# Patient Record
Sex: FEMALE | Race: WHITE | Employment: OTHER | URBAN - METROPOLITAN AREA
[De-identification: names, ages, dates, MRNs, and addresses within clinical notes are randomized per-mention and may not be internally consistent; named-entity substitution may affect disease eponyms.]

---

## 2017-10-01 ENCOUNTER — APPOINTMENT (OUTPATIENT)
Dept: CT IMAGING | Age: 82
DRG: 690 | End: 2017-10-01
Payer: MEDICARE

## 2017-10-01 ENCOUNTER — APPOINTMENT (OUTPATIENT)
Dept: MRI IMAGING | Age: 82
DRG: 690 | End: 2017-10-01
Payer: MEDICARE

## 2017-10-01 ENCOUNTER — HOSPITAL ENCOUNTER (INPATIENT)
Age: 82
LOS: 2 days | Discharge: HOME | DRG: 690 | End: 2017-10-03
Attending: EMERGENCY MEDICINE | Admitting: INTERNAL MEDICINE
Payer: MEDICARE

## 2017-10-01 DIAGNOSIS — G35 MULTIPLE SCLEROSIS (HCC): ICD-10-CM

## 2017-10-01 DIAGNOSIS — R27.0 ATAXIA: Primary | ICD-10-CM

## 2017-10-01 DIAGNOSIS — N30.00 ACUTE CYSTITIS WITHOUT HEMATURIA: ICD-10-CM

## 2017-10-01 LAB
-: ABNORMAL
ABSOLUTE EOS #: 0.1 K/UL (ref 0–0.4)
ABSOLUTE LYMPH #: 2.2 K/UL (ref 1–4.8)
ABSOLUTE MONO #: 0.3 K/UL (ref 0.1–1.3)
ALBUMIN SERPL-MCNC: 4.3 G/DL (ref 3.5–5.2)
ALBUMIN/GLOBULIN RATIO: NORMAL (ref 1–2.5)
ALP BLD-CCNC: 58 U/L (ref 35–104)
ALT SERPL-CCNC: 11 U/L (ref 5–33)
AMORPHOUS: ABNORMAL
ANION GAP SERPL CALCULATED.3IONS-SCNC: 13 MMOL/L (ref 9–17)
AST SERPL-CCNC: 14 U/L
BACTERIA: ABNORMAL
BASOPHILS # BLD: 1 %
BASOPHILS ABSOLUTE: 0 K/UL (ref 0–0.2)
BILIRUB SERPL-MCNC: 0.34 MG/DL (ref 0.3–1.2)
BILIRUBIN DIRECT: <0.08 MG/DL
BILIRUBIN URINE: NEGATIVE
BILIRUBIN, INDIRECT: NORMAL MG/DL (ref 0–1)
BUN BLDV-MCNC: 18 MG/DL (ref 8–23)
BUN/CREAT BLD: ABNORMAL (ref 9–20)
CALCIUM SERPL-MCNC: 9.2 MG/DL (ref 8.6–10.4)
CASTS UA: ABNORMAL /LPF
CHLORIDE BLD-SCNC: 101 MMOL/L (ref 98–107)
CO2: 26 MMOL/L (ref 20–31)
COLOR: YELLOW
COMMENT UA: ABNORMAL
CREAT SERPL-MCNC: 0.82 MG/DL (ref 0.5–0.9)
CRYSTALS, UA: ABNORMAL /HPF
DIFFERENTIAL TYPE: NORMAL
EOSINOPHILS RELATIVE PERCENT: 1 %
EPITHELIAL CELLS UA: ABNORMAL /HPF
GFR AFRICAN AMERICAN: >60 ML/MIN
GFR NON-AFRICAN AMERICAN: >60 ML/MIN
GFR SERPL CREATININE-BSD FRML MDRD: ABNORMAL ML/MIN/{1.73_M2}
GFR SERPL CREATININE-BSD FRML MDRD: ABNORMAL ML/MIN/{1.73_M2}
GLOBULIN: NORMAL G/DL (ref 1.5–3.8)
GLUCOSE BLD-MCNC: 100 MG/DL (ref 70–99)
GLUCOSE URINE: NEGATIVE
HCT VFR BLD CALC: 37.4 % (ref 36–46)
HEMOGLOBIN: 12.7 G/DL (ref 12–16)
KETONES, URINE: NEGATIVE
LEUKOCYTE ESTERASE, URINE: ABNORMAL
LYMPHOCYTES # BLD: 37 %
MCH RBC QN AUTO: 30.8 PG (ref 26–34)
MCHC RBC AUTO-ENTMCNC: 34.1 G/DL (ref 31–37)
MCV RBC AUTO: 90.3 FL (ref 80–100)
MONOCYTES # BLD: 6 %
MUCUS: ABNORMAL
NITRITE, URINE: NEGATIVE
OTHER OBSERVATIONS UA: ABNORMAL
PDW BLD-RTO: 14.2 % (ref 11.5–14.9)
PH UA: 7.5 (ref 5–8)
PLATELET # BLD: 288 K/UL (ref 150–450)
PLATELET ESTIMATE: NORMAL
PMV BLD AUTO: 7.8 FL (ref 6–12)
POTASSIUM SERPL-SCNC: 4.7 MMOL/L (ref 3.7–5.3)
PROTEIN UA: ABNORMAL
RBC # BLD: 4.14 M/UL (ref 4–5.2)
RBC # BLD: NORMAL 10*6/UL
RBC UA: ABNORMAL /HPF
RENAL EPITHELIAL, UA: ABNORMAL /HPF
SEG NEUTROPHILS: 55 %
SEGMENTED NEUTROPHILS ABSOLUTE COUNT: 3.3 K/UL (ref 1.3–9.1)
SODIUM BLD-SCNC: 140 MMOL/L (ref 135–144)
SPECIFIC GRAVITY UA: 1.01 (ref 1–1.03)
TOTAL PROTEIN: 7 G/DL (ref 6.4–8.3)
TRICHOMONAS: ABNORMAL
TROPONIN INTERP: NORMAL
TROPONIN T: <0.03 NG/ML
TURBIDITY: ABNORMAL
URINE HGB: NEGATIVE
UROBILINOGEN, URINE: NORMAL
WBC # BLD: 5.9 K/UL (ref 3.5–11)
WBC # BLD: NORMAL 10*3/UL
WBC UA: ABNORMAL /HPF
YEAST: ABNORMAL

## 2017-10-01 PROCEDURE — 80048 BASIC METABOLIC PNL TOTAL CA: CPT

## 2017-10-01 PROCEDURE — 96374 THER/PROPH/DIAG INJ IV PUSH: CPT

## 2017-10-01 PROCEDURE — 6360000002 HC RX W HCPCS: Performed by: EMERGENCY MEDICINE

## 2017-10-01 PROCEDURE — 6360000002 HC RX W HCPCS: Performed by: STUDENT IN AN ORGANIZED HEALTH CARE EDUCATION/TRAINING PROGRAM

## 2017-10-01 PROCEDURE — 84484 ASSAY OF TROPONIN QUANT: CPT

## 2017-10-01 PROCEDURE — 87086 URINE CULTURE/COLONY COUNT: CPT

## 2017-10-01 PROCEDURE — 6370000000 HC RX 637 (ALT 250 FOR IP): Performed by: STUDENT IN AN ORGANIZED HEALTH CARE EDUCATION/TRAINING PROGRAM

## 2017-10-01 PROCEDURE — 99285 EMERGENCY DEPT VISIT HI MDM: CPT

## 2017-10-01 PROCEDURE — 70450 CT HEAD/BRAIN W/O DYE: CPT

## 2017-10-01 PROCEDURE — 36415 COLL VENOUS BLD VENIPUNCTURE: CPT

## 2017-10-01 PROCEDURE — 80076 HEPATIC FUNCTION PANEL: CPT

## 2017-10-01 PROCEDURE — 6370000000 HC RX 637 (ALT 250 FOR IP): Performed by: INTERNAL MEDICINE

## 2017-10-01 PROCEDURE — 81001 URINALYSIS AUTO W/SCOPE: CPT

## 2017-10-01 PROCEDURE — 6360000002 HC RX W HCPCS: Performed by: INTERNAL MEDICINE

## 2017-10-01 PROCEDURE — 1200000000 HC SEMI PRIVATE

## 2017-10-01 PROCEDURE — 2580000003 HC RX 258: Performed by: STUDENT IN AN ORGANIZED HEALTH CARE EDUCATION/TRAINING PROGRAM

## 2017-10-01 PROCEDURE — 2580000003 HC RX 258: Performed by: EMERGENCY MEDICINE

## 2017-10-01 PROCEDURE — 93005 ELECTROCARDIOGRAM TRACING: CPT

## 2017-10-01 PROCEDURE — 85025 COMPLETE CBC W/AUTO DIFF WBC: CPT

## 2017-10-01 PROCEDURE — 99223 1ST HOSP IP/OBS HIGH 75: CPT | Performed by: INTERNAL MEDICINE

## 2017-10-01 RX ORDER — MELOXICAM 15 MG/1
15 TABLET ORAL DAILY
Status: CANCELLED | OUTPATIENT
Start: 2017-10-01

## 2017-10-01 RX ORDER — TRAMADOL HYDROCHLORIDE 50 MG/1
50 TABLET ORAL NIGHTLY
Status: DISCONTINUED | OUTPATIENT
Start: 2017-10-01 | End: 2017-10-01

## 2017-10-01 RX ORDER — TRAMADOL HYDROCHLORIDE 50 MG/1
50 TABLET ORAL EVERY 6 HOURS PRN
Status: DISCONTINUED | OUTPATIENT
Start: 2017-10-01 | End: 2017-10-03 | Stop reason: HOSPADM

## 2017-10-01 RX ORDER — TIZANIDINE 4 MG/1
4 TABLET ORAL EVERY 6 HOURS PRN
Status: CANCELLED | OUTPATIENT
Start: 2017-10-01

## 2017-10-01 RX ORDER — HYDROCODONE BITARTRATE AND ACETAMINOPHEN 5; 325 MG/1; MG/1
1 TABLET ORAL 2 TIMES DAILY
Status: DISCONTINUED | OUTPATIENT
Start: 2017-10-01 | End: 2017-10-01

## 2017-10-01 RX ORDER — HYDROCODONE BITARTRATE AND ACETAMINOPHEN 5; 325 MG/1; MG/1
1 TABLET ORAL EVERY 6 HOURS PRN
Status: DISCONTINUED | OUTPATIENT
Start: 2017-10-01 | End: 2017-10-01

## 2017-10-01 RX ORDER — ACETAMINOPHEN AND CODEINE PHOSPHATE 60; 300 MG/1; MG/1
1 TABLET ORAL DAILY
Status: DISCONTINUED | OUTPATIENT
Start: 2017-10-01 | End: 2017-10-01

## 2017-10-01 RX ORDER — ACETAMINOPHEN AND CODEINE PHOSPHATE 300; 60 MG/1; MG/1
1 TABLET ORAL DAILY
Status: CANCELLED | OUTPATIENT
Start: 2017-10-01

## 2017-10-01 RX ORDER — HYDROCODONE BITARTRATE AND ACETAMINOPHEN 5; 325 MG/1; MG/1
1 TABLET ORAL 2 TIMES DAILY
Status: CANCELLED | OUTPATIENT
Start: 2017-10-01

## 2017-10-01 RX ORDER — TIZANIDINE 4 MG/1
4 TABLET ORAL EVERY 6 HOURS PRN
Status: DISCONTINUED | OUTPATIENT
Start: 2017-10-01 | End: 2017-10-03 | Stop reason: HOSPADM

## 2017-10-01 RX ORDER — METHYLPREDNISOLONE SODIUM SUCCINATE 1 G/16ML
1000 INJECTION, POWDER, LYOPHILIZED, FOR SOLUTION INTRAMUSCULAR; INTRAVENOUS DAILY
Status: DISCONTINUED | OUTPATIENT
Start: 2017-10-01 | End: 2017-10-01 | Stop reason: CLARIF

## 2017-10-01 RX ORDER — ONDANSETRON 2 MG/ML
4 INJECTION INTRAMUSCULAR; INTRAVENOUS EVERY 6 HOURS PRN
Status: DISCONTINUED | OUTPATIENT
Start: 2017-10-01 | End: 2017-10-03 | Stop reason: HOSPADM

## 2017-10-01 RX ORDER — TRAMADOL HYDROCHLORIDE 50 MG/1
100 TABLET ORAL EVERY 6 HOURS PRN
Status: DISCONTINUED | OUTPATIENT
Start: 2017-10-01 | End: 2017-10-03 | Stop reason: HOSPADM

## 2017-10-01 RX ORDER — METHYLPREDNISOLONE SODIUM SUCCINATE 500 MG/8ML
500 INJECTION INTRAMUSCULAR; INTRAVENOUS EVERY 12 HOURS
Status: CANCELLED | OUTPATIENT
Start: 2017-10-02

## 2017-10-01 RX ORDER — AMITRIPTYLINE HYDROCHLORIDE 25 MG/1
25 TABLET, FILM COATED ORAL NIGHTLY
Status: CANCELLED | OUTPATIENT
Start: 2017-10-01

## 2017-10-01 RX ORDER — SODIUM CHLORIDE 0.9 % (FLUSH) 0.9 %
10 SYRINGE (ML) INJECTION EVERY 12 HOURS SCHEDULED
Status: DISCONTINUED | OUTPATIENT
Start: 2017-10-01 | End: 2017-10-03 | Stop reason: HOSPADM

## 2017-10-01 RX ORDER — SODIUM CHLORIDE 0.9 % (FLUSH) 0.9 %
10 SYRINGE (ML) INJECTION PRN
Status: DISCONTINUED | OUTPATIENT
Start: 2017-10-01 | End: 2017-10-03 | Stop reason: HOSPADM

## 2017-10-01 RX ORDER — SIMVASTATIN 20 MG
20 TABLET ORAL NIGHTLY
Status: CANCELLED | OUTPATIENT
Start: 2017-10-01

## 2017-10-01 RX ORDER — AMITRIPTYLINE HYDROCHLORIDE 25 MG/1
25 TABLET, FILM COATED ORAL NIGHTLY
Status: DISCONTINUED | OUTPATIENT
Start: 2017-10-01 | End: 2017-10-03 | Stop reason: HOSPADM

## 2017-10-01 RX ORDER — DEXAMETHASONE SODIUM PHOSPHATE 4 MG/ML
4 INJECTION, SOLUTION INTRA-ARTICULAR; INTRALESIONAL; INTRAMUSCULAR; INTRAVENOUS; SOFT TISSUE ONCE
Status: COMPLETED | OUTPATIENT
Start: 2017-10-01 | End: 2017-10-01

## 2017-10-01 RX ORDER — ALENDRONATE SODIUM 70 MG/1
70 TABLET ORAL
Status: CANCELLED | OUTPATIENT
Start: 2017-10-01

## 2017-10-01 RX ORDER — ALENDRONATE SODIUM 70 MG/1
70 TABLET ORAL
COMMUNITY
End: 2018-10-10 | Stop reason: ALTCHOICE

## 2017-10-01 RX ORDER — CIPROFLOXACIN 2 MG/ML
400 INJECTION, SOLUTION INTRAVENOUS ONCE
Status: COMPLETED | OUTPATIENT
Start: 2017-10-01 | End: 2017-10-01

## 2017-10-01 RX ORDER — MORPHINE SULFATE 2 MG/ML
2 INJECTION, SOLUTION INTRAMUSCULAR; INTRAVENOUS
Status: DISCONTINUED | OUTPATIENT
Start: 2017-10-01 | End: 2017-10-01

## 2017-10-01 RX ORDER — METHYLPREDNISOLONE SODIUM SUCCINATE 125 MG/2ML
250 INJECTION, POWDER, LYOPHILIZED, FOR SOLUTION INTRAMUSCULAR; INTRAVENOUS EVERY 8 HOURS
Status: DISCONTINUED | OUTPATIENT
Start: 2017-10-01 | End: 2017-10-01

## 2017-10-01 RX ORDER — POTASSIUM CHLORIDE 20MEQ/15ML
40 LIQUID (ML) ORAL PRN
Status: DISCONTINUED | OUTPATIENT
Start: 2017-10-01 | End: 2017-10-03 | Stop reason: HOSPADM

## 2017-10-01 RX ORDER — LISINOPRIL 5 MG/1
5 TABLET ORAL DAILY
Status: CANCELLED | OUTPATIENT
Start: 2017-10-01

## 2017-10-01 RX ORDER — TRAMADOL HYDROCHLORIDE 50 MG/1
50 TABLET ORAL NIGHTLY
Status: CANCELLED | OUTPATIENT
Start: 2017-10-01

## 2017-10-01 RX ORDER — ACETAMINOPHEN 325 MG/1
650 TABLET ORAL EVERY 4 HOURS PRN
Status: DISCONTINUED | OUTPATIENT
Start: 2017-10-01 | End: 2017-10-03 | Stop reason: HOSPADM

## 2017-10-01 RX ORDER — SIMVASTATIN 20 MG
20 TABLET ORAL NIGHTLY
Status: DISCONTINUED | OUTPATIENT
Start: 2017-10-01 | End: 2017-10-03 | Stop reason: HOSPADM

## 2017-10-01 RX ORDER — TRAMADOL HYDROCHLORIDE 50 MG/1
50 TABLET ORAL NIGHTLY
COMMUNITY
End: 2018-11-30 | Stop reason: SDUPTHER

## 2017-10-01 RX ORDER — HYDROCODONE BITARTRATE AND ACETAMINOPHEN 5; 325 MG/1; MG/1
1 TABLET ORAL 2 TIMES DAILY PRN
Status: DISCONTINUED | OUTPATIENT
Start: 2017-10-01 | End: 2017-10-01 | Stop reason: SDUPTHER

## 2017-10-01 RX ORDER — MORPHINE SULFATE 10 MG/ML
2 INJECTION, SOLUTION INTRAMUSCULAR; INTRAVENOUS
Status: DISCONTINUED | OUTPATIENT
Start: 2017-10-01 | End: 2017-10-03 | Stop reason: HOSPADM

## 2017-10-01 RX ORDER — POTASSIUM CHLORIDE 20 MEQ/1
40 TABLET, EXTENDED RELEASE ORAL PRN
Status: DISCONTINUED | OUTPATIENT
Start: 2017-10-01 | End: 2017-10-03 | Stop reason: HOSPADM

## 2017-10-01 RX ORDER — PANTOPRAZOLE SODIUM 40 MG/1
40 TABLET, DELAYED RELEASE ORAL
Status: DISCONTINUED | OUTPATIENT
Start: 2017-10-02 | End: 2017-10-03 | Stop reason: HOSPADM

## 2017-10-01 RX ORDER — LISINOPRIL 5 MG/1
5 TABLET ORAL DAILY
Status: DISCONTINUED | OUTPATIENT
Start: 2017-10-01 | End: 2017-10-03 | Stop reason: HOSPADM

## 2017-10-01 RX ORDER — SODIUM CHLORIDE 9 MG/ML
INJECTION, SOLUTION INTRAVENOUS CONTINUOUS
Status: DISCONTINUED | OUTPATIENT
Start: 2017-10-01 | End: 2017-10-03 | Stop reason: HOSPADM

## 2017-10-01 RX ORDER — POTASSIUM CHLORIDE 7.45 MG/ML
10 INJECTION INTRAVENOUS PRN
Status: DISCONTINUED | OUTPATIENT
Start: 2017-10-01 | End: 2017-10-03 | Stop reason: HOSPADM

## 2017-10-01 RX ORDER — ALENDRONATE SODIUM 70 MG/1
70 TABLET ORAL
Status: DISCONTINUED | OUTPATIENT
Start: 2017-10-01 | End: 2017-10-01

## 2017-10-01 RX ORDER — PANTOPRAZOLE SODIUM 40 MG/1
40 TABLET, DELAYED RELEASE ORAL
Status: CANCELLED | OUTPATIENT
Start: 2017-10-02

## 2017-10-01 RX ORDER — CIPROFLOXACIN 2 MG/ML
400 INJECTION, SOLUTION INTRAVENOUS EVERY 12 HOURS
Status: DISCONTINUED | OUTPATIENT
Start: 2017-10-01 | End: 2017-10-03 | Stop reason: HOSPADM

## 2017-10-01 RX ORDER — TIZANIDINE 4 MG/1
4 TABLET ORAL EVERY 6 HOURS PRN
COMMUNITY
End: 2018-11-01 | Stop reason: SDUPTHER

## 2017-10-01 RX ORDER — 0.9 % SODIUM CHLORIDE 0.9 %
1000 INTRAVENOUS SOLUTION INTRAVENOUS ONCE
Status: COMPLETED | OUTPATIENT
Start: 2017-10-01 | End: 2017-10-01

## 2017-10-01 RX ADMIN — AMITRIPTYLINE HYDROCHLORIDE 25 MG: 25 TABLET, FILM COATED ORAL at 21:16

## 2017-10-01 RX ADMIN — TRAMADOL HYDROCHLORIDE 50 MG: 50 TABLET, FILM COATED ORAL at 17:11

## 2017-10-01 RX ADMIN — SODIUM CHLORIDE: 9 INJECTION, SOLUTION INTRAVENOUS at 16:33

## 2017-10-01 RX ADMIN — SIMVASTATIN 20 MG: 20 TABLET, FILM COATED ORAL at 21:17

## 2017-10-01 RX ADMIN — DEXTROSE MONOHYDRATE: 5 INJECTION, SOLUTION INTRAVENOUS at 17:11

## 2017-10-01 RX ADMIN — HYDROCODONE BITARTRATE AND ACETAMINOPHEN 1 TABLET: 5; 325 TABLET ORAL at 13:16

## 2017-10-01 RX ADMIN — TRAMADOL HYDROCHLORIDE 100 MG: 50 TABLET, FILM COATED ORAL at 23:39

## 2017-10-01 RX ADMIN — LISINOPRIL 5 MG: 5 TABLET ORAL at 13:17

## 2017-10-01 RX ADMIN — CIPROFLOXACIN 400 MG: 2 INJECTION, SOLUTION INTRAVENOUS at 11:16

## 2017-10-01 RX ADMIN — SODIUM CHLORIDE 1000 ML: 9 INJECTION, SOLUTION INTRAVENOUS at 11:03

## 2017-10-01 RX ADMIN — CIPROFLOXACIN 400 MG: 2 INJECTION, SOLUTION INTRAVENOUS at 23:40

## 2017-10-01 RX ADMIN — ENOXAPARIN SODIUM 40 MG: 40 INJECTION SUBCUTANEOUS at 13:17

## 2017-10-01 RX ADMIN — DEXAMETHASONE SODIUM PHOSPHATE 4 MG: 4 INJECTION, SOLUTION INTRAMUSCULAR; INTRAVENOUS at 11:15

## 2017-10-01 ASSESSMENT — ENCOUNTER SYMPTOMS
CONSTIPATION: 0
SHORTNESS OF BREATH: 0
CHEST TIGHTNESS: 0
ABDOMINAL PAIN: 0
COUGH: 0
BACK PAIN: 0
VOMITING: 0
CHOKING: 0
ABDOMINAL DISTENTION: 0
NAUSEA: 0
TROUBLE SWALLOWING: 0
DIARRHEA: 0

## 2017-10-01 ASSESSMENT — PAIN DESCRIPTION - LOCATION
LOCATION: GENERALIZED;HEAD
LOCATION: HEAD
LOCATION: HEAD

## 2017-10-01 ASSESSMENT — PAIN DESCRIPTION - FREQUENCY: FREQUENCY: CONTINUOUS

## 2017-10-01 ASSESSMENT — PAIN SCALES - GENERAL
PAINLEVEL_OUTOF10: 5
PAINLEVEL_OUTOF10: 7
PAINLEVEL_OUTOF10: 8
PAINLEVEL_OUTOF10: 8
PAINLEVEL_OUTOF10: 7
PAINLEVEL_OUTOF10: 7

## 2017-10-01 ASSESSMENT — PAIN DESCRIPTION - PAIN TYPE: TYPE: CHRONIC PAIN

## 2017-10-01 ASSESSMENT — PAIN DESCRIPTION - ONSET: ONSET: ON-GOING

## 2017-10-01 ASSESSMENT — PAIN DESCRIPTION - ORIENTATION: ORIENTATION: POSTERIOR

## 2017-10-01 ASSESSMENT — PAIN DESCRIPTION - PROGRESSION: CLINICAL_PROGRESSION: NOT CHANGED

## 2017-10-01 NOTE — CARE COORDINATION
ADMISSION NOTE       Patient admitted to room  2059. Time of admit:  1215    Admit from:  ER    Reason for admission:  Ataxia    Where patient has been residing for the last 24 hrs:  Private residence    Has the patient been admitted to any facility in the last 4 weeks, which one:  No    Family at bedside:  Daughter    Patient is currently in pain Headache    Patient has been oriented to room, educated on how to use call light, and to call for assistance prior to getting up. Bed in lowest and locked position. 2 siderails up for safety. Call light within reach. Patient resting comfortably with daughter at bedside. 14 [facility]  DVT Prophylaxis and Vaccine Status  Work List  Mandatory for all patients      Patient must be on both Chemical prophylaxis and Mechanical prophylaxis.  If chemical/mechanical prophylaxis is not ordered, the physician must document a reason for not using prophylaxis     Chemical Prophylaxis  Is patient on chemical prophylaxis: Yes  If no chemical prophylaxis Is a order in for No Chemical VTE prophylaxisNo  If no was the physician notified not applicable      Mechanical Prophylaxis  Is patient on mechanical prophylaxis, intermittent pneumatic compression device: No  If no was the physician notified  Order in for No Mechanical VTE        Pneumonia Vaccine  Vaccine indicated:  Vaccination refused  If indicated was the vaccine given: not applicable    Influenza Vaccine (applicable from October through March):  Vaccine indicated: Up-to-date  If indicated was the vaccine given: not applicable    Patient Education  Education completed on DVT prophylaxis: yes

## 2017-10-01 NOTE — IP AVS SNAPSHOT
You may receive a survey regarding the care you received during your stay. Your input is valuable to us. We encourage you to complete and return your survey in the envelope provided. We hope you will choose us in the future for your healthcare needs. Patient Information     Patient Name YULIANA Lee 1934      Care Provided at:     Name Address Phone       [address] [facility]  [address] [phone]            Your Visit    Here you will find information about your visit, including the reason for your visit. Please take this sheet with you when you visit your doctor or other health care provider in the future. It will help determine the best possible medical care for you at that time. If you have any questions once you leave the hospital, please call the department phone number listed below. Why you were here     Your primary diagnosis was:  Ataxia    Your diagnoses also included:  Multiple Sclerosis (Hcc), Acute Cystitis Without Hematuria      Visit Information     Date & Time Provider Department Dept. Phone    10/1/2017 Amber Akhtar MD [facility]       Follow-up Appointments    Below is a list of your follow-up and future appointments. This may not be a complete list as you may have made appointments directly with providers that we are not aware of or your providers may have made some for you. Please call your providers to confirm appointments. It is important to keep your appointments. Please bring your current insurance card, photo ID, co-pay, and all medication bottles to your appointment. If self-pay, payment is expected at the time of service. Follow-up Information     Follow up with Ana Perla MD.    Specialty:  Internal Medicine    Contact information:    [facility] [address]  [phone]          Follow up with [facility]. Specialty:  Home Health Services    Why:  your visiting nurse service will call you and set up a time to come to your home    Contact information:    [facility]  [phone]        Preventive Care        Date Due    Tetanus Combination Vaccine (1 - Tdap) 1953    Zoster Vaccine 1994    Osteoporosis screening or a bone density scan (Dexa) is recommended once at age 72. Based upon the results and risk factors for bone loss, your provider will recommend whether this needs to be repeated. 1999    Pneumococcal Vaccines (two) for all adults aged 72 and over (1 of 2 - PCV13) 1999                 Care Plan Once You Return Home    This section includes instructions you will need to follow once you leave the hospital.  Your care team will discuss these with you, so you and those caring for you know how to best care for your health needs at home. This section may also include educational information about certain health topics that may be of help to you.           Discharge [address] Continuity of Care Form    Patient Name: Arcenio Randall   :  1934  MRN:  [id number]    Admit date:  10/1/2017  Discharge date:  10/3/17    Code Status Order: Full Code   Advance Directives: No    Admitting Physician:  Dunia Peaccok MD  PCP: Florin Deal MD    Discharging Nurse: DEB[facility] Unit/Room#: 5216/8539-86  Discharging Unit Phone Number: [phone]    Emergency Contact:   Contact 1: Name: Kathe Reed  Contact 1: Number: [phone]  Contact 1: Relationship: Daughter    Past Surgical History:  Past Surgical History:   Procedure Laterality Date    CATARACT REMOVAL WITH IMPLANT Right 2014    [facility]    CATARACT REMOVAL WITH IMPLANT Left 2014    [facility]    CERVICAL DISC SURGERY      CYSTOCELE REPAIR      HYSTERECTOMY      RECTOCELE REPAIR      SHOULDER ARTHROPLASTY Right 2017 Daily Fluid Restriction: no  Last Modified Barium Swallow with Video (Video Swallowing Test): not done    Treatments at the Time of Hospital Discharge:   Respiratory Treatments: none  Oxygen Therapy:  is not on home oxygen therapy. Ventilator:    - No ventilator support    Rehab Therapies: Physical Therapy and Occupational Therapy  Weight Bearing Status/Restrictions: No weight bearing restirctions  Other Medical Equipment (for information only, NOT a DME order):  walker  Other Treatments: skilled assessment, medication education    Patient's personal belongings (please select all that are sent with patient):  Rose    RN SIGNATURE:  Samara Tirado    PHYSICIAN SECTION    Prognosis: Good    Condition at Discharge: Stable    Rehab Potential (if transferring to Rehab): Good    Recommended Labs or Other Treatments After Discharge:     Physician Certification: I certify the above information and transfer of Luigi Sutton  is necessary for the continuing treatment of the diagnosis listed and that she requires Home Care for less 30 days. Update Admission H&P: No change in H&P    PHYSICIAN SIGNATURE:  Electronically signed by Ashley Rodriguez MD on 10/3/17 at 12:03 PM    CASE MANAGEMENT/SOCIAL WORK SECTION    Inpatient Status Date: 10/1/17    [facility] Readmission Risk Assessment Score:  Risk Score: 19.5   (Score > 14= high risk for readmission)    Discharging to Facility/ [address] [place] [address]  Phone [phone]  · Fax [phone]      / signature: Electronically signed by Harris Jensen RN on 10/3/17 at 3:07 PM      Diet Instructions     ? Good nutrition is important when healing from an illness, injury, or surgery. Follow any nutrition recommendations given to you during your hospital stay. ? If you were given an oral nutrition supplement while in the hospital, continue to take this supplement at home.   You can take it with meals, · headache with chest pain and severe dizziness, fainting, fast or pounding heartbeats;  · muscle pain or weakness;  · a seizure (convulsions);  · signs of tendon rupture --sudden pain, swelling, bruising, tenderness, stiffness, movement problems, or a snapping or popping sound in any of your joints (rest the joint until you receive medical care or instructions);  · nerve symptoms --numbness, weakness, tingling, burning, pain, or being more sensitive to temperature, light touch, or the sense of your body position;  · changes in mood or behavior --depression, confusion, hallucinations, paranoia, tremors, feeling restless or anxious, unusual thoughts or behavior, insomnia, nightmares;  · liver problems --upper stomach pain, loss of appetite, dark urine, sabiha-colored stools, jaundice (yellowing of the skin or eyes);  · increased pressure inside the skull --severe headaches, ringing in your ears, vision problems, pain behind your eyes; or  · severe skin reaction --skin pain followed by a red or purple skin rash that spreads (especially in the face or upper body) and causes blistering and peeling. Common side effects may include:  · nausea, vomiting, diarrhea;  · rash; or  · abnormal liver function tests. This is not a complete list of side effects and others may occur. Call your doctor for medical advice about side effects. You may report side effects to FDA at 0-648-FDA-0549. What other drugs will affect ciprofloxacin?   Tell your doctor about all your current medicines and any you start or stop using, especially:  · cyclosporine, methotrexate, metoclopramide, omeprazole, pentoxifylline, phenytoin, probenecid, ropinirole, sildenafil, theophylline;  · a diuretic or \"water pill\";  · heart rhythm medication --amiodarone, disopyramide, dofetilide, dronedarone, procainamide, quinidine, sotalol, and others;  · medicine to treat depression or mental illness --amitriptylline, clomipramine, clozapine, desipramine, duloxetine, iloperidone, imipramine, nortriptyline, and others; or  · NSAIDs (nonsteroidal anti-inflammatory drugs) --aspirin, ibuprofen (Advil, Motrin), naproxen (Aleve), celecoxib, diclofenac, indomethacin, meloxicam, and others. This list is not complete. Other drugs may interact with ciprofloxacin, including prescription and over-the-counter medicines, vitamins, and herbal products. Not all possible interactions are listed in this medication guide. Where can I get more information? Your pharmacist can provide more information about ciprofloxacin. Remember, keep this and all other medicines out of the reach of children, never share your medicines with others, and use this medication only for the indication prescribed. Every effort has been made to ensure that the information provided by [address] is accurate, up-to-date, and complete, but no guarantee is made to that effect. Drug information contained herein may be time sensitive. [facility] information has been compiled for use by healthcare practitioners and consumers in the [place] and therefore [facility] does not warrant that uses outside of the [place] are appropriate, unless specifically indicated otherwise. [facility]'s drug information does not endorse drugs, diagnose patients or recommend therapy. [facility] drug information is an informational resource designed to assist licensed healthcare practitioners in caring for their patients and/or to serve consumers viewing this service as a supplement to, and not a substitute for, the expertise, skill, knowledge and judgment of healthcare practitioners. The absence of a warning for a given drug or drug combination in no way should be construed to indicate that the drug or drug combination is safe, effective or appropriate for any given patient.  [facility] does not assume any responsibility for any aspect of

## 2017-10-01 NOTE — ED PROVIDER NOTES
[address]  eMERGENCY dEPARTMENT eNCOUnter      Pt Name: Justin Nj  MRN: [id number]  [place] 1934  Date of evaluation: 10/1/17      CHIEF COMPLAINT       Chief Complaint   Patient presents with    Dizziness     Lightheadedness    Neurologic Problem     Imbalance    Multiple Sclerosis    Headache         HISTORY OF PRESENT ILLNESS   HPI 80 y.o. female with a history of multiple sclerosis presents with impaired gait. The patient states that over the last week she's had intermittent sensations of unsteadiness and lightheadedness. The symptoms of been going on for the last week. She went to bed last night around 9 or 10, she was at her baseline but then when she woke up at 4 AM this morning, she tried to get up to go the bathroom by she was unable to walk a straight line and she fell sideways against the wall. She states that she is laying still she feels fine. Unsteadiness occurs when she tries to get up and walk. She denies any weakness in her legs. She has a chronic right-sided weakness and right-sided facial droop from her multiple sclerosis. She denies any new slurred speech or new weakness. She didn't take any medications prior to arrival.  She does state that she doesn't drink much water and that she's been eating and drinking less over the last few weeks. REVIEW OF SYSTEMS       Review of Systems   Constitutional: Positive for activity change, appetite change and fatigue. Negative for chills and fever. HENT: Negative for congestion. Eyes: Negative for visual disturbance. Respiratory: Negative for cough and shortness of breath. Cardiovascular: Negative for chest pain. Gastrointestinal: Negative for abdominal pain and nausea. Endocrine: Negative for polyuria. Musculoskeletal: Positive for gait problem. Negative for back pain and myalgias. Skin: Negative for rash. Neurological: Negative for headaches. Hematological: Negative for adenopathy.        PAST MEDICAL

## 2017-10-01 NOTE — H&P
frequency, hematuria and urgency. Musculoskeletal: Positive for gait problem (Wobbling Gait). Negative for back pain. Neurological: Positive for dizziness, facial asymmetry (Stable R sided Facial Droop), weakness (B/L Lower extremity), light-headedness and headaches. Negative for seizures, speech difficulty and numbness. PHYSICAL EXAM      BP (!) 185/75  Pulse 83  Temp 97.5 °F (36.4 °C) (Oral)   Resp 16  Ht 4' 11\" (1.499 m)  Wt 129 lb 6.4 oz (58.7 kg)  SpO2 97%  BMI 26.14 kg/m2   Physical Exam   Constitutional: She is oriented to person, place, and time. She appears well-developed and well-nourished. HENT:   Head: Normocephalic and atraumatic. Eyes: Conjunctivae and EOM are normal. Pupils are equal, round, and reactive to light. Neck: Normal range of motion. Neck supple. Cardiovascular: Normal rate, regular rhythm, normal heart sounds and intact distal pulses. Pulmonary/Chest: Effort normal and breath sounds normal.   Abdominal: Soft. Neurological: She is alert and oriented to person, place, and time. No cranial nerve deficit. GCS eye subscore is 4. GCS verbal subscore is 5. GCS motor subscore is 6. She displays no Babinski's sign on the right side. She displays no Babinski's sign on the left side. Reflex Scores:       Patellar reflexes are 1+ on the right side and 2+ on the left side. Achilles reflexes are 1+ on the right side and 2+ on the left side.   Strength 4/5 On lower extremities         DIAGNOSTICS     Laboratory Testing:  Recent Results (from the past 24 hour(s))   CBC with DIFF    Collection Time: 10/01/17 10:25 AM   Result Value Ref Range    WBC 5.9 3.5 - 11.0 k/uL    RBC 4.14 4.0 - 5.2 m/uL    Hemoglobin 12.7 12.0 - 16.0 g/dL    Hematocrit 37.4 36 - 46 %    MCV 90.3 80 - 100 fL    MCH 30.8 26 - 34 pg    MCHC 34.1 31 - 37 g/dL    RDW 14.2 11.5 - 14.9 %    Platelets 552 880 - 866 k/uL    MPV 7.8 6.0 - 12.0 fL    Differential Type NOT REPORTED     Seg Neutrophils 55 % Lymphocytes 37 %    Monocytes 6 %    Eosinophils % 1 %    Basophils 1 %    Segs Absolute 3.30 1.3 - 9.1 k/uL    Absolute Lymph # 2.20 1.0 - 4.8 k/uL    Absolute Mono # 0.30 0.1 - 1.3 k/uL    Absolute Eos # 0.10 0.0 - 0.4 k/uL    Basophils # 0.00 0.0 - 0.2 k/uL    WBC Morphology NOT REPORTED     RBC Morphology NOT REPORTED     Platelet Estimate NOT REPORTED    Basic Metabolic Prof    Collection Time: 10/01/17 10:25 AM   Result Value Ref Range    Glucose 100 (H) 70 - 99 mg/dL    BUN 18 8 - 23 mg/dL    CREATININE 0.82 0.50 - 0.90 mg/dL    Bun/Cre Ratio NOT REPORTED 9 - 20    Calcium 9.2 8.6 - 10.4 mg/dL    Sodium 140 135 - 144 mmol/L    Potassium 4.7 3.7 - 5.3 mmol/L    Chloride 101 98 - 107 mmol/L    CO2 26 20 - 31 mmol/L    Anion Gap 13 9 - 17 mmol/L    GFR Non-African American >60 >60 mL/min    GFR African American >60 >60 mL/min    GFR Comment          GFR Staging NOT REPORTED    Hepatic Function Panel    Collection Time: 10/01/17 10:25 AM   Result Value Ref Range    Alb 4.3 3.5 - 5.2 g/dL    Alkaline Phosphatase 58 35 - 104 U/L    ALT 11 5 - 33 U/L    AST 14 <32 U/L    Total Bilirubin 0.34 0.3 - 1.2 mg/dL    Bilirubin, Direct <0.08 <0.31 mg/dL    Bilirubin, Indirect CANNOT BE CALCULATED 0.00 - 1.00 mg/dL    Total Protein 7.0 6.4 - 8.3 g/dL    Globulin NOT REPORTED 1.5 - 3.8 g/dL    Albumin/Globulin Ratio NOT REPORTED 1.0 - 2.5   Troponin    Collection Time: 10/01/17 10:25 AM   Result Value Ref Range    Troponin T <0.03 <0.03 ng/mL    Troponin Interp         Urine reflex to Culture    Collection Time: 10/01/17 10:49 AM   Result Value Ref Range    Color, UA YELLOW YEL    Turbidity UA CLOUDY (A) CLEAR    Glucose, Ur NEGATIVE NEG    Bilirubin Urine NEGATIVE NEG    Ketones, Urine NEGATIVE NEG    Specific [place], UA 1.010 1.000 - 1.030    Urine Hgb NEGATIVE NEG    pH, UA 7.5 5.0 - 8.0    Protein, UA NEGATIVE  Verified by sulfosalicylic acid test. (A) NEG    Urobilinogen, Urine Normal NORM    Nitrite, Urine NEGATIVE NEG    Leukocyte Esterase, Urine LARGE (A) NEG    Urinalysis Comments NOT REPORTED    Microscopic Urinalysis    Collection Time: 10/01/17 10:49 AM   Result Value Ref Range    -          WBC, UA TOO NUMEROUS TO COUNT /HPF    RBC, UA 10 TO 20 /HPF    Casts UA NOT REPORTED /LPF    Crystals UA NOT REPORTED NONE /HPF    Epithelial Cells UA 10 TO 20 /HPF    Renal Epithelial, Urine NOT REPORTED 0 /HPF    Bacteria, UA FEW (A) NONE    Mucus, UA NOT REPORTED NONE    Trichomonas, UA NOT REPORTED NONE    Amorphous, UA NOT REPORTED NONE    Other Observations UA NOT REPORTED NREQ    Yeast, UA NOT REPORTED NONE   EKG 12 Lead    Collection Time: 10/01/17 11:27 AM   Result Value Ref Range    Ventricular Rate 83 BPM    Atrial Rate 83 BPM    P-R Interval 170 ms    QRS Duration 86 ms    Q-T Interval 408 ms    QTc Calculation (Bazett) 479 ms    P Axis 10 degrees    R Axis -3 degrees    T Axis 42 degrees       Imaging/Diagonstics:  Ct Head Wo Contrast    Result Date: 10/1/2017  EXAMINATION: CT OF THE HEAD WITHOUT CONTRAST  10/1/2017 10:41 am TECHNIQUE: CT of the head was performed without the administration of intravenous contrast. Dose modulation, iterative reconstruction, and/or weight based adjustment of the mA/kV was utilized to reduce the radiation dose to as low as reasonably achievable. COMPARISON: 11/16/2016 HISTORY: ORDERING SYSTEM PROVIDED HISTORY: gait impairment. TECHNOLOGIST PROVIDED HISTORY: Ordering Physician Provided Reason for Exam: GAIT IMPAIRMENT Acuity: Acute Type of Exam: Initial Additional signs and symptoms: WOKE UP LIGHT HEADED/DIZZY AND FALLING INTO WALLS FINDINGS: BRAIN/VENTRICLES: There is no acute intracranial hemorrhage, mass effect or midline shift. No abnormal extra-axial fluid collection. The gray-white differentiation is maintained without evidence of an acute infarct. There is no evidence of hydrocephalus. Mild diminished attenuation of periventricular white matter suggesting chronic microangiopathy. There are calcified arteriosclerotic plaques of the bilateral carotid siphons ORBITS: The visualized portion of the orbits demonstrate no acute abnormality. SINUSES: The visualized paranasal sinuses and mastoid air cells demonstrate no acute abnormality. SOFT TISSUES/SKULL:  No acute abnormality of the visualized skull . There is mild soft tissue edema in the left frontal scalp. No acute intracranial abnormality. ASSESSMENT     Principal Problem:    Ataxia  Active Problems:    Multiple sclerosis ([address]    Acute cystitis without hematuria      PLAN     1. Admit the patient Med/Surg    2. Multiple Sclerosis Exacerbation   - MRI Head w/ w/o contrast for new White matter lesion   - Neuro Consult   - IV Solumedrol 1000 mg QD for 3-7 days depending on Symptoms    3.  UTI   - IV Cipro 400 mg Q12       Orders Placed This Encounter   Procedures    Urine culture clean catch    CT Head WO Contrast    MRI BRAIN W WO CONTRAST    CBC with DIFF    Basic Metabolic Prof    Hepatic Function Panel    Troponin    Urine reflex to Culture    Microscopic Urinalysis    Protein, Urine    Basic metabolic panel    CBC auto differential    B12/Folate Panel    TSH w/reflex to FT4    DIET GENERAL;    Orthostatic blood pressure and pulse    Vital signs per unit routine    Tobacco cessation education    Notify physician    Up with assistance    Daily weights    Neuro checks    Full Code    Inpatient consult to Internal Medicine    Consult to Neurology    Inpatient consult to Social Work    OT eval and treat    PT evaluation and treat    Initiate Oxygen Therapy Protocol    Speech language pathology evaluation    EKG 12 Lead    PATIENT STATUS (DIRECT) Inpatient    PATIENT STATUS (FROM ED OR OR/PROCEDURAL) Inpatient         DVT prophylaxis: Lovenox 40 mg SC  GI prophylaxis: Protonix 40 mg daily    Consultations:   Consults: IP CONSULT TO INTERNAL MEDICINE  IP CONSULT TO NEUROLOGY  IP CONSULT TO SOCIAL

## 2017-10-02 ENCOUNTER — APPOINTMENT (OUTPATIENT)
Dept: MRI IMAGING | Age: 82
DRG: 690 | End: 2017-10-02
Payer: MEDICARE

## 2017-10-02 LAB
ABSOLUTE EOS #: 0 K/UL (ref 0–0.4)
ABSOLUTE LYMPH #: 1 K/UL (ref 1–4.8)
ABSOLUTE MONO #: 0.1 K/UL (ref 0.1–1.3)
ANION GAP SERPL CALCULATED.3IONS-SCNC: 15 MMOL/L (ref 9–17)
BASOPHILS # BLD: 0 %
BASOPHILS ABSOLUTE: 0 K/UL (ref 0–0.2)
BUN BLDV-MCNC: 17 MG/DL (ref 8–23)
BUN/CREAT BLD: ABNORMAL (ref 9–20)
CALCIUM SERPL-MCNC: 8.5 MG/DL (ref 8.6–10.4)
CHLORIDE BLD-SCNC: 102 MMOL/L (ref 98–107)
CO2: 22 MMOL/L (ref 20–31)
CREAT SERPL-MCNC: 0.82 MG/DL (ref 0.5–0.9)
CULTURE: NORMAL
CULTURE: NORMAL
DIFFERENTIAL TYPE: ABNORMAL
EOSINOPHILS RELATIVE PERCENT: 0 %
FOLATE: 14.6 NG/ML
GFR AFRICAN AMERICAN: >60 ML/MIN
GFR NON-AFRICAN AMERICAN: >60 ML/MIN
GFR SERPL CREATININE-BSD FRML MDRD: ABNORMAL ML/MIN/{1.73_M2}
GFR SERPL CREATININE-BSD FRML MDRD: ABNORMAL ML/MIN/{1.73_M2}
GLUCOSE BLD-MCNC: 180 MG/DL (ref 70–99)
HCT VFR BLD CALC: 34.2 % (ref 36–46)
HEMOGLOBIN: 11.9 G/DL (ref 12–16)
LYMPHOCYTES # BLD: 12 %
Lab: NORMAL
MCH RBC QN AUTO: 31.2 PG (ref 26–34)
MCHC RBC AUTO-ENTMCNC: 34.6 G/DL (ref 31–37)
MCV RBC AUTO: 90.1 FL (ref 80–100)
MONOCYTES # BLD: 1 %
PDW BLD-RTO: 14.1 % (ref 11.5–14.9)
PLATELET # BLD: 272 K/UL (ref 150–450)
PLATELET ESTIMATE: ABNORMAL
PMV BLD AUTO: 8.3 FL (ref 6–12)
POTASSIUM SERPL-SCNC: 4.1 MMOL/L (ref 3.7–5.3)
RBC # BLD: 3.8 M/UL (ref 4–5.2)
RBC # BLD: ABNORMAL 10*6/UL
SEG NEUTROPHILS: 87 %
SEGMENTED NEUTROPHILS ABSOLUTE COUNT: 6.7 K/UL (ref 1.3–9.1)
SODIUM BLD-SCNC: 139 MMOL/L (ref 135–144)
SPECIMEN DESCRIPTION: NORMAL
SPECIMEN DESCRIPTION: NORMAL
STATUS: NORMAL
THYROXINE, FREE: 0.87 NG/DL (ref 0.93–1.7)
TSH SERPL DL<=0.05 MIU/L-ACNC: 0.61 MIU/L (ref 0.3–5)
VITAMIN B-12: 220 PG/ML (ref 211–946)
WBC # BLD: 7.7 K/UL (ref 3.5–11)
WBC # BLD: ABNORMAL 10*3/UL

## 2017-10-02 PROCEDURE — 85025 COMPLETE CBC W/AUTO DIFF WBC: CPT

## 2017-10-02 PROCEDURE — 70553 MRI BRAIN STEM W/O & W/DYE: CPT

## 2017-10-02 PROCEDURE — 82746 ASSAY OF FOLIC ACID SERUM: CPT

## 2017-10-02 PROCEDURE — 6360000002 HC RX W HCPCS: Performed by: STUDENT IN AN ORGANIZED HEALTH CARE EDUCATION/TRAINING PROGRAM

## 2017-10-02 PROCEDURE — 2580000003 HC RX 258: Performed by: STUDENT IN AN ORGANIZED HEALTH CARE EDUCATION/TRAINING PROGRAM

## 2017-10-02 PROCEDURE — G9162 LANG EXPRESS CURRENT STATUS: HCPCS

## 2017-10-02 PROCEDURE — 84443 ASSAY THYROID STIM HORMONE: CPT

## 2017-10-02 PROCEDURE — 6360000004 HC RX CONTRAST MEDICATION: Performed by: INTERNAL MEDICINE

## 2017-10-02 PROCEDURE — 1200000000 HC SEMI PRIVATE

## 2017-10-02 PROCEDURE — 6370000000 HC RX 637 (ALT 250 FOR IP): Performed by: INTERNAL MEDICINE

## 2017-10-02 PROCEDURE — 80048 BASIC METABOLIC PNL TOTAL CA: CPT

## 2017-10-02 PROCEDURE — 97161 PT EVAL LOW COMPLEX 20 MIN: CPT

## 2017-10-02 PROCEDURE — G8979 MOBILITY GOAL STATUS: HCPCS

## 2017-10-02 PROCEDURE — 36415 COLL VENOUS BLD VENIPUNCTURE: CPT

## 2017-10-02 PROCEDURE — 97530 THERAPEUTIC ACTIVITIES: CPT

## 2017-10-02 PROCEDURE — 82607 VITAMIN B-12: CPT

## 2017-10-02 PROCEDURE — 97165 OT EVAL LOW COMPLEX 30 MIN: CPT

## 2017-10-02 PROCEDURE — 84439 ASSAY OF FREE THYROXINE: CPT

## 2017-10-02 PROCEDURE — 99232 SBSQ HOSP IP/OBS MODERATE 35: CPT | Performed by: INTERNAL MEDICINE

## 2017-10-02 PROCEDURE — G8978 MOBILITY CURRENT STATUS: HCPCS

## 2017-10-02 PROCEDURE — 6360000002 HC RX W HCPCS: Performed by: INTERNAL MEDICINE

## 2017-10-02 PROCEDURE — G9164 LANG EXPRESS D/C STATUS: HCPCS

## 2017-10-02 PROCEDURE — A9579 GAD-BASE MR CONTRAST NOS,1ML: HCPCS | Performed by: INTERNAL MEDICINE

## 2017-10-02 PROCEDURE — 6370000000 HC RX 637 (ALT 250 FOR IP): Performed by: STUDENT IN AN ORGANIZED HEALTH CARE EDUCATION/TRAINING PROGRAM

## 2017-10-02 PROCEDURE — 92523 SPEECH SOUND LANG COMPREHEN: CPT

## 2017-10-02 RX ORDER — SODIUM CHLORIDE 0.9 % (FLUSH) 0.9 %
10 SYRINGE (ML) INJECTION PRN
Status: DISCONTINUED | OUTPATIENT
Start: 2017-10-02 | End: 2017-10-03 | Stop reason: HOSPADM

## 2017-10-02 RX ADMIN — PANTOPRAZOLE SODIUM 40 MG: 40 TABLET, DELAYED RELEASE ORAL at 06:19

## 2017-10-02 RX ADMIN — GADOPENTETATE DIMEGLUMINE 13 ML: 469.01 INJECTION INTRAVENOUS at 12:30

## 2017-10-02 RX ADMIN — MORPHINE SULFATE 2 MG: 10 INJECTION, SOLUTION INTRAMUSCULAR; INTRAVENOUS at 19:45

## 2017-10-02 RX ADMIN — TRAMADOL HYDROCHLORIDE 100 MG: 50 TABLET, FILM COATED ORAL at 14:07

## 2017-10-02 RX ADMIN — DEXTROSE MONOHYDRATE: 5 INJECTION, SOLUTION INTRAVENOUS at 09:11

## 2017-10-02 RX ADMIN — SODIUM CHLORIDE: 9 INJECTION, SOLUTION INTRAVENOUS at 18:39

## 2017-10-02 RX ADMIN — LISINOPRIL 5 MG: 5 TABLET ORAL at 09:11

## 2017-10-02 RX ADMIN — MORPHINE SULFATE 2 MG: 10 INJECTION, SOLUTION INTRAMUSCULAR; INTRAVENOUS at 06:18

## 2017-10-02 RX ADMIN — SIMVASTATIN 20 MG: 20 TABLET, FILM COATED ORAL at 19:44

## 2017-10-02 RX ADMIN — ENOXAPARIN SODIUM 40 MG: 40 INJECTION SUBCUTANEOUS at 09:10

## 2017-10-02 RX ADMIN — CIPROFLOXACIN 400 MG: 2 INJECTION, SOLUTION INTRAVENOUS at 14:04

## 2017-10-02 RX ADMIN — CIPROFLOXACIN 400 MG: 2 INJECTION, SOLUTION INTRAVENOUS at 23:16

## 2017-10-02 RX ADMIN — AMITRIPTYLINE HYDROCHLORIDE 25 MG: 25 TABLET, FILM COATED ORAL at 19:44

## 2017-10-02 RX ADMIN — TRAMADOL HYDROCHLORIDE 100 MG: 50 TABLET, FILM COATED ORAL at 23:15

## 2017-10-02 ASSESSMENT — PAIN DESCRIPTION - FREQUENCY
FREQUENCY: INTERMITTENT
FREQUENCY: INTERMITTENT
FREQUENCY: CONTINUOUS

## 2017-10-02 ASSESSMENT — PAIN DESCRIPTION - LOCATION
LOCATION: HEAD

## 2017-10-02 ASSESSMENT — PAIN DESCRIPTION - PROGRESSION
CLINICAL_PROGRESSION: GRADUALLY WORSENING
CLINICAL_PROGRESSION: NOT CHANGED
CLINICAL_PROGRESSION: NOT CHANGED

## 2017-10-02 ASSESSMENT — PAIN SCALES - GENERAL
PAINLEVEL_OUTOF10: 10
PAINLEVEL_OUTOF10: 5
PAINLEVEL_OUTOF10: 4
PAINLEVEL_OUTOF10: 5
PAINLEVEL_OUTOF10: 9
PAINLEVEL_OUTOF10: 4
PAINLEVEL_OUTOF10: 4
PAINLEVEL_OUTOF10: 7
PAINLEVEL_OUTOF10: 6
PAINLEVEL_OUTOF10: 7

## 2017-10-02 ASSESSMENT — PAIN DESCRIPTION - PAIN TYPE
TYPE: CHRONIC PAIN

## 2017-10-02 ASSESSMENT — PAIN DESCRIPTION - DESCRIPTORS
DESCRIPTORS: HEADACHE

## 2017-10-02 ASSESSMENT — PAIN DESCRIPTION - ORIENTATION: ORIENTATION: POSTERIOR

## 2017-10-02 ASSESSMENT — PAIN DESCRIPTION - ONSET: ONSET: ON-GOING

## 2017-10-02 NOTE — CARE COORDINATION
CASE MANAGEMENT NOTE:    Admission Date:  10/1/2017 Cuco Zavala is a 80 y.o.  female    Admitted for : Multiple sclerosis exacerbation ([facility] [address] [G35]  Multiple sclerosis exacerbation ([facility] [address] Deandra [facility]  Ataxia [R27.0]    Met with:  Patient    PCP:  Tammie Guzman,                                Insurance:  Medicare/ BCBS      Current Residence/ Living Arrangements:  independently at home             Current Services PTA:  No    Is patient agreeable to VNS: Yes, if needed    Freedom of choice provided: Yes         VNS chosen:  Yes, thinks she has had [facility], in past, informed Leslie Liriano, from [facility], to speak w/PT. DME:  straight cane, Grab bars  In [place]    Home Oxygen: No    Nebulizer: No    Supplier: N/A    Potential Assistance Needed: Yes follow for possible ARU, & would like a walker, notified, Edmond Alfaro, from [facility] [place] to follow for this    SNF needed: No    Pharmacy:  [facility] [address]       Does Patient want to use MEDS to BEDS? No    Family Members/Caregivers that pt would like involved in their care:    Yes    If yes, list name here:  Brittany Heller, Daughter Irineo Macias    Transportation Provider:  Family                      Discharge Plan:  10/2/17 Medicare Pt. Lives alone in 2 story home w/ steps, has liveable first floor. HX of MS since she was 52. DME, cane, grab bars in [place], would like a walker, Notified, Edmond Alfaro from [id number] [place] to follow for this. LSW is following for possible ARU, SEEMA will need signed, completed. VNS, [facility], following as well. MRI Brain, Neuro following, PT/OT.  Will follow closely for needs//KB               Readmission Risk              Readmission Risk:        19.5       Age 72 or Greater:  1    Admitted from SNF or Requires Paid or Family Care:  0    Currently has CHF,COPD,ARF,CRI,or is on dialysis:  0    Takes more than 5 Prescription Medications:  4    Takes Digoxin,Insulin,Anticoagulants,Narcotics or ASA/Plavix:  [facility] in Past 12 Months:  10    On Disability:  0    Patient Considers own Health:  2.5          Electronically signed by: Zara Edmonds RN on 10/2/2017 at 12:01 PM

## 2017-10-02 NOTE — CARE COORDINATION
Notified, Wm, from [facility] [place], to follow for pt's request for walker, & possibility for Pt. D/C to ARU & to follow.

## 2017-10-02 NOTE — CARE COORDINATION
SW intern gave referral to the ARU and spoke to Andreea from the [facility]. SW intern is waiting to hear back for accept/denial. SW will continue to follow.

## 2017-10-02 NOTE — PROGRESS NOTES
Speech Language Pathology  Facility/Department: [facility] MED SURG  Initial Speech/Language/Cognitive Assessment    NAME: Tami Downing  : 1934   MRN: [id number]  ADMISSION DATE: 10/1/2017  ADMITTING DIAGNOSIS: has Ataxia; Multiple sclerosis ([address] and Acute cystitis without hematuria on her problem list.    Date of Eval: 10/2/2017   Evaluating Therapist: Tanya Camarena    RECENT RESULTS  CT OF HEAD/MRI: 10/01 CT- No acute intracranial abnormality. Primary Complaint: Pt. Admitted d/t lightheadedness and dizziness, c a h/o MS. Pain:  Pain Assessment  Patient Currently in Pain: none reported       Assessment:      Diagnosis: Pt. presents with clear fluent speech and performance within functional limits for orientation and abstract reasoning. Pt. exhibits left facial weakness which she reports is from  [address] diagnosed in , that does not effect speech. Recommendations:  Requires SLP Intervention: No       Subjective:   Previous level of function and limitations: independent at home  General  Chart Reviewed: Yes  Patient assessed for rehabilitation services?: Yes  Family / Caregiver Present: No     Vision  Vision: Impaired  Vision Exceptions: Wears glasses at all times  Hearing  Hearing: Within functional limits        Objective:     Oral/Motor  Oral Motor: Exceptions to [facility]  Labial Symmetry: Abnormal symmetry left    Auditory Comprehension  Comprehension: Within Functional Limits       Expression  Primary Mode of Expression: Verbal    Verbal Expression  Verbal Expression: Within functional limits       Motor Speech  Motor Speech:  Within Functional Limits    Pragmatics/Social Functioning  Pragmatics: Within functional limits    Cognition:      Orientation  Overall Orientation Status: Within Normal Limits  Attention  Attention: Within Functional Limits  Abstract Reasoning  Abstract Reasoning: Within Functional Limits    Education:  Patient Education Response: Verbalizes understanding    G-Code:  SLP G-Codes  Functional Limitations: Spoken language expressive  Spoken Language Expression Current Status (): 0 percent impaired, limited or restricted  Spoken Language Expression Discharge Status (): 0 percent impaired, limited or restricted         Therapy Time:   Individual Concurrent Group Co-treatment   Time In  [address] [place] [address]         Time Out  1105         Minutes  [address],  intern  10/2/2017 11:16 AM

## 2017-10-02 NOTE — PLAN OF CARE
Problem: Falls - Risk of  Goal: Absence of falls  Outcome: Ongoing    Problem: Pain:  Goal: Pain level will decrease  Pain level will decrease   Outcome: Ongoing  Goal: Control of acute pain  Control of acute pain   Outcome: Ongoing  Goal: Control of chronic pain  Control of chronic pain   Outcome: Ongoing    Problem: Mobility - Impaired:  Goal: Able to ambulate independently  Able to ambulate independently   Outcome: Ongoing  Goal: Able to ambulate with minimal assistance  Able to ambulate with minimal assistance   Outcome: Ongoing  Goal: Ability to appropriately use an adaptive device for ambulation will improve  Ability to appropriately use an adaptive device for ambulation will improve   Outcome: Ongoing

## 2017-10-02 NOTE — PROGRESS NOTES
[address]    PROGRESS NOTE             Date:   10/2/2017  Patient name:  Trace Seen  Date of admission:  10/1/2017  9:19 AM  MRN:   [id number]  YOB: 1934    SUBJECTIVE/ LAST 24 HOURS     Patient seen and examined at bedside. No acute overnight events. Patient states her dizziness is less frequent but ataxia still present. Patients UTI is improving. Patient denies dysuria and hematuria. CHIEF COMPLAINT     Chief Complaint   Patient presents with    Dizziness     Lightheadedness    Neurologic Problem     Imbalance    Multiple Sclerosis    Headache     History Obtained From:  Patient and chart review. HISTORY OF PRESENT ILLNESS      The patient is a 80 y.o.  female who is admitted to the hospital for ataxia. PAST MEDICAL HISTORY       has a past medical history of Arthritis; GERD (gastroesophageal reflux disease); Hyperlipidemia; Hypertension; and Multiple sclerosis ([facility] PAST SURGICAL HISTORY       has a past surgical history that includes Cystocele repair; Rectocele repair; Hysterectomy; Cervical disc surgery; Cataract removal with implant (Right, 11/6/2014); Cataract removal with implant (Left, 12/2/2014); and Total shoulder arthroplasty (Right, 04/20/2017). HOME MEDICATIONS        Prior to Admission medications    Medication Sig Start Date End Date Taking? Authorizing Provider   traMADol (ULTRAM) 50 MG tablet Take 50 mg by mouth nightly   Yes Historical Provider, MD   tiZANidine (ZANAFLEX) 4 MG tablet Take 4 mg by mouth every 6 hours as needed   Yes Historical Provider, MD   alendronate (FOSAMAX) 70 MG tablet Take 70 mg by mouth every 7 days   Yes Historical Provider, MD   omeprazole (PRILOSEC) 20 MG capsule Take 20 mg by mouth daily. Yes Historical Provider, MD   amitriptyline (ELAVIL) 25 MG tablet Take 25 mg by mouth nightly.    Yes Historical Provider, MD   meloxicam (MOBIC) 15 MG tablet Take 145/70  Pulse 96  Temp 97.7 °F (36.5 °C) (Oral)   Resp 16  Ht 4' 11\" (1.499 m)  Wt 129 lb 6.4 oz (58.7 kg)  SpO2 98%  BMI 26.14 kg/m2   Physical Exam   Constitutional: She is oriented to person, place, and time. She appears well-developed and well-nourished. HENT:   Head: Normocephalic and atraumatic. Eyes: Conjunctivae and EOM are normal. Pupils are equal, round, and reactive to light. Neck: Normal range of motion. Neck supple. Cardiovascular: Normal rate, regular rhythm, normal heart sounds. Pulmonary/Chest: Effort normal and breath sounds normal.   Abdominal: Soft. Neurological: She is alert and oriented to person, place, and time. No cranial nerve deficit. Romberg sign +   She displays no Babinski's sign on the right side. She displays no Babinski's sign on the left side.      DIAGNOSTICS      Laboratory Testing:  Recent Results (from the past 24 hour(s))   Urine reflex to Culture    Collection Time: 10/01/17 10:49 AM   Result Value Ref Range    Color, UA YELLOW YEL    Turbidity UA CLOUDY (A) CLEAR    Glucose, Ur NEGATIVE NEG    Bilirubin Urine NEGATIVE NEG    Ketones, Urine NEGATIVE NEG    Specific [place], UA 1.010 1.000 - 1.030    Urine Hgb NEGATIVE NEG    pH, UA 7.5 5.0 - 8.0    Protein, UA NEGATIVE  Verified by sulfosalicylic acid test. (A) NEG    Urobilinogen, Urine Normal NORM    Nitrite, Urine NEGATIVE NEG    Leukocyte Esterase, Urine LARGE (A) NEG    Urinalysis Comments NOT REPORTED    Microscopic Urinalysis    Collection Time: 10/01/17 10:49 AM   Result Value Ref Range    -          WBC, UA TOO NUMEROUS TO COUNT /HPF    RBC, UA 10 TO 20 /HPF    Casts UA NOT REPORTED /LPF    Crystals UA NOT REPORTED NONE /HPF    Epithelial Cells UA 10 TO 20 /HPF    Renal Epithelial, Urine NOT REPORTED 0 /HPF    Bacteria, UA FEW (A) NONE    Mucus, UA NOT REPORTED NONE    Trichomonas, UA NOT REPORTED NONE    Amorphous, UA NOT REPORTED NONE    Other Observations UA NOT REPORTED NREQ    Yeast, UA NOT 1. 00 1.0 - 4.8 k/uL    Absolute Mono # 0.10 0.1 - 1.3 k/uL    Absolute Eos # 0.00 0.0 - 0.4 k/uL    Basophils # 0.00 0.0 - 0.2 k/uL   B12/Folate Panel    Collection Time: 10/02/17  6:04 AM   Result Value Ref Range    Vitamin B-12 220 211 - 946 pg/mL    Folate 14.6 >4.8 ng/mL   TSH w/reflex to FT4    Collection Time: 10/02/17  6:04 AM   Result Value Ref Range    TSH 0.61 0.30 - 5.00 mIU/L       Intake/Output    Intake/Output Summary (Last 24 hours) at 10/02/17 1046  Last data filed at 10/01/17 1842   Gross per 24 hour   Intake             1015 ml   Output                0 ml   Net             1015 ml       Imaging/Diagnostics:    Ct Head Wo Contrast     Result Date: 10/1/2017  EXAMINATION: CT OF THE HEAD WITHOUT CONTRAST  10/1/2017 10:41 am TECHNIQUE: CT of the head was performed without the administration of intravenous contrast. Dose modulation, iterative reconstruction, and/or weight based adjustment of the mA/kV was utilized to reduce the radiation dose to as low as reasonably achievable. COMPARISON: 11/16/2016 HISTORY: ORDERING SYSTEM PROVIDED HISTORY: gait impairment. TECHNOLOGIST PROVIDED HISTORY: Ordering Physician Provided Reason for Exam: GAIT IMPAIRMENT Acuity: Acute Type of Exam: Initial Additional signs and symptoms: WOKE UP LIGHT HEADED/DIZZY AND FALLING INTO WALLS FINDINGS: BRAIN/VENTRICLES: There is no acute intracranial hemorrhage, mass effect or midline shift. No abnormal extra-axial fluid collection. The gray-white differentiation is maintained without evidence of an acute infarct. There is no evidence of hydrocephalus. Mild diminished attenuation of periventricular white matter suggesting chronic microangiopathy. There are calcified arteriosclerotic plaques of the bilateral carotid siphons ORBITS: The visualized portion of the orbits demonstrate no acute abnormality. SINUSES: The visualized paranasal sinuses and mastoid air cells demonstrate no acute abnormality.  SOFT TISSUES/SKULL:  No acute abnormality of the visualized skull . There is mild soft tissue edema in the left frontal scalp.      No acute intracranial abnormality. ASSSESSMENT     Principal Problem:    Ataxia  Active Problems:    Multiple sclerosis (HCC)    Acute cystitis without hematuria      PLAN      Multiple Sclerosis Exacerbation   - MRI Head w/ w/o contrast for new White matter lesion   - D/C IV Solumedrol as per neuro   - waiting neuro rec     UTI   - IV Cipro 400 mg Q12          Chronic conditions      F: sodium chloride Last Rate: 75 mL/hr at 10/01/17 1633,    E: Supp as needed  N:  DIET GENERAL;      DVT prophylaxis: Lovenox 40 mg SC  GI prophylaxis: Protonix 40 mg daily    Consults: IP CONSULT TO INTERNAL MEDICINE  IP CONSULT TO NEUROLOGY  IP CONSULT TO SOCIAL WORK      Plan will be discussed with the rounding attending: Dr. Justina Acevedo. MD IQRA Hernandez[address], [address] [place] [address]. Phone [phone]   Fax: [phone]  Answering Service: [phone]      IM Attending    Pt seen and examined today   I have discussed the care of pt , including pertinent history and exam findings,  with the resident. I have reviewed the key elements of all parts of the encounter with the resident. I agree with the assessment, plan and orders as documented by the resident.     Electronically signed by Ashley Rodriguez MD on 10/2/2017 at 11:23 AM

## 2017-10-02 NOTE — PROGRESS NOTES
Physical Therapy    Facility/Department: [facility] MED SURG  Initial Assessment    NAME: Jose Armando Mendez  : 1934  MRN: [id number]    Date of Service: 10/2/2017    Patient Diagnosis(es): The primary encounter diagnosis was Ataxia. A diagnosis of Multiple sclerosis ([facility] [address] was also pertinent to this visit. has a past medical history of Arthritis; GERD (gastroesophageal reflux disease); Hyperlipidemia; Hypertension; and Multiple sclerosis ([facility] [address] has a past surgical history that includes Cystocele repair; Rectocele repair; Hysterectomy; Cervical disc surgery; Cataract removal with implant (Right, 2014); Cataract removal with implant (Left, 2014); and Total shoulder arthroplasty (Right, 2017).     Restrictions  Restrictions/Precautions  Restrictions/Precautions: Fall Risk, Contact Precautions  Required Braces or Orthoses?: No  Implants present? : Metal implants (R TSA)  Vision/Hearing  Vision: Impaired  Vision Exceptions: Wears glasses at all times  Hearing: Within functional limits     Subjective  General  Patient assessed for rehabilitation services?: Yes  Pain Screening  Patient Currently in Pain: Yes  Pain Assessment  Pain Assessment: 0-10  Pain Level: 5  Pain Type: Chronic pain  Pain Location: Head  Pain Descriptors: Headache  Pain Frequency: Intermittent  Clinical Progression: Not changed  Response to Pain Intervention: Patient Satisfied  Vital Signs  Patient Currently in Pain: Yes     Orientation  Orientation  Overall Orientation Status: Within Normal Limits    Social/Functional History  Social/Functional History  Lives With: Alone  Type of Home: House  Home Layout: Two level, Performs ADL's on one level  Home Access: Stairs to enter with rails  Entrance Stairs - Number of Steps: 3-4  Entrance Stairs - Rails: Left (Also has grab bar on inside of door)  Bathroom Shower/Tub: Tub/Shower unit, Curtain, Shower chair with back  [employer]: Standard  Bathroom Equipment: Grab bars in shower  Bathroom Recommendations: Gait Training, Transfer Training, Balance Training, Functional Mobility Training  Safety Devices  Type of devices: Left in bed, Gait belt, Call light within reach    G-Code  PT G-Codes  Functional Assessment Tool Used: [facility] Outcome  Score: 16  Functional Limitation: Mobility: Walking and moving around  Mobility: Walking and Moving Around Current Status (): At least 40 percent but less than 60 percent impaired, limited or restricted  Mobility: Walking and Moving Around Goal Status ():  At least 20 percent but less than 40 percent impaired, limited or restricted    Goals  Short term goals  Time Frame for Short term goals: 7 visits  Short term goal 1: transfers SBA  Short term goal 2: Ambulation 25ft CGA  Short term goal 3: standing balance FAIR     Therapy Time   Individual Concurrent Group Co-treatment   Time In [facility]         Time Out 0952         Minutes [address], PT

## 2017-10-02 NOTE — CONSULTS
[address] Neurology  Requesting Physician:  Reji Camarillo MD/Jose Bill MD     CHIEF COMPLAINT:     MS  UTI  Gen weakness       HISTORY OF PRESENT ILLNESS:                 The patient is a 80 y.o. female who presents with     Allergies:  Bactrim [sulfamethoxazole-trimethoprim]; Lactose intolerance (gi); and Pcn [penicillins]    Current Medications:   Scheduled Meds:   sodium chloride flush  10 mL Intravenous 2 times per day    enoxaparin  40 mg Subcutaneous Daily    ciprofloxacin  400 mg Intravenous Q12H    amitriptyline  25 mg Oral Nightly    lisinopril  5 mg Oral Daily    pantoprazole  40 mg Oral QAM AC    simvastatin  20 mg Oral Nightly    IVPB builder   Intravenous Daily     Continuous Infusions:   sodium chloride 75 mL/hr at 10/01/17 1633     PRN Meds:.sodium chloride flush, acetaminophen, magnesium hydroxide, ondansetron, potassium chloride **OR** potassium chloride **OR** potassium chloride, tiZANidine, morphine, traMADol **OR** traMADol     REVIEW OF SYSTEMS:         HPI 80 y.o. female with a history of multiple sclerosis presents with impaired gait. The patient states that over the last week she's had intermittent sensations of unsteadiness and lightheadedness. The symptoms of been going on for the last week. She went to bed last night around 9 or 10, she was at her baseline but then when she woke up at 4 AM this morning, she tried to get up to go the bathroom by she was unable to walk a straight line and she fell sideways against the wall. She states that she is laying still she feels fine. Unsteadiness occurs when she tries to get up and walk. She denies any weakness in her legs. She has a chronic right-sided weakness and right-sided facial droop from her multiple sclerosis. She denies any new slurred speech or new weakness.   She didn't take any medications prior to arrival.  She does state that she doesn't drink much water and that she's been eating BRAIN/VENTRICLES: There is no acute intracranial hemorrhage, mass effect or midline shift. No abnormal extra-axial fluid collection. The gray-white differentiation is maintained without evidence of an acute infarct. There is no evidence of hydrocephalus. Mild diminished attenuation of periventricular white matter suggesting chronic microangiopathy. There are calcified arteriosclerotic plaques of the bilateral carotid siphons ORBITS: The visualized portion of the orbits demonstrate no acute abnormality. SINUSES: The visualized paranasal sinuses and mastoid air cells demonstrate no acute abnormality. SOFT TISSUES/SKULL:  No acute abnormality of the visualized skull . There is mild soft tissue edema in the left frontal scalp. No acute intracranial abnormality. ASSESSMENT AND PLAN:       Patient Active Problem List   Diagnosis    Ataxia    Multiple sclerosis ([facility] [address]    Acute cystitis without hematuria         Donna Sanford M.D.   Neurology  10/2/2017  9:59 AM

## 2017-10-02 NOTE — PROGRESS NOTES
[facility]: [facility]   Occupational Therapy Evaluation  Date: 10/2/17  Patient Name: Will Amin       Room: 8795/3854-79  MRN: [id number]  Account: [de-identified]   : 1934  (80 y.o.) Gender: female     Referring Practitioner: Dr. Simeon Rasmussen  Diagnosis: MS exacerbation  Additional Pertinent Hx: Hx of MS for ~30 yrs - double vision and daily headaches, R sided weakness    Treatment Diagnosis: Impaired self-care status  Past Medical History:  has a past medical history of Arthritis; GERD (gastroesophageal reflux disease); Hyperlipidemia; Hypertension; and Multiple sclerosis ([facility] [address] Past Surgical History:   has a past surgical history that includes Cystocele repair; Rectocele repair; Hysterectomy; Cervical disc surgery; Cataract removal with implant (Right, 2014); Cataract removal with implant (Left, 2014); and Total shoulder arthroplasty (Right, 2017). Restrictions  Restrictions/Precautions: Fall Risk, Contact Precautions  Implants present? : Metal implants (R TSA)  Required Braces or Orthoses?: No     Vitals  Temp: 97.7 °F (36.5 °C)  Pulse: 96  Resp: 16  BP: (!) 145/70  Height: 4' 11\" (149.9 cm)  Weight: 129 lb 6.4 oz (58.7 kg)  BMI (Calculated): 26.2  Oxygen Therapy  SpO2: 98 %  Pulse Oximeter Device Mode: Continuous  O2 Device: None (Room air)  Blood Pressure Lyin/75  Pulse Lyin PER MINUTE  Blood Pressure Sittin/114 (pt reports moderate imbalance sensation)  Pulse Sittin PER MINUTE  Level of Consciousness: Alert    Subjective  Subjective: \"I don't have any problems dressing myself, I am very independent\"  Comments: Pt demo'd don/doffing socks at EOB without difficulty.   Overall Orientation Status: Within Functional Limits  Vision  Vision: Impaired  Vision Exceptions: Wears glasses at all times  Hearing  Hearing: Within functional limits  Social/Functional History  Lives With: Alone  Type of Home: House  Home Layout: Two level, Performs ADL's on one level  Home text)  Activity Tolerance: Pt had increased c/o lightheadedness towards end of session and was returned to supine. Goals  Patient Goals   Patient goals : Pt planning to return home alone with intermittent A from children  Short term goals  Time Frame for Short term goals: By Discharge  Short term goal 1: Pt will complete BADL's with Mod I and Good safety using AE as needed. Short term goal 2: Pt will complete toileting and toilet transfer with Mod I and Good safety using appropriate DME. Short term goal 3: Pt will stand for 5+ minutes with 1-2 UE support and no LOB while completing a functional task. Short term goal 4: Pt will V/D good understanding of fall prevention strategies as they relate to self-care and home mgt tasks. Short term goal 5: Pt will actively participate in 15-20 minutes of therapeutic exercise/activity to promote increased independence and safety with self-care and mobility.     Plan  Safety Devices  Safety Devices in place: Yes  Type of devices: Patient at risk for falls, Gait belt, Left in bed, Call light within reach     Plan  Times per week: 5-7  Times per day: Daily  Current Treatment Recommendations: Balance Training, Functional Mobility Training, Endurance Training, Safety Education & Training, Patient/Caregiver Education & Training, Equipment Evaluation, Education, & procurement, Self-Care / ADL, Home Management Training    Equipment Recommendations  Equipment Needed:  (TBD)  OT Individual Minutes  Time In: 0839  Time Out: 9674  Minutes: 27    Electronically signed by Case Avila on 10/2/17 at 10:44 AM

## 2017-10-03 VITALS
OXYGEN SATURATION: 96 % | RESPIRATION RATE: 14 BRPM | TEMPERATURE: 98.2 F | HEART RATE: 95 BPM | SYSTOLIC BLOOD PRESSURE: 159 MMHG | WEIGHT: 129.4 LBS | BODY MASS INDEX: 26.08 KG/M2 | HEIGHT: 59 IN | DIASTOLIC BLOOD PRESSURE: 76 MMHG

## 2017-10-03 LAB
ABSOLUTE BANDS #: 0.81 K/UL (ref 0–1)
ABSOLUTE EOS #: 0 K/UL (ref 0–0.4)
ABSOLUTE LYMPH #: 1.46 K/UL (ref 1–4.8)
ABSOLUTE MONO #: 0.16 K/UL (ref 0.1–1.3)
ANION GAP SERPL CALCULATED.3IONS-SCNC: 11 MMOL/L (ref 9–17)
BANDS: 5 %
BASOPHILS # BLD: 0 %
BASOPHILS ABSOLUTE: 0 K/UL (ref 0–0.2)
BUN BLDV-MCNC: 20 MG/DL (ref 8–23)
BUN/CREAT BLD: ABNORMAL (ref 9–20)
CALCIUM SERPL-MCNC: 8.6 MG/DL (ref 8.6–10.4)
CHLORIDE BLD-SCNC: 105 MMOL/L (ref 98–107)
CO2: 24 MMOL/L (ref 20–31)
CREAT SERPL-MCNC: 0.76 MG/DL (ref 0.5–0.9)
DIFFERENTIAL TYPE: ABNORMAL
EOSINOPHILS RELATIVE PERCENT: 0 %
GFR AFRICAN AMERICAN: >60 ML/MIN
GFR NON-AFRICAN AMERICAN: >60 ML/MIN
GFR SERPL CREATININE-BSD FRML MDRD: ABNORMAL ML/MIN/{1.73_M2}
GFR SERPL CREATININE-BSD FRML MDRD: ABNORMAL ML/MIN/{1.73_M2}
GLUCOSE BLD-MCNC: 167 MG/DL (ref 70–99)
HCT VFR BLD CALC: 34.2 % (ref 36–46)
HEMOGLOBIN: 11.7 G/DL (ref 12–16)
LYMPHOCYTES # BLD: 9 %
MCH RBC QN AUTO: 30.9 PG (ref 26–34)
MCHC RBC AUTO-ENTMCNC: 34.2 G/DL (ref 31–37)
MCV RBC AUTO: 90.5 FL (ref 80–100)
MONOCYTES # BLD: 1 %
MORPHOLOGY: ABNORMAL
PDW BLD-RTO: 14.3 % (ref 11.5–14.9)
PLATELET # BLD: 279 K/UL (ref 150–450)
PLATELET ESTIMATE: ABNORMAL
PMV BLD AUTO: 8.2 FL (ref 6–12)
POTASSIUM SERPL-SCNC: 4.4 MMOL/L (ref 3.7–5.3)
RBC # BLD: 3.78 M/UL (ref 4–5.2)
RBC # BLD: ABNORMAL 10*6/UL
SEG NEUTROPHILS: 85 %
SEGMENTED NEUTROPHILS ABSOLUTE COUNT: 13.77 K/UL (ref 1.3–9.1)
SODIUM BLD-SCNC: 140 MMOL/L (ref 135–144)
WBC # BLD: 16.2 K/UL (ref 3.5–11)
WBC # BLD: ABNORMAL 10*3/UL

## 2017-10-03 PROCEDURE — 99239 HOSP IP/OBS DSCHRG MGMT >30: CPT | Performed by: INTERNAL MEDICINE

## 2017-10-03 PROCEDURE — 97110 THERAPEUTIC EXERCISES: CPT

## 2017-10-03 PROCEDURE — 36415 COLL VENOUS BLD VENIPUNCTURE: CPT

## 2017-10-03 PROCEDURE — 6360000002 HC RX W HCPCS: Performed by: STUDENT IN AN ORGANIZED HEALTH CARE EDUCATION/TRAINING PROGRAM

## 2017-10-03 PROCEDURE — 80048 BASIC METABOLIC PNL TOTAL CA: CPT

## 2017-10-03 PROCEDURE — 6360000002 HC RX W HCPCS: Performed by: INTERNAL MEDICINE

## 2017-10-03 PROCEDURE — 2580000003 HC RX 258: Performed by: STUDENT IN AN ORGANIZED HEALTH CARE EDUCATION/TRAINING PROGRAM

## 2017-10-03 PROCEDURE — 85025 COMPLETE CBC W/AUTO DIFF WBC: CPT

## 2017-10-03 PROCEDURE — 6370000000 HC RX 637 (ALT 250 FOR IP): Performed by: INTERNAL MEDICINE

## 2017-10-03 PROCEDURE — 97530 THERAPEUTIC ACTIVITIES: CPT

## 2017-10-03 RX ORDER — CIPROFLOXACIN 500 MG/1
500 TABLET, FILM COATED ORAL 2 TIMES DAILY
Qty: 14 TABLET | Refills: 0 | Status: SHIPPED | OUTPATIENT
Start: 2017-10-03 | End: 2017-10-10

## 2017-10-03 RX ADMIN — PANTOPRAZOLE SODIUM 40 MG: 40 TABLET, DELAYED RELEASE ORAL at 05:22

## 2017-10-03 RX ADMIN — CIPROFLOXACIN 400 MG: 2 INJECTION, SOLUTION INTRAVENOUS at 10:47

## 2017-10-03 RX ADMIN — ENOXAPARIN SODIUM 40 MG: 40 INJECTION SUBCUTANEOUS at 09:25

## 2017-10-03 RX ADMIN — SODIUM CHLORIDE: 9 INJECTION, SOLUTION INTRAVENOUS at 09:36

## 2017-10-03 RX ADMIN — MORPHINE SULFATE 2 MG: 10 INJECTION, SOLUTION INTRAMUSCULAR; INTRAVENOUS at 05:23

## 2017-10-03 RX ADMIN — LISINOPRIL 5 MG: 5 TABLET ORAL at 09:25

## 2017-10-03 ASSESSMENT — PAIN SCALES - GENERAL
PAINLEVEL_OUTOF10: 0
PAINLEVEL_OUTOF10: 9
PAINLEVEL_OUTOF10: 0
PAINLEVEL_OUTOF10: 6

## 2017-10-03 NOTE — PROGRESS NOTES
Discharge instructions  And verbalized understanding, pt to car per wheelchair with belongings and dtr at side, pt stable at time of discharge

## 2017-10-03 NOTE — CARE COORDINATION
[facility] [address] Encounter Date/Time: 10/1/2017 [address] Account: [de-identified]    MRN: [id number]    Patient:  Arcenio Randall   Contact Serial #: [id number]              ENCOUNTER          Patient Class: I Private Enc? No Unit RM BD: STCZ MED ZIYAD    Hospital Service: Med/Surg   ADM DX: Multiple sclerosis exace*   ADM Provider: Dunia Peacock MD   Procedure:     ATT Provider: Dunia Peacock MD   REF Provider: Michael Alvarado      PATIENT                 Name: Arcenio Randall : 1934 (82 yrs)   Address: [address] [place] [address] Sex: Female   City: [address] [place], [place] [id number]         Marital Status:    Employer: NA         Tenriism: Latter day   Primary Care Provider: Florin Deal MD         Primary Phone: [phone]   EMERGENCY CONTACT   Contact Name Legal Guardian? Relationship to Patient Home Phone Work Phone   1. Yolis Poon  2. Justino Raza   Child  Child [phone]           GUARANTOR            Guarantor: Arcenio Randall     : 1934   Address: [address] [place] [address] Sex: Female     [place] [id number]     Relation to Patient: Self       Home Phone: [phone]   Guarantor ID: [id number]       Work Phone:     Guarantor Employer: NA         Status: NOT EMPLO*      COVERAGE        PRIMARY INSURANCE   Payor: MEDICARE Plan: MEDICARE PART A AND B   Group Number:   Insurance Type: INDEMNITY   Subscriber Name: Cecilia Weinstein : 1934   Subscriber ID: [id number] Pat. Rel. to Sub: Self   SECONDARY INSURANCE   Payor: BCBS Plan: ANTHEM MEDICARE SUPP   Group Number: [id number] Insurance Type: INDEMNITY   Subscriber Name: Cecilia Weinstein : 1934   Subscriber ID: [id number] Josh Guzman.  Rel. to Sub: [address] of Care Form    Patient Name: Arcenio Randall   :  1934  MRN:  [id number]    Admit date:  10/1/2017  Discharge date:  10/3/17    Code Status Order: Full Code   Advance Directives: No    Admitting Physician:  Dunia Peacock MD  PCP: Elimination:  Continence:   · Bowel: Yes  · Bladder: Yes  Urinary Catheter: None   Colostomy/Ileostomy/Ileal Conduit: No    Date of Last BM:     Intake/Output Summary (Last 24 hours) at 10/02/17 1211  Last data filed at 10/01/17 1842   Gross per 24 hour   Intake             1015 ml   Output                0 ml   Net             1015 ml     I/O last 3 completed shifts: In: 9078 [P.O.:690; I.V.:75; IV Piggyback:250]  Out: -     Safety Concerns: At Risk for Falls    Impairments/Disabilities:      ataxia    Nutrition Therapy:  Current Nutrition Therapy:   - Oral Diet:  General    Routes of Feeding: Oral  Liquids: No Restrictions  Daily Fluid Restriction: no  Last Modified Barium Swallow with Video (Video Swallowing Test): not done    Treatments at the Time of Hospital Discharge:   Respiratory Treatments: none  Oxygen Therapy:  is not on home oxygen therapy. Ventilator:    - No ventilator support    Rehab Therapies: Physical Therapy and Occupational Therapy  Weight Bearing Status/Restrictions: No weight bearing restirctions  Other Medical Equipment (for information only, NOT a DME order):  walker  Other Treatments: skilled assessment, medication education    Patient's personal belongings (please select all that are sent with patient):  Rose    RN SIGNATURE:  Ty Bedolla    PHYSICIAN SECTION    Prognosis: Good    Condition at Discharge: Stable    Rehab Potential (if transferring to Rehab): Good    Recommended Labs or Other Treatments After Discharge:     Physician Certification: I certify the above information and transfer of Margarita Garrison  is necessary for the continuing treatment of the diagnosis listed and that she requires Home Care for less 30 days.      Update Admission H&P: No change in H&P    PHYSICIAN SIGNATURE:  Electronically signed by Valentina Jamison MD on 10/3/17 at 12:03 PM    CASE MANAGEMENT/SOCIAL WORK SECTION    Inpatient Status Date: 10/1/17    [facility] Read[facility] Risk Assessment Score:  Risk Score: 19.5   (Score > 14= high risk for readmission)    Discharging to Facility/ [address]  Phone [phone]  · Fax [phone]      / signature: Electronically signed by Puma Vazquez RN on 10/3/17 at 3:07 PM    Current Discharge Medication List      UNREVIEWED medications      Medication Dose     traMADol (ULTRAM) 50 MG tablet 50 mg                            START taking these medications      Medication Dose     ciprofloxacin (CIPRO) 500 MG tablet 500 mg     Take 1 tablet by mouth 2 times daily for 7 days   Quantity: 14 tablet Refills: 0                      CONTINUE these medications which have NOT CHANGED      Medication Dose     tiZANidine (ZANAFLEX) 4 MG tablet 4 mg     Take 4 mg by mouth every 6 hours as needed         alendronate (FOSAMAX) 70 MG tablet 70 mg     Take 70 mg by mouth every 7 days         omeprazole (PRILOSEC) 20 MG capsule 20 mg     Take 20 mg by mouth daily.         amitriptyline (ELAVIL) 25 MG tablet 25 mg     Take 25 mg by mouth nightly.         meloxicam (MOBIC) 15 MG tablet 15 mg     Take 15 mg by mouth daily.         lisinopril (PRINIVIL;ZESTRIL) 5 MG tablet 5 mg     Take 5 mg by mouth daily.         simvastatin (ZOCOR) 20 MG tablet 20 mg     Take 20 mg by mouth nightly.         HYDROcodone-acetaminophen (NORCO) 5-325 MG per tablet 1 tablet     Take 1 tablet by mouth 2 times daily.         VITAMIN D, CHOLECALCIFEROL, PO 1 tablet     Take 1 tablet by mouth 2 times daily.         acetaminophen-codeine (TYLENOL #4) 300-60 MG per tablet 1 tablet     Take 1 tablet by mouth daily.

## 2017-10-03 NOTE — PROGRESS NOTES
Device: Standard Walker  Activity: Other;Retrieve items  Assist Level: Supervision  Functional Mobility Comments: A req for IV pole, pt demo no LOB and G safety during task, pt able to safely maneuever around obstacles in room using S/W         Transfers  Sit to stand: Modified independent  Stand to sit: Modified independent  Upper Extremity Function  UE Strengthing: BUE exercises using 2# weighted bar, 20 reps in all available planes, to support mobility/transfers and overall endurance, rest breaks req d/t overall fatigue, pt stood for exercises to increase standing tolerance/balance and safety, pt demo no LOB during task c no UE support, pt took appropriate rest breaks when needed        Additional Activities: pt educated on fall prevention/home safety, pt able to verbally demonstrate G safety regarding home safety/fall prevention, pt states \"I got rid of all my rugs, I use a cane but want a walker. My family put in grab bars near the toilet and shower and I have a seat I sit on. Nothing is to high because I can't reach over my head after I hurt my shoulder and I always leave a light on at night because of my MS\", pt reports having transfer tub bench and GB for increased shower safety, pt requests getting a R/W for home, SW notified. Assessment  Performance deficits / Impairments: Decreased functional mobility ; Decreased ADL status; Decreased endurance;Decreased balance;Decreased high-level IADLs  Prognosis: Good  Discharge Recommendations: Home with assist PRN  Activity Tolerance: Patient Tolerated treatment well  Activity Tolerance: rest breaks when needed  Safety Devices in place: Yes  Type of devices: Call light within reach; Left in bed  Equipment Recommendations  Equipment Needed: Yes  Walker: Rolling     Patient Education:  Patient Education: safety, fall prevention/home safety, DME/AE  Learner:patient  Method: demonstration and explanation       Outcome: demonstrated understanding Plan  Safety Devices  Safety Devices in place: Yes  Type of devices: Call light within reach, Left in bed  Plan  Times per week: 5-7  Times per day: Daily  Current Treatment Recommendations: Balance Training, Functional Mobility Training, Endurance Training, Safety Education & Training, Patient/Caregiver Education & Training, Equipment Evaluation, Education, & procurement, Self-Care / ADL, Home Management Training    Goals  Short term goals  Time Frame for Short term goals: By Discharge  Short term goal 1: Pt will complete BADL's with Mod I and Good safety using AE as needed. Short term goal 2: Pt will complete toileting and toilet transfer with Mod I and Good safety using appropriate DME. Short term goal 3: Pt will stand for 5+ minutes with 1-2 UE support and no LOB while completing a functional task. Short term goal 4: Pt will V/D good understanding of fall prevention strategies as they relate to self-care and home mgt tasks. Short term goal 5: Pt will actively participate in 15-20 minutes of therapeutic exercise/activity to promote increased independence and safety with self-care and mobility.     OT Individual Minutes  Time In: 1010  Time Out: 1553  Minutes: 25     10/03/17 1354   OT Individual Minutes   Time In 1010   Time Out 1035   Minutes 25       Electronically signed by Leia Runner, COTA/L on 10/3/17 at 2:15 PM

## 2017-10-03 NOTE — PROGRESS NOTES
[address]    PROGRESS NOTE             Date:   10/3/2017  Patient name:  Oskar Felton  Date of admission:  10/1/2017  9:19 AM  MRN:   [id number]  YOB: 1934    SUBJECTIVE/ LAST 24 HOURS     Patient seen and examined at bedside. No acute overnight events. Patient states she is no longer dizzy but ataxia still present. Patients UTI is improving. Patient denies dysuria and hematuria. CHIEF COMPLAINT     Chief Complaint   Patient presents with    Dizziness     Lightheadedness    Neurologic Problem     Imbalance    Multiple Sclerosis    Headache     History Obtained From:  Patient and chart review. HISTORY OF PRESENT ILLNESS      The patient is a 80 y.o.  female who is admitted to the hospital for ataxia. PAST MEDICAL HISTORY       has a past medical history of Arthritis; GERD (gastroesophageal reflux disease); Hyperlipidemia; Hypertension; and Multiple sclerosis ([facility] [address] PAST SURGICAL HISTORY       has a past surgical history that includes Cystocele repair; Rectocele repair; Hysterectomy; Cervical disc surgery; Cataract removal with implant (Right, 11/6/2014); Cataract removal with implant (Left, 12/2/2014); and Total shoulder arthroplasty (Right, 04/20/2017). HOME MEDICATIONS        Prior to Admission medications    Medication Sig Start Date End Date Taking? Authorizing Provider   traMADol (ULTRAM) 50 MG tablet Take 50 mg by mouth nightly   Yes Historical Provider, MD   tiZANidine (ZANAFLEX) 4 MG tablet Take 4 mg by mouth every 6 hours as needed   Yes Historical Provider, MD   alendronate (FOSAMAX) 70 MG tablet Take 70 mg by mouth every 7 days   Yes Historical Provider, MD   omeprazole (PRILOSEC) 20 MG capsule Take 20 mg by mouth daily. Yes Historical Provider, MD   amitriptyline (ELAVIL) 25 MG tablet Take 25 mg by mouth nightly.    Yes Historical Provider, MD   meloxicam (MOBIC) 15 MG tablet Take 15 mg by mouth daily. Yes Historical Provider, MD   lisinopril (PRINIVIL;ZESTRIL) 5 MG tablet Take 5 mg by mouth daily. Yes Historical Provider, MD   simvastatin (ZOCOR) 20 MG tablet Take 20 mg by mouth nightly. Yes Historical Provider, MD   HYDROcodone-acetaminophen (NORCO) 5-325 MG per tablet Take 1 tablet by mouth 2 times daily. Yes Historical Provider, MD   VITAMIN D, CHOLECALCIFEROL, PO Take 1 tablet by mouth 2 times daily. Yes Historical Provider, MD   acetaminophen-codeine (TYLENOL #4) 300-60 MG per tablet Take 1 tablet by mouth daily. Yes Historical Provider, MD       ALLERGIES      Bactrim [sulfamethoxazole-trimethoprim]; Lactose intolerance (gi); and Pcn [penicillins]    SOCIAL HISTORY       reports that she has never smoked. She does not have any smokeless tobacco history on file. She reports that she does not drink alcohol or use illicit drugs. FAMILY HISTORY      family history is not on file. REVIEW OF SYSTEMS      Review of Systems   Constitutional: Negative for chills, fatigue and fever. HENT: Negative for trouble swallowing. Eyes: Positive for visual disturbance (Stable Diplopia). Respiratory: Negative for cough, choking, chest tightness and shortness of breath. Cardiovascular: Negative for chest pain and leg swelling. Gastrointestinal: Negative for abdominal distention, abdominal pain, constipation, diarrhea, nausea and vomiting. Endocrine: Negative for polyuria. Genitourinary: Positive for difficulty urinating (Difficulty in initiating stream. ). Negative for dysuria, enuresis, flank pain, frequency, hematuria and urgency. Musculoskeletal: Positive for gait problem (Wobbling Gait). Negative for back pain. Neurological: Positive for dizziness, facial asymmetry (Stable R sided Facial Droop), weakness (B/L Lower extremity), light-headedness and headaches.  Negative for seizures, speech difficulty and numbness.        PHYSICAL EXAM    BP (!) 143/67  Pulse 86  Temp 98.2 °F (36.8 °C) (Oral)   Resp 16  Ht 4' 11\" (1.499 m)  Wt 129 lb 6.4 oz (58.7 kg)  SpO2 96%  BMI 26.14 kg/m2   Physical Exam   Constitutional: She is oriented to person, place, and time. She appears well-developed and well-nourished. HENT:   Head: Normocephalic and atraumatic. Eyes: Conjunctivae and EOM are normal. Pupils are equal, round, and reactive to light. Neck: Normal range of motion. Neck supple. Cardiovascular: Normal rate, regular rhythm, normal heart sounds. Pulmonary/Chest: Effort normal and breath sounds normal.   Abdominal: Soft. Neurological: She is alert and oriented to person, place, and time. No cranial nerve deficit. Romberg sign +   She displays no Babinski's sign on the right side. She displays no Babinski's sign on the left side.      DIAGNOSTICS      Laboratory Testing:  Recent Results (from the past 24 hour(s))   Basic metabolic panel    Collection Time: 10/03/17  5:54 AM   Result Value Ref Range    Glucose 167 (H) 70 - 99 mg/dL    BUN 20 8 - 23 mg/dL    CREATININE 0.76 0.50 - 0.90 mg/dL    Bun/Cre Ratio NOT REPORTED 9 - 20    Calcium 8.6 8.6 - 10.4 mg/dL    Sodium 140 135 - 144 mmol/L    Potassium 4.4 3.7 - 5.3 mmol/L    Chloride 105 98 - 107 mmol/L    CO2 24 20 - 31 mmol/L    Anion Gap 11 9 - 17 mmol/L    GFR Non-African American >60 >60 mL/min    GFR African American >60 >60 mL/min    GFR Comment          GFR Staging NOT REPORTED    CBC auto differential    Collection Time: 10/03/17  5:54 AM   Result Value Ref Range    WBC 16.2 (H) 3.5 - 11.0 k/uL    RBC 3.78 (L) 4.0 - 5.2 m/uL    Hemoglobin 11.7 (L) 12.0 - 16.0 g/dL    Hematocrit 34.2 (L) 36 - 46 %    MCV 90.5 80 - 100 fL    MCH 30.9 26 - 34 pg    MCHC 34.2 31 - 37 g/dL    RDW 14.3 11.5 - 14.9 %    Platelets 356 932 - 917 k/uL    MPV 8.2 6.0 - 12.0 fL    Differential Type NOT REPORTED     WBC Morphology NOT REPORTED     RBC Morphology NOT REPORTED     Platelet Estimate NOT REPORTED     Seg Neutrophils 85 %    Lymphocytes 9 % Monocytes 1 %    Eosinophils % 0 %    Basophils 0 %    Bands 5 %    Segs Absolute 13.77 (H) 1.3 - 9.1 k/uL    Absolute Lymph # 1.46 1.0 - 4.8 k/uL    Absolute Mono # 0.16 0.1 - 1.3 k/uL    Absolute Eos # 0.00 0.0 - 0.4 k/uL    Basophils # 0.00 0.0 - 0.2 k/uL    Absolute Bands # 0.81 0.0 - 1.0 k/uL    Morphology 1+        Intake/Output    Intake/Output Summary (Last 24 hours) at 10/03/17 1024  Last data filed at 10/03/17 4266   Gross per 24 hour   Intake             3630 ml   Output             1950 ml   Net             1680 ml       Imaging/Diagnostics:    Ct Head Wo Contrast     Result Date: 10/1/2017  EXAMINATION: CT OF THE HEAD WITHOUT CONTRAST  10/1/2017 10:41 am TECHNIQUE: CT of the head was performed without the administration of intravenous contrast. Dose modulation, iterative reconstruction, and/or weight based adjustment of the mA/kV was utilized to reduce the radiation dose to as low as reasonably achievable. COMPARISON: 11/16/2016 HISTORY: ORDERING SYSTEM PROVIDED HISTORY: gait impairment. TECHNOLOGIST PROVIDED HISTORY: Ordering Physician Provided Reason for Exam: GAIT IMPAIRMENT Acuity: Acute Type of Exam: Initial Additional signs and symptoms: WOKE UP LIGHT HEADED/DIZZY AND FALLING INTO WALLS FINDINGS: BRAIN/VENTRICLES: There is no acute intracranial hemorrhage, mass effect or midline shift. No abnormal extra-axial fluid collection. The gray-white differentiation is maintained without evidence of an acute infarct. There is no evidence of hydrocephalus. Mild diminished attenuation of periventricular white matter suggesting chronic microangiopathy. There are calcified arteriosclerotic plaques of the bilateral carotid siphons ORBITS: The visualized portion of the orbits demonstrate no acute abnormality. SINUSES: The visualized paranasal sinuses and mastoid air cells demonstrate no acute abnormality. SOFT TISSUES/SKULL:  No acute abnormality of the visualized skull .   There is mild soft tissue edema

## 2017-10-03 NOTE — CARE COORDINATION
ONGOING DISCHARGE PLAN:    Spoke with patient regarding discharge plan and patient confirms that plan is home with [facility]   kishan is signed and completed, does not want to go to ARU  Pt no longer dizzy but ataxia still present  UTI improving on IV cipro  Pt needs wheeled walker, called to Laila Rowell at [facility]   face sheet and DME faxed to [facility]- [phone]    Will continue to follow for additional discharge needs.     Electronically signed by Joyce Romo RN on 10/3/2017 at 11:57 AM

## 2017-10-03 NOTE — PROGRESS NOTES
SW spoke to pt regarding discharge. Pt does not want ot go to ARU, she said she will return home. Pt is agreeable to home care. Pt said her daughter will be here around three and can take her home. Pt further stated that her son put grab bars throughout her house and she has DME.

## 2017-10-03 NOTE — DISCHARGE SUMMARY
ETO:[id number]    Printed on:10/03/17 4986   Medication Information                      acetaminophen-codeine (TYLENOL #4) 300-60 MG per tablet  Take 1 tablet by mouth daily. alendronate (FOSAMAX) 70 MG tablet  Take 70 mg by mouth every 7 days             amitriptyline (ELAVIL) 25 MG tablet  Take 25 mg by mouth nightly. HYDROcodone-acetaminophen (NORCO) 5-325 MG per tablet  Take 1 tablet by mouth 2 times daily. lisinopril (PRINIVIL;ZESTRIL) 5 MG tablet  Take 5 mg by mouth daily. meloxicam (MOBIC) 15 MG tablet  Take 15 mg by mouth daily. omeprazole (PRILOSEC) 20 MG capsule  Take 20 mg by mouth daily. simvastatin (ZOCOR) 20 MG tablet  Take 20 mg by mouth nightly. tiZANidine (ZANAFLEX) 4 MG tablet  Take 4 mg by mouth every 6 hours as needed             traMADol (ULTRAM) 50 MG tablet  Take 50 mg by mouth nightly             VITAMIN D, CHOLECALCIFEROL, PO  Take 1 tablet by mouth 2 times daily. Send Copies to: Cheryl Murphy MD      Note that over 30 minutes was spent in preparing discharge papers, discussing discharge with patient and family, medication review, etc.    Signed:  Cyndy Skinner MD PGY 1  10/3/2017, 1:55 PM    [address], [address] [place] [address]. Phone [phone]   Fax: [phone]  Answering Service: [phone]    IM Attending     Pt seen and examined before discharge   Discharge plan and medications were reviewed and agreed as documented by resident.   Time spent for discharge planning more than 30 minutes     Electronically signed by Juliette Devries MD on 10/10/2017 at 5:19 AM

## 2017-10-03 NOTE — CONSULTS
deferred. This lady has evidence of a florid urinary tract infection. ASSESSMENT AND PLAN:  1. This lady has a history of multiple sclerosis. 2.  Presently coming with multiple complaints including ataxia but in  the process of urinary tract infection. 3.  At this juncture, we would prefer to treating her urinary tract  infection rather than placing her on steroids. 4.  Above observation and recommendations were discussed with her. 5.  MRI scan has already been ordered. 6.  We will ask physical therapy to see her. B12 and folate levels  have also been ordered with thyroid profile. Following in the  hospital with you. Following which, she will follow with Dr. Jud Kayser at [facility]. Her last MRI done at [facility] will be requested. Leverette Sever    D: 10/02/2017 10:13:35       T: 10/02/2017 20:22:58     [email or web address]  Job#: [id number]     Doc#: [id number]    CC:  Dr. Richelle Edwards. Dr. Tisha Quiroga.

## 2017-10-04 LAB
EKG ATRIAL RATE: 83 BPM
EKG P AXIS: 10 DEGREES
EKG P-R INTERVAL: 170 MS
EKG Q-T INTERVAL: 408 MS
EKG QRS DURATION: 86 MS
EKG QTC CALCULATION (BAZETT): 479 MS
EKG R AXIS: -3 DEGREES
EKG T AXIS: 42 DEGREES
EKG VENTRICULAR RATE: 83 BPM

## 2018-02-22 ENCOUNTER — APPOINTMENT (OUTPATIENT)
Dept: CT IMAGING | Age: 83
End: 2018-02-22
Payer: MEDICARE

## 2018-02-22 ENCOUNTER — HOSPITAL ENCOUNTER (EMERGENCY)
Age: 83
Discharge: HOME | End: 2018-02-22
Attending: EMERGENCY MEDICINE
Payer: MEDICARE

## 2018-02-22 VITALS
HEIGHT: 59 IN | WEIGHT: 138 LBS | OXYGEN SATURATION: 98 % | DIASTOLIC BLOOD PRESSURE: 68 MMHG | HEART RATE: 89 BPM | SYSTOLIC BLOOD PRESSURE: 181 MMHG | BODY MASS INDEX: 27.82 KG/M2 | RESPIRATION RATE: 16 BRPM | TEMPERATURE: 98.4 F

## 2018-02-22 DIAGNOSIS — R10.9 ABDOMINAL PAIN, UNSPECIFIED ABDOMINAL LOCATION: Primary | ICD-10-CM

## 2018-02-22 DIAGNOSIS — N30.00 ACUTE CYSTITIS WITHOUT HEMATURIA: ICD-10-CM

## 2018-02-22 LAB
-: ABNORMAL
ABO/RH: NORMAL
ABSOLUTE EOS #: 0.1 K/UL (ref 0–0.4)
ABSOLUTE IMMATURE GRANULOCYTE: NORMAL K/UL (ref 0–0.3)
ABSOLUTE LYMPH #: 1.6 K/UL (ref 1–4.8)
ABSOLUTE MONO #: 0.3 K/UL (ref 0.1–1.3)
ALBUMIN SERPL-MCNC: 4.5 G/DL (ref 3.5–5.2)
ALBUMIN/GLOBULIN RATIO: ABNORMAL (ref 1–2.5)
ALP BLD-CCNC: 56 U/L (ref 35–104)
ALT SERPL-CCNC: 12 U/L (ref 5–33)
AMORPHOUS: ABNORMAL
ANION GAP SERPL CALCULATED.3IONS-SCNC: 9 MMOL/L (ref 9–17)
ANTIBODY SCREEN: NEGATIVE
ARM BAND NUMBER: NORMAL
AST SERPL-CCNC: 16 U/L
BACTERIA: ABNORMAL
BASOPHILS # BLD: 1 % (ref 0–2)
BASOPHILS ABSOLUTE: 0.1 K/UL (ref 0–0.2)
BILIRUB SERPL-MCNC: 0.28 MG/DL (ref 0.3–1.2)
BILIRUBIN URINE: NEGATIVE
BLOOD BANK COMMENT: NORMAL
BUN BLDV-MCNC: 14 MG/DL (ref 8–23)
BUN/CREAT BLD: ABNORMAL (ref 9–20)
CALCIUM SERPL-MCNC: 9.2 MG/DL (ref 8.6–10.4)
CASTS UA: ABNORMAL /LPF
CHLORIDE BLD-SCNC: 102 MMOL/L (ref 98–107)
CO2: 28 MMOL/L (ref 20–31)
COLOR: YELLOW
COMMENT UA: ABNORMAL
CREAT SERPL-MCNC: 0.8 MG/DL (ref 0.5–0.9)
CRYSTALS, UA: ABNORMAL /HPF
DIFFERENTIAL TYPE: NORMAL
EOSINOPHILS RELATIVE PERCENT: 2 % (ref 0–4)
EPITHELIAL CELLS UA: ABNORMAL /HPF
EXPIRATION DATE: NORMAL
GFR AFRICAN AMERICAN: >60 ML/MIN
GFR NON-AFRICAN AMERICAN: >60 ML/MIN
GFR SERPL CREATININE-BSD FRML MDRD: ABNORMAL ML/MIN/{1.73_M2}
GFR SERPL CREATININE-BSD FRML MDRD: ABNORMAL ML/MIN/{1.73_M2}
GLUCOSE BLD-MCNC: 100 MG/DL (ref 70–99)
GLUCOSE URINE: NEGATIVE
HCT VFR BLD CALC: 38.2 % (ref 36–46)
HEMOGLOBIN: 13.1 G/DL (ref 12–16)
IMMATURE GRANULOCYTES: NORMAL %
INR BLD: 1
KETONES, URINE: NEGATIVE
LEUKOCYTE ESTERASE, URINE: ABNORMAL
LIPASE: 15 U/L (ref 13–60)
LYMPHOCYTES # BLD: 35 % (ref 24–44)
MAGNESIUM: 2 MG/DL (ref 1.6–2.6)
MCH RBC QN AUTO: 31.3 PG (ref 26–34)
MCHC RBC AUTO-ENTMCNC: 34.3 G/DL (ref 31–37)
MCV RBC AUTO: 91.3 FL (ref 80–100)
MONOCYTES # BLD: 6 % (ref 1–7)
MUCUS: ABNORMAL
NITRITE, URINE: NEGATIVE
NRBC AUTOMATED: NORMAL PER 100 WBC
OTHER OBSERVATIONS UA: ABNORMAL
PDW BLD-RTO: 13.4 % (ref 11.5–14.9)
PH UA: 7.5 (ref 5–8)
PLATELET # BLD: 281 K/UL (ref 150–450)
PLATELET ESTIMATE: NORMAL
PMV BLD AUTO: 7.7 FL (ref 6–12)
POTASSIUM SERPL-SCNC: 4.5 MMOL/L (ref 3.7–5.3)
PROTEIN UA: NEGATIVE
PROTHROMBIN TIME: 10.3 SEC (ref 9.7–12)
RBC # BLD: 4.19 M/UL (ref 4–5.2)
RBC # BLD: NORMAL 10*6/UL
RBC UA: ABNORMAL /HPF
RENAL EPITHELIAL, UA: ABNORMAL /HPF
SEG NEUTROPHILS: 56 % (ref 36–66)
SEGMENTED NEUTROPHILS ABSOLUTE COUNT: 2.6 K/UL (ref 1.3–9.1)
SODIUM BLD-SCNC: 139 MMOL/L (ref 135–144)
SPECIFIC GRAVITY UA: 1.02 (ref 1–1.03)
TOTAL PROTEIN: 6.8 G/DL (ref 6.4–8.3)
TRICHOMONAS: ABNORMAL
TURBIDITY: CLEAR
URINE HGB: NEGATIVE
UROBILINOGEN, URINE: NORMAL
WBC # BLD: 4.7 K/UL (ref 3.5–11)
WBC # BLD: NORMAL 10*3/UL
WBC UA: ABNORMAL /HPF
YEAST: ABNORMAL

## 2018-02-22 PROCEDURE — 36415 COLL VENOUS BLD VENIPUNCTURE: CPT

## 2018-02-22 PROCEDURE — 86900 BLOOD TYPING SEROLOGIC ABO: CPT

## 2018-02-22 PROCEDURE — 6360000002 HC RX W HCPCS: Performed by: EMERGENCY MEDICINE

## 2018-02-22 PROCEDURE — 99284 EMERGENCY DEPT VISIT MOD MDM: CPT

## 2018-02-22 PROCEDURE — 96374 THER/PROPH/DIAG INJ IV PUSH: CPT

## 2018-02-22 PROCEDURE — 2580000003 HC RX 258: Performed by: EMERGENCY MEDICINE

## 2018-02-22 PROCEDURE — 85025 COMPLETE CBC W/AUTO DIFF WBC: CPT

## 2018-02-22 PROCEDURE — 6360000004 HC RX CONTRAST MEDICATION: Performed by: EMERGENCY MEDICINE

## 2018-02-22 PROCEDURE — 6370000000 HC RX 637 (ALT 250 FOR IP): Performed by: EMERGENCY MEDICINE

## 2018-02-22 PROCEDURE — 85610 PROTHROMBIN TIME: CPT

## 2018-02-22 PROCEDURE — 83690 ASSAY OF LIPASE: CPT

## 2018-02-22 PROCEDURE — 80053 COMPREHEN METABOLIC PANEL: CPT

## 2018-02-22 PROCEDURE — 74177 CT ABD & PELVIS W/CONTRAST: CPT

## 2018-02-22 PROCEDURE — 72131 CT LUMBAR SPINE W/O DYE: CPT

## 2018-02-22 PROCEDURE — 86850 RBC ANTIBODY SCREEN: CPT

## 2018-02-22 PROCEDURE — 86901 BLOOD TYPING SEROLOGIC RH(D): CPT

## 2018-02-22 PROCEDURE — 81001 URINALYSIS AUTO W/SCOPE: CPT

## 2018-02-22 PROCEDURE — 83735 ASSAY OF MAGNESIUM: CPT

## 2018-02-22 PROCEDURE — 72128 CT CHEST SPINE W/O DYE: CPT

## 2018-02-22 PROCEDURE — 87086 URINE CULTURE/COLONY COUNT: CPT

## 2018-02-22 RX ORDER — CEPHALEXIN 250 MG/1
500 CAPSULE ORAL ONCE
Status: COMPLETED | OUTPATIENT
Start: 2018-02-22 | End: 2018-02-22

## 2018-02-22 RX ORDER — MORPHINE SULFATE 4 MG/ML
4 INJECTION, SOLUTION INTRAMUSCULAR; INTRAVENOUS ONCE
Status: COMPLETED | OUTPATIENT
Start: 2018-02-22 | End: 2018-02-22

## 2018-02-22 RX ORDER — 0.9 % SODIUM CHLORIDE 0.9 %
100 INTRAVENOUS SOLUTION INTRAVENOUS ONCE
Status: COMPLETED | OUTPATIENT
Start: 2018-02-22 | End: 2018-02-22

## 2018-02-22 RX ORDER — CEPHALEXIN 500 MG/1
500 CAPSULE ORAL 3 TIMES DAILY
Qty: 21 CAPSULE | Refills: 0 | Status: SHIPPED | OUTPATIENT
Start: 2018-02-22 | End: 2018-03-01

## 2018-02-22 RX ORDER — SODIUM CHLORIDE 0.9 % (FLUSH) 0.9 %
10 SYRINGE (ML) INJECTION PRN
Status: DISCONTINUED | OUTPATIENT
Start: 2018-02-22 | End: 2018-02-22 | Stop reason: HOSPADM

## 2018-02-22 RX ORDER — 0.9 % SODIUM CHLORIDE 0.9 %
500 INTRAVENOUS SOLUTION INTRAVENOUS ONCE
Status: COMPLETED | OUTPATIENT
Start: 2018-02-22 | End: 2018-02-22

## 2018-02-22 RX ADMIN — SODIUM CHLORIDE 100 ML: 9 INJECTION, SOLUTION INTRAVENOUS at 10:38

## 2018-02-22 RX ADMIN — CEPHALEXIN 500 MG: 250 CAPSULE ORAL at 12:22

## 2018-02-22 RX ADMIN — IOPAMIDOL 100 ML: 755 INJECTION, SOLUTION INTRAVENOUS at 10:38

## 2018-02-22 RX ADMIN — SODIUM CHLORIDE 500 ML: 9 INJECTION, SOLUTION INTRAVENOUS at 10:45

## 2018-02-22 RX ADMIN — MORPHINE SULFATE 4 MG: 4 INJECTION, SOLUTION INTRAMUSCULAR; INTRAVENOUS at 09:56

## 2018-02-22 RX ADMIN — Medication 10 ML: at 10:38

## 2018-02-22 ASSESSMENT — PAIN DESCRIPTION - DESCRIPTORS
DESCRIPTORS_2: ACHING
DESCRIPTORS: ACHING

## 2018-02-22 ASSESSMENT — PAIN SCALES - GENERAL
PAINLEVEL_OUTOF10: 8
PAINLEVEL_OUTOF10: 6

## 2018-02-22 ASSESSMENT — PAIN DESCRIPTION - ORIENTATION
ORIENTATION: LOWER
ORIENTATION_2: LOWER

## 2018-02-22 ASSESSMENT — ENCOUNTER SYMPTOMS
VOMITING: 0
HEMATOCHEZIA: 0
DIARRHEA: 0
HEMATEMESIS: 0
CONSTIPATION: 0
ABDOMINAL PAIN: 1
NAUSEA: 0

## 2018-02-22 ASSESSMENT — PAIN DESCRIPTION - PAIN TYPE: TYPE: ACUTE PAIN

## 2018-02-22 ASSESSMENT — PAIN DESCRIPTION - INTENSITY: RATING_2: 10

## 2018-02-22 ASSESSMENT — PAIN DESCRIPTION - LOCATION
LOCATION: ABDOMEN
LOCATION_2: BACK

## 2018-02-22 NOTE — ED PROVIDER NOTES
evidence of acute process with multiple incidental/chronic findings as   described including tiny amount of air in the nondependent aspect of the   bladder which may be correlated for recent catheterization. LABS: All lab results were reviewed by myself, and all abnormals are listed below. Labs Reviewed   COMPREHENSIVE METABOLIC PANEL - Abnormal; Notable for the following:        Result Value    Glucose 100 (*)     Total Bilirubin 0.28 (*)     All other components within normal limits   URINALYSIS - Abnormal; Notable for the following:     Leukocyte Esterase, Urine MOD (*)     All other components within normal limits   MICROSCOPIC URINALYSIS - Abnormal; Notable for the following:     Bacteria, UA FEW (*)     All other components within normal limits   URINE CULTURE CLEAN CATCH   CBC WITH AUTO DIFFERENTIAL   MAGNESIUM   PROTIME-INR   LIPASE   TYPE AND SCREEN     EMERGENCY DEPARTMENT COURSE:     Vitals:    Vitals:    02/22/18 0848 02/22/18 0936 02/22/18 1053 02/22/18 1150   BP: (!) 181/83 (!) 174/82 (!) 161/74 (!) 181/68   Pulse: 88 82 85 89   Resp: 16 16 16 16   Temp: 98.4 °F (36.9 °C)      SpO2: 95% 96% 97% 98%   Weight: 138 lb (62.6 kg)      Height: 4' 11\" (1.499 m)        The patient was given the following medications while in the emergency department:  Orders Placed This Encounter   Medications    0.9 % sodium chloride bolus    morphine injection 4 mg    iopamidol (ISOVUE-370) 76 % injection 100 mL    0.9 % sodium chloride bolus    DISCONTD: sodium chloride flush 0.9 % injection 10 mL    cephALEXin (KEFLEX) capsule 500 mg    cephALEXin (KEFLEX) 500 MG capsule     Sig: Take 1 capsule by mouth 3 times daily for 7 days     Dispense:  21 capsule     Refill:  0     FINAL IMPRESSION      1. Abdominal pain, unspecified abdominal location    2.  Acute cystitis without hematuria          DISPOSITION/PLAN   DISPOSITION        PATIENT REFERRED TO:  Vic Terrazas MD  [place] [address] [address] [facility]    Schedule an appointment as soon as possible for a visit in 1 day      Nani Hollis MD  Attending Emergency Physician  [employer] voice recognition software used in portions of this document.                     Nani Hollis MD  02/22/18 7258

## 2018-02-23 LAB
CULTURE: NORMAL
CULTURE: NORMAL
Lab: NORMAL
SPECIMEN DESCRIPTION: NORMAL
SPECIMEN DESCRIPTION: NORMAL
STATUS: NORMAL

## 2018-10-10 ENCOUNTER — OFFICE VISIT (OUTPATIENT)
Dept: FAMILY MEDICINE CLINIC | Age: 83
End: 2018-10-10
Payer: MEDICARE

## 2018-10-10 VITALS
HEART RATE: 88 BPM | BODY MASS INDEX: 28.1 KG/M2 | TEMPERATURE: 97.2 F | DIASTOLIC BLOOD PRESSURE: 76 MMHG | OXYGEN SATURATION: 95 % | WEIGHT: 139.4 LBS | HEIGHT: 59 IN | RESPIRATION RATE: 16 BRPM | SYSTOLIC BLOOD PRESSURE: 118 MMHG

## 2018-10-10 DIAGNOSIS — M81.0 AGE-RELATED OSTEOPOROSIS WITHOUT CURRENT PATHOLOGICAL FRACTURE: ICD-10-CM

## 2018-10-10 DIAGNOSIS — I10 ESSENTIAL HYPERTENSION: ICD-10-CM

## 2018-10-10 DIAGNOSIS — G62.9 PERIPHERAL POLYNEUROPATHY: ICD-10-CM

## 2018-10-10 DIAGNOSIS — R27.0 ATAXIA: ICD-10-CM

## 2018-10-10 DIAGNOSIS — M70.62 GREATER TROCHANTERIC BURSITIS OF LEFT HIP: Primary | ICD-10-CM

## 2018-10-10 DIAGNOSIS — Z23 NEED FOR INFLUENZA VACCINATION: ICD-10-CM

## 2018-10-10 DIAGNOSIS — G35 MULTIPLE SCLEROSIS (HCC): ICD-10-CM

## 2018-10-10 PROCEDURE — G0008 ADMIN INFLUENZA VIRUS VAC: HCPCS | Performed by: INTERNAL MEDICINE

## 2018-10-10 PROCEDURE — 1090F PRES/ABSN URINE INCON ASSESS: CPT | Performed by: INTERNAL MEDICINE

## 2018-10-10 PROCEDURE — 4040F PNEUMOC VAC/ADMIN/RCVD: CPT | Performed by: INTERNAL MEDICINE

## 2018-10-10 PROCEDURE — 99203 OFFICE O/P NEW LOW 30 MIN: CPT | Performed by: INTERNAL MEDICINE

## 2018-10-10 PROCEDURE — G8482 FLU IMMUNIZE ORDER/ADMIN: HCPCS | Performed by: INTERNAL MEDICINE

## 2018-10-10 PROCEDURE — G8400 PT W/DXA NO RESULTS DOC: HCPCS | Performed by: INTERNAL MEDICINE

## 2018-10-10 PROCEDURE — 90662 IIV NO PRSV INCREASED AG IM: CPT | Performed by: INTERNAL MEDICINE

## 2018-10-10 PROCEDURE — G8419 CALC BMI OUT NRM PARAM NOF/U: HCPCS | Performed by: INTERNAL MEDICINE

## 2018-10-10 PROCEDURE — 1123F ACP DISCUSS/DSCN MKR DOCD: CPT | Performed by: INTERNAL MEDICINE

## 2018-10-10 PROCEDURE — 1036F TOBACCO NON-USER: CPT | Performed by: INTERNAL MEDICINE

## 2018-10-10 PROCEDURE — G8427 DOCREV CUR MEDS BY ELIG CLIN: HCPCS | Performed by: INTERNAL MEDICINE

## 2018-10-10 PROCEDURE — 1101F PT FALLS ASSESS-DOCD LE1/YR: CPT | Performed by: INTERNAL MEDICINE

## 2018-10-10 RX ORDER — ALENDRONATE SODIUM 10 MG/1
10 TABLET ORAL
Qty: 90 TABLET | Refills: 1 | Status: ON HOLD | OUTPATIENT
Start: 2018-10-10 | End: 2019-08-19 | Stop reason: HOSPADM

## 2018-10-10 RX ORDER — METHYLPREDNISOLONE 4 MG/1
TABLET ORAL
Qty: 1 KIT | Refills: 0 | Status: SHIPPED | OUTPATIENT
Start: 2018-10-10 | End: 2018-11-07 | Stop reason: ALTCHOICE

## 2018-10-10 RX ORDER — GABAPENTIN 100 MG/1
100 CAPSULE ORAL 2 TIMES DAILY
Qty: 60 CAPSULE | Refills: 0 | Status: ON HOLD | OUTPATIENT
Start: 2018-10-10 | End: 2019-03-08 | Stop reason: HOSPADM

## 2018-10-10 ASSESSMENT — PATIENT HEALTH QUESTIONNAIRE - PHQ9
SUM OF ALL RESPONSES TO PHQ9 QUESTIONS 1 & 2: 0
SUM OF ALL RESPONSES TO PHQ QUESTIONS 1-9: 0
1. LITTLE INTEREST OR PLEASURE IN DOING THINGS: 0
2. FEELING DOWN, DEPRESSED OR HOPELESS: 0
SUM OF ALL RESPONSES TO PHQ QUESTIONS 1-9: 0

## 2018-10-16 DIAGNOSIS — M25.552 LEFT HIP PAIN: Primary | ICD-10-CM

## 2018-10-19 ENCOUNTER — HOSPITAL ENCOUNTER (OUTPATIENT)
Age: 83
Discharge: HOME | End: 2018-10-21
Payer: MEDICARE

## 2018-10-19 ENCOUNTER — HOSPITAL ENCOUNTER (OUTPATIENT)
Dept: GENERAL RADIOLOGY | Facility: CLINIC | Age: 83
Discharge: HOME | End: 2018-10-21
Payer: MEDICARE

## 2018-10-19 DIAGNOSIS — M25.552 LEFT HIP PAIN: ICD-10-CM

## 2018-10-19 PROCEDURE — 73502 X-RAY EXAM HIP UNI 2-3 VIEWS: CPT

## 2018-10-22 ENCOUNTER — PATIENT MESSAGE (OUTPATIENT)
Dept: FAMILY MEDICINE CLINIC | Age: 83
End: 2018-10-22

## 2018-10-22 DIAGNOSIS — M16.9 OSTEOARTHRITIS OF HIP, UNSPECIFIED LATERALITY, UNSPECIFIED OSTEOARTHRITIS TYPE: Primary | ICD-10-CM

## 2018-11-01 ENCOUNTER — PATIENT MESSAGE (OUTPATIENT)
Dept: FAMILY MEDICINE CLINIC | Age: 83
End: 2018-11-01

## 2018-11-01 NOTE — TELEPHONE ENCOUNTER
Last visit: 10/10/18  Last Med refill: HISTORICAL    Next Visit Date:  Future Appointments  Date Time Provider Cj Tara   11/7/2018 3:30 PM Pauly Pierce  [address] [facility]   Topic Date Due    DTaP/Tdap/Td vaccine (1 - Tdap) [birth year]/1953    DEXA (modify frequency per FRAX score)  12/08/1999    Shingles Vaccine (1 of 2 - 2 Dose Series) 10/10/2019 (Originally 12/8/1984)    Potassium monitoring  02/22/2019    Creatinine monitoring  02/22/2019    Flu vaccine  Completed    Pneumococcal low/med risk  Completed       No results found for: LABA1C          ( goal A1C is < 7)   No results found for: LABMICR  LDL Cholesterol (mg/dL)   Date Value   01/04/2012 167 (H)       (goal LDL is <100)   AST (U/L)   Date Value   02/22/2018 16     ALT (U/L)   Date Value   02/22/2018 12     BUN (mg/dL)   Date Value   02/22/2018 14     BP Readings from Last 3 Encounters:   10/10/18 118/76   02/22/18 (!) 181/68   10/03/17 (!) 159/76          (goal 120/80)    All Future Testing planned in [facility]              Patient Active Problem List:     Ataxia     Multiple sclerosis ([facility] [address]     Acute cystitis without hematuria

## 2018-11-02 RX ORDER — TIZANIDINE 4 MG/1
4 TABLET ORAL EVERY 6 HOURS PRN
Qty: 30 TABLET | Refills: 0 | Status: ON HOLD | OUTPATIENT
Start: 2018-11-02 | End: 2019-03-08 | Stop reason: HOSPADM

## 2018-11-07 ENCOUNTER — OFFICE VISIT (OUTPATIENT)
Dept: FAMILY MEDICINE CLINIC | Age: 83
End: 2018-11-07
Payer: MEDICARE

## 2018-11-07 VITALS
HEART RATE: 97 BPM | WEIGHT: 139.8 LBS | DIASTOLIC BLOOD PRESSURE: 60 MMHG | SYSTOLIC BLOOD PRESSURE: 108 MMHG | OXYGEN SATURATION: 95 % | TEMPERATURE: 98 F | BODY MASS INDEX: 28.48 KG/M2 | RESPIRATION RATE: 16 BRPM

## 2018-11-07 DIAGNOSIS — E78.5 HYPERLIPIDEMIA, UNSPECIFIED HYPERLIPIDEMIA TYPE: ICD-10-CM

## 2018-11-07 DIAGNOSIS — M25.552 LEFT HIP PAIN: Primary | ICD-10-CM

## 2018-11-07 DIAGNOSIS — I10 ESSENTIAL HYPERTENSION: ICD-10-CM

## 2018-11-07 DIAGNOSIS — R27.0 ATAXIA: ICD-10-CM

## 2018-11-07 DIAGNOSIS — M81.0 AGE-RELATED OSTEOPOROSIS WITHOUT CURRENT PATHOLOGICAL FRACTURE: ICD-10-CM

## 2018-11-07 PROCEDURE — 4040F PNEUMOC VAC/ADMIN/RCVD: CPT | Performed by: INTERNAL MEDICINE

## 2018-11-07 PROCEDURE — 1090F PRES/ABSN URINE INCON ASSESS: CPT | Performed by: INTERNAL MEDICINE

## 2018-11-07 PROCEDURE — G8400 PT W/DXA NO RESULTS DOC: HCPCS | Performed by: INTERNAL MEDICINE

## 2018-11-07 PROCEDURE — 1101F PT FALLS ASSESS-DOCD LE1/YR: CPT | Performed by: INTERNAL MEDICINE

## 2018-11-07 PROCEDURE — G8482 FLU IMMUNIZE ORDER/ADMIN: HCPCS | Performed by: INTERNAL MEDICINE

## 2018-11-07 PROCEDURE — G8427 DOCREV CUR MEDS BY ELIG CLIN: HCPCS | Performed by: INTERNAL MEDICINE

## 2018-11-07 PROCEDURE — 99214 OFFICE O/P EST MOD 30 MIN: CPT | Performed by: INTERNAL MEDICINE

## 2018-11-07 PROCEDURE — G8419 CALC BMI OUT NRM PARAM NOF/U: HCPCS | Performed by: INTERNAL MEDICINE

## 2018-11-07 PROCEDURE — 1036F TOBACCO NON-USER: CPT | Performed by: INTERNAL MEDICINE

## 2018-11-07 PROCEDURE — 1123F ACP DISCUSS/DSCN MKR DOCD: CPT | Performed by: INTERNAL MEDICINE

## 2018-11-28 DIAGNOSIS — G89.29 CHRONIC LEFT-SIDED LOW BACK PAIN, WITH SCIATICA PRESENCE UNSPECIFIED: ICD-10-CM

## 2018-11-28 DIAGNOSIS — M54.5 CHRONIC LEFT-SIDED LOW BACK PAIN, WITH SCIATICA PRESENCE UNSPECIFIED: ICD-10-CM

## 2018-11-28 DIAGNOSIS — M25.552 LEFT HIP PAIN: Primary | ICD-10-CM

## 2018-11-30 ENCOUNTER — PATIENT MESSAGE (OUTPATIENT)
Dept: FAMILY MEDICINE CLINIC | Age: 83
End: 2018-11-30

## 2018-11-30 RX ORDER — SIMVASTATIN 20 MG
20 TABLET ORAL NIGHTLY
Qty: 30 TABLET | Refills: 5 | Status: SHIPPED | OUTPATIENT
Start: 2018-11-30 | End: 2019-05-24 | Stop reason: SDUPTHER

## 2018-11-30 NOTE — TELEPHONE ENCOUNTER
From: Emanuel Martinez  To:  Barbra Martinez MD  Sent: 11/30/2018 10:15 AM EST  Subject: Prescription Question     I also need a refill on the below medication:    simvastatin 20 MG tablet  Commonly known as: ZOCOR  Take 20 mg by mouth night

## 2018-12-06 ENCOUNTER — HOSPITAL ENCOUNTER (OUTPATIENT)
Dept: MRI IMAGING | Age: 83
Discharge: HOME | End: 2018-12-08
Payer: MEDICARE

## 2018-12-06 DIAGNOSIS — M25.552 LEFT HIP PAIN: ICD-10-CM

## 2018-12-06 DIAGNOSIS — G89.29 CHRONIC LEFT-SIDED LOW BACK PAIN, WITH SCIATICA PRESENCE UNSPECIFIED: ICD-10-CM

## 2018-12-06 DIAGNOSIS — M54.5 CHRONIC LEFT-SIDED LOW BACK PAIN, WITH SCIATICA PRESENCE UNSPECIFIED: ICD-10-CM

## 2018-12-06 PROCEDURE — 73721 MRI JNT OF LWR EXTRE W/O DYE: CPT

## 2018-12-06 PROCEDURE — 72148 MRI LUMBAR SPINE W/O DYE: CPT

## 2018-12-11 DIAGNOSIS — M54.50 CHRONIC MIDLINE LOW BACK PAIN WITHOUT SCIATICA: ICD-10-CM

## 2018-12-11 DIAGNOSIS — M25.552 LEFT HIP PAIN: Primary | ICD-10-CM

## 2018-12-11 DIAGNOSIS — M67.952 TENDINOPATHY OF LEFT GLUTEUS MEDIUS: ICD-10-CM

## 2018-12-11 DIAGNOSIS — G89.29 OTHER CHRONIC PAIN: ICD-10-CM

## 2018-12-11 DIAGNOSIS — G89.29 CHRONIC MIDLINE LOW BACK PAIN WITHOUT SCIATICA: ICD-10-CM

## 2018-12-11 RX ORDER — TRAMADOL HYDROCHLORIDE 50 MG/1
50 TABLET ORAL EVERY 8 HOURS PRN
Qty: 90 TABLET | Refills: 1 | Status: SHIPPED | OUTPATIENT
Start: 2018-12-11 | End: 2019-01-10

## 2018-12-28 ENCOUNTER — OFFICE VISIT (OUTPATIENT)
Dept: ORTHOPEDIC SURGERY | Age: 83
End: 2018-12-28
Payer: MEDICARE

## 2018-12-28 VITALS — BODY MASS INDEX: 28.18 KG/M2 | WEIGHT: 139.77 LBS | HEIGHT: 59 IN

## 2018-12-28 DIAGNOSIS — M25.552 HIP PAIN, LEFT: ICD-10-CM

## 2018-12-28 DIAGNOSIS — M76.892 HIP FLEXOR TENDONITIS, LEFT: Primary | ICD-10-CM

## 2018-12-28 PROCEDURE — 99203 OFFICE O/P NEW LOW 30 MIN: CPT | Performed by: ORTHOPAEDIC SURGERY

## 2018-12-28 PROCEDURE — G8419 CALC BMI OUT NRM PARAM NOF/U: HCPCS | Performed by: ORTHOPAEDIC SURGERY

## 2018-12-28 PROCEDURE — 1090F PRES/ABSN URINE INCON ASSESS: CPT | Performed by: ORTHOPAEDIC SURGERY

## 2018-12-28 PROCEDURE — G8482 FLU IMMUNIZE ORDER/ADMIN: HCPCS | Performed by: ORTHOPAEDIC SURGERY

## 2018-12-28 PROCEDURE — 1101F PT FALLS ASSESS-DOCD LE1/YR: CPT | Performed by: ORTHOPAEDIC SURGERY

## 2018-12-28 PROCEDURE — G8427 DOCREV CUR MEDS BY ELIG CLIN: HCPCS | Performed by: ORTHOPAEDIC SURGERY

## 2018-12-28 PROCEDURE — 20610 DRAIN/INJ JOINT/BURSA W/O US: CPT | Performed by: ORTHOPAEDIC SURGERY

## 2018-12-28 RX ORDER — BUPIVACAINE HYDROCHLORIDE 2.5 MG/ML
2 INJECTION, SOLUTION INFILTRATION; PERINEURAL ONCE
Status: COMPLETED | OUTPATIENT
Start: 2018-12-28 | End: 2018-12-28

## 2018-12-28 RX ORDER — MELOXICAM 15 MG/1
15 TABLET ORAL DAILY
Qty: 30 TABLET | Refills: 2 | Status: ON HOLD | OUTPATIENT
Start: 2018-12-28 | End: 2019-03-08 | Stop reason: HOSPADM

## 2018-12-28 RX ORDER — METHYLPREDNISOLONE ACETATE 80 MG/ML
80 INJECTION, SUSPENSION INTRA-ARTICULAR; INTRALESIONAL; INTRAMUSCULAR; SOFT TISSUE ONCE
Status: COMPLETED | OUTPATIENT
Start: 2018-12-28 | End: 2018-12-28

## 2018-12-28 RX ADMIN — BUPIVACAINE HYDROCHLORIDE 5 MG: 2.5 INJECTION, SOLUTION INFILTRATION; PERINEURAL at 16:02

## 2018-12-28 RX ADMIN — METHYLPREDNISOLONE ACETATE 80 MG: 80 INJECTION, SUSPENSION INTRA-ARTICULAR; INTRALESIONAL; INTRAMUSCULAR; SOFT TISSUE at 16:02

## 2018-12-28 ASSESSMENT — ENCOUNTER SYMPTOMS
CHEST TIGHTNESS: 0
APNEA: 0
VOMITING: 0
ABDOMINAL PAIN: 0
COUGH: 0

## 2018-12-28 NOTE — PROGRESS NOTES
are greatest at L1-L2. Multiple endplate Schmorl's node deformities are noted. SPINAL CORD: Conus terminates at L1 and is grossly normal in appearance SOFT TISSUES: Multilevel disc space narrowing and disc desiccation is noted. No paraspinal masses are noted. There is no retroperitoneal adenopathy. In the lower thoracic spine, minor multilevel disc bulging is noted on the sagittal imaging. This is greatest at T12-L1 L1-L2: Uncovering of the disc is noted. There is mild superimposed diffuse disc bulging with annular tear. Facet hypertrophic changes are noted. Mild proximal foraminal narrowing on the left is suggested. L2-L3: Minimal disc bulging is noted. Facet hypertrophic changes are noted L3-L4: Mild disc bulging is noted diffusely. Facet and ligament hypertrophic changes are noted. Mild narrowing of the canal is noted. L4-L5: Minimal disc bulging is noted. Facet and ligament hypertrophic changes are noted. Severe narrowing of the canal is noted. Mild proximal foraminal narrowing on the right is noted. L5-S1: Minimal disc bulging is noted. Facet hypertrophic changes are noted. Mild-to-moderate right and mild left foraminal narrowing is noted. Multilevel degenerative disc disease in the lumbar spine as described. See above for details. Mri Hip Left Wo Contrast    Result Date: 12/7/2018  EXAMINATION: MRI OF THE LEFT HIP WITHOUT CONTRAST, 12/6/2018 6:04 pm TECHNIQUE: Multiplanar multisequence MRI of the hip was performed without the administration of intravenous contrast. COMPARISON: Radiographs October 8, 2014. HISTORY: ORDERING SYSTEM PROVIDED HISTORY: Left hip pain TECHNOLOGIST PROVIDED HISTORY: ongoing severe hip pain Ordering Physician Provided Reason for Exam: Left hip pain M25.552; Chronic left-sided low back pain, with sciatica presence unspecified Acuity: Chronic Type of Exam: Ongoing FINDINGS: BONE MARROW: No acute fracture. No suspicious bone marrow replacing lesion.  HIP JOINT:

## 2019-01-02 ENCOUNTER — PATIENT MESSAGE (OUTPATIENT)
Dept: FAMILY MEDICINE CLINIC | Age: 84
End: 2019-01-02

## 2019-01-02 RX ORDER — AMITRIPTYLINE HYDROCHLORIDE 25 MG/1
25 TABLET, FILM COATED ORAL NIGHTLY
Qty: 90 TABLET | Refills: 1 | Status: SHIPPED | OUTPATIENT
Start: 2019-01-02 | End: 2019-01-08 | Stop reason: SDUPTHER

## 2019-01-07 ENCOUNTER — HOSPITAL ENCOUNTER (OUTPATIENT)
Dept: PHYSICAL THERAPY | Age: 84
Setting detail: THERAPIES SERIES
Discharge: HOME | End: 2019-01-07
Payer: MEDICARE

## 2019-01-07 PROCEDURE — G8978 MOBILITY CURRENT STATUS: HCPCS

## 2019-01-07 PROCEDURE — 97110 THERAPEUTIC EXERCISES: CPT

## 2019-01-07 PROCEDURE — G8979 MOBILITY GOAL STATUS: HCPCS

## 2019-01-07 PROCEDURE — 97162 PT EVAL MOD COMPLEX 30 MIN: CPT

## 2019-01-08 ENCOUNTER — PATIENT MESSAGE (OUTPATIENT)
Dept: FAMILY MEDICINE CLINIC | Age: 84
End: 2019-01-08

## 2019-01-08 RX ORDER — AMITRIPTYLINE HYDROCHLORIDE 100 MG/1
100 TABLET, FILM COATED ORAL NIGHTLY
Qty: 90 TABLET | Refills: 1 | Status: ON HOLD | OUTPATIENT
Start: 2019-01-08 | End: 2019-03-08 | Stop reason: HOSPADM

## 2019-01-09 ENCOUNTER — HOSPITAL ENCOUNTER (OUTPATIENT)
Dept: PHYSICAL THERAPY | Age: 84
Setting detail: THERAPIES SERIES
Discharge: HOME | End: 2019-01-09
Payer: MEDICARE

## 2019-01-09 PROCEDURE — 97110 THERAPEUTIC EXERCISES: CPT

## 2019-01-09 PROCEDURE — 97112 NEUROMUSCULAR REEDUCATION: CPT

## 2019-01-14 ENCOUNTER — HOSPITAL ENCOUNTER (OUTPATIENT)
Dept: PHYSICAL THERAPY | Age: 84
Setting detail: THERAPIES SERIES
Discharge: HOME | End: 2019-01-14
Payer: MEDICARE

## 2019-01-14 PROCEDURE — 97110 THERAPEUTIC EXERCISES: CPT

## 2019-01-14 PROCEDURE — 97140 MANUAL THERAPY 1/> REGIONS: CPT

## 2019-01-16 ENCOUNTER — HOSPITAL ENCOUNTER (OUTPATIENT)
Dept: PHYSICAL THERAPY | Age: 84
Setting detail: THERAPIES SERIES
Discharge: HOME | End: 2019-01-16
Payer: MEDICARE

## 2019-01-16 PROCEDURE — 97110 THERAPEUTIC EXERCISES: CPT

## 2019-01-17 ENCOUNTER — HOSPITAL ENCOUNTER (OUTPATIENT)
Dept: PHYSICAL THERAPY | Age: 84
Setting detail: THERAPIES SERIES
Discharge: HOME | End: 2019-01-17
Payer: MEDICARE

## 2019-01-17 PROCEDURE — 97110 THERAPEUTIC EXERCISES: CPT

## 2019-01-21 ENCOUNTER — HOSPITAL ENCOUNTER (OUTPATIENT)
Dept: PHYSICAL THERAPY | Age: 84
Setting detail: THERAPIES SERIES
Discharge: HOME | End: 2019-01-21
Payer: MEDICARE

## 2019-01-21 PROCEDURE — 97110 THERAPEUTIC EXERCISES: CPT

## 2019-01-23 ENCOUNTER — HOSPITAL ENCOUNTER (OUTPATIENT)
Dept: PHYSICAL THERAPY | Age: 84
Setting detail: THERAPIES SERIES
Discharge: HOME | End: 2019-01-23
Payer: MEDICARE

## 2019-01-23 PROCEDURE — 97140 MANUAL THERAPY 1/> REGIONS: CPT

## 2019-01-23 PROCEDURE — 97110 THERAPEUTIC EXERCISES: CPT

## 2019-01-24 ENCOUNTER — HOSPITAL ENCOUNTER (OUTPATIENT)
Dept: PHYSICAL THERAPY | Age: 84
Setting detail: THERAPIES SERIES
Discharge: HOME | End: 2019-01-24
Payer: MEDICARE

## 2019-01-24 PROCEDURE — 97110 THERAPEUTIC EXERCISES: CPT

## 2019-01-28 ENCOUNTER — HOSPITAL ENCOUNTER (OUTPATIENT)
Dept: PHYSICAL THERAPY | Age: 84
Setting detail: THERAPIES SERIES
Discharge: HOME | End: 2019-01-28
Payer: MEDICARE

## 2019-01-28 PROCEDURE — 97110 THERAPEUTIC EXERCISES: CPT

## 2019-01-28 PROCEDURE — 97140 MANUAL THERAPY 1/> REGIONS: CPT

## 2019-01-30 ENCOUNTER — APPOINTMENT (OUTPATIENT)
Dept: PHYSICAL THERAPY | Age: 84
End: 2019-01-30
Payer: MEDICARE

## 2019-01-31 ENCOUNTER — APPOINTMENT (OUTPATIENT)
Dept: PHYSICAL THERAPY | Age: 84
End: 2019-01-31
Payer: MEDICARE

## 2019-02-04 ENCOUNTER — HOSPITAL ENCOUNTER (OUTPATIENT)
Dept: PHYSICAL THERAPY | Age: 84
Setting detail: THERAPIES SERIES
Discharge: HOME | End: 2019-02-04
Payer: MEDICARE

## 2019-02-04 PROCEDURE — 97110 THERAPEUTIC EXERCISES: CPT

## 2019-02-04 PROCEDURE — 97140 MANUAL THERAPY 1/> REGIONS: CPT

## 2019-02-06 ENCOUNTER — HOSPITAL ENCOUNTER (OUTPATIENT)
Dept: PHYSICAL THERAPY | Age: 84
Setting detail: THERAPIES SERIES
Discharge: HOME | End: 2019-02-06
Payer: MEDICARE

## 2019-02-06 PROCEDURE — 97110 THERAPEUTIC EXERCISES: CPT

## 2019-02-08 ENCOUNTER — HOSPITAL ENCOUNTER (OUTPATIENT)
Dept: PHYSICAL THERAPY | Age: 84
Setting detail: THERAPIES SERIES
Discharge: HOME | End: 2019-02-08
Payer: MEDICARE

## 2019-02-08 PROCEDURE — 97110 THERAPEUTIC EXERCISES: CPT

## 2019-02-11 ENCOUNTER — HOSPITAL ENCOUNTER (OUTPATIENT)
Dept: PHYSICAL THERAPY | Age: 84
Setting detail: THERAPIES SERIES
Discharge: HOME | End: 2019-02-11
Payer: MEDICARE

## 2019-02-11 PROCEDURE — 97140 MANUAL THERAPY 1/> REGIONS: CPT

## 2019-02-11 PROCEDURE — 97110 THERAPEUTIC EXERCISES: CPT

## 2019-02-13 ENCOUNTER — HOSPITAL ENCOUNTER (OUTPATIENT)
Dept: PHYSICAL THERAPY | Age: 84
Setting detail: THERAPIES SERIES
Discharge: HOME | End: 2019-02-13
Payer: MEDICARE

## 2019-02-13 PROCEDURE — 97110 THERAPEUTIC EXERCISES: CPT

## 2019-02-14 ENCOUNTER — OFFICE VISIT (OUTPATIENT)
Dept: FAMILY MEDICINE CLINIC | Age: 84
End: 2019-02-14
Payer: MEDICARE

## 2019-02-14 VITALS
OXYGEN SATURATION: 98 % | SYSTOLIC BLOOD PRESSURE: 120 MMHG | TEMPERATURE: 98.1 F | HEART RATE: 91 BPM | WEIGHT: 141.8 LBS | BODY MASS INDEX: 28.89 KG/M2 | RESPIRATION RATE: 16 BRPM | DIASTOLIC BLOOD PRESSURE: 66 MMHG

## 2019-02-14 DIAGNOSIS — R73.9 ELEVATED BLOOD SUGAR: ICD-10-CM

## 2019-02-14 DIAGNOSIS — E78.5 HYPERLIPIDEMIA, UNSPECIFIED HYPERLIPIDEMIA TYPE: ICD-10-CM

## 2019-02-14 DIAGNOSIS — G35 MULTIPLE SCLEROSIS (HCC): ICD-10-CM

## 2019-02-14 DIAGNOSIS — I10 ESSENTIAL HYPERTENSION: ICD-10-CM

## 2019-02-14 DIAGNOSIS — R30.0 DYSURIA: Primary | ICD-10-CM

## 2019-02-14 LAB
BILIRUBIN, POC: NORMAL
BLOOD URINE, POC: NORMAL
CLARITY, POC: NORMAL
COLOR, POC: NORMAL
GLUCOSE URINE, POC: NORMAL
HBA1C MFR BLD: 6 %
KETONES, POC: NORMAL
LEUKOCYTE EST, POC: NORMAL
NITRITE, POC: POSITIVE
PH, POC: 6
PROTEIN, POC: NORMAL
SPECIFIC GRAVITY, POC: <=1.005
UROBILINOGEN, POC: 0.2

## 2019-02-14 PROCEDURE — 99214 OFFICE O/P EST MOD 30 MIN: CPT | Performed by: INTERNAL MEDICINE

## 2019-02-14 PROCEDURE — G8482 FLU IMMUNIZE ORDER/ADMIN: HCPCS | Performed by: INTERNAL MEDICINE

## 2019-02-14 PROCEDURE — G8419 CALC BMI OUT NRM PARAM NOF/U: HCPCS | Performed by: INTERNAL MEDICINE

## 2019-02-14 PROCEDURE — 83036 HEMOGLOBIN GLYCOSYLATED A1C: CPT | Performed by: INTERNAL MEDICINE

## 2019-02-14 PROCEDURE — 1123F ACP DISCUSS/DSCN MKR DOCD: CPT | Performed by: INTERNAL MEDICINE

## 2019-02-14 PROCEDURE — G8427 DOCREV CUR MEDS BY ELIG CLIN: HCPCS | Performed by: INTERNAL MEDICINE

## 2019-02-14 PROCEDURE — G8400 PT W/DXA NO RESULTS DOC: HCPCS | Performed by: INTERNAL MEDICINE

## 2019-02-14 PROCEDURE — 81002 URINALYSIS NONAUTO W/O SCOPE: CPT | Performed by: INTERNAL MEDICINE

## 2019-02-14 PROCEDURE — 4040F PNEUMOC VAC/ADMIN/RCVD: CPT | Performed by: INTERNAL MEDICINE

## 2019-02-14 PROCEDURE — 1036F TOBACCO NON-USER: CPT | Performed by: INTERNAL MEDICINE

## 2019-02-14 PROCEDURE — 1101F PT FALLS ASSESS-DOCD LE1/YR: CPT | Performed by: INTERNAL MEDICINE

## 2019-02-14 PROCEDURE — 1090F PRES/ABSN URINE INCON ASSESS: CPT | Performed by: INTERNAL MEDICINE

## 2019-02-14 RX ORDER — TRAMADOL HYDROCHLORIDE 50 MG/1
50 TABLET ORAL EVERY 8 HOURS PRN
Refills: 1 | COMMUNITY
Start: 2019-01-29 | End: 2019-02-19 | Stop reason: SDUPTHER

## 2019-02-14 RX ORDER — NITROFURANTOIN 25; 75 MG/1; MG/1
100 CAPSULE ORAL 2 TIMES DAILY
Qty: 20 CAPSULE | Refills: 0 | Status: SHIPPED | OUTPATIENT
Start: 2019-02-14 | End: 2019-02-24

## 2019-02-14 ASSESSMENT — PATIENT HEALTH QUESTIONNAIRE - PHQ9
SUM OF ALL RESPONSES TO PHQ QUESTIONS 1-9: 0
2. FEELING DOWN, DEPRESSED OR HOPELESS: 0
SUM OF ALL RESPONSES TO PHQ QUESTIONS 1-9: 0
1. LITTLE INTEREST OR PLEASURE IN DOING THINGS: 0
SUM OF ALL RESPONSES TO PHQ9 QUESTIONS 1 & 2: 0

## 2019-02-15 ENCOUNTER — HOSPITAL ENCOUNTER (OUTPATIENT)
Dept: PHYSICAL THERAPY | Age: 84
Setting detail: THERAPIES SERIES
Discharge: HOME | End: 2019-02-15
Payer: MEDICARE

## 2019-02-15 PROCEDURE — 97110 THERAPEUTIC EXERCISES: CPT

## 2019-02-19 DIAGNOSIS — G89.29 OTHER CHRONIC PAIN: ICD-10-CM

## 2019-02-19 RX ORDER — TRAMADOL HYDROCHLORIDE 50 MG/1
50 TABLET ORAL EVERY 6 HOURS PRN
Qty: 120 TABLET | Refills: 1 | Status: ON HOLD | OUTPATIENT
Start: 2019-02-19 | End: 2019-03-08 | Stop reason: HOSPADM

## 2019-02-21 ENCOUNTER — OFFICE VISIT (OUTPATIENT)
Dept: ORTHOPEDIC SURGERY | Age: 84
End: 2019-02-21
Payer: MEDICARE

## 2019-02-21 VITALS — BODY MASS INDEX: 28.58 KG/M2 | HEIGHT: 59 IN | WEIGHT: 141.76 LBS

## 2019-02-21 DIAGNOSIS — M76.892 HIP FLEXOR TENDONITIS, LEFT: Primary | ICD-10-CM

## 2019-02-21 PROCEDURE — 1036F TOBACCO NON-USER: CPT | Performed by: ORTHOPAEDIC SURGERY

## 2019-02-21 PROCEDURE — 99213 OFFICE O/P EST LOW 20 MIN: CPT | Performed by: ORTHOPAEDIC SURGERY

## 2019-02-21 PROCEDURE — 1123F ACP DISCUSS/DSCN MKR DOCD: CPT | Performed by: ORTHOPAEDIC SURGERY

## 2019-02-21 PROCEDURE — 1090F PRES/ABSN URINE INCON ASSESS: CPT | Performed by: ORTHOPAEDIC SURGERY

## 2019-02-21 PROCEDURE — 1101F PT FALLS ASSESS-DOCD LE1/YR: CPT | Performed by: ORTHOPAEDIC SURGERY

## 2019-02-21 PROCEDURE — G8419 CALC BMI OUT NRM PARAM NOF/U: HCPCS | Performed by: ORTHOPAEDIC SURGERY

## 2019-02-21 PROCEDURE — 20610 DRAIN/INJ JOINT/BURSA W/O US: CPT | Performed by: ORTHOPAEDIC SURGERY

## 2019-02-21 PROCEDURE — 4040F PNEUMOC VAC/ADMIN/RCVD: CPT | Performed by: ORTHOPAEDIC SURGERY

## 2019-02-21 PROCEDURE — G8482 FLU IMMUNIZE ORDER/ADMIN: HCPCS | Performed by: ORTHOPAEDIC SURGERY

## 2019-02-21 PROCEDURE — G8427 DOCREV CUR MEDS BY ELIG CLIN: HCPCS | Performed by: ORTHOPAEDIC SURGERY

## 2019-02-21 PROCEDURE — G8400 PT W/DXA NO RESULTS DOC: HCPCS | Performed by: ORTHOPAEDIC SURGERY

## 2019-02-21 RX ORDER — BUPIVACAINE HYDROCHLORIDE 2.5 MG/ML
2 INJECTION, SOLUTION INFILTRATION; PERINEURAL ONCE
Status: COMPLETED | OUTPATIENT
Start: 2019-02-21 | End: 2019-02-22

## 2019-02-21 RX ORDER — METHYLPREDNISOLONE ACETATE 80 MG/ML
80 INJECTION, SUSPENSION INTRA-ARTICULAR; INTRALESIONAL; INTRAMUSCULAR; SOFT TISSUE ONCE
Status: COMPLETED | OUTPATIENT
Start: 2019-02-21 | End: 2019-02-22

## 2019-02-21 ASSESSMENT — ENCOUNTER SYMPTOMS
BACK PAIN: 0
SHORTNESS OF BREATH: 0
WHEEZING: 0

## 2019-02-22 RX ADMIN — BUPIVACAINE HYDROCHLORIDE 5 MG: 2.5 INJECTION, SOLUTION INFILTRATION; PERINEURAL at 16:34

## 2019-02-22 RX ADMIN — METHYLPREDNISOLONE ACETATE 80 MG: 80 INJECTION, SUSPENSION INTRA-ARTICULAR; INTRALESIONAL; INTRAMUSCULAR; SOFT TISSUE at 16:34

## 2019-02-25 ENCOUNTER — APPOINTMENT (OUTPATIENT)
Dept: CT IMAGING | Age: 84
End: 2019-02-25
Payer: MEDICARE

## 2019-02-25 ENCOUNTER — HOSPITAL ENCOUNTER (OUTPATIENT)
Age: 84
Setting detail: OBSERVATION
Discharge: NURSING, REHAB OR LONG-TERM CARE | End: 2019-02-28
Attending: EMERGENCY MEDICINE | Admitting: SURGERY
Payer: MEDICARE

## 2019-02-25 ENCOUNTER — APPOINTMENT (OUTPATIENT)
Dept: GENERAL RADIOLOGY | Age: 84
End: 2019-02-25
Payer: MEDICARE

## 2019-02-25 DIAGNOSIS — S22.42XA CLOSED FRACTURE OF MULTIPLE RIBS OF LEFT SIDE, INITIAL ENCOUNTER: Primary | ICD-10-CM

## 2019-02-25 PROBLEM — S22.32XA CLOSED FRACTURE OF RIB OF LEFT SIDE: Status: ACTIVE | Noted: 2019-02-25

## 2019-02-25 PROCEDURE — G0378 HOSPITAL OBSERVATION PER HR: HCPCS

## 2019-02-25 PROCEDURE — 74176 CT ABD & PELVIS W/O CONTRAST: CPT

## 2019-02-25 PROCEDURE — 2580000003 HC RX 258: Performed by: SURGERY

## 2019-02-25 PROCEDURE — 6370000000 HC RX 637 (ALT 250 FOR IP): Performed by: SURGERY

## 2019-02-25 PROCEDURE — 6370000000 HC RX 637 (ALT 250 FOR IP): Performed by: EMERGENCY MEDICINE

## 2019-02-25 PROCEDURE — 99285 EMERGENCY DEPT VISIT HI MDM: CPT

## 2019-02-25 PROCEDURE — 71046 X-RAY EXAM CHEST 2 VIEWS: CPT

## 2019-02-25 PROCEDURE — 96374 THER/PROPH/DIAG INJ IV PUSH: CPT

## 2019-02-25 PROCEDURE — 6360000002 HC RX W HCPCS: Performed by: EMERGENCY MEDICINE

## 2019-02-25 RX ORDER — SODIUM CHLORIDE 0.9 % (FLUSH) 0.9 %
10 SYRINGE (ML) INJECTION EVERY 12 HOURS SCHEDULED
Status: DISCONTINUED | OUTPATIENT
Start: 2019-02-25 | End: 2019-03-01 | Stop reason: HOSPADM

## 2019-02-25 RX ORDER — FENTANYL CITRATE 50 UG/ML
50 INJECTION, SOLUTION INTRAMUSCULAR; INTRAVENOUS ONCE
Status: COMPLETED | OUTPATIENT
Start: 2019-02-25 | End: 2019-02-25

## 2019-02-25 RX ORDER — MORPHINE SULFATE 4 MG/ML
2 INJECTION, SOLUTION INTRAMUSCULAR; INTRAVENOUS
Status: DISCONTINUED | OUTPATIENT
Start: 2019-02-25 | End: 2019-03-01 | Stop reason: HOSPADM

## 2019-02-25 RX ORDER — SODIUM CHLORIDE 0.9 % (FLUSH) 0.9 %
10 SYRINGE (ML) INJECTION PRN
Status: DISCONTINUED | OUTPATIENT
Start: 2019-02-25 | End: 2019-03-01 | Stop reason: HOSPADM

## 2019-02-25 RX ORDER — ACETAMINOPHEN 325 MG/1
650 TABLET ORAL EVERY 4 HOURS PRN
Status: DISCONTINUED | OUTPATIENT
Start: 2019-02-25 | End: 2019-03-01 | Stop reason: HOSPADM

## 2019-02-25 RX ORDER — HYDROCODONE BITARTRATE AND ACETAMINOPHEN 5; 325 MG/1; MG/1
1 TABLET ORAL ONCE
Status: COMPLETED | OUTPATIENT
Start: 2019-02-25 | End: 2019-02-25

## 2019-02-25 RX ORDER — OXYCODONE HYDROCHLORIDE AND ACETAMINOPHEN 5; 325 MG/1; MG/1
1 TABLET ORAL EVERY 4 HOURS PRN
Status: DISCONTINUED | OUTPATIENT
Start: 2019-02-25 | End: 2019-03-01 | Stop reason: HOSPADM

## 2019-02-25 RX ORDER — ONDANSETRON 4 MG/1
4 TABLET, ORALLY DISINTEGRATING ORAL EVERY 8 HOURS PRN
Status: DISCONTINUED | OUTPATIENT
Start: 2019-02-25 | End: 2019-03-01 | Stop reason: HOSPADM

## 2019-02-25 RX ADMIN — OXYCODONE AND ACETAMINOPHEN 1 TABLET: 5; 325 TABLET ORAL at 22:15

## 2019-02-25 RX ADMIN — Medication 10 ML: at 22:15

## 2019-02-25 RX ADMIN — HYDROCODONE BITARTRATE AND ACETAMINOPHEN 1 TABLET: 5; 325 TABLET ORAL at 17:23

## 2019-02-25 RX ADMIN — FENTANYL CITRATE 50 MCG: 50 INJECTION INTRAMUSCULAR; INTRAVENOUS at 20:01

## 2019-02-25 ASSESSMENT — ENCOUNTER SYMPTOMS
VOMITING: 0
ABDOMINAL PAIN: 0
NAUSEA: 0
DIARRHEA: 0

## 2019-02-25 ASSESSMENT — PAIN SCALES - GENERAL
PAINLEVEL_OUTOF10: 9
PAINLEVEL_OUTOF10: 9
PAINLEVEL_OUTOF10: 0
PAINLEVEL_OUTOF10: 7
PAINLEVEL_OUTOF10: 9
PAINLEVEL_OUTOF10: 8

## 2019-02-26 LAB
ABSOLUTE EOS #: 0.1 K/UL (ref 0–0.4)
ABSOLUTE IMMATURE GRANULOCYTE: ABNORMAL K/UL (ref 0–0.3)
ABSOLUTE LYMPH #: 2.3 K/UL (ref 1–4.8)
ABSOLUTE MONO #: 0.7 K/UL (ref 0.1–1.3)
ANION GAP SERPL CALCULATED.3IONS-SCNC: 8 MMOL/L (ref 9–17)
BASOPHILS # BLD: 1 % (ref 0–2)
BASOPHILS ABSOLUTE: 0.1 K/UL (ref 0–0.2)
BUN BLDV-MCNC: 21 MG/DL (ref 8–23)
BUN/CREAT BLD: ABNORMAL (ref 9–20)
CALCIUM SERPL-MCNC: 9.1 MG/DL (ref 8.6–10.4)
CHLORIDE BLD-SCNC: 100 MMOL/L (ref 98–107)
CO2: 30 MMOL/L (ref 20–31)
CREAT SERPL-MCNC: 0.91 MG/DL (ref 0.5–0.9)
DIFFERENTIAL TYPE: ABNORMAL
EOSINOPHILS RELATIVE PERCENT: 1 % (ref 0–4)
GFR AFRICAN AMERICAN: >60 ML/MIN
GFR NON-AFRICAN AMERICAN: 59 ML/MIN
GFR SERPL CREATININE-BSD FRML MDRD: ABNORMAL ML/MIN/{1.73_M2}
GFR SERPL CREATININE-BSD FRML MDRD: ABNORMAL ML/MIN/{1.73_M2}
GLUCOSE BLD-MCNC: 117 MG/DL (ref 70–99)
HCT VFR BLD CALC: 35.5 % (ref 36–46)
HEMOGLOBIN: 12.3 G/DL (ref 12–16)
IMMATURE GRANULOCYTES: ABNORMAL %
LYMPHOCYTES # BLD: 25 % (ref 24–44)
MCH RBC QN AUTO: 32.1 PG (ref 26–34)
MCHC RBC AUTO-ENTMCNC: 34.8 G/DL (ref 31–37)
MCV RBC AUTO: 92.4 FL (ref 80–100)
MONOCYTES # BLD: 8 % (ref 1–7)
NRBC AUTOMATED: ABNORMAL PER 100 WBC
PDW BLD-RTO: 13.8 % (ref 11.5–14.9)
PLATELET # BLD: 279 K/UL (ref 150–450)
PLATELET ESTIMATE: ABNORMAL
PMV BLD AUTO: 8 FL (ref 6–12)
POTASSIUM SERPL-SCNC: 4.5 MMOL/L (ref 3.7–5.3)
RBC # BLD: 3.84 M/UL (ref 4–5.2)
RBC # BLD: ABNORMAL 10*6/UL
SEG NEUTROPHILS: 65 % (ref 36–66)
SEGMENTED NEUTROPHILS ABSOLUTE COUNT: 6.1 K/UL (ref 1.3–9.1)
SODIUM BLD-SCNC: 138 MMOL/L (ref 135–144)
WBC # BLD: 9.3 K/UL (ref 3.5–11)
WBC # BLD: ABNORMAL 10*3/UL

## 2019-02-26 PROCEDURE — 96376 TX/PRO/DX INJ SAME DRUG ADON: CPT

## 2019-02-26 PROCEDURE — 85025 COMPLETE CBC W/AUTO DIFF WBC: CPT

## 2019-02-26 PROCEDURE — 96372 THER/PROPH/DIAG INJ SC/IM: CPT

## 2019-02-26 PROCEDURE — 6370000000 HC RX 637 (ALT 250 FOR IP): Performed by: SURGERY

## 2019-02-26 PROCEDURE — 36415 COLL VENOUS BLD VENIPUNCTURE: CPT

## 2019-02-26 PROCEDURE — 2580000003 HC RX 258: Performed by: SURGERY

## 2019-02-26 PROCEDURE — G0378 HOSPITAL OBSERVATION PER HR: HCPCS

## 2019-02-26 PROCEDURE — 6360000002 HC RX W HCPCS: Performed by: INTERNAL MEDICINE

## 2019-02-26 PROCEDURE — 96375 TX/PRO/DX INJ NEW DRUG ADDON: CPT

## 2019-02-26 PROCEDURE — 6370000000 HC RX 637 (ALT 250 FOR IP): Performed by: INTERNAL MEDICINE

## 2019-02-26 PROCEDURE — 6360000002 HC RX W HCPCS: Performed by: SURGERY

## 2019-02-26 PROCEDURE — 80048 BASIC METABOLIC PNL TOTAL CA: CPT

## 2019-02-26 RX ORDER — ALBUTEROL SULFATE 2.5 MG/3ML
2.5 SOLUTION RESPIRATORY (INHALATION) EVERY 4 HOURS PRN
Status: DISCONTINUED | OUTPATIENT
Start: 2019-02-26 | End: 2019-03-01 | Stop reason: HOSPADM

## 2019-02-26 RX ORDER — LIDOCAINE 4 G/G
2 PATCH TOPICAL DAILY
Status: DISCONTINUED | OUTPATIENT
Start: 2019-02-26 | End: 2019-03-01 | Stop reason: HOSPADM

## 2019-02-26 RX ORDER — HEPARIN SODIUM 5000 [USP'U]/ML
5000 INJECTION, SOLUTION INTRAVENOUS; SUBCUTANEOUS 2 TIMES DAILY
Status: DISCONTINUED | OUTPATIENT
Start: 2019-02-26 | End: 2019-03-01 | Stop reason: HOSPADM

## 2019-02-26 RX ADMIN — OXYCODONE AND ACETAMINOPHEN 1 TABLET: 5; 325 TABLET ORAL at 08:03

## 2019-02-26 RX ADMIN — OXYCODONE AND ACETAMINOPHEN 1 TABLET: 5; 325 TABLET ORAL at 02:04

## 2019-02-26 RX ADMIN — MORPHINE SULFATE 2 MG: 4 INJECTION, SOLUTION INTRAMUSCULAR; INTRAVENOUS at 12:25

## 2019-02-26 RX ADMIN — OXYCODONE AND ACETAMINOPHEN 1 TABLET: 5; 325 TABLET ORAL at 21:17

## 2019-02-26 RX ADMIN — Medication 10 ML: at 21:19

## 2019-02-26 RX ADMIN — HEPARIN SODIUM 5000 UNITS: 5000 INJECTION INTRAVENOUS; SUBCUTANEOUS at 21:17

## 2019-02-26 RX ADMIN — MORPHINE SULFATE 2 MG: 4 INJECTION, SOLUTION INTRAMUSCULAR; INTRAVENOUS at 09:17

## 2019-02-26 RX ADMIN — HEPARIN SODIUM 5000 UNITS: 5000 INJECTION INTRAVENOUS; SUBCUTANEOUS at 14:56

## 2019-02-26 ASSESSMENT — PAIN SCALES - GENERAL
PAINLEVEL_OUTOF10: 10
PAINLEVEL_OUTOF10: 8
PAINLEVEL_OUTOF10: 9
PAINLEVEL_OUTOF10: 0
PAINLEVEL_OUTOF10: 10
PAINLEVEL_OUTOF10: 9
PAINLEVEL_OUTOF10: 0

## 2019-02-27 ENCOUNTER — APPOINTMENT (OUTPATIENT)
Dept: GENERAL RADIOLOGY | Age: 84
End: 2019-02-27
Payer: MEDICARE

## 2019-02-27 PROBLEM — J98.11 ATELECTASIS: Status: ACTIVE | Noted: 2019-02-27

## 2019-02-27 LAB
ABSOLUTE EOS #: 0.1 K/UL (ref 0–0.4)
ABSOLUTE IMMATURE GRANULOCYTE: NORMAL K/UL (ref 0–0.3)
ABSOLUTE LYMPH #: 2.2 K/UL (ref 1–4.8)
ABSOLUTE MONO #: 0.5 K/UL (ref 0.1–1.3)
ANION GAP SERPL CALCULATED.3IONS-SCNC: 10 MMOL/L (ref 9–17)
BASOPHILS # BLD: 1 % (ref 0–2)
BASOPHILS ABSOLUTE: 0.1 K/UL (ref 0–0.2)
BUN BLDV-MCNC: 22 MG/DL (ref 8–23)
BUN/CREAT BLD: ABNORMAL (ref 9–20)
CALCIUM SERPL-MCNC: 8.9 MG/DL (ref 8.6–10.4)
CHLORIDE BLD-SCNC: 100 MMOL/L (ref 98–107)
CO2: 27 MMOL/L (ref 20–31)
CREAT SERPL-MCNC: 0.97 MG/DL (ref 0.5–0.9)
DIFFERENTIAL TYPE: NORMAL
EOSINOPHILS RELATIVE PERCENT: 2 % (ref 0–4)
GFR AFRICAN AMERICAN: >60 ML/MIN
GFR NON-AFRICAN AMERICAN: 55 ML/MIN
GFR SERPL CREATININE-BSD FRML MDRD: ABNORMAL ML/MIN/{1.73_M2}
GFR SERPL CREATININE-BSD FRML MDRD: ABNORMAL ML/MIN/{1.73_M2}
GLUCOSE BLD-MCNC: 118 MG/DL (ref 70–99)
HCT VFR BLD CALC: 37 % (ref 36–46)
HEMOGLOBIN: 12.6 G/DL (ref 12–16)
IMMATURE GRANULOCYTES: NORMAL %
LYMPHOCYTES # BLD: 30 % (ref 24–44)
MCH RBC QN AUTO: 31.3 PG (ref 26–34)
MCHC RBC AUTO-ENTMCNC: 34.1 G/DL (ref 31–37)
MCV RBC AUTO: 91.8 FL (ref 80–100)
MONOCYTES # BLD: 7 % (ref 1–7)
NRBC AUTOMATED: NORMAL PER 100 WBC
PDW BLD-RTO: 13.5 % (ref 11.5–14.9)
PLATELET # BLD: 271 K/UL (ref 150–450)
PLATELET ESTIMATE: NORMAL
PMV BLD AUTO: 7.9 FL (ref 6–12)
POTASSIUM SERPL-SCNC: 4.5 MMOL/L (ref 3.7–5.3)
RBC # BLD: 4.02 M/UL (ref 4–5.2)
RBC # BLD: NORMAL 10*6/UL
SEG NEUTROPHILS: 60 % (ref 36–66)
SEGMENTED NEUTROPHILS ABSOLUTE COUNT: 4.5 K/UL (ref 1.3–9.1)
SODIUM BLD-SCNC: 137 MMOL/L (ref 135–144)
WBC # BLD: 7.4 K/UL (ref 3.5–11)
WBC # BLD: NORMAL 10*3/UL

## 2019-02-27 PROCEDURE — 97530 THERAPEUTIC ACTIVITIES: CPT

## 2019-02-27 PROCEDURE — G0378 HOSPITAL OBSERVATION PER HR: HCPCS

## 2019-02-27 PROCEDURE — 6360000002 HC RX W HCPCS: Performed by: INTERNAL MEDICINE

## 2019-02-27 PROCEDURE — 96372 THER/PROPH/DIAG INJ SC/IM: CPT

## 2019-02-27 PROCEDURE — 85025 COMPLETE CBC W/AUTO DIFF WBC: CPT

## 2019-02-27 PROCEDURE — 97166 OT EVAL MOD COMPLEX 45 MIN: CPT

## 2019-02-27 PROCEDURE — 2580000003 HC RX 258: Performed by: SURGERY

## 2019-02-27 PROCEDURE — 6370000000 HC RX 637 (ALT 250 FOR IP): Performed by: INTERNAL MEDICINE

## 2019-02-27 PROCEDURE — 6370000000 HC RX 637 (ALT 250 FOR IP): Performed by: SURGERY

## 2019-02-27 PROCEDURE — 36415 COLL VENOUS BLD VENIPUNCTURE: CPT

## 2019-02-27 PROCEDURE — 71045 X-RAY EXAM CHEST 1 VIEW: CPT

## 2019-02-27 PROCEDURE — 80048 BASIC METABOLIC PNL TOTAL CA: CPT

## 2019-02-27 RX ADMIN — Medication 10 ML: at 07:32

## 2019-02-27 RX ADMIN — OXYCODONE AND ACETAMINOPHEN 1 TABLET: 5; 325 TABLET ORAL at 15:37

## 2019-02-27 RX ADMIN — HEPARIN SODIUM 5000 UNITS: 5000 INJECTION INTRAVENOUS; SUBCUTANEOUS at 20:44

## 2019-02-27 RX ADMIN — OXYCODONE AND ACETAMINOPHEN 1 TABLET: 5; 325 TABLET ORAL at 20:44

## 2019-02-27 RX ADMIN — OXYCODONE AND ACETAMINOPHEN 1 TABLET: 5; 325 TABLET ORAL at 06:57

## 2019-02-27 RX ADMIN — OXYCODONE AND ACETAMINOPHEN 1 TABLET: 5; 325 TABLET ORAL at 11:21

## 2019-02-27 RX ADMIN — OXYCODONE AND ACETAMINOPHEN 1 TABLET: 5; 325 TABLET ORAL at 02:30

## 2019-02-27 RX ADMIN — HEPARIN SODIUM 5000 UNITS: 5000 INJECTION INTRAVENOUS; SUBCUTANEOUS at 07:27

## 2019-02-27 ASSESSMENT — PAIN SCALES - GENERAL
PAINLEVEL_OUTOF10: 7
PAINLEVEL_OUTOF10: 1
PAINLEVEL_OUTOF10: 2
PAINLEVEL_OUTOF10: 0
PAINLEVEL_OUTOF10: 0
PAINLEVEL_OUTOF10: 9
PAINLEVEL_OUTOF10: 8
PAINLEVEL_OUTOF10: 2
PAINLEVEL_OUTOF10: 8

## 2019-02-27 ASSESSMENT — PAIN DESCRIPTION - LOCATION
LOCATION: RIB CAGE

## 2019-02-27 ASSESSMENT — PAIN DESCRIPTION - ORIENTATION
ORIENTATION: LEFT
ORIENTATION: LEFT
ORIENTATION: RIGHT;LEFT

## 2019-02-27 ASSESSMENT — PAIN DESCRIPTION - PAIN TYPE
TYPE: ACUTE PAIN

## 2019-02-27 ASSESSMENT — PAIN DESCRIPTION - FREQUENCY: FREQUENCY: INTERMITTENT

## 2019-02-27 ASSESSMENT — PAIN DESCRIPTION - DESCRIPTORS: DESCRIPTORS: SHARP;CONSTANT

## 2019-02-28 ENCOUNTER — APPOINTMENT (OUTPATIENT)
Dept: PHYSICAL THERAPY | Age: 84
End: 2019-02-28
Payer: MEDICARE

## 2019-02-28 ENCOUNTER — HOSPITAL ENCOUNTER (INPATIENT)
Age: 84
LOS: 9 days | Discharge: HOME | DRG: 059 | End: 2019-03-09
Attending: PHYSICAL MEDICINE & REHABILITATION | Admitting: PHYSICAL MEDICINE & REHABILITATION
Payer: MEDICARE

## 2019-02-28 PROCEDURE — 6370000000 HC RX 637 (ALT 250 FOR IP): Performed by: INTERNAL MEDICINE

## 2019-02-28 PROCEDURE — 97162 PT EVAL MOD COMPLEX 30 MIN: CPT

## 2019-02-28 PROCEDURE — 97530 THERAPEUTIC ACTIVITIES: CPT

## 2019-02-28 PROCEDURE — G0378 HOSPITAL OBSERVATION PER HR: HCPCS

## 2019-02-28 PROCEDURE — 1180000000 HC REHAB R&B

## 2019-02-28 PROCEDURE — 97110 THERAPEUTIC EXERCISES: CPT

## 2019-02-28 PROCEDURE — 6370000000 HC RX 637 (ALT 250 FOR IP): Performed by: SURGERY

## 2019-02-28 PROCEDURE — 6360000002 HC RX W HCPCS: Performed by: INTERNAL MEDICINE

## 2019-02-28 PROCEDURE — 97116 GAIT TRAINING THERAPY: CPT

## 2019-02-28 PROCEDURE — 96372 THER/PROPH/DIAG INJ SC/IM: CPT

## 2019-02-28 RX ORDER — IBUPROFEN 400 MG/1
400 TABLET ORAL EVERY 6 HOURS PRN
Status: DISCONTINUED | OUTPATIENT
Start: 2019-02-28 | End: 2019-03-01 | Stop reason: HOSPADM

## 2019-02-28 RX ORDER — HEPARIN SODIUM 5000 [USP'U]/ML
5000 INJECTION, SOLUTION INTRAVENOUS; SUBCUTANEOUS 2 TIMES DAILY
Status: CANCELLED | OUTPATIENT
Start: 2019-02-28

## 2019-02-28 RX ORDER — OXYCODONE HYDROCHLORIDE AND ACETAMINOPHEN 5; 325 MG/1; MG/1
1 TABLET ORAL EVERY 4 HOURS PRN
Status: CANCELLED | OUTPATIENT
Start: 2019-02-28

## 2019-02-28 RX ORDER — IBUPROFEN 400 MG/1
400 TABLET ORAL EVERY 6 HOURS PRN
Status: CANCELLED | OUTPATIENT
Start: 2019-02-28

## 2019-02-28 RX ORDER — ONDANSETRON 4 MG/1
4 TABLET, ORALLY DISINTEGRATING ORAL EVERY 8 HOURS PRN
Status: CANCELLED | OUTPATIENT
Start: 2019-02-28

## 2019-02-28 RX ORDER — LIDOCAINE 4 G/G
2 PATCH TOPICAL DAILY
Status: CANCELLED | OUTPATIENT
Start: 2019-03-01

## 2019-02-28 RX ORDER — ALBUTEROL SULFATE 2.5 MG/3ML
2.5 SOLUTION RESPIRATORY (INHALATION) EVERY 4 HOURS PRN
Status: CANCELLED | OUTPATIENT
Start: 2019-02-28

## 2019-02-28 RX ORDER — ACETAMINOPHEN 325 MG/1
650 TABLET ORAL EVERY 4 HOURS PRN
Status: CANCELLED | OUTPATIENT
Start: 2019-02-28

## 2019-02-28 RX ADMIN — HEPARIN SODIUM 5000 UNITS: 5000 INJECTION INTRAVENOUS; SUBCUTANEOUS at 08:07

## 2019-02-28 RX ADMIN — OXYCODONE AND ACETAMINOPHEN 1 TABLET: 5; 325 TABLET ORAL at 20:30

## 2019-02-28 RX ADMIN — HEPARIN SODIUM 5000 UNITS: 5000 INJECTION INTRAVENOUS; SUBCUTANEOUS at 20:31

## 2019-02-28 RX ADMIN — OXYCODONE AND ACETAMINOPHEN 1 TABLET: 5; 325 TABLET ORAL at 02:31

## 2019-02-28 RX ADMIN — OXYCODONE AND ACETAMINOPHEN 1 TABLET: 5; 325 TABLET ORAL at 12:25

## 2019-02-28 RX ADMIN — OXYCODONE AND ACETAMINOPHEN 1 TABLET: 5; 325 TABLET ORAL at 16:15

## 2019-02-28 RX ADMIN — OXYCODONE AND ACETAMINOPHEN 1 TABLET: 5; 325 TABLET ORAL at 08:07

## 2019-02-28 ASSESSMENT — PAIN SCALES - GENERAL
PAINLEVEL_OUTOF10: 2
PAINLEVEL_OUTOF10: 2
PAINLEVEL_OUTOF10: 10
PAINLEVEL_OUTOF10: 2
PAINLEVEL_OUTOF10: 5
PAINLEVEL_OUTOF10: 8
PAINLEVEL_OUTOF10: 10
PAINLEVEL_OUTOF10: 10
PAINLEVEL_OUTOF10: 8
PAINLEVEL_OUTOF10: 2
PAINLEVEL_OUTOF10: 2
PAINLEVEL_OUTOF10: 10
PAINLEVEL_OUTOF10: 10
PAINLEVEL_OUTOF10: 8

## 2019-02-28 ASSESSMENT — PAIN DESCRIPTION - ORIENTATION
ORIENTATION: LEFT

## 2019-02-28 ASSESSMENT — PAIN DESCRIPTION - PAIN TYPE
TYPE: ACUTE PAIN

## 2019-02-28 ASSESSMENT — PAIN DESCRIPTION - LOCATION
LOCATION: RIB CAGE

## 2019-02-28 ASSESSMENT — PAIN DESCRIPTION - DESCRIPTORS
DESCRIPTORS: SHARP;ACHING
DESCRIPTORS: ACHING
DESCRIPTORS: SHARP

## 2019-02-28 ASSESSMENT — PAIN DESCRIPTION - PROGRESSION: CLINICAL_PROGRESSION: NOT CHANGED

## 2019-02-28 ASSESSMENT — PAIN DESCRIPTION - FREQUENCY: FREQUENCY: CONTINUOUS

## 2019-02-28 ASSESSMENT — PAIN DESCRIPTION - ONSET: ONSET: ON-GOING

## 2019-03-01 VITALS
HEIGHT: 59 IN | SYSTOLIC BLOOD PRESSURE: 100 MMHG | BODY MASS INDEX: 27.2 KG/M2 | TEMPERATURE: 97.3 F | WEIGHT: 134.92 LBS | OXYGEN SATURATION: 98 % | HEART RATE: 91 BPM | DIASTOLIC BLOOD PRESSURE: 61 MMHG | RESPIRATION RATE: 16 BRPM

## 2019-03-01 DIAGNOSIS — S22.42XS CLOSED FRACTURE OF MULTIPLE RIBS OF LEFT SIDE, SEQUELA: Primary | ICD-10-CM

## 2019-03-01 DIAGNOSIS — G35 MULTIPLE SCLEROSIS (HCC): ICD-10-CM

## 2019-03-01 DIAGNOSIS — R53.81 DEBILITY: ICD-10-CM

## 2019-03-01 PROCEDURE — 6370000000 HC RX 637 (ALT 250 FOR IP): Performed by: INTERNAL MEDICINE

## 2019-03-01 PROCEDURE — 6370000000 HC RX 637 (ALT 250 FOR IP): Performed by: SURGERY

## 2019-03-01 PROCEDURE — 6360000002 HC RX W HCPCS: Performed by: SURGERY

## 2019-03-01 PROCEDURE — 97530 THERAPEUTIC ACTIVITIES: CPT

## 2019-03-01 PROCEDURE — 96372 THER/PROPH/DIAG INJ SC/IM: CPT

## 2019-03-01 PROCEDURE — 97110 THERAPEUTIC EXERCISES: CPT

## 2019-03-01 PROCEDURE — 6360000002 HC RX W HCPCS: Performed by: INTERNAL MEDICINE

## 2019-03-01 PROCEDURE — 99221 1ST HOSP IP/OBS SF/LOW 40: CPT | Performed by: PHYSICAL MEDICINE & REHABILITATION

## 2019-03-01 PROCEDURE — 97116 GAIT TRAINING THERAPY: CPT

## 2019-03-01 PROCEDURE — 1180000000 HC REHAB R&B

## 2019-03-01 RX ORDER — ONDANSETRON 4 MG/1
4 TABLET, ORALLY DISINTEGRATING ORAL EVERY 8 HOURS PRN
Status: DISCONTINUED | OUTPATIENT
Start: 2019-03-01 | End: 2019-03-09 | Stop reason: HOSPADM

## 2019-03-01 RX ORDER — OXYCODONE HYDROCHLORIDE AND ACETAMINOPHEN 5; 325 MG/1; MG/1
1 TABLET ORAL EVERY 4 HOURS PRN
Status: DISCONTINUED | OUTPATIENT
Start: 2019-03-01 | End: 2019-03-06

## 2019-03-01 RX ORDER — DOCUSATE SODIUM 100 MG/1
200 CAPSULE, LIQUID FILLED ORAL ONCE
Status: COMPLETED | OUTPATIENT
Start: 2019-03-01 | End: 2019-03-01

## 2019-03-01 RX ORDER — ALBUTEROL SULFATE 2.5 MG/3ML
2.5 SOLUTION RESPIRATORY (INHALATION) EVERY 4 HOURS PRN
Status: DISCONTINUED | OUTPATIENT
Start: 2019-03-01 | End: 2019-03-09 | Stop reason: HOSPADM

## 2019-03-01 RX ORDER — LIDOCAINE 4 G/G
2 PATCH TOPICAL DAILY
Status: DISCONTINUED | OUTPATIENT
Start: 2019-03-01 | End: 2019-03-04

## 2019-03-01 RX ORDER — IBUPROFEN 400 MG/1
400 TABLET ORAL EVERY 6 HOURS PRN
Status: DISCONTINUED | OUTPATIENT
Start: 2019-03-01 | End: 2019-03-09 | Stop reason: HOSPADM

## 2019-03-01 RX ORDER — HEPARIN SODIUM 5000 [USP'U]/ML
5000 INJECTION, SOLUTION INTRAVENOUS; SUBCUTANEOUS 2 TIMES DAILY
Status: DISCONTINUED | OUTPATIENT
Start: 2019-03-01 | End: 2019-03-09 | Stop reason: HOSPADM

## 2019-03-01 RX ORDER — POLYETHYLENE GLYCOL 3350 17 G/17G
17 POWDER, FOR SOLUTION ORAL DAILY
Status: DISCONTINUED | OUTPATIENT
Start: 2019-03-01 | End: 2019-03-01 | Stop reason: HOSPADM

## 2019-03-01 RX ORDER — ACETAMINOPHEN 325 MG/1
650 TABLET ORAL EVERY 4 HOURS PRN
Status: DISCONTINUED | OUTPATIENT
Start: 2019-03-01 | End: 2019-03-09 | Stop reason: HOSPADM

## 2019-03-01 RX ADMIN — IBUPROFEN 400 MG: 400 TABLET, FILM COATED ORAL at 08:07

## 2019-03-01 RX ADMIN — DOCUSATE SODIUM 200 MG: 100 CAPSULE, LIQUID FILLED ORAL at 14:19

## 2019-03-01 RX ADMIN — HEPARIN SODIUM 5000 UNITS: 5000 INJECTION INTRAVENOUS; SUBCUTANEOUS at 08:08

## 2019-03-01 RX ADMIN — OXYCODONE AND ACETAMINOPHEN 1 TABLET: 5; 325 TABLET ORAL at 21:44

## 2019-03-01 RX ADMIN — OXYCODONE AND ACETAMINOPHEN 1 TABLET: 5; 325 TABLET ORAL at 12:56

## 2019-03-01 RX ADMIN — OXYCODONE AND ACETAMINOPHEN 1 TABLET: 5; 325 TABLET ORAL at 08:07

## 2019-03-01 RX ADMIN — HEPARIN SODIUM 5000 UNITS: 5000 INJECTION INTRAVENOUS; SUBCUTANEOUS at 21:44

## 2019-03-01 RX ADMIN — POLYETHYLENE GLYCOL 3350 17 G: 17 POWDER, FOR SOLUTION ORAL at 14:19

## 2019-03-01 ASSESSMENT — PAIN SCALES - GENERAL
PAINLEVEL_OUTOF10: 1
PAINLEVEL_OUTOF10: 3
PAINLEVEL_OUTOF10: 7
PAINLEVEL_OUTOF10: 5
PAINLEVEL_OUTOF10: 2
PAINLEVEL_OUTOF10: 8
PAINLEVEL_OUTOF10: 1
PAINLEVEL_OUTOF10: 8
PAINLEVEL_OUTOF10: 7
PAINLEVEL_OUTOF10: 8

## 2019-03-01 ASSESSMENT — PAIN DESCRIPTION - FREQUENCY: FREQUENCY: INTERMITTENT

## 2019-03-01 ASSESSMENT — PAIN DESCRIPTION - PAIN TYPE
TYPE: ACUTE PAIN

## 2019-03-01 ASSESSMENT — PAIN DESCRIPTION - LOCATION
LOCATION: RIB CAGE
LOCATION: RIB CAGE
LOCATION: BACK;RIB CAGE

## 2019-03-01 ASSESSMENT — PAIN DESCRIPTION - ORIENTATION
ORIENTATION: LEFT

## 2019-03-01 ASSESSMENT — PAIN DESCRIPTION - PROGRESSION: CLINICAL_PROGRESSION: GRADUALLY IMPROVING

## 2019-03-01 ASSESSMENT — PAIN DESCRIPTION - DESCRIPTORS: DESCRIPTORS: ACHING

## 2019-03-02 PROBLEM — E44.0 MODERATE MALNUTRITION (HCC): Status: ACTIVE | Noted: 2019-03-02

## 2019-03-02 LAB
ANION GAP SERPL CALCULATED.3IONS-SCNC: 11 MMOL/L (ref 9–17)
BUN BLDV-MCNC: 25 MG/DL (ref 8–23)
BUN/CREAT BLD: ABNORMAL (ref 9–20)
CALCIUM SERPL-MCNC: 9 MG/DL (ref 8.6–10.4)
CHLORIDE BLD-SCNC: 97 MMOL/L (ref 98–107)
CO2: 28 MMOL/L (ref 20–31)
CREAT SERPL-MCNC: 0.97 MG/DL (ref 0.5–0.9)
GFR AFRICAN AMERICAN: >60 ML/MIN
GFR NON-AFRICAN AMERICAN: 55 ML/MIN
GFR SERPL CREATININE-BSD FRML MDRD: ABNORMAL ML/MIN/{1.73_M2}
GFR SERPL CREATININE-BSD FRML MDRD: ABNORMAL ML/MIN/{1.73_M2}
GLUCOSE BLD-MCNC: 104 MG/DL (ref 70–99)
HCT VFR BLD CALC: 34.2 % (ref 36–46)
HEMOGLOBIN: 11.7 G/DL (ref 12–16)
MCH RBC QN AUTO: 31.6 PG (ref 26–34)
MCHC RBC AUTO-ENTMCNC: 34.2 G/DL (ref 31–37)
MCV RBC AUTO: 92.3 FL (ref 80–100)
NRBC AUTOMATED: ABNORMAL PER 100 WBC
PDW BLD-RTO: 13.7 % (ref 11.5–14.9)
PLATELET # BLD: 257 K/UL (ref 150–450)
PMV BLD AUTO: 7.9 FL (ref 6–12)
POTASSIUM SERPL-SCNC: 4.9 MMOL/L (ref 3.7–5.3)
RBC # BLD: 3.7 M/UL (ref 4–5.2)
SODIUM BLD-SCNC: 136 MMOL/L (ref 135–144)
WBC # BLD: 6.4 K/UL (ref 3.5–11)

## 2019-03-02 PROCEDURE — 97110 THERAPEUTIC EXERCISES: CPT

## 2019-03-02 PROCEDURE — 6360000002 HC RX W HCPCS: Performed by: SURGERY

## 2019-03-02 PROCEDURE — 85027 COMPLETE CBC AUTOMATED: CPT

## 2019-03-02 PROCEDURE — 97166 OT EVAL MOD COMPLEX 45 MIN: CPT

## 2019-03-02 PROCEDURE — 1180000000 HC REHAB R&B

## 2019-03-02 PROCEDURE — 99222 1ST HOSP IP/OBS MODERATE 55: CPT | Performed by: PHYSICAL MEDICINE & REHABILITATION

## 2019-03-02 PROCEDURE — 97116 GAIT TRAINING THERAPY: CPT

## 2019-03-02 PROCEDURE — 97161 PT EVAL LOW COMPLEX 20 MIN: CPT

## 2019-03-02 PROCEDURE — 6370000000 HC RX 637 (ALT 250 FOR IP): Performed by: SURGERY

## 2019-03-02 PROCEDURE — 97530 THERAPEUTIC ACTIVITIES: CPT

## 2019-03-02 PROCEDURE — 97535 SELF CARE MNGMENT TRAINING: CPT

## 2019-03-02 PROCEDURE — 80048 BASIC METABOLIC PNL TOTAL CA: CPT

## 2019-03-02 PROCEDURE — 36415 COLL VENOUS BLD VENIPUNCTURE: CPT

## 2019-03-02 RX ORDER — CHOLECALCIFEROL (VITAMIN D3) 125 MCG
6000 CAPSULE ORAL
Status: DISCONTINUED | OUTPATIENT
Start: 2019-03-02 | End: 2019-03-03

## 2019-03-02 RX ADMIN — OXYCODONE AND ACETAMINOPHEN 1 TABLET: 5; 325 TABLET ORAL at 06:18

## 2019-03-02 RX ADMIN — OXYCODONE AND ACETAMINOPHEN 1 TABLET: 5; 325 TABLET ORAL at 22:13

## 2019-03-02 RX ADMIN — HEPARIN SODIUM 5000 UNITS: 5000 INJECTION INTRAVENOUS; SUBCUTANEOUS at 08:15

## 2019-03-02 RX ADMIN — ACETAMINOPHEN 650 MG: 325 TABLET, FILM COATED ORAL at 08:14

## 2019-03-02 RX ADMIN — ACETAMINOPHEN 650 MG: 325 TABLET, FILM COATED ORAL at 16:05

## 2019-03-02 RX ADMIN — IBUPROFEN 400 MG: 400 TABLET, FILM COATED ORAL at 14:46

## 2019-03-02 RX ADMIN — OXYCODONE AND ACETAMINOPHEN 1 TABLET: 5; 325 TABLET ORAL at 11:29

## 2019-03-02 RX ADMIN — HEPARIN SODIUM 5000 UNITS: 5000 INJECTION INTRAVENOUS; SUBCUTANEOUS at 22:06

## 2019-03-02 RX ADMIN — OXYCODONE AND ACETAMINOPHEN 1 TABLET: 5; 325 TABLET ORAL at 16:05

## 2019-03-02 ASSESSMENT — PAIN SCALES - GENERAL
PAINLEVEL_OUTOF10: 8
PAINLEVEL_OUTOF10: 0
PAINLEVEL_OUTOF10: 8
PAINLEVEL_OUTOF10: 4
PAINLEVEL_OUTOF10: 6
PAINLEVEL_OUTOF10: 9
PAINLEVEL_OUTOF10: 7
PAINLEVEL_OUTOF10: 8
PAINLEVEL_OUTOF10: 7
PAINLEVEL_OUTOF10: 8

## 2019-03-02 ASSESSMENT — PAIN DESCRIPTION - ORIENTATION
ORIENTATION: LEFT

## 2019-03-02 ASSESSMENT — PAIN DESCRIPTION - PROGRESSION
CLINICAL_PROGRESSION: NOT CHANGED
CLINICAL_PROGRESSION: NOT CHANGED

## 2019-03-02 ASSESSMENT — PAIN DESCRIPTION - DESCRIPTORS
DESCRIPTORS: ACHING
DESCRIPTORS: ACHING;DULL
DESCRIPTORS: CONSTANT

## 2019-03-02 ASSESSMENT — PAIN DESCRIPTION - PAIN TYPE
TYPE: ACUTE PAIN
TYPE: ACUTE PAIN

## 2019-03-02 ASSESSMENT — PAIN DESCRIPTION - FREQUENCY
FREQUENCY: INTERMITTENT
FREQUENCY: INTERMITTENT
FREQUENCY: CONTINUOUS

## 2019-03-02 ASSESSMENT — PAIN DESCRIPTION - ONSET: ONSET: OTHER (COMMENT)

## 2019-03-02 ASSESSMENT — PAIN DESCRIPTION - LOCATION
LOCATION: RIB CAGE

## 2019-03-03 PROCEDURE — 6360000002 HC RX W HCPCS: Performed by: SURGERY

## 2019-03-03 PROCEDURE — 6370000000 HC RX 637 (ALT 250 FOR IP): Performed by: SURGERY

## 2019-03-03 PROCEDURE — 97110 THERAPEUTIC EXERCISES: CPT

## 2019-03-03 PROCEDURE — 99232 SBSQ HOSP IP/OBS MODERATE 35: CPT | Performed by: PHYSICAL MEDICINE & REHABILITATION

## 2019-03-03 PROCEDURE — 97116 GAIT TRAINING THERAPY: CPT

## 2019-03-03 PROCEDURE — 97530 THERAPEUTIC ACTIVITIES: CPT

## 2019-03-03 PROCEDURE — 1180000000 HC REHAB R&B

## 2019-03-03 RX ORDER — CHOLECALCIFEROL (VITAMIN D3) 125 MCG
6000 CAPSULE ORAL PRN
Status: DISCONTINUED | OUTPATIENT
Start: 2019-03-03 | End: 2019-03-09 | Stop reason: HOSPADM

## 2019-03-03 RX ADMIN — Medication 6000 UNITS: at 07:51

## 2019-03-03 RX ADMIN — HEPARIN SODIUM 5000 UNITS: 5000 INJECTION INTRAVENOUS; SUBCUTANEOUS at 22:42

## 2019-03-03 RX ADMIN — HEPARIN SODIUM 5000 UNITS: 5000 INJECTION INTRAVENOUS; SUBCUTANEOUS at 10:31

## 2019-03-03 RX ADMIN — OXYCODONE AND ACETAMINOPHEN 1 TABLET: 5; 325 TABLET ORAL at 18:11

## 2019-03-03 RX ADMIN — OXYCODONE AND ACETAMINOPHEN 1 TABLET: 5; 325 TABLET ORAL at 07:21

## 2019-03-03 RX ADMIN — OXYCODONE AND ACETAMINOPHEN 1 TABLET: 5; 325 TABLET ORAL at 12:08

## 2019-03-03 RX ADMIN — OXYCODONE AND ACETAMINOPHEN 1 TABLET: 5; 325 TABLET ORAL at 22:42

## 2019-03-03 ASSESSMENT — PAIN DESCRIPTION - DESCRIPTORS: DESCRIPTORS: ACHING;DISCOMFORT

## 2019-03-03 ASSESSMENT — PAIN DESCRIPTION - PROGRESSION: CLINICAL_PROGRESSION: NOT CHANGED

## 2019-03-03 ASSESSMENT — PAIN SCALES - GENERAL
PAINLEVEL_OUTOF10: 5
PAINLEVEL_OUTOF10: 8
PAINLEVEL_OUTOF10: 7
PAINLEVEL_OUTOF10: 8
PAINLEVEL_OUTOF10: 8
PAINLEVEL_OUTOF10: 0
PAINLEVEL_OUTOF10: 9
PAINLEVEL_OUTOF10: 8
PAINLEVEL_OUTOF10: 8

## 2019-03-03 ASSESSMENT — PAIN DESCRIPTION - PAIN TYPE
TYPE: ACUTE PAIN
TYPE: ACUTE PAIN

## 2019-03-03 ASSESSMENT — PAIN DESCRIPTION - FREQUENCY: FREQUENCY: INTERMITTENT

## 2019-03-03 ASSESSMENT — PAIN DESCRIPTION - ORIENTATION
ORIENTATION: LEFT
ORIENTATION: LEFT

## 2019-03-03 ASSESSMENT — PAIN DESCRIPTION - ONSET: ONSET: ON-GOING

## 2019-03-03 ASSESSMENT — PAIN DESCRIPTION - LOCATION
LOCATION: RIB CAGE
LOCATION: RIB CAGE

## 2019-03-04 PROCEDURE — 6370000000 HC RX 637 (ALT 250 FOR IP): Performed by: SURGERY

## 2019-03-04 PROCEDURE — 97530 THERAPEUTIC ACTIVITIES: CPT

## 2019-03-04 PROCEDURE — 99232 SBSQ HOSP IP/OBS MODERATE 35: CPT | Performed by: PHYSICAL MEDICINE & REHABILITATION

## 2019-03-04 PROCEDURE — 97110 THERAPEUTIC EXERCISES: CPT

## 2019-03-04 PROCEDURE — 6360000002 HC RX W HCPCS: Performed by: SURGERY

## 2019-03-04 PROCEDURE — 97535 SELF CARE MNGMENT TRAINING: CPT

## 2019-03-04 PROCEDURE — 97116 GAIT TRAINING THERAPY: CPT

## 2019-03-04 PROCEDURE — 1180000000 HC REHAB R&B

## 2019-03-04 PROCEDURE — 6370000000 HC RX 637 (ALT 250 FOR IP): Performed by: PHYSICAL MEDICINE & REHABILITATION

## 2019-03-04 RX ORDER — DIAPER,BRIEF,INFANT-TODD,DISP
EACH MISCELLANEOUS 2 TIMES DAILY
Status: DISCONTINUED | OUTPATIENT
Start: 2019-03-04 | End: 2019-03-09 | Stop reason: HOSPADM

## 2019-03-04 RX ADMIN — HEPARIN SODIUM 5000 UNITS: 5000 INJECTION INTRAVENOUS; SUBCUTANEOUS at 09:23

## 2019-03-04 RX ADMIN — OXYCODONE AND ACETAMINOPHEN 1 TABLET: 5; 325 TABLET ORAL at 22:48

## 2019-03-04 RX ADMIN — IBUPROFEN 400 MG: 400 TABLET, FILM COATED ORAL at 14:25

## 2019-03-04 RX ADMIN — OXYCODONE AND ACETAMINOPHEN 1 TABLET: 5; 325 TABLET ORAL at 18:20

## 2019-03-04 RX ADMIN — HEPARIN SODIUM 5000 UNITS: 5000 INJECTION INTRAVENOUS; SUBCUTANEOUS at 21:06

## 2019-03-04 RX ADMIN — OXYCODONE AND ACETAMINOPHEN 1 TABLET: 5; 325 TABLET ORAL at 11:34

## 2019-03-04 RX ADMIN — OXYCODONE AND ACETAMINOPHEN 1 TABLET: 5; 325 TABLET ORAL at 06:19

## 2019-03-04 RX ADMIN — HYDROCORTISONE: 1 OINTMENT TOPICAL at 21:06

## 2019-03-04 ASSESSMENT — PAIN DESCRIPTION - LOCATION
LOCATION: RIB CAGE

## 2019-03-04 ASSESSMENT — PAIN SCALES - GENERAL
PAINLEVEL_OUTOF10: 9
PAINLEVEL_OUTOF10: 3
PAINLEVEL_OUTOF10: 8
PAINLEVEL_OUTOF10: 0
PAINLEVEL_OUTOF10: 7
PAINLEVEL_OUTOF10: 8
PAINLEVEL_OUTOF10: 6
PAINLEVEL_OUTOF10: 8
PAINLEVEL_OUTOF10: 7
PAINLEVEL_OUTOF10: 3
PAINLEVEL_OUTOF10: 7

## 2019-03-04 ASSESSMENT — PAIN DESCRIPTION - ONSET
ONSET: ON-GOING
ONSET: ON-GOING

## 2019-03-04 ASSESSMENT — PAIN DESCRIPTION - ORIENTATION
ORIENTATION: LEFT

## 2019-03-04 ASSESSMENT — PAIN DESCRIPTION - PROGRESSION
CLINICAL_PROGRESSION: NOT CHANGED

## 2019-03-04 ASSESSMENT — PAIN DESCRIPTION - FREQUENCY
FREQUENCY: INTERMITTENT
FREQUENCY: INTERMITTENT

## 2019-03-04 ASSESSMENT — PAIN DESCRIPTION - PAIN TYPE
TYPE: ACUTE PAIN

## 2019-03-04 ASSESSMENT — PAIN DESCRIPTION - DESCRIPTORS
DESCRIPTORS: ACHING;DISCOMFORT
DESCRIPTORS: ACHING;DISCOMFORT

## 2019-03-05 PROCEDURE — 97110 THERAPEUTIC EXERCISES: CPT

## 2019-03-05 PROCEDURE — 97530 THERAPEUTIC ACTIVITIES: CPT

## 2019-03-05 PROCEDURE — 97535 SELF CARE MNGMENT TRAINING: CPT

## 2019-03-05 PROCEDURE — 6360000002 HC RX W HCPCS: Performed by: SURGERY

## 2019-03-05 PROCEDURE — 6370000000 HC RX 637 (ALT 250 FOR IP): Performed by: SURGERY

## 2019-03-05 PROCEDURE — 99232 SBSQ HOSP IP/OBS MODERATE 35: CPT | Performed by: PHYSICAL MEDICINE & REHABILITATION

## 2019-03-05 PROCEDURE — 97116 GAIT TRAINING THERAPY: CPT

## 2019-03-05 PROCEDURE — 99232 SBSQ HOSP IP/OBS MODERATE 35: CPT | Performed by: INTERNAL MEDICINE

## 2019-03-05 PROCEDURE — 1180000000 HC REHAB R&B

## 2019-03-05 RX ADMIN — HEPARIN SODIUM 5000 UNITS: 5000 INJECTION INTRAVENOUS; SUBCUTANEOUS at 09:19

## 2019-03-05 RX ADMIN — HYDROCORTISONE: 1 OINTMENT TOPICAL at 09:21

## 2019-03-05 RX ADMIN — OXYCODONE AND ACETAMINOPHEN 1 TABLET: 5; 325 TABLET ORAL at 14:17

## 2019-03-05 RX ADMIN — IBUPROFEN 400 MG: 400 TABLET, FILM COATED ORAL at 21:29

## 2019-03-05 RX ADMIN — OXYCODONE AND ACETAMINOPHEN 1 TABLET: 5; 325 TABLET ORAL at 04:55

## 2019-03-05 RX ADMIN — OXYCODONE AND ACETAMINOPHEN 1 TABLET: 5; 325 TABLET ORAL at 18:56

## 2019-03-05 RX ADMIN — ACETAMINOPHEN 650 MG: 325 TABLET, FILM COATED ORAL at 09:19

## 2019-03-05 RX ADMIN — HYDROCORTISONE: 1 OINTMENT TOPICAL at 21:30

## 2019-03-05 RX ADMIN — HEPARIN SODIUM 5000 UNITS: 5000 INJECTION INTRAVENOUS; SUBCUTANEOUS at 21:30

## 2019-03-05 ASSESSMENT — PAIN DESCRIPTION - PROGRESSION: CLINICAL_PROGRESSION: NOT CHANGED

## 2019-03-05 ASSESSMENT — PAIN SCALES - GENERAL
PAINLEVEL_OUTOF10: 8
PAINLEVEL_OUTOF10: 9
PAINLEVEL_OUTOF10: 8
PAINLEVEL_OUTOF10: 7
PAINLEVEL_OUTOF10: 7
PAINLEVEL_OUTOF10: 9
PAINLEVEL_OUTOF10: 0

## 2019-03-05 ASSESSMENT — PAIN DESCRIPTION - LOCATION
LOCATION: RIB CAGE
LOCATION: RIB CAGE

## 2019-03-05 ASSESSMENT — PAIN DESCRIPTION - DESCRIPTORS: DESCRIPTORS: ACHING;DISCOMFORT

## 2019-03-05 ASSESSMENT — PAIN DESCRIPTION - PAIN TYPE
TYPE: ACUTE PAIN
TYPE: ACUTE PAIN

## 2019-03-05 ASSESSMENT — PAIN DESCRIPTION - ONSET: ONSET: ON-GOING

## 2019-03-05 ASSESSMENT — PAIN DESCRIPTION - ORIENTATION
ORIENTATION: LEFT
ORIENTATION: LEFT

## 2019-03-05 ASSESSMENT — PAIN DESCRIPTION - FREQUENCY: FREQUENCY: INTERMITTENT

## 2019-03-06 PROCEDURE — 97535 SELF CARE MNGMENT TRAINING: CPT

## 2019-03-06 PROCEDURE — 97530 THERAPEUTIC ACTIVITIES: CPT

## 2019-03-06 PROCEDURE — 97116 GAIT TRAINING THERAPY: CPT

## 2019-03-06 PROCEDURE — 6370000000 HC RX 637 (ALT 250 FOR IP): Performed by: PHYSICAL MEDICINE & REHABILITATION

## 2019-03-06 PROCEDURE — 6360000002 HC RX W HCPCS: Performed by: SURGERY

## 2019-03-06 PROCEDURE — 99231 SBSQ HOSP IP/OBS SF/LOW 25: CPT | Performed by: INTERNAL MEDICINE

## 2019-03-06 PROCEDURE — 6370000000 HC RX 637 (ALT 250 FOR IP): Performed by: SURGERY

## 2019-03-06 PROCEDURE — 97150 GROUP THERAPEUTIC PROCEDURES: CPT

## 2019-03-06 PROCEDURE — 97110 THERAPEUTIC EXERCISES: CPT

## 2019-03-06 PROCEDURE — 99232 SBSQ HOSP IP/OBS MODERATE 35: CPT | Performed by: PHYSICAL MEDICINE & REHABILITATION

## 2019-03-06 PROCEDURE — 1180000000 HC REHAB R&B

## 2019-03-06 RX ORDER — HYDROCODONE BITARTRATE AND ACETAMINOPHEN 5; 325 MG/1; MG/1
1 TABLET ORAL EVERY 6 HOURS PRN
Status: DISCONTINUED | OUTPATIENT
Start: 2019-03-06 | End: 2019-03-09 | Stop reason: HOSPADM

## 2019-03-06 RX ORDER — HYDROCODONE BITARTRATE AND ACETAMINOPHEN 5; 325 MG/1; MG/1
1 TABLET ORAL EVERY 4 HOURS PRN
Status: DISCONTINUED | OUTPATIENT
Start: 2019-03-06 | End: 2019-03-09 | Stop reason: HOSPADM

## 2019-03-06 RX ADMIN — HEPARIN SODIUM 5000 UNITS: 5000 INJECTION INTRAVENOUS; SUBCUTANEOUS at 07:48

## 2019-03-06 RX ADMIN — IBUPROFEN 400 MG: 400 TABLET, FILM COATED ORAL at 20:31

## 2019-03-06 RX ADMIN — IBUPROFEN 400 MG: 400 TABLET, FILM COATED ORAL at 07:48

## 2019-03-06 RX ADMIN — HYDROCORTISONE: 1 OINTMENT TOPICAL at 15:12

## 2019-03-06 RX ADMIN — HYDROCODONE BITARTRATE AND ACETAMINOPHEN 1 TABLET: 5; 325 TABLET ORAL at 15:51

## 2019-03-06 RX ADMIN — HYDROCORTISONE: 1 OINTMENT TOPICAL at 20:33

## 2019-03-06 RX ADMIN — HEPARIN SODIUM 5000 UNITS: 5000 INJECTION INTRAVENOUS; SUBCUTANEOUS at 20:33

## 2019-03-06 RX ADMIN — OXYCODONE AND ACETAMINOPHEN 1 TABLET: 5; 325 TABLET ORAL at 05:18

## 2019-03-06 RX ADMIN — HYDROCODONE BITARTRATE AND ACETAMINOPHEN 1 TABLET: 5; 325 TABLET ORAL at 11:53

## 2019-03-06 ASSESSMENT — PAIN DESCRIPTION - LOCATION
LOCATION: RIB CAGE
LOCATION: RIB CAGE

## 2019-03-06 ASSESSMENT — PAIN SCALES - GENERAL
PAINLEVEL_OUTOF10: 7
PAINLEVEL_OUTOF10: 4
PAINLEVEL_OUTOF10: 7
PAINLEVEL_OUTOF10: 6
PAINLEVEL_OUTOF10: 0
PAINLEVEL_OUTOF10: 6
PAINLEVEL_OUTOF10: 5
PAINLEVEL_OUTOF10: 9
PAINLEVEL_OUTOF10: 8
PAINLEVEL_OUTOF10: 5

## 2019-03-06 ASSESSMENT — PAIN DESCRIPTION - PAIN TYPE
TYPE: ACUTE PAIN
TYPE: ACUTE PAIN

## 2019-03-06 ASSESSMENT — PAIN DESCRIPTION - ORIENTATION
ORIENTATION: LEFT
ORIENTATION: LEFT

## 2019-03-07 PROCEDURE — 6360000002 HC RX W HCPCS: Performed by: SURGERY

## 2019-03-07 PROCEDURE — 97535 SELF CARE MNGMENT TRAINING: CPT

## 2019-03-07 PROCEDURE — 1180000000 HC REHAB R&B

## 2019-03-07 PROCEDURE — 99232 SBSQ HOSP IP/OBS MODERATE 35: CPT | Performed by: PHYSICAL MEDICINE & REHABILITATION

## 2019-03-07 PROCEDURE — 99231 SBSQ HOSP IP/OBS SF/LOW 25: CPT | Performed by: INTERNAL MEDICINE

## 2019-03-07 PROCEDURE — 97116 GAIT TRAINING THERAPY: CPT

## 2019-03-07 PROCEDURE — 6370000000 HC RX 637 (ALT 250 FOR IP): Performed by: PHYSICAL MEDICINE & REHABILITATION

## 2019-03-07 PROCEDURE — 97530 THERAPEUTIC ACTIVITIES: CPT

## 2019-03-07 PROCEDURE — 97110 THERAPEUTIC EXERCISES: CPT

## 2019-03-07 RX ORDER — SENNA AND DOCUSATE SODIUM 50; 8.6 MG/1; MG/1
1 TABLET, FILM COATED ORAL 2 TIMES DAILY
Status: DISCONTINUED | OUTPATIENT
Start: 2019-03-07 | End: 2019-03-09 | Stop reason: HOSPADM

## 2019-03-07 RX ORDER — DOCUSATE SODIUM 100 MG/1
100 CAPSULE, LIQUID FILLED ORAL 2 TIMES DAILY
Status: DISCONTINUED | OUTPATIENT
Start: 2019-03-07 | End: 2019-03-09 | Stop reason: HOSPADM

## 2019-03-07 RX ADMIN — STANDARDIZED SENNA CONCENTRATE AND DOCUSATE SODIUM 1 TABLET: 8.6; 5 TABLET ORAL at 22:34

## 2019-03-07 RX ADMIN — HEPARIN SODIUM 5000 UNITS: 5000 INJECTION INTRAVENOUS; SUBCUTANEOUS at 07:23

## 2019-03-07 RX ADMIN — HYDROCORTISONE: 1 OINTMENT TOPICAL at 22:34

## 2019-03-07 RX ADMIN — DOCUSATE SODIUM 100 MG: 100 CAPSULE, LIQUID FILLED ORAL at 22:34

## 2019-03-07 RX ADMIN — HYDROCODONE BITARTRATE AND ACETAMINOPHEN 1 TABLET: 5; 325 TABLET ORAL at 05:45

## 2019-03-07 RX ADMIN — HYDROCODONE BITARTRATE AND ACETAMINOPHEN 1 TABLET: 5; 325 TABLET ORAL at 10:09

## 2019-03-07 RX ADMIN — HYDROCODONE BITARTRATE AND ACETAMINOPHEN 1 TABLET: 5; 325 TABLET ORAL at 15:24

## 2019-03-07 RX ADMIN — HEPARIN SODIUM 5000 UNITS: 5000 INJECTION INTRAVENOUS; SUBCUTANEOUS at 22:36

## 2019-03-07 RX ADMIN — HYDROCORTISONE: 1 OINTMENT TOPICAL at 07:24

## 2019-03-07 RX ADMIN — HYDROCODONE BITARTRATE AND ACETAMINOPHEN 1 TABLET: 5; 325 TABLET ORAL at 22:35

## 2019-03-07 ASSESSMENT — PAIN SCALES - GENERAL
PAINLEVEL_OUTOF10: 5
PAINLEVEL_OUTOF10: 0
PAINLEVEL_OUTOF10: 9
PAINLEVEL_OUTOF10: 9
PAINLEVEL_OUTOF10: 7
PAINLEVEL_OUTOF10: 10
PAINLEVEL_OUTOF10: 5
PAINLEVEL_OUTOF10: 6
PAINLEVEL_OUTOF10: 5
PAINLEVEL_OUTOF10: 7

## 2019-03-07 ASSESSMENT — PAIN DESCRIPTION - PAIN TYPE
TYPE: ACUTE PAIN
TYPE: ACUTE PAIN

## 2019-03-07 ASSESSMENT — PAIN DESCRIPTION - DESCRIPTORS: DESCRIPTORS: ACHING;DISCOMFORT

## 2019-03-07 ASSESSMENT — PAIN DESCRIPTION - ONSET: ONSET: ON-GOING

## 2019-03-07 ASSESSMENT — PAIN DESCRIPTION - ORIENTATION
ORIENTATION: LEFT

## 2019-03-07 ASSESSMENT — PAIN DESCRIPTION - PROGRESSION: CLINICAL_PROGRESSION: NOT CHANGED

## 2019-03-07 ASSESSMENT — PAIN DESCRIPTION - LOCATION
LOCATION: RIB CAGE

## 2019-03-07 ASSESSMENT — PAIN DESCRIPTION - FREQUENCY: FREQUENCY: INTERMITTENT

## 2019-03-08 PROCEDURE — 6370000000 HC RX 637 (ALT 250 FOR IP): Performed by: PHYSICAL MEDICINE & REHABILITATION

## 2019-03-08 PROCEDURE — 97535 SELF CARE MNGMENT TRAINING: CPT

## 2019-03-08 PROCEDURE — 97116 GAIT TRAINING THERAPY: CPT

## 2019-03-08 PROCEDURE — 97530 THERAPEUTIC ACTIVITIES: CPT

## 2019-03-08 PROCEDURE — 1180000000 HC REHAB R&B

## 2019-03-08 PROCEDURE — 99231 SBSQ HOSP IP/OBS SF/LOW 25: CPT | Performed by: INTERNAL MEDICINE

## 2019-03-08 PROCEDURE — 6360000002 HC RX W HCPCS: Performed by: SURGERY

## 2019-03-08 PROCEDURE — 97110 THERAPEUTIC EXERCISES: CPT

## 2019-03-08 PROCEDURE — 99232 SBSQ HOSP IP/OBS MODERATE 35: CPT | Performed by: PHYSICAL MEDICINE & REHABILITATION

## 2019-03-08 RX ORDER — PSEUDOEPHEDRINE HCL 30 MG
100 TABLET ORAL 2 TIMES DAILY
Qty: 30 CAPSULE | Refills: 0 | Status: ON HOLD | OUTPATIENT
Start: 2019-03-08 | End: 2019-10-09 | Stop reason: HOSPADM

## 2019-03-08 RX ORDER — HYDROCODONE BITARTRATE AND ACETAMINOPHEN 5; 325 MG/1; MG/1
1 TABLET ORAL EVERY 6 HOURS PRN
Qty: 20 TABLET | Refills: 0 | Status: SHIPPED | OUTPATIENT
Start: 2019-03-08 | End: 2019-03-13

## 2019-03-08 RX ORDER — DIAPER,BRIEF,INFANT-TODD,DISP
EACH MISCELLANEOUS
Qty: 30 G | Refills: 0 | Status: SHIPPED | OUTPATIENT
Start: 2019-03-08 | End: 2019-05-24 | Stop reason: ALTCHOICE

## 2019-03-08 RX ADMIN — DOCUSATE SODIUM 100 MG: 100 CAPSULE, LIQUID FILLED ORAL at 09:05

## 2019-03-08 RX ADMIN — HYDROCORTISONE: 1 OINTMENT TOPICAL at 21:40

## 2019-03-08 RX ADMIN — HEPARIN SODIUM 5000 UNITS: 5000 INJECTION INTRAVENOUS; SUBCUTANEOUS at 09:05

## 2019-03-08 RX ADMIN — STANDARDIZED SENNA CONCENTRATE AND DOCUSATE SODIUM 1 TABLET: 8.6; 5 TABLET ORAL at 09:05

## 2019-03-08 RX ADMIN — HEPARIN SODIUM 5000 UNITS: 5000 INJECTION INTRAVENOUS; SUBCUTANEOUS at 21:41

## 2019-03-08 RX ADMIN — HYDROCODONE BITARTRATE AND ACETAMINOPHEN 1 TABLET: 5; 325 TABLET ORAL at 03:24

## 2019-03-08 RX ADMIN — HYDROCODONE BITARTRATE AND ACETAMINOPHEN 1 TABLET: 5; 325 TABLET ORAL at 21:40

## 2019-03-08 RX ADMIN — HYDROCORTISONE: 1 OINTMENT TOPICAL at 09:05

## 2019-03-08 RX ADMIN — HYDROCODONE BITARTRATE AND ACETAMINOPHEN 1 TABLET: 5; 325 TABLET ORAL at 16:05

## 2019-03-08 RX ADMIN — HYDROCODONE BITARTRATE AND ACETAMINOPHEN 1 TABLET: 5; 325 TABLET ORAL at 09:13

## 2019-03-08 ASSESSMENT — PAIN SCALES - GENERAL
PAINLEVEL_OUTOF10: 0
PAINLEVEL_OUTOF10: 0
PAINLEVEL_OUTOF10: 2
PAINLEVEL_OUTOF10: 5
PAINLEVEL_OUTOF10: 7
PAINLEVEL_OUTOF10: 5
PAINLEVEL_OUTOF10: 6
PAINLEVEL_OUTOF10: 4
PAINLEVEL_OUTOF10: 3

## 2019-03-08 ASSESSMENT — PAIN DESCRIPTION - FREQUENCY
FREQUENCY: INTERMITTENT

## 2019-03-08 ASSESSMENT — PAIN DESCRIPTION - ORIENTATION
ORIENTATION: LEFT

## 2019-03-08 ASSESSMENT — PAIN DESCRIPTION - PROGRESSION: CLINICAL_PROGRESSION: GRADUALLY IMPROVING

## 2019-03-08 ASSESSMENT — PAIN DESCRIPTION - LOCATION
LOCATION: RIB CAGE

## 2019-03-08 ASSESSMENT — PAIN DESCRIPTION - DESCRIPTORS
DESCRIPTORS: ACHING
DESCRIPTORS: ACHING
DESCRIPTORS: ACHING;DISCOMFORT

## 2019-03-08 ASSESSMENT — PAIN DESCRIPTION - PAIN TYPE
TYPE: ACUTE PAIN
TYPE: CHRONIC PAIN
TYPE: CHRONIC PAIN

## 2019-03-08 ASSESSMENT — PAIN DESCRIPTION - ONSET: ONSET: ON-GOING

## 2019-03-09 VITALS
OXYGEN SATURATION: 97 % | WEIGHT: 134 LBS | DIASTOLIC BLOOD PRESSURE: 75 MMHG | TEMPERATURE: 97.3 F | RESPIRATION RATE: 16 BRPM | BODY MASS INDEX: 27.01 KG/M2 | SYSTOLIC BLOOD PRESSURE: 155 MMHG | HEART RATE: 87 BPM | HEIGHT: 59 IN

## 2019-03-09 PROCEDURE — 97535 SELF CARE MNGMENT TRAINING: CPT

## 2019-03-09 PROCEDURE — 6370000000 HC RX 637 (ALT 250 FOR IP): Performed by: PHYSICAL MEDICINE & REHABILITATION

## 2019-03-09 PROCEDURE — 97530 THERAPEUTIC ACTIVITIES: CPT

## 2019-03-09 PROCEDURE — 97110 THERAPEUTIC EXERCISES: CPT

## 2019-03-09 PROCEDURE — 6370000000 HC RX 637 (ALT 250 FOR IP): Performed by: SURGERY

## 2019-03-09 PROCEDURE — 97116 GAIT TRAINING THERAPY: CPT

## 2019-03-09 PROCEDURE — 99238 HOSP IP/OBS DSCHRG MGMT 30/<: CPT | Performed by: PHYSICAL MEDICINE & REHABILITATION

## 2019-03-09 PROCEDURE — 6360000002 HC RX W HCPCS: Performed by: SURGERY

## 2019-03-09 RX ADMIN — HYDROCODONE BITARTRATE AND ACETAMINOPHEN 1 TABLET: 5; 325 TABLET ORAL at 04:51

## 2019-03-09 RX ADMIN — STANDARDIZED SENNA CONCENTRATE AND DOCUSATE SODIUM 1 TABLET: 8.6; 5 TABLET ORAL at 07:39

## 2019-03-09 RX ADMIN — IBUPROFEN 400 MG: 400 TABLET, FILM COATED ORAL at 07:42

## 2019-03-09 RX ADMIN — HYDROCORTISONE: 1 OINTMENT TOPICAL at 07:39

## 2019-03-09 RX ADMIN — DOCUSATE SODIUM 100 MG: 100 CAPSULE, LIQUID FILLED ORAL at 07:39

## 2019-03-09 RX ADMIN — HEPARIN SODIUM 5000 UNITS: 5000 INJECTION INTRAVENOUS; SUBCUTANEOUS at 07:38

## 2019-03-09 RX ADMIN — HYDROCODONE BITARTRATE AND ACETAMINOPHEN 1 TABLET: 5; 325 TABLET ORAL at 10:28

## 2019-03-09 ASSESSMENT — PAIN DESCRIPTION - DESCRIPTORS: DESCRIPTORS: ACHING;DISCOMFORT

## 2019-03-09 ASSESSMENT — PAIN SCALES - GENERAL
PAINLEVEL_OUTOF10: 5
PAINLEVEL_OUTOF10: 6
PAINLEVEL_OUTOF10: 6
PAINLEVEL_OUTOF10: 0
PAINLEVEL_OUTOF10: 3
PAINLEVEL_OUTOF10: 6

## 2019-03-09 ASSESSMENT — PAIN DESCRIPTION - PAIN TYPE: TYPE: CHRONIC PAIN

## 2019-03-09 ASSESSMENT — PAIN DESCRIPTION - ORIENTATION: ORIENTATION: LEFT

## 2019-03-09 ASSESSMENT — PAIN DESCRIPTION - LOCATION: LOCATION: RIB CAGE

## 2019-03-09 ASSESSMENT — PAIN DESCRIPTION - PROGRESSION: CLINICAL_PROGRESSION: GRADUALLY IMPROVING

## 2019-03-27 DIAGNOSIS — G62.9 PERIPHERAL POLYNEUROPATHY: ICD-10-CM

## 2019-03-27 RX ORDER — GABAPENTIN 100 MG/1
CAPSULE ORAL
Qty: 60 CAPSULE | Refills: 0 | OUTPATIENT
Start: 2019-03-27

## 2019-04-26 DIAGNOSIS — M76.892 HIP FLEXOR TENDONITIS, LEFT: ICD-10-CM

## 2019-04-26 RX ORDER — MELOXICAM 15 MG/1
TABLET ORAL
Qty: 30 TABLET | Refills: 0 | Status: SHIPPED | OUTPATIENT
Start: 2019-04-26 | End: 2019-05-24 | Stop reason: SDUPTHER

## 2019-05-15 ENCOUNTER — PATIENT MESSAGE (OUTPATIENT)
Dept: FAMILY MEDICINE CLINIC | Age: 84
End: 2019-05-15

## 2019-05-15 NOTE — TELEPHONE ENCOUNTER
From: Keenan Vallejo  To:  Charisse Trevizo MD  Sent: 5/8/2019 7:10 AM EDT  Subject: Alcohol & Tobacco Use    History questionnaire submitted on Wednesday May 08,   2019 at 7:10:22 AM  Questionnaire: Alcohol & Tobacco Use  Patient: Keenan Vallejo [[id number]]      Social History:    Question: Alcohol Use  Response: Not Currently  Drinks/Week:   Comments:     Question: Tobacco Use  Response: Never  Comments:

## 2019-05-24 ENCOUNTER — OFFICE VISIT (OUTPATIENT)
Dept: FAMILY MEDICINE CLINIC | Age: 84
End: 2019-05-24
Payer: MEDICARE

## 2019-05-24 ENCOUNTER — HOSPITAL ENCOUNTER (OUTPATIENT)
Age: 84
Setting detail: SPECIMEN
Discharge: HOME | End: 2019-05-24
Payer: MEDICARE

## 2019-05-24 VITALS
SYSTOLIC BLOOD PRESSURE: 118 MMHG | WEIGHT: 133 LBS | OXYGEN SATURATION: 97 % | RESPIRATION RATE: 16 BRPM | BODY MASS INDEX: 26.86 KG/M2 | DIASTOLIC BLOOD PRESSURE: 70 MMHG | TEMPERATURE: 98.6 F | HEART RATE: 85 BPM

## 2019-05-24 DIAGNOSIS — Z13.29 SCREENING FOR THYROID DISORDER: ICD-10-CM

## 2019-05-24 DIAGNOSIS — E78.5 HYPERLIPIDEMIA, UNSPECIFIED HYPERLIPIDEMIA TYPE: Primary | ICD-10-CM

## 2019-05-24 DIAGNOSIS — Z13.0 SCREENING FOR DEFICIENCY ANEMIA: ICD-10-CM

## 2019-05-24 DIAGNOSIS — E44.0 MODERATE MALNUTRITION (HCC): ICD-10-CM

## 2019-05-24 DIAGNOSIS — K21.9 GASTROESOPHAGEAL REFLUX DISEASE, ESOPHAGITIS PRESENCE NOT SPECIFIED: ICD-10-CM

## 2019-05-24 DIAGNOSIS — M25.552 LEFT HIP PAIN: ICD-10-CM

## 2019-05-24 DIAGNOSIS — M76.892 HIP FLEXOR TENDONITIS, LEFT: ICD-10-CM

## 2019-05-24 DIAGNOSIS — E78.5 HYPERLIPIDEMIA, UNSPECIFIED HYPERLIPIDEMIA TYPE: ICD-10-CM

## 2019-05-24 LAB
ABSOLUTE EOS #: 0.05 K/UL (ref 0–0.44)
ABSOLUTE IMMATURE GRANULOCYTE: 0.03 K/UL (ref 0–0.3)
ABSOLUTE LYMPH #: 1.51 K/UL (ref 1.1–3.7)
ABSOLUTE MONO #: 0.51 K/UL (ref 0.1–1.2)
ALBUMIN SERPL-MCNC: 4.4 G/DL (ref 3.5–5.2)
ALBUMIN/GLOBULIN RATIO: 1.6 (ref 1–2.5)
ALP BLD-CCNC: 54 U/L (ref 35–104)
ALT SERPL-CCNC: 10 U/L (ref 5–33)
ANION GAP SERPL CALCULATED.3IONS-SCNC: 18 MMOL/L (ref 9–17)
AST SERPL-CCNC: 12 U/L
BASOPHILS # BLD: 1 % (ref 0–2)
BASOPHILS ABSOLUTE: 0.05 K/UL (ref 0–0.2)
BILIRUB SERPL-MCNC: 0.3 MG/DL (ref 0.3–1.2)
BUN BLDV-MCNC: 16 MG/DL (ref 8–23)
BUN/CREAT BLD: ABNORMAL (ref 9–20)
CALCIUM SERPL-MCNC: 9.4 MG/DL (ref 8.6–10.4)
CHLORIDE BLD-SCNC: 102 MMOL/L (ref 98–107)
CHOLESTEROL, FASTING: 140 MG/DL
CHOLESTEROL/HDL RATIO: 3
CO2: 23 MMOL/L (ref 20–31)
CREAT SERPL-MCNC: 0.92 MG/DL (ref 0.5–0.9)
DIFFERENTIAL TYPE: ABNORMAL
EOSINOPHILS RELATIVE PERCENT: 1 % (ref 1–4)
GFR AFRICAN AMERICAN: >60 ML/MIN
GFR NON-AFRICAN AMERICAN: 58 ML/MIN
GFR SERPL CREATININE-BSD FRML MDRD: ABNORMAL ML/MIN/{1.73_M2}
GFR SERPL CREATININE-BSD FRML MDRD: ABNORMAL ML/MIN/{1.73_M2}
GLUCOSE BLD-MCNC: 99 MG/DL (ref 70–99)
HCT VFR BLD CALC: 37.7 % (ref 36.3–47.1)
HDLC SERPL-MCNC: 46 MG/DL
HEMOGLOBIN: 12.1 G/DL (ref 11.9–15.1)
IMMATURE GRANULOCYTES: 0 %
LDL CHOLESTEROL: 75 MG/DL (ref 0–130)
LYMPHOCYTES # BLD: 22 % (ref 24–43)
MCH RBC QN AUTO: 30.7 PG (ref 25.2–33.5)
MCHC RBC AUTO-ENTMCNC: 32.1 G/DL (ref 28.4–34.8)
MCV RBC AUTO: 95.7 FL (ref 82.6–102.9)
MONOCYTES # BLD: 7 % (ref 3–12)
NRBC AUTOMATED: 0 PER 100 WBC
PDW BLD-RTO: 12.5 % (ref 11.8–14.4)
PLATELET # BLD: 282 K/UL (ref 138–453)
PLATELET ESTIMATE: ABNORMAL
PMV BLD AUTO: 10.6 FL (ref 8.1–13.5)
POTASSIUM SERPL-SCNC: 4.9 MMOL/L (ref 3.7–5.3)
RBC # BLD: 3.94 M/UL (ref 3.95–5.11)
RBC # BLD: ABNORMAL 10*6/UL
SEG NEUTROPHILS: 69 % (ref 36–65)
SEGMENTED NEUTROPHILS ABSOLUTE COUNT: 4.72 K/UL (ref 1.5–8.1)
SODIUM BLD-SCNC: 143 MMOL/L (ref 135–144)
TOTAL PROTEIN: 7.2 G/DL (ref 6.4–8.3)
TRIGLYCERIDE, FASTING: 95 MG/DL
TSH SERPL DL<=0.05 MIU/L-ACNC: 1.54 MIU/L (ref 0.3–5)
VLDLC SERPL CALC-MCNC: NORMAL MG/DL (ref 1–30)
WBC # BLD: 6.9 K/UL (ref 3.5–11.3)
WBC # BLD: ABNORMAL 10*3/UL

## 2019-05-24 PROCEDURE — 99214 OFFICE O/P EST MOD 30 MIN: CPT | Performed by: INTERNAL MEDICINE

## 2019-05-24 PROCEDURE — G8419 CALC BMI OUT NRM PARAM NOF/U: HCPCS | Performed by: INTERNAL MEDICINE

## 2019-05-24 PROCEDURE — 1090F PRES/ABSN URINE INCON ASSESS: CPT | Performed by: INTERNAL MEDICINE

## 2019-05-24 PROCEDURE — 1036F TOBACCO NON-USER: CPT | Performed by: INTERNAL MEDICINE

## 2019-05-24 PROCEDURE — 4040F PNEUMOC VAC/ADMIN/RCVD: CPT | Performed by: INTERNAL MEDICINE

## 2019-05-24 PROCEDURE — G8400 PT W/DXA NO RESULTS DOC: HCPCS | Performed by: INTERNAL MEDICINE

## 2019-05-24 PROCEDURE — 1123F ACP DISCUSS/DSCN MKR DOCD: CPT | Performed by: INTERNAL MEDICINE

## 2019-05-24 PROCEDURE — G8427 DOCREV CUR MEDS BY ELIG CLIN: HCPCS | Performed by: INTERNAL MEDICINE

## 2019-05-24 RX ORDER — LISINOPRIL 5 MG/1
10 TABLET ORAL NIGHTLY
Status: ON HOLD | COMMUNITY
End: 2019-10-09 | Stop reason: HOSPADM

## 2019-05-24 RX ORDER — HYDROCODONE BITARTRATE AND ACETAMINOPHEN 5; 325 MG/1; MG/1
1 TABLET ORAL 3 TIMES DAILY
COMMUNITY
End: 2021-10-26 | Stop reason: ALTCHOICE

## 2019-05-24 RX ORDER — OMEPRAZOLE 20 MG/1
20 CAPSULE, DELAYED RELEASE ORAL DAILY
Qty: 90 CAPSULE | Refills: 1 | Status: SHIPPED | OUTPATIENT
Start: 2019-05-24 | End: 2020-01-14

## 2019-05-24 RX ORDER — TRAMADOL HYDROCHLORIDE 50 MG/1
50 TABLET ORAL NIGHTLY
COMMUNITY
End: 2019-05-24 | Stop reason: SDUPTHER

## 2019-05-24 RX ORDER — METHYLPREDNISOLONE 4 MG/1
TABLET ORAL
Qty: 1 KIT | Refills: 0 | Status: SHIPPED | OUTPATIENT
Start: 2019-05-24 | End: 2019-07-15 | Stop reason: ALTCHOICE

## 2019-05-24 RX ORDER — TIZANIDINE 4 MG/1
4 TABLET ORAL DAILY PRN
COMMUNITY
End: 2019-06-04 | Stop reason: SDUPTHER

## 2019-05-24 RX ORDER — TRAMADOL HYDROCHLORIDE 50 MG/1
50 TABLET ORAL EVERY 6 HOURS PRN
Qty: 120 TABLET | Refills: 0 | Status: SHIPPED | OUTPATIENT
Start: 2019-05-24 | End: 2019-06-18 | Stop reason: SDUPTHER

## 2019-05-24 RX ORDER — AMITRIPTYLINE HYDROCHLORIDE 25 MG/1
100 TABLET, FILM COATED ORAL NIGHTLY
COMMUNITY
End: 2019-07-25 | Stop reason: SDUPTHER

## 2019-05-24 RX ORDER — SIMVASTATIN 20 MG
20 TABLET ORAL NIGHTLY
Qty: 90 TABLET | Refills: 1 | Status: SHIPPED | OUTPATIENT
Start: 2019-05-24 | End: 2019-09-24 | Stop reason: SDUPTHER

## 2019-05-24 RX ORDER — ALENDRONATE SODIUM 70 MG/1
70 TABLET ORAL
COMMUNITY
End: 2019-08-19 | Stop reason: SDUPTHER

## 2019-05-24 RX ORDER — MELOXICAM 15 MG/1
TABLET ORAL
Qty: 90 TABLET | Refills: 1 | Status: ON HOLD | OUTPATIENT
Start: 2019-05-24 | End: 2019-10-09 | Stop reason: HOSPADM

## 2019-05-24 NOTE — PROGRESS NOTES
medSubjective:       Patient ID: Betsy Neil is a 80 y.o. female who presents for   Chief Complaint   Patient presents with    Back Pain    Abdominal Pain    Hip Pain     left       HPI:  Nursing note reviewed and discussed with patient. Abdominal pain - not having regular BMs. No appetite. She is bloated and gassy. She denies nausea, vomiting, diarrhea. She has been eating less now. Continues to have hip pain and low back pain on the left side, unchanged. She had some temporary relief with cortisone shot to L hip. MRI showed tendonitis only. She is not able to function at all. She is not able to move around her house without pain. She is able to sleep at night with her pain meds but wakes up frequently in pain. Was supposed to go back to see ortho for repeat injection but that was when she fell and broke her ribs and ended up in rehab, did not finish PT for her ribs. Patient's medications, allergies, past medical, surgical, social and family histories were reviewed and updated as appropriate. Social History     Tobacco Use    Smoking status: Never Smoker    Smokeless tobacco: Never Used   Substance Use Topics    Alcohol use: No        Review of Systems  Energy level good overall, and weight is stable. No chest pain or shortness of breath. Bowels have been normal without constipation or diarrhea         Objective:        Physical Exam:  /70 (Site: Right Upper Arm, Position: Sitting, Cuff Size: Medium Adult)   Pulse 85   Temp 98.6 °F (37 °C) (Oral)   Resp 16   Wt 133 lb (60.3 kg)   SpO2 97%   BMI 26.86 kg/m²   Wt Readings from Last 3 Encounters:   05/24/19 133 lb (60.3 kg)   03/01/19 134 lb (60.8 kg)   02/28/19 134 lb 14.7 oz (61.2 kg)       General: Alert and oriented, in no distress. Patient ambulating with normal gait. Normal body habitus. Chest: clear with no wheezes or rales. No retractions, or use of accessory muscles noted.     Cardiovascular: PMI is not displaced, and no thrill noted. Regular rate and rhythm with no rub, murmur or gallop. There is no peripheral edema. Pedal pulses are normal.   Abdomen: Abdomen is soft and nontender. The bowel sounds are normal.   Musculoskeletal: There are no deformities of the the extremities. Patient has all ten fingers intact. The patient has full range of motion on all 4 extremities without pain. Skin: The skin is warm and dry. There are no rashes noted. Prior to Visit Medications    Medication Sig Taking? Authorizing Provider   traMADol (ULTRAM) 50 MG tablet Take 50 mg by mouth nightly. Yes Historical Provider, MD   lisinopril (PRINIVIL;ZESTRIL) 5 MG tablet Take 5 mg by mouth nightly Yes Historical Provider, MD   amitriptyline (ELAVIL) 25 MG tablet Take 100 mg by mouth nightly Yes Historical Provider, MD   tiZANidine (ZANAFLEX) 4 MG tablet Take 4 mg by mouth daily as needed Yes Historical Provider, MD   HYDROcodone-acetaminophen (NORCO) 5-325 MG per tablet Take 1 tablet by mouth every 6 hours as needed for Pain. Yes Historical Provider, MD   alendronate (FOSAMAX) 70 MG tablet Take 70 mg by mouth every 7 days Yes Historical Provider, MD   meloxicam (MOBIC) 15 MG tablet TAKE 1 TABLET BY MOUTH EVERY DAY Yes Elder Eaton DO   docusate sodium (COLACE, DULCOLAX) 100 MG CAPS Take 100 mg by mouth 2 times daily Yes Manny Garay MD   simvastatin (ZOCOR) 20 MG tablet Take 1 tablet by mouth nightly Yes Pauly Bowens MD   omeprazole (PRILOSEC) 20 MG capsule Take 20 mg by mouth daily. Yes Historical Provider, MD       Data Review      Assessment/Plan:      1. Hip flexor tendonitis, left  Go back to ortho   - meloxicam (MOBIC) 15 MG tablet; TAKE 1 TABLET BY MOUTH EVERY DAY  Dispense: 90 tablet; Refill: 1  - [facility]    2. Hyperlipidemia, unspecified hyperlipidemia type  - simvastatin (ZOCOR) 20 MG tablet; Take 1 tablet by mouth nightly  Dispense: 90 tablet; Refill: 1  - Lipid, Fasting;  Future  - Comprehensive Metabolic Panel; Future    3. Screening for deficiency anemia  - CBC With Auto Differential; Future    4. Screening for thyroid disorder  - TSH With Reflex Ft4; Future    5. Left hip pain  - [facility]  - traMADol (ULTRAM) 50 MG tablet; Take 1 tablet by mouth every 6 hours as needed for Pain for up to 30 days. Dispense: 120 tablet; Refill: 0    6. Gastroesophageal reflux disease, esophagitis presence not specified  - omeprazole (PRILOSEC) 20 MG delayed release capsule; Take 1 capsule by mouth daily  Dispense: 90 capsule;  Refill: 1           Electronically signed by Malcolm Berry MD on 5/24/2019 at 9:13 AM

## 2019-06-04 ENCOUNTER — PATIENT MESSAGE (OUTPATIENT)
Dept: FAMILY MEDICINE CLINIC | Age: 84
End: 2019-06-04

## 2019-06-04 RX ORDER — TIZANIDINE 4 MG/1
4 TABLET ORAL EVERY 6 HOURS PRN
Qty: 30 TABLET | Refills: 0 | Status: SHIPPED | OUTPATIENT
Start: 2019-06-04 | End: 2019-07-01 | Stop reason: SDUPTHER

## 2019-06-04 NOTE — TELEPHONE ENCOUNTER
From: Lakhwinder Dwyer  To: Kristan Eckert MD  Sent: 6/4/2019 7:17 AM EDT  Subject: Prescription Question    Dr. Asia Joshi.    I need a refill on my mom, Nichelle's Tizanidine 4mg. Tablet. You had said at the last visit to let you know when this was needed. She will be out of pills by the end of the week. Also, I will be taking her to the pain management appointment on June 17th as her hip pain is back again. The steroid worked for a couple of days but now she is complaining again about being in pain. Taking her tramadol as prescribed but still complaining. Just an FYI. Thanks!     Jessica Jeffries

## 2019-06-17 ENCOUNTER — HOSPITAL ENCOUNTER (OUTPATIENT)
Dept: PAIN MANAGEMENT | Age: 84
Discharge: HOME | End: 2019-06-17
Payer: MEDICARE

## 2019-06-17 VITALS
DIASTOLIC BLOOD PRESSURE: 75 MMHG | BODY MASS INDEX: 26.81 KG/M2 | TEMPERATURE: 98.8 F | OXYGEN SATURATION: 96 % | HEIGHT: 59 IN | SYSTOLIC BLOOD PRESSURE: 122 MMHG | RESPIRATION RATE: 17 BRPM | WEIGHT: 133 LBS | HEART RATE: 89 BPM

## 2019-06-17 DIAGNOSIS — M51.36 DEGENERATIVE DISC DISEASE, LUMBAR: ICD-10-CM

## 2019-06-17 DIAGNOSIS — G60.9 IDIOPATHIC PERIPHERAL NEUROPATHY: ICD-10-CM

## 2019-06-17 DIAGNOSIS — G35 MULTIPLE SCLEROSIS (HCC): ICD-10-CM

## 2019-06-17 DIAGNOSIS — M16.12 PRIMARY LOCALIZED OSTEOARTHROSIS OF THE HIP, LEFT: Primary | ICD-10-CM

## 2019-06-17 DIAGNOSIS — R26.9 ABNORMAL GAIT: ICD-10-CM

## 2019-06-17 DIAGNOSIS — M47.817 LUMBOSACRAL SPONDYLOSIS WITHOUT MYELOPATHY: ICD-10-CM

## 2019-06-17 DIAGNOSIS — S76.012A TEAR OF LEFT GLUTEUS MEDIUS TENDON: ICD-10-CM

## 2019-06-17 PROCEDURE — 99203 OFFICE O/P NEW LOW 30 MIN: CPT

## 2019-06-17 PROCEDURE — 99213 OFFICE O/P EST LOW 20 MIN: CPT

## 2019-06-17 PROCEDURE — 99204 OFFICE O/P NEW MOD 45 MIN: CPT | Performed by: PAIN MEDICINE

## 2019-06-17 ASSESSMENT — PAIN - FUNCTIONAL ASSESSMENT: PAIN_FUNCTIONAL_ASSESSMENT: PREVENTS OR INTERFERES SOME ACTIVE ACTIVITIES AND ADLS

## 2019-06-17 ASSESSMENT — PAIN DESCRIPTION - ORIENTATION: ORIENTATION: LEFT

## 2019-06-17 ASSESSMENT — ENCOUNTER SYMPTOMS
GASTROINTESTINAL NEGATIVE: 1
EYES NEGATIVE: 1
RESPIRATORY NEGATIVE: 1
ALLERGIC/IMMUNOLOGIC NEGATIVE: 1

## 2019-06-17 ASSESSMENT — PAIN DESCRIPTION - PROGRESSION: CLINICAL_PROGRESSION: GRADUALLY WORSENING

## 2019-06-17 ASSESSMENT — PAIN DESCRIPTION - DIRECTION: RADIATING_TOWARDS: TO GROIN

## 2019-06-17 ASSESSMENT — PAIN DESCRIPTION - ONSET: ONSET: ON-GOING

## 2019-06-17 ASSESSMENT — PAIN DESCRIPTION - DESCRIPTORS: DESCRIPTORS: ACHING;CONSTANT

## 2019-06-17 ASSESSMENT — PAIN SCALES - GENERAL: PAINLEVEL_OUTOF10: 9

## 2019-06-17 ASSESSMENT — PAIN DESCRIPTION - LOCATION: LOCATION: LEG;HIP

## 2019-06-17 ASSESSMENT — PAIN DESCRIPTION - FREQUENCY: FREQUENCY: CONTINUOUS

## 2019-06-17 ASSESSMENT — PAIN DESCRIPTION - PAIN TYPE: TYPE: CHRONIC PAIN

## 2019-06-17 NOTE — PROGRESS NOTES
[address] Pain Management  Patient Pain Assessment  Consultation - Dr. Eric Tay    Primary Care Physician: Abigail Herbert MD    Chief complaint:   Chief Complaint   Patient presents with    Hip Pain   . HISTORY OF PRESENT ILLNESS:  John Sampson is 80 y.o. female referred to the pain clinic in consultation for left hip pain by Donato Vasquez MD    Patient is a 59-year-old female who is referred to the pain clinic with chief complaint of pain in all in the left hip region. Patient also has history of multiple sclerosis. She was evaluated by Dr. Amanda Peters and apparently had an injection in her left hip which helped the pain for only short period of time. She is working on doing physical therapy. She apparently developed this pain in follow-up last year. Patient reports when she tries to move the pain becomes extremely severe and makes her to double over she reports it feels like something is moving in her hip. Hip Pain    The incident occurred more than 1 week ago (since September 2018). There was no injury mechanism. The pain is present in the left hip. The quality of the pain is described as aching. The pain is at a severity of 8/10 (6-10). The pain is severe. The pain has been constant since onset. Associated symptoms include an inability to bear weight and muscle weakness. She reports no foreign bodies present. The symptoms are aggravated by movement, weight bearing and palpation. RX Monitoring 5/24/2019   Attestation The Prescription Monitoring Report for this patient was reviewed today. Acute Pain Prescriptions Severe pain not adequately treated with lower dose. Periodic Controlled Substance Monitoring No signs of potential drug abuse or diversion identified: otherwise, see note documentation;Obtaining appropriate analgesic effect of treatment.    Chronic Pain > 80 MEDD -       Current Pain Assessment  Pain Assessment  Pain Assessment: 0-10  Pain Level: 9  Patient's Stated Pain Goal: 1(to decrease pain with increased activity)  Pain Type: Chronic pain  Pain Location: Leg, Hip  Pain Orientation: Left  Pain Radiating Towards: to groin  Pain Descriptors: Aching, Constant  Pain Frequency: Continuous  Pain Onset: On-going  Clinical Progression: Gradually worsening  Functional Pain Assessment: Prevents or interferes some active activities and ADLs  Non-Pharmaceutical Pain Intervention(s): Rest, Repositioned, Cold applied                    ADVERSE MEDICATION EFFECTS:   Constipation: no  Bowel Regimen: No  Diet: common adult  Appetite:  ok  Sedation:  yes  Urinary Retention: no    FOCUSED PAIN SCALE:  Highest : 10  Lowest :6  Average: Range-8  When and What  was your last procedure:  Dr. Nino December - Injections in February 2019    Was your procedureeffective:  no    ACTIVITY/SOCIAL/EMOTIONAL:  Sleep Pattern: 8 hours per night. generally restful sleep  Energy Level: Normal  Currently attending Physical Therapy:  No- completed  Home Exercises: none  Mobility: some pain with walking  Do you use assistive devices?  Yes, cane  Have you fallen in the last 30 days?:  No  Currently seeing a Psychiatristor Psychologist:  No  Emotional Issues: normal   Mood: appropriate     ABERRANT BEHAVIORS SINCE LAST VISIT:  Have you ever been treated in another Pain Clinic no  Refills for prescriptions appropriate: not applicable  Lost rx/pills: not applicable  Taking more medication than prescribed:  not applicable  Are you receiving PAIN medications from  other doctors: yes, Dr. Montrell Paz- Tramadol, Dr. Yong Gunter - Vicodin for chronic headaches d/t MS  Last Urine/Serum Drug Screen :  Was Serum/UDS as anticipated?  not applicable  Brought pill bottles in :not applicable   Was Pill count appropriate? :not applicable   Are currently pregnant? not applicable  Recent ER visits: No             Past Medical History      Diagnosis Date    Arthritis     Chronic daily headache     GERD (gastroesophageal reflux disease)     Hyperlipidemia     Hypertension     Multiple sclerosis ([facility] [address]     Neuropathy     Pneumonia 2017    states had double pneumonia    Vitamin D deficiency        Surgical History  Past Surgical History:   Procedure Laterality Date    CATARACT [facility]PL[facility]2014    [facility]    CATARACT REMOVAL WITH IMPLANT Left 12/2/2014    [facility]    CERVICAL DISC SURGERY      CYSTOCELE REPAIR      HYSTERECTOMY      RECTOCELE REPAIR      SHOULDER ARTHROPLASTY Right 04/20/2017    SHOULDER SURGERY Right 2017       Medications  Current Outpatient Medications   Medication Sig Dispense Refill    tiZANidine (ZANAFLEX) 4 MG tablet Take 1 tablet by mouth every 6 hours as needed (for muscle spasms) 30 tablet 0    lisinopril (PRINIVIL;ZESTRIL) 5 MG tablet Take 5 mg by mouth nightly      amitriptyline (ELAVIL) 25 MG tablet Take 100 mg by mouth nightly      HYDROcodone-acetaminophen (NORCO) 5-325 MG per tablet Take 1 tablet by mouth every 6 hours as needed for Pain.  alendronate (FOSAMAX) 70 MG tablet Take 70 mg by mouth every 7 days      meloxicam (MOBIC) 15 MG tablet TAKE 1 TABLET BY MOUTH EVERY DAY 90 tablet 1    simvastatin (ZOCOR) 20 MG tablet Take 1 tablet by mouth nightly 90 tablet 1    omeprazole (PRILOSEC) 20 MG delayed release capsule Take 1 capsule by mouth daily 90 capsule 1    traMADol (ULTRAM) 50 MG tablet Take 1 tablet by mouth every 6 hours as needed for Pain for up to 30 days. 120 tablet 0    methylPREDNISolone (MEDROL DOSEPACK) 4 MG tablet Take by mouth. 1 kit 0    docusate sodium (COLACE, DULCOLAX) 100 MG CAPS Take 100 mg by mouth 2 times daily 30 capsule 0     No current facility-administered medications for this encounter. Allergies  Bactrim [sulfamethoxazole-trimethoprim]; Lactose intolerance (gi); Pcn [penicillins]; and Lidoderm [lidocaine]    Family History  family history is not on file.     Social History  Social History Socioeconomic History    Marital status:      Spouse name: None    Number of children: None    Years of education: None    Highest education level: None   Occupational History    None   Social Needs    Financial resource strain: None    Food insecurity:     Worry: None     Inability: None    Transportation needs:     Medical: None     Non-medical: None   Tobacco Use    Smoking status: Never Smoker    Smokeless tobacco: Never Used   Substance and Sexual Activity    Alcohol use: No    Drug use: No    Sexual activity: None   Lifestyle    Physical activity:     Days per week: None     Minutes per session: None    Stress: None   Relationships    Social connections:     Talks on phone: None     Gets together: None     Attends [employer] service: None     Active member of club or organization: None     Attends meetings of clubs or organizations: None     Relationship status: None    Intimate partner violence:     Fear of current or ex partner: None     Emotionally abused: None     Physically abused: None     Forced sexual activity: None   Other Topics Concern    None   Social History Narrative    None      reports that she does not use drugs. REVIEW OF SYSTEMS:  Review of Systems   Constitutional: Negative. Negative for appetite change, fatigue and unexpected weight change. HENT: Negative. Negative for congestion and hearing loss. Redness on the nose   Eyes: Negative. Negative for photophobia and pain. Respiratory: Negative. Negative for cough, shortness of breath and stridor. Cardiovascular: Negative. Negative for chest pain and palpitations. Gastrointestinal: Negative. Negative for abdominal pain, constipation, nausea and vomiting. Endocrine: Negative. Negative for cold intolerance and polyphagia. Genitourinary: Negative. Negative for dysuria and hematuria. Musculoskeletal: Positive for arthralgias and back pain. HIP PAIN   Skin: Negative. Negative for rash. Allergic/Immunologic: Negative. Neurological: Positive for tremors and headaches. MS   Hematological: Negative. Does not bruise/bleed easily. Psychiatric/Behavioral: Negative. Negative for suicidal ideas. The patient is not nervous/anxious. GENERAL PHYSICAL EXAM:  Vitals: /75   Pulse 89   Temp 98.8 °F (37.1 °C) (Oral)   Resp 17   Ht 4' 11\" (1.499 m)   Wt 133 lb (60.3 kg)   SpO2 96%   BMI 26.86 kg/m² , Body mass index is 26.86 kg/m². Physical Exam   Constitutional: She is oriented to person, place, and time. She appears well-developed and well-nourished. HENT:   Head: Normocephalic and atraumatic. Eyes: Pupils are equal, round, and reactive to light. Conjunctivae are normal.   Neck: Normal range of motion. Neck supple. No tracheal deviation present. No thyromegaly present. Cardiovascular: Normal rate and regular rhythm. Pulmonary/Chest: Effort normal and breath sounds normal. No respiratory distress. Abdominal: Soft. Bowel sounds are normal. She exhibits no distension. Musculoskeletal: Normal range of motion. Neurological: She is alert and oriented to person, place, and time. She has normal strength. She displays no atrophy, no tremor and normal reflexes. No cranial nerve deficit or sensory deficit. She exhibits normal muscle tone. She displays a negative Romberg sign. Gait abnormal. Coordination normal.   Reflex Scores:       Patellar reflexes are 1+ on the right side and 1+ on the left side. Achilles reflexes are 1+ on the right side and 1+ on the left side. She uses a walker to help with ambulation. Her gait is antalgic   Skin: Skin is warm and dry. No erythema. No pallor. Psychiatric: She has a normal mood and affect.  Her speech is normal and behavior is normal. Judgment and thought content normal. Cognition and memory are normal.    Right Ankle Exam     Muscle Strength   The patient has normal right ankle strength. Left Ankle Exam     Muscle Strength   The patient has normal left ankle strength. Right Knee Exam     Muscle Strength   The patient has normal right knee strength. Left Knee Exam     Muscle Strength   The patient has normal left knee strength. Right Hip Exam     Muscle Strength   The patient has normal right hip strength. Left Hip Exam     Tenderness   The patient is experiencing tenderness in the anterior, greater trochanter, lateral and posterior. Muscle Strength   Abduction: 4/5   Adduction: 5/5   Flexion: 5/5     Tests   WANDY: positive    Other   Erythema: absent  Scars: absent  Sensation: normal  Pulse: present    Comments: It is extremely tender over the gluteal muscles. Back Exam     Tenderness   The patient is experiencing tenderness in the lumbar and sacroiliac. Range of Motion   Back extension: painful and limited. Back flexion: painful and limited. Tests   Straight leg raise right: negative  Straight leg raise left: negative    Other   Sensation: normal  Gait: antalgic   Erythema: no back redness  Scars: absent    Comments:  Mild scoliosis present. There is slight increase in the thoracic kyphosis          Nurses Notes and Vital Signs reviewed. DATA  Labs:     5/24/2019  5:04 PM - Kurt Cai Incoming Lab Results From [facility]     Component Value Ref Range & Units Status Collected Lab   Glucose 99  70 - 99 mg/dL Final 05/24/2019  9:31  [address]   BUN 16  8 - 23 mg/dL Final 05/24/2019  9:31  [address]   0.50 - 0.90 mg/dL Final 05/24/2019  9:31  [address]   Bun/Cre Ratio NOT REPORTED  9 - 20 Final 05/24/2019  9:31  [address]   Calcium 9.4  8.6 - 10.4 mg/dL Final 05/24/2019  9:31  [address]   Sodium 143  135 - 144 mmol/L Final 05/24/2019  9:31  [address]   Potassium 4.9  3.7 - 5.3 mmol/L Final 05/24/2019  9:31 trochanteric bursa.       SCIATIC NERVE: No mass or lesion upon the course of the sciatic nerve.       MUSCLES/TENDONS: Moderate partial-thickness tearing of the distal gluteus   medius tendon near the attachment.  Mild surrounding increased signal.       INTRAPELVIC CONTENTS/SOFT TISSUES: Limited images of the intrapelvic contents   demonstrate no acute abnormality.           Impression   Left hip osteoarthritis.       Suspect mild trochanteric bursitis.       Moderate partial-thickness tearing of the distal gluteus medius tendon with   mild surrounding increased signal may be symptomatic. FINDINGS:   BONES/ALIGNMENT: There loss of the normal lordotic curvature.  Degenerative   endplate signal changes are greatest at L1-L2.  Multiple endplate Schmorl's   node deformities are noted.       SPINAL CORD: Conus terminates at L1 and is grossly normal in appearance       SOFT TISSUES: Multilevel disc space narrowing and disc desiccation is noted. No paraspinal masses are noted. Sofi Leventhal is no retroperitoneal adenopathy.       In the lower thoracic spine, minor multilevel disc bulging is noted on the   sagittal imaging.  This is greatest at T12-L1       L1-L2: Uncovering of the disc is noted.  There is mild superimposed diffuse   disc bulging with annular tear.  Facet hypertrophic changes are noted.  Mild   proximal foraminal narrowing on the left is suggested.       L2-L3: Minimal disc bulging is noted.  Facet hypertrophic changes are noted       L3-L4: Mild disc bulging is noted diffusely.  Facet and ligament hypertrophic   changes are noted.  Mild narrowing of the canal is noted.       L4-L5: Minimal disc bulging is noted.  Facet and ligament hypertrophic   changes are noted.  Severe narrowing of the canal is noted.  Mild proximal   foraminal narrowing on the right is noted.       L5-S1: Minimal disc bulging is noted.  Facet hypertrophic changes are noted.    Mild-to-moderate right and mild left foraminal narrowing is noted.           Impression   Multilevel degenerative disc disease in the lumbar spine as described.  See   above for details. Patient Active Problem List   Diagnosis    Ataxia    Multiple sclerosis ([address]    Acute cystitis without hematuria    Essential hypertension    Hyperlipidemia    Closed fracture of rib of left side    Atelectasis    Debility    Moderate malnutrition (HCC)    Abnormal gait    Actinic keratosis    Enthesopathy of hip region    Generalized osteoarthritis    Idiopathic peripheral neuropathy    Vitamin D deficiency        ASSESSMENT    Donnie Milan is a 80 y.o. female with    1. Primary localized osteoarthrosis of the hip, left    2. Abnormal gait    3. Tear of left gluteus medius tendon    4. Idiopathic peripheral neuropathy    5. Multiple sclerosis ([address]    6. Degenerative disc disease, lumbar    7. Lumbosacral spondylosis without myelopathy           PLAN  . Patient's   [x] x-ray    [] CT scan    [x] MRI  Were/was  Reviewed. These findings are consistent with the patient's symptoms and physical examination. [x] Patient's findings on the x-ray were explained to the patient using a bone modal.    Other reports reviewed include    [] Bone scan   [] EMG and nerve conduction studies   [x] Referral reports-  I also discussed with him the following treatment options Including advantages and disadvantages of each:    [x] Physical therapy    [x] Interventional pain treatment    [] Medication management    [] Surgical options    Patient's OARRS were reviewed. It is acceptable and appears patient is not receiving prescriptions from multiple prescribers. Patient is  forthcoming regarding prescriptions for pain medication in the past  Controlled Substances Monitoring: The following screens were also reviewed  SOAPP- the score is 0.  (Values:cates patient is  <4minimal potential  4-7 Moderate potential  >7 High potential  for drug addiction  Counselling/Preventive measures for pain  Control:    [x]  Spine strengthening exercises are discussed with patient in detail. As patient is having increased Left Hip joint and Greater Trochanteric Bursa  pain, I discussed injections of Left under Fluorsoscopy. Patient also has a trigger area in the left gluteals medius muscle with a torn tendon. We may inject the trigger area at the same time  Patient failed NSAIDS, home exercises, ice/heat, Opioid medications without any relief. Patient has a painful gait with limping on the affected side and not able to carry out activities of daily living. Imaging correlates with her pain and physical findings. We will see the patient in two weeks after the procedure and re-evaluate her symptoms. Orders Placed This Encounter   Procedures    Fluoro For Surgical Procedures     Standing Status:   Future     Standing Expiration Date:   6/17/2020    NH ARTHROCENTESIS ASPIR&/INJ MAJOR JT/BURSA W/O US   Patient is also encouraged to participate in physical therapy and massage therapy. She will be contacting Dr. Aminata Rios in the near future regarding this. Decision Making Process : Patient's health history and referral records thoroughly reviewed before focused physical examination and discussion with patient. Over 50% of today's visit is spent on examining the patient and counseling. Level of complexity of date to be reviewed is Moderate. The chart date reviewed include the following: Imaging Reports. Summary of Care. Time spent reviewing with patient the below reports:   Medication safety, Treatment options. Level of diagnosis and management options of this case is multiple: involving the following management options: Interventions as needed, medication management as appropriate, future visits, activity modification, heat/ice as needed, Urine drug screen as required. [x]The patient's questions were answered to the best of my abilities.       This note was created using voice recognition software. There may be inaccuracies of transcription  that are inadvertently overlooked prior to the signature. There is any questions about the transcription please contact me.     Electronically signed by Nasim Vides MD on 6/18/2019 at 6:21 AM

## 2019-06-18 ASSESSMENT — ENCOUNTER SYMPTOMS
VOMITING: 0
ABDOMINAL PAIN: 0
CONSTIPATION: 0
NAUSEA: 0
COUGH: 0
PHOTOPHOBIA: 0
SHORTNESS OF BREATH: 0
EYE PAIN: 0
STRIDOR: 0
BACK PAIN: 1

## 2019-06-21 ENCOUNTER — HOSPITAL ENCOUNTER (OUTPATIENT)
Dept: GENERAL RADIOLOGY | Age: 84
Discharge: HOME | End: 2019-06-23
Payer: MEDICARE

## 2019-06-21 ENCOUNTER — HOSPITAL ENCOUNTER (OUTPATIENT)
Dept: PAIN MANAGEMENT | Age: 84
Discharge: HOME | End: 2019-06-21
Payer: MEDICARE

## 2019-06-21 VITALS
HEIGHT: 59 IN | SYSTOLIC BLOOD PRESSURE: 152 MMHG | TEMPERATURE: 98.2 F | OXYGEN SATURATION: 95 % | DIASTOLIC BLOOD PRESSURE: 73 MMHG | HEART RATE: 86 BPM | WEIGHT: 133 LBS | BODY MASS INDEX: 26.81 KG/M2 | RESPIRATION RATE: 16 BRPM

## 2019-06-21 DIAGNOSIS — M79.18 MYOFASCIAL PAIN: ICD-10-CM

## 2019-06-21 DIAGNOSIS — M16.12 PRIMARY OSTEOARTHRITIS OF LEFT HIP: Primary | ICD-10-CM

## 2019-06-21 DIAGNOSIS — M16.12 PRIMARY LOCALIZED OSTEOARTHROSIS OF THE HIP, LEFT: ICD-10-CM

## 2019-06-21 DIAGNOSIS — M70.62 GREATER TROCHANTERIC BURSITIS OF LEFT HIP: ICD-10-CM

## 2019-06-21 DIAGNOSIS — S76.012A TEAR OF LEFT GLUTEUS MEDIUS TENDON: ICD-10-CM

## 2019-06-21 PROCEDURE — 6360000004 HC RX CONTRAST MEDICATION: Performed by: PAIN MEDICINE

## 2019-06-21 PROCEDURE — 3209999900 FLUORO FOR SURGICAL PROCEDURES

## 2019-06-21 PROCEDURE — 20610 DRAIN/INJ JOINT/BURSA W/O US: CPT | Performed by: PAIN MEDICINE

## 2019-06-21 PROCEDURE — 20552 NJX 1/MLT TRIGGER POINT 1/2: CPT | Performed by: PAIN MEDICINE

## 2019-06-21 PROCEDURE — 20610 DRAIN/INJ JOINT/BURSA W/O US: CPT

## 2019-06-21 PROCEDURE — 77002 NEEDLE LOCALIZATION BY XRAY: CPT | Performed by: PAIN MEDICINE

## 2019-06-21 PROCEDURE — 77002 NEEDLE LOCALIZATION BY XRAY: CPT

## 2019-06-21 PROCEDURE — 6360000002 HC RX W HCPCS: Performed by: PAIN MEDICINE

## 2019-06-21 RX ORDER — ROPIVACAINE HYDROCHLORIDE 5 MG/ML
INJECTION, SOLUTION EPIDURAL; INFILTRATION; PERINEURAL
Status: COMPLETED | OUTPATIENT
Start: 2019-06-21 | End: 2019-06-21

## 2019-06-21 RX ORDER — BETAMETHASONE SODIUM PHOSPHATE AND BETAMETHASONE ACETATE 3; 3 MG/ML; MG/ML
INJECTION, SUSPENSION INTRA-ARTICULAR; INTRALESIONAL; INTRAMUSCULAR; SOFT TISSUE
Status: COMPLETED | OUTPATIENT
Start: 2019-06-21 | End: 2019-06-21

## 2019-06-21 RX ADMIN — IOHEXOL 1 ML: 180 INJECTION INTRAVENOUS at 11:12

## 2019-06-21 RX ADMIN — ROPIVACAINE HYDROCHLORIDE 20 ML: 5 INJECTION, SOLUTION EPIDURAL; INFILTRATION; PERINEURAL at 11:13

## 2019-06-21 RX ADMIN — BETAMETHASONE SODIUM PHOSPHATE AND BETAMETHASONE ACETATE 15 MG: 3; 3 INJECTION, SUSPENSION INTRA-ARTICULAR; INTRALESIONAL; INTRAMUSCULAR at 11:13

## 2019-06-21 ASSESSMENT — PAIN DESCRIPTION - ORIENTATION: ORIENTATION: LEFT

## 2019-06-21 ASSESSMENT — PAIN SCALES - GENERAL
PAINLEVEL_OUTOF10: 9
PAINLEVEL_OUTOF10: 6

## 2019-06-21 ASSESSMENT — PAIN DESCRIPTION - DIRECTION: RADIATING_TOWARDS: TO GROIN

## 2019-06-21 ASSESSMENT — PAIN - FUNCTIONAL ASSESSMENT: PAIN_FUNCTIONAL_ASSESSMENT: PREVENTS OR INTERFERES SOME ACTIVE ACTIVITIES AND ADLS

## 2019-06-21 ASSESSMENT — PAIN DESCRIPTION - LOCATION: LOCATION: HIP

## 2019-06-21 ASSESSMENT — PAIN DESCRIPTION - DESCRIPTORS: DESCRIPTORS: ACHING;CONSTANT

## 2019-06-21 ASSESSMENT — PAIN DESCRIPTION - PAIN TYPE: TYPE: CHRONIC PAIN

## 2019-06-21 ASSESSMENT — PAIN DESCRIPTION - FREQUENCY: FREQUENCY: CONTINUOUS

## 2019-06-21 ASSESSMENT — PAIN DESCRIPTION - PROGRESSION: CLINICAL_PROGRESSION: GRADUALLY WORSENING

## 2019-06-21 ASSESSMENT — PAIN DESCRIPTION - ONSET: ONSET: ON-GOING

## 2019-06-21 NOTE — PROCEDURES
Pre-Procedure Note    Patient Name: Artur Bach   YOB: 1934  Room/Bed: Room/bed info not found  Medical Record Number: [id number]  Date: 6/21/2019       Indication:    1. Primary osteoarthritis of left hip    2. Greater trochanteric bursitis of left hip    3. Tear of left gluteus medius tendon    4. Myofascial pain trigger point left gluteus medius        Consent: On file. Vital Signs:   Vitals:    06/21/19 1111   BP: (!) 170/82   Pulse: 81   Resp: 12   Temp:    SpO2: 95%       Past Medical History:   has a past medical history of Arthritis, Chronic daily headache, GERD (gastroesophageal reflux disease), Hyperlipidemia, Hypertension, Multiple sclerosis ([address] Neuropathy, Pneumonia, and Vitamin D deficiency. Past Surgical History:   has a past surgical history that includes Cystocele repair; Rectocele repair; Hysterectomy; Cervical disc surgery; Cataract removal with implant (Right, 11/6/2014); Cataract removal with implant (Left, 12/2/2014); Total shoulder arthroplasty (Right, 04/20/2017); and shoulder surgery (Right, 2017). Pre-Sedation Documentation and Exam:   Vital signs have been reviewed (see flow sheet for vitals). Mallampati Airway Assessment:  normal    ASA Classification:  Class 3 - A patient with severe systemic disease that limits activity but is not incapacitating    Sedation/ Anesthesia Plan:   intravenous sedation   as needed. Medications Planned:   midazolam (Versed) / Fentanyl  Intravenously  as needed. Patient is an appropriate candidate for plan of sedation: yes    Patient's History and Physical examination was reviewed and there is no change. Electronically signed by Irvin Donnelly MD on 6/21/2019 at 11:18 AM      Preoperative Diagnosis:   1.  left hip joint osteoarthritis. 2.  left greater trochantric bursitis. Postoperative Diagnosis:   1. left hip joint osteoarthritis. 2. left greater trochantric bursitis.     Procedure Performed:  1.  left hip injected. The needle was withdrawn and repositioned over the tip of greater trochanter. After negitive aspiration a total of 3 ml of 0.5% Naropin and 3 mg of Celestone was injected. The trigger points over the left gluteus medius was palpated and were marked. Skin over the area was preped with betadine. Then a skin wheel is raised with 1ml of 0.5% Naropin  A 3.5  inch needle was inserted into the muscle through the skin wheel. Depth and direction of the needle were monitored at all times. The needle was advanced until a muscle twitch response was felt and the patient reported concordant pain. The syringe was aspirated and was negative for blood  or air. Then, a combination of 8 ml. 0.5% Naropin with a 3 mg of Celestone was injected at the site in a fan zarco fashion . [place] A total of 1 trigger points were injected. The patient tolerated the procedure well and with out complication Patient's vital signs remained stable and was discharged home in stable condition. Patient will be followed in pain clinic in 2 weeks for follow up and further planning. Patient's vital signs remained stable and tolerated the procedure well. Patient was discharged home in stable condition and will be followed in the pain clinic in the next few weeks for further planning.     Electronically signed by Alon Arriola MD on 6/21/2019 at 11:18 AM

## 2019-06-21 NOTE — PROGRESS NOTES
Discharge criteria met. Post procedure dressing dry and intact. Sensory and motor function intact as per pre-procedure. Patient alert and oriented x3  Instructions and follow up reviewed with pt at patient at discharge. Discharged home transported by wheelchair, accompanied by family .   Discharged @1802

## 2019-06-24 ENCOUNTER — TELEPHONE (OUTPATIENT)
Dept: PAIN MANAGEMENT | Age: 84
End: 2019-06-24

## 2019-07-01 RX ORDER — TIZANIDINE 4 MG/1
4 TABLET ORAL EVERY 6 HOURS PRN
Qty: 30 TABLET | Refills: 0 | Status: SHIPPED | OUTPATIENT
Start: 2019-07-01 | End: 2019-07-17 | Stop reason: SDUPTHER

## 2019-07-05 RX ORDER — TIZANIDINE 4 MG/1
TABLET ORAL
Qty: 385 TABLET | Refills: 0 | OUTPATIENT
Start: 2019-07-05

## 2019-07-15 ENCOUNTER — HOSPITAL ENCOUNTER (OUTPATIENT)
Dept: PAIN MANAGEMENT | Age: 84
Discharge: HOME | End: 2019-07-15
Payer: MEDICARE

## 2019-07-15 VITALS
DIASTOLIC BLOOD PRESSURE: 101 MMHG | OXYGEN SATURATION: 97 % | TEMPERATURE: 98.3 F | SYSTOLIC BLOOD PRESSURE: 147 MMHG | RESPIRATION RATE: 16 BRPM | HEART RATE: 84 BPM

## 2019-07-15 DIAGNOSIS — M16.12 PRIMARY OSTEOARTHRITIS OF LEFT HIP: Primary | ICD-10-CM

## 2019-07-15 DIAGNOSIS — M15.9 GENERALIZED OSTEOARTHRITIS: ICD-10-CM

## 2019-07-15 DIAGNOSIS — M16.12 PRIMARY LOCALIZED OSTEOARTHROSIS OF THE HIP, LEFT: ICD-10-CM

## 2019-07-15 DIAGNOSIS — M25.552 LEFT HIP PAIN: ICD-10-CM

## 2019-07-15 DIAGNOSIS — M76.892 ENTHESOPATHY OF LEFT HIP REGION: ICD-10-CM

## 2019-07-15 DIAGNOSIS — R26.9 ABNORMAL GAIT: ICD-10-CM

## 2019-07-15 PROCEDURE — 99213 OFFICE O/P EST LOW 20 MIN: CPT | Performed by: NURSE PRACTITIONER

## 2019-07-15 PROCEDURE — 99213 OFFICE O/P EST LOW 20 MIN: CPT

## 2019-07-15 RX ORDER — TIZANIDINE 4 MG/1
4 TABLET ORAL EVERY 6 HOURS PRN
Qty: 30 TABLET | Refills: 0 | OUTPATIENT
Start: 2019-07-15

## 2019-07-15 RX ORDER — TRAMADOL HYDROCHLORIDE 50 MG/1
50 TABLET ORAL EVERY 6 HOURS PRN
Qty: 120 TABLET | Refills: 0 | OUTPATIENT
Start: 2019-07-15 | End: 2019-08-14

## 2019-07-15 ASSESSMENT — ENCOUNTER SYMPTOMS: GASTROINTESTINAL NEGATIVE: 1

## 2019-07-15 NOTE — PROGRESS NOTES
Annemarie 89 PROGRESS NOTE      Patient here today for follow up after procedure  Chief Complaint:  Left hip pain      HPI: She is here for follow up injection left hip and  Gluteus medius injection  on 6-21-19. She had 3 days of relief 50% . It has now worn off and pain is back to where it was. She sleeps well. She has difficulty ambulating due to pain. She states PT did not help. Hip Pain    There was no injury mechanism. The pain is present in the left hip (groin). Quality: dull. The pain has been constant since onset. She reports no foreign bodies present. Treatments tried: tramadol. The treatment provided mild relief. Patient denies any new neurological symptoms. No bowel or bladder incontinence, no weakness, and no falling. Treatment goals:  Functional status: reduce pain      Aberrancy:   Any alcoholic beverages    no   Any illegal drugs   no      Analgesia:pain 7      Adverse  Effects :n/a    ADL;s :        Pill count: appropriate n/a    Morphine equivalent dose as reported on OARRS:n/a     Review ofOARRS does not show any aberrant prescription behavior. Medication is helping the patient stay active. Patient denies any side effects and reports adequate analgesia. No sign of misuse/abuse.         When was thelast UDS:     n/a        Was the UDS appropriate:      Record/Diagnostics Review:      As above, I did review the imaging          Past Medical History:   Diagnosis Date    Arthritis     Chronic daily headache     GERD (gastroesophageal reflux disease)     Hyperlipidemia     Hypertension     Multiple sclerosis ([address]     Neuropathy     Pneumonia 2017    states had double pneumonia    Vitamin D deficiency        Past Surgical History:   Procedure Laterality Date    CATARACT REMOVAL WITH IMPLANT [facility]    CATARACT REMOVAL WITH IMPLANT Left 12/2/2014    [facility]    CERVICAL DISC SURGERY      CYSTOCELE REPAIR Non-medical: Not on file   Tobacco Use    Smoking status: Never Smoker    Smokeless tobacco: Never Used   Substance and Sexual Activity    Alcohol use: No    Drug use: No    Sexual activity: Not on file   Lifestyle    Physical activity:     Days per week: Not on file     Minutes per session: Not on file    Stress: Not on file   Relationships    Social connections:     Talks on phone: Not on file     Gets together: Not on file     Attends Mandaen service: Not on file     Active member of club or organization: Not on file     Attends meetings of clubs or organizations: Not on file     Relationship status: Not on file    Intimate partner violence:     Fear of current or ex partner: Not on file     Emotionally abused: Not on file     Physically abused: Not on file     Forced sexual activity: Not on file   Other Topics Concern    Not on file   Social History Narrative    Not on file       Review of Systems:  Review of Systems   Constitution: Negative. HENT: Positive for hearing loss. Eyes:        Glasses   Cardiovascular: Negative. Endocrine: Negative. Hematologic/Lymphatic: Negative. Skin: Negative. Musculoskeletal: Positive for joint pain. Gastrointestinal: Negative. Genitourinary: Negative. Neurological: Positive for headaches and loss of balance. Cane    Psychiatric/Behavioral: Negative. Physical Exam:  BP (!) 147/101   Pulse 84   Temp 98.3 °F (36.8 °C) (Oral)   Resp 16   SpO2 97%     Physical Exam   Constitutional: She appears well-developed. Neck: Normal range of motion. Neck supple. Pulmonary/Chest: Effort normal.   Musculoskeletal:        Left hip: She exhibits decreased range of motion and tenderness. Lumbar back: She exhibits decreased range of motion. Neurological: She is alert. Gait abnormal.   Ambulates with a cane   Skin:        Psychiatric: She has a normal mood and affect.  Her speech is normal and behavior is normal. Judgment normal. hip and  Gluteus mediusinjection    2.  Advised of elevated B/P    TREATMENT OPTIONS:     Return ASAP  Left hip andinjection  Follow up appointment made

## 2019-07-16 DIAGNOSIS — M25.552 LEFT HIP PAIN: ICD-10-CM

## 2019-07-16 RX ORDER — TRAMADOL HYDROCHLORIDE 50 MG/1
50 TABLET ORAL EVERY 6 HOURS PRN
Qty: 120 TABLET | Refills: 1 | Status: SHIPPED | OUTPATIENT
Start: 2019-07-16 | End: 2019-08-13 | Stop reason: SDUPTHER

## 2019-07-17 RX ORDER — TIZANIDINE 4 MG/1
4 TABLET ORAL EVERY 6 HOURS PRN
Qty: 60 TABLET | Refills: 1 | Status: SHIPPED | OUTPATIENT
Start: 2019-07-17 | End: 2019-09-25 | Stop reason: SDUPTHER

## 2019-07-23 RX ORDER — AMITRIPTYLINE HYDROCHLORIDE 100 MG/1
100 TABLET, FILM COATED ORAL NIGHTLY
Qty: 90 TABLET | Refills: 1 | OUTPATIENT
Start: 2019-07-23

## 2019-07-25 ENCOUNTER — PATIENT MESSAGE (OUTPATIENT)
Dept: FAMILY MEDICINE CLINIC | Age: 84
End: 2019-07-25

## 2019-07-25 DIAGNOSIS — M81.0 AGE-RELATED OSTEOPOROSIS WITHOUT CURRENT PATHOLOGICAL FRACTURE: ICD-10-CM

## 2019-07-25 RX ORDER — ALENDRONATE SODIUM 10 MG/1
TABLET ORAL
Qty: 90 TABLET | Refills: 0 | OUTPATIENT
Start: 2019-07-25

## 2019-07-25 RX ORDER — AMITRIPTYLINE HYDROCHLORIDE 100 MG/1
100 TABLET, FILM COATED ORAL NIGHTLY
Qty: 90 TABLET | Refills: 1 | Status: SHIPPED | OUTPATIENT
Start: 2019-07-25 | End: 2020-01-21 | Stop reason: SDUPTHER

## 2019-07-26 DIAGNOSIS — M81.0 AGE-RELATED OSTEOPOROSIS WITHOUT CURRENT PATHOLOGICAL FRACTURE: ICD-10-CM

## 2019-07-26 RX ORDER — ALENDRONATE SODIUM 10 MG/1
TABLET ORAL
Qty: 90 TABLET | Refills: 0 | OUTPATIENT
Start: 2019-07-26

## 2019-08-02 ENCOUNTER — HOSPITAL ENCOUNTER (OUTPATIENT)
Dept: PAIN MANAGEMENT | Age: 84
Discharge: HOME | End: 2019-08-02
Payer: MEDICARE

## 2019-08-02 ENCOUNTER — HOSPITAL ENCOUNTER (OUTPATIENT)
Dept: GENERAL RADIOLOGY | Age: 84
Discharge: HOME | End: 2019-08-04
Payer: MEDICARE

## 2019-08-02 VITALS
WEIGHT: 133 LBS | HEIGHT: 59 IN | RESPIRATION RATE: 15 BRPM | SYSTOLIC BLOOD PRESSURE: 156 MMHG | BODY MASS INDEX: 26.81 KG/M2 | OXYGEN SATURATION: 95 % | DIASTOLIC BLOOD PRESSURE: 83 MMHG | HEART RATE: 83 BPM | TEMPERATURE: 97.9 F

## 2019-08-02 DIAGNOSIS — M16.12 PRIMARY OSTEOARTHRITIS OF LEFT HIP: Primary | ICD-10-CM

## 2019-08-02 DIAGNOSIS — S76.012A TEAR OF LEFT GLUTEUS MEDIUS TENDON: ICD-10-CM

## 2019-08-02 DIAGNOSIS — M70.62 GREATER TROCHANTERIC BURSITIS OF LEFT HIP: ICD-10-CM

## 2019-08-02 DIAGNOSIS — M16.12 PRIMARY OSTEOARTHRITIS OF LEFT HIP: ICD-10-CM

## 2019-08-02 PROCEDURE — 77002 NEEDLE LOCALIZATION BY XRAY: CPT | Performed by: PAIN MEDICINE

## 2019-08-02 PROCEDURE — 20610 DRAIN/INJ JOINT/BURSA W/O US: CPT

## 2019-08-02 PROCEDURE — 3209999900 FLUORO FOR SURGICAL PROCEDURES

## 2019-08-02 PROCEDURE — 77002 NEEDLE LOCALIZATION BY XRAY: CPT

## 2019-08-02 PROCEDURE — 6360000002 HC RX W HCPCS: Performed by: PAIN MEDICINE

## 2019-08-02 PROCEDURE — 20552 NJX 1/MLT TRIGGER POINT 1/2: CPT

## 2019-08-02 PROCEDURE — 6360000004 HC RX CONTRAST MEDICATION: Performed by: PAIN MEDICINE

## 2019-08-02 PROCEDURE — 20610 DRAIN/INJ JOINT/BURSA W/O US: CPT | Performed by: PAIN MEDICINE

## 2019-08-02 PROCEDURE — 20552 NJX 1/MLT TRIGGER POINT 1/2: CPT | Performed by: PAIN MEDICINE

## 2019-08-02 RX ORDER — ROPIVACAINE HYDROCHLORIDE 5 MG/ML
INJECTION, SOLUTION EPIDURAL; INFILTRATION; PERINEURAL
Status: COMPLETED | OUTPATIENT
Start: 2019-08-02 | End: 2019-08-02

## 2019-08-02 RX ORDER — BETAMETHASONE SODIUM PHOSPHATE AND BETAMETHASONE ACETATE 3; 3 MG/ML; MG/ML
INJECTION, SUSPENSION INTRA-ARTICULAR; INTRALESIONAL; INTRAMUSCULAR; SOFT TISSUE
Status: COMPLETED | OUTPATIENT
Start: 2019-08-02 | End: 2019-08-02

## 2019-08-02 RX ADMIN — ROPIVACAINE HYDROCHLORIDE 20 ML: 5 INJECTION, SOLUTION EPIDURAL; INFILTRATION; PERINEURAL at 09:54

## 2019-08-02 RX ADMIN — IOHEXOL 1 ML: 180 INJECTION INTRAVENOUS at 09:54

## 2019-08-02 RX ADMIN — BETAMETHASONE SODIUM PHOSPHATE AND BETAMETHASONE ACETATE 15 MG: 3; 3 INJECTION, SUSPENSION INTRA-ARTICULAR; INTRALESIONAL; INTRAMUSCULAR at 09:54

## 2019-08-02 ASSESSMENT — PAIN - FUNCTIONAL ASSESSMENT
PAIN_FUNCTIONAL_ASSESSMENT: 0-10
PAIN_FUNCTIONAL_ASSESSMENT: 0-10
PAIN_FUNCTIONAL_ASSESSMENT: PREVENTS OR INTERFERES SOME ACTIVE ACTIVITIES AND ADLS

## 2019-08-02 ASSESSMENT — PAIN DESCRIPTION - DESCRIPTORS: DESCRIPTORS: ACHING;CONSTANT;DULL;JABBING;NAGGING

## 2019-08-05 ENCOUNTER — OFFICE VISIT (OUTPATIENT)
Dept: FAMILY MEDICINE CLINIC | Age: 84
End: 2019-08-05
Payer: MEDICARE

## 2019-08-05 ENCOUNTER — TELEPHONE (OUTPATIENT)
Dept: PAIN MANAGEMENT | Age: 84
End: 2019-08-05

## 2019-08-05 VITALS
DIASTOLIC BLOOD PRESSURE: 80 MMHG | SYSTOLIC BLOOD PRESSURE: 136 MMHG | BODY MASS INDEX: 27.06 KG/M2 | HEART RATE: 88 BPM | WEIGHT: 134 LBS | OXYGEN SATURATION: 97 %

## 2019-08-05 DIAGNOSIS — M16.12 PRIMARY OSTEOARTHRITIS OF LEFT HIP: ICD-10-CM

## 2019-08-05 DIAGNOSIS — N39.498 OTHER URINARY INCONTINENCE: ICD-10-CM

## 2019-08-05 DIAGNOSIS — G35 MULTIPLE SCLEROSIS (HCC): ICD-10-CM

## 2019-08-05 DIAGNOSIS — M25.552 LEFT HIP PAIN: Primary | ICD-10-CM

## 2019-08-05 DIAGNOSIS — E78.5 HYPERLIPIDEMIA, UNSPECIFIED HYPERLIPIDEMIA TYPE: ICD-10-CM

## 2019-08-05 DIAGNOSIS — I10 ESSENTIAL HYPERTENSION: ICD-10-CM

## 2019-08-05 DIAGNOSIS — R82.90 ABNORMAL URINALYSIS: ICD-10-CM

## 2019-08-05 LAB
BILIRUBIN, POC: NORMAL
BLOOD URINE, POC: NORMAL
CLARITY, POC: NORMAL
COLOR, POC: NORMAL
GLUCOSE URINE, POC: NORMAL
KETONES, POC: NORMAL
LEUKOCYTE EST, POC: NORMAL
NITRITE, POC: NEGATIVE
PH, POC: 5.5
PROTEIN, POC: NORMAL
SPECIFIC GRAVITY, POC: 1
UROBILINOGEN, POC: 0.2

## 2019-08-05 PROCEDURE — G8427 DOCREV CUR MEDS BY ELIG CLIN: HCPCS | Performed by: INTERNAL MEDICINE

## 2019-08-05 PROCEDURE — 1036F TOBACCO NON-USER: CPT | Performed by: INTERNAL MEDICINE

## 2019-08-05 PROCEDURE — 0509F URINE INCON PLAN DOCD: CPT | Performed by: INTERNAL MEDICINE

## 2019-08-05 PROCEDURE — 4040F PNEUMOC VAC/ADMIN/RCVD: CPT | Performed by: INTERNAL MEDICINE

## 2019-08-05 PROCEDURE — G8400 PT W/DXA NO RESULTS DOC: HCPCS | Performed by: INTERNAL MEDICINE

## 2019-08-05 PROCEDURE — 1123F ACP DISCUSS/DSCN MKR DOCD: CPT | Performed by: INTERNAL MEDICINE

## 2019-08-05 PROCEDURE — G8419 CALC BMI OUT NRM PARAM NOF/U: HCPCS | Performed by: INTERNAL MEDICINE

## 2019-08-05 PROCEDURE — 99214 OFFICE O/P EST MOD 30 MIN: CPT | Performed by: INTERNAL MEDICINE

## 2019-08-05 PROCEDURE — 81002 URINALYSIS NONAUTO W/O SCOPE: CPT | Performed by: INTERNAL MEDICINE

## 2019-08-05 PROCEDURE — 1090F PRES/ABSN URINE INCON ASSESS: CPT | Performed by: INTERNAL MEDICINE

## 2019-08-05 RX ORDER — CEPHALEXIN 500 MG/1
500 CAPSULE ORAL 3 TIMES DAILY
Qty: 21 CAPSULE | Refills: 0 | Status: SHIPPED | OUTPATIENT
Start: 2019-08-05 | End: 2019-08-12

## 2019-08-05 NOTE — PROGRESS NOTES
Visit Information    Have you changed or started any medications since your last visit including any over-the-counter medicines, vitamins, or herbal medicines? no   Have you stopped taking any of your medications? Is so, why? -  no  Are you having any side effects from any of your medications? - no    Have you seen any other physician or provider since your last visit? yes - pain management   Have you had any other diagnostic tests since your last visit?  no   Have you been seen in the emergency room and/or had an admission in a hospital since we last saw you?  no   Have you had your routine dental cleaning in the past 6 months?  no     Do you have an active MyChart account? If no, what is the barrier?   Yes    Patient Care Team:  Erickson Churchill MD as PCP - General (Internal Medicine)  Erickson Churchill MD as PCP [facility]  Silvana Hensley as Neurologist (Neurology)    Medical History Review  Past Medical, Family, and Social History reviewed and does not contribute to the patient presenting condition    Health Maintenance   Topic Date Due    DTaP/Tdap/Td vaccine (1 - Tdap) [birth year]/1953    Annual Wellness Visit (AWV)  12/08/1997    Shingles Vaccine (1 of 2) 10/10/2019 (Originally 12/8/1984)    Flu vaccine (1) 09/01/2019    Potassium monitoring  05/24/2020    Creatinine monitoring  05/24/2020    DEXA (modify frequency per FRAX score)  Completed    Pneumococcal 65+ years Vaccine  Completed

## 2019-08-06 ENCOUNTER — HOSPITAL ENCOUNTER (OUTPATIENT)
Age: 84
Setting detail: SPECIMEN
Discharge: HOME | End: 2019-08-06
Payer: MEDICARE

## 2019-08-07 LAB
CULTURE: NORMAL
Lab: NORMAL
SPECIMEN DESCRIPTION: NORMAL

## 2019-08-13 ENCOUNTER — PATIENT MESSAGE (OUTPATIENT)
Dept: FAMILY MEDICINE CLINIC | Age: 84
End: 2019-08-13

## 2019-08-13 DIAGNOSIS — M25.552 LEFT HIP PAIN: ICD-10-CM

## 2019-08-13 RX ORDER — TRAMADOL HYDROCHLORIDE 50 MG/1
50 TABLET ORAL EVERY 6 HOURS PRN
Qty: 120 TABLET | Refills: 1 | Status: SHIPPED | OUTPATIENT
Start: 2019-08-13 | End: 2019-09-12

## 2019-08-13 NOTE — TELEPHONE ENCOUNTER
Last visit: 8/5/19  Last Med refill: 7/16/19  Does patient have enough medication for 72 hours: Yes    Next Visit Date:  Future Appointments   Date Time Provider Cj Pacheco   8/19/2019  3:00 PM MANDI Russo - CNP [facility] None   11/5/2019  3:30 PM Pauly Roberts  [address] Maintenance   Topic Date Due    DTaP/Tdap/Td vaccine (1 - Tdap) 12[birth year]1953    Annual Wellness Visit (AWV)  12/08/1997    Shingles Vaccine (1 of 2) 10/10/2019 (Originally 12/8/1984)    Flu vaccine (1) 09/01/2019    Potassium monitoring  05/24/2020    Creatinine monitoring  05/24/2020    DEXA (modify frequency per FRAX score)  Completed    Pneumococcal 65+ years Vaccine  Completed       Hemoglobin A1C (%)   Date Value   02/14/2019 6.0             ( goal A1C is < 7)   No results found for: LABMICR  LDL Cholesterol (mg/dL)   Date Value   05/24/2019 75   01/04/2012 167 (H)       (goal LDL is <100)   AST (U/L)   Date Value   05/24/2019 12     ALT (U/L)   Date Value   05/24/2019 10     BUN (mg/dL)   Date Value   05/24/2019 16     BP Readings from Last 3 Encounters:   08/05/19 136/80   08/02/19 (!) 156/83   07/15/19 (!) 147/101          (goal 120/80)    All Future Testing planned in [facility]              Patient Active Problem List:     Ataxia     Multiple sclerosis ([facility] [address]     Acute cystitis without hematuria     Essential hypertension     Hyperlipidemia     Closed fracture of rib of left side     Atelectasis     Debility     Moderate malnutrition (HCC)     Abnormal gait     Actinic keratosis     Enthesopathy of hip region     Generalized osteoarthritis     Idiopathic peripheral neuropathy     Vitamin D deficiency     Primary localized osteoarthrosis of the hip, left     Primary osteoarthritis of left hip

## 2019-08-13 NOTE — TELEPHONE ENCOUNTER
From: Sebastián Bianchi  To: Analia Walker MD  Sent: 8/13/2019 9:50 AM EDT  Subject: Visit Follow-Up Question    Good Morning - my mom Ashok was in to see Dr. Chele Castellanos on August 5th. She was prescribed Keflex 500MG table 3 times daily for a urinary tract infection. She will complete the medicine today but is still feeling no different as far as her abdominal discomfort. Does Dr. Chele Castellanos want to see her again or try to prescribe a different medicine for her to take again. Please advise.      Thanks - Michele Carrillo

## 2019-08-14 DIAGNOSIS — R35.0 URINARY FREQUENCY: Primary | ICD-10-CM

## 2019-08-14 DIAGNOSIS — R33.9 INCOMPLETE BLADDER EMPTYING: ICD-10-CM

## 2019-08-17 DIAGNOSIS — E78.5 HYPERLIPIDEMIA, UNSPECIFIED HYPERLIPIDEMIA TYPE: ICD-10-CM

## 2019-08-17 DIAGNOSIS — K21.9 GASTROESOPHAGEAL REFLUX DISEASE, ESOPHAGITIS PRESENCE NOT SPECIFIED: ICD-10-CM

## 2019-08-17 DIAGNOSIS — M76.892 HIP FLEXOR TENDONITIS, LEFT: ICD-10-CM

## 2019-08-19 ENCOUNTER — HOSPITAL ENCOUNTER (OUTPATIENT)
Dept: ULTRASOUND IMAGING | Age: 84
Discharge: HOME | End: 2019-08-21
Payer: MEDICARE

## 2019-08-19 ENCOUNTER — HOSPITAL ENCOUNTER (OUTPATIENT)
Dept: PAIN MANAGEMENT | Age: 84
Discharge: HOME | End: 2019-08-19
Payer: MEDICARE

## 2019-08-19 VITALS
TEMPERATURE: 99 F | RESPIRATION RATE: 16 BRPM | OXYGEN SATURATION: 100 % | BODY MASS INDEX: 27.06 KG/M2 | WEIGHT: 134 LBS | HEART RATE: 88 BPM | SYSTOLIC BLOOD PRESSURE: 145 MMHG | DIASTOLIC BLOOD PRESSURE: 79 MMHG

## 2019-08-19 DIAGNOSIS — R26.9 ABNORMAL GAIT: ICD-10-CM

## 2019-08-19 DIAGNOSIS — M16.12 PRIMARY OSTEOARTHRITIS OF LEFT HIP: Primary | ICD-10-CM

## 2019-08-19 DIAGNOSIS — R33.9 INCOMPLETE BLADDER EMPTYING: ICD-10-CM

## 2019-08-19 DIAGNOSIS — M16.12 PRIMARY LOCALIZED OSTEOARTHROSIS OF THE HIP, LEFT: ICD-10-CM

## 2019-08-19 DIAGNOSIS — R35.0 URINARY FREQUENCY: ICD-10-CM

## 2019-08-19 PROCEDURE — 76775 US EXAM ABDO BACK WALL LIM: CPT

## 2019-08-19 PROCEDURE — 99213 OFFICE O/P EST LOW 20 MIN: CPT

## 2019-08-19 PROCEDURE — 99213 OFFICE O/P EST LOW 20 MIN: CPT | Performed by: NURSE PRACTITIONER

## 2019-08-19 RX ORDER — OMEPRAZOLE 20 MG/1
20 CAPSULE, DELAYED RELEASE ORAL DAILY
Qty: 90 CAPSULE | Refills: 0 | OUTPATIENT
Start: 2019-08-19

## 2019-08-19 RX ORDER — SIMVASTATIN 20 MG
TABLET ORAL
Qty: 90 TABLET | Refills: 0 | OUTPATIENT
Start: 2019-08-19

## 2019-08-19 RX ORDER — MELOXICAM 15 MG/1
TABLET ORAL
Qty: 90 TABLET | Refills: 0 | OUTPATIENT
Start: 2019-08-19

## 2019-08-19 ASSESSMENT — ENCOUNTER SYMPTOMS
RESPIRATORY NEGATIVE: 1
GASTROINTESTINAL NEGATIVE: 1

## 2019-08-20 ENCOUNTER — PATIENT MESSAGE (OUTPATIENT)
Dept: FAMILY MEDICINE CLINIC | Age: 84
End: 2019-08-20

## 2019-09-04 RX ORDER — PREDNISONE 2.5 MG
2.5 TABLET ORAL DAILY
Qty: 30 TABLET | Refills: 3 | Status: SHIPPED | OUTPATIENT
Start: 2019-09-04 | End: 2019-09-17

## 2019-09-04 RX ORDER — SOLIFENACIN SUCCINATE 5 MG/1
5 TABLET, FILM COATED ORAL DAILY
Qty: 90 TABLET | Refills: 3 | OUTPATIENT
Start: 2019-09-04 | End: 2020-09-03

## 2019-09-04 RX ORDER — SOLIFENACIN SUCCINATE 5 MG/1
5 TABLET, FILM COATED ORAL DAILY
Qty: 30 TABLET | Refills: 3 | Status: ON HOLD | OUTPATIENT
Start: 2019-09-04 | End: 2019-10-09 | Stop reason: HOSPADM

## 2019-09-17 ENCOUNTER — PATIENT MESSAGE (OUTPATIENT)
Dept: FAMILY MEDICINE CLINIC | Age: 84
End: 2019-09-17

## 2019-09-17 DIAGNOSIS — G89.29 OTHER CHRONIC PAIN: Primary | ICD-10-CM

## 2019-09-17 RX ORDER — PREDNISONE 10 MG/1
10 TABLET ORAL DAILY
Qty: 30 TABLET | Refills: 3 | Status: ON HOLD | OUTPATIENT
Start: 2019-09-17 | End: 2019-10-09 | Stop reason: SDUPTHER

## 2019-09-17 RX ORDER — TRAMADOL HYDROCHLORIDE 50 MG/1
50 TABLET ORAL EVERY 6 HOURS PRN
Qty: 120 TABLET | Refills: 1 | Status: ON HOLD | OUTPATIENT
Start: 2019-09-17 | End: 2019-10-09 | Stop reason: HOSPADM

## 2019-09-24 DIAGNOSIS — E78.5 HYPERLIPIDEMIA, UNSPECIFIED HYPERLIPIDEMIA TYPE: ICD-10-CM

## 2019-09-24 RX ORDER — SIMVASTATIN 20 MG
TABLET ORAL
Qty: 90 TABLET | Refills: 1 | Status: SHIPPED | OUTPATIENT
Start: 2019-09-24 | End: 2020-02-13

## 2019-09-24 NOTE — TELEPHONE ENCOUNTER
Last visit: 8/5/19  Last Med refill: 5/24/19  Does patient have enough medication for 72 hours: Yes    Next Visit Date:  Future Appointments   Date Time Provider Cj Tara   11/5/2019  3:30 PM Pauly Miller  [address] Maintenance   Topic Date Due    DTaP/Tdap/Td vaccine (1 - Tdap) 12/08/1953    Annual Wellness Visit (AWV)  05/29/2019    Flu vaccine (1) 09/01/2019    Shingles Vaccine (1 of 2) 10/10/2019 (Originally 12/8/1984)    Potassium monitoring  05/24/2020    Creatinine monitoring  05/24/2020    DEXA (modify frequency per FRAX score)  Completed    Pneumococcal 65+ years Vaccine  Completed       Hemoglobin A1C (%)   Date Value   02/14/2019 6.0             ( goal A1C is < 7)   No results found for: LABMICR  LDL Cholesterol (mg/dL)   Date Value   05/24/2019 75   01/04/2012 167 (H)       (goal LDL is <100)   AST (U/L)   Date Value   05/24/2019 12     ALT (U/L)   Date Value   05/24/2019 10     BUN (mg/dL)   Date Value   05/24/2019 16     BP Readings from Last 3 Encounters:   08/19/19 (!) 145/79   08/05/19 136/80   08/02/19 (!) 156/83          (goal 120/80)    All Future Testing planned in [facility]              Patient Active Problem List:     Ataxia     Multiple sclerosis ([facility] [address]     Acute cystitis without hematuria     Essential hypertension     Hyperlipidemia     Closed fracture of rib of left side     Atelectasis     Debility     Moderate malnutrition (HCC)     Abnormal gait     Actinic keratosis     Enthesopathy of hip region     Generalized osteoarthritis     Idiopathic peripheral neuropathy     Vitamin D deficiency     Primary localized osteoarthrosis of the hip, left     Primary osteoarthritis of left hip

## 2019-09-25 RX ORDER — TIZANIDINE 4 MG/1
4 TABLET ORAL EVERY 6 HOURS PRN
Qty: 60 TABLET | Refills: 1 | Status: ON HOLD | OUTPATIENT
Start: 2019-09-25 | End: 2019-10-09 | Stop reason: HOSPADM

## 2019-09-26 ENCOUNTER — OFFICE VISIT (OUTPATIENT)
Dept: FAMILY MEDICINE CLINIC | Age: 84
End: 2019-09-26
Payer: MEDICARE

## 2019-09-26 VITALS
BODY MASS INDEX: 27.06 KG/M2 | HEART RATE: 79 BPM | RESPIRATION RATE: 16 BRPM | SYSTOLIC BLOOD PRESSURE: 108 MMHG | OXYGEN SATURATION: 94 % | WEIGHT: 134 LBS | TEMPERATURE: 97.9 F | DIASTOLIC BLOOD PRESSURE: 80 MMHG

## 2019-09-26 DIAGNOSIS — M25.552 CHRONIC LEFT HIP PAIN: ICD-10-CM

## 2019-09-26 DIAGNOSIS — R42 DIZZINESS: ICD-10-CM

## 2019-09-26 DIAGNOSIS — R13.12 OROPHARYNGEAL DYSPHAGIA: ICD-10-CM

## 2019-09-26 DIAGNOSIS — E86.0 DEHYDRATION: Primary | ICD-10-CM

## 2019-09-26 DIAGNOSIS — G89.29 CHRONIC LEFT HIP PAIN: ICD-10-CM

## 2019-09-26 PROCEDURE — 1036F TOBACCO NON-USER: CPT | Performed by: INTERNAL MEDICINE

## 2019-09-26 PROCEDURE — G8427 DOCREV CUR MEDS BY ELIG CLIN: HCPCS | Performed by: INTERNAL MEDICINE

## 2019-09-26 PROCEDURE — G8400 PT W/DXA NO RESULTS DOC: HCPCS | Performed by: INTERNAL MEDICINE

## 2019-09-26 PROCEDURE — 1123F ACP DISCUSS/DSCN MKR DOCD: CPT | Performed by: INTERNAL MEDICINE

## 2019-09-26 PROCEDURE — 4040F PNEUMOC VAC/ADMIN/RCVD: CPT | Performed by: INTERNAL MEDICINE

## 2019-09-26 PROCEDURE — 1090F PRES/ABSN URINE INCON ASSESS: CPT | Performed by: INTERNAL MEDICINE

## 2019-09-26 PROCEDURE — G8419 CALC BMI OUT NRM PARAM NOF/U: HCPCS | Performed by: INTERNAL MEDICINE

## 2019-09-26 PROCEDURE — 99214 OFFICE O/P EST MOD 30 MIN: CPT | Performed by: INTERNAL MEDICINE

## 2019-09-26 RX ORDER — METHYLPREDNISOLONE 4 MG/1
TABLET ORAL
Qty: 1 KIT | Refills: 0 | Status: ON HOLD | OUTPATIENT
Start: 2019-09-26 | End: 2019-10-09 | Stop reason: HOSPADM

## 2019-09-26 RX ORDER — MECLIZINE HYDROCHLORIDE 25 MG/1
25 TABLET ORAL 3 TIMES DAILY PRN
Qty: 15 TABLET | Refills: 0 | Status: ON HOLD | OUTPATIENT
Start: 2019-09-26 | End: 2019-10-09 | Stop reason: HOSPADM

## 2019-09-26 NOTE — PROGRESS NOTES
range of motion on all 4 extremities without pain. Skin: The skin is warm and dry. There are no rashes noted. Prior to Visit Medications    Medication Sig Taking? Authorizing Provider   tiZANidine (ZANAFLEX) 4 MG tablet Take 1 tablet by mouth every 6 hours as needed (for muscle spasms) Yes Pauly Borden MD   simvastatin (ZOCOR) 20 MG tablet TAKE 1 TABLET BY MOUTH EVERY NIGHT Yes Pauly Borden MD   traMADol (ULTRAM) 50 MG tablet Take 1 tablet by mouth every 6 hours as needed for Pain for up to 60 days. Yes Shreya Muhammad MD   predniSONE (DELTASONE) 10 MG tablet Take 1 tablet by mouth daily Yes Pauly Borden MD   solifenacin (VESICARE) 5 MG tablet Take 1 tablet by mouth daily Yes Shreya Muhammad MD   alendronate (FOSAMAX) 70 MG tablet Take 1 tablet by mouth every 7 days Yes Pauly Borden MD   amitriptyline (ELAVIL) 100 MG tablet Take 1 tablet by mouth nightly Yes Shreya Muhammad MD   lisinopril (PRINIVIL;ZESTRIL) 5 MG tablet Take 10 mg by mouth nightly  Yes Pauly Borden MD   HYDROcodone-acetaminophen (NORCO) 5-325 MG per tablet Take 1 tablet by mouth every 6 hours as needed for Pain. Yes Historical Provider, MD   meloxicam (MOBIC) 15 MG tablet TAKE 1 TABLET BY MOUTH EVERY DAY Yes Pauly Borden MD   omeprazole (PRILOSEC) 20 MG delayed release capsule Take 1 capsule by mouth daily Yes Shreya Muhammad MD   docusate sodium (COLACE, DULCOLAX) 100 MG CAPS Take 100 mg by mouth 2 times daily Yes Imelda Harris MD       Data Review      Assessment/Plan:      1. Dehydration  - methylPREDNISolone (MEDROL DOSEPACK) 4 MG tablet; Take by mouth. Dispense: 1 kit; Refill: 0    2. Dizziness  - meclizine (ANTIVERT) 25 MG tablet; Take 1 tablet by mouth 3 times daily as needed for Dizziness  Dispense: 15 tablet; Refill: 0    3. Chronic left hip pain  - External Referral To Pain Clinic    4.  Oropharyngeal dysphagia  Will keep an eye on it - if worsens needs to have GI eval

## 2019-10-06 ENCOUNTER — OFFICE VISIT (OUTPATIENT)
Dept: FAMILY MEDICINE CLINIC | Age: 84
End: 2019-10-06
Payer: MEDICARE

## 2019-10-06 ENCOUNTER — HOSPITAL ENCOUNTER (INPATIENT)
Age: 84
LOS: 3 days | Discharge: HOME | DRG: 202 | End: 2019-10-09
Attending: EMERGENCY MEDICINE | Admitting: INTERNAL MEDICINE
Payer: MEDICARE

## 2019-10-06 ENCOUNTER — APPOINTMENT (OUTPATIENT)
Dept: GENERAL RADIOLOGY | Age: 84
DRG: 202 | End: 2019-10-06
Payer: MEDICARE

## 2019-10-06 VITALS
TEMPERATURE: 97.8 F | HEART RATE: 102 BPM | OXYGEN SATURATION: 95 % | BODY MASS INDEX: 26.46 KG/M2 | WEIGHT: 131 LBS | DIASTOLIC BLOOD PRESSURE: 46 MMHG | SYSTOLIC BLOOD PRESSURE: 75 MMHG

## 2019-10-06 DIAGNOSIS — G35 MULTIPLE SCLEROSIS (HCC): ICD-10-CM

## 2019-10-06 DIAGNOSIS — R42 LIGHT HEADED: ICD-10-CM

## 2019-10-06 DIAGNOSIS — I95.1 ORTHOSTATIC HYPOTENSION: ICD-10-CM

## 2019-10-06 DIAGNOSIS — N17.9 ACUTE KIDNEY INJURY (HCC): ICD-10-CM

## 2019-10-06 DIAGNOSIS — J20.9 ACUTE BRONCHITIS, UNSPECIFIED ORGANISM: Primary | ICD-10-CM

## 2019-10-06 DIAGNOSIS — I10 ESSENTIAL HYPERTENSION: ICD-10-CM

## 2019-10-06 DIAGNOSIS — J18.9 PNEUMONIA DUE TO ORGANISM: Primary | ICD-10-CM

## 2019-10-06 LAB
-: ABNORMAL
ABSOLUTE EOS #: 0 K/UL (ref 0–0.4)
ABSOLUTE IMMATURE GRANULOCYTE: ABNORMAL K/UL (ref 0–0.3)
ABSOLUTE LYMPH #: 1.46 K/UL (ref 1–4.8)
ABSOLUTE MONO #: 1.04 K/UL (ref 0.1–1.3)
ADENOVIRUS PCR: NOT DETECTED
ALBUMIN SERPL-MCNC: 3.8 G/DL (ref 3.5–5.2)
ALBUMIN/GLOBULIN RATIO: ABNORMAL (ref 1–2.5)
ALP BLD-CCNC: 64 U/L (ref 35–104)
ALT SERPL-CCNC: 9 U/L (ref 5–33)
AMORPHOUS: ABNORMAL
ANION GAP SERPL CALCULATED.3IONS-SCNC: 15 MMOL/L (ref 9–17)
AST SERPL-CCNC: 9 U/L
BACTERIA: ABNORMAL
BASOPHILS # BLD: 0 % (ref 0–2)
BASOPHILS ABSOLUTE: 0 K/UL (ref 0–0.2)
BILIRUB SERPL-MCNC: 0.29 MG/DL (ref 0.3–1.2)
BILIRUBIN URINE: NEGATIVE
BORDETELLA PERTUSSIS PCR: NOT DETECTED
BUN BLDV-MCNC: 19 MG/DL (ref 8–23)
BUN/CREAT BLD: ABNORMAL (ref 9–20)
CALCIUM SERPL-MCNC: 8.6 MG/DL (ref 8.6–10.4)
CASTS UA: ABNORMAL /LPF
CHLAMYDIA PNEUMONIAE BY PCR: NOT DETECTED
CHLORIDE BLD-SCNC: 96 MMOL/L (ref 98–107)
CO2: 24 MMOL/L (ref 20–31)
COLOR: YELLOW
COMMENT UA: ABNORMAL
CORONAVIRUS 229E PCR: NOT DETECTED
CORONAVIRUS HKU1 PCR: NOT DETECTED
CORONAVIRUS NL63 PCR: NOT DETECTED
CORONAVIRUS OC43 PCR: NOT DETECTED
CREAT SERPL-MCNC: 1.31 MG/DL (ref 0.5–0.9)
CRYSTALS, UA: ABNORMAL /HPF
DIFFERENTIAL TYPE: ABNORMAL
EOSINOPHILS RELATIVE PERCENT: 0 % (ref 0–4)
EPITHELIAL CELLS UA: ABNORMAL /HPF
GFR AFRICAN AMERICAN: 47 ML/MIN
GFR NON-AFRICAN AMERICAN: 39 ML/MIN
GFR SERPL CREATININE-BSD FRML MDRD: ABNORMAL ML/MIN/{1.73_M2}
GFR SERPL CREATININE-BSD FRML MDRD: ABNORMAL ML/MIN/{1.73_M2}
GLUCOSE BLD-MCNC: 124 MG/DL (ref 70–99)
GLUCOSE URINE: NEGATIVE
HCT VFR BLD CALC: 34.8 % (ref 36–46)
HEMOGLOBIN: 11.8 G/DL (ref 12–16)
HUMAN METAPNEUMOVIRUS PCR: NOT DETECTED
IMMATURE GRANULOCYTES: ABNORMAL %
INFLUENZA A BY PCR: NOT DETECTED
INFLUENZA A H1 (2009) PCR: ABNORMAL
INFLUENZA A H1 PCR: ABNORMAL
INFLUENZA A H3 PCR: ABNORMAL
INFLUENZA B BY PCR: NOT DETECTED
KETONES, URINE: NEGATIVE
LACTIC ACID: 1.5 MMOL/L (ref 0.5–2.2)
LEUKOCYTE ESTERASE, URINE: ABNORMAL
LYMPHOCYTES # BLD: 7 % (ref 24–44)
MCH RBC QN AUTO: 31.3 PG (ref 26–34)
MCHC RBC AUTO-ENTMCNC: 33.8 G/DL (ref 31–37)
MCV RBC AUTO: 92.5 FL (ref 80–100)
MONOCYTES # BLD: 5 % (ref 1–7)
MORPHOLOGY: ABNORMAL
MUCUS: ABNORMAL
MYCOPLASMA PNEUMONIAE PCR: NOT DETECTED
NITRITE, URINE: NEGATIVE
NRBC AUTOMATED: ABNORMAL PER 100 WBC
OTHER OBSERVATIONS UA: ABNORMAL
PARAINFLUENZA 1 PCR: NOT DETECTED
PARAINFLUENZA 2 PCR: NOT DETECTED
PARAINFLUENZA 3 PCR: NOT DETECTED
PARAINFLUENZA 4 PCR: NOT DETECTED
PDW BLD-RTO: 14.3 % (ref 11.5–14.9)
PH UA: 6.5 (ref 5–8)
PLATELET # BLD: 342 K/UL (ref 150–450)
PLATELET ESTIMATE: ABNORMAL
PMV BLD AUTO: 6.8 FL (ref 6–12)
POTASSIUM SERPL-SCNC: 3.9 MMOL/L (ref 3.7–5.3)
PROTEIN UA: NEGATIVE
RBC # BLD: 3.76 M/UL (ref 4–5.2)
RBC # BLD: ABNORMAL 10*6/UL
RBC UA: ABNORMAL /HPF
RENAL EPITHELIAL, UA: ABNORMAL /HPF
RESP SYNCYTIAL VIRUS PCR: NOT DETECTED
RHINO/ENTEROVIRUS PCR: DETECTED
SEG NEUTROPHILS: 88 % (ref 36–66)
SEGMENTED NEUTROPHILS ABSOLUTE COUNT: 18.3 K/UL (ref 1.3–9.1)
SODIUM BLD-SCNC: 135 MMOL/L (ref 135–144)
SPECIFIC GRAVITY UA: 1.01 (ref 1–1.03)
SPECIMEN DESCRIPTION: ABNORMAL
TOTAL PROTEIN: 6.7 G/DL (ref 6.4–8.3)
TRICHOMONAS: ABNORMAL
TROPONIN INTERP: ABNORMAL
TROPONIN T: ABNORMAL NG/ML
TROPONIN, HIGH SENSITIVITY: 38 NG/L (ref 0–14)
TURBIDITY: CLEAR
URINE HGB: NEGATIVE
UROBILINOGEN, URINE: NORMAL
WBC # BLD: 20.8 K/UL (ref 3.5–11)
WBC # BLD: ABNORMAL 10*3/UL
WBC UA: ABNORMAL /HPF
YEAST: ABNORMAL

## 2019-10-06 PROCEDURE — 4040F PNEUMOC VAC/ADMIN/RCVD: CPT | Performed by: INTERNAL MEDICINE

## 2019-10-06 PROCEDURE — 87086 URINE CULTURE/COLONY COUNT: CPT

## 2019-10-06 PROCEDURE — 1036F TOBACCO NON-USER: CPT | Performed by: INTERNAL MEDICINE

## 2019-10-06 PROCEDURE — 96365 THER/PROPH/DIAG IV INF INIT: CPT

## 2019-10-06 PROCEDURE — 1090F PRES/ABSN URINE INCON ASSESS: CPT | Performed by: INTERNAL MEDICINE

## 2019-10-06 PROCEDURE — 87633 RESP VIRUS 12-25 TARGETS: CPT

## 2019-10-06 PROCEDURE — 6370000000 HC RX 637 (ALT 250 FOR IP): Performed by: STUDENT IN AN ORGANIZED HEALTH CARE EDUCATION/TRAINING PROGRAM

## 2019-10-06 PROCEDURE — 99285 EMERGENCY DEPT VISIT HI MDM: CPT

## 2019-10-06 PROCEDURE — 80053 COMPREHEN METABOLIC PANEL: CPT

## 2019-10-06 PROCEDURE — 85025 COMPLETE CBC W/AUTO DIFF WBC: CPT

## 2019-10-06 PROCEDURE — 83605 ASSAY OF LACTIC ACID: CPT

## 2019-10-06 PROCEDURE — 81001 URINALYSIS AUTO W/SCOPE: CPT

## 2019-10-06 PROCEDURE — 2060000000 HC ICU INTERMEDIATE R&B

## 2019-10-06 PROCEDURE — 87798 DETECT AGENT NOS DNA AMP: CPT

## 2019-10-06 PROCEDURE — 93005 ELECTROCARDIOGRAM TRACING: CPT | Performed by: EMERGENCY MEDICINE

## 2019-10-06 PROCEDURE — G8484 FLU IMMUNIZE NO ADMIN: HCPCS | Performed by: INTERNAL MEDICINE

## 2019-10-06 PROCEDURE — G8400 PT W/DXA NO RESULTS DOC: HCPCS | Performed by: INTERNAL MEDICINE

## 2019-10-06 PROCEDURE — 87040 BLOOD CULTURE FOR BACTERIA: CPT

## 2019-10-06 PROCEDURE — 6360000002 HC RX W HCPCS: Performed by: INTERNAL MEDICINE

## 2019-10-06 PROCEDURE — 71046 X-RAY EXAM CHEST 2 VIEWS: CPT

## 2019-10-06 PROCEDURE — 2580000003 HC RX 258: Performed by: EMERGENCY MEDICINE

## 2019-10-06 PROCEDURE — 2580000003 HC RX 258: Performed by: INTERNAL MEDICINE

## 2019-10-06 PROCEDURE — 87581 M.PNEUMON DNA AMP PROBE: CPT

## 2019-10-06 PROCEDURE — 99223 1ST HOSP IP/OBS HIGH 75: CPT | Performed by: INTERNAL MEDICINE

## 2019-10-06 PROCEDURE — G8427 DOCREV CUR MEDS BY ELIG CLIN: HCPCS | Performed by: INTERNAL MEDICINE

## 2019-10-06 PROCEDURE — 1123F ACP DISCUSS/DSCN MKR DOCD: CPT | Performed by: INTERNAL MEDICINE

## 2019-10-06 PROCEDURE — 87486 CHLMYD PNEUM DNA AMP PROBE: CPT

## 2019-10-06 PROCEDURE — 36415 COLL VENOUS BLD VENIPUNCTURE: CPT

## 2019-10-06 PROCEDURE — 84484 ASSAY OF TROPONIN QUANT: CPT

## 2019-10-06 PROCEDURE — G8419 CALC BMI OUT NRM PARAM NOF/U: HCPCS | Performed by: INTERNAL MEDICINE

## 2019-10-06 PROCEDURE — 6360000002 HC RX W HCPCS: Performed by: EMERGENCY MEDICINE

## 2019-10-06 PROCEDURE — 99213 OFFICE O/P EST LOW 20 MIN: CPT | Performed by: INTERNAL MEDICINE

## 2019-10-06 RX ORDER — CODEINE PHOSPHATE AND GUAIFENESIN 10; 100 MG/5ML; MG/5ML
5 SOLUTION ORAL EVERY 4 HOURS PRN
Status: DISCONTINUED | OUTPATIENT
Start: 2019-10-06 | End: 2019-10-09 | Stop reason: HOSPADM

## 2019-10-06 RX ORDER — DOCUSATE SODIUM 100 MG/1
100 CAPSULE, LIQUID FILLED ORAL 2 TIMES DAILY
Status: DISCONTINUED | OUTPATIENT
Start: 2019-10-06 | End: 2019-10-09 | Stop reason: HOSPADM

## 2019-10-06 RX ORDER — AMITRIPTYLINE HYDROCHLORIDE 50 MG/1
100 TABLET, FILM COATED ORAL NIGHTLY
Status: DISCONTINUED | OUTPATIENT
Start: 2019-10-06 | End: 2019-10-09 | Stop reason: HOSPADM

## 2019-10-06 RX ORDER — SODIUM CHLORIDE 0.9 % (FLUSH) 0.9 %
10 SYRINGE (ML) INJECTION EVERY 12 HOURS SCHEDULED
Status: DISCONTINUED | OUTPATIENT
Start: 2019-10-06 | End: 2019-10-09 | Stop reason: HOSPADM

## 2019-10-06 RX ORDER — ALENDRONATE SODIUM 70 MG/1
70 TABLET ORAL
Status: DISCONTINUED | OUTPATIENT
Start: 2019-10-06 | End: 2019-10-06 | Stop reason: RX

## 2019-10-06 RX ORDER — AZITHROMYCIN 250 MG/1
TABLET, FILM COATED ORAL
Qty: 1 PACKET | Refills: 0 | Status: ON HOLD | OUTPATIENT
Start: 2019-10-06 | End: 2019-10-09 | Stop reason: HOSPADM

## 2019-10-06 RX ORDER — ONDANSETRON 2 MG/ML
4 INJECTION INTRAMUSCULAR; INTRAVENOUS EVERY 8 HOURS PRN
Status: DISCONTINUED | OUTPATIENT
Start: 2019-10-06 | End: 2019-10-09 | Stop reason: HOSPADM

## 2019-10-06 RX ORDER — SIMVASTATIN 20 MG
20 TABLET ORAL NIGHTLY
Status: DISCONTINUED | OUTPATIENT
Start: 2019-10-06 | End: 2019-10-09 | Stop reason: HOSPADM

## 2019-10-06 RX ORDER — MELOXICAM 15 MG/1
15 TABLET ORAL DAILY
Status: DISCONTINUED | OUTPATIENT
Start: 2019-10-06 | End: 2019-10-07

## 2019-10-06 RX ORDER — ACETAMINOPHEN 325 MG/1
650 TABLET ORAL EVERY 4 HOURS PRN
Status: DISCONTINUED | OUTPATIENT
Start: 2019-10-06 | End: 2019-10-09 | Stop reason: HOSPADM

## 2019-10-06 RX ORDER — BENZONATATE 100 MG/1
100 CAPSULE ORAL 3 TIMES DAILY PRN
Qty: 15 CAPSULE | Refills: 0 | Status: SHIPPED | OUTPATIENT
Start: 2019-10-06 | End: 2019-10-11

## 2019-10-06 RX ORDER — TIZANIDINE 4 MG/1
4 TABLET ORAL EVERY 6 HOURS PRN
Status: DISCONTINUED | OUTPATIENT
Start: 2019-10-06 | End: 2019-10-09 | Stop reason: HOSPADM

## 2019-10-06 RX ORDER — SODIUM CHLORIDE 0.9 % (FLUSH) 0.9 %
10 SYRINGE (ML) INJECTION PRN
Status: DISCONTINUED | OUTPATIENT
Start: 2019-10-06 | End: 2019-10-09 | Stop reason: HOSPADM

## 2019-10-06 RX ORDER — HYDROCODONE BITARTRATE AND ACETAMINOPHEN 5; 325 MG/1; MG/1
1 TABLET ORAL EVERY 6 HOURS PRN
Status: DISCONTINUED | OUTPATIENT
Start: 2019-10-06 | End: 2019-10-09 | Stop reason: HOSPADM

## 2019-10-06 RX ORDER — 0.9 % SODIUM CHLORIDE 0.9 %
30 INTRAVENOUS SOLUTION INTRAVENOUS ONCE
Status: COMPLETED | OUTPATIENT
Start: 2019-10-06 | End: 2019-10-06

## 2019-10-06 RX ORDER — TRAMADOL HYDROCHLORIDE 50 MG/1
50 TABLET ORAL EVERY 6 HOURS PRN
Status: DISCONTINUED | OUTPATIENT
Start: 2019-10-06 | End: 2019-10-09 | Stop reason: HOSPADM

## 2019-10-06 RX ADMIN — SIMVASTATIN 20 MG: 20 TABLET, FILM COATED ORAL at 21:32

## 2019-10-06 RX ADMIN — MELOXICAM 15 MG: 15 TABLET ORAL at 21:31

## 2019-10-06 RX ADMIN — SODIUM CHLORIDE 1782 ML: 9 INJECTION, SOLUTION INTRAVENOUS at 13:25

## 2019-10-06 RX ADMIN — GUAIFENESIN AND CODEINE PHOSPHATE 5 ML: 100; 10 SOLUTION ORAL at 23:40

## 2019-10-06 RX ADMIN — HYDROCODONE BITARTRATE AND ACETAMINOPHEN 1 TABLET: 5; 325 TABLET ORAL at 19:09

## 2019-10-06 RX ADMIN — Medication 10 ML: at 21:32

## 2019-10-06 RX ADMIN — AMITRIPTYLINE HYDROCHLORIDE 100 MG: 50 TABLET, FILM COATED ORAL at 21:32

## 2019-10-06 RX ADMIN — GUAIFENESIN AND CODEINE PHOSPHATE 5 ML: 100; 10 SOLUTION ORAL at 19:40

## 2019-10-06 RX ADMIN — AZITHROMYCIN MONOHYDRATE 500 MG: 500 INJECTION, POWDER, LYOPHILIZED, FOR SOLUTION INTRAVENOUS at 13:55

## 2019-10-06 RX ADMIN — CEFTRIAXONE SODIUM 1 G: 1 INJECTION, POWDER, FOR SOLUTION INTRAMUSCULAR; INTRAVENOUS at 13:25

## 2019-10-06 RX ADMIN — TRAMADOL HYDROCHLORIDE 50 MG: 50 TABLET, FILM COATED ORAL at 21:32

## 2019-10-06 RX ADMIN — DOCUSATE SODIUM 100 MG: 100 CAPSULE, LIQUID FILLED ORAL at 21:31

## 2019-10-06 RX ADMIN — ENOXAPARIN SODIUM 30 MG: 30 INJECTION SUBCUTANEOUS at 21:31

## 2019-10-06 ASSESSMENT — PAIN SCALES - GENERAL
PAINLEVEL_OUTOF10: 6
PAINLEVEL_OUTOF10: 6
PAINLEVEL_OUTOF10: 4
PAINLEVEL_OUTOF10: 5
PAINLEVEL_OUTOF10: 6
PAINLEVEL_OUTOF10: 9

## 2019-10-06 ASSESSMENT — PAIN DESCRIPTION - PAIN TYPE: TYPE: ACUTE PAIN

## 2019-10-06 ASSESSMENT — PAIN DESCRIPTION - FREQUENCY: FREQUENCY: CONTINUOUS

## 2019-10-06 ASSESSMENT — ENCOUNTER SYMPTOMS
COUGH: 1
ABDOMINAL PAIN: 0
SHORTNESS OF BREATH: 0
VOMITING: 0
DIARRHEA: 0
COUGH: 1
SHORTNESS OF BREATH: 1

## 2019-10-06 ASSESSMENT — PAIN DESCRIPTION - DESCRIPTORS: DESCRIPTORS: ACHING

## 2019-10-06 ASSESSMENT — PAIN DESCRIPTION - LOCATION: LOCATION: HEAD

## 2019-10-07 ENCOUNTER — APPOINTMENT (OUTPATIENT)
Dept: GENERAL RADIOLOGY | Age: 84
DRG: 202 | End: 2019-10-07
Payer: MEDICARE

## 2019-10-07 LAB
ANION GAP SERPL CALCULATED.3IONS-SCNC: 11 MMOL/L (ref 9–17)
BUN BLDV-MCNC: 14 MG/DL (ref 8–23)
BUN/CREAT BLD: ABNORMAL (ref 9–20)
CALCIUM SERPL-MCNC: 7.9 MG/DL (ref 8.6–10.4)
CHLORIDE BLD-SCNC: 102 MMOL/L (ref 98–107)
CO2: 23 MMOL/L (ref 20–31)
CORTISOL COLLECTION INFO: NORMAL
CORTISOL: 9.4 UG/DL (ref 2.7–18.4)
CREAT SERPL-MCNC: 0.81 MG/DL (ref 0.5–0.9)
CULTURE: NO GROWTH
GFR AFRICAN AMERICAN: >60 ML/MIN
GFR NON-AFRICAN AMERICAN: >60 ML/MIN
GFR SERPL CREATININE-BSD FRML MDRD: ABNORMAL ML/MIN/{1.73_M2}
GFR SERPL CREATININE-BSD FRML MDRD: ABNORMAL ML/MIN/{1.73_M2}
GLUCOSE BLD-MCNC: 125 MG/DL (ref 70–99)
Lab: NORMAL
PATHOLOGIST REVIEW: NORMAL
POTASSIUM SERPL-SCNC: 4 MMOL/L (ref 3.7–5.3)
PROCALCITONIN: 0.15 NG/ML
SODIUM BLD-SCNC: 136 MMOL/L (ref 135–144)
SPECIMEN DESCRIPTION: NORMAL

## 2019-10-07 PROCEDURE — 6370000000 HC RX 637 (ALT 250 FOR IP): Performed by: INTERNAL MEDICINE

## 2019-10-07 PROCEDURE — 71045 X-RAY EXAM CHEST 1 VIEW: CPT

## 2019-10-07 PROCEDURE — 6360000002 HC RX W HCPCS: Performed by: STUDENT IN AN ORGANIZED HEALTH CARE EDUCATION/TRAINING PROGRAM

## 2019-10-07 PROCEDURE — 36415 COLL VENOUS BLD VENIPUNCTURE: CPT

## 2019-10-07 PROCEDURE — 94761 N-INVAS EAR/PLS OXIMETRY MLT: CPT

## 2019-10-07 PROCEDURE — 2580000003 HC RX 258: Performed by: INTERNAL MEDICINE

## 2019-10-07 PROCEDURE — 82533 TOTAL CORTISOL: CPT

## 2019-10-07 PROCEDURE — 6370000000 HC RX 637 (ALT 250 FOR IP): Performed by: STUDENT IN AN ORGANIZED HEALTH CARE EDUCATION/TRAINING PROGRAM

## 2019-10-07 PROCEDURE — 84145 PROCALCITONIN (PCT): CPT

## 2019-10-07 PROCEDURE — 97162 PT EVAL MOD COMPLEX 30 MIN: CPT

## 2019-10-07 PROCEDURE — 94640 AIRWAY INHALATION TREATMENT: CPT

## 2019-10-07 PROCEDURE — 6360000002 HC RX W HCPCS: Performed by: INTERNAL MEDICINE

## 2019-10-07 PROCEDURE — 2060000000 HC ICU INTERMEDIATE R&B

## 2019-10-07 PROCEDURE — 94664 DEMO&/EVAL PT USE INHALER: CPT

## 2019-10-07 PROCEDURE — 97166 OT EVAL MOD COMPLEX 45 MIN: CPT

## 2019-10-07 PROCEDURE — 2580000003 HC RX 258: Performed by: STUDENT IN AN ORGANIZED HEALTH CARE EDUCATION/TRAINING PROGRAM

## 2019-10-07 PROCEDURE — 86738 MYCOPLASMA ANTIBODY: CPT

## 2019-10-07 PROCEDURE — 80048 BASIC METABOLIC PNL TOTAL CA: CPT

## 2019-10-07 PROCEDURE — 99233 SBSQ HOSP IP/OBS HIGH 50: CPT | Performed by: INTERNAL MEDICINE

## 2019-10-07 RX ORDER — BENZONATATE 100 MG/1
100 CAPSULE ORAL 3 TIMES DAILY PRN
Status: DISCONTINUED | OUTPATIENT
Start: 2019-10-07 | End: 2019-10-09 | Stop reason: HOSPADM

## 2019-10-07 RX ORDER — GUAIFENESIN 600 MG/1
600 TABLET, EXTENDED RELEASE ORAL 2 TIMES DAILY
Status: DISCONTINUED | OUTPATIENT
Start: 2019-10-07 | End: 2019-10-09 | Stop reason: HOSPADM

## 2019-10-07 RX ORDER — SODIUM CHLORIDE FOR INHALATION 0.9 %
3 VIAL, NEBULIZER (ML) INHALATION PRN
Status: CANCELLED | OUTPATIENT
Start: 2019-10-07

## 2019-10-07 RX ORDER — IPRATROPIUM BROMIDE AND ALBUTEROL SULFATE 2.5; .5 MG/3ML; MG/3ML
1 SOLUTION RESPIRATORY (INHALATION)
Status: DISCONTINUED | OUTPATIENT
Start: 2019-10-07 | End: 2019-10-09 | Stop reason: HOSPADM

## 2019-10-07 RX ADMIN — TRAMADOL HYDROCHLORIDE 50 MG: 50 TABLET, FILM COATED ORAL at 09:13

## 2019-10-07 RX ADMIN — HYDROCODONE BITARTRATE AND ACETAMINOPHEN 1 TABLET: 5; 325 TABLET ORAL at 03:53

## 2019-10-07 RX ADMIN — AMITRIPTYLINE HYDROCHLORIDE 100 MG: 50 TABLET, FILM COATED ORAL at 20:26

## 2019-10-07 RX ADMIN — ENOXAPARIN SODIUM 30 MG: 30 INJECTION SUBCUTANEOUS at 09:13

## 2019-10-07 RX ADMIN — GUAIFENESIN 600 MG: 600 TABLET, EXTENDED RELEASE ORAL at 20:26

## 2019-10-07 RX ADMIN — GUAIFENESIN AND CODEINE PHOSPHATE 5 ML: 100; 10 SOLUTION ORAL at 03:45

## 2019-10-07 RX ADMIN — TRAMADOL HYDROCHLORIDE 50 MG: 50 TABLET, FILM COATED ORAL at 17:25

## 2019-10-07 RX ADMIN — IPRATROPIUM BROMIDE AND ALBUTEROL SULFATE 1 AMPULE: .5; 3 SOLUTION RESPIRATORY (INHALATION) at 19:54

## 2019-10-07 RX ADMIN — HYDROCODONE BITARTRATE AND ACETAMINOPHEN 1 TABLET: 5; 325 TABLET ORAL at 12:23

## 2019-10-07 RX ADMIN — Medication 10 ML: at 20:26

## 2019-10-07 RX ADMIN — GUAIFENESIN AND CODEINE PHOSPHATE 5 ML: 100; 10 SOLUTION ORAL at 09:12

## 2019-10-07 RX ADMIN — GUAIFENESIN AND CODEINE PHOSPHATE 5 ML: 100; 10 SOLUTION ORAL at 17:25

## 2019-10-07 RX ADMIN — SIMVASTATIN 20 MG: 20 TABLET, FILM COATED ORAL at 20:26

## 2019-10-07 RX ADMIN — IPRATROPIUM BROMIDE AND ALBUTEROL SULFATE 1 AMPULE: .5; 3 SOLUTION RESPIRATORY (INHALATION) at 10:36

## 2019-10-07 RX ADMIN — DOCUSATE SODIUM 100 MG: 100 CAPSULE, LIQUID FILLED ORAL at 09:13

## 2019-10-07 RX ADMIN — TIZANIDINE 4 MG: 4 TABLET ORAL at 09:13

## 2019-10-07 RX ADMIN — GUAIFENESIN AND CODEINE PHOSPHATE 5 ML: 100; 10 SOLUTION ORAL at 21:49

## 2019-10-07 RX ADMIN — MELOXICAM 15 MG: 15 TABLET ORAL at 09:13

## 2019-10-07 RX ADMIN — AZITHROMYCIN MONOHYDRATE 500 MG: 500 INJECTION, POWDER, LYOPHILIZED, FOR SOLUTION INTRAVENOUS at 10:09

## 2019-10-07 RX ADMIN — BENZONATATE 100 MG: 100 CAPSULE ORAL at 19:46

## 2019-10-07 RX ADMIN — IPRATROPIUM BROMIDE AND ALBUTEROL SULFATE 1 AMPULE: .5; 3 SOLUTION RESPIRATORY (INHALATION) at 14:50

## 2019-10-07 RX ADMIN — Medication 10 ML: at 09:13

## 2019-10-07 RX ADMIN — DOCUSATE SODIUM 100 MG: 100 CAPSULE, LIQUID FILLED ORAL at 20:26

## 2019-10-07 RX ADMIN — GUAIFENESIN 600 MG: 600 TABLET, EXTENDED RELEASE ORAL at 17:25

## 2019-10-07 RX ADMIN — BENZONATATE 100 MG: 100 CAPSULE ORAL at 10:07

## 2019-10-07 RX ADMIN — CEFTRIAXONE SODIUM 1 G: 1 INJECTION, POWDER, FOR SOLUTION INTRAMUSCULAR; INTRAVENOUS at 12:24

## 2019-10-07 ASSESSMENT — ENCOUNTER SYMPTOMS
FACIAL SWELLING: 0
RHINORRHEA: 1
SINUS PRESSURE: 0
SHORTNESS OF BREATH: 1
ABDOMINAL DISTENTION: 0
GASTROINTESTINAL NEGATIVE: 1
CHOKING: 1
COUGH: 1
SINUS PAIN: 0
SHORTNESS OF BREATH: 0
NAUSEA: 0
WHEEZING: 0
ABDOMINAL PAIN: 0
VOMITING: 0
CONSTIPATION: 1
SORE THROAT: 0

## 2019-10-07 ASSESSMENT — PAIN DESCRIPTION - FREQUENCY
FREQUENCY: INTERMITTENT

## 2019-10-07 ASSESSMENT — PAIN SCALES - GENERAL
PAINLEVEL_OUTOF10: 8
PAINLEVEL_OUTOF10: 8
PAINLEVEL_OUTOF10: 6
PAINLEVEL_OUTOF10: 6
PAINLEVEL_OUTOF10: 8

## 2019-10-07 ASSESSMENT — PAIN DESCRIPTION - PROGRESSION
CLINICAL_PROGRESSION: NOT CHANGED

## 2019-10-07 ASSESSMENT — PAIN DESCRIPTION - PAIN TYPE
TYPE: ACUTE PAIN

## 2019-10-07 ASSESSMENT — PAIN DESCRIPTION - LOCATION
LOCATION: HEAD;RIB CAGE

## 2019-10-07 ASSESSMENT — PAIN DESCRIPTION - ONSET: ONSET: ON-GOING

## 2019-10-08 PROBLEM — M48.062 SPINAL STENOSIS OF LUMBAR REGION WITH NEUROGENIC CLAUDICATION: Status: ACTIVE | Noted: 2019-10-08

## 2019-10-08 PROBLEM — M43.10 ACQUIRED SPONDYLOLISTHESIS: Status: ACTIVE | Noted: 2019-10-08

## 2019-10-08 PROBLEM — M54.42 CHRONIC MIDLINE LOW BACK PAIN WITH LEFT-SIDED SCIATICA: Status: ACTIVE | Noted: 2019-10-08

## 2019-10-08 PROBLEM — G89.29 CHRONIC MIDLINE LOW BACK PAIN WITH LEFT-SIDED SCIATICA: Status: ACTIVE | Noted: 2019-10-08

## 2019-10-08 LAB
ANION GAP SERPL CALCULATED.3IONS-SCNC: 12 MMOL/L (ref 9–17)
BUN BLDV-MCNC: 13 MG/DL (ref 8–23)
BUN/CREAT BLD: ABNORMAL (ref 9–20)
CALCIUM SERPL-MCNC: 7.6 MG/DL (ref 8.6–10.4)
CHLORIDE BLD-SCNC: 99 MMOL/L (ref 98–107)
CO2: 23 MMOL/L (ref 20–31)
CREAT SERPL-MCNC: 0.9 MG/DL (ref 0.5–0.9)
EKG ATRIAL RATE: 95 BPM
EKG P AXIS: 21 DEGREES
EKG P-R INTERVAL: 144 MS
EKG Q-T INTERVAL: 370 MS
EKG QRS DURATION: 84 MS
EKG QTC CALCULATION (BAZETT): 464 MS
EKG R AXIS: 11 DEGREES
EKG T AXIS: 40 DEGREES
EKG VENTRICULAR RATE: 95 BPM
GFR AFRICAN AMERICAN: >60 ML/MIN
GFR NON-AFRICAN AMERICAN: 60 ML/MIN
GFR SERPL CREATININE-BSD FRML MDRD: ABNORMAL ML/MIN/{1.73_M2}
GFR SERPL CREATININE-BSD FRML MDRD: ABNORMAL ML/MIN/{1.73_M2}
GLUCOSE BLD-MCNC: 94 MG/DL (ref 70–99)
POTASSIUM SERPL-SCNC: 3.9 MMOL/L (ref 3.7–5.3)
SODIUM BLD-SCNC: 134 MMOL/L (ref 135–144)

## 2019-10-08 PROCEDURE — 94761 N-INVAS EAR/PLS OXIMETRY MLT: CPT

## 2019-10-08 PROCEDURE — 2580000003 HC RX 258: Performed by: INTERNAL MEDICINE

## 2019-10-08 PROCEDURE — 6370000000 HC RX 637 (ALT 250 FOR IP): Performed by: INTERNAL MEDICINE

## 2019-10-08 PROCEDURE — 90686 IIV4 VACC NO PRSV 0.5 ML IM: CPT | Performed by: INTERNAL MEDICINE

## 2019-10-08 PROCEDURE — 99223 1ST HOSP IP/OBS HIGH 75: CPT | Performed by: ORTHOPAEDIC SURGERY

## 2019-10-08 PROCEDURE — 97116 GAIT TRAINING THERAPY: CPT

## 2019-10-08 PROCEDURE — G0008 ADMIN INFLUENZA VIRUS VAC: HCPCS | Performed by: INTERNAL MEDICINE

## 2019-10-08 PROCEDURE — 99232 SBSQ HOSP IP/OBS MODERATE 35: CPT | Performed by: INTERNAL MEDICINE

## 2019-10-08 PROCEDURE — 80048 BASIC METABOLIC PNL TOTAL CA: CPT

## 2019-10-08 PROCEDURE — 94640 AIRWAY INHALATION TREATMENT: CPT

## 2019-10-08 PROCEDURE — 2060000000 HC ICU INTERMEDIATE R&B

## 2019-10-08 PROCEDURE — 2580000003 HC RX 258: Performed by: STUDENT IN AN ORGANIZED HEALTH CARE EDUCATION/TRAINING PROGRAM

## 2019-10-08 PROCEDURE — 97530 THERAPEUTIC ACTIVITIES: CPT

## 2019-10-08 PROCEDURE — 6360000002 HC RX W HCPCS: Performed by: STUDENT IN AN ORGANIZED HEALTH CARE EDUCATION/TRAINING PROGRAM

## 2019-10-08 PROCEDURE — 6370000000 HC RX 637 (ALT 250 FOR IP): Performed by: STUDENT IN AN ORGANIZED HEALTH CARE EDUCATION/TRAINING PROGRAM

## 2019-10-08 PROCEDURE — 36415 COLL VENOUS BLD VENIPUNCTURE: CPT

## 2019-10-08 PROCEDURE — 6360000002 HC RX W HCPCS: Performed by: INTERNAL MEDICINE

## 2019-10-08 PROCEDURE — 93010 ELECTROCARDIOGRAM REPORT: CPT | Performed by: INTERNAL MEDICINE

## 2019-10-08 RX ADMIN — HYDROCODONE BITARTRATE AND ACETAMINOPHEN 1 TABLET: 5; 325 TABLET ORAL at 10:05

## 2019-10-08 RX ADMIN — GUAIFENESIN AND CODEINE PHOSPHATE 5 ML: 100; 10 SOLUTION ORAL at 19:23

## 2019-10-08 RX ADMIN — ENOXAPARIN SODIUM 30 MG: 30 INJECTION SUBCUTANEOUS at 10:04

## 2019-10-08 RX ADMIN — TRAMADOL HYDROCHLORIDE 50 MG: 50 TABLET, FILM COATED ORAL at 11:09

## 2019-10-08 RX ADMIN — INFLUENZA A VIRUS A/BRISBANE/02/2018 IVR-190 (H1N1) ANTIGEN (PROPIOLACTONE INACTIVATED), INFLUENZA A VIRUS A/KANSAS/14/2017 X-327 (H3N2) ANTIGEN (PROPIOLACTONE INACTIVATED), INFLUENZA B VIRUS B/MARYLAND/15/2016 ANTIGEN (PROPIOLACTONE INACTIVATED), INFLUENZA B VIRUS B/PHUKET/3073/2013 BVR-1B ANTIGEN (PROPIOLACTONE INACTIVATED) 0.5 ML: 15; 15; 15; 15 INJECTION, SUSPENSION INTRAMUSCULAR at 11:21

## 2019-10-08 RX ADMIN — AMITRIPTYLINE HYDROCHLORIDE 100 MG: 50 TABLET, FILM COATED ORAL at 21:10

## 2019-10-08 RX ADMIN — IPRATROPIUM BROMIDE AND ALBUTEROL SULFATE 1 AMPULE: .5; 3 SOLUTION RESPIRATORY (INHALATION) at 10:45

## 2019-10-08 RX ADMIN — GUAIFENESIN 600 MG: 600 TABLET, EXTENDED RELEASE ORAL at 10:04

## 2019-10-08 RX ADMIN — TIZANIDINE 4 MG: 4 TABLET ORAL at 21:11

## 2019-10-08 RX ADMIN — IPRATROPIUM BROMIDE AND ALBUTEROL SULFATE 1 AMPULE: .5; 3 SOLUTION RESPIRATORY (INHALATION) at 14:42

## 2019-10-08 RX ADMIN — CEFTRIAXONE SODIUM 1 G: 1 INJECTION, POWDER, FOR SOLUTION INTRAMUSCULAR; INTRAVENOUS at 07:13

## 2019-10-08 RX ADMIN — BENZONATATE 100 MG: 100 CAPSULE ORAL at 19:23

## 2019-10-08 RX ADMIN — IPRATROPIUM BROMIDE AND ALBUTEROL SULFATE 1 AMPULE: .5; 3 SOLUTION RESPIRATORY (INHALATION) at 06:55

## 2019-10-08 RX ADMIN — DOCUSATE SODIUM 100 MG: 100 CAPSULE, LIQUID FILLED ORAL at 21:10

## 2019-10-08 RX ADMIN — GUAIFENESIN AND CODEINE PHOSPHATE 5 ML: 100; 10 SOLUTION ORAL at 14:44

## 2019-10-08 RX ADMIN — SIMVASTATIN 20 MG: 20 TABLET, FILM COATED ORAL at 21:10

## 2019-10-08 RX ADMIN — TRAMADOL HYDROCHLORIDE 50 MG: 50 TABLET, FILM COATED ORAL at 19:23

## 2019-10-08 RX ADMIN — DOCUSATE SODIUM 100 MG: 100 CAPSULE, LIQUID FILLED ORAL at 10:04

## 2019-10-08 RX ADMIN — GUAIFENESIN AND CODEINE PHOSPHATE 5 ML: 100; 10 SOLUTION ORAL at 08:09

## 2019-10-08 RX ADMIN — GUAIFENESIN 600 MG: 600 TABLET, EXTENDED RELEASE ORAL at 21:10

## 2019-10-08 RX ADMIN — AZITHROMYCIN MONOHYDRATE 500 MG: 500 INJECTION, POWDER, LYOPHILIZED, FOR SOLUTION INTRAVENOUS at 11:22

## 2019-10-08 RX ADMIN — IPRATROPIUM BROMIDE AND ALBUTEROL SULFATE 1 AMPULE: .5; 3 SOLUTION RESPIRATORY (INHALATION) at 19:14

## 2019-10-08 RX ADMIN — BENZONATATE 100 MG: 100 CAPSULE ORAL at 10:04

## 2019-10-08 RX ADMIN — ACETAMINOPHEN 650 MG: 325 TABLET, FILM COATED ORAL at 00:07

## 2019-10-08 ASSESSMENT — PAIN DESCRIPTION - LOCATION
LOCATION: ARM;HAND
LOCATION: HEAD

## 2019-10-08 ASSESSMENT — PAIN SCALES - GENERAL
PAINLEVEL_OUTOF10: 8
PAINLEVEL_OUTOF10: 0
PAINLEVEL_OUTOF10: 2
PAINLEVEL_OUTOF10: 2
PAINLEVEL_OUTOF10: 8
PAINLEVEL_OUTOF10: 2

## 2019-10-08 ASSESSMENT — PAIN DESCRIPTION - PAIN TYPE
TYPE: ACUTE PAIN
TYPE: ACUTE PAIN

## 2019-10-08 ASSESSMENT — PAIN DESCRIPTION - ORIENTATION: ORIENTATION: LEFT;RIGHT

## 2019-10-08 ASSESSMENT — PAIN DESCRIPTION - DESCRIPTORS
DESCRIPTORS: HEADACHE
DESCRIPTORS: ACHING

## 2019-10-08 ASSESSMENT — PAIN DESCRIPTION - FREQUENCY: FREQUENCY: CONTINUOUS

## 2019-10-09 ENCOUNTER — APPOINTMENT (OUTPATIENT)
Dept: GENERAL RADIOLOGY | Age: 84
DRG: 202 | End: 2019-10-09
Payer: MEDICARE

## 2019-10-09 VITALS
DIASTOLIC BLOOD PRESSURE: 72 MMHG | HEIGHT: 59 IN | SYSTOLIC BLOOD PRESSURE: 125 MMHG | HEART RATE: 92 BPM | WEIGHT: 131 LBS | TEMPERATURE: 99 F | RESPIRATION RATE: 16 BRPM | BODY MASS INDEX: 26.41 KG/M2 | OXYGEN SATURATION: 99 %

## 2019-10-09 LAB
ANION GAP SERPL CALCULATED.3IONS-SCNC: 13 MMOL/L (ref 9–17)
BUN BLDV-MCNC: 11 MG/DL (ref 8–23)
BUN/CREAT BLD: ABNORMAL (ref 9–20)
CALCIUM SERPL-MCNC: 8.2 MG/DL (ref 8.6–10.4)
CHLORIDE BLD-SCNC: 99 MMOL/L (ref 98–107)
CO2: 23 MMOL/L (ref 20–31)
CREAT SERPL-MCNC: 0.78 MG/DL (ref 0.5–0.9)
GFR AFRICAN AMERICAN: >60 ML/MIN
GFR NON-AFRICAN AMERICAN: >60 ML/MIN
GFR SERPL CREATININE-BSD FRML MDRD: ABNORMAL ML/MIN/{1.73_M2}
GFR SERPL CREATININE-BSD FRML MDRD: ABNORMAL ML/MIN/{1.73_M2}
GLUCOSE BLD-MCNC: 100 MG/DL (ref 70–99)
MYCOPLASMA PNEUMONIAE IGM: 0.06
POTASSIUM SERPL-SCNC: 4.3 MMOL/L (ref 3.7–5.3)
SODIUM BLD-SCNC: 135 MMOL/L (ref 135–144)

## 2019-10-09 PROCEDURE — 71046 X-RAY EXAM CHEST 2 VIEWS: CPT

## 2019-10-09 PROCEDURE — 6370000000 HC RX 637 (ALT 250 FOR IP): Performed by: STUDENT IN AN ORGANIZED HEALTH CARE EDUCATION/TRAINING PROGRAM

## 2019-10-09 PROCEDURE — 2580000003 HC RX 258: Performed by: STUDENT IN AN ORGANIZED HEALTH CARE EDUCATION/TRAINING PROGRAM

## 2019-10-09 PROCEDURE — 94640 AIRWAY INHALATION TREATMENT: CPT

## 2019-10-09 PROCEDURE — 36415 COLL VENOUS BLD VENIPUNCTURE: CPT

## 2019-10-09 PROCEDURE — 80048 BASIC METABOLIC PNL TOTAL CA: CPT

## 2019-10-09 PROCEDURE — 2580000003 HC RX 258: Performed by: INTERNAL MEDICINE

## 2019-10-09 PROCEDURE — 6370000000 HC RX 637 (ALT 250 FOR IP): Performed by: INTERNAL MEDICINE

## 2019-10-09 PROCEDURE — 6360000002 HC RX W HCPCS: Performed by: INTERNAL MEDICINE

## 2019-10-09 PROCEDURE — 6360000002 HC RX W HCPCS: Performed by: STUDENT IN AN ORGANIZED HEALTH CARE EDUCATION/TRAINING PROGRAM

## 2019-10-09 PROCEDURE — 99239 HOSP IP/OBS DSCHRG MGMT >30: CPT | Performed by: INTERNAL MEDICINE

## 2019-10-09 RX ORDER — AZITHROMYCIN 250 MG/1
250 TABLET, FILM COATED ORAL DAILY
Qty: 3 TABLET | Refills: 0 | Status: SHIPPED | OUTPATIENT
Start: 2019-10-09 | End: 2019-10-12

## 2019-10-09 RX ORDER — PREDNISONE 10 MG/1
10 TABLET ORAL DAILY
Qty: 5 TABLET | Refills: 0 | Status: SHIPPED | OUTPATIENT
Start: 2019-10-09 | End: 2019-10-14

## 2019-10-09 RX ORDER — CODEINE PHOSPHATE AND GUAIFENESIN 10; 100 MG/5ML; MG/5ML
5 SOLUTION ORAL 4 TIMES DAILY PRN
Qty: 100 ML | Refills: 0 | Status: SHIPPED | OUTPATIENT
Start: 2019-10-09 | End: 2019-10-14

## 2019-10-09 RX ADMIN — IPRATROPIUM BROMIDE AND ALBUTEROL SULFATE 1 AMPULE: .5; 3 SOLUTION RESPIRATORY (INHALATION) at 07:45

## 2019-10-09 RX ADMIN — Medication 10 ML: at 08:50

## 2019-10-09 RX ADMIN — ENOXAPARIN SODIUM 30 MG: 30 INJECTION SUBCUTANEOUS at 08:42

## 2019-10-09 RX ADMIN — GUAIFENESIN 600 MG: 600 TABLET, EXTENDED RELEASE ORAL at 08:42

## 2019-10-09 RX ADMIN — GUAIFENESIN AND CODEINE PHOSPHATE 5 ML: 100; 10 SOLUTION ORAL at 08:42

## 2019-10-09 RX ADMIN — CEFTRIAXONE SODIUM 1 G: 1 INJECTION, POWDER, FOR SOLUTION INTRAMUSCULAR; INTRAVENOUS at 05:20

## 2019-10-09 RX ADMIN — DOCUSATE SODIUM 100 MG: 100 CAPSULE, LIQUID FILLED ORAL at 08:42

## 2019-10-09 RX ADMIN — IPRATROPIUM BROMIDE AND ALBUTEROL SULFATE 1 AMPULE: .5; 3 SOLUTION RESPIRATORY (INHALATION) at 15:23

## 2019-10-09 RX ADMIN — IPRATROPIUM BROMIDE AND ALBUTEROL SULFATE 1 AMPULE: .5; 3 SOLUTION RESPIRATORY (INHALATION) at 11:07

## 2019-10-09 RX ADMIN — AZITHROMYCIN MONOHYDRATE 500 MG: 500 INJECTION, POWDER, LYOPHILIZED, FOR SOLUTION INTRAVENOUS at 08:42

## 2019-10-09 RX ADMIN — HYDROCODONE BITARTRATE AND ACETAMINOPHEN 1 TABLET: 5; 325 TABLET ORAL at 08:42

## 2019-10-09 ASSESSMENT — PAIN SCALES - GENERAL
PAINLEVEL_OUTOF10: 3
PAINLEVEL_OUTOF10: 8

## 2019-10-12 LAB
CULTURE: NORMAL
CULTURE: NORMAL
Lab: NORMAL
Lab: NORMAL
SPECIMEN DESCRIPTION: NORMAL
SPECIMEN DESCRIPTION: NORMAL

## 2019-10-15 ENCOUNTER — PATIENT MESSAGE (OUTPATIENT)
Dept: FAMILY MEDICINE CLINIC | Age: 84
End: 2019-10-15

## 2019-10-15 DIAGNOSIS — M25.552 LEFT HIP PAIN: ICD-10-CM

## 2019-10-15 RX ORDER — TRAMADOL HYDROCHLORIDE 50 MG/1
50 TABLET ORAL EVERY 6 HOURS PRN
Qty: 120 TABLET | Refills: 1 | Status: SHIPPED | OUTPATIENT
Start: 2019-10-15 | End: 2019-12-26 | Stop reason: SDUPTHER

## 2019-10-27 DIAGNOSIS — M76.892 HIP FLEXOR TENDONITIS, LEFT: ICD-10-CM

## 2019-10-28 RX ORDER — MELOXICAM 15 MG/1
TABLET ORAL
Qty: 90 TABLET | Refills: 0 | OUTPATIENT
Start: 2019-10-28

## 2019-10-30 ENCOUNTER — PATIENT MESSAGE (OUTPATIENT)
Dept: FAMILY MEDICINE CLINIC | Age: 84
End: 2019-10-30

## 2019-10-30 RX ORDER — MELOXICAM 15 MG/1
15 TABLET ORAL DAILY
Qty: 90 TABLET | Refills: 1 | Status: SHIPPED | OUTPATIENT
Start: 2019-10-30 | End: 2020-05-13

## 2019-11-19 ENCOUNTER — OFFICE VISIT (OUTPATIENT)
Dept: FAMILY MEDICINE CLINIC | Age: 84
End: 2019-11-19
Payer: MEDICARE

## 2019-11-19 VITALS
WEIGHT: 135 LBS | BODY MASS INDEX: 27.21 KG/M2 | HEIGHT: 59 IN | DIASTOLIC BLOOD PRESSURE: 80 MMHG | HEART RATE: 90 BPM | OXYGEN SATURATION: 96 % | SYSTOLIC BLOOD PRESSURE: 130 MMHG

## 2019-11-19 DIAGNOSIS — Z00.00 ROUTINE GENERAL MEDICAL EXAMINATION AT A HEALTH CARE FACILITY: Primary | ICD-10-CM

## 2019-11-19 PROBLEM — Z96.611 PRESENCE OF RIGHT ARTIFICIAL SHOULDER JOINT: Status: ACTIVE | Noted: 2017-07-12

## 2019-11-19 PROCEDURE — G0438 PPPS, INITIAL VISIT: HCPCS | Performed by: NURSE PRACTITIONER

## 2019-11-19 PROCEDURE — G8482 FLU IMMUNIZE ORDER/ADMIN: HCPCS | Performed by: NURSE PRACTITIONER

## 2019-11-19 PROCEDURE — 1123F ACP DISCUSS/DSCN MKR DOCD: CPT | Performed by: NURSE PRACTITIONER

## 2019-11-19 PROCEDURE — 4040F PNEUMOC VAC/ADMIN/RCVD: CPT | Performed by: NURSE PRACTITIONER

## 2019-11-19 ASSESSMENT — PATIENT HEALTH QUESTIONNAIRE - PHQ9
SUM OF ALL RESPONSES TO PHQ QUESTIONS 1-9: 0
SUM OF ALL RESPONSES TO PHQ QUESTIONS 1-9: 0

## 2019-11-19 ASSESSMENT — LIFESTYLE VARIABLES: HOW OFTEN DO YOU HAVE A DRINK CONTAINING ALCOHOL: 0

## 2020-01-13 NOTE — TELEPHONE ENCOUNTER
Last visit: 11/19/19  Last Med refill: 5/24/19  Does patient have enough medication for 72 hours: No:     Next Visit Date:  Future Appointments   Date Time Provider Cj Pacheco   2/10/2020  3:30 PM Pauly Peraza  [address] Maintenance   Topic Date Due    DTaP/Tdap/Td vaccine (1 - Tdap) 12/08/1945    Shingles Vaccine (1 of 2) 12/08/1984    Lipid screen  05/24/2020    Potassium monitoring  10/09/2020    Creatinine monitoring  10/09/2020    Annual Wellness Visit (AWV)  11/18/2020    DEXA (modify frequency per FRAX score)  Completed    Flu vaccine  Completed    Pneumococcal 65+ years Vaccine  Completed       Hemoglobin A1C (%)   Date Value   02/14/2019 6.0             ( goal A1C is < 7)   No results found for: LABMICR  LDL Cholesterol (mg/dL)   Date Value   05/24/2019 75   01/04/2012 167 (H)       (goal LDL is <100)   AST (U/L)   Date Value   10/06/2019 9     ALT (U/L)   Date Value   10/06/2019 9     BUN (mg/dL)   Date Value   10/09/2019 11     BP Readings from Last 3 Encounters:   11/19/19 130/80   10/09/19 125/72   10/06/19 (!) 75/46          (goal 120/80)    All Future Testing planned in [facility]              Patient Active Problem List:     Ataxia     Multiple sclerosis ([facility] [address]     Acute cystitis without hematuria     Essential hypertension     Hyperlipidemia     Closed fracture of rib of left side     Atelectasis     Debility     Moderate malnutrition (HCC)     Abnormal gait     Actinic keratosis     Enthesopathy of hip region     Generalized osteoarthritis     Idiopathic peripheral neuropathy     Vitamin D deficiency     Primary localized osteoarthrosis of the hip, left     Primary osteoarthritis of left hip     Pneumonia     Acquired spondylolisthesis     Chronic midline low back pain with left-sided sciatica     Spinal stenosis of lumbar region with neurogenic claudication     Presence of right artificial shoulder joint

## 2020-01-14 RX ORDER — OMEPRAZOLE 20 MG/1
20 CAPSULE, DELAYED RELEASE ORAL DAILY
Qty: 90 CAPSULE | Refills: 1 | Status: SHIPPED | OUTPATIENT
Start: 2020-01-14 | End: 2020-07-27

## 2020-01-21 NOTE — TELEPHONE ENCOUNTER
Last visit: 11/19/19  Last Med refill: 7/25/19  Does patient have enough medication for 72 hours: Yes    Next Visit Date:  Future Appointments   Date Time Provider Cj Pacheco   2/10/2020  3:30 PM Pauly Carlin  [address] Maintenance   Topic Date Due    DTaP/Tdap/Td vaccine (1 - Tdap) 12/08/1945    Shingles Vaccine (1 of 2) 12/08/1984    Lipid screen  05/24/2020    Potassium monitoring  10/09/2020    Creatinine monitoring  10/09/2020    Annual Wellness Visit (AWV)  11/18/2020    DEXA (modify frequency per FRAX score)  Completed    Flu vaccine  Completed    Pneumococcal 65+ years Vaccine  Completed       Hemoglobin A1C (%)   Date Value   02/14/2019 6.0             ( goal A1C is < 7)   No results found for: LABMICR  LDL Cholesterol (mg/dL)   Date Value   05/24/2019 75   01/04/2012 167 (H)       (goal LDL is <100)   AST (U/L)   Date Value   10/06/2019 9     ALT (U/L)   Date Value   10/06/2019 9     BUN (mg/dL)   Date Value   10/09/2019 11     BP Readings from Last 3 Encounters:   11/19/19 130/80   10/09/19 125/72   10/06/19 (!) 75/46          (goal 120/80)    All Future Testing planned in [facility]              Patient Active Problem List:     Ataxia     Multiple sclerosis ([facility] [address]     Acute cystitis without hematuria     Essential hypertension     Hyperlipidemia     Closed fracture of rib of left side     Atelectasis     Debility     Moderate malnutrition (HCC)     Abnormal gait     Actinic keratosis     Enthesopathy of hip region     Generalized osteoarthritis     Idiopathic peripheral neuropathy     Vitamin D deficiency     Primary localized osteoarthrosis of the hip, left     Primary osteoarthritis of left hip     Pneumonia     Acquired spondylolisthesis     Chronic midline low back pain with left-sided sciatica     Spinal stenosis of lumbar region with neurogenic claudication     Presence of right artificial shoulder joint

## 2020-01-22 RX ORDER — AMITRIPTYLINE HYDROCHLORIDE 100 MG/1
100 TABLET, FILM COATED ORAL NIGHTLY
Qty: 90 TABLET | Refills: 1 | Status: SHIPPED | OUTPATIENT
Start: 2020-01-22 | End: 2020-07-30

## 2020-02-10 ENCOUNTER — OFFICE VISIT (OUTPATIENT)
Dept: FAMILY MEDICINE CLINIC | Age: 85
End: 2020-02-10
Payer: MEDICARE

## 2020-02-10 VITALS
HEART RATE: 97 BPM | WEIGHT: 134.2 LBS | OXYGEN SATURATION: 100 % | HEIGHT: 59 IN | BODY MASS INDEX: 27.05 KG/M2 | DIASTOLIC BLOOD PRESSURE: 88 MMHG | SYSTOLIC BLOOD PRESSURE: 136 MMHG

## 2020-02-10 PROBLEM — E44.0 MODERATE MALNUTRITION (HCC): Status: RESOLVED | Noted: 2019-03-02 | Resolved: 2020-02-10

## 2020-02-10 PROBLEM — N30.00 ACUTE CYSTITIS WITHOUT HEMATURIA: Status: RESOLVED | Noted: 2017-10-01 | Resolved: 2020-02-10

## 2020-02-10 PROCEDURE — G8427 DOCREV CUR MEDS BY ELIG CLIN: HCPCS | Performed by: INTERNAL MEDICINE

## 2020-02-10 PROCEDURE — G8417 CALC BMI ABV UP PARAM F/U: HCPCS | Performed by: INTERNAL MEDICINE

## 2020-02-10 PROCEDURE — 1036F TOBACCO NON-USER: CPT | Performed by: INTERNAL MEDICINE

## 2020-02-10 PROCEDURE — 0509F URINE INCON PLAN DOCD: CPT | Performed by: INTERNAL MEDICINE

## 2020-02-10 PROCEDURE — 1123F ACP DISCUSS/DSCN MKR DOCD: CPT | Performed by: INTERNAL MEDICINE

## 2020-02-10 PROCEDURE — G8482 FLU IMMUNIZE ORDER/ADMIN: HCPCS | Performed by: INTERNAL MEDICINE

## 2020-02-10 PROCEDURE — 1090F PRES/ABSN URINE INCON ASSESS: CPT | Performed by: INTERNAL MEDICINE

## 2020-02-10 PROCEDURE — 4040F PNEUMOC VAC/ADMIN/RCVD: CPT | Performed by: INTERNAL MEDICINE

## 2020-02-10 PROCEDURE — 99214 OFFICE O/P EST MOD 30 MIN: CPT | Performed by: INTERNAL MEDICINE

## 2020-02-10 PROCEDURE — G8400 PT W/DXA NO RESULTS DOC: HCPCS | Performed by: INTERNAL MEDICINE

## 2020-02-10 RX ORDER — ALBUTEROL SULFATE 90 UG/1
2 AEROSOL, METERED RESPIRATORY (INHALATION) EVERY 4 HOURS PRN
Qty: 1 INHALER | Refills: 0 | Status: ON HOLD | OUTPATIENT
Start: 2020-02-10 | End: 2021-03-11

## 2020-02-10 RX ORDER — OXYBUTYNIN CHLORIDE 5 MG/1
10 TABLET, EXTENDED RELEASE ORAL DAILY
Qty: 60 TABLET | Refills: 3 | Status: SHIPPED | OUTPATIENT
Start: 2020-02-10 | End: 2020-03-11 | Stop reason: DRUGHIGH

## 2020-02-10 ASSESSMENT — PATIENT HEALTH QUESTIONNAIRE - PHQ9
SUM OF ALL RESPONSES TO PHQ QUESTIONS 1-9: 0
1. LITTLE INTEREST OR PLEASURE IN DOING THINGS: 0
2. FEELING DOWN, DEPRESSED OR HOPELESS: 0
SUM OF ALL RESPONSES TO PHQ9 QUESTIONS 1 & 2: 0
SUM OF ALL RESPONSES TO PHQ QUESTIONS 1-9: 0

## 2020-02-10 NOTE — PROGRESS NOTES
Abdomen: Abdomen is soft and nontender. The bowel sounds are normal.   Musculoskeletal: There are no deformities of the the extremities. Patient has all ten fingers intact. The patient has full range of motion on all 4 extremities without pain. Skin: The skin is warm and dry. There are no rashes noted. Prior to Visit Medications    Medication Sig Taking? Authorizing Provider   traMADol (ULTRAM) 50 MG tablet Take 1 tablet by mouth every 6 hours as needed for Pain for up to 30 days. Yes Rox Albarran MD   amitriptyline (ELAVIL) 100 MG tablet Take 1 tablet by mouth nightly Yes Rox Albarran MD   omeprazole (PRILOSEC) 20 MG delayed release capsule TAKE 1 CAPSULE BY MOUTH DAILY Yes Pauly Pérez MD   meloxicam (MOBIC) 15 MG tablet Take 1 tablet by mouth daily Yes Rox Albarran MD   simvastatin (ZOCOR) 20 MG tablet TAKE 1 TABLET BY MOUTH EVERY NIGHT Yes Pauly Pérez MD   HYDROcodone-acetaminophen (NORCO) 5-325 MG per tablet Take 1 tablet by mouth every 6 hours as needed for Pain. Yes Historical Provider, MD       Data Review      Assessment/Plan:      1. Moderate malnutrition ([facility] [address]  Resolved     2. Multiple sclerosis ([facility] [address]  F/u neruology  Continue current regimen per neurology     3. Hyperlipidemia, unspecified hyperlipidemia type  Continue atorvastatin  - Lipid, Fasting; Future  - Comprehensive Metabolic Panel; Future    4. Essential hypertension  Continue diet and lifestyle management   - Comprehensive Metabolic Panel; Future    5. Generalized osteoarthritis  Continue tramadol and prednisone 2.5mg daily. Will wean off prednisone once weather warms up     6. Mild intermittent reactive airway disease without complication  - albuterol sulfate HFA (VENTOLIN HFA) 108 (90 Base) MCG/ACT inhaler; Inhale 2 puffs into the lungs every 4 hours as needed for Wheezing  Dispense: 1 Inhaler; Refill: 0    7.  Mixed stress and urge urinary incontinence  D/c vesicare   - oxybutynin (DITROPAN-XL) 5 MG extended

## 2020-02-13 RX ORDER — SIMVASTATIN 20 MG
TABLET ORAL
Qty: 90 TABLET | Refills: 1 | Status: SHIPPED | OUTPATIENT
Start: 2020-02-13 | End: 2020-07-27

## 2020-02-13 NOTE — TELEPHONE ENCOUNTER
midline low back pain with left-sided sciatica     Spinal stenosis of lumbar region with neurogenic claudication     Presence of right artificial shoulder joint

## 2020-03-02 ENCOUNTER — OFFICE VISIT (OUTPATIENT)
Dept: FAMILY MEDICINE CLINIC | Age: 85
End: 2020-03-02
Payer: MEDICARE

## 2020-03-02 VITALS
WEIGHT: 138 LBS | TEMPERATURE: 98.9 F | HEART RATE: 106 BPM | SYSTOLIC BLOOD PRESSURE: 119 MMHG | HEIGHT: 59 IN | OXYGEN SATURATION: 90 % | BODY MASS INDEX: 27.82 KG/M2 | DIASTOLIC BLOOD PRESSURE: 78 MMHG

## 2020-03-02 LAB
BILIRUBIN, POC: NEGATIVE
BLOOD URINE, POC: NEGATIVE
CLARITY, POC: CLEAR
COLOR, POC: YELLOW
GLUCOSE URINE, POC: NEGATIVE
KETONES, POC: NEGATIVE
LEUKOCYTE EST, POC: ABNORMAL
NITRITE, POC: NEGATIVE
PH, POC: 6
PROTEIN, POC: NEGATIVE
SPECIFIC GRAVITY, POC: 1
UROBILINOGEN, POC: 0.2

## 2020-03-02 PROCEDURE — G8417 CALC BMI ABV UP PARAM F/U: HCPCS | Performed by: INTERNAL MEDICINE

## 2020-03-02 PROCEDURE — 99213 OFFICE O/P EST LOW 20 MIN: CPT | Performed by: INTERNAL MEDICINE

## 2020-03-02 PROCEDURE — 4040F PNEUMOC VAC/ADMIN/RCVD: CPT | Performed by: INTERNAL MEDICINE

## 2020-03-02 PROCEDURE — G8482 FLU IMMUNIZE ORDER/ADMIN: HCPCS | Performed by: INTERNAL MEDICINE

## 2020-03-02 PROCEDURE — 81003 URINALYSIS AUTO W/O SCOPE: CPT | Performed by: INTERNAL MEDICINE

## 2020-03-02 PROCEDURE — 1036F TOBACCO NON-USER: CPT | Performed by: INTERNAL MEDICINE

## 2020-03-02 PROCEDURE — 1090F PRES/ABSN URINE INCON ASSESS: CPT | Performed by: INTERNAL MEDICINE

## 2020-03-02 PROCEDURE — G8427 DOCREV CUR MEDS BY ELIG CLIN: HCPCS | Performed by: INTERNAL MEDICINE

## 2020-03-02 PROCEDURE — 1123F ACP DISCUSS/DSCN MKR DOCD: CPT | Performed by: INTERNAL MEDICINE

## 2020-03-02 PROCEDURE — G8400 PT W/DXA NO RESULTS DOC: HCPCS | Performed by: INTERNAL MEDICINE

## 2020-03-02 RX ORDER — PREDNISONE 10 MG/1
10 TABLET ORAL DAILY
Qty: 30 TABLET | Refills: 3 | Status: SHIPPED | OUTPATIENT
Start: 2020-03-02 | End: 2020-07-15 | Stop reason: SDUPTHER

## 2020-03-02 RX ORDER — CEPHALEXIN 500 MG/1
500 CAPSULE ORAL 3 TIMES DAILY
Qty: 21 CAPSULE | Refills: 0 | Status: SHIPPED | OUTPATIENT
Start: 2020-03-02 | End: 2020-07-15 | Stop reason: SDUPTHER

## 2020-03-02 ASSESSMENT — ENCOUNTER SYMPTOMS
RHINORRHEA: 0
NAUSEA: 0
COUGH: 1
WHEEZING: 0
VOMITING: 0
SORE THROAT: 0
HEARTBURN: 0
HEMOPTYSIS: 0
SHORTNESS OF BREATH: 1

## 2020-03-02 NOTE — PROGRESS NOTES
[facility]  [facility] [address]  [facility] [address]  Dept: [phone]  Dept Fax: [phone]      Darshan Mistry is a 80 y.o. female who presents today for hermedical conditions/complaints as noted below. Darshan Mitsry is c/o of Urinary Tract Infection and Cough            HPI:     Urinary Tract Infection    This is a new problem. The current episode started in the past 7 days. The problem occurs every urination. The quality of the pain is described as burning. The pain is moderate. She is not sexually active. There is no history of pyelonephritis. Associated symptoms include chills and urgency. Pertinent negatives include no discharge, flank pain, frequency, hematuria, hesitancy, nausea, possible pregnancy, sweats or vomiting. She has tried increased fluids for the symptoms. The treatment provided no relief. Cough   This is a new problem. The current episode started 1 to 4 weeks ago. The problem has been gradually worsening. The problem occurs constantly. The cough is non-productive. Associated symptoms include chills, nasal congestion, postnasal drip and shortness of breath. Pertinent negatives include no chest pain, ear congestion, ear pain, fever, headaches, heartburn, hemoptysis, myalgias, rash, rhinorrhea, sore throat, sweats, weight loss or wheezing. The symptoms are aggravated by exercise. She has tried OTC cough suppressant for the symptoms.        Hemoglobin A1C (%)   Date Value   02/14/2019 6.0             ( goal A1Cis < 7)   No results found for: LABMICR  LDL Cholesterol (mg/dL)   Date Value   05/24/2019 75   01/04/2012 167 (H)       (goal LDL is <100)   AST (U/L)   Date Value   10/06/2019 9     ALT (U/L)   Date Value   10/06/2019 9     BUN (mg/dL)   Date Value   10/09/2019 11     BP Readings from Last 3 Encounters:   03/02/20 119/78   02/10/20 136/88   11/19/19 130/80          (goal 120/80)    Past Medical History:   Diagnosis Date    Acute cystitis without hematuria 10/1/2017    Arthritis     Chronic daily headache     GERD (gastroesophageal reflux disease)     Hyperlipidemia     Hypertension     Moderate malnutrition ([facility] [address] 3/2/2019    Multiple sclerosis ([facility] [address]     Neuropathy     Pneumonia 2017    states had double pneumonia    Vitamin D deficiency       Past Surgical History:   Procedure Laterality Date    CATARACT REMOVAL WITH IMPLANT [facility] 11/6/2014    [facility]    CATARACT REMOVAL WITH IMPLANT Left 12/2/2014    [facility]    CERVICAL DISC SURGERY      CYSTOCELE REPAIR      HYSTERECTOMY      RECTOCELE REPAIR      SHOULDER ARTHROPLASTY Right 04/20/2017    SHOULDER SURGERY Right 2017       No family history on file. Social History     Tobacco Use    Smoking status: Never Smoker    Smokeless tobacco: Never Used   Substance Use Topics    Alcohol use: No      Current Outpatient Medications   Medication Sig Dispense Refill    simvastatin (ZOCOR) 20 MG tablet TAKE 1 TABLET BY MOUTH EVERY NIGHT 90 tablet 1    oxybutynin (DITROPAN-XL) 5 MG extended release tablet Take 2 tablets by mouth daily 60 tablet 3    albuterol sulfate HFA (VENTOLIN HFA) 108 (90 Base) MCG/ACT inhaler Inhale 2 puffs into the lungs every 4 hours as needed for Wheezing 1 Inhaler 0    amitriptyline (ELAVIL) 100 MG tablet Take 1 tablet by mouth nightly 90 tablet 1    omeprazole (PRILOSEC) 20 MG delayed release capsule TAKE 1 CAPSULE BY MOUTH DAILY 90 capsule 1    meloxicam (MOBIC) 15 MG tablet Take 1 tablet by mouth daily 90 tablet 1    HYDROcodone-acetaminophen (NORCO) 5-325 MG per tablet Take 1 tablet by mouth every 6 hours as needed for Pain. No current facility-administered medications for this visit.       Allergies   Allergen Reactions    Bactrim [Sulfamethoxazole-Trimethoprim] Other (See Comments)     BLISTERS IN MOUTH    Lactose Intolerance (Gi) Diarrhea    Pcn [Penicillins] Hives    Lidoderm [Lidocaine] Rash       Health Maintenance   Topic Date Due    DTaP/Tdap/Td vaccine (1 - Tdap) 12/08/1945    Shingles Vaccine (1 of 2) 12/08/1984    Lipid screen  05/24/2020    Potassium monitoring  10/09/2020    Creatinine monitoring  10/09/2020    Annual Wellness Visit (AWV)  11/19/2020    DEXA (modify frequency per FRAX score)  Completed    Flu vaccine  Completed    Pneumococcal 65+ years Vaccine  Completed    Hepatitis A vaccine  Aged Out    Hepatitis B vaccine  Aged Out    Hib vaccine  Aged Out    Meningococcal (ACWY) vaccine  Aged Out          Review of Systems   Constitutional: Positive for chills. Negative for fever and weight loss. HENT: Positive for postnasal drip. Negative for ear pain, rhinorrhea and sore throat. Respiratory: Positive for cough and shortness of breath. Negative for hemoptysis and wheezing. Cardiovascular: Negative for chest pain. Gastrointestinal: Negative for heartburn, nausea and vomiting. Genitourinary: Positive for urgency. Negative for flank pain, frequency, hematuria and hesitancy. Musculoskeletal: Negative for myalgias. Skin: Negative for rash. Neurological: Negative for headaches. All other systems reviewed and are negative. Objective:     /78 (Site: Left Upper Arm, Position: Sitting, Cuff Size: Medium Adult)   Pulse 106   Temp 98.9 °F (37.2 °C) (Oral)   Ht 4' 11.02\" (1.499 m)   Wt 138 lb (62.6 kg)   SpO2 90%   BMI 27.86 kg/m²   Physical Exam  Vitals signs and nursing note reviewed. Constitutional:       Appearance: She is well-developed. HENT:      Right Ear: Hearing and external ear normal.      Left Ear: Hearing and external ear normal.      Nose: Mucosal edema and rhinorrhea present. Rhinorrhea is purulent. Right Nostril: Epistaxis present. Right Sinus: No maxillary sinus tenderness or frontal sinus tenderness. Left Sinus: No maxillary sinus tenderness or frontal sinus tenderness. Mouth/Throat:      Pharynx: Uvula midline. Cardiovascular:      Rate and Rhythm: Normal rate and regular rhythm. Heart sounds: Normal heart sounds. No murmur. No friction rub. No gallop. Pulmonary:      Effort: Pulmonary effort is normal. No respiratory distress. Breath sounds: Normal breath sounds. No wheezing. Abdominal:      General: Bowel sounds are normal.      Palpations: Abdomen is soft. There is no mass. Tenderness: There is no abdominal tenderness. There is no guarding. Musculoskeletal: Normal range of motion. Lymphadenopathy:      Cervical: Cervical adenopathy present. Right cervical: Superficial cervical adenopathy and posterior cervical adenopathy present. Left cervical: Superficial cervical adenopathy and posterior cervical adenopathy present. Neurological:      Mental Status: She is alert. Assessment/Plan:     1. Dysuria  - POCT Urinalysis No Micro (Auto)  - cephALEXin (KEFLEX) 500 MG capsule; Take 1 capsule by mouth 3 times daily for 7 days  Dispense: 21 capsule; Refill: 0    2. Acute bacterial sinusitis  - cephALEXin (KEFLEX) 500 MG capsule; Take 1 capsule by mouth 3 times daily for 7 days  Dispense: 21 capsule; Refill: 0    3. Primary osteoarthritis involving multiple joints  - predniSONE (DELTASONE) 10 MG tablet; Take 1 tablet by mouth daily  Dispense: 30 tablet; Refill: 3        No follow-ups on file. Patient given educational materials- see patient instructions. Discussed use, benefit, and side effects of prescribedmedications. All patient questions answered. Pt voiced understanding. Reviewedhealth maintenance. Instructed to continue current medications, diet and exercise. Patient agreed with treatment plan. Follow up as directed.      Electronically signedby Rohit Pizarro MD on 3/2/2020

## 2020-03-03 ENCOUNTER — PATIENT MESSAGE (OUTPATIENT)
Dept: FAMILY MEDICINE CLINIC | Age: 85
End: 2020-03-03

## 2020-03-11 RX ORDER — OXYBUTYNIN CHLORIDE 10 MG/1
10 TABLET, EXTENDED RELEASE ORAL DAILY
Qty: 30 TABLET | Refills: 3 | Status: SHIPPED | OUTPATIENT
Start: 2020-03-11 | End: 2020-06-10

## 2020-03-11 RX ORDER — OXYBUTYNIN CHLORIDE 10 MG/1
10 TABLET, EXTENDED RELEASE ORAL DAILY
Qty: 90 TABLET | Refills: 1 | OUTPATIENT
Start: 2020-03-11

## 2020-03-11 NOTE — TELEPHONE ENCOUNTER
Last visit:   Last Med refill: 3/11/2020  Does patient have enough medication for 72 hours: Yes    Next Visit Date:  Future Appointments   Date Time Provider Cj Tara   6/10/2020  3:45 PM Pauly Goss  [address] Maintenance   Topic Date Due    DTaP/Tdap/Td vaccine (1 - Tdap) [birth year]/1953    Shingles Vaccine (1 of 2) 12/08/1984    Lipid screen  05/24/2020    Potassium monitoring  10/09/2020    Creatinine monitoring  10/09/2020    Annual Wellness Visit (AWV)  11/19/2020    DEXA (modify frequency per FRAX score)  Completed    Flu vaccine  Completed    Pneumococcal 65+ years Vaccine  Completed    Hepatitis A vaccine  Aged Out    Hepatitis B vaccine  Aged Out    Hib vaccine  Aged Out    Meningococcal (ACWY) vaccine  Aged Out       Hemoglobin A1C (%)   Date Value   02/14/2019 6.0             ( goal A1C is < 7)   No results found for: LABMICR  LDL Cholesterol (mg/dL)   Date Value   05/24/2019 75   01/04/2012 167 (H)       (goal LDL is <100)   AST (U/L)   Date Value   10/06/2019 9     ALT (U/L)   Date Value   10/06/2019 9     BUN (mg/dL)   Date Value   10/09/2019 11     BP Readings from Last 3 Encounters:   03/02/20 119/78   02/10/20 136/88   11/19/19 130/80          (goal 120/80)    All Future Testing planned in [facility]  Lab Frequency Next Occurrence   Lipid, Fasting Once 05/24/2020   Comprehensive Metabolic Panel Once 41/11/8670   Hemoglobin A1C Once 05/24/2020               Patient Active Problem List:     Ataxia     Multiple sclerosis ([facility] [address]     Essential hypertension     Hyperlipidemia     Closed fracture of rib of left side     Atelectasis     Debility     Abnormal gait     Actinic keratosis     Enthesopathy of hip region     Generalized osteoarthritis     Idiopathic peripheral neuropathy     Vitamin D deficiency     Primary localized osteoarthrosis of the hip, left     Primary osteoarthritis of left hip     Pneumonia     Acquired spondylolisthesis     Chronic midline low back pain with left-sided sciatica     Spinal stenosis of lumbar region with neurogenic claudication     Presence of right artificial shoulder joint

## 2020-03-23 RX ORDER — GABAPENTIN 100 MG/1
100 CAPSULE ORAL 2 TIMES DAILY
Qty: 60 CAPSULE | Refills: 3 | Status: SHIPPED | OUTPATIENT
Start: 2020-03-23 | End: 2021-02-25

## 2020-04-01 RX ORDER — SIMVASTATIN 20 MG
TABLET ORAL
Qty: 90 TABLET | Refills: 1 | OUTPATIENT
Start: 2020-04-01

## 2020-04-06 ENCOUNTER — PATIENT MESSAGE (OUTPATIENT)
Dept: FAMILY MEDICINE CLINIC | Age: 85
End: 2020-04-06

## 2020-04-06 RX ORDER — TRAMADOL HYDROCHLORIDE 50 MG/1
50 TABLET ORAL EVERY 6 HOURS PRN
Qty: 120 TABLET | Refills: 1 | Status: SHIPPED | OUTPATIENT
Start: 2020-04-06 | End: 2020-05-06

## 2020-04-30 ENCOUNTER — TELEPHONE (OUTPATIENT)
Dept: FAMILY MEDICINE CLINIC | Age: 85
End: 2020-04-30

## 2020-04-30 RX ORDER — DOCUSATE SODIUM 100 MG/1
100 CAPSULE, LIQUID FILLED ORAL 2 TIMES DAILY
Qty: 60 CAPSULE | Refills: 0 | Status: SHIPPED | OUTPATIENT
Start: 2020-04-30 | End: 2020-05-30

## 2020-05-13 RX ORDER — MELOXICAM 15 MG/1
15 TABLET ORAL DAILY
Qty: 90 TABLET | Refills: 1 | Status: SHIPPED | OUTPATIENT
Start: 2020-05-13 | End: 2020-06-10 | Stop reason: ALTCHOICE

## 2020-05-19 ENCOUNTER — PATIENT MESSAGE (OUTPATIENT)
Dept: FAMILY MEDICINE CLINIC | Age: 85
End: 2020-05-19

## 2020-05-20 ENCOUNTER — E-VISIT (OUTPATIENT)
Dept: FAMILY MEDICINE CLINIC | Age: 85
End: 2020-05-20
Payer: MEDICARE

## 2020-05-20 PROCEDURE — 99421 OL DIG E/M SVC 5-10 MIN: CPT | Performed by: INTERNAL MEDICINE

## 2020-05-20 RX ORDER — CEPHALEXIN 500 MG/1
500 CAPSULE ORAL 3 TIMES DAILY
Qty: 21 CAPSULE | Refills: 0 | Status: SHIPPED | OUTPATIENT
Start: 2020-05-20 | End: 2020-05-27

## 2020-05-20 RX ORDER — CELECOXIB 200 MG/1
200 CAPSULE ORAL DAILY
Qty: 30 CAPSULE | Refills: 3 | Status: SHIPPED | OUTPATIENT
Start: 2020-05-20 | End: 2020-10-06 | Stop reason: SDUPTHER

## 2020-05-20 NOTE — TELEPHONE ENCOUNTER
Subject:RE: Visit Follow-Up Question    Perla Lara, we are not at our [facility] office right now. We are temporally off of [address]. Our [facility] lab is closed but I can give you a list of the labs that are open right now. [address] [facility] [address] [facility] Ph: [address] [facility] [address] Ph: [phone]    [address], [place], [address] [facility] Ph: [phone]      ----- Message -----   From:Nichelle Valles   Sent:5/19/2020 8:53 AM EDT   To:IFTIKHAR Eaton MD   Subject:Visit Follow-Up Question    My mom has a scheduled appointment with Dr. Usama Chavez on June 10th. She gave us an order for blood work to get completed prior to this visit. With the current conditions we have not been able to get this done. Are you currently doing blood work at your office? If so, can we get this done when I bring her in to see Dr. Usama Chavez? Or can we come into your office another time to get this blood work completed? If so, what time is best and when can I bring her over? I believe one of the tests she is running on her would require fasting, so that probably would not be possible if we get it done at the time she sees the doctor. Let me know what you think or is best for this. Thanks!     Aashish Ochoa

## 2020-05-21 ENCOUNTER — HOSPITAL ENCOUNTER (OUTPATIENT)
Age: 85
Setting detail: SPECIMEN
Discharge: HOME | End: 2020-05-21
Payer: MEDICARE

## 2020-05-21 LAB
ALBUMIN SERPL-MCNC: 3.9 G/DL (ref 3.5–5.2)
ALBUMIN/GLOBULIN RATIO: 1.6 (ref 1–2.5)
ALP BLD-CCNC: 69 U/L (ref 35–104)
ALT SERPL-CCNC: 10 U/L (ref 5–33)
ANION GAP SERPL CALCULATED.3IONS-SCNC: 11 MMOL/L (ref 9–17)
AST SERPL-CCNC: 11 U/L
BILIRUB SERPL-MCNC: 0.28 MG/DL (ref 0.3–1.2)
BUN BLDV-MCNC: 12 MG/DL (ref 8–23)
BUN/CREAT BLD: ABNORMAL (ref 9–20)
CALCIUM SERPL-MCNC: 9.2 MG/DL (ref 8.6–10.4)
CHLORIDE BLD-SCNC: 102 MMOL/L (ref 98–107)
CHOLESTEROL, FASTING: 159 MG/DL
CHOLESTEROL/HDL RATIO: 3.5
CO2: 29 MMOL/L (ref 20–31)
CREAT SERPL-MCNC: 0.87 MG/DL (ref 0.5–0.9)
ESTIMATED AVERAGE GLUCOSE: 131 MG/DL
GFR AFRICAN AMERICAN: >60 ML/MIN
GFR NON-AFRICAN AMERICAN: >60 ML/MIN
GFR SERPL CREATININE-BSD FRML MDRD: ABNORMAL ML/MIN/{1.73_M2}
GFR SERPL CREATININE-BSD FRML MDRD: ABNORMAL ML/MIN/{1.73_M2}
GLUCOSE BLD-MCNC: 120 MG/DL (ref 70–99)
HBA1C MFR BLD: 6.2 % (ref 4–6)
HDLC SERPL-MCNC: 46 MG/DL
LDL CHOLESTEROL: 86 MG/DL (ref 0–130)
POTASSIUM SERPL-SCNC: 4.1 MMOL/L (ref 3.7–5.3)
SODIUM BLD-SCNC: 142 MMOL/L (ref 135–144)
TOTAL PROTEIN: 6.4 G/DL (ref 6.4–8.3)
TRIGLYCERIDE, FASTING: 135 MG/DL
VLDLC SERPL CALC-MCNC: NORMAL MG/DL (ref 1–30)

## 2020-06-10 ENCOUNTER — HOSPITAL ENCOUNTER (OUTPATIENT)
Age: 85
Discharge: HOME | End: 2020-06-12
Payer: MEDICARE

## 2020-06-10 ENCOUNTER — HOSPITAL ENCOUNTER (OUTPATIENT)
Dept: GENERAL RADIOLOGY | Age: 85
Discharge: HOME | End: 2020-06-12
Payer: MEDICARE

## 2020-06-10 ENCOUNTER — OFFICE VISIT (OUTPATIENT)
Dept: FAMILY MEDICINE CLINIC | Age: 85
End: 2020-06-10
Payer: MEDICARE

## 2020-06-10 VITALS
TEMPERATURE: 98 F | SYSTOLIC BLOOD PRESSURE: 128 MMHG | BODY MASS INDEX: 26.97 KG/M2 | DIASTOLIC BLOOD PRESSURE: 82 MMHG | OXYGEN SATURATION: 95 % | HEART RATE: 90 BPM | WEIGHT: 133.6 LBS

## 2020-06-10 PROCEDURE — G8400 PT W/DXA NO RESULTS DOC: HCPCS | Performed by: INTERNAL MEDICINE

## 2020-06-10 PROCEDURE — 1123F ACP DISCUSS/DSCN MKR DOCD: CPT | Performed by: INTERNAL MEDICINE

## 2020-06-10 PROCEDURE — 0509F URINE INCON PLAN DOCD: CPT | Performed by: INTERNAL MEDICINE

## 2020-06-10 PROCEDURE — 1036F TOBACCO NON-USER: CPT | Performed by: INTERNAL MEDICINE

## 2020-06-10 PROCEDURE — 73502 X-RAY EXAM HIP UNI 2-3 VIEWS: CPT

## 2020-06-10 PROCEDURE — 4040F PNEUMOC VAC/ADMIN/RCVD: CPT | Performed by: INTERNAL MEDICINE

## 2020-06-10 PROCEDURE — G8427 DOCREV CUR MEDS BY ELIG CLIN: HCPCS | Performed by: INTERNAL MEDICINE

## 2020-06-10 PROCEDURE — G8417 CALC BMI ABV UP PARAM F/U: HCPCS | Performed by: INTERNAL MEDICINE

## 2020-06-10 PROCEDURE — 1090F PRES/ABSN URINE INCON ASSESS: CPT | Performed by: INTERNAL MEDICINE

## 2020-06-10 PROCEDURE — 99214 OFFICE O/P EST MOD 30 MIN: CPT | Performed by: INTERNAL MEDICINE

## 2020-06-10 RX ORDER — PREDNISONE 20 MG/1
TABLET ORAL
Qty: 30 TABLET | Refills: 0 | Status: SHIPPED | OUTPATIENT
Start: 2020-06-10 | End: 2020-06-20

## 2020-06-10 RX ORDER — TRAMADOL HYDROCHLORIDE 50 MG/1
50 TABLET ORAL EVERY 6 HOURS PRN
Qty: 120 TABLET | Refills: 2 | Status: SHIPPED | OUTPATIENT
Start: 2020-06-10 | End: 2020-09-21 | Stop reason: SDUPTHER

## 2020-06-10 RX ORDER — TRAMADOL HYDROCHLORIDE 50 MG/1
50 TABLET ORAL EVERY 6 HOURS PRN
COMMUNITY
End: 2020-06-10 | Stop reason: SDUPTHER

## 2020-06-10 RX ORDER — TRAMADOL HYDROCHLORIDE 50 MG/1
50 TABLET ORAL EVERY 6 HOURS PRN
Qty: 120 TABLET | Refills: 1 | Status: CANCELLED | OUTPATIENT
Start: 2020-06-10 | End: 2020-07-10

## 2020-06-10 RX ORDER — SOLIFENACIN SUCCINATE 5 MG/1
5 TABLET, FILM COATED ORAL DAILY
Qty: 90 TABLET | Refills: 1 | Status: SHIPPED | OUTPATIENT
Start: 2020-06-10 | End: 2020-12-03 | Stop reason: SDUPTHER

## 2020-06-10 NOTE — PROGRESS NOTES
She fell in the kitchen in March falling on her RT side. She has a lot of pain on the RT side and radiates to her back and into her leg. She can only stand a few minutes and needs to sit. Wants to discuss the Ditropan.
on 6/10/2020  Pauly Mcclellan MD       Data Review      Assessment/Plan:     1. Generalized osteoarthritis  - traMADol (ULTRAM) 50 MG tablet; Take 1 tablet by mouth every 6 hours as needed for Pain for up to 30 days. Dispense: 120 tablet; Refill: 2    2. Spinal stenosis of lumbar region with neurogenic claudication  Continue tramadol     3. Fall, initial encounter  - predniSONE (DELTASONE) 20 MG tablet; 4 tabs po daily for 4 days, then 3 po daily for 3 days, then 2 po daily for 2 days, then 1 po daily for 1 day. Dispense: 30 tablet; Refill: 0  - XR HIP LEFT (2-3 VIEWS); Future    4. Hip pain, bilateral  - predniSONE (DELTASONE) 20 MG tablet; 4 tabs po daily for 4 days, then 3 po daily for 3 days, then 2 po daily for 2 days, then 1 po daily for 1 day. Dispense: 30 tablet; Refill: 0  - XR HIP LEFT (2-3 VIEWS); Future  - will refer for PT once imaging is resulted     5. Mixed stress and urge urinary incontinence  - solifenacin (VESICARE) 5 MG tablet; Take 1 tablet by mouth daily  Dispense: 90 tablet;  Refill: 1        Electronically signed by Eric Guerrero MD on 6/10/2020 at 4:04 PM

## 2020-06-15 ENCOUNTER — PATIENT MESSAGE (OUTPATIENT)
Dept: FAMILY MEDICINE CLINIC | Age: 85
End: 2020-06-15

## 2020-06-15 NOTE — TELEPHONE ENCOUNTER
From: Lui Jovel  To: Heather Lea MD  Sent: 6/15/2020 11:46 AM EDT  Subject: Visit Irena Lopez. The prednisone that you prescribed is giving her relief - however I still feel she needs some in home therapy which you suggested at the office. Can we get this set up for her?     Thanks - Candido Liang

## 2020-06-18 ENCOUNTER — TELEPHONE (OUTPATIENT)
Dept: FAMILY MEDICINE CLINIC | Age: 85
End: 2020-06-18

## 2020-06-18 NOTE — TELEPHONE ENCOUNTER
Chantel Victor with [facility], called to let you know they are starting therapy 1x this week, 2x for 3 weeks, and 1x for 1 week. Also wanted to let you know they may use ultrasound.

## 2020-06-29 ENCOUNTER — E-VISIT (OUTPATIENT)
Dept: FAMILY MEDICINE CLINIC | Age: 85
End: 2020-06-29
Payer: MEDICARE

## 2020-06-29 PROCEDURE — 99421 OL DIG E/M SVC 5-10 MIN: CPT | Performed by: INTERNAL MEDICINE

## 2020-06-29 RX ORDER — TRIAMCINOLONE ACETONIDE 0.1 %
PASTE (GRAM) DENTAL
Qty: 5 G | Refills: 1 | Status: SHIPPED | OUTPATIENT
Start: 2020-06-29 | End: 2020-09-23 | Stop reason: ALTCHOICE

## 2020-07-03 ENCOUNTER — PATIENT MESSAGE (OUTPATIENT)
Dept: FAMILY MEDICINE CLINIC | Age: 85
End: 2020-07-03

## 2020-07-03 ENCOUNTER — E-VISIT (OUTPATIENT)
Dept: FAMILY MEDICINE CLINIC | Age: 85
End: 2020-07-03
Payer: MEDICARE

## 2020-07-03 PROCEDURE — 99421 OL DIG E/M SVC 5-10 MIN: CPT | Performed by: INTERNAL MEDICINE

## 2020-07-06 RX ORDER — CELECOXIB 200 MG/1
200 CAPSULE ORAL DAILY
Qty: 30 CAPSULE | Refills: 3 | OUTPATIENT
Start: 2020-07-06 | End: 2021-07-06

## 2020-07-06 RX ORDER — CLOBETASOL PROPIONATE 0.5 MG/G
CREAM TOPICAL
Qty: 15 G | Refills: 0 | Status: SHIPPED | OUTPATIENT
Start: 2020-07-06 | End: 2020-09-23 | Stop reason: ALTCHOICE

## 2020-07-06 RX ORDER — SOLIFENACIN SUCCINATE 5 MG/1
5 TABLET, FILM COATED ORAL DAILY
Qty: 90 TABLET | Refills: 1 | OUTPATIENT
Start: 2020-07-06 | End: 2021-07-06

## 2020-07-06 NOTE — TELEPHONE ENCOUNTER
From: Sophia Lai  To: Clementine Holland MD  Sent: 7/3/2020 9:25 AM EDT  Subject: Non-Urgent Medical Question    My moms mouth is not getting any better from the sores inside is there anything else we can do or medicine that might help.  The sites do not seem worse just not any better and she wears dentures and she cannot wear them right now

## 2020-07-06 NOTE — PROGRESS NOTES
From: Jenaro Joy  To: Zen Jackman MD  Sent: 7/3/2020  9:25 AM EDT  Subject: Non-Urgent Medical Question    My moms mouth is not getting any better from the sores inside is there anything else we can do or medicine that might help.  The sites do not seem worse just not any better and she wears dentures and she cannot wear them right now         Documentation          The message was closed by an RN but I'm not sure it was addressed

## 2020-07-11 ENCOUNTER — HOSPITAL ENCOUNTER (OUTPATIENT)
Dept: MRI IMAGING | Age: 85
Discharge: HOME | End: 2020-07-13
Payer: MEDICARE

## 2020-07-11 ENCOUNTER — HOSPITAL ENCOUNTER (OUTPATIENT)
Dept: NON INVASIVE DIAGNOSTICS | Age: 85
Discharge: HOME | End: 2020-07-11
Payer: MEDICARE

## 2020-07-11 ENCOUNTER — HOSPITAL ENCOUNTER (OUTPATIENT)
Dept: VASCULAR LAB | Age: 85
Discharge: HOME | End: 2020-07-11
Payer: MEDICARE

## 2020-07-11 LAB
LV EF: 75 %
LVEF MODALITY: NORMAL

## 2020-07-11 PROCEDURE — 93306 TTE W/DOPPLER COMPLETE: CPT

## 2020-07-11 PROCEDURE — 93880 EXTRACRANIAL BILAT STUDY: CPT

## 2020-07-11 PROCEDURE — 70551 MRI BRAIN STEM W/O DYE: CPT

## 2020-07-11 NOTE — PROGRESS NOTES
Echo complete. IV removed. PT taken to radiology waiting room to await further testing. Lorne Hernandez, notified.

## 2020-07-15 ENCOUNTER — E-VISIT (OUTPATIENT)
Dept: FAMILY MEDICINE CLINIC | Age: 85
End: 2020-07-15
Payer: MEDICARE

## 2020-07-15 ENCOUNTER — PATIENT MESSAGE (OUTPATIENT)
Dept: FAMILY MEDICINE CLINIC | Age: 85
End: 2020-07-15

## 2020-07-15 PROCEDURE — 99421 OL DIG E/M SVC 5-10 MIN: CPT | Performed by: INTERNAL MEDICINE

## 2020-07-15 RX ORDER — PREDNISONE 10 MG/1
10 TABLET ORAL DAILY
Qty: 30 TABLET | Refills: 5 | Status: SHIPPED | OUTPATIENT
Start: 2020-07-15 | End: 2020-10-07 | Stop reason: SDUPTHER

## 2020-07-15 RX ORDER — NITROFURANTOIN 25; 75 MG/1; MG/1
100 CAPSULE ORAL 2 TIMES DAILY
Qty: 20 CAPSULE | Refills: 0 | Status: SHIPPED | OUTPATIENT
Start: 2020-07-15 | End: 2020-07-15 | Stop reason: SINTOL

## 2020-07-15 RX ORDER — CEPHALEXIN 500 MG/1
500 CAPSULE ORAL 3 TIMES DAILY
Qty: 21 CAPSULE | Refills: 0 | Status: SHIPPED | OUTPATIENT
Start: 2020-07-15 | End: 2020-07-22

## 2020-07-15 NOTE — TELEPHONE ENCOUNTER
Last visit: 6/10/20  Last Med refill: 3/2/20  Does patient have enough medication for 72 hours: No:     Next Visit Date:  No future appointments.     Health Maintenance   Topic Date Due    DTaP/Tdap/Td vaccine (1 - Tdap) 12/[birth year]/1953    Shingles Vaccine (1 of 2) 12/08/1984    Flu vaccine (1) 09/01/2020    Annual Wellness Visit (AWV)  11/19/2020    Lipid screen  05/21/2021    Potassium monitoring  05/21/2021    Creatinine monitoring  05/21/2021    DEXA (modify frequency per FRAX score)  Completed    Pneumococcal 65+ years Vaccine  Completed    Hepatitis A vaccine  Aged Out    Hepatitis B vaccine  Aged Out    Hib vaccine  Aged Out    Meningococcal (ACWY) vaccine  Aged Out       Hemoglobin A1C (%)   Date Value   05/21/2020 6.2 (H)   02/14/2019 6.0             ( goal A1C is < 7)   No results found for: LABMICR  LDL Cholesterol (mg/dL)   Date Value   05/21/2020 86   05/24/2019 75       (goal LDL is <100)   AST (U/L)   Date Value   05/21/2020 11     ALT (U/L)   Date Value   05/21/2020 10     BUN (mg/dL)   Date Value   05/21/2020 12     BP Readings from Last 3 Encounters:   06/10/20 128/82   03/02/20 119/78   02/10/20 136/88          (goal 120/80)    All Future Testing planned in [facility]              Patient Active Problem List:     Ataxia     Multiple sclerosis ([address]     Essential hypertension     Hyperlipidemia     Closed fracture of rib of left side     Atelectasis     Debility     Abnormal gait     Actinic keratosis     Enthesopathy of hip region     Generalized osteoarthritis     Idiopathic peripheral neuropathy     Vitamin D deficiency     Primary localized osteoarthrosis of the hip, left     Primary osteoarthritis of left hip     Pneumonia     Acquired spondylolisthesis     Chronic midline low back pain with left-sided sciatica     Spinal stenosis of lumbar region with neurogenic claudication     Presence of right artificial shoulder joint

## 2020-07-27 RX ORDER — SIMVASTATIN 20 MG
TABLET ORAL
Qty: 90 TABLET | Refills: 1 | Status: SHIPPED | OUTPATIENT
Start: 2020-07-27 | End: 2020-10-01 | Stop reason: SDUPTHER

## 2020-07-27 RX ORDER — OMEPRAZOLE 20 MG/1
20 CAPSULE, DELAYED RELEASE ORAL DAILY
Qty: 90 CAPSULE | Refills: 1 | Status: SHIPPED | OUTPATIENT
Start: 2020-07-27 | End: 2020-08-07

## 2020-07-27 NOTE — TELEPHONE ENCOUNTER
Last visit: 7/15/20-E VISIT, 6/10/20-OV  Last Med refill: 1/14/20, 2/13/20  Does patient have enough medication for 72 hours: No:     Next Visit Date:  No future appointments.     Health Maintenance   Topic Date Due    DTaP/Tdap/Td vaccine (1 - Tdap) 12/[birth year]/1953    Shingles Vaccine (1 of 2) 12/08/1984    Flu vaccine (1) 09/01/2020    Annual Wellness Visit (AWV)  11/19/2020    Lipid screen  05/21/2021    Potassium monitoring  05/21/2021    Creatinine monitoring  05/21/2021    DEXA (modify frequency per FRAX score)  Completed    Pneumococcal 65+ years Vaccine  Completed    Hepatitis A vaccine  Aged Out    Hepatitis B vaccine  Aged Out    Hib vaccine  Aged Out    Meningococcal (ACWY) vaccine  Aged Out       Hemoglobin A1C (%)   Date Value   05/21/2020 6.2 (H)   02/14/2019 6.0             ( goal A1C is < 7)   No results found for: LABMICR  LDL Cholesterol (mg/dL)   Date Value   05/21/2020 86   05/24/2019 75       (goal LDL is <100)   AST (U/L)   Date Value   05/21/2020 11     ALT (U/L)   Date Value   05/21/2020 10     BUN (mg/dL)   Date Value   05/21/2020 12     BP Readings from Last 3 Encounters:   06/10/20 128/82   03/02/20 119/78   02/10/20 136/88          (goal 120/80)    All Future Testing planned in [facility]              Patient Active Problem List:     Ataxia     Multiple sclerosis ([address]     Essential hypertension     Hyperlipidemia     Closed fracture of rib of left side     Atelectasis     Debility     Abnormal gait     Actinic keratosis     Enthesopathy of hip region     Generalized osteoarthritis     Idiopathic peripheral neuropathy     Vitamin D deficiency     Primary localized osteoarthrosis of the hip, left     Primary osteoarthritis of left hip     Pneumonia     Acquired spondylolisthesis     Chronic midline low back pain with left-sided sciatica     Spinal stenosis of lumbar region with neurogenic claudication     Presence of right artificial shoulder joint

## 2020-07-30 RX ORDER — AMITRIPTYLINE HYDROCHLORIDE 100 MG/1
100 TABLET, FILM COATED ORAL NIGHTLY
Qty: 90 TABLET | Refills: 1 | Status: SHIPPED | OUTPATIENT
Start: 2020-07-30 | End: 2021-01-25 | Stop reason: SDUPTHER

## 2020-07-30 NOTE — TELEPHONE ENCOUNTER
Last visit: 6/10/20  Last Med refill: 1/22/20  Does patient have enough medication for 72 hours: No:     Next Visit Date:  No future appointments.     Health Maintenance   Topic Date Due    DTaP/Tdap/Td vaccine (1 - Tdap) 12/08/1953    Shingles Vaccine (1 of 2) 12/08/1984    Flu vaccine (1) 09/01/2020    Annual Wellness Visit (AWV)  11/19/2020    Lipid screen  05/21/2021    Potassium monitoring  05/21/2021    Creatinine monitoring  05/21/2021    DEXA (modify frequency per FRAX score)  Completed    Pneumococcal 65+ years Vaccine  Completed    Hepatitis A vaccine  Aged Out    Hepatitis B vaccine  Aged Out    Hib vaccine  Aged Out    Meningococcal (ACWY) vaccine  Aged Out       Hemoglobin A1C (%)   Date Value   05/21/2020 6.2 (H)   02/14/2019 6.0             ( goal A1C is < 7)   No results found for: LABMICR  LDL Cholesterol (mg/dL)   Date Value   05/21/2020 86   05/24/2019 75       (goal LDL is <100)   AST (U/L)   Date Value   05/21/2020 11     ALT (U/L)   Date Value   05/21/2020 10     BUN (mg/dL)   Date Value   05/21/2020 12     BP Readings from Last 3 Encounters:   06/10/20 128/82   03/02/20 119/78   02/10/20 136/88          (goal 120/80)    All Future Testing planned in [facility]              Patient Active Problem List:     Ataxia     Multiple sclerosis ([address]     Essential hypertension     Hyperlipidemia     Closed fracture of rib of left side     Atelectasis     Debility     Abnormal gait     Actinic keratosis     Enthesopathy of hip region     Generalized osteoarthritis     Idiopathic peripheral neuropathy     Vitamin D deficiency     Primary localized osteoarthrosis of the hip, left     Primary osteoarthritis of left hip     Pneumonia     Acquired spondylolisthesis     Chronic midline low back pain with left-sided sciatica     Spinal stenosis of lumbar region with neurogenic claudication     Presence of right artificial shoulder joint

## 2020-08-06 NOTE — TELEPHONE ENCOUNTER
Last visit: 06/10/2020  Last Med refill: 04/30/2020  Does patient have enough medication for 72 hours: Yes    Next Visit Date:  No future appointments.     Health Maintenance   Topic Date Due    DTaP/Tdap/Td vaccine (1 - Tdap) 12/08/1953    Shingles Vaccine (1 of 2) 12/08/1984    Flu vaccine (1) 09/01/2020    Annual Wellness Visit (AWV)  11/19/2020    Lipid screen  05/21/2021    Potassium monitoring  05/21/2021    Creatinine monitoring  05/21/2021    DEXA (modify frequency per FRAX score)  Completed    Pneumococcal 65+ years Vaccine  Completed    Hepatitis A vaccine  Aged Out    Hepatitis B vaccine  Aged Out    Hib vaccine  Aged Out    Meningococcal (ACWY) vaccine  Aged Out       Hemoglobin A1C (%)   Date Value   05/21/2020 6.2 (H)   02/14/2019 6.0             ( goal A1C is < 7)   No results found for: LABMICR  LDL Cholesterol (mg/dL)   Date Value   05/21/2020 86   05/24/2019 75       (goal LDL is <100)   AST (U/L)   Date Value   05/21/2020 11     ALT (U/L)   Date Value   05/21/2020 10     BUN (mg/dL)   Date Value   05/21/2020 12     BP Readings from Last 3 Encounters:   06/10/20 128/82   03/02/20 119/78   02/10/20 136/88          (goal 120/80)    All Future Testing planned in [facility]              Patient Active Problem List:     Ataxia     Multiple sclerosis ([address]     Essential hypertension     Hyperlipidemia     Closed fracture of rib of left side     Atelectasis     Debility     Abnormal gait     Actinic keratosis     Enthesopathy of hip region     Generalized osteoarthritis     Idiopathic peripheral neuropathy     Vitamin D deficiency     Primary localized osteoarthrosis of the hip, left     Primary osteoarthritis of left hip     Pneumonia     Acquired spondylolisthesis     Chronic midline low back pain with left-sided sciatica     Spinal stenosis of lumbar region with neurogenic claudication     Presence of right artificial shoulder joint

## 2020-08-07 RX ORDER — OMEPRAZOLE 20 MG/1
20 CAPSULE, DELAYED RELEASE ORAL DAILY
Qty: 90 CAPSULE | Refills: 1 | Status: SHIPPED | OUTPATIENT
Start: 2020-08-07 | End: 2021-02-07 | Stop reason: SDUPTHER

## 2020-08-24 ENCOUNTER — E-VISIT (OUTPATIENT)
Dept: FAMILY MEDICINE CLINIC | Age: 85
End: 2020-08-24
Payer: MEDICARE

## 2020-08-24 PROCEDURE — 99421 OL DIG E/M SVC 5-10 MIN: CPT | Performed by: INTERNAL MEDICINE

## 2020-08-24 RX ORDER — CEPHALEXIN 500 MG/1
500 CAPSULE ORAL 3 TIMES DAILY
Qty: 21 CAPSULE | Refills: 0 | Status: SHIPPED | OUTPATIENT
Start: 2020-08-24 | End: 2020-11-27 | Stop reason: SDUPTHER

## 2020-09-23 ENCOUNTER — OFFICE VISIT (OUTPATIENT)
Dept: FAMILY MEDICINE CLINIC | Age: 85
End: 2020-09-23
Payer: MEDICARE

## 2020-09-23 VITALS
TEMPERATURE: 98.1 F | SYSTOLIC BLOOD PRESSURE: 124 MMHG | OXYGEN SATURATION: 95 % | DIASTOLIC BLOOD PRESSURE: 78 MMHG | WEIGHT: 136.6 LBS | BODY MASS INDEX: 27.57 KG/M2 | HEART RATE: 92 BPM

## 2020-09-23 PROCEDURE — G8400 PT W/DXA NO RESULTS DOC: HCPCS | Performed by: INTERNAL MEDICINE

## 2020-09-23 PROCEDURE — G8417 CALC BMI ABV UP PARAM F/U: HCPCS | Performed by: INTERNAL MEDICINE

## 2020-09-23 PROCEDURE — 1090F PRES/ABSN URINE INCON ASSESS: CPT | Performed by: INTERNAL MEDICINE

## 2020-09-23 PROCEDURE — 4040F PNEUMOC VAC/ADMIN/RCVD: CPT | Performed by: INTERNAL MEDICINE

## 2020-09-23 PROCEDURE — 1123F ACP DISCUSS/DSCN MKR DOCD: CPT | Performed by: INTERNAL MEDICINE

## 2020-09-23 PROCEDURE — 99214 OFFICE O/P EST MOD 30 MIN: CPT | Performed by: INTERNAL MEDICINE

## 2020-09-23 PROCEDURE — G8427 DOCREV CUR MEDS BY ELIG CLIN: HCPCS | Performed by: INTERNAL MEDICINE

## 2020-09-23 PROCEDURE — 90694 VACC AIIV4 NO PRSRV 0.5ML IM: CPT | Performed by: INTERNAL MEDICINE

## 2020-09-23 PROCEDURE — 1036F TOBACCO NON-USER: CPT | Performed by: INTERNAL MEDICINE

## 2020-09-23 PROCEDURE — G0008 ADMIN INFLUENZA VIRUS VAC: HCPCS | Performed by: INTERNAL MEDICINE

## 2020-09-23 RX ORDER — DOCUSATE SODIUM 100 MG/1
100 CAPSULE, LIQUID FILLED ORAL DAILY
Qty: 90 CAPSULE | Refills: 1 | Status: SHIPPED | OUTPATIENT
Start: 2020-09-23 | End: 2021-01-25 | Stop reason: ALTCHOICE

## 2020-09-23 NOTE — PROGRESS NOTES
Subjective:       Patient ID: Binu Astorga is a 80 y.o. female who presents for   Chief Complaint   Patient presents with    Headache    Hip Pain     LT side       HPI:  Nursing note reviewed and discussed with patient. Here to follow-up   Breathing has been goodm, has not needed her inhaler     She has been struggling to see out of her right eye, she saw ophthalmology and she has film on it from her cataract surgery 5 years ago. She has been scheduled for surgery to remove that film. --> decided not to do it, the recovery would have been too much for her to handle. Unable to afford her vesicare due to cost, would jignesh to see if there are alternatives. She reports it has been a miracle pill for her. --> doing well on it, will continue --> continues to do well on vesicare, ditropan did not work   Tolerating simvastatin without myalgias, dyspepsia and jaundice --> unchanged   Continues to f/u with neurology --> headache is worse every morning, she has been noticing more pain lately. She does admit that she has decreased her norco to once daily from three times daily without discussing with neurology. Patient's medications, allergies, past medical, surgical, social and family histories were reviewed and updated as appropriate. Social History     Tobacco Use    Smoking status: Never Smoker    Smokeless tobacco: Never Used   Substance Use Topics    Alcohol use: No        Review of Systems  Energy level good overall, and weight is stable. No chest pain or shortness of breath. Bowels have been normal without constipation or diarrhea         Objective:        Physical Exam:  /78 (Site: Left Upper Arm, Position: Sitting, Cuff Size: Medium Adult)   Pulse 92   Temp 98.1 °F (36.7 °C) (Temporal)   Wt 136 lb 9.6 oz (62 kg)   SpO2 95%   BMI 27.57 kg/m²     General: Alert and oriented, in no distress. Patient ambulating with normal gait. Normal body habitus.    Chest: clear with no wheezes or rales. No retractions, or use of accessory muscles noted. Cardiovascular: PMI is not displaced, and no thrill noted. Regular rate and rhythm with no rub, murmur or gallop. There is no peripheral edema. Pedal pulses are normal.   Abdomen: Abdomen is soft and nontender. The bowel sounds are normal.   Musculoskeletal: There are no deformities of the the extremities. There is difficulty getting up to stand from sitting, bilateral hip pain with standing, R greater trochanteric tenderness to palpation noted   Skin: The skin is warm and dry. There are no rashes noted. Prior to Visit Medications    Medication Sig Taking? Authorizing Provider   traMADol (ULTRAM) 50 MG tablet Take 1 tablet by mouth every 6 hours as needed for Pain for up to 30 days. Yes Aline Reynoso MD   omeprazole (PRILOSEC) 20 MG delayed release capsule TAKE 1 CAPSULE BY MOUTH DAILY Yes Pauly Guzman MD   amitriptyline (ELAVIL) 100 MG tablet take 1 tablet by mouth nightly Yes Pauly Guzman MD   simvastatin (ZOCOR) 20 MG tablet TAKE 1 TABLET BY MOUTH EVERY NIGHT Yes Pauly Guzman MD   predniSONE (DELTASONE) 10 MG tablet Take 1 tablet by mouth daily Yes Pauly Guzman MD   solifenacin (VESICARE) 5 MG tablet Take 1 tablet by mouth daily Yes Pauly Guzman MD   celecoxib (CELEBREX) 200 MG capsule Take 1 capsule by mouth daily Yes Aline Reynoso MD   gabapentin (NEURONTIN) 100 MG capsule Take 1 capsule by mouth 2 times daily for 30 days. Yes Pauly Guzman MD   albuterol sulfate HFA (VENTOLIN HFA) 108 (90 Base) MCG/ACT inhaler Inhale 2 puffs into the lungs every 4 hours as needed for Wheezing Yes Aline Reynoso MD   HYDROcodone-acetaminophen (NORCO) 5-325 MG per tablet Take 1 tablet by mouth every 6 hours as needed for Pain. Yes Historical Provider, MD       Data Review      Assessment/Plan:     1. Need for influenza vaccination    - INFLUENZA, QUADV, ADJUVANTED, 65 YRS =, IM, PF, PREFILL SYR, 0.5ML (FLUAD)    2. Therapeutic opioid-induced constipation (OIC)  - docusate sodium (COLACE) 100 MG capsule; Take 1 capsule by mouth daily  Dispense: 90 capsule; Refill: 1    3. Spinal stenosis of lumbar region with neurogenic claudication  Continue tramadol     4. Primary osteoarthritis of left hip  Continue prednisone, celebrex and tramadol   Prednisone wean reviewed - half a pill daily for two weeks, then every other day for two weeks, every three days for two weeks, then stop. 5. Hyperlipidemia, unspecified hyperlipidemia type  Continue simvastatin    6.  Essential hypertension  Continue diet and lifestyle management         Electronically signed by Andrew Burrows MD on 9/23/2020 at 4:26 PM

## 2020-09-23 NOTE — PROGRESS NOTES
Pt is here today for a 3 month follow up. She states her LT hip is painful. Some days are worse than others. It gets better but never total goes away. She did have PT/OT and has nursing. Pt feels she is okay with out the nursing. Vaccine Information Sheet, \"Influenza - Inactivated\"  given to Lazaro Cheney, or parent/legal guardian of  Lazaro Cheney and verbalized understanding. Patient responses:    Have you ever had a reaction to a flu vaccine? No  Do you have any current illness? No  Have you ever had Guillian Westby Syndrome? No  Do you have a serious allergy to any of the following: Neomycin, Polymyxin, Thimerosal, eggs or egg products? No    Flu vaccine given per order. Please see immunization tab. Risks and benefits explained. Current VIS given.

## 2020-10-01 ENCOUNTER — PATIENT MESSAGE (OUTPATIENT)
Dept: FAMILY MEDICINE CLINIC | Age: 85
End: 2020-10-01

## 2020-10-01 RX ORDER — SIMVASTATIN 20 MG
TABLET ORAL
Qty: 90 TABLET | Refills: 1 | Status: SHIPPED | OUTPATIENT
Start: 2020-10-01 | End: 2021-01-20 | Stop reason: SDUPTHER

## 2020-10-01 RX ORDER — SIMVASTATIN 20 MG
TABLET ORAL
Qty: 90 TABLET | Refills: 1 | OUTPATIENT
Start: 2020-10-01

## 2020-10-01 NOTE — TELEPHONE ENCOUNTER
From: Chelsea Gaines  To: Emmanuel Andersen MD  Sent: 10/1/2020 10:46 AM EDT  Subject: Prescription Question    Can you please tell me why my mom's simvastin prescription was denied?

## 2020-10-04 PROBLEM — S22.32XA CLOSED FRACTURE OF RIB OF LEFT SIDE: Status: RESOLVED | Noted: 2019-02-25 | Resolved: 2020-10-04

## 2020-10-04 PROBLEM — J18.9 PNEUMONIA: Status: RESOLVED | Noted: 2019-10-06 | Resolved: 2020-10-04

## 2020-10-06 RX ORDER — CELECOXIB 200 MG/1
200 CAPSULE ORAL DAILY
Qty: 30 CAPSULE | Refills: 3 | Status: SHIPPED | OUTPATIENT
Start: 2020-10-06 | End: 2021-01-30 | Stop reason: SDUPTHER

## 2020-10-07 ENCOUNTER — PATIENT MESSAGE (OUTPATIENT)
Dept: FAMILY MEDICINE CLINIC | Age: 85
End: 2020-10-07

## 2020-10-07 RX ORDER — PREDNISONE 10 MG/1
10 TABLET ORAL DAILY
Qty: 30 TABLET | Refills: 5 | Status: SHIPPED | OUTPATIENT
Start: 2020-10-07 | End: 2021-01-30 | Stop reason: SDUPTHER

## 2020-10-07 RX ORDER — METHYLPREDNISOLONE 4 MG/1
TABLET ORAL
Qty: 1 KIT | Refills: 0 | Status: SHIPPED | OUTPATIENT
Start: 2020-10-07 | End: 2021-01-25 | Stop reason: ALTCHOICE

## 2020-11-21 ENCOUNTER — PATIENT MESSAGE (OUTPATIENT)
Dept: FAMILY MEDICINE CLINIC | Age: 85
End: 2020-11-21

## 2020-11-23 RX ORDER — TRAMADOL HYDROCHLORIDE 50 MG/1
50 TABLET ORAL EVERY 6 HOURS PRN
Qty: 120 TABLET | Refills: 1 | Status: CANCELLED | OUTPATIENT
Start: 2020-11-23 | End: 2020-12-23

## 2020-11-23 NOTE — TELEPHONE ENCOUNTER
Last visit: 9/23/2020  Last Med refill: 9/21/2020  Does patient have enough medication for 72 hours: No:     Next Visit Date:  Future Appointments   Date Time Provider Cj Tara   1/25/2021  4:45 PM Pauly Jones  [address] Maintenance   Topic Date Due    DTaP/Tdap/Td vaccine (1 - Tdap) 12/08/1953    Shingles Vaccine (1 of 2) 12/08/1984    Annual Wellness Visit (AWV)  05/29/2019    Lipid screen  05/21/2021    Potassium monitoring  05/21/2021    Creatinine monitoring  05/21/2021    DEXA (modify frequency per FRAX score)  Completed    Flu vaccine  Completed    Pneumococcal 65+ years Vaccine  Completed    Hepatitis A vaccine  Aged Out    Hepatitis B vaccine  Aged Out    Hib vaccine  Aged Out    Meningococcal (ACWY) vaccine  Aged Out       Hemoglobin A1C (%)   Date Value   05/21/2020 6.2 (H)   02/14/2019 6.0             ( goal A1C is < 7)   No results found for: LABMICR  LDL Cholesterol (mg/dL)   Date Value   05/21/2020 86   05/24/2019 75       (goal LDL is <100)   AST (U/L)   Date Value   05/21/2020 11     ALT (U/L)   Date Value   05/21/2020 10     BUN (mg/dL)   Date Value   05/21/2020 12     BP Readings from Last 3 Encounters:   09/23/20 124/78   06/10/20 128/82   03/02/20 119/78          (goal 120/80)    All Future Testing planned in [facility]              Patient Active Problem List:     Ataxia     Multiple sclerosis ([address]     Essential hypertension     Hyperlipidemia     Atelectasis     Debility     Abnormal gait     Actinic keratosis     Enthesopathy of hip region     Generalized osteoarthritis     Idiopathic peripheral neuropathy     Vitamin D deficiency     Primary localized osteoarthrosis of the hip, left     Primary osteoarthritis of left hip     Acquired spondylolisthesis     Chronic midline low back pain with left-sided sciatica     Spinal stenosis of lumbar region with neurogenic claudication     Presence of right artificial shoulder joint

## 2020-11-23 NOTE — TELEPHONE ENCOUNTER
From: Zulema Forbes  To: Zev Mcbride MD  Sent: 11/21/2020 11:01 AM EST  Subject: Prescription Question    We need a tramadol refill to be sent to the pharmacy ASAP she is totally out. The pharmacy said they tried to contact you with no response. Again this medicine is not listed in her chart to request a refill through my chart. If there is some problem with the refill please let me know.

## 2020-11-24 RX ORDER — TRAMADOL HYDROCHLORIDE 50 MG/1
50 TABLET ORAL EVERY 6 HOURS PRN
Qty: 120 TABLET | Refills: 1 | OUTPATIENT
Start: 2020-11-24 | End: 2020-12-24

## 2020-11-24 RX ORDER — TRAMADOL HYDROCHLORIDE 50 MG/1
50 TABLET ORAL EVERY 6 HOURS PRN
Qty: 120 TABLET | Refills: 1 | Status: SHIPPED | OUTPATIENT
Start: 2020-11-24 | End: 2020-12-24

## 2020-11-26 ENCOUNTER — E-VISIT (OUTPATIENT)
Dept: FAMILY MEDICINE CLINIC | Age: 85
End: 2020-11-26
Payer: MEDICARE

## 2020-11-26 PROCEDURE — 99421 OL DIG E/M SVC 5-10 MIN: CPT | Performed by: NURSE PRACTITIONER

## 2020-11-27 RX ORDER — CEPHALEXIN 500 MG/1
500 CAPSULE ORAL 3 TIMES DAILY
Qty: 21 CAPSULE | Refills: 0 | Status: SHIPPED | OUTPATIENT
Start: 2020-11-27 | End: 2020-12-04

## 2020-11-27 NOTE — PROGRESS NOTES
Spoke with daughter- did an e visit yesterday without response. Still having symptoms. This is a recurrent problem for her. macrobid doesn't work, keflex is the only thing that works. She is using azo, symptoms not resolved.

## 2020-12-04 RX ORDER — SOLIFENACIN SUCCINATE 5 MG/1
5 TABLET, FILM COATED ORAL DAILY
Qty: 30 TABLET | Refills: 5 | Status: ON HOLD | OUTPATIENT
Start: 2020-12-04 | End: 2021-03-11 | Stop reason: HOSPADM

## 2020-12-04 NOTE — TELEPHONE ENCOUNTER
Last visit: 9/23/2020  Last Med refill: 6/10/2020  Does patient have enough medication for 72 hours: Yes    Next Visit Date:  Future Appointments   Date Time Provider Cj Pacheco   1/25/2021  4:45 PM Pauly Brasher  [address] Maintenance   Topic Date Due    DTaP/Tdap/Td vaccine (1 - Tdap) 12/08/1953    Shingles Vaccine (1 of 2) 12/08/1984    Annual Wellness Visit (AWV)  05/29/2019    Lipid screen  05/21/2021    Potassium monitoring  05/21/2021    Creatinine monitoring  05/21/2021    DEXA (modify frequency per FRAX score)  Completed    Flu vaccine  Completed    Pneumococcal 65+ years Vaccine  Completed    Hepatitis A vaccine  Aged Out    Hepatitis B vaccine  Aged Out    Hib vaccine  Aged Out    Meningococcal (ACWY) vaccine  Aged Out       Hemoglobin A1C (%)   Date Value   05/21/2020 6.2 (H)   02/14/2019 6.0             ( goal A1C is < 7)   No results found for: LABMICR  LDL Cholesterol (mg/dL)   Date Value   05/21/2020 86   05/24/2019 75       (goal LDL is <100)   AST (U/L)   Date Value   05/21/2020 11     ALT (U/L)   Date Value   05/21/2020 10     BUN (mg/dL)   Date Value   05/21/2020 12     BP Readings from Last 3 Encounters:   09/23/20 124/78   06/10/20 128/82   03/02/20 119/78          (goal 120/80)    All Future Testing planned in [facility]              Patient Active Problem List:     Ataxia     Multiple sclerosis ([address]     Essential hypertension     Hyperlipidemia     Atelectasis     Debility     Abnormal gait     Actinic keratosis     Enthesopathy of hip region     Generalized osteoarthritis     Idiopathic peripheral neuropathy     Vitamin D deficiency     Primary localized osteoarthrosis of the hip, left     Primary osteoarthritis of left hip     Acquired spondylolisthesis     Chronic midline low back pain with left-sided sciatica     Spinal stenosis of lumbar region with neurogenic claudication     Presence of right artificial shoulder joint

## 2021-01-19 DIAGNOSIS — E78.5 HYPERLIPIDEMIA, UNSPECIFIED HYPERLIPIDEMIA TYPE: ICD-10-CM

## 2021-01-19 NOTE — TELEPHONE ENCOUNTER
Last visit: 09/23/2020  Last Med refill: 12/21/2020  Does patient have enough medication for 72 hours: No:     Next Visit Date:  Future Appointments   Date Time Provider Cj Pacheco   1/25/2021  4:45 PM Pauly Osborn  [address] Maintenance   Topic Date Due    COVID-19 Vaccine (1 of 2) 12/08/1950    DTaP/Tdap/Td vaccine (1 - Tdap) 12[birth year]/1953    Shingles Vaccine (1 of 2) 12/08/1984    Annual Wellness Visit (AWV)  05/29/2019    Lipid screen  05/21/2021    Potassium monitoring  05/21/2021    Creatinine monitoring  05/21/2021    Flu vaccine  Completed    Pneumococcal 65+ years Vaccine  Completed    Hepatitis A vaccine  Aged Out    Hepatitis B vaccine  Aged Out    Hib vaccine  Aged Out    Meningococcal (ACWY) vaccine  Aged Out       Hemoglobin A1C (%)   Date Value   05/21/2020 6.2 (H)   02/14/2019 6.0             ( goal A1C is < 7)   No results found for: LABMICR  LDL Cholesterol (mg/dL)   Date Value   05/21/2020 86   05/24/2019 75       (goal LDL is <100)   AST (U/L)   Date Value   05/21/2020 11     ALT (U/L)   Date Value   05/21/2020 10     BUN (mg/dL)   Date Value   05/21/2020 12     BP Readings from Last 3 Encounters:   09/23/20 124/78   06/10/20 128/82   03/02/20 119/78          (goal 120/80)    All Future Testing planned in [facility]              Patient Active Problem List:     Ataxia     Multiple sclerosis ([address]     Essential hypertension     Hyperlipidemia     Atelectasis     Debility     Abnormal gait     Actinic keratosis     Enthesopathy of hip region     Generalized osteoarthritis     Idiopathic peripheral neuropathy     Vitamin D deficiency     Primary localized osteoarthrosis of the hip, left     Primary osteoarthritis of left hip     Acquired spondylolisthesis     Chronic midline low back pain with left-sided sciatica     Spinal stenosis of lumbar region with neurogenic claudication     Presence of right artificial shoulder joint

## 2021-01-20 RX ORDER — SIMVASTATIN 20 MG
TABLET ORAL
Qty: 90 TABLET | Refills: 1 | Status: ON HOLD | OUTPATIENT
Start: 2021-01-20 | End: 2021-03-11 | Stop reason: SDUPTHER

## 2021-01-22 ENCOUNTER — E-VISIT (OUTPATIENT)
Dept: FAMILY MEDICINE CLINIC | Age: 86
End: 2021-01-22
Payer: MEDICARE

## 2021-01-22 ENCOUNTER — E-VISIT (OUTPATIENT)
Dept: INTERNAL MEDICINE CLINIC | Age: 86
End: 2021-01-22

## 2021-01-22 DIAGNOSIS — N39.0 URINARY TRACT INFECTION WITHOUT HEMATURIA, SITE UNSPECIFIED: Primary | ICD-10-CM

## 2021-01-22 PROCEDURE — 99421 OL DIG E/M SVC 5-10 MIN: CPT | Performed by: INTERNAL MEDICINE

## 2021-01-22 RX ORDER — CEPHALEXIN 500 MG/1
500 CAPSULE ORAL 3 TIMES DAILY
Qty: 21 CAPSULE | Refills: 0 | Status: SHIPPED | OUTPATIENT
Start: 2021-01-22 | End: 2021-01-29

## 2021-01-25 ENCOUNTER — OFFICE VISIT (OUTPATIENT)
Dept: FAMILY MEDICINE CLINIC | Age: 86
End: 2021-01-25
Payer: MEDICARE

## 2021-01-25 VITALS
TEMPERATURE: 98.2 F | HEART RATE: 86 BPM | SYSTOLIC BLOOD PRESSURE: 156 MMHG | OXYGEN SATURATION: 97 % | WEIGHT: 141.6 LBS | DIASTOLIC BLOOD PRESSURE: 92 MMHG | BODY MASS INDEX: 28.58 KG/M2

## 2021-01-25 DIAGNOSIS — M16.12 PRIMARY OSTEOARTHRITIS OF LEFT HIP: ICD-10-CM

## 2021-01-25 DIAGNOSIS — G89.29 CHRONIC MIDLINE LOW BACK PAIN WITH LEFT-SIDED SCIATICA: Primary | ICD-10-CM

## 2021-01-25 DIAGNOSIS — G35 MULTIPLE SCLEROSIS (HCC): ICD-10-CM

## 2021-01-25 DIAGNOSIS — M15.9 GENERALIZED OSTEOARTHRITIS: ICD-10-CM

## 2021-01-25 DIAGNOSIS — M54.42 CHRONIC MIDLINE LOW BACK PAIN WITH LEFT-SIDED SCIATICA: Primary | ICD-10-CM

## 2021-01-25 DIAGNOSIS — E78.5 HYPERLIPIDEMIA, UNSPECIFIED HYPERLIPIDEMIA TYPE: ICD-10-CM

## 2021-01-25 DIAGNOSIS — K21.9 GASTROESOPHAGEAL REFLUX DISEASE, UNSPECIFIED WHETHER ESOPHAGITIS PRESENT: ICD-10-CM

## 2021-01-25 DIAGNOSIS — I10 ESSENTIAL HYPERTENSION: ICD-10-CM

## 2021-01-25 DIAGNOSIS — M43.10 ACQUIRED SPONDYLOLISTHESIS: ICD-10-CM

## 2021-01-25 DIAGNOSIS — R42 DIZZINESS: ICD-10-CM

## 2021-01-25 DIAGNOSIS — G62.9 PERIPHERAL POLYNEUROPATHY: ICD-10-CM

## 2021-01-25 PROCEDURE — 1090F PRES/ABSN URINE INCON ASSESS: CPT | Performed by: INTERNAL MEDICINE

## 2021-01-25 PROCEDURE — 1123F ACP DISCUSS/DSCN MKR DOCD: CPT | Performed by: INTERNAL MEDICINE

## 2021-01-25 PROCEDURE — 99214 OFFICE O/P EST MOD 30 MIN: CPT | Performed by: INTERNAL MEDICINE

## 2021-01-25 PROCEDURE — G8484 FLU IMMUNIZE NO ADMIN: HCPCS | Performed by: INTERNAL MEDICINE

## 2021-01-25 PROCEDURE — 4040F PNEUMOC VAC/ADMIN/RCVD: CPT | Performed by: INTERNAL MEDICINE

## 2021-01-25 PROCEDURE — 1036F TOBACCO NON-USER: CPT | Performed by: INTERNAL MEDICINE

## 2021-01-25 PROCEDURE — G8427 DOCREV CUR MEDS BY ELIG CLIN: HCPCS | Performed by: INTERNAL MEDICINE

## 2021-01-25 PROCEDURE — G8417 CALC BMI ABV UP PARAM F/U: HCPCS | Performed by: INTERNAL MEDICINE

## 2021-01-25 RX ORDER — AMITRIPTYLINE HYDROCHLORIDE 100 MG/1
100 TABLET, FILM COATED ORAL NIGHTLY
Qty: 90 TABLET | Refills: 1 | Status: ON HOLD | OUTPATIENT
Start: 2021-01-25 | End: 2021-03-11 | Stop reason: SDUPTHER

## 2021-01-25 RX ORDER — TRAMADOL HYDROCHLORIDE 50 MG/1
50 TABLET ORAL EVERY 6 HOURS PRN
Qty: 120 TABLET | Refills: 0 | Status: SHIPPED | OUTPATIENT
Start: 2021-01-25 | End: 2021-02-22 | Stop reason: SDUPTHER

## 2021-01-25 RX ORDER — MECLIZINE HYDROCHLORIDE 25 MG/1
25 TABLET ORAL 3 TIMES DAILY PRN
Qty: 30 TABLET | Refills: 1 | Status: SHIPPED | OUTPATIENT
Start: 2021-01-25 | End: 2022-02-23 | Stop reason: SDUPTHER

## 2021-01-25 RX ORDER — TRAMADOL HYDROCHLORIDE 50 MG/1
50 TABLET ORAL EVERY 6 HOURS PRN
COMMUNITY
End: 2021-01-25 | Stop reason: SDUPTHER

## 2021-01-25 RX ORDER — LISINOPRIL 5 MG/1
5 TABLET ORAL DAILY
Qty: 30 TABLET | Refills: 5 | Status: ON HOLD
Start: 2021-01-25 | End: 2021-03-11 | Stop reason: HOSPADM

## 2021-01-25 ASSESSMENT — PATIENT HEALTH QUESTIONNAIRE - PHQ9
SUM OF ALL RESPONSES TO PHQ QUESTIONS 1-9: 0
SUM OF ALL RESPONSES TO PHQ QUESTIONS 1-9: 0

## 2021-01-25 NOTE — PROGRESS NOTES
Subjective:       Patient ID:     Gurpreet Barnhart is a 80 y.o. female who presents for   Chief Complaint   Patient presents with    Discuss Medications     Amitriptyline  is costly    Back Pain     asking about a brace    Shortness of Breath     more lately       HPI:  Nursing note reviewed and discussed with patient. Here for follow-up today   Elavil is 60$ for 90 day supply at [facility] - wondering whether there is an alternative that would cost less. She has noted shortness of breath with prolonged walking. She is able to do housework, has to sit down. Going up three steps is hard for her - has to do that to change dru litter once a week. Her back pain is worst in the morning - very stiff when she wakes up, has to use her walker, she gets more limber as she moves around more. She is much better by the time afternoon comes around. Her morning headaches are getting worse, she thinks this is her MS, she also feels that she is getting a little bit weaker from that as well. Following with neurology - remains on norco in AM for her headaches   She remains on prednisone daily for her joint pains. Patient's medications, allergies, past medical, surgical, social and family histories were reviewed and updated as appropriate.     Past Medical History:   Diagnosis Date    Acute cystitis without hematuria 10/1/2017    Arthritis     Chronic daily headache     GERD (gastroesophageal reflux disease)     Hyperlipidemia     Hypertension     Moderate malnutrition ([address] 3/2/2019    Multiple sclerosis ([address]     Neuropathy     Pneumonia 2017    states had double pneumonia    Vitamin D deficiency      Past Surgical History:   Procedure Laterality Date    CATARACT REMOVAL WITH IMPLANT [facility] 11/6/2014    [facility]    CATARACT REMOVAL WITH IMPLANT Left 12/2/2014    [facility]    CERVICAL DISC SURGERY      CYSTOCELE REPAIR      HYSTERECTOMY      RECTOCELE REPAIR  SHOULDER ARTHROPLASTY Right 04/20/2017    SHOULDER SURGERY Right 2017       Social History     Tobacco Use    Smoking status: Never Smoker    Smokeless tobacco: Never Used   Substance Use Topics    Alcohol use: No      Patient Active Problem List   Diagnosis    Ataxia    Multiple sclerosis ([address]    Essential hypertension    Hyperlipidemia    Atelectasis    Debility    Abnormal gait    Actinic keratosis    Enthesopathy of hip region    Generalized osteoarthritis    Idiopathic peripheral neuropathy    Vitamin D deficiency    Primary localized osteoarthrosis of the hip, left    Primary osteoarthritis of left hip    Acquired spondylolisthesis    Chronic midline low back pain with left-sided sciatica    Spinal stenosis of lumbar region with neurogenic claudication    Presence of right artificial shoulder joint         Prior to Visit Medications    Medication Sig Taking? Authorizing Provider   traMADol (ULTRAM) 50 MG tablet Take 50 mg by mouth every 6 hours as needed for Pain. Yes Historical Provider, MD   cephALEXin (KEFLEX) 500 MG capsule Take 1 capsule by mouth 3 times daily for 7 days Yes Pauly Diallo MD   simvastatin (ZOCOR) 20 MG tablet TAKE 1 TABLET BY MOUTH EVERY NIGHT Yes Pauly Diallo MD   solifenacin (VESICARE) 5 MG tablet Take 1 tablet by mouth daily Yes Dominique Godwin MD   predniSONE (DELTASONE) 10 MG tablet Take 1 tablet by mouth daily Yes Pauly Diallo MD   celecoxib (CELEBREX) 200 MG capsule Take 1 capsule by mouth daily Yes Dominique Godwin MD   omeprazole (PRILOSEC) 20 MG delayed release capsule TAKE 1 CAPSULE BY MOUTH DAILY Yes Pauly Diallo MD   amitriptyline (ELAVIL) 100 MG tablet take 1 tablet by mouth nightly Yes Dominique Godwin MD   gabapentin (NEURONTIN) 100 MG capsule Take 1 capsule by mouth 2 times daily for 30 days.  Yes Dominique Godwin MD albuterol sulfate HFA (VENTOLIN HFA) 108 (90 Base) MCG/ACT inhaler Inhale 2 puffs into the lungs every 4 hours as needed for Wheezing Yes Arti Darylene Ronde, MD   HYDROcodone-acetaminophen (NORCO) 5-325 MG per tablet Take 1 tablet by mouth every 6 hours as needed for Pain. Yes Historical Provider, MD     Review of Systems  Review of Systems   Constitutional: Negative for fatigue, fever and unexpected weight change. Respiratory: Positive for shortness of breath. Negative for cough, choking, chest tightness and wheezing. Cardiovascular: Negative for chest pain, palpitations and leg swelling. Gastrointestinal: Negative for abdominal pain, anal bleeding, blood in stool, constipation, diarrhea, nausea and vomiting. Endocrine: Negative. Musculoskeletal: Negative for joint swelling and myalgias. Skin: Negative. Neurological: Positive for headaches. Negative for dizziness. Psychiatric/Behavioral: Negative for sleep disturbance. All other systems reviewed and are negative. Objective:       Physical Exam:  BP (!) 160/90 (Site: Left Upper Arm, Position: Sitting, Cuff Size: Medium Adult)   Pulse 86   Temp 98.2 °F (36.8 °C) (Temporal)   Wt 141 lb 9.6 oz (64.2 kg)   SpO2 97%   BMI 28.58 kg/m²   Physical Exam  Vitals signs and nursing note reviewed. Constitutional:       General: She is not in acute distress. Appearance: She is well-developed. She is not ill-appearing. Eyes:      General: Lids are normal. Vision grossly intact. Cardiovascular:      Rate and Rhythm: Normal rate and regular rhythm. Heart sounds: Normal heart sounds, S1 normal and S2 normal. No murmur. No friction rub. No gallop. Pulmonary:      Effort: Pulmonary effort is normal. No respiratory distress. Breath sounds: Normal breath sounds. No wheezing. Abdominal:      General: Bowel sounds are normal.      Palpations: Abdomen is soft. There is no mass. Tenderness: There is no abdominal tenderness. There is no guarding. Musculoskeletal: Normal range of motion. Skin:     General: Skin is warm and dry. Capillary Refill: Capillary refill takes less than 2 seconds. Neurological:      General: No focal deficit present. Mental Status: She is alert and oriented to person, place, and time. Data Review  No visits with results within 2 Month(s) from this visit. Latest known visit with results is:   Hospital Outpatient Visit on 07/11/2020   Component Date Value    Left Ventricular Ejectio* 07/11/2020 75     LVEF MODALITY 07/11/2020 ECHO           Assessment/Plan:      1. Chronic midline low back pain with left-sided sciatica  - Elastic Bandages & Supports (LUMBAR BACK BRACE/SUPPORT PAD) MISC; 1 each by Does not apply route daily as needed (back pain)  Dispense: 1 each; Refill: 0  - amitriptyline (ELAVIL) 100 MG tablet; Take 1 tablet by mouth nightly  Dispense: 90 tablet; Refill: 1    2. Gastroesophageal reflux disease, unspecified whether esophagitis present  Continue prilosec     3. Peripheral polyneuropathy  Continue gabapentin     4. Acquired spondylolisthesis  Continue tramadol     5. Primary osteoarthritis of left hip  Continue prednisone     6. Generalized osteoarthritis  - traMADol (ULTRAM) 50 MG tablet; Take 1 tablet by mouth every 6 hours as needed for Pain for up to 30 days. Dispense: 120 tablet; Refill: 0    7. Multiple sclerosis ([facility]  F/u neurology     8. Essential hypertension  - lisinopril (PRINIVIL;ZESTRIL) 5 MG tablet; Take 1 tablet by mouth daily  Dispense: 30 tablet; Refill: 5    9. Hyperlipidemia, unspecified hyperlipidemia type  Continue simvastatin    10. Dizziness  - meclizine (ANTIVERT) 25 MG tablet; Take 1 tablet by mouth 3 times daily as needed for Dizziness  Dispense: 30 tablet;  Refill: 1           Health Maintenance Due   Topic Date Due    DTaP/Tdap/Td vaccine (1 - Tdap) 12/08/1953  Shingles Vaccine (1 of 2) 12/08/1984    Annual Wellness Visit (AWV)  05/29/2019       Electronically signed by Arnav Hernandez MD on 1/25/2021 at 5:12 PM

## 2021-01-25 NOTE — PATIENT INSTRUCTIONS
Patient Education        Patellofemoral Pain Syndrome (Runner's Knee): Exercises  Introduction  Here are some examples of exercises for you to try. The exercises may be suggested for a condition or for rehabilitation. Start each exercise slowly. Ease off the exercises if you start to have pain. You will be told when to start these exercises and which ones will work best for you. How to do the exercises  Calf wall stretch   1. Stand facing a wall with your hands on the wall at about eye level. Put your affected leg about a step behind your other leg. 2. Keeping your back leg straight and your back heel on the floor, bend your front knee and gently bring your hip and chest toward the wall until you feel a stretch in the calf of your back leg. 3. Hold the stretch for at least 15 to 30 seconds. 4. Repeat 2 to 4 times. 5. Repeat steps 1 through 4, but this time keep your back knee bent. Quadriceps stretch   1. If you are not steady on your feet, hold on to a chair, counter, or wall. 2. Bend your affected leg, and reach behind you to grab the front of your foot or ankle with the hand on the same side. For example, if you are stretching your right leg, use your right hand. 3. Keeping your knees next to each other, pull your foot toward your buttock until you feel a gentle stretch across the front of your hip and down the front of your thigh. Your knee should be pointed directly to the ground, and not out to the side. 4. Hold the stretch for at least 15 to 30 seconds. 5. Repeat 2 to 4 times. Hamstring wall stretch   1. Lie on your back in a doorway, with your good leg through the open door. 2. Slide your affected leg up the wall to straighten your knee. You should feel a gentle stretch down the back of your leg. 3. Hold the stretch for at least 1 minute. Then over time, try to lengthen the time you hold the stretch to as long as 6 minutes. 4. Repeat 2 to 4 times. 5. If you do not have a place to do this exercise in a doorway, there is another way to do it:  6. Lie on your back, and bend your affected leg. 7. Loop a towel under the ball and toes of that foot, and hold the ends of the towel in your hands. 8. Straighten your knee, and slowly pull back on the towel. You should feel a gentle stretch down the back of your leg. 9. Hold the stretch for at least 15 to 30 seconds. Or even better, hold the stretch for 1 minute if you can. 10. Repeat 2 to 4 times. 1. Do not arch your back. 2. Do not bend either knee. 3. Keep one heel touching the floor and the other heel touching the wall. Do not point your toes. Quad sets   1. Sit with your affected leg straight and supported on the floor or a firm bed. Place a small, rolled-up towel under your affected knee. Your other leg should be bent, with that foot flat on the floor. 2. Tighten the thigh muscles of your affected leg by pressing the back of your knee down into the towel. 3. Hold for about 6 seconds, then rest for up to 10 seconds. 4. Repeat 8 to 12 times. Straight-leg raises to the front   1. Lie on your back with your good knee bent so that your foot rests flat on the floor. Your affected leg should be straight. Make sure that your low back has a normal curve. You should be able to slip your hand in between the floor and the small of your back, with your palm touching the floor and your back touching the back of your hand. 2. Tighten the thigh muscles in your affected leg by pressing the back of your knee flat down to the floor. Hold your knee straight. 3. Keeping the thigh muscles tight and your leg straight, lift your affected leg up so that your heel is about 12 inches off the floor. 4. Hold for about 6 seconds, then lower your leg slowly. Rest for up to 10 seconds between repetitions. 5. Repeat 8 to 12 times.     Straight-leg raises to the back 1. Lie on your stomach, and lift your leg straight up behind you (toward the ceiling). 2. Lift your toes about 6 inches off the floor, hold for about 6 seconds, then lower slowly. 3. Do 8 to 12 repetitions. Wall slide with ball squeeze   1. Stand with your back against a wall and with your feet about shoulder-width apart. Your feet should be about 12 inches away from the wall. 2. Put a ball about the size of a soccer ball between your knees. Then slowly slide down the wall until your knees are bent about 20 to 30 degrees. 3. Tighten your thigh muscles by squeezing the ball between your knees. Hold that position for about 10 seconds, then stop squeezing. Rest for up to 10 seconds between repetitions. 4. Repeat 8 to 12 times. Follow-up care is a key part of your treatment and safety. Be sure to make and go to all appointments, and call your doctor if you are having problems. It's also a good idea to know your test results and keep a list of the medicines you take. Where can you learn more? Go to https://[email or web address]. org and sign in to your [employer] account. Enter A404 in the [employer] box to learn more about \"Patellofemoral Pain Syndrome (Runner's Knee): Exercises. \"     If you do not have an account, please click on the \"Sign Up Now\" link. Current as of: March 2, 2020               Content Version: 12.6  © 0688-4860 [employer]. Care instructions adapted under license by [facility] [place] ([facility]). If you have questions about a medical condition or this instruction, always ask your healthcare professional. [place] [address] any warranty or liability for your use of this information.

## 2021-01-25 NOTE — PROGRESS NOTES
Pt's BP is elevated today. Her daughter states is also running high at home when they check it. She is wondering about going back on Lisinopril or something. Pt is here today for a follow up. She needs the Tramadol refilled today, she is completely out. Her lower back is hurting due to arthritis and pain. She is wondering if she can get an order for a back brace.

## 2021-01-26 ENCOUNTER — PATIENT MESSAGE (OUTPATIENT)
Dept: FAMILY MEDICINE CLINIC | Age: 86
End: 2021-01-26

## 2021-01-26 DIAGNOSIS — M48.062 SPINAL STENOSIS OF LUMBAR REGION WITH NEUROGENIC CLAUDICATION: ICD-10-CM

## 2021-01-26 DIAGNOSIS — G89.29 CHRONIC MIDLINE LOW BACK PAIN WITH LEFT-SIDED SCIATICA: Primary | ICD-10-CM

## 2021-01-26 DIAGNOSIS — M54.42 CHRONIC MIDLINE LOW BACK PAIN WITH LEFT-SIDED SCIATICA: Primary | ICD-10-CM

## 2021-01-26 NOTE — TELEPHONE ENCOUNTER
From: Sherlyn Ballesteros  To: Juan Perry MD  Sent: 1/26/2021 2:29 PM EST  Subject: Visit Follow-Up Question    My mom was in to see Dr. Amada Rivera. yesterday. She submitted a prescription for a back brace for her to the wrong location. It should have went to [facility]. Could we please get this prescription faxed to [phone] so that they can get it approved from her insurance and then they will get back to me. Please also put my phone number on the fax to them at [phone] for contact purposes. Thank you very much.

## 2021-01-26 NOTE — TELEPHONE ENCOUNTER
Order was faxed to [phone].   I list daughter [facility] Flor was to be called when ready for , [phone]

## 2021-01-30 DIAGNOSIS — M15.9 PRIMARY OSTEOARTHRITIS INVOLVING MULTIPLE JOINTS: ICD-10-CM

## 2021-02-01 RX ORDER — CELECOXIB 200 MG/1
200 CAPSULE ORAL DAILY
Qty: 30 CAPSULE | Refills: 3 | Status: ON HOLD | OUTPATIENT
Start: 2021-02-01 | End: 2021-03-11 | Stop reason: HOSPADM

## 2021-02-01 RX ORDER — PREDNISONE 10 MG/1
10 TABLET ORAL DAILY
Qty: 30 TABLET | Refills: 5 | Status: ON HOLD | OUTPATIENT
Start: 2021-02-01 | End: 2021-02-27 | Stop reason: SDUPTHER

## 2021-02-01 NOTE — TELEPHONE ENCOUNTER
Last visit: 01/25/2021  Last Med refill: 01/6/2021  Does patient have enough medication for 72 hours: yes    Next Visit Date:  Future Appointments   Date Time Provider Cj Pacheco   3/8/2021  3:30 PM Pauly Diallo MD [facility]   4/26/2021  4:30 PM Pauly Diallo  [address] Maintenance   Topic Date Due    DTaP/Tdap/Td vaccine (1 - Tdap) 12/08/1953    Shingles Vaccine (1 of 2) 12/08/1984    Annual Wellness Visit (AWV)  05/29/2019    COVID-19 Vaccine (2 of 2 - Pfizer series) 02/11/2021    Lipid screen  05/21/2021    Potassium monitoring  05/21/2021    Creatinine monitoring  05/21/2021    Flu vaccine  Completed    Pneumococcal 65+ years Vaccine  Completed    Hepatitis A vaccine  Aged Out    Hepatitis B vaccine  Aged Out    Hib vaccine  Aged Out    Meningococcal (ACWY) vaccine  Aged Out       Hemoglobin A1C (%)   Date Value   05/21/2020 6.2 (H)   02/14/2019 6.0             ( goal A1C is < 7)   No results found for: LABMICR  LDL Cholesterol (mg/dL)   Date Value   05/21/2020 86   05/24/2019 75       (goal LDL is <100)   AST (U/L)   Date Value   05/21/2020 11     ALT (U/L)   Date Value   05/21/2020 10     BUN (mg/dL)   Date Value   05/21/2020 12     BP Readings from Last 3 Encounters:   01/25/21 (!) 156/92   09/23/20 124/78   06/10/20 128/82          (goal 120/80)    All Future Testing planned in [facility]              Patient Active Problem List:     Ataxia     Multiple sclerosis ([facility] [address]     Essential hypertension     Hyperlipidemia     Atelectasis     Debility     Abnormal gait     Actinic keratosis     Enthesopathy of hip region     Generalized osteoarthritis     Idiopathic peripheral neuropathy     Vitamin D deficiency     Primary localized osteoarthrosis of the hip, left     Primary osteoarthritis of left hip     Acquired spondylolisthesis     Chronic midline low back pain with left-sided sciatica     Spinal stenosis of lumbar region with neurogenic claudication     Presence of right artificial shoulder joint

## 2021-02-01 NOTE — TELEPHONE ENCOUNTER
Last visit: 01/25/2021  Last Med refill: 10/05/2020-5 refills  Does patient have enough medication for 72 hours:     Next Visit Date:  Future Appointments   Date Time Provider Cj Pacheco   3/8/2021  3:30 PM Pauly Loredo MD [facility]   4/26/2021  4:30 PM Pauly Loredo  [address] Maintenance   Topic Date Due    DTaP/Tdap/Td vaccine (1 - Tdap) 12[birth year]1953    Shingles Vaccine (1 of 2) 12/08/1984    Annual Wellness Visit (AWV)  05/29/2019    COVID-19 Vaccine (2 of 2 - Pfizer series) 02/11/2021    Lipid screen  05/21/2021    Potassium monitoring  05/21/2021    Creatinine monitoring  05/21/2021    Flu vaccine  Completed    Pneumococcal 65+ years Vaccine  Completed    Hepatitis A vaccine  Aged Out    Hepatitis B vaccine  Aged Out    Hib vaccine  Aged Out    Meningococcal (ACWY) vaccine  Aged Out       Hemoglobin A1C (%)   Date Value   05/21/2020 6.2 (H)   02/14/2019 6.0             ( goal A1C is < 7)   No results found for: LABMICR  LDL Cholesterol (mg/dL)   Date Value   05/21/2020 86   05/24/2019 75       (goal LDL is <100)   AST (U/L)   Date Value   05/21/2020 11     ALT (U/L)   Date Value   05/21/2020 10     BUN (mg/dL)   Date Value   05/21/2020 12     BP Readings from Last 3 Encounters:   01/25/21 (!) 156/92   09/23/20 124/78   06/10/20 128/82          (goal 120/80)    All Future Testing planned in [facility]              Patient Active Problem List:     Ataxia     Multiple sclerosis ([facility] [address]     Essential hypertension     Hyperlipidemia     Atelectasis     Debility     Abnormal gait     Actinic keratosis     Enthesopathy of hip region     Generalized osteoarthritis     Idiopathic peripheral neuropathy     Vitamin D deficiency     Primary localized osteoarthrosis of the hip, left     Primary osteoarthritis of left hip     Acquired spondylolisthesis     Chronic midline low back pain with left-sided sciatica     Spinal stenosis of lumbar region with neurogenic claudication     Presence of right artificial shoulder joint

## 2021-02-02 ENCOUNTER — E-VISIT (OUTPATIENT)
Dept: FAMILY MEDICINE CLINIC | Age: 86
End: 2021-02-02
Payer: MEDICARE

## 2021-02-02 DIAGNOSIS — N39.0 RECURRENT UTI: Primary | ICD-10-CM

## 2021-02-02 PROCEDURE — 99421 OL DIG E/M SVC 5-10 MIN: CPT | Performed by: INTERNAL MEDICINE

## 2021-02-02 RX ORDER — CEPHALEXIN 500 MG/1
500 CAPSULE ORAL 3 TIMES DAILY
Qty: 21 CAPSULE | Refills: 0 | Status: SHIPPED | OUTPATIENT
Start: 2021-02-02 | End: 2021-02-09

## 2021-02-07 DIAGNOSIS — K21.9 GASTROESOPHAGEAL REFLUX DISEASE: ICD-10-CM

## 2021-02-07 ASSESSMENT — ENCOUNTER SYMPTOMS
CHOKING: 0
VOMITING: 0
COUGH: 0
ANAL BLEEDING: 0
WHEEZING: 0
SHORTNESS OF BREATH: 1
NAUSEA: 0
BLOOD IN STOOL: 0
CHEST TIGHTNESS: 0
DIARRHEA: 0
CONSTIPATION: 0
ABDOMINAL PAIN: 0

## 2021-02-07 ASSESSMENT — VISUAL ACUITY: OU: 1

## 2021-02-08 RX ORDER — OMEPRAZOLE 20 MG/1
20 CAPSULE, DELAYED RELEASE ORAL DAILY
Qty: 90 CAPSULE | Refills: 1 | Status: ON HOLD | OUTPATIENT
Start: 2021-02-08 | End: 2021-03-11 | Stop reason: SDUPTHER

## 2021-02-08 NOTE — TELEPHONE ENCOUNTER
Last visit: 02/02/2021  Last Med refill: 11/07/2020  Does patient have enough medication for 72 hours: No:     Next Visit Date:  Future Appointments   Date Time Provider Cj Pacheco   3/8/2021  3:30 PM Pauly Diallo MD [facility]   4/26/2021  4:30 PM aPuly Diallo  [address] Maintenance   Topic Date Due    DTaP/Tdap/Td vaccine (1 - Tdap) 12/08/1953    Shingles Vaccine (1 of 2) 12/08/1984    Annual Wellness Visit (AWV)  05/29/2019    COVID-19 Vaccine (2 of 2 - Pfizer series) 02/11/2021    Lipid screen  05/21/2021    Potassium monitoring  05/21/2021    Creatinine monitoring  05/21/2021    Flu vaccine  Completed    Pneumococcal 65+ years Vaccine  Completed    Hepatitis A vaccine  Aged Out    Hepatitis B vaccine  Aged Out    Hib vaccine  Aged Out    Meningococcal (ACWY) vaccine  Aged Out       Hemoglobin A1C (%)   Date Value   05/21/2020 6.2 (H)   02/14/2019 6.0             ( goal A1C is < 7)   No results found for: LABMICR  LDL Cholesterol (mg/dL)   Date Value   05/21/2020 86   05/24/2019 75       (goal LDL is <100)   AST (U/L)   Date Value   05/21/2020 11     ALT (U/L)   Date Value   05/21/2020 10     BUN (mg/dL)   Date Value   05/21/2020 12     BP Readings from Last 3 Encounters:   01/25/21 (!) 156/92   09/23/20 124/78   06/10/20 128/82          (goal 120/80)    All Future Testing planned in [facility]  Lab Frequency Next Occurrence   Culture, Urine Once 02/16/2021               Patient Active Problem List:     Ataxia     Multiple sclerosis ([address]     Essential hypertension     Hyperlipidemia     Atelectasis     Debility     Abnormal gait     Actinic keratosis     Enthesopathy of hip region     Generalized osteoarthritis     Idiopathic peripheral neuropathy     Vitamin D deficiency     Primary localized osteoarthrosis of the hip, left     Primary osteoarthritis of left hip     Acquired spondylolisthesis     Chronic midline low back pain with left-sided sciatica     Spinal stenosis of lumbar region with neurogenic claudication     Presence of right artificial shoulder joint

## 2021-02-18 ENCOUNTER — E-VISIT (OUTPATIENT)
Dept: FAMILY MEDICINE CLINIC | Age: 86
End: 2021-02-18
Payer: MEDICARE

## 2021-02-18 DIAGNOSIS — R09.82 PND (POST-NASAL DRIP): ICD-10-CM

## 2021-02-18 DIAGNOSIS — R05.9 COUGH: ICD-10-CM

## 2021-02-18 DIAGNOSIS — J06.9 ACUTE URI: Primary | ICD-10-CM

## 2021-02-18 PROCEDURE — 98970 NQHP OL DIG ASSMT&MGMT 5-10: CPT | Performed by: PHYSICIAN ASSISTANT

## 2021-02-18 ASSESSMENT — LIFESTYLE VARIABLES: SMOKING_STATUS: NO, I'VE NEVER SMOKED

## 2021-02-19 RX ORDER — FLUTICASONE PROPIONATE 50 MCG
1 SPRAY, SUSPENSION (ML) NASAL DAILY
Qty: 1 BOTTLE | Refills: 1 | Status: SHIPPED | OUTPATIENT
Start: 2021-02-19 | End: 2021-04-21 | Stop reason: ALTCHOICE

## 2021-02-19 RX ORDER — GUAIFENESIN 600 MG/1
600 TABLET, EXTENDED RELEASE ORAL 2 TIMES DAILY
Qty: 30 TABLET | Refills: 0 | Status: SHIPPED | OUTPATIENT
Start: 2021-02-19 | End: 2021-02-25

## 2021-02-19 RX ORDER — BENZONATATE 100 MG/1
100 CAPSULE ORAL EVERY 8 HOURS
Qty: 30 CAPSULE | Refills: 0 | Status: SHIPPED | OUTPATIENT
Start: 2021-02-19 | End: 2021-02-25

## 2021-02-19 NOTE — PROGRESS NOTES
E-visit accepted. Q/A reviewed. 3 day symptoms of clear congestion, PND and cough with clear sputum. No fever reported. At this time, supportive treatment with flonase, mucinex. Should be evaluated Monday if symptoms are worsening. Total time spent: 5-10 minutes.

## 2021-02-22 DIAGNOSIS — M15.9 GENERALIZED OSTEOARTHRITIS: ICD-10-CM

## 2021-02-22 RX ORDER — TRAMADOL HYDROCHLORIDE 50 MG/1
50 TABLET ORAL EVERY 6 HOURS PRN
Qty: 120 TABLET | Refills: 0 | Status: ON HOLD | OUTPATIENT
Start: 2021-02-22 | End: 2021-03-11 | Stop reason: HOSPADM

## 2021-02-22 NOTE — TELEPHONE ENCOUNTER
Last visit: 1/25/21  Last Med refill: 1/25/21  Does patient have enough medication for 72 hours: Yes    Next Visit Date:  Future Appointments   Date Time Provider Cj Pacheco   3/8/2021  3:30 PM Pauly Dyson MD [facility]   4/26/2021  4:30 PM Pauly Dyson  [address] Maintenance   Topic Date Due    DTaP/Tdap/Td vaccine (1 - Tdap) 12/08/1953    Shingles Vaccine (1 of 2) 12/08/1984    Annual Wellness Visit (AWV)  05/29/2019    COVID-19 Vaccine (2 of 2 - Pfizer series) 02/11/2021    Lipid screen  05/21/2021    Potassium monitoring  05/21/2021    Creatinine monitoring  05/21/2021    Flu vaccine  Completed    Pneumococcal 65+ years Vaccine  Completed    Hepatitis A vaccine  Aged Out    Hepatitis B vaccine  Aged Out    Hib vaccine  Aged Out    Meningococcal (ACWY) vaccine  Aged Out       Hemoglobin A1C (%)   Date Value   05/21/2020 6.2 (H)   02/14/2019 6.0             ( goal A1C is < 7)   No results found for: LABMICR  LDL Cholesterol (mg/dL)   Date Value   05/21/2020 86   05/24/2019 75       (goal LDL is <100)   AST (U/L)   Date Value   05/21/2020 11     ALT (U/L)   Date Value   05/21/2020 10     BUN (mg/dL)   Date Value   05/21/2020 12     BP Readings from Last 3 Encounters:   01/25/21 (!) 156/92   09/23/20 124/78   06/10/20 128/82          (goal 120/80)    All Future Testing planned in [facility]  Lab Frequency Next Occurrence   Culture, Urine Once 02/16/2021               Patient Active Problem List:     Ataxia     Multiple sclerosis ([address]     Essential hypertension     Hyperlipidemia     Atelectasis     Debility     Abnormal gait     Actinic keratosis     Enthesopathy of hip region     Generalized osteoarthritis     Idiopathic peripheral neuropathy     Vitamin D deficiency     Primary localized osteoarthrosis of the hip, left     Primary osteoarthritis of left hip     Acquired spondylolisthesis     Chronic midline low back pain with left-sided sciatica

## 2021-02-25 ENCOUNTER — APPOINTMENT (OUTPATIENT)
Dept: GENERAL RADIOLOGY | Age: 86
DRG: 059 | End: 2021-02-25
Payer: MEDICARE

## 2021-02-25 ENCOUNTER — HOSPITAL ENCOUNTER (INPATIENT)
Age: 86
LOS: 5 days | Discharge: NURSING, REHAB OR LONG-TERM CARE | DRG: 059 | End: 2021-03-02
Attending: EMERGENCY MEDICINE | Admitting: INTERNAL MEDICINE
Payer: MEDICARE

## 2021-02-25 ENCOUNTER — APPOINTMENT (OUTPATIENT)
Dept: CT IMAGING | Age: 86
DRG: 059 | End: 2021-02-25
Payer: MEDICARE

## 2021-02-25 DIAGNOSIS — M15.9 PRIMARY OSTEOARTHRITIS INVOLVING MULTIPLE JOINTS: ICD-10-CM

## 2021-02-25 DIAGNOSIS — R42 DIZZINESS: ICD-10-CM

## 2021-02-25 DIAGNOSIS — G35 MULTIPLE SCLEROSIS EXACERBATION (HCC): Primary | ICD-10-CM

## 2021-02-25 DIAGNOSIS — R27.0 ATAXIA: ICD-10-CM

## 2021-02-25 PROBLEM — G89.29 CHRONIC HEADACHE: Status: ACTIVE | Noted: 2021-02-25

## 2021-02-25 PROBLEM — R19.7 DIARRHEA: Status: ACTIVE | Noted: 2021-02-25

## 2021-02-25 PROBLEM — R51.9 CHRONIC HEADACHE: Status: ACTIVE | Noted: 2021-02-25

## 2021-02-25 PROBLEM — Q67.0 FACIAL ASYMMETRY: Status: ACTIVE | Noted: 2021-02-25

## 2021-02-25 LAB
-: ABNORMAL
ABSOLUTE EOS #: 0.1 K/UL (ref 0–0.4)
ABSOLUTE IMMATURE GRANULOCYTE: ABNORMAL K/UL (ref 0–0.3)
ABSOLUTE LYMPH #: 1.9 K/UL (ref 1–4.8)
ABSOLUTE MONO #: 0.4 K/UL (ref 0.1–1.3)
ALBUMIN SERPL-MCNC: 3.9 G/DL (ref 3.5–5.2)
ALBUMIN/GLOBULIN RATIO: ABNORMAL (ref 1–2.5)
ALP BLD-CCNC: 55 U/L (ref 35–104)
ALT SERPL-CCNC: 14 U/L (ref 5–33)
AMORPHOUS: ABNORMAL
ANION GAP SERPL CALCULATED.3IONS-SCNC: 12 MMOL/L (ref 9–17)
AST SERPL-CCNC: 15 U/L
BACTERIA: ABNORMAL
BASOPHILS # BLD: 1 % (ref 0–2)
BASOPHILS ABSOLUTE: 0.1 K/UL (ref 0–0.2)
BILIRUB SERPL-MCNC: 0.21 MG/DL (ref 0.3–1.2)
BILIRUBIN URINE: NEGATIVE
BUN BLDV-MCNC: 24 MG/DL (ref 8–23)
BUN/CREAT BLD: ABNORMAL (ref 9–20)
CALCIUM SERPL-MCNC: 8.9 MG/DL (ref 8.6–10.4)
CASTS UA: ABNORMAL /LPF
CHLORIDE BLD-SCNC: 100 MMOL/L (ref 98–107)
CO2: 24 MMOL/L (ref 20–31)
COLOR: YELLOW
COMMENT UA: ABNORMAL
CREAT SERPL-MCNC: 1.13 MG/DL (ref 0.5–0.9)
CRYSTALS, UA: ABNORMAL /HPF
DIFFERENTIAL TYPE: ABNORMAL
EOSINOPHILS RELATIVE PERCENT: 1 % (ref 0–4)
EPITHELIAL CELLS UA: ABNORMAL /HPF
GFR AFRICAN AMERICAN: 55 ML/MIN
GFR NON-AFRICAN AMERICAN: 46 ML/MIN
GFR SERPL CREATININE-BSD FRML MDRD: ABNORMAL ML/MIN/{1.73_M2}
GFR SERPL CREATININE-BSD FRML MDRD: ABNORMAL ML/MIN/{1.73_M2}
GLUCOSE BLD-MCNC: 117 MG/DL (ref 70–99)
GLUCOSE URINE: NEGATIVE
HCT VFR BLD CALC: 36 % (ref 36–46)
HEMOGLOBIN: 11.8 G/DL (ref 12–16)
IMMATURE GRANULOCYTES: ABNORMAL %
INR BLD: 0.9
KETONES, URINE: NEGATIVE
LEUKOCYTE ESTERASE, URINE: ABNORMAL
LIPASE: 15 U/L (ref 13–60)
LYMPHOCYTES # BLD: 19 % (ref 24–44)
MAGNESIUM: 1.9 MG/DL (ref 1.6–2.6)
MCH RBC QN AUTO: 30.7 PG (ref 26–34)
MCHC RBC AUTO-ENTMCNC: 32.8 G/DL (ref 31–37)
MCV RBC AUTO: 93.6 FL (ref 80–100)
MONOCYTES # BLD: 4 % (ref 1–7)
MUCUS: ABNORMAL
NITRITE, URINE: NEGATIVE
NRBC AUTOMATED: ABNORMAL PER 100 WBC
OTHER OBSERVATIONS UA: ABNORMAL
PDW BLD-RTO: 14.5 % (ref 11.5–14.9)
PH UA: 5.5 (ref 5–8)
PLATELET # BLD: 282 K/UL (ref 150–450)
PLATELET ESTIMATE: ABNORMAL
PMV BLD AUTO: 7.3 FL (ref 6–12)
POTASSIUM SERPL-SCNC: 4.2 MMOL/L (ref 3.7–5.3)
PROTEIN UA: NEGATIVE
PROTHROMBIN TIME: 12 SEC (ref 11.8–14.6)
RBC # BLD: 3.85 M/UL (ref 4–5.2)
RBC # BLD: ABNORMAL 10*6/UL
RBC UA: ABNORMAL /HPF
RENAL EPITHELIAL, UA: ABNORMAL /HPF
SARS-COV-2, RAPID: NOT DETECTED
SEG NEUTROPHILS: 75 % (ref 36–66)
SEGMENTED NEUTROPHILS ABSOLUTE COUNT: 7.8 K/UL (ref 1.3–9.1)
SODIUM BLD-SCNC: 136 MMOL/L (ref 135–144)
SPECIFIC GRAVITY UA: 1.01 (ref 1–1.03)
SPECIMEN DESCRIPTION: NORMAL
TOTAL PROTEIN: 6.5 G/DL (ref 6.4–8.3)
TRICHOMONAS: ABNORMAL
TROPONIN INTERP: ABNORMAL
TROPONIN INTERP: ABNORMAL
TROPONIN T: ABNORMAL NG/ML
TROPONIN T: ABNORMAL NG/ML
TROPONIN, HIGH SENSITIVITY: 23 NG/L (ref 0–14)
TROPONIN, HIGH SENSITIVITY: 27 NG/L (ref 0–14)
TURBIDITY: CLEAR
URINE HGB: NEGATIVE
UROBILINOGEN, URINE: NORMAL
WBC # BLD: 10.3 K/UL (ref 3.5–11)
WBC # BLD: ABNORMAL 10*3/UL
WBC UA: ABNORMAL /HPF
YEAST: ABNORMAL

## 2021-02-25 PROCEDURE — 6370000000 HC RX 637 (ALT 250 FOR IP): Performed by: STUDENT IN AN ORGANIZED HEALTH CARE EDUCATION/TRAINING PROGRAM

## 2021-02-25 PROCEDURE — 85025 COMPLETE CBC W/AUTO DIFF WBC: CPT

## 2021-02-25 PROCEDURE — 87186 SC STD MICRODIL/AGAR DIL: CPT

## 2021-02-25 PROCEDURE — 84484 ASSAY OF TROPONIN QUANT: CPT

## 2021-02-25 PROCEDURE — 6360000002 HC RX W HCPCS: Performed by: STUDENT IN AN ORGANIZED HEALTH CARE EDUCATION/TRAINING PROGRAM

## 2021-02-25 PROCEDURE — 87077 CULTURE AEROBIC IDENTIFY: CPT

## 2021-02-25 PROCEDURE — 81001 URINALYSIS AUTO W/SCOPE: CPT

## 2021-02-25 PROCEDURE — 99285 EMERGENCY DEPT VISIT HI MDM: CPT

## 2021-02-25 PROCEDURE — 6360000002 HC RX W HCPCS: Performed by: NURSE PRACTITIONER

## 2021-02-25 PROCEDURE — 2580000003 HC RX 258: Performed by: EMERGENCY MEDICINE

## 2021-02-25 PROCEDURE — 2580000003 HC RX 258: Performed by: STUDENT IN AN ORGANIZED HEALTH CARE EDUCATION/TRAINING PROGRAM

## 2021-02-25 PROCEDURE — 87086 URINE CULTURE/COLONY COUNT: CPT

## 2021-02-25 PROCEDURE — 6370000000 HC RX 637 (ALT 250 FOR IP): Performed by: NURSE PRACTITIONER

## 2021-02-25 PROCEDURE — U0002 COVID-19 LAB TEST NON-CDC: HCPCS

## 2021-02-25 PROCEDURE — 83735 ASSAY OF MAGNESIUM: CPT

## 2021-02-25 PROCEDURE — 83690 ASSAY OF LIPASE: CPT

## 2021-02-25 PROCEDURE — 6360000002 HC RX W HCPCS: Performed by: EMERGENCY MEDICINE

## 2021-02-25 PROCEDURE — 36415 COLL VENOUS BLD VENIPUNCTURE: CPT

## 2021-02-25 PROCEDURE — 80053 COMPREHEN METABOLIC PANEL: CPT

## 2021-02-25 PROCEDURE — 70450 CT HEAD/BRAIN W/O DYE: CPT

## 2021-02-25 PROCEDURE — 2060000000 HC ICU INTERMEDIATE R&B

## 2021-02-25 PROCEDURE — 96374 THER/PROPH/DIAG INJ IV PUSH: CPT

## 2021-02-25 PROCEDURE — 85610 PROTHROMBIN TIME: CPT

## 2021-02-25 PROCEDURE — 71045 X-RAY EXAM CHEST 1 VIEW: CPT

## 2021-02-25 PROCEDURE — 93005 ELECTROCARDIOGRAM TRACING: CPT | Performed by: EMERGENCY MEDICINE

## 2021-02-25 RX ORDER — PROMETHAZINE HYDROCHLORIDE 25 MG/1
12.5 TABLET ORAL EVERY 6 HOURS PRN
Status: CANCELLED | OUTPATIENT
Start: 2021-02-25

## 2021-02-25 RX ORDER — ACETAMINOPHEN 650 MG/1
650 SUPPOSITORY RECTAL EVERY 6 HOURS PRN
Status: CANCELLED | OUTPATIENT
Start: 2021-02-25

## 2021-02-25 RX ORDER — FAMOTIDINE 20 MG/1
10 TABLET, FILM COATED ORAL DAILY
Status: DISCONTINUED | OUTPATIENT
Start: 2021-02-25 | End: 2021-03-02 | Stop reason: HOSPADM

## 2021-02-25 RX ORDER — HYDROCODONE BITARTRATE AND ACETAMINOPHEN 5; 325 MG/1; MG/1
1 TABLET ORAL EVERY 6 HOURS PRN
Status: DISCONTINUED | OUTPATIENT
Start: 2021-02-25 | End: 2021-03-02 | Stop reason: HOSPADM

## 2021-02-25 RX ORDER — SODIUM CHLORIDE 0.9 % (FLUSH) 0.9 %
10 SYRINGE (ML) INJECTION EVERY 12 HOURS SCHEDULED
Status: DISCONTINUED | OUTPATIENT
Start: 2021-02-25 | End: 2021-03-02 | Stop reason: HOSPADM

## 2021-02-25 RX ORDER — SODIUM CHLORIDE 9 MG/ML
INJECTION, SOLUTION INTRAVENOUS CONTINUOUS
Status: CANCELLED | OUTPATIENT
Start: 2021-02-25

## 2021-02-25 RX ORDER — AMITRIPTYLINE HYDROCHLORIDE 50 MG/1
100 TABLET, FILM COATED ORAL NIGHTLY
Status: DISCONTINUED | OUTPATIENT
Start: 2021-02-25 | End: 2021-03-02 | Stop reason: HOSPADM

## 2021-02-25 RX ORDER — ONDANSETRON 2 MG/ML
4 INJECTION INTRAMUSCULAR; INTRAVENOUS EVERY 6 HOURS PRN
Status: DISCONTINUED | OUTPATIENT
Start: 2021-02-25 | End: 2021-03-02 | Stop reason: HOSPADM

## 2021-02-25 RX ORDER — SODIUM CHLORIDE 9 MG/ML
INJECTION, SOLUTION INTRAVENOUS CONTINUOUS
Status: DISCONTINUED | OUTPATIENT
Start: 2021-02-25 | End: 2021-02-27

## 2021-02-25 RX ORDER — METHYLPREDNISOLONE SODIUM SUCCINATE 125 MG/2ML
125 INJECTION, POWDER, LYOPHILIZED, FOR SOLUTION INTRAMUSCULAR; INTRAVENOUS ONCE
Status: COMPLETED | OUTPATIENT
Start: 2021-02-25 | End: 2021-02-25

## 2021-02-25 RX ORDER — PROMETHAZINE HYDROCHLORIDE 25 MG/1
12.5 TABLET ORAL EVERY 6 HOURS PRN
Status: DISCONTINUED | OUTPATIENT
Start: 2021-02-25 | End: 2021-03-02 | Stop reason: HOSPADM

## 2021-02-25 RX ORDER — POLYETHYLENE GLYCOL 3350 17 G/17G
17 POWDER, FOR SOLUTION ORAL DAILY PRN
Status: DISCONTINUED | OUTPATIENT
Start: 2021-02-25 | End: 2021-03-02 | Stop reason: HOSPADM

## 2021-02-25 RX ORDER — DEXTROMETHORPHAN POLISTIREX 30 MG/5ML
30 SUSPENSION ORAL EVERY 12 HOURS SCHEDULED
Status: DISCONTINUED | OUTPATIENT
Start: 2021-02-25 | End: 2021-03-02 | Stop reason: HOSPADM

## 2021-02-25 RX ORDER — POTASSIUM CHLORIDE 7.45 MG/ML
10 INJECTION INTRAVENOUS PRN
Status: DISCONTINUED | OUTPATIENT
Start: 2021-02-25 | End: 2021-02-25 | Stop reason: CLARIF

## 2021-02-25 RX ORDER — SODIUM CHLORIDE 0.9 % (FLUSH) 0.9 %
10 SYRINGE (ML) INJECTION EVERY 12 HOURS SCHEDULED
Status: CANCELLED | OUTPATIENT
Start: 2021-02-25

## 2021-02-25 RX ORDER — GABAPENTIN 100 MG/1
100 CAPSULE ORAL 2 TIMES DAILY
Status: DISCONTINUED | OUTPATIENT
Start: 2021-02-25 | End: 2021-03-02 | Stop reason: HOSPADM

## 2021-02-25 RX ORDER — BENZONATATE 100 MG/1
100 CAPSULE ORAL 3 TIMES DAILY PRN
Status: DISCONTINUED | OUTPATIENT
Start: 2021-02-25 | End: 2021-03-02 | Stop reason: HOSPADM

## 2021-02-25 RX ORDER — ACETAMINOPHEN 650 MG/1
650 SUPPOSITORY RECTAL EVERY 6 HOURS PRN
Status: DISCONTINUED | OUTPATIENT
Start: 2021-02-25 | End: 2021-03-02 | Stop reason: HOSPADM

## 2021-02-25 RX ORDER — TRAMADOL HYDROCHLORIDE 50 MG/1
50 TABLET ORAL EVERY 6 HOURS PRN
Status: DISCONTINUED | OUTPATIENT
Start: 2021-02-25 | End: 2021-03-02 | Stop reason: HOSPADM

## 2021-02-25 RX ORDER — POTASSIUM CHLORIDE 20 MEQ/1
40 TABLET, EXTENDED RELEASE ORAL PRN
Status: DISCONTINUED | OUTPATIENT
Start: 2021-02-25 | End: 2021-02-25 | Stop reason: CLARIF

## 2021-02-25 RX ORDER — ATORVASTATIN CALCIUM 20 MG/1
20 TABLET, FILM COATED ORAL DAILY
Status: DISCONTINUED | OUTPATIENT
Start: 2021-02-25 | End: 2021-03-02 | Stop reason: HOSPADM

## 2021-02-25 RX ORDER — POLYETHYLENE GLYCOL 3350 17 G/17G
17 POWDER, FOR SOLUTION ORAL DAILY PRN
Status: CANCELLED | OUTPATIENT
Start: 2021-02-25

## 2021-02-25 RX ORDER — ONDANSETRON 2 MG/ML
4 INJECTION INTRAMUSCULAR; INTRAVENOUS EVERY 6 HOURS PRN
Status: CANCELLED | OUTPATIENT
Start: 2021-02-25

## 2021-02-25 RX ORDER — SODIUM CHLORIDE 0.9 % (FLUSH) 0.9 %
10 SYRINGE (ML) INJECTION PRN
Status: DISCONTINUED | OUTPATIENT
Start: 2021-02-25 | End: 2021-03-02 | Stop reason: HOSPADM

## 2021-02-25 RX ORDER — SODIUM CHLORIDE 0.9 % (FLUSH) 0.9 %
10 SYRINGE (ML) INJECTION PRN
Status: CANCELLED | OUTPATIENT
Start: 2021-02-25

## 2021-02-25 RX ORDER — SODIUM CHLORIDE 9 MG/ML
INJECTION, SOLUTION INTRAVENOUS CONTINUOUS
Status: DISCONTINUED | OUTPATIENT
Start: 2021-02-25 | End: 2021-02-25

## 2021-02-25 RX ORDER — HYDRALAZINE HYDROCHLORIDE 20 MG/ML
5 INJECTION INTRAMUSCULAR; INTRAVENOUS EVERY 6 HOURS PRN
Status: DISCONTINUED | OUTPATIENT
Start: 2021-02-25 | End: 2021-02-26

## 2021-02-25 RX ORDER — ACETAMINOPHEN 325 MG/1
650 TABLET ORAL EVERY 6 HOURS PRN
Status: CANCELLED | OUTPATIENT
Start: 2021-02-25

## 2021-02-25 RX ORDER — ACETAMINOPHEN 325 MG/1
650 TABLET ORAL EVERY 6 HOURS PRN
Status: DISCONTINUED | OUTPATIENT
Start: 2021-02-25 | End: 2021-03-02 | Stop reason: HOSPADM

## 2021-02-25 RX ORDER — BUTALBITAL, ACETAMINOPHEN AND CAFFEINE 300; 40; 50 MG/1; MG/1; MG/1
1 CAPSULE ORAL EVERY 6 HOURS PRN
Status: DISCONTINUED | OUTPATIENT
Start: 2021-02-25 | End: 2021-03-02 | Stop reason: HOSPADM

## 2021-02-25 RX ADMIN — HYDRALAZINE HYDROCHLORIDE 5 MG: 20 INJECTION INTRAMUSCULAR; INTRAVENOUS at 21:59

## 2021-02-25 RX ADMIN — METHYLPREDNISOLONE SODIUM SUCCINATE 125 MG: 125 INJECTION, POWDER, FOR SOLUTION INTRAMUSCULAR; INTRAVENOUS at 17:30

## 2021-02-25 RX ADMIN — FAMOTIDINE 10 MG: 20 TABLET ORAL at 21:58

## 2021-02-25 RX ADMIN — BENZONATATE 100 MG: 100 CAPSULE ORAL at 23:31

## 2021-02-25 RX ADMIN — HYDROCODONE BITARTRATE AND ACETAMINOPHEN 1 TABLET: 5; 325 TABLET ORAL at 20:28

## 2021-02-25 RX ADMIN — SODIUM CHLORIDE 500 MG: 9 INJECTION, SOLUTION INTRAVENOUS at 19:17

## 2021-02-25 RX ADMIN — SODIUM CHLORIDE: 9 INJECTION, SOLUTION INTRAVENOUS at 21:41

## 2021-02-25 RX ADMIN — ATORVASTATIN CALCIUM 20 MG: 20 TABLET, FILM COATED ORAL at 21:37

## 2021-02-25 RX ADMIN — AMITRIPTYLINE HYDROCHLORIDE 100 MG: 50 TABLET, FILM COATED ORAL at 21:37

## 2021-02-25 RX ADMIN — ENOXAPARIN SODIUM 30 MG: 30 INJECTION SUBCUTANEOUS at 21:59

## 2021-02-25 RX ADMIN — Medication 30 MG: at 23:31

## 2021-02-25 RX ADMIN — SODIUM CHLORIDE: 9 INJECTION, SOLUTION INTRAVENOUS at 17:05

## 2021-02-25 ASSESSMENT — PAIN SCALES - GENERAL
PAINLEVEL_OUTOF10: 8
PAINLEVEL_OUTOF10: 7
PAINLEVEL_OUTOF10: 5

## 2021-02-25 ASSESSMENT — ENCOUNTER SYMPTOMS
CHEST TIGHTNESS: 0
COUGH: 0
PHOTOPHOBIA: 0
DIARRHEA: 1
SHORTNESS OF BREATH: 0
BACK PAIN: 1
NAUSEA: 1
ALLERGIC/IMMUNOLOGIC NEGATIVE: 1
ABDOMINAL PAIN: 1

## 2021-02-25 ASSESSMENT — PAIN DESCRIPTION - FREQUENCY: FREQUENCY: CONTINUOUS

## 2021-02-25 ASSESSMENT — PAIN DESCRIPTION - LOCATION: LOCATION: BACK

## 2021-02-25 ASSESSMENT — PAIN DESCRIPTION - PROGRESSION: CLINICAL_PROGRESSION: NOT CHANGED

## 2021-02-25 ASSESSMENT — PAIN - FUNCTIONAL ASSESSMENT: PAIN_FUNCTIONAL_ASSESSMENT: PREVENTS OR INTERFERES SOME ACTIVE ACTIVITIES AND ADLS

## 2021-02-25 NOTE — H&P
[address] [place] [facility] [address]      [address]     HISTORY AND PHYSICAL EXAMINATION            Date:   2/26/2021  Patient name:  Lorin Godoy  Date of admission:  2/25/2021  4:07 PM  MRN:   [id number]  Account:  [de-identified]  YOB: 1934  PCP:    Tarah Garcia MD  Room:   [facility]  Code Status:    Full Code    Chief Complaint:     Chief Complaint   Patient presents with    Dizziness    Difficulty Walking    Cough    Back Pain       History Obtained From:     patient    History of Present Illness: The patient is a 80 y.o. Non-/non  female who presents withDizziness, Difficulty Walking, Cough, and Back Pain   and she is admitted to the hospital for the management of MS exacerbation, acute kidney injury and lightheadedness secondary to dehydration. 77-year-old  female with past medical history of multiple sclerosis, spinal stenosis of the lumbar region, chronic severe headaches and facial asymmetry presented to us with complaint of lightheadedness and abnormal bowel movements for the past 12 hours. Patient states that on 2/24, she had a fish sandwich at 6 PM and at around 10 PM and she  started having abdominal pain, generalized, 7 out of 10 intensity and gurgling in nature followed by more than 3 episodes of diarrhea without any associated blood. The consistency of her semisolid and patient also felt nauseous and had an episode of vomiting. She slept at 12 PM and woke up feeling dizzy and around 7 AM and call her daughter. Patient was brought in the emergency department, she recalls that her last MS exacerbation years ago had a similar presentation. She also complains of severe chronic headache like migraines. In the ED, patient was given 125 mg of Solu-Medrol and her creatinine was elevated.   She was started on maintenance fluid 100 mL/h for elevated creatinine 1.19 (baseline 0.80). Troponin were mildly elevated 27 and her GFR is 46. Home medications were resumed with respiratory care and eval consult. Severe headache patient was started on Norco and Fioricet as needed orders. Past Medical History:     Past Medical History:   Diagnosis Date    Acute cystitis without hematuria 10/1/2017    Arthritis     Chronic daily headache     GERD (gastroesophageal reflux disease)     Hyperlipidemia     Hypertension     Moderate malnutrition ([facility] [address] 3/2/2019    Multiple sclerosis ([facility] [address]     Neuropathy     Pneumonia 2017    states had double pneumonia    Vitamin D deficiency         Past SurgicalHistory:     Past Surgical History:   Procedure Laterality Date    CATARACT REMOVAL WITH IMPLANT [facility] 11/6/2014    [facility]    CATARACT REMOVAL WITH IMPLANT Left 12/2/2014    [facility]    CERVICAL DISC SURGERY      CYSTOCELE REPAIR      HYSTERECTOMY      RECTOCELE REPAIR      SHOULDER ARTHROPLASTY Right 04/20/2017    SHOULDER SURGERY Right 2017        Medications Prior to Admission:        Prior to Admission medications    Medication Sig Start Date End Date Taking? Authorizing Provider   traMADol (ULTRAM) 50 MG tablet Take 1 tablet by mouth every 6 hours as needed for Pain for up to 30 days.  2/22/21 3/24/21 Yes Pauly Ambrose MD   fluticasone [facility]) 50 MCG/ACT nasal spray 1 spray by Each Nostril route daily 2/19/21  Yes Fanta Chandler PA-C   omeprazole (PRILOSEC) 20 MG delayed release capsule Take 1 capsule by mouth daily 2/8/21  Yes Pauly Ambrose MD   predniSONE (DELTASONE) 10 MG tablet Take 1 tablet by mouth daily 2/1/21 2/1/22 Yes Pauly Ambrose MD   celecoxib (CELEBREX) 200 MG capsule Take 1 capsule by mouth daily 2/1/21 2/1/22 Yes Pauly Ambrose MD   amitriptyline (ELAVIL) 100 MG tablet Take 1 tablet by mouth nightly 1/25/21  Yes Pauly Ambrose MD   lisinopril (PRINIVIL;ZESTRIL) 5 MG tablet Take 1 tablet by mouth daily 1/25/21  Yes Pauly Malhotra MD   simvastatin (ZOCOR) 20 MG tablet TAKE 1 TABLET BY MOUTH EVERY NIGHT 1/20/21  Yes Pauly Malhotra MD   solifenacin (VESICARE) 5 MG tablet Take 1 tablet by mouth daily 12/4/20 12/4/21 Yes Pauly Malhotra MD   HYDROcodone-acetaminophen (NORCO) 5-325 MG per tablet Take 1 tablet by mouth 3 times daily. Yes Historical Provider, MD   Elastic Bandages & Supports (LUMBAR BACK BRACE/SUPPORT PAD) MISC 1 each by Does not apply route daily 1/26/21   Pauly Malhotra MD   Elastic Bandages & Supports (LUMBAR BACK BRACE/SUPPORT PAD) MISC 1 each by Does not apply route daily as needed (back pain) 1/25/21   Pauly Malhotra MD   albuterol sulfate HFA (VENTOLIN HFA) 108 (90 Base) MCG/ACT inhaler Inhale 2 puffs into the lungs every 4 hours as needed for Wheezing 2/10/20 2/9/21  Pauly Malhotra MD        Allergies:     Bactrim [sulfamethoxazole-trimethoprim], Lactose intolerance (gi), Pcn [penicillins], Lidoderm [lidocaine], and Macrobid [nitrofurantoin]    Social History:     Tobacco:    reports that she has never smoked. She has never used smokeless tobacco.  Alcohol:      reports no history of alcohol use. Drug Use:  reports no history of drug use. Family History:     History reviewed. No pertinent family history. Review of Systems:     Positive and Negative as described in HPI. Review of Systems   Constitutional: Positive for fatigue. HENT: Negative. Eyes: Negative for photophobia and visual disturbance. Respiratory: Negative for cough, chest tightness and shortness of breath. Cardiovascular: Negative for chest pain, palpitations and leg swelling. Gastrointestinal: Positive for abdominal pain, diarrhea and nausea. Endocrine: Negative. Genitourinary: Negative for difficulty urinating, dysuria, flank pain and urgency. Musculoskeletal: Positive for back pain and gait problem. Skin: Negative. Allergic/Immunologic: Negative. Neurological: Positive for facial asymmetry, weakness, light-headedness, numbness and headaches. Hematological: Negative. Psychiatric/Behavioral: Positive for decreased concentration. Physical Exam:   BP (!) 170/83   Pulse 95   Temp 97.3 °F (36.3 °C) (Oral)   Resp 18   Ht 4' 11\" (1.499 m)   Wt 132 lb 15 oz (60.3 kg)   SpO2 96%   BMI 26.85 kg/m²   Temp (24hrs), Av.3 °F (36.8 °C), Min:97.3 °F (36.3 °C), Max:98.9 °F (37.2 °C)    No results for input(s): POCGLU in the last 72 hours. Intake/Output Summary (Last 24 hours) at 2021 1310  Last data filed at 2021 0600  Gross per 24 hour   Intake 1400 ml   Output 100 ml   Net 1300 ml       Physical Exam  Vitals signs and nursing note reviewed. Constitutional:       Appearance: Normal appearance. She is overweight. HENT:      Head: Normocephalic and atraumatic. Nose: Nose normal.      Mouth/Throat:      Mouth: Mucous membranes are moist.   Eyes:      General: No visual field deficit. Pupils: Pupils are equal, round, and reactive to light. Comments: No visual disturbance in the past 12 hours   Neck:      Musculoskeletal: Normal range of motion. Cardiovascular:      Rate and Rhythm: Normal rate and regular rhythm. Pulses: Normal pulses. Comments: History of grade 2 diastolic dysfunction  Pulmonary:      Effort: Pulmonary effort is normal.      Breath sounds: Normal breath sounds. No wheezing or rales. Abdominal:      General: Abdomen is flat. Bowel sounds are normal.      Palpations: Abdomen is soft. Tenderness: There is abdominal tenderness. Musculoskeletal:      Right lower leg: No edema. Left lower leg: No edema. Comments: Normal upper and lower extremity movements as per baseline. Skin:     General: Skin is warm and dry. Neurological:      Mental Status: She is lethargic. Cranial Nerves: Facial asymmetry present. No dysarthria. Sensory: Sensory deficit present.       Motor: Weakness present. Gait: Gait abnormal.      Comments: Patient mentioned that she has left-sided weakness and sensory defects especially on the medial side of the foot and shin. Facial drooping on the left side (chronic finding). Abnormal gait patient refused to walk as she feels wobbling. Psychiatric:         Attention and Perception: Attention normal.         Speech: Speech normal.         Behavior: Behavior is cooperative. Thought Content:  Thought content normal.         Judgment: Judgment normal.      Comments: Mild memory impairment         Investigations:     Laboratory Testing:  Recent Results (from the past 24 hour(s))   EKG 12 Lead    Collection Time: 02/25/21  4:16 PM   Result Value Ref Range    Ventricular Rate 86 BPM    Atrial Rate 86 BPM    P-R Interval 148 ms    QRS Duration 88 ms    Q-T Interval 382 ms    QTc Calculation (Bazett) 457 ms    P Axis 47 degrees    R Axis 38 degrees    T Axis 83 degrees   CBC Auto Differential    Collection Time: 02/25/21  4:55 PM   Result Value Ref Range    WBC 10.3 3.5 - 11.0 k/uL    RBC 3.85 (L) 4.0 - 5.2 m/uL    Hemoglobin 11.8 (L) 12.0 - 16.0 g/dL    Hematocrit 36.0 36 - 46 %    MCV 93.6 80 - 100 fL    MCH 30.7 26 - 34 pg    MCHC 32.8 31 - 37 g/dL    RDW 14.5 11.5 - 14.9 %    Platelets 107 089 - 263 k/uL    MPV 7.3 6.0 - 12.0 fL    NRBC Automated NOT REPORTED per 100 WBC    Differential Type NOT REPORTED     Seg Neutrophils 75 (H) 36 - 66 %    Lymphocytes 19 (L) 24 - 44 %    Monocytes 4 1 - 7 %    Eosinophils % 1 0 - 4 %    Basophils 1 0 - 2 %    Immature Granulocytes NOT REPORTED 0 %    Segs Absolute 7.80 1.3 - 9.1 k/uL    Absolute Lymph # 1.90 1.0 - 4.8 k/uL    Absolute Mono # 0.40 0.1 - 1.3 k/uL    Absolute Eos # 0.10 0.0 - 0.4 k/uL    Basophils Absolute 0.10 0.0 - 0.2 k/uL    Absolute Immature Granulocyte NOT REPORTED 0.00 - 0.30 k/uL    WBC Morphology NOT REPORTED     RBC Morphology NOT REPORTED     Platelet Estimate NOT REPORTED    Comprehensive Metabolic Panel    Collection Time: 02/25/21  4:55 PM   Result Value Ref Range    Glucose 117 (H) 70 - 99 mg/dL    BUN 24 (H) 8 - 23 mg/dL    CREATININE 1.13 (H) 0.50 - 0.90 mg/dL    Bun/Cre Ratio NOT REPORTED 9 - 20    Calcium 8.9 8.6 - 10.4 mg/dL    Sodium 136 135 - 144 mmol/L    Potassium 4.2 3.7 - 5.3 mmol/L    Chloride 100 98 - 107 mmol/L    CO2 24 20 - 31 mmol/L    Anion Gap 12 9 - 17 mmol/L    Alkaline Phosphatase 55 35 - 104 U/L    ALT 14 5 - 33 U/L    AST 15 <32 U/L    Total Bilirubin 0.21 (L) 0.3 - 1.2 mg/dL    Total Protein 6.5 6.4 - 8.3 g/dL    Albumin 3.9 3.5 - 5.2 g/dL    Albumin/Globulin Ratio NOT REPORTED 1.0 - 2.5    GFR Non- 46 (L) >60 mL/min    GFR  55 (L) >60 mL/min    GFR Comment          GFR Staging NOT REPORTED    Troponin    Collection Time: 02/25/21  4:55 PM   Result Value Ref Range    Troponin, High Sensitivity 27 (H) 0 - 14 ng/L    Troponin T NOT REPORTED <0.03 ng/mL    Troponin Interp NOT REPORTED    Protime-INR    Collection Time: 02/25/21  4:55 PM   Result Value Ref Range    Protime 12.0 11.8 - 14.6 sec    INR 0.9    Magnesium    Collection Time: 02/25/21  4:55 PM   Result Value Ref Range    Magnesium 1.9 1.6 - 2.6 mg/dL   Lipase    Collection Time: 02/25/21  4:55 PM   Result Value Ref Range    Lipase 15 13 - 60 U/L   COVID-19, Rapid    Collection Time: 02/25/21  5:08 PM    Specimen: Nasopharyngeal Swab   Result Value Ref Range    Specimen Description . NASOPHARYNGEAL SWAB     SARS-CoV-2, Rapid Not Detected Not Detected   Urinalysis    Collection Time: 02/25/21  5:48 PM   Result Value Ref Range    Color, UA YELLOW YELLOW    Turbidity UA CLEAR CLEAR    Glucose, Ur NEGATIVE NEGATIVE    Bilirubin Urine NEGATIVE NEGATIVE    Ketones, Urine NEGATIVE NEGATIVE    Specific [place], UA 1.011 1.000 - 1.030    Urine Hgb NEGATIVE NEGATIVE    pH, UA 5.5 5.0 - 8.0    Protein, UA NEGATIVE NEGATIVE    Urobilinogen, Urine Normal Normal    Nitrite, Urine NEGATIVE NEGATIVE Leukocyte Esterase, Urine TRACE (A) NEGATIVE    Urinalysis Comments NOT REPORTED    Microscopic Urinalysis    Collection Time: 02/25/21  5:48 PM   Result Value Ref Range    -          WBC, UA 2 TO 5 /HPF    RBC, UA 0 TO 2 /HPF    Casts UA NOT REPORTED /LPF    Crystals, UA NOT REPORTED None /HPF    Epithelial Cells UA 0 TO 2 /HPF    Renal Epithelial, UA NOT REPORTED 0 /HPF    Bacteria, UA FEW (A) None    Mucus, UA NOT REPORTED None    Trichomonas, UA NOT REPORTED None    Amorphous, UA NOT REPORTED None    Other Observations UA NOT REPORTED NOT REQ. Yeast, UA NOT REPORTED None   Troponin    Collection Time: 02/25/21  6:55 PM   Result Value Ref Range    Troponin, High Sensitivity 23 (H) 0 - 14 ng/L    Troponin T NOT REPORTED <0.03 ng/mL    Troponin Interp NOT REPORTED    Creatinine,Random Ur    Collection Time: 02/26/21  1:57 AM   Result Value Ref Range    Creatinine, Ur 24.1 (L) 28.0 - 217.0 mg/dL   SODIUM, URINE, RANDOM    Collection Time: 02/26/21  1:57 AM   Result Value Ref Range    Sodium,Ur 39 mmol/L       Imaging/Diagnostics:  Ct Head Wo Contrast    Result Date: 2/25/2021  EXAMINATION: CT OF THE HEAD WITHOUT CONTRAST  2/25/2021 4:32 pm TECHNIQUE: CT of the head was performed without the administration of intravenous contrast. Dose modulation, iterative reconstruction, and/or weight based adjustment of the mA/kV was utilized to reduce the radiation dose to as low as reasonably achievable. COMPARISON: MRI of the brain July 11, 2020. HISTORY: ORDERING SYSTEM PROVIDED HISTORY: dizziness, vomiting TECHNOLOGIST PROVIDED HISTORY: dizziness, vomiting Decision Support Exception->Emergency Medical Condition (MA) Reason for Exam: Dizzines, vomiting Acuity: Unknown Type of Exam: Unknown Mechanism of Injury: Pt c/o worsening double vision and dizziness, h/o multiple sclerosis FINDINGS: BRAIN/VENTRICLES: There is no acute intracranial hemorrhage, mass effect or midline shift. No abnormal extra-axial fluid collection.   The gray-white differentiation is maintained without evidence of an acute infarct. There is no evidence of hydrocephalus. The ventricular system is mildly prominent, but still well within limits of normal for size for the age of the patient and with compensatory prominence of sulcation. There are areas of decreased attenuation density in the deep white matter and periventricular regions compatible with areas of old micro ischemic change. There is some prominence of the cerebellar and vermian sulci. Calcifications are present in vessels at the base of the brain. ORBITS: The visualized portion of the orbits demonstrate no acute abnormality. SINUSES: The visualized paranasal sinuses and mastoid air cells demonstrate no acute abnormality. SOFT TISSUES/SKULL:  No acute abnormality of the visualized skull or soft tissues. Senescent changes compatible with the age of the patient. No acute intracranial abnormality. Xr Chest Portable    Result Date: 2/25/2021  EXAMINATION: ONE XRAY VIEW OF THE CHEST 2/25/2021 3:20 pm COMPARISON: 10/09/2019. HISTORY: ORDERING SYSTEM PROVIDED HISTORY: cough, dizziness TECHNOLOGIST PROVIDED HISTORY: cough, dizziness Reason for Exam: cough, dizziness Acuity: Unknown Type of Exam: Unknown FINDINGS: Postoperative changes were noted from prior discectomies in the lower cervical spine. Mild left ventricular enlargement was noted without pneumonia, interstitial edema or pleural effusions. Elevation of the right hemidiaphragm was noted. Old healed fractures involve the posterolateral aspect of the left 3rd, 4th, 5th, 6th and left 7th ribs. A right shoulder hemiarthroplasty was noted with humeral and glenoid components in their expected positions. Mild left ventricular enlargement without pneumonia, interstitial edema or pleural effusion. Old healed fractures involve the posterolateral aspect of the left 3rd through 7th ribs.        Assessment :      Primary Problem  Exacerbation of the resident. Is my impression that she had an episode of food poisoning with vomiting and abdominal pain, it started after she ate a fish sandwich with mayonnaise. Later on she became symptomatic with dizziness and ataxia which suggest an exacerbation of multiple sclerosis.   Electronically signed by Tara Zapien MD on 2/26/2021 at 1:10 PM

## 2021-02-25 NOTE — ED PROVIDER NOTES
EMERGENCY DEPARTMENT ENCOUNTER    Pt Name: Jai Abdi  MRN: [id number]  [place] 1934  Date of evaluation: 2/25/21  CHIEF COMPLAINT       Chief Complaint   Patient presents with    Dizziness    Difficulty Walking    Cough    Back Pain     HISTORY OF PRESENT ILLNESS     Dizziness  Quality:  Lightheadedness and imbalance  Severity:  Severe  Onset quality:  Gradual  Duration:  3 days  Timing:  Constant  Progression:  Worsening  Chronicity:  New  Context: head movement, physical activity and standing up    Relieved by:  Nothing  Worsened by:  Nothing  Associated symptoms comment:  Feels like an MS flair up, dizzy, unstable gait  Had some nausea last night, has a sensitive stomach  No diarrhea  Cough the past three days  Had both covid vaccines, booster 2 weeks ago  uses can and walker at home  No falls  Having a hard time walking          REVIEW OF SYSTEMS     Review of Systems   Neurological: Positive for dizziness. All other systems reviewed and are negative.     PASTMEDICAL HISTORY     Past Medical History:   Diagnosis Date    Acute cystitis without hematuria 10/1/2017    Arthritis     Chronic daily headache     GERD (gastroesophageal reflux disease)     Hyperlipidemia     Hypertension     Moderate malnutrition ([address] 3/2/2019    Multiple sclerosis ([address]     Neuropathy     Pneumonia 2017    states had double pneumonia    Vitamin D deficiency      Past Problem List  Patient Active Problem List   Diagnosis Code    Ataxia R27.0    Multiple sclerosis ([address] G35    Essential hypertension I10    Hyperlipidemia E78.5    Atelectasis J98.11    Debility R53.81    Abnormal gait R26.9    Actinic keratosis L57.0    Enthesopathy of hip region M76.899    Generalized osteoarthritis M15.9    Idiopathic peripheral neuropathy G60.9    Vitamin D deficiency E55.9    Primary localized osteoarthrosis of the hip, left M16.12    Primary osteoarthritis of left hip M16.12    Acquired spondylolisthesis M43.10  Chronic midline low back pain with left-sided sciatica M54.42, G89.29    Spinal stenosis of lumbar region with neurogenic claudication M48.062    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     SURGICAL HISTORY       Past Surgical History:   Procedure Laterality Date    CATARACT REMOVAL [facility] IMPLANT [facility] 11/6[facility]2014    [facility]    CATARACT REMOVAL WITH IMPLANT Left 12/2/2014    [facility]    CERVICAL DISC SURGERY      CYSTOCELE REPAIR      HYSTERECTOMY      RECTOCELE REPAIR      SHOULDER ARTHROPLASTY Right 04/20/2017    SHOULDER SURGERY Right 2017     CURRENT MEDICATIONS       Previous Medications    ALBUTEROL SULFATE HFA (VENTOLIN HFA) 108 (90 BASE) MCG/ACT INHALER    Inhale 2 puffs into the lungs every 4 hours as needed for Wheezing    AMITRIPTYLINE (ELAVIL) 100 MG TABLET    Take 1 tablet by mouth nightly    BENZONATATE (TESSALON PERLES) 100 MG CAPSULE    Take 1 capsule by mouth every 8 hours for 10 days    CELECOXIB (CELEBREX) 200 MG CAPSULE    Take 1 capsule by mouth daily    ELASTIC BANDAGES & SUPPORTS (LUMBAR BACK BRACE/SUPPORT PAD) MISC    1 each by Does not apply route daily as needed (back pain)    ELASTIC BANDAGES & SUPPORTS (LUMBAR BACK BRACE/SUPPORT PAD) MISC    1 each by Does not apply route daily    FLUTICASONE (FLONASE) 50 MCG/ACT NASAL SPRAY    1 spray by Each Nostril route daily    GABAPENTIN (NEURONTIN) 100 MG CAPSULE    Take 1 capsule by mouth 2 times daily for 30 days. GUAIFENESIN (MUCINEX) 600 MG EXTENDED RELEASE TABLET    Take 1 tablet by mouth 2 times daily for 15 days    HYDROCODONE-ACETAMINOPHEN (NORCO) 5-325 MG PER TABLET    Take 1 tablet by mouth every 6 hours as needed for Pain.     LISINOPRIL (PRINIVIL;ZESTRIL) 5 MG TABLET    Take 1 tablet by mouth daily    OMEPRAZOLE (PRILOSEC) 20 MG DELAYED RELEASE CAPSULE    Take 1 capsule by mouth daily    PREDNISONE (DELTASONE) 10 MG TABLET    Take 1 tablet by mouth daily    SIMVASTATIN (ZOCOR) 20 MG TABLET    TAKE 1 TABLET BY MOUTH EVERY NIGHT    SOLIFENACIN (VESICARE) 5 MG TABLET    Take 1 tablet by mouth daily    TRAMADOL (ULTRAM) 50 MG TABLET    Take 1 tablet by mouth every 6 hours as needed for Pain for up to 30 days. ALLERGIES     is allergic to bactrim [sulfamethoxazole-trimethoprim]; lactose intolerance (gi); pcn [penicillins]; lidoderm [lidocaine]; and macrobid [nitrofurantoin]. FAMILY HISTORY     She indicated that her mother is . She indicated that her father is . She indicated that her sister is alive. She indicated that her brother is alive. SOCIAL HISTORY       Social History     Tobacco Use    Smoking status: Never Smoker    Smokeless tobacco: Never Used   Substance Use Topics    Alcohol use: No    Drug use: No     PHYSICAL EXAM     INITIAL VITALS: /66   Pulse 88   Temp 98.9 °F (37.2 °C) (Oral)   Resp 18   Ht 4' 11\" (1.499 m)   Wt 139 lb (63 kg)   SpO2 97%   BMI 28.07 kg/m²    Physical Exam  Constitutional:       General: She is not in acute distress. Appearance: Normal appearance. She is well-developed. She is not diaphoretic. HENT:      Head: Normocephalic and atraumatic. Right Ear: External ear normal.      Left Ear: External ear normal.      Nose: Nose normal. No congestion. Mouth/Throat:      Mouth: Mucous membranes are moist.      Pharynx: Oropharynx is clear. Eyes:      General:         Right eye: No discharge. Left eye: No discharge. Conjunctiva/sclera: Conjunctivae normal.      Pupils: Pupils are equal, round, and reactive to light. Neck:      Musculoskeletal: Normal range of motion and neck supple. Trachea: No tracheal deviation. Cardiovascular:      Rate and Rhythm: Normal rate and regular rhythm. Pulses: Normal pulses. Heart sounds: Normal heart sounds. Pulmonary:      Effort: Pulmonary effort is normal. No respiratory distress. Breath sounds: Normal breath sounds. No stridor.  No wheezing or rales. Abdominal:      Palpations: Abdomen is soft. Tenderness: There is no abdominal tenderness. There is no guarding or rebound. Musculoskeletal: Normal range of motion. General: No tenderness or deformity. Skin:     General: Skin is warm and dry. Capillary Refill: Capillary refill takes less than 2 seconds. Findings: No erythema or rash. Neurological:      Mental Status: She is alert and oriented to person, place, and time. Cranial Nerves: No cranial nerve deficit. Coordination: Coordination normal.      Comments: Left lower facial droop, ataxia all 4 limbs, ataxic gait, strength in arms and legs 5/5, sensation intact all 4 limbs, no dysarthria or aphasia, visual fields intact, no nystagmus, no vertical skew   Psychiatric:         Mood and Affect: Mood normal.         Behavior: Behavior normal.         Thought Content: Thought content normal.         Judgment: Judgment normal.         MEDICAL DECISION MAKING:       ED Course as of Feb 25 1739   Thu Feb 25, 2021   1720 Do not suspect stroke or TIA  3 days of symptoms  Patient states this feels exactly like an MS exacerbation  Starting iv solumedrol  Calling medicine for admission    [WM]   [phone] I do not suspect sepsis  Looks like she is dehydrated also, giving iv fluids      [WM]   [address] Elysia Schofield, accepts admit to PCU  Notifying residents now    [WM]      ED Course User Index  [WM] Jesica Gongora MD     Procedures    DIAGNOSTIC RESULTS   EKG:All EKG's are interpreted by the Emergency Department Physician who either signs or Co-signs this chart in the absence of a cardiologist.  Normal ekg      RADIOLOGY:All plain film, CT, MRI, and formal ultrasound images (except ED bedside ultrasound) are read by the radiologist, see reports below, unless otherwisenoted in MDM or here. CT HEAD WO CONTRAST   Final Result   Senescent changes compatible with the age of the patient. No acute intracranial abnormality.          XR CHEST PORTABLE   Final Result   Mild left ventricular enlargement without pneumonia, interstitial edema or   pleural effusion. Old healed fractures involve the posterolateral aspect of the left 3rd   through 7th ribs. LABS: All lab results were reviewed by myself, and all abnormals are listed below. Labs Reviewed   CBC WITH AUTO DIFFERENTIAL - Abnormal; Notable for the following components:       Result Value    RBC 3.85 (*)     Hemoglobin 11.8 (*)     Seg Neutrophils 75 (*)     Lymphocytes 19 (*)     All other components within normal limits   COMPREHENSIVE METABOLIC PANEL - Abnormal; Notable for the following components:    Glucose 117 (*)     BUN 24 (*)     CREATININE 1.13 (*)     Total Bilirubin 0.21 (*)     GFR Non- 46 (*)     GFR  55 (*)     All other components within normal limits   TROPONIN - Abnormal; Notable for the following components:    Troponin, High Sensitivity 27 (*)     All other components within normal limits   COVID-19, RAPID   CULTURE, URINE   PROTIME-INR   MAGNESIUM   LIPASE   TROPONIN   URINALYSIS       EMERGENCY DEPARTMENTCOURSE:         Vitals:    Vitals:    02/25/21 1600   BP: 129/66   Pulse: 88   Resp: 18   Temp: 98.9 °F (37.2 °C)   TempSrc: Oral   SpO2: 97%   Weight: 139 lb (63 kg)   Height: 4' 11\" (1.499 m)       The patient was given the following medications while in the emergency department:  Orders Placed This Encounter   Medications    0.9 % sodium chloride infusion    methylPREDNISolone sodium (SOLU-MEDROL) injection 125 mg     CONSULTS:  IP CONSULT TO INTERNAL MEDICINE    FINAL IMPRESSION      1. Multiple sclerosis exacerbation ([address]    2. Ataxia    3. Dizziness          DISPOSITION/PLAN   DISPOSITION        PATIENT REFERRED TO:  No follow-up provider specified.   DISCHARGE MEDICATIONS:  New Prescriptions    No medications on file     Catherine Moya MD  Attending Emergency Physician                    Catherine Moya MD 02/25/21 1731

## 2021-02-26 ENCOUNTER — APPOINTMENT (OUTPATIENT)
Dept: GENERAL RADIOLOGY | Age: 86
DRG: 059 | End: 2021-02-26
Payer: MEDICARE

## 2021-02-26 PROBLEM — A05.9 FOOD POISONING: Status: ACTIVE | Noted: 2021-02-26

## 2021-02-26 LAB
CREATININE URINE: 24.1 MG/DL (ref 28–217)
SODIUM,UR: 39 MMOL/L

## 2021-02-26 PROCEDURE — 92611 MOTION FLUOROSCOPY/SWALLOW: CPT

## 2021-02-26 PROCEDURE — 99223 1ST HOSP IP/OBS HIGH 75: CPT | Performed by: INTERNAL MEDICINE

## 2021-02-26 PROCEDURE — 99221 1ST HOSP IP/OBS SF/LOW 40: CPT | Performed by: STUDENT IN AN ORGANIZED HEALTH CARE EDUCATION/TRAINING PROGRAM

## 2021-02-26 PROCEDURE — 92610 EVALUATE SWALLOWING FUNCTION: CPT

## 2021-02-26 PROCEDURE — 2060000000 HC ICU INTERMEDIATE R&B

## 2021-02-26 PROCEDURE — 97162 PT EVAL MOD COMPLEX 30 MIN: CPT

## 2021-02-26 PROCEDURE — 2580000003 HC RX 258: Performed by: STUDENT IN AN ORGANIZED HEALTH CARE EDUCATION/TRAINING PROGRAM

## 2021-02-26 PROCEDURE — 6360000002 HC RX W HCPCS: Performed by: STUDENT IN AN ORGANIZED HEALTH CARE EDUCATION/TRAINING PROGRAM

## 2021-02-26 PROCEDURE — 82570 ASSAY OF URINE CREATININE: CPT

## 2021-02-26 PROCEDURE — 97530 THERAPEUTIC ACTIVITIES: CPT

## 2021-02-26 PROCEDURE — 97166 OT EVAL MOD COMPLEX 45 MIN: CPT

## 2021-02-26 PROCEDURE — 74230 X-RAY XM SWLNG FUNCJ C+: CPT

## 2021-02-26 PROCEDURE — 2500000003 HC RX 250 WO HCPCS: Performed by: STUDENT IN AN ORGANIZED HEALTH CARE EDUCATION/TRAINING PROGRAM

## 2021-02-26 PROCEDURE — 84300 ASSAY OF URINE SODIUM: CPT

## 2021-02-26 PROCEDURE — 6370000000 HC RX 637 (ALT 250 FOR IP): Performed by: NURSE PRACTITIONER

## 2021-02-26 PROCEDURE — 6370000000 HC RX 637 (ALT 250 FOR IP): Performed by: STUDENT IN AN ORGANIZED HEALTH CARE EDUCATION/TRAINING PROGRAM

## 2021-02-26 RX ORDER — METOPROLOL TARTRATE 5 MG/5ML
5 INJECTION INTRAVENOUS EVERY 4 HOURS PRN
Status: DISCONTINUED | OUTPATIENT
Start: 2021-02-26 | End: 2021-02-26

## 2021-02-26 RX ORDER — METOPROLOL TARTRATE 5 MG/5ML
10 INJECTION INTRAVENOUS EVERY 4 HOURS PRN
Status: DISCONTINUED | OUTPATIENT
Start: 2021-02-26 | End: 2021-03-02 | Stop reason: HOSPADM

## 2021-02-26 RX ADMIN — GABAPENTIN 100 MG: 100 CAPSULE ORAL at 20:30

## 2021-02-26 RX ADMIN — SODIUM CHLORIDE 500 MG: 9 INJECTION, SOLUTION INTRAVENOUS at 10:46

## 2021-02-26 RX ADMIN — FAMOTIDINE 10 MG: 20 TABLET ORAL at 10:45

## 2021-02-26 RX ADMIN — TRAMADOL HYDROCHLORIDE 50 MG: 50 TABLET, FILM COATED ORAL at 21:48

## 2021-02-26 RX ADMIN — HYDROCODONE BITARTRATE AND ACETAMINOPHEN 1 TABLET: 5; 325 TABLET ORAL at 10:55

## 2021-02-26 RX ADMIN — Medication 30 MG: at 10:45

## 2021-02-26 RX ADMIN — Medication 30 MG: at 20:30

## 2021-02-26 RX ADMIN — METOPROLOL TARTRATE 10 MG: 1 INJECTION, SOLUTION INTRAVENOUS at 16:58

## 2021-02-26 RX ADMIN — ACETAMINOPHEN 650 MG: 325 TABLET ORAL at 13:00

## 2021-02-26 RX ADMIN — SODIUM CHLORIDE: 9 INJECTION, SOLUTION INTRAVENOUS at 01:53

## 2021-02-26 RX ADMIN — ENOXAPARIN SODIUM 30 MG: 30 INJECTION SUBCUTANEOUS at 10:46

## 2021-02-26 RX ADMIN — AMITRIPTYLINE HYDROCHLORIDE 100 MG: 50 TABLET, FILM COATED ORAL at 20:30

## 2021-02-26 RX ADMIN — GABAPENTIN 100 MG: 100 CAPSULE ORAL at 10:46

## 2021-02-26 RX ADMIN — ATORVASTATIN CALCIUM 20 MG: 20 TABLET, FILM COATED ORAL at 10:45

## 2021-02-26 RX ADMIN — HYDROCODONE BITARTRATE AND ACETAMINOPHEN 1 TABLET: 5; 325 TABLET ORAL at 16:58

## 2021-02-26 ASSESSMENT — PAIN SCALES - GENERAL
PAINLEVEL_OUTOF10: 9
PAINLEVEL_OUTOF10: 7
PAINLEVEL_OUTOF10: 7
PAINLEVEL_OUTOF10: 9
PAINLEVEL_OUTOF10: 7
PAINLEVEL_OUTOF10: 2
PAINLEVEL_OUTOF10: 9

## 2021-02-26 ASSESSMENT — PAIN DESCRIPTION - PAIN TYPE
TYPE: ACUTE PAIN
TYPE: CHRONIC PAIN;ACUTE PAIN
TYPE: ACUTE PAIN
TYPE: ACUTE PAIN
TYPE: CHRONIC PAIN

## 2021-02-26 ASSESSMENT — PAIN DESCRIPTION - DESCRIPTORS
DESCRIPTORS: ACHING;CONSTANT;HEADACHE
DESCRIPTORS: ACHING;CONSTANT

## 2021-02-26 ASSESSMENT — ENCOUNTER SYMPTOMS
CHEST TIGHTNESS: 0
SHORTNESS OF BREATH: 0
ABDOMINAL PAIN: 1
EYES NEGATIVE: 1
ALLERGIC/IMMUNOLOGIC NEGATIVE: 1
ABDOMINAL PAIN: 0
SHORTNESS OF BREATH: 1

## 2021-02-26 ASSESSMENT — PAIN DESCRIPTION - LOCATION
LOCATION: HEAD
LOCATION: HAND

## 2021-02-26 ASSESSMENT — PAIN DESCRIPTION - FREQUENCY
FREQUENCY: CONTINUOUS
FREQUENCY: CONTINUOUS

## 2021-02-26 ASSESSMENT — PAIN - FUNCTIONAL ASSESSMENT
PAIN_FUNCTIONAL_ASSESSMENT: 0-10
PAIN_FUNCTIONAL_ASSESSMENT: 0-10

## 2021-02-26 ASSESSMENT — PAIN DESCRIPTION - ORIENTATION
ORIENTATION: ANTERIOR
ORIENTATION: ANTERIOR

## 2021-02-26 ASSESSMENT — PAIN DESCRIPTION - ONSET: ONSET: ON-GOING

## 2021-02-26 NOTE — PROCEDURES
INSTRUMENTAL SWALLOW REPORT  MODIFIED BARIUM SWALLOW    NAME: Sherlyn Ballesteros   : 1934  MRN: [id number]       Date of Eval: 2021              Referring Diagnosis(es):  dysphagia    Past Medical History:  has a past medical history of Acute cystitis without hematuria, Arthritis, Chronic daily headache, GERD (gastroesophageal reflux disease), Hyperlipidemia, Hypertension, Moderate malnutrition ([address] Multiple sclerosis ([address] Neuropathy, Pneumonia, and Vitamin D deficiency. Past Surgical History:  has a past surgical history that includes Cystocele repair; Rectocele repair; Hysterectomy; Cervical disc surgery; Cataract removal with implant (Right, 2014); Cataract removal with implant (Left, 2014); Total shoulder arthroplasty (Right, 2017); and shoulder surgery (Right, 2017). Current Diet Solid Consistency: NPO  Current Diet Liquid Consistency: NPO       Type of Study: Initial MBS       Recent CXR/CT of Chest: Date - CXR-   Mild left ventricular enlargement without pneumonia, interstitial edema or   pleural effusion.       Old healed fractures involve the posterolateral aspect of the left 3rd   through 7th ribs. Patient Complaints/Reason for Referral:  Sherlyn Ballesteros was referred for a MBS to assess the efficiency of his/her swallow function, assess for aspiration, and to make recommendations regarding safe dietary consistencies, effective compensatory strategies, and safe eating environment. Onset of problem:    Unable to r/o or confirm aspiration at bedside. Behavior/Cognition/Vision/Hearing:  Behavior/Cognition: Alert; Cooperative  Vision: Impaired  Vision Exceptions: Wears glasses at all times  Hearing: Exceptions to [facility]  Hearing Exceptions: Hard of hearing/hearing concerns; No hearing aid    Impressions:     Patient Position: Lateral     Consistencies Administered: Dysphagia Soft and Bite-Sized (Dysphagia III); Pudding teaspoon;Reg solid; Nectar cup; Thin cup Dysphagia Outcome Severity Scale: Level 7: Normal in all situations       Recommended Diet:  Solid consistency: Regular  Liquid consistency: Thin            Safe Swallow Protocol:     Compensatory Swallowing Strategies: Eat/Feed slowly;Upright as possible for all oral intake;Small bites/sips              Recommendations/Treatment  Requires SLP Intervention: No                Education:  recommendations were reviewed with pt.  following this exam.      Patient Education Response: Verbalizes understanding                            Oral Preparation / Oral Phase  Oral Phase: WNL        Pharyngeal Phase  Pharyngeal Phase: WNL      Esophageal Phase  Esophageal Screen: WNL        Pain   Patient Currently in Pain: Yes  Pain Level: 7  Pain Type: Acute pain  Pain Location: Head      Therapy Time:   Individual Concurrent Group Co-treatment   Time In 1430         Time Out 1443         Minutes 13                 Mayelin MCKEON A.CCC/SLP     2/26/2021, 3:01 PM

## 2021-02-26 NOTE — PROGRESS NOTES
Bronchodilator Assessment     RR 18  Breath Sounds: clear  SPO2: 94%  FiO2: room air      · Bronchodilator assessment at level  0  ·   · []    Bronchodilator Assessment  BRONCHODILATOR ASSESSMENT SCORE  Score 0 1 2 3 4 5   Breath Sounds   [x]  Patient Baseline []  No Wheeze good aeration []  Faint, scattered wheezing, good aeration []  Expiratory Wheezing and or moderately diminished []  Insp/Exp wheeze and/or very diminished []  Insp/Exp and/ or marked distress   Respiratory Rate   [x]  Patient Baseline []  Less than 20 []  Less than 20 []  20-25 []  Greater than 25 []  Greater than 25   Peak flow % of Pred or PB []  NA   []  Greater than 90%  []  81-90% []  71-80% []  Less than or equal to 70%  or unable to perform []  Unable due to Respiratory Distress   Dyspnea re [x]  Patient Baseline []  No SOB []  No SOB []  SOB on exertion []  SOB min activity []  At rest/acute   e FEV% Predicted       []  NA []  Above 69%  []  Unable []  Above 60-69%  []  Unable []  Above 50-59%  []  Unable []  Above 35-49%  []  Unable []  Less than 35%  []  Unable          Pt has no pulmonary history   Orlie Jose      8:07 PM

## 2021-02-26 NOTE — PLAN OF CARE
Problem: Falls - Risk of:  Goal: Will remain free from falls  Description: Will remain free from falls  Outcome: Ongoing  Note: Pt assessed as a fall risk this shift. Remains free from falls and accidental injury at this time. Fall precautions in place, including falling star sign and fall risk band on pt. Floor free from obstacles, and bed is locked and in lowest position. Adequate lighting provided. Pt encouraged to call before getting OOB for any need. Bed alarm activated. Needs monitored during hourly rounding.

## 2021-02-26 NOTE — PLAN OF CARE
Plan of Care:     Morning: Pt difficulty swallowing semi solids. Vomited. NPO effective now. Nursing swallow assessment. Speech pathologist consulted. Patient confirms past medical history of dysphagia since 1 year. IV hydralazine changed to IV lopressor. BP and HR elevated. MS exacerbation improving.

## 2021-02-26 NOTE — ED NOTES
Report given to Prem Hummel RN from [facility]. Report method by phone   The following was reviewed with receiving RN:   Current vital signs:  BP (!) 167/83   Pulse 97   Temp 98.7 °F (37.1 °C) (Oral)   Resp 18   Ht 4' 11\" (1.499 m)   Wt 139 lb (63 kg)   SpO2 91%   BMI 28.07 kg/m²                MEWS Score: 1     Any medication or safety alerts were reviewed. Any pending diagnostics and notifications were also reviewed, as well as any safety concerns or issues, abnormal labs, abnormal imaging, and abnormal assessment findings. Questions were answered.         Max Martinez RN  02/25/21 9950

## 2021-02-26 NOTE — PROGRESS NOTES
Speech Language Pathology  Facility/Department: [address] [place] [address]   CLINICAL BEDSIDE SWALLOW EVALUATION    NAME: Kay Gitelman  : 1934  MRN: [id number]    ADMISSION DATE: 2021  ADMITTING DIAGNOSIS: has Ataxia; Multiple sclerosis ([facility] Essential hypertension; Hyperlipidemia; Atelectasis; Debility; Abnormal gait; Actinic keratosis; Enthesopathy of hip region; Generalized osteoarthritis; Idiopathic peripheral neuropathy; Vitamin D deficiency; Primary localized osteoarthrosis of the hip, left; Primary osteoarthritis of left hip; Acquired spondylolisthesis; Chronic midline low back pain with left-sided sciatica; Spinal stenosis of lumbar region with neurogenic claudication; Presence of right artificial shoulder joint; Exacerbation of multiple sclerosis ([facility] Diarrhea; Chronic headache; and Facial asymmetry on their problem list.    Recent Chest Xray/CT of Chest:   - CXR-   Mild left ventricular enlargement without pneumonia, interstitial edema or   pleural effusion.       Old healed fractures involve the posterolateral aspect of the left 3rd   through 7th ribs. Date of Eval: 2021  Evaluating Therapist: Jacky Nascimento    Current Diet level:  Current Diet : NPO  Current Liquid Diet : NPO      Primary Complaint   IM resident H&P:  The patient is a 80 y.o.   Non-/non  female who presents withDizziness, Difficulty Walking, Cough, and Back Pain   and she is admitted to the hospital for the management of MS exacerbation, acute kidney injury and lightheadedness secondary to dehydration.    51-year-old  female with past medical history of multiple sclerosis, spinal stenosis of the lumbar region, chronic severe headaches and facial asymmetry presented to us with complaint of lightheadedness and abnormal bowel movements for the past 12 hours. Patient states that on 2/24, she had a fish sandwich at 6 PM and at around 10 PM and she  started having abdominal pain, generalized, 7 out of 10 intensity and gurgling in nature followed by more than 3 episodes of diarrhea without any associated blood. The consistency of her semisolid and patient also felt nauseous and had an episode of vomiting. She slept at 12 PM and woke up feeling dizzy and around 7 AM and call her daughter. Patient was brought in the emergency department, she recalls that her last MS exacerbation years ago had a similar presentation. She also complains of severe chronic headache like migraines. In the ED, patient was given 125 mg of Solu-Medrol and her creatinine was elevated. She was started on maintenance fluid 100 mL/h for elevated creatinine 1.19 (baseline 0.80). Troponin were mildly elevated 27 and her GFR is 46. Home medications were resumed with respiratory care and eval consult. Severe headache patient was started on Norco and Fioricet as needed orders. Per pt. And RN, pt. Vomited on her oatmeal this am as \"it felt like it got stuck. \"    Pain:  Pain Assessment  Pain Assessment: 0-10  Pain Level: 7  Patient's Stated Pain Goal: No pain  Pain Type: Chronic pain, Acute pain  Pain Location: Head    Reason for Referral  Maribell Perry was referred for a bedside swallow evaluation to assess the efficiency of her swallow function, identify signs and symptoms of aspiration and make recommendations regarding safe dietary consistencies, effective compensatory strategies, and safe eating environment.     Impression  Dysphagia Diagnosis: Suspected needs further assessment Dysphagia Impression : Recommend video swallow study to r/o or confirm aspiration        Treatment Plan   Pending MBS results             Recommended Diet and Intervention  Diet Solids Recommendation: NPO  Liquid Consistency Recommendation: NPO     Recommendations: Modified barium swallow study         General  Behavior/Cognition: Alert; Cooperative  Respiratory Status: O2 via nasual cannula  O2 Device: Nasal cannula  Communication Observation: Functional  Follows Directions: Complex  Dentition: Some missing teeth  Patient Positioning: Upright in bed  Baseline Vocal Quality: Normal  Consistencies Administered: Reg solid; Thin - straw(taking sips of water with pills)      Pain Level: 7    Vision/Hearing  Vision  Vision: Impaired  Vision Exceptions: Wears glasses at all times  Hearing  Hearing: Exceptions to [facility]  Hearing Exceptions: Hard of hearing/hearing concerns; No hearing aid    Oral Motor Deficits  Oral/Motor  Oral Motor: Within functional limits    Oral Phase Dysfunction  Oral Phase  Oral Phase: Exceptions  Oral Phase  Oral Phase - Comment: Decreased mastication of dry solids. Indicators of Pharyngeal Phase Dysfunction   Pharyngeal Phase  Pharyngeal Phase: Exceptions  Pharyngeal Phase   Pharyngeal: No immediate overt s/s aspiration, but pt. demonstrating significant, delayed cough. Unable to r/o or confirm aspiration at bedside. Recommend video swallow study. Education  Patient Education Response: Verbalizes understanding             Therapy Time  SLP Individual Minutes  Time In: 4608  Time Out: [address]  Minutes: 15          Khushi MCKEON A.CCC/SLP    2/26/2021 11:16 AM

## 2021-02-26 NOTE — FLOWSHEET NOTE
02/26/21 0600   Vital Signs   Orthostatic B/P and Pulse?  Yes   Blood Pressure Lying 173/92   Pulse Lying 94 PER MINUTE   Blood Pressure Sitting 130/79   Pulse Sitting 100 PER MINUTE   Blood Pressure Standing 137/86   Pulse Standing 98 PER MINUTE

## 2021-02-26 NOTE — PROGRESS NOTES
RN notified Resident of speech therapy's recommendation for diet.  General diet noted and Resident is going to be placing the order

## 2021-02-26 NOTE — CONSULTS
Physical Medicine & Rehabilitation  Consult Note      Admitting Physician:  Kiran Ocampo MD    Primary Care Provider:  Johnnie Diehl MD     Reason for Consult:  Acute Inpatient Rehabilitation    Chief Complaint:  Dizziness    History of Present Illness:  Referring Provider is requesting an evaluation for appropriate placement upon discharge from acute care. History from chart review and patient. Faye Pinto is a 80 y.o. right-handed female with past medical history of multiples sclerosis, HTN, HLD, arthritis, GERD, and chronic daily headache admitted to [facility] on 2/25/2021. She initially presented with dizziness, abdominal pain, and diarrhea. She reportedly ate a fish sandwich the night prior to presentation and developed abdominal pain, diarrhea, nausea, and an episode of vomiting a few hours later. The next morning she awoke with dizziness and difficulty walking and was brought to the ED by her daughter, as a previous MS exacerbation had a similar presentation. She was found to have EDINSON. She was started on solumedrol for 5 days and IV fluids. She currently reports ongoing lightheadedness. She also notes a recent cold, shortness of breath on exertion, chronic daily headaches, and chronic numbness/tingling in her feet. She denies any other pain at this time. Review of Systems:  Review of Systems   Constitutional: Negative for fever. Respiratory: Positive for shortness of breath. Cardiovascular: Negative for chest pain. Gastrointestinal: Negative for abdominal pain. No changes in bowel control   Genitourinary:        +intermittent urinary incontinence   Neurological: Positive for dizziness, sensory change (chronic) and headaches (chronic).       Premorbid function:  Independent with ADLs, needs assistance with IADLs    Current function:    PT:  Restrictions/Precautions: Fall Risk(troponins 27 on 2-, peripheral IV right antecubital) Implants present? : Metal implants(left shoulder arthroplasty)  Required Braces or Orthoses  Spinal: (states she has a back brace)   Transfers  Sit to Stand: Moderate Assistance  Stand to sit: Moderate Assistance  Comment: pt stood next to the bed for less than 30 seconds w/ max x 1 for support- pt's standing balance was poor w/ increased sway initially progressing to almost a rotational sway. Pt placed back on the bed w/ BP taken in a seated position at the /102 and then returned to bed       Transfers  Sit to Stand: Moderate Assistance  Stand to sit: Moderate Assistance  Comment: pt stood next to the bed for less than 30 seconds w/ max x 1 for support- pt's standing balance was poor w/ increased sway initially progressing to almost a rotational sway. Pt placed back on the bed w/ BP taken in a seated position at the /102 and then returned to bed  Ambulation  Ambulation?: No(deferred due to safety concerns)                 OT:   ADL  Feeding: NPO  Grooming: Stand by assistance  UE Bathing: Stand by assistance  LE Bathing: Moderate assistance  UE Dressing: Stand by assistance  LE Dressing: Maximum assistance  Toileting: Maximum assistance  Additional Comments: ADL scores based on skilled observations and clinical reasoning unless otherwise noted. Pt likely requires significant assist with lower body self-care tasks while standing due to needed Maximum assist for standing balance this date. Pt doffed and donned bilateral socks while seated edge of bed with stand by assist and Minimal verbal cues for use of bed to bring RLE onto bed for increased ease to doff/don. Pt demonstrates Fair carryover with further education warranted. Continue OT POC.          Balance  Sitting Balance: Stand by assistance  Standing Balance: Maximum assistance(pt visibly swaying while standing)   Standing Balance  Time: 30 seconds  Activity: BUE support on RW, standing against edge of bed Comment: pt visibly swaying while standing at edge of bed, reports dizziness. Therapist assisted Pt to sit and BP assessed. BP was 171/102. RN [employer] notified. Bed mobility  Rolling to Left: Stand by assistance  Supine to Sit: Stand by assistance  Scooting: Stand by assistance  Comment: dangles at the EOB w/ SBA; O2 sat 95% at the EOB; C/O dizziness at the EOB  Transfers  Sit to stand: Moderate assistance  Stand to sit: Moderate assistance  Transfer Comments: Unsafe to attempt steps this date due to Maximum assist for standing balance with visible sway and reported dizziness                 SLP:  Impressions:    Patient Position: Lateral      Consistencies Administered: Dysphagia Soft and Bite-Sized (Dysphagia III); Pudding teaspoon;Reg solid; Nectar cup; Thin cup     Dysphagia Outcome Severity Scale: Level 7: Normal in all situations     Recommended Diet:  Solid consistency: Regular  Liquid consistency: Thin        Safe Swallow Protocol:  Compensatory Swallowing Strategies: Eat/Feed slowly;Upright as possible for all oral intake;Small bites/sips     Recommendations/Treatment  Requires SLP Intervention: No      Past Medical History:        Diagnosis Date    Acute cystitis without hematuria 10/1/2017    Arthritis     Chronic daily headache     GERD (gastroesophageal reflux disease)     Hyperlipidemia     Hypertension     Moderate malnutrition ([address] 3/2/2019    Multiple sclerosis ([address]     Neuropathy     Pneumonia 2017    states had double pneumonia    Vitamin D deficiency        Past Surgical History:        Procedure Laterality Date    CATARACT REMOVAL [facility] IMPLANT [facility]2014    [facility]    CATARACT REMOVAL WITH IMPLANT Left 12/2/2014    [facility]    CERVICAL DISC SURGERY      CYSTOCELE REPAIR      HYSTERECTOMY      RECTOCELE REPAIR      SHOULDER ARTHROPLASTY Right 04/20/2017    SHOULDER SURGERY Right 2017       Allergies:     Allergies   Allergen Reactions  Bactrim [Sulfamethoxazole-Trimethoprim] Other (See Comments)     BLISTERS IN MOUTH    Lactose Intolerance (Gi) Diarrhea    Pcn [Penicillins] Hives    Lidoderm [Lidocaine] Rash    Macrobid [Nitrofurantoin] Nausea And Vomiting        Current Medications:   Current Facility-Administered Medications: metoprolol (LOPRESSOR) injection 10 mg, 10 mg, Intravenous, Q4H PRN  sodium chloride flush 0.9 % injection 10 mL, 10 mL, Intravenous, 2 times per day  sodium chloride flush 0.9 % injection 10 mL, 10 mL, Intravenous, PRN  enoxaparin (LOVENOX) injection 30 mg, 30 mg, Subcutaneous, Daily  promethazine (PHENERGAN) tablet 12.5 mg, 12.5 mg, Oral, Q6H PRN **OR** ondansetron (ZOFRAN) injection 4 mg, 4 mg, Intravenous, Q6H PRN  polyethylene glycol (GLYCOLAX) packet 17 g, 17 g, Oral, Daily PRN  acetaminophen (TYLENOL) tablet 650 mg, 650 mg, Oral, Q6H PRN **OR** acetaminophen (TYLENOL) suppository 650 mg, 650 mg, Rectal, Q6H PRN  famotidine (PEPCID) tablet 10 mg, 10 mg, Oral, Daily  methylPREDNISolone sodium (SOLU-MEDROL) 500 mg in sodium chloride 0.9 % 250 mL IVPB, 500 mg, Intravenous, Daily  amitriptyline (ELAVIL) tablet 100 mg, 100 mg, Oral, Nightly  gabapentin (NEURONTIN) capsule 100 mg, 100 mg, Oral, BID  HYDROcodone-acetaminophen (NORCO) 5-325 MG per tablet 1 tablet, 1 tablet, Oral, Q6H PRN  atorvastatin (LIPITOR) tablet 20 mg, 20 mg, Oral, Daily  traMADol (ULTRAM) tablet 50 mg, 50 mg, Oral, Q6H PRN  butalbital-APAP-caffeine -40 MG per capsule 1 capsule, 1 capsule, Oral, Q6H PRN  0.9 % sodium chloride infusion, , Intravenous, Continuous  benzonatate (TESSALON) capsule 100 mg, 100 mg, Oral, TID PRN  dextromethorphan (DELSYM) 30 MG/5ML extended release liquid 30 mg, 30 mg, Oral, 2 times per day    Family History:   History reviewed. No pertinent family history.     Social History:  Lives With: Alone  Type of Home: House  Home Layout: Two level, Able to Live on Main level with bedroom/bathroom Home Access: Stairs to enter with rails  Entrance Stairs - Number of Steps: 3 steps w/ right rail  Entrance Stairs - Rails: Right  Bathroom Shower/Tub: Tub/Shower unit, Shower chair without back, Curtain  Bathroom Toilet: Standard  Bathroom Equipment: Grab bars in shower, Shower chair, Grab bars around toilet  Home Equipment: Rolling walker, Reacher  ADL Assistance: Independent(dtr will assist w/ showers when she is not feeling well, otherwise she is independent)  Homemaking Assistance: Needs assistance(dtr assists cleaning, grocery shopping  and laundry, pt does her own microwave or simple meals; dtr brings in some meals)  Homemaking Responsibilities: Yes(simple meals)  Ambulation Assistance: Independent(uses back brace and wheeled walker mainly in the morning and then progresses  to cane or walking around furniture in the later day, uses cane outside)  Transfer Assistance: Independent  Active : No  Mode of Transportation: Family  Occupation: Retired  IADL Comments: sleeps in a flat bed  Additional Comments: son and dtr both work day shift; states that she calls her dtr every morning  Social History     Socioeconomic History    Marital status:       Spouse name: None    Number of children: 3    Years of education: None    Highest education level: None   Occupational History    None   Social Needs    Financial resource strain: Not hard at all   [place] insecurity     Worry: Never true     Inability: Never true   [employer] needs     Medical: No     Non-medical: No   Tobacco Use    Smoking status: Never Smoker    Smokeless tobacco: Never Used   Substance and Sexual Activity    Alcohol use: No    Drug use: No    Sexual activity: None   Lifestyle    Physical activity     Days per week: None     Minutes per session: None    Stress: None   Relationships    Social connections     Talks on phone: None     Gets together: None     Attends Oriental orthodox service: None Active member of club or organization: None     Attends meetings of clubs or organizations: None     Relationship status: None    Intimate partner violence     Fear of current or ex partner: None     Emotionally abused: None     Physically abused: None     Forced sexual activity: None   Other Topics Concern    None   Social History Narrative    None       Physical Exam:  BP (!) 170/83   Pulse 95   Temp 97.3 °F (36.3 °C) (Oral)   Resp 18   Ht 4' 11\" (1.499 m)   Wt 132 lb 15 oz (60.3 kg)   SpO2 96%   BMI 26.85 kg/m²     GEN: Well developed, well nourished, no acute distress  HEENT: NCAT. EOMI. Hearing grossly intact. Mucous membranes pink and moist.  RESP: Normal breath sounds with no wheezing, rales, or rhonchi. Respirations WNL and unlabored. CV: Tachycardic, regular rhythm. No murmurs, rubs, or gallops. ABD: Soft, non-distended, BS+ and equal.  NEURO: Alert. Speech fluent with no aphasia or dysarthria noted. No facial droop. Symmetrical shoulder shrug. Midline tongue protrusion. Sensation to light touch intact. MSK: Functional ROM in all limbs. Muscle tone and bulk are normal bilaterally. Strength 4/5 in bilateral upper limbs. Able to lift bilateral lower limbs off bed against gravity. Strength 4+/5 with bilateral ankle dorsiflexion and plantarflexion. LIMBS: No edema in bilateral lower limbs. SKIN: Warm and dry with good turgor. PSYCH: Mood WNL. Affect WNL. Appropriately interactive. Diagnostics:    CBC:   Recent Labs     02/25/21  1655   WBC 10.3   RBC 3.85*   HGB 11.8*   HCT 36.0   MCV 93.6   RDW 14.5        BMP:   Recent Labs     02/25/21  1655      K 4.2      CO2 24   BUN 24*   CREATININE 1.13*   GLUCOSE 117*      HbA1c:   Lab Results   Component Value Date    LABA1C 6.2 (H) 05/21/2020     BNP: No results for input(s): BNP in the last 72 hours.   PT/INR:   Recent Labs     02/25/21  1655   PROTIME 12.0   INR 0.9 APTT: No results for input(s): APTT in the last 72 hours. CARDIAC ENZYMES:   Recent Labs     02/25/21  1655 02/25/21  1855   TROPONINT NOT REPORTED NOT REPORTED     FASTING LIPID PANEL:  Lab Results   Component Value Date    CHOL 235 (H) 01/04/2012    HDL 46 05/21/2020    TRIG 119 01/04/2012     LIVER PROFILE:   Recent Labs     02/25/21  1655   AST 15   ALT 14   BILITOT 0.21*   ALKPHOS 55       Radiology:  FL MODIFIED BARIUM SWALLOW W VIDEO   Final Result   Swallowing mechanism grossly within normal limits without evidence of   aspiration. Please see separate speech pathology report for full discussion of findings   and recommendations. CT HEAD WO CONTRAST   Final Result   Senescent changes compatible with the age of the patient. No acute intracranial abnormality. XR CHEST PORTABLE   Final Result   Mild left ventricular enlargement without pneumonia, interstitial edema or   pleural effusion. Old healed fractures involve the posterolateral aspect of the left 3rd   through 7th ribs. Impression:    1. Acute exacerbation of MS  2. EDINSON  3. Anemia  4. HTN  5. HLD  6. Arthritis  7. Chronic daily headaches  8. GERD    Recommendations:    1. Diagnosis:  Acute exacerbation of MS  2. Therapy: Has PT/OT needs  3. Medical Necessity: As above  4.  Support: Lives alone, has supportive daughter 5. Rehab Recommendation: The patient will benefit from acute inpatient rehabilitation once medically stable per primary service. Anticipate she will be able to tolerate 3 hours of therapy per day in rehabilitation. The patient requires multidisciplinary rehabilitation treatment including medical management by a PM&R physician, 24 hour rehabilitation nursing, physical therapy, occupational therapy, rehabilitation social work, and nutrition services. Patient and family also require education in post-hospital precautions and home exercise routine, adaptive techniques and deficit compensation strategies, strengthening and conditioning, equipment prescription and instructions in use. - Patient is currently on day 2 of a 5-day course of solumedrol    6. DVT Prophylaxis: Lovenox    It was my pleasure to evaluate Wesley Adorno today. Please call with questions.     Norma Lerma MD

## 2021-02-26 NOTE — PROGRESS NOTES
Pt arrived to floor via stretcher from ED and was transfered to bed. Vitals taken. Admission and assessment to follow. No distress noted. See doc flowsheet and admission navigator for details. POC and education initiated and reviewed with patient. Call light within reach, and pt educated on its use. Bed in lowest position, and locked. Side rails up x 2. Denied further questions or needs at this time.

## 2021-02-26 NOTE — PROGRESS NOTES
Blood Pressure Sittin/79  Pulse Sittin PER MINUTE  Blood Pressure Standin/86  Pulse Standin PER MINUTE  Level of Consciousness: Alert (0)    Subjective  Subjective: \"We have a thing where if I don't call her by 11 AM, she calls me\" Pt reports her daughters checks in on her everyday.   Overall Orientation Status: Within Functional Limits  Vision  Vision: Impaired  Vision Exceptions: Wears glasses at all times  Hearing  Hearing: Exceptions to [facility]  Hearing Exceptions: Hard of hearing/hearing concerns, No hearing aid  Social/Functional History  Lives With: Alone  Type of Home: House  Home Layout: Two level, Able to Live on Main level with bedroom/bathroom  Home Access: Stairs to enter with rails  Entrance Stairs - Number of Steps: 3 steps w/ right rail  Entrance Stairs - Rails: Right  Bathroom Shower/Tub: Tub/Shower unit, Shower chair without back, Curtain  Bathroom Toilet: Standard  Bathroom Equipment: Grab bars in shower, Shower chair, Grab bars around toilet  Home Equipment: Rolling walker, Reacher  ADL Assistance: Independent(dtr will assist w/ showers when she is not feeling well, otherwise she is independent)  [address]: Needs assistance(dtr assists cleaning, grocery shopping  and laundry, pt does her own microwave or simple meals; dtr brings in some meals)  Homemaking Responsibilities: Yes(simple meals)  Ambulation Assistance: Independent(uses back brace and wheeled walker mainly in the morning and then progresses  to cane or walking around furniture in the later day, uses cane outside)  Transfer Assistance: Independent  Active : No  Mode of Transportation: Family  Occupation: Retired  IADL Comments: sleeps in a flat bed  Additional Comments: son and dtr both work day shift; states that she calls her dtr every morning  Pain Assessment  Pain Assessment: 0-10  Pain Level: 9  Patient's Stated Pain Goal: No pain  Pain Type: Chronic pain  Pain Location: Head  Pain Orientation: Anterior(by ears)  Pain Descriptors: Aching, Constant, Headache  Pain Frequency: Continuous  Response to Pain Intervention: (asking for pain meds, RN notified)  Multiple Pain Sites: No    Objective  Vision - Basic Assessment  Prior Vision: Wears glasses all the time  Patient Visual Report: Diplopia(reports glasses have prism to counteract diplopia)   Cognition  Overall Cognitive Status: WFL   Perception  Overall Perceptual Status: WFL  Sensation  Overall Sensation Status: Impaired(C/O neuropathy bilateral feet (sole of the foot))   ADL  Feeding: NPO  Grooming: Stand by assistance  UE Bathing: Stand by assistance  LE Bathing: Moderate assistance  UE Dressing: Stand by assistance  LE Dressing: Maximum assistance  Toileting: Maximum assistance  Additional Comments: ADL scores based on skilled observations and clinical reasoning unless otherwise noted. Pt likely requires significant assist with lower body self-care tasks while standing due to needed Maximum assist for standing balance this date. Pt doffed and donned bilateral socks while seated edge of bed with stand by assist and Minimal verbal cues for use of bed to bring RLE onto bed for increased ease to doff/don. Pt demonstrates Fair carryover with further education warranted. Continue OT POC. UE Function  Hand Dominance  Hand Dominance: Right        LUE Strength  Gross LUE Strength: Exceptions to [facility]  L Hand General: 4-/5  LUE Strength Comment: Shoulder 4-/5, elbow 4-/5. Pt reports her L sided is \"weaker\" due to her MS, however MMT of LUE is slightly stronger than RUE this date. LUE Tone: Normotonic     LUE AROM (degrees)  LUE AROM : WFL     Left Hand AROM (degrees)  Left Hand AROM: WFL  RUE Strength  Gross RUE Strength: Exceptions to [place]  R Hand General: 3+/5  RUE Strength Comment: Shoulder 3+/5, elbow 3+/5      RUE Tone: Normotonic  RUE PROM (degrees)  RUE PROM: Exceptions  RUE General PROM: Hard end feel noted at right shoulder ~60% of PROM.  Reports history of shoulder surgery. Elbow WFL  RUE AROM (degrees)  RUE AROM : Exceptions  RUE General AROM: Shoulder AROM ~60%. Reports history of shoulder surgery. Elbow WFL. Right Hand AROM (degrees)  Right Hand AROM: WFL  Right Hand General AROM: Chronic arthritic deformities noted in R hand    Fine Motor Skills  Coordination  Movements Are Fluid And Coordinated: No  Coordination and Movement description: Left UE, Right UE, Fine motor impairments, Decreased speed, Decreased accuracy                           Mobility  Supine to Sit: Stand by assistance       Balance  Sitting Balance: Stand by assistance  Standing Balance: Maximum assistance(pt visibly swaying while standing)  Standing Balance  Time: 30 seconds  Activity: BUE support on RW, standing against edge of bed  Comment: pt visibly swaying while standing at edge of bed, reports dizziness. Therapist assisted Pt to sit and BP assessed. BP was 171/102. RN [employer] notified. Bed mobility  Rolling to Left: Stand by assistance  Supine to Sit: Stand by assistance  Scooting: Stand by assistance  Comment: dangles at the EOB w/ SBA; O2 sat 95% at the EOB; C/O dizziness at the EOB     Transfers  Sit to stand: Moderate assistance  Stand to sit: Moderate assistance  Transfer Comments: Unsafe to attempt steps this date due to Maximum assist for standing balance with visible sway and reported dizziness  Functional Activity Tolerance  Functional Activity Tolerance: Tolerates 30 min exercise with multiple rests   Assessment  Performance deficits / Impairments: Decreased functional mobility , Decreased ADL status, Decreased ROM, Decreased strength, Decreased safe awareness, Decreased endurance, Decreased sensation, Decreased balance, Decreased vision/visual deficit, Decreased high-level IADLs, Decreased fine motor control, Decreased coordination, Decreased posture  Treatment Diagnosis: Impaired self-care status.   Prognosis: Good  Decision Making: Medium Complexity  REQUIRES OT FOLLOW UP: Yes  Discharge Recommendations: Patient would benefit from continued therapy after discharge  Activity Tolerance: Patient Tolerated treatment well, Treatment limited secondary to medical complications (free text)(dizziness and elevated BP while seated EOB)         Functional Outcome Measures  -PAC Daily Activity Inpatient   How much help for putting on and taking off regular lower body clothing?: A Lot  How much help for Bathing?: A Lot  How much help for Toileting?: A Lot  How much help for putting on and taking off regular upper body clothing?: A Little  How much help for taking care of personal grooming?: A Little  How much help for eating meals?: A Little(NPO with video swallow study pending per SLP - would require setup)  -[facility] Inpatient Daily Activity Raw Score: 15  AM-PAC Inpatient ADL T-Scale Score : 34.69  ADL Inpatient CMS 0-100% Score: 56.46  ADL Inpatient CMS G-Code Modifier : CK       Goals  Patient Goals   Patient goals : To discharge to rehab prior to return home  Short term goals  Time Frame for Short term goals: By discharge  Short term goal 1: Pt will verbalize/demonstrate Good understanding of assistive equipment/durable medical equipment/modified techniques for increased independence in self-care and mobility. Short term goal 2: Pt will perform functional transfers and mobility during self-care with Moderate assist, rolling walker, and Good safety awareness. Short term goal 3: Pt will perform upper body bathing and dressing with setup and Good safety. Short term goal 4: Pt will perform lower body bathing/dressing and toileting tasks during self-care with Moderate assist and Good safety. Short term goal 5: Pt will actively participate in 15+ minutes of therapeutic exercise/functional activities to promote increased independence with self-care and mobility. Plan  Safety Devices  Safety Devices in place: Yes  Type of devices:  All fall risk precautions in place, Bed alarm in place, Call light within reach, Gait belt, Patient at risk for falls, Left in bed, Nurse notified     Plan  Times per week: 5-7  Times per day: Daily  Current Treatment Recommendations: Self-Care / ADL, Home Management Training, Strengthening, ROM, Balance Training, Functional Mobility Training, Endurance Training, Pain Management, Safety Education & Training, Patient/Caregiver Education & Training, Equipment Evaluation, Education, & procurement       Equipment Recommendations  Equipment Needed: (TBD)  OT Individual Minutes  Time In: 4759  Time Out: 7052  Minutes: 44    Electronically signed by Tennille Hill OT on 2/26/21 at 11:46 AM EST

## 2021-02-26 NOTE — PROGRESS NOTES
Physical Therapy    Facility/Department: [facility] PROGRESSIVE CARE  Initial Assessment    NAME: Felicia Bryson  : 1934  MRN: [id number]    Date of Service: 2021    Discharge Recommendations:  Patient would benefit from continued therapy after discharge   PT Equipment Recommendations  Equipment Needed: (TBD)    Assessment   Body structures, Functions, Activity limitations: Decreased strength;Decreased safe awareness;Decreased balance; Increased pain;Decreased functional mobility ; Decreased endurance  Assessment: continue per POC to maxmize potential for safe D/C  Treatment Diagnosis: impaired mobility due to weakness and debilitation  Specific instructions for Next Treatment: advance gait distance using wheeled walker, advance to steps w/ rail if dizziness improves and instruct in exercises for balance and strengthening exercises  Prognosis: Good  Decision Making: Medium Complexity  History: admitted due to exacerbation of MS  Exam: ROM, MMT, balance and mobility assessments  Clinical Presentation: SBA for bed mobility, mod x 1 sit <> stand but max x 1 for standing by the EOB w/ wheeled walker for 30 seconds- HIGH FALL RISK- C/O dizziness  PT Education: Goals; General Safety;Gait Training;PT Role;Plan of Care;Family Education;Home Exercise Program;Transfer Training;Energy Conservation; Functional Mobility Training  Barriers to Learning: [place]  REQUIRES PT FOLLOW UP: Yes  Activity Tolerance  Activity Tolerance: Patient limited by fatigue;Patient limited by pain; Patient limited by endurance       Patient Diagnosis(es): The primary encounter diagnosis was Multiple sclerosis exacerbation ([address] Diagnoses of Ataxia and Dizziness were also pertinent to this visit.      has a past medical history of Acute cystitis without hematuria, Arthritis, Chronic daily headache, GERD (gastroesophageal reflux disease), Hyperlipidemia, Hypertension, Moderate malnutrition ([address] Multiple sclerosis ([address] Neuropathy, Pneumonia, and Vitamin D deficiency. has a past surgical history that includes Cystocele repair; Rectocele repair; Hysterectomy; Cervical disc surgery; Cataract removal with implant (Right, 11/6/2014); Cataract removal with implant (Left, 12/2/2014); Total shoulder arthroplasty (Right, 04/20/2017); and shoulder surgery (Right, 2017). Restrictions  Restrictions/Precautions  Restrictions/Precautions: Fall Risk(troponins 27 on 2-, peripheral IV right antecubital)  Required Braces or Orthoses?: Yes(back  brace- wears mainly in the morning)  Implants present? : Metal implants(left shoulder arthroplasty)  Required Braces or Orthoses  Spinal: (states she has a back brace)  Vision/Hearing  Vision: Impaired  Vision Exceptions: Wears glasses at all times(wears prism glasses)  Hearing: Exceptions to [facility]  Hearing Exceptions: Hard of hearing/hearing concerns; No hearing aid     Subjective  General  Chart Reviewed: Yes  Patient assessed for rehabilitation services?: Yes  Response To Previous Treatment: Not applicable  Family / Caregiver Present: No  Referring Practitioner: Dr. Chelsey Jacobson. [facility]  Referral Date : 02/25/21  Diagnosis: exacerbation of MS  Follows Commands: Impaired  Other (Comment): OK per nurse Stephens to proceed w/ PT evaluation. Bedrest order is discontinued. General Comment  Comments: CT scan of the head shows no acute intracranial abnormality. Chest x-ray showed old healed rib fractures left 3rd through the 7th. Subjective  Subjective: C/O a headache that I can't get rid of. \"I have MS. \" Pt states that she was diagnosed in 1983 and has had chronic headaches since. Pt has C/O left sided weaknes and double vision.   Pain Screening  Patient Currently in Pain: Yes  Pain Assessment  Pain Assessment: 0-10  Pain Level: 9  Patient's Stated Pain Goal: No pain  Pain Type: Chronic pain  Pain Location: Head  Pain Orientation: Anterior(by ears)  Pain Descriptors: Aching;Constant  Pain Frequency: Continuous  Pain Onset: On-going Non-Pharmaceutical Pain Intervention(s): Ambulation/Increased Activity;Repositioned  Response to Pain Intervention: (asking for pain meds)  Multiple Pain Sites: No  Vital Signs  Patient Currently in Pain: Yes       Orientation  Orientation  Overall Orientation Status: Within Functional Limits(stated name and birthdate correctly, month: March corrected to February, year: 2021, President: Alyssa Holland)  Social/Functional History  Social/Functional History  Lives With: Alone  Type of Home: House  Home Layout: Two level, Able to Live on Main level with bedroom/bathroom  Home Access: Stairs to enter with rails  Entrance Stairs - Number of Steps: 3 steps w/ right rail  Entrance Stairs - Rails: Right  Bathroom Shower/Tub: Tub/Shower unit, Shower chair without back, Curtain  Bathroom Toilet: Standard  Bathroom Equipment: Grab bars in shower, Shower chair, Grab bars around toilet  Home Equipment: Rolling walker, Reacher  ADL Assistance: Independent(dtr will assist w/ showers when she is not feeling well, otherwise she is independent)  [address]: Needs assistance(dtr assists cleaning, grocery shopping  and laundry, pt does her own microwave or simple meals; dtr brings in some meals)  Homemaking Responsibilities: Yes(simple meals)  Ambulation Assistance: Independent(uses back brace and wheeled walker mainly in the morning and then progresses  to cane or walking around furniture in the later day, uses cane outside)  Transfer Assistance: Independent  Active : No  Mode of Transportation: Family  Occupation: Retired  IADL Comments: sleeps in a flat bed  Additional Comments: son and dtr both work day shift; states that she calls her dtr every morning  Cognition        Objective     Observation/Palpation  Observation: peripheral IV right antecubital    AROM RLE (degrees)  RLE AROM: WFL  AROM LLE (degrees)  LLE AROM : WFL  AROM RUE (degrees)  RUE General AROM: see OT for UE assessment  AROM LUE (degrees)  LUE General AROM: see OT for UE assessment  Strength RLE  Comment: grossly 4-/5  Strength LLE  Comment: C/O left sided weakness; grossly  3+/5  Strength RUE  Comment: see OT for UE assessment  Strength LUE  Comment: see OT for UE assessment     Sensation  Overall Sensation Status: Impaired(C/O neuropathy bilateral feet (sole of the foot))  Bed mobility  Rolling to Left: Stand by assistance  Supine to Sit: Stand by assistance  Scooting: Stand by assistance  Comment: dangles at the EOB w/ SBA; O2 sat 95% at the EOB; C/O dizziness at the EOB  Transfers  Sit to Stand: Moderate Assistance  Stand to sit: Moderate Assistance  Comment: pt stood next to the bed for less than 30 seconds w/ max x 1 for support- pt's standing balance was poor w/ increased sway initially progressing to almost a rotational sway. Pt placed back on the bed w/ BP taken in a seated position at the /102 and then returned to bed  Ambulation  Ambulation?: No(deferred due to safety concerns)  Stairs/Curb  Stairs?: No     Balance  Sitting - Static: Fair;+  Sitting - Dynamic: -;Fair  Standing - Static: Poor(used wheeled walker)  Standing - Dynamic: Poor(used wheeled walker)        Plan   Plan  Times per week: 5-7 treatments/ week  Times per day: (5-7 treatments/ week)  Specific instructions for Next Treatment: advance gait distance using wheeled walker, advance to steps w/ rail if dizziness improves and instruct in exercises for balance and strengthening exercises  Current Treatment Recommendations: Strengthening, Home Exercise Program, Safety Education & Training, Balance Training, Endurance Training, Patient/Caregiver Education & Training, Equipment Evaluation, Education, & procurement, Functional Mobility Training, Transfer Training, Gait Training, Stair training  Safety Devices  Type of devices:  All fall risk precautions in place, Call light within reach, Gait belt, Patient at risk for falls, Nurse notified, Bed alarm in place, Left in bed(nurse Kat)

## 2021-02-26 NOTE — DISCHARGE INSTR - COC
 Influenza Vaccine, unspecified formulation 11/01/2011, 10/25/2012, 10/01/2013, 11/20/2015    Influenza Virus Vaccine 11/01/2011, 10/25/2012, 10/01/2013, 11/20/2015, 09/01/2017    Influenza, High Dose (Fluzone 65 yrs and older) 10/17/2015, 09/26/2016, 09/26/2017, 10/10/2018    Influenza, Barbara Rey, IM, PF (6 mo and older Fluzone, Flulaval, Fluarix, and 3 yrs and older Afluria) 10/08/2019    Influenza, Quadv, adjuvanted, 65 yrs +, IM, PF (Fluad) 09/23/2020    Pneumococcal Conjugate 13-valent ([id number]) 01/27/2017    Pneumococcal Polysaccharide ([id number]) 12/01/2010       Active Problems:  Patient Active Problem List   Diagnosis Code    Ataxia R27.0    Multiple sclerosis ([facility] [address] G35    Essential hypertension I10    Hyperlipidemia E78.5    Atelectasis J98.11    Debility R53.81    Abnormal gait R26.9    Actinic keratosis L57.0    Enthesopathy of hip region M76.899    Generalized osteoarthritis M15.9    Idiopathic peripheral neuropathy G60.9    Vitamin D deficiency E55.9    Primary localized osteoarthrosis of the hip, left M16.12    Primary osteoarthritis of left hip M16.12    Acquired spondylolisthesis M43.10    Chronic midline low back pain with left-sided sciatica M54.42, G89.29    Spinal stenosis of lumbar region with neurogenic claudication M48.062    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    Multiple sclerosis exacerbation (HCC) G35    Diarrhea R19.7    Chronic headache R51.9, G89.29    Facial asymmetry Q67.0    Food poisoning A05.9       Isolation/Infection:   Isolation            Contact          Patient Infection Status       Infection Onset Added Last Indicated Last Indicated By Review Planned Expiration Resolved Resolved By    MRSA  11/07/14 11/07/14 Neil Oquendo RN        07/2013 rt shin    Resolved    COVID-19 Rule Out 02/25/21 02/25/21 02/25/21 COVID-19, Rapid (Ordered)   02/25/21 Rule-Out Test Resulted            Nurse Assessment: Last Vital Signs: BP (!) 170/83   Pulse 95   Temp 97.3 °F (36.3 °C) (Oral)   Resp 18   Ht 4' 11\" (1.499 m)   Wt 132 lb 15 oz (60.3 kg)   SpO2 96%   BMI 26.85 kg/m²     Last documented pain score (0-10 scale): Pain Level: 9  Last Weight:   Wt Readings from Last 1 Encounters:   21 132 lb 15 oz (60.3 kg)     Mental Status:  {IP PT MENTAL STATUS:}    IV Access:  { SEEMA IV ACCESS:727905444}    Nursing Mobility/ADLs:  Walking   {CHP DME NVHQ:264847881}  Transfer  {CHP DME IDFY:774808973}  Bathing  {CHP DME ACRT:288199551}  Dressing  {CHP DME DAPN:601160782}  Toileting  {CHP DME HGUW:822285823}  Feeding  {CHP DME YJJE:416126040}  Med Admin  {P DME NUZH:533918510}  Med Delivery   { SEEMA MED Delivery:574221016}    Wound Care Documentation and Therapy:        Elimination:  Continence:   · Bowel: {YES / QT:46933}  · Bladder: {YES / TA:33473}  Urinary Catheter: {Urinary Catheter:556491873}   Colostomy/Ileostomy/Ileal Conduit: {YES / EU:70397}       Date of Last BM: ***    Intake/Output Summary (Last 24 hours) at 2021 1407  Last data filed at 2021 0600  Gross per 24 hour   Intake 1400 ml   Output 100 ml   Net 1300 ml     I/O last 3 completed shifts: In: 1400 [P.O.:500;  I.V.:900]  Out: 100 [Urine:100]    Safety Concerns:     [address] [employer] Safety Concerns:435412286}    Impairments/Disabilities:      [address] [employer] Impairments/Disabilities:109921267}    Nutrition Therapy:  Current Nutrition Therapy:   [address] [employer] Diet List:375027855}    Routes of Feeding: {CHP DME Other Feedings:408816222}  Liquids: {Slp liquid thickness:58104}  Daily Fluid Restriction: {CHP DME Yes amt example:200897597}  Last Modified Barium Swallow with Video (Video Swallowing Test): {Done Not Done [facility]:265241870}    Treatments at the Time of Hospital Discharge:   Respiratory Treatments: ***  Oxygen Therapy:  {Therapy; copd oxygen:21046}  Ventilator:    { CC Vent PUV} Rehab Therapies: Physical Therapy, Occupational Therapy and Speech/Language Therapy  Weight Bearing Status/Restrictions: No weight bearing restirctions  Other Medical Equipment (for information only, NOT a DME order): Other Treatments: skilled nursing assessment, medication education and monitoring    Patient's personal belongings (please select all that are sent with patient):  {CHP DME Belongings:677894565}    RN SIGNATURE:  {Esignature:960596576}    CASE MANAGEMENT/SOCIAL WORK SECTION    Inpatient Status Date: 2/25/21    Readmission Risk Assessment Score:  Readmission Risk              Risk of Unplanned Readmission:        10           Discharging to Facility/ Agency   ? [address]  ? Phone: [phone]  · Fax [phone]  ·     Dialysis Facility (if applicable)   · Name:  · Address:  · Dialysis Schedule:  · Phone:  · Fax:    / signature: Electronically signed by Waldemar Villalpando RN on 2/26/21 at 2:07 PM EST    PHYSICIAN SECTION    Prognosis: Good    Condition at Discharge: Stable    Rehab Potential (if transferring to Rehab): Good    Recommended Labs or Other Treatments After Discharge:     Physician Certification: I certify the above information and transfer of Cony Oneal  is necessary for the continuing treatment of the diagnosis listed and that she requires Acute Rehab for greater 30 days.      Update Admission H&P: No change in H&P    PHYSICIAN SIGNATURE:  Electronically signed by Miguel Ángel House MD on 3/1/21 at 10:50 AM EST

## 2021-02-27 PROBLEM — R82.71 ASYMPTOMATIC BACTERIURIA: Status: ACTIVE | Noted: 2021-02-27

## 2021-02-27 LAB
ABSOLUTE EOS #: 0 K/UL (ref 0–0.4)
ABSOLUTE IMMATURE GRANULOCYTE: ABNORMAL K/UL (ref 0–0.3)
ABSOLUTE LYMPH #: 0.52 K/UL (ref 1–4.8)
ABSOLUTE MONO #: 0.17 K/UL (ref 0.1–1.3)
ANION GAP SERPL CALCULATED.3IONS-SCNC: 12 MMOL/L (ref 9–17)
BASOPHILS # BLD: 0 % (ref 0–2)
BASOPHILS ABSOLUTE: 0 K/UL (ref 0–0.2)
BUN BLDV-MCNC: 23 MG/DL (ref 8–23)
BUN/CREAT BLD: ABNORMAL (ref 9–20)
CALCIUM SERPL-MCNC: 8.2 MG/DL (ref 8.6–10.4)
CHLORIDE BLD-SCNC: 109 MMOL/L (ref 98–107)
CO2: 22 MMOL/L (ref 20–31)
CREAT SERPL-MCNC: 0.89 MG/DL (ref 0.5–0.9)
CULTURE: ABNORMAL
DIFFERENTIAL TYPE: ABNORMAL
EOSINOPHILS RELATIVE PERCENT: 0 % (ref 0–4)
GFR AFRICAN AMERICAN: >60 ML/MIN
GFR NON-AFRICAN AMERICAN: >60 ML/MIN
GFR SERPL CREATININE-BSD FRML MDRD: ABNORMAL ML/MIN/{1.73_M2}
GFR SERPL CREATININE-BSD FRML MDRD: ABNORMAL ML/MIN/{1.73_M2}
GLUCOSE BLD-MCNC: 184 MG/DL (ref 70–99)
HCT VFR BLD CALC: 33.5 % (ref 36–46)
HEMOGLOBIN: 11.4 G/DL (ref 12–16)
IMMATURE GRANULOCYTES: ABNORMAL %
LYMPHOCYTES # BLD: 3 % (ref 24–44)
Lab: ABNORMAL
MCH RBC QN AUTO: 31.3 PG (ref 26–34)
MCHC RBC AUTO-ENTMCNC: 34 G/DL (ref 31–37)
MCV RBC AUTO: 92.2 FL (ref 80–100)
MONOCYTES # BLD: 1 % (ref 1–7)
MORPHOLOGY: NORMAL
NRBC AUTOMATED: ABNORMAL PER 100 WBC
PDW BLD-RTO: 14.2 % (ref 11.5–14.9)
PLATELET # BLD: 295 K/UL (ref 150–450)
PLATELET ESTIMATE: ABNORMAL
PMV BLD AUTO: 7.7 FL (ref 6–12)
POTASSIUM SERPL-SCNC: 3.8 MMOL/L (ref 3.7–5.3)
RBC # BLD: 3.64 M/UL (ref 4–5.2)
RBC # BLD: ABNORMAL 10*6/UL
SEG NEUTROPHILS: 96 % (ref 36–66)
SEGMENTED NEUTROPHILS ABSOLUTE COUNT: 16.71 K/UL (ref 1.3–9.1)
SODIUM BLD-SCNC: 143 MMOL/L (ref 135–144)
SPECIMEN DESCRIPTION: ABNORMAL
WBC # BLD: 17.4 K/UL (ref 3.5–11)
WBC # BLD: ABNORMAL 10*3/UL

## 2021-02-27 PROCEDURE — 80048 BASIC METABOLIC PNL TOTAL CA: CPT

## 2021-02-27 PROCEDURE — 36415 COLL VENOUS BLD VENIPUNCTURE: CPT

## 2021-02-27 PROCEDURE — 2500000003 HC RX 250 WO HCPCS: Performed by: STUDENT IN AN ORGANIZED HEALTH CARE EDUCATION/TRAINING PROGRAM

## 2021-02-27 PROCEDURE — 6360000002 HC RX W HCPCS: Performed by: STUDENT IN AN ORGANIZED HEALTH CARE EDUCATION/TRAINING PROGRAM

## 2021-02-27 PROCEDURE — 97110 THERAPEUTIC EXERCISES: CPT

## 2021-02-27 PROCEDURE — 6370000000 HC RX 637 (ALT 250 FOR IP): Performed by: NURSE PRACTITIONER

## 2021-02-27 PROCEDURE — 97116 GAIT TRAINING THERAPY: CPT

## 2021-02-27 PROCEDURE — 6370000000 HC RX 637 (ALT 250 FOR IP): Performed by: STUDENT IN AN ORGANIZED HEALTH CARE EDUCATION/TRAINING PROGRAM

## 2021-02-27 PROCEDURE — 2580000003 HC RX 258: Performed by: STUDENT IN AN ORGANIZED HEALTH CARE EDUCATION/TRAINING PROGRAM

## 2021-02-27 PROCEDURE — 2060000000 HC ICU INTERMEDIATE R&B

## 2021-02-27 PROCEDURE — 85025 COMPLETE CBC W/AUTO DIFF WBC: CPT

## 2021-02-27 PROCEDURE — 99232 SBSQ HOSP IP/OBS MODERATE 35: CPT | Performed by: INTERNAL MEDICINE

## 2021-02-27 RX ORDER — PREDNISONE 10 MG/1
10 TABLET ORAL DAILY
Qty: 30 TABLET | Refills: 5 | Status: ON HOLD | OUTPATIENT
Start: 2021-03-02 | End: 2021-03-11 | Stop reason: HOSPADM

## 2021-02-27 RX ORDER — HYDROCHLOROTHIAZIDE 25 MG/1
25 TABLET ORAL ONCE
Status: COMPLETED | OUTPATIENT
Start: 2021-02-27 | End: 2021-02-27

## 2021-02-27 RX ORDER — AMLODIPINE BESYLATE 10 MG/1
10 TABLET ORAL ONCE
Status: COMPLETED | OUTPATIENT
Start: 2021-02-27 | End: 2021-02-27

## 2021-02-27 RX ORDER — PREDNISONE 20 MG/1
TABLET ORAL
Qty: 38 TABLET | Refills: 0 | Status: ON HOLD | OUTPATIENT
Start: 2021-02-27 | End: 2021-03-11 | Stop reason: HOSPADM

## 2021-02-27 RX ORDER — HYDROCODONE BITARTRATE AND ACETAMINOPHEN 5; 325 MG/1; MG/1
1 TABLET ORAL ONCE
Status: DISCONTINUED | OUTPATIENT
Start: 2021-02-27 | End: 2021-03-02 | Stop reason: HOSPADM

## 2021-02-27 RX ADMIN — GABAPENTIN 100 MG: 100 CAPSULE ORAL at 08:21

## 2021-02-27 RX ADMIN — METOPROLOL TARTRATE 10 MG: 1 INJECTION, SOLUTION INTRAVENOUS at 13:59

## 2021-02-27 RX ADMIN — AMITRIPTYLINE HYDROCHLORIDE 100 MG: 50 TABLET, FILM COATED ORAL at 21:06

## 2021-02-27 RX ADMIN — AMLODIPINE BESYLATE 10 MG: 10 TABLET ORAL at 16:59

## 2021-02-27 RX ADMIN — SODIUM CHLORIDE 500 MG: 9 INJECTION, SOLUTION INTRAVENOUS at 08:37

## 2021-02-27 RX ADMIN — HYDROCODONE BITARTRATE AND ACETAMINOPHEN 1 TABLET: 5; 325 TABLET ORAL at 08:21

## 2021-02-27 RX ADMIN — ENOXAPARIN SODIUM 30 MG: 30 INJECTION SUBCUTANEOUS at 08:21

## 2021-02-27 RX ADMIN — ACETAMINOPHEN 650 MG: 325 TABLET ORAL at 15:10

## 2021-02-27 RX ADMIN — Medication 30 MG: at 21:06

## 2021-02-27 RX ADMIN — SODIUM CHLORIDE, PRESERVATIVE FREE 10 ML: 5 INJECTION INTRAVENOUS at 21:07

## 2021-02-27 RX ADMIN — METOPROLOL TARTRATE 10 MG: 1 INJECTION, SOLUTION INTRAVENOUS at 08:38

## 2021-02-27 RX ADMIN — FAMOTIDINE 10 MG: 20 TABLET ORAL at 08:21

## 2021-02-27 RX ADMIN — SODIUM CHLORIDE: 9 INJECTION, SOLUTION INTRAVENOUS at 01:10

## 2021-02-27 RX ADMIN — GABAPENTIN 100 MG: 100 CAPSULE ORAL at 21:06

## 2021-02-27 RX ADMIN — HYDROCHLOROTHIAZIDE 25 MG: 25 TABLET ORAL at 19:37

## 2021-02-27 RX ADMIN — Medication 30 MG: at 08:20

## 2021-02-27 RX ADMIN — ATORVASTATIN CALCIUM 20 MG: 20 TABLET, FILM COATED ORAL at 08:21

## 2021-02-27 RX ADMIN — METOPROLOL TARTRATE 10 MG: 1 INJECTION, SOLUTION INTRAVENOUS at 01:16

## 2021-02-27 ASSESSMENT — ENCOUNTER SYMPTOMS
CHEST TIGHTNESS: 0
EYES NEGATIVE: 1
SHORTNESS OF BREATH: 0
ABDOMINAL PAIN: 1
ALLERGIC/IMMUNOLOGIC NEGATIVE: 1

## 2021-02-27 ASSESSMENT — PAIN SCALES - GENERAL
PAINLEVEL_OUTOF10: 0
PAINLEVEL_OUTOF10: 1
PAINLEVEL_OUTOF10: 10

## 2021-02-27 NOTE — PLAN OF CARE
Patient agreed to acute rehabilitation. Discharge orders placed. Plan to continue p.o. prednisone for the next 3 days. Follow-up appointment with urology for stress incontinence.

## 2021-02-27 NOTE — PROGRESS NOTES
[address] [facility].    PROGRESS NOTE             2/27/2021    7:27 AM    Name:   Maribell Perry  MRN:     [id number]     [place]:      [de-identified]   Room:   2112/2112-01  IP Day:  2  Admit Date:  2/25/2021  4:07 PM    PCP:  Deisi Manning MD  Code Status:  Full Code    Subjective:     C/C:   Chief Complaint   Patient presents with    Dizziness    Difficulty Walking    Cough    Back Pain     Interval History Status: not changed. Overnight: Patient on IV Solu-Medrol. Glucose was up from 117-184. WBCs 10.3-17.4. Blood pressure fluctuating overnight minimum 108/55 to max 175/93. One-time Norco given to resolve headache. Had an episode of nightmare. Plan to discharge on p.o. methylprednisone. Night notes from the consults and nurses were reviewed. Further management plan was discussed with the patient and the nurse.   Brief History: 80-year-old  female with past medical history of multiple sclerosis, spinal stenosis of the lumbar region, chronic severe headaches and facial asymmetry presented to us with complaint of lightheadedness and abnormal bowel movements for the past 12 hours. Patient states that on 2/24, she had a fish sandwich at 6 PM and at around 10 PM and she  started having abdominal pain, generalized, 7 out of 10 intensity and gurgling in nature followed by more than 3 episodes of diarrhea without any associated blood. The consistency of her semisolid and patient also felt nauseous and had an episode of vomiting. She slept at 12 PM and woke up feeling dizzy and around 7 AM and call her daughter. Patient was brought in the emergency department, she recalls that her last MS exacerbation years ago had a similar presentation. She also complains of severe chronic headache like migraines. In the ED, patient was given 125 mg of Solu-Medrol and her creatinine was elevated. She was started on maintenance fluid 100 mL/h for elevated creatinine 1.19 (baseline 0.80). Troponin were mildly elevated 27 and her GFR is 46. Home medications were resumed with respiratory care and eval consult. Severe headache patient was started on Norco and Fioricet as needed orders.       Review of Systems:     Review of Systems   Constitutional: Positive for fatigue. HENT: Negative. Eyes: Negative. Respiratory: Negative for chest tightness and shortness of breath. Cardiovascular: Negative for chest pain, palpitations and leg swelling. Gastrointestinal: Positive for abdominal pain. Endocrine: Negative. Musculoskeletal: Positive for gait problem. Allergic/Immunologic: Negative. Neurological: Positive for weakness, light-headedness, numbness and headaches. Medications: Allergies:     Allergies   Allergen Reactions    Bactrim [Sulfamethoxazole-Trimethoprim] Other (See Comments)     BLISTERS IN MOUTH  Lactose Intolerance (Gi) Diarrhea    Pcn [Penicillins] Hives    Lidoderm [Lidocaine] Rash    Macrobid [Nitrofurantoin] Nausea And Vomiting       Current Meds:   Scheduled Meds:    sodium chloride flush  10 mL Intravenous 2 times per day    enoxaparin  30 mg Subcutaneous Daily    famotidine  10 mg Oral Daily    methylPREDNISolone  500 mg Intravenous Daily    amitriptyline  100 mg Oral Nightly    gabapentin  100 mg Oral BID    atorvastatin  20 mg Oral Daily    dextromethorphan  30 mg Oral 2 times per day     Continuous Infusions:    sodium chloride 50 mL/hr at 21 0110     PRN Meds: metoprolol, sodium chloride flush, promethazine **OR** ondansetron, polyethylene glycol, acetaminophen **OR** acetaminophen, HYDROcodone-acetaminophen, traMADol, butalbital-APAP-caffeine, benzonatate    Data:     Past Medical History:   has a past medical history of Acute cystitis without hematuria, Arthritis, Chronic daily headache, GERD (gastroesophageal reflux disease), Hyperlipidemia, Hypertension, Moderate malnutrition ([address] Multiple sclerosis ([address] Neuropathy, Pneumonia, and Vitamin D deficiency. Social History:   reports that she has never smoked. She has never used smokeless tobacco. She reports that she does not drink alcohol or use drugs. Family History: History reviewed. No pertinent family history. Vitals:  BP (!) 167/89   Pulse 88   Temp 97.3 °F (36.3 °C) (Oral)   Resp 16   Ht 4' 11\" (1.499 m)   Wt 131 lb 6.3 oz (59.6 kg)   SpO2 95%   BMI 26.54 kg/m²   Temp (24hrs), Av.9 °F (36.6 °C), Min:97.3 °F (36.3 °C), Max:98.7 °F (37.1 °C)    No results for input(s): POCGLU in the last 72 hours. I/O(24Hr):     Intake/Output Summary (Last 24 hours) at 2021 07  Last data filed at 2021 2748  Gross per 24 hour   Intake 1335 ml   Output    Net 1335 ml       Labs:    [unfilled]    Lab Results   Component Value Date/Time    SPECIAL NOT REPORTED 2021 05:48 PM     Lab Results Component Value Date/Time    CULTURE  02/25/2021 05:48 PM     PRESUMPTIVE ID: GROUP D ENTEROCOCCUS >[id number] CFU/ML       [unfilled]    Radiology:    Ct Head Wo Contrast    Result Date: 2/25/2021  EXAMINATION: CT OF THE HEAD WITHOUT CONTRAST  2/25/2021 4:32 pm TECHNIQUE: CT of the head was performed without the administration of intravenous contrast. Dose modulation, iterative reconstruction, and/or weight based adjustment of the mA/kV was utilized to reduce the radiation dose to as low as reasonably achievable. COMPARISON: MRI of the brain July 11, 2020. HISTORY: ORDERING SYSTEM PROVIDED HISTORY: dizziness, vomiting TECHNOLOGIST PROVIDED HISTORY: dizziness, vomiting Decision Support Exception->Emergency Medical Condition (MA) Reason for Exam: Dizzines, vomiting Acuity: Unknown Type of Exam: Unknown Mechanism of Injury: Pt c/o worsening double vision and dizziness, h/o multiple sclerosis FINDINGS: BRAIN/VENTRICLES: There is no acute intracranial hemorrhage, mass effect or midline shift. No abnormal extra-axial fluid collection. The gray-white differentiation is maintained without evidence of an acute infarct. There is no evidence of hydrocephalus. The ventricular system is mildly prominent, but still well within limits of normal for size for the age of the patient and with compensatory prominence of sulcation. There are areas of decreased attenuation density in the deep white matter and periventricular regions compatible with areas of old micro ischemic change. There is some prominence of the cerebellar and vermian sulci. Calcifications are present in vessels at the base of the brain. ORBITS: The visualized portion of the orbits demonstrate no acute abnormality. SINUSES: The visualized paranasal sinuses and mastoid air cells demonstrate no acute abnormality. SOFT TISSUES/SKULL:  No acute abnormality of the visualized skull or soft tissues. Senescent changes compatible with the age of the patient. No acute intracranial abnormality. Xr Chest Portable    Result Date: 2/25/2021  EXAMINATION: ONE XRAY VIEW OF THE CHEST 2/25/2021 3:20 pm COMPARISON: 10/09/2019. HISTORY: ORDERING SYSTEM PROVIDED HISTORY: cough, dizziness TECHNOLOGIST PROVIDED HISTORY: cough, dizziness Reason for Exam: cough, dizziness Acuity: Unknown Type of Exam: Unknown FINDINGS: Postoperative changes were noted from prior discectomies in the lower cervical spine. Mild left ventricular enlargement was noted without pneumonia, interstitial edema or pleural effusions. Elevation of the right hemidiaphragm was noted. Old healed fractures involve the posterolateral aspect of the left 3rd, 4th, 5th, 6th and left 7th ribs. A right shoulder hemiarthroplasty was noted with humeral and glenoid components in their expected positions. Mild left ventricular enlargement without pneumonia, interstitial edema or pleural effusion. Old healed fractures involve the posterolateral aspect of the left 3rd through 7th ribs. Physical Examination:        Physical Exam  Constitutional:       General: She is awake. Appearance: Normal appearance. She is overweight. Comments: Patient had difficulty swallowing semisolids and solids. She vomited everything out and complaint of something being stuck in her throat. Patient was kept n.p.o. and speech pathology was consulted   HENT:      Head: Normocephalic and atraumatic. Nose: Nose normal.      Mouth/Throat:      Mouth: Mucous membranes are moist.   Eyes:      Pupils: Pupils are equal, round, and reactive to light. Neck:      Musculoskeletal: Normal range of motion. Cardiovascular:      Rate and Rhythm: Normal rate and regular rhythm. Pulses: Normal pulses. Heart sounds: Normal heart sounds. Pulmonary:      Effort: Pulmonary effort is normal.      Breath sounds: Normal breath sounds.    Abdominal: General: Bowel sounds are normal.      Comments: Abdominal pain with improvement since yesterday   Musculoskeletal:      Comments: Weakness on the left side and facial asymmetry   Neurological:      Mental Status: She is alert and oriented to person, place, and time. Cranial Nerves: Facial asymmetry present. Motor: Weakness present. Gait: Gait abnormal.      Comments: Severe headache. Psychiatric:         Mood and Affect: Mood normal.         Behavior: Behavior normal. Behavior is cooperative. Assessment:        Primary Problem  Multiple sclerosis exacerbation [facility])    Active Hospital Problems    Diagnosis Date Noted    Food poisoning [A05.9] 02/26/2021    Multiple sclerosis exacerbation ([facility] [address] Marvene Hash 02/25/2021    Diarrhea [R19.7] 02/25/2021    Chronic headache [R51.9, G89.29] 02/25/2021    Facial asymmetry [Q67.0] 02/25/2021    Acquired spondylolisthesis [M43.10] 10/08/2019    Essential hypertension [I10] 11/07/2018    Hyperlipidemia [E78.5] 11/07/2018    Abnormal gait [R26.9] 10/07/2014       Plan:         Patient status Admit as inpatient in the  Progressive Unit/Step down  Overall status: Improving  Date of admission: 2/25     ~MS exacerbation (improving)  Reasoning: Past medical history of multiple sclerosis. Abnormal bowel movement. Dizziness and ataxia. IV Solu-Medrol 500 mg daily for the next 5 days. Plan for outpatient prednisone therapy. Recommended dose 650 mg today 1250 mg for 3 to 7 days. Plan to give 2 more days of oral prednisone starting from tomorrow 2/28. Leukocytosis 17.8    Dysphagia (resolved)  Past medical history of dysphagia primarily with semisolids. Consult to speech pathologist: N.p.o. for solids and liquids. Modified barium swallow: Swallowing mechanism grossly within normal limits without evidence of aspiration.     ~EDINSON 2/2 dehydration 2/2 diarrhea/2 suspected food poisoning (resolved)  Cr: 0.89 (baseline 0.80)  Maintenance therapy 50 mL/h.

## 2021-02-27 NOTE — PROGRESS NOTES
RN spoke with Resident regarding patient's BP of 173/107 after giving the one time dose of Norvasc 10mg. Resident Maverick said he would put an order in.

## 2021-02-27 NOTE — CARE COORDINATION
Social Work-Phone call to ARU. They do not have any paperwork on patient for admission. Please call [phone] Tonio Stallings to see if they are able to admit. Read Dr Matthew Lowery notes and she appears to be appropriate for ARU. Left message for Elke.  [facility]

## 2021-02-27 NOTE — PLAN OF CARE
Problem: Falls - Risk of:  Goal: Will remain free from falls  Description: Will remain free from falls  2/27/2021 0430 by Rip Stinson RN  Outcome: Ongoing  Note: Patient remained free from falls. Call light within reach. Problem: Pain:  Goal: Pain level will decrease  Description: Pain level will decrease  2/27/2021 0430 by Rip Stinson RN  Outcome: Ongoing  Note: Patient given pain medication as ordered. Problem: Physical Regulation:  Goal: Will show no signs and symptoms of electrolyte imbalance  Description: Will show no signs and symptoms of electrolyte imbalance  2/27/2021 0430 by Rip Stinson RN  Outcome: Ongoing  Note: Electrolytes drawn this shift were within normal range.

## 2021-02-27 NOTE — PLAN OF CARE
Problem: Falls - Risk of:  Goal: Will remain free from falls  Description: Will remain free from falls  Outcome: Ongoing     Problem: Falls - Risk of:  Goal: Absence of physical injury  Description: Absence of physical injury  Outcome: Ongoing     Problem:  Activity:  Goal: Ability to tolerate increased activity will improve  Description: Ability to tolerate increased activity will improve  Outcome: Ongoing     Problem: Pain:  Goal: Pain level will decrease  Description: Pain level will decrease  Outcome: Ongoing     Problem: Pain:  Goal: Control of acute pain  Description: Control of acute pain  Outcome: Ongoing     Problem: Pain:  Goal: Control of chronic pain  Description: Control of chronic pain  Outcome: Ongoing     Problem: Fluid Volume:  Goal: Ability to achieve a balanced intake and output will improve  Description: Ability to achieve a balanced intake and output will improve  Outcome: Ongoing     Problem: Physical Regulation:  Goal: Ability to maintain clinical measurements within normal limits will improve  Description: Ability to maintain clinical measurements within normal limits will improve  Outcome: Ongoing     Problem: Physical Regulation:  Goal: Will show no signs and symptoms of electrolyte imbalance  Description: Will show no signs and symptoms of electrolyte imbalance  Outcome: Ongoing     Problem: Musculor/Skeletal Functional Status  Goal: Highest potential functional level  Outcome: Ongoing     Problem: Musculor/Skeletal Functional Status  Goal: Absence of falls  Outcome: Ongoing     Problem: Musculor/Skeletal Functional Status  Goal: Highest potential functional level  Outcome: Ongoing     Problem: Musculor/Skeletal Functional Status  Goal: Absence of falls  Outcome: Ongoing

## 2021-02-27 NOTE — PLAN OF CARE
Problem: Falls - Risk of:  Goal: Will remain free from falls  Description: Will remain free from falls  2/27/2021 1749 by Ling Maria RN  Outcome: Ongoing  Note: Patient remained free from falls. Call light within reach. Problem: Pain:  Goal: Pain level will decrease  Description: Pain level will decrease  2/27/2021 1749 by Ling Maria RN  Outcome: Ongoing  Note: Patient given pain medication as ordered.

## 2021-02-27 NOTE — PROGRESS NOTES
Physical Therapy  Facility/Department: [facility] PROGRESSIVE CARE  Daily Treatment Note  NAME: Lorin Godoy  : 1934  MRN: [id number]    Date of Service: 2021    Discharge Recommendations:  Patient would benefit from continued therapy after discharge   PT Equipment Recommendations  Equipment Needed: (TBD)    Assessment   Body structures, Functions, Activity limitations: Decreased strength;Decreased safe awareness;Decreased balance; Increased pain;Decreased functional mobility ; Decreased endurance  Treatment Diagnosis: impaired mobility due to weakness and debilitation  Specific instructions for Next Treatment: advance gait distance using wheeled walker, advance to steps w/ rail if dizziness improves and instruct in exercises for balance and strengthening exercises  History: admitted due to exacerbation of MS  Barriers to Learning: [facility]  REQUIRES PT FOLLOW UP: Yes  Activity Tolerance  Activity Tolerance: Patient limited by endurance; Patient Tolerated treatment well     Patient Diagnosis(es): The primary encounter diagnosis was Multiple sclerosis exacerbation ([address] Diagnoses of Ataxia, Dizziness, and Primary osteoarthritis involving multiple joints were also pertinent to this visit. has a past medical history of Acute cystitis without hematuria, Arthritis, Chronic daily headache, GERD (gastroesophageal reflux disease), Hyperlipidemia, Hypertension, Moderate malnutrition ([address] Multiple sclerosis ([address] Neuropathy, Pneumonia, and Vitamin D deficiency. has a past surgical history that includes Cystocele repair; Rectocele repair; Hysterectomy; Cervical disc surgery; Cataract removal with implant (Right, 2014); Cataract removal with implant (Left, 2014); Total shoulder arthroplasty (Right, 2017); and shoulder surgery (Right, ).     Restrictions  Restrictions/Precautions  Restrictions/Precautions: Fall Risk( troponins 27 on 2021, peripheral IV right antecubital) Required Braces or Orthoses?: Yes(back  brace- wears mainly in the morning)  Implants present? : Metal implants(right  shoulder arthroplasty)  Required Braces or Orthoses  Spinal: (states she has a back brace)  Subjective   General  Chart Reviewed: Yes  Response To Previous Treatment: Patient with no complaints from previous session. Family / Caregiver Present: No  Referring Practitioner: Dr. Rodrigo Robbins. Jonh  Subjective  Subjective: Patient complains of shortness of breath while sitting. Checked SpO2: 93%.  Patient reported sleeping well throughout the night   General Comment  Comments: LASHAUN Quesada approved therapy   Pain Screening  Patient Currently in Pain: Denies  Vital Signs  Pulse: 82  Heart Rate Source: Monitor  Patient Currently in Pain: Denies  Oxygen Therapy  SpO2: 93 %  Pulse Oximeter Device Mode: Intermittent  Pulse Oximeter Device Location: Finger  O2 Device: None (Room air)       Orientation  Orientation  Overall Orientation Status: Within Functional Limits  Objective   Bed mobility  Comment: patient up in chair upon arrival  Transfers  Sit to Stand: Minimal Assistance  Stand to sit: Minimal Assistance  Ambulation  Ambulation?: Yes  Ambulation 1  Surface: level tile  Device: Rolling Walker  Assistance: Contact guard assistance  Quality of Gait: forward flexed posture and small steps   Gait Deviations: Slow Emily;Decreased step length;Decreased step height  Distance: 88ft  Comments: patient commeneted how she felt better while walking and less SOB rather than when she was sitting   Stairs/Curb  Stairs?: No        Other exercises  Other exercises?: Yes  Other exercises 1: sit to stand x2  Other exercises 2: seated B LE exercises x20 reps   Other exercises 3: education on pursed lip breathing     Goals  Short term goals  Time Frame for Short term goals: 5-7 treatments/ week  Short term goal 1: pt to tolerate 1/2 hour of therapuetic exercise and activity

## 2021-02-28 PROBLEM — R73.9 HYPERGLYCEMIA: Status: ACTIVE | Noted: 2021-02-28

## 2021-02-28 LAB
ABSOLUTE EOS #: 0 K/UL (ref 0–0.4)
ABSOLUTE IMMATURE GRANULOCYTE: ABNORMAL K/UL (ref 0–0.3)
ABSOLUTE LYMPH #: 0.74 K/UL (ref 1–4.8)
ABSOLUTE MONO #: 0.74 K/UL (ref 0.1–1.3)
ANION GAP SERPL CALCULATED.3IONS-SCNC: 11 MMOL/L (ref 9–17)
BASOPHILS # BLD: 0 % (ref 0–2)
BASOPHILS ABSOLUTE: 0 K/UL (ref 0–0.2)
BUN BLDV-MCNC: 23 MG/DL (ref 8–23)
BUN/CREAT BLD: ABNORMAL (ref 9–20)
CALCIUM SERPL-MCNC: 8.9 MG/DL (ref 8.6–10.4)
CHLORIDE BLD-SCNC: 102 MMOL/L (ref 98–107)
CO2: 26 MMOL/L (ref 20–31)
CREAT SERPL-MCNC: 0.91 MG/DL (ref 0.5–0.9)
DIFFERENTIAL TYPE: ABNORMAL
EKG ATRIAL RATE: 86 BPM
EKG P AXIS: 47 DEGREES
EKG P-R INTERVAL: 148 MS
EKG Q-T INTERVAL: 382 MS
EKG QRS DURATION: 88 MS
EKG QTC CALCULATION (BAZETT): 457 MS
EKG R AXIS: 38 DEGREES
EKG T AXIS: 83 DEGREES
EKG VENTRICULAR RATE: 86 BPM
EOSINOPHILS RELATIVE PERCENT: 0 % (ref 0–4)
GFR AFRICAN AMERICAN: >60 ML/MIN
GFR NON-AFRICAN AMERICAN: 59 ML/MIN
GFR SERPL CREATININE-BSD FRML MDRD: ABNORMAL ML/MIN/{1.73_M2}
GFR SERPL CREATININE-BSD FRML MDRD: ABNORMAL ML/MIN/{1.73_M2}
GLUCOSE BLD-MCNC: 228 MG/DL (ref 70–99)
HCT VFR BLD CALC: 37.4 % (ref 36–46)
HEMOGLOBIN: 12.7 G/DL (ref 12–16)
IMMATURE GRANULOCYTES: ABNORMAL %
LYMPHOCYTES # BLD: 4 % (ref 24–44)
MCH RBC QN AUTO: 31.2 PG (ref 26–34)
MCHC RBC AUTO-ENTMCNC: 33.9 G/DL (ref 31–37)
MCV RBC AUTO: 92.2 FL (ref 80–100)
MONOCYTES # BLD: 4 % (ref 1–7)
MORPHOLOGY: ABNORMAL
NRBC AUTOMATED: ABNORMAL PER 100 WBC
PDW BLD-RTO: 14.4 % (ref 11.5–14.9)
PLATELET # BLD: 321 K/UL (ref 150–450)
PLATELET ESTIMATE: ABNORMAL
PMV BLD AUTO: 7.8 FL (ref 6–12)
POTASSIUM SERPL-SCNC: 4.1 MMOL/L (ref 3.7–5.3)
RBC # BLD: 4.06 M/UL (ref 4–5.2)
RBC # BLD: ABNORMAL 10*6/UL
SEG NEUTROPHILS: 92 % (ref 36–66)
SEGMENTED NEUTROPHILS ABSOLUTE COUNT: 17.12 K/UL (ref 1.3–9.1)
SODIUM BLD-SCNC: 139 MMOL/L (ref 135–144)
WBC # BLD: 18.6 K/UL (ref 3.5–11)
WBC # BLD: ABNORMAL 10*3/UL

## 2021-02-28 PROCEDURE — 2500000003 HC RX 250 WO HCPCS: Performed by: STUDENT IN AN ORGANIZED HEALTH CARE EDUCATION/TRAINING PROGRAM

## 2021-02-28 PROCEDURE — 97110 THERAPEUTIC EXERCISES: CPT

## 2021-02-28 PROCEDURE — 6360000002 HC RX W HCPCS: Performed by: STUDENT IN AN ORGANIZED HEALTH CARE EDUCATION/TRAINING PROGRAM

## 2021-02-28 PROCEDURE — 99232 SBSQ HOSP IP/OBS MODERATE 35: CPT | Performed by: INTERNAL MEDICINE

## 2021-02-28 PROCEDURE — 6370000000 HC RX 637 (ALT 250 FOR IP): Performed by: STUDENT IN AN ORGANIZED HEALTH CARE EDUCATION/TRAINING PROGRAM

## 2021-02-28 PROCEDURE — 80048 BASIC METABOLIC PNL TOTAL CA: CPT

## 2021-02-28 PROCEDURE — 2060000000 HC ICU INTERMEDIATE R&B

## 2021-02-28 PROCEDURE — 85025 COMPLETE CBC W/AUTO DIFF WBC: CPT

## 2021-02-28 PROCEDURE — 2580000003 HC RX 258: Performed by: STUDENT IN AN ORGANIZED HEALTH CARE EDUCATION/TRAINING PROGRAM

## 2021-02-28 PROCEDURE — 93010 ELECTROCARDIOGRAM REPORT: CPT | Performed by: INTERNAL MEDICINE

## 2021-02-28 PROCEDURE — 97116 GAIT TRAINING THERAPY: CPT

## 2021-02-28 PROCEDURE — 6370000000 HC RX 637 (ALT 250 FOR IP): Performed by: NURSE PRACTITIONER

## 2021-02-28 PROCEDURE — 36415 COLL VENOUS BLD VENIPUNCTURE: CPT

## 2021-02-28 RX ORDER — CIPROFLOXACIN 250 MG/1
250 TABLET, FILM COATED ORAL EVERY 12 HOURS SCHEDULED
Status: DISCONTINUED | OUTPATIENT
Start: 2021-02-28 | End: 2021-03-02 | Stop reason: HOSPADM

## 2021-02-28 RX ORDER — PREDNISONE 20 MG/1
20 TABLET ORAL DAILY
Status: DISCONTINUED | OUTPATIENT
Start: 2021-02-28 | End: 2021-02-28

## 2021-02-28 RX ORDER — PREDNISONE 20 MG/1
20 TABLET ORAL DAILY
Status: DISCONTINUED | OUTPATIENT
Start: 2021-03-01 | End: 2021-03-02 | Stop reason: HOSPADM

## 2021-02-28 RX ORDER — HYDROCHLOROTHIAZIDE 25 MG/1
25 TABLET ORAL ONCE
Status: COMPLETED | OUTPATIENT
Start: 2021-02-28 | End: 2021-02-28

## 2021-02-28 RX ADMIN — ATORVASTATIN CALCIUM 20 MG: 20 TABLET, FILM COATED ORAL at 08:40

## 2021-02-28 RX ADMIN — METOPROLOL TARTRATE 10 MG: 1 INJECTION, SOLUTION INTRAVENOUS at 06:16

## 2021-02-28 RX ADMIN — Medication 30 MG: at 08:41

## 2021-02-28 RX ADMIN — HYDROCHLOROTHIAZIDE 25 MG: 25 TABLET ORAL at 08:40

## 2021-02-28 RX ADMIN — GABAPENTIN 100 MG: 100 CAPSULE ORAL at 20:37

## 2021-02-28 RX ADMIN — GABAPENTIN 100 MG: 100 CAPSULE ORAL at 08:40

## 2021-02-28 RX ADMIN — CIPROFLOXACIN HYDROCHLORIDE 250 MG: 250 TABLET, FILM COATED ORAL at 20:37

## 2021-02-28 RX ADMIN — SODIUM CHLORIDE, PRESERVATIVE FREE 10 ML: 5 INJECTION INTRAVENOUS at 20:37

## 2021-02-28 RX ADMIN — SODIUM CHLORIDE 500 MG: 9 INJECTION, SOLUTION INTRAVENOUS at 08:41

## 2021-02-28 RX ADMIN — SODIUM CHLORIDE, PRESERVATIVE FREE 10 ML: 5 INJECTION INTRAVENOUS at 08:41

## 2021-02-28 RX ADMIN — ACETAMINOPHEN 650 MG: 325 TABLET ORAL at 08:40

## 2021-02-28 RX ADMIN — FAMOTIDINE 10 MG: 20 TABLET ORAL at 08:40

## 2021-02-28 RX ADMIN — Medication 30 MG: at 20:37

## 2021-02-28 RX ADMIN — HYDROCODONE BITARTRATE AND ACETAMINOPHEN 1 TABLET: 5; 325 TABLET ORAL at 06:19

## 2021-02-28 RX ADMIN — ENOXAPARIN SODIUM 30 MG: 30 INJECTION SUBCUTANEOUS at 08:40

## 2021-02-28 RX ADMIN — AMITRIPTYLINE HYDROCHLORIDE 100 MG: 50 TABLET, FILM COATED ORAL at 20:37

## 2021-02-28 RX ADMIN — ACETAMINOPHEN 650 MG: 325 TABLET ORAL at 14:34

## 2021-02-28 ASSESSMENT — PAIN SCALES - GENERAL
PAINLEVEL_OUTOF10: 0
PAINLEVEL_OUTOF10: 7
PAINLEVEL_OUTOF10: 3
PAINLEVEL_OUTOF10: 2

## 2021-02-28 ASSESSMENT — ENCOUNTER SYMPTOMS
ALLERGIC/IMMUNOLOGIC NEGATIVE: 1
EYES NEGATIVE: 1
ABDOMINAL PAIN: 1
SHORTNESS OF BREATH: 0
CHEST TIGHTNESS: 0

## 2021-02-28 NOTE — CARE COORDINATION
ONGOING DISCHARGE PLANNING NOTE:    Writer reviewed LSW notes, and discharge plan is still to follow for DC to ARU. Per Notes, Dr. Gin Pisano states patient is appropriate for ARU. Not sure they can admit on the weekend-no paperwork. Writer did call ARU, staff unsure of admission. Await, to see if Dr. Gin Pisano rounds today. Will follow.     Electronically signed by Asya Moran RN on 2/28/2021 at 10:11 AM

## 2021-02-28 NOTE — PROGRESS NOTES
Physical Therapy  Facility/Department: [facility] PROGRESSIVE CARE  Daily Treatment Note  NAME: Peter Mcgowan  : 1934  MRN: [id number]    Date of Service: 2021    Discharge Recommendations:  Patient would benefit from continued therapy after discharge   PT Equipment Recommendations  Equipment Needed: (TBD)    Assessment   Body structures, Functions, Activity limitations: Decreased strength;Decreased safe awareness;Decreased balance; Increased pain;Decreased functional mobility ; Decreased endurance  Treatment Diagnosis: impaired mobility due to weakness and debilitation  Specific instructions for Next Treatment: advance gait distance using wheeled walker, advance to steps w/ rail if dizziness improves and instruct in exercises for balance and strengthening exercises  History: admitted due to exacerbation of MS  REQUIRES PT FOLLOW UP: Yes  Activity Tolerance  Activity Tolerance: Patient limited by endurance; Patient Tolerated treatment well     Patient Diagnosis(es): The primary encounter diagnosis was Multiple sclerosis exacerbation ([address] Diagnoses of Ataxia, Dizziness, and Primary osteoarthritis involving multiple joints were also pertinent to this visit. has a past medical history of Acute cystitis without hematuria, Arthritis, Chronic daily headache, GERD (gastroesophageal reflux disease), Hyperlipidemia, Hypertension, Moderate malnutrition ([address] Multiple sclerosis ([address] Neuropathy, Pneumonia, and Vitamin D deficiency. has a past surgical history that includes Cystocele repair; Rectocele repair; Hysterectomy; Cervical disc surgery; Cataract removal with implant (Right, 2014); Cataract removal with implant (Left, 2014); Total shoulder arthroplasty (Right, 2017); and shoulder surgery (Right, ).     Restrictions  Restrictions/Precautions  Restrictions/Precautions: Fall Risk(troponins 27 on 2021, peripheral IV right antecubital)  Required Braces or Orthoses?: Yes(back  brace- wears mainly in the morning)  Implants present? : Metal implants(right  shoulder arthroplasty)  Required Braces or Orthoses  Spinal: (states she has a back brace)  Subjective   General  Chart Reviewed: Yes  Response To Previous Treatment: Patient with no complaints from previous session. Family / Caregiver Present: No  Referring Practitioner: Dr. Jannet Garcia.  Jonh  Subjective  Subjective: Patient up in bedside chair upon arrival, agreeable to therapy  General Comment  Comments: LASHAUN Zaragoza approved therapy   Pain Screening  Patient Currently in Pain: Denies  Vital Signs  Pulse: 88  Heart Rate Source: Monitor  BP: (!) 158/88  BP Location: Left upper arm  Patient Position: Sitting(post ambulation )  Patient Currently in Pain: Denies  Oxygen Therapy  SpO2: 98 %  Pulse Oximeter Device Mode: Intermittent  Pulse Oximeter Device Location: Finger  O2 Device: None (Room air)       Orientation  Orientation  Overall Orientation Status: Within Functional Limits  Objective      Transfers  Sit to Stand: Contact guard assistance  Stand to sit: Contact guard assistance  Ambulation  Ambulation?: Yes  Ambulation 1  Surface: level tile  Device: Rolling Walker  Assistance: Contact guard assistance  Quality of Gait: forward flexed posture and small steps   Gait Deviations: Slow Emily;Decreased step length;Decreased step height  Distance: 95ft  Comments: no LOB   Stairs/Curb  Stairs?: No        Other exercises  Other exercises?: Yes  Other exercises 1: sit to stand x2  Other exercises 2: seated B LE exercises x20 reps   Other exercises 3: standing mini squats x5 reps     Goals  Short term goals  Time Frame for Short term goals: 5-7 treatments/ week  Short term goal 1: pt to tolerate 1/2 hour of therapuetic exercise and activity  Short term goal 2: pt to demonstrate good technique for balance activities, strengthening exercises and energy conservation techniques  Short term goal 3: pt to demonstrate independent rolling in bed for position change Short term goal 4: pt to demonstrate transfers supine <> sit w/ supervision  Short term goal 5: pt to demonstrate transfers sit <> stand and bed <> chair using wheeled walker w/ min x 1  Short term goal 6: pt to demonstrate good sitting balance and fair + or better standing balance using wheeled walker  Short term goal 7: pt to demonstrate gait 30-50' using wheeled walker w/ min x 1 and W/C follow for safety  Short term goal 8: pt to advance to and demonstrate ability to ascend/ descend 4-6\" platform step using rail and AD w/ min x 1  Patient Goals   Patient goals : get stronger    Plan    Plan  Times per week: 5-7 treatments/ week  Times per day: (5-7 treatments/ week)  Specific instructions for Next Treatment: advance gait distance using wheeled walker, advance to steps w/ rail if dizziness improves and instruct in exercises for balance and strengthening exercises  Current Treatment Recommendations: Strengthening, Home Exercise Program, Safety Education & Training, Balance Training, Endurance Training, Patient/Caregiver Education & Training, Equipment Evaluation, Education, & procurement, Functional Mobility Training, Transfer Training, Gait Training, Stair training  Safety Devices  Type of devices:  All fall risk precautions in place, Call light within reach, Gait belt, Patient at risk for falls, Nurse notified, Left in chair        02/28/21 1350   PT Individual Minutes   Time In [facility]   Time Out 1117   Minutes 25     Electronically signed by Kayla Sun PTA on 2/28/21 at 1:52 PM EST

## 2021-02-28 NOTE — PROGRESS NOTES
[address]    PROGRESS NOTE             2/28/2021    7:42 AM    Name:   Maribell Perry  MRN:     [id number]     Acct:      [de-identified]   Room:   2112/2112-01  IP Day:  3  Admit Date:  2/25/2021  4:07 PM    PCP:  Deisi Manning MD  Code Status:  Full Code    Subjective:     C/C:   Chief Complaint   Patient presents with    Dizziness    Difficulty Walking    Cough    Back Pain     Interval History Status: not changed. Overnight: Blood pressure was elevated in the last 18 hours. Max 210/90. Patient given Norvasc. Blood pressure still 173/102. Patient given one-time dose of HCTZ and blood pressure slowly declined. Current blood pressure 137/82. HCTZ repeated. Plan is to go to ARU. Patient is looking forward for blood pressure stabilization and then discharged to ARU for rehabilitation. Overnight notes from the nurses and consults were reviewed. Patient was explained about the course of disease and further management plan. Brief History:     60-year-old  female with past medical history of multiple sclerosis, spinal stenosis of the lumbar region, chronic severe headaches and facial asymmetry presented to us with complaint of lightheadedness and abnormal bowel movements for the past 12 hours. Patient states that on 2/24, she had a fish sandwich at 6 PM and at around 10 PM and she  started having abdominal pain, generalized, 7 out of 10 intensity and gurgling in nature followed by more than 3 episodes of diarrhea without any associated blood. The consistency of her semisolid and patient also felt nauseous and had an episode of vomiting. She slept at 12 PM and woke up feeling dizzy and around 7 AM and call her daughter. Patient was brought in the emergency department, she recalls that her last MS exacerbation years ago had a similar presentation. She also complains of severe chronic headache like migraines. In the ED, patient was given 125 mg of Solu-Medrol and her creatinine was elevated. She was started on maintenance fluid 100 mL/h for elevated creatinine 1.19 (baseline 0.80). Troponin were mildly elevated 27 and her GFR is 46. Home medications were resumed with respiratory care and eval consult. Severe headache patient was started on Norco and Fioricet as needed orders.       Review of Systems:     Review of Systems   Constitutional: Positive for fatigue. HENT: Negative. Eyes: Negative. Respiratory: Negative for chest tightness and shortness of breath. Cardiovascular: Negative for chest pain, palpitations and leg swelling. Gastrointestinal: Positive for abdominal pain. Endocrine: Negative. Musculoskeletal: Positive for gait problem. Allergic/Immunologic: Negative. Neurological: Positive for weakness, light-headedness, numbness and headaches. Medications: Allergies:     Allergies   Allergen Reactions    Bactrim [Sulfamethoxazole-Trimethoprim] Other (See Comments)     BLISTERS IN MOUTH    Lactose Intolerance (Gi) Diarrhea    Pcn [Penicillins] Hives    Lidoderm [Lidocaine] Rash    Macrobid [Nitrofurantoin] Nausea And Vomiting       Current Meds:   Scheduled Meds:    HYDROcodone 5 mg - acetaminophen  1 tablet Oral Once    sodium chloride flush  10 mL Intravenous 2 times per day    enoxaparin  30 mg Subcutaneous Daily    famotidine  10 mg Oral Daily    methylPREDNISolone  500 mg Intravenous Daily    amitriptyline  100 mg Oral Nightly    gabapentin  100 mg Oral BID    atorvastatin  20 mg Oral Daily    dextromethorphan  30 mg Oral 2 times per day     Continuous Infusions:     PRN Meds: metoprolol, sodium chloride flush, promethazine **OR** ondansetron, polyethylene glycol, acetaminophen **OR** acetaminophen, HYDROcodone-acetaminophen, traMADol, butalbital-APAP-caffeine, benzonatate    Data:     Past Medical History:   has a past medical history of Acute cystitis without hematuria, Arthritis, Chronic daily headache, GERD (gastroesophageal reflux disease), Hyperlipidemia, Hypertension, Moderate malnutrition ([address] Multiple sclerosis ([address] Neuropathy, Pneumonia, and Vitamin D deficiency. Social History:   reports that she has never smoked. She has never used smokeless tobacco. She reports that she does not drink alcohol or use drugs. Family History: History reviewed. No pertinent family history. Vitals:  BP (!) 167/82   Pulse 81   Temp 97.7 °F (36.5 °C) (Oral)   Resp 16   Ht 4' 11\" (1.499 m)   Wt 130 lb 8.2 oz (59.2 kg)   SpO2 92%   BMI 26.36 kg/m²   Temp (24hrs), Av °F (36.7 °C), Min:97.7 °F (36.5 °C), Max:98.3 °F (36.8 °C)    No results for input(s): POCGLU in the last 72 hours. I/O(24Hr): Intake/Output Summary (Last 24 hours) at 2021 4652  Last data filed at 2021 0956  Gross per 24 hour   Intake 75 ml   Output    Net 75 ml       Labs:    [unfilled]    Lab Results   Component Value Date/Time    SPECIAL NOT REPORTED 2021 05:48 PM     Lab Results   Component Value Date/Time    CULTURE ENTEROCOCCUS FAECALIS >[id number] CFU/ML (A) 2021 05:48 PM       [unfilled]    Radiology:    Ct Head Wo Contrast    Result Date: 2021  EXAMINATION: CT OF THE HEAD WITHOUT CONTRAST  2021 4:32 pm TECHNIQUE: CT of the head was performed without the administration of intravenous contrast. Dose modulation, iterative reconstruction, and/or weight based adjustment of the mA/kV was utilized to reduce the radiation dose to as low as reasonably achievable. COMPARISON: MRI of the brain 2020.  HISTORY: ORDERING SYSTEM PROVIDED HISTORY: dizziness, vomiting TECHNOLOGIST PROVIDED HISTORY: dizziness, vomiting Decision Support Exception->Emergency Medical Condition (MA) Reason for Exam: Dizzines, vomiting Acuity: Unknown Type of Exam: Unknown Mechanism of Injury: Pt c/o worsening double vision and dizziness, h/o multiple sclerosis FINDINGS: BRAIN/VENTRICLES: There is no acute intracranial hemorrhage, mass effect or midline shift. No abnormal extra-axial fluid collection. The gray-white differentiation is maintained without evidence of an acute infarct. There is no evidence of hydrocephalus. The ventricular system is mildly prominent, but still well within limits of normal for size for the age of the patient and with compensatory prominence of sulcation. There are areas of decreased attenuation density in the deep white matter and periventricular regions compatible with areas of old micro ischemic change. There is some prominence of the cerebellar and vermian sulci. Calcifications are present in vessels at the base of the brain. ORBITS: The visualized portion of the orbits demonstrate no acute abnormality. SINUSES: The visualized paranasal sinuses and mastoid air cells demonstrate no acute abnormality. SOFT TISSUES/SKULL:  No acute abnormality of the visualized skull or soft tissues. Senescent changes compatible with the age of the patient. No acute intracranial abnormality. Xr Chest Portable    Result Date: 2/25/2021  EXAMINATION: ONE XRAY VIEW OF THE CHEST 2/25/2021 3:20 pm COMPARISON: 10/09/2019. HISTORY: ORDERING SYSTEM PROVIDED HISTORY: cough, dizziness TECHNOLOGIST PROVIDED HISTORY: cough, dizziness Reason for Exam: cough, dizziness Acuity: Unknown Type of Exam: Unknown FINDINGS: Postoperative changes were noted from prior discectomies in the lower cervical spine. Mild left ventricular enlargement was noted without pneumonia, interstitial edema or pleural effusions. Elevation of the right hemidiaphragm was noted. Old healed fractures involve the posterolateral aspect of the left 3rd, 4th, 5th, 6th and left 7th ribs. A right shoulder hemiarthroplasty was noted with humeral and glenoid components in their expected positions. Mild left ventricular enlargement without pneumonia, interstitial edema or pleural effusion.  Old healed fractures involve the posterolateral aspect of the left 3rd through 7th ribs. Physical Examination:        Physical Exam  Constitutional:       General: She is awake. Appearance: Normal appearance. She is overweight. Comments: Patient had difficulty swallowing semisolids and solids. She vomited everything out and complaint of something being stuck in her throat. Patient was kept n.p.o. and speech pathology was consulted   HENT:      Head: Normocephalic and atraumatic. Nose: Nose normal.      Mouth/Throat:      Mouth: Mucous membranes are moist.   Eyes:      Pupils: Pupils are equal, round, and reactive to light. Neck:      Musculoskeletal: Normal range of motion. Cardiovascular:      Rate and Rhythm: Normal rate and regular rhythm. Pulses: Normal pulses. Heart sounds: Normal heart sounds. Pulmonary:      Effort: Pulmonary effort is normal.      Breath sounds: Normal breath sounds. Abdominal:      General: Bowel sounds are normal.      Comments: Abdominal pain with improvement since yesterday   Musculoskeletal:      Comments: Weakness on the left side and facial asymmetry   Neurological:      Mental Status: She is alert and oriented to person, place, and time. Cranial Nerves: Facial asymmetry present. Motor: Weakness present. Gait: Gait abnormal.      Comments: Severe headache. Psychiatric:         Mood and Affect: Mood normal.         Behavior: Behavior normal. Behavior is cooperative.            Assessment:        Primary Problem  Multiple sclerosis exacerbation [facility])    Active Hospital Problems    Diagnosis Date Noted    Asymptomatic bacteriuria [R82.71] 02/27/2021    Food poisoning [A05.9] 02/26/2021    [facility] Tiff Vicktz 02/25/2021    Diarrhea [R19.7] 02/25/2021    Chronic headache [R51.9, G89.29] 02/25/2021    Facial asymmetry [Q67.0] 02/25/2021    Acquired spondylolisthesis [M43.10] 10/08/2019    Essential hypertension Ramana Andrade 11/07/2018    Hyperlipidemia [E78.5] 11/07/2018    Abnormal gait [R26.9] 10/07/2014       Plan:         Patient status Admit as inpatient in the  Progressive Unit/Step down  Overall status: Improving  Date of admission: 2/25     ~MS exacerbation (improving)  Reasoning: Past medical history of multiple sclerosis. Abnormal bowel movement. Dizziness and ataxia. IV Solu-Medrol 500 mg daily for the next 5 days. Plan for outpatient prednisone therapy. Recommended dose 650 mg today 1250 mg for 3 to 7 days. Plan to give 2 more days of oral prednisone starting from tomorrow 2/28. Leukocytosis 17.8. Dysphagia (resolved)  Past medical history of dysphagia primarily with semisolids. Consult to speech pathologist: N.p.o. for solids and liquids. Modified barium swallow: Swallowing mechanism grossly within normal limits without evidence of aspiration.     ~EDINSON 2/2 dehydration 2/2 diarrhea/2 suspected food poisoning (resolved)  Cr: 0.89 (baseline 0.80)  Maintenance therapy 50 mL/h. FeNa  1.3 %. Intrinsic  Diarrhea resolved.     ~Lightheadedness   Orthostatic pulse and pressure pending.     ~Chronic severe headache  Norco.  Tramadol  Fioricet as needed    Hyperglycemia  GLU: 228. Patient on methyl prednisone. ~Hyperlipidemia  Atorvastatin 20 mg.    ~Hypertension   Norvasc 10 mg once on 2/27. Blood pressure still elevated. HCTZ 25 mg oral once. Patient on IV Solu-Medrol 500 mg IV for MS exacerbation. Respiratory care and eval on board. DVT prophylaxis: Enoxaparin 40 mg subcu  GI prophylaxis: Pepcid 20 mg tablet  Discharge planning: Awaiting recommendations  Diet: Renal diet  OT: Patient will benefit from intensive level of therapy after discharge from the facility. They should be able to tolerate 3-hours of Combined OT/PT/ST over 5 days/week or at least 900 minutes of  Combined Therapy over 7 days. Equipment Needed: (TBD).   PM&R on board: Patient would benefit from continued therapy after discharge. Myah Addison MD  2/28/2021  7:42 AM     Attestation and add on       I have discussed the care of Virginia Kumar , including pertinent history and exam findings,      2/28/21    with the resident. I have seen and examined the patient and the key elements of all parts of the encounter have been performed by me . I agree with the assessment, plan and orders as documented by the resident. Principal Problem:    Multiple sclerosis exacerbation (HCC)  Active Problems:    Essential hypertension    Hyperlipidemia    Abnormal gait    Acquired spondylolisthesis    Diarrhea    Chronic headache    Facial asymmetry    Food poisoning    Asymptomatic bacteriuria    Hyperglycemia  Resolved Problems:    * No resolved hospital problems. *            ---- ;        Medications: Allergies: Allergies   Allergen Reactions    Bactrim [Sulfamethoxazole-Trimethoprim] Other (See Comments)     BLISTERS IN MOUTH    Lactose Intolerance (Gi) Diarrhea    Pcn [Penicillins] Hives    Lidoderm [Lidocaine] Rash    Macrobid [Nitrofurantoin] Nausea And Vomiting       Current Meds:   Scheduled Meds:    [START ON 3/1/2021] predniSONE  20 mg Oral Daily    ciprofloxacin  250 mg Oral 2 times per day    HYDROcodone 5 mg - acetaminophen  1 tablet Oral Once    sodium chloride flush  10 mL Intravenous 2 times per day    enoxaparin  30 mg Subcutaneous Daily    famotidine  10 mg Oral Daily    amitriptyline  100 mg Oral Nightly    gabapentin  100 mg Oral BID    atorvastatin  20 mg Oral Daily    dextromethorphan  30 mg Oral 2 times per day     Continuous Infusions:   PRN Meds: metoprolol, sodium chloride flush, promethazine **OR** ondansetron, polyethylene glycol, acetaminophen **OR** acetaminophen, HYDROcodone-acetaminophen, traMADol, butalbital-APAP-caffeine, benzonatate        [facility] [address]    Phone [phone]   Fax: [phone]  Answering Service: [phone]

## 2021-02-28 NOTE — CARE COORDINATION
Writer spoke to Jelena Thibodeaux, from ARU, in regards to admission to ARU. Jelena Thibodeaux states \"they have not started any paperwork for Pre-Cert & that will not be able to be done until inés\". Writer notified, Charge Nurse [address] [place] [address], of this.

## 2021-02-28 NOTE — PLAN OF CARE
Problem: Falls - Risk of:  Goal: Will remain free from falls  Description: Will remain free from falls  2/28/2021 0354 by Tia Cruz RN  Outcome: Ongoing  Note: Patient remained free from falls. Call light within reach. Problem: Pain:  Goal: Pain level will decrease  Description: Pain level will decrease  2/28/2021 0354 by Tia Cruz RN  Outcome: Ongoing  Note: Patient given pain medication as ordered. Problem: Musculor/Skeletal Functional Status  Goal: Highest potential functional level  Outcome: Ongoing  Note: Patient up to bathroom multiple times over night with walker and stand by assistance.

## 2021-03-01 LAB
ABSOLUTE EOS #: 0 K/UL (ref 0–0.4)
ABSOLUTE IMMATURE GRANULOCYTE: ABNORMAL K/UL (ref 0–0.3)
ABSOLUTE LYMPH #: 0.87 K/UL (ref 1–4.8)
ABSOLUTE MONO #: 0.87 K/UL (ref 0.1–1.3)
ANION GAP SERPL CALCULATED.3IONS-SCNC: 13 MMOL/L (ref 9–17)
BASOPHILS # BLD: 0 % (ref 0–2)
BASOPHILS ABSOLUTE: 0 K/UL (ref 0–0.2)
BUN BLDV-MCNC: 27 MG/DL (ref 8–23)
BUN/CREAT BLD: ABNORMAL (ref 9–20)
CALCIUM SERPL-MCNC: 8.5 MG/DL (ref 8.6–10.4)
CHLORIDE BLD-SCNC: 100 MMOL/L (ref 98–107)
CO2: 28 MMOL/L (ref 20–31)
CREAT SERPL-MCNC: 0.99 MG/DL (ref 0.5–0.9)
DIFFERENTIAL TYPE: ABNORMAL
EOSINOPHILS RELATIVE PERCENT: 0 % (ref 0–4)
GFR AFRICAN AMERICAN: >60 ML/MIN
GFR NON-AFRICAN AMERICAN: 53 ML/MIN
GFR SERPL CREATININE-BSD FRML MDRD: ABNORMAL ML/MIN/{1.73_M2}
GFR SERPL CREATININE-BSD FRML MDRD: ABNORMAL ML/MIN/{1.73_M2}
GLUCOSE BLD-MCNC: 163 MG/DL (ref 65–105)
GLUCOSE BLD-MCNC: 174 MG/DL (ref 65–105)
GLUCOSE BLD-MCNC: 203 MG/DL (ref 65–105)
GLUCOSE BLD-MCNC: 229 MG/DL (ref 70–99)
HCT VFR BLD CALC: 36.8 % (ref 36–46)
HEMOGLOBIN: 12.6 G/DL (ref 12–16)
IMMATURE GRANULOCYTES: ABNORMAL %
LYMPHOCYTES # BLD: 5 % (ref 24–44)
MAGNESIUM: 2.1 MG/DL (ref 1.6–2.6)
MCH RBC QN AUTO: 31.4 PG (ref 26–34)
MCHC RBC AUTO-ENTMCNC: 34.3 G/DL (ref 31–37)
MCV RBC AUTO: 91.4 FL (ref 80–100)
MONOCYTES # BLD: 5 % (ref 1–7)
MORPHOLOGY: ABNORMAL
NRBC AUTOMATED: ABNORMAL PER 100 WBC
PDW BLD-RTO: 14.1 % (ref 11.5–14.9)
PLATELET # BLD: 297 K/UL (ref 150–450)
PLATELET ESTIMATE: ABNORMAL
PMV BLD AUTO: 7.7 FL (ref 6–12)
POTASSIUM SERPL-SCNC: 2.9 MMOL/L (ref 3.7–5.3)
RBC # BLD: 4.03 M/UL (ref 4–5.2)
RBC # BLD: ABNORMAL 10*6/UL
SEG NEUTROPHILS: 90 % (ref 36–66)
SEGMENTED NEUTROPHILS ABSOLUTE COUNT: 15.56 K/UL (ref 1.3–9.1)
SODIUM BLD-SCNC: 141 MMOL/L (ref 135–144)
WBC # BLD: 17.3 K/UL (ref 3.5–11)
WBC # BLD: ABNORMAL 10*3/UL

## 2021-03-01 PROCEDURE — 99232 SBSQ HOSP IP/OBS MODERATE 35: CPT | Performed by: INTERNAL MEDICINE

## 2021-03-01 PROCEDURE — 97110 THERAPEUTIC EXERCISES: CPT

## 2021-03-01 PROCEDURE — 6360000002 HC RX W HCPCS: Performed by: STUDENT IN AN ORGANIZED HEALTH CARE EDUCATION/TRAINING PROGRAM

## 2021-03-01 PROCEDURE — 2060000000 HC ICU INTERMEDIATE R&B

## 2021-03-01 PROCEDURE — 6370000000 HC RX 637 (ALT 250 FOR IP): Performed by: STUDENT IN AN ORGANIZED HEALTH CARE EDUCATION/TRAINING PROGRAM

## 2021-03-01 PROCEDURE — 97116 GAIT TRAINING THERAPY: CPT

## 2021-03-01 PROCEDURE — 6370000000 HC RX 637 (ALT 250 FOR IP): Performed by: INTERNAL MEDICINE

## 2021-03-01 PROCEDURE — 2580000003 HC RX 258: Performed by: STUDENT IN AN ORGANIZED HEALTH CARE EDUCATION/TRAINING PROGRAM

## 2021-03-01 PROCEDURE — 36415 COLL VENOUS BLD VENIPUNCTURE: CPT

## 2021-03-01 PROCEDURE — 82947 ASSAY GLUCOSE BLOOD QUANT: CPT

## 2021-03-01 PROCEDURE — 83735 ASSAY OF MAGNESIUM: CPT

## 2021-03-01 PROCEDURE — 85025 COMPLETE CBC W/AUTO DIFF WBC: CPT

## 2021-03-01 PROCEDURE — 2500000003 HC RX 250 WO HCPCS: Performed by: STUDENT IN AN ORGANIZED HEALTH CARE EDUCATION/TRAINING PROGRAM

## 2021-03-01 PROCEDURE — 6360000002 HC RX W HCPCS: Performed by: NURSE PRACTITIONER

## 2021-03-01 PROCEDURE — 6370000000 HC RX 637 (ALT 250 FOR IP): Performed by: NURSE PRACTITIONER

## 2021-03-01 PROCEDURE — 80048 BASIC METABOLIC PNL TOTAL CA: CPT

## 2021-03-01 PROCEDURE — 2580000003 HC RX 258: Performed by: NURSE PRACTITIONER

## 2021-03-01 RX ORDER — CIPROFLOXACIN 250 MG/1
250 TABLET, FILM COATED ORAL EVERY 12 HOURS SCHEDULED
Status: CANCELLED | OUTPATIENT
Start: 2021-03-01 | End: 2021-03-05

## 2021-03-01 RX ORDER — BUTALBITAL, ACETAMINOPHEN AND CAFFEINE 300; 40; 50 MG/1; MG/1; MG/1
1 CAPSULE ORAL EVERY 6 HOURS PRN
Status: CANCELLED | OUTPATIENT
Start: 2021-03-01

## 2021-03-01 RX ORDER — DEXTROSE MONOHYDRATE 25 G/50ML
12.5 INJECTION, SOLUTION INTRAVENOUS PRN
Status: CANCELLED | OUTPATIENT
Start: 2021-03-01

## 2021-03-01 RX ORDER — LISINOPRIL 5 MG/1
5 TABLET ORAL NIGHTLY
Status: DISCONTINUED | OUTPATIENT
Start: 2021-03-01 | End: 2021-03-02

## 2021-03-01 RX ORDER — HYDROCODONE BITARTRATE AND ACETAMINOPHEN 5; 325 MG/1; MG/1
1 TABLET ORAL EVERY 6 HOURS PRN
Status: CANCELLED | OUTPATIENT
Start: 2021-03-01

## 2021-03-01 RX ORDER — FAMOTIDINE 20 MG/1
10 TABLET, FILM COATED ORAL DAILY
Status: CANCELLED | OUTPATIENT
Start: 2021-03-02

## 2021-03-01 RX ORDER — DEXTROSE MONOHYDRATE 25 G/50ML
12.5 INJECTION, SOLUTION INTRAVENOUS PRN
Status: DISCONTINUED | OUTPATIENT
Start: 2021-03-01 | End: 2021-03-02 | Stop reason: HOSPADM

## 2021-03-01 RX ORDER — BENZONATATE 100 MG/1
100 CAPSULE ORAL 3 TIMES DAILY PRN
Status: CANCELLED | OUTPATIENT
Start: 2021-03-01

## 2021-03-01 RX ORDER — HYDRALAZINE HYDROCHLORIDE 50 MG/1
50 TABLET, FILM COATED ORAL EVERY 6 HOURS PRN
Status: DISCONTINUED | OUTPATIENT
Start: 2021-03-01 | End: 2021-03-02 | Stop reason: HOSPADM

## 2021-03-01 RX ORDER — POTASSIUM CHLORIDE 20 MEQ/1
40 TABLET, EXTENDED RELEASE ORAL PRN
Status: CANCELLED | OUTPATIENT
Start: 2021-03-01

## 2021-03-01 RX ORDER — HYDROCHLOROTHIAZIDE 25 MG/1
25 TABLET ORAL ONCE
Status: COMPLETED | OUTPATIENT
Start: 2021-03-01 | End: 2021-03-01

## 2021-03-01 RX ORDER — ACETAMINOPHEN 650 MG/1
650 SUPPOSITORY RECTAL EVERY 6 HOURS PRN
Status: CANCELLED | OUTPATIENT
Start: 2021-03-01

## 2021-03-01 RX ORDER — LISINOPRIL 5 MG/1
5 TABLET ORAL NIGHTLY
Status: CANCELLED | OUTPATIENT
Start: 2021-03-01

## 2021-03-01 RX ORDER — AMITRIPTYLINE HYDROCHLORIDE 50 MG/1
100 TABLET, FILM COATED ORAL NIGHTLY
Status: CANCELLED | OUTPATIENT
Start: 2021-03-01

## 2021-03-01 RX ORDER — DEXTROSE MONOHYDRATE 50 MG/ML
100 INJECTION, SOLUTION INTRAVENOUS PRN
Status: DISCONTINUED | OUTPATIENT
Start: 2021-03-01 | End: 2021-03-02 | Stop reason: HOSPADM

## 2021-03-01 RX ORDER — GABAPENTIN 100 MG/1
100 CAPSULE ORAL 2 TIMES DAILY
Status: CANCELLED | OUTPATIENT
Start: 2021-03-01

## 2021-03-01 RX ORDER — DEXTROSE MONOHYDRATE 50 MG/ML
100 INJECTION, SOLUTION INTRAVENOUS PRN
Status: CANCELLED | OUTPATIENT
Start: 2021-03-01

## 2021-03-01 RX ORDER — DEXTROMETHORPHAN POLISTIREX 30 MG/5ML
30 SUSPENSION ORAL EVERY 12 HOURS SCHEDULED
Status: CANCELLED | OUTPATIENT
Start: 2021-03-01

## 2021-03-01 RX ORDER — POTASSIUM CHLORIDE 7.45 MG/ML
10 INJECTION INTRAVENOUS PRN
Status: DISCONTINUED | OUTPATIENT
Start: 2021-03-01 | End: 2021-03-02 | Stop reason: HOSPADM

## 2021-03-01 RX ORDER — SPIRONOLACTONE 25 MG/1
25 TABLET ORAL DAILY
Status: DISCONTINUED | OUTPATIENT
Start: 2021-03-01 | End: 2021-03-02 | Stop reason: HOSPADM

## 2021-03-01 RX ORDER — METOPROLOL TARTRATE 5 MG/5ML
10 INJECTION INTRAVENOUS EVERY 4 HOURS PRN
Status: CANCELLED | OUTPATIENT
Start: 2021-03-01

## 2021-03-01 RX ORDER — NICOTINE POLACRILEX 4 MG
15 LOZENGE BUCCAL PRN
Status: DISCONTINUED | OUTPATIENT
Start: 2021-03-01 | End: 2021-03-02 | Stop reason: HOSPADM

## 2021-03-01 RX ORDER — ATORVASTATIN CALCIUM 20 MG/1
20 TABLET, FILM COATED ORAL DAILY
Status: CANCELLED | OUTPATIENT
Start: 2021-03-02

## 2021-03-01 RX ORDER — NICOTINE POLACRILEX 4 MG
15 LOZENGE BUCCAL PRN
Status: CANCELLED | OUTPATIENT
Start: 2021-03-01

## 2021-03-01 RX ORDER — POTASSIUM CHLORIDE 7.45 MG/ML
10 INJECTION INTRAVENOUS PRN
Status: CANCELLED | OUTPATIENT
Start: 2021-03-01

## 2021-03-01 RX ORDER — ACETAMINOPHEN 325 MG/1
650 TABLET ORAL EVERY 6 HOURS PRN
Status: CANCELLED | OUTPATIENT
Start: 2021-03-01

## 2021-03-01 RX ORDER — SPIRONOLACTONE 25 MG/1
25 TABLET ORAL DAILY
Status: CANCELLED | OUTPATIENT
Start: 2021-03-01

## 2021-03-01 RX ORDER — POTASSIUM CHLORIDE 20 MEQ/1
40 TABLET, EXTENDED RELEASE ORAL PRN
Status: DISCONTINUED | OUTPATIENT
Start: 2021-03-01 | End: 2021-03-02 | Stop reason: HOSPADM

## 2021-03-01 RX ADMIN — ENOXAPARIN SODIUM 30 MG: 30 INJECTION SUBCUTANEOUS at 08:25

## 2021-03-01 RX ADMIN — INSULIN LISPRO 1 UNITS: 100 INJECTION, SOLUTION INTRAVENOUS; SUBCUTANEOUS at 12:37

## 2021-03-01 RX ADMIN — GABAPENTIN 100 MG: 100 CAPSULE ORAL at 08:24

## 2021-03-01 RX ADMIN — HYDRALAZINE HYDROCHLORIDE 50 MG: 50 TABLET, FILM COATED ORAL at 20:01

## 2021-03-01 RX ADMIN — POTASSIUM CHLORIDE: 2 INJECTION, SOLUTION, CONCENTRATE INTRAVENOUS at 08:24

## 2021-03-01 RX ADMIN — SPIRONOLACTONE 25 MG: 25 TABLET, FILM COATED ORAL at 13:02

## 2021-03-01 RX ADMIN — HYDROCHLOROTHIAZIDE 25 MG: 25 TABLET ORAL at 05:47

## 2021-03-01 RX ADMIN — INSULIN LISPRO 1 UNITS: 100 INJECTION, SOLUTION INTRAVENOUS; SUBCUTANEOUS at 20:02

## 2021-03-01 RX ADMIN — BUTALBITAL, ACETAMINOPHEN, AND CAFFEINE 1 CAPSULE: 50; 300; 40 CAPSULE ORAL at 05:49

## 2021-03-01 RX ADMIN — ACETAMINOPHEN 650 MG: 325 TABLET ORAL at 09:51

## 2021-03-01 RX ADMIN — INSULIN LISPRO 1 UNITS: 100 INJECTION, SOLUTION INTRAVENOUS; SUBCUTANEOUS at 17:16

## 2021-03-01 RX ADMIN — ATORVASTATIN CALCIUM 20 MG: 20 TABLET, FILM COATED ORAL at 08:24

## 2021-03-01 RX ADMIN — Medication 30 MG: at 08:25

## 2021-03-01 RX ADMIN — SODIUM CHLORIDE, PRESERVATIVE FREE 10 ML: 5 INJECTION INTRAVENOUS at 08:24

## 2021-03-01 RX ADMIN — BUTALBITAL, ACETAMINOPHEN, AND CAFFEINE 1 CAPSULE: 50; 300; 40 CAPSULE ORAL at 20:13

## 2021-03-01 RX ADMIN — CIPROFLOXACIN HYDROCHLORIDE 250 MG: 250 TABLET, FILM COATED ORAL at 20:02

## 2021-03-01 RX ADMIN — AMITRIPTYLINE HYDROCHLORIDE 100 MG: 50 TABLET, FILM COATED ORAL at 20:13

## 2021-03-01 RX ADMIN — METOPROLOL TARTRATE 10 MG: 1 INJECTION, SOLUTION INTRAVENOUS at 03:33

## 2021-03-01 RX ADMIN — Medication 30 MG: at 20:02

## 2021-03-01 RX ADMIN — FAMOTIDINE 10 MG: 20 TABLET ORAL at 08:24

## 2021-03-01 RX ADMIN — CIPROFLOXACIN HYDROCHLORIDE 250 MG: 250 TABLET, FILM COATED ORAL at 08:24

## 2021-03-01 RX ADMIN — GABAPENTIN 100 MG: 100 CAPSULE ORAL at 20:02

## 2021-03-01 RX ADMIN — HYDROCODONE BITARTRATE AND ACETAMINOPHEN 1 TABLET: 5; 325 TABLET ORAL at 14:30

## 2021-03-01 RX ADMIN — PREDNISONE 20 MG: 20 TABLET ORAL at 08:25

## 2021-03-01 RX ADMIN — LISINOPRIL 5 MG: 5 TABLET ORAL at 20:02

## 2021-03-01 RX ADMIN — METOPROLOL TARTRATE 10 MG: 1 INJECTION, SOLUTION INTRAVENOUS at 18:15

## 2021-03-01 RX ADMIN — SODIUM CHLORIDE, PRESERVATIVE FREE 10 ML: 5 INJECTION INTRAVENOUS at 20:03

## 2021-03-01 ASSESSMENT — PAIN SCALES - GENERAL
PAINLEVEL_OUTOF10: 0
PAINLEVEL_OUTOF10: 8
PAINLEVEL_OUTOF10: 1
PAINLEVEL_OUTOF10: 8
PAINLEVEL_OUTOF10: 5

## 2021-03-01 ASSESSMENT — PAIN DESCRIPTION - LOCATION: LOCATION: HEAD

## 2021-03-01 ASSESSMENT — PAIN DESCRIPTION - FREQUENCY: FREQUENCY: CONTINUOUS

## 2021-03-01 ASSESSMENT — PAIN DESCRIPTION - DESCRIPTORS: DESCRIPTORS: HEADACHE;PRESSURE

## 2021-03-01 NOTE — PLAN OF CARE
Problem: Falls - Risk of:  Goal: Will remain free from falls  Description: Will remain free from falls  3/1/2021 0422 by Jeffrey Garcia RN  Outcome: Ongoing  Note: Patient remained free from falls. Call light within reach. Problem: Pain:  Goal: Pain level will decrease  Description: Pain level will decrease  3/1/2021 0422 by Jeffrey Garcia RN  Outcome: Ongoing  Note: Patient given pain medication as ordered. Problem: Physical Regulation:  Goal: Will show no signs and symptoms of electrolyte imbalance  Description: Will show no signs and symptoms of electrolyte imbalance  Outcome: Ongoing  Note: Electrolytes within normal range this shift.

## 2021-03-01 NOTE — DISCHARGE SUMMARY
[place] [address] Internal Medicine    Discharge Summary     Patient ID: Deep Kirkland  :  1934   MRN: [id number]     ACCOUNT:  [de-identified]   Patient's PCP: Johnnie Whitley MD  Admit Date: 2021   Discharge Date: 3/2/2021    Length of Stay: 5  Code Status:  Full Code  Admitting Physician: Dahiana Rivera MD  Discharge Physician: Parish Kelley MD     Active Discharge Diagnoses:     Primary Problem  Multiple sclerosis exacerbation [facility])      [facility]    Diagnosis Date Noted    Hyperglycemia [R73.9] 2021    Asymptomatic bacteriuria [R82.71] 2021    Food poisoning [A05.9] 2021    Multiple sclerosis exacerbation ([facility] Raymond Sale 2021    Diarrhea [R19.7] 2021    Chronic headache [R51.9, G89.29] 2021    Facial asymmetry [Q67.0] 2021    Acquired spondylolisthesis [M43.10] 10/08/2019    Essential hypertension [I10] 2018    Hyperlipidemia [E78.5] 2018    Abnormal gait [R26.9] 10/07/2014       Admission Condition:  fair     Discharged Condition: fair    Hospital Stay:     Hospital Course:  Deep Kirkland is a 80 y.o. female who was admitted for the management of Multiple sclerosis exacerbation ([facility] , presented to ER with Dizziness, Difficulty Walking, Cough, and Back Pain  The patient is a 80 y.o. Non-/non  female who presents withDizziness, Difficulty Walking, Cough, and Back Pain   and she is admitted to the hospital for the management of MS exacerbation, acute kidney injury and lightheadedness secondary to dehydration.   She was diagnosed with UTI secondary to Enterococcus treated with Cipro sensitive  Advised to continue total 5 days  Uncontrolled hypertension with hypokalemia possible primary hyper aldosteronism added Aldactone recheck potassium internal medicine consult for hypertension and hypokalemia  lisinopirl increased to 40  Bmp in a few days to rule out within normal limits without evidence of aspiration. Please see separate speech pathology report for full discussion of findings and recommendations. Consultations:    Consults:     Final Specialist Recommendations/Findings:   IP CONSULT TO INTERNAL MEDICINE  IP CONSULT TO SOCIAL WORK  IP CONSULT TO PHYSICAL MEDICINE REHAB  IP CONSULT TO INTERNAL MEDICINE      The patient was seen and examined on day of discharge and this discharge summary is in conjunction with any daily progress note from day of discharge. Discharge plan:     Disposition: ARU  Vikashsaúl Ponce    Physician Follow Up:     Edie Velez MD  [address] [place] [address]  [phone]    Go on 3/8/2021  [facility] Follow Up @ 3:30pm    [address] [facility] [address]  [phone]    they will call you to setup a time to come out to your house    [address] [place] [address]  [phone]    In 1 week  Post MS excerbation. Patient recieved 2 days of 500mg IV methylprednisone followed by oral for three days. Post hospital followup. Miguel Ángel Martinez MD  [place] [id number]  [phone]      Evaluation of stress incontinence. Asymptomatic bacteuria. Requiring Further Evaluation/Follow Up POST HOSPITALIZATION/Incidental Findings:    Diet: cardiac diet    Activity: As tolerated    Instructions to Patient:     Discharge Medications:      Medication List      CHANGE how you take these medications    * predniSONE 20 MG tablet  Commonly known as: [place] will need 625mg of prednisone every day for the next three days. Please take 4 tablets of 50mg every 8 hours apart each day. After three days you can resume your 10 mg prednisone every day schedule. What changed: You were already taking a medication with the same name, and this prescription was added. Make sure you understand how and when to take each.      * predniSONE 10 MG tablet  Commonly known as: DELTASONE  Take 1 tablet by mouth daily  What changed: Another medication with the same name was added. Make sure you understand how and when to take each. * This list has 2 medication(s) that are the same as other medications prescribed for you. Read the directions carefully, and ask your doctor or other care provider to review them with you. CONTINUE taking these medications    albuterol sulfate  (90 Base) MCG/ACT inhaler  Commonly known as: Ventolin HFA  Inhale 2 puffs into the lungs every 4 hours as needed for Wheezing     amitriptyline 100 MG tablet  Commonly known as: ELAVIL  Take 1 tablet by mouth nightly     celecoxib 200 MG capsule  Commonly known as: CeleBREX  Take 1 capsule by mouth daily     fluticasone 50 MCG/ACT nasal spray  Commonly known as: FLONASE  1 spray by Each Nostril route daily     HYDROcodone-acetaminophen 5-325 MG per tablet  Commonly known as: NORCO     lisinopril 5 MG tablet  Commonly known as: PRINIVIL;ZESTRIL  Take 1 tablet by mouth daily     * Lumbar Back Brace/Support Pad Misc  1 each by Does not apply route daily as needed (back pain)     * Lumbar Back Brace/Support Pad Misc  1 each by Does not apply route daily     omeprazole 20 MG delayed release capsule  Commonly known as: PRILOSEC  Take 1 capsule by mouth daily     simvastatin 20 MG tablet  Commonly known as: ZOCOR  TAKE 1 TABLET BY MOUTH EVERY NIGHT     solifenacin 5 MG tablet  Commonly known as: VESIcare  Take 1 tablet by mouth daily     traMADol 50 MG tablet  Commonly known as: ULTRAM  Take 1 tablet by mouth every 6 hours as needed for Pain for up to 30 days. * This list has 2 medication(s) that are the same as other medications prescribed for you. Read the directions carefully, and ask your doctor or other care provider to review them with you.                Where to Get Your Medications      These medications were sent to [address]  [address] [facility], [address] [place] [id number]    Phone: [phone]   · predniSONE 10 MG tablet     You can get these medications from any pharmacy    Bring a paper prescription for each of these medications  · predniSONE 20 MG tablet         Time Spent on discharge is  35 mins in patient examination, evaluation, counseling as well as medication reconciliation, prescriptions for required medications, discharge plan and follow up. Electronically signed by   Hossein Hills MD  3/2/2021  9:56 AM      Thank you Dr. Salma Mathias MD for the opportunity to be involved in this patient's care.

## 2021-03-01 NOTE — PROGRESS NOTES
[address] [facility]   Acute Inpatient Rehab Preadmission Assessment    Patient Name: Asad Eisenberg        MRN: [id number]    : 1934  (80 y.o.)  Gender: female     Admitted from:   [address] [place] [address]  [place] [place]   [][facility]   []Outside Admission - Location:                                 [x]Initial         []Updated    Date of Onset / Admission to the acute hospital:  21    Inpatient Rehabilitation Admitting Diagnosis: Exacerbation of MS    Did patient have surgery? [x]No  []Yes:      Physicians: Yousuf-Gómez, Amena-MALORIE    Risk for clinical complications: Moderate to High      Co-Morbidities:  1. EDINSON  2. Anemia  3. HTN  4. HLD  5. Arthritis  6. Chronic daily headaches    7.  GERD    Financial Information  Primary insurance:  [x]Medicare [] Medicare HMO  []Commercial insurance    []Medicaid   [] Medicaid HMO []Workers Compensation   []Personal Pay    Secondary Insurance:  []Medicare [] Medicare HMO  [x]Commercial insurance    []Medicaid  []Medicaid HMO  []Workers Compensation []None    Precautions:   []Cardiac Precautions:    []Total hip precautions:    []Weight Bearing status:  [x]Safety Precautions/Concerns:  Fall Risk, General Precautions  [x]Visually impaired:  Wears glasses at all times   [x]Hard of Hearing: Hard of hearing/hearing concerns, No hearing aid    Isolation Precautions:         [x]Yes  []No  If Yes:  [] Droplet  [x]Contact []Airborne     []VRE     [x]MRSA     []C-diff         [] TB       [] ESBL [] MDRO          [] Other:        Physiatrist:  []        []   Dr. iRckie Olivo  [x]   Dr. Corazon Haro  []   Dr. Martinez Govern    Patients Occupation: Retired    Reviewed Lab and Diagnostic reports from Current Admission: Yes    Patients Prior Functional  Level: Prior Function  ADL Assistance: Independent(dtr will assist w/ showers when she is not feeling well, otherwise she is independent)  Homemaking Assistance: Needs assistance(dtr assists cleaning, grocery shopping  and laundry, pt does her own microwave or simple meals; dtr brings in some meals)  Ambulation Assistance: Independent(uses back brace and wheeled walker mainly in the morning and then progresses  to cane or walking around furniture in the later day, uses cane outside)  Transfer Assistance: Independent  Additional Comments: son and dtr both work day shift; states that she calls her dtr every morning    History of current illness:  Estephanie Garcia is a 80 y.o. right-handed female with past medical history of multiples sclerosis, HTN, HLD, arthritis, GERD, and chronic daily headache admitted to [facility] on 2/25/2021.       She initially presented with dizziness, abdominal pain, and diarrhea. She reportedly ate a fish sandwich the night prior to presentation and developed abdominal pain, diarrhea, nausea, and an episode of vomiting a few hours later. The next morning she awoke with dizziness and difficulty walking and was brought to the ED by her daughter, as a previous MS exacerbation had a similar presentation. She was found to have EDINSON. She was started on solumedrol for 5 days and IV fluids.     She currently reports ongoing lightheadedness. She also notes a recent cold, shortness of breath on exertion, chronic daily headaches, and chronic numbness/tingling in her feet. She denies any other pain at this time. Current functional status for upper extremity ADLs:  UE Bathing: Stand by assistance  UE Dressing: Stand by assistance    Current functional status for lower extremity ADLs:  LE Bathing: Moderate assistance  LE Dressing: Maximum assistance    Current functional status for bed, chair, wheelchair transfers:  Transfers  Sit to Stand: Contact guard assistance  Stand to sit: Contact guard assistance  Comment: pt stood next to the bed for less than 30 seconds w/ max x 1 for support- pt's standing balance was poor w/ increased sway initially progressing to almost a rotational sway.   Pt placed back on the bed w/ BP taken in a seated position at the /102 and then returned to bed    Current functional status for toilet transfers:  Contact guard assistance         Current functional status for locomotion:  Ambulation  Ambulation?: Yes  Ambulation 1  Surface: level tile  Device: Rolling Walker  Assistance: Contact guard assistance  Quality of Gait: forward flexed posture and small steps   Gait Deviations: Slow Emily, Decreased step length, Decreased step height  Distance: 95ft  Comments: no LOB     Current functional status for comprehension: Complete independence    Current functional status for expression: Complete independence    Current functional status for social interaction: Complete independence    Current functional status for problem solving: Complete independence    Current functional status for memory: Complete independence    Current Deficits R/T Impairment: Impaired Functional Mobility and Decreased ADLs    Required Therapy:   [x] Physical Therapy  [x] Occupational Therapy   [] Speech Therapy, as appropriate    Additional Services:  [x]   [] Recreational Therapy, as appropriate  [x] Nutrition  [] Dialysis  [] Other:     Rehab Justification:  Needs 3 hrs therapy per day or 15 hours per week:  Yes  Identified Rehab Nursing needs: Yes  Intense Interdisciplinary need:  Yes  Need for 24 hr physician supervision:  Yes  Measurable improved quality of life:  Yes  Willingness to participate:  Yes  Medical Necessity:  Yes  Patient able to tolerate care proposed:  Yes    Expected Discharge Destination/Functional Level:  Home with assist  Expected length of time to achieve that level of improvement: 1-2 weeks  Expected Post Discharge Treatments: Home with possible Home Care    Other information relevant to the care needs:  n/a    Acute Inpatient Rehabilitation Disclosure Statement provided to patient. Patient verbalized understanding. Copy placed on patient's light chart.     I have reviewed and concur with the findings and results of the pre-admission screening assessment completed by the Inpatient Rehabilitation Admissions Coordinator.

## 2021-03-01 NOTE — PROGRESS NOTES
Informed per Claduette Wiseman RN, of patient's elevated BP throughout the night, with most recent reading of 179/87. Upon review of E HR, noted that patient received HCTZ in the a.m. for the past 2 days with effective decline in her BP. Review of home medications reveals the patient usually takes lisinopril 5 mg nightly, which has not been administered this admission. Creatinine 0.91 yesterday a.m., which appears to be near patient's baseline. New orders entered for HCTZ 25 mg now and resume home dose lisinopril 5 mg p.o. nightly this evening. A.m. labs pending; will review creatinine once available.

## 2021-03-01 NOTE — PROGRESS NOTES
[address] [facility]    Physical Therapy Progress Note    Date: 3/1/21  Patient Name: Estephanie Garcia       Room:   MRN: [id number]   Account: [de-identified]   : 1934  (80 y.o.)   Gender: female     Discharge Recommendations   Patient would benefit from continued therapy after discharge  Equipment Needed: (TBD)    Referring Practitioner: Mainor Garvey MD  Diagnosis: Exacerbation of multiple sclerosis  Restrictions/Precautions: Fall Risk(troponins 27 on 2021, peripheral IV right antecubital)  Implants present? : Metal implants(right  shoulder arthroplasty)  Required Braces or Orthoses  Spinal: (states she has a back brace)   Past Medical History:  has a past medical history of Acute cystitis without hematuria, Arthritis, Chronic daily headache, GERD (gastroesophageal reflux disease), Hyperlipidemia, Hypertension, Moderate malnutrition ([address] Multiple sclerosis ([address] Neuropathy, Pneumonia, and Vitamin D deficiency. Past Surgical History:   has a past surgical history that includes Cystocele repair; Rectocele repair; Hysterectomy; Cervical disc surgery; Cataract removal with implant (Right, 2014); Cataract removal with implant (Left, 2014); Total shoulder arthroplasty (Right, 2017); and shoulder surgery (Right, ). Overall Orientation Status: Within Functional Limits  Restrictions/Precautions  Restrictions/Precautions: Fall Risk(troponins 27 on 2021, peripheral IV right antecubital)  Required Braces or Orthoses?: Yes(back  brace- wears mainly in the morning)  Implants present? : Metal implants(right  shoulder arthroplasty)  Required Braces or Orthoses  Spinal: (states she has a back brace)    Subjective: Pt reports having a headache, with double vision, and light headed. Pt report having these problems often for the last 35 years. Comments: LASHAUN edng with PT tx.     Vital Signs  BP: (!) 171/105  BP Location: Left upper arm Discharge Recommendations: Patient would benefit from continued therapy after discharge     Type of devices: All fall risk precautions in place;Call light within reach;Gait belt;Patient at risk for falls;Nurse notified; Left in chair     Plan  Times per week: 5-7 treatments/ week  Times per day: (5-7 treatments/ week)  Current Treatment Recommendations: Strengthening, Home Exercise Program, Safety Education & Training, Balance Training, Endurance Training, Patient/Caregiver Education & Training, Equipment Evaluation, Education, & procurement, Functional Mobility Training, Transfer Training, Gait Training, Stair training    Patient Education  New Education Provided:  Plan of Care  Learner:patient  Method: demonstration and explanation       Outcome: needs reinforcement     Goals  Short term goals  Time Frame for Short term goals: 5-7 treatments/ week  Short term goal 1: pt to tolerate 1/2 hour of therapuetic exercise and activity  Short term goal 2: pt to demonstrate good technique for balance activities, strengthening exercises and energy conservation techniques  Short term goal 3: pt to demonstrate independent rolling in bed for position change  Short term goal 4: pt to demonstrate transfers supine <> sit w/ supervision  Short term goal 5: pt to demonstrate transfers sit <> stand and bed <> chair using wheeled walker w/ min x 1  Short term goal 6: pt to demonstrate good sitting balance and fair + or better standing balance using wheeled walker  Short term goal 7: pt to demonstrate gait 30-50' using wheeled walker w/ min x 1 and W/C follow for safety  Short term goal 8: pt to advance to and demonstrate ability to ascend/ descend 4-6\" platform step using rail and AD w/ min x 1    PT Individual Minutes  Time In: 1446  Time Out: [address]  Minutes: 43    Electronically signed by Deepa Pradhan PTA on 3/1/21 at 3:30 PM EST

## 2021-03-01 NOTE — PROGRESS NOTES
Transfer to private service    Patient is a 24-year-old female with multiple sclerosis initially admitted for dysphagia, EDINSON, MS exacerbation. Plan to give 2 more days of oral prednisone. Elevated blood pressure 2/27 was started on Norvasc. Overnight elevated blood pressure resumed home lisinopril and hydrochlorothiazide. Pending placement ARU.     Devon Chavarria  Medicine Resident

## 2021-03-02 ENCOUNTER — HOSPITAL ENCOUNTER (INPATIENT)
Age: 86
LOS: 10 days | Discharge: HOME HEALTH | DRG: 092 | End: 2021-03-12
Attending: PHYSICAL MEDICINE & REHABILITATION | Admitting: PHYSICAL MEDICINE & REHABILITATION
Payer: MEDICARE

## 2021-03-02 VITALS
RESPIRATION RATE: 18 BRPM | TEMPERATURE: 97.5 F | OXYGEN SATURATION: 95 % | HEIGHT: 59 IN | DIASTOLIC BLOOD PRESSURE: 70 MMHG | SYSTOLIC BLOOD PRESSURE: 120 MMHG | WEIGHT: 116.18 LBS | HEART RATE: 112 BPM | BODY MASS INDEX: 23.42 KG/M2

## 2021-03-02 DIAGNOSIS — G89.29 CHRONIC MIDLINE LOW BACK PAIN WITH LEFT-SIDED SCIATICA: ICD-10-CM

## 2021-03-02 DIAGNOSIS — K21.9 GASTROESOPHAGEAL REFLUX DISEASE: ICD-10-CM

## 2021-03-02 DIAGNOSIS — J45.20 MILD INTERMITTENT REACTIVE AIRWAY DISEASE WITHOUT COMPLICATION: ICD-10-CM

## 2021-03-02 DIAGNOSIS — M54.42 CHRONIC MIDLINE LOW BACK PAIN WITH LEFT-SIDED SCIATICA: ICD-10-CM

## 2021-03-02 DIAGNOSIS — E78.5 HYPERLIPIDEMIA, UNSPECIFIED HYPERLIPIDEMIA TYPE: ICD-10-CM

## 2021-03-02 DIAGNOSIS — G62.9 PERIPHERAL POLYNEUROPATHY: ICD-10-CM

## 2021-03-02 LAB
ABSOLUTE EOS #: 0 K/UL (ref 0–0.4)
ABSOLUTE IMMATURE GRANULOCYTE: ABNORMAL K/UL (ref 0–0.3)
ABSOLUTE LYMPH #: 3.31 K/UL (ref 1–4.8)
ABSOLUTE MONO #: 1.01 K/UL (ref 0.1–1.3)
ANION GAP SERPL CALCULATED.3IONS-SCNC: 9 MMOL/L (ref 9–17)
BASOPHILS # BLD: 0 % (ref 0–2)
BASOPHILS ABSOLUTE: 0 K/UL (ref 0–0.2)
BUN BLDV-MCNC: 25 MG/DL (ref 8–23)
BUN/CREAT BLD: ABNORMAL (ref 9–20)
CALCIUM SERPL-MCNC: 8.7 MG/DL (ref 8.6–10.4)
CHLORIDE BLD-SCNC: 102 MMOL/L (ref 98–107)
CO2: 30 MMOL/L (ref 20–31)
CREAT SERPL-MCNC: 0.99 MG/DL (ref 0.5–0.9)
DIFFERENTIAL TYPE: ABNORMAL
EOSINOPHILS RELATIVE PERCENT: 0 % (ref 0–4)
GFR AFRICAN AMERICAN: >60 ML/MIN
GFR NON-AFRICAN AMERICAN: 53 ML/MIN
GFR SERPL CREATININE-BSD FRML MDRD: ABNORMAL ML/MIN/{1.73_M2}
GFR SERPL CREATININE-BSD FRML MDRD: ABNORMAL ML/MIN/{1.73_M2}
GLUCOSE BLD-MCNC: 110 MG/DL (ref 70–99)
GLUCOSE BLD-MCNC: 195 MG/DL (ref 65–105)
GLUCOSE BLD-MCNC: 200 MG/DL (ref 65–105)
GLUCOSE BLD-MCNC: 96 MG/DL (ref 65–105)
HCT VFR BLD CALC: 38 % (ref 36–46)
HEMOGLOBIN: 12.8 G/DL (ref 12–16)
IMMATURE GRANULOCYTES: ABNORMAL %
LYMPHOCYTES # BLD: 23 % (ref 24–44)
MCH RBC QN AUTO: 31.3 PG (ref 26–34)
MCHC RBC AUTO-ENTMCNC: 33.7 G/DL (ref 31–37)
MCV RBC AUTO: 92.8 FL (ref 80–100)
MONOCYTES # BLD: 7 % (ref 1–7)
MORPHOLOGY: NORMAL
NRBC AUTOMATED: ABNORMAL PER 100 WBC
PDW BLD-RTO: 14.4 % (ref 11.5–14.9)
PLATELET # BLD: 293 K/UL (ref 150–450)
PLATELET ESTIMATE: ABNORMAL
PMV BLD AUTO: 7.8 FL (ref 6–12)
POTASSIUM SERPL-SCNC: 4.4 MMOL/L (ref 3.7–5.3)
RBC # BLD: 4.1 M/UL (ref 4–5.2)
RBC # BLD: ABNORMAL 10*6/UL
SEG NEUTROPHILS: 70 % (ref 36–66)
SEGMENTED NEUTROPHILS ABSOLUTE COUNT: 10.08 K/UL (ref 1.3–9.1)
SODIUM BLD-SCNC: 141 MMOL/L (ref 135–144)
WBC # BLD: 14.4 K/UL (ref 3.5–11)
WBC # BLD: ABNORMAL 10*3/UL

## 2021-03-02 PROCEDURE — 82947 ASSAY GLUCOSE BLOOD QUANT: CPT

## 2021-03-02 PROCEDURE — 94664 DEMO&/EVAL PT USE INHALER: CPT

## 2021-03-02 PROCEDURE — 2580000003 HC RX 258: Performed by: STUDENT IN AN ORGANIZED HEALTH CARE EDUCATION/TRAINING PROGRAM

## 2021-03-02 PROCEDURE — 6370000000 HC RX 637 (ALT 250 FOR IP): Performed by: INTERNAL MEDICINE

## 2021-03-02 PROCEDURE — 6370000000 HC RX 637 (ALT 250 FOR IP): Performed by: NURSE PRACTITIONER

## 2021-03-02 PROCEDURE — 6370000000 HC RX 637 (ALT 250 FOR IP): Performed by: STUDENT IN AN ORGANIZED HEALTH CARE EDUCATION/TRAINING PROGRAM

## 2021-03-02 PROCEDURE — 99239 HOSP IP/OBS DSCHRG MGMT >30: CPT | Performed by: INTERNAL MEDICINE

## 2021-03-02 PROCEDURE — 97116 GAIT TRAINING THERAPY: CPT

## 2021-03-02 PROCEDURE — 1180000000 HC REHAB R&B

## 2021-03-02 PROCEDURE — 36415 COLL VENOUS BLD VENIPUNCTURE: CPT

## 2021-03-02 PROCEDURE — 85025 COMPLETE CBC W/AUTO DIFF WBC: CPT

## 2021-03-02 PROCEDURE — 97110 THERAPEUTIC EXERCISES: CPT

## 2021-03-02 PROCEDURE — 80048 BASIC METABOLIC PNL TOTAL CA: CPT

## 2021-03-02 PROCEDURE — 6360000002 HC RX W HCPCS: Performed by: STUDENT IN AN ORGANIZED HEALTH CARE EDUCATION/TRAINING PROGRAM

## 2021-03-02 PROCEDURE — 94761 N-INVAS EAR/PLS OXIMETRY MLT: CPT

## 2021-03-02 RX ORDER — BENZONATATE 100 MG/1
100 CAPSULE ORAL 3 TIMES DAILY PRN
Status: DISCONTINUED | OUTPATIENT
Start: 2021-03-02 | End: 2021-03-12 | Stop reason: HOSPADM

## 2021-03-02 RX ORDER — POTASSIUM CHLORIDE 20 MEQ/1
40 TABLET, EXTENDED RELEASE ORAL PRN
Status: DISCONTINUED | OUTPATIENT
Start: 2021-03-02 | End: 2021-03-02

## 2021-03-02 RX ORDER — ALBUTEROL SULFATE 90 UG/1
2 AEROSOL, METERED RESPIRATORY (INHALATION) EVERY 6 HOURS PRN
Status: DISCONTINUED | OUTPATIENT
Start: 2021-03-02 | End: 2021-03-12 | Stop reason: HOSPADM

## 2021-03-02 RX ORDER — HYDRALAZINE HYDROCHLORIDE 50 MG/1
50 TABLET, FILM COATED ORAL EVERY 6 HOURS PRN
Status: CANCELLED | OUTPATIENT
Start: 2021-03-02

## 2021-03-02 RX ORDER — HYDRALAZINE HYDROCHLORIDE 50 MG/1
50 TABLET, FILM COATED ORAL EVERY 6 HOURS PRN
Status: DISCONTINUED | OUTPATIENT
Start: 2021-03-02 | End: 2021-03-12 | Stop reason: HOSPADM

## 2021-03-02 RX ORDER — METOPROLOL TARTRATE 5 MG/5ML
10 INJECTION INTRAVENOUS EVERY 4 HOURS PRN
Status: DISCONTINUED | OUTPATIENT
Start: 2021-03-02 | End: 2021-03-02

## 2021-03-02 RX ORDER — HYDROCODONE BITARTRATE AND ACETAMINOPHEN 5; 325 MG/1; MG/1
1 TABLET ORAL EVERY 6 HOURS PRN
Status: DISCONTINUED | OUTPATIENT
Start: 2021-03-02 | End: 2021-03-12 | Stop reason: HOSPADM

## 2021-03-02 RX ORDER — POLYETHYLENE GLYCOL 3350 17 G/17G
17 POWDER, FOR SOLUTION ORAL DAILY
Status: DISCONTINUED | OUTPATIENT
Start: 2021-03-02 | End: 2021-03-12 | Stop reason: HOSPADM

## 2021-03-02 RX ORDER — ACETAMINOPHEN 325 MG/1
650 TABLET ORAL EVERY 4 HOURS PRN
Status: DISCONTINUED | OUTPATIENT
Start: 2021-03-02 | End: 2021-03-02

## 2021-03-02 RX ORDER — ATORVASTATIN CALCIUM 10 MG/1
20 TABLET, FILM COATED ORAL NIGHTLY
Status: DISCONTINUED | OUTPATIENT
Start: 2021-03-03 | End: 2021-03-12 | Stop reason: HOSPADM

## 2021-03-02 RX ORDER — BISACODYL 10 MG
10 SUPPOSITORY, RECTAL RECTAL DAILY PRN
Status: DISCONTINUED | OUTPATIENT
Start: 2021-03-02 | End: 2021-03-12 | Stop reason: HOSPADM

## 2021-03-02 RX ORDER — FAMOTIDINE 20 MG/1
10 TABLET, FILM COATED ORAL DAILY
Status: DISCONTINUED | OUTPATIENT
Start: 2021-03-03 | End: 2021-03-12 | Stop reason: HOSPADM

## 2021-03-02 RX ORDER — SENNA PLUS 8.6 MG/1
2 TABLET ORAL DAILY PRN
Status: DISCONTINUED | OUTPATIENT
Start: 2021-03-02 | End: 2021-03-12 | Stop reason: HOSPADM

## 2021-03-02 RX ORDER — LISINOPRIL 20 MG/1
40 TABLET ORAL NIGHTLY
Status: DISCONTINUED | OUTPATIENT
Start: 2021-03-02 | End: 2021-03-08

## 2021-03-02 RX ORDER — NICOTINE POLACRILEX 4 MG
15 LOZENGE BUCCAL PRN
Status: DISCONTINUED | OUTPATIENT
Start: 2021-03-02 | End: 2021-03-12 | Stop reason: HOSPADM

## 2021-03-02 RX ORDER — BUTALBITAL, ACETAMINOPHEN AND CAFFEINE 300; 40; 50 MG/1; MG/1; MG/1
1 CAPSULE ORAL EVERY 6 HOURS PRN
Status: DISCONTINUED | OUTPATIENT
Start: 2021-03-02 | End: 2021-03-12 | Stop reason: HOSPADM

## 2021-03-02 RX ORDER — DEXTROMETHORPHAN POLISTIREX 30 MG/5ML
30 SUSPENSION ORAL EVERY 12 HOURS SCHEDULED
Status: DISCONTINUED | OUTPATIENT
Start: 2021-03-02 | End: 2021-03-12 | Stop reason: HOSPADM

## 2021-03-02 RX ORDER — ACETAMINOPHEN 325 MG/1
650 TABLET ORAL EVERY 6 HOURS PRN
Status: DISCONTINUED | OUTPATIENT
Start: 2021-03-02 | End: 2021-03-02

## 2021-03-02 RX ORDER — GABAPENTIN 100 MG/1
100 CAPSULE ORAL 2 TIMES DAILY
Status: DISCONTINUED | OUTPATIENT
Start: 2021-03-02 | End: 2021-03-12 | Stop reason: HOSPADM

## 2021-03-02 RX ORDER — AMITRIPTYLINE HYDROCHLORIDE 50 MG/1
100 TABLET, FILM COATED ORAL NIGHTLY
Status: DISCONTINUED | OUTPATIENT
Start: 2021-03-02 | End: 2021-03-12 | Stop reason: HOSPADM

## 2021-03-02 RX ORDER — DEXTROSE MONOHYDRATE 50 MG/ML
100 INJECTION, SOLUTION INTRAVENOUS PRN
Status: DISCONTINUED | OUTPATIENT
Start: 2021-03-02 | End: 2021-03-12 | Stop reason: HOSPADM

## 2021-03-02 RX ORDER — SPIRONOLACTONE 25 MG/1
25 TABLET ORAL DAILY
Status: DISCONTINUED | OUTPATIENT
Start: 2021-03-03 | End: 2021-03-03

## 2021-03-02 RX ORDER — LISINOPRIL 20 MG/1
40 TABLET ORAL NIGHTLY
Status: CANCELLED | OUTPATIENT
Start: 2021-03-02

## 2021-03-02 RX ORDER — ACETAMINOPHEN 650 MG/1
650 SUPPOSITORY RECTAL EVERY 6 HOURS PRN
Status: DISCONTINUED | OUTPATIENT
Start: 2021-03-02 | End: 2021-03-02

## 2021-03-02 RX ORDER — POTASSIUM CHLORIDE 7.45 MG/ML
10 INJECTION INTRAVENOUS PRN
Status: DISCONTINUED | OUTPATIENT
Start: 2021-03-02 | End: 2021-03-02

## 2021-03-02 RX ORDER — LISINOPRIL 20 MG/1
40 TABLET ORAL NIGHTLY
Status: DISCONTINUED | OUTPATIENT
Start: 2021-03-02 | End: 2021-03-02 | Stop reason: HOSPADM

## 2021-03-02 RX ORDER — CIPROFLOXACIN 500 MG/1
250 TABLET, FILM COATED ORAL EVERY 12 HOURS SCHEDULED
Status: COMPLETED | OUTPATIENT
Start: 2021-03-02 | End: 2021-03-06

## 2021-03-02 RX ORDER — DEXTROSE MONOHYDRATE 25 G/50ML
12.5 INJECTION, SOLUTION INTRAVENOUS PRN
Status: DISCONTINUED | OUTPATIENT
Start: 2021-03-02 | End: 2021-03-12 | Stop reason: HOSPADM

## 2021-03-02 RX ADMIN — METRONIDAZOLE 100 MG: 500 TABLET ORAL at 20:41

## 2021-03-02 RX ADMIN — INSULIN LISPRO 1 UNITS: 100 INJECTION, SOLUTION INTRAVENOUS; SUBCUTANEOUS at 14:03

## 2021-03-02 RX ADMIN — LISINOPRIL 40 MG: 20 TABLET ORAL at 20:40

## 2021-03-02 RX ADMIN — HYDRALAZINE HYDROCHLORIDE 50 MG: 50 TABLET, FILM COATED ORAL at 02:37

## 2021-03-02 RX ADMIN — BUTALBITA,ACETAMINOPHEN AND CAFFEINE 1 CAPSULE: 50; 300; 40 CAPSULE ORAL at 21:18

## 2021-03-02 RX ADMIN — FAMOTIDINE 10 MG: 20 TABLET ORAL at 08:49

## 2021-03-02 RX ADMIN — Medication 30 MG: at 08:52

## 2021-03-02 RX ADMIN — SPIRONOLACTONE 25 MG: 25 TABLET, FILM COATED ORAL at 08:49

## 2021-03-02 RX ADMIN — CIPROFLOXACIN 250 MG: 500 TABLET, FILM COATED ORAL at 20:41

## 2021-03-02 RX ADMIN — ACETAMINOPHEN 650 MG: 325 TABLET ORAL at 10:31

## 2021-03-02 RX ADMIN — HYDROCODONE BITARTRATE AND ACETAMINOPHEN 1 TABLET: 5; 325 TABLET ORAL at 05:45

## 2021-03-02 RX ADMIN — CIPROFLOXACIN HYDROCHLORIDE 250 MG: 250 TABLET, FILM COATED ORAL at 08:49

## 2021-03-02 RX ADMIN — ATORVASTATIN CALCIUM 20 MG: 20 TABLET, FILM COATED ORAL at 08:52

## 2021-03-02 RX ADMIN — GABAPENTIN 100 MG: 100 CAPSULE ORAL at 08:49

## 2021-03-02 RX ADMIN — PREDNISONE 20 MG: 20 TABLET ORAL at 08:49

## 2021-03-02 RX ADMIN — INSULIN LISPRO 1 UNITS: 100 INJECTION, SOLUTION INTRAVENOUS; SUBCUTANEOUS at 20:42

## 2021-03-02 RX ADMIN — AMITRIPTYLINE HYDROCHLORIDE 100 MG: 50 TABLET, FILM COATED ORAL at 21:18

## 2021-03-02 RX ADMIN — SODIUM CHLORIDE, PRESERVATIVE FREE 10 ML: 5 INJECTION INTRAVENOUS at 08:53

## 2021-03-02 RX ADMIN — ENOXAPARIN SODIUM 30 MG: 30 INJECTION SUBCUTANEOUS at 08:52

## 2021-03-02 ASSESSMENT — PAIN DESCRIPTION - PAIN TYPE: TYPE: ACUTE PAIN

## 2021-03-02 ASSESSMENT — PAIN SCALES - GENERAL
PAINLEVEL_OUTOF10: 5
PAINLEVEL_OUTOF10: 0

## 2021-03-02 ASSESSMENT — ENCOUNTER SYMPTOMS
SHORTNESS OF BREATH: 0
CHEST TIGHTNESS: 0
ALLERGIC/IMMUNOLOGIC NEGATIVE: 1
EYES NEGATIVE: 1
ABDOMINAL PAIN: 1

## 2021-03-02 NOTE — PLAN OF CARE
Problem: Falls - Risk of:  Goal: Will remain free from falls  Description: Will remain free from falls  Outcome: Ongoing  Goal: Absence of physical injury  Description: Absence of physical injury  Outcome: Ongoing     Problem: Activity:  Goal: Ability to tolerate increased activity will improve  Description: Ability to tolerate increased activity will improve  Outcome: Ongoing     Problem: Pain:  Goal: Pain level will decrease  Description: Pain level will decrease  Outcome: Ongoing  Goal: Control of acute pain  Description: Control of acute pain  Outcome: Ongoing  Note: Pt has continued WATSON with the high blood pressure. New medication on board as needed. Goal: Control of chronic pain  Description: Control of chronic pain  Outcome: Ongoing     Problem: Fluid Volume:  Goal: Ability to achieve a balanced intake and output will improve  Description: Ability to achieve a balanced intake and output will improve  Outcome: Ongoing     Problem: Physical Regulation:  Goal: Ability to maintain clinical measurements within normal limits will improve  Description: Ability to maintain clinical measurements within normal limits will improve  Outcome: Ongoing  Goal: Will show no signs and symptoms of electrolyte imbalance  Description: Will show no signs and symptoms of electrolyte imbalance  Outcome: Ongoing     Problem: Musculor/Skeletal Functional Status  Goal: Highest potential functional level  Outcome: Ongoing  Goal: Absence of falls  Outcome: Ongoing     Problem: Musculor/Skeletal Functional Status  Goal: Highest potential functional level  Outcome: Ongoing  Goal: Absence of falls  Outcome: Ongoing     Problem: Cardiac:  Goal: Hemodynamic stability will improve  Description: Hemodynamic stability will improve  Outcome: Ongoing  Note: BP was unstable at beginning of shift but after PRN hydralazine, it stabled out.   Vitals:    03/01/21 1900 03/01/21 1930 03/01/21 2200 03/02/21 0130 BP: (!) 152/97 (!) 200/109 (!) 153/81 (!) 168/90   Pulse: 87 91 86 81   Resp:   18 16   Temp:  98.1 °F (36.7 °C)  98.1 °F (36.7 °C)   TempSrc:  Oral  Oral   SpO2:  96% 95%    Weight:    116 lb 2.9 oz (52.7 kg)   Height:           Goal: Complications related to the disease process, condition or treatment will be avoided or minimized  Description: Complications related to the disease process, condition or treatment will be avoided or minimized  Outcome: Ongoing  Goal: Cerebral tissue perfusion will improve  Description: Cerebral tissue perfusion will improve  Outcome: Ongoing     Problem: Coping:  Goal: Ability to identify and develop effective coping behavior will improve  Description: Ability to identify and develop effective coping behavior will improve  Outcome: Ongoing

## 2021-03-02 NOTE — PROGRESS NOTES
[address] [facility].    PROGRESS NOTE             3/2/2021    9:53 AM    Name:   Aki Kidd  MRN:     [id number]     [place]:      [de-identified]   Room:   2112/2112-01  IP Day:  5  Admit Date:  2/25/2021  4:07 PM    PCP:  Aline Leigh MD  Code Status:  Full Code    Subjective:     C/C:   Chief Complaint   Patient presents with    Dizziness    Difficulty Walking    Cough    Back Pain     Interval History Status: not changed. Overnight: Blood pressure was elevated in the last 18 hours. Max 210/90. Patient given Norvasc. Blood pressure still 173/102. Patient given one-time dose of HCTZ and blood pressure slowly declined. Current blood pressure 137/82. HCTZ repeated. Plan is to go to ARU. Patient is looking forward for blood pressure stabilization and then discharged to ARU for rehabilitation. Overnight notes from the nurses and consults were reviewed. Patient was explained about the course of disease and further management plan.   Brief History: 63-year-old  female with past medical history of multiple sclerosis, spinal stenosis of the lumbar region, chronic severe headaches and facial asymmetry presented to us with complaint of lightheadedness and abnormal bowel movements for the past 12 hours. Patient states that on 2/24, she had a fish sandwich at 6 PM and at around 10 PM and she  started having abdominal pain, generalized, 7 out of 10 intensity and gurgling in nature followed by more than 3 episodes of diarrhea without any associated blood. The consistency of her semisolid and patient also felt nauseous and had an episode of vomiting. She slept at 12 PM and woke up feeling dizzy and around 7 AM and call her daughter. Patient was brought in the emergency department, she recalls that her last MS exacerbation years ago had a similar presentation. She also complains of severe chronic headache like migraines. In the ED, patient was given 125 mg of Solu-Medrol and her creatinine was elevated. She was started on maintenance fluid 100 mL/h for elevated creatinine 1.19 (baseline 0.80). Troponin were mildly elevated 27 and her GFR is 46. Home medications were resumed with respiratory care and eval consult. Severe headache patient was started on Norco and Fioricet as needed orders.       Review of Systems:     Review of Systems   Constitutional: Positive for fatigue. HENT: Negative. Eyes: Negative. Respiratory: Negative for chest tightness and shortness of breath. Cardiovascular: Negative for chest pain, palpitations and leg swelling. Gastrointestinal: Positive for abdominal pain. Endocrine: Negative. Musculoskeletal: Positive for gait problem. Allergic/Immunologic: Negative. Neurological: Positive for weakness, light-headedness, numbness and headaches. Medications: Allergies:     Allergies   Allergen Reactions    Bactrim [Sulfamethoxazole-Trimethoprim] Other (See Comments)     BLISTERS IN MOUTH  Lactose Intolerance (Gi) Diarrhea    Pcn [Penicillins] Hives    Lidoderm [Lidocaine] Rash    Macrobid [Nitrofurantoin] Nausea And Vomiting       Current Meds:   Scheduled Meds:    lisinopril  40 mg Oral Nightly    spironolactone  25 mg Oral Daily    insulin lispro  0-6 Units Subcutaneous TID WC    insulin lispro  0-3 Units Subcutaneous Nightly    predniSONE  20 mg Oral Daily    ciprofloxacin  250 mg Oral 2 times per day    HYDROcodone 5 mg - acetaminophen  1 tablet Oral Once    sodium chloride flush  10 mL Intravenous 2 times per day    enoxaparin  30 mg Subcutaneous Daily    famotidine  10 mg Oral Daily    amitriptyline  100 mg Oral Nightly    gabapentin  100 mg Oral BID    atorvastatin  20 mg Oral Daily    dextromethorphan  30 mg Oral 2 times per day     Continuous Infusions:    dextrose       PRN Meds: potassium chloride **OR** potassium alternative oral replacement **OR** potassium chloride, glucose, dextrose, glucagon (rDNA), dextrose, hydrALAZINE, metoprolol, sodium chloride flush, promethazine **OR** ondansetron, polyethylene glycol, acetaminophen **OR** acetaminophen, HYDROcodone-acetaminophen, traMADol, butalbital-APAP-caffeine, benzonatate    Data:     Past Medical History:   has a past medical history of Acute cystitis without hematuria, Arthritis, Chronic daily headache, GERD (gastroesophageal reflux disease), Hyperlipidemia, Hypertension, Moderate malnutrition ([address] Multiple sclerosis ([address] Neuropathy, Pneumonia, and Vitamin D deficiency. Social History:   reports that she has never smoked. She has never used smokeless tobacco. She reports that she does not drink alcohol or use drugs. Family History: History reviewed. No pertinent family history.     Vitals:  /84   Pulse 86   Temp 98.6 °F (37 °C) (Oral)   Resp 18   Ht 4' 11\" (1.499 m)   Wt 116 lb 2.9 oz (52.7 kg)   SpO2 95%   BMI 23.47 kg/m² Temp (24hrs), Av.2 °F (36.8 °C), Min:98.1 °F (36.7 °C), Max:98.6 °F (37 °C)    Recent Labs     21  1214 21  1612 21  1937 21  0707   POCGLU 174* 163* 203* 96       I/O(24Hr):     Intake/Output Summary (Last 24 hours) at 3/2/2021 0953  Last data filed at 3/2/2021 0428  Gross per 24 hour   Intake 240 ml   Output    Net 240 ml       Labs:    [unfilled]    Lab Results   Component Value Date/Time    SPECIAL NOT REPORTED 2021 05:48 PM     Lab Results   Component Value Date/Time    CULTURE ENTEROCOCCUS FAECALIS >610478 CFU/ML (A) 2021 05:48 PM       [unfilled]    Radiology:    Ct Head Wo Contrast    Result Date: 2021 EXAMINATION: CT OF THE HEAD WITHOUT CONTRAST  2/25/2021 4:32 pm TECHNIQUE: CT of the head was performed without the administration of intravenous contrast. Dose modulation, iterative reconstruction, and/or weight based adjustment of the mA/kV was utilized to reduce the radiation dose to as low as reasonably achievable. COMPARISON: MRI of the brain July 11, 2020. HISTORY: ORDERING SYSTEM PROVIDED HISTORY: dizziness, vomiting TECHNOLOGIST PROVIDED HISTORY: dizziness, vomiting Decision Support Exception->Emergency Medical Condition (MA) Reason for Exam: Dizzines, vomiting Acuity: Unknown Type of Exam: Unknown Mechanism of Injury: Pt c/o worsening double vision and dizziness, h/o multiple sclerosis FINDINGS: BRAIN/VENTRICLES: There is no acute intracranial hemorrhage, mass effect or midline shift. No abnormal extra-axial fluid collection. The gray-white differentiation is maintained without evidence of an acute infarct. There is no evidence of hydrocephalus. The ventricular system is mildly prominent, but still well within limits of normal for size for the age of the patient and with compensatory prominence of sulcation. There are areas of decreased attenuation density in the deep white matter and periventricular regions compatible with areas of old micro ischemic change. There is some prominence of the cerebellar and vermian sulci. Calcifications are present in vessels at the base of the brain. ORBITS: The visualized portion of the orbits demonstrate no acute abnormality. SINUSES: The visualized paranasal sinuses and mastoid air cells demonstrate no acute abnormality. SOFT TISSUES/SKULL:  No acute abnormality of the visualized skull or soft tissues. Senescent changes compatible with the age of the patient. No acute intracranial abnormality.      Xr Chest Portable    Result Date: 2/25/2021 EXAMINATION: ONE XRAY VIEW OF THE CHEST 2/25/2021 3:20 pm COMPARISON: 10/09/2019. HISTORY: ORDERING SYSTEM PROVIDED HISTORY: cough, dizziness TECHNOLOGIST PROVIDED HISTORY: cough, dizziness Reason for Exam: cough, dizziness Acuity: Unknown Type of Exam: Unknown FINDINGS: Postoperative changes were noted from prior discectomies in the lower cervical spine. Mild left ventricular enlargement was noted without pneumonia, interstitial edema or pleural effusions. Elevation of the right hemidiaphragm was noted. Old healed fractures involve the posterolateral aspect of the left 3rd, 4th, 5th, 6th and left 7th ribs. A right shoulder hemiarthroplasty was noted with humeral and glenoid components in their expected positions. Mild left ventricular enlargement without pneumonia, interstitial edema or pleural effusion. Old healed fractures involve the posterolateral aspect of the left 3rd through 7th ribs. Physical Examination:        Physical Exam  Constitutional:       General: She is awake. Appearance: Normal appearance. She is overweight. Comments: Patient had difficulty swallowing semisolids and solids. She vomited everything out and complaint of something being stuck in her throat. Patient was kept n.p.o. and speech pathology was consulted   HENT:      Head: Normocephalic and atraumatic. Nose: Nose normal.      Mouth/Throat:      Mouth: Mucous membranes are moist.   Eyes:      Pupils: Pupils are equal, round, and reactive to light. Neck:      Musculoskeletal: Normal range of motion. Cardiovascular:      Rate and Rhythm: Normal rate and regular rhythm. Pulses: Normal pulses. Heart sounds: Normal heart sounds. Pulmonary:      Effort: Pulmonary effort is normal.      Breath sounds: Normal breath sounds.    Abdominal:      General: Bowel sounds are normal.      Comments: Abdominal pain with improvement since yesterday   Musculoskeletal: Comments: Weakness on the left side and facial asymmetry   Neurological:      Mental Status: She is alert and oriented to person, place, and time. Cranial Nerves: Facial asymmetry present. Motor: Weakness present. Gait: Gait abnormal.      Comments: Severe headache. Psychiatric:         Mood and Affect: Mood normal.         Behavior: Behavior normal. Behavior is cooperative. Assessment:        Primary Problem  Multiple sclerosis exacerbation [facility])    Active Hospital Problems    Diagnosis Date Noted    Hyperglycemia [R73.9] 02/28/2021    Asymptomatic bacteriuria [R82.71] 02/27/2021    Food poisoning [A05.9] 02/26/2021    Multiple sclerosis exacerbation ([facility] [address] Allen Art 02/25/2021    Diarrhea [R19.7] 02/25/2021    Chronic headache [R51.9, G89.29] 02/25/2021    Facial asymmetry [Q67.0] 02/25/2021    Acquired spondylolisthesis [M43.10] 10/08/2019    Essential hypertension [I10] 11/07/2018    Hyperlipidemia [E78.5] 11/07/2018    Abnormal gait [R26.9] 10/07/2014       Plan:         Patient status Admit as inpatient in the  Progressive Unit/Step down  Overall status: Improving  Date of admission: 2/25     ~MS exacerbation (improving)  Reasoning: Past medical history of multiple sclerosis. Abnormal bowel movement. Dizziness and ataxia. IV Solu-Medrol 500 mg daily for the next 5 days. Plan for outpatient prednisone therapy. Recommended dose 650 mg today 1250 mg for 3 to 7 days. Plan to give 2 more days of oral prednisone starting from tomorrow 2/28. Leukocytosis 17.8. Dysphagia (resolved)  Past medical history of dysphagia primarily with semisolids. Consult to speech pathologist: N.p.o. for solids and liquids. Modified barium swallow: Swallowing mechanism grossly within normal limits without evidence of aspiration.     ~EDINSON 2/2 dehydration 2/2 diarrhea/2 suspected food poisoning (resolved)  Cr: 0.89 (baseline 0.80)  Maintenance therapy 50 mL/h. FeNa  1.3 %.   Intrinsic Diarrhea resolved.     ~Lightheadedness   Orthostatic pulse and pressure pending.     ~Chronic severe headache  Norco.  Tramadol  Fioricet as needed    Hyperglycemia  GLU: 228. Patient on methyl prednisone. ~Hyperlipidemia  Atorvastatin 20 mg.    ~Hypertension   Norvasc 10 mg once on 2/27. Blood pressure still elevated. HCTZ 25 mg oral once. Patient on IV Solu-Medrol 500 mg IV for MS exacerbation. Respiratory care and eval on board. DVT prophylaxis: Enoxaparin 40 mg subcu  GI prophylaxis: Pepcid 20 mg tablet  Discharge planning: Awaiting recommendations  Diet: Renal diet  OT: Patient will benefit from intensive level of therapy after discharge from the facility. They should be able to tolerate 3-hours of Combined OT/PT/ST over 5 days/week or at least 900 minutes of  Combined Therapy over 7 days. Equipment Needed: (TBD). PM&R on board: Patient would benefit from continued therapy after discharge. Jade Snyder MD      Attestation and add on            Principal Problem:    Multiple sclerosis exacerbation ([facility] [address]  Active Problems:    Essential hypertension    Hyperlipidemia    Abnormal gait    Acquired spondylolisthesis    Diarrhea    Chronic headache    Facial asymmetry    Food poisoning    Asymptomatic bacteriuria    Hyperglycemia  Resolved Problems:    * No resolved hospital problems. *       Late note entry   Pt has unconrolled htn  And dc held       ---- ;        Medications: Allergies:     Allergies   Allergen Reactions    Bactrim [Sulfamethoxazole-Trimethoprim] Other (See Comments)     BLISTERS IN MOUTH    Lactose Intolerance (Gi) Diarrhea    Pcn [Penicillins] Hives    Lidoderm [Lidocaine] Rash    Macrobid [Nitrofurantoin] Nausea And Vomiting       Current Meds:   Scheduled Meds:    lisinopril  40 mg Oral Nightly    spironolactone  25 mg Oral Daily    insulin lispro  0-6 Units Subcutaneous TID WC    insulin lispro  0-3 Units Subcutaneous Nightly  predniSONE  20 mg Oral Daily    ciprofloxacin  250 mg Oral 2 times per day    HYDROcodone 5 mg - acetaminophen  1 tablet Oral Once    sodium chloride flush  10 mL Intravenous 2 times per day    enoxaparin  30 mg Subcutaneous Daily    famotidine  10 mg Oral Daily    amitriptyline  100 mg Oral Nightly    gabapentin  100 mg Oral BID    atorvastatin  20 mg Oral Daily    dextromethorphan  30 mg Oral 2 times per day     Continuous Infusions:    dextrose       PRN Meds: potassium chloride **OR** potassium alternative oral replacement **OR** potassium chloride, glucose, dextrose, glucagon (rDNA), dextrose, hydrALAZINE, metoprolol, sodium chloride flush, promethazine **OR** ondansetron, polyethylene glycol, acetaminophen **OR** acetaminophen, HYDROcodone-acetaminophen, traMADol, butalbital-APAP-caffeine, benzonatate        [address], [address] [facility].    Phone [phone]   Fax: [phone]  Answering Service: [phone]

## 2021-03-02 NOTE — PROGRESS NOTES
Patient admitted to room 2609. V/s obtain, patient resting comfortably with call light with in reach. Admission medications reviewed with Dr. Estephanie Joseph on the phone.

## 2021-03-02 NOTE — CARE COORDINATION
CASE MANAGEMENT NOTE:    Admission Date:  3/2/2021 Maribell Perry is a 80 y.o.  female    Admitted for : Multiple sclerosis exacerbation ([facility] [address] Drake Silva    Met with:  Patient    PCP:  Taylor Rivera MD                                Insurance:  Medicare and . [address]    . Current Residence/ Living Arrangements:  independently at home. Daughter visits approximately 3-4 times a week and assists with housekeeping and laundry. Pt has a cat at home whom her daughter is caring for at this time. Current Services PTA:  No    Is patient agreeable to VNS: NA    Freedom of choice provided:  Yes    List of [address] provided: No    VNS chosen:  No    DME:  straight cane and walker    Home Oxygen: No    Nebulizer: No    CPAP/BIPAP: No    Supplier: N/A    Potential Assistance Needed: Pt does not anticipate    SNF needed: No    Freedom of choice and list provided: NA    Pharmacy:  [facility] in [place]       Does Patient want to use MEDS to BEDS? Yes    Is patient currently receiving oral anticoagulation therapy? No    Is the Patient an LEE JULIAN [facility] with Readmission Risk Score greater than 14%? No  If yes, pt needs a follow up appointment made within 7 days. Family Members/Caregivers that pt would like involved in their care:    Yes    If yes, list name here:  Shahram Jones and Kwan Mishra- adult children    Transportation Provider:  Family             Is patient in Isolation/One on One/Altered Mental Status? No  If yes, skip next question. If no, would they like an I-Pad to  use? No  If yes, call [phone]. PHQ-9: 1 minimal depression for having little energy. Pt denied SI or thoughts of self harm    Mental Health History: Pt denied    Substance use: Pt denied    Discharge Plan:  Pt plans to return home with little need of additional services.                   Electronically signed by: OLIVER Andre on 3/2/2021 at 4:01 PM

## 2021-03-03 PROBLEM — N17.9 AKI (ACUTE KIDNEY INJURY) (HCC): Status: ACTIVE | Noted: 2021-03-03

## 2021-03-03 LAB
ANION GAP SERPL CALCULATED.3IONS-SCNC: 11 MMOL/L (ref 9–17)
BUN BLDV-MCNC: 25 MG/DL (ref 8–23)
BUN/CREAT BLD: ABNORMAL (ref 9–20)
CALCIUM SERPL-MCNC: 8.8 MG/DL (ref 8.6–10.4)
CHLORIDE BLD-SCNC: 102 MMOL/L (ref 98–107)
CO2: 27 MMOL/L (ref 20–31)
CREAT SERPL-MCNC: 0.99 MG/DL (ref 0.5–0.9)
GFR AFRICAN AMERICAN: >60 ML/MIN
GFR NON-AFRICAN AMERICAN: 53 ML/MIN
GFR SERPL CREATININE-BSD FRML MDRD: ABNORMAL ML/MIN/{1.73_M2}
GFR SERPL CREATININE-BSD FRML MDRD: ABNORMAL ML/MIN/{1.73_M2}
GLUCOSE BLD-MCNC: 104 MG/DL (ref 65–105)
GLUCOSE BLD-MCNC: 110 MG/DL (ref 70–99)
GLUCOSE BLD-MCNC: 118 MG/DL (ref 65–105)
GLUCOSE BLD-MCNC: 135 MG/DL (ref 65–105)
GLUCOSE BLD-MCNC: 158 MG/DL (ref 65–105)
GLUCOSE BLD-MCNC: 170 MG/DL (ref 65–105)
HCT VFR BLD CALC: 40 % (ref 36–46)
HEMOGLOBIN: 13.1 G/DL (ref 12–16)
MCH RBC QN AUTO: 30.7 PG (ref 26–34)
MCHC RBC AUTO-ENTMCNC: 32.8 G/DL (ref 31–37)
MCV RBC AUTO: 93.7 FL (ref 80–100)
NRBC AUTOMATED: ABNORMAL PER 100 WBC
PDW BLD-RTO: 14.1 % (ref 11.5–14.9)
PLATELET # BLD: 303 K/UL (ref 150–450)
PMV BLD AUTO: 7.6 FL (ref 6–12)
POTASSIUM SERPL-SCNC: 3.7 MMOL/L (ref 3.7–5.3)
RBC # BLD: 4.27 M/UL (ref 4–5.2)
SODIUM BLD-SCNC: 140 MMOL/L (ref 135–144)
WBC # BLD: 14.8 K/UL (ref 3.5–11)

## 2021-03-03 PROCEDURE — 82947 ASSAY GLUCOSE BLOOD QUANT: CPT

## 2021-03-03 PROCEDURE — 36415 COLL VENOUS BLD VENIPUNCTURE: CPT

## 2021-03-03 PROCEDURE — 99222 1ST HOSP IP/OBS MODERATE 55: CPT | Performed by: INTERNAL MEDICINE

## 2021-03-03 PROCEDURE — 51798 US URINE CAPACITY MEASURE: CPT

## 2021-03-03 PROCEDURE — 6360000002 HC RX W HCPCS: Performed by: INTERNAL MEDICINE

## 2021-03-03 PROCEDURE — 80048 BASIC METABOLIC PNL TOTAL CA: CPT

## 2021-03-03 PROCEDURE — 97161 PT EVAL LOW COMPLEX 20 MIN: CPT

## 2021-03-03 PROCEDURE — 6370000000 HC RX 637 (ALT 250 FOR IP): Performed by: INTERNAL MEDICINE

## 2021-03-03 PROCEDURE — 1180000000 HC REHAB R&B

## 2021-03-03 PROCEDURE — 97166 OT EVAL MOD COMPLEX 45 MIN: CPT

## 2021-03-03 PROCEDURE — 6370000000 HC RX 637 (ALT 250 FOR IP): Performed by: PHYSICAL MEDICINE & REHABILITATION

## 2021-03-03 PROCEDURE — 97530 THERAPEUTIC ACTIVITIES: CPT

## 2021-03-03 PROCEDURE — 97110 THERAPEUTIC EXERCISES: CPT

## 2021-03-03 PROCEDURE — 97535 SELF CARE MNGMENT TRAINING: CPT

## 2021-03-03 PROCEDURE — 99223 1ST HOSP IP/OBS HIGH 75: CPT | Performed by: PHYSICAL MEDICINE & REHABILITATION

## 2021-03-03 PROCEDURE — 97116 GAIT TRAINING THERAPY: CPT

## 2021-03-03 PROCEDURE — 85027 COMPLETE CBC AUTOMATED: CPT

## 2021-03-03 RX ORDER — CALCIUM CARBONATE 200(500)MG
500 TABLET,CHEWABLE ORAL 3 TIMES DAILY PRN
Status: DISCONTINUED | OUTPATIENT
Start: 2021-03-03 | End: 2021-03-12 | Stop reason: HOSPADM

## 2021-03-03 RX ADMIN — INSULIN LISPRO 1 UNITS: 100 INJECTION, SOLUTION INTRAVENOUS; SUBCUTANEOUS at 21:21

## 2021-03-03 RX ADMIN — METRONIDAZOLE 100 MG: 500 TABLET ORAL at 21:12

## 2021-03-03 RX ADMIN — SPIRONOLACTONE 25 MG: 25 TABLET, FILM COATED ORAL at 08:37

## 2021-03-03 RX ADMIN — ANTACID TABLETS 500 MG: 500 TABLET, CHEWABLE ORAL at 19:35

## 2021-03-03 RX ADMIN — ENOXAPARIN SODIUM 30 MG: 30 INJECTION SUBCUTANEOUS at 08:35

## 2021-03-03 RX ADMIN — INSULIN LISPRO 1 UNITS: 100 INJECTION, SOLUTION INTRAVENOUS; SUBCUTANEOUS at 11:17

## 2021-03-03 RX ADMIN — FAMOTIDINE 10 MG: 20 TABLET ORAL at 08:37

## 2021-03-03 RX ADMIN — AMITRIPTYLINE HYDROCHLORIDE 100 MG: 50 TABLET, FILM COATED ORAL at 21:15

## 2021-03-03 RX ADMIN — ANTACID TABLETS 500 MG: 500 TABLET, CHEWABLE ORAL at 16:46

## 2021-03-03 RX ADMIN — CIPROFLOXACIN 250 MG: 500 TABLET, FILM COATED ORAL at 21:12

## 2021-03-03 RX ADMIN — LISINOPRIL 40 MG: 20 TABLET ORAL at 21:12

## 2021-03-03 RX ADMIN — Medication 30 MG: at 21:16

## 2021-03-03 RX ADMIN — CIPROFLOXACIN 250 MG: 500 TABLET, FILM COATED ORAL at 08:37

## 2021-03-03 RX ADMIN — HYDROCODONE BITARTRATE AND ACETAMINOPHEN 1 TABLET: 5; 325 TABLET ORAL at 10:27

## 2021-03-03 RX ADMIN — ATORVASTATIN CALCIUM 20 MG: 10 TABLET, FILM COATED ORAL at 21:12

## 2021-03-03 RX ADMIN — METRONIDAZOLE 100 MG: 500 TABLET ORAL at 08:38

## 2021-03-03 RX ADMIN — Medication 30 MG: at 08:38

## 2021-03-03 RX ADMIN — POLYETHYLENE GLYCOL 3350 17 G: 17 POWDER, FOR SOLUTION ORAL at 08:42

## 2021-03-03 RX ADMIN — BUTALBITA,ACETAMINOPHEN AND CAFFEINE 1 CAPSULE: 50; 300; 40 CAPSULE ORAL at 06:12

## 2021-03-03 ASSESSMENT — PAIN DESCRIPTION - DESCRIPTORS: DESCRIPTORS: HEADACHE

## 2021-03-03 ASSESSMENT — PAIN DESCRIPTION - LOCATION
LOCATION: HEAD
LOCATION: HEAD

## 2021-03-03 ASSESSMENT — PAIN DESCRIPTION - FREQUENCY: FREQUENCY: CONTINUOUS

## 2021-03-03 ASSESSMENT — PAIN DESCRIPTION - PAIN TYPE
TYPE: CHRONIC PAIN
TYPE: CHRONIC PAIN

## 2021-03-03 ASSESSMENT — PAIN DESCRIPTION - PROGRESSION
CLINICAL_PROGRESSION: GRADUALLY WORSENING
CLINICAL_PROGRESSION: NOT CHANGED

## 2021-03-03 ASSESSMENT — PAIN SCALES - GENERAL
PAINLEVEL_OUTOF10: 5
PAINLEVEL_OUTOF10: 7
PAINLEVEL_OUTOF10: 4

## 2021-03-03 ASSESSMENT — PAIN DESCRIPTION - ORIENTATION: ORIENTATION: ANTERIOR;POSTERIOR;RIGHT;LEFT

## 2021-03-03 ASSESSMENT — PAIN DESCRIPTION - ONSET: ONSET: ON-GOING

## 2021-03-03 NOTE — PROGRESS NOTES
Speech Language Pathology  [facility] Rehab Unit at [address] [place] [address]  Speech Language Pathology    Date: 3/3/2021  Patient Name: Beltran Manning  YOB: 1934   AGE: 80 y.o. MRN: [id number]      [address] contacted  for diet order clarification, pt. Admitted on general diet with notation \"consistency of food needs to be thin, pt. Cleared for general diet\". Per pt., pt. Having no difficulty tolerating regular diet. Pt. Had MBS on 02/26, demonstrating functional swallow with recommendations for regular diet. ST recommends REGULAR Diet with thin liquids. Completed by: Manolo Hunter A.CCC/SLP

## 2021-03-03 NOTE — CONSULTS
Comprehensive Nutrition Assessment    Type and Reason for Visit:  Initial, Consult(Evalutate and Treat)    Nutrition Recommendations/Plan: Continue current diet. Monitor glucose levels. Nutrition Assessment:  Pt admitted with MS Exacerbation. Pt generally has been eating well. Wt has been stable over time; possibly gained over the past year. No chewing or swallowing problems. No recent N+V. Has lactose intolerance; avoids ice cream and drinking milk. Is able to tolerate small amounts of milk in foods. Will continue current diet. Malnutrition Assessment:  Malnutrition Status:  No malnutrition    Context:  Acute Illness     Findings of the 6 clinical characteristics of malnutrition:  Energy Intake:  No significant decrease in energy intake  Weight Loss:  No significant weight loss     Body Fat Loss:  No significant body fat loss     Muscle Mass Loss:  No significant muscle mass loss    Fluid Accumulation:  No significant fluid accumulation     Strength:  Not Performed    Estimated Daily Nutrient Needs:  Energy (kcal):  1245 based on Arti Olives with 1.3 factor; Weight Used for Energy Requirements:  Admission     Protein (g):  61 based on 1 gm per kg; Weight Used for Protein Requirements:  Admission(98lb)          Nutrition Related Findings:  No edema. Wounds:  None       Current Nutrition Therapies:    DIET GENERAL; Anthropometric Measures:  · Height: 4' 11\" (149.9 cm)  · Current Body Weight: 134 lb 14.7 oz (61.2 kg)   · Admission Body Weight: 134 lb 14.7 oz (61.2 kg)    · Ideal Body Weight: 95 lbs; % Ideal Body Weight 142 %   · BMI: 27.2  · BMI Categories: Overweight (BMI 25.0-29. 9)       Nutrition Diagnosis:   · Overweight/Obese related to excessive energy intake as evidenced by BMI    Nutrition Interventions:   Food and/or Nutrient Delivery:  Continue Current Diet  Nutrition Education/Counseling:  No recommendation at this time Coordination of Nutrition Care:  Continue to monitor while inpatient    Goals:  PO intake % of most meals with avoidance of wt gain       Nutrition Monitoring and Evaluation:   Behavioral-Environmental Outcomes:  None Identified   Food/Nutrient Intake Outcomes:  Food and Nutrient Intake  Physical Signs/Symptoms Outcomes:  Biochemical Data, Weight     Discharge Planning:    Continue current diet     Some areas of assessment may be incomplete due to standard COVID-19 Precautions. Natasha Pierce R.D., L.D.   Phone: [phone]

## 2021-03-03 NOTE — PLAN OF CARE
Nutrition Problem #1: Overweight/Obese  Intervention: Food and/or Nutrient Delivery: Continue Current Diet  Nutritional Goals: PO intake % of most meals with avoidance of wt gain

## 2021-03-03 NOTE — PROGRESS NOTES
[facility]: HE AN   ACUTE REHABILITATION OCCUPATIONAL THERAPY  DAILY NOTE    Date: 3/3/21  Patient Name: Yaritza Bowie      Room: 3197/9618-36    MRN: [id number]   : 1934  (80 y.o.)  Gender: female   Referring Practitioner: Dr. Katie Dangelo  Diagnosis: Exacerbation of multiple sclerosis    Restrictions  Restrictions/Precautions: Fall Risk  Implants present? 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 Pt aslo has Left eye lid droop, per pt, its not Bell's palsy, but its from MS-facial muscle weakness. Required Braces or Orthoses  Spinal: (states she has a back brace, uses PRN)  Required Braces or Orthoses?: Yes(back  brace- wears mainly in the morning)    Subjective  Subjective: \"I am going to work really hard to get stronger so I can get back home\"  Comments: Pt very motivated to participate in therapy.   Patient Currently in Pain: Yes  Pain Level: 4  Pain Location: Head  Restrictions/Precautions: Fall Risk  Overall Orientation Status: Within Functional Limits     Pain Assessment  Pain Assessment: 0-10  Pain Level: 4  Pain Type: Chronic pain  Pain Location: Head  Pain Descriptors: Headache  Pain Frequency: Continuous  Clinical Progression: Gradually worsening(Increased to 7/10 by end of session)  Response to Pain Intervention: Asleep with RR greater than 10  Multiple Pain Sites: No    Objective  Cognition  Overall Cognitive Status: WFL  Perception  Overall Perceptual Status: WFL  Balance  Sitting Balance: Supervision  Standing Balance: Contact guard assistance(SBA in AM; CGA in PM 2* low BP)  Bed mobility  Supine to Sit: Stand by assistance  Sit to Supine: Stand by assistance  Scooting: Stand by assistance  Transfers  Stand Pivot Transfers: Stand by assistance;Contact guard assistance(SBA in AM; CGA in PM) Sit to stand: Stand by assistance  Stand to sit: Stand by assistance  Transfer Comments: w/RW; cues for hand placement      Toilet Transfers  Toilet - Technique: Stand pivot  Equipment Used: Grab bars  Toilet Transfer: Contact guard assistance  Toilet Transfers Comments: Pivot to/from wheelchair in PM session. Shower Transfers  Shower - Transfer From: Wheelchair  Shower - Transfer Type: To and From  Shower - Transfer To:  Transfer tub bench  Shower - Technique: Stand pivot  Shower Transfers: Stand by assistance  Shower Transfers Comments: Used GB for support as needed; Good safety noted     Vision  Vision: Impaired(Double vison)  Vision Exceptions: Wears glasses at all times  Vision - Basic Assessment  Prior Vision: Wears glasses all the time(Glasses have prism to counteract diplopia)  Patient Visual Report: Diplopia(No c/o in AM, increased c/o in PM even w/glasses on)  Vision  Patient Visual Report: Diplopia(No c/o in AM, increased c/o in PM even w/glasses on)  ADL  Feeding: Modified independent (Dentures)  Grooming: Setup  UE Bathing: Stand by assistance  LE Bathing: Stand by assistance  UE Dressing: Stand by assistance  LE Dressing: Minimal assistance(A for sock adjustments and TEDs; pants/underwear w/SBA)  Toileting: Contact guard assistance Additional Comments: In AM, pt with minimal c/o pain and no reports of dizziness/lightheadedness. Pt T/F'd in/out of shower with SBA for pivot from w/c<>TTB. Pt completed all bathing tasks, including standing to wash her buttocks with SBA and Good safety. Pt donned [place] shirt, underwear, and pants with SBA and Good safety in standing. Pt required A for FRANCISCO's and donned B socks with difficulty and Min A for adjustments. In PM session, pt experiencing low BP with associated reports of feeling dizzy and \"woozy\", as well as reporting increased c/o double vision. Pt alert and conversant at EOB with BP of 87/49. Pt requested to go to the bathroom and stated she felt safe transferring to w/c and toilet. Transfers completed with CGA/Min A and cues for hand placement. Pt reported minimal increase in dizziness and BP was taken once returned to EOB at 95/48. Pt returned to supine and nursing informed of all vitals and pt reports.     OT scores   Eating  Assistance Needed: Independent  CARE Score: 6  Discharge Goal: Independent  Oral Hygiene  Assistance Needed: Setup or clean-up assistance  CARE Score: 5  Discharge Goal: Independent  Toileting Hygiene  Assistance Needed: Supervision or touching assistance  CARE Score: 4  Discharge Goal: Independent  Shower/Bathe Self  Assistance Needed: Supervision or touching assistance  CARE Score: 4  Discharge Goal: Independent  Upper Body Dressing  Assistance Needed: Supervision or touching assistance  CARE Score: 4  Discharge Goal: Independent  Lower Body Dressing  Assistance Needed: Supervision or touching assistance  CARE Score: 4  Discharge Goal: Independent  Putting On/Taking Off Footwear  Assistance Needed: Partial/moderate assistance  CARE Score: 3  Discharge Goal: Independent  Toilet Transfer  Assistance Needed: Supervision or touching assistance  CARE Score: 4  Discharge Goal: Independent    Assessment Short term goal 3: Pt will consistently complete toilet transfer and toileting with supervision and Good safety using least restrictive device. Short term goal 4: Pt will V/D good understanding of fall prevention strategies as well as EC/WS techniques during functional tasks. Short term goal 5: Pt will actively participate in 30 minutes of therapeutic exercise/functional activities to promote increased independence with self-care and mobility. Long term goals  Time Frame for Long term goals : By Discharge  Long term goal 1: Pt will complete BADL's with Mod I and Good safety using AE as needed. Long term goal 2: Pt will complete functional transfers with Mod I and Good safety using least restrictive device. Long term goal 3: Pt will complete simple meal prep/light housekeeping tasks with Mod I and Good safety using least restrictive device. 03/03/21 0912 03/03/21 1306 03/03/21 1335   OT Individual Minutes   Time In [id number]   Time Out 1020 1321 1355   Minutes 68 15 20   Time Code Minutes    Timed Code Treatment Minutes 50 Minutes 15 Minutes 20 Minutes     Electronically signed by Diego Turpin on 3/3/21 at 3:39 PM EST    3-4-21 Plan frequency adjusted to 900 minutes / 7 days to support care needs and stamina.   Electronically signed by Melly Dugan OT on 3/4/21 at 10:40 AM EST

## 2021-03-03 NOTE — CARE COORDINATION
Katie Montejo RN   Registered Nurse   Case Management   Progress Notes   Signed   Date of Service:  3/1/2021  2:53 PM         Related encounter: ED to Hosp-Admission (Discharged) from 2021 in [facility]  Acute Inpatient Rehab Preadmission Assessment     Patient Name: Josee Love        MRN:   [id number]    : 1934  (80 y.o.)  Gender: female      Admitted from:   [place]? [facility] [place]  [place]? [facility]   []? [facility]   []? [facility]   []? Outside Admission - Location:                                 [x]? Initial         []? Updated     Date of Onset / Admission to the acute hospital:  21     Inpatient Rehabilitation Admitting Diagnosis: Exacerbation of MS     Did patient have surgery? [x]? No  []? Yes:       Physicians: Johnna Rinaldit for clinical complications: Moderate to High        Co-Morbidities:  1. EDINSON  2. Anemia  3. HTN  4. HLD  5. Arthritis  6. Chronic daily headaches    7. GERD     Financial Information  Primary insurance:  [x]? Medicare     []? Medicare HMO      []? [employer]    []? Medicaid      []? Medicaid HMO       []? Workers Compensation        []? Personal Pay     Secondary Insurance:  []? Medicare     []? Medicare HMO      [x]? [employer]    []? Medicaid      []? Medicaid HMO        []? Workers Compensation      []? None     Precautions:   []? Cardiac Precautions:            []? Total hip precautions:           []? Weight Bearing status:  [x]? Safety Precautions/Concerns:  Fall Risk, General Precautions  [x]? Visually impaired:  Wears glasses at all times         [x]? Hard of Hearing: Hard of hearing/hearing concerns, No hearing aid     Isolation Precautions:         [x]? Yes              []?No  If Yes:   []? Droplet  [x]? Contact           []? Airborne     []? VRE     [x]? MRSA        []? C-diff         []? TB             []? ESBL         []? MDRO          []?  Other:                        Physiatrist: []?        []? Dr. Steven Epperson  [x]? Dr. Bushra Lawson  []? Dr. Edy Cortes     Patients Occupation: Retired     Reviewed Lab and Diagnostic reports from Current Admission: Yes     Patients Prior Functional  Level: Prior Function  ADL Assistance: Independent(dtr will assist w/ showers when she is not feeling well, otherwise she is independent)  Homemaking Assistance: Needs assistance(dtr assists cleaning, grocery shopping  and laundry, pt does her own microwave or simple meals; dtr brings in some meals)  Ambulation Assistance: Independent(uses back brace and wheeled walker mainly in the morning and then progresses  to cane or walking around furniture in the later day, uses cane outside)  Transfer Assistance: Independent  Additional Comments: son and dtr both work day shift; states that she calls her dtr every morning     History of current illness:  Lois Raza is a 80 y. o. right-handed female with past medical history of multiples sclerosis, HTN, HLD, arthritis, GERD, and chronic daily headache admitted to [facility] 2/25/2021.       She initially presented with dizziness, abdominal pain, and diarrhea.  She reportedly ate a fish sandwich the night prior to presentation and developed abdominal pain, diarrhea, nausea, and an episode of vomiting a few hours later.  The next morning she awoke with dizziness and difficulty walking and was brought to the ED by her daughter, as a previous MS exacerbation had a similar presentation. Barak Ruiz was found to have EDINSON. Barak Ruiz was started on solumedrol for 5 days and IV fluids.     She currently reports ongoing lightheadedness.  She also notes a recent cold, shortness of breath on exertion, chronic daily headaches, and chronic numbness/tingling in her feet.  She denies any other pain at this time.     Current functional status for upper extremity ADLs:  UE Bathing: Stand by assistance  UE Dressing: Stand by assistance     Current functional status for lower extremity ADLs: LE Bathing: Moderate assistance  LE Dressing: Maximum assistance     Current functional status for bed, chair, wheelchair transfers:  Transfers  Sit to Stand: Contact guard assistance  Stand to sit: Contact guard assistance  Comment: pt stood next to the bed for less than 30 seconds w/ max x 1 for support- pt's standing balance was poor w/ increased sway initially progressing to almost a rotational sway. Pt placed back on the bed w/ BP taken in a seated position at the /102 and then returned to bed     Current functional status for toilet transfers:  Contact guard assistance       Current functional status for locomotion:  Ambulation  Ambulation?: Yes  Ambulation 1  Surface: level tile  Device: Rolling Walker  Assistance: Contact guard assistance  Quality of Gait: forward flexed posture and small steps   Gait Deviations: Slow Emily, Decreased step length, Decreased step height  Distance: 95ft  Comments: no LOB      Current functional status for comprehension: Complete independence     Current functional status for expression: Complete independence     Current functional status for social interaction: Complete independence     Current functional status for problem solving: Complete independence     Current functional status for memory: Complete independence     Current Deficits R/T Impairment: Impaired Functional Mobility and Decreased ADLs     Required Therapy:   [x]? Physical Therapy  [x]? Occupational Therapy   []? Speech Therapy, as appropriate     Additional Services:  [x]?   []? Recreational Therapy, as appropriate  [x]? Nutrition  []? Dialysis  []?  Other:      Rehab Justification:  Needs 3 hrs therapy per day or 15 hours per week:  Yes  Identified Rehab Nursing needs: Yes  Intense Interdisciplinary need:  Yes  Need for 24 hr physician supervision:  Yes  Measurable improved quality of life:  Yes  Willingness to participate:  Yes  Medical Necessity:  Yes Patient able to tolerate care proposed: Yes     Expected Discharge Destination/Functional Level:  Home with assist  Expected length of time to achieve that level of improvement: 1-2 weeks  Expected Post Discharge Treatments: Home with possible Home Care     Other information relevant to the care needs:  n/a     Acute Inpatient Rehabilitation Disclosure Statement provided to patient. Patient verbalized understanding.   Copy placed on patient's light chart.     I have reviewed and concur with the findings and results of the pre-admission screening assessment completed by the Inpatient Rehabilitation Admissions Coordinator.                  Cosigned by: Irena Weber MD at 3/1/2021  3:12 PM

## 2021-03-03 NOTE — H&P
Physical Medicine & Rehabilitation History and Physical  [facility] Acute Rehabilitation Unit     Primary care provider: Daniel Delgado MD     Chief Complaint and Reason for Rehabilitation Admission:   MS Exacerbation    History of Present Illness:  Becky Stoner  is a 80 y.o.   female admitted to the [address] [place] [address] unit on 3/2/2021. She was originally admitted to [facility] on 02/25/21 for dizziness, ataxia, cough, back pain, abdominal pain, diarrhea and was admitted to hospital for management of MS exacerbation, EDINSON, and lightheadedness secondary to dehydration. Has a past medical history of MS, spinal stenosis of lumbar region, chronic severe headaches, and facial asymmetry. On 02/24 she had a fish sandwich around 1800 and by 2200 began having abdominal pain, 7/10 and gurgling in nature followed by 3 episodes of non-bloody diarrhea. She also had a single episode of non-bloody emesis. She fell asleep around 0000 and awoke at 0700 and called her daughter. She was brought to the ED at that time. In the ED, she recalls an MS exacerbation that was similar in presentation. She was having chonic migraaine-like headaches. Cr was elevated 1.19 (baseline 0.80) in the ED, maintenance fluids started. Troponins were mildly elevated 27 and GFR 46. Nordco and Fuioricet for headaches. She was diagnosed and treated of UTI secondary to Enterococcus infection sensitive to Cipro. Aldactone added for uncontrolled HTN with hypokalemia suspecting primary hyperaldosteronism. She is currently requiring assistance for self-care activities and mobility prompting this admission.     Premorbid function:  Modified independent    Current Function:  PT:  Restrictions/Precautions: Fall Risk  Implants present? 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 Transfers  Sit to Stand: Contact guard assistance  Stand to sit: Contact guard assistance  Stand Pivot Transfers: Contact guard assistance  Comment: standing with RW  Ambulation  Ambulation?: Yes  Ambulation 1  Surface: level tile  Device: Rolling Walker  Assistance: Contact guard assistance  Quality of Gait: No LOB  Gait Deviations: Slow Emily, Decreased step length, Decreased step height  Distance: 50' x 1(One standing rest break)  Comments: No LOB, required more time and encouraged to rest PRN. Surface: level tile  Ambulation 1  Surface: level tile  Device: Rolling Walker  Assistance: Contact guard assistance  Quality of Gait: No LOB  Gait Deviations: Slow Emily, Decreased step length, Decreased step height  Distance: 50' x 1(One standing rest break)  Comments: No LOB, required more time and encouraged to rest PRN. OT:   ADL  Feeding: Modified independent (Dentures)                      Bed mobility  Scooting: Stand by assistance                  SPEECH:    Recommended Diet:  Solid consistency: Regular  Liquid consistency:  Thin        Safe Swallow Protocol:  Compensatory Swallowing Strategies: Eat/Feed slowly;Upright as possible for all oral intake;Small bites/sips       Past Medical History:      Diagnosis Date    Acute cystitis without hematuria 10/1/2017    Arthritis     Chronic daily headache     GERD (gastroesophageal reflux disease)     Hyperlipidemia     Hypertension     Moderate malnutrition ([facility] [address] 3/2/2019    Multiple sclerosis ([facility] [address]     Neuropathy     Pneumonia 2017    states had double pneumonia    Vitamin D deficiency        Past Surgical History:      Procedure Laterality Date    CATARACT REMOVAL WITH IMPLANT [facility] 11/6/2014    [facility]    CATARACT REMOVAL WITH IMPLANT Left 12/2/2014    [facility]    CERVICAL DISC SURGERY      CYSTOCELE REPAIR      HYSTERECTOMY      RECTOCELE REPAIR      SHOULDER ARTHROPLASTY Right 04/20/2017    SHOULDER SURGERY Right 2017       Allergies:    Bactrim [sulfamethoxazole-trimethoprim], Lactose intolerance (gi), Pcn [penicillins], Lidoderm [lidocaine], and Macrobid [nitrofurantoin]    Medications   Scheduled Meds:   lisinopril  40 mg Oral Nightly    enoxaparin  30 mg Subcutaneous Daily    famotidine  10 mg Oral Daily    insulin lispro  0-3 Units Subcutaneous Nightly    insulin lispro  0-6 Units Subcutaneous TID WC    amitriptyline  100 mg Oral Nightly    atorvastatin  20 mg Oral Nightly    ciprofloxacin  250 mg Oral 2 times per day    dextromethorphan  30 mg Oral 2 times per day    gabapentin  100 mg Oral BID    spironolactone  25 mg Oral Daily    polyethylene glycol  17 g Oral Daily     Continuous Infusions:   dextrose       PRN Meds:.hydrALAZINE, dextrose, dextrose, glucagon (rDNA), glucose, benzonatate, butalbital-APAP-caffeine, HYDROcodone-acetaminophen, senna, bisacodyl, albuterol sulfate HFA     Social History:  Dwelling House - 2 story. Steps to enter:  Ramp in back entrance                                       Bed/bathroom level:  1. Lives with: self  Devices: Cane, walker  Activity level:  normal activities of daily living  Social History     Socioeconomic History    Marital status:       Spouse name: Not on file    Number of children: 3    Years of education: Not on file    Highest education level: Not on file Occupational History    Not on file   Social Needs    Financial resource strain: Not hard at all   [employer] insecurity     Worry: Never true     Inability: Never true   [employer] needs     Medical: No     Non-medical: No   Tobacco Use    Smoking status: Never Smoker    Smokeless tobacco: Never Used   Substance and Sexual Activity    Alcohol use: No    Drug use: No    Sexual activity: Not on file   Lifestyle    Physical activity     Days per week: Not on file     Minutes per session: Not on file    Stress: Not on file   Relationships    Social connections     Talks on phone: Not on file     Gets together: Not on file     Attends Yazidi service: Not on file     Active member of club or organization: Not on file     Attends meetings of clubs or organizations: Not on file     Relationship status: Not on file    Intimate partner violence     Fear of current or ex partner: Not on file     Emotionally abused: Not on file     Physically abused: Not on file     Forced sexual activity: Not on file   Other Topics Concern    Not on file   Social History Narrative    Not on file       Family History:   No family history on file. Diagnostics:     XR CHEST PORTABLE   2/25/2021 3:20 pm  Impression   Mild left ventricular enlargement without pneumonia, interstitial edema or   pleural effusion.       Old healed fractures involve the posterolateral aspect of the left 3rd   through 7th ribs.         CT HEAD WO CONTRAST  2/25/2021 4:32 pm  Impression   Senescent changes compatible with the age of the patient.       No acute intracranial abnormality.         MODIFIED BARIUM SWALLOW  02/26/21  Impression   Swallowing mechanism grossly within normal limits without evidence of   aspiration.           CBC:   Recent Labs     03/01/21  0502 03/02/21  0520 03/03/21  0605   WBC 17.3* 14.4* 14.8*   RBC 4.03 4.10 4.27   HGB 12.6 12.8 13.1   HCT 36.8 38.0 40.0   MCV 91.4 92.8 93.7   RDW 14.1 14.4 14.1    293 303 BMP:   Recent Labs     03/01/21  0502 03/02/21  0520 03/03/21  0605    141 140   K 2.9* 4.4 3.7    102 102   CO2 28 30 27   BUN 27* 25* 25*   CREATININE 0.99* 0.99* 0.99*   GLUCOSE 229* 110* 110*     HbA1c:   Lab Results   Component Value Date    LABA1C 6.2 (H) 05/21/2020     BNP: No results for input(s): BNP in the last 72 hours. PT/INR: No results for input(s): PROTIME, INR in the last 72 hours. APTT: No results for input(s): APTT in the last 72 hours. CARDIAC ENZYMES: No results for input(s): CKMB, CKMBINDEX, TROPONINT in the last 72 hours. Invalid input(s): CKTOTAL;3  FASTING LIPID PANEL:  Lab Results   Component Value Date    CHOL 235 (H) 01/04/2012    HDL 46 05/21/2020    TRIG 119 01/04/2012     LIVER PROFILE: No results for input(s): AST, ALT, ALB, BILIDIR, BILITOT, ALKPHOS in the last 72 hours. Review of Systems:  CONSTITUTIONAL:  Denies fevers, chills, fatigue. HEENT:  Denies new trouble swallowing  RESPIRATORY:  Denies wheezing, coughing, dyspnea  CARDIOVASCULAR:  Denies chest pain, palpitations   GASTROINTESTINAL:  Abdominal discomfort. Denies nausea, constipation, diarrhea  GENITOURINARY:  Denies urgency, frequency, incontinence, dysuria. MUSCULOSKELETAL: Left sided weakness, lower back pain, denies focal joint pain, neck pain. NEUROLOGICAL:  Lightheadedness. Denies focal numbness, tingling, decreased sensation  SKIN:  No ulcers, rash, bruises. Physical Exam:  /65   Pulse 99   Temp 97.9 °F (36.6 °C)   Resp 19   Ht 4' 11\" (1.499 m)   Wt 135 lb (61.2 kg)   SpO2 93%   BMI 27.27 kg/m²     GEN: In no acute distress, pleasant, well appearing  HEENT:  EOMI, mucous membranes moist and pink  PULM:  CTA bilaterally, no rhonchi, rales, cough  CV:  Regular rate rhythm. No murmurs, rubs, or gallops. GI:  Abdomen soft, nontender to palpation, BSx4 quadrants. Non-distended. NEUROLOGICAL: A&O x3. Sensation intact to light touch in all extremities.  CN II-XII intact  MSK: Left facial asymmetry Functional ROM intact. Motor testing LLE 3/5, all other extremities 5/5. SKIN: Warm dry and intact. Good turgor. PSYCH: Mood and affect normal. Interactive. Principal Diagnosis/plan:  The patient is a 80y.o. year old with ADL and Mobility deficits secondary to MS exacerbation. She will require close medical monitoring for the comorbidities listed below. She will benefit from intensive interdisciplinary therapies and rehab nursing care and is appropriate for inpatient rehabilitation. The post admission physician evaluation (EDWINA) is consistent with the pre-admission assessment. See above findings to reflect the elements required in the EDWINA. Patient's admitting condition is consistent with the findings of the preadmission assessment by the rehabilitation admissions coordinator. Diagnoses/plan:    1. MS exacerbation:  PT/OT for gait, mobility, strengthening, endurance, ADLs, and self care.    -gabapentin 100 mg bid, amitriptyline 100 mg qhs  2. UTI- Continue Cipro 250 mg until 03/06/21  3. Lightheadedness  4. Chronic headache- butalbital, Norco, tylenol prn  5. Hyperglycemia- Humalog,   6. Hyperlipidemia- Atorvastatin 20 mg  7. HTN- Lisinopril 40 mg, spironolactone 25 mg  8. Bowel Management: Pepcid, glycolax, senna, mirilax  9. DVT Prophylaxis:  Lovenox   10. IM for medical management      Estimated Length of Stay:  ??? weeks.     Prognosis  good    Goals    Home at [facility] at Discharge: None      Irena Marinelli

## 2021-03-03 NOTE — H&P
Physical Medicine & Rehabilitation History and Physical  [facility] Acute Rehabilitation Unit     Primary care provider: Elba Patel MD     Chief Complaint and Reason for Rehabilitation Admission:   ADL and Mobility deficits secondary to MS exacerbation. History of Present Illness:  Eric Lerma  is a 80 y.o. right-handed     female admitted to the [address] [place] [address] unit on 3/2/2021. She was originally admitted to [address] [place] [address] on 2/25/21 for dizziness, abdominal pain, and diarrhea. She reportedly ate a fish sandwich the night prior to presentation and developed abdominal pain, diarrhea, nausea, and an episode of vomiting a few hours later. The next morning she awoke with dizziness and difficulty walking and was brought to the ED by her daughter, as a previous MS exacerbation had a similar presentation. She treats with Dr. Javad Camilo at [facility]. She was found to have EDINSON. She was treated with solumedrol for 5 days and IV fluids. Solumedrol completed 3/1. Internal medicine added aldactone for her uncontrolled hypertension and increased her lisinopril. Internal medicine also recommending urology evaluation as outpatient for stress incontinence and asymptomatic bacteriuria. She is currently requiring assistance for self-care activities and mobility prompting this admission. Her MS was diagnosed in 26 and she is not currently on any medications. She reports that Dr. Javad Camilo typically treats exacerbations with steroids. She has been modified independent at home with the intermittent use of a straight cane.      Premorbid function:  Needing some assistance    Current Function:  PT:  Restrictions/Precautions: Fall Risk  Implants present? : Metal implants(right  shoulder arthroplasty)  Other position/activity restrictions: Per pt, she has chronic Left Hip tendonitis, used to follow orthopaedic surge  Required Braces or Orthoses  Spinal: (states she has a back brace, uses PRN)   Transfers  Sit to Stand: Contact guard assistance  Stand to sit: Contact guard assistance  Stand Pivot Transfers: Contact guard assistance  Comment: standing with RW  Ambulation 1  Surface: level tile  Device: Rolling Walker  Assistance: Contact guard assistance  Quality of Gait: No LOB  Gait Deviations: Slow Emily, Decreased step length, Decreased step height  Distance: 50' x 1(One standing rest break)  Comments: No LOB, required more time and encouraged to rest PRN. Transfers  Sit to Stand: Contact guard assistance  Stand to sit: Contact guard assistance  Stand Pivot Transfers: Contact guard assistance  Comment: standing with RW  Ambulation  Ambulation?: Yes  Ambulation 1  Surface: level tile  Device: Rolling Walker  Assistance: Contact guard assistance  Quality of Gait: No LOB  Gait Deviations: Slow Emily, Decreased step length, Decreased step height  Distance: 50' x 1(One standing rest break)  Comments: No LOB, required more time and encouraged to rest PRN. Surface: level tile  Ambulation 1  Surface: level tile  Device: Rolling Walker  Assistance: Contact guard assistance  Quality of Gait: No LOB  Gait Deviations: Slow Emily, Decreased step length, Decreased step height  Distance: 50' x 1(One standing rest break)  Comments: No LOB, required more time and encouraged to rest PRN. OT:       ADL  Feeding: Modified independent ([employer])  Grooming: Setup  UE Bathing: Stand by assistance  LE Bathing: Stand by assistance  UE Dressing: Stand by assistance  LE Dressing: Minimal assistance(A for sock adjustments and TEDs; pants/underwear w/SBA)  Toileting: Contact guard assistance  Additional Comments: In AM, pt with minimal c/o pain and no reports of dizziness/lightheadedness. Pt T/F'd in/out of shower with SBA for pivot from w/c<>TTB. Pt completed all bathing tasks, including standing to wash her buttocks with SBA and Good safety.   Pt donned OH shirt, underwear, and pants with SBA and Good safety in standing. Pt required A for FRANCISCO's and donned B socks with difficulty and Min A for adjustments. In PM session, pt experiencing low BP with associated reports of feeling dizzy and \"woozy\", as well as reporting increased c/o double vision. Pt alert and conversant at EOB with BP of 87/49. Pt requested to go to the bathroom and stated she felt safe transferring to w/c and toilet. Transfers completed with CGA/Min A and cues for hand placement. Pt reported minimal increase in dizziness and BP was taken once returned to EOB at 95/48. Pt returned to supine and nursing informed of all vitals and pt reports. SPEECH:  LASHAUN Diego contacted ST for diet order clarification, pt. Admitted on general diet with notation \"consistency of food needs to be thin, pt. Cleared for general diet\". Per pt., pt. Having no difficulty tolerating regular diet. Pt. Had MBS on 02/26, demonstrating functional swallow with recommendations for regular diet. ST recommends REGULAR Diet with thin liquids.        Past Medical History:      Diagnosis Date    Acute cystitis without hematuria 10/1/2017    Arthritis     Chronic daily headache     GERD (gastroesophageal reflux disease)     Hyperlipidemia     Hypertension     Moderate malnutrition ([address] 3/2/2019    Multiple sclerosis [address] [address]     Neuropathy     Pneumonia 2017    states had double pneumonia    Vitamin D deficiency        Past Surgical History:      Procedure Laterality Date    CATARACT REMOVAL WITH IMPLANT Right 11/6/2014    [facility]    CATARACT REMOVAL WITH IMPLANT Left 12/2/2014    [facility]    CERVICAL DISC SURGERY      CYSTOCELE REPAIR      HYSTERECTOMY      RECTOCELE REPAIR      SHOULDER ARTHROPLASTY Right 04/20/2017    SHOULDER SURGERY Right 2017       Allergies:    Bactrim [sulfamethoxazole-trimethoprim], Lactose intolerance (gi), Pcn [penicillins], Lidoderm [lidocaine], and Macrobid [nitrofurantoin]    Medications Scheduled Meds:   lisinopril  40 mg Oral Nightly    enoxaparin  30 mg Subcutaneous Daily    famotidine  10 mg Oral Daily    insulin lispro  0-3 Units Subcutaneous Nightly    insulin lispro  0-6 Units Subcutaneous TID WC    amitriptyline  100 mg Oral Nightly    atorvastatin  20 mg Oral Nightly    ciprofloxacin  250 mg Oral 2 times per day    dextromethorphan  30 mg Oral 2 times per day    gabapentin  100 mg Oral BID    spironolactone  25 mg Oral Daily    polyethylene glycol  17 g Oral Daily     Continuous Infusions:   dextrose       PRN Meds:.hydrALAZINE, dextrose, dextrose, glucagon (rDNA), glucose, benzonatate, butalbital-APAP-caffeine, HYDROcodone-acetaminophen, senna, bisacodyl, albuterol sulfate HFA     Social History:  Lives With: Alone  Type of Home: House  Home Layout: Two level, Able to Live on Main level with bedroom/bathroom  Home Access: Stairs to enter with rails  Entrance Stairs - Number of Steps: 3 steps w/ right rail  Entrance Stairs - Rails: Right  Bathroom Shower/Tub: Tub/Shower unit, Shower chair without back, Curtain  Bathroom Toilet: Standard  Bathroom Equipment: Grab bars in shower, Shower chair, Grab bars around toilet  Home Equipment: Rolling walker, Reacher  ADL Assistance: Independent(dtr will assist w/ showers when she is not feeling well, otherwise she is independent)  Homemaking Assistance: Needs assistance(dtr assists cleaning, grocery shopping  and laundry, pt does her own microwave or simple meals; dtr brings in some meals)  Homemaking Responsibilities: Yes(simple meals)  Ambulation Assistance: Independent(uses back brace and wheeled walker mainly in the morning and then progresses  to cane or walking around furniture in the later day, uses cane outside)  Transfer Assistance: Independent  Active : No  Mode of Transportation: Family  Occupation: Retired  IADL Comments: sleeps in a flat bed  Additional Comments: son and dtr both work day shift; states that she calls her dtr every morning    Social History     Socioeconomic History    Marital status:      Spouse name: Not on file    Number of children: 3    Years of education: Not on file    Highest education level: Not on file   Occupational History    Not on file   Social Needs    Financial resource strain: Not hard at all   [employer] insecurity     Worry: Never true     Inability: Never true   [employer] needs     Medical: No     Non-medical: No   Tobacco Use    Smoking status: Never Smoker    Smokeless tobacco: Never Used   Substance and Sexual Activity    Alcohol use: No    Drug use: No    Sexual activity: Not on file   Lifestyle    Physical activity     Days per week: Not on file     Minutes per session: Not on file    Stress: Not on file   Relationships    Social connections     Talks on phone: Not on file     Gets together: Not on file     Attends Protestant service: Not on file     Active member of club or organization: Not on file     Attends meetings of clubs or organizations: Not on file     Relationship status: Not on file    Intimate partner violence     Fear of current or ex partner: Not on file     Emotionally abused: Not on file     Physically abused: Not on file     Forced sexual activity: Not on file   Other Topics Concern    Not on file   Social History Narrative    Not on file       Family History:   No family history on file. Diagnostics:     Ct Head Wo Contrast     Result Date: 2/25/2021  EXAMINATION: CT OF THE HEAD WITHOUT CONTRAST  2/25/2021 4:32 pm TECHNIQUE: CT of the head was performed without the administration of intravenous contrast. Dose modulation, iterative reconstruction, and/or weight based adjustment of the mA/kV was utilized to reduce the radiation dose to as low as reasonably achievable. COMPARISON: MRI of the brain July 11, 2020.  HISTORY: ORDERING SYSTEM PROVIDED HISTORY: dizziness, vomiting TECHNOLOGIST PROVIDED HISTORY: dizziness, vomiting Decision Support Exception->Emergency Medical Condition (MA) Reason for Exam: Dizzines, vomiting Acuity: Unknown Type of Exam: Unknown Mechanism of Injury: Pt c/o worsening double vision and dizziness, h/o multiple sclerosis FINDINGS: BRAIN/VENTRICLES: There is no acute intracranial hemorrhage, mass effect or midline shift. No abnormal extra-axial fluid collection. The gray-white differentiation is maintained without evidence of an acute infarct. There is no evidence of hydrocephalus. The ventricular system is mildly prominent, but still well within limits of normal for size for the age of the patient and with compensatory prominence of sulcation. There are areas of decreased attenuation density in the deep white matter and periventricular regions compatible with areas of old micro ischemic change. There is some prominence of the cerebellar and vermian sulci. Calcifications are present in vessels at the base of the brain. ORBITS: The visualized portion of the orbits demonstrate no acute abnormality. SINUSES: The visualized paranasal sinuses and mastoid air cells demonstrate no acute abnormality. SOFT TISSUES/SKULL:  No acute abnormality of the visualized skull or soft tissues.      Senescent changes compatible with the age of the patient. No acute intracranial abnormality.      Xr Chest Portable     Result Date: 2/25/2021  EXAMINATION: ONE XRAY VIEW OF THE CHEST 2/25/2021 3:20 pm COMPARISON: 10/09/2019. HISTORY: ORDERING SYSTEM PROVIDED HISTORY: cough, dizziness TECHNOLOGIST PROVIDED HISTORY: cough, dizziness Reason for Exam: cough, dizziness Acuity: Unknown Type of Exam: Unknown FINDINGS: Postoperative changes were noted from prior discectomies in the lower cervical spine. Mild left ventricular enlargement was noted without pneumonia, interstitial edema or pleural effusions. Elevation of the right hemidiaphragm was noted.   Old healed fractures involve the posterolateral aspect of the left 3rd, 4th, 5th, 6th and left 7th ribs.  A right shoulder hemiarthroplasty was noted with humeral and glenoid components in their expected positions.      Mild left ventricular enlargement without pneumonia, interstitial edema or pleural effusion. Old healed fractures involve the posterolateral aspect of the left 3rd through 7th ribs.      Fl Modified Barium Swallow W Video     Result Date: 2/26/2021  EXAMINATION: MODIFIED BARIUM SWALLOW WAS PERFORMED IN CONJUNCTION WITH SPEECH PATHOLOGY SERVICES 02/26/2021 TECHNIQUE: Fluoroscopic evaluation of the swallowing mechanism was performed with multiple consistency of barium product. FLUOROSCOPY DOSE AND TYPE OR TIME AND EXPOSURES: 141 seconds; D AP 81.5 dGy cm2 COMPARISON: None HISTORY: ORDERING SYSTEM PROVIDED HISTORY: difficulty swallowing TECHNOLOGIST PROVIDED HISTORY: difficulty swallowing Reason for Exam: Difficulty swallowing. DAP 81.5 dGy/cm2 AK 18.41 mGy Fl time 141 seconds Acuity: Unknown Type of Exam: Unknown Additional signs and symptoms: Difficulty swallowing. DAP 81.5 dGy/cm2 AK 18.41 mGy Fl time 141 seconds FINDINGS: Oral phase of swallowing was grossly within normal limits. No evidence of laryngeal penetration or aspiration. Fusion hardware lower cervical spine.      Swallowing mechanism grossly within normal limits without evidence of aspiration. Please see separate speech pathology report for full discussion of findings and recommendations.      CBC:   Recent Labs     03/01/21  0502 03/02/21  0520 03/03/21  0605   WBC 17.3* 14.4* 14.8*   RBC 4.03 4.10 4.27   HGB 12.6 12.8 13.1   HCT 36.8 38.0 40.0   MCV 91.4 92.8 93.7   RDW 14.1 14.4 14.1    293 303     BMP:   Recent Labs     03/01/21  0502 03/02/21  0520 03/03/21  0605    141 140   K 2.9* 4.4 3.7    102 102   CO2 28 30 27   BUN 27* 25* 25*   CREATININE 0.99* 0.99* 0.99*   GLUCOSE 229* 110* 110*     HbA1c:   Lab Results   Component Value Date    LABA1C 6.2 (H) 05/21/2020     BNP: No results for input(s): BNP in the last 72 hours. PT/INR: No results for input(s): PROTIME, INR in the last 72 hours. APTT: No results for input(s): APTT in the last 72 hours. CARDIAC ENZYMES: No results for input(s): CKMB, CKMBINDEX, TROPONINT in the last 72 hours. Invalid input(s): CKTOTAL;3  FASTING LIPID PANEL:  Lab Results   Component Value Date    CHOL 235 (H) 01/04/2012    HDL 46 05/21/2020    TRIG 119 01/04/2012     LIVER PROFILE: No results for input(s): AST, ALT, ALB, BILIDIR, BILITOT, ALKPHOS in the last 72 hours. Review of Systems:  CONSTITUTIONAL:  Denies fevers, chills, sweats or fatigue. EYES:  Denies blind spots. Notes intermittent diplopia, blurring. HEENT:  Denies hearing loss, trouble chewing or swallowing. RESPIRATORY:  No wheezing, coughing, shortness of breath. CARDIOVASCULAR:  Denies chest pain, palpitations, lightheadedness. GASTROINTESTINAL:  Denies heartburn, nausea, constipation, diarrhea, abdominal pain. GENITOURINARY:  No urgency, frequency, incontinence, dysuria. ENDOCRINE:  Denies hot or cold intolerance. MUSCULOSKELETAL:  Denies focal joint pain, back pain, neck pain. NEUROLOGICAL:  Denies focal numbness, tingling, balance loss, headache. BEHAVIOR/PSYCH:  Denies depression, anxiety, memory loss, insomnia. SKIN:  No ulcers, rash, bruises. Physical Exam:  /65   Pulse 99   Temp 97.9 °F (36.6 °C)   Resp 19   Ht 4' 11\" (1.499 m)   Wt 135 lb (61.2 kg)   SpO2 93%   BMI 27.27 kg/m²     GEN: Well developed, well nourished, in NAD  HEENT:  NCAT. PERRL. EOMI. Mucous membranes pink and moist.   PULM:  Clear to ausculation. No rales or rhonchi. Respirations WNL and unlabored. CV:  Regular rate rhythm. No murmurs or gallops. GI:  Abdomen soft. Nontender. Non-distended. BS + and equal.    NEUROLOGICAL: A&O x3. Sensation intact to light touch. DTRs 2+. MSK:  Functional ROM all extremities. Motor testing 4+/5 key muscles RUE and RLE. 4-/5 key muscles LUE and LLE.     SKIN: Warm dry and intact. Good turgor. EXTREMITIES:  No calf tenderness to palpation. No edema BLEs. Yulissa Garcia PSYCH: Mood WNL. Appropriately interactive. Affect WNL. Principal Diagnosis/plan:  The patient is a 80y.o. year old with ADL and Mobility deficits secondary to MS exacerbation    She will require close medical monitoring for the comorbidities listed below. She will benefit from intensive interdisciplinary therapies and rehab nursing care and is appropriate for inpatient rehabilitation. The post admission physician evaluation (EDWINA) is consistent with the pre-admission assessment. See above findings to reflect the elements required in the EDWINA. Patient's admitting condition is consistent with the findings of the preadmission assessment by the rehabilitation admissions coordinator. Diagnoses/plan:    1. Acute MS exacerbation:  PT/OT for gait, mobility, strengthening, endurance, ADLs, and self care. To follow up with Dr. Matthew Flores 1 week. 2. EDINSON: monitoring BMP. Encourage hydration. 3. Anemia: monitoring CBC  4. Leukocytosis: likely secondary to recent burst of IV steroid. Will monitor. No current signs or symptoms of infection. 5. HTN/Hyperlipidemia: on atorvastatin, hydralazine prn, lisinopril, spironolactone  6. Arthritis: has Norco prn pain  7. Chronic daily headaches: on amitriptyline q hs. has Fioricet prn.   8. GERD: on famotidine daily  9. Hyperglycemia: has insulin sliding scale - likely related to 5 days IV steroid. 10. Cough: Improving. has albuterol prn, tessalon prn, dextromethorphan BID  11. Stress incontinence/asymptomatic bacteriuria: for outpatient follow up with Dr. Kenny Gross. Completing Cipro 3/6. 12. Bowel Management: Miralax daily, senokot prn, dulcolax prn. 13. DVT Prophylaxis:  low molecular weight heparin, SCD's while in bed and FRANCISCO's during the day  14. Internal medicine for medical management      Estimated Length of Stay:  2 weeks.     Prognosis  fair    Goals    Home at Independent Supervision at Discharge: None      Taniya Whitfield MD     This note is created with the assistance of a speech recognition program.  While intending to generate a document that actually reflects the content of the visit, the document can still have some errors including those of syntax and sound a like substitutions which may escape proof reading. In such instances, actual meaning can be extrapolated by contextual diversion.

## 2021-03-03 NOTE — PLAN OF CARE
Problem: Falls - Risk of:  Goal: Will remain free from falls  Description: Will remain free from falls  3/3/2021 1522 by Aaliyah Griggs RN  Outcome: Ongoing     Problem: Falls - Risk of:  Goal: Absence of physical injury  Description: Absence of physical injury  3/3/2021 1522 by Aaliyah Griggs RN  Outcome: Ongoing     Problem: Pain:  Goal: Pain level will decrease  Description: Pain level will decrease  Outcome: Ongoing     Problem: Pain:  Goal: Control of acute pain  Description: Control of acute pain  Outcome: Ongoing     Problem: Pain:  Goal: Control of chronic pain  Description: Control of chronic pain  Outcome: Ongoing     Problem: Musculor/Skeletal Functional Status  Goal: Highest potential functional level  Outcome: Ongoing     Problem: Musculor/Skeletal Functional Status  Goal: Absence of falls  Outcome: Ongoing

## 2021-03-03 NOTE — PROGRESS NOTES
NERI TianPPatient Assessment complete. Multiple sclerosis exacerbation ([facility] [address] [G35] . Vitals:    03/02/21 1835   BP: 125/75   Pulse: 109   Resp: 18   Temp: 98.5 °F (36.9 °C)   SpO2: 92%   . Patients home meds are   Prior to Admission medications    Medication Sig Start Date End Date Taking? Authorizing Provider   amitriptyline (ELAVIL) 100 MG tablet Take 1 tablet by mouth nightly 1/25/21  Yes Pauly Millan MD   predniSONE (DELTASONE) 10 MG tablet Take 1 tablet by mouth daily 3/2/21 3/2/22  Blanca Moreno MD   predniSONE (DELTASONE) 20 MG tablet You will need 625mg of prednisone every day for the next three days. Please take 4 tablets of 50mg every 8 hours apart each day. After three days you can resume your 10 mg prednisone every day schedule. 2/27/21 3/2/21  Blanca Moreno MD   traMADol (ULTRAM) 50 MG tablet Take 1 tablet by mouth every 6 hours as needed for Pain for up to 30 days.  2/22/21 3/24/21  Pauly Millan MD   fluticasone Adonna [place]) 50 MCG/ACT nasal spray 1 spray by Each Nostril route daily 2/19/21   Fanta Chandler PA-C   omeprazole (PRILOSEC) 20 MG delayed release capsule Take 1 capsule by mouth daily 2/8/21   Pauly Millan MD   celecoxib (CELEBREX) 200 MG capsule Take 1 capsule by mouth daily 2/1/21 2/1/22  Pauly Millan MD   Elastic Bandages & Supports (LUMBAR BACK BRACE/SUPPORT PAD) MISC 1 each by Does not apply route daily 1/26/21   Pauly Millan MD   Elastic Bandages & Supports (LUMBAR BACK BRACE/SUPPORT PAD) MISC 1 each by Does not apply route daily as needed (back pain) 1/25/21   Pauly Millan MD   lisinopril (PRINIVIL;ZESTRIL) 5 MG tablet Take 1 tablet by mouth daily 1/25/21   Pauly Millan MD   simvastatin (ZOCOR) 20 MG tablet TAKE 1 TABLET BY MOUTH EVERY NIGHT 1/20/21   Pauly Millan MD   solifenacin (VESICARE) 5 MG tablet Take 1 tablet by mouth daily 12/4/20 12/4/21  Toña Cavazos MD Atelectasis or absent basilar breath sounds   Incentive Spirometry Volume  (Per IBW)   [x]  Greater than or equal to 15ml/Kg []  less than 15ml/Kg []  less than 15ml/Kg   Surgery within last 2 weeks [x]  None or general   []  Abdominal or thoracic surgery  []  Abdominal or thoracic   Chronic Pulmonary Historyre [x]  No []  Yes []  Yes     [x]  Secretion Management Assessment  Score 1 2 3   Bilateral Breath Sounds   []  Occasional Rhonchi []  Scattered Rhonchi []  Course Rhonchi and/or poor aeration   Sputum    []  Small amount of thin secretions []  Moderate amount of viscous secretions []  Copius, Viscious Yellow/ Secretions   CXR as reported by physician []  clear  [x]  Unavailable []  Infiltrates and/or consolidation  []  Unavailable []  Mucus Plugging and or lobar consolidation  []  Unavailable   Cough [x]  Strong, productive cough []  Weak productive cough []  No cough or weak non-productive cough   Sergey Tejada  7:38 PM

## 2021-03-03 NOTE — PATIENT CARE CONFERENCE
and Pulse?: Yes            Equipment Needed: (Continue to assess)    Pt with history of MS, is overall weak, L LE > R LE due  Left Hip tendonitis. Pt needs assistance for functional mobility and is at fall risk at this time. Will conitnue POC to maximize her fucntion for safe return to home. Goals  Time Frame for Short term goals: 5 to 6 days. Short term goal 1: pt to tolerate 1/2 to 45 minutes of therapuetic exercise and activity, four sessions per day.   Short term goal 2: pt to demonstrate good technique for balance activities, strengthening exercises and energy conservation techniques  Short term goal 3: pt to demonstrate independent rolling in bed for position change  Short term goal 4: pt to demonstrate transfers supine <> sit w/ supervision  Short term goal 5: pt to demonstrate transfers sit <> stand and bed <> chair using wheeled walker w/ SBA  Short term goal 6: pt to demonstrate good sitting dynamic balance and fair + or better standing balance using wheeled walker  Short term goal 7: pt to demonstrate gait 200 ft using wheeled walker w/ SBA  Short term goal 8: pt to advance to and demonstrate ability to ascend/ descend 4-6\" step using rail and st cane w/ min x 1      OCCUPATIONAL THERAPY  SELF CARE      Eating   6  Independent     Modified independent ([employer])   Oral Hygiene   5  Assistance Needed: Setup or clean-up assistance     Setup   Shower/Bathe Self   4  Assistance Needed: Supervision or touching assistance      UE Bathing: Stand by assistance  LE Bathing: Stand by assistance   Upper Body Dressing   4  Assistance Needed: Supervision or touching assistance     Stand by assistance   Lower Body Dressing   4  Assistance Needed: Supervision or touching assistance     Putting On/Taking Off Footwear   3  Assistance Needed: Partial/moderate assistance      Minimal assistance(A for sock adjustments and TEDs; pants/underwear w/SBA)   Toilet Transfer   4  Assistance Needed: Supervision or touching Tub/Shower unit, Shower chair without back, Curtain  Bathroom Toilet: Standard  Bathroom Equipment: Grab bars in shower, Shower chair, Grab bars around toilet, Hand-held shower  Bathroom Accessibility: Accessible(walker will not fit int he bathroom.)  Home Equipment: Rolling walker, Reacher, Cane, Alert [employer] Help From: Family  ADL Assistance: Independent(dtr will assist w/ showers when she is not feeling well, otherwise she is independent)  [address]: Needs assistance(dtr assists cleaning, grocery shopping  and laundry, pt does her own microwave or simple meals; dtr brings in some meals)  Homemaking Responsibilities: Yes(simple meals)  Ambulation Assistance: Independent(uses back brace and wheeled walker mainly in the morning and then progresses  to cane or walking around furniture in the later day, uses cane outside)  Transfer Assistance: Independent  Active : No  Patient's  Info: Pt's daughter  Mode of Transportation: Family, Car  Occupation: Retired  IADL Comments: sleeps in a flat bed  Additional Comments: son and dtr both work day shift; states that she calls her dtr every morning Daughter does shopping, cleaning and laundry. Pt does her own meals and dishes. Family Education: Need to make contact with family to initiate education    Percentage Risk for Readmission: Low 0 - 18%   Risk of Unplanned Readmission:      10 %    Critical Items: None       Problem / Barrier Intervention / Plan  Results   Altered Tolerance and Safety during self care tasks  Conditioning and training in use of devices and modified care strategies to maximize ADL status     Impaired function Strengthening, endurance activiites, fucntional mobility training. Total Self Care Score    Total Mobility Score  Admission Score:  30      Admission Score:  34  Goal:  42/42         Goal:  79/90   `  Discharge Plan   Estimated Discharge Date: 3/12/2021  Home evaluation needed?  No approve the established interdisciplinary plan of care as documented within the medical record of Peter Mcgowan.     Elizabeth Gil MD

## 2021-03-03 NOTE — PROGRESS NOTES
Patient minimally responsive during OT. BP obtained at 1315: 102/59, blood sugar stable. BP rechecked at 1415 98/51 lying and 87/49 sitting.

## 2021-03-03 NOTE — PROGRESS NOTES
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 Pt aslo has Left eye lid droop, per pt, its not Bell's palsy, but its from MS-facial muscle weakness. Subjective   General  Chart Reviewed: Yes  Family / Caregiver Present: No  Referring Practitioner: Dr Jesse Lopez: Patient complaint of significant dizziness and \"unable to focus\"  General Comment  Comments: During session, RN brings patient water and encourages patient to drink more per Dr. Autumn Tomlinson. While drinking, patient begins coughing. Patient reports at home she has to \"put a cracker in her mouth\" to stop her from choking. Patient states, \"it doesn't happen all the time but only sometimes the water strangles me. \" RN [address] and SLP Jim Speaker notified of complaint and symptoms  Vital Signs  Orthostatic B/P and Pulse?: Yes  Blood Pressure Lyin/55  Blood Pressure Sittin/42  Blood Pressure Standing: (Unable to assess, unsafe to stand patient at this time)  Orthostatic B/P and Pulse?: Yes       Objective   Bed mobility  Rolling to Left: Stand by assistance  Rolling to Right: Stand by assistance  Supine to Sit: Contact guard assistance  Sit to Supine: Contact guard assistance  Scooting: Stand by assistance  Comment: CGA for safety, patient complaint of dizziness and feeling \"woozy\"  Transfers  Comment: Unable to assess, not safe to attempt at this time     Other exercises  Other exercises?: Yes  Other exercises 1: Supine <> sit (for orthostatic BPs)  Other exercises 2: EOB x2 minutes (minimal assist required to maintain balance, increased sway with dizziness)  Other exercises 3: Supine (B) LE exercises x15 (AROM)   Other exercises 4: Positioning in bed (patient able to scoot herself up in supine towards HOB)      Goals Short term goals  Time Frame for Short term goals: 5 to 6 days. Short term goal 1: pt to tolerate 1/2 to 45 minutes of therapuetic exercise and activity, four sessions per day. Short term goal 2: pt to demonstrate good technique for balance activities, strengthening exercises and energy conservation techniques  Short term goal 3: pt to demonstrate independent rolling in bed for position change  Short term goal 4: pt to demonstrate transfers supine <> sit w/ supervision  Short term goal 5: pt to demonstrate transfers sit <> stand and bed <> chair using wheeled walker w/ SBA  Short term goal 6: pt to demonstrate good sitting dynamic balance and fair + or better standing balance using wheeled walker  Short term goal 7: pt to demonstrate gait 200 ft using wheeled walker w/ SBA  Short term goal 8: pt to advance to and demonstrate ability to ascend/ descend 4-6\" step using rail and st cane w/ min x 1  Long term goals  Time Frame for Long term goals : By DC  Long term goal 1: pt able to perform transfers independently with or without appropriate device. Long term goal 2: pt able to ambulate with appropriate device safely distance of 150 to 200 ft, level surface, for 2MWT. Long term goal 3: Assess if pt able to ambulate safely without device for 10 to 20 ft. (at baseline at times pt able to walk shor distance without a device). Long term goal 4: pt able to go up and down 5 to 8 steps with 1 HR/st cane, supervsion level. Long term goal 5: improve Tinetti balance score to atleast 22/28 to reduce fall risk. Patient Goals   Patient goals : get stronger    Plan    Plan  Times per week: 1.5 hr/day, 5 to 7 days/week.   Specific instructions for Next Treatment: advance gait distance using wheeled walker, advance to steps w/ rail if dizziness improves and instruct in exercises for balance and strengthening exercises Current Treatment Recommendations: Strengthening, Home Exercise Program, Safety Education & Training, Balance Training, Endurance Training, Patient/Caregiver Education & Training, Equipment Evaluation, Education, & procurement, Functional Mobility Training, Transfer Training, Gait Training, Stair training  Safety Devices  Type of devices:  All fall risk precautions in place, Call light within reach, Gait belt, Patient at risk for falls, Nurse notified, Bed alarm in place, Left in bed     Therapy Time   Individual Concurrent Group Co-treatment   Time In 1430         Time Out 1504         Minutes [place], [place]

## 2021-03-03 NOTE — FLOWSHEET NOTE
Patient alone sitting up in bed, patient recognized writer from earlier visit. Patient shared with writer that she hope that she would be able to tolerate her rehab program so she could get better and go home soon. Writer helped patient to understand that we are here to help her not to challenge her. Patient has good support system through her family. Chaplains will remain available to offer spiritual and emotional support as needed. 03/02/21 1830   Encounter Summary   Services provided to: Patient   Referral/Consult From: [address] [place] [address] Children;Family members   Continue Visiting   (3/2/21)   Complexity of Encounter Moderate   Length of Encounter 15 minutes   Spiritual Assessment Completed Yes   Routine   Type Initial   Assessment Calm; Approachable   Intervention Active listening;Explored feelings, thoughts, concerns;Nurtured hope;Prayer;Sustaining presence/ Ministry of presence;Provided reading materials/devotional materials; Discussed illness/injury and it's impact; Discussed relationship with God   Outcome Acceptance;Expressed gratitude;Engaged in conversation;Coping;Encouraged; Hopeful;Receptive   Visited by Ann Marie Bowens

## 2021-03-03 NOTE — PROGRESS NOTES
Pain Radiating Towards: From forehead goes to posterior head/behind the ears, and face  Pain Descriptors: Headache  Pain Frequency: Continuous  Pain Onset: On-going  Clinical Progression: Not changed  Response to Pain Intervention: Asleep with RR greater than 10  Vital Signs  BP Location: Left upper arm  Level of Consciousness: Alert (0)  Patient Currently in Pain: Yes  Oxygen Therapy  O2 Device: None (Room air)       Orientation  Orientation  Overall Orientation Status: Within Functional Limits  Social/Functional History  Social/Functional History  Lives With: Alone  Type of Home: House  Home Layout: Two level, Able to Live on Main level with bedroom/bathroom, Laundry in basement  Home Access: Stairs to enter with rails  Entrance Stairs - Number of Steps: 3 steps w/ right rail  Entrance Stairs - Rails: Right  Bathroom Shower/Tub: Tub/Shower unit, Shower chair without back, Curtain  Bathroom Toilet: Standard  Bathroom Equipment: Grab bars in shower, Shower chair, Grab bars around toilet, Hand-held shower  Bathroom Accessibility: Accessible(walker will not fit int he bathroom.)  Home Equipment: Rolling walker, Reacher, Cane, Alert [employer] Help From: Family  ADL Assistance: Independent(dtr will assist w/ showers when she is not feeling well, otherwise she is independent)  Homemaking Assistance: Needs assistance(dtr assists cleaning, grocery shopping  and laundry, pt does her own microwave or simple meals; dtr brings in some meals)  Homemaking Responsibilities: Yes(simple meals)  Ambulation Assistance: Independent(uses back brace and wheeled walker mainly in the morning and then progresses  to cane or walking around furniture in the later day, uses cane outside)  Transfer Assistance: Independent  Active : No  Patient's  Info: Pt's daughter  Mode of Transportation: Family, Car  Occupation: Retired  IADL Comments: sleeps in a flat bed Additional Comments: son and dtr both work day shift; states that she calls her dtr every morning Daughter does shopping, cleaning and laundry. Pt does her own meals and dishes. 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))  Bed mobility  Scooting: Stand by assistance  Transfers  Sit to Stand: Contact guard assistance  Stand to sit: Contact guard assistance  Bed to Chair: Contact guard assistance;Minimal assistance(Fluctuates depending on HA and dizzyness.)  Stand Pivot Transfers: Contact guard assistance  Comment: standing with RW  Ambulation  Ambulation?: Yes  Ambulation 1  Surface: level tile  Device: Rolling Walker  Assistance: Contact guard assistance;Minimal assistance  Quality of Gait: No LOB  Gait Deviations: Slow Emily;Decreased step length;Decreased step height  Distance: 91 ft (2MWT),  pt rests and ambulates another 180 ft with one standing rest berak for 10 secs. Comments: No LOB,, but L LE slightly \"gaveout\" with fatiuqe during 2nd ambulation. Eduction provided for pacing and not overdoing things at one time.    Stairs/Curb  Stairs?: No     Balance  Posture: Fair  Sitting - Static: Good  Sitting - Dynamic: Fair;+  Standing - Static: Fair(w/ RW)  Standing - Dynamic: Fair( with RW)  Comments: Seated EOB SBA, Standing with RW-CGA/min A  Other exercises  Other exercises?: Yes  Other exercises 1: sit to stand x 3(cues for hand placement)  Other exercises 2: Ther ex both L/E seated x 10     Plan   Plan Times per week: 1.5 hr/day, 5 to 7 days/week. Specific instructions for Next Treatment: advance gait distance using wheeled walker, advance to steps w/ rail if dizziness improves and instruct in exercises for balance and strengthening exercises  Current Treatment Recommendations: Strengthening, Home Exercise Program, Safety Education & Training, Balance Training, Endurance Training, Patient/Caregiver Education & Training, Equipment Evaluation, Education, & procurement, Functional Mobility Training, Transfer Training, Gait Training, Stair training  Safety Devices  Type of devices: All fall risk precautions in place, Call light within reach, Gait belt, Patient at risk for falls, Nurse notified, Left in chair    G-Code       OutComes Score  Tinetti Performance Oriented Mobility Assessment  Tinetti  Sitting Balance: Steady, safe  Arises: Unable without help  Attempts to Arise: Unable without help  Immediate Standing Balance (First 5 Seconds):  Unsteady (swaggers, moves feet, trunk sway)(when gets dizzy/lightheaded and or have headaches early in the morning.)  Standing Balance: Steady but wide stance, uses cane or other support  Nudged: Begins to fall  Eyes Closed: Unsteady  Turned 360 Degrees: Steadiness: Unsteady (grabs, staggers)  Turned 360 Degrees: Continuity of Steps: Continuous  Sitting Down: Uses arms or not a smooth motion  Balance Score: 4  Initiation of Gait: No hesitancy  Step Height: R Swing Foot: Right foot complete clears floor  Step Length: R Swing Foot: Passes left stance foot  Step Height: L Swing Foot: Left foot complete clears floor  Step Length: L Swing Foot: Passes right stance foot  Step Symmetry: Right and left step appear equal  Step Continuity: Steps appear continuous  Path: Mild/moderate deviation or uses walking aid  Trunk: Marked sway or uses walking aid  Walking Time: Heels apart  Gait Score: 8  Tinetti Total Score: 12  Tinetti Disability Index: 40-59%  Tinetti CMS MODIFIER: CK Balance Score: 4 (03/03/21 0804)  Gait Score: 8 (03/03/21 0804)        Tinetti Total Score: 12 (03/03/21 0804)             2MWT: 91 ft with RW. Goals  Short term goals  Time Frame for Short term goals: 5 to 6 days. Short term goal 1: pt to tolerate 1/2 to 45 minutes of therapuetic exercise and activity, four sessions per day. Short term goal 2: pt to demonstrate good technique for balance activities, strengthening exercises and energy conservation techniques  Short term goal 3: pt to demonstrate independent rolling in bed for position change  Short term goal 4: pt to demonstrate transfers supine <> sit w/ supervision  Short term goal 5: pt to demonstrate transfers sit <> stand and bed <> chair using wheeled walker w/ SBA  Short term goal 6: pt to demonstrate good sitting dynamic balance and fair + or better standing balance using wheeled walker  Short term goal 7: pt to demonstrate gait 200 ft using wheeled walker w/ SBA  Short term goal 8: pt to advance to and demonstrate ability to ascend/ descend 4-6\" step using rail and st cane w/ min x 1  Long term goals  Time Frame for Long term goals : By DC  Long term goal 1: pt able to perform transfers independently with or without appropriate device. Long term goal 2: pt able to ambulate with appropriate device safely distance of 150 to 200 ft, level surface, for 2MWT. Long term goal 3: Assess if pt able to ambulate safely without device for 10 to 20 ft. (at baseline at times pt able to walk shor distance without a device). Long term goal 4: pt able to go up and down 5 to 8 steps with 1 HR/st cane, supervsion level. Long term goal 5: improve Tinetti balance score to atleast 22/28 to reduce fall risk.   Patient Goals   Patient goals : get stronger       Therapy Time   Individual Concurrent Group Co-treatment   Time In 0804         Time Out 0906         Minutes 62         Timed Code Treatment Minutes: 3475 EVELYN Hernandez, PT

## 2021-03-03 NOTE — CONSULTS
[facility] Internal Medicine    CONSULTATION / HISTORY AND PHYSICAL EXAMINATION            Date:   3/3/2021  Patient name:  Ethan Lazcano  Date of admission:  3/2/2021  3:19 PM  MRN:   [id number]  Account:  [de-identified]  YOB: 1934  PCP:    Samira Piper MD  Room:   26092609-01  Code Status:    Full Code    Physician Requesting Consult: Lexy Carver MD    Reason for Consult:  medical management    Chief Complaint:     No chief complaint on file.       History Obtained From:     Patient medical record nursing staff    History of Present Illness:   Patient admitted to acute rehab for rehabilitation  Internal medicine, consulted for medical management  Patient with multiple medical problems which include history of MS, since 1983, chronic headache, GERD, hypertension  She has chronic weakness on left side of her body, leg more than arm  She was admitted originally at [facility] with worsening dizziness, ataxia, CT head was done, which was negative for acute intracranial pathology  Patient was treated with IV steroids for 5 days, her symptoms improved, during her hospital stay, she was found to have urinary tract infection, urine culture was positive for Enterococcus faecalis, which was sensitive to ciprofloxacin, treated with antibiotics  Patient mention she has chronic headache for years, which got better with Norco  Her blood pressure was running high, she was started on Aldactone  Today morning, during therapy patient was found to be dizzy, her blood pressure was running low  No other complaints    Past Medical History:     Past Medical History:   Diagnosis Date    Acute cystitis without hematuria 10/1/2017    Arthritis     Chronic daily headache     GERD (gastroesophageal reflux disease)     Hyperlipidemia     Hypertension     Moderate malnutrition ([address] 3/2/2019    Multiple sclerosis ([address]     Neuropathy     Pneumonia 2017 albuterol sulfate HFA (VENTOLIN HFA) 108 (90 Base) MCG/ACT inhaler Inhale 2 puffs into the lungs every 4 hours as needed for Wheezing 2/10/20 2/9/21  Pauly Garcia MD   HYDROcodone-acetaminophen (NORCO) 5-325 MG per tablet Take 1 tablet by mouth 3 times daily. Historical Provider, MD        Allergies:     Bactrim [sulfamethoxazole-trimethoprim], Lactose intolerance (gi), Pcn [penicillins], Lidoderm [lidocaine], and Macrobid [nitrofurantoin]    Social History:     Tobacco:    reports that she has never smoked. She has never used smokeless tobacco.  Alcohol:      reports no history of alcohol use. Drug Use:  reports no history of drug use. Family History:     No family history on file. Review of Systems:     Positive and Negative as described in HPI. CONSTITUTIONAL:  negative for fevers, chills, sweats, fatigue, weight loss  HEENT:  negative for vision, hearing changes, runny nose, throat pain  RESPIRATORY:  negative for shortness of breath, cough, congestion, wheezing. CARDIOVASCULAR:  negative for chest pain, palpitations.   GASTROINTESTINAL:  negative for nausea, vomiting, diarrhea, constipation, change in bowel habits, abdominal pain   GENITOURINARY:  negative for difficulty of urination, burning with urination, frequency   INTEGUMENT:  negative for rash, skin lesions, easy bruising   HEMATOLOGIC/LYMPHATIC:  negative for swelling/edema   ALLERGIC/IMMUNOLOGIC:  negative for urticaria , itching  ENDOCRINE:  negative increase in drinking, increase in urination, hot or cold intolerance  MUSCULOSKELETAL:  negative joint pains, muscle aches, swelling of joints  NEUROLOGICAL: Dizziness, weakness on left side of body  BEHAVIOR/PSYCH:      Physical Exam:     BP (!) 114/57   Pulse 95   Temp 97.9 °F (36.6 °C)   Resp 19   Ht 4' 11\" (1.499 m)   Wt 135 lb (61.2 kg)   SpO2 93%   BMI 27.27 kg/m²   Temp (24hrs), Av.2 °F (36.8 °C), Min:97.9 °F (36.6 °C), Max:98.5 °F (36.9 °C)    Recent Labs 03/03/21  0526 03/03/21  1026 03/03/21  1318 03/03/21  1541   POCGLU 104 158* 135* 118*     No intake or output data in the 24 hours ending 03/03/21 1552    General Appearance:  alert, well appearing, and in no acute distress  Mental status: oriented to person, place, and time with normal affect  Head:  normocephalic, atraumatic. Eye: no icterus, redness, pupils equal and reactive, extraocular eye movements intact, conjunctiva clear  Ear: normal external ear, no discharge, hearing intact  Nose:  no drainage noted  Mouth: mucous membranes moist  Neck: supple, no carotid bruits, thyroid not palpable  Lungs: Bilateral equal air entry, clear to ausculation, no wheezing, rales or rhonchi, normal effort  Cardiovascular: normal rate, regular rhythm, no murmur, gallop, rub. Abdomen: Soft, nontender, nondistended, normal bowel sounds, no hepatomegaly or splenomegaly  Neurologic: Patient has weakness on left side of body, power in left upper and lower extremity is 5/5 skin: No gross lesions, rashes, bruising or bleeding on exposed skin area  Extremities:  peripheral pulses palpable, no pedal edema or calf pain with palpation  Psych:      Investigations:      Laboratory Testing:  Recent Results (from the past 24 hour(s))   POC Glucose Fingerstick    Collection Time: 03/02/21  7:47 PM   Result Value Ref Range    POC Glucose 200 (H) 65 - 105 mg/dL   POC Glucose Fingerstick    Collection Time: 03/03/21  5:26 AM   Result Value Ref Range    POC Glucose 104 65 - 105 mg/dL   Basic Metabolic Panel w/ Reflex to MG    Collection Time: 03/03/21  6:05 AM   Result Value Ref Range    Glucose 110 (H) 70 - 99 mg/dL    BUN 25 (H) 8 - 23 mg/dL    CREATININE 0.99 (H) 0.50 - 0.90 mg/dL    Bun/Cre Ratio NOT REPORTED 9 - 20    Calcium 8.8 8.6 - 10.4 mg/dL    Sodium 140 135 - 144 mmol/L    Potassium 3.7 3.7 - 5.3 mmol/L    Chloride 102 98 - 107 mmol/L    CO2 27 20 - 31 mmol/L    Anion Gap 11 9 - 17 mmol/L GFR Non-African American 53 (L) >60 mL/min    GFR African American >60 >60 mL/min    GFR Comment          GFR Staging NOT REPORTED    CBC    Collection Time: 03/03/21  6:05 AM   Result Value Ref Range    WBC 14.8 (H) 3.5 - 11.0 k/uL    RBC 4.27 4.0 - 5.2 m/uL    Hemoglobin 13.1 12.0 - 16.0 g/dL    Hematocrit 40.0 36 - 46 %    MCV 93.7 80 - 100 fL    MCH 30.7 26 - 34 pg    MCHC 32.8 31 - 37 g/dL    RDW 14.1 11.5 - 14.9 %    Platelets 677 573 - 917 k/uL    MPV 7.6 6.0 - 12.0 fL    NRBC Automated NOT REPORTED per 100 WBC   POC Glucose Fingerstick    Collection Time: 03/03/21 10:26 AM   Result Value Ref Range    POC Glucose 158 (H) 65 - 105 mg/dL   POC Glucose Fingerstick    Collection Time: 03/03/21  1:18 PM   Result Value Ref Range    POC Glucose 135 (H) 65 - 105 mg/dL   POC Glucose Fingerstick    Collection Time: 03/03/21  3:41 PM   Result Value Ref Range    POC Glucose 118 (H) 65 - 105 mg/dL           Consultations:   IP CONSULT TO INTERNAL MEDICINE  IP CONSULT TO DIETITIAN  IP CONSULT TO SOCIAL WORK  IP CONSULT TO INTERNAL MEDICINE  Assessment :      Primary Problem  <principal problem not specified>    Active Hospital Problems    Diagnosis Date Noted    Multiple sclerosis exacerbation ([facility] Jonah Tlaley 02/25/2021   Active Problems:    Ataxia    Multiple sclerosis ([address]    Essential hypertension    Hyperlipidemia    Abnormal gait    Multiple sclerosis exacerbation (HCC)    Chronic headache    EDINSON (acute kidney injury) ([facility]  Resolved Problems:    * No resolved hospital problems. *        Plan:     1. Admitted with multiple sclerosis exacerbation, treated with IV steroids feeling better  2. Urinary tract infection, urine culture growing Enterococcus, sensitive to ciprofloxacin, currently on ciprofloxacin till 3/6  3. Hypertension, patient was started recently on Aldactone, readings of low blood pressures, will discontinue Aldactone  4. Chronic headache, patient is on Elavil, [place] as needed  5.  On DVT proxy Lovenox 6. Acute kidney injury, much improved, last creatinine is 0.9        Suyapa Murry MD  3/3/2021  3:52 PM    Copy sent to Dr. Marky Henson MD    Please note that this chart was generated using voice recognition Dragon dictation software. Although every effort was made to ensure the accuracy of this automated transcription, some errors in transcription may have occurred.

## 2021-03-03 NOTE — PROGRESS NOTES
Physical Therapy  [email or web address]  Daily Treatment Note  NAME: Danis Brown  : 1934  MRN: [id number]    Date of Service: 3/2/2021    Discharge Recommendations:  Patient would benefit from continued therapy after discharge   PT Equipment Recommendations  Equipment Needed: (TBD)    Assessment   Body structures, Functions, Activity limitations: Decreased strength;Decreased safe awareness;Decreased balance; Increased pain;Decreased functional mobility ; Decreased endurance  Treatment Diagnosis: impaired mobility due to weakness and debilitation  Prognosis: Good  Barriers to Learning: [place]  REQUIRES PT FOLLOW UP: Yes  Activity Tolerance  Activity Tolerance: Patient limited by endurance  Activity Tolerance: Dizziness and lightheadness     Patient Diagnosis(es): The primary encounter diagnosis was Multiple sclerosis exacerbation ([address] Diagnoses of Ataxia, Dizziness, and Primary osteoarthritis involving multiple joints were also pertinent to this visit. has a past medical history of Acute cystitis without hematuria, Arthritis, Chronic daily headache, GERD (gastroesophageal reflux disease), Hyperlipidemia, Hypertension, Moderate malnutrition ([address] Multiple sclerosis ([address] Neuropathy, Pneumonia, and Vitamin D deficiency. has a past surgical history that includes Cystocele repair; Rectocele repair; Hysterectomy; Cervical disc surgery; Cataract removal with implant (Right, 2014); Cataract removal with implant (Left, 2014); Total shoulder arthroplasty (Right, 2017); and shoulder surgery (Right, 2017).     Restrictions  Restrictions/Precautions  Restrictions/Precautions: Fall Risk(troponins 27 on 2021, peripheral IV right antecubital)  Required Braces or Orthoses?: Yes(back  brace- wears mainly in the morning)  Implants present? : Metal implants(right  shoulder arthroplasty)  Required Braces or Orthoses  Spinal: (states she has a back brace)  Subjective   Subjective Subjective: Pt report a mild headache, no double vision today. . Pt agreeable to therapy. General Comment  Comments: Pt is very pleasant and cooperative. Pain Assessment  Pain Assessment: 0-10  Pain Level: 2  Patient's Stated Pain Goal: No pain  Pain Type: Acute pain  Pain Location: Head  Pain Orientation: Anterior;Posterior  Response to Pain Intervention: Patient Satisfied  Oxygen Therapy  SpO2: 95 %  Pulse Oximeter Device Mode: Intermittent  Pulse Oximeter Device Location: Finger  O2 Device: None (Room air)       Orientation  Orientation  Overall Orientation Status: Within Functional Limits  Cognition      Objective      Transfers  Sit to Stand: Contact guard assistance  Stand to sit: Contact guard assistance  Stand Pivot Transfers: Contact guard assistance  Comment: standing with RW  Ambulation  Ambulation?: Yes  Ambulation 1  Surface: level tile  Device: Rolling Walker  Assistance: Contact guard assistance  Quality of Gait: No LOB  Gait Deviations: Slow Emily;Decreased step length;Decreased step height  Distance: 50' x 1(One standing rest break)  Comments: No LOB, required more time and encouraged to rest PRN. Stairs/Curb  Stairs?: No     Balance  Posture: Fair  Sitting - Static: Good;-  Sitting - Dynamic: Fair  Standing - Static: Fair(w/ RW)  Standing - Dynamic: Fair( with RW)  Comments: Seated EOB, Standing with RW  Other exercises  Other exercises?: Yes  Other exercises 1: sit to stand x2(cues for hand placement)  Other exercises 2: Ther ex both L/E seated x 10  Other exercises 3: Ther ex both U/E w/ MIN ([place]) T-band 2 sets 5's all planes.                         G-Code     OutComes Score                                                     AM-PAC Score             Goals  Short term goals  Time Frame for Short term goals: 5-7 treatments/ week  Short term goal 1: pt to tolerate 1/2 hour of therapuetic exercise and activity Short term goal 2: pt to demonstrate good technique for balance activities, strengthening exercises and energy conservation techniques  Short term goal 3: pt to demonstrate independent rolling in bed for position change  Short term goal 4: pt to demonstrate transfers supine <> sit w/ supervision  Short term goal 5: pt to demonstrate transfers sit <> stand and bed <> chair using wheeled walker w/ min x 1  Short term goal 6: pt to demonstrate good sitting balance and fair + or better standing balance using wheeled walker  Short term goal 7: pt to demonstrate gait 30-50' using wheeled walker w/ min x 1 and W/C follow for safety  Short term goal 8: pt to advance to and demonstrate ability to ascend/ descend 4-6\" platform step using rail and AD w/ min x 1  Patient Goals   Patient goals : get stronger    Plan    Plan  Times per week: 5-7 treatments/ week  Times per day: (5-7 treatments/ week)  Specific instructions for Next Treatment: advance gait distance using wheeled walker, advance to steps w/ rail if dizziness improves and instruct in exercises for balance and strengthening exercises  Current Treatment Recommendations: Strengthening, Home Exercise Program, Safety Education & Training, Balance Training, Endurance Training, Patient/Caregiver Education & Training, Equipment Evaluation, Education, & procurement, Functional Mobility Training, Transfer Training, Gait Training, Stair training  Safety Devices  Type of devices:  All fall risk precautions in place, Call light within reach, Gait belt, Patient at risk for falls, Nurse notified, Left in chair     Therapy Time   Individual Concurrent Group Co-treatment   Time In 0926         Time Out 1000         Minutes 140 KEENA Byrd   Electronically signed by Nola Calderon PTA on 3/2/2021 at 9:16 PM

## 2021-03-03 NOTE — PLAN OF CARE
Problem: Falls - Risk of:  Goal: Will remain free from falls  Description: Will remain free from falls  Outcome: Ongoing  Note: Patient remained free from falls this shift. Call light and bed side table are within reach, bed is in lowest position, and side rails are up x2. Will continue to monitor.    Goal: Absence of physical injury  Description: Absence of physical injury  Outcome: Ongoing

## 2021-03-04 LAB
GLUCOSE BLD-MCNC: 121 MG/DL (ref 65–105)
GLUCOSE BLD-MCNC: 132 MG/DL (ref 65–105)
GLUCOSE BLD-MCNC: 135 MG/DL (ref 65–105)
GLUCOSE BLD-MCNC: 153 MG/DL (ref 65–105)

## 2021-03-04 PROCEDURE — 1180000000 HC REHAB R&B

## 2021-03-04 PROCEDURE — 82947 ASSAY GLUCOSE BLOOD QUANT: CPT

## 2021-03-04 PROCEDURE — 97110 THERAPEUTIC EXERCISES: CPT

## 2021-03-04 PROCEDURE — 6370000000 HC RX 637 (ALT 250 FOR IP): Performed by: INTERNAL MEDICINE

## 2021-03-04 PROCEDURE — 6370000000 HC RX 637 (ALT 250 FOR IP): Performed by: PHYSICAL MEDICINE & REHABILITATION

## 2021-03-04 PROCEDURE — 97535 SELF CARE MNGMENT TRAINING: CPT

## 2021-03-04 PROCEDURE — 97116 GAIT TRAINING THERAPY: CPT

## 2021-03-04 PROCEDURE — 99232 SBSQ HOSP IP/OBS MODERATE 35: CPT | Performed by: PHYSICAL MEDICINE & REHABILITATION

## 2021-03-04 PROCEDURE — 6360000002 HC RX W HCPCS: Performed by: INTERNAL MEDICINE

## 2021-03-04 PROCEDURE — 99232 SBSQ HOSP IP/OBS MODERATE 35: CPT | Performed by: INTERNAL MEDICINE

## 2021-03-04 PROCEDURE — 97530 THERAPEUTIC ACTIVITIES: CPT

## 2021-03-04 RX ADMIN — Medication 30 MG: at 08:00

## 2021-03-04 RX ADMIN — ENOXAPARIN SODIUM 30 MG: 30 INJECTION SUBCUTANEOUS at 07:59

## 2021-03-04 RX ADMIN — HYDROCODONE BITARTRATE AND ACETAMINOPHEN 1 TABLET: 5; 325 TABLET ORAL at 20:31

## 2021-03-04 RX ADMIN — AMITRIPTYLINE HYDROCHLORIDE 100 MG: 50 TABLET, FILM COATED ORAL at 20:30

## 2021-03-04 RX ADMIN — ATORVASTATIN CALCIUM 20 MG: 10 TABLET, FILM COATED ORAL at 20:32

## 2021-03-04 RX ADMIN — POLYETHYLENE GLYCOL 3350 17 G: 17 POWDER, FOR SOLUTION ORAL at 07:59

## 2021-03-04 RX ADMIN — LISINOPRIL 40 MG: 20 TABLET ORAL at 20:31

## 2021-03-04 RX ADMIN — HYDROCODONE BITARTRATE AND ACETAMINOPHEN 1 TABLET: 5; 325 TABLET ORAL at 08:00

## 2021-03-04 RX ADMIN — METRONIDAZOLE 100 MG: 500 TABLET ORAL at 07:59

## 2021-03-04 RX ADMIN — CIPROFLOXACIN 250 MG: 500 TABLET, FILM COATED ORAL at 08:00

## 2021-03-04 RX ADMIN — CIPROFLOXACIN 250 MG: 500 TABLET, FILM COATED ORAL at 20:32

## 2021-03-04 RX ADMIN — Medication 30 MG: at 20:32

## 2021-03-04 RX ADMIN — FAMOTIDINE 10 MG: 20 TABLET ORAL at 07:59

## 2021-03-04 RX ADMIN — METRONIDAZOLE 100 MG: 500 TABLET ORAL at 20:31

## 2021-03-04 ASSESSMENT — PAIN DESCRIPTION - PAIN TYPE
TYPE: ACUTE PAIN

## 2021-03-04 ASSESSMENT — PAIN DESCRIPTION - LOCATION
LOCATION: HEAD
LOCATION: HEAD

## 2021-03-04 ASSESSMENT — PAIN DESCRIPTION - ONSET: ONSET: GRADUAL

## 2021-03-04 ASSESSMENT — PAIN SCALES - GENERAL: PAINLEVEL_OUTOF10: 7

## 2021-03-04 NOTE — PLAN OF CARE
Problem: Falls - Risk of:  Goal: Will remain free from falls  Description: Will remain free from falls  3/4/2021 0249 by Nicola Sow RN  Outcome: Ongoing  No falls or injury this shift. Bed is maintained at the lowest level, brakes engaged, call light and assistive devices in reach. Room is clutter free, adequate lighting for safe transfers and ambulation. Assistance provided for transfers and toileting. Problem: Falls - Risk of:  Goal: Absence of physical injury  Description: Absence of physical injury  3/4/2021 0249 by Nicola Sow RN  Outcome: Ongoing    Problem: Musculor/Skeletal Functional Status  Goal: Highest potential functional level  3/4/2021 0249 by Nicola Sow RN  Outcome: Ongoing    Problem: Musculor/Skeletal Functional Status  Goal: Absence of falls  3/4/2021 0249 by Nicola Sow RN  Outcome: Ongoing     Problem: Pain:  Goal: Pain level will decrease  Description: Pain level will decrease  3/4/2021 0249 by Nicola Sow RN  Outcome: Ongoing  Patient assessed for pain, medicated per orders. Patient reports an acceptable level of discomfort with the current medications. Patient was repositioned as needed for comfort and skin integrity.     Problem: Pain:  Goal: Control of acute pain  Description: Control of acute pain  3/4/2021 0249 by Nicola Sow RN  Outcome: Ongoing    Problem: Pain:  Goal: Control of chronic pain  Description: Control of chronic pain  3/4/2021 0249 by Nicola Sow RN  Outcome: Ongoing     Problem: Nutrition  Goal: Optimal nutrition therapy  Outcome: Ongoing

## 2021-03-04 NOTE — PROGRESS NOTES
[facility]    CONSULTATION / HISTORY AND PHYSICAL EXAMINATION            Date:   3/4/2021  Patient name:  Pat Toro  Date of admission:  3/2/2021  3:19 PM  MRN:   [id number]  Account:  [de-identified]  YOB: 1934  PCP:    Nba Dupont MD  Room:   [id number]  Code Status:    Full Code    Physician Requesting Consult: Yasmine Stahl MD    Reason for Consult:  medical management    Chief Complaint:     No chief complaint on file.       History Obtained From:     Patient medical record nursing staff    History of Present Illness:   Patient admitted to acute rehab for rehabilitation  Internal medicine, consulted for medical management  Patient with multiple medical problems which include history of MS, since 1983, chronic headache, GERD, hypertension  She has chronic weakness on left side of her body, leg more than arm  She was admitted originally at [facility] with worsening dizziness, ataxia, CT head was done, which was negative for acute intracranial pathology  Patient was treated with IV steroids for 5 days, her symptoms improved, during her hospital stay, she was found to have urinary tract infection, urine culture was positive for Enterococcus faecalis, which was sensitive to ciprofloxacin, treated with antibiotics  Patient mention she has chronic headache for years, which got better with Norco  Her blood pressure was running high, she was started on Aldactone  Today morning, during therapy patient was found to be dizzy, her blood pressure was running low  No other complaints    Past Medical History:     Past Medical History:   Diagnosis Date    Acute cystitis without hematuria 10/1/2017    Arthritis     Chronic daily headache     GERD (gastroesophageal reflux disease)     Hyperlipidemia     Hypertension     Moderate malnutrition ([address] 3/2/2019    Multiple sclerosis ([address]     Neuropathy     Pneumonia 2017 albuterol sulfate HFA (VENTOLIN HFA) 108 (90 Base) MCG/ACT inhaler Inhale 2 puffs into the lungs every 4 hours as needed for Wheezing 2/10/20 2/9/21  Pauly Simpson MD   HYDROcodone-acetaminophen (NORCO) 5-325 MG per tablet Take 1 tablet by mouth 3 times daily. Historical Provider, MD        Allergies:     Bactrim [sulfamethoxazole-trimethoprim], Lactose intolerance (gi), Pcn [penicillins], Lidoderm [lidocaine], and Macrobid [nitrofurantoin]    Social History:     Tobacco:    reports that she has never smoked. She has never used smokeless tobacco.  Alcohol:      reports no history of alcohol use. Drug Use:  reports no history of drug use. Family History:     No family history on file. Review of Systems:     Positive and Negative as described in HPI. CONSTITUTIONAL:  negative for fevers, chills, sweats, fatigue, weight loss  HEENT:  negative for vision, hearing changes, runny nose, throat pain  RESPIRATORY:  negative for shortness of breath, cough, congestion, wheezing. CARDIOVASCULAR:  negative for chest pain, palpitations.   GASTROINTESTINAL:  negative for nausea, vomiting, diarrhea, constipation, change in bowel habits, abdominal pain   GENITOURINARY:  negative for difficulty of urination, burning with urination, frequency   INTEGUMENT:  negative for rash, skin lesions, easy bruising   HEMATOLOGIC/LYMPHATIC:  negative for swelling/edema   ALLERGIC/IMMUNOLOGIC:  negative for urticaria , itching  ENDOCRINE:  negative increase in drinking, increase in urination, hot or cold intolerance  MUSCULOSKELETAL:  negative joint pains, muscle aches, swelling of joints  NEUROLOGICAL: Dizziness, weakness on left side of body  BEHAVIOR/PSYCH:      Physical Exam:     /60   Pulse 103   Temp 98.7 °F (37.1 °C) (Oral)   Resp 18   Ht 4' 11\" (1.499 m)   Wt 135 lb (61.2 kg)   SpO2 100%   BMI 27.27 kg/m²   Temp (24hrs), Av.7 °F (37.1 °C), Min:98.6 °F (37 °C), Max:98.7 °F (37.1 °C)    Recent Labs 03/03/21  1541 03/03/21  1940 03/04/21  0558 03/04/21  1049   POCGLU 118* 170* 121* 132*       Intake/Output Summary (Last 24 hours) at 3/4/2021 1516  Last data filed at 3/4/2021 0801  Gross per 24 hour   Intake 200 ml   Output    Net 200 ml       General Appearance:  alert, well appearing, and in no acute distress  Mental status: oriented to person, place, and time with normal affect  Head:  normocephalic, atraumatic. Eye: no icterus, redness, pupils equal and reactive, extraocular eye movements intact, conjunctiva clear  Ear: normal external ear, no discharge, hearing intact  Nose:  no drainage noted  Mouth: mucous membranes moist  Neck: supple, no carotid bruits, thyroid not palpable  Lungs: Bilateral equal air entry, clear to ausculation, no wheezing, rales or rhonchi, normal effort  Cardiovascular: normal rate, regular rhythm, no murmur, gallop, rub. Abdomen: Soft, nontender, nondistended, normal bowel sounds, no hepatomegaly or splenomegaly  Neurologic: Patient has weakness on left side of body, power in left upper and lower extremity is 5/5 skin: No gross lesions, rashes, bruising or bleeding on exposed skin area  Extremities:  peripheral pulses palpable, no pedal edema or calf pain with palpation  Psych:      Investigations:      Laboratory Testing:  Recent Results (from the past 24 hour(s))   POC Glucose Fingerstick    Collection Time: 03/03/21  3:41 PM   Result Value Ref Range    POC Glucose 118 (H) 65 - 105 mg/dL   POC Glucose Fingerstick    Collection Time: 03/03/21  7:40 PM   Result Value Ref Range    POC Glucose 170 (H) 65 - 105 mg/dL   POC Glucose Fingerstick    Collection Time: 03/04/21  5:58 AM   Result Value Ref Range    POC Glucose 121 (H) 65 - 105 mg/dL   POC Glucose Fingerstick    Collection Time: 03/04/21 10:49 AM   Result Value Ref Range    POC Glucose 132 (H) 65 - 105 mg/dL           Consultations:   IP CONSULT TO INTERNAL MEDICINE  IP CONSULT TO DIETITIAN  IP CONSULT TO SOCIAL WORK

## 2021-03-04 NOTE — PLAN OF CARE
Individualized Plan of Care  [facility]    Rehabilitation physician: Dr Tuyet Dobbins Date: 3/2/2021     Rehabilitation Diagnosis: Multiple sclerosis exacerbation ([facility] [G35]     Rehabilitation impairments: self care, mobility and motor dysfunction    Factors facilitating achievement of predicted outcomes: Family support, Motivated, Cooperative, Pleasant, Good insight into deficits, Knowledge about rehab and Has homemaker services  Barriers to the achievement of predicted outcomes: Decreased endurance, Upper extremity weakness, Lower extremity weakness, Long standing deficits, Impaired vision and Medication managment    Patient Goals: Improve independence with mobility, Increase overall strength and endurance, Increase balance, Increase independence with activities of daily living, Increase safety awareness, Integrate appropriate pain management plan, Assure adequate nutritional option for discharge and Provide appropriate patient and family education      NURSING:  Nursing goals for Estephanie Garcia while on the rehabilitation unit will include:  Adequate number of bowel movements, Urinate with no urinary retention >300ml in bladder, Maintain O2 SATs at an acceptable level during stay, Effective pain management while on the rehabilitation unit, Establish adequate pain control plan for discharge, Absence of skin breakdown while on the rehabilitation unit, Avoidance of any hospital acquired infections, Freedom from injury during hospitalization and Complete education with patient/family with understanding demonstrated regarding disease process and resultant impairment     In order to achieve these goals, nursing interventions may include bowel/bladder training, education for medical assistive devices, medication education, O2 saturation management, energy conservation, stress management techniques, fall prevention, alarms protocol, seating and positioning, skin/wound care, pressure relief instruction, dressing changes, infection protection, DVT prophylaxis, assistance with safe transfers , and/or assistance with bathroom activities and hygiene. PHYSICAL THERAPY:  Goals:        Short term goals  Time Frame for Short term goals: 5 to 6 days. Short term goal 1: pt to tolerate 1/2 to 45 minutes of therapuetic exercise and activity, four sessions per day. Short term goal 2: pt to demonstrate good technique for balance activities, strengthening exercises and energy conservation techniques  Short term goal 3: pt to demonstrate independent rolling in bed for position change  Short term goal 4: pt to demonstrate transfers supine <> sit w/ supervision  Short term goal 5: pt to demonstrate transfers sit <> stand and bed <> chair using wheeled walker w/ SBA  Short term goal 6: pt to demonstrate good sitting dynamic balance and fair + or better standing balance using wheeled walker  Short term goal 7: pt to demonstrate gait 200 ft using wheeled walker w/ SBA  Short term goal 8: pt to advance to and demonstrate ability to ascend/ descend 4-6\" step using rail and st cane w/ min x 1  Long term goals  Time Frame for Long term goals : By DC  Long term goal 1: pt able to perform transfers independently with or without appropriate device. Long term goal 2: pt able to ambulate with appropriate device safely distance of 150 to 200 ft, level surface, for 2MWT. Long term goal 3: Assess if pt able to ambulate safely without device for 10 to 20 ft. (at baseline at times pt able to walk shor distance without a device). Long term goal 4: pt able to go up and down 5 to 8 steps with 1 HR/st cane, supervsion level. Long term goal 5: improve Tinetti balance score to atleast 22/28 to reduce fall risk.     Plan of Care: Pt to be seen by physical therapy services Plan Of Care:  Due to impaired functional endurance and/or medical issues, the patient is to be seen for a combined total of at least a  900 minutes over 7 days of Physical, Occupational and/or Speech Therapy. per day at least 5 out of 7 days per week     Anticipated interventions may include therapeutic exercises, gait training, neuromuscular re-ed, transfer training, community reintegration, bed mobility, w/c mobility and training. OCCUPATIONAL THERAPY:  Goals:             Short term goals  Time Frame for Short term goals: One Week  Short term goal 1: Pt will complete bathing/UBD tasks with set-up A and Good safety. Short term goal 2: Pt will complete LB dressing including socks/shoes with SBA and Good safety using AE if needed. Short term goal 3: Pt will consistently complete toilet transfer and toileting with supervision and Good safety using least restrictive device. Short term goal 4: Pt will V/D good understanding of fall prevention strategies as well as EC/WS techniques during functional tasks. Short term goal 5: Pt will actively participate in 30 minutes of therapeutic exercise/functional activities to promote increased independence with self-care and mobility. Long term goals  Time Frame for Long term goals : By Discharge  Long term goal 1: Pt will complete BADL's with Mod I and Good safety using AE as needed. Long term goal 2: Pt will complete functional transfers with Mod I and Good safety using least restrictive device. Long term goal 3: Pt will complete simple meal prep/light housekeeping tasks with Mod I and Good safety using least restrictive device. Plan of Care: Patient to be seen by occupational therapy services Plan Of Care:  Due to impaired functional endurance and/or medical issues, the patient is to be seen for a combined total of at least a  900 minutes over 7 days of Physical, Occupational and/or Speech Therapy.    per day at least 5 out of 7 days per week     Anticipated interventions may include ADL and IADL retraining, strengthening, safety education and training, patient/caregiver education and training, equipment evaluation/ training/procurement, neuromuscular reeducation, wheelchair mobility training. SPEECH THERAPY:   Goals:                        CASE MANAGEMENT:  Goals:   Assist patient/family with discharge planning, patient/family counseling,  and coordination with insurance during the inpatient rehabilitation stay. Other members of the multidisciplinary rehabilitation team that will be involved in the patient's plan of care include recreational therapy, dietary, respiratory therapy, and neuropsychology. Medical issues being managed closely and that require 24 hour availability of a physician:  Pain management, Infection protection, DVT prophylaxis, Fall precautions, Fluid/Electrolyte balance, Nutritional status and History of heart disease                                           Physician anticipated functional outcomes: Improved independence with functional measures   Estimated length of stay for this admission 2 weeks  Medical Prognosis: Fair  Anticipated disposition: Home. The potential to achieve the above medical and rehabilitative goals is fair. This plan of care has been developed with the assistance and input of the multidisciplinary rehabilitation team.  The plan was reviewed with the patient on 3/4/2021. The patient has had the opportunity to provide input to the therapy team.    I have reviewed this Individualized Plan of Care and agree with its contents. Above documentation has been expanded, modified, adjusted to reflect the findings of my evaluations and goals for the patient.     Physician:  Electronically signed by Ravi Whalen MD on 3/4/21 at 7:48 AM EST

## 2021-03-04 NOTE — PROGRESS NOTES
[address]   ACUTE REHABILITATION OCCUPATIONAL THERAPY  DAILY NOTE    Date: 3/4/21  Patient Name: Josee Love      Room: 2695/9797-99    MRN: [id number]   : 1934  (80 y.o.)  Gender: female   Referring Practitioner: Dr. Delia Goldman  Diagnosis: Exacerbation of multiple sclerosis       Restrictions  Restrictions/Precautions: Fall Risk  Implants present? 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 Pt aslo has Left eye lid droop, per pt, its not Bell's palsy, but its from MS-facial muscle weakness. Required Braces or Orthoses  Spinal: (states she has a back brace, uses PRN)  Required Braces or Orthoses?: Yes(back brace-wears mainly in the morning. )    Subjective  Subjective: \"having this episode has really changed me\" pt reports since latest MS exasberation. Comments: Pt fatigued with reported headache this AM. Pt reports headache has improved however remains fatigue and \"foggy headed\". Pt is pleasant and cooperative despite symptoms. Patient Currently in Pain: Yes  Pain Level: 7  Pain Location: Head  Restrictions/Precautions: Fall Risk  Overall Orientation Status: Within Functional Limits     Pain Assessment  Pain Assessment: 0-10  Pain Level: 7  Pain Type: Acute pain  Pain Location: Head    Objective  Cognition  Overall Cognitive Status: WFL  Perception  Overall Perceptual Status: WFL  Balance  Sitting Balance: Independent  Standing Balance: Stand by assistance  Bed mobility  Sit to Supine: Stand by assistance  Transfers  Sit to stand: Stand by assistance  Stand to sit: Stand by assistance  Transfer Comments: w/RW; cues for hand placement   Standing Balance  Time: Am: 4-5 minutes, 1-2 min x 4  Activity: AM: functional mobility, sinkside standing for self care tasks. Comment: Pt reports feeling a little lightheaded however no noted unsteadiness. Functional Mobility  Functional - Mobility Device: Rolling Walker  Activity: To/from bathroom  Assist Level: Stand by assistance     Type of ROM/Therapeutic Exercise  Type of ROM/Therapeutic Exercise: Free weights(1#, x15 reps)  Comment: Pt engages in UB strengthening exercises to promote increased strength and endurance for functional task tolerance. Pt requires frequent rest breaks throughout, noted increased fatigue following. Exercises  Scapular Protraction: x  Scapular Retraction: x  Shoulder Flexion: x  Shoulder Extension: x  Elbow Flexion: x  Elbow Extension: x  Grasp/Release: provided blue resistive sponge for B hand strengthening and promote joint mvmt for improved tolerance and use during functional and leisure tasks; pt reports that she loves to christopher and complete word puzzles. Other: resistive clothespins yellow (min resistance) to blue (mod/max resistance) grasped and placed onto vertical and horizontal dowel rods promoting increased /pinch strengthening and controlled ROM of UE's as pt reports she was noticing lack of control when reaching for items on meal tray and tabletop as well as difficulty opening containers with tabs. Pt completes task from seated position to promote energy conservation technique during dynamic UB task mvmt. ADL  Grooming: Setup  UE Bathing: Setup;Stand by assistance, increased time(completed mostly standing at sinkside. )  LE Bathing: Stand by assistance;Setup, increased time  UE Dressing: Setup  LE Dressing: Setup;Stand by assistance; Increased time to complete(Assist with donning TEDs)  Toileting: None  Additional Comments: Pt reports increased fatigue following basic ADL routine.            Assessment Performance deficits / Impairments: Decreased functional mobility ; Decreased ADL status; Decreased endurance;Decreased balance;Decreased high-level IADLs;Decreased fine motor control  Prognosis: Good  Discharge Recommendations: Home with assist PRN  Activity Tolerance: Patient Tolerated treatment well;Treatment limited secondary to medical complications (free text)  Safety Devices in place: Yes  Type of devices: Patient at risk for falls;Gait belt;Left in chair;Call light within reach;Nurse notified  Equipment Recommendations  Equipment Needed: (TBD)        Plan  Plan  Times per week: 900/7  Times per day: Twice a day  Current Treatment Recommendations: Self-Care / ADL, Home Management Training, Strengthening, Balance Training, Functional Mobility Training, Endurance Training, Pain Management, Safety Education & Training, Patient/Caregiver Education & Training, Equipment Evaluation, Education, & procurement  Plan Comment: Due to medical status and fatigue concerns she will be seen for 900 minutes of OT/PT/ST combined over 7 days  Patient Goals   Patient goals : Regain independence and return home with A from family as needed. Short term goals  Time Frame for Short term goals: One Week  Short term goal 1: Pt will complete bathing/UBD tasks with set-up A and Good safety. Short term goal 2: Pt will complete LB dressing including socks/shoes with SBA and Good safety using AE if needed. Short term goal 3: Pt will consistently complete toilet transfer and toileting with supervision and Good safety using least restrictive device. Short term goal 4: Pt will V/D good understanding of fall prevention strategies as well as EC/WS techniques during functional tasks. Short term goal 5: Pt will actively participate in 30 minutes of therapeutic exercise/functional activities to promote increased independence with self-care and mobility.   Long term goals  Time Frame for Long term goals : By Discharge Long term goal 1: Pt will complete BADL's with Mod I and Good safety using AE as needed. Long term goal 2: Pt will complete functional transfers with Mod I and Good safety using least restrictive device. Long term goal 3: Pt will complete simple meal prep/light housekeeping tasks with Mod I and Good safety using least restrictive device.         03/04/21 1047 03/04/21 1340   OT Individual Minutes   Time In 0935 1235   Time Out 1030 1308   Minutes 55 33     Electronically signed by SANDI Goodwin on 3/4/21 at 1:55 PM EST

## 2021-03-04 NOTE — PROGRESS NOTES
Implants present? 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 Pt aslo has Left eye lid droop, per pt, its not Bell's palsy, but its from MS-facial muscle weakness. Subjective   General  Chart Reviewed: Yes  Response To Previous Treatment: Patient with no complaints from previous session. Family / Caregiver Present: No  Referring Practitioner: Dr Bal Chamorro: Patient feeling better than previous session, but reports continued feeling of being light headed.  Complaint of headache (which she reports is chronic, daily for >30 years)  Pain Screening  Patient Currently in Pain: Yes  Pain Assessment  Pain Assessment: 0-10  Pain Level: 7  Pain Type: Acute pain  Pain Location: Head  Vital Signs  Level of Consciousness: Alert (0)  Patient Currently in Pain: Yes  Oxygen Therapy  O2 Device: None (Room air)       Objective   Bed mobility  Scooting: Stand by assistance  Transfers  Sit to Stand: Contact guard assistance  Stand to sit: Contact guard assistance  Bed to Chair: Contact guard assistance;Minimal assistance(Fluctuates depending on HA and dizzyness.)  Stand Pivot Transfers: Contact guard assistance(with and without RW)  Comment: Cues for hand placement and technique  Ambulation  Ambulation?: Yes  Ambulation 1  Surface: level tile  Device: Rolling Walker  Other Apparatus: Wheelchair follow  Assistance: Contact guard assistance;Minimal assistance  Quality of Gait: No LOB  Gait Deviations: Slow Emily;Decreased step length;Decreased step height  Distance: 80ft; 25 ft x2 (to/from bathroom) Comments: Patient fatigues easily, WC follow for safety due to low blood pressure and dizziness during last session. Cues given to look ahead and have good posture. Noted veering to the Right, verbal cues given to go towards the middle of the hallway to avoid wall. Stairs/Curb  Stairs?: Yes  Stairs  # Steps : 5  Stairs Height: 4\"(6\")  Rails: Bilateral  Device: No Device  Assistance: Contact guard assistance  Comment: Patient used Right rail when ascending and descending during initial 5 steps, B rails during last 5 steps. Step-to pattern. Cues for sequencing (advance right LE first d/t hx of left hip tendonitis)      Balance  Posture: Fair  Sitting - Static: Good  Sitting - Dynamic: Fair;+  Standing - Static: Fair(w/ RW)  Standing - Dynamic: Fair( with RW)  Comments: Seated EOB SBA, Standing with RW-CGA/min A  Other exercises  Other exercises?: Yes  Other exercises 1: Seated B LE exercises #1.5  x10 reps  x2   Other exercises 2: Seated B LE exercises with Lime Green Band  x10 reps  x2  Other exercises 3: Supine (B) LE exercises x15-20 per patient tolerance (AROM)  Other exercises 4: UBE x3 minutes (forward/retro)      Other Activities: (Toilet transfer (AM and PM))  Comment: Patient performs teofilo care and ld/doffs pants at [facility] for safety/balance    Goals  Short term goals  Time Frame for Short term goals: 5 to 6 days. Short term goal 1: pt to tolerate 1/2 to 45 minutes of therapuetic exercise and activity, four sessions per day.   Short term goal 2: pt to demonstrate good technique for balance activities, strengthening exercises and energy conservation techniques  Short term goal 3: pt to demonstrate independent rolling in bed for position change  Short term goal 4: pt to demonstrate transfers supine <> sit w/ supervision  Short term goal 5: pt to demonstrate transfers sit <> stand and bed <> chair using wheeled walker w/ SBA Short term goal 6: pt to demonstrate good sitting dynamic balance and fair + or better standing balance using wheeled walker  Short term goal 7: pt to demonstrate gait 200 ft using wheeled walker w/ SBA  Short term goal 8: pt to advance to and demonstrate ability to ascend/ descend 4-6\" step using rail and st cane w/ min x 1  Long term goals  Time Frame for Long term goals : By DC  Long term goal 1: pt able to perform transfers independently with or without appropriate device. Long term goal 2: pt able to ambulate with appropriate device safely distance of 150 to 200 ft, level surface, for 2MWT. Long term goal 3: Assess if pt able to ambulate safely without device for 10 to 20 ft. (at baseline at times pt able to walk shor distance without a device). Long term goal 4: pt able to go up and down 5 to 8 steps with 1 HR/st cane, supervsion level. Long term goal 5: improve Tinetti balance score to atleast 22/28 to reduce fall risk. Patient Goals   Patient goals : get stronger    Plan    Plan  Times per week: 900 minutes/week between combined therapy of PT/OT due to decreased tolerance to activity. Times per day: (5-7 treatments/ week)  Specific instructions for Next Treatment: advance gait distance using wheeled walker, advance to steps w/ rail if dizziness improves and instruct in exercises for balance and strengthening exercises  Current Treatment Recommendations: Strengthening, Home Exercise Program, Safety Education & Training, Balance Training, Endurance Training, Patient/Caregiver Education & Training, Equipment Evaluation, Education, & procurement, Functional Mobility Training, Transfer Training, Gait Training, Stair training  Safety Devices  Type of devices: Gait belt        03/04/21 0801 03/04/21 1430   PT Individual Minutes   Time In 0801 1430   Time Out 9358 1030   [id number]   Time Code Minutes   Timed Code Treatment Minutes  --  35 Minutes   * 15 minutes of non-billable time.

## 2021-03-04 NOTE — PROGRESS NOTES
Transfers  Sit to Stand: Contact guard assistance  Stand to sit: Contact guard assistance  Bed to Chair: Contact guard assistance, Minimal assistance(Fluctuates depending on HA and dizzyness.)  Stand Pivot Transfers: Contact guard assistance  Comment: Unable to assess, not safe to attempt at this time  Ambulation 1  Surface: level tile  Device: Rolling Walker  Assistance: Contact guard assistance, Minimal assistance  Quality of Gait: No LOB  Gait Deviations: Slow Emily, Decreased step length, Decreased step height  Distance: 91 ft (2MWT),  pt rests and ambulates another 180 ft with one standing rest berak for 10 secs. Comments: No LOB,, but L LE slightly \"gaveout\" with fatiuqe during 2nd ambulation. Eduction provided for pacing and not overdoing things at one time. Transfers  Sit to Stand: Contact guard assistance  Stand to sit: Contact guard assistance  Bed to Chair: Contact guard assistance, Minimal assistance(Fluctuates depending on HA and dizzyness.)  Stand Pivot Transfers: Contact guard assistance  Comment: Unable to assess, not safe to attempt at this time  Ambulation  Ambulation?: Yes  Ambulation 1  Surface: level tile  Device: Rolling Walker  Assistance: Contact guard assistance, Minimal assistance  Quality of Gait: No LOB  Gait Deviations: Slow Emily, Decreased step length, Decreased step height  Distance: 91 ft (2MWT),  pt rests and ambulates another 180 ft with one standing rest berak for 10 secs. Comments: No LOB,, but L LE slightly \"gaveout\" with fatiuqe during 2nd ambulation. Eduction provided for pacing and not overdoing things at one time. Surface: level tile  Ambulation 1  Surface: level tile  Device: Rolling Walker  Assistance: Contact guard assistance, Minimal assistance  Quality of Gait: No LOB  Gait Deviations: Slow Emily, Decreased step length, Decreased step height  Distance: 91 ft (2MWT),  pt rests and ambulates another 180 ft with one standing rest berak for 10 secs. Comments: No LOB,, but L LE slightly \"gaveout\" with fatiuqe during 2nd ambulation. Eduction provided for pacing and not overdoing things at one time. OT:  ADL  Feeding: Modified independent ([employer])  Grooming: Setup  UE Bathing: Stand by assistance  LE Bathing: Stand by assistance  UE Dressing: Stand by assistance  LE Dressing: Minimal assistance(A for sock adjustments and TEDs; pants/underwear w/SBA)  Toileting: Contact guard assistance  Additional Comments: In AM, pt with minimal c/o pain and no reports of dizziness/lightheadedness. Pt T/F'd in/out of shower with SBA for pivot from w/c<>TTB. Pt completed all bathing tasks, including standing to wash her buttocks with SBA and Good safety. Pt donned [place] shirt, underwear, and pants with SBA and Good safety in standing. Pt required A for FRANCISCO's and donned B socks with difficulty and Min A for adjustments. In PM session, pt experiencing low BP with associated reports of feeling dizzy and \"woozy\", as well as reporting increased c/o double vision. Pt alert and conversant at EOB with BP of 87/49. Pt requested to go to the bathroom and stated she felt safe transferring to w/c and toilet. Transfers completed with CGA/Min A and cues for hand placement. Pt reported minimal increase in dizziness and BP was taken once returned to EOB at 95/48. Pt returned to supine and nursing informed of all vitals and pt reports.          Balance  Sitting Balance: Supervision  Standing Balance: Contact guard assistance(SBA in AM; CGA in PM 2* low BP)            Bed mobility  Rolling to Left: Stand by assistance  Rolling to Right: Stand by assistance  Supine to Sit: Contact guard assistance  Sit to Supine: Contact guard assistance  Scooting: Stand by assistance  Comment: CGA for safety, patient complaint of dizziness and feeling \"woozy\"  Transfers  Stand Pivot Transfers: Stand by assistance, Contact guard assistance(SBA in AM; CGA in PM)  Sit to stand: Stand by assistance Stand to sit: Stand by assistance  Transfer Comments: w/RW; cues for hand placement    Toilet Transfers  Toilet - Technique: Stand pivot  Equipment Used: Grab bars  Toilet Transfer: Contact guard assistance  Toilet Transfers Comments: Pivot to/from wheelchair in PM session. Shower Transfers  Shower - Transfer From: Wheelchair  Shower - Transfer Type: To and From  Shower - Transfer To: Transfer tub bench  Shower - Technique: Stand pivot  Shower Transfers: Stand by assistance  Shower Transfers Comments: Used GB for support as needed; Good safety noted       SPEECH:      Objective:  /69   Pulse 102   Temp 98.6 °F (37 °C) (Oral)   Resp 16   Ht 4' 11\" (1.499 m)   Wt 135 lb (61.2 kg)   SpO2 98%   BMI 27.27 kg/m²       GEN: Well developed, well nourished, in NAD  HEENT:  NCAT. PERRL. EOMI. Mucous membranes pink and moist.   PULM:  Clear to ausculation. No rales or rhonchi. Respirations WNL and unlabored. CV:  tachycardic rate regular rhythm. No murmurs or gallops. GI:  Abdomen soft. Nontender. Non-distended. BS + and equal.    NEUROLOGICAL: A&O x3. Sensation intact to light touch. DTRs 2+. MSK:  Functional ROM all extremities. Motor testing 4+/5 key muscles RUE and RLE. 4-/5 key muscles LUE and LLE. SKIN: Warm dry and intact. Good turgor. EXTREMITIES:  No calf tenderness to palpation. No edema BLEs. Lesle Gulling PSYCH: Mood WNL. Appropriately interactive. Affect WNL. Diagnostics:     CBC:   Recent Labs     03/02/21  0520 03/03/21  0605   WBC 14.4* 14.8*   RBC 4.10 4.27   HGB 12.8 13.1   HCT 38.0 40.0   MCV 92.8 93.7   RDW 14.4 14.1    303     BMP:   Recent Labs     03/02/21  0520 03/03/21  0605    140   K 4.4 3.7    102   CO2 30 27   BUN 25* 25*   CREATININE 0.99* 0.99*   GLUCOSE 110* 110*     BNP: No results for input(s): BNP in the last 72 hours. PT/INR: No results for input(s): PROTIME, INR in the last 72 hours. APTT: No results for input(s): APTT in the last 72 hours. CARDIAC ENZYMES: No results for input(s): CKMB, CKMBINDEX, TROPONINT in the last 72 hours. Invalid input(s): CKTOTAL;3  FASTING LIPID PANEL:  Lab Results   Component Value Date    CHOL 235 (H) 01/04/2012    HDL 46 05/21/2020    TRIG 119 01/04/2012     LIVER PROFILE: No results for input(s): AST, ALT, ALB, BILIDIR, BILITOT, ALKPHOS in the last 72 hours.      Current Medications:   Current Facility-Administered Medications: calcium carbonate (TUMS) chewable tablet 500 mg, 500 mg, Oral, TID PRN  hydrALAZINE (APRESOLINE) tablet 50 mg, 50 mg, Oral, Q6H PRN  lisinopril (PRINIVIL;ZESTRIL) tablet 40 mg, 40 mg, Oral, Nightly  enoxaparin (LOVENOX) injection 30 mg, 30 mg, Subcutaneous, Daily  famotidine (PEPCID) tablet 10 mg, 10 mg, Oral, Daily  dextrose 5 % solution, 100 mL/hr, Intravenous, PRN  dextrose 50 % IV solution, 12.5 g, Intravenous, PRN  glucagon (rDNA) injection 1 mg, 1 mg, Intramuscular, PRN  glucose (GLUTOSE) 40 % oral gel 15 g, 15 g, Oral, PRN  insulin lispro (HUMALOG) injection vial 0-3 Units, 0-3 Units, Subcutaneous, Nightly  insulin lispro (HUMALOG) injection vial 0-6 Units, 0-6 Units, Subcutaneous, TID WC  amitriptyline (ELAVIL) tablet 100 mg, 100 mg, Oral, Nightly  atorvastatin (LIPITOR) tablet 20 mg, 20 mg, Oral, Nightly  benzonatate (TESSALON) capsule 100 mg, 100 mg, Oral, TID PRN  butalbital-APAP-caffeine -40 MG per capsule 1 capsule, 1 capsule, Oral, Q6H PRN  ciprofloxacin (CIPRO) tablet 250 mg, 250 mg, Oral, 2 times per day  dextromethorphan (DELSYM) 30 MG/5ML extended release liquid 30 mg, 30 mg, Oral, 2 times per day  gabapentin (NEURONTIN) capsule 100 mg, 100 mg, Oral, BID  HYDROcodone-acetaminophen (NORCO) 5-325 MG per tablet 1 tablet, 1 tablet, Oral, Q6H PRN  polyethylene glycol (GLYCOLAX) packet 17 g, 17 g, Oral, Daily  senna (SENOKOT) tablet 17.2 mg, 2 tablet, Oral, Daily PRN  bisacodyl (DULCOLAX) suppository 10 mg, 10 mg, Rectal, Daily PRN albuterol sulfate  (90 Base) MCG/ACT inhaler 2 puff, 2 puff, Inhalation, Q6H PRN      Impression/Plan:   Impaired ADLs, gait, and mobility due to:      1. Acute MS exacerbation:  PT/OT for gait, mobility, strengthening, endurance, ADLs, and self care. To follow up with Dr. Acacia Street in October - will contact about scheduling sooner since patient has been hospitalized with an exacerbation  2. EDINSON: monitoring BMP. Encourage hydration. 3. Anemia: monitoring CBC  4. Leukocytosis: likely secondary to recent burst of IV steroid. Will monitor. No current signs or symptoms of infection. 5. HTN/Hyperlipidemia: on atorvastatin, hydralazine prn, lisinopril, spironolactone  6. Arthritis: has Norco prn pain  7. Chronic daily headaches: on amitriptyline q hs. has Norco and Fioricet prn.   8. GERD: on famotidine daily  9. Hyperglycemia: has insulin sliding scale - likely related to 5 days IV steroid. 10. Cough: Improving. has albuterol prn, tessalon prn, dextromethorphan BID  11. Stress incontinence/asymptomatic bacteriuria: for outpatient follow up with Dr. Liborio Montanez. Completing Cipro 3/6. 12. Bowel Management: Miralax daily, senokot prn, dulcolax prn. 13. DVT Prophylaxis:  low molecular weight heparin, SCD's while in bed and FRANCISCO's during the day  14. Internal medicine for medical management    Electronically signed by Saul Heredia MD on 3/4/2021 at 7:39 AM      This note is created with the assistance of a speech recognition program.  While intending to generate a document that actually reflects the content of the visit, the document can still have some errors including those of syntax and sound a like substitutions which may escape proof reading. In such instances, actual meaning can be extrapolated by contextual diversion.

## 2021-03-05 LAB — GLUCOSE BLD-MCNC: 119 MG/DL (ref 65–105)

## 2021-03-05 PROCEDURE — 97530 THERAPEUTIC ACTIVITIES: CPT

## 2021-03-05 PROCEDURE — 6370000000 HC RX 637 (ALT 250 FOR IP): Performed by: PHYSICAL MEDICINE & REHABILITATION

## 2021-03-05 PROCEDURE — 51798 US URINE CAPACITY MEASURE: CPT

## 2021-03-05 PROCEDURE — 99231 SBSQ HOSP IP/OBS SF/LOW 25: CPT | Performed by: INTERNAL MEDICINE

## 2021-03-05 PROCEDURE — 51701 INSERT BLADDER CATHETER: CPT

## 2021-03-05 PROCEDURE — 97116 GAIT TRAINING THERAPY: CPT

## 2021-03-05 PROCEDURE — 6370000000 HC RX 637 (ALT 250 FOR IP): Performed by: INTERNAL MEDICINE

## 2021-03-05 PROCEDURE — 6360000002 HC RX W HCPCS: Performed by: INTERNAL MEDICINE

## 2021-03-05 PROCEDURE — 1180000000 HC REHAB R&B

## 2021-03-05 PROCEDURE — 97110 THERAPEUTIC EXERCISES: CPT

## 2021-03-05 PROCEDURE — 82947 ASSAY GLUCOSE BLOOD QUANT: CPT

## 2021-03-05 PROCEDURE — 99231 SBSQ HOSP IP/OBS SF/LOW 25: CPT | Performed by: PHYSICAL MEDICINE & REHABILITATION

## 2021-03-05 PROCEDURE — 97535 SELF CARE MNGMENT TRAINING: CPT

## 2021-03-05 RX ADMIN — FAMOTIDINE 10 MG: 20 TABLET ORAL at 07:28

## 2021-03-05 RX ADMIN — Medication 30 MG: at 19:57

## 2021-03-05 RX ADMIN — ATORVASTATIN CALCIUM 20 MG: 10 TABLET, FILM COATED ORAL at 19:57

## 2021-03-05 RX ADMIN — METRONIDAZOLE 100 MG: 500 TABLET ORAL at 19:57

## 2021-03-05 RX ADMIN — AMITRIPTYLINE HYDROCHLORIDE 100 MG: 50 TABLET, FILM COATED ORAL at 20:00

## 2021-03-05 RX ADMIN — Medication 30 MG: at 07:54

## 2021-03-05 RX ADMIN — HYDROCODONE BITARTRATE AND ACETAMINOPHEN 1 TABLET: 5; 325 TABLET ORAL at 07:29

## 2021-03-05 RX ADMIN — HYDROCODONE BITARTRATE AND ACETAMINOPHEN 1 TABLET: 5; 325 TABLET ORAL at 19:57

## 2021-03-05 RX ADMIN — ENOXAPARIN SODIUM 30 MG: 30 INJECTION SUBCUTANEOUS at 07:28

## 2021-03-05 RX ADMIN — SENNOSIDES 17.2 MG: 8.6 TABLET, FILM COATED ORAL at 18:24

## 2021-03-05 RX ADMIN — POLYETHYLENE GLYCOL 3350 17 G: 17 POWDER, FOR SOLUTION ORAL at 07:28

## 2021-03-05 RX ADMIN — CIPROFLOXACIN 250 MG: 500 TABLET, FILM COATED ORAL at 07:27

## 2021-03-05 RX ADMIN — LISINOPRIL 40 MG: 20 TABLET ORAL at 19:58

## 2021-03-05 RX ADMIN — CIPROFLOXACIN 250 MG: 500 TABLET, FILM COATED ORAL at 19:57

## 2021-03-05 RX ADMIN — METRONIDAZOLE 100 MG: 500 TABLET ORAL at 07:28

## 2021-03-05 ASSESSMENT — PAIN SCALES - GENERAL
PAINLEVEL_OUTOF10: 0
PAINLEVEL_OUTOF10: 9
PAINLEVEL_OUTOF10: 0
PAINLEVEL_OUTOF10: 8
PAINLEVEL_OUTOF10: 5
PAINLEVEL_OUTOF10: 2

## 2021-03-05 ASSESSMENT — PAIN DESCRIPTION - PAIN TYPE: TYPE: CHRONIC PAIN

## 2021-03-05 ASSESSMENT — PAIN DESCRIPTION - LOCATION
LOCATION: HEAD
LOCATION: HEAD

## 2021-03-05 NOTE — PROGRESS NOTES
[facility] Internal Medicine    CONSULTATION / HISTORY AND PHYSICAL EXAMINATION            Date:   3/5/2021  Patient name:  Wesley Adorno  Date of admission:  3/2/2021  3:19 PM  MRN:   [id number]  Account:  [de-identified]  YOB: 1934  PCP:    Moriah Grimes MD  Room:   [facility]  Code Status:    Full Code    Physician Requesting Consult: Brittany Birmingham MD    Reason for Consult:  medical management    Chief Complaint:     No chief complaint on file.       History Obtained From:     Patient medical record nursing staff    History of Present Illness:   Patient admitted to acute rehab for rehabilitation  Internal medicine, consulted for medical management  Patient with multiple medical problems which include history of MS, since 1983, chronic headache, GERD, hypertension  She has chronic weakness on left side of her body, leg more than arm  She was admitted originally at [facility] with worsening dizziness, ataxia, CT head was done, which was negative for acute intracranial pathology  Patient was treated with IV steroids for 5 days, her symptoms improved, during her hospital stay, she was found to have urinary tract infection, urine culture was positive for Enterococcus faecalis, which was sensitive to ciprofloxacin, treated with antibiotics  Patient mention she has chronic headache for years, which got better with Norco  Her blood pressure was running high, she was started on Aldactone  Today morning, during therapy patient was found to be dizzy, her blood pressure was running low  No other complaints    Past Medical History:     Past Medical History:   Diagnosis Date    Acute cystitis without hematuria 10/1/2017    Arthritis     Chronic daily headache     GERD (gastroesophageal reflux disease)     Hyperlipidemia     Hypertension     Moderate malnutrition ([address] 3/2/2019    Multiple sclerosis ([address]     Neuropathy     Pneumonia 2017    states had double pneumonia    Vitamin D deficiency         Past Surgical History:     Past Surgical History:   Procedure Laterality Date    CATARACT REMOVAL WITH IMPLANT Right 11/6/2014    [facility]    CATARACT REMOVAL WITH IMPLANT Left 12/2/2014    [facility]    CERVICAL DISC SURGERY      CYSTOCELE REPAIR      HYSTERECTOMY      RECTOCELE REPAIR      SHOULDER ARTHROPLASTY Right 04/20/2017    SHOULDER SURGERY Right 2017        Medications Prior to Admission:     Prior to Admission medications    Medication Sig Start Date End Date Taking? Authorizing Provider   amitriptyline (ELAVIL) 100 MG tablet Take 1 tablet by mouth nightly 1/25/21  Yes Pauly Marie MD   predniSONE (DELTASONE) 10 MG tablet Take 1 tablet by mouth daily 3/2/21 3/2/22  Laila Somers MD   traMADol (ULTRAM) 50 MG tablet Take 1 tablet by mouth every 6 hours as needed for Pain for up to 30 days.  2/22/21 3/24/21  Pauly Marie MD   fluticasone [facility]) 50 MCG/ACT nasal spray 1 spray by Each Nostril route daily 2/19/21   Fanta Chandler PA-C   omeprazole (PRILOSEC) 20 MG delayed release capsule Take 1 capsule by mouth daily 2/8/21   Pauly Marie MD   celecoxib (CELEBREX) 200 MG capsule Take 1 capsule by mouth daily 2/1/21 2/1/22  Pauly Marie MD   Elastic Bandages & Supports (LUMBAR BACK BRACE/SUPPORT PAD) MISC 1 each by Does not apply route daily 1/26/21   Pauly Marie MD   Elastic Bandages & Supports (LUMBAR BACK BRACE/SUPPORT PAD) MISC 1 each by Does not apply route daily as needed (back pain) 1/25/21   Pauly Marie MD   lisinopril (PRINIVIL;ZESTRIL) 5 MG tablet Take 1 tablet by mouth daily 1/25/21   Pauly Marie MD   simvastatin (ZOCOR) 20 MG tablet TAKE 1 TABLET BY MOUTH EVERY NIGHT 1/20/21   Pauly Marie MD   solifenacin (VESICARE) 5 MG tablet Take 1 tablet by mouth daily 12/4/20 12/4/21  Pauly Marie MD   albuterol sulfate HFA (VENTOLIN HFA) 108 (90 Base) MCG/ACT inhaler Inhale 2 puffs into the lungs every 4 hours as needed for Wheezing 2/10/20 2/9/21  Pauly Jarqiun MD   HYDROcodone-acetaminophen (NORCO) 5-325 MG per tablet Take 1 tablet by mouth 3 times daily. Historical Provider, MD        Allergies:     Bactrim [sulfamethoxazole-trimethoprim], Lactose intolerance (gi), Pcn [penicillins], Lidoderm [lidocaine], and Macrobid [nitrofurantoin]    Social History:     Tobacco:    reports that she has never smoked. She has never used smokeless tobacco.  Alcohol:      reports no history of alcohol use. Drug Use:  reports no history of drug use. Family History:     No family history on file. Review of Systems:     Positive and Negative as described in HPI. CONSTITUTIONAL:  negative for fevers, chills, sweats, fatigue, weight loss  HEENT:  negative for vision, hearing changes, runny nose, throat pain  RESPIRATORY:  negative for shortness of breath, cough, congestion, wheezing. CARDIOVASCULAR:  negative for chest pain, palpitations.   GASTROINTESTINAL:  negative for nausea, vomiting, diarrhea, constipation, change in bowel habits, abdominal pain   GENITOURINARY:  negative for difficulty of urination, burning with urination, frequency   INTEGUMENT:  negative for rash, skin lesions, easy bruising   HEMATOLOGIC/LYMPHATIC:  negative for swelling/edema   ALLERGIC/IMMUNOLOGIC:  negative for urticaria , itching  ENDOCRINE:  negative increase in drinking, increase in urination, hot or cold intolerance  MUSCULOSKELETAL:  negative joint pains, muscle aches, swelling of joints  NEUROLOGICAL: Dizziness, weakness on left side of body  BEHAVIOR/PSYCH:      Physical Exam:     BP 99/73   Pulse 100   Temp 99 °F (37.2 °C) (Oral) Comment (Src): oral  Resp 20   Ht 4' 11\" (1.499 m)   Wt 135 lb (61.2 kg)   SpO2 98%   BMI 27.27 kg/m²   Temp (24hrs), Av.4 °F (36.9 °C), Min:97.9 °F (36.6 °C), Max:99 °F (37.2 °C)    Recent Labs     21  1049 21  1555 21  0615 POCGLU 132* 135* 153* 119*       Intake/Output Summary (Last 24 hours) at 3/5/2021 1607  Last data filed at 3/5/2021 1525  Gross per 24 hour   Intake 960 ml   Output 1286 ml   Net -326 ml       General Appearance:  alert, well appearing, and in no acute distress  Mental status: oriented to person, place, and time with normal affect  Head:  normocephalic, atraumatic. Eye: no icterus, redness, pupils equal and reactive, extraocular eye movements intact, conjunctiva clear  Ear: normal external ear, no discharge, hearing intact  Nose:  no drainage noted  Mouth: mucous membranes moist  Neck: supple, no carotid bruits, thyroid not palpable  Lungs: Bilateral equal air entry, clear to ausculation, no wheezing, rales or rhonchi, normal effort  Cardiovascular: normal rate, regular rhythm, no murmur, gallop, rub. Abdomen: Soft, nontender, nondistended, normal bowel sounds, no hepatomegaly or splenomegaly  Neurologic: Patient has weakness on left side of body, power in left upper and lower extremity is 5/5 skin: No gross lesions, rashes, bruising or bleeding on exposed skin area  Extremities:  peripheral pulses palpable, no pedal edema or calf pain with palpation  Psych:      Investigations:      Laboratory Testing:  Recent Results (from the past 24 hour(s))   POC Glucose Fingerstick    Collection Time: 03/04/21  8:23 PM   Result Value Ref Range    POC Glucose 153 (H) 65 - 105 mg/dL   POC Glucose Fingerstick    Collection Time: 03/05/21  6:15 AM   Result Value Ref Range    POC Glucose 119 (H) 65 - 105 mg/dL           Consultations:   IP CONSULT TO INTERNAL MEDICINE  IP CONSULT TO DIETITIAN  IP CONSULT TO SOCIAL WORK  IP CONSULT TO INTERNAL MEDICINE  Assessment :      Primary Problem  <principal problem not specified>    Active Hospital Problems    Diagnosis Date Noted    EDINSON (acute kidney injury) ([facility] [N17.9] 03/03/2021    Multiple sclerosis exacerbation ([facility] [G35] 02/25/2021    Chronic headache [R51.9, G89.29] 02/25/2021  Essential hypertension [I10] 11/07/2018    Hyperlipidemia [E78.5] 11/07/2018    Ataxia [R27.0] 10/01/2017    Multiple sclerosis ([facility] Bella Gain 10/01/2017    Abnormal gait [R26.9] 10/07/2014   Active Problems:    Ataxia    Multiple sclerosis (HCC)    Essential hypertension    Hyperlipidemia    Abnormal gait    Multiple sclerosis exacerbation (HCC)    Chronic headache    EDINSON (acute kidney injury) ([facility]  Resolved Problems:    * No resolved hospital problems. *        Plan:     1. Admitted with multiple sclerosis exacerbation, treated with IV steroids feeling better  2. Urinary tract infection, urine culture growing Enterococcus, sensitive to ciprofloxacin, currently on ciprofloxacin till 3/6  3. Hypertension, patient was started recently on Aldactone, readings of low blood pressures, will discontinue Aldactone  4. Chronic headache, patient is on Elavil, [place] as needed  5. On DVT proxy Lovenox  6. Acute kidney injury, much improved, last creatinine is 0.9    3/4   Patient blood pressure is doing okay  Patient told me she never diagnosed with diabetes, will discontinue point-of-care glucoses in the chest, sliding scale  Last HbA1c was 6.2  3/5   No new complaints, patient is smiling, feeling better      Mickey Ribera MD  3/5/2021  4:07 PM    Copy sent to Dr. Jim Nugent MD    Please note that this chart was generated using voice recognition Dragon dictation software. Although every effort was made to ensure the accuracy of this automated transcription, some errors in transcription may have occurred.

## 2021-03-05 NOTE — PROGRESS NOTES
Physical Therapy    [address] [facility]   Acute Rehabilitation Physical Therapy Progress Note    Date: 3/5/21  Patient Name: Opal Bacon       Room: 6570/5185-80  MRN: [id number]   Account: [de-identified]   : 1934  (80 y.o.) Gender: female     Referring Practitioner: Dr. Ene Omer  Diagnosis: Exacerbation of multiple sclerosis  Past Medical History:  has a past medical history of Acute cystitis without hematuria, Arthritis, Chronic daily headache, GERD (gastroesophageal reflux disease), Hyperlipidemia, Hypertension, Moderate malnutrition ([address] Multiple sclerosis ([address] Neuropathy, Pneumonia, and Vitamin D deficiency. Past Surgical History:   has a past surgical history that includes Cystocele repair; Rectocele repair; Hysterectomy; Cervical disc surgery; Cataract removal with implant (Right, 2014); Cataract removal with implant (Left, 2014); Total shoulder arthroplasty (Right, 2017); and shoulder surgery (Right, ). Restrictions/Precautions  Restrictions/Precautions: Fall Risk  Required Braces or Orthoses?: Yes(back brace-wears mainly in the morning. )  Implants present? 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 Pt aslo has Left eye lid droop, per pt, its not Bell's palsy, but its from MS-facial muscle weakness. Subjective: Patient complains of headache, says she gets them every morning for the past >30 years. Mentions she doesn't feel dizzy today during AM session. Upon arrival for PM session, patient states her headache went away, just light headed.  She says, \"I am always light headed, it stays with me all the time because of my MS\"     Vital Signs  Pulse: 97(97 AM, lightheadness, headaches, \"difficulties focusing\"  PT Equipment Recommendations  Equipment Needed: No  Other: Patient has RW and single point cane at home. Current Treatment Recommendations: Strengthening, Home Exercise Program, Safety Education & Training, Balance Training, Endurance Training, Patient/Caregiver Education & Training, Equipment Evaluation, Education, & procurement, Functional Mobility Training, Transfer Training, Gait Training, Stair training    Conditions Requiring Skilled Therapeutic Intervention  Body structures, Functions, Activity limitations: Decreased strength;Decreased safe awareness;Decreased balance; Increased pain;Decreased functional mobility ; Decreased endurance  Treatment Diagnosis: impaired mobility due to weakness and debilitation  Prognosis: Good  Decision Making: Medium Complexity  History: Admitted due to exacerbation of MS  Clinical Presentation: Motivated and pleasant. Barriers to Learning: [place]  REQUIRES PT FOLLOW UP: Yes  Discharge Recommendations: Patient would benefit from continued therapy after discharge;Home with assist PRN    Goals  Short term goals  Time Frame for Short term goals: 5 to 6 days. Short term goal 1: pt to tolerate 1/2 to 45 minutes of therapuetic exercise and activity, four sessions per day.   Short term goal 2: pt to demonstrate good technique for balance activities, strengthening exercises and energy conservation techniques  Short term goal 3: pt to demonstrate independent rolling in bed for position change  Short term goal 4: pt to demonstrate transfers supine <> sit w/ supervision  Short term goal 5: pt to demonstrate transfers sit <> stand and bed <> chair using wheeled walker w/ SBA  Short term goal 6: pt to demonstrate good sitting dynamic balance and fair + or better standing balance using wheeled walker  Short term goal 7: pt to demonstrate gait 200 ft using wheeled walker w/ SBA  Short term goal 8: pt to advance to and demonstrate ability to ascend/ descend 4-6\" step using rail and st cane w/ min x 1  Long term goals  Time Frame for Long term goals : By DC  Long term goal 1: pt able to perform transfers independently with or without appropriate device. Long term goal 2: pt able to ambulate with appropriate device safely distance of 150 to 200 ft, level surface, for 2MWT. Long term goal 3: Assess if pt able to ambulate safely without device for 10 to 20 ft. (at baseline at times pt able to walk shor distance without a device). Long term goal 4: pt able to go up and down 5 to 8 steps with 1 HR/st cane, supervsion level. Long term goal 5: improve Tinetti balance score to atleast 22/28 to reduce fall risk. 03/05/21 0800 03/05/21 1346   PT Individual Minutes   Time In 0800 1346   Time Out 0845 1441   Minutes 45 55       Electronically signed by LANCE Adams  The above documentation completed by Student Physical Therapist Assistant was provided under the direct line of sight by supervision. Documentation was reviewed and accepted by supervising Clinical Instructor [facility].

## 2021-03-05 NOTE — PLAN OF CARE
Problem: Falls - Risk of:  Goal: Will remain free from falls  Outcome: Ongoing     Problem: Falls - Risk of:  Goal: Absence of physical injury  Outcome: Ongoing     Problem: Pain:  Goal: Pain level will decrease  Outcome: Ongoing     Problem: Pain:  Goal: Control of acute pain  Outcome: Ongoing     Problem: Musculor/Skeletal Functional Status  Goal: Highest potential functional level  Outcome: Ongoing     Problem: Nutrition  Goal: Optimal nutrition therapy  Outcome: Ongoing

## 2021-03-05 NOTE — PLAN OF CARE
Problem: Falls - Risk of:  Goal: Will remain free from falls  Description: Will remain free from falls  3/4/2021 0249 by Gilmer Wall RN  Outcome: Ongoing  No falls or injury this shift. Bed is maintained at the lowest level, brakes engaged, call light and assistive devices in reach. Room is clutter free, adequate lighting for safe transfers and ambulation. Assistance provided for transfers and toileting.      Problem: Falls - Risk of:  Goal: Absence of physical injury  Description: Absence of physical injury  3/4/2021 0249 by Gilmer Wall RN  Outcome: Ongoing     Problem: Musculor/Skeletal Functional Status  Goal: Highest potential functional level  3/4/2021 0249 by Gilmer Wall RN  Outcome: Ongoing     Problem: Musculor/Skeletal Functional Status  Goal: Absence of falls  3/4/2021 0249 by Gilmer Wall RN  Outcome: Ongoing     Problem: Pain:  Goal: Pain level will decrease  Description: Pain level will decrease  3/4/2021 0249 by Gilmer Wall RN  Outcome: Ongoing  Patient assessed for pain, medicated per orders. Patient reports an acceptable level of discomfort with the current medications.  Patient was repositioned as needed for comfort and skin integrity.     Problem: Pain:  Goal: Control of acute pain  Description: Control of acute pain  3/4/2021 0249 by Gilmer Wall RN  Outcome: Ongoing     Problem: Pain:  Goal: Control of chronic pain  Description: Control of chronic pain  3/4/2021 0249 by Gilmer Wall RN  Outcome: Ongoing     Problem: Nutrition  Goal: Optimal nutrition therapy  Outcome: Ongoing

## 2021-03-05 NOTE — PROGRESS NOTES
Physical Medicine & Rehabilitation  Progress Note      Subjective:      80year-old with MS exacerbation. Patient is doing well today. R hand symptoms have improved today. She had some issues with urine retention this morning. No new issues with sleep, appetite, bowels. ROS:  Denies fevers, chills, sweats. No chest pain, palpitations, lightheadedness. Denies coughing, wheezing or shortness of breath. Denies abdominal pain, nausea, diarrhea or constipation. No new areas of joint pain. Chronic daily headache. Denies new areas of numbness or weakness. Denies new anxiety or depression issues. No new skin problems. Rehabilitation:   Progressing in therapies. PT:  Restrictions/Precautions: Fall Risk  Implants present? 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 Pt aslo has Left eye lid droop, per pt, its not Bell's palsy, but its from MS-facial muscle weakness. Required Braces or Orthoses  Spinal: (states she has a back brace, uses PRN)   Transfers  Sit to Stand: Contact guard assistance  Stand to sit: Contact guard assistance  Bed to Chair: Contact guard assistance, Minimal assistance(Fluctuates depending on HA and dizzyness.)  Stand Pivot Transfers: Contact guard assistance(with and without RW)  Comment: Cues for hand placement and technique  Ambulation 1  Surface: level tile  Device: Rolling Walker  Other Apparatus: Wheelchair follow  Assistance: Contact guard assistance, Minimal assistance  Quality of Gait: No LOB  Gait Deviations: Slow Emily, Decreased step length, Decreased step height  Distance: 80ft; 25 ft x2 (to/from bathroom)  Comments: Patient fatigues easily, WC follow for safety due to low blood pressure and dizziness during last session. Cues given to look ahead and have good posture.  Noted veering to the R, verabl cues given to go towards the middle of the hallway to avoid wall. Transfers  Sit to Stand: Contact guard assistance  Stand to sit: Contact guard assistance  Bed to Chair: Contact guard assistance, Minimal assistance(Fluctuates depending on HA and dizzyness.)  Stand Pivot Transfers: Contact guard assistance(with and without RW)  Comment: Cues for hand placement and technique  Ambulation  Ambulation?: Yes  Ambulation 1  Surface: level tile  Device: Rolling Walker  Other Apparatus: Wheelchair follow  Assistance: Contact guard assistance, Minimal assistance  Quality of Gait: No LOB  Gait Deviations: Slow Emily, Decreased step length, Decreased step height  Distance: 80ft; 25 ft x2 (to/from bathroom)  Comments: Patient fatigues easily, WC follow for safety due to low blood pressure and dizziness during last session. Cues given to look ahead and have good posture. Noted veering to the R, verabl cues given to go towards the middle of the hallway to avoid wall. Surface: level tile  Ambulation 1  Surface: level tile  Device: Rolling Walker  Other Apparatus: Wheelchair follow  Assistance: Contact guard assistance, Minimal assistance  Quality of Gait: No LOB  Gait Deviations: Slow Emily, Decreased step length, Decreased step height  Distance: 80ft; 25 ft x2 (to/from bathroom)  Comments: Patient fatigues easily, WC follow for safety due to low blood pressure and dizziness during last session. Cues given to look ahead and have good posture. Noted veering to the R, verabl cues given to go towards the middle of the hallway to avoid wall.     OT:  ADL  Feeding: Modified independent ([place])  Grooming: Setup  UE Bathing: Setup, Stand by assistance(completed mostly standing at sinkside. )  LE Bathing: Stand by assistance, Setup  UE Dressing: Setup  LE Dressing: Setup, Stand by assistance, Increased time to complete(Assist with donning TEDs)  Toileting: None  Additional Comments: Pt reports increased fatigue following basic ADL routine. Balance  Sitting Balance: Independent  Standing Balance: Stand by assistance   Standing Balance  Time: Am: 4-5 minutes, 1-2 min x 4  Activity: AM: functional mobility, sinkside standing for self care tasks. Comment: Pt reports feeling a little lightheaded however no noted unsteadiness. Functional Mobility  Functional - Mobility Device: Rolling Walker  Activity: To/from bathroom  Assist Level: Stand by assistance     Bed mobility  Rolling to Left: Stand by assistance  Rolling to Right: Stand by assistance  Supine to Sit: Contact guard assistance  Sit to Supine: Stand by assistance  Scooting: Stand by assistance  Comment: CGA for safety, patient complaint of dizziness and feeling \"woozy\"  Transfers  Stand Pivot Transfers: Stand by assistance, Contact guard assistance(SBA in AM; CGA in PM)  Sit to stand: Stand by assistance  Stand to sit: Stand by assistance  Transfer Comments: w/RW; cues for hand placement    Toilet Transfers  Toilet - Technique: Stand pivot  Equipment Used: Grab bars  Toilet Transfer: Contact guard assistance  Toilet Transfers Comments: Pivot to/from wheelchair in PM session. Shower Transfers  Shower - Transfer From: Wheelchair  Shower - Transfer Type: To and From  Shower - Transfer To: Transfer tub bench  Shower - Technique: Stand pivot  Shower Transfers: Stand by assistance  Shower Transfers Comments: Used GB for support as needed; Good safety noted       SPEECH:      Objective:  /61   Pulse 91   Temp 97.9 °F (36.6 °C) (Oral)   Resp 19   Ht 4' 11\" (1.499 m)   Wt 135 lb (61.2 kg)   SpO2 95%   BMI 27.27 kg/m²       GEN: Well developed, well nourished, in NAD  HEENT:  NCAT. PERRL. EOMI. Mucous membranes pink and moist.   PULM:  Clear to ausculation. No rales or rhonchi. Respirations WNL and unlabored. CV:  tachycardic rate regular rhythm. No murmurs or gallops. GI:  Abdomen soft. Nontender. Non-distended.   BS + and equal.    NEUROLOGICAL: A&O x3. Sensation intact to light touch. DTRs 2+. MSK:  Functional ROM all extremities. Motor testing 4+/5 key muscles RUE and RLE. 4-/5 key muscles LUE and LLE. SKIN: Warm dry and intact. Good turgor. EXTREMITIES:  No calf tenderness to palpation. No edema BLEs. Johana Salm PSYCH: Mood WNL. Appropriately interactive. Affect WNL. Diagnostics:     CBC:   Recent Labs     03/03/21  0605   WBC 14.8*   RBC 4.27   HGB 13.1   HCT 40.0   MCV 93.7   RDW 14.1        BMP:   Recent Labs     03/03/21  0605      K 3.7      CO2 27   BUN 25*   CREATININE 0.99*   GLUCOSE 110*     BNP: No results for input(s): BNP in the last 72 hours. PT/INR: No results for input(s): PROTIME, INR in the last 72 hours. APTT: No results for input(s): APTT in the last 72 hours. CARDIAC ENZYMES: No results for input(s): CKMB, CKMBINDEX, TROPONINT in the last 72 hours. Invalid input(s): CKTOTAL;3  FASTING LIPID PANEL:  Lab Results   Component Value Date    CHOL 235 (H) 01/04/2012    HDL 46 05/21/2020    TRIG 119 01/04/2012     LIVER PROFILE: No results for input(s): AST, ALT, ALB, BILIDIR, BILITOT, ALKPHOS in the last 72 hours.      Current Medications:   Current Facility-Administered Medications: calcium carbonate (TUMS) chewable tablet 500 mg, 500 mg, Oral, TID PRN  hydrALAZINE (APRESOLINE) tablet 50 mg, 50 mg, Oral, Q6H PRN  lisinopril (PRINIVIL;ZESTRIL) tablet 40 mg, 40 mg, Oral, Nightly  enoxaparin (LOVENOX) injection 30 mg, 30 mg, Subcutaneous, Daily  famotidine (PEPCID) tablet 10 mg, 10 mg, Oral, Daily  dextrose 5 % solution, 100 mL/hr, Intravenous, PRN  dextrose 50 % IV solution, 12.5 g, Intravenous, PRN  glucagon (rDNA) injection 1 mg, 1 mg, Intramuscular, PRN  glucose (GLUTOSE) 40 % oral gel 15 g, 15 g, Oral, PRN  amitriptyline (ELAVIL) tablet 100 mg, 100 mg, Oral, Nightly  atorvastatin (LIPITOR) tablet 20 mg, 20 mg, Oral, Nightly  benzonatate (TESSALON) capsule 100 mg, 100 mg, Oral, TID PRN 14. DVT Prophylaxis:  low molecular weight heparin, SCD's while in bed and FRANCISCO's during the day  15. Internal medicine for medical management    Electronically signed by Cande Philippe MD on 3/5/2021 at 8:37 AM      This note is created with the assistance of a speech recognition program.  While intending to generate a document that actually reflects the content of the visit, the document can still have some errors including those of syntax and sound a like substitutions which may escape proof reading. In such instances, actual meaning can be extrapolated by contextual diversion.

## 2021-03-05 NOTE — PROGRESS NOTES
[address] [facility]    ACUTE REHABILITATION OCCUPATIONAL THERAPY  DAILY NOTE    Date: 3/5/21  Patient Name: Estephanie Garcia      Room: 1786/5049-71    MRN: [id number]   : 1934  (80 y.o.)  Gender: female   Referring Practitioner: Dr. Alline Krabbe  Diagnosis: Exacerbation of multiple sclerosis       Restrictions  Restrictions/Precautions: Fall Risk  Implants present? 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 Pt aslo has Left eye lid droop, per pt, its not Bell's palsy, but its from MS-facial muscle weakness. Required Braces or Orthoses  Spinal: (states she has a back brace, uses PRN)  Required Braces or Orthoses?: Yes    Subjective  Subjective: \" My arms are just so heavy, I need to get them back\"   Comments: Pt states she is eager to regain strenght in BUEs to perform ADL and leisure activiteis   Patient Currently in Pain: Yes  Pain Level: 2  Pain Location: Head  Restrictions/Precautions: Fall Risk  Overall Orientation Status: Within Functional Limits     Pain Assessment  Pain Assessment: 0-10  Pain Level: 2  Pain Type: Acute pain  Pain Location: Head    Objective  Cognition  Overall Cognitive Status: WFL  Perception  Overall Perceptual Status: WFL  Balance  Sitting Balance: Independent  Standing Balance: Stand by assistance  Transfers  Sit to stand: Stand by assistance  Stand to sit: Stand by assistance  Transfer Comments: w/RW; cues for hand placement   Standing Balance  Time: 2-3 min, 1-2 min x3   Activity: functional activity, transfers and ADL activities   Comment: Pt reports lightneadedness at times, vitals checked and withing normal limits.  No SOB or LOB noted   Functional Mobility  Functional - Mobility Device: Rolling Walker  Activity: To/from bathroom  Assist Level: Stand by assistance     Exercises  Grasp/Release: Pt engaged in BUE hand strenghting exercises with (1.0, 3.0 and 5.0#) 15 reps each             ADL  Grooming: Setup  UE Bathing: Stand by assistance;Setup ( seated at sink)   LE Bathing: Stand by assistance;Setup ( lifting leg reach foot and lower leg)   UE Dressing: Setup( donning [place] shirt)   LE Dressing: Setup;Stand by assistance; Increased time to complete( donning brief, pants   Toileting: None  Additional Comments: Pt completes ADL seated/standing at sink this date           Assessment  Performance deficits / Impairments: Decreased functional mobility ; Decreased ADL status; Decreased endurance;Decreased balance;Decreased high-level IADLs;Decreased fine motor control  Prognosis: Good  Discharge Recommendations: Home with assist PRN  Activity Tolerance: Patient Tolerated treatment well;Treatment limited secondary to medical complications (free text)  Safety Devices in place: Yes          Patient Education:  Patient Goals   Patient goals : Regain independence and return home with A from family as needed. Learner:patient  Method: demonstration and explanation       Outcome: acknowledged understanding         Plan  Plan  Times per week: 900/7  Times per day: Twice a day  Current Treatment Recommendations: Self-Care / ADL, Home Management Training, Strengthening, Balance Training, Functional Mobility Training, Endurance Training, Pain Management, Safety Education & Training, Patient/Caregiver Education & Training, Equipment Evaluation, Education, & procurement  Plan Comment: Due to medical status and fatigue concerns she will be seen for 900 minutes of OT/PT/ST combined over 7 days  Patient Goals   Patient goals : Regain independence and return home with A from family as needed. Short term goals  Time Frame for Short term goals: One Week  Short term goal 1: Pt will complete bathing/UBD tasks with set-up A and Good safety.   Short term goal 2: Pt will complete LB dressing including socks/shoes with SBA and Good safety using AE if needed. Short term goal 3: Pt will consistently complete toilet transfer and toileting with supervision and Good safety using least restrictive device. Short term goal 4: Pt will V/D good understanding of fall prevention strategies as well as EC/WS techniques during functional tasks. Short term goal 5: Pt will actively participate in 30 minutes of therapeutic exercise/functional activities to promote increased independence with self-care and mobility. Long term goals  Time Frame for Long term goals : By Discharge  Long term goal 1: Pt will complete BADL's with Mod I and Good safety using AE as needed. Long term goal 2: Pt will complete functional transfers with Mod I and Good safety using least restrictive device. Long term goal 3: Pt will complete simple meal prep/light housekeeping tasks with Mod I and Good safety using least restrictive device.         03/05/21 1030 03/05/21 1512   OT Individual Minutes   Time In 1030 1230   Time Out 1115 1300   Minutes 45 30     Electronically signed by RISSA Galarza on 3/5/21 at 3:13 PM EST

## 2021-03-05 NOTE — PROGRESS NOTES
Patient complains of inability to void. Abdomen slightly distended. Bladder scan of 452 ml. Straight catheterization of 600 ml. Will continue to monitor.

## 2021-03-06 PROCEDURE — 97112 NEUROMUSCULAR REEDUCATION: CPT

## 2021-03-06 PROCEDURE — 6370000000 HC RX 637 (ALT 250 FOR IP): Performed by: PHYSICAL MEDICINE & REHABILITATION

## 2021-03-06 PROCEDURE — 1180000000 HC REHAB R&B

## 2021-03-06 PROCEDURE — 6370000000 HC RX 637 (ALT 250 FOR IP): Performed by: INTERNAL MEDICINE

## 2021-03-06 PROCEDURE — 97530 THERAPEUTIC ACTIVITIES: CPT

## 2021-03-06 PROCEDURE — 99231 SBSQ HOSP IP/OBS SF/LOW 25: CPT | Performed by: INTERNAL MEDICINE

## 2021-03-06 PROCEDURE — 51798 US URINE CAPACITY MEASURE: CPT

## 2021-03-06 PROCEDURE — 97535 SELF CARE MNGMENT TRAINING: CPT

## 2021-03-06 PROCEDURE — 97116 GAIT TRAINING THERAPY: CPT

## 2021-03-06 PROCEDURE — 6360000002 HC RX W HCPCS: Performed by: INTERNAL MEDICINE

## 2021-03-06 PROCEDURE — 97110 THERAPEUTIC EXERCISES: CPT

## 2021-03-06 RX ORDER — ACETAMINOPHEN 500 MG
500 TABLET ORAL EVERY 6 HOURS PRN
Status: DISCONTINUED | OUTPATIENT
Start: 2021-03-06 | End: 2021-03-12 | Stop reason: HOSPADM

## 2021-03-06 RX ADMIN — ATORVASTATIN CALCIUM 20 MG: 10 TABLET, FILM COATED ORAL at 21:28

## 2021-03-06 RX ADMIN — LISINOPRIL 40 MG: 20 TABLET ORAL at 21:28

## 2021-03-06 RX ADMIN — ENOXAPARIN SODIUM 30 MG: 30 INJECTION SUBCUTANEOUS at 08:44

## 2021-03-06 RX ADMIN — HYDROCODONE BITARTRATE AND ACETAMINOPHEN 1 TABLET: 5; 325 TABLET ORAL at 13:32

## 2021-03-06 RX ADMIN — HYDROCODONE BITARTRATE AND ACETAMINOPHEN 1 TABLET: 5; 325 TABLET ORAL at 06:38

## 2021-03-06 RX ADMIN — METRONIDAZOLE 100 MG: 500 TABLET ORAL at 08:44

## 2021-03-06 RX ADMIN — BUTALBITA,ACETAMINOPHEN AND CAFFEINE 1 CAPSULE: 50; 300; 40 CAPSULE ORAL at 01:03

## 2021-03-06 RX ADMIN — ACETAMINOPHEN 500 MG: 500 TABLET ORAL at 09:02

## 2021-03-06 RX ADMIN — POLYETHYLENE GLYCOL 3350 17 G: 17 POWDER, FOR SOLUTION ORAL at 08:44

## 2021-03-06 RX ADMIN — Medication 30 MG: at 21:36

## 2021-03-06 RX ADMIN — METRONIDAZOLE 100 MG: 500 TABLET ORAL at 21:28

## 2021-03-06 RX ADMIN — FAMOTIDINE 10 MG: 20 TABLET ORAL at 08:48

## 2021-03-06 RX ADMIN — BUTALBITA,ACETAMINOPHEN AND CAFFEINE 1 CAPSULE: 50; 300; 40 CAPSULE ORAL at 06:39

## 2021-03-06 RX ADMIN — Medication 30 MG: at 08:45

## 2021-03-06 RX ADMIN — AMITRIPTYLINE HYDROCHLORIDE 100 MG: 50 TABLET, FILM COATED ORAL at 21:35

## 2021-03-06 RX ADMIN — CIPROFLOXACIN 250 MG: 500 TABLET, FILM COATED ORAL at 08:48

## 2021-03-06 ASSESSMENT — PAIN SCALES - GENERAL
PAINLEVEL_OUTOF10: 7
PAINLEVEL_OUTOF10: 7
PAINLEVEL_OUTOF10: 0
PAINLEVEL_OUTOF10: 8
PAINLEVEL_OUTOF10: 0
PAINLEVEL_OUTOF10: 0
PAINLEVEL_OUTOF10: 5

## 2021-03-06 ASSESSMENT — PAIN DESCRIPTION - ORIENTATION: ORIENTATION: ANTERIOR;POSTERIOR

## 2021-03-06 NOTE — PLAN OF CARE
Problem: Falls - Risk of:  Goal: Will remain free from falls  Description: Will remain free from falls  3/6/2021 1426 by Kaylie Patel RN  Outcome: Ongoing  Note: Patient is alert and oriented and remains free from falls this shift. Bed is locked and in lowest position. Call light is within reach and patient is using appropriately. Problem: Pain:  Description: Pain management should include both nonpharmacologic and pharmacologic interventions. Goal: Control of chronic pain  Description: Control of chronic pain  3/6/2021 1426 by Kaylie Patel RN  Outcome: Ongoing  Note: Patient complains of headache this shift. Chronic in nature. Patient medicated per PRN orders. See MAR. Patient currently resting in bed. No distress noted. Problem: Skin Integrity:  Goal: Will show no infection signs and symptoms  Description: Will show no infection signs and symptoms  Outcome: Ongoing     Problem: Skin Integrity:  Goal: Absence of new skin breakdown  Description: Absence of new skin breakdown  Outcome: Ongoing  Note: Skin assessment completed this shift. No new skin breakdown noted. See head to toe.

## 2021-03-06 NOTE — PROGRESS NOTES
[address]   ACUTE REHABILITATION OCCUPATIONAL THERAPY  DAILY NOTE    Date: 3/6/21  Patient Name: Gurpreet Ear      Room: 9169/3874-38    MRN: [id number]   : 1934  (80 y.o.)  Gender: female   Referring Practitioner: Dr. Mar Jauregui  Diagnosis: Exacerbation of multiple sclerosis       Restrictions  Restrictions/Precautions: Fall Risk  Implants present? 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 Pt aslo has Left eye lid droop, per pt, its not Bell's palsy, but its from MS-facial muscle weakness. Required Braces or Orthoses  Spinal: (states she has a back brace, uses PRN)  Required Braces or Orthoses?: Yes(back brace-wears mainly in the morning. )    Subjective  Subjective: \"I got to thinking, I'm going to do my crocheting at home. I've got to practice. \"  pt with christopher materials here and was pleased with her ability to resume christopher when she tried it. Comments: discussed pt's attempt to urinate on toilet this am, (slight dribble) and agreed nsg should complete bladder scan (RN was notified)  per pt she had to be straight cathed yesterday but then was incontinent large amount of urine in bed last night. pt requesting therapy for L facial weakness, OT spoke with ST who notes she can complete ed re exercises with pt.             Objective     Balance  Sitting Balance: Independent  Standing Balance: Contact guard assistance(SBA static, CGA dynamic)  Transfers  Stand Step Transfers: Contact guard assistance  Sit to stand: Stand by assistance  Stand to sit: Stand by assistance  Transfer Comments: w/RW; cues for hand placement   Standing Balance  Time: 5-6 min x 2, 2-4 min x 3 in am, 5-7 min x 2 in pm  Activity: adls with walker  Functional Mobility  Functional - Mobility Device: Rolling Walker Activity: To/from bathroom  Assist Level: Contact guard assistance  Functional Mobility Comments: pt ambulated 20 feet to obtain set up with walker and CGA- ed re carry clothing on walker, ambulated toilet to sink, bathroom to edge of bed. Toilet Transfers  Toilet - Technique: Ambulating  Equipment Used: Grab bars  Toilet Transfer: Contact guard assistance                             ADL  Feeding: Modified independent (dentures, cuts meat but with extra time and is difficult)  Grooming: Modified independent (seated in w/c at sink)  UE Bathing: Setup  LE Bathing: Setup;Stand by assistance  UE Dressing: Setup  LE Dressing: Setup;Stand by assistance(underpants, pants, teds, slip on shoes, assist with teds and SBA stand to don pants)  Toileting: Stand by assistance  Additional Comments: pt ambulated to obtain set up with walker and CGA, pt completed groom and bathe at sink, dressed. pt declined shower due to headache. Instrumental ADL's  Instrumental ADLs: Yes  Meal Prep  Meal Prep Level: Walker  Meal Prep Level of Assistance: Stand by assistance(verbal cues)  Meal Preparation: post demo, pt practiced access refridgerator, obtain plate of food, transport across kitchen and to table x 2, errors with walker safety needing verbal cues to correct- even post same task completed twice in a row. pt attempting to step without both hands on walker during task. Assessment     Activity Tolerance: Patient Tolerated treatment well;Patient limited by fatigue;Patient limited by pain  Activity Tolerance: pt declined shower due to headache. RN in and pt declined pain meds for headache. pt standing tolerance was limited by back pain. 03/06/21 1142 03/06/21 1623   OT Individual Minutes   Time In [id number]   Time Out [id number]   Minutes 60 30          Patient Education:  Patient Goals   Patient goals : Regain independence and return home with A from family as needed.   Learner:patient  Method: demonstration and explanation       Outcome: acknowledged understanding , demonstrated understanding and needs reinforcement   OT Education  Patient Education: ed re obtain set up for adls:  pt ambulated 20 feet to obtain set up with walker and CGA- ed re carry clothing on walker, ed re safe walker technique for advanced adls- pt returned demo, needs reinforcement    Plan  Plan  Times per week: 900/7  Times per day: Twice a day  Current Treatment Recommendations: Self-Care / ADL, Home Management Training, Strengthening, Balance Training, Functional Mobility Training, Endurance Training, Pain Management, Safety Education & Training, Patient/Caregiver Education & Training, Equipment Evaluation, Education, & procurement  Plan Comment: Due to medical status and fatigue concerns she will be seen for 900 minutes of OT/PT/ST combined over 7 days  Patient Goals   Patient goals : Regain independence and return home with A from family as needed. Short term goals  Time Frame for Short term goals: One Week  Short term goal 1: Pt will complete bathing/UBD tasks with set-up A and Good safety. Short term goal 2: Pt will complete LB dressing including socks/shoes with SBA and Good safety using AE if needed. Short term goal 3: Pt will consistently complete toilet transfer and toileting with supervision and Good safety using least restrictive device. Short term goal 4: Pt will V/D good understanding of fall prevention strategies as well as EC/WS techniques during functional tasks. Short term goal 5: Pt will actively participate in 30 minutes of therapeutic exercise/functional activities to promote increased independence with self-care and mobility. Long term goals  Time Frame for Long term goals : By Discharge  Long term goal 1: Pt will complete BADL's with Mod I and Good safety using AE as needed. Long term goal 2: Pt will complete functional transfers with Mod I and Good safety using least restrictive device.   Long term goal 3: Pt will complete simple meal prep/light housekeeping tasks with Mod I and Good safety using least restrictive device.        Electronically signed by Manuel Akhtar OT on 3/6/21 at 4:28 PM EST

## 2021-03-06 NOTE — PROGRESS NOTES
[facility] Internal Medicine    CONSULTATION / HISTORY AND PHYSICAL EXAMINATION            Date:   3/6/2021  Patient name:  Ethan Lazcano  Date of admission:  3/2/2021  3:19 PM  MRN:   [id number]  Account:  [de-identified]  YOB: 1934  PCP:    Samira Piper MD  Room:   [id number]  Code Status:    Full Code    Physician Requesting Consult: Lexy Carver MD    Reason for Consult:  medical management    Chief Complaint:     No chief complaint on file.       History Obtained From:     Patient medical record nursing staff    History of Present Illness:   Patient admitted to acute rehab for rehabilitation  Internal medicine, consulted for medical management  Patient with multiple medical problems which include history of MS, since 1983, chronic headache, GERD, hypertension  She has chronic weakness on left side of her body, leg more than arm  She was admitted originally at [facility] with worsening dizziness, ataxia, CT head was done, which was negative for acute intracranial pathology  Patient was treated with IV steroids for 5 days, her symptoms improved, during her hospital stay, she was found to have urinary tract infection, urine culture was positive for Enterococcus faecalis, which was sensitive to ciprofloxacin, treated with antibiotics  Patient mention she has chronic headache for years, which got better with Norco  Her blood pressure was running high, she was started on Aldactone  Today morning, during therapy patient was found to be dizzy, her blood pressure was running low  No other complaints    Past Medical History:     Past Medical History:   Diagnosis Date    Acute cystitis without hematuria 10/1/2017    Arthritis     Chronic daily headache     GERD (gastroesophageal reflux disease)     Hyperlipidemia     Hypertension     Moderate malnutrition ([address] 3/2/2019    Multiple sclerosis ([address]     Neuropathy     Pneumonia 2017    states had double pneumonia    Vitamin D deficiency         Past Surgical History:     Past Surgical History:   Procedure Laterality Date    CATARACT REMOVAL WITH IMPLANT Right 11/6/2014    Raffoul/Ole    CATARACT REMOVAL WITH IMPLANT Left 12/2/2014    Raffofrankie/Ole    CERVICAL DISC SURGERY      CYSTOCELE REPAIR      HYSTERECTOMY      RECTOCELE REPAIR      SHOULDER ARTHROPLASTY Right 04/20/2017    SHOULDER SURGERY Right 2017        Medications Prior to Admission:     Prior to Admission medications    Medication Sig Start Date End Date Taking? Authorizing Provider   amitriptyline (ELAVIL) 100 MG tablet Take 1 tablet by mouth nightly 1/25/21  Yes Pauly Sheth MD   predniSONE (DELTASONE) 10 MG tablet Take 1 tablet by mouth daily 3/2/21 3/2/22  Samy Cordova MD   traMADol (ULTRAM) 50 MG tablet Take 1 tablet by mouth every 6 hours as needed for Pain for up to 30 days.  2/22/21 3/24/21  Pauly Sheth MD   fluticasone Collin Ask) 50 MCG/ACT nasal spray 1 spray by Each Nostril route daily 2/19/21   Fanta Chandler PA-C   omeprazole (PRILOSEC) 20 MG delayed release capsule Take 1 capsule by mouth daily 2/8/21   Pauly Sheth MD   celecoxib (CELEBREX) 200 MG capsule Take 1 capsule by mouth daily 2/1/21 2/1/22  Pauly Sheth MD   Elastic Bandages & Supports (LUMBAR BACK BRACE/SUPPORT PAD) MISC 1 each by Does not apply route daily 1/26/21   Pauly Sheth MD   Elastic Bandages & Supports (LUMBAR BACK BRACE/SUPPORT PAD) MISC 1 each by Does not apply route daily as needed (back pain) 1/25/21   Pauly Sheth MD   lisinopril (PRINIVIL;ZESTRIL) 5 MG tablet Take 1 tablet by mouth daily 1/25/21   Pauly Sheth MD   simvastatin (ZOCOR) 20 MG tablet TAKE 1 TABLET BY MOUTH EVERY NIGHT 1/20/21   Pauly Sheth MD   solifenacin (VESICARE) 5 MG tablet Take 1 tablet by mouth daily 12/4/20 12/4/21  Pauly Sheth MD   albuterol sulfate HFA (VENTOLIN HFA) 108 (90 Base) MCG/ACT inhaler Inhale 2 puffs into the lungs every 4 hours as needed for Wheezing 2/10/20 2/9/21  Pauly Loredo MD   HYDROcodone-acetaminophen (NORCO) 5-325 MG per tablet Take 1 tablet by mouth 3 times daily. Historical Provider, MD        Allergies:     Bactrim [sulfamethoxazole-trimethoprim], Lactose intolerance (gi), Pcn [penicillins], Lidoderm [lidocaine], and Macrobid [nitrofurantoin]    Social History:     Tobacco:    reports that she has never smoked. She has never used smokeless tobacco.  Alcohol:      reports no history of alcohol use. Drug Use:  reports no history of drug use. Family History:     No family history on file. Review of Systems:     Positive and Negative as described in HPI. CONSTITUTIONAL:  negative for fevers, chills, sweats, fatigue, weight loss  HEENT:  negative for vision, hearing changes, runny nose, throat pain  RESPIRATORY:  negative for shortness of breath, cough, congestion, wheezing. CARDIOVASCULAR:  negative for chest pain, palpitations.   GASTROINTESTINAL:  negative for nausea, vomiting, diarrhea, constipation, change in bowel habits, abdominal pain   GENITOURINARY:  negative for difficulty of urination, burning with urination, frequency   INTEGUMENT:  negative for rash, skin lesions, easy bruising   HEMATOLOGIC/LYMPHATIC:  negative for swelling/edema   ALLERGIC/IMMUNOLOGIC:  negative for urticaria , itching  ENDOCRINE:  negative increase in drinking, increase in urination, hot or cold intolerance  MUSCULOSKELETAL:  negative joint pains, muscle aches, swelling of joints  NEUROLOGICAL: Dizziness, weakness on left side of body  BEHAVIOR/PSYCH:      Physical Exam:     BP (!) 97/48   Pulse 97   Temp 98 °F (36.7 °C) (Oral)   Resp 18   Ht 4' 11\" (1.499 m)   Wt 135 lb (61.2 kg)   SpO2 100%   BMI 27.27 kg/m²   Temp (24hrs), Av.3 °F (36.8 °C), Min:98 °F (36.7 °C), Max:98.5 °F (36.9 °C)    Recent Labs     21  1049 21  1555 21  0615   POCGLU 132* 135* 153* 119*       Intake/Output Summary (Last 24 hours) at 3/6/2021 1451  Last data filed at 3/6/2021 1425  Gross per 24 hour   Intake 675 ml   Output 2079 ml   Net -1404 ml       General Appearance:  alert, well appearing, and in no acute distress  Mental status: oriented to person, place, and time with normal affect  Head:  normocephalic, atraumatic. Eye: no icterus, redness, pupils equal and reactive, extraocular eye movements intact, conjunctiva clear  Ear: normal external ear, no discharge, hearing intact  Nose:  no drainage noted  Mouth: mucous membranes moist  Neck: supple, no carotid bruits, thyroid not palpable  Lungs: Bilateral equal air entry, clear to ausculation, no wheezing, rales or rhonchi, normal effort  Cardiovascular: normal rate, regular rhythm, no murmur, gallop, rub. Abdomen: Soft, nontender, nondistended, normal bowel sounds, no hepatomegaly or splenomegaly  Neurologic: Patient has weakness on left side of body, power in left upper and lower extremity is 5/5 skin: No gross lesions, rashes, bruising or bleeding on exposed skin area  Extremities:  peripheral pulses palpable, no pedal edema or calf pain with palpation  Psych: Investigations:      Laboratory Testing:  No results found for this or any previous visit (from the past 24 hour(s)).         Consultations:   IP CONSULT TO INTERNAL MEDICINE  IP CONSULT TO DIETITIAN  IP CONSULT TO SOCIAL WORK  IP CONSULT TO INTERNAL MEDICINE  Assessment :      Primary Problem  <principal problem not specified>    Active Hospital Problems    Diagnosis Date Noted    EDINSON (acute kidney injury) ([facility] [N17.9] 03/03/2021    Multiple sclerosis exacerbation ([facility] Lanice Sukhdeep 02/25/2021    Chronic headache [R51.9, G89.29] 02/25/2021    Essential hypertension [I10] 11/07/2018    Hyperlipidemia [E78.5] 11/07/2018    Ataxia [R27.0] 10/01/2017    Multiple sclerosis ([facility] Lanice Sukhdeep 10/01/2017    Abnormal gait [R26.9] 10/07/2014   Active Problems:    Ataxia    Multiple

## 2021-03-06 NOTE — PROGRESS NOTES
Physical Medicine & Rehabilitation  Progress Note      Subjective:      80year-old with MS exacerbation. Patient is doing well today. R hand symptoms have improved today. She continues to have H/A however it is somewhat better , would like to try tylenol. No new issues with sleep, appetite, bowels. ROS:  Denies fevers, chills, sweats. No chest pain, palpitations, lightheadedness. Denies coughing, wheezing or shortness of breath. Denies abdominal pain, nausea, diarrhea or constipation. No new areas of joint pain. Chronic daily headache. Denies new areas of numbness or weakness. Denies new anxiety or depression issues. No new skin problems. Rehabilitation:   Progressing in therapies. PT:      Bed Mobility:   Rolling: Stand by assistance  Supine to Sit: Stand by assistance  Sit to Supine: Stand by assistance  Scooting: Stand by assistance     Transfers:  Sit to Stand: Stand by assistance  Stand to sit: Stand by assistance  Bed to Chair: Stand by assistance(Fluctuates depending on HA and dizzyness.)  Ambulation 1  Surface: level tile  Device: Rolling Walker  Other Apparatus: Wheelchair follow  Assistance: Contact guard assistance;Minimal assistance  Quality of Gait: No LOB  Gait Deviations: Slow Emily;Decreased step length;Decreased step height  Distance: 180ft (AM), 200ft (PM)   Comments: Patient fatigues easily, WC follow for safety due to low blood pressure and dizziness during from a previous session. Cues given to look ahead and have good posture. Stairs/Curb  Stairs?: Yes  Stairs  # Steps : 10  Stairs Height: 4\"(6\")  Rails: Right ascending(Right descending)  Device: No Device  Assistance: Contact guard assistance  Comment: Step-to pattern at first, transitioned to reciprocal pattern.   BALANCE Posture: Fair  Sitting - Static: Good  Sitting - Dynamic: Fair;+  Standing - Static: Fair(w/ RW)  Standing - Dynamic: Fair( with RW)  Comments: Seated EOB SBA, Standing with RW-CGA/min A   OT Cognition  Overall Cognitive Status: WFL  Perception  Overall Perceptual Status: WFL  Balance  Sitting Balance: Independent  Standing Balance: Stand by assistance  Transfers  Sit to stand: Stand by assistance  Stand to sit: Stand by assistance  Transfer Comments: w/RW; cues for hand placement   Standing Balance  Time: 2-3 min, 1-2 min x3   Activity: functional activity, transfers and ADL activities   Comment: Pt reports lightneadedness at times, vitals checked and withing normal limits. No SOB or LOB noted   Functional Mobility  Functional - Mobility Device: Rolling Walker  Activity: To/from bathroom  Assist Level: Stand by assistance  Exercises  Grasp/Release: Pt engaged in BUE hand strenghting exercises with (1.0, 3.0 and 5.0#) 15 reps each   ADL  Grooming: Setup  UE Bathing: Stand by assistance;Setup ( seated at sink)   LE Bathing: Stand by assistance;Setup ( lifting leg reach foot and lower leg)   UE Dressing: Setup( donning [place] shirt)   LE Dressing: Setup;Stand by assistance; Increased time to complete( donning brief, pants   Toileting: None  Additional Comments: Pt completes ADL seated/standing at sink this date        Objective:  BP (!) 97/48   Pulse 97   Temp 98 °F (36.7 °C) (Oral)   Resp 18   Ht 4' 11\" (1.499 m)   Wt 135 lb (61.2 kg)   SpO2 100%   BMI 27.27 kg/m²       GEN: Well developed, well nourished, in NAD  HEENT:  NCAT. PERRL. EOMI. Mucous membranes pink and moist.   PULM:  Clear to ausculation. No rales or rhonchi. Respirations WNL and unlabored. CV:  tachycardic rate regular rhythm. No murmurs or gallops. GI:  Abdomen soft. Nontender. Non-distended. BS + and equal.    NEUROLOGICAL: A&O x3. Sensation intact to light touch. DTRs 2+. MSK:  Functional ROM all extremities. Motor testing 4+/5 key muscles RUE and RLE. 4-/5 key muscles LUE and LLE. SKIN: Warm dry and intact. Good turgor. EXTREMITIES:  No calf tenderness to palpation. No edema BLEs. Felipe Steel PSYCH: Mood WNL.  Appropriately interactive. Affect WNL. Diagnostics:     CBC:   No results for input(s): WBC, RBC, HGB, HCT, MCV, RDW, PLT in the last 72 hours. BMP:   No results for input(s): NA, K, CL, CO2, PHOS, BUN, CREATININE, GLUCOSE in the last 72 hours. Invalid input(s): CA  BNP: No results for input(s): BNP in the last 72 hours. PT/INR: No results for input(s): PROTIME, INR in the last 72 hours. APTT: No results for input(s): APTT in the last 72 hours. CARDIAC ENZYMES: No results for input(s): CKMB, CKMBINDEX, TROPONINT in the last 72 hours. Invalid input(s): CKTOTAL;3  FASTING LIPID PANEL:  Lab Results   Component Value Date    CHOL 235 (H) 01/04/2012    HDL 46 05/21/2020    TRIG 119 01/04/2012     LIVER PROFILE: No results for input(s): AST, ALT, ALB, BILIDIR, BILITOT, ALKPHOS in the last 72 hours.      Current Medications:   Current Facility-Administered Medications: acetaminophen (TYLENOL) tablet 500 mg, 500 mg, Oral, Q6H PRN  calcium carbonate (TUMS) chewable tablet 500 mg, 500 mg, Oral, TID PRN  hydrALAZINE (APRESOLINE) tablet 50 mg, 50 mg, Oral, Q6H PRN  lisinopril (PRINIVIL;ZESTRIL) tablet 40 mg, 40 mg, Oral, Nightly  enoxaparin (LOVENOX) injection 30 mg, 30 mg, Subcutaneous, Daily  famotidine (PEPCID) tablet 10 mg, 10 mg, Oral, Daily  dextrose 5 % solution, 100 mL/hr, Intravenous, PRN  dextrose 50 % IV solution, 12.5 g, Intravenous, PRN  glucagon (rDNA) injection 1 mg, 1 mg, Intramuscular, PRN  glucose (GLUTOSE) 40 % oral gel 15 g, 15 g, Oral, PRN  amitriptyline (ELAVIL) tablet 100 mg, 100 mg, Oral, Nightly  atorvastatin (LIPITOR) tablet 20 mg, 20 mg, Oral, Nightly  benzonatate (TESSALON) capsule 100 mg, 100 mg, Oral, TID PRN  butalbital-APAP-caffeine -40 MG per capsule 1 capsule, 1 capsule, Oral, Q6H PRN  dextromethorphan (DELSYM) 30 MG/5ML extended release liquid 30 mg, 30 mg, Oral, 2 times per day  gabapentin (NEURONTIN) capsule 100 mg, 100 mg, Oral, BID  HYDROcodone-acetaminophen (NORCO) 5-325 MG per tablet 1 tablet, 1 tablet, Oral, Q6H PRN  polyethylene glycol (GLYCOLAX) packet 17 g, 17 g, Oral, Daily  senna (SENOKOT) tablet 17.2 mg, 2 tablet, Oral, Daily PRN  bisacodyl (DULCOLAX) suppository 10 mg, 10 mg, Rectal, Daily PRN  albuterol sulfate  (90 Base) MCG/ACT inhaler 2 puff, 2 puff, Inhalation, Q6H PRN      Impression/Plan:   Impaired ADLs, gait, and mobility due to:      1. Acute MS exacerbation:  PT/OT for gait, mobility, strengthening, endurance, ADLs, and self care. To follow up with Dr. Colin Stinson in October - will contact about scheduling sooner since patient has been hospitalized with an exacerbation  2. EDINSON: monitoring BMP. Encourage hydration. 3. Urine retention: Nursing to perform timed voids while awake. Can intermittent straight cath prn if PVR > 350 ml. Performed x 1 on 3/5.  4. Anemia: monitoring CBC  5. Leukocytosis: likely secondary to recent burst of IV steroid. Will monitor. No current signs or symptoms of infection. 6. HTN/Hyperlipidemia: on atorvastatin, hydralazine prn, lisinopril, spironolactone  7. Arthritis: has Norco prn pain  8. Chronic daily headaches: worse in the morning. on amitriptyline q hs. has Norco and Fioricet prn.   9. GERD: on famotidine daily  10. Hyperglycemia: Resolved-  related to 5 days IV steroid. No history diabetes. IM discontinued insulin sliding scale. 11. Cough: Improving. has albuterol prn, tessalon prn, dextromethorphan BID  12. Stress incontinence/asymptomatic bacteriuria: for outpatient follow up with Dr. Shaquille Matias. Completing Cipro 3/6. 13. Bowel Management: Miralax daily, senokot prn, dulcolax prn. 14. DVT Prophylaxis:  low molecular weight heparin, SCD's while in bed and FRANCISCO's during the day  15.  Internal medicine for medical management    Electronically signed by Alen Jones MD on 3/6/2021 at 9:19 AM      This note is created with the assistance of a speech recognition program.  While intending to generate a document that actually reflects the content of the visit, the document can still have some errors including those of syntax and sound a like substitutions which may escape proof reading. In such instances, actual meaning can be extrapolated by contextual diversion.

## 2021-03-06 NOTE — PROGRESS NOTES
Physical Therapy  [facility]: [facility]  Acute Rehabilitation Physical Therapy Progress Note    Date: 3/6/21  Patient Name: Cynthia Kimble       Room: [facility]/8921-  MRN: [id number]   Account: [de-identified]   : 1934  (80 y.o.) Gender: female     Referring Practitioner: Dr. Chidi Machuca  Diagnosis: Exacerbation of multiple sclerosis  Past Medical History:  has a past medical history of Acute cystitis without hematuria, Arthritis, Chronic daily headache, GERD (gastroesophageal reflux disease), Hyperlipidemia, Hypertension, Moderate malnutrition ([address] Multiple sclerosis ([address] Neuropathy, Pneumonia, and Vitamin D deficiency. Past Surgical History:   has a past surgical history that includes Cystocele repair; Rectocele repair; Hysterectomy; Cervical disc surgery; Cataract removal with implant (Right, 2014); Cataract removal with implant (Left, 2014); Total shoulder arthroplasty (Right, 2017); and shoulder surgery (Right, ). Restrictions/Precautions  Restrictions/Precautions: Fall Risk  Required Braces or Orthoses?: Yes(back brace-wears mainly in the morning. )  Implants present? 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 Pt aslo has Left eye lid droop, per pt, its not Bell's palsy, but its from MS-facial muscle weakness.      Subjective: Patient complains of headache, says she gets them every morning for the past >30 years, with mild light headed symptoms AM . PM headache abolished moderate lighr headed BP seated pre treatment 94/48 HR 80 , post standing alternating hip flexion 100/54 HR 99       Vital Signs  Patient Currently in Pain: Yes  Pain Assessment: 0-10  Pain Level: 7 Pain Type: Chronic pain  Pain Location: Head  Pain Orientation: Anterior;Posterior(right <> left)  Pain Descriptors: Aching  Pain Frequency: Intermittent  Non-Pharmaceutical Pain Intervention(s): Ambulation/Increased Activity(as tolerated )     Bed Mobility:   Bed Mobility  Rolling: Stand by assistance  Supine to Sit: Stand by assistance  Scooting: Stand by assistance  Bed mobility  Scooting: Stand by assistance    Transfers:  Sit to Stand: Stand by assistance  Stand to sit: Stand by assistance  Bed to Chair: Stand by assistance(Fluctuates depending on HA and dizzyness.)      Ambulation 1  Surface: level tile  Device: Rolling Walker  Other Apparatus: Wheelchair follow  Assistance: Contact guard assistance;Stand by assistance  Quality of Gait: No LOB  Gait Deviations: Slow Emily;Decreased step length;Decreased step height  Distance: 200 ft  ft PM  Comments: BP at completion /66  /54 HR 99  Ambulation 2  Surface - 2: level tile  Device 2: Single point cane  Assistance 2: Contact guard assistance;Minimal assistance  Gait Deviations: Decreased step length;Decreased step height;Deviated path  Distance: 100 ft  Comments: decreased stability and straight path vs rolling walker      Stairs/Curb  Stairs?: Yes  Stairs  # Steps : 10  Stairs Height: 4\"(6\")  Rails: Right ascending(Right descending)  Device: No Device  Assistance: Contact guard assistance;Stand by assistance  Comment: Step-to pattern ascending right , 1 x step thru ascending 4 inch      FIMS:       BALANCE Posture: Fair  Sitting - Static: Good  Sitting - Dynamic: Fair;+  Standing - Static: Fair(w/ RW)  Standing - Dynamic: Fair( with RW)  Comments: Seated EOB SBA, Standing with RW-CGA/min A    EXERCISES    Other exercises?: Yes  Other exercises 1: Seated B LE exercises #2 and lt green x15 reps     Other exercises 2: standing no UE support reaching within NELDA , 3.30 minute bouts CGA(instruction to increase head rotation/visual scan)  Other exercises 3: nustep 5 minutes L2           Activity Tolerance: Patient limited by endurance, Treatment limited secondary to medical complications (free text), Patient limited by fatigue  Activity Tolerance: Low BP, dizziness and lightheadness, headaches, \"difficulties focusing\"  PT Equipment Recommendations  Equipment Needed: No  Other: Patient has RW and single point cane at home. Patient Education  New Education Provided:    Learner:patient  Method: explanation       Outcome: demonstrated understanding     Current Treatment Recommendations: Strengthening, Home Exercise Program, Safety Education & Training, Balance Training, Endurance Training, Patient/Caregiver Education & Training, Equipment Evaluation, Education, & procurement, Functional Mobility Training, Transfer Training, Gait Training, Stair training    Conditions Requiring Skilled Therapeutic Intervention  Body structures, Functions, Activity limitations: Decreased strength;Decreased safe awareness;Decreased balance; Increased pain;Decreased functional mobility ; Decreased endurance  Treatment Diagnosis: impaired mobility due to weakness and debilitation  Prognosis: Good  Decision Making: Medium Complexity  History: Admitted due to exacerbation of MS  Clinical Presentation: Motivated and pleasant. Barriers to Learning: [place]  REQUIRES PT FOLLOW UP: Yes  Discharge Recommendations: Patient would benefit from continued therapy after discharge;Home with assist PRN    Goals  Short term goals  Time Frame for Short term goals: 5 to 6 days. Short term goal 1: pt to tolerate 1/2 to 45 minutes of therapuetic exercise and activity, four sessions per day.   Short term goal 2: pt to demonstrate good technique for balance activities, strengthening exercises and energy conservation techniques  Short term goal 3: pt to demonstrate independent rolling in bed for position change  Short term goal 4: pt to demonstrate transfers supine <> sit w/ supervision  Short term goal 5: pt to demonstrate transfers sit <> stand and bed <> chair using wheeled walker w/ SBA  Short term goal 6: pt to demonstrate good sitting dynamic balance and fair + or better standing balance using wheeled walker  Short term goal 7: pt to demonstrate gait 200 ft using wheeled walker w/ SBA  Short term goal 8: pt to advance to and demonstrate ability to ascend/ descend 4-6\" step using rail and st cane w/ min x 1  Long term goals  Time Frame for Long term goals : By DC  Long term goal 1: pt able to perform transfers independently with or without appropriate device. Long term goal 2: pt able to ambulate with appropriate device safely distance of 150 to 200 ft, level surface, for 2MWT. Long term goal 3: Assess if pt able to ambulate safely without device for 10 to 20 ft. (at baseline at times pt able to walk shor distance without a device). Long term goal 4: pt able to go up and down 5 to 8 steps with 1 HR/st cane, supervsion level. Long term goal 5: improve Tinetti balance score to atleast 22/28 to reduce fall risk.     Electronically signed by Jose South, KEENA on 3/6/21 at 2:23 PM EST     03/06/21 0908 03/06/21 1422   PT Individual Minutes   Time In [id number]   Time Out 0833 1250   Minutes 64 35   2

## 2021-03-06 NOTE — PROGRESS NOTES
Speech Language Pathology  [facility] at [facility]  Speech Language Pathology    Date: 3/6/2021  Patient Name: Durga Pruitt  YOB: 1934   AGE: 80 y.o. MRN: [id number]      OT contacted ST reporting Pt. has requested oral motor exercises. Per chart and Pt. report, Pt. has hx of MS and Bell's palsy with residual weakness on L side of face. Handout of oral motor exercises reviewed with Pt. And left in room. Education provided re: completion of exercises in front of mirror for visual feedback. Pt. verbalized understanding of all information presented and expressed gratitude for information/handout. Completed by:  Karina Moore M.A., CCC-SLP

## 2021-03-06 NOTE — PROGRESS NOTES
Patient currently resting in bed. Denies pain. No change in patient condition. Will continue to monitor.

## 2021-03-07 LAB
BILIRUBIN URINE: NEGATIVE
COLOR: YELLOW
COMMENT UA: NORMAL
GLUCOSE URINE: NEGATIVE
KETONES, URINE: NEGATIVE
LEUKOCYTE ESTERASE, URINE: NEGATIVE
NITRITE, URINE: NEGATIVE
PH UA: 6.5 (ref 5–8)
PROTEIN UA: NEGATIVE
SPECIFIC GRAVITY UA: 1.01 (ref 1–1.03)
TURBIDITY: CLEAR
URINE HGB: NEGATIVE
UROBILINOGEN, URINE: NORMAL

## 2021-03-07 PROCEDURE — 51701 INSERT BLADDER CATHETER: CPT

## 2021-03-07 PROCEDURE — 6370000000 HC RX 637 (ALT 250 FOR IP): Performed by: PHYSICAL MEDICINE & REHABILITATION

## 2021-03-07 PROCEDURE — 6370000000 HC RX 637 (ALT 250 FOR IP): Performed by: INTERNAL MEDICINE

## 2021-03-07 PROCEDURE — 97110 THERAPEUTIC EXERCISES: CPT

## 2021-03-07 PROCEDURE — 97116 GAIT TRAINING THERAPY: CPT

## 2021-03-07 PROCEDURE — 51798 US URINE CAPACITY MEASURE: CPT

## 2021-03-07 PROCEDURE — 99231 SBSQ HOSP IP/OBS SF/LOW 25: CPT | Performed by: INTERNAL MEDICINE

## 2021-03-07 PROCEDURE — 1180000000 HC REHAB R&B

## 2021-03-07 PROCEDURE — 97535 SELF CARE MNGMENT TRAINING: CPT

## 2021-03-07 PROCEDURE — 6360000002 HC RX W HCPCS: Performed by: INTERNAL MEDICINE

## 2021-03-07 PROCEDURE — 51702 INSERT TEMP BLADDER CATH: CPT

## 2021-03-07 PROCEDURE — 97112 NEUROMUSCULAR REEDUCATION: CPT

## 2021-03-07 PROCEDURE — 81003 URINALYSIS AUTO W/O SCOPE: CPT

## 2021-03-07 RX ORDER — DOCUSATE SODIUM 100 MG/1
100 CAPSULE, LIQUID FILLED ORAL 2 TIMES DAILY
Status: DISCONTINUED | OUTPATIENT
Start: 2021-03-07 | End: 2021-03-12 | Stop reason: HOSPADM

## 2021-03-07 RX ORDER — FLUCONAZOLE 100 MG/1
100 TABLET ORAL DAILY
Status: DISCONTINUED | OUTPATIENT
Start: 2021-03-07 | End: 2021-03-12 | Stop reason: HOSPADM

## 2021-03-07 RX ADMIN — Medication 30 MG: at 21:07

## 2021-03-07 RX ADMIN — Medication 30 MG: at 08:39

## 2021-03-07 RX ADMIN — DOCUSATE SODIUM 100 MG: 100 CAPSULE, LIQUID FILLED ORAL at 12:27

## 2021-03-07 RX ADMIN — HYDROCODONE BITARTRATE AND ACETAMINOPHEN 1 TABLET: 5; 325 TABLET ORAL at 08:49

## 2021-03-07 RX ADMIN — ATORVASTATIN CALCIUM 20 MG: 10 TABLET, FILM COATED ORAL at 21:06

## 2021-03-07 RX ADMIN — METRONIDAZOLE 100 MG: 500 TABLET ORAL at 08:38

## 2021-03-07 RX ADMIN — METRONIDAZOLE 100 MG: 500 TABLET ORAL at 21:06

## 2021-03-07 RX ADMIN — POLYETHYLENE GLYCOL 3350 17 G: 17 POWDER, FOR SOLUTION ORAL at 08:38

## 2021-03-07 RX ADMIN — ANTACID TABLETS 500 MG: 500 TABLET, CHEWABLE ORAL at 17:02

## 2021-03-07 RX ADMIN — DOCUSATE SODIUM 100 MG: 100 CAPSULE, LIQUID FILLED ORAL at 21:06

## 2021-03-07 RX ADMIN — AMITRIPTYLINE HYDROCHLORIDE 100 MG: 50 TABLET, FILM COATED ORAL at 21:07

## 2021-03-07 RX ADMIN — LISINOPRIL 40 MG: 20 TABLET ORAL at 21:06

## 2021-03-07 RX ADMIN — ACETAMINOPHEN 500 MG: 500 TABLET ORAL at 05:36

## 2021-03-07 RX ADMIN — FLUCONAZOLE 100 MG: 100 TABLET ORAL at 16:37

## 2021-03-07 RX ADMIN — SENNOSIDES 17.2 MG: 8.6 TABLET, FILM COATED ORAL at 08:49

## 2021-03-07 RX ADMIN — ENOXAPARIN SODIUM 30 MG: 30 INJECTION SUBCUTANEOUS at 08:38

## 2021-03-07 RX ADMIN — FAMOTIDINE 10 MG: 20 TABLET ORAL at 08:38

## 2021-03-07 RX ADMIN — ACETAMINOPHEN 500 MG: 500 TABLET ORAL at 17:02

## 2021-03-07 ASSESSMENT — PAIN DESCRIPTION - ORIENTATION: ORIENTATION: RIGHT;LEFT;ANTERIOR;POSTERIOR

## 2021-03-07 ASSESSMENT — PAIN DESCRIPTION - DESCRIPTORS: DESCRIPTORS: ACHING

## 2021-03-07 ASSESSMENT — PAIN DESCRIPTION - ONSET: ONSET: ON-GOING

## 2021-03-07 ASSESSMENT — PAIN SCALES - GENERAL
PAINLEVEL_OUTOF10: 6
PAINLEVEL_OUTOF10: 3
PAINLEVEL_OUTOF10: 0
PAINLEVEL_OUTOF10: 8
PAINLEVEL_OUTOF10: 0
PAINLEVEL_OUTOF10: 0
PAINLEVEL_OUTOF10: 2

## 2021-03-07 ASSESSMENT — PAIN DESCRIPTION - PAIN TYPE
TYPE: CHRONIC PAIN
TYPE: CHRONIC PAIN

## 2021-03-07 ASSESSMENT — PAIN DESCRIPTION - FREQUENCY: FREQUENCY: INTERMITTENT

## 2021-03-07 ASSESSMENT — PAIN DESCRIPTION - LOCATION
LOCATION: HEAD
LOCATION: HEAD

## 2021-03-07 NOTE — PLAN OF CARE
Problem: Falls - Risk of:  Goal: Will remain free from falls  Description: Will remain free from falls  3/7/2021 0418 by Avi Perera RN  Outcome: Ongoing  Note: Education provided; safety precautions in place; pt remained free from falls  3/6/2021 1714 by Abby Byrd RN  Outcome: Ongoing  3/6/2021 1426 by Letitia Sales RN  Outcome: Ongoing  Note: Patient is alert and oriented and remains free from falls this shift. Bed is locked and in lowest position. Call light is within reach and patient is using appropriately. Goal: Absence of physical injury  Description: Absence of physical injury  3/7/2021 0418 by Avi Perera RN  Outcome: Ongoing  3/6/2021 1714 by Abby Byrd RN  Outcome: Ongoing     Problem: Pain:  Goal: Pain level will decrease  Description: Pain level will decrease  3/7/2021 0418 by Avi Perera RN  Outcome: Ongoing  Note: Patient denies pain  3/6/2021 1714 by Abby Byrd RN  Outcome: Ongoing  Goal: Control of acute pain  Description: Control of acute pain  3/7/2021 0418 by Avi Perera RN  Outcome: Ongoing  3/6/2021 1714 by Abby Byrd RN  Outcome: Ongoing  Goal: Control of chronic pain  Description: Control of chronic pain  3/7/2021 0418 by Avi Perera RN  Outcome: Ongoing  3/6/2021 1714 by Abby Byrd RN  Outcome: Ongoing  3/6/2021 1426 by Letitia Sales RN  Outcome: Ongoing  Note: Patient complains of headache this shift. Chronic in nature. Patient medicated per PRN orders. See MAR. Patient currently resting in bed. No distress noted.       Problem: Musculor/Skeletal Functional Status  Goal: Highest potential functional level  3/7/2021 0418 by Avi Perera RN  Outcome: Ongoing  3/6/2021 1714 by Abby Byrd RN  Outcome: Ongoing  Goal: Absence of falls  3/7/2021 0418 by Avi Perera RN  Outcome: Ongoing  3/6/2021 1714 by Abby Byrd RN  Outcome: Ongoing     Problem: Nutrition  Goal: Optimal nutrition therapy  3/7/2021 0418 by Avi Perera RN  Outcome: Ongoing  3/6/2021 1714 by Raven Conroy RN  Outcome: Ongoing     Problem: Skin Integrity:  Goal: Will show no infection signs and symptoms  Description: Will show no infection signs and symptoms  3/7/2021 0418 by Jackie Serra RN  Outcome: Ongoing  3/6/2021 1714 by Raven Conroy RN  Outcome: Ongoing  3/6/2021 1426 by Delmis Arambula RN  Outcome: Ongoing  Goal: Absence of new skin breakdown  Description: Absence of new skin breakdown  3/7/2021 0418 by Jackie Serra RN  Outcome: Ongoing  3/6/2021 1714 by Raven Conroy RN  Outcome: Ongoing  3/6/2021 1426 by Delmis Arambula RN  Outcome: Ongoing  Note: Skin assessment completed this shift. No new skin breakdown noted. See head to toe.

## 2021-03-07 NOTE — PROGRESS NOTES
Physical Medicine & Rehabilitation  Progress Note      Subjective:      80year-old with MS exacerbation. Patient is doing well today. R hand symptoms have improved today. She continues to have H/A however it is somewhat better using Tylenol / hydrocodone , . No new issues with sleep, appetite, bowels. occasional urinary retention with PVR <300, just completed ATB TX for UTI    ROS:  Denies fevers, chills, sweats. No chest pain, palpitations, lightheadedness. Denies coughing, wheezing or shortness of breath. Denies abdominal pain, nausea, diarrhea or constipation. No new areas of joint pain. Chronic daily headache. Denies new areas of numbness or weakness. Denies new anxiety or depression issues. No new skin problems. Rehabilitation:   Progressing in therapies.     PT:      Bed Mobility  Rolling: Stand by assistance  Supine to Sit: Stand by assistance  Scooting: Stand by assistance  Bed mobility  Scooting: Stand by assistance     Transfers:  Sit to Stand: Stand by assistance  Stand to sit: Stand by assistance  Bed to Chair: Stand by assistance(Fluctuates depending on HA and dizzyness.)  Ambulation 1  Surface: level tile  Device: Rolling Walker  Other Apparatus: Wheelchair follow  Assistance: Contact guard assistance;Stand by assistance  Quality of Gait: No LOB  Gait Deviations: Slow Emily;Decreased step length;Decreased step height  Distance: 200 ft  ft PM  Comments: BP at completion /66  /54 HR 99  Ambulation 2  Surface - 2: level tile  Device 2: Single point cane  Assistance 2: Contact guard assistance;Minimal assistance  Gait Deviations: Decreased step length;Decreased step height;Deviated path  Distance: 100 ft  Comments: decreased stability and straight path vs rolling walker   Stairs/Curb  Stairs?: Yes  Stairs  # Steps : 10  Stairs Height: 4\"(6\")  Rails: Right ascending(Right descending)  Device: No Device  Assistance: Contact guard assistance;Stand by assistance  Comment: Step-to pattern ascending right , 1 x step thru ascending 4 inch   FIMS:       BALANCE Posture: Fair  Sitting - Static: Good  Sitting - Dynamic: Fair;+  Standing - Static: Fair(w/ RW)  Standing - Dynamic: Fair( with RW)  Comments: Seated EOB SBA, Standing with RW-CGA/min A       OT    Balance  Sitting Balance: Independent  Standing Balance: Contact guard assistance(SBA static, CGA dynamic)  Transfers  Stand Step Transfers: Contact guard assistance  Sit to stand: Stand by assistance  Stand to sit: Stand by assistance  Transfer Comments: w/RW; cues for hand placement   Standing Balance  Time: 5-6 min x 2, 2-4 min x 3 in am, 5-7 min x 2 in pm  Activity: adls with walker  Functional Mobility  Functional - Mobility Device: Rolling Walker  Activity: To/from bathroom  Assist Level: Contact guard assistance  Functional Mobility Comments: pt ambulated 20 feet to obtain set up with walker and CGA- ed re carry clothing on walker, ambulated toilet to sink, bathroom to edge of bed. Toilet Transfers  Toilet - Technique: Ambulating  Equipment Used: Grab bars  Toilet Transfer: Contact guard assistance  ADL  Feeding: Modified independent (dentures, cuts meat but with extra time and is difficult)  Grooming: Modified independent (seated in w/c at sink)  UE Bathing: Setup  LE Bathing: Setup;Stand by assistance  UE Dressing: Setup  LE Dressing: Setup;Stand by assistance(underpants, pants, teds, slip on shoes, assist with teds and SBA stand to don pants)  Toileting: Stand by assistance  Additional Comments: pt ambulated to obtain set up with walker and CGA, pt completed groom and bathe at sink, dressed. pt declined shower due to headache.   Instrumental ADL's  Instrumental ADLs: Yes  Meal Prep  Meal Prep Level: Walker  Meal Prep Level of Assistance: Stand by assistance(verbal cues)  Meal Preparation: post demo, pt practiced access refridgerator, obtain plate of food, transport across kitchen and to table x 2, errors with walker safety Invalid input(s): CKTOTAL;3  FASTING LIPID PANEL:  Lab Results   Component Value Date    CHOL 235 (H) 01/04/2012    HDL 46 05/21/2020    TRIG 119 01/04/2012     LIVER PROFILE: No results for input(s): AST, ALT, ALB, BILIDIR, BILITOT, ALKPHOS in the last 72 hours. Current Medications:   Current Facility-Administered Medications: docusate sodium (COLACE) capsule 100 mg, 100 mg, Oral, BID  acetaminophen (TYLENOL) tablet 500 mg, 500 mg, Oral, Q6H PRN  calcium carbonate (TUMS) chewable tablet 500 mg, 500 mg, Oral, TID PRN  hydrALAZINE (APRESOLINE) tablet 50 mg, 50 mg, Oral, Q6H PRN  lisinopril (PRINIVIL;ZESTRIL) tablet 40 mg, 40 mg, Oral, Nightly  enoxaparin (LOVENOX) injection 30 mg, 30 mg, Subcutaneous, Daily  famotidine (PEPCID) tablet 10 mg, 10 mg, Oral, Daily  dextrose 5 % solution, 100 mL/hr, Intravenous, PRN  dextrose 50 % IV solution, 12.5 g, Intravenous, PRN  glucagon (rDNA) injection 1 mg, 1 mg, Intramuscular, PRN  glucose (GLUTOSE) 40 % oral gel 15 g, 15 g, Oral, PRN  amitriptyline (ELAVIL) tablet 100 mg, 100 mg, Oral, Nightly  atorvastatin (LIPITOR) tablet 20 mg, 20 mg, Oral, Nightly  benzonatate (TESSALON) capsule 100 mg, 100 mg, Oral, TID PRN  butalbital-APAP-caffeine -40 MG per capsule 1 capsule, 1 capsule, Oral, Q6H PRN  dextromethorphan (DELSYM) 30 MG/5ML extended release liquid 30 mg, 30 mg, Oral, 2 times per day  gabapentin (NEURONTIN) capsule 100 mg, 100 mg, Oral, BID  HYDROcodone-acetaminophen (NORCO) 5-325 MG per tablet 1 tablet, 1 tablet, Oral, Q6H PRN  polyethylene glycol (GLYCOLAX) packet 17 g, 17 g, Oral, Daily  senna (SENOKOT) tablet 17.2 mg, 2 tablet, Oral, Daily PRN  bisacodyl (DULCOLAX) suppository 10 mg, 10 mg, Rectal, Daily PRN  albuterol sulfate  (90 Base) MCG/ACT inhaler 2 puff, 2 puff, Inhalation, Q6H PRN      Impression/Plan:   Impaired ADLs, gait, and mobility due to:      1.  Acute MS exacerbation:  PT/OT for gait, mobility, strengthening, endurance, ADLs, and self care. To follow up with Dr. Sherman Bhatia in October - will contact about scheduling sooner since patient has been hospitalized with an exacerbation  2. EDINSON: monitoring BMP. Encourage hydration. 3. Urine retention: Nursing to perform timed voids while awake. Can intermittent straight cath prn if PVR > 350 ml. Performed x 1 on 3/5 , none since  4. Anemia: monitoring CBC  5. Leukocytosis: likely secondary to recent burst of IV steroid. Will monitor. No current signs or symptoms of infection. 6. HTN/Hyperlipidemia: on atorvastatin, hydralazine prn, lisinopril, spironolactone  7. Arthritis: has Norco prn pain  8. Chronic daily headaches: worse in the morning. on amitriptyline q hs. has Norco and Fioricet prn.   9. GERD: on famotidine daily  10. Hyperglycemia: Resolved-  related to 5 days IV steroid. No history diabetes. IM discontinued insulin sliding scale. 11. Cough: Improving. has albuterol prn, tessalon prn, dextromethorphan BID  12. Stress incontinence/asymptomatic bacteriuria: for outpatient follow up with Dr. Tung Murray. Completing Cipro 3/6. 13. Bowel Management: Miralax daily, senokot prn, dulcolax prn. 14. DVT Prophylaxis:  low molecular weight heparin, SCD's while in bed and FRANCISCO's during the day  15.  Internal medicine for medical management    Electronically signed by Kym Hensley MD on 3/7/2021 at 8:52 AM      .

## 2021-03-07 NOTE — PROGRESS NOTES
[address] [facility]    ACUTE REHABILITATION OCCUPATIONAL THERAPY  DAILY NOTE    Date: 3/7/21  Patient Name: Faye Pinto      Room: 9763/7417-76    MRN: [id number]   : 1934  (80 y.o.)  Gender: female   Referring Practitioner: Dr. Benito Paige  Diagnosis: Exacerbation of multiple sclerosis       Restrictions  Restrictions/Precautions: Fall Risk  Implants present? 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 Pt aslo has Left eye lid droop, per pt, its not Bell's palsy, but its from MS-facial muscle weakness. Required Braces or Orthoses  Spinal: (states she has a back brace, uses PRN)  Required Braces or Orthoses?: Yes(back brace-wears mainly in the morning. )    Subjective  Subjective: Because of my MS I never take a hot shower, it bothers the nerves. Comments: am pt attempt to urinate on toilet with little output, RN was notified. pm, pt notes 600 cc urinary retention when RN straight cathed, ed pt that sometimes people have to learn to straight cath themselves. pt notes new onset also of  incontinent urine during night x past 2 nights.                 Objective  Cognition  Overall Cognitive Status: WFL  Balance  Sitting Balance: Independent(dynamic sitting on tub bench)  Standing Balance: Stand by assistance  Bed mobility  Supine to Sit: Modified independent  Scooting: Modified independent  Transfers  Stand Step Transfers: Stand by assistance  Stand Pivot Transfers: Stand by assistance  Sit to stand: Supervision  Stand to sit: Supervision  Transfer Comments: w/RW; cues for hand placement   Standing Balance  Time: 6-7 min, 2-4 min x 4 in am, 2-5 min x 5 in pm  Activity: adls with walker- am self care, pm- advanced adls  Functional Mobility  Functional - Mobility Device: Rolling Walker  Activity: To/from bathroom; Retrieve items;Transport items  Assist Level: Stand by assistance  Functional Mobility Comments: pt ambulated 20 feet to obtain set up with walker and SBA- ambulated to toilet to shower then w/c then to sit edge of bed. additional ambulation in room carrying items in pm for advanced adls. Type of ROM/Therapeutic Exercise  Type of ROM/Therapeutic Exercise: AROM  Exercises  Shoulder Flexion: BUE 5 stretch x 2 in supine              Light Housekeeping  Light Housekeeping Level: [employer] Level of Assistance: Stand by assistance  Light Housekeeping: ambulated to remove soiled pad from bed, to bathroom to place in hamper , to bed to position pad and pillows. ADL  Feeding: Modified independent (dentures)  Grooming: Modified independent (seated at sink in w/c)  UE Bathing: Setup  LE Bathing: Setup;Stand by assistance  UE Dressing: Setup  LE Dressing: Setup;Supervision  Toileting: Supervision  Additional Comments: pt ambulated to obtain set up with walker and SBA, pt showered , washed her hair and dressed. pt completed groom at sink (pt wearing dentures already donned this am, used mouthwash and combed hair. )     Meal Prep  Meal Prep Level: Walker  Meal Prep Level of Assistance: Stand by assistance  Meal Preparation: ambulated to obtain washcloth from cupboard, to bathroom sink to wet it, to table in room to wipe it, to bathroom to place in hamper and wash hands. Light Housekeeping  Light Housekeeping Level: [employer] Level of Assistance: Stand by assistance  Light Housekeeping: ambulated to remove soiled pad from bed, to bathroom to place in hamper , to bed to position pad and pillows. Assessment     Activity Tolerance: Patient Tolerated treatment well;Patient limited by fatigue;Patient limited by pain  Activity Tolerance: pt with headache (chronic)  but agreed to shower this am, extra rests needed.   pt max fatigued this pm, needing sitting breaks due to limited stand tolerance, good motivation and participation               03/07/21 1129 03/07/21 1619   OT Individual Minutes   Time In [id number]   Time Out 9350 0845   [id number]       Patient Education:  Patient Goals   Patient goals : Regain independence and return home with A from family as needed. Learner:patient  Method: demonstration and explanation       Outcome: acknowledged understanding of  and demonstrated understanding   OT Education  Patient Education: ed pt re neurogenic bladder with MS, pt was unaware current retention issues could be related. Plan  Plan  Times per week: 900/7  Times per day: Twice a day  Current Treatment Recommendations: Self-Care / ADL, Home Management Training, Strengthening, Balance Training, Functional Mobility Training, Endurance Training, Pain Management, Safety Education & Training, Patient/Caregiver Education & Training, Equipment Evaluation, Education, & procurement  Plan Comment: Due to medical status and fatigue concerns she will be seen for 900 minutes of OT/PT/ST combined over 7 days  Patient Goals   Patient goals : Regain independence and return home with A from family as needed. Short term goals  Time Frame for Short term goals: One Week  Short term goal 1: Pt will complete bathing/UBD tasks with set-up A and Good safety. Short term goal 2: Pt will complete LB dressing including socks/shoes with SBA and Good safety using AE if needed. Short term goal 3: Pt will consistently complete toilet transfer and toileting with supervision and Good safety using least restrictive device. Short term goal 4: Pt will V/D good understanding of fall prevention strategies as well as EC/WS techniques during functional tasks. Short term goal 5: Pt will actively participate in 30 minutes of therapeutic exercise/functional activities to promote increased independence with self-care and mobility.   Long term goals  Time Frame for Long term goals : By Discharge  Long term goal 1: Pt will complete BADL's with Mod I and Good safety using AE as needed. Long term goal 2: Pt will complete functional transfers with Mod I and Good safety using least restrictive device. Long term goal 3: Pt will complete simple meal prep/light housekeeping tasks with Mod I and Good safety using least restrictive device.        Electronically signed by Penelope Barraza OT on 3/7/21 at 4:28 PM EST

## 2021-03-07 NOTE — PROGRESS NOTES
Notified Dr. Ulysses Mead of consult via perfect serve for urology. Dr. Ulysses Mead called back. Per t.o. Dr. Ulysses Mead, place continuous francisco and obtain an ua/c&s with placement.

## 2021-03-07 NOTE — PROGRESS NOTES
Right;Left; Anterior;Posterior  Pain Descriptors: Aching  Pain Frequency: Intermittent  Non-Pharmaceutical Pain Intervention(s): Ambulation/Increased Activity(as tolerated )     Bed Mobility:   Bed Mobility  Rolling: Stand by assistance  Supine to Sit: Stand by assistance    Transfers:  Sit to Stand: Stand by assistance  Stand to sit: Stand by assistance      Ambulation 1  Surface: level tile  Device: Rolling Walker  Assistance: Contact guard assistance;Stand by assistance  Quality of Gait: 25% thoracic /cervical flexion   Gait Deviations: Slow Emily  Distance: 200 ft AM and PM  Comments: no change lightheaded intensity mild fatigue   Ambulation 2  Surface - 2: level tile  Device 2: Single point cane(right UE )  Assistance 2: Contact guard assistance;Minimal assistance(left HHA)  Gait Deviations: Decreased step length;Decreased step height;Deviated path  Distance: 50 ft  Comments: pt reports is primary technique ambulating in community   Ambulation 3  Surface - 3: level tile  Device 3: Cane Single point cane(right UE )  Assistance 3: Contact guard assistance;Minimal assistance  Quality of Gait 3: wavers to left 25% , pt demonstrated left lateral curvature upper trunk   Gait Deviations: Deviated path;Decreased step height;Decreased step length; Slow Emily  Distance: 50 ft and 100 ft  Comments: pt reports ambulation in home enviroment SC as able   Stairs/Curb  Stairs?: Yes  Stairs  # Steps : 5(x 2 1 sitting rest break)  Stairs Height: 4\"(6\")  Rails: Right ascending(Right descending)  Device: Single pt cane  Assistance: Contact guard assistance  Comment: Step-to pattern ascending right descending left SC positioned on step ascending to      FIMS:      BALANCE Posture: Fair  Sitting - Static: Good  Sitting - Dynamic: Fair;+  Standing - Static: Fair(w/ RW)  Standing - Dynamic: Fair( with RW)  Comments: Seated EOB standing with RW    EXERCISES    Other exercises?: Yes  Other exercises 1: Seated B LE exercises #2 and lt green x15 reps     Other exercises 2: advanced gait training ambulation without AD mod assist (truknk flexion and left lateral flexion )  Other exercises 3: nustep 3 minutes L2  Other exercises 4: dynamic balance challenge standing no UE support right rotation and lateral trunk flexion   Other exercises 5: standing no UE support right lateral reach(pt reports low back fatigue vs pain )  Other Activities  Comment: daughter present PM , pt educated risks of ambulation without AD and tactile cues at walls vs ambulation with AD rolling walker vs SC , daughter voices understanding         Activity Tolerance: Patient limited by endurance, Treatment limited secondary to medical complications (free text), Patient limited by fatigue  Activity Tolerance: Low BP, dizziness and lightheadness, headaches, \"difficulties focusing\"  PT Equipment Recommendations  Equipment Needed: No  Other: Patient has RW and single point cane at home. Patient Education  New Education Provided:    Learner:patient  Method: explanation       Outcome: needs reinforcement     Current Treatment Recommendations: Strengthening, Home Exercise Program, Safety Education & Training, Balance Training, Endurance Training, Patient/Caregiver Education & Training, Equipment Evaluation, Education, & procurement, Functional Mobility Training, Transfer Training, Gait Training, Stair training    Conditions Requiring Skilled Therapeutic Intervention  Body structures, Functions, Activity limitations: Decreased strength;Decreased safe awareness;Decreased balance; Increased pain;Decreased functional mobility ; Decreased endurance  Treatment Diagnosis: impaired mobility due to weakness and debilitation  Prognosis: Good  Decision Making: Medium Complexity  History: Admitted due to exacerbation of MS  Clinical Presentation: Motivated and pleasant.   Barriers to Learning: [place]  REQUIRES PT FOLLOW UP: Yes  Discharge Recommendations: Patient would benefit from continued therapy after discharge;Home with assist PRN    Goals  Short term goals  Time Frame for Short term goals: 5 to 6 days. Short term goal 1: pt to tolerate 1/2 to 45 minutes of therapuetic exercise and activity, four sessions per day. Short term goal 2: pt to demonstrate good technique for balance activities, strengthening exercises and energy conservation techniques  Short term goal 3: pt to demonstrate independent rolling in bed for position change  Short term goal 4: pt to demonstrate transfers supine <> sit w/ supervision  Short term goal 5: pt to demonstrate transfers sit <> stand and bed <> chair using wheeled walker w/ SBA  Short term goal 6: pt to demonstrate good sitting dynamic balance and fair + or better standing balance using wheeled walker  Short term goal 7: pt to demonstrate gait 200 ft using wheeled walker w/ SBA  Short term goal 8: pt to advance to and demonstrate ability to ascend/ descend 4-6\" step using rail and st cane w/ min x 1  Long term goals  Time Frame for Long term goals : By DC  Long term goal 1: pt able to perform transfers independently with or without appropriate device. Long term goal 2: pt able to ambulate with appropriate device safely distance of 150 to 200 ft, level surface, for 2MWT. Long term goal 3: Assess if pt able to ambulate safely without device for 10 to 20 ft. (at baseline at times pt able to walk shor distance without a device). Long term goal 4: pt able to go up and down 5 to 8 steps with 1 HR/st cane, supervsion level. Long term goal 5: improve Tinetti balance score to atleast 22/28 to reduce fall risk.     Electronically signed by Grabiel Isaacs PTA on 3/7/21 at 3:35 PM EST     03/07/21 1158 03/07/21 1534   PT Individual Minutes   Time In 7963 [id number]   Time Out 5790 3404   Minutes 48 39   PT Concurrent Minutes   Time In [email or web address]  --    Time Out 3153  --    Minutes 5  --

## 2021-03-07 NOTE — PROGRESS NOTES
[facility] Internal Medicine    CONSULTATION / HISTORY AND PHYSICAL EXAMINATION            Date:   3/7/2021  Patient name:  Bceky Stoner  Date of admission:  3/2/2021  3:19 PM  MRN:   [id number]  Account:  [de-identified]  YOB: 1934  PCP:    Daniel Delgado MD  Room:   [facility]  Code Status:    Full Code    Physician Requesting Consult: Bud Butts MD    Reason for Consult:  medical management    Chief Complaint:     No chief complaint on file. History Obtained From:     Patient medical record nursing staff    History of Present Illness:   Patient admitted to acute rehab for rehabilitation  Internal medicine, consulted for medical management  Patient with multiple medical problems which include history of MS, since 1983, chronic headache, GERD, hypertension  She has chronic weakness on left side of her body, leg more than arm  She was admitted originally at [facility] with worsening dizziness, ataxia, CT head was done, which was negative for acute intracranial pathology  Patient was treated with IV steroids for 5 days, her symptoms improved, during her hospital stay, she was found to have urinary tract infection, urine culture was positive for Enterococcus faecalis, which was sensitive to ciprofloxacin, treated with antibiotics  Patient mention she has chronic headache for years, which got better with Norco  Her blood pressure was running high, she was started on Aldactone  Today morning, during therapy patient was found to be dizzy, her blood pressure was running low  No other complaints      3/7/21    · Has urinary retention . · Neurogenic bladder . · PVResidual was 650 ml . Straight cath . · Consult urology . ·  1. Has vaginal candidiasis . 2. Had antibiotics for uti . 3. Will start Diflucon  4.          Past Medical History:     Past Medical History:   Diagnosis Date    Acute cystitis without hematuria 10/1/2017    Arthritis  Chronic daily headache     GERD (gastroesophageal reflux disease)     Hyperlipidemia     Hypertension     Moderate malnutrition ([facility] [address] 3/2/2019    Multiple sclerosis ([facility] [address]     Neuropathy     Pneumonia 2017    states had double pneumonia    Vitamin D deficiency         Past Surgical History:     Past Surgical History:   Procedure Laterality Date    CATARACT REMOVAL WITH IMPLANT Right 11/6/2014    [facility]    CATARACT REMOVAL WITH IMPLANT Left 12/2/2014    [facility]    CERVICAL DISC SURGERY      CYSTOCELE REPAIR      HYSTERECTOMY      RECTOCELE REPAIR      SHOULDER ARTHROPLASTY Right 04/20/2017    SHOULDER SURGERY Right 2017        Medications Prior to Admission:     Prior to Admission medications    Medication Sig Start Date End Date Taking? Authorizing Provider   amitriptyline (ELAVIL) 100 MG tablet Take 1 tablet by mouth nightly 1/25/21  Yes Pauly Woodson MD   predniSONE (DELTASONE) 10 MG tablet Take 1 tablet by mouth daily 3/2/21 3/2/22  Heavenly Thompson MD   traMADol (ULTRAM) 50 MG tablet Take 1 tablet by mouth every 6 hours as needed for Pain for up to 30 days.  2/22/21 3/24/21  Pauly Woodson MD   fluticasone [facility]) 50 MCG/ACT nasal spray 1 spray by Each Nostril route daily 2/19/21   Fanta Chandler PA-C   omeprazole (PRILOSEC) 20 MG delayed release capsule Take 1 capsule by mouth daily 2/8/21   Pauly Woodson MD   celecoxib (CELEBREX) 200 MG capsule Take 1 capsule by mouth daily 2/1/21 2/1/22  Pauly Woodson MD   Elastic Bandages & Supports (LUMBAR BACK BRACE/SUPPORT PAD) MISC 1 each by Does not apply route daily 1/26/21   Pauly Woodson MD   Elastic Bandages & Supports (LUMBAR BACK BRACE/SUPPORT PAD) MISC 1 each by Does not apply route daily as needed (back pain) 1/25/21   Pauly Woodson MD   lisinopril (PRINIVIL;ZESTRIL) 5 MG tablet Take 1 tablet by mouth daily 1/25/21   Pauly Woodson MD   simvastatin (ZOCOR) 20 MG tablet TAKE 1 TABLET BY MOUTH 135 lb (61.2 kg)   SpO2 97%   BMI 27.27 kg/m²   Temp (24hrs), Av.4 °F (36.9 °C), Min:97.6 °F (36.4 °C), Max:99.2 °F (37.3 °C)    Recent Labs     21  1555 21  0615   POCGLU 135* 153* 119*       Intake/Output Summary (Last 24 hours) at 3/7/2021 1516  Last data filed at 3/7/2021 1227  Gross per 24 hour   Intake 800 ml   Output 2827 ml   Net - ml       General Appearance:  alert, well appearing, and in no acute distress  Mental status: oriented to person, place, and time with normal affect  Head:  normocephalic, atraumatic. Eye: no icterus, redness, pupils equal and reactive, extraocular eye movements intact, conjunctiva clear  Ear: normal external ear, no discharge, hearing intact  Nose:  no drainage noted  Mouth: mucous membranes moist  Neck: supple, no carotid bruits, thyroid not palpable  Lungs: Bilateral equal air entry, clear to ausculation, no wheezing, rales or rhonchi, normal effort  Cardiovascular: normal rate, regular rhythm, no murmur, gallop, rub. Abdomen: Soft, nontender, nondistended, normal bowel sounds, no hepatomegaly or splenomegaly  Neurologic: Patient has weakness on left side of body, power in left upper and lower extremity is 5/5 skin: No gross lesions, rashes, bruising or bleeding on exposed skin area  Extremities:  peripheral pulses palpable, no pedal edema or calf pain with palpation  Psych: Investigations:      Laboratory Testing:  No results found for this or any previous visit (from the past 24 hour(s)).         Consultations:   IP CONSULT TO INTERNAL MEDICINE  IP CONSULT TO DIETITIAN  IP CONSULT TO SOCIAL WORK  IP CONSULT TO INTERNAL MEDICINE  Assessment :      Primary Problem  <principal problem not specified>    Active Hospital Problems    Diagnosis Date Noted    EDINSON (acute kidney injury) ([facility] [N17.9] 2021    Multiple sclerosis exacerbation ([facility] Marvene Hash 2021    Chronic headache [R51.9, G89.29] 2021    Essential hypertension Sandra Castro 11/07/2018    Hyperlipidemia [E78.5] 11/07/2018    Ataxia [R27.0] 10/01/2017    Multiple sclerosis ([facility] Claudia Reynaga 10/01/2017    Abnormal gait [R26.9] 10/07/2014   Active Problems:    Ataxia    Multiple sclerosis (HCC)    Essential hypertension    Hyperlipidemia    Abnormal gait    Multiple sclerosis exacerbation (HCC)    Chronic headache    EDINSON (acute kidney injury) ([facility]  Resolved Problems:    * No resolved hospital problems. *        Plan:     1. Admitted with multiple sclerosis exacerbation, treated with IV steroids feeling better  2. Urinary tract infection, urine culture growing Enterococcus, sensitive to ciprofloxacin, currently on ciprofloxacin till 3/6  3. Hypertension, patient was started recently on Aldactone, readings of low blood pressures, will discontinue Aldactone  4. Chronic headache, patient is on Elavil, [place] as needed  5. On DVT proxy Lovenox  6. Acute kidney injury, much improved, last creatinine is 0.9    3/4   Patient blood pressure is doing okay  Patient told me she never diagnosed with diabetes, will discontinue point-of-care glucoses in the chest, sliding scale  Last HbA1c was 6.2  3/5   No new complaints, patient is smiling, feeling better      3/7/21    · Has urinary retention . · Neurogenic bladder . · PVResidual was 650 ml . Straight cath . · Consult urology . · Order ua with reflex 5. Has vaginal candidiasis . 6. Had antibiotics for uti . 7. Will start Diflucon  8. Harmeet Snider MD  3/7/2021  3:16 PM    Copy sent to Dr. Chidi James MD    Please note that this chart was generated using voice recognition [employer]apping (SAM)n dictation software. Although every effort was made to ensure the accuracy of this automated transcription, some errors in transcription may have occurred.

## 2021-03-08 LAB
ANION GAP SERPL CALCULATED.3IONS-SCNC: 11 MMOL/L (ref 9–17)
BUN BLDV-MCNC: 16 MG/DL (ref 8–23)
BUN/CREAT BLD: ABNORMAL (ref 9–20)
CALCIUM SERPL-MCNC: 9.2 MG/DL (ref 8.6–10.4)
CHLORIDE BLD-SCNC: 95 MMOL/L (ref 98–107)
CO2: 25 MMOL/L (ref 20–31)
CREAT SERPL-MCNC: 1.39 MG/DL (ref 0.5–0.9)
GFR AFRICAN AMERICAN: 44 ML/MIN
GFR NON-AFRICAN AMERICAN: 36 ML/MIN
GFR SERPL CREATININE-BSD FRML MDRD: ABNORMAL ML/MIN/{1.73_M2}
GFR SERPL CREATININE-BSD FRML MDRD: ABNORMAL ML/MIN/{1.73_M2}
GLUCOSE BLD-MCNC: 139 MG/DL (ref 70–99)
HCT VFR BLD CALC: 36.7 % (ref 36–46)
HEMOGLOBIN: 12 G/DL (ref 12–16)
MCH RBC QN AUTO: 30.8 PG (ref 26–34)
MCHC RBC AUTO-ENTMCNC: 32.7 G/DL (ref 31–37)
MCV RBC AUTO: 94.2 FL (ref 80–100)
NRBC AUTOMATED: ABNORMAL PER 100 WBC
PDW BLD-RTO: 14 % (ref 11.5–14.9)
PLATELET # BLD: 289 K/UL (ref 150–450)
PMV BLD AUTO: 7.9 FL (ref 6–12)
POTASSIUM SERPL-SCNC: 4.3 MMOL/L (ref 3.7–5.3)
RBC # BLD: 3.9 M/UL (ref 4–5.2)
SODIUM BLD-SCNC: 131 MMOL/L (ref 135–144)
WBC # BLD: 9.7 K/UL (ref 3.5–11)

## 2021-03-08 PROCEDURE — 6370000000 HC RX 637 (ALT 250 FOR IP): Performed by: INTERNAL MEDICINE

## 2021-03-08 PROCEDURE — 99232 SBSQ HOSP IP/OBS MODERATE 35: CPT | Performed by: INTERNAL MEDICINE

## 2021-03-08 PROCEDURE — 97110 THERAPEUTIC EXERCISES: CPT

## 2021-03-08 PROCEDURE — 1180000000 HC REHAB R&B

## 2021-03-08 PROCEDURE — 97530 THERAPEUTIC ACTIVITIES: CPT

## 2021-03-08 PROCEDURE — 97116 GAIT TRAINING THERAPY: CPT

## 2021-03-08 PROCEDURE — 80048 BASIC METABOLIC PNL TOTAL CA: CPT

## 2021-03-08 PROCEDURE — 6360000002 HC RX W HCPCS: Performed by: INTERNAL MEDICINE

## 2021-03-08 PROCEDURE — 36415 COLL VENOUS BLD VENIPUNCTURE: CPT

## 2021-03-08 PROCEDURE — 97535 SELF CARE MNGMENT TRAINING: CPT

## 2021-03-08 PROCEDURE — 6370000000 HC RX 637 (ALT 250 FOR IP): Performed by: PHYSICAL MEDICINE & REHABILITATION

## 2021-03-08 PROCEDURE — 85027 COMPLETE CBC AUTOMATED: CPT

## 2021-03-08 PROCEDURE — 99231 SBSQ HOSP IP/OBS SF/LOW 25: CPT | Performed by: STUDENT IN AN ORGANIZED HEALTH CARE EDUCATION/TRAINING PROGRAM

## 2021-03-08 RX ORDER — LISINOPRIL 20 MG/1
20 TABLET ORAL NIGHTLY
Status: DISCONTINUED | OUTPATIENT
Start: 2021-03-08 | End: 2021-03-09

## 2021-03-08 RX ADMIN — DOCUSATE SODIUM 100 MG: 100 CAPSULE, LIQUID FILLED ORAL at 08:05

## 2021-03-08 RX ADMIN — ANTACID TABLETS 500 MG: 500 TABLET, CHEWABLE ORAL at 21:36

## 2021-03-08 RX ADMIN — SENNOSIDES 17.2 MG: 8.6 TABLET, FILM COATED ORAL at 21:25

## 2021-03-08 RX ADMIN — FAMOTIDINE 10 MG: 20 TABLET ORAL at 08:05

## 2021-03-08 RX ADMIN — Medication 30 MG: at 21:25

## 2021-03-08 RX ADMIN — Medication 30 MG: at 08:07

## 2021-03-08 RX ADMIN — FLUCONAZOLE 100 MG: 100 TABLET ORAL at 08:07

## 2021-03-08 RX ADMIN — DOCUSATE SODIUM 100 MG: 100 CAPSULE, LIQUID FILLED ORAL at 21:25

## 2021-03-08 RX ADMIN — METRONIDAZOLE 100 MG: 500 TABLET ORAL at 08:05

## 2021-03-08 RX ADMIN — METRONIDAZOLE 100 MG: 500 TABLET ORAL at 21:25

## 2021-03-08 RX ADMIN — LISINOPRIL 20 MG: 20 TABLET ORAL at 21:29

## 2021-03-08 RX ADMIN — AMITRIPTYLINE HYDROCHLORIDE 100 MG: 50 TABLET, FILM COATED ORAL at 21:25

## 2021-03-08 RX ADMIN — ENOXAPARIN SODIUM 30 MG: 30 INJECTION SUBCUTANEOUS at 08:05

## 2021-03-08 RX ADMIN — ATORVASTATIN CALCIUM 20 MG: 10 TABLET, FILM COATED ORAL at 21:25

## 2021-03-08 RX ADMIN — HYDROCODONE BITARTRATE AND ACETAMINOPHEN 1 TABLET: 5; 325 TABLET ORAL at 06:17

## 2021-03-08 RX ADMIN — POLYETHYLENE GLYCOL 3350 17 G: 17 POWDER, FOR SOLUTION ORAL at 08:05

## 2021-03-08 ASSESSMENT — PAIN DESCRIPTION - PAIN TYPE
TYPE: ACUTE PAIN
TYPE: ACUTE PAIN

## 2021-03-08 ASSESSMENT — PAIN DESCRIPTION - ORIENTATION
ORIENTATION: LEFT
ORIENTATION: POSTERIOR;ANTERIOR

## 2021-03-08 ASSESSMENT — PAIN SCALES - GENERAL
PAINLEVEL_OUTOF10: 5
PAINLEVEL_OUTOF10: 0
PAINLEVEL_OUTOF10: 0
PAINLEVEL_OUTOF10: 8

## 2021-03-08 ASSESSMENT — PAIN DESCRIPTION - ONSET: ONSET: ON-GOING

## 2021-03-08 ASSESSMENT — PAIN DESCRIPTION - LOCATION: LOCATION: HEAD

## 2021-03-08 NOTE — PROGRESS NOTES
Physical Medicine & Rehabilitation  Progress Note      Subjective:      Maribell Perry is a 80 y.o. female with MS exacerbation. She reports feeling \"off\" today. She notes a headache, which is consistent with her chronic daily headaches. She notes dizziness and diplopia, which are also chronic for her. She has known weakness on the left side. She states that she was falling asleep during therapy this morning, although she feels like she slept okay overnight. She is not able to pinpoint any specific or new changes in how she is feeling. ROS:  Denies fevers, chills, sweats. No chest pain, palpitations, lightheadedness. Denies coughing, wheezing or shortness of breath. Denies abdominal pain, nausea, diarrhea or constipation. No new areas of joint pain. Denies new areas of numbness or weakness. Denies new anxiety or depression issues. No new skin problems. Rehabilitation:   Progressing in therapies. PT:  Restrictions/Precautions: Fall Risk  Implants present? 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 Pt aslo has Left eye lid droop, per pt, its not Bell's palsy, but its from MS-facial muscle weakness.    Required Braces or Orthoses  Spinal: (states she has a back brace, uses PRN)   Transfers  Sit to Stand: Stand by assistance  Stand to sit: Contact guard assistance, Minimal Assistance  Bed to Chair: Stand by assistance(Fluctuates depending on HA and dizzyness.)  Stand Pivot Transfers: Stand by assistance(with and without RW)  Comment: Cues for hand placement and technique  Ambulation 1  Surface: level tile  Device: Rolling Walker  Other Apparatus: Wheelchair follow  Assistance: Contact guard assistance, Minimal assistance  Quality of Gait: No LOB  Gait Deviations: Slow Emily, Decreased step length, Decreased step height  Distance: 47 (AM), 157ft (PM)  Comments: Patient fatigues easily, WC follow for safety due to low blood pressure and dizziness during from a previous session. Cues given to look ahead and have good posture. Max cues given to keep patient alert. Transfers  Sit to Stand: Stand by assistance  Stand to sit: Contact guard assistance, Minimal Assistance  Bed to Chair: Stand by assistance(Fluctuates depending on HA and dizzyness.)  Stand Pivot Transfers: Stand by assistance(with and without RW)  Comment: Cues for hand placement and technique  Ambulation  Ambulation?: Yes  Ambulation 1  Surface: level tile  Device: Rolling Walker  Other Apparatus: Wheelchair follow  Assistance: Contact guard assistance, Minimal assistance  Quality of Gait: No LOB  Gait Deviations: Slow Emily, Decreased step length, Decreased step height  Distance: 47 (AM), 157ft (PM)  Comments: Patient fatigues easily, WC follow for safety due to low blood pressure and dizziness during from a previous session. Cues given to look ahead and have good posture. Max cues given to keep patient alert. Surface: level tile  Ambulation 1  Surface: level tile  Device: Rolling Walker  Other Apparatus: Wheelchair follow  Assistance: Contact guard assistance, Minimal assistance  Quality of Gait: No LOB  Gait Deviations: Slow Emily, Decreased step length, Decreased step height  Distance: 47 (AM), 157ft (PM)  Comments: Patient fatigues easily, WC follow for safety due to low blood pressure and dizziness during from a previous session. Cues given to look ahead and have good posture. Max cues given to keep patient alert.     OT:  ADL  Feeding: Modified independent (dentures)  Grooming: Setup  UE Bathing: Setup  LE Bathing: None  UE Dressing: Stand by assistance(pt retireved clothing from closet at [facility] level )  LE Dressing: Minimal assistance, Stand by assistance, Setup, Increased time to complete(pt requires assist c threading due to catheter, difficult)  Toileting: None  Additional Comments: Pt too fatigued for initial set up for bathing this date. Instrumental ADL's  Instrumental ADLs: Yes     Balance  Sitting Balance: Independent(pt sitting EOB in good safety upon arrival )  Standing Balance: Stand by assistance   Standing Balance  Time: AM: ~1 min, 1-2 min, <1 min PM: 2-3 min   Activity: AM: functional transfers, clothing retrieval and LB care tasks. PM: therapeutic task at tabletop; increased fatigue in PM, LEs giving out with prolonged standing. Comment: Pt reports lightneadedness at times, vitals checked and withing normal limits. No SOB or LOB noted   Functional Mobility  Functional - Mobility Device: Rolling Walker  Activity: Other, Retrieve items, Transport items  Assist Level: Stand by assistance  Functional Mobility Comments: pt ambulated 20 feet to obtain set up with walker and SBA- ambulated to toilet to shower then w/c then to sit edge of bed. additional ambulation in room carrying items in pm for advanced adls. Bed mobility  Rolling to Left: Stand by assistance  Rolling to Right: Stand by assistance  Supine to Sit: Modified independent  Sit to Supine: Stand by assistance  Scooting: Stand by assistance  Comment: CGA for safety, patient complaint of dizziness and feeling \"woozy\"  Transfers  Stand Step Transfers: Stand by assistance  Stand Pivot Transfers: Stand by assistance  Sit to stand: Stand by assistance  Stand to sit: Stand by assistance  Transfer Comments: w/RW; cues for hand placement    Toilet Transfers  Toilet - Technique: Ambulating  Equipment Used: Grab bars  Toilet Transfer: Contact guard assistance  Toilet Transfers Comments: Pivot to/from wheelchair in PM session. Shower Transfers  Shower - Transfer From: Wheelchair  Shower - Transfer Type: To and From  Shower - Transfer To:  Transfer tub bench  Shower - Technique: Stand pivot  Shower Transfers: Stand by assistance  Shower Transfers Comments: Used GB for support as with no wheezing, rales, or rhonchi. Respirations WNL and unlabored. CV: Regular rate and rhythm. No murmurs, rubs, or gallops. ABD: Soft, non-distended, BS+ and equal.  NEURO: Alert. Speech fluent with no aphasia or dysarthria noted. Left facial droop. Symmetrical shoulder shrug. Midline tongue protrusion. Sensation to light touch intact. MSK: Functional ROM in all limbs. Muscle tone and bulk are normal bilaterally. Strength 4+/5 in right upper and lower limbs. Strength 4/5 in left upper and lower limbs. LIMBS: No edema in bilateral lower limbs. SKIN: Warm and dry with good turgor. PSYCH: Mood WNL. Affect WNL. Appropriately interactive. Diagnostics:     CBC:   Recent Labs     03/08/21  1302   WBC 9.7   RBC 3.90*   HGB 12.0   HCT 36.7   MCV 94.2   RDW 14.0        BMP:   Recent Labs     03/08/21  1302   *   K 4.3   CL 95*   CO2 25   BUN 16   CREATININE 1.39*   GLUCOSE 139*     BNP: No results for input(s): BNP in the last 72 hours. PT/INR: No results for input(s): PROTIME, INR in the last 72 hours. APTT: No results for input(s): APTT in the last 72 hours. CARDIAC ENZYMES: No results for input(s): CKMB, CKMBINDEX, TROPONINT in the last 72 hours. Invalid input(s): CKTOTAL;3  FASTING LIPID PANEL:  Lab Results   Component Value Date    CHOL 235 (H) 01/04/2012    HDL 46 05/21/2020    TRIG 119 01/04/2012     LIVER PROFILE: No results for input(s): AST, ALT, ALB, BILIDIR, BILITOT, ALKPHOS in the last 72 hours. Impression/Plan:   Impaired ADLs, gait, and mobility due to:    1. Acute MS exacerbation:  PT/OT for gait, mobility, strengthening, endurance, ADLs, and self care. To follow up with Dr. Alonso Mckinney in October - will contact about scheduling sooner since patient has been hospitalized with an exacerbation  2. EDINSON: monitoring BMP. Creatinine 1.39 on 3/8. Encourage hydration. Will discuss possible IV fluids with IM.    3. Urine retention: Nursing was performing timed voids while awake with intermittent straight cath prn if PVR > 350 ml. Performed x 1 on 3/5 and several times on 3/7. Urology consulted and recommended francisco placement with voiding trial on day prior to discharge with PVRs. Plan to notify urology of PVRs for any further recommendations. 4. Vaginal candidiasis:  IM started diflucan 3/7.  5. Anemia: Resolved. Monitoring CBC  6. Leukocytosis:  Resolved. Likely secondary to recent burst of IV steroid. Will monitor. No current signs or symptoms of infection.   7. HTN/Hyperlipidemia: on atorvastatin, hydralazine prn, lisinopril  8. Arthritis: has Norco prn pain  9. Chronic daily headaches: worse in the morning. on amitriptyline q hs. has Norco and Fioricet prn. 10. GERD: on famotidine daily  11. Hyperglycemia: Resolved-  related to 5 days IV steroid. No history diabetes. IM discontinued insulin sliding scale. 12. Cough: Improving. has albuterol prn, tessalon prn, dextromethorphan BID  13. Stress incontinence/asymptomatic bacteriuria: for outpatient follow up with Dr. Lenny Graham. Completed Cipro 3/6.   14. Bowel Management: Miralax daily, senokot prn, dulcolax prn. 15. DVT Prophylaxis:  low molecular weight heparin, SCD's while in bed and FRANCISCO's during the day  16.  Internal medicine for medical management      Electronically signed by Irena Osborn MD on 3/8/2021 at 3:19 PM

## 2021-03-08 NOTE — PATIENT CARE CONFERENCE
[address]    ACUTE REHABILITATION  TEAM CONFERENCE NOTE  Date: 3/9/21  Patient Name: Virginia Kumar       Room: 5675/6534-22  MRN: [id number]       : 1934  (80 y.o.)     Gender: female   Referring Practitioner: Dr Gerhardt Beery   Multiple sclerosis exacerbation [facility]) Tressa Conklin  Diagnosis: Exacerbation of multiple sclerosis     NURSING  Bladder  francisco  Bowel   Continent  Intervention    Both Bowel & Bladder Program     Wounds/Incisions/Ulcers: No skin issues identified  Medication Education Program: Patient able to manage medications and being educated by nursing  Pain: norco    Fall Risk:  Falling star program initiated    PHYSICAL THERAPY  Bed Mobility  Rolling: Stand by assistance  Supine to Sit: Stand by assistance  Sit to Supine: Stand by assistance  Scooting: Stand by assistance    Transfers:  Sit to Stand: Contact guard assistance(Increased fatigue today with dizziness.)  Stand to sit: Contact guard assistance  Bed to Chair: Stand by assistance(Fluctuates depending on HA and dizzyness.)    Ambulation 1  Surface: level tile  Device: Rolling Walker  Other Apparatus: Wheelchair follow  Assistance: Contact guard assistance;Minimal assistance  Quality of Gait: No LOB  Gait Deviations: Slow Emily;Decreased step length;Decreased step height  Distance: 47 (AM), 157ft (PM)  Comments: Patient fatigues easily, WC follow for safety due to low blood pressure and dizziness during from a previous session. Cues given to look ahead and have good posture. Max cues given to keep patient alert. # Steps : 10  Stairs Height: 4\"(6\")  Rails: Right ascending(Right descending)  Device: No Device  Assistance: Contact guard assistance  Comment: step-to pattern. Did not require cane on this date. Equipment Needed: No  Other: Patient has RW and single point cane at home. Pt with history of MS, is overall weak, L LE > R LE due  Left Hip tendonitis.   Pt needs assistance for functional mobility and is at fall risk at this time. Will conitnue POC to maximize her fucntion for safe return to home. Goals  Time Frame for Short term goals: 5 to 6 days. Short term goal 1: pt to tolerate 1/2 to 45 minutes of therapuetic exercise and activity, four sessions per day. Short term goal 2: pt to demonstrate good technique for balance activities, strengthening exercises and energy conservation techniques  Short term goal 3: pt to demonstrate independent rolling in bed for position change  Short term goal 4: pt to demonstrate transfers supine <> sit w/ supervision  Short term goal 5: pt to demonstrate transfers sit <> stand and bed <> chair using wheeled walker w/ SBA  Short term goal 6: pt to demonstrate good sitting dynamic balance and fair + or better standing balance using wheeled walker  Short term goal 7: pt to demonstrate gait 200 ft using wheeled walker w/ SBA  Short term goal 8: pt to advance to and demonstrate ability to ascend/ descend 4-6\" step using rail and st cane w/ min x 1    OCCUPATIONAL THERAPY  SELF CARE    Eating            Modified independent (dentures)   Oral Hygiene            Setup   Shower/Bathe Self             UE Bathing: Setup  LE Bathing: None (prior session - min A for foot level care)   Upper Body Dressing            Stand by assistance(pt retireved clothing from closet at RW level )   Lower Body Dressing            Putting On/Taking Off Footwear             Minimal assistance;Stand by assistance;Setup; Increased time to complete(pt requires assist c threading due to catheter, difficult)   Toilet Transfer             Did not occur during OT session    [address]            Did not occur during OT session     Bed mobility  Supine to Sit: Modified independent  Sit to Supine: Stand by assistance  Scooting: Modified independent      Balance  Sitting Balance: Independent(pt sitting EOB in good safety upon arrival )  Standing Balance: Stand by assistance  Standing Balance  Time: AM: ~1 min, 1-2 min, <1 min PM: 2-3 min   Activity: AM: functional transfers, clothing retrieval and LB care tasks. PM: therapeutic task at tabletop; increased fatigue in PM, LEs giving out with prolonged standing. Equipment Recommendations  Equipment Needed: (TBD)       Short term goals  Time Frame for Short term goals: One Week  Short term goal 1: Pt will complete bathing/UBD tasks with set-up A and Good safety. Short term goal 2: Pt will complete LB dressing including socks/shoes with SBA and Good safety using AE if needed. Short term goal 3: Pt will consistently complete toilet transfer and toileting with supervision and Good safety using least restrictive device. Short term goal 4: Pt will V/D good understanding of fall prevention strategies as well as EC/WS techniques during functional tasks. Short term goal 5: Pt will actively participate in 30 minutes of therapeutic exercise/functional activities to promote increased independence with self-care and mobility. SPEECH THERAPY      NUTRITION  Weight: 135 lb (61.2 kg) / Body mass index is 27.27 kg/m². Diet: General- po intake appears variable; 25-75% of most meals. Ensure HP added once daily. Will monitor intake. Please see nutrition note for details. SOCIAL WORK ASSESSMENT  Assessment: Pt anticipates returning home. Her daughter visits regularly and assists with light house keeping and laundry.    Pre-Admission Status:  Lives With: Alone  Type of Home: House  Home Layout: Two level, Able to Live on Main level with bedroom/bathroom, Laundry in basement  Home Access: Stairs to enter with rails  Entrance Stairs - Number of Steps: 3 steps w/ right rail  Entrance Stairs - Rails: Right  Bathroom Shower/Tub: Tub/Shower unit, Shower chair without back, Curtain  Bathroom Toilet: Standard  Bathroom Equipment: Grab bars in shower, Shower chair, Grab bars around toilet, Hand-held shower  Bathroom Accessibility: Accessible(walker will not fit int he bathroom.)  Home Equipment: Rolling walker, Reacher, Cane, Alert [employer] Help From: Family  ADL Assistance: Independent(dtr will assist w/ showers when she is not feeling well, otherwise she is independent)  Homemaking Assistance: Needs assistance(dtr assists cleaning, grocery shopping  and laundry, pt does her own microwave or simple meals; dtr brings in some meals)  Homemaking Responsibilities: Yes(simple meals)  Ambulation Assistance: Independent(uses back brace and wheeled walker mainly in the morning and then progresses  to cane or walking around furniture in the later day, uses cane outside)  Transfer Assistance: Independent  Active : No  Patient's  Info: Pt's daughter  Mode of Transportation: Family, Car  Occupation: Retired  IADL Comments: sleeps in a flat bed  Additional Comments: son and dtr both work day shift; states that she calls her dtr every morning Daughter does shopping, cleaning and laundry. Pt does her own meals and dishes. Family Education: Family Education not required    Percentage Risk for Readmission: Low 0 - 18%   Risk of Unplanned Readmission:      9 %    Critical Items: None     Problem / Barrier Intervention / Plan  Results   Altered Tolerance and Safety during self care tasks  Conditioning and training in use of devices and modified care strategies to maximize ADL status      Impaired function Strengthening, endurance activiites, fucntional mobility training.                                               Total Self Care Score    Total Mobility Score  Admission Score:  30      Admission Score:  34  Goal:  42/42         Goal:  79/90   `  Discharge Plan   Estimated Discharge Date: 3/12/21  Home evaluation needed?  No home evaluation need indicated for patient at this time  Overnight or Day Pass: No  Factors facilitating achievement of predicted outcomes: Family support, Motivated, Cooperative and Pleasant  Barriers to the achievement of predicted outcomes: Pain, Decreased endurance, Upper extremity weakness, Lower extremity weakness, Skin Care and Medication managment    Functional Goals at discharge:  Predicted Outcome: Home alonePATIENT'S LEVEL OF ASSISTANCE: Modified independence  Discharge therapy goals:  PT: Long term goals  Time Frame for Long term goals : By DC  Long term goal 1: pt able to perform transfers independently with or without appropriate device. Long term goal 2: pt able to ambulate with appropriate device safely distance of 150 to 200 ft, level surface, for 2MWT. Long term goal 3: Assess if pt able to ambulate safely without device for 10 to 20 ft. (at baseline at times pt able to walk shor distance without a device). Long term goal 4: pt able to go up and down 5 to 8 steps with 1 HR/st cane, supervsion level. Long term goal 5: improve Tinetti balance score to atleast 22/28 to reduce fall risk. OT:Long term goals  Time Frame for Long term goals : By Discharge  Long term goal 1: Pt will complete BADL's with Mod I and Good safety using AE as needed. Long term goal 2: Pt will complete functional transfers with Mod I and Good safety using least restrictive device. Long term goal 3: Pt will complete simple meal prep/light housekeeping tasks with Mod I and Good safety using least restrictive device. ST:     Team Members Present at Conference:  :  Sergio Gonzalez [facility]  Occupational Therapist: Wes Pettit OT   [address] PT  Speech Therapist:  N/A  Nurse: Princess Faby RN    Dietary/Nutrition: Johnnie VASQUEZ  Pastoral Care: Mercedez Palacio  CMG:  Nu Gonzales RN    I approve the established interdisciplinary plan of care as documented within the medical record of [facility].     Shlomo Gamble MD

## 2021-03-08 NOTE — PROGRESS NOTES
[address] [facility]    ACUTE REHABILITATION OCCUPATIONAL THERAPY  DAILY NOTE    Date: 3/8/21  Patient Name: Omayra Pinedaal      Room: 2505/7963-08    MRN: [id number]   : 1934  (80 y.o.)  Gender: female   Referring Practitioner: Dr. Vicente Kinsey  Diagnosis: Exacerbation of multiple sclerosis       Restrictions  Restrictions/Precautions: Fall Risk  Implants present? 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 Pt aslo has Left eye lid droop, per pt, its not Bell's palsy, but its from MS-facial muscle weakness. Required Braces or Orthoses  Spinal: (states she has a back brace, uses PRN)  Required Braces or Orthoses?: Yes(back brace-wears mainly in the morning. )    Subjective  Subjective: \"I'm just so tired today\"   Comments: Pt fatigued with mild confusion however agreeable to therapy session. Pain Level: 5  Pain Location: Head;Hip  Pain Orientation: Left  Restrictions/Precautions: Fall Risk  Overall Orientation Status: Within Functional Limits     Pain Assessment  Pain Assessment: 0-10  Pain Level: 5  Pain Type: Acute pain  Pain Location: Head, Hip  Pain Orientation: Left    Objective  Cognition  Overall Cognitive Status: WFL  Perception  Overall Perceptual Status: WFL  Balance  Sitting Balance: Independent(pt sitting EOB in good safety upon arrival )  Standing Balance: Stand by assistance  Bed mobility  Sit to Supine: Stand by assistance  Transfers  Sit to stand: Stand by assistance  Stand to sit: Stand by assistance  Standing Balance  Time: AM: ~1 min, 1-2 min, <1 min PM: 2-3 min   Activity: AM: functional transfers, clothing retrieval and LB care tasks. PM: therapeutic task at tabletop; increased fatigue in PM, LEs giving out with prolonged standing.    Functional Mobility  Functional - Mobility Device: Rolling Walker  Activity: Other;Retrieve items;Transport items  Assist Level: Stand by assistance     Type of ROM/Therapeutic Exercise  Type of ROM/Therapeutic Exercise: Free weights(2#, x10 reps as tolerated. )  Comment: Pt engaged in UB ex's to improve overall strength and endurance for functional tasks, 2# increased challenge for patient  Exercises  Scapular Protraction: x  Scapular Retraction: x  Shoulder Flexion: x  Shoulder Extension: x  Elbow Flexion: x  Elbow Extension: x  Other: folding activity with B wrist weights; .5# donned on RUE, 1# donned on LUE to address overall strength and endurance for functional tasks. Task completed at seated level for safety due to increased fatigue this date. Pt tolerated with increased time and rest breaks. ADL  Grooming: Setup  UE Bathing: Setup  LE Bathing: None  UE Dressing: Stand by assistance(pt retireved clothing from closet at RW level )  LE Dressing: Minimal assistance;Stand by assistance;Setup; Increased time to complete(pt requires assist c threading due to catheter, difficulty this date with orientation and technique)  Toileting: None  Additional Comments: Pt too fatigued for initial set up for bathing this date. Assessment  Performance deficits / Impairments: Decreased functional mobility ; Decreased ADL status; Decreased endurance;Decreased balance;Decreased high-level IADLs;Decreased fine motor control  Prognosis: Good  Discharge Recommendations: Home with assist PRN  Activity Tolerance: Patient Tolerated treatment well;Patient limited by fatigue;Patient limited by pain  Safety Devices in place: Yes  Equipment Recommendations  Equipment Needed: (TBD)        Plan  Plan  Times per week: 900/7  Times per day: Twice a day  Current Treatment Recommendations: Self-Care / ADL, Home Management Training, Strengthening, Balance Training, Functional Mobility Training, Endurance Training, Pain Management, Safety Education & Training, Patient/Caregiver Education & Training, Equipment Evaluation, Education, & procurement  Plan Comment: Due to medical status and fatigue concerns she will be seen for 900 minutes of OT/PT/ST combined over 7 days  Patient Goals   Patient goals : Regain independence and return home with A from family as needed. Short term goals  Time Frame for Short term goals: One Week  Short term goal 1: Pt will complete bathing/UBD tasks with set-up A and Good safety. Short term goal 2: Pt will complete LB dressing including socks/shoes with SBA and Good safety using AE if needed. Short term goal 3: Pt will consistently complete toilet transfer and toileting with supervision and Good safety using least restrictive device. Short term goal 4: Pt will V/D good understanding of fall prevention strategies as well as EC/WS techniques during functional tasks. Short term goal 5: Pt will actively participate in 30 minutes of therapeutic exercise/functional activities to promote increased independence with self-care and mobility. Long term goals  Time Frame for Long term goals : By Discharge  Long term goal 1: Pt will complete BADL's with Mod I and Good safety using AE as needed. Long term goal 2: Pt will complete functional transfers with Mod I and Good safety using least restrictive device. Long term goal 3: Pt will complete simple meal prep/light housekeeping tasks with Mod I and Good safety using least restrictive device.         03/08/21 0935 03/08/21 1427   OT Individual Minutes   Time In 5064 [id number]   Time Out 0933 1254   Minutes 55 21     Electronically signed by SANDI Cummings on 3/8/21 at 2:35 PM EST

## 2021-03-08 NOTE — PLAN OF CARE
Problem: Falls - Risk of:  Goal: Will remain free from falls  Description: Will remain free from falls  Outcome: Met This Shift  No falls or injuries sustained at this time. No attempts to get out of bed without nursing assistance. Call light within reach and pt. uses appropriately for assistance. Siderails up x 2. Nonskid footwear remains on. Bed in low and locked position. Hourly nursing rounds made. Pt. Alert and oriented, aware of limitations, and exhibits good safety judgement. Pt. uses assistive devices appropriately. Pt. understands individual fall risk factors. Pt. reminded to use call light with each nurse/patient interaction. Bed alarm  remains engaged throughout the shift as a precaution. Problem: Pain:  Goal: Pain level will decrease  Description: Pain level will decrease  Outcome: Met This Shift   Pain assessment and reassessment completed so far this shift. Pt. able to rest after the use of pain medication. Patient denies any pain so far this shift  Will continue to monitor. Problem: Skin Integrity:  Goal: Absence of new skin breakdown  Description: Absence of new skin breakdown  Outcome: Met This Shift   Skin assessment completed this shift. Nutrition and Hydration status assessed with adequate intake. José Miguel Score as charted. Chair cushion in use for when pt is up to chair. Bilateral heels remain elevated on pillows throughout the shift. Patient able to reposition self for comfort and to prevent breakdown. Patient verbalizes understanding of pressure ulcer prevention measures. Skin integrity maintained. No new skin breakdown noted. Skin to high risk pressure areas including coccyx and heels are clear. Helen care provided as needed throughout the shift. Aloe Vesta Moisture Barrier ointment applied to buttocks as a preventative measure.

## 2021-03-08 NOTE — PROGRESS NOTES
Physical Therapy    [address] [facility]   Acute Rehabilitation Physical Therapy Progress Note    Date: 3/8/21  Patient Name: Cony Oneal       Room: 2756/5615-33  MRN: [id number]   Account: [de-identified]   : 1934  (80 y.o.) Gender: female     Referring Practitioner: Dr. Eric Montoya  Diagnosis: Exacerbation of multiple sclerosis  Past Medical History:  has a past medical history of Acute cystitis without hematuria, Arthritis, Chronic daily headache, GERD (gastroesophageal reflux disease), Hyperlipidemia, Hypertension, Moderate malnutrition ([address] Multiple sclerosis ([address] Neuropathy, Pneumonia, and Vitamin D deficiency. Past Surgical History:   has a past surgical history that includes Cystocele repair; Rectocele repair; Hysterectomy; Cervical disc surgery; Cataract removal with implant (Right, 2014); Cataract removal with implant (Left, 2014); Total shoulder arthroplasty (Right, 2017); and shoulder surgery (Right, ). Restrictions/Precautions  Restrictions/Precautions: Fall Risk  Required Braces or Orthoses?: Yes(back brace-wears mainly in the morning. )  Implants present? 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 Pt aslo has Left eye lid droop, per pt, its not Bell's palsy, but its from MS-facial muscle weakness. Subjective: Patient took pain meds before AM treatment session. She states, \"I don't feel normal, I am seeing double vision today. Normally when I have my glasses on it goes away, but my glasses aren't helping. \"  Comments: Patient seems more fatigued today during AM and PM treatments. Noted possibly some confusion this morning. Dr. Nolasco Staff and RN informed. Patient got her blood drawn before PM session. Vital Signs  Pulse: 91  BP: (!) 92/52  BP Location: Right upper arm  Patient Currently in Pain: Denies  Pain Assessment: 0-10  Pain Level: 0     Oxygen Therapy  SpO2: 95 %          Bed Mobility:   Rolling: Stand by assistance  Supine to Sit: Stand by assistance  Sit to Supine: Stand by assistance  Scooting: Stand by assistance      Transfers:  Sit to Stand: Contact guard assistance(Increased fatigue today with dizziness.)  Stand to sit: Contact guard assistance  Bed to Chair: Stand by assistance(Fluctuates depending on HA and dizzyness.)              Ambulation 1  Surface: level tile  Device: Rolling Walker  Other Apparatus: Wheelchair follow  Assistance: Contact guard assistance;Minimal assistance  Quality of Gait: No LOB  Gait Deviations: Slow Emily;Decreased step length;Decreased step height  Distance: 47 (AM), 157ft (PM)  Comments: Patient fatigues easily, WC follow for safety due to low blood pressure and dizziness during from a previous session. Cues given to look ahead and have good posture. Max cues given to keep patient alert. Stairs/Curb  Stairs?: Yes  Stairs  # Steps : 10  Stairs Height: 4\"(6\")  Rails: Right ascending(Right descending)  Device: No Device  Assistance: Contact guard assistance  Comment: step-to pattern. Did not require cane on this date.            BALANCE Posture: Fair  Sitting - Static: Good  Sitting - Dynamic: Fair;+  Standing - Static: Fair(w/ RW)  Standing - Dynamic: Fair( with RW)  Comments: Seated EOB SBA, Standing with RW-CGA/min A    EXERCISES    Other exercises?: Yes  Other exercises 1: Seated B LE exercises #1.5  x15 reps     Other exercises 2: Seated B LE exercises with Lime Green Band  x15 reps   Other exercises 3: Toilet transfer           Activity Tolerance: Patient limited by endurance, Treatment limited secondary to medical complications (free text), Patient limited by fatigue  Activity Tolerance: Low BP, dizziness and lightheadness, headaches, \"difficulties focusing\"  PT Equipment Recommendations  Equipment Needed: No  Other: Patient has RW and single point cane at home. Current Treatment Recommendations: Strengthening, Home Exercise Program, Safety Education & Training, Balance Training, Endurance Training, Patient/Caregiver Education & Training, Equipment Evaluation, Education, & procurement, Functional Mobility Training, Transfer Training, Gait Training, Stair training    Conditions Requiring Skilled Therapeutic Intervention  Body structures, Functions, Activity limitations: Decreased strength;Decreased safe awareness;Decreased balance; Increased pain;Decreased functional mobility ; Decreased endurance  Treatment Diagnosis: impaired mobility due to weakness and debilitation  Prognosis: Good  Decision Making: Medium Complexity  History: Admitted due to exacerbation of MS  Clinical Presentation: Motivated and pleasant. Barriers to Learning: [place]  REQUIRES PT FOLLOW UP: Yes  Discharge Recommendations: Patient would benefit from continued therapy after discharge;Home with assist PRN    Goals  Short term goals  Time Frame for Short term goals: 5 to 6 days. Short term goal 1: pt to tolerate 1/2 to 45 minutes of therapuetic exercise and activity, four sessions per day.   Short term goal 2: pt to demonstrate good technique for balance activities, strengthening exercises and energy conservation techniques  Short term goal 3: pt to demonstrate independent rolling in bed for position change  Short term goal 4: pt to demonstrate transfers supine <> sit w/ supervision  Short term goal 5: pt to demonstrate transfers sit <> stand and bed <> chair using wheeled walker w/ SBA  Short term goal 6: pt to demonstrate good sitting dynamic balance and fair + or better standing balance using wheeled walker  Short term goal 7: pt to demonstrate gait 200 ft using wheeled walker w/ SBA  Short term goal 8: pt to advance to and demonstrate ability to ascend/ descend 4-6\" step using rail and st cane w/ min x 1  Long term goals  Time Frame for Long term goals : By DC  Long term goal 1: pt able to perform transfers independently with or without appropriate device. Long term goal 2: pt able to ambulate with appropriate device safely distance of 150 to 200 ft, level surface, for 2MWT. Long term goal 3: Assess if pt able to ambulate safely without device for 10 to 20 ft. (at baseline at times pt able to walk shor distance without a device). Long term goal 4: pt able to go up and down 5 to 8 steps with 1 HR/st cane, supervsion level. Long term goal 5: improve Tinetti balance score to atleast 22/28 to reduce fall risk. 03/08/21 1032 03/08/21 1254   PT Individual Minutes   Time In [address]   Time Out 1116 1325   Minutes 44 31       Electronically signed by LANCE Mendes  The above documentation completed by Student Physical Therapist Assistant was provided under the direct line of sight by supervision. Documentation was reviewed and accepted by supervising Clinical Instructor [facility], [facility].

## 2021-03-08 NOTE — CONSULTS
Department of Urology  Urology Consult Note    Patient:  Asad Eisenberg  MRN: [id number]  YOB: 1934    Reason for Consult:  Exacerbation of multiple sclerosis and urinary retention  Requesting Physician:  Nolna Asher COMPLAINT:    No chief complaint on file. History Obtained From:   patient, RN    HISTORY OF PRESENT ILLNESS:    The patient is a 80 y.o. female with significant past medical history of multiple sclerosis who presents with exacerbation. She was recently treated with a urinary tract infection. She was found to be retaining a large postvoid residual.  A Vanessa catheter was placed yesterday. She is feeling better today. Up until recently, patient denies any difficulty urinating. She states that she has been emptying her bladder prior to this admission without much difficulty.     Past Medical History:        Diagnosis Date    Acute cystitis without hematuria 10/1/2017    Arthritis     Chronic daily headache     GERD (gastroesophageal reflux disease)     Hyperlipidemia     Hypertension     Moderate malnutrition ([address] 3/2/2019    [facility] sclerosis [facility]     Neuropathy     Pneumonia 2017    states had double pneumonia    Vitamin D deficiency      Past Surgical History:        Procedure Laterality Date    CATARACT REM[facility]AL [facility]PLANT [facility] 11/6/2014    [facility]    CATARACT REMOVAL WITH IMPLANT Left 12/2/2014    [facility]    CERVICAL DISC SURGERY      CYSTOCELE REPAIR      HYSTERECTOMY      RECTOCELE REPAIR      SHOULDER ARTHROPLASTY Right 04/20/2017    SHOULDER SURGERY Right 2017     Current Medications:   Current Facility-Administered Medications: docusate sodium (COLACE) capsule 100 mg, 100 mg, Oral, BID  fluconazole (DIFLUCAN) tablet 100 mg, 100 mg, Oral, Daily  acetaminophen (TYLENOL) tablet 500 mg, 500 mg, Oral, Q6H PRN  calcium carbonate (TUMS) chewable tablet 500 mg, 500 mg, Oral, TID PRN  hydrALAZINE (APRESOLINE) tablet 50 mg, 50 mg, Oral, Q6H PRN  lisinopril (PRINIVIL;ZESTRIL) tablet 40 mg, 40 mg, Oral, Nightly  enoxaparin (LOVENOX) injection 30 mg, 30 mg, Subcutaneous, Daily  famotidine (PEPCID) tablet 10 mg, 10 mg, Oral, Daily  dextrose 5 % solution, 100 mL/hr, Intravenous, PRN  dextrose 50 % IV solution, 12.5 g, Intravenous, PRN  glucagon (rDNA) injection 1 mg, 1 mg, Intramuscular, PRN  glucose (GLUTOSE) 40 % oral gel 15 g, 15 g, Oral, PRN  amitriptyline (ELAVIL) tablet 100 mg, 100 mg, Oral, Nightly  atorvastatin (LIPITOR) tablet 20 mg, 20 mg, Oral, Nightly  benzonatate (TESSALON) capsule 100 mg, 100 mg, Oral, TID PRN  butalbital-APAP-caffeine -40 MG per capsule 1 capsule, 1 capsule, Oral, Q6H PRN  dextromethorphan (DELSYM) 30 MG/5ML extended release liquid 30 mg, 30 mg, Oral, 2 times per day  gabapentin (NEURONTIN) capsule 100 mg, 100 mg, Oral, BID  HYDROcodone-acetaminophen (NORCO) 5-325 MG per tablet 1 tablet, 1 tablet, Oral, Q6H PRN  polyethylene glycol (GLYCOLAX) packet 17 g, 17 g, Oral, Daily  senna (SENOKOT) tablet 17.2 mg, 2 tablet, Oral, Daily PRN  bisacodyl (DULCOLAX) suppository 10 mg, 10 mg, Rectal, Daily PRN  albuterol sulfate  (90 Base) MCG/ACT inhaler 2 puff, 2 puff, Inhalation, Q6H PRN    Allergies: ALG@    Social History:   Social History     Socioeconomic History    Marital status:       Spouse name: Not on file    Number of children: 3    Years of education: Not on file    Highest education level: Not on file   Occupational History    Not on file   Social Needs    Financial resource strain: Not hard at all   [employer] insecurity     Worry: Never true     Inability: Never true   [employer] needs     Medical: No     Non-medical: No   Tobacco Use    Smoking status: Never Smoker    Smokeless tobacco: Never Used   Substance and Sexual Activity    Alcohol use: No    Drug use: No    Sexual activity: Not on file   Lifestyle    Physical activity     Days per week: Not on file     Minutes per session: Not on file    Stress: Not on file   Relationships    Social connections     Talks on phone: Not on file     Gets together: Not on file     Attends [employer] service: Not on file     Active member of club or organization: Not on file     Attends meetings of clubs or organizations: Not on file     Relationship status: Not on file    Intimate partner violence     Fear of current or ex partner: Not on file     Emotionally abused: Not on file     Physically abused: Not on file     Forced sexual activity: Not on file   Other Topics Concern    Not on file   Social History Narrative    Not on file       Family History:   No family history on file. Review of Systems:  Constitutional: Negative for fever, chills and activity change. Eyes: Negative for pain, redness and visual disturbance. Respiratory: Negative for cough, shortness of breath and wheezing. Cardiovascular: Negative for chest pain and leg swelling. Gastrointestinal: Negative for nausea, vomiting and abdominal pain. Endocrine: Negative for polydipsia and polyphagia. Genitourinary: Negative for dysuria, frequency, hematuria, flank pain and difficulty urinating. Musculoskeletal: Negative for myalgias, back pain and joint swelling. Skin: Negative for color change, rash and wound. Allergic/Immunologic: Negative for environmental allergies and food allergies. Neurological: Negative for dizziness, tremors and numbness. Hematological: Negative for adenopathy. Does not bruise/bleed easily. Psychiatric/Behavioral: Negative for confusion and dysphoric mood. The patient is not nervous/anxious.        Patient Vitals for the past 24 hrs:   BP Temp Temp src Pulse Resp SpO2   03/08/21 0617 127/69 98 °F (36.7 °C) Oral 92 19 97 %   03/07/21 2105 128/61 98.5 °F (36.9 °C) Oral 91     03/07/21 1836 (!) 117/47 99.7 °F (37.6 °C) Oral 95 16 97 %       Intake/Output Summary (Last 24 hours) at 3/8/2021 1247  Last data filed at 3/8/2021 3520  Gross per 24 hour   Intake 250 ml   Output 1175 ml   Net -925 ml       No results for input(s): WBC, HGB, HCT, MCV, PLT in the last 72 hours. No results for input(s): NA, K, CL, CO2, PHOS, BUN, CREATININE in the last 72 hours. Invalid input(s): CA    Recent Labs     03/07/21  [address] 6.5   SPECGRAV 1.007   LEUKOCYTESUR NEGATIVE   UROBILINOGEN Normal   BILIRUBINUR NEGATIVE       Additional Lab/culture results:    Physical Exam:  Constitutional: Patient in no acute distress; Neuro: alert and oriented to person place and time. Psych: Mood and affect normal.  Skin: Normal  Lungs: Respiratory effort normal  Cardiovascular:  Normal peripheral pulses  Abdomen: Soft, non-tender, non-distended with no CVA, flank pain, hepatosplenomegaly or hernia. Kidneys normal.  Bladder non-tender and not distended. Lymphatics: no palpable lymphadenopathy  Vanessa catheter draining clear yellow urine    Interval Imaging Findings:   No results found. Impression:    Patient Active Problem List   Diagnosis    Ataxia    Multiple sclerosis ([address]    Essential hypertension    Hyperlipidemia    Atelectasis    Debility    Abnormal gait    Actinic keratosis    Enthesopathy of hip region    Generalized osteoarthritis    Idiopathic peripheral neuropathy    Vitamin D deficiency    Primary localized osteoarthrosis of the hip, left    Primary osteoarthritis of left hip    Acquired spondylolisthesis    Chronic midline low back pain with left-sided sciatica    Spinal stenosis of lumbar region with neurogenic claudication    Presence of right artificial shoulder joint    Multiple sclerosis exacerbation (HCC)    Diarrhea    Chronic headache    Facial asymmetry    Food poisoning    Asymptomatic bacteriuria    Hyperglycemia    EDINSON (acute kidney injury) ([address]       Plan: Maintain Vanessa catheter for now.   Once a plan is made for discharge, the catheter can be removed 1 day prior with postvoid residuals measured by bladder scan. I have asked the nurse to make a note of this and to notify us when she is ready for discharge and we can give further recommendations based on her postvoid residuals. Thank you very much for involving us with the care of this very pleasant patient.     Electronically signed by Nubia Quispe MD on 3/8/2021 at 12:47 PM

## 2021-03-08 NOTE — PROGRESS NOTES
Formerly [facility] Internal Medicine    CONSULTATION / HISTORY AND PHYSICAL EXAMINATION            Date:   3/8/2021  Patient name:  Kay Gitelman  Date of admission:  3/2/2021  3:19 PM  MRN:   [id number]  Account:  [de-identified]  YOB: 1934  PCP:    Elin Lane MD  Room:   [facility]  Code Status:    Full Code    Physician Requesting Consult: Capri Loredo MD    Reason for Consult:  medical management    Chief Complaint:     No chief complaint on file.       History Obtained From:     Patient medical record nursing staff    History of Present Illness:   Patient admitted to acute rehab for rehabilitation  Internal medicine, consulted for medical management  Patient with multiple medical problems which include history of MS, since 1983, chronic headache, GERD, hypertension  She has chronic weakness on left side of her body, leg more than arm  She was admitted originally at [facility] with worsening dizziness, ataxia, CT head was done, which was negative for acute intracranial pathology  Patient was treated with IV steroids for 5 days, her symptoms improved, during her hospital stay, she was found to have urinary tract infection, urine culture was positive for Enterococcus faecalis, which was sensitive to ciprofloxacin, treated with antibiotics  Patient mention she has chronic headache for years, which got better with Norco  Her blood pressure was running high, she was started on Aldactone  Today morning, during therapy patient was found to be dizzy, her blood pressure was running low  No other complaints    Past Medical History:     Past Medical History:   Diagnosis Date    Acute cystitis without hematuria 10/1/2017    Arthritis     Chronic daily headache     GERD (gastroesophageal reflux disease)     Hyperlipidemia     Hypertension     Moderate malnutrition ([address] 3/2/2019    Multiple sclerosis ([address]     Neuropathy     Pneumonia 2017    states had double pneumonia    Vitamin D deficiency         Past Surgical History:     Past Surgical History:   Procedure Laterality Date    CATARACT REMOVAL WITH IMPLANT Right 11/6/2014    [facility]    CATARACT REMOVAL WITH IMPLANT Left 12/2/2014    [facility]    CERVICAL DISC SURGERY      CYSTOCELE REPAIR      HYSTERECTOMY      RECTOCELE REPAIR      SHOULDER ARTHROPLASTY Right 04/20/2017    SHOULDER SURGERY Right 2017        Medications Prior to Admission:     Prior to Admission medications    Medication Sig Start Date End Date Taking? Authorizing Provider   amitriptyline (ELAVIL) 100 MG tablet Take 1 tablet by mouth nightly 1/25/21  Yes Pauly Cherry MD   predniSONE (DELTASONE) 10 MG tablet Take 1 tablet by mouth daily 3/2/21 3/2/22  Anirudh Gutierrez MD   traMADol (ULTRAM) 50 MG tablet Take 1 tablet by mouth every 6 hours as needed for Pain for up to 30 days.  2/22/21 3/24/21  Pauly Cherry MD   fluticasone [facility]) 50 MCG/ACT nasal spray 1 spray by Each Nostril route daily 2/19/21   Fanta Chandler PA-C   omeprazole (PRILOSEC) 20 MG delayed release capsule Take 1 capsule by mouth daily 2/8/21   Pauly Cherry MD   celecoxib (CELEBREX) 200 MG capsule Take 1 capsule by mouth daily 2/1/21 2/1/22  Pauly Cherry MD   Elastic Bandages & Supports (LUMBAR BACK BRACE/SUPPORT PAD) MISC 1 each by Does not apply route daily 1/26/21   Pualy Cherry MD   Elastic Bandages & Supports (LUMBAR BACK BRACE/SUPPORT PAD) MISC 1 each by Does not apply route daily as needed (back pain) 1/25/21   Pauly Cherry MD   lisinopril (PRINIVIL;ZESTRIL) 5 MG tablet Take 1 tablet by mouth daily 1/25/21   Pauly Cherry MD   simvastatin (ZOCOR) 20 MG tablet TAKE 1 TABLET BY MOUTH EVERY NIGHT 1/20/21   Pauly Cherry MD   solifenacin (VESICARE) 5 MG tablet Take 1 tablet by mouth daily 12/4/20 12/4/21  Pauly Cherry MD   albuterol sulfate HFA (VENTOLIN HFA) 108 (90 Base) MCG/ACT inhaler Inhale 2 puffs into the lungs every 4 hours as needed for Wheezing 2/10/20 2/9/21  Pauly Cherry MD   HYDROcodone-acetaminophen (NORCO) 5-325 MG per tablet Take 1 tablet by mouth 3 times daily. Historical Provider, MD        Allergies:     Bactrim [sulfamethoxazole-trimethoprim], Lactose intolerance (gi), Pcn [penicillins], Lidoderm [lidocaine], and Macrobid [nitrofurantoin]    Social History:     Tobacco:    reports that she has never smoked. She has never used smokeless tobacco.  Alcohol:      reports no history of alcohol use. Drug Use:  reports no history of drug use. Family History:     No family history on file. Review of Systems:     Positive and Negative as described in HPI. CONSTITUTIONAL:  negative for fevers, chills, sweats, fatigue, weight loss  HEENT:  negative for vision, hearing changes, runny nose, throat pain  RESPIRATORY:  negative for shortness of breath, cough, congestion, wheezing. CARDIOVASCULAR:  negative for chest pain, palpitations. GASTROINTESTINAL:  negative for nausea, vomiting, diarrhea, constipation, change in bowel habits, abdominal pain   GENITOURINARY:  negative for difficulty of urination, burning with urination, frequency   INTEGUMENT:  negative for rash, skin lesions, easy bruising   HEMATOLOGIC/LYMPHATIC:  negative for swelling/edema   ALLERGIC/IMMUNOLOGIC:  negative for urticaria , itching  ENDOCRINE:  negative increase in drinking, increase in urination, hot or cold intolerance  MUSCULOSKELETAL:  negative joint pains, muscle aches, swelling of joints  NEUROLOGICAL: Dizziness, weakness on left side of body  BEHAVIOR/PSYCH:      Physical Exam:     BP (!) 92/52   Pulse 91   Temp 98 °F (36.7 °C) (Oral)   Resp 19   Ht 4' 11\" (1.499 m)   Wt 135 lb (61.2 kg)   SpO2 95%   BMI 27.27 kg/m²   Temp (24hrs), Av.7 °F (37.1 °C), Min:98 °F (36.7 °C), Max:99.7 °F (37.6 °C)    No results for input(s): POCGLU in the last 72 hours.     Intake/Output Summary (Last 24 hours) at 3/8/2021 1650  Last data filed at 3/8/2021 0653  Gross per 24 hour   Intake 250 ml   Output 1175 ml   Net -925 ml       General Appearance:  alert, well appearing, and in no acute distress  Mental status: oriented to person, place, and time with normal affect  Head:  normocephalic, atraumatic. Eye: no icterus, redness, pupils equal and reactive, extraocular eye movements intact, conjunctiva clear  Ear: normal external ear, no discharge, hearing intact  Nose:  no drainage noted  Mouth: mucous membranes moist  Neck: supple, no carotid bruits, thyroid not palpable  Lungs: Bilateral equal air entry, clear to ausculation, no wheezing, rales or rhonchi, normal effort  Cardiovascular: normal rate, regular rhythm, no murmur, gallop, rub. Abdomen: Soft, nontender, nondistended, normal bowel sounds, no hepatomegaly or splenomegaly  Neurologic: Patient has weakness on left side of body, power in left upper and lower extremity is 5/5 skin: No gross lesions, rashes, bruising or bleeding on exposed skin area  Extremities:  peripheral pulses palpable, no pedal edema or calf pain with palpation  Psych:      Investigations:      Laboratory Testing:  Recent Results (from the past 24 hour(s))   Basic Metabolic Panel w/ Reflex to MG    Collection Time: 03/08/21  1:02 PM   Result Value Ref Range    Glucose 139 (H) 70 - 99 mg/dL    BUN 16 8 - 23 mg/dL    CREATININE 1.39 (H) 0.50 - 0.90 mg/dL    Bun/Cre Ratio NOT REPORTED 9 - 20    Calcium 9.2 8.6 - 10.4 mg/dL    Sodium 131 (L) 135 - 144 mmol/L    Potassium 4.3 3.7 - 5.3 mmol/L    Chloride 95 (L) 98 - 107 mmol/L    CO2 25 20 - 31 mmol/L    Anion Gap 11 9 - 17 mmol/L    GFR Non-African American 36 (L) >60 mL/min    GFR  44 (L) >60 mL/min    GFR Comment          GFR Staging NOT REPORTED    CBC    Collection Time: 03/08/21  1:02 PM   Result Value Ref Range    WBC 9.7 3.5 - 11.0 k/uL    RBC 3.90 (L) 4.0 - 5.2 m/uL    Hemoglobin 12.0 12.0 - 16.0 g/dL    Hematocrit 36.7 36 - 46 %    MCV 94.2 80 - 100 fL    MCH 30.8 26 - 34 pg    MCHC 32.7 31 - 37 g/dL    RDW 14.0 11.5 - 14.9 %    Platelets 607 506 - 065 k/uL    MPV 7.9 6.0 - 12.0 fL    NRBC Automated NOT REPORTED per 100 WBC           Consultations:   IP CONSULT TO INTERNAL MEDICINE  IP CONSULT TO DIETITIAN  IP CONSULT TO SOCIAL WORK  IP CONSULT TO INTERNAL MEDICINE  IP CONSULT TO UROLOGY  Assessment :      Primary Problem  <principal problem not specified>    Active Hospital Problems    Diagnosis Date Noted    EDINSON (acute kidney injury) ([facility] [N17.9] 03/03/2021    Multiple sclerosis exacerbation ([facility] [facility] Renate Ropjaiden 02/25/2021    Chronic headache [R51.9, G89.29] 02/25/2021    Essential hypertension [I10] 11/07/2018    Hyperlipidemia [E78.5] 11/07/2018    Ataxia [R27.0] 10/01/2017    Multiple sclerosis ([facility] [facility] Renate Edwards 10/01/2017    Abnormal gait [R26.9] 10/07/2014   Active Problems:    Ataxia    Multiple sclerosis (HCC)    Essential hypertension    Hyperlipidemia    Abnormal gait    Multiple sclerosis exacerbation (HCC)    Chronic headache    EDINSON (acute kidney injury) ([facility]  Resolved Problems:    * No resolved hospital problems. *        Plan:     1. Admitted with multiple sclerosis exacerbation, treated with IV steroids feeling better  2. Urinary tract infection, urine culture growing Enterococcus, sensitive to ciprofloxacin, currently on ciprofloxacin till 3/6  3. Hypertension, patient was started recently on Aldactone, readings of low blood pressures, will discontinue Aldactone  4. Chronic headache, patient is on Elavil, Royal as needed  5. On DVT proxy Lovenox  6.  Acute kidney injury, much improved, last creatinine is 0.9    3/4   Patient blood pressure is doing okay  Patient told me she never diagnosed with diabetes, will discontinue point-of-care glucoses in the chest, sliding scale  Last HbA1c was 6.2  3/5   No new complaints, patient is smiling, feeling better    3/8   Patient feels Dizzy   Has readings of low BP   REDUCING LISINOPRIL to 20 mg   Creatinine is slightly , elevated . Nury Gonzáles MD  3/8/2021  4:50 PM    Copy sent to Dr. Maria Eugenia Garcia MD    Please note that this chart was generated using voice recognition Dragon dictation software. Although every effort was made to ensure the accuracy of this automated transcription, some errors in transcription may have occurred.

## 2021-03-09 PROCEDURE — 99231 SBSQ HOSP IP/OBS SF/LOW 25: CPT | Performed by: STUDENT IN AN ORGANIZED HEALTH CARE EDUCATION/TRAINING PROGRAM

## 2021-03-09 PROCEDURE — 97116 GAIT TRAINING THERAPY: CPT

## 2021-03-09 PROCEDURE — 97535 SELF CARE MNGMENT TRAINING: CPT

## 2021-03-09 PROCEDURE — 97110 THERAPEUTIC EXERCISES: CPT

## 2021-03-09 PROCEDURE — 6370000000 HC RX 637 (ALT 250 FOR IP): Performed by: INTERNAL MEDICINE

## 2021-03-09 PROCEDURE — 99232 SBSQ HOSP IP/OBS MODERATE 35: CPT | Performed by: INTERNAL MEDICINE

## 2021-03-09 PROCEDURE — 6370000000 HC RX 637 (ALT 250 FOR IP): Performed by: PHYSICAL MEDICINE & REHABILITATION

## 2021-03-09 PROCEDURE — 6360000002 HC RX W HCPCS: Performed by: INTERNAL MEDICINE

## 2021-03-09 PROCEDURE — 1180000000 HC REHAB R&B

## 2021-03-09 PROCEDURE — 97530 THERAPEUTIC ACTIVITIES: CPT

## 2021-03-09 RX ADMIN — POLYETHYLENE GLYCOL 3350 17 G: 17 POWDER, FOR SOLUTION ORAL at 07:41

## 2021-03-09 RX ADMIN — ENOXAPARIN SODIUM 30 MG: 30 INJECTION SUBCUTANEOUS at 07:44

## 2021-03-09 RX ADMIN — METRONIDAZOLE 100 MG: 500 TABLET ORAL at 20:10

## 2021-03-09 RX ADMIN — DOCUSATE SODIUM 100 MG: 100 CAPSULE, LIQUID FILLED ORAL at 20:11

## 2021-03-09 RX ADMIN — AMITRIPTYLINE HYDROCHLORIDE 100 MG: 50 TABLET, FILM COATED ORAL at 20:11

## 2021-03-09 RX ADMIN — HYDROCODONE BITARTRATE AND ACETAMINOPHEN 1 TABLET: 5; 325 TABLET ORAL at 06:34

## 2021-03-09 RX ADMIN — FAMOTIDINE 10 MG: 20 TABLET ORAL at 07:44

## 2021-03-09 RX ADMIN — LISINOPRIL 20 MG: 20 TABLET ORAL at 20:45

## 2021-03-09 RX ADMIN — METRONIDAZOLE 100 MG: 500 TABLET ORAL at 07:44

## 2021-03-09 RX ADMIN — Medication 30 MG: at 07:45

## 2021-03-09 RX ADMIN — FLUCONAZOLE 100 MG: 100 TABLET ORAL at 07:45

## 2021-03-09 RX ADMIN — DOCUSATE SODIUM 100 MG: 100 CAPSULE, LIQUID FILLED ORAL at 07:44

## 2021-03-09 RX ADMIN — ATORVASTATIN CALCIUM 20 MG: 10 TABLET, FILM COATED ORAL at 20:10

## 2021-03-09 RX ADMIN — SENNOSIDES 17.2 MG: 8.6 TABLET, FILM COATED ORAL at 07:44

## 2021-03-09 RX ADMIN — Medication 30 MG: at 20:11

## 2021-03-09 RX ADMIN — ANTACID TABLETS 500 MG: 500 TABLET, CHEWABLE ORAL at 20:10

## 2021-03-09 ASSESSMENT — PAIN DESCRIPTION - FREQUENCY: FREQUENCY: INTERMITTENT

## 2021-03-09 ASSESSMENT — PAIN DESCRIPTION - PROGRESSION: CLINICAL_PROGRESSION: NOT CHANGED

## 2021-03-09 ASSESSMENT — PAIN SCALES - GENERAL
PAINLEVEL_OUTOF10: 0
PAINLEVEL_OUTOF10: 8
PAINLEVEL_OUTOF10: 0

## 2021-03-09 NOTE — PROGRESS NOTES
Physical Medicine & Rehabilitation  Progress Note      Subjective:      Becky Stoner is a 80 y.o. female with MS exacerbation. She reports feeling better today than yesterday. He notes chronic headache this morning but states that norco helps (which is what she takes at home as well). She reports that she was able to eat some breakfast this morning without any problems. She denies any acute concerns. ROS:  Denies fevers, chills, sweats. No chest pain, palpitations, lightheadedness. Denies coughing, wheezing or shortness of breath. Denies abdominal pain, nausea, diarrhea or constipation. No new areas of joint pain. Denies new areas of numbness or weakness. Denies new anxiety or depression issues. No new skin problems. Rehabilitation:   Progressing in therapies. PT:  Restrictions/Precautions: Fall Risk  Implants present? 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 Pt aslo has Left eye lid droop, per pt, its not Bell's palsy, but its from MS-facial muscle weakness. Required Braces or Orthoses  Spinal: (states she has a back brace, uses PRN)   Transfers  Sit to Stand: Contact guard assistance(Increased fatigue today with dizziness.)  Stand to sit: Contact guard assistance  Bed to Chair: Stand by assistance(Fluctuates depending on HA and dizzyness.)  Stand Pivot Transfers: Stand by assistance(with and without RW)  Comment: Cues for hand placement and technique  Ambulation 1  Surface: level tile  Device: Rolling Walker  Other Apparatus: Wheelchair follow  Assistance: Contact guard assistance, Minimal assistance  Quality of Gait: No LOB  Gait Deviations: Slow Emily, Decreased step length, Decreased step height  Distance: 47 ft (AM), 200 ft (PM)  Comments: Posterior lean at times when she first stands. Transfers  Sit to Stand: Contact guard assistance(Increased fatigue today with dizziness.)  Stand to sit: Contact guard assistance  Bed to Chair: Stand by assistance(Fluctuates depending on HA and dizzyness.)  Stand Pivot Transfers: Stand by assistance(with and without RW)  Comment: Cues for hand placement and technique  Ambulation  Ambulation?: Yes  Ambulation 1  Surface: level tile  Device: Rolling Walker  Other Apparatus: Wheelchair follow  Assistance: Contact guard assistance, Minimal assistance  Quality of Gait: No LOB  Gait Deviations: Slow Emily, Decreased step length, Decreased step height  Distance: 47 ft (AM), 200 ft (PM)  Comments: Posterior lean at times when she first stands. Surface: level tile  Ambulation 1  Surface: level tile  Device: Rolling Walker  Other Apparatus: Wheelchair follow  Assistance: Contact guard assistance, Minimal assistance  Quality of Gait: No LOB  Gait Deviations: Slow Emily, Decreased step length, Decreased step height  Distance: 47 ft (AM), 200 ft (PM)  Comments: Posterior lean at times when she first stands. OT:  ADL  Feeding: Modified independent   Grooming: Modified independent   UE Bathing: Setup, Increased time to complete  LE Bathing: Supervision, Setup, Increased time to complete  UE Dressing: Increased time to complete, Supervision  LE Dressing: Minimal assistance, Setup, Increased time to complete, Supervision(assist with thread RLE and catheter. )  Toileting: None  Additional Comments: Pt too fatigued for initial set up for bathing this date. Instrumental ADL's  Instrumental ADLs: Yes     Balance  Sitting Balance: Independent(pt sitting EOB in good safety upon arrival )  Standing Balance: Stand by assistance   Standing Balance  Time: Am: 6-7 min, 1-2 min x2 PM: 6 min   Activity: Am: clothing retrieval with RW, functional care tasks at sinkside. PM: standing at tabletop to increase standing tolerance for functional tasks. Comment: 0-1 UE support. Functional Mobility  Functional - Mobility Device: Rolling Walker  Activity: Other, Retrieve items, Transport items  Assist Level: Stand by assistance  Functional Mobility Comments: pt ambulated 20 feet to obtain set up with walker and SBA- ambulated to toilet to shower then w/c then to sit edge of bed. additional ambulation in room carrying items in pm for advanced adls. Bed mobility  Rolling to Left: Stand by assistance  Rolling to Right: Stand by assistance  Supine to Sit: Modified independent  Sit to Supine: Stand by assistance  Scooting: Stand by assistance  Comment: CGA for safety, patient complaint of dizziness and feeling \"woozy\"  Transfers  Stand Step Transfers: Stand by assistance  Stand Pivot Transfers: Stand by assistance  Sit to stand: Supervision  Stand to sit: Supervision  Transfer Comments: w/RW; cues for hand placement    Toilet Transfers  Toilet - Technique: Ambulating  Equipment Used: Grab bars  Toilet Transfer: Contact guard assistance  Toilet Transfers Comments: Pivot to/from wheelchair in PM session. Shower Transfers  Shower - Transfer From: Wheelchair  Shower - Transfer Type: To and From  Shower - Transfer To:  Transfer tub bench  Shower - Technique: Stand pivot  Shower Transfers: Stand by assistance  Shower Transfers Comments: Used GB for support as needed; Good safety noted       SPEECH:      Current Medications:   Current Facility-Administered Medications: lisinopril (PRINIVIL;ZESTRIL) tablet 20 mg, 20 mg, Oral, Nightly  docusate sodium (COLACE) capsule 100 mg, 100 mg, Oral, BID  fluconazole (DIFLUCAN) tablet 100 mg, 100 mg, Oral, Daily  acetaminophen (TYLENOL) tablet 500 mg, 500 mg, Oral, Q6H PRN  calcium carbonate (TUMS) chewable tablet 500 mg, 500 mg, Oral, TID PRN  hydrALAZINE (APRESOLINE) tablet 50 mg, 50 mg, Oral, Q6H PRN  enoxaparin (LOVENOX) injection 30 mg, 30 mg, Subcutaneous, Daily  famotidine (PEPCID) tablet 10 mg, 10 mg, Oral, Daily  dextrose 5 % solution, 100 mL/hr, Intravenous, PRN  dextrose 50 % IV solution, 12.5 g, Intravenous, PRN  glucagon (rDNA) injection 1 mg, 1 mg, Intramuscular, PRN  glucose (GLUTOSE) 40 % oral gel 15 g, 15 g, Oral, PRN  amitriptyline (ELAVIL) tablet 100 mg, 100 mg, Oral, Nightly  atorvastatin (LIPITOR) tablet 20 mg, 20 mg, Oral, Nightly  benzonatate (TESSALON) capsule 100 mg, 100 mg, Oral, TID PRN  butalbital-APAP-caffeine -40 MG per capsule 1 capsule, 1 capsule, Oral, Q6H PRN  dextromethorphan (DELSYM) 30 MG/5ML extended release liquid 30 mg, 30 mg, Oral, 2 times per day  gabapentin (NEURONTIN) capsule 100 mg, 100 mg, Oral, BID  HYDROcodone-acetaminophen (NORCO) 5-325 MG per tablet 1 tablet, 1 tablet, Oral, Q6H PRN  polyethylene glycol (GLYCOLAX) packet 17 g, 17 g, Oral, Daily  senna (SENOKOT) tablet 17.2 mg, 2 tablet, Oral, Daily PRN  bisacodyl (DULCOLAX) suppository 10 mg, 10 mg, Rectal, Daily PRN  albuterol sulfate  (90 Base) MCG/ACT inhaler 2 puff, 2 puff, Inhalation, Q6H PRN      Objective:  /62   Pulse 98   Temp 98.4 °F (36.9 °C) (Oral)   Resp 18   Ht 4' 11\" (1.499 m)   Wt 135 lb (61.2 kg)   SpO2 96%   BMI 27.27 kg/m²       GEN: Well developed, well nourished, no acute distress  HEENT: NCAT. EOMI. Hearing grossly intact. Mucous membranes pink and moist.  RESP: Normal breath sounds with no wheezing, rales, or rhonchi. Respirations WNL and unlabored. CV: Regular rate and rhythm. No murmurs, rubs, or gallops. ABD: Soft, non-distended, BS+ and equal.  NEURO: Alert. Speech fluent with no aphasia or dysarthria noted. Left facial droop. Sensation to light touch intact. MSK: Functional ROM in all limbs. Muscle tone and bulk are normal bilaterally. Strength 4+/5 in right upper and lower limbs. Strength 4/5 in left upper limb and 4+/5 in left lower limb. LIMBS: No edema in bilateral lower limbs. SKIN: Warm and dry with good turgor. PSYCH: Mood WNL. Affect WNL. Appropriately interactive.     Diagnostics:     CBC: has Norco prn pain  9. Chronic daily headaches: worse in the morning. on amitriptyline q hs. has Norco and Fioricet prn. 10. GERD: on famotidine daily  11. Hyperglycemia: Resolved-  related to 5 days IV steroid. No history diabetes. IM discontinued insulin sliding scale. 12. Cough: Improving. has albuterol prn, tessalon prn, dextromethorphan BID  13. Stress incontinence/asymptomatic bacteriuria: for outpatient follow up with Dr. bEer Chin. Completed Cipro 3/6.   14. Bowel Management: Miralax daily, senokot prn, dulcolax prn. 15. DVT Prophylaxis:  low molecular weight heparin, SCD's while in bed and FRANCISCO's during the day  16.  Internal medicine for medical management      Electronically signed by Paolo Potter MD on 3/9/2021 at 9:07 PM

## 2021-03-09 NOTE — PROGRESS NOTES
Physical Therapy    [address]   Acute Rehabilitation Physical Therapy Progress Note    Date: 3/9/21  Patient Name: Asad Eisenberg       Room: 7975/0811-92  MRN: [id number]   Account: [de-identified]   : 1934  (80 y.o.) Gender: female     Referring Practitioner: Dr. Rickie Olivo  Diagnosis: Exacerbation of multiple sclerosis  Past Medical History:  has a past medical history of Acute cystitis without hematuria, Arthritis, Chronic daily headache, GERD (gastroesophageal reflux disease), Hyperlipidemia, Hypertension, Moderate malnutrition ([address] Multiple sclerosis ([address] Neuropathy, Pneumonia, and Vitamin D deficiency. Past Surgical History:   has a past surgical history that includes Cystocele repair; Rectocele repair; Hysterectomy; Cervical disc surgery; Cataract removal with implant (Right, 2014); Cataract removal with implant (Left, 2014); Total shoulder arthroplasty (Right, 2017); and shoulder surgery (Right, ). Restrictions/Precautions  Restrictions/Precautions: Fall Risk  Required Braces or Orthoses?: Yes(back brace-wears mainly in the morning. )  Implants present? 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 Pt aslo has Left eye lid droop, per pt, its not Bell's palsy, but its from MS-facial muscle weakness. Subjective: Patient reports she feels light headed and tired today.        Vital Signs  Pulse: 68(70 (PM))  Heart Rate Source: Monitor  BP: (!) 89/52(97/62 (PM))  BP Location: Right upper arm  Patient Currently in Pain: Denies  Pain Assessment: 0-10  Pain Level: 0     Oxygen Therapy  SpO2: 92 %(97 (PM))          Bed Mobility:   Rolling: Stand by assistance Supine to Sit: Stand by assistance  Sit to Supine: Stand by assistance  Scooting: Stand by assistance      Transfers:  Sit to Stand: Contact guard assistance(Increased fatigue today with dizziness.)  Stand to sit: Contact guard assistance  Bed to Chair: Stand by assistance(Fluctuates depending on HA and dizzyness.)              Ambulation 1  Surface: level tile  Device: Rolling Walker  Assistance: Contact guard assistance;Minimal assistance  Quality of Gait: No LOB  Gait Deviations: Slow Emily;Decreased step length;Decreased step height  Distance: 47 ft (AM), 200 ft (PM)  Comments: Posterior lean at times when she first stands. Stairs/Curb  Stairs?: Yes  Stairs  # Steps : 10  Stairs Height: 4\"(6\")  Rails: Right ascending(Right descending)  Device: No Device  Assistance: Contact guard assistance  Comment: Reciprocal pattern. Posterior lean caused patient to become unbalanced with CGA to correct. No cane needed this date. BALANCE Posture: Fair  Sitting - Static: Good  Sitting - Dynamic: Fair;+  Standing - Static: Fair(w/ RW)  Standing - Dynamic: Fair( with RW)  Comments: Seated EOB SBA, Standing with RW-CGA/min A    EXERCISES    Other exercises?: Yes  Other exercises 1: Seated B LE exercises #1.5  x15 reps     Other exercises 2: Seated B LE exercises with Lime Green Band  x15 reps   Other exercises 3: NuStep workload 3, 10mins  Other exercises 4: Seated UBE  5 mins. forward and backward. Activity Tolerance: Patient limited by endurance, Treatment limited secondary to medical complications (free text), Patient limited by fatigue  Activity Tolerance: Low BP, dizziness and lightheadness, headaches, \"difficulties focusing\"  PT Equipment Recommendations  Equipment Needed: No  Other: Patient has RW and single point cane at home.          Current Treatment Recommendations: Strengthening, Home Exercise Program, Safety Education & Training, Balance Training, Endurance Training, Patient/Caregiver Education & Training, Equipment Evaluation, Education, & procurement, Functional Mobility Training, Transfer Training, Gait Training, Stair training    Conditions Requiring Skilled Therapeutic Intervention  Body structures, Functions, Activity limitations: Decreased strength;Decreased safe awareness;Decreased balance; Increased pain;Decreased functional mobility ; Decreased endurance  Treatment Diagnosis: impaired mobility due to weakness and debilitation  Prognosis: Good  Decision Making: Medium Complexity  History: Admitted due to exacerbation of MS  Clinical Presentation: Motivated and pleasant. Barriers to Learning: [place]  REQUIRES PT FOLLOW UP: Yes  Discharge Recommendations: Patient would benefit from continued therapy after discharge;Home with assist PRN    Goals  Short term goals  Time Frame for Short term goals: 5 to 6 days. Short term goal 1: pt to tolerate 1/2 to 45 minutes of therapuetic exercise and activity, four sessions per day. Short term goal 2: pt to demonstrate good technique for balance activities, strengthening exercises and energy conservation techniques  Short term goal 3: pt to demonstrate independent rolling in bed for position change  Short term goal 4: pt to demonstrate transfers supine <> sit w/ supervision  Short term goal 5: pt to demonstrate transfers sit <> stand and bed <> chair using wheeled walker w/ SBA  Short term goal 6: pt to demonstrate good sitting dynamic balance and fair + or better standing balance using wheeled walker  Short term goal 7: pt to demonstrate gait 200 ft using wheeled walker w/ SBA  Short term goal 8: pt to advance to and demonstrate ability to ascend/ descend 4-6\" step using rail and st cane w/ min x 1  Long term goals  Time Frame for Long term goals : By DC  Long term goal 1: pt able to perform transfers independently with or without appropriate device.   Long term goal 2: pt able to ambulate with appropriate device safely distance of 150 to 200 ft, level

## 2021-03-09 NOTE — PLAN OF CARE
Problem: Falls - Risk of:  Goal: Will remain free from falls  Description: Will remain free from falls  Outcome: Ongoing  Goal: Absence of physical injury  Description: Absence of physical injury  Outcome: Ongoing     Problem: Pain:  Goal: Pain level will decrease  Description: Pain level will decrease  Outcome: Ongoing  Goal: Control of acute pain  Description: Control of acute pain  Outcome: Ongoing  Goal: Control of chronic pain  Description: Control of chronic pain  Outcome: Ongoing     Problem: Musculor/Skeletal Functional Status  Goal: Highest potential functional level  Outcome: Ongoing  Goal: Absence of falls  Outcome: Ongoing     Problem: Nutrition  Goal: Optimal nutrition therapy  Outcome: Ongoing     Problem: Skin Integrity:  Goal: Will show no infection signs and symptoms  Description: Will show no infection signs and symptoms  Outcome: Ongoing  Goal: Absence of new skin breakdown  Description: Absence of new skin breakdown  Outcome: Ongoing

## 2021-03-09 NOTE — DISCHARGE INSTR - COC
Continuity of Care Form    Patient Name: Durga Pruitt   :  1934  MRN:  [id number]    Admit date:  3/2/2021  Discharge date:  2021    Code Status Order: Full Code   Advance Directives:   [facility] Documentation       Date/Time Healthcare Directive Type of Healthcare Directive Copy in [address] Agent's Name Healthcare Agent's Phone Number    [phone]  No, patient does not have an advance directive for healthcare treatment --  Other (Comment) -- -- --            Admitting Physician:  Esha Bermudez MD  PCP: Yoseph Liang MD    Discharging Nurse: [facility] Unit/Room#: 2609/2609-01  Discharging Unit Phone Number: [phone]    Emergency Contact:   Extended Emergency Contact Information  Primary Emergency Contact: Annalee Quick  Address: [place] [address] Phone: [phone]  Work Phone: [phone]  Mobile Phone: [phone]  Relation: Child  Hearing or visual needs: None  Other needs: None  Preferred language: English   needed? No  Secondary Emergency Contact: Justino Raza  Address: [place] [address] Phone: [phone]  Relation: Child  Hearing or visual needs: None  Other needs: None  Preferred language: English   needed?  No    Past Surgical History:  Past Surgical History:   Procedure Laterality Date    [facility]ACT [facility]OVAL WITH IMPLANT [facility] 2014    [facility]    CATARACT REMOVAL WITH IMPLANT Left 2014    [facility]    CERVICAL DISC SURGERY      CYSTOCELE REPAIR      HYSTERECTOMY      RECTOCELE REPAIR      SHOULDER ARTHROPLASTY Right 2017    SHOULDER SURGERY Right 2017       Immunization History:   Immunization History   Administered Date(s) Administered    COVID-19, Dumont Peter, PF, 30mcg/0.3mL 2021    Influenza A (M8M4-11) Vaccine PF IM 2010    Influenza Vaccine, unspecified formulation 2011, 10/25/2012, 10/01/2013, 11/20/2015    Influenza Virus Vaccine 11/01/2011, 10/25/2012, 10/01/2013, 11/20/2015, 09/01/2017    Influenza, High Dose (Fluzone 65 yrs and older) 10/17/2015, 09/26/2016, 09/26/2017, 10/10/2018    Influenza, Gale Ora, IM, PF (6 mo and older Fluzone, Flulaval, Fluarix, and 3 yrs and older Afluria) 10/08/2019    Influenza, Quadv, adjuvanted, 65 yrs +, IM, PF (Fluad) 09/23/2020    Pneumococcal Conjugate 13-valent ([id number]) 01/27/2017    Pneumococcal Polysaccharide ([id number]) 12/01/2010       Active Problems:  Patient Active Problem List   Diagnosis Code    Ataxia R27.0    Multiple sclerosis ([facility] G35    Essential hypertension I10    Hyperlipidemia E78.5    Atelectasis J98.11    Debility R53.81    Abnormal gait R26.9    Actinic keratosis L57.0    Enthesopathy of hip region M76.899    Generalized osteoarthritis M15.9    Idiopathic peripheral neuropathy G60.9    Vitamin D deficiency E55.9    Primary localized osteoarthrosis of the hip, left M16.12    Primary osteoarthritis of left hip M16.12    Acquired spondylolisthesis M43.10    Chronic midline low back pain with left-sided sciatica M54.42, G89.29    Spinal stenosis of lumbar region with neurogenic claudication M48.062    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    Multiple sclerosis exacerbation (HCC) G35    Diarrhea R19.7    Chronic headache R51.9, G89.29    Facial asymmetry Q67.0    Food poisoning A05.9    Asymptomatic bacteriuria R82.71    Hyperglycemia R73.9    EDINSON (acute kidney injury) ([facility] N17.9       Isolation/Infection:   Isolation            Contact          Patient Infection Status       Infection Onset Added Last Indicated Last Indicated By Review Planned Expiration Resolved Resolved By    MRSA  11/07/14 11/07/14 El Gruber RN        07/2013 rt shin    Resolved    COVID-19 Rule Out 02/25/21 02/25/21 02/25/21 COVID-19, Rapid (Ordered)   02/25/21 Rule-Out Test Resulted            Nurse Assessment:  Last Vital Signs: BP (!) 99/59   Pulse 85   Temp 97.8 °F (36.6 °C) (Oral)   Resp 16   Ht 4' 11\" (1.499 m)   Wt 135 lb (61.2 kg)   SpO2 100%   BMI 27.27 kg/m²     Last documented pain score (0-10 scale): Pain Level: 8  Last Weight:   Wt Readings from Last 1 Encounters:   03/02/21 135 lb (61.2 kg)     Mental Status:  oriented and alert    IV Access:  - None    Nursing Mobility/ADLs:  Walking   Assisted  Transfer  Assisted  Bathing  Assisted  Dressing  Assisted  Toileting  Independent  Feeding  [address]  Independent  Med Delivery   whole    Wound Care Documentation and Therapy:        Elimination:  Continence:   · Bowel: Yes  · Bladder: Yes  Urinary Catheter: None   Colostomy/Ileostomy/Ileal Conduit: No       Date of Last BM: 03/11/2021      Intake/Output Summary (Last 24 hours) at 3/9/2021 1310  Last data filed at 3/9/2021 0604  Gross per 24 hour   Intake    Output 1050 ml   Net -1050 ml     I/O last 3 completed shifts:  In: -   Out: [address] [Urine:1050]    Safety Concerns: At Risk for Falls    Impairments/Disabilities:      None    Nutrition Therapy:  Current Nutrition Therapy:   - Oral Diet:  General    Routes of Feeding: Oral  Liquids: No Restrictions  Daily Fluid Restriction: no  Last Modified Barium Swallow with Video (Video Swallowing Test): not done    Treatments at the Time of Hospital Discharge:   Respiratory Treatments:   Oxygen Therapy:  is not on home oxygen therapy.   Ventilator:    - No ventilator support    Rehab Therapies: Physical Therapy and Occupational Therapy  Weight Bearing Status/Restrictions: No weight bearing restirctions  Other Medical Equipment (for information only, NOT a DME order):  walker  Other Treatments: ***    Patient's personal belongings (please select all that are sent with patient):  None    RN SIGNATURE:  Electronically signed by Jessica Addison RN on 3/12/21 at 2:14 PM EST    CASE MANAGEMENT/SOCIAL WORK SECTION    Inpatient Status Date: 3/2/2021    Readmission Risk Assessment Score:  Readmission Risk              Risk of Unplanned Readmission:        10           Discharging to Facility/ Agency    Name: [address] [place] [address] [address] [facility] [address]   Phone: [phone]  · Fax: [phone]      / signature: Electronically signed by OLIVER Santiago on 3/9/21 at 1:14 PM EST    PHYSICIAN SECTION    Prognosis: Fair    Condition at Discharge: Stable    Rehab Potential (if transferring to Rehab): Good    Recommended Labs or Other Treatments After Discharge: Physical and occupational therapy for ongoing functional deficits related to exacerbation of multiple sclerosis. Nursing for medication management. Physician Certification: I certify the above information and transfer of Desi Arnett  is necessary for the continuing treatment of the diagnosis listed and that she requires Home Care for less than 30 days.      Update Admission H&P: No change in H&P    PHYSICIAN SIGNATURE:  Electronically signed by Venus Issa MD on 3/11/21 at 5:03 PM EST

## 2021-03-09 NOTE — PLAN OF CARE
Problem: Falls - Risk of:  Goal: Will remain free from falls  Description: Will remain free from falls  Outcome: Met This Shift  No falls or injuries sustained at this time. No attempts to get out of bed without nursing assistance. Call light within reach and pt. uses appropriately for assistance. Siderails up x 2. Nonskid footwear remains on. Bed in low and locked position. Hourly nursing rounds made. Pt. Alert and oriented, aware of limitations, and exhibits good safety judgement. Pt. uses assistive devices appropriately. Pt. understands individual fall risk factors.     Pt. reminded to use call light with each nurse/patient interaction.     Bed alarm  remains engaged throughout the shift as a precaution.          Problem: Pain:  Goal: Pain level will decrease  Description: Pain level will decrease  Outcome: Met This Shift   Pain assessment and reassessment completed so far this shift. Pt. able to rest after the use of pain medication. Patient denies any pain so far this shift  Will continue to monitor.        Problem: Skin Integrity:  Goal: Absence of new skin breakdown  Description: Absence of new skin breakdown  Outcome: Met This Shift   Skin assessment completed this shift. Nutrition and Hydration status assessed with adequate intake. José Miguel Score as charted. Chair cushion in use for when pt is up to chair. Bilateral heels remain elevated on pillows throughout the shift. Patient able to reposition self for comfort and to prevent breakdown. Patient verbalizes understanding of pressure ulcer prevention measures. Skin integrity maintained. No new skin breakdown noted. Skin to high risk pressure areas including coccyx and heels are clear.     Helen care provided as needed throughout the shift.  Aloe Depauw Moisture Barrier ointment applied to buttocks as a preventative measure.

## 2021-03-09 NOTE — PROGRESS NOTES
[address] [facility]    ACUTE REHABILITATION OCCUPATIONAL THERAPY  DAILY NOTE    Date: 3/9/21  Patient Name: Ethan Lazcano      Room: 9598/3632-69    MRN: [id number]   : 1934  (80 y.o.)  Gender: female   Referring Practitioner: Dr. Roger Mayo  Diagnosis: Exacerbation of multiple sclerosis       Restrictions  Restrictions/Precautions: Fall Risk  Implants present? 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 Pt aslo has Left eye lid droop, per pt, its not Bell's palsy, but its from MS-facial muscle weakness. Required Braces or Orthoses  Spinal: (states she has a back brace, uses PRN)  Required Braces or Orthoses?: Yes(back brace-wears mainly in the morning. )    Subjective  Subjective: Pt reports feeling better this date, more awake. Comments: Pt pleasant and cooperative. Pt with increased fatigue in PM.   Patient Currently in Pain: Denies  Restrictions/Precautions: Fall Risk  Overall Orientation Status: Within Functional Limits     Pain Assessment  Pain Assessment: 0-10  Pain Level: 5  Pain Type: Acute pain  Pain Location: Head, Hip  Pain Orientation: Left    Objective  Cognition  Overall Cognitive Status: WFL  Perception  Overall Perceptual Status: WFL  Balance  Standing Balance: Stand by assistance  Transfers  Sit to stand: Supervision  Stand to sit: Supervision  Standing Balance  Time: Am: 6-7 min, 1-2 min x2 PM: 6 min   Activity: Am: clothing retrieval with RW, functional care tasks at sinkside. PM: standing at tabletop to increase standing tolerance for functional tasks. Comment: 0-1 UE support.    Functional Mobility  Functional - Mobility Device: Rolling Walker  Activity: Other;Retrieve items;Transport items  Assist Level: Stand by assistance     Type of ROM/Therapeutic Exercise  Type of ROM/Therapeutic Exercise: Free weights(1#, x15 reps. )  Comment: Pt engaged in UB ex's to improve overall strength and endurance for functional tasks. Exercises  Scapular Protraction: x  Scapular Retraction: x  Shoulder Flexion: x  Shoulder Extension: x  Elbow Flexion: x  Elbow Extension: x  Grasp/Release: resistive clothespins for digit strenghtening and UE endurance for repetitive reaching; all colors (resistances) tolerated. Other: red theraflex bar; upward/downward bends x10                        ADL  Feeding: Modified independent   Grooming: Modified independent   UE Bathing: Setup; Increased time to complete  LE Bathing: Supervision;Setup; Increased time to complete  UE Dressing: Increased time to complete;Supervision  LE Dressing: Minimal assistance;Setup; Increased time to complete;Supervision(assist with thread RLE and catheter. )  Toileting: None        Assessment  Performance deficits / Impairments: Decreased functional mobility ; Decreased ADL status; Decreased endurance;Decreased balance;Decreased high-level IADLs;Decreased fine motor control  Prognosis: Good  Discharge Recommendations: Home with assist PRN  Activity Tolerance: Patient Tolerated treatment well;Patient limited by fatigue;Patient limited by pain  Safety Devices in place: Yes           Plan  Plan  Times per week: 900/7  Times per day: Twice a day  Current Treatment Recommendations: Self-Care / ADL, Home Management Training, Strengthening, Balance Training, Functional Mobility Training, Endurance Training, Pain Management, Safety Education & Training, Patient/Caregiver Education & Training, Equipment Evaluation, Education, & procurement  Plan Comment: Due to medical status and fatigue concerns she will be seen for 900 minutes of OT/PT/ST combined over 7 days  Patient Goals   Patient goals : Regain independence and return home with A from family as needed. Short term goals  Time Frame for Short term goals:  One Week  Short term goal 1: Pt will complete bathing/UBD tasks with set-up A and Good safety. Short term goal 2: Pt will complete LB dressing including socks/shoes with SBA and Good safety using AE if needed. Short term goal 3: Pt will consistently complete toilet transfer and toileting with supervision and Good safety using least restrictive device. Short term goal 4: Pt will V/D good understanding of fall prevention strategies as well as EC/WS techniques during functional tasks. Short term goal 5: Pt will actively participate in 30 minutes of therapeutic exercise/functional activities to promote increased independence with self-care and mobility. Long term goals  Time Frame for Long term goals : By Discharge  Long term goal 1: Pt will complete BADL's with Mod I and Good safety using AE as needed. Long term goal 2: Pt will complete functional transfers with Mod I and Good safety using least restrictive device. Long term goal 3: Pt will complete simple meal prep/light housekeeping tasks with Mod I and Good safety using least restrictive device.         03/09/21 1033 03/09/21 1505   OT Individual Minutes   Time In 0845 1235   Time Out 0931 1300   Minutes 46 25     Electronically signed by SANDI Mina on 3/9/21 at 3:15 PM EST

## 2021-03-09 NOTE — CARE COORDINATION
Met with pt regarding discharge needs. Per team meeting pt will need home health services. Met with pt; she stated she has had HH in the past and would like to use the same agency. She could not recall the name but it was when she was at [facility] before. Review of medical records indicated it was [facility]; referral sent. Call received from Kev Escamilla at [address]; provided clarification as they had recently had pt for services in October. Referral sent and cancelled with [facility].

## 2021-03-10 LAB
ANION GAP SERPL CALCULATED.3IONS-SCNC: 10 MMOL/L (ref 9–17)
BUN BLDV-MCNC: 15 MG/DL (ref 8–23)
BUN/CREAT BLD: ABNORMAL (ref 9–20)
CALCIUM SERPL-MCNC: 9 MG/DL (ref 8.6–10.4)
CHLORIDE BLD-SCNC: 94 MMOL/L (ref 98–107)
CO2: 27 MMOL/L (ref 20–31)
CREAT SERPL-MCNC: 1.12 MG/DL (ref 0.5–0.9)
GFR AFRICAN AMERICAN: 56 ML/MIN
GFR NON-AFRICAN AMERICAN: 46 ML/MIN
GFR SERPL CREATININE-BSD FRML MDRD: ABNORMAL ML/MIN/{1.73_M2}
GFR SERPL CREATININE-BSD FRML MDRD: ABNORMAL ML/MIN/{1.73_M2}
GLUCOSE BLD-MCNC: 116 MG/DL (ref 70–99)
HCT VFR BLD CALC: 32.6 % (ref 36–46)
HEMOGLOBIN: 11.3 G/DL (ref 12–16)
MCH RBC QN AUTO: 32 PG (ref 26–34)
MCHC RBC AUTO-ENTMCNC: 34.8 G/DL (ref 31–37)
MCV RBC AUTO: 92.2 FL (ref 80–100)
NRBC AUTOMATED: ABNORMAL PER 100 WBC
PDW BLD-RTO: 13.8 % (ref 11.5–14.9)
PLATELET # BLD: 251 K/UL (ref 150–450)
PMV BLD AUTO: 7.9 FL (ref 6–12)
POTASSIUM SERPL-SCNC: 4.8 MMOL/L (ref 3.7–5.3)
RBC # BLD: 3.54 M/UL (ref 4–5.2)
SODIUM BLD-SCNC: 131 MMOL/L (ref 135–144)
WBC # BLD: 7.8 K/UL (ref 3.5–11)

## 2021-03-10 PROCEDURE — 1180000000 HC REHAB R&B

## 2021-03-10 PROCEDURE — 6370000000 HC RX 637 (ALT 250 FOR IP): Performed by: PHYSICAL MEDICINE & REHABILITATION

## 2021-03-10 PROCEDURE — 6360000002 HC RX W HCPCS: Performed by: INTERNAL MEDICINE

## 2021-03-10 PROCEDURE — 6370000000 HC RX 637 (ALT 250 FOR IP): Performed by: INTERNAL MEDICINE

## 2021-03-10 PROCEDURE — 99231 SBSQ HOSP IP/OBS SF/LOW 25: CPT | Performed by: INTERNAL MEDICINE

## 2021-03-10 PROCEDURE — 97116 GAIT TRAINING THERAPY: CPT

## 2021-03-10 PROCEDURE — 36415 COLL VENOUS BLD VENIPUNCTURE: CPT

## 2021-03-10 PROCEDURE — 97530 THERAPEUTIC ACTIVITIES: CPT

## 2021-03-10 PROCEDURE — 80048 BASIC METABOLIC PNL TOTAL CA: CPT

## 2021-03-10 PROCEDURE — 99231 SBSQ HOSP IP/OBS SF/LOW 25: CPT | Performed by: STUDENT IN AN ORGANIZED HEALTH CARE EDUCATION/TRAINING PROGRAM

## 2021-03-10 PROCEDURE — 97535 SELF CARE MNGMENT TRAINING: CPT

## 2021-03-10 PROCEDURE — 85027 COMPLETE CBC AUTOMATED: CPT

## 2021-03-10 PROCEDURE — 97110 THERAPEUTIC EXERCISES: CPT

## 2021-03-10 RX ADMIN — FAMOTIDINE 10 MG: 20 TABLET ORAL at 06:49

## 2021-03-10 RX ADMIN — POLYETHYLENE GLYCOL 3350 17 G: 17 POWDER, FOR SOLUTION ORAL at 06:49

## 2021-03-10 RX ADMIN — SENNOSIDES 17.2 MG: 8.6 TABLET, FILM COATED ORAL at 06:49

## 2021-03-10 RX ADMIN — Medication 30 MG: at 08:39

## 2021-03-10 RX ADMIN — HYDROCODONE BITARTRATE AND ACETAMINOPHEN 1 TABLET: 5; 325 TABLET ORAL at 06:49

## 2021-03-10 RX ADMIN — DOCUSATE SODIUM 100 MG: 100 CAPSULE, LIQUID FILLED ORAL at 06:49

## 2021-03-10 RX ADMIN — FLUCONAZOLE 100 MG: 100 TABLET ORAL at 06:49

## 2021-03-10 RX ADMIN — METRONIDAZOLE 100 MG: 500 TABLET ORAL at 20:30

## 2021-03-10 RX ADMIN — METRONIDAZOLE 100 MG: 500 TABLET ORAL at 06:49

## 2021-03-10 RX ADMIN — BISACODYL 10 MG: 10 SUPPOSITORY RECTAL at 21:50

## 2021-03-10 RX ADMIN — ATORVASTATIN CALCIUM 20 MG: 10 TABLET, FILM COATED ORAL at 20:30

## 2021-03-10 RX ADMIN — ENOXAPARIN SODIUM 30 MG: 30 INJECTION SUBCUTANEOUS at 06:49

## 2021-03-10 RX ADMIN — DOCUSATE SODIUM 100 MG: 100 CAPSULE, LIQUID FILLED ORAL at 20:30

## 2021-03-10 RX ADMIN — AMITRIPTYLINE HYDROCHLORIDE 100 MG: 50 TABLET, FILM COATED ORAL at 20:31

## 2021-03-10 RX ADMIN — HYDROCODONE BITARTRATE AND ACETAMINOPHEN 1 TABLET: 5; 325 TABLET ORAL at 20:30

## 2021-03-10 RX ADMIN — Medication 30 MG: at 20:30

## 2021-03-10 ASSESSMENT — PAIN SCALES - GENERAL
PAINLEVEL_OUTOF10: 6
PAINLEVEL_OUTOF10: 4

## 2021-03-10 ASSESSMENT — PAIN DESCRIPTION - LOCATION: LOCATION: HEAD

## 2021-03-10 ASSESSMENT — PAIN DESCRIPTION - DESCRIPTORS: DESCRIPTORS: HEADACHE

## 2021-03-10 ASSESSMENT — PAIN DESCRIPTION - PAIN TYPE: TYPE: ACUTE PAIN

## 2021-03-10 NOTE — PROGRESS NOTES
[place] [address] Internal Medicine    CONSULTATION / HISTORY AND PHYSICAL EXAMINATION            Date:   3/9/2021  Patient name:  Omayra Howard  Date of admission:  3/2/2021  3:19 PM  MRN:   [id number]  Account:  [de-identified]  YOB: 1934  PCP:    Tova Morrison MD  Room:   2609/2609-01  Code Status:    Full Code    Physician Requesting Consult: Andrés Enrique MD    Reason for Consult:  medical management    Chief Complaint:     No chief complaint on file.       History Obtained From:     Patient medical record nursing staff    History of Present Illness:   Patient admitted to acute rehab for rehabilitation  Internal medicine, consulted for medical management  Patient with multiple medical problems which include history of MS, since 1983, chronic headache, GERD, hypertension  She has chronic weakness on left side of her body, leg more than arm  She was admitted originally at [facility] with worsening dizziness, ataxia, CT head was done, which was negative for acute intracranial pathology  Patient was treated with IV steroids for 5 days, her symptoms improved, during her hospital stay, she was found to have urinary tract infection, urine culture was positive for Enterococcus faecalis, which was sensitive to ciprofloxacin, treated with antibiotics  Patient mention she has chronic headache for years, which got better with Norco  Her blood pressure was running high, she was started on Aldactone  Today morning, during therapy patient was found to be dizzy, her blood pressure was running low  No other complaints    Past Medical History:     Past Medical History:   Diagnosis Date    Acute cystitis without hematuria 10/1/2017    Arthritis     Chronic daily headache     GERD (gastroesophageal reflux disease)     Hyperlipidemia     Hypertension     Moderate malnutrition ([address] 3/2/2019    Multiple sclerosis ([address]     Neuropathy     Pneumonia 2017    states had double pneumonia    Vitamin D deficiency         Past Surgical History:     Past Surgical History:   Procedure Laterality Date    CATARACT REMOVAL WITH IMPLANT Right 11/6/2014    [facility]    CATARACT REMOVAL WITH IMPLANT Left 12/2/2014    [facility]    CERVICAL DISC SURGERY      CYSTOCELE REPAIR      HYSTERECTOMY      RECTOCELE REPAIR      SHOULDER ARTHROPLASTY Right 04/20/2017    SHOULDER SURGERY Right 2017        Medications Prior to Admission:     Prior to Admission medications    Medication Sig Start Date End Date Taking? Authorizing Provider   amitriptyline (ELAVIL) 100 MG tablet Take 1 tablet by mouth nightly 1/25/21  Yes Pauly Marie MD   predniSONE (DELTASONE) 10 MG tablet Take 1 tablet by mouth daily 3/2/21 3/2/22  Laila Somers MD   traMADol (ULTRAM) 50 MG tablet Take 1 tablet by mouth every 6 hours as needed for Pain for up to 30 days.  2/22/21 3/24/21  Pauly Marie MD   fluticasone Kacey Dub) 50 MCG/ACT nasal spray 1 spray by Each Nostril route daily 2/19/21   Fanta Chandler PA-C   omeprazole (PRILOSEC) 20 MG delayed release capsule Take 1 capsule by mouth daily 2/8/21   Pauly Marie MD   celecoxib (CELEBREX) 200 MG capsule Take 1 capsule by mouth daily 2/1/21 2/1/22  Pauly Marie MD   Elastic Bandages & Supports (LUMBAR BACK BRACE/SUPPORT PAD) MISC 1 each by Does not apply route daily 1/26/21   Pauly Marie MD   Elastic Bandages & Supports (LUMBAR BACK BRACE/SUPPORT PAD) MISC 1 each by Does not apply route daily as needed (back pain) 1/25/21   Pauly Marie MD   lisinopril (PRINIVIL;ZESTRIL) 5 MG tablet Take 1 tablet by mouth daily 1/25/21   Pauly Marie MD   simvastatin (ZOCOR) 20 MG tablet TAKE 1 TABLET BY MOUTH EVERY NIGHT 1/20/21   Pauly Marie MD   solifenacin (VESICARE) 5 MG tablet Take 1 tablet by mouth daily 12/4/20 12/4/21  Pauly Marie MD   albuterol sulfate HFA (VENTOLIN HFA) 108 (90 Base) MCG/ACT inhaler Inhale 2 puffs into the lungs every 4 hours as needed for Wheezing 2/10/20 2/9/21  Pauly Padron MD   HYDROcodone-acetaminophen (NORCO) 5-325 MG per tablet Take 1 tablet by mouth 3 times daily. Historical Provider, MD        Allergies:     Bactrim [sulfamethoxazole-trimethoprim], Lactose intolerance (gi), Pcn [penicillins], Lidoderm [lidocaine], and Macrobid [nitrofurantoin]    Social History:     Tobacco:    reports that she has never smoked. She has never used smokeless tobacco.  Alcohol:      reports no history of alcohol use. Drug Use:  reports no history of drug use. Family History:     No family history on file. Review of Systems:     Positive and Negative as described in HPI. CONSTITUTIONAL:  negative for fevers, chills, sweats, fatigue, weight loss  HEENT:  negative for vision, hearing changes, runny nose, throat pain  RESPIRATORY:  negative for shortness of breath, cough, congestion, wheezing. CARDIOVASCULAR:  negative for chest pain, palpitations. GASTROINTESTINAL:  negative for nausea, vomiting, diarrhea, constipation, change in bowel habits, abdominal pain   GENITOURINARY:  negative for difficulty of urination, burning with urination, frequency   INTEGUMENT:  negative for rash, skin lesions, easy bruising   HEMATOLOGIC/LYMPHATIC:  negative for swelling/edema   ALLERGIC/IMMUNOLOGIC:  negative for urticaria , itching  ENDOCRINE:  negative increase in drinking, increase in urination, hot or cold intolerance  MUSCULOSKELETAL:  negative joint pains, muscle aches, swelling of joints  NEUROLOGICAL: Dizziness, weakness on left side of body  BEHAVIOR/PSYCH:      Physical Exam:     /62   Pulse 98   Temp 98.4 °F (36.9 °C) (Oral)   Resp 18   Ht 4' 11\" (1.499 m)   Wt 135 lb (61.2 kg)   SpO2 96%   BMI 27.27 kg/m²   Temp (24hrs), Av.1 °F (36.7 °C), Min:97.8 °F (36.6 °C), Max:98.4 °F (36.9 °C)    No results for input(s): POCGLU in the last 72 hours.     Intake/Output Summary (Last 24 hours) at 3/9/2021 2351  Last data filed at 3/9/2021 0604  Gross per 24 hour   Intake    Output 1050 ml   Net -1050 ml       General Appearance:  alert, well appearing, and in no acute distress  Mental status: oriented to person, place, and time with normal affect  Head:  normocephalic, atraumatic. Eye: no icterus, redness, pupils equal and reactive, extraocular eye movements intact, conjunctiva clear  Ear: normal external ear, no discharge, hearing intact  Nose:  no drainage noted  Mouth: mucous membranes moist  Neck: supple, no carotid bruits, thyroid not palpable  Lungs: Bilateral equal air entry, clear to ausculation, no wheezing, rales or rhonchi, normal effort  Cardiovascular: normal rate, regular rhythm, no murmur, gallop, rub. Abdomen: Soft, nontender, nondistended, normal bowel sounds, no hepatomegaly or splenomegaly  Neurologic: Patient has weakness on left side of body, power in left upper and lower extremity is 5/5 skin: No gross lesions, rashes, bruising or bleeding on exposed skin area  Extremities:  peripheral pulses palpable, no pedal edema or calf pain with palpation  Psych: Investigations:      Laboratory Testing:  No results found for this or any previous visit (from the past 24 hour(s)).         Consultations:   IP CONSULT TO INTERNAL MEDICINE  IP CONSULT TO DIETITIAN  IP CONSULT TO SOCIAL WORK  IP CONSULT TO INTERNAL MEDICINE  IP CONSULT TO UROLOGY  Assessment :      Primary Problem  <principal problem not specified>    Active Hospital Problems    Diagnosis Date Noted    EDINSON (acute kidney injury) ([facility] [N17.9] 03/03/2021    Multiple sclerosis exacerbation ([facility] Alma Darby 02/25/2021    Chronic headache [R51.9, G89.29] 02/25/2021    Essential hypertension [I10] 11/07/2018    Hyperlipidemia [E78.5] 11/07/2018    Ataxia [R27.0] 10/01/2017    Multiple sclerosis ([facility] Alma Darby 10/01/2017    Abnormal gait [R26.9] 10/07/2014   Active Problems:    Ataxia    Multiple sclerosis ([facility]    Essential hypertension Hyperlipidemia    Abnormal gait    Multiple sclerosis exacerbation (HCC)    Chronic headache    EDINSON (acute kidney injury) ([facility]  Resolved Problems:    * No resolved hospital problems. *        Plan:     1. Admitted with multiple sclerosis exacerbation, treated with IV steroids feeling better  2. Urinary tract infection, completed abx on march 6  3. Hypertension,now bp is low  4. Stop ace lisinopril    5. Chronic headache, patient is on Elavil, [place] as needed  6. On DVT proxy Lovenox  7. Acute kidney injury, much improved, last creatinine is 0.9           Marcelino Saenz MD  3/9/2021  11:51 PM    Copy sent to Dr. Elin Lane MD    Please note that this chart was generated using voice recognition Dragon dictation software. Although every effort was made to ensure the accuracy of this automated transcription, some errors in transcription may have occurred.

## 2021-03-10 NOTE — PROGRESS NOTES
Formerly [facility], 1928–1983 Internal Medicine    CONSULTATION / HISTORY AND PHYSICAL EXAMINATION            Date:   3/10/2021  Patient name:  Lorin Godoy  Date of admission:  3/2/2021  3:19 PM  MRN:   [id number]  Account:  [de-identified]  YOB: 1934  PCP:    Tarah Garcia MD  Room:   [facility]  Code Status:    Full Code    Physician Requesting Consult: Leah Lei MD    Reason for Consult:  medical management    Chief Complaint:     No chief complaint on file.       History Obtained From:     Patient medical record nursing staff    History of Present Illness:   Patient admitted to acute rehab for rehabilitation  Internal medicine, consulted for medical management  Patient with multiple medical problems which include history of MS, since 1983, chronic headache, GERD, hypertension  She has chronic weakness on left side of her body, leg more than arm  She was admitted originally at [facility] with worsening dizziness, ataxia, CT head was done, which was negative for acute intracranial pathology  Patient was treated with IV steroids for 5 days, her symptoms improved, during her hospital stay, she was found to have urinary tract infection, urine culture was positive for Enterococcus faecalis, which was sensitive to ciprofloxacin, treated with antibiotics  Patient mention she has chronic headache for years, which got better with Norco  Her blood pressure was running high, she was started on Aldactone  Today morning, during therapy patient was found to be dizzy, her blood pressure was running low  No other complaints    Past Medical History:     Past Medical History:   Diagnosis Date    Acute cystitis without hematuria 10/1/2017    Arthritis     Chronic daily headache     GERD (gastroesophageal reflux disease)     Hyperlipidemia     Hypertension     Moderate malnutrition ([address] 3/2/2019    Multiple sclerosis ([address]     Neuropathy     Pneumonia 2017    states had double pneumonia    Vitamin D deficiency         Past Surgical History:     Past Surgical History:   Procedure Laterality Date    CATARACT REMOVAL WITH IMPLANT Right 11/6/2014    Raffoul/StKlaudia    CATARACT REMOVAL WITH IMPLANT Left 12/2/2014    Raffoul/Ole    CERVICAL DISC SURGERY      CYSTOCELE REPAIR      HYSTERECTOMY      RECTOCELE REPAIR      SHOULDER ARTHROPLASTY Right 04/20/2017    SHOULDER SURGERY Right 2017        Medications Prior to Admission:     Prior to Admission medications    Medication Sig Start Date End Date Taking? Authorizing Provider   amitriptyline (ELAVIL) 100 MG tablet Take 1 tablet by mouth nightly 1/25/21  Yes Pauly Woodson MD   predniSONE (DELTASONE) 10 MG tablet Take 1 tablet by mouth daily 3/2/21 3/2/22  Heavenly Thompson MD   traMADol (ULTRAM) 50 MG tablet Take 1 tablet by mouth every 6 hours as needed for Pain for up to 30 days.  2/22/21 3/24/21  Pauly Woodson MD   fluticasone [facility]) 50 MCG/ACT nasal spray 1 spray by Each Nostril route daily 2/19/21   Fanta Chandler PA-C   omeprazole (PRILOSEC) 20 MG delayed release capsule Take 1 capsule by mouth daily 2/8/21   Pauly Woodson MD   celecoxib (CELEBREX) 200 MG capsule Take 1 capsule by mouth daily 2/1/21 2/1/22  Pauly Woodson MD   Elastic Bandages & Supports (LUMBAR BACK BRACE/SUPPORT PAD) MISC 1 each by Does not apply route daily 1/26/21   Pauly Woodson MD   Elastic Bandages & Supports (LUMBAR BACK BRACE/SUPPORT PAD) MISC 1 each by Does not apply route daily as needed (back pain) 1/25/21   Pauly Woodson MD   lisinopril (PRINIVIL;ZESTRIL) 5 MG tablet Take 1 tablet by mouth daily 1/25/21   Pauly Woodson MD   simvastatin (ZOCOR) 20 MG tablet TAKE 1 TABLET BY MOUTH EVERY NIGHT 1/20/21   Pauly Woodson MD   solifenacin (VESICARE) 5 MG tablet Take 1 tablet by mouth daily 12/4/20 12/4/21  Pauly Woodson MD   albuterol sulfate HFA (VENTOLIN HFA) 108 (90 Base) MCG/ACT inhaler Inhale 2 puffs into the lungs every 4 hours as needed for Wheezing 2/10/20 2/9/21  Pauly Dyson MD   HYDROcodone-acetaminophen (NORCO) 5-325 MG per tablet Take 1 tablet by mouth 3 times daily. Historical Provider, MD        Allergies:     Bactrim [sulfamethoxazole-trimethoprim], Lactose intolerance (gi), Pcn [penicillins], Lidoderm [lidocaine], and Macrobid [nitrofurantoin]    Social History:     Tobacco:    reports that she has never smoked. She has never used smokeless tobacco.  Alcohol:      reports no history of alcohol use. Drug Use:  reports no history of drug use. Family History:     No family history on file. Review of Systems:     Positive and Negative as described in HPI. CONSTITUTIONAL:  negative for fevers, chills, sweats, fatigue, weight loss  HEENT:  negative for vision, hearing changes, runny nose, throat pain  RESPIRATORY:  negative for shortness of breath, cough, congestion, wheezing. CARDIOVASCULAR:  negative for chest pain, palpitations. GASTROINTESTINAL:  negative for nausea, vomiting, diarrhea, constipation, change in bowel habits, abdominal pain   GENITOURINARY:  negative for difficulty of urination, burning with urination, frequency   INTEGUMENT:  negative for rash, skin lesions, easy bruising   HEMATOLOGIC/LYMPHATIC:  negative for swelling/edema   ALLERGIC/IMMUNOLOGIC:  negative for urticaria , itching  ENDOCRINE:  negative increase in drinking, increase in urination, hot or cold intolerance  MUSCULOSKELETAL:  negative joint pains, muscle aches, swelling of joints  NEUROLOGICAL: Dizziness, weakness on left side of body  BEHAVIOR/PSYCH:      Physical Exam:     /68   Pulse 94   Temp 97.7 °F (36.5 °C) (Oral)   Resp 18   Ht 4' 11\" (1.499 m)   Wt 135 lb (61.2 kg)   SpO2 100%   BMI 27.27 kg/m²   Temp (24hrs), Av.1 °F (36.7 °C), Min:97.7 °F (36.5 °C), Max:98.4 °F (36.9 °C)    No results for input(s): POCGLU in the last 72 hours.     Intake/Output Summary (Last 24 hours) at 3/10/2021 1513  Last data filed at 3/10/2021 0427  Gross per 24 hour   Intake    Output 850 ml   Net -850 ml       General Appearance:  alert, well appearing, and in no acute distress  Mental status: oriented to person, place, and time with normal affect  Head:  normocephalic, atraumatic. Eye: no icterus, redness, pupils equal and reactive, extraocular eye movements intact, conjunctiva clear  Ear: normal external ear, no discharge, hearing intact  Nose:  no drainage noted  Mouth: mucous membranes moist  Neck: supple, no carotid bruits, thyroid not palpable  Lungs: Bilateral equal air entry, clear to ausculation, no wheezing, rales or rhonchi, normal effort  Cardiovascular: normal rate, regular rhythm, no murmur, gallop, rub. Abdomen: Soft, nontender, nondistended, normal bowel sounds, no hepatomegaly or splenomegaly  Neurologic: Patient has weakness on left side of body, power in left upper and lower extremity is 5/5 skin: No gross lesions, rashes, bruising or bleeding on exposed skin area  Extremities:  peripheral pulses palpable, no pedal edema or calf pain with palpation  Psych:      Investigations:      Laboratory Testing:  Recent Results (from the past 24 hour(s))   Basic Metabolic Panel w/ Reflex to MG    Collection Time: 03/10/21  6:10 AM   Result Value Ref Range    Glucose 116 (H) 70 - 99 mg/dL    BUN 15 8 - 23 mg/dL    CREATININE 1.12 (H) 0.50 - 0.90 mg/dL    Bun/Cre Ratio NOT REPORTED 9 - 20    Calcium 9.0 8.6 - 10.4 mg/dL    Sodium 131 (L) 135 - 144 mmol/L    Potassium 4.8 3.7 - 5.3 mmol/L    Chloride 94 (L) 98 - 107 mmol/L    CO2 27 20 - 31 mmol/L    Anion Gap 10 9 - 17 mmol/L    GFR Non-African American 46 (L) >60 mL/min    GFR  56 (L) >60 mL/min    GFR Comment          GFR Staging NOT REPORTED    CBC    Collection Time: 03/10/21  6:10 AM   Result Value Ref Range    WBC 7.8 3.5 - 11.0 k/uL    RBC 3.54 (L) 4.0 - 5.2 m/uL    Hemoglobin 11.3 (L) 12.0 - 16.0 g/dL    Hematocrit 32.6 (L) 36 - 46 %    MCV 92.2 80 - 100 fL    MCH 32.0 26 - 34 pg    MCHC 34.8 31 - 37 g/dL    RDW 13.8 11.5 - 14.9 %    Platelets 460 179 - 978 k/uL    MPV 7.9 6.0 - 12.0 fL    NRBC Automated NOT REPORTED per 100 WBC           Consultations:   IP CONSULT TO INTERNAL MEDICINE  IP CONSULT TO DIETITIAN  IP CONSULT TO SOCIAL WORK  IP CONSULT TO INTERNAL MEDICINE  IP CONSULT TO UROLOGY  Assessment :      Primary Problem  <principal problem not specified>    Active Hospital Problems    Diagnosis Date Noted    EDINSON (acute kidney injury) ([facility] [address] [N17.9] 03/03/2021    Multiple sclerosis exacerbation ([facility] [address] Tiff Haro 02/25/2021    Chronic headache [R51.9, G89.29] 02/25/2021    Essential hypertension [I10] 11/07/2018    Hyperlipidemia [E78.5] 11/07/2018    Ataxia [R27.0] 10/01/2017    Multiple sclerosis ([facility] [address] Tiff Haro 10/01/2017    Abnormal gait [R26.9] 10/07/2014   Active Problems:    Ataxia    Multiple sclerosis (HCC)    Essential hypertension    Hyperlipidemia    Abnormal gait    Multiple sclerosis exacerbation (HCC)    Chronic headache    EDINSON (acute kidney injury) ([facility] [address]  Resolved Problems:    * No resolved hospital problems. *        Plan:     1. Admitted with multiple sclerosis exacerbation, treated with IV steroids feeling better  2. Urinary tract infection, completed abx on march 6  3. Hypertension,now bp is low  4. Stop ace lisinopril    5. Chronic headache, patient is on Elavil, [place] as needed  6. On DVT proxy Lovenox  7. Acute kidney injury, much improved, last creatinine is 0.9           Humble Pierce MD  3/10/2021  3:13 PM    Copy sent to Dr. Juan Perry MD    Please note that this chart was generated using voice recognition Dragon dictation software. Although every effort was made to ensure the accuracy of this automated transcription, some errors in transcription may have occurred.

## 2021-03-10 NOTE — PROGRESS NOTES
[address]   ACUTE REHABILITATION OCCUPATIONAL THERAPY  DAILY NOTE    Date: 3/10/21  Patient Name: Yaritza Bowie      Room: 4111/4703-54    MRN: [id number]   : 1934  (80 y.o.)  Gender: female   Referring Practitioner: Dr. Katie Dangelo  Diagnosis: Exacerbation of multiple sclerosis, Urinary tract infection, completed antibiotics on , has francisco catheter       Restrictions  Restrictions/Precautions: Fall Risk  Implants present? 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 Pt aslo has Left eye lid droop, per pt, its not Bell's palsy, but its from MS-facial muscle weakness. Required Braces or Orthoses  Spinal: (states she has a back brace, uses PRN)  Required Braces or Orthoses?: Yes(back brace-wears mainly in the morning. )    Subjective  Subjective: \"I think I got them on wrong, try again. \"  (underpants leg in wrong place)  self corrected on 2nd attempt  Comments: Pt pleasant and cooperative. pt with francisco catheter today, pt having urinary retention new this admit.   Urinary tract infection, completed abx on                 Objective  Cognition  Overall Cognitive Status: Exceptions  Cognition Comment: mild decreased memory and problem solving especially with high pain and fatigue  Balance  Sitting Balance: Independent  Standing Balance: Stand by assistance  Bed mobility  Rolling to Left: Modified independent  Rolling to Right: Modified independent  Sit to Supine: Modified independent  Transfers  Stand Step Transfers: Stand by assistance  Stand Pivot Transfers: Stand by assistance  Sit to stand: Supervision  Stand to sit: Supervision  Standing Balance  Time: 4-5 min x 2, 2-3 min x 3 in am, 2-3 min x 1 pm  Activity: adls with walker  Functional Mobility  Functional - Mobility Device: Rolling Walker  Activity: Other;Retrieve items;Transport items  Assist Level: Contact guard assistance  Functional Mobility Comments: pt ambulated 20 feet to obtain set up for adls with walker - unsteady- having pain, fatigue  Toilet Transfers  Toilet Transfers Comments: pt has francisco, no toilet transfer today     Type of ROM/Therapeutic Exercise  Comment: pt practiced christopher tasks, had only completed 1.5 inches of line since materials were brought from home, pt completed 1 inch by 2 inch rectangle this pm,  pt notes pleased she can do it but difference in B hands and vision is noticeable. Exercises  Grasp/Release: resistive clothespins for digit strenghtening and UE endurance for repetitive reaching. ADL  Feeding: Modified independent   Grooming: Modified independent   UE Bathing: Setup  LE Bathing: Supervision;Setup; Increased time to complete  UE Dressing: Setup  LE Dressing: Minimal assistance;Setup; Increased time to complete  Toileting: Supervision  Additional Comments: pt ambulated with walker and CGA to obtain set up, declined shower, groom, wash up and dress in bathroom. pt needing min assist to reach R foot to don clothing over and also assist with francisco management          Assessment     Activity Tolerance: Patient Tolerated treatment well;Patient limited by fatigue;Patient limited by pain  Activity Tolerance: stand hay limited this am due to L  hip pain and back pain, declined need to re don back brace post self care. pt with heartburn, nsg aware and already medicated. pt with chronic headache also. pt max fatigued prior to pm OT, modified this treatment to UE work seated. Type of devices: Bed alarm in place;Call light within reach; Left in chair            03/10/21 1430 03/10/21 1431   OT Individual Minutes   Time In [address] [place] [address]   Time Out 1129 1421   Minutes 57 35       Patient Education:  Patient Goals   Patient goals : Regain independence and return home with A from family as needed. restrictive device. Long term goal 3: Pt will complete simple meal prep/light housekeeping tasks with Mod I and Good safety using least restrictive device.        Electronically signed by Leopoldo Pane, OT on 3/10/21 at 2:58 PM EST No

## 2021-03-10 NOTE — PROGRESS NOTES
orthopaedic surgeon, conservative treatment was recommended. Pt aslo has Left eye lid droop, per pt, its not Bell's palsy, but its from MS-facial muscle weakness. Subjective   General  Chart Reviewed: Yes  Family / Caregiver Present: No  Referring Practitioner: Dr Pope Abts: Patient pleasant and agreeable to therapy. No complaints of dizziness this AM/PM.  General Comment  Comments: Back brace donned at begining of AM session for patient comfort, patient requests back brace only for AM session.   Pain Screening  Patient Currently in Pain: Yes  Pain Assessment  Pain Assessment: 0-10  Pain Level: 4  Pain Type: Acute pain  Pain Location: Head  Pain Orientation: Posterior  Pain Descriptors: Headache  Vital Signs  Patient Currently in Pain: Yes       Objective   Transfers  Sit to Stand: Contact guard assistance  Stand to sit: Contact guard assistance  Comment: Cues for hand placement and technique  Ambulation  Ambulation?: Yes  Ambulation 1  Surface: level tile  Device: Rolling Walker  Other Apparatus: (Back brace in place for patient comfort)  Assistance: Contact guard assistance  Gait Deviations: Slow Emily;Decreased step length;Decreased step height  Distance: 200 ft  Comments: Posterior lean and minimally unsteady throughout, no LOB, cues to remain within walker NELDA and correct posture  Stairs/Curb  Stairs?: Yes  Stairs  # Steps : 10  Stairs Height: (4\"/6\")  Rails: Right ascending  Device: No Device  Assistance: Contact guard assistance  Comment: Reciprocal pattern, posterior lean at times     Other exercises  Other exercises?: Yes  Other exercises 1: Seated (B) LE exercises x15 (#1.5, lime green theraband)  Other exercises 2: Balance activities on blue foam balance mat (heavy posterior lean with no UE support)  Other exercises 3: Balance activities in // bars (patient unable to maintain NBOS without (B) UE support), reaching activity with 1 UE support, tadem stance requiring 1 UE support to maintain balance)(Patient with increased difficulties following directions throughout activities, requiring min-moderate assistance to maintain balance throughout)  Other exercises 4: (B) toe taps (forward>lateral>retro>neutral) patient with difficulties following sequence without constant verbal/visual cues  Other exercises 5: NuStep lvl 2 x15 minutes  Other exercises 6: Low hurdles at ray rail, forward and laterally (patient does well forward, knocking over x1 francisco javier; increased difficulties laterally, hits or knocks each francisco javier despite cues for technique)     Goals  Short term goals  Time Frame for Short term goals: 5 to 6 days. Short term goal 1: pt to tolerate 1/2 to 45 minutes of therapuetic exercise and activity, four sessions per day. Short term goal 2: pt to demonstrate good technique for balance activities, strengthening exercises and energy conservation techniques  Short term goal 3: pt to demonstrate independent rolling in bed for position change  Short term goal 4: pt to demonstrate transfers supine <> sit w/ supervision  Short term goal 5: pt to demonstrate transfers sit <> stand and bed <> chair using wheeled walker w/ SBA  Short term goal 6: pt to demonstrate good sitting dynamic balance and fair + or better standing balance using wheeled walker  Short term goal 7: pt to demonstrate gait 200 ft using wheeled walker w/ SBA  Short term goal 8: pt to advance to and demonstrate ability to ascend/ descend 4-6\" step using rail and st cane w/ min x 1  Long term goals  Time Frame for Long term goals : By DC  Long term goal 1: pt able to perform transfers independently with or without appropriate device. Long term goal 2: pt able to ambulate with appropriate device safely distance of 150 to 200 ft, level surface, for 2MWT. Long term goal 3: Assess if pt able to ambulate safely without device for 10 to 20 ft. (at baseline at times pt able to walk shor distance without a device).   Long term goal 4: pt able to go up and down 5 to 8 steps with 1 HR/st cane, supervsion level. Long term goal 5: improve Tinetti balance score to atleast 22/28 to reduce fall risk. Patient Goals   Patient goals : get stronger    Plan    Plan  Times per week: 900 minutes/week between combined therapy of PT/OT due to decreased tolerance to activity. Times per day: (5-7 treatments/ week)  Specific instructions for Next Treatment: advance gait distance using wheeled walker, advance to steps w/ rail if dizziness improves and instruct in exercises for balance and strengthening exercises  Current Treatment Recommendations: Strengthening, Home Exercise Program, Safety Education & Training, Balance Training, Endurance Training, Patient/Caregiver Education & Training, Equipment Evaluation, Education, & procurement, Functional Mobility Training, Transfer Training, Gait Training, Stair training  Safety Devices  Type of devices:  All fall risk precautions in place, Call light within reach, Chair alarm in place, Gait belt, Patient at risk for falls, Left in chair        03/10/21 1043 03/10/21 1341   PT Individual Minutes   Time In 0928 1309   Time Out 1028 1340   Minutes 60 [address], [place]

## 2021-03-10 NOTE — PROGRESS NOTES
Physical Medicine & Rehabilitation  Progress Note      Subjective:      Faye Pinto is a 80 y.o. female with MS exacerbation. She reports feeling fine today. She notes chronic dizziness/lightheadedness. IM discontinued lisinopril yesterday due to low blood pressure during the day, which is improved this morning. She is looking forward to discharge on Friday. Discussed plan for francisco removal tomorrow and monitoring of PVRs. She denies any acute concerns. ROS:  Denies fevers, chills, sweats. No chest pain, palpitations, lightheadedness. Denies coughing, wheezing or shortness of breath. Denies abdominal pain, nausea, diarrhea or constipation. No new areas of joint pain. Denies new areas of numbness or weakness. Denies new anxiety or depression issues. No new skin problems. Rehabilitation:   Progressing in therapies. PT:  Restrictions/Precautions: Fall Risk  Implants present? 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 Pt aslo has Left eye lid droop, per pt, its not Bell's palsy, but its from MS-facial muscle weakness.    Required Braces or Orthoses  Spinal: (states she has a back brace, uses PRN)   Transfers  Sit to Stand: Contact guard assistance  Stand to sit: Contact guard assistance  Bed to Chair: Stand by assistance(Fluctuates depending on HA and dizzyness.)  Stand Pivot Transfers: Stand by assistance(with and without RW)  Comment: Cues for hand placement and technique  Ambulation 1  Surface: level tile  Device: Rolling Walker  Other Apparatus: (Back brace in place for patient comfort)  Assistance: Contact guard assistance  Quality of Gait: No LOB  Gait Deviations: Slow Emily, Decreased step length, Decreased step height  Distance: 200 ft  Comments: Posterior lean and minimally unsteady throughout, no LOB, cues to remain within walker NELDA and correct posture    Transfers  Sit to Stand: Contact guard assistance  Stand to sit: Contact guard assistance  Bed to Chair: Stand by assistance(Fluctuates depending on HA and dizzyness.)  Stand Pivot Transfers: Stand by assistance(with and without RW)  Comment: Cues for hand placement and technique  Ambulation  Ambulation?: Yes  Ambulation 1  Surface: level tile  Device: Rolling Walker  Other Apparatus: (Back brace in place for patient comfort)  Assistance: Contact guard assistance  Quality of Gait: No LOB  Gait Deviations: Slow Emily, Decreased step length, Decreased step height  Distance: 200 ft  Comments: Posterior lean and minimally unsteady throughout, no LOB, cues to remain within walker NELDA and correct posture    Surface: level tile  Ambulation 1  Surface: level tile  Device: Rolling Walker  Other Apparatus: (Back brace in place for patient comfort)  Assistance: Contact guard assistance  Quality of Gait: No LOB  Gait Deviations: Slow Emily, Decreased step length, Decreased step height  Distance: 200 ft  Comments: Posterior lean and minimally unsteady throughout, no LOB, cues to remain within walker NELDA and correct posture    OT:  ADL  Feeding: Modified independent   Grooming: Modified independent   UE Bathing: Setup  LE Bathing: Supervision, Setup, Increased time to complete  UE Dressing: Setup  LE Dressing: Minimal assistance, Setup, Increased time to complete  Toileting: Supervision  Additional Comments: pt ambulated with walker and CGA to obtain set up, declined shower, groom, wash up and dress in bathroom.   pt needing min assist to reach R foot to don clothing over and also assist with francisco management   Instrumental ADL's  Instrumental ADLs: Yes     Balance  Sitting Balance: Independent  Standing Balance: Stand by assistance   Standing Balance  Time: 4-5 min x 2, 2-3 min x 3 in am, 2-3 min x 1 pm  Activity: adls with walker  Comment: 0-1 UE support. Functional Mobility  Functional - Mobility Device: Rolling Walker  Activity: Other, Retrieve items, Transport items  Assist Level: Contact guard assistance  Functional Mobility Comments: pt ambulated 20 feet to obtain set up for adls with walker - unsteady- having pain, fatigue     Bed mobility  Rolling to Left: Modified independent  Rolling to Right: Modified independent  Supine to Sit: Modified independent  Sit to Supine: Modified independent  Scooting: Stand by assistance  Comment: CGA for safety, patient complaint of dizziness and feeling \"woozy\"  Transfers  Stand Step Transfers: Stand by assistance  Stand Pivot Transfers: Stand by assistance  Sit to stand: Supervision  Stand to sit: Supervision  Transfer Comments: w/RW; cues for hand placement    Toilet Transfers  Toilet - Technique: Ambulating  Equipment Used: Grab bars  Toilet Transfer: Contact guard assistance  Toilet Transfers Comments: pt has francisco, no toilet transfer today     Shower Transfers  Shower - Transfer From: Wheelchair  Shower - Transfer Type: To and From  Shower - Transfer To:  Transfer tub bench  Shower - Technique: Stand pivot  Shower Transfers: Stand by assistance  Shower Transfers Comments: Used GB for support as needed; Good safety noted       SPEECH:      Current Medications:   Current Facility-Administered Medications: docusate sodium (COLACE) capsule 100 mg, 100 mg, Oral, BID  fluconazole (DIFLUCAN) tablet 100 mg, 100 mg, Oral, Daily  acetaminophen (TYLENOL) tablet 500 mg, 500 mg, Oral, Q6H PRN  calcium carbonate (TUMS) chewable tablet 500 mg, 500 mg, Oral, TID PRN  hydrALAZINE (APRESOLINE) tablet 50 mg, 50 mg, Oral, Q6H PRN  enoxaparin (LOVENOX) injection 30 mg, 30 mg, Subcutaneous, Daily  famotidine (PEPCID) tablet 10 mg, 10 mg, Oral, Daily  dextrose 5 % solution, 100 mL/hr, Intravenous, PRN  dextrose 50 % IV solution, 12.5 g, Intravenous, PRN  glucagon (rDNA) injection 1 mg, 1 mg, Intramuscular, PRN  glucose (GLUTOSE) 40 % oral gel 15 g, 15 g, Oral, PRN  amitriptyline (ELAVIL) tablet 100 mg, 100 mg, Oral, Nightly  atorvastatin (LIPITOR) tablet 20 mg, 20 mg, Oral, Nightly  benzonatate (TESSALON) capsule 100 mg, 100 mg, Oral, TID PRN  butalbital-APAP-caffeine -40 MG per capsule 1 capsule, 1 capsule, Oral, Q6H PRN  dextromethorphan (DELSYM) 30 MG/5ML extended release liquid 30 mg, 30 mg, Oral, 2 times per day  gabapentin (NEURONTIN) capsule 100 mg, 100 mg, Oral, BID  HYDROcodone-acetaminophen (NORCO) 5-325 MG per tablet 1 tablet, 1 tablet, Oral, Q6H PRN  polyethylene glycol (GLYCOLAX) packet 17 g, 17 g, Oral, Daily  senna (SENOKOT) tablet 17.2 mg, 2 tablet, Oral, Daily PRN  bisacodyl (DULCOLAX) suppository 10 mg, 10 mg, Rectal, Daily PRN  albuterol sulfate  (90 Base) MCG/ACT inhaler 2 puff, 2 puff, Inhalation, Q6H PRN      Objective:  /68   Pulse 94   Temp 97.7 °F (36.5 °C) (Oral)   Resp 18   Ht 4' 11\" (1.499 m)   Wt 135 lb (61.2 kg)   SpO2 100%   BMI 27.27 kg/m²       GEN: Well developed, well nourished, no acute distress  HEENT: NCAT. EOMI. Hearing grossly intact. Mucous membranes pink and moist.  RESP: Normal breath sounds with no wheezing, rales, or rhonchi. Respirations WNL and unlabored. CV: Regular rate and rhythm. No murmurs, rubs, or gallops. ABD: Soft, non-distended, BS+ and equal.  NEURO: Alert. Speech fluent with no aphasia or dysarthria noted. Left facial droop. Sensation to light touch intact. MSK: Functional ROM in all limbs. Muscle tone and bulk are normal bilaterally. Strength 4+/5 in right upper and lower limbs. Strength 4/5 in left upper limb and 4+/5 in left lower limb. LIMBS: No edema in bilateral lower limbs. SKIN: Warm and dry with good turgor. PSYCH: Mood WNL. Affect WNL. Appropriately interactive.     Diagnostics:     CBC:   Recent Labs     03/08/21  1302 03/10/21  0610   WBC 9.7 7.8   RBC 3.90* 3.54*   HGB 12.0 11.3*   HCT 36.7 32.6*   MCV 94.2 92.2   RDW 14.0 13.8   PLT 289 251     BMP:   Recent Labs     03/08/21  1302 03/10/21  0610   * 131*   K 4.3 4.8   CL 95* 94*   CO2 25 27   BUN 16 15   CREATININE 1.39* 1.12*   GLUCOSE 139* 116*     BNP: No results for input(s): BNP in the last 72 hours. PT/INR: No results for input(s): PROTIME, INR in the last 72 hours. APTT: No results for input(s): APTT in the last 72 hours. CARDIAC ENZYMES: No results for input(s): CKMB, CKMBINDEX, TROPONINT in the last 72 hours. Invalid input(s): CKTOTAL;3  FASTING LIPID PANEL:  Lab Results   Component Value Date    CHOL 235 (H) 01/04/2012    HDL 46 05/21/2020    TRIG 119 01/04/2012     LIVER PROFILE: No results for input(s): AST, ALT, ALB, BILIDIR, BILITOT, ALKPHOS in the last 72 hours. Impression/Plan:   Impaired ADLs, gait, and mobility due to:    1. Acute MS exacerbation:  PT/OT for gait, mobility, strengthening, endurance, ADLs, and self care. To follow up with Dr. Valentina Conte in October - will contact about scheduling sooner since patient has been hospitalized with an exacerbation  2. EDINSON: Improving, monitoring BMP. Creatinine 1.12 on 3/10. Encourage hydration. IM discontinued lisinopril 3/9.  3. Urine retention: Nursing was performing timed voids while awake with intermittent straight cath prn if PVR > 350 ml. Performed x 1 on 3/5 and several times on 3/7. Urology consulted and recommended francisco placement with voiding trial on day prior to discharge with PVRs - plan for 3/11. Plan to notify urology of PVRs for any further recommendations. 4. Vaginal candidiasis:  IM started diflucan 3/7.  5. Anemia: Resolved. Monitoring CBC  6. Leukocytosis:  Resolved. Likely secondary to recent burst of IV steroid. Will monitor. No current signs or symptoms of infection.   7. HTN/Hyperlipidemia: on atorvastatin, hydralazine prn, lisinopril (IM discontinued 3/9)  8. Arthritis: has Norco prn pain  9. Chronic daily headaches: worse in the morning.  on amitriptyline q hs. has Norco and Fioricet prn. 10. GERD: on famotidine daily  11. Hyperglycemia: Resolved-  related to 5 days IV steroid. No history diabetes. IM discontinued insulin sliding scale. 12. Cough: Improving. has albuterol prn, tessalon prn, dextromethorphan BID  13. Stress incontinence/asymptomatic bacteriuria: for outpatient follow up with Dr. Kenny Gross. Completed Cipro 3/6.   14. Bowel Management: Miralax daily, senokot prn, dulcolax prn. 15. DVT Prophylaxis:  low molecular weight heparin, SCD's while in bed and FRANCISCO's during the day  16.  Internal medicine for medical management      Electronically signed by Bushra Orellana MD on 3/10/2021 at 3:48 PM

## 2021-03-10 NOTE — PLAN OF CARE
Problem: Falls - Risk of:  Goal: Will remain free from falls  Description: Will remain free from falls  3/10/2021 0043 by Jackie Serra RN  Outcome: Ongoing  Note: Education provide; pt remained free from falls  3/9/2021 1728 by Darylene Gibbon, RN  Outcome: Ongoing  Goal: Absence of physical injury  Description: Absence of physical injury  3/10/2021 0043 by Jackie Serra RN  Outcome: Ongoing  3/9/2021 1728 by Darylene Gibbon, RN  Outcome: Ongoing     Problem: Pain:  Goal: Pain level will decrease  Description: Pain level will decrease  3/10/2021 0043 by Jackie Serra RN  Outcome: Ongoing  Note: Pt denies pain  3/9/2021 1728 by Darylene Gibbon, RN  Outcome: Ongoing  Goal: Control of acute pain  Description: Control of acute pain  3/10/2021 0043 by Jackie Serra RN  Outcome: Ongoing  3/9/2021 1728 by Darylene Gibbon, RN  Outcome: Ongoing  Goal: Control of chronic pain  Description: Control of chronic pain  3/10/2021 0043 by Jackie Serra RN  Outcome: Ongoing  3/9/2021 1728 by Darylene Gibbon, RN  Outcome: Ongoing     Problem: Musculor/Skeletal Functional Status  Goal: Highest potential functional level  3/10/2021 0043 by Jackie Serra RN  Outcome: Ongoing  3/9/2021 1728 by Darylene Gibbon, RN  Outcome: Ongoing  Goal: Absence of falls  3/10/2021 0043 by Jackie Serra RN  Outcome: Ongoing  3/9/2021 1728 by Darylene Gibbon, RN  Outcome: Ongoing     Problem: Nutrition  Goal: Optimal nutrition therapy  3/10/2021 0043 by Jackie Serra RN  Outcome: Ongoing  3/9/2021 1728 by Darylene Gibbon, RN  Outcome: Ongoing     Problem: Skin Integrity:  Goal: Will show no infection signs and symptoms  Description: Will show no infection signs and symptoms  3/10/2021 0043 by Jackie Serra RN  Outcome: Ongoing  3/9/2021 1728 by Darylene Gibbon, RN  Outcome: Ongoing  Goal: Absence of new skin breakdown  Description: Absence of new skin breakdown  3/10/2021 0043 by Jackie Serra RN  Outcome: Ongoing  Note: No signs of new skin breakdown  3/9/2021 1728 by Trevon Rosario, RN  Outcome: Ongoing

## 2021-03-11 PROCEDURE — 99231 SBSQ HOSP IP/OBS SF/LOW 25: CPT | Performed by: STUDENT IN AN ORGANIZED HEALTH CARE EDUCATION/TRAINING PROGRAM

## 2021-03-11 PROCEDURE — 6370000000 HC RX 637 (ALT 250 FOR IP): Performed by: PHYSICAL MEDICINE & REHABILITATION

## 2021-03-11 PROCEDURE — 6370000000 HC RX 637 (ALT 250 FOR IP): Performed by: INTERNAL MEDICINE

## 2021-03-11 PROCEDURE — 97535 SELF CARE MNGMENT TRAINING: CPT

## 2021-03-11 PROCEDURE — 99231 SBSQ HOSP IP/OBS SF/LOW 25: CPT | Performed by: INTERNAL MEDICINE

## 2021-03-11 PROCEDURE — 97116 GAIT TRAINING THERAPY: CPT

## 2021-03-11 PROCEDURE — 97110 THERAPEUTIC EXERCISES: CPT

## 2021-03-11 PROCEDURE — 1180000000 HC REHAB R&B

## 2021-03-11 PROCEDURE — 6360000002 HC RX W HCPCS: Performed by: INTERNAL MEDICINE

## 2021-03-11 PROCEDURE — 97530 THERAPEUTIC ACTIVITIES: CPT

## 2021-03-11 RX ORDER — AMITRIPTYLINE HYDROCHLORIDE 100 MG/1
100 TABLET, FILM COATED ORAL NIGHTLY
Qty: 30 TABLET | Refills: 0 | Status: SHIPPED | OUTPATIENT
Start: 2021-03-11 | End: 2021-09-20

## 2021-03-11 RX ORDER — SIMVASTATIN 20 MG
20 TABLET ORAL NIGHTLY
Qty: 30 TABLET | Refills: 0 | Status: SHIPPED | OUTPATIENT
Start: 2021-03-11 | End: 2021-07-19

## 2021-03-11 RX ORDER — OMEPRAZOLE 20 MG/1
20 CAPSULE, DELAYED RELEASE ORAL DAILY
Qty: 30 CAPSULE | Refills: 0 | Status: SHIPPED | OUTPATIENT
Start: 2021-03-11 | End: 2021-08-09 | Stop reason: SDUPTHER

## 2021-03-11 RX ORDER — ALBUTEROL SULFATE 90 UG/1
2 AEROSOL, METERED RESPIRATORY (INHALATION) EVERY 6 HOURS PRN
Qty: 1 INHALER | Refills: 0 | Status: ON HOLD | OUTPATIENT
Start: 2021-03-11 | End: 2022-02-20 | Stop reason: HOSPADM

## 2021-03-11 RX ORDER — POLYETHYLENE GLYCOL 3350 17 G/17G
17 POWDER, FOR SOLUTION ORAL DAILY
Qty: 507 G | Refills: 0 | Status: SHIPPED | OUTPATIENT
Start: 2021-03-11 | End: 2021-04-21 | Stop reason: ALTCHOICE

## 2021-03-11 RX ORDER — GABAPENTIN 100 MG/1
100 CAPSULE ORAL 2 TIMES DAILY
Qty: 60 CAPSULE | Refills: 0 | Status: ON HOLD | OUTPATIENT
Start: 2021-03-11 | End: 2022-02-20

## 2021-03-11 RX ORDER — FLUCONAZOLE 100 MG/1
100 TABLET ORAL DAILY
Qty: 1 TABLET | Refills: 0 | Status: SHIPPED | OUTPATIENT
Start: 2021-03-13 | End: 2021-03-14

## 2021-03-11 RX ADMIN — DOCUSATE SODIUM 100 MG: 100 CAPSULE, LIQUID FILLED ORAL at 19:37

## 2021-03-11 RX ADMIN — ATORVASTATIN CALCIUM 20 MG: 10 TABLET, FILM COATED ORAL at 19:38

## 2021-03-11 RX ADMIN — DOCUSATE SODIUM 100 MG: 100 CAPSULE, LIQUID FILLED ORAL at 08:25

## 2021-03-11 RX ADMIN — METRONIDAZOLE 100 MG: 500 TABLET ORAL at 19:37

## 2021-03-11 RX ADMIN — ALBUTEROL SULFATE 2 PUFF: 90 AEROSOL, METERED RESPIRATORY (INHALATION) at 08:27

## 2021-03-11 RX ADMIN — BENZONATATE 100 MG: 100 CAPSULE ORAL at 08:26

## 2021-03-11 RX ADMIN — AMITRIPTYLINE HYDROCHLORIDE 100 MG: 50 TABLET, FILM COATED ORAL at 19:37

## 2021-03-11 RX ADMIN — Medication 30 MG: at 19:38

## 2021-03-11 RX ADMIN — FLUCONAZOLE 100 MG: 100 TABLET ORAL at 10:29

## 2021-03-11 RX ADMIN — POLYETHYLENE GLYCOL 3350 17 G: 17 POWDER, FOR SOLUTION ORAL at 08:27

## 2021-03-11 RX ADMIN — HYDROCODONE BITARTRATE AND ACETAMINOPHEN 1 TABLET: 5; 325 TABLET ORAL at 19:38

## 2021-03-11 RX ADMIN — ENOXAPARIN SODIUM 30 MG: 30 INJECTION SUBCUTANEOUS at 08:26

## 2021-03-11 RX ADMIN — FAMOTIDINE 10 MG: 20 TABLET ORAL at 08:26

## 2021-03-11 RX ADMIN — METRONIDAZOLE 100 MG: 500 TABLET ORAL at 08:26

## 2021-03-11 RX ADMIN — HYDROCODONE BITARTRATE AND ACETAMINOPHEN 1 TABLET: 5; 325 TABLET ORAL at 10:29

## 2021-03-11 RX ADMIN — Medication 30 MG: at 08:26

## 2021-03-11 ASSESSMENT — PAIN SCALES - GENERAL
PAINLEVEL_OUTOF10: 7
PAINLEVEL_OUTOF10: 7

## 2021-03-11 ASSESSMENT — PAIN DESCRIPTION - ORIENTATION: ORIENTATION: LEFT

## 2021-03-11 ASSESSMENT — PAIN DESCRIPTION - LOCATION: LOCATION: HIP

## 2021-03-11 ASSESSMENT — PAIN DESCRIPTION - PAIN TYPE: TYPE: ACUTE PAIN

## 2021-03-11 NOTE — PROGRESS NOTES
Physical Therapy    [address]  Acute Rehabilitation Physical Therapy Progress Note    Date: 3/11/21  Patient Name: Jarek Espinoza       Room: 0237/6725-29  MRN: [id number]   Account: [de-identified]   : 1934  (80 y.o.) Gender: female     Referring Practitioner: Dr. Lauren Cain  Diagnosis: Exacerbation of multiple sclerosis, Urinary tract infection, completed antibiotics on , has francisco catheter  Past Medical History:  has a past medical history of Acute cystitis without hematuria, Arthritis, Chronic daily headache, GERD (gastroesophageal reflux disease), Hyperlipidemia, Hypertension, Moderate malnutrition ([address] Multiple sclerosis ([address] Neuropathy, Pneumonia, and Vitamin D deficiency. Past Surgical History:   has a past surgical history that includes Cystocele repair; Rectocele repair; Hysterectomy; Cervical disc surgery; Cataract removal with implant (Right, 2014); Cataract removal with implant (Left, 2014); Total shoulder arthroplasty (Right, 2017); and shoulder surgery (Right, ). Overall Orientation Status: Within Functional Limits  Restrictions/Precautions  Restrictions/Precautions: Fall Risk  Required Braces or Orthoses?: Yes(back brace-wears mainly in the morning. )  Implants present? 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 Pt aslo has Left eye lid droop, per pt, its not Bell's palsy, but its from MS-facial muscle weakness. Subjective: Patient pleasant and agreeable to therapy. No complaints of dizziness this AM. Patient reports she is light headed upon arrival this PM. Pain meds taken before PM session.        Vital Signs  Pulse: 88(90 (PM))  Heart goal 1: pt able to perform transfers independently with or without appropriate device. Long term goal 2: pt able to ambulate with appropriate device safely distance of 150 to 200 ft, level surface, for 2MWT. Long term goal 3: Assess if pt able to ambulate safely without device for 10 to 20 ft. (at baseline at times pt able to walk shor distance without a device). Long term goal 4: pt able to go up and down 5 to 8 steps with 1 HR/st cane, supervsion level. Long term goal 5: improve Tinetti balance score to atleast 22/28 to reduce fall risk. 03/11/21 0930 03/11/21 1300   PT Individual Minutes   Time In 0930 1300   Time Out 1008 1350   Minutes 38 50       Electronically signed by LANCE Roe  The above documentation completed by Student Physical Therapist Assistant was provided under the direct line of sight by supervision. Documentation was reviewed and accepted by supervising Clinical Instructor [facility].

## 2021-03-11 NOTE — PROGRESS NOTES
Physical Medicine & Rehabilitation  Progress Note      Subjective:      Becky Stoner is a 80 y.o. female with MS exacerbation. She reports feeling pretty well today. She states that she is ready for discharge tomorrow. Discussed discharge plans. Vanessa removed this morning, and patient is voiding. Asked patient's nurse to check PVRs. Patient denies any acute concerns. ROS:  Denies fevers, chills, sweats. No chest pain, palpitations  Denies coughing, wheezing or shortness of breath. Denies abdominal pain, nausea, diarrhea or constipation. No new areas of joint pain. Denies new areas of numbness or weakness. Denies new anxiety or depression issues. No new skin problems. Rehabilitation:   Progressing in therapies. PT:  Restrictions/Precautions: Fall Risk  Implants present? 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 Pt aslo has Left eye lid droop, per pt, its not Bell's palsy, but its from MS-facial muscle weakness.    Required Braces or Orthoses  Spinal: (states she has back brace, uses PRN. )   Transfers  Sit to Stand: Modified independent  Stand to sit: Modified independent  Bed to Chair: Modified independent  Stand Pivot Transfers: Modified independent  Comment: Cues for hand placement and technique  Ambulation 1  Surface: level tile  Device: Rolling Walker  Other Apparatus: (Back brace in place for patient comfort)  Assistance: Modified Independent  Quality of Gait: No LOB  Gait Deviations: Slow Emily, Decreased step length, Decreased step height  Distance: 60ft x2   Comments: Posterior lean and minimally unsteady throughout, no LOB, cues to remain within walker NELDA and correct posture    Transfers  Sit to Stand: Modified independent  Stand to sit: Modified independent  Bed to Chair: Modified independent Transport items, To/from bathroom  Assist Level: Stand by assistance  Functional Mobility Comments: pt ambulated 20 feet to obtain set up for adls with walker - unsteady- having pain, fatigue     Bed mobility  Rolling to Left: Modified independent  Rolling to Right: Modified independent  Supine to Sit: Modified independent  Sit to Supine: Modified independent  Scooting: Modified independent  Comment: CGA for safety, patient complaint of dizziness and feeling \"woozy\"  Transfers  Stand Step Transfers: Stand by assistance  Stand Pivot Transfers: Stand by assistance  Sit to stand: Supervision  Stand to sit: Supervision  Transfer Comments: with RW, good hand placement throughout. Toilet Transfers  Toilet - Technique: Ambulating  Equipment Used: Grab bars  Toilet Transfer: Contact guard assistance  Toilet Transfers Comments: pt has nolan, no toilet transfer today     [address] - Transfer From: Wheelchair  Shower - Transfer Type: To and From  Shower - Transfer To:  Transfer tub bench  Shower - Technique: Stand pivot  Shower Transfers: Stand by assistance  Shower Transfers Comments: Used GB for support as needed; Good safety noted       SPEECH:      Current Medications:   Current Facility-Administered Medications: docusate sodium (COLACE) capsule 100 mg, 100 mg, Oral, BID  fluconazole (DIFLUCAN) tablet 100 mg, 100 mg, Oral, Daily  acetaminophen (TYLENOL) tablet 500 mg, 500 mg, Oral, Q6H PRN  calcium carbonate (TUMS) chewable tablet 500 mg, 500 mg, Oral, TID PRN  hydrALAZINE (APRESOLINE) tablet 50 mg, 50 mg, Oral, Q6H PRN  enoxaparin (LOVENOX) injection 30 mg, 30 mg, Subcutaneous, Daily  famotidine (PEPCID) tablet 10 mg, 10 mg, Oral, Daily  dextrose 5 % solution, 100 mL/hr, Intravenous, PRN  dextrose 50 % IV solution, 12.5 g, Intravenous, PRN  glucagon (rDNA) injection 1 mg, 1 mg, Intramuscular, PRN  glucose (GLUTOSE) 40 % oral gel 15 g, 15 g, Oral, PRN  amitriptyline (ELAVIL) tablet 100 mg, 100 mg, Oral, Nightly  atorvastatin (LIPITOR) tablet 20 mg, 20 mg, Oral, Nightly  benzonatate (TESSALON) capsule 100 mg, 100 mg, Oral, TID PRN  butalbital-APAP-caffeine -40 MG per capsule 1 capsule, 1 capsule, Oral, Q6H PRN  dextromethorphan (DELSYM) 30 MG/5ML extended release liquid 30 mg, 30 mg, Oral, 2 times per day  gabapentin (NEURONTIN) capsule 100 mg, 100 mg, Oral, BID  HYDROcodone-acetaminophen (NORCO) 5-325 MG per tablet 1 tablet, 1 tablet, Oral, Q6H PRN  polyethylene glycol (GLYCOLAX) packet 17 g, 17 g, Oral, Daily  senna (SENOKOT) tablet 17.2 mg, 2 tablet, Oral, Daily PRN  bisacodyl (DULCOLAX) suppository 10 mg, 10 mg, Rectal, Daily PRN  albuterol sulfate  (90 Base) MCG/ACT inhaler 2 puff, 2 puff, Inhalation, Q6H PRN      Objective:  BP (!) 132/58   Pulse 95   Temp 98 °F (36.7 °C) (Oral)   Resp 16   Ht 4' 11\" (1.499 m)   Wt 135 lb (61.2 kg)   SpO2 96%   BMI 27.27 kg/m²       GEN: Well developed, well nourished, no acute distress  HEENT: NCAT. EOMI. Hearing grossly intact. Mucous membranes pink and moist.  RESP: Normal breath sounds with no wheezing, rales, or rhonchi. Respirations WNL and unlabored. CV: Regular rate and rhythm. No murmurs, rubs, or gallops. ABD: Soft, non-distended, BS+ and equal.  NEURO: Alert. Speech fluent with no aphasia or dysarthria noted. Left facial droop. MSK: Functional ROM in all limbs. Muscle tone and bulk are normal bilaterally. Strength 4+/5 in right upper limb. Strength 4/5 in left upper limb. LIMBS: No edema in bilateral lower limbs. SKIN: Warm and dry with good turgor. PSYCH: Mood WNL. Affect WNL. Appropriately interactive. Diagnostics:     CBC:   Recent Labs     03/10/21  0610   WBC 7.8   RBC 3.54*   HGB 11.3*   HCT 32.6*   MCV 92.2   RDW 13.8        BMP:   Recent Labs     03/10/21  0610   *   K 4.8   CL 94*   CO2 27   BUN 15   CREATININE 1.12*   GLUCOSE 116*     BNP: No results for input(s): BNP in the last 72 hours.   PT/INR: No results for input(s): PROTIME, INR in the last 72 hours. APTT: No results for input(s): APTT in the last 72 hours. CARDIAC ENZYMES: No results for input(s): CKMB, CKMBINDEX, TROPONINT in the last 72 hours. Invalid input(s): CKTOTAL;3  FASTING LIPID PANEL:  Lab Results   Component Value Date    CHOL 235 (H) 01/04/2012    HDL 46 05/21/2020    TRIG 119 01/04/2012     LIVER PROFILE: No results for input(s): AST, ALT, ALB, BILIDIR, BILITOT, ALKPHOS in the last 72 hours. Impression/Plan:   Impaired ADLs, gait, and mobility due to:    1. Acute MS exacerbation:  PT/OT for gait, mobility, strengthening, endurance, ADLs, and self care. To follow up with Dr. Phil Rolle as outpatient  2. EDINSON: Improving, monitoring BMP. Creatinine 1.12 on 3/10. Encourage hydration. IM discontinued lisinopril 3/9.  3. Urine retention: Nursing was performing timed voids while awake with intermittent straight cath prn if PVR > 350 ml. Performed x 1 on 3/5 and several times on 3/7. Urology consulted and recommended francisco placement with voiding trial on day prior to discharge with PVRs - francisco removed 3/11, asked nurse to check PVRs. 4. Vaginal candidiasis:  IM started diflucan 3/7 for 7 days. 5. Anemia: Resolved. Monitoring CBC  6. Leukocytosis:  Resolved. Likely secondary to recent burst of IV steroid. Will monitor. No current signs or symptoms of infection.   7. HTN/Hyperlipidemia: on atorvastatin, hydralazine prn, lisinopril (IM discontinued 3/9)  8. Arthritis: has Norco prn pain  9. Chronic daily headaches: worse in the morning. on amitriptyline q hs. has Norco and Fioricet prn. 10. GERD: on famotidine daily  11. Hyperglycemia: Resolved-  related to 5 days IV steroid. No history diabetes. IM discontinued insulin sliding scale. 12. Cough: Improving. has albuterol prn, tessalon prn, dextromethorphan BID  13. Stress incontinence/asymptomatic bacteriuria: for outpatient follow up with Dr. Analia Tejada. Completed Cipro 3/6.   14.  Bowel Management: Miralax daily, senokot prn, dulcolax prn. 15. DVT Prophylaxis:  low molecular weight heparin, SCD's while in bed and FRANCISCO's during the day  16.  Internal medicine for medical management      Electronically signed by Duke Gomes MD on 3/11/2021 at 9:12 PM

## 2021-03-11 NOTE — PLAN OF CARE
Nutrition Problem #1: Predicted inadequate energy intake  Intervention: Food and/or Nutrient Delivery: Continue Current Diet, Continue Oral Nutrition Supplement  Nutritional Goals: PO intake to closely meet estimated needs

## 2021-03-11 NOTE — PROGRESS NOTES
[facility] Internal Medicine    CONSULTATION / HISTORY AND PHYSICAL EXAMINATION            Date:   3/11/2021  Patient name:  Redd Pierre  Date of admission:  3/2/2021  3:19 PM  MRN:   [id number]  Account:  [de-identified]  YOB: 1934  PCP:    Barton Dandy, MD  Room:   [facility]2609-  Code Status:    Full Code    Physician Requesting Consult: Nguyen Burnette MD    Reason for Consult:  medical management    Chief Complaint:     No chief complaint on file.       History Obtained From:     Patient medical record nursing staff    History of Present Illness:   Patient admitted to acute rehab for rehabilitation  Internal medicine, consulted for medical management  Patient with multiple medical problems which include history of MS, since 1983, chronic headache, GERD, hypertension  She has chronic weakness on left side of her body, leg more than arm  She was admitted originally at [facility] with worsening dizziness, ataxia, CT head was done, which was negative for acute intracranial pathology  Patient was treated with IV steroids for 5 days, her symptoms improved, during her hospital stay, she was found to have urinary tract infection, urine culture was positive for Enterococcus faecalis, which was sensitive to ciprofloxacin, treated with antibiotics  Patient mention she has chronic headache for years, which got better with Norco  Her blood pressure was running high, she was started on Aldactone  Today morning, during therapy patient was found to be dizzy, her blood pressure was running low  No other complaints    Past Medical History:     Past Medical History:   Diagnosis Date    Acute cystitis without hematuria 10/1/2017    Arthritis     Chronic daily headache     GERD (gastroesophageal reflux disease)     Hyperlipidemia     Hypertension     Moderate malnutrition ([address] 3/2/2019    Multiple sclerosis ([address]     Neuropathy     Pneumonia 2017    states had puffs into the lungs every 4 hours as needed for Wheezing 2/10/20 2/9/21  Pauly Ambrose MD   HYDROcodone-acetaminophen (NORCO) 5-325 MG per tablet Take 1 tablet by mouth 3 times daily. Historical Provider, MD        Allergies:     Bactrim [sulfamethoxazole-trimethoprim], Lactose intolerance (gi), Pcn [penicillins], Lidoderm [lidocaine], and Macrobid [nitrofurantoin]    Social History:     Tobacco:    reports that she has never smoked. She has never used smokeless tobacco.  Alcohol:      reports no history of alcohol use. Drug Use:  reports no history of drug use. Family History:     No family history on file. Review of Systems:     Positive and Negative as described in HPI. CONSTITUTIONAL:  negative for fevers, chills, sweats, fatigue, weight loss  HEENT:  negative for vision, hearing changes, runny nose, throat pain  RESPIRATORY:  negative for shortness of breath, cough, congestion, wheezing. CARDIOVASCULAR:  negative for chest pain, palpitations. GASTROINTESTINAL:  negative for nausea, vomiting, diarrhea, constipation, change in bowel habits, abdominal pain   GENITOURINARY:  negative for difficulty of urination, burning with urination, frequency   INTEGUMENT:  negative for rash, skin lesions, easy bruising   HEMATOLOGIC/LYMPHATIC:  negative for swelling/edema   ALLERGIC/IMMUNOLOGIC:  negative for urticaria , itching  ENDOCRINE:  negative increase in drinking, increase in urination, hot or cold intolerance  MUSCULOSKELETAL:  negative joint pains, muscle aches, swelling of joints  NEUROLOGICAL: Dizziness, weakness on left side of body  BEHAVIOR/PSYCH:      Physical Exam:     BP (!) 146/72   Pulse 88 Comment: 90 (PM)  Temp 97.9 °F (36.6 °C)   Resp 19   Ht 4' 11\" (1.499 m)   Wt 135 lb (61.2 kg)   SpO2 98%   BMI 27.27 kg/m²   Temp (24hrs), Av.1 °F (36.7 °C), Min:97.9 °F (36.6 °C), Max:98.2 °F (36.8 °C)    No results for input(s): POCGLU in the last 72 hours.     Intake/Output Summary (Last Essential hypertension    Hyperlipidemia    Abnormal gait    Multiple sclerosis exacerbation (HCC)    Chronic headache    EDINSON (acute kidney injury) ([facility] [address]  Resolved Problems:    * No resolved hospital problems. *        Plan:     1. Admitted with multiple sclerosis exacerbation, treated with IV steroids feeling better  2. Urinary tract infection, completed abx on march 6  3. Hypertension,now bp is low  4. Stop ace lisinopril    5. Chronic headache, patient is on Elavil, [place] as needed  6. On DVT proxy Lovenox  7. Acute kidney injury, much improved, last creatinine is 1.12           Lorenso Dakins, MD  3/11/2021  3:01 PM    Copy sent to Dr. Adebayo Barahona MD    Please note that this chart was generated using voice recognition Dragon dictation software. Although every effort was made to ensure the accuracy of this automated transcription, some errors in transcription may have occurred.

## 2021-03-11 NOTE — CARE COORDINATION
Met with pt and reviewed discharge plan; pt in agreement and requested to contact her daughter Gerry Brownlee. Contacted daughter and provided discharge plan information; daughter agreeable and will pick-up her mother around 3PM tomorrow. She requested [facility] contact her to arrange for initial assessment. Contacted Mitali Holley at [address] and provided request; they will contact daughter.

## 2021-03-11 NOTE — PROGRESS NOTES
Comprehensive Nutrition Assessment    Type and Reason for Visit:  Reassess    Nutrition Recommendations/Plan: Continue current diet and oral nutrition supplement. Nutrition Assessment:  Pt states her PO intake varies and she doesn't have much of an appetite. Symptoms with MS contribute to decreased intake at times. Likes Ensure High Protein which has been provided with dinner since 3/8; states she drinks one Ensure per day at home also. Malnutrition Assessment:  Malnutrition Status: At risk for malnutrition (Comment)(Poor appetite)    Context:  Acute Illness     Findings of the 6 clinical characteristics of malnutrition:  Energy Intake:  Mild decrease in energy intake (Comment)  Weight Loss:  No significant weight loss     Body Fat Loss:  No significant body fat loss     Muscle Mass Loss:  No significant muscle mass loss    Fluid Accumulation:  No significant fluid accumulation     Strength:  Not Performed    Estimated Daily Nutrient Needs:  Energy (kcal):  1245 based on Nasim Danger with 1.3 factor; Weight Used for Energy Requirements:  Admission     Protein (g):  61 based on 1 gm per kg; Weight Used for Protein Requirements:  Admission(98lb)          Nutrition Related Findings:  No edema. Glucose: 116. Other labs and meds reviewed. Wounds:  None       Current Nutrition Therapies:    DIET GENERAL;  Dietary Nutrition Supplements: Low Calorie High Protein Supplement    Anthropometric Measures:  · Height: 4' 11\" (149.9 cm)  · Current Body Weight: 134 lb 14.7 oz (61.2 kg)   · Admission Body Weight: 134 lb 14.7 oz (61.2 kg)    · Ideal Body Weight: 95 lbs; % Ideal Body Weight 142 %   · BMI: 27.2  · BMI Categories: Overweight (BMI 25.0-29. 9)       Nutrition Diagnosis:   · Predicted inadequate energy intake related to (Decreased appetite) as evidenced by intake 26-50%(of meals)    Nutrition Interventions:   Food and/or Nutrient Delivery:  Continue Current Diet, Continue Oral Nutrition Supplement Nutrition Education/Counseling:  No recommendation at this time   Coordination of Nutrition Care:  Continue to monitor while inpatient, Coordination of Community Care(Contact [place] for possible oral nutrition supplement provision)    Goals:  PO intake to closely meet estimated needs       Nutrition Monitoring and Evaluation:   Behavioral-Environmental Outcomes:  None Identified   Food/Nutrient Intake Outcomes:  Food and Nutrient Intake  Physical Signs/Symptoms Outcomes:  Biochemical Data, Weight     Discharge Planning:    Continue current diet, Continue Oral Nutrition Supplement     Some areas of assessment may be incomplete due to standard COVID-19 Precautions. Tu De Leon R.D., L.D.   Phone: [phone]

## 2021-03-11 NOTE — PROGRESS NOTES
Removed francisco catheter per Dr. Lei Green order. Patient stated she had burning pain during removal. Recent urine output is 1200ml.

## 2021-03-11 NOTE — PLAN OF CARE
Problem: Falls - Risk of:  Goal: Will remain free from falls  Description: Will remain free from falls  3/11/2021 1811 by Shanelle Peralta RN  Outcome: Ongoing  3/11/2021 0515 by Oh Brewer RN  Outcome: Ongoing  Goal: Absence of physical injury  Description: Absence of physical injury  3/11/2021 1811 by Shanelle Peralta RN  Outcome: Ongoing  3/11/2021 0515 by Oh Brewer RN  Outcome: Ongoing     Problem: Pain:  Goal: Pain level will decrease  Description: Pain level will decrease  3/11/2021 1811 by Shanelle Peralta RN  Outcome: Ongoing  3/11/2021 0515 by Oh Brewer RN  Outcome: Ongoing  Note: Pain is managed by Norco 5-325 mg Q6H. Patient last received dose 2030.   Goal: Control of acute pain  Description: Control of acute pain  3/11/2021 1811 by Shanelle Peralta RN  Outcome: Ongoing  3/11/2021 0515 by Oh Brewer RN  Outcome: Ongoing  Goal: Control of chronic pain  Description: Control of chronic pain  3/11/2021 1811 by Shanelle Peralta RN  Outcome: Ongoing  3/11/2021 0515 by Oh Brewer RN  Outcome: Ongoing     Problem: Musculor/Skeletal Functional Status  Goal: Highest potential functional level  3/11/2021 1811 by Shanelle Peralta RN  Outcome: Ongoing  3/11/2021 0515 by Oh Brewer RN  Outcome: Ongoing  Goal: Absence of falls  3/11/2021 1811 by Shanelle Peralta RN  Outcome: Ongoing  3/11/2021 0515 by Oh Brewer RN  Outcome: Ongoing     Problem: Nutrition  Goal: Optimal nutrition therapy  3/11/2021 1811 by Shanelle Peralta RN  Outcome: Ongoing  3/11/2021 0515 by Oh Brewer RN  Outcome: Ongoing     Problem: Skin Integrity:  Goal: Will show no infection signs and symptoms  Description: Will show no infection signs and symptoms  3/11/2021 1811 by Shanelle Peralta RN  Outcome: Ongoing  3/11/2021 0515 by Oh Brewer RN  Outcome: Ongoing  Goal: Absence of new skin breakdown  Description: Absence of new skin breakdown  3/11/2021 1811 by Shanelle Peralta RN  Outcome: Ongoing  3/11/2021 0515 by Oh Brewer RN Outcome: Ongoing

## 2021-03-11 NOTE — PROGRESS NOTES
[facility]   ACUTE REHABILITATION OCCUPATIONAL THERAPY  DAILY NOTE    Date: 3/11/21  Patient Name: Felicia Bryson      Room: 5701/0743-63    MRN: [id number]   : 1934  (80 y.o.)  Gender: female   Referring Practitioner: Dr. Catracho Martínez  Diagnosis: Exacerbation of multiple sclerosis, Urinary tract infection, completed antibiotics on , has francisco catheter     Restrictions  Restrictions/Precautions: Fall Risk  Implants present? 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 Pt aslo has Left eye lid droop, per pt, its not Bell's palsy, but its from MS-facial muscle weakness. Required Braces or Orthoses  Spinal: (states she has back brace, uses PRN. )  Required Braces or Orthoses?: Yes(back brace-wears mainly in the morning. )    Subjective  Subjective: \"I do feel much better today, I get to go home tomorrow\"  Comments: Pt pleasant and cooperative. Patient Currently in Pain: Yes  Restrictions/Precautions: Fall Risk  Overall Orientation Status: Within Functional Limits     Pain Assessment  Pain Assessment: 0-10  Pain Level: 5  Pain Type: Acute pain  Pain Location: Head, Hip  Pain Orientation: Left    Objective  Cognition  Overall Cognitive Status: Impaired  Arousal/Alertness: Delayed responses to stimuli  Following Directions: Follows two step commands  Attention Span: Attends with cues to redirect  Memory: Decreased recall of recent events;Decreased short term memory  Safety Judgement: Decreased awareness of need for safety  Insights: Decreased awareness of deficits  Sequencing and Organization: Assistance required to identify errors made  Additional Comments: mild confusion intermittent throughout basic care tasks. Pt has assist with higher level IADL's such as finances, medications, shopping. Perception  Overall Perceptual Status: WFL     Bed mobility  Supine to Sit: Modified independent  Transfers  Sit to stand: Supervision  Stand to sit: Supervision  Transfer Comments: with RW, good hand placement throughout. Standing Balance  Time: AM: 4-5 min, ~2 min, <1 min, 3-4 min PM: 7-8 min   Activity: Am: self care tasks in room/bathroom PM: IADL tasks   Comment: 0-1 UE support. Functional Mobility  Functional - Mobility Device: Rolling Walker  Activity: Other;Retrieve items;Transport items; To/from bathroom  Assist Level: Stand by assistance     Type of ROM/Therapeutic Exercise  Type of ROM/Therapeutic Exercise: Free weights(2#, x10-15 )  Comment: Pt engaged in UB strengthening to promote increased strength and endurance for functional task tolerance. Pt requires cues for encouargement, proper technique. Rest breaks throughout for energy conservation. Exercises  Scapular Protraction: x  Scapular Retraction: x  Shoulder Flexion: x  Shoulder Extension: x  Elbow Flexion: x  Elbow Extension: x  Wrist Flexion: x  Wrist Extension: x        Left Hand Strength -  (lbs)  Handle Setting 2: 24#(improved by 5.5# since eval. NORMS 31-50)        Right Hand Strength -  (lbs)  Handle Setting 2: 27#(improved by 3# since eval (NORMS 31-50#))           ADL  Feeding: Modified independent   Grooming: Modified independent   UE Bathing: Setup(SBA and Vc's for set up from closet)  LE Bathing: Supervision;Setup; Increased time to complete(SBA for set up from closet )  UE Dressing: Setup  LE Dressing: Supervision;Setup; Increased time to complete  Toileting: Stand by assistance(per pt report this date. )     Meal Prep  Meal Prep Level: Walker  Meal Prep Level of Assistance: Stand by assistance  Meal Preparation: Pt reengaged in refridgerator and freezer access and reaching for items within fridge/freezer, bringing to countertop and transfer to tabletop within home restrictions as verbalized by pt.  Pt stood at counter to

## 2021-03-12 VITALS
OXYGEN SATURATION: 97 % | TEMPERATURE: 97.8 F | SYSTOLIC BLOOD PRESSURE: 141 MMHG | HEART RATE: 96 BPM | DIASTOLIC BLOOD PRESSURE: 78 MMHG | HEIGHT: 59 IN | WEIGHT: 135 LBS | BODY MASS INDEX: 27.21 KG/M2 | RESPIRATION RATE: 14 BRPM

## 2021-03-12 PROCEDURE — 6360000002 HC RX W HCPCS: Performed by: INTERNAL MEDICINE

## 2021-03-12 PROCEDURE — 99238 HOSP IP/OBS DSCHRG MGMT 30/<: CPT | Performed by: STUDENT IN AN ORGANIZED HEALTH CARE EDUCATION/TRAINING PROGRAM

## 2021-03-12 PROCEDURE — 6370000000 HC RX 637 (ALT 250 FOR IP): Performed by: PHYSICAL MEDICINE & REHABILITATION

## 2021-03-12 PROCEDURE — 97530 THERAPEUTIC ACTIVITIES: CPT

## 2021-03-12 PROCEDURE — 6370000000 HC RX 637 (ALT 250 FOR IP): Performed by: INTERNAL MEDICINE

## 2021-03-12 PROCEDURE — 51798 US URINE CAPACITY MEASURE: CPT

## 2021-03-12 PROCEDURE — 97116 GAIT TRAINING THERAPY: CPT

## 2021-03-12 RX ADMIN — DOCUSATE SODIUM 100 MG: 100 CAPSULE, LIQUID FILLED ORAL at 09:18

## 2021-03-12 RX ADMIN — Medication 30 MG: at 09:18

## 2021-03-12 RX ADMIN — FAMOTIDINE 10 MG: 20 TABLET ORAL at 09:18

## 2021-03-12 RX ADMIN — METRONIDAZOLE 100 MG: 500 TABLET ORAL at 09:18

## 2021-03-12 RX ADMIN — FLUCONAZOLE 100 MG: 100 TABLET ORAL at 09:18

## 2021-03-12 RX ADMIN — HYDROCODONE BITARTRATE AND ACETAMINOPHEN 1 TABLET: 5; 325 TABLET ORAL at 09:18

## 2021-03-12 RX ADMIN — POLYETHYLENE GLYCOL 3350 17 G: 17 POWDER, FOR SOLUTION ORAL at 09:18

## 2021-03-12 RX ADMIN — ENOXAPARIN SODIUM 30 MG: 30 INJECTION SUBCUTANEOUS at 09:18

## 2021-03-12 ASSESSMENT — PAIN DESCRIPTION - LOCATION
LOCATION: HIP
LOCATION: HIP

## 2021-03-12 ASSESSMENT — PAIN DESCRIPTION - PAIN TYPE
TYPE: ACUTE PAIN
TYPE_2: ACUTE PAIN

## 2021-03-12 ASSESSMENT — PAIN DESCRIPTION - INTENSITY: RATING_2: 7

## 2021-03-12 ASSESSMENT — PAIN DESCRIPTION - ORIENTATION: ORIENTATION: LEFT

## 2021-03-12 NOTE — PLAN OF CARE
Problem: Falls - Risk of:  Goal: Will remain free from falls  Description: Will remain free from falls  3/12/2021 0300 by Guilherme Hernadez RN  Outcome: Ongoing     Problem: Falls - Risk of:  Goal: Absence of physical injury  Description: Absence of physical injury  3/12/2021 0300 by Guilherme Hernadez RN  Outcome: Ongoing     Problem: Pain:  Goal: Pain level will decrease  Description: Pain level will decrease  3/12/2021 0300 by Guilherme Hernadez RN  Outcome: Ongoing     Problem: Pain:  Goal: Control of acute pain  Description: Control of acute pain  3/12/2021 0300 by Guilherme Hernadez RN  Outcome: Ongoing     Problem: Pain:  Goal: Control of chronic pain  Description: Control of chronic pain  3/12/2021 0300 by Guilherme Hernadez RN  Outcome: Ongoing     Problem: Musculor/Skeletal Functional Status  Goal: Highest potential functional level  3/12/2021 0300 by Guilherme Hernadez RN  Outcome: Ongoing     Problem: Musculor/Skeletal Functional Status  Goal: Absence of falls  3/12/2021 0300 by Guilherme Hernadez RN  Outcome: Ongoing     Problem: Nutrition  Goal: Optimal nutrition therapy  3/12/2021 0300 by Guilherme Hernadez RN  Outcome: Ongoing     Problem: Skin Integrity:  Goal: Will show no infection signs and symptoms  Description: Will show no infection signs and symptoms  3/12/2021 0300 by Guilherme Hernadez RN  Outcome: Ongoing     Problem: Skin Integrity:  Goal: Absence of new skin breakdown  Description: Absence of new skin breakdown  3/12/2021 0300 by Guilherme Hernadez RN  Outcome: Ongoing

## 2021-03-12 NOTE — PROGRESS NOTES
[facility]   ACUTE REHABILITATION OCCUPATIONAL THERAPY  DAILY NOTE    Date: 3/12/21  Patient Name: Omayra [facility]      Room: 4117/8861-02    MRN: [id number]   : 1934  (80 y.o.)  Gender: female   Referring Practitioner: Dr. Vicente Kinsey  Diagnosis: Exacerbation of multiple sclerosis, Urinary tract infection, completed antibiotics on , has francisco catheter       Restrictions  Restrictions/Precautions: Fall Risk  Implants present? 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 Pt aslo has Left eye lid droop, per pt, its not Bell's palsy, but its from MS-facial muscle weakness. Required Braces or Orthoses  Spinal: (states she has a back brace, uses PRN)  Required Braces or Orthoses?: Yes(back brace however not at hospital )    Subjective  Comments: Pt pleasant and cooperative. Patient Currently in Pain: Denies  Restrictions/Precautions: Fall Risk  Overall Orientation Status: Within Functional Limits     Pain Assessment  Pain Assessment: 0-10  Pain Level: 5  Pain Type: Acute pain  Pain Location: Head, Hip  Pain Orientation: Left    Objective     Balance  Sitting Balance: Independent  Standing Balance: Supervision  Bed mobility  Supine to Sit: Modified independent  Sit to Supine: Modified independent  Transfers  Sit to stand: Supervision  Stand to sit: Supervision  Transfer Comments: with RW, minimal cues for hand placement. Standing Balance  Time: ~10 min, ~8 min, ~1 min x 3   Activity: functional care tasks in room   Comment: 0-1 UE support. Functional Mobility  Functional - Mobility Device: Rolling Walker  Activity: Other;Retrieve items;Transport items; To/from bathroom  Assist Level: Supervision     Type of ROM/Therapeutic Exercise  Type of ROM/Therapeutic Exercise: Resistive Bands  Comment: and Good safety using AE if needed. Short term goal 3: Pt will consistently complete toilet transfer and toileting with supervision and Good safety using least restrictive device. Short term goal 4: Pt will V/D good understanding of fall prevention strategies as well as EC/WS techniques during functional tasks. Short term goal 5: Pt will actively participate in 30 minutes of therapeutic exercise/functional activities to promote increased independence with self-care and mobility. Long term goals  Time Frame for Long term goals : By Discharge  Long term goal 1: Pt will complete BADL's with Mod I and Good safety using AE as needed. Long term goal 2: Pt will complete functional transfers with Mod I and Good safety using least restrictive device. Long term goal 3: Pt will complete simple meal prep/light housekeeping tasks with Mod I and Good safety using least restrictive device.         03/12/21 1207   OT Individual Minutes   Time In 7203   Time Out 1138   Minutes 33     Electronically signed by SANDI Manriquez on 3/12/21 at 3:59 PM EST

## 2021-03-12 NOTE — PROGRESS NOTES
Physical Therapy  [facility]: [facility]  Acute Rehabilitation Physical Therapy Progress Note    Date: 3/12/21  Patient Name: Redd Pierre       Room: 5898/5702-59  MRN: [id number]   Account: [de-identified]   : 1934  (80 y.o.) Gender: female     Referring Practitioner: Dr. Author Parkinson  Diagnosis: Exacerbation of multiple sclerosis, Urinary tract infection, completed antibiotics on , has francisco catheter  Past Medical History:  has a past medical history of Acute cystitis without hematuria, Arthritis, Chronic daily headache, GERD (gastroesophageal reflux disease), Hyperlipidemia, Hypertension, Moderate malnutrition ([address] Multiple sclerosis ([address] Neuropathy, Pneumonia, and Vitamin D deficiency. Past Surgical History:   has a past surgical history that includes Cystocele repair; Rectocele repair; Hysterectomy; Cervical disc surgery; Cataract removal with implant (Right, 2014); Cataract removal with implant (Left, 2014); Total shoulder arthroplasty (Right, 2017); and shoulder surgery (Right, ). Overall Orientation Status: Within Functional Limits  Restrictions/Precautions  Restrictions/Precautions: Fall Risk  Required Braces or Orthoses?: Yes(back brace-wears mainly in the morning. )  Implants present? 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 Pt aslo has Left eye lid droop, per pt, its not Bell's palsy, but its from MS-facial muscle weakness. Subjective: Patient reports being light headed this morning, agreeable to therapy.         Vital Signs  Pulse: 96  Heart Rate Source: Monitor  BP: (!) 141/78  BP Location: Right upper arm  Patient Currently in Pain: Yes  Pain Assessment: 0-10  Pain Level: 5  Pain Type: Acute pain  Pain Location: Hip  Pain Orientation: Left     Oxygen Therapy  SpO2: 97 %          Bed Mobility:   Rolling: Modified independent  Supine to Sit: Modified independent  Sit to Supine: Modified independent  Scooting: Modified independent      Transfers:  Sit to Stand: Modified independent  Stand to sit: Modified independent  Bed to Chair: Modified independent              Ambulation 1  Surface: level tile  Device: Rolling Walker  Assistance: Modified Independent  Quality of Gait: No LOB  Gait Deviations: Slow Emily  Distance: 60ft         Ambulation 2  Surface - 2: level tile  Device 2: Rolling Walker  Assistance 2: Supervision  Gait Deviations: Slow Emily  Distance: 200 ft 2MWT  Comments: No LOB     Stairs/Curb  Stairs?: Yes  Stairs  # Steps : 10  Stairs Height: (4\"/6\")  Rails: Right ascending  Device: No Device  Assistance: Stand by assistance  Comment: Reciprocal pattern               BALANCE Posture: Fair  Sitting - Static: Good  Sitting - Dynamic: Fair;+  Standing - Static: Fair(w/ RW)  Standing - Dynamic: Fair( with RW)    EXERCISES    Other exercises?: Yes  Other exercises 1: 2 MWT: 200ft with RW  Other exercises 2: Tinetti Assessment: 21/28           Activity Tolerance: Patient limited by fatigue, Patient limited by endurance  Activity Tolerance: Low BP, dizziness and lightheadness, headaches, \"difficulties focusing\"  PT Equipment Recommendations  Equipment Needed: No  Other: Patient has RW and single point cane at home.          Current Treatment Recommendations: Strengthening, Home Exercise Program, Safety Education & Training, Balance Training, Endurance Training, Patient/Caregiver Education & Training, Equipment Evaluation, Education, & procurement, Functional Mobility Training, Transfer Training, Gait Training, Stair training    Conditions Requiring Skilled Therapeutic Intervention  Body structures, Functions, Activity limitations: Decreased strength;Decreased safe awareness;Decreased balance; Increased pain;Decreased functional mobility ; Decreased endurance  Treatment Diagnosis: impaired mobility due to weakness and debilitation  Prognosis: Good  Decision Making: Medium Complexity  History: Admitted due to exacerbation of MS  Clinical Presentation: Motivated and pleasant. Barriers to Learning: Torres Martinez  REQUIRES PT FOLLOW UP: Yes  Discharge Recommendations: Patient would benefit from continued therapy after discharge;Home with assist PRN    Goals  Short term goals  Time Frame for Short term goals: 5 to 6 days. Short term goal 1: pt to tolerate 1/2 to 45 minutes of therapuetic exercise and activity, four sessions per day. Short term goal 2: pt to demonstrate good technique for balance activities, strengthening exercises and energy conservation techniques  Short term goal 3: pt to demonstrate independent rolling in bed for position change  Short term goal 4: pt to demonstrate transfers supine <> sit w/ supervision  Short term goal 5: pt to demonstrate transfers sit <> stand and bed <> chair using wheeled walker w/ SBA  Short term goal 6: pt to demonstrate good sitting dynamic balance and fair + or better standing balance using wheeled walker  Short term goal 7: pt to demonstrate gait 200 ft using wheeled walker w/ SBA  Short term goal 8: pt to advance to and demonstrate ability to ascend/ descend 4-6\" step using rail and st cane w/ min x 1  Long term goals  Time Frame for Long term goals : By DC  Long term goal 1: pt able to perform transfers independently with or without appropriate device. Long term goal 2: pt able to ambulate with appropriate device safely distance of 150 to 200 ft, level surface, for 2MWT. Long term goal 3: Assess if pt able to ambulate safely without device for 10 to 20 ft. (at baseline at times pt able to walk shor distance without a device).   Long term goal 4: pt able to go up and down 5 to 8 steps with 1 HR/st cane, supervsion level.   Long term goal 5: improve Tinetti balance score to atleast 22/28 to reduce fall risk.        03/12/21 0930   PT Individual Minutes   Time In 0930   Time Out 1010   Minutes 40       Electronically signed by Nelia Mike PTA on 3/12/21 at 1:25 PM EST

## 2021-03-12 NOTE — PROGRESS NOTES
Pt. Discharged in stable in condition. Discharge teaching and instructions completed with patient and pt. Daughter using teachback method. AVS reviewed. Patient voiced understanding regarding medications, follow up appointments, and care of self at home.  Discharged in a wheelchair to  home with support per family

## 2021-03-13 NOTE — DISCHARGE SUMMARY
Recommendations  Equipment Needed: (TBD),      Speech therapy:         Inpatient Rehabilitation Course:   Asad Eisenberg is a 80 y.o. female admitted to inpatient rehabilitation on 3/2/2021 for rehab for acute exacerbation of multiple sclerosis. She was originally admitted to [address] [place] [address] on 2/25/21 for dizziness, abdominal pain, and diarrhea. She reportedly ate a fish sandwich the night prior to presentation and developed abdominal pain, diarrhea, nausea, and an episode of vomiting a few hours later. The next morning she awoke with dizziness and difficulty walking and was brought to the ED by her daughter, as a previous MS exacerbation had a similar presentation. Hospital course was complicated by EDINSON and asymptomatic bacteriuria. She was started on solumedrol for 5 days and IV fluids. She was also started on cipro for asymptomatic bacteriuria.     She was requiring assistance for self-care activities and mobility prompting admission to inpatient rehab. Rehab course: The patient participated in an aggressive multidisciplinary inpatient rehabilitation program involving 900 minutes of rehabilitation over 7 days. Patient benefited from inpatient rehab and was discharged in good stable condition. Lisinopril was discontinued due to hypotension. Vanessa was placed due to urinary retention but was removed on 3/11/21 with  mL. She was treated with diflucan for vaginal candidiasis. Chronic conditions were otherwise stable on medications. Patient evaluated today:  GEN: Well developed, well nourished, no acute distress  HEENT: NCAT. EOMI. Hearing grossly intact. Mucous membranes pink and moist.  RESP: Normal breath sounds with no wheezing, rales, or rhonchi. Respirations WNL and unlabored. CV: Regular rate and rhythm. No murmurs, rubs, or gallops. ABD: Soft, non-distended, BS+ and equal.  NEURO: Alert. Speech fluent with no aphasia or dysarthria noted. Left facial droop.   Sensation to light touch intact. MSK: Functional ROM in all limbs. Muscle tone and bulk are normal bilaterally. Strength 4+/5 in right upper limb. Strength 4/5 in left upper limb. Strength 4+/5 in bilateral lower limbs. LIMBS: No edema in bilateral lower limbs. SKIN: Warm and dry with good turgor. PSYCH: Mood WNL. Affect WNL. Appropriately interactive.       Consults:   urology and internal medicine    Significant Diagnostics:   CBC:   Lab Results   Component Value Date    WBC 7.8 03/10/2021    RBC 3.54 03/10/2021    RBC 3.45 01/05/2012    HGB 11.3 03/10/2021    HCT 32.6 03/10/2021    MCV 92.2 03/10/2021    MCH 32.0 03/10/2021    MCHC 34.8 03/10/2021    RDW 13.8 03/10/2021     03/10/2021     01/05/2012    MPV 7.9 03/10/2021     BMP:    Lab Results   Component Value Date     03/10/2021    K 4.8 03/10/2021    CL 94 03/10/2021    CO2 27 03/10/2021    BUN 15 03/10/2021    LABALBU 3.9 02/25/2021    LABALBU 3.7 01/05/2012    CREATININE 1.12 03/10/2021    CALCIUM 9.0 03/10/2021    GFRAA 56 03/10/2021    LABGLOM 46 03/10/2021    GLUCOSE 116 03/10/2021    GLUCOSE 112 01/05/2012         No orders to display         Patient Instructions:    No driving until cleared by a physician    Medications, precautions and follow up reviewed with patient and family    Follow-up visits: See after visit summary from hospitalization  Follow up PCP 3/25/21, Dr. Avi Jovel 3/29/21, Dr. Taylor Thomas 3/25/21, Dr. Tonia Donald 4/8/21    Disposition:  Home with home health    Discharge Medications:   Andrew Mock   Home Medication Instructions OKX:[id number]    Printed on:03/12/21 5250   Medication Information                      albuterol sulfate HFA (VENTOLIN HFA) 108 (90 Base) MCG/ACT inhaler  Inhale 2 puffs into the lungs every 6 hours as needed for Wheezing             amitriptyline (ELAVIL) 100 MG tablet  Take 1 tablet by mouth nightly             fluconazole (DIFLUCAN) 100 MG tablet  Take 1 tablet by mouth daily for 1 day fluticasone (FLONASE) 50 MCG/ACT nasal spray  1 spray by Each Nostril route daily             gabapentin (NEURONTIN) 100 MG capsule  Take 1 capsule by mouth 2 times daily for 30 days. HYDROcodone-acetaminophen (NORCO) 5-325 MG per tablet  Take 1 tablet by mouth 3 times daily.               omeprazole (PRILOSEC) 20 MG delayed release capsule  Take 1 capsule by mouth daily             polyethylene glycol (GLYCOLAX) 17 GM/SCOOP powder  Take 17 g by mouth daily             simvastatin (ZOCOR) 20 MG tablet  Take 1 tablet by mouth nightly                   Ruperto Carmona MD

## 2021-03-15 ENCOUNTER — TELEPHONE (OUTPATIENT)
Dept: FAMILY MEDICINE CLINIC | Age: 86
End: 2021-03-15

## 2021-03-15 NOTE — TELEPHONE ENCOUNTER
Patient c/o a lot of indigestion and feels like a lump in throat. Especially at night and on right side. Takes omperzole, can dose be increased or is there something else she can take?   Call daughter J Luis Diallo back

## 2021-03-16 ENCOUNTER — TELEPHONE (OUTPATIENT)
Dept: FAMILY MEDICINE CLINIC | Age: 86
End: 2021-03-16

## 2021-03-16 NOTE — TELEPHONE ENCOUNTER
Spoke with Chi Cleary and she stated mom describes it has a burning sensation in throat. States she has noticed she has been burping more. States they will try to gas-x and let us know if no improvement.

## 2021-03-30 ENCOUNTER — OFFICE VISIT (OUTPATIENT)
Dept: FAMILY MEDICINE CLINIC | Age: 86
End: 2021-03-30
Payer: MEDICARE

## 2021-03-30 VITALS
BODY MASS INDEX: 27.94 KG/M2 | HEART RATE: 92 BPM | HEIGHT: 59 IN | TEMPERATURE: 97 F | OXYGEN SATURATION: 94 % | SYSTOLIC BLOOD PRESSURE: 126 MMHG | DIASTOLIC BLOOD PRESSURE: 62 MMHG | WEIGHT: 138.6 LBS

## 2021-03-30 DIAGNOSIS — M79.605 LEFT LEG PAIN: ICD-10-CM

## 2021-03-30 DIAGNOSIS — E11.9 TYPE 2 DIABETES MELLITUS WITHOUT COMPLICATION, WITHOUT LONG-TERM CURRENT USE OF INSULIN (HCC): ICD-10-CM

## 2021-03-30 DIAGNOSIS — E78.5 HYPERLIPIDEMIA, UNSPECIFIED HYPERLIPIDEMIA TYPE: ICD-10-CM

## 2021-03-30 DIAGNOSIS — L20.9 ATOPIC DERMATITIS, UNSPECIFIED TYPE: ICD-10-CM

## 2021-03-30 DIAGNOSIS — R73.9 HYPERGLYCEMIA: Primary | ICD-10-CM

## 2021-03-30 DIAGNOSIS — B37.31 VULVOVAGINAL CANDIDIASIS: ICD-10-CM

## 2021-03-30 DIAGNOSIS — R60.0 LEG EDEMA, LEFT: ICD-10-CM

## 2021-03-30 DIAGNOSIS — M15.9 GENERALIZED OSTEOARTHRITIS: ICD-10-CM

## 2021-03-30 DIAGNOSIS — G35 MULTIPLE SCLEROSIS EXACERBATION (HCC): ICD-10-CM

## 2021-03-30 PROBLEM — A05.9 FOOD POISONING: Status: RESOLVED | Noted: 2021-02-26 | Resolved: 2021-03-30

## 2021-03-30 LAB — HBA1C MFR BLD: 6.7 %

## 2021-03-30 PROCEDURE — 83036 HEMOGLOBIN GLYCOSYLATED A1C: CPT | Performed by: INTERNAL MEDICINE

## 2021-03-30 PROCEDURE — 1111F DSCHRG MED/CURRENT MED MERGE: CPT | Performed by: INTERNAL MEDICINE

## 2021-03-30 PROCEDURE — 99214 OFFICE O/P EST MOD 30 MIN: CPT | Performed by: INTERNAL MEDICINE

## 2021-03-30 RX ORDER — CLOTRIMAZOLE AND BETAMETHASONE DIPROPIONATE 10; .64 MG/G; MG/G
CREAM TOPICAL
Qty: 15 G | Refills: 1 | Status: SHIPPED | OUTPATIENT
Start: 2021-03-30 | End: 2021-08-11 | Stop reason: SDUPTHER

## 2021-03-30 RX ORDER — CLOBETASOL PROPIONATE 0.5 MG/G
CREAM TOPICAL
Qty: 15 G | Refills: 0 | Status: ON HOLD | OUTPATIENT
Start: 2021-03-30 | End: 2022-02-20 | Stop reason: HOSPADM

## 2021-03-30 RX ORDER — FLUCONAZOLE 100 MG/1
200 TABLET ORAL DAILY
Qty: 14 TABLET | Refills: 0 | Status: SHIPPED | OUTPATIENT
Start: 2021-03-30 | End: 2021-04-06

## 2021-03-30 RX ORDER — CELECOXIB 200 MG/1
200 CAPSULE ORAL DAILY
Qty: 30 CAPSULE | Refills: 3 | Status: SHIPPED | OUTPATIENT
Start: 2021-03-30 | End: 2021-07-22 | Stop reason: SDUPTHER

## 2021-03-30 NOTE — PROGRESS NOTES
albuterol sulfate HFA (VENTOLIN HFA) 108 (90 Base) MCG/ACT inhaler  Inhale 2 puffs into the lungs every 6 hours as needed for Wheezing             amitriptyline (ELAVIL) 100 MG tablet  Take 1 tablet by mouth nightly             fluticasone (FLONASE) 50 MCG/ACT nasal spray  1 spray by Each Nostril route daily             gabapentin (NEURONTIN) 100 MG capsule  Take 1 capsule by mouth 2 times daily for 30 days. HYDROcodone-acetaminophen (NORCO) 5-325 MG per tablet  Take 1 tablet by mouth 3 times daily. omeprazole (PRILOSEC) 20 MG delayed release capsule  Take 1 capsule by mouth daily             polyethylene glycol (GLYCOLAX) 17 GM/SCOOP powder  Take 17 g by mouth daily             simvastatin (ZOCOR) 20 MG tablet  Take 1 tablet by mouth nightly                   Medications marked \"taking\" at this time  Outpatient Medications Marked as Taking for the 3/30/21 encounter (Office Visit) with Clara Lora MD   Medication Sig Dispense Refill    gabapentin (NEURONTIN) 100 MG capsule Take 1 capsule by mouth 2 times daily for 30 days. 60 capsule 0    amitriptyline (ELAVIL) 100 MG tablet Take 1 tablet by mouth nightly 30 tablet 0    simvastatin (ZOCOR) 20 MG tablet Take 1 tablet by mouth nightly 30 tablet 0    omeprazole (PRILOSEC) 20 MG delayed release capsule Take 1 capsule by mouth daily 30 capsule 0    albuterol sulfate HFA (VENTOLIN HFA) 108 (90 Base) MCG/ACT inhaler Inhale 2 puffs into the lungs every 6 hours as needed for Wheezing 1 Inhaler 0    HYDROcodone-acetaminophen (NORCO) 5-325 MG per tablet Take 1 tablet by mouth 3 times daily.            Medications patient taking as of now reconciled against medications ordered at time of hospital discharge: Yes    Chief Complaint   Patient presents with    Follow-Up from Hospital    Vaginitis    Joint Swelling     bilateral ankles       History of Present illness - Follow up of Hospital diagnosis(es): admitted with MS Flare, discharged to [facility] rehab, discharged home 3/12/21 with improvement in weakness. Inpatient course: Discharge summary reviewed- see chart. Interval history/Current status: seen Neurology last week, taken off the gabapentin as scheduled, using it as needed when neuropathy kicks in. A comprehensive review of systems was negative except for what was noted in the HPI. Vitals:    03/30/21 1536   BP: 126/62   Site: Right Upper Arm   Position: Sitting   Cuff Size: Small Adult   Pulse: 92   Temp: 97 °F (36.1 °C)   TempSrc: Temporal   SpO2: 94%   Weight: 138 lb 9.6 oz (62.9 kg)   Height: 4' 11\" (1.499 m)     Body mass index is 27.99 kg/m². Wt Readings from Last 3 Encounters:   03/30/21 138 lb 9.6 oz (62.9 kg)   03/02/21 135 lb (61.2 kg)   03/02/21 116 lb 2.9 oz (52.7 kg)     BP Readings from Last 3 Encounters:   03/30/21 126/62   03/12/21 (!) 141/78   03/02/21 120/70        Physical Exam:  General Appearance: alert and oriented to person, place and time, well developed and well- nourished, in no acute distress  Skin: warm and dry, no rash or erythema  Head: normocephalic and atraumatic  Eyes: pupils equal, round, and reactive to light, extraocular eye movements intact, conjunctivae normal  ENT: tympanic membrane, external ear and ear canal normal bilaterally, nose without deformity, nasal mucosa and turbinates normal without polyps  Neck: supple and non-tender without mass, no thyromegaly or thyroid nodules, no cervical lymphadenopathy  Pulmonary/Chest: clear to auscultation bilaterally- no wheezes, rales or rhonchi, normal air movement, no respiratory distress  Cardiovascular: normal rate, regular rhythm, normal S1 and S2, no murmurs, rubs, clicks, or gallops, distal pulses intact, no carotid bruits  Abdomen: soft, non-tender, non-distended, normal bowel sounds, no masses or organomegaly  Extremities: no cyanosis, clubbing or edema  Musculoskeletal: arthritic changes of hands and feet.  Crepitus on knee movement. Good strength all 4 extremities 4/5. Tense LLE edema 4/5 with tenderness to calf palpation   Neurologic: reflexes normal and symmetric, no cranial nerve deficit, gait, coordination and speech normal    Assessment/Plan:  1. Hyperglycemia  - POCT glycosylated hemoglobin (Hb A1C)    2. Vulvovaginal candidiasis  - clotrimazole-betamethasone (LOTRISONE) 1-0.05 % cream; Apply topically 2 times daily. Dispense: 15 g; Refill: 1  - fluconazole (DIFLUCAN) 100 MG tablet; Take 2 tablets by mouth daily for 7 days  Dispense: 14 tablet; Refill: 0    3. Type 2 diabetes mellitus without complication, without long-term current use of insulin ([facility])  Provided counseling re: low sugar die, consistent exercise 20-30 minutes per day (goal 150 min/week of moderate intensity exercise like walking) and consistent eating habits that include vegetables, fruit and whole grain foods along with healthy fats and protein  Avoid processed and sugary foods, large amounts of alcoholic drinks     4. Multiple sclerosis exacerbation ([facility]  F/u neurology   - [place] DISCHARGE MEDS RECONCILED W/ CURRENT OUTPATIENT MED LIST    5. Hyperlipidemia, unspecified hyperlipidemia type  Continue statin     6. Generalized osteoarthritis  - celecoxib (CELEBREX) 200 MG capsule; Take 1 capsule by mouth daily  Dispense: 30 capsule; Refill: 3    7. Leg edema, left  - US DUP LOWER EXTREMITY LEFT NILSA; Future  - [place] DISCHARGE MEDS RECONCILED W/ CURRENT OUTPATIENT MED LIST    8. Left leg pain  - US DUP LOWER EXTREMITY LEFT NILSA; Future  - [place] DISCHARGE MEDS RECONCILED W/ CURRENT OUTPATIENT MED LIST  - if the US is negative for DVT will treat with diuretics     9. Atopic dermatitis, unspecified type  - clobetasol (TEMOVATE) 0.05 % cream; Apply topically 2 times daily.   Dispense: 15 g; Refill: 0        Medical Decision Making: high complexity

## 2021-03-31 ENCOUNTER — HOSPITAL ENCOUNTER (OUTPATIENT)
Dept: VASCULAR LAB | Age: 86
Discharge: HOME | End: 2021-03-31
Payer: MEDICARE

## 2021-03-31 DIAGNOSIS — R60.0 LEG EDEMA, LEFT: ICD-10-CM

## 2021-03-31 DIAGNOSIS — M79.605 LEFT LEG PAIN: ICD-10-CM

## 2021-03-31 PROCEDURE — 93970 EXTREMITY STUDY: CPT

## 2021-04-01 RX ORDER — FUROSEMIDE 20 MG/1
20 TABLET ORAL DAILY
Qty: 7 TABLET | Refills: 0 | Status: SHIPPED | OUTPATIENT
Start: 2021-04-01 | End: 2021-04-21 | Stop reason: SDUPTHER

## 2021-04-08 ENCOUNTER — OFFICE VISIT (OUTPATIENT)
Dept: PHYSICAL MEDICINE AND REHAB | Age: 86
End: 2021-04-08
Payer: MEDICARE

## 2021-04-08 VITALS
DIASTOLIC BLOOD PRESSURE: 60 MMHG | WEIGHT: 135 LBS | HEART RATE: 99 BPM | SYSTOLIC BLOOD PRESSURE: 103 MMHG | HEIGHT: 59 IN | BODY MASS INDEX: 27.21 KG/M2 | TEMPERATURE: 97.3 F

## 2021-04-08 DIAGNOSIS — G81.94 LEFT HEMIPARESIS (HCC): ICD-10-CM

## 2021-04-08 DIAGNOSIS — G35 MULTIPLE SCLEROSIS (HCC): Primary | ICD-10-CM

## 2021-04-08 PROCEDURE — G8417 CALC BMI ABV UP PARAM F/U: HCPCS | Performed by: STUDENT IN AN ORGANIZED HEALTH CARE EDUCATION/TRAINING PROGRAM

## 2021-04-08 PROCEDURE — 4040F PNEUMOC VAC/ADMIN/RCVD: CPT | Performed by: STUDENT IN AN ORGANIZED HEALTH CARE EDUCATION/TRAINING PROGRAM

## 2021-04-08 PROCEDURE — 99212 OFFICE O/P EST SF 10 MIN: CPT | Performed by: STUDENT IN AN ORGANIZED HEALTH CARE EDUCATION/TRAINING PROGRAM

## 2021-04-08 PROCEDURE — 1036F TOBACCO NON-USER: CPT | Performed by: STUDENT IN AN ORGANIZED HEALTH CARE EDUCATION/TRAINING PROGRAM

## 2021-04-08 PROCEDURE — 1111F DSCHRG MED/CURRENT MED MERGE: CPT | Performed by: STUDENT IN AN ORGANIZED HEALTH CARE EDUCATION/TRAINING PROGRAM

## 2021-04-08 PROCEDURE — 1090F PRES/ABSN URINE INCON ASSESS: CPT | Performed by: STUDENT IN AN ORGANIZED HEALTH CARE EDUCATION/TRAINING PROGRAM

## 2021-04-08 PROCEDURE — G8427 DOCREV CUR MEDS BY ELIG CLIN: HCPCS | Performed by: STUDENT IN AN ORGANIZED HEALTH CARE EDUCATION/TRAINING PROGRAM

## 2021-04-08 PROCEDURE — 1123F ACP DISCUSS/DSCN MKR DOCD: CPT | Performed by: STUDENT IN AN ORGANIZED HEALTH CARE EDUCATION/TRAINING PROGRAM

## 2021-04-08 NOTE — PROGRESS NOTES
[facility]  [facility] PHYSICAL MEDICINE AND REHABILITATION  [address] [address]  Dept: [phone]  Dept Fax: [phone]    Outpatient Followup Note    Nusrat Winter is a 80y.o.-year-old female presenting for follow up of exacerbation of multiple sclerosis. HPI:     Initial History:  She was originally admitted to [facility] on 2/25/21 for an exacerbation of MS with symptoms of dizziness and difficulty walking.  Hospital course was complicated by EDINSON and asymptomatic bacteriuria. She was started on solumedrol for 5 days and IV fluids. She was also started on cipro for asymptomatic bacteriuria. She was admitted to the [address] [place] [address] from 3/2/21 to 3/12/21. Vanessa was placed due to urinary retention but was removed on 3/11/21 with  mL. Interval History:  She reports doing well since discharge from acute rehab. She states that she finished up home PT and OT last week. She notes that she is able to complete ADLs independently but her daughter is at the patient's home when she takes a shower for safety. Additionally, the patient is able to complete most IADLs, although she reports having some assistance with vacuuming. She notes that she walks with a straight cane or walker most of the time. She reports that she does ambulate without an assistive device at home sometimes if she is feeling well. She notes ongoing dizziness/lightheadedness, which is chronic for her. She states that the left-sided weakness is still present but improved. She reports that urination seems normal and that she did not see urology because she has not been having any issues. She denies any constipation. She had some edema in her lower limbs but notes that it is better after switching gabapentin from scheduled back to as needed (this is how she was taking it at home previously) and after she was given lasix by her PCP.     She states that she has been able to follow up with her PCP and neurologist since discharge from acute rehab. Past Medical History:   Diagnosis Date    Acute cystitis without hematuria 10/1/2017    Arthritis     Chronic daily headache     GERD (gastroesophageal reflux disease)     Hyperlipidemia     Hypertension     Moderate malnutrition ([address] 3/2/2019    Multiple sclerosis ([facility]     Neuropathy     Pneumonia 2017    states had double pneumonia    Vitamin D deficiency       Past Surgical History:   Procedure Laterality Date    CATARACT REMOVAL [facility] IMPLANT Right 11/6/2014    [facility]    CATARACT REMOVAL WITH IMPLANT Left 12/2/2014    [facility]    CERVICAL DISC SURGERY      CYSTOCELE REPAIR      HYSTERECTOMY      RECTOCELE REPAIR      SHOULDER ARTHROPLASTY Right 04/20/2017    SHOULDER SURGERY Right 2017     History reviewed. No pertinent family history. Social History     Socioeconomic History    Marital status:       Spouse name: None    Number of children: 3    Years of education: None    Highest education level: None   Occupational History    None   Social Needs    Financial resource strain: Not hard at all   [employer] insecurity     Worry: Never true     Inability: Never true   Zynstra needs     Medical: No     Non-medical: No   Tobacco Use    Smoking status: Never Smoker    Smokeless tobacco: Never Used   Substance and Sexual Activity    Alcohol use: No    Drug use: No    Sexual activity: None   Lifestyle    Physical activity     Days per week: None     Minutes per session: None    Stress: None   Relationships    Social connections     Talks on phone: None     Gets together: None     Attends Gnosticism service: None     Active member of club or organization: None     Attends meetings of clubs or organizations: None     Relationship status: None    Intimate partner violence     Fear of current or ex partner: None     Emotionally abused: None     Physically abused: None     Forced sexual activity: None   Other Topics Concern    None   Social History Narrative    None       Allergies   Allergen Reactions    Bactrim [Sulfamethoxazole-Trimethoprim] Other (See Comments)     BLISTERS IN MOUTH    Lactose Intolerance (Gi) Diarrhea    Pcn [Penicillins] Hives    Lidoderm [Lidocaine] Rash    Macrobid [Nitrofurantoin] Nausea And Vomiting       Current Outpatient Medications   Medication Sig Dispense Refill    furosemide (LASIX) 20 MG tablet Take 1 tablet by mouth daily for 7 days 7 tablet 0    clotrimazole-betamethasone (LOTRISONE) 1-0.05 % cream Apply topically 2 times daily. 15 g 1    celecoxib (CELEBREX) 200 MG capsule Take 1 capsule by mouth daily 30 capsule 3    clobetasol (TEMOVATE) 0.05 % cream Apply topically 2 times daily. 15 g 0    gabapentin (NEURONTIN) 100 MG capsule Take 1 capsule by mouth 2 times daily for 30 days. 60 capsule 0    amitriptyline (ELAVIL) 100 MG tablet Take 1 tablet by mouth nightly 30 tablet 0    simvastatin (ZOCOR) 20 MG tablet Take 1 tablet by mouth nightly 30 tablet 0    omeprazole (PRILOSEC) 20 MG delayed release capsule Take 1 capsule by mouth daily 30 capsule 0    polyethylene glycol (GLYCOLAX) 17 GM/SCOOP powder Take 17 g by mouth daily 507 g 0    albuterol sulfate HFA (VENTOLIN HFA) 108 (90 Base) MCG/ACT inhaler Inhale 2 puffs into the lungs every 6 hours as needed for Wheezing 1 Inhaler 0    fluticasone (FLONASE) 50 MCG/ACT nasal spray 1 spray by Each Nostril route daily 1 Bottle 1    HYDROcodone-acetaminophen (NORCO) 5-325 MG per tablet Take 1 tablet by mouth 3 times daily. No current facility-administered medications for this visit. Subjective:      Review of Systems   Constitutional: Negative for activity change. Cardiovascular: Negative for leg swelling. Gastrointestinal: Negative for constipation. Genitourinary: Negative for difficulty urinating. Neurological: Positive for dizziness and weakness. Objective:     Physical Exam  /60   Pulse 99   Temp 97.3 °F (36.3 °C) (Temporal)   Ht 4' 11\" (1.499 m)   Wt 135 lb (61.2 kg)   BMI 27.27 kg/m²     Constitutional: She appears well-developed and well-nourished. In no distress. HEENT: NCAT, EOMI. Hard of hearing. Pulmonary/Chest: Respirations WNL and unlabored. MSK: Functional ROM in all limbs. Strength 5/5 in right upper and lower limbs. Strength 4+/5 in left upper and lower limbs with some give-away weakness noted. Neurological: She is alert and oriented to person, place, and time. Speech fluent. No facial droop. Symmetrical shoulder shrug. Midline tongue protrusion. Sensation to light touch intact in all limbs. Normal gait with straight cane. Skin: Skin is warm dry and intact with good turgor. Psychiatric: She has a normal mood and affect. Her behavior is normal. Thought content normal.    Nursing note and vitals reviewed. Assessment:       Diagnosis Orders   1. Multiple sclerosis ([address]     2. Left hemiparesis ([address]          Plan:      - Patient has finished home PT and OT and feels like she is doing well at this time.   Discussed that she may benefit from additional home or outpatient therapies in the future if she notes a functional decline.  - Continue use of straight cane or walker for assistance with ambulation  - Follow up with neurology as directed  - Follow up in this clinic as needed      Electronically signed by Janiya Fragoso MD on 4/13/2021 at 9:41 PM.

## 2021-04-13 ASSESSMENT — ENCOUNTER SYMPTOMS: CONSTIPATION: 0

## 2021-04-17 ENCOUNTER — PATIENT MESSAGE (OUTPATIENT)
Dept: FAMILY MEDICINE CLINIC | Age: 86
End: 2021-04-17

## 2021-04-17 DIAGNOSIS — M15.9 GENERALIZED OSTEOARTHRITIS: ICD-10-CM

## 2021-04-18 ASSESSMENT — PATIENT HEALTH QUESTIONNAIRE - PHQ9
SUM OF ALL RESPONSES TO PHQ QUESTIONS 1-9: 0
SUM OF ALL RESPONSES TO PHQ QUESTIONS 1-9: 0
2. FEELING DOWN, DEPRESSED OR HOPELESS: 0
SUM OF ALL RESPONSES TO PHQ QUESTIONS 1-9: 0
1. LITTLE INTEREST OR PLEASURE IN DOING THINGS: 0

## 2021-04-18 ASSESSMENT — LIFESTYLE VARIABLES
AUDIT-C TOTAL SCORE: INCOMPLETE
HOW OFTEN DO YOU HAVE A DRINK CONTAINING ALCOHOL: NEVER
HOW OFTEN DO YOU HAVE A DRINK CONTAINING ALCOHOL: 0
AUDIT TOTAL SCORE: INCOMPLETE

## 2021-04-19 RX ORDER — TRAMADOL HYDROCHLORIDE 50 MG/1
50 TABLET ORAL EVERY 6 HOURS PRN
Qty: 120 TABLET | Refills: 0 | Status: SHIPPED | OUTPATIENT
Start: 2021-04-19 | End: 2021-05-17 | Stop reason: SDUPTHER

## 2021-04-21 ENCOUNTER — OFFICE VISIT (OUTPATIENT)
Dept: FAMILY MEDICINE CLINIC | Age: 86
End: 2021-04-21
Payer: MEDICARE

## 2021-04-21 VITALS
BODY MASS INDEX: 28.91 KG/M2 | DIASTOLIC BLOOD PRESSURE: 76 MMHG | SYSTOLIC BLOOD PRESSURE: 118 MMHG | TEMPERATURE: 98.1 F | HEART RATE: 91 BPM | OXYGEN SATURATION: 96 % | HEIGHT: 57 IN | WEIGHT: 134 LBS

## 2021-04-21 DIAGNOSIS — B37.31 VULVOVAGINAL CANDIDIASIS: ICD-10-CM

## 2021-04-21 DIAGNOSIS — Z00.00 ROUTINE GENERAL MEDICAL EXAMINATION AT A HEALTH CARE FACILITY: ICD-10-CM

## 2021-04-21 DIAGNOSIS — E11.9 TYPE 2 DIABETES MELLITUS WITHOUT COMPLICATION, WITHOUT LONG-TERM CURRENT USE OF INSULIN (HCC): ICD-10-CM

## 2021-04-21 DIAGNOSIS — R60.0 LEG EDEMA, LEFT: ICD-10-CM

## 2021-04-21 DIAGNOSIS — M16.12 PRIMARY OSTEOARTHRITIS OF LEFT HIP: ICD-10-CM

## 2021-04-21 DIAGNOSIS — M76.892 ENTHESOPATHY OF LEFT HIP REGION: ICD-10-CM

## 2021-04-21 DIAGNOSIS — M15.9 GENERALIZED OSTEOARTHRITIS: ICD-10-CM

## 2021-04-21 DIAGNOSIS — L20.89 OTHER ATOPIC DERMATITIS: Primary | ICD-10-CM

## 2021-04-21 PROCEDURE — G0439 PPPS, SUBSEQ VISIT: HCPCS | Performed by: INTERNAL MEDICINE

## 2021-04-21 PROCEDURE — 1123F ACP DISCUSS/DSCN MKR DOCD: CPT | Performed by: INTERNAL MEDICINE

## 2021-04-21 PROCEDURE — 4040F PNEUMOC VAC/ADMIN/RCVD: CPT | Performed by: INTERNAL MEDICINE

## 2021-04-21 RX ORDER — HYDROXYZINE HYDROCHLORIDE 25 MG/1
25 TABLET, FILM COATED ORAL NIGHTLY
Qty: 30 TABLET | Refills: 0 | Status: SHIPPED | OUTPATIENT
Start: 2021-04-21 | End: 2021-05-21

## 2021-04-21 RX ORDER — FUROSEMIDE 20 MG/1
20 TABLET ORAL DAILY
Qty: 7 TABLET | Refills: 0 | Status: SHIPPED | OUTPATIENT
Start: 2021-04-21 | End: 2021-10-26 | Stop reason: ALTCHOICE

## 2021-04-21 RX ORDER — FLUCONAZOLE 100 MG/1
100 TABLET ORAL DAILY
Qty: 7 TABLET | Refills: 0 | Status: SHIPPED | OUTPATIENT
Start: 2021-04-21 | End: 2021-04-28

## 2021-04-21 RX ORDER — PREDNISONE 10 MG/1
TABLET ORAL
Qty: 30 TABLET | Refills: 0 | Status: SHIPPED | OUTPATIENT
Start: 2021-04-21 | End: 2021-08-11 | Stop reason: ALTCHOICE

## 2021-04-21 RX ORDER — CALCIPOTRIENE 50 UG/G
CREAM TOPICAL
Qty: 1 TUBE | Refills: 4 | Status: ON HOLD
Start: 2021-04-21 | End: 2022-02-20 | Stop reason: HOSPADM

## 2021-04-21 NOTE — PROGRESS NOTES
Medicare Annual Wellness Visit  Name: Marline Parada Date: 2021   MRN: [id number] Sex: Female   Age: 80 y.o. Ethnicity: Non-/Non    : 1934 Race: Edawrdo Oquendo is here for Medicare AWV    Screenings for behavioral, psychosocial and functional/safety risks, and cognitive dysfunction are all negative except as indicated below. These results, as well as other patient data from the [address] form, are documented in Flowsheets linked to this Encounter. Allergies   Allergen Reactions    Bactrim [Sulfamethoxazole-Trimethoprim] Other (See Comments)     BLISTERS IN MOUTH    Lactose Intolerance (Gi) Diarrhea    Pcn [Penicillins] Hives    Lidoderm [Lidocaine] Rash    Macrobid [Nitrofurantoin] Nausea And Vomiting       Prior to Visit Medications    Medication Sig Taking? Authorizing Provider   traMADol (ULTRAM) 50 MG tablet Take 1 tablet by mouth every 6 hours as needed for Pain for up to 30 days. Yes Khushboo Pratt MD   clotrimazole-betamethasone (LOTRISONE) 1-0.05 % cream Apply topically 2 times daily. Yes Pauly Salcedo MD   celecoxib (CELEBREX) 200 MG capsule Take 1 capsule by mouth daily Yes Pauly Salcedo MD   clobetasol (TEMOVATE) 0.05 % cream Apply topically 2 times daily. Yes Khushboo Pratt MD   gabapentin (NEURONTIN) 100 MG capsule Take 1 capsule by mouth 2 times daily for 30 days. Yes Janiya Fragoso MD   amitriptyline (ELAVIL) 100 MG tablet Take 1 tablet by mouth nightly Yes Janiya Fragoso MD   simvastatin (ZOCOR) 20 MG tablet Take 1 tablet by mouth nightly Yes Janiya Fragoso MD   omeprazole (PRILOSEC) 20 MG delayed release capsule Take 1 capsule by mouth daily Yes Janiya Fragoso MD   albuterol sulfate HFA (VENTOLIN HFA) 108 (90 Base) MCG/ACT inhaler Inhale 2 puffs into the lungs every 6 hours as needed for Wheezing Yes Janiya Fragoso MD   HYDROcodone-acetaminophen (NORCO) 5-325 MG per tablet Take 1 tablet by mouth 3 times daily. Yes Historical Provider, MD   furosemide (LASIX) 20 MG tablet Take 1 tablet by mouth daily for 7 days  Pauly Schuster MD       Past Medical History:   Diagnosis Date    Acute cystitis without hematuria 10/1/2017    Arthritis     Chronic daily headache     GERD (gastroesophageal reflux disease)     Hyperlipidemia     Hypertension     Moderate malnutrition ([address] 3/2/2019    Multiple sclerosis ([facility]     Neuropathy     Pneumonia 2017    states had double pneumonia    Vitamin D deficiency        Past Surgical History:   Procedure Laterality Date    CATARACT REMOVAL WITH IMPLANT Right 11/6/2014    [facility]    CATARACT REMOVAL WITH IMPLANT Left 12/2/2014    [facility]    CERVICAL DISC SURGERY      CYSTOCELE REPAIR      HYSTERECTOMY      RECTOCELE REPAIR      SHOULDER ARTHROPLASTY Right 04/20/2017    SHOULDER SURGERY Right 2017       No family history on file. CareTeam (Including outside providers/suppliers regularly involved in providing care):   Patient Care Team:  Jerrica Blanchard MD as PCP - General (Internal Medicine)  Jerrica Blanchard MD as PCP - [facility] Empaneled Provider  Felicia Mancuso as Neurologist (Neurology)    Wt Readings from Last 3 Encounters:   04/21/21 134 lb (60.8 kg)   04/08/21 135 lb (61.2 kg)   03/30/21 138 lb 9.6 oz (62.9 kg)     Vitals:    04/21/21 1536   BP: 118/76   Site: Left Upper Arm   Position: Sitting   Cuff Size: Medium Adult   Pulse: 91   Temp: 98.1 °F (36.7 °C)   TempSrc: Temporal   SpO2: 96%   Weight: 134 lb (60.8 kg)   Height: 4' 9\" (1.448 m)     Body mass index is 29 kg/m². Based upon direct observation of the patient, evaluation of cognition reveals recent and remote memory intact.   Rash L leg   Swelling of left leg is better       General Appearance: alert and oriented to person, place and time, well developed and well- nourished, in no acute distress  Skin: warm and dry, no rash or erythema  Head: normocephalic and atraumatic  Eyes: pupils equal, round, and reactive to light, extraocular eye movements intact, conjunctivae normal  ENT: tympanic membrane, external ear and ear canal normal bilaterally, nose without deformity, nasal mucosa and turbinates normal without polyps  Neck: supple and non-tender without mass, no thyromegaly or thyroid nodules, no cervical lymphadenopathy  Pulmonary/Chest: clear to auscultation bilaterally- no wheezes, rales or rhonchi, normal air movement, no respiratory distress  Cardiovascular: normal rate, regular rhythm, normal S1 and S2, no murmurs, rubs, clicks, or gallops, distal pulses intact, no carotid bruits  Abdomen: soft, non-tender, non-distended, normal bowel sounds, no masses or organomegaly  Extremities: no cyanosis, clubbing or edema  Musculoskeletal: normal range of motion, no joint swelling, deformity or tenderness  Neurologic: reflexes normal and symmetric, no cranial nerve deficit, gait, coordination and speech normal    Patient's complete Health Risk Assessment and screening values have been reviewed and are found in Flowsheets. The following problems were reviewed today and where indicated follow up appointments were made and/or referrals ordered. Positive Risk Factor Screenings with Interventions:          General Health and ACP:  General  In general, how would you say your health is?: Good  In the past 7 days, have you experienced any of the following?  New or Increased Pain, New or Increased Fatigue, Loneliness, Social Isolation, Stress or Anger?: (!) New or Increased Pain  Do you get the social and emotional support that you need?: Yes  Do you have a Living Will?: Yes  Advance Directives     Power of  Living Will ACP-Advance Directive ACP-Power of     Not on File Not on File Not on File Not on File      General Health Risk Interventions:  · No Living Will: ACP documents already completed- patient asked to provide copy to the office    Health Habits/Nutrition:  Health taper, by which time she should be able to get back in with orthopedics. If her pain continues to come back after completion of treatment and she is not getting much relief with orthopedic management, will consider restarting daily low-dose prednisone therapy. Ashok and her daughter Kj Walsh are agreeable to this plan. .    Generalized osteoarthritis  -     predniSONE (DELTASONE) 10 MG tablet; 4 tabs by mouth daily for 3 days, then 3 tabs daily for 3 days, then 2 tabs daily for 3 days, then 1 tab daily till gone. Primary osteoarthritis of left hip  -     predniSONE (DELTASONE) 10 MG tablet; 4 tabs by mouth daily for 3 days, then 3 tabs daily for 3 days, then 2 tabs daily for 3 days, then 1 tab daily till gone. Vulvovaginal candidiasis  -     fluconazole (DIFLUCAN) 100 MG tablet; Take 1 tablet by mouth daily for 7 days  -     VAGINITIS DNA PROBE    Type 2 diabetes mellitus without complication, without long-term current use of insulin ([facility])    Leg edema, left  -     furosemide (LASIX) 20 MG tablet;  Take 1 tablet by mouth daily for 7 days    Routine general medical examination at a health care facility

## 2021-04-21 NOTE — PATIENT INSTRUCTIONS
Laquita [address]Jeff, [address] [facility] [address], [address]   [facility], [address]   [phone]  Personalized Preventive Plan for Wendy Mcgowan - 4/21/2021  Medicare offers a range of preventive health benefits. Some of the tests and screenings are paid in full while other may be subject to a deductible, co-insurance, and/or copay. Some of these benefits include a comprehensive review of your medical history including lifestyle, illnesses that may run in your family, and various assessments and screenings as appropriate. After reviewing your medical record and screening and assessments performed today your provider may have ordered immunizations, labs, imaging, and/or referrals for you. A list of these orders (if applicable) as well as your Preventive Care list are included within your After Visit Summary for your review. Other Preventive Recommendations:    · A preventive eye exam performed by an eye specialist is recommended every 1-2 years to screen for glaucoma; cataracts, macular degeneration, and other eye disorders. · A preventive dental visit is recommended every 6 months. · Try to get at least 150 minutes of exercise per week or 10,000 steps per day on a pedometer . · Order or download the FREE \"Exercise & Physical Activity: Your Everyday Guide\" from [employer]. Call 8-536.313.4906 or search [employer] online. · You need 0467-7119 mg of calcium and 1564-4171 IU of vitamin D per day. It is possible to meet your calcium requirement with diet alone, but a vitamin D supplement is usually necessary to meet this goal.  · When exposed to the sun, use a sunscreen that protects against both UVA and UVB radiation with an SPF of 30 or greater. Reapply every 2 to 3 hours or after sweating, drying off with a towel, or swimming. · Always wear a seat belt when traveling in a car.  Always wear a helmet when riding a bicycle or motorcycle.

## 2021-04-22 ENCOUNTER — HOSPITAL ENCOUNTER (OUTPATIENT)
Age: 86
Setting detail: SPECIMEN
Discharge: HOME | End: 2021-04-22
Payer: MEDICARE

## 2021-04-23 LAB
DIRECT EXAM: ABNORMAL
Lab: ABNORMAL
SPECIMEN DESCRIPTION: ABNORMAL

## 2021-04-26 RX ORDER — METRONIDAZOLE 500 MG/1
500 TABLET ORAL 2 TIMES DAILY
Qty: 14 TABLET | Refills: 0 | Status: SHIPPED | OUTPATIENT
Start: 2021-04-26 | End: 2021-05-03

## 2021-05-03 ENCOUNTER — PATIENT MESSAGE (OUTPATIENT)
Dept: FAMILY MEDICINE CLINIC | Age: 86
End: 2021-05-03

## 2021-05-03 NOTE — TELEPHONE ENCOUNTER
From: Collin Gordon  To: Otilio Alberto MD  Sent: 5/3/2021 11:21 AM EDT  Subject: Prescription Question    Dr Zaid Mosley.  I believe the prednisone is working very well for my mom. Can we get her back on a daily dose now? If so please call in prescription to [facility] on [employer].  thanks!!

## 2021-05-04 RX ORDER — PREDNISONE 1 MG/1
5 TABLET ORAL DAILY
Qty: 90 TABLET | Refills: 1 | Status: SHIPPED | OUTPATIENT
Start: 2021-05-04 | End: 2021-10-25 | Stop reason: SDUPTHER

## 2021-05-17 DIAGNOSIS — M15.9 GENERALIZED OSTEOARTHRITIS: ICD-10-CM

## 2021-05-17 RX ORDER — TRAMADOL HYDROCHLORIDE 50 MG/1
50 TABLET ORAL EVERY 6 HOURS PRN
Qty: 120 TABLET | Refills: 0 | Status: SHIPPED | OUTPATIENT
Start: 2021-05-17 | End: 2021-06-17 | Stop reason: SDUPTHER

## 2021-05-17 NOTE — TELEPHONE ENCOUNTER
Last visit: 04/21/2021  Last Med refill: 04/19/2021  Does patient have enough medication for 72 hours: Yes    Next Visit Date:  Future Appointments   Date Time Provider Cj Pacheco   5/28/2021 11:50 AM Chanel Phlegm,  [address] Maintenance   Topic Date Due    Lipid screen  05/21/2021    Shingles Vaccine (1 of 2) 07/30/2021 (Originally 12/8/1984)    DTaP/Tdap/Td vaccine (1 - Tdap) 03/30/2022 (Originally 12/8/1953)    Potassium monitoring  03/10/2022    Creatinine monitoring  03/10/2022    Annual Wellness Visit (AWV)  04/22/2022    Flu vaccine  Completed    Pneumococcal 65+ years Vaccine  Completed    COVID-19 Vaccine  Completed    Hepatitis A vaccine  Aged Out    Hib vaccine  Aged Out    Meningococcal (ACWY) vaccine  Aged Out       Hemoglobin A1C (%)   Date Value   03/30/2021 6.7   05/21/2020 6.2 (H)   02/14/2019 6.0             ( goal A1C is < 7)   No results found for: LABMICR  LDL Cholesterol (mg/dL)   Date Value   05/21/2020 86   05/24/2019 75       (goal LDL is <100)   AST (U/L)   Date Value   02/25/2021 15     ALT (U/L)   Date Value   02/25/2021 14     BUN (mg/dL)   Date Value   03/10/2021 15     BP Readings from Last 3 Encounters:   04/21/21 118/76   04/08/21 103/60   03/30/21 126/62          (goal 120/80)    All Future Testing planned in [facility]              Patient Active Problem List:     Ataxia     Multiple sclerosis ([facility]     Essential hypertension     Hyperlipidemia     Atelectasis     Debility     Abnormal gait     Actinic keratosis     Enthesopathy of hip region     Generalized osteoarthritis     Idiopathic peripheral neuropathy     Vitamin D deficiency     Primary localized osteoarthrosis of the hip, left     Primary osteoarthritis of left hip     Acquired spondylolisthesis     Chronic midline low back pain with left-sided sciatica     Spinal stenosis of lumbar region with neurogenic claudication     Presence of right artificial shoulder joint

## 2021-05-20 ENCOUNTER — PATIENT MESSAGE (OUTPATIENT)
Dept: FAMILY MEDICINE CLINIC | Age: 86
End: 2021-05-20

## 2021-05-20 DIAGNOSIS — G35 MULTIPLE SCLEROSIS (HCC): Primary | ICD-10-CM

## 2021-05-20 NOTE — TELEPHONE ENCOUNTER
From: Chuck Barahona  To: Marino Mathews MD  Sent: 2021 9:45 AM EDT  Subject: Prescription Question    Good Morning,    My mom is in need of a handicap card for the vehicle she is in when going somewhere. We had previous had one but it has . I believe the doctor will need to write a prescription for this for which I take over to the [facility] to get it. If you could email the prescription to me to print out or I can pick it up at the office whatever works best for you. My email is [email or web address] if this is how you want to send it to us. Thanks!

## 2021-05-21 NOTE — TELEPHONE ENCOUNTER
I will sign this on Monday 5/24 when I am in office since it needs to be printed, or it can be done by in-office provider today if urgent.

## 2021-05-24 ENCOUNTER — PATIENT MESSAGE (OUTPATIENT)
Dept: FAMILY MEDICINE CLINIC | Age: 86
End: 2021-05-24

## 2021-05-24 DIAGNOSIS — K59.03 THERAPEUTIC OPIOID-INDUCED CONSTIPATION (OIC): ICD-10-CM

## 2021-05-24 DIAGNOSIS — T40.2X5A THERAPEUTIC OPIOID-INDUCED CONSTIPATION (OIC): ICD-10-CM

## 2021-05-25 DIAGNOSIS — M16.12 PRIMARY LOCALIZED OSTEOARTHROSIS OF THE HIP, LEFT: Primary | ICD-10-CM

## 2021-05-25 RX ORDER — DOCUSATE SODIUM 100 MG/1
100 CAPSULE, LIQUID FILLED ORAL DAILY
Qty: 90 CAPSULE | Refills: 1 | Status: SHIPPED | OUTPATIENT
Start: 2021-05-25 | End: 2022-02-01 | Stop reason: SDUPTHER

## 2021-05-27 ENCOUNTER — OFFICE VISIT (OUTPATIENT)
Dept: ORTHOPEDIC SURGERY | Age: 86
End: 2021-05-27
Payer: MEDICARE

## 2021-05-27 VITALS — HEIGHT: 57 IN | WEIGHT: 128 LBS | BODY MASS INDEX: 27.61 KG/M2

## 2021-05-27 DIAGNOSIS — M47.816 LUMBAR SPONDYLOSIS: Primary | ICD-10-CM

## 2021-05-27 PROCEDURE — 4040F PNEUMOC VAC/ADMIN/RCVD: CPT | Performed by: ORTHOPAEDIC SURGERY

## 2021-05-27 PROCEDURE — 1123F ACP DISCUSS/DSCN MKR DOCD: CPT | Performed by: ORTHOPAEDIC SURGERY

## 2021-05-27 PROCEDURE — G8427 DOCREV CUR MEDS BY ELIG CLIN: HCPCS | Performed by: ORTHOPAEDIC SURGERY

## 2021-05-27 PROCEDURE — 1090F PRES/ABSN URINE INCON ASSESS: CPT | Performed by: ORTHOPAEDIC SURGERY

## 2021-05-27 PROCEDURE — G8417 CALC BMI ABV UP PARAM F/U: HCPCS | Performed by: ORTHOPAEDIC SURGERY

## 2021-05-27 PROCEDURE — 1036F TOBACCO NON-USER: CPT | Performed by: ORTHOPAEDIC SURGERY

## 2021-05-27 PROCEDURE — 99213 OFFICE O/P EST LOW 20 MIN: CPT | Performed by: ORTHOPAEDIC SURGERY

## 2021-05-27 RX ORDER — METHYLPREDNISOLONE 4 MG/1
TABLET ORAL
Qty: 1 KIT | Refills: 0 | Status: SHIPPED | OUTPATIENT
Start: 2021-05-27 | End: 2021-06-02

## 2021-05-27 NOTE — PROGRESS NOTES
[facility]  [address] [facility] [address]  Dept: [phone]    Orthopedic Clinic Follow Up      SUBJECTIVE: Donavan Miller is a 80 y.o. female who presents as a follow up for low back and left hip pain. Patient was last evaluated on 2/21/2019 for left hip flexor tendinitis. Today, her main complaint is left-sided pain along her low back and into her left lateral thigh. Denies groin pain. She has significant pain with extension of her low back. Mild pain with low back flexion. Denies any pain to the left lower extremity extending past the knee. No new numbness or tingling however she does have a significant past medical history of multiple sclerosis. She has hyperreflexia to the left and right lower extremities. She also has noticeable weakness on the left lower extremity. She ambulates with a cane baseline as well as a walker when needed. Her pain is created a significant deterioration in her quality of life. She is tearful in the office. She takes Vicodin, low-dose steroid, and tramadol daily for her multiple sclerosis. She is not on any other multiple sclerosis medications. She has significant stiffness in the morning, but this does get better throughout the day. Her most difficult time is getting out of bed. She has done physical therapy in the past but has not done this in roughly 1 year. ROS:   Constitutional: Negative for fever and chills. Respiratory: Negative for cough, shortness of breath. Cardiovascular: Negative for chest pain and palpitations. Musculoskeletal: Positive for low back and left lower extremity pain. Skin: Negative for itching and rash. Neurological: Positive for lower extremity weakness, numbness, tingling. Julia Rued Psychiatric/Behavioral: Patient display good fund of knowledge, understanding of assessment and plan. OBJECTIVE:  Physical Exam:  General: NAD, sitting comfortably in the room. CV: No dependent edema, regular rate.   Pulm: Respirations unlabored and regular. Neuro: Alert. Oriented. Ortho exam:  Lumbar spine: Pain with extension of the low back, mild pain with lumbar flexion. No radiating pain down the left lower extremity past the left lateral thigh. No pain past the knee. 3+ hyperreflexia to the patellar and Achilles tendon reflexes. Mild ankle clonus bilaterally. Dysesthesias bilateral stocking distribution from the midfoot to the toes. Otherwise, sensation intact to light touch to the L3-S1 distribution. LLE: Unable to perform left lower extremity Stinchfield test due to severe weakness. 3/5 hip flexor motor function. Negative logroll. No groin pain. Nontender over the lateral hip. PROCEDURES: None. RADIOLOGY:  History: 80y.o. year old female with a history of low back and left hip pain. Comparison: 6/10/2020    Findings: 2 views including AP and frog-leg lateral of the left hip demonstrates mild to moderate degenerative changes of the left femoral acetabular joint with joint space narrowing. Minimal osteophyte formation and subchondral sclerosis and cyst formation. Diffuse osteopenia present. No obvious fractures or dislocations, lytic or blastic lesions identified. Significant spondylosis to the lumbar spine that is evident on the AP radiograph. Impression: Mild to moderate degenerative changes of the left hip as described above. ASSESSMENT:   1. Lumbar spondylosis without radiculopathy  2. Mild left hip osteoarthritis    PLAN:  Jose Weldon is here for her low back pain. We discussed getting radiographs at her next visit which will include lumbar spine radiographs with flexion extension films. We recommend that she undergo physical therapy 3 days a week for 6 weeks for a total of 18 sessions prior to returning for an office visit. We will see her in roughly 7 weeks time. She was also given a Rx for a Medrol dose pack to help improve her symptoms acutely.     -Return in about 7 weeks (around 7/15/2021) for XRs of the lumbar spine. Orders Placed This Encounter   Medications    methylPREDNISolone (MEDROL DOSEPACK) 4 MG tablet     Sig: Take by mouth. Dispense:  1 kit     Refill:  0      Orders Placed This Encounter   Procedures    External Referral To Physical Therapy     Referral Priority:   Routine     Referral Type:   Eval and Treat     Referral Reason:   Specialty Services Required     Requested Specialty:   Physical Therapy     Number of Visits Requested:   1         Electronically signed by Greg Howell DO on 5/27/2021 at 2:42 PM    This dictation was generated by voice recognition computer software. Although all attempts are made to edit the dictation for accuracy, there may be errors in the transcription that are not intended. The actual meaning should be extrapolated by the context of the sentence.

## 2021-06-07 ENCOUNTER — HOSPITAL ENCOUNTER (OUTPATIENT)
Dept: PHYSICAL THERAPY | Age: 86
Setting detail: THERAPIES SERIES
Discharge: HOME | End: 2021-06-07
Payer: MEDICARE

## 2021-06-07 PROCEDURE — 97530 THERAPEUTIC ACTIVITIES: CPT

## 2021-06-07 PROCEDURE — 97162 PT EVAL MOD COMPLEX 30 MIN: CPT

## 2021-06-07 NOTE — CONSULTS
[facility]   Outpatient Physical Therapy  Physical Therapy Lumbar Evaluation    Date:  2021  Patient: Carlene Parada  : 1934  MRN: [id number]  Physician: Nalini Webb DO (resident physician)  Castro Miranda DO (attending physician)    Insurance: Medicare with Elaine Sutherland Medicare Supplement secondary. Visits BMN and in accordance with Medicare guidelines  Medical Diagnosis: M47.816 (ICD-10-CM) - Lumbar spondylosis Rehab Codes: M47.816  Onset Date:    Next 's appt: 7/15/21    Subjective:   CC: Chronic back/L hip pain and generalized weakness/instability  HPI: Chronic 5+ year hx of L sided low back and hip pain associated with degenerative arthritis. Received PT at this facility in 2019 with fair improvement at the time but pt states that pain has progressively worsened over time causing greater limitations in her mobility/function. Has had several pain mgmt injections with no significant relief. Denies any radiating pain into the legs, but does have chronic neuropathy in B feet. Of note, pt has also been diagnosed with MS for 36 years now and also feels like her mobility and balance has gotten worse as a result of this. Was hospitalized this past March for a MS flare up, and was referred to PT for continued conservative mgmt of pain and impaired functional mobility.     PMHx: [] Unremarkable [] Diabetes [x] HTN  [] Pacemaker   [] MI/Heart Problems [] Cancer [x] Arthritis [x] Other: GERD              [x] Refer to full medical chart  In EPIC     Comorbidities:   [] Obesity [] Dialysis  [x] Other: Multiple Sclerosis (diagnosed at age 52)   [] Asthma/COPD [] Dementia [] Other:   [] Stroke [] Sleep apnea [] Other:   [] Vascular disease [] Rheumatic disease [] Other:     Preferred Language:   [x] English           [] Other:    Prior Imaging: Recent x-rays revealed significant lumbar spondylosis and mild to mod OA in L hip    Previous Treatment: Attended 12 PT visits at this facility in 2019 for her L hip pain with some improvement at the time. Multiple prior injections in L lumbar/hip region without significant relief. Completed home health PT a couple months ago after a MS exacerbation put her in the hospital this past March. Home Environment: Pt lives alone in a multi-story home, but everything that she needs is on the first floor. Pt's son and daughter live close by and help pt with household upkeep, transportation, and self-care tasks as needed (ex: bathing). Medications: [] Refer to full medical record [] None [x] Other: Tramadol and Vicodin  Allergies:      [x] Refer to full medical record  [] None [] Other:    Work Status: Retired    Prior Level of Function:  Pt has had chronic pain for many years at this point but prior to the past couple years, states she was able to be more active and mobile in her home/community with assistance from her family    Pain:  [x] Yes  [] No Location: L lower back/hip Pain Rating: (0-10 scale) 7/10  Pain altered Tx:  [] Yes  [x] No  Action:    Symptoms:  [] Improving [x] Worsening [] Same  Aggravating factors: Standing/walking, bending   Alleviating factors: Light activity (usually feels better in the evening)      Functional Status Previous level of function Current level of function Comments   Sitting [x] Independent  [] Deficit [x] Independent  [] Deficit    Standing/walking [] Independent  [x] Deficit [] Independent  [x] Deficit 5 minutes max.  Uses cane or walker to amb   Driving [] Independent  [x] Deficit [] Independent  [x] Deficit Pt's family assists   Housekeeping/Meal Preparation [] Independent  [x] Deficit [] Independent  [x] Deficit Pt's family assists   Lifting/Carrying [] Independent  [x] Deficit [] Independent  [x] Deficit Unable   Bending/Reaching [] Independent  [x] Deficit [] Independent  [x] Deficit Pain   Stair climbing [] Independent  [x] Deficit [] Independent  [x] Deficit Does not use stairs   Pivoting [] Independent  [x] Deficit [] Independent  [x] Deficit Pain   Getting out of bed [x] Independent  [] Deficit [] Independent  [x] Deficit Severe pain/difficulty   Other: [] Independent  [] Deficit [] Independent  [] Deficit      Patient Goals/Rehab Expectations: \"Get the pain more tolerable\"      Objective:      OBSERVATION No Deficit Deficit Not Tested Comments   Forward Head [] [] []    Rounded Shoulders [] [x] []    Kyphosis [] [x] []    Lordosis [] [x] [] Reduced   Scoliosis [x] [] []    Innominate Alignment [] [] [x]    NEUROLOGICAL       Reflexes [] [] [x]    Compression/Distraction [] [x] [] Pain with compression. No relief with distraction   Sensation [] [x] [] Chronic neuropathy in B feet   FALL RISK? [] [x]     Comments:                                  Yenny De Leon Fall Risk Assessment    Risk Factor Scale  Score   History of Falls [x] Yes  [] No 25  0 25   Secondary Diagnosis [x] Yes  [] No 15  0 15   Ambulatory Aid [] Furniture  [x] Crutches/cane/walker  [] None/bedrest/wheelchair/nurse 30  15  0 15   IV/Heparin Lock [] Yes  [x] No 20  0 0   Gait/Transferring [x] Impaired  [] Weak  [] Normal/bedrest/immobile 20  10  0 20   Mental Status [] Forgets limitations  [x] Oriented to own ability 15  0 0      Total: 75     Based on the Assessment score: check the appropriate box.     []  No intervention needed   Low =   Score of 0-24    []  Use standard prevention interventions Moderate =  Score of 24-44   [] Give patient handout and discuss fall prevention strategies   [] Establish goal of education for patient/family RE: fall prevention strategies    [x]  Use high risk prevention interventions High = Score of 45 and higher   [x] Give patient handout and discuss fall prevention strategies   [x] Establish goal of education for patient/family Re: fall prevention strategies   [x] Discuss lifeline / other resources            Range of Motion  Left Range of Motion  Right Strength  Left Strength  Right   Lumbar Flexion 50%  Pain/discomfort 50%  Pain/discomfort     Lumbar Extension 25%  Pain 25%  Pain     Lumbar Rotation 50%  Tightness 50%  Tightness     Lumbar Side Bend 75%  Tightness 75%  Tightness     Hip Flexion   3/5 3+/5   Hip Abduction   3/5 3+/5   Hip Adduction   3+/5 4-/5   Hip Extension   2+/5 3/5   Hip ER       Hip IR       Knee Flexion   3/5 3+/5   Knee Extension   3/5 3+/5   Dorsiflexion   3+/5 3+/5   Plantar flexion   3+/5 3+/5   Inversion       Eversion        *Modified MMT in sitting    LUMBAR/SIJ SPECIAL TESTS Left Right   Standing Flexion + [x]         - []           NT []  + [x]         - []           NT []    Repeated Flexion + []         - []           NT [x]  + []         - []           NT [x]    Repeated Extension + []         - []           NT [x]  + []         - []           NT [x]    SLR + []         - [x]           NT []  + []         - [x]           NT []    Slump Test + []         - [x]           NT []  + []         - [x]           NT []    Prone Instability Test + []         - []           NT [x]  + []         - []           NT [x]    Anterior Gapping + []         - []           NT [x]  + []         - []           NT [x]    Posterior Gapping + []         - []           NT [x]  + []         - []           NT [x]    Sacral Thrust + []         - []           NT [x]  + []         - []           NT [x]    Thigh Thrust + []         - []           NT [x]  + []         - []           NT [x]    Gaenslen's + []         - []           NT [x]  + []         - []           NT [x]    Other:  + []         - []           NT []  + []         - []           NT []      HIP SPECIAL TESTS Left Right   WANDY + [x]         - []           NT []  + []         - []           NT []    FADIR + [x]         - []           NT []  + []         - []           NT []    Scour + [x]         - []           NT []  + []         - []           NT []    Trendelenburg Sign + []         - []           NT []   Frequently loses balance to L + []         - [] NT []    Roxi + []         - []           NT [x]  + []         - []           NT []    Long axis traction + []         - [x]           NT []  + []         - []           NT []    Other: + []         - []           NT []  + []         - []           NT []        NEURAL/VASCULAR TESTS Left Right   Sciatic Nerve Tensioning + []         - [x]           NT []  + []         - [x]           NT []    Tibial Nerve Tensioning + []         - [x]           NT []  + []         - [x]           NT []    Sural Nerve Tensioning + []         - [x]           NT []  + []         - [x]           NT []    Common Peroneal Nerve Tensioning + []         - [x]           NT []  + []         - [x]           NT []    Femoral Nerve Tensioning + []         - []           NT [x]  + []         - []           NT [x]    Other: + []         - []           NT []  + []         - []           NT []        Palpation:  Hypertonic and TTP at L lumbar paraspinals, glutes, and proximal rectus femoris    Gait: Independent []           Modified Independent []            Not independent [x]  Comments: CGA to intermittent hand hold assist required amb with SPC today     Functional Testing:   Five Time Sit to Stand (FTSTS): To be assessed at a future visit with appropriate AD  Timed Up and Go (TUG): To be assessed at a future visit with appropriate AD    Tinetti Balance & Gait Assessment     0 1 2   Sitting balance Leans/slides in chair  [] Steady; safe  [x]    Rises from chair Unable without help  [] Uses arms  [x] Able without arms  []   Attempts to rise Unable without help  [] >1 attempt  [x] 1 attempt  []   Immediate standing balance Unsteady  [x] Steady w/AD  [] Steady;  Independent  []   Standing balance Unsteady  [x] Wide NELDA  [] Steady; neutral NELDA  []   Nudged Begins to fall  [x] Staggers/Grabs/Catches  [] Steady  []   Standing (eyes closed) Unsteady  [x] Steady  []    360 deg Turn (steps) Discontinuous steps  [x] Continuous steps  []    360 deg Turn (steadiness) Unsteady  [x] Steady  []    Sitting Down Unsafe  [x] Uses arms; not smooth  [] Safe and smooth  []         Gait initiation Hesitancy  [x] No hesitancy  []    Step Length (R) R swing foot does not pass stance foot  [] R swing foot passes stance foot  [x]    Step Length (L) L swing foot does not pass stance foot  [] L swing foot passes stance foot  [x]    Step Height (R) R foot does not clear completely  [] R foot clears completely  [x]    Step Height (L) L foot does not clear completely  [] L foot clears completely  [x]    Step Symmetry Not equal  [x] Equal  []    Step Continuity Discontinuous  [x] Continuous  []    Walking Path Marked deviation  [] AD  [] Straight w/o AD  []   Trunk Marked sway or AD  [x] Flexed knees/back or arms out  [] No sway/deviation  []   Walking Stance Heels apart  [x] Heels almost touch  []     Balance score: 3 / 16  Gait score: 4 / 12  Total score: 7 / 28  Fall risk: Low (>/=24)  []      Moderate (19-23)  []       High (</=18)  [x]        Assessment:  Problems:    [x] ? Pain:  [x] ? ROM:  [x] ? Strength:  [x] ? Function:  [] Other:     Pt is a 81 y/o F referred to PT with chronic hx of LBP and L hip pain associated with degenerative changes. Pt presents today with moderate pain levels through all lumbar and L hip mobility but no radiculopathy at this time. Of note, pt also has MS and shows generalized weakness through B LEs (L>R) as well as very poor static and dynamic balance. Pt presents to evaluation using a SPC but with significant unsteadiness with CGA to min A required to safely ambulate through clinic. Recommended pt utilize RW at all times for safety, especially given that pt lives alone and she's already had a couple recent falls. Mild soft tissue dysfunction present at L lumbar and hip region but otherwise chief concern is poor strength and mobility limitations at this time.  Skilled PT intervention is required to modulate sx as well as improve strength, gait/balance LEs and core, with progressive static and dynamic balance interventions as safe/appropriate. May also benefit from soft tissue mobilization/MFR as needed to L lumbar/hip region. Treatment Charges: Mins Units   [x] Evaluation       []  Low       [x]  Moderate       []  High 52 1   []  Modalities     []  Ther Exercise     []  Manual Therapy     [x]  Ther Activities 8 1   []  Aquatics     []  Neuromuscular     []  Gait Training     []  Dry Needling           1-2 muscles     []  Dry Needling           3 or more muscles     [] Vasocompression     []  Other       TOTAL TREATMENT TIME: 60    Time in: 5:15pm    Time Out: 6:15pm    Electronically signed by: Radha Richard PT        Physician Signature:________________________________Date:__________________  By signing above or cosigning this note, I have reviewed this plan of care and certify a need for medically necessary rehabilitation services.      *PLEASE SIGN ABOVE AND FAX BACK ALL PAGES*

## 2021-06-14 ENCOUNTER — HOSPITAL ENCOUNTER (OUTPATIENT)
Dept: PHYSICAL THERAPY | Age: 86
Setting detail: THERAPIES SERIES
Discharge: HOME | End: 2021-06-14
Payer: MEDICARE

## 2021-06-14 PROCEDURE — 97112 NEUROMUSCULAR REEDUCATION: CPT

## 2021-06-14 PROCEDURE — 97110 THERAPEUTIC EXERCISES: CPT

## 2021-06-14 NOTE — FLOWSHEET NOTE
[facility]   [address]   Phone: [phone]   Fax: [phone]    Physical Therapy Daily Treatment Note      Date:  2021  Patient Name:  Wendy Mcgowan    :  1934  MRN: [id number]  Physician: Surya Bazan DO (resident physician)  Renetta Fonseca DO (attending physician)                                            Insurance: Medicare with Agustín Driver 150 Medicare Supplement secondary. Visits [facility]N and in accordance with Medicare guidelines  Medical Diagnosis: M47.816 (ICD-10-CM) - Lumbar spondylosis  Rehab Codes: M47.816  Onset Date:                    Next 's appt: 7/15/21  Visit# / total visits:  Cancels/No Shows: 0/0    Subjective:  Pt reports pain is in center of lumbar back and off to the left. Denies any changes since the evaluation. Notes using her walker for safety and is limited with walking due to pain. Pain:  [x] Yes  [] No Location: Left lumbar back    Pain Rating: (0-10 scale) 7/10  Pain altered Tx:  [] No  [] Yes  Action:  Comments:    Objective:  Modalities:   Precautions: Unsteady on feet. CGA to close supervision required at all times when ambulating for safety. Avoid over-exertion as pt has MS.   Exercises: Pt wore gait belt for all standing exercises  Exercise Reps/ Time Weight/ Level Comments   Supine SKTC 3x15\" (B)    Piriformis stretch 3x15\" L only    LTR 10x (B)    TrA activation w/ bridge 10x3\" hold     Clamshells 10x  L only          Nustep 5' L3    Semi-Tandem stance 2x30\" ea  Alt LE forward, finger tip touch for balance   Feet together 2x30\"  Unable to balance with feet touching   Slant board w/ toes on board 2x30\"      Hip abduction x10 reps (B) LE     Heel raises x10 reps  Finger tip touch for balance                                           Other:     Specific Instructions for next treatment: Emphasis on functional strengthening/stabilization of B LEs and core, with progressive static and dynamic balance interventions as safe/appropriate. May also benefit from soft tissue mobilization/MFR as needed to L lumbar/hip region. Assessment: [x] Progressing toward goals. Initiated therapeutic exercise for core and LE strengthening and balance training to patient's tolerance. CGA-Mod assist with balance exercises and cueing for postural awareness throughout. CGA for gait and turns while walking. Difficulty sitting up from supine, mod A needed. Unable to maintain balance with most exercises unless using finger tip touch on rails. 2 rest breaks needed due to increased lumbar back pain. [] No change. [] Other:    [] Patient would continue to benefit from skilled physical therapy services in order to: modulate sx as well as improve strength, gait/balance and functional tolerances necessary to improve safe ADL/self-care participation in her home. STG: (to be met in 8 treatments)  1. Pt will self report worst pain no greater than 4/10 in order to better tolerate ADLs/self-care tasks with minimal dysfunction  2. Pt will improve lumbar AROM to 75% or greater in all planes in order to demonstrate ability to move/reach in all planes with minimal restriction  3. Pt will demonstrate good understanding of all fall precaution education (using RW at all times, life alert button, clear paths and good lighting) to demonstrate improved safety in home  4. Pt will self report ability to be on feet for 10 min or greater to improve ability to perform light household upkeep tasks around home at prior level  LTG: (to be met in 16 treatments)  1. Pt will demonstrate improved B LE strength to 4-/5 or greater (modified MMT in sitting) in order to demonstrate improved stability/strength necessary for unrestricted ADLs/work activities  2. Pt will decrease DAREK to 40% impaired or less in order to demonstrate improved functional tolerances at PLOF with minimal restriction/dysfunction  3.  Pt will improve Tinetti Balance score to 19 or greater to demonstrate decreased fall risk in home  4. Pt will self report no falls x 4 weeks to demonstrate better safety in home and overall functional mobility  5. Pt will demonstrate independence with a long term HEP for continued progress/maintenance after completion of PT    Pt. Education:  [x] Yes  [] No  [x] Reviewed Prior HEP/Ed  Method of Education: [x] Verbal  [x] Demo  [] Written  Comprehension of Education:  [x] Verbalizes understanding. [x] Demonstrates understanding. [] Needs review. [] Demonstrates/verbalizes HEP/Ed previously given. Plan: [x] Continue per plan of care.    [] Other:      Treatment Charges: Mins Units   []  Modalities     [x]  Ther Exercise 36 2   []  Manual Therapy     []  Ther Activities     []  Aquatics     [x]  Neuromuscular 10 1   [] Vasocompression     [] Gait Training     [] Dry needling        [] 1 or 2 muscles        [] 3 or more muscles     []  Other     Total Treatment time 46 3     Time In:  7732           Time Out: 9673    Electronically signed by:  Berenice Montemayor PTA

## 2021-06-16 ENCOUNTER — HOSPITAL ENCOUNTER (OUTPATIENT)
Dept: PHYSICAL THERAPY | Age: 86
Setting detail: THERAPIES SERIES
Discharge: HOME | End: 2021-06-16
Payer: MEDICARE

## 2021-06-16 PROCEDURE — 97112 NEUROMUSCULAR REEDUCATION: CPT

## 2021-06-16 PROCEDURE — 97140 MANUAL THERAPY 1/> REGIONS: CPT

## 2021-06-16 PROCEDURE — 97110 THERAPEUTIC EXERCISES: CPT

## 2021-06-16 NOTE — FLOWSHEET NOTE
[facility]   [address]   Phone: [phone]   Fax: [phone]    Physical Therapy Daily Treatment Note      Date:  2021  Patient Name:  Dennie Hartigan    :  1934  MRN: [id number]  Physician: Ashly Manzanares DO (resident physician)  Kim Tavarez DO (attending physician)                                            Insurance: Medicare with [facility] Medicare Supplement secondary. Visits BMN and in accordance with Medicare guidelines  Medical Diagnosis: M47.816 (ICD-10-CM) - Lumbar spondylosis  Rehab Codes: M47.816  Onset Date:                    Next 's appt: 7/15/21  Visit# / total visits: 3/16 Cancels/No Shows: 0/0    Subjective:    Pain:  [x] Yes  [] No Location: Left lumbar back    Pain Rating: (0-10 scale) 8/10  Pain altered Tx:  [] No  [] Yes  Action:  Comments: : Pt reports very high levels of pain today in L mid/lower back, more so than usual and states that today is \"just a bad day. \" Otherwise, states that her previous session went well and has been trying to do her exercises at home. Objective:  Modalities:   Precautions: Unsteady on feet. CGA to close supervision required at all times when ambulating for safety. Avoid over-exertion as pt has MS.   Exercises: Pt wore gait belt for all standing exercises  Exercise Reps/ Time Weight/ Level Comments Completed today   Supine SKTC 3x15\" (B)     Piriformis stretch 3x15\" L only  x   LTR 20 reps total   x   TrA activation w/ bridge 10x3\" hold   x   Supine marches with TrA activation 20 reps   L/R alternating each rep x   Clamshells 10x  L only            Nustep 5' L3     Semi-Tandem stance 2x30\" ea  Alt LE forward, finger tip touch for balance x   Horizontal head turns in neutral NELDA 2'  Fingertip touch at parallel bars  x   Balance reaction training in neutral NELDA 2'  Very light trunk perturbations in all planes  UE support at parallel bars and CGA to intermittent min A x Slant board w/ toes on board 2x30\"       Hip abduction x10 reps (B) LE      Heel raises x10 reps  Finger tip touch for balance                                                Other:  Manual therapy x 10'- MFR to L thoracolumbar paraspinals for pain control     Specific Instructions for next treatment: Emphasis on functional strengthening/stabilization of B LEs and core, with progressive static and dynamic balance interventions as safe/appropriate. May also benefit from soft tissue mobilization/MFR as needed to L lumbar/hip region. Assessment: [x] Progressing toward goals. [] No change. [] Other:    [x] Patient would continue to benefit from skilled physical therapy services in order to: modulate sx as well as improve strength, gait/balance and functional tolerances necessary to improve safe ADL/self-care participation in her home. 6/16: Increased pain levels today in L mid and lower back. Trialed light manual therapy to this region for pain control, which seemed to help some with no reduction in sx severity but pt reporting that it felt \"less stabby\" afterwards. Increased time required to complete all treatment interventions but overall still expressed good motivation to participate despite increased pain. Initiated more dynamic balance training and reactional stability with high levels of difficulty and UE support required in parallel bars, which will benefit from additional focus in future visits. STG: (to be met in 8 treatments)  1. Pt will self report worst pain no greater than 4/10 in order to better tolerate ADLs/self-care tasks with minimal dysfunction  2. Pt will improve lumbar AROM to 75% or greater in all planes in order to demonstrate ability to move/reach in all planes with minimal restriction  3.  Pt will demonstrate good understanding of all fall precaution education (using RW at all times, life alert button, clear paths and good lighting) to demonstrate improved safety in home  4. Pt will self report ability to be on feet for 10 min or greater to improve ability to perform light household upkeep tasks around home at prior level  LTG: (to be met in 16 treatments)  1. Pt will demonstrate improved B LE strength to 4-/5 or greater (modified MMT in sitting) in order to demonstrate improved stability/strength necessary for unrestricted ADLs/work activities  2. Pt will decrease DAREK to 40% impaired or less in order to demonstrate improved functional tolerances at PLOF with minimal restriction/dysfunction  3. Pt will improve Tinetti Balance score to 19 or greater to demonstrate decreased fall risk in home  4. Pt will self report no falls x 4 weeks to demonstrate better safety in home and overall functional mobility  5. Pt will demonstrate independence with a long term HEP for continued progress/maintenance after completion of PT    Pt. Education:  [x] Yes  [] No  [x] Reviewed Prior HEP/Ed  Method of Education: [x] Verbal  [x] Demo  [] Written  Comprehension of Education:  [x] Verbalizes understanding. [x] Demonstrates understanding. [] Needs review. [] Demonstrates/verbalizes HEP/Ed previously given. Plan: [x] Continue per plan of care.    [] Other:      Treatment Charges: Mins Units   []  Modalities     [x]  Ther Exercise 23 1   [x]  Manual Therapy 10 1   []  Ther Activities     []  Aquatics     [x]  Neuromuscular 10 1   [] Vasocompression     [] Gait Training     [] Dry needling        [] 1 or 2 muscles        [] 3 or more muscles     []  Other     Total Treatment time 43 3     Time In: 3:32pm           Time Out: 4:15pm    Electronically signed by:  Stephanie Ruiz, PT

## 2021-06-21 ENCOUNTER — HOSPITAL ENCOUNTER (OUTPATIENT)
Dept: PHYSICAL THERAPY | Age: 86
Setting detail: THERAPIES SERIES
Discharge: HOME | End: 2021-06-21
Payer: MEDICARE

## 2021-06-21 PROCEDURE — 97110 THERAPEUTIC EXERCISES: CPT

## 2021-06-21 PROCEDURE — 97112 NEUROMUSCULAR REEDUCATION: CPT

## 2021-06-21 PROCEDURE — 97140 MANUAL THERAPY 1/> REGIONS: CPT

## 2021-06-21 NOTE — FLOWSHEET NOTE
[facility]    [address]   Phone: [phone]   Fax: [phone]    Physical Therapy Daily Treatment Note    Date:  2021  Patient Name:  Gale Morales    :  1934  MRN: [id number]  Physician: Ivonne Cline DO (resident physician)  Swati Zarate DO (attending physician)                                            Insurance: Medicare with [facility] Medicare Supplement secondary. Visits BMN and in accordance with Medicare guidelines  Medical Diagnosis: M47.816 (ICD-10-CM) - Lumbar spondylosis  Rehab Codes: M47.816  Onset Date:                    Next 's appt: 7/15/21  Visit# / total visits:  Cancels/No Shows: 0/0    Subjective:   Pain:  [x] Yes  [] No Location: Left lumbar back    Pain Rating: (0-10 scale) 7/10  Pain altered Tx:  [] No  [] Yes  Action:  Comments: :  Pt reports pain still high after performing HEP on the ground at home. Notes has been performing HEP on the floor due to bed being too soft to perform stretches/exercises on. But getting off the floor hurts when patient is finished with HEP. Objective:  Modalities:   Precautions: Unsteady on feet. CGA to close supervision required at all times when ambulating for safety. Avoid over-exertion as pt has MS.   Exercises: Pt wore gait belt for all standing exercises  Exercise Reps/ Time Weight/ Level Comments Completed today   Supine SKTC 3x15\" (B)     Piriformis stretch 3x15\" L only  x   LTR 20 reps total   x   TrA activation w/ bridge 10x3\" hold   x   Supine marches with TrA activation 20 reps   L/R alternating each rep x   Clamshells 10x  L only            Nustep 5' L3  x   Semi-Tandem stance 2x30\" ea  Alt LE forward, finger tip touch for balance x   Horizontal head turns in neutral NELDA 2'  Fingertip touch at parallel bars  x   Balance reaction training in neutral NELDA 2'  Very light trunk perturbations in all planes  UE support at parallel bars and CGA to intermittent min A x   Slant board w/ toes on board 2x30\"       Hip abduction x10 reps (B) LE      Heel raises x10 reps  Finger tip touch for balance                                                Other:  Manual therapy x 10'- MFR to L thoracolumbar paraspinals for pain control     Specific Instructions for next treatment: Emphasis on functional strengthening/stabilization of B LEs and core, with progressive static and dynamic balance interventions as safe/appropriate. May also benefit from soft tissue mobilization/MFR as needed to L lumbar/hip region. Assessment: [x] Progressing toward goals. [] No change. [] Other:    [x] Patient would continue to benefit from skilled physical therapy services in order to: modulate sx as well as improve strength, gait/balance and functional tolerances necessary to improve safe ADL/self-care participation in her home. 6/21:  Continued with manual to L thoracolumbar paraspinals and L proximal gluts with decreased tightness afterwards noted by patient. Also decreased pain and tightness after Nustep today. Good tolerance to balance activities with light finger tip touch for balance. Notes some dizziness with rotational head turns today. 1 Standing rest break needed. STG: (to be met in 8 treatments)  1. Pt will self report worst pain no greater than 4/10 in order to better tolerate ADLs/self-care tasks with minimal dysfunction  2. Pt will improve lumbar AROM to 75% or greater in all planes in order to demonstrate ability to move/reach in all planes with minimal restriction  3. Pt will demonstrate good understanding of all fall precaution education (using RW at all times, life alert button, clear paths and good lighting) to demonstrate improved safety in home  4. Pt will self report ability to be on feet for 10 min or greater to improve ability to perform light household upkeep tasks around home at prior level  LTG: (to be met in 16 treatments)  1.  Pt will demonstrate improved B LE strength to 4-/5 or greater (modified MMT in sitting) in order to demonstrate improved stability/strength necessary for unrestricted ADLs/work activities  2. Pt will decrease DAREK to 40% impaired or less in order to demonstrate improved functional tolerances at PLOF with minimal restriction/dysfunction  3. Pt will improve Tinetti Balance score to 19 or greater to demonstrate decreased fall risk in home  4. Pt will self report no falls x 4 weeks to demonstrate better safety in home and overall functional mobility  5. Pt will demonstrate independence with a long term HEP for continued progress/maintenance after completion of PT    Pt. Education:  [x] Yes  [] No  [x] Reviewed Prior HEP/Ed  Method of Education: [x] Verbal  [x] Demo  [] Written  Comprehension of Education:  [x] Verbalizes understanding. [x] Demonstrates understanding. [] Needs review. [] Demonstrates/verbalizes HEP/Ed previously given. Plan: [x] Continue per plan of care.    [] Other:      Treatment Charges: Mins Units   []  Modalities     [x]  Ther Exercise 23 1   [x]  Manual Therapy 10 1   []  Ther Activities     []  Aquatics     [x]  Neuromuscular 12 1   [] Vasocompression     [] Gait Training     [] Dry needling        [] 1 or 2 muscles        [] 3 or more muscles     []  Other     Total Treatment time       Time In: 2370           Time Out:  5775    Electronically signed by:  Ayaka Sears PTA

## 2021-06-23 ENCOUNTER — HOSPITAL ENCOUNTER (OUTPATIENT)
Dept: PHYSICAL THERAPY | Age: 86
Setting detail: THERAPIES SERIES
Discharge: HOME | End: 2021-06-23
Payer: MEDICARE

## 2021-06-23 PROCEDURE — 97110 THERAPEUTIC EXERCISES: CPT

## 2021-06-23 PROCEDURE — 97140 MANUAL THERAPY 1/> REGIONS: CPT

## 2021-06-23 PROCEDURE — 97112 NEUROMUSCULAR REEDUCATION: CPT

## 2021-06-23 NOTE — FLOWSHEET NOTE
[facility]   [address]   Phone: [phone]   Fax: [phone]    Physical Therapy Daily Treatment Note    Date:  2021  Patient Name:  Karen Mcdonald    :  1934  MRN: [id number]  Physician: Dave Lovett DO (resident physician)  Argyle Soulier, DO (attending physician)                                            Insurance: Medicare with [place] Medicare Supplement secondary. Visits BMN and in accordance with Medicare guidelines  Medical Diagnosis: M47.816 (ICD-10-CM) - Lumbar spondylosis  Rehab Codes: M47.816  Onset Date:                    Next 's appt: 7/15/21  Visit# / total visits:  Cancels/No Shows: 0/0  Next progress note due by visit #10 or 21    Subjective:   Pain:  [x] Yes  [] No Location: Left lumbar back    Pain Rating: (0-10 scale) 5/10  Pain altered Tx:  [] No  [] Yes  Action:  Comments: : Pt states that she feels like therapy has been helping with managing pain throughout the day and helping her stand taller. Still feels a lot of pain first thing in the morning. 5/10 discomfort along L side of mid and lower back currently. Objective:  Modalities:   Precautions: Unsteady on feet. CGA to close supervision required at all times when ambulating for safety. Avoid over-exertion as pt has MS.   Exercises: Pt wore gait belt for all standing exercises  Exercise Reps/ Time Weight/ Level Comments Completed today   Supine SKTC 3x15\" (B)     Piriformis stretch 3x15\" L only     LTR 20 reps total   x   TrA activation w/ bridge 10x3\" hold   x   Supine marches with TrA activation 20 reps   L/R alternating each rep x   Clamshells 10x  L only     BKFOs 20 reps ea  With TrA activation x          Nustep 5' L3  x   Semi-Tandem stance 2x30\" ea  Alt LE forward, finger tip touch for balance x   Horizontal head turns in neutral NELDA 2'  Fingertip touch at parallel bars  x   Balance reaction training in neutral NELDA 2'  Very light trunk perturbations in all planes  UE support at parallel bars and CGA to intermittent min A x   Slant board w/ toes on board 2x30\"      Standing hip abduction x10 reps (B) LE      Heel raises x10 reps  Finger tip touch for balance x                                               Other:  Manual therapy x 10'- MFR to L thoracolumbar paraspinals for pain control     Specific Instructions for next treatment: Emphasis on functional strengthening/stabilization of B LEs and core, with progressive static and dynamic balance interventions as safe/appropriate. May also benefit from soft tissue mobilization/MFR as needed to L lumbar/hip region. Assessment: [x] Progressing toward goals. [] No change. [] Other:    [x] Patient would continue to benefit from skilled physical therapy services in order to: modulate sx as well as improve strength, gait/balance and functional tolerances necessary to improve safe ADL/self-care participation in her home. 6/23: Continues to have soreness through L mid back but tolerances seem to be improving with no seated rest breaks required today. Slightly improved stability with modified tandem static standing, but is still highly challenged by all dynamic balance exercises and still requires CGA with gait belt through all for safety. STG: (to be met in 8 treatments)  1. Pt will self report worst pain no greater than 4/10 in order to better tolerate ADLs/self-care tasks with minimal dysfunction  2. Pt will improve lumbar AROM to 75% or greater in all planes in order to demonstrate ability to move/reach in all planes with minimal restriction  3. Pt will demonstrate good understanding of all fall precaution education (using RW at all times, life alert button, clear paths and good lighting) to demonstrate improved safety in home  4.  Pt will self report ability to be on feet for 10 min or greater to improve ability to perform light household upkeep tasks around home at prior level  LTG: (to be met in 16 treatments)  1. Pt will demonstrate improved B LE strength to 4-/5 or greater (modified MMT in sitting) in order to demonstrate improved stability/strength necessary for unrestricted ADLs/work activities  2. Pt will decrease DAREK to 40% impaired or less in order to demonstrate improved functional tolerances at PLOF with minimal restriction/dysfunction  3. Pt will improve Tinetti Balance score to 19 or greater to demonstrate decreased fall risk in home  4. Pt will self report no falls x 4 weeks to demonstrate better safety in home and overall functional mobility  5. Pt will demonstrate independence with a long term HEP for continued progress/maintenance after completion of PT    Pt. Education:  [x] Yes  [] No  [x] Reviewed Prior HEP/Ed  Method of Education: [x] Verbal  [x] Demo  [] Written  Comprehension of Education:  [x] Verbalizes understanding. [x] Demonstrates understanding. [] Needs review. [] Demonstrates/verbalizes HEP/Ed previously given. Plan: [x] Continue per plan of care.    [] Other:      Treatment Charges: Mins Units   []  Modalities     [x]  Ther Exercise 22 1   [x]  Manual Therapy 10 1   []  Ther Activities     []  Aquatics     [x]  Neuromuscular 10 1   [] Vasocompression     [] Gait Training     [] Dry needling        [] 1 or 2 muscles        [] 3 or more muscles     []  Other     Total Treatment time 42 3     Time In: 3:30pm           Time Out: 4:12pm    Electronically signed by:  Hossein Parekh, PT

## 2021-06-28 ENCOUNTER — HOSPITAL ENCOUNTER (OUTPATIENT)
Dept: PHYSICAL THERAPY | Age: 86
Setting detail: THERAPIES SERIES
Discharge: HOME | End: 2021-06-28
Payer: MEDICARE

## 2021-06-28 NOTE — FLOWSHEET NOTE
[] [address]  [place]: [phone]   F: [phone]     Physical Therapy Cancel/No Show note    Date: 2021  Patient: Viry Biswas  : 1934  MRN: [id number]    Visit Count:   Cancels/No Shows to date:     For today's appointment patient:    [x]  Cancelled    [] Rescheduled appointment    [] No-show     Reason given by patient:    [x]  Patient ill    []  Conflicting appointment    [] No transportation      [] Conflict with work    [] No reason given    [] Weather related    [] COVID-19    [] Other:      Comments:        [x] Next appointment was confirmed    Electronically signed by: Jalen Alexander PT

## 2021-06-30 ENCOUNTER — HOSPITAL ENCOUNTER (OUTPATIENT)
Dept: PHYSICAL THERAPY | Age: 86
Setting detail: THERAPIES SERIES
Discharge: HOME | End: 2021-06-30
Payer: MEDICARE

## 2021-06-30 PROCEDURE — 97112 NEUROMUSCULAR REEDUCATION: CPT

## 2021-06-30 PROCEDURE — 97140 MANUAL THERAPY 1/> REGIONS: CPT

## 2021-06-30 PROCEDURE — 97110 THERAPEUTIC EXERCISES: CPT

## 2021-06-30 NOTE — FLOWSHEET NOTE
[facility]   [address]   Phone: [phone]   Fax: [phone]    Physical Therapy Daily Treatment Note    Date:  2021  Patient Name:  Maureen Bazzi    :  1934  MRN: [id number]  Physician: Velma Alcazar DO (resident physician)  Edward Montiel DO (attending physician)                                            Insurance: Medicare with [facility] Medicare Supplement secondary. Visits BMN and in accordance with Medicare guidelines  Medical Diagnosis: M47.816 (ICD-10-CM) - Lumbar spondylosis  Rehab Codes: M47.816  Onset Date:                    Next 's appt: 7/15/21  Visit# / total visits:  Cancels/No Shows: 1  Next progress note due by visit #10 or 21    Subjective:   Pain:  [x] Yes  [] No Location: Left lumbar back    Pain Rating: (0-10 scale) 4/10  Pain altered Tx:  [] No  [] Yes  Action:  Comments: : Pt states that she had to miss her appt earlier this week due to stomach issues but is feeling better today. Feels like manual therapy has been helping make her pain less sharp and expresses that she appreciates the value of working on balance exercises. Objective:  Modalities:   Precautions: Unsteady on feet. CGA to close supervision required at all times when ambulating for safety. Avoid over-exertion as pt has MS.   Exercises: Pt wore gait belt for all standing exercises  Exercise Reps/ Time Weight/ Level Comments Completed today   Supine SKTC 3x15\" (B)     Piriformis stretch 3x15\" L only     LTR 20 reps total   x   TrA activation w/ bridge 10x3\" hold   x   Supine marches with TrA activation 20 reps   L/R alternating each rep x   Clamshells 10x  L only     BKFOs 20 reps ea  With TrA activation           Nustep 5' L3  x   Semi-Tandem stance 2x30\" ea  Alt LE forward, finger tip touch for balance    Horizontal head turns in neutral NELDA 2'  Fingertip touch at parallel bars  x   Balance reaction training in neutral NELDA 2' move/reach in all planes with minimal restriction  3. Pt will demonstrate good understanding of all fall precaution education (using RW at all times, life alert button, clear paths and good lighting) to demonstrate improved safety in home  4. Pt will self report ability to be on feet for 10 min or greater to improve ability to perform light household upkeep tasks around home at prior level  LTG: (to be met in 16 treatments)  1. Pt will demonstrate improved B LE strength to 4-/5 or greater (modified MMT in sitting) in order to demonstrate improved stability/strength necessary for unrestricted ADLs/work activities  2. Pt will decrease DAREK to 40% impaired or less in order to demonstrate improved functional tolerances at PLOF with minimal restriction/dysfunction  3. Pt will improve Tinetti Balance score to 19 or greater to demonstrate decreased fall risk in home  4. Pt will self report no falls x 4 weeks to demonstrate better safety in home and overall functional mobility  5. Pt will demonstrate independence with a long term HEP for continued progress/maintenance after completion of PT    Pt. Education:  [x] Yes  [] No  [x] Reviewed Prior HEP/Ed  Method of Education: [x] Verbal  [x] Demo  [] Written  Comprehension of Education:  [x] Verbalizes understanding. [x] Demonstrates understanding. [] Needs review. [] Demonstrates/verbalizes HEP/Ed previously given. Plan: [x] Continue per plan of care.    [] Other:      Treatment Charges: Mins Units   []  Modalities     [x]  Ther Exercise 20 1   [x]  Manual Therapy 10 1   [x]  Ther Activities 5 0   []  Aquatics     [x]  Neuromuscular 10 1   [] Vasocompression     [] Gait Training     [] Dry needling        [] 1 or 2 muscles        [] 3 or more muscles     []  Other     Total Treatment time 45 3     Time In: 3:25pm           Time Out: 4:10pm    Electronically signed by:  Florencio Connor, PT

## 2021-07-06 ENCOUNTER — HOSPITAL ENCOUNTER (OUTPATIENT)
Dept: PHYSICAL THERAPY | Age: 86
Setting detail: THERAPIES SERIES
Discharge: HOME | End: 2021-07-06
Payer: MEDICARE

## 2021-07-06 PROCEDURE — 97140 MANUAL THERAPY 1/> REGIONS: CPT

## 2021-07-06 PROCEDURE — 97110 THERAPEUTIC EXERCISES: CPT

## 2021-07-06 PROCEDURE — 97112 NEUROMUSCULAR REEDUCATION: CPT

## 2021-07-06 NOTE — PROGRESS NOTES
[facility]   [address]   Phone: [phone]   Fax: [phone]    Physical Therapy Progress Note    Date:  2021  Patient Name:  Fredrica Hammans    :  1934  MRN: [id number]  Physician: Lisa Salazar DO (resident physician)  Pawel Hills DO (attending physician)                                            Insurance: Medicare with [facility] Medicare Supplement secondary. Visits BMN and in accordance with Medicare guidelines  Medical Diagnosis: M47.816 (ICD-10-CM) - Lumbar spondylosis  Rehab Codes: M47.816  Onset Date:                    Next 's appt: 7/15/21  Visit# / total visits:  Cancels/No Shows:   Next progress note due by visit #16 or 21    Subjective:   Pain:  [x] Yes  [] No Location: Left lumbar back    Pain Rating: (0-10 scale) 7/10  Pain altered Tx:  [] No  [] Yes  Action:  Comments: : Pt states that her back has been hurting more than usual the last couple days, rating soreness at 7/10 currently. Otherwise, feels like therapy has been helpful in improving her mobility and pain levels. Formal progress not performed today for reporting period 21-21. Objective:  All below objectives updated  by primary PT       Range of Motion  Left Range of Motion  Right Strength  Left Strength  Right   Lumbar Flexion 75%  Pain/discomfort 75%  Pain/discomfort       Lumbar Extension 50%   Loses balance 50%  Loses balance       Lumbar Rotation 75%  Stretch 75%  Tightness/stretch       Lumbar Side Bend 75%  Tightness 75%  Tightness       Hip Flexion     3/5 3+/5   Hip Abduction     3/5 3+/5   Hip Adduction     4-/5 4-/5   Hip Extension     3-/5 3/5   Hip ER           Hip IR           Knee Flexion     3+/5 4-/5   Knee Extension     3+/5 4-/5   Dorsiflexion     3+/5 3+/5   Plantar flexion     3+/5 3+/5   Inversion           Eversion              Functional Testing:   Five Time Sit to Stand (FTSTS): 35 seconds from standard chair pushing up from 2WW  Timed Up and Go (TUG): 32 seconds with walker    Tinetti Balance & Gait Assessment     0 1 2   Sitting balance Leans/slides in chair  [] Steady; safe  [x]    Rises from chair Unable without help  [] Uses arms  [x] Able without arms  []   Attempts to rise Unable without help  [] >1 attempt  [] 1 attempt  [x]   Immediate standing balance Unsteady  [] Steady w/AD  [x] Steady; Independent  []   Standing balance Unsteady  [x] Wide NELDA  [] Steady; neutral NELDA  []   Nudged Begins to fall  [] Staggers/Grabs/Catches  [x] Steady  []   Standing (eyes closed) Unsteady  [x] Steady  []    360 deg Turn (steps) Discontinuous steps  [x] Continuous steps  []    360 deg Turn (steadiness) Unsteady  [x] Steady  []    Sitting Down Unsafe  [] Uses arms; not smooth  [x] Safe and smooth  []         Gait initiation Hesitancy  [x] No hesitancy  []    Step Length (R) R swing foot does not pass stance foot  [] R swing foot passes stance foot  [x]    Step Length (L) L swing foot does not pass stance foot  [] L swing foot passes stance foot  [x]    Step Height (R) R foot does not clear completely  [] R foot clears completely  [x]    Step Height (L) L foot does not clear completely  [] L foot clears completely  [x]    Step Symmetry Not equal  [] Equal  [x]    Step Continuity Discontinuous  [] Continuous  [x]    Walking Path Marked deviation  [] AD  [x] Straight w/o AD  []   Trunk Marked sway or AD  [x] Flexed knees/back or arms out  [] No sway/deviation  []   Walking Stance Heels apart  [x] Heels almost touch  []     Balance score: 7 / 16  Gait score: 7 / 12  Total score: 14 / 28  Fall risk: Low (>/=24)  []      Moderate (19-23)  []       High (</=18)  [x]      Modalities:   Precautions: Unsteady on feet. CGA to close supervision required at all times when ambulating for safety. Avoid over-exertion as pt has MS.   Exercises: Pt wore gait belt for all standing exercises  Exercise Reps/ Time Weight/ Level Comments Completed today   Supine SKTC 3x15\" (B)     Piriformis stretch 3x15\" L only     LTR 20 reps total   x   TrA activation w/ bridge 10x3\" hold      Supine marches with TrA activation 20 reps   L/R alternating each rep    Clamshells 10x  L only     BKFOs 20 reps ea  With TrA activation           Nustep 5' L3     Semi-Tandem stance 2x30\" ea  Alt LE forward, finger tip touch for balance x   Horizontal head turns in neutral NELDA 2'  Fingertip touch at parallel bars     Balance reaction training in neutral NELDA 2'  Very light trunk perturbations in all planes  UE support at parallel bars and CGA to intermittent min A    Slant board w/ toes on board 2x30\"      Standing hip flexion x10 reps B LE In parallel bars and CGA for balance    Standing hip abduction x10 reps (B) LE  In parallel bars and CGA for balance    Heel raises x10 reps  Finger tip touch for balance                Bed mobility (supine > sit) 5'   Practice rolling to side prior to sitting up to minimize strain on back                          Other:  Manual therapy x 10'- MFR to L thoracolumbar paraspinals for pain control     Re-evaluation of all subjective & objective measures 7/6 by primary PT- billed as therex (15') and neuro re-ed (10')     Specific Instructions for next treatment: Emphasis on functional strengthening/stabilization of B LEs and core, with progressive static and dynamic balance interventions as safe/appropriate. May also benefit from soft tissue mobilization/MFR as needed to L lumbar/hip region. Assessment: [x] Progressing toward goals. [] No change. [] Other:    [x] Patient would continue to benefit from skilled physical therapy services in order to: modulate sx as well as improve strength, gait/balance and functional tolerances necessary to improve safe ADL/self-care participation in her home. Pt has been seen for 7 PT visits to date for her chronic back pain, weakness and balance deficits.  Elevated pain levels present today over past couple days with unclear cause but overall pt reports that therapy has been helpful in making her back feel better and move easier. Lumbar AROM and LE strength slightly improved from initial assessment but pt continues demonstrate significant weakness as well as static & dynamic balance deficits that are putting her at an increased risk for falls in her home. Demonstrates good compliance with walker use, but still requires occasional cueing on safety in regard to using it all the way to the chair/mat. Discussed sitting posture as well today as pt has tendency to sit deviated to L which seems to cause increased L sided LBP. Additional skilled PT intervention is required to modulate pain and maximize strength, stability and general safety through all ADLs. 9 visits currently remaining on POC with no additional visits added at this time. STG: (to be met in 8 treatments)  1. Pt will self report worst pain no greater than 4/10 in order to better tolerate ADLs/self-care tasks with minimal dysfunction GOAL PROGRESSING AND EXTENDED THROUGH POC 7/6  2. Pt will improve lumbar AROM to 75% or greater in all planes in order to demonstrate ability to move/reach in all planes with minimal restriction GOAL MET 7/6  3. Pt will demonstrate good understanding of all fall precaution education (using RW at all times, life alert button, clear paths and good lighting) to demonstrate improved safety in home GOAL MET 7/6  4. Pt will self report ability to be on feet for 10 min or greater to improve ability to perform light household upkeep tasks around home at prior level [address] [facility] 7/6  LTG: (to be met in 16 treatments)  1. Pt will demonstrate improved B LE strength to 4-/5 or greater (modified MMT in sitting) in order to demonstrate improved stability/strength necessary for unrestricted ADLs/work activities GOAL PROGRESSING 7/6  2.  Pt will decrease DAREK to 40% impaired or less in order to demonstrate improved functional tolerances at PLOF with minimal restriction/dysfunction   3. Pt will improve Tinetti Balance score to 19 or greater to demonstrate decreased fall risk in home GOAL PROGRESSING 7/6  4. Pt will self report no falls x 4 weeks to demonstrate better safety in home and overall functional mobility  5. Pt will demonstrate independence with a long term HEP for continued progress/maintenance after completion of PT GOAL PROGRESSING 7/6    Pt. Education:  [x] Yes  [] No  [x] Reviewed Prior HEP/Ed  Method of Education: [x] Verbal  [x] Demo  [] Written  Comprehension of Education:  [x] Verbalizes understanding. [x] Demonstrates understanding. [] Needs review. [] Demonstrates/verbalizes HEP/Ed previously given. Plan: [x] Continue per plan of care.    [] Other:      Treatment Charges: Mins Units   []  Modalities     [x]  Ther Exercise 18 1   [x]  Manual Therapy 10 1   []  Ther Activities     []  Aquatics     [x]  Neuromuscular 12 1   [] Vasocompression     [] Gait Training     [] Dry needling        [] 1 or 2 muscles        [] 3 or more muscles     []  Other     Total Treatment time 40 3     Time In: 3:32pm           Time Out: 4:12pm    Electronically signed by:  Tammi Bailey, PT

## 2021-07-08 ENCOUNTER — HOSPITAL ENCOUNTER (OUTPATIENT)
Dept: PHYSICAL THERAPY | Age: 86
Setting detail: THERAPIES SERIES
Discharge: HOME | End: 2021-07-08
Payer: MEDICARE

## 2021-07-08 PROCEDURE — 97110 THERAPEUTIC EXERCISES: CPT

## 2021-07-08 PROCEDURE — 97112 NEUROMUSCULAR REEDUCATION: CPT

## 2021-07-08 PROCEDURE — 97140 MANUAL THERAPY 1/> REGIONS: CPT

## 2021-07-08 NOTE — FLOWSHEET NOTE
[facility]   [address]   Phone: [phone]   Fax: [phone]    Physical Therapy Daily Treatment Note    Date:  2021  Patient Name:  Chrissie Abebe    :  1934  MRN: [id number]  Physician: Bronwyn Bui DO (resident physician)  Tiffany Robert DO (attending physician)                                            Insurance: Medicare with [employer] Medicare Supplement secondary. Visits BMN and in accordance with Medicare guidelines  Medical Diagnosis: M47.816 (ICD-10-CM) - Lumbar spondylosis  Rehab Codes: M47.816  Onset Date:                    Next 's appt: 7/15/21  Visit# / total visits:  Cancels/No Shows:   Next progress note due by visit #16 or 21    Subjective:   Pain:  [x] Yes  [] No Location: Left lumbar back    Pain Rating: (0-10 scale) 6/10  Pain altered Tx:  [] No  [] Yes  Action:  Comments: : Pt states that she's doing ok today, having less pain than her previous visit but still reports usual L sided mid to lower back discomfort. \"I know I will probably have pain there for the rest of my life but I want to be able to manage it the best that I can and try to work on my posture so that I don't stoop down down so bad. \"    Objective:  All below objectives updated  by primary PT       Range of Motion  Left Range of Motion  Right Strength  Left Strength  Right   Lumbar Flexion 75%  Pain/discomfort 75%  Pain/discomfort       Lumbar Extension 50%   Loses balance 50%  Loses balance       Lumbar Rotation 75%  Stretch 75%  Tightness/stretch       Lumbar Side Bend 75%  Tightness 75%  Tightness       Hip Flexion     3/5 3+/5   Hip Abduction     3/5 3+/5   Hip Adduction     4-/5 4-/5   Hip Extension     3-/5 3/5   Hip ER           Hip IR           Knee Flexion     3+/5 4-/5   Knee Extension     3+/5 4-/5   Dorsiflexion     3+/5 3+/5   Plantar flexion     3+/5 3+/5   Inversion           Eversion              Functional Testing: Five Time Sit to Stand (FTSTS): 35 seconds from standard chair pushing up from 2WW  Timed Up and Go (TUG): 32 seconds with walker    Tinetti Balance & Gait Assessment     0 1 2   Sitting balance Leans/slides in chair  [] Steady; safe  [x]    Rises from chair Unable without help  [] Uses arms  [x] Able without arms  []   Attempts to rise Unable without help  [] >1 attempt  [] 1 attempt  [x]   Immediate standing balance Unsteady  [] Steady w/AD  [x] Steady; Independent  []   Standing balance Unsteady  [x] Wide NELDA  [] Steady; neutral NELDA  []   Nudged Begins to fall  [] Staggers/Grabs/Catches  [x] Steady  []   Standing (eyes closed) Unsteady  [x] Steady  []    360 deg Turn (steps) Discontinuous steps  [x] Continuous steps  []    360 deg Turn (steadiness) Unsteady  [x] Steady  []    Sitting Down Unsafe  [] Uses arms; not smooth  [x] Safe and smooth  []         Gait initiation Hesitancy  [x] No hesitancy  []    Step Length (R) R swing foot does not pass stance foot  [] R swing foot passes stance foot  [x]    Step Length (L) L swing foot does not pass stance foot  [] L swing foot passes stance foot  [x]    Step Height (R) R foot does not clear completely  [] R foot clears completely  [x]    Step Height (L) L foot does not clear completely  [] L foot clears completely  [x]    Step Symmetry Not equal  [] Equal  [x]    Step Continuity Discontinuous  [] Continuous  [x]    Walking Path Marked deviation  [] AD  [x] Straight w/o AD  []   Trunk Marked sway or AD  [x] Flexed knees/back or arms out  [] No sway/deviation  []   Walking Stance Heels apart  [x] Heels almost touch  []     Balance score: 7 / 16  Gait score: 7 / 12  Total score: 14 / 28  Fall risk: Low (>/=24)  []      Moderate (19-23)  []       High (</=18)  [x]      Modalities:   Precautions: Unsteady on feet. CGA to close supervision required at all times when ambulating for safety. Avoid over-exertion as pt has MS.   Exercises: Pt wore gait belt for all standing exercises  Exercise Reps/ Time Weight/ Level Comments Completed today   Supine SKTC 3x15\" (B)     Piriformis stretch 3x15\" L only     LTR 20 reps total      TrA activation w/ bridge 10x3\" hold      Supine marches with TrA activation 20 reps   L/R alternating each rep    Clamshells 10x  L only     BKFOs 20 reps ea  With TrA activation    Seated thoracic rotations 10 reps ea  Pain free range x   Seated scapular protraction 10 reps  Holding onto wooden dowel for mobilization through mid thoracic and scapular range x   Seated marches at edge of table 10 reps x 3  Emphasis on upright posture  x   Nustep 5' L3  x   Semi-Tandem stance 2x30\" ea  Alt LE forward, finger tip touch for balance x   Horizontal head turns in neutral NELDA 2'  Fingertip touch at parallel bars     Balance reaction training in neutral NELDA 2'  Very light trunk perturbations in all planes  UE support at parallel bars and CGA to intermittent min A    Slant board w/ toes on board 2x30\"      Standing 3-way hip 10 reps ea B LE In parallel bars and CGA for balance x   Heel raises x10 reps  Finger tip touch for balance                Bed mobility (supine > sit) 5'   Practice rolling to side prior to sitting up to minimize strain on back                         Other:  Manual therapy x 10'- MFR to L thoracolumbar paraspinals for pain control        Specific Instructions for next treatment: Emphasis on functional strengthening/stabilization of B LEs and core, with progressive static and dynamic balance interventions as safe/appropriate. May also benefit from soft tissue mobilization/MFR as needed to L lumbar/hip region. Assessment: [x] Progressing toward goals. [] No change. [] Other:    [x] Patient would continue to benefit from skilled physical therapy services in order to: modulate sx as well as improve strength, gait/balance and functional tolerances necessary to improve safe ADL/self-care participation in her home.     7/8: Performed manual intervention in sitting today as pt still struggles with a great deal of back pain transitioning from supine to sit. Also initiated gentle thoracic and scapular mobility exercises with pt reporting mild stretching/relief with seated scapular protraction with dowel. Pt states that she flexion seems to give her relief in tightness but knows that walking while stooped over is not good for her back long-term, and has been trying to be more mindful of her upright posture in sitting and standing. Added seatd marches and standing 3-way hip with emphasis on postural stability component with difficulty evident, requiring frequent tactile cues for correction. Will benefit from additional reinforcement as well as continued work on static & dynamic balance interventions    STG: (to be met in 8 treatments)  1. Pt will self report worst pain no greater than 4/10 in order to better tolerate ADLs/self-care tasks with minimal dysfunction GOAL PROGRESSING AND EXTENDED THROUGH POC 7/6  2. Pt will improve lumbar AROM to 75% or greater in all planes in order to demonstrate ability to move/reach in all planes with minimal restriction GOAL MET 7/6  3. Pt will demonstrate good understanding of all fall precaution education (using RW at all times, life alert button, clear paths and good lighting) to demonstrate improved safety in home GOAL MET 7/6  4. Pt will self report ability to be on feet for 10 min or greater to improve ability to perform light household upkeep tasks around home at prior level [address] [facility] POC 7/6  LTG: (to be met in 16 treatments)  1. Pt will demonstrate improved B LE strength to 4-/5 or greater (modified MMT in sitting) in order to demonstrate improved stability/strength necessary for unrestricted ADLs/work activities GOAL PROGRESSING 7/6  2. Pt will decrease DAREK to 40% impaired or less in order to demonstrate improved functional tolerances at PLOF with minimal restriction/dysfunction   3.  Pt will improve Tinetti Balance score to 19 or greater to demonstrate decreased fall risk in home GOAL PROGRESSING 7/6  4. Pt will self report no falls x 4 weeks to demonstrate better safety in home and overall functional mobility  5. Pt will demonstrate independence with a long term HEP for continued progress/maintenance after completion of PT GOAL PROGRESSING 7/6    Pt. Education:  [x] Yes  [] No  [x] Reviewed Prior HEP/Ed  Method of Education: [x] Verbal  [x] Demo  [] Written  Comprehension of Education:  [x] Verbalizes understanding. [x] Demonstrates understanding. [] Needs review. [] Demonstrates/verbalizes HEP/Ed previously given. Plan: [x] Continue per plan of care.    [] Other:      Treatment Charges: Mins Units   []  Modalities     [x]  Ther Exercise 17 1   [x]  Manual Therapy 10 1   []  Ther Activities     []  Aquatics     [x]  Neuromuscular 15 1   [] Vasocompression     [] Gait Training     [] Dry needling        [] 1 or 2 muscles        [] 3 or more muscles     []  Other     Total Treatment time 42 3     Time In: 3:30pm           Time Out: 4:12pm    Electronically signed by:  He Montenegro, PT

## 2021-07-12 ENCOUNTER — HOSPITAL ENCOUNTER (OUTPATIENT)
Dept: PHYSICAL THERAPY | Age: 86
Setting detail: THERAPIES SERIES
Discharge: HOME | End: 2021-07-12
Payer: MEDICARE

## 2021-07-12 PROCEDURE — 97140 MANUAL THERAPY 1/> REGIONS: CPT

## 2021-07-12 PROCEDURE — 97112 NEUROMUSCULAR REEDUCATION: CPT

## 2021-07-12 PROCEDURE — 97110 THERAPEUTIC EXERCISES: CPT

## 2021-07-12 NOTE — FLOWSHEET NOTE
[facility]   [address] [facility] [address]   Phone: [phone]   Fax: [phone]    Physical Therapy Daily Treatment Note    Date:  2021  Patient Name:  Lucila Gilford    :  1934  MRN: [id number]  Physician: Severiano Primmer, DO (resident physician)  Pardeep Alexandre DO (attending physician)                                            Insurance: Medicare with [employer] Medicare Supplement secondary. Visits BMN and in accordance with Medicare guidelines  Medical Diagnosis: M47.816 (ICD-10-CM) - Lumbar spondylosis  Rehab Codes: M47.816  Onset Date:                    Next 's appt: 7/15/21  Visit# / total visits:  Cancels/No Shows:   Next progress note due by visit #16 or 21    Subjective:   Pain:  [x] Yes  [] No Location: Left lumbar back    Pain Rating: (0-10 scale) 7/10  Pain altered Tx:  [] No  [] Yes  Action:  Comments: : Pt with continued complaints of pain in left lumber back with no change since last visit. The most painful when getting out of bed. Continues to work on posture and HEP throughout the day. Objective:    Modalities:   Precautions: Unsteady on feet. CGA to close supervision required at all times when ambulating for safety. Avoid over-exertion as pt has MS.   Exercises: Pt wore gait belt for all standing exercises  Exercise Reps/ Time Weight/ Level Comments Completed today   Supine SKTC 3x15\" (B)     Piriformis stretch 3x15\" L only     LTR 20 reps total      TrA activation w/ bridge 10x3\" hold      Supine marches with TrA activation 20 reps   L/R alternating each rep    Clamshells 10x  L only     BKFOs 20 reps ea  With TrA activation    Seated thoracic rotations 10 reps ea  Pain free range x   Seated scapular protraction 10 reps  Holding onto wooden dowel for mobilization through mid thoracic and scapular range x   Seated marches at edge of table 10 reps x 3  Emphasis on upright posture  x   Seated Scap retraction @ edge of mat       Nustep 5' L3  x   Semi-Tandem stance 2x30\" ea  Alt LE forward, finger tip touch for balance x   Horizontal head turns in neutral NELDA 2'  Fingertip touch at parallel bars     Balance reaction training in neutral NELDA 2'  Very light trunk perturbations in all planes  UE support at parallel bars and CGA to intermittent min A    Slant board w/ toes on board 2x30\"      Standing 3-way hip 10 reps ea B LE In parallel bars and CGA for balance x   Heel raises x10 reps  Finger tip touch for balance                Bed mobility (supine > sit) 5'   Practice rolling to side prior to sitting up to minimize strain on back                         Other:  Manual therapy x 10'- MFR to L thoracolumbar paraspinals for pain control        Specific Instructions for next treatment: Emphasis on functional strengthening/stabilization of B LEs and core, with progressive static and dynamic balance interventions as safe/appropriate. May also benefit from soft tissue mobilization/MFR as needed to L lumbar/hip region. Assessment: [x] Progressing toward goals. [] No change. [] Other:    [x] Patient would continue to benefit from skilled physical therapy services in order to: modulate sx as well as improve strength, gait/balance and functional tolerances necessary to improve safe ADL/self-care participation in her home. 7/12: Pt continues with increased pain with sit to  left lumbar back. Started treatment with manual to decrease tightness and pain. Notes less tightness and pain after manual.  Continued with no supine exercises and working on postural exercises in seated for better tolerance to exercises. Tactile cueing needed for proper technique and postural awareness. Able to complete all exercises with discomfort going from sit-stand throughout treatment. Educated on importance or posture and performing HEP daily. STG: (to be met in 8 treatments)  1.  Pt will self report worst pain no greater than 4/10 in order to better tolerate ADLs/self-care tasks with minimal dysfunction GOAL PROGRESSING AND EXTENDED THROUGH POC 7/6  2. Pt will improve lumbar AROM to 75% or greater in all planes in order to demonstrate ability to move/reach in all planes with minimal restriction GOAL MET 7/6  3. Pt will demonstrate good understanding of all fall precaution education (using RW at all times, life alert button, clear paths and good lighting) to demonstrate improved safety in home GOAL MET 7/6  4. Pt will self report ability to be on feet for 10 min or greater to improve ability to perform light household upkeep tasks around home at prior level [facility] POC 7/6  LTG: (to be met in 16 treatments)  1. Pt will demonstrate improved B LE strength to 4-/5 or greater (modified MMT in sitting) in order to demonstrate improved stability/strength necessary for unrestricted ADLs/work activities GOAL PROGRESSING 7/6  2. Pt will decrease DAREK to 40% impaired or less in order to demonstrate improved functional tolerances at PLOF with minimal restriction/dysfunction   3. Pt will improve Tinetti Balance score to 19 or greater to demonstrate decreased fall risk in home GOAL PROGRESSING 7/6  4. Pt will self report no falls x 4 weeks to demonstrate better safety in home and overall functional mobility  5. Pt will demonstrate independence with a long term HEP for continued progress/maintenance after completion of PT GOAL PROGRESSING 7/6    Pt. Education:  [x] Yes  [] No  [x] Reviewed Prior HEP/Ed  Method of Education: [x] Verbal  [x] Demo  [] Written  Comprehension of Education:  [x] Verbalizes understanding. [x] Demonstrates understanding. [] Needs review. [] Demonstrates/verbalizes HEP/Ed previously given. Plan: [x] Continue per plan of care.    [] Other:      Treatment Charges: Mins Units   []  Modalities     [x]  Ther Exercise 20 1   [x]  Manual Therapy 10 1   []  Ther Activities     []  Aquatics     [x] Neuromuscular 10 1   [] Vasocompression     [] Gait Training     [] Dry needling        [] 1 or 2 muscles        [] 3 or more muscles     []  Other     Total Treatment time 40 3     Time In: 3:30pm           Time Out: 4:20 PM    Electronically signed by:  Mary Jo Dallas PTA

## 2021-07-14 ENCOUNTER — HOSPITAL ENCOUNTER (OUTPATIENT)
Dept: PHYSICAL THERAPY | Age: 86
Setting detail: THERAPIES SERIES
Discharge: HOME | End: 2021-07-14
Payer: MEDICARE

## 2021-07-14 PROCEDURE — 97140 MANUAL THERAPY 1/> REGIONS: CPT

## 2021-07-14 PROCEDURE — 97112 NEUROMUSCULAR REEDUCATION: CPT

## 2021-07-14 PROCEDURE — 97110 THERAPEUTIC EXERCISES: CPT

## 2021-07-14 NOTE — FLOWSHEET NOTE
[facility]   [address]   Phone: [phone]   Fax: [phone]    Physical Therapy Daily Treatment Note    Date:  2021  Patient Name:  Brittany Phipps    :  1934  MRN: [id number]  Physician: Yong Roque DO (resident physician)  Zach Morales DO (attending physician)                                            Insurance: Medicare with [employer] Medicare Supplement secondary. Visits BMN and in accordance with Medicare guidelines  Medical Diagnosis: M47.816 (ICD-10-CM) - Lumbar spondylosis  Rehab Codes: M47.816  Onset Date:                    Next 's appt: 7/15/21  Visit# / total visits: 10/16 Cancels/No Shows:   Next progress note due by visit #16 or 21    Subjective:   Pain:  [x] Yes  [] No Location: Left lumbar back    Pain Rating: (0-10 scale) 5/10  Pain altered Tx:  [] No  [] Yes  Action:  Comments: : Pt states that she just woke up very tired this morning but otherwise is doing ok. Feels like she is walking better with better posture which her family has even commented on. Objective:    Modalities:   Precautions: Unsteady on feet. CGA to close supervision required at all times when ambulating for safety. Avoid over-exertion as pt has MS.   Exercises: Pt wore gait belt for all standing exercises  Exercise Reps/ Time Weight/ Level Comments Completed today   Supine SKTC 3x15\" (B)     Piriformis stretch 3x15\" L only     LTR 20 reps total      TrA activation w/ bridge 10x3\" hold      Supine marches with TrA activation 20 reps   L/R alternating each rep    Clamshells 10x  L only     BKFOs 20 reps ea  With TrA activation    Seated thoracic rotations 10 reps ea  Pain free range x   Seated scapular protraction 10 reps  Holding onto wooden dowel for mobilization through mid thoracic and scapular range    Seated marches at edge of table 10 reps x 3  Emphasis on upright posture  x   Seated Scap retraction @ edge of mat Nustep 5' L3  x   Standing balance unsupported 2'  CGA to min A; hovering hands above parallel bars x   Semi-Tandem stance 2x30\" ea  Alt LE forward, finger tip touch for balance x   Horizontal head turns in neutral NELDA 2'  Fingertip touch at parallel bars  x   Balance reaction training in neutral NELDA 2'  Very light trunk perturbations in all planes  UE support at parallel bars and CGA to intermittent min A    Slant board w/ toes on board 2x30\"      Standing 3-way hip 10 reps ea B LE In parallel bars and CGA for balance x   Heel raises x10 reps  Finger tip touch for balance                Bed mobility (supine > sit) 5'   Practice rolling to side prior to sitting up to minimize strain on back                         Other:  Manual therapy x 10'- MFR to L thoracolumbar paraspinals for pain control (in sitting)       Specific Instructions for next treatment: Emphasis on functional strengthening/stabilization of B LEs and core, with progressive static and dynamic balance interventions as safe/appropriate. May also benefit from soft tissue mobilization/MFR as needed to L lumbar/hip region. Assessment: [x] Progressing toward goals. [] No change. [] Other:    [x] Patient would continue to benefit from skilled physical therapy services in order to: modulate sx as well as improve strength, gait/balance and functional tolerances necessary to improve safe ADL/self-care participation in her home. 7/14: More fatigue present during today's visit which pt attributes to her MS and not sleeping well. Education provided today on pacing/energy conservation techniques to minimize over-exertion and chronic fatigue. More seated rest breaks required between interventions for safe recovery. Otherwise, good carryover understanding of appropriate upright posture with walking with improved back pain throughout the day. However, still reports high pain levels getting up from bed first thing in the morning.      STG: (to be met in 8 treatments)  1. Pt will self report worst pain no greater than 4/10 in order to better tolerate ADLs/self-care tasks with minimal dysfunction GOAL PROGRESSING AND EXTENDED THROUGH POC 7/6  2. Pt will improve lumbar AROM to 75% or greater in all planes in order to demonstrate ability to move/reach in all planes with minimal restriction GOAL MET 7/6  3. Pt will demonstrate good understanding of all fall precaution education (using RW at all times, life alert button, clear paths and good lighting) to demonstrate improved safety in home GOAL MET 7/6  4. Pt will self report ability to be on feet for 10 min or greater to improve ability to perform light household upkeep tasks around home at prior level [address] [facility] POC 7/6  LTG: (to be met in 16 treatments)  1. Pt will demonstrate improved B LE strength to 4-/5 or greater (modified MMT in sitting) in order to demonstrate improved stability/strength necessary for unrestricted ADLs/work activities GOAL PROGRESSING 7/6  2. Pt will decrease DAREK to 40% impaired or less in order to demonstrate improved functional tolerances at PLOF with minimal restriction/dysfunction   3. Pt will improve Tinetti Balance score to 19 or greater to demonstrate decreased fall risk in home GOAL PROGRESSING 7/6  4. Pt will self report no falls x 4 weeks to demonstrate better safety in home and overall functional mobility  5. Pt will demonstrate independence with a long term HEP for continued progress/maintenance after completion of PT GOAL PROGRESSING 7/6    Pt. Education:  [x] Yes  [] No  [x] Reviewed Prior HEP/Ed  Method of Education: [x] Verbal  [x] Demo  [] Written  Comprehension of Education:  [x] Verbalizes understanding. [x] Demonstrates understanding. [] Needs review. [] Demonstrates/verbalizes HEP/Ed previously given. Plan: [x] Continue per plan of care.    [] Other:      Treatment Charges: Mins Units   []  Modalities     [x]  Ther Exercise 20 1   [x] Manual Therapy 10 1   []  Ther Activities     []  Aquatics     [x]  Neuromuscular 10 1   [] Vasocompression     [] Gait Training     [] Dry needling        [] 1 or 2 muscles        [] 3 or more muscles     []  Other     Total Treatment time 42 3     Time In: 3:30pm           Time Out: 4:22 PM    Electronically signed by:  Tigre Stiles PT

## 2021-07-18 DIAGNOSIS — E78.5 HYPERLIPIDEMIA, UNSPECIFIED HYPERLIPIDEMIA TYPE: ICD-10-CM

## 2021-07-19 ENCOUNTER — HOSPITAL ENCOUNTER (OUTPATIENT)
Dept: PHYSICAL THERAPY | Age: 86
Setting detail: THERAPIES SERIES
Discharge: HOME | End: 2021-07-19
Payer: MEDICARE

## 2021-07-19 PROCEDURE — 97110 THERAPEUTIC EXERCISES: CPT

## 2021-07-19 PROCEDURE — 97140 MANUAL THERAPY 1/> REGIONS: CPT

## 2021-07-19 RX ORDER — SIMVASTATIN 20 MG
20 TABLET ORAL NIGHTLY
Qty: 90 TABLET | Refills: 1 | Status: SHIPPED | OUTPATIENT
Start: 2021-07-19 | End: 2021-08-23 | Stop reason: SDUPTHER

## 2021-07-19 NOTE — TELEPHONE ENCOUNTER
Last visit: 04/21/2021  Last Med refill: 04/23/2021  Does patient have enough medication for 72 hours: No:     Next Visit Date:  Future Appointments   Date Time Provider Cj Pacheco   7/19/2021  3:30 PM Tonya Roca, PTA [facility]   7/21/2021  3:30 PM Florentino Montgomery, PT [facility]   7/26/2021  3:30 PM Tonya Roca, PTA [facility]   7/28/2021  3:30 PM Florentino Montgomery, PT [facility]   8/5/2021  2:00 PM SCHEDULE, P ORTH[facility] [address] Maintenance   Topic Date Due    Lipid screen  05/21/2021    Shingles Vaccine (1 of 2) 07/30/2021 (Originally 12/8/1984)    DTaP/Tdap/Td vaccine (1 - Tdap) 03/30/2022 (Originally 12/8/1953)    Flu vaccine (1) 09/01/2021    Potassium monitoring  03/10/2022    Creatinine monitoring  03/10/2022    Annual Wellness Visit (AWV)  04/22/2022    Pneumococcal 65+ years Vaccine  Completed    COVID-19 Vaccine  Completed    Hepatitis A vaccine  Aged Out    Hib vaccine  Aged Out    Meningococcal (ACWY) vaccine  Aged Out       Hemoglobin A1C (%)   Date Value   03/30/2021 6.7   05/21/2020 6.2 (H)   02/14/2019 6.0             ( goal A1C is < 7)   No results found for: LABMICR  LDL Cholesterol (mg/dL)   Date Value   05/21/2020 86   05/24/2019 75       (goal LDL is <100)   AST (U/L)   Date Value   02/25/2021 15     ALT (U/L)   Date Value   02/25/2021 14     BUN (mg/dL)   Date Value   03/10/2021 15     BP Readings from Last 3 Encounters:   04/21/21 118/76   04/08/21 103/60   03/30/21 126/62          (goal 120/80)    All Future Testing planned in [facility]              Patient Active Problem List:     Ataxia     Multiple sclerosis (HCC)     Essential hypertension     Hyperlipidemia     Atelectasis     Debility     Abnormal gait     Actinic keratosis     Enthesopathy of hip region     Generalized osteoarthritis     Idiopathic peripheral neuropathy     Vitamin D deficiency     Primary localized osteoarthrosis of the hip, left     Primary osteoarthritis of left hip     Acquired spondylolisthesis     Chronic midline low back pain with left-sided sciatica     Spinal stenosis of lumbar region with neurogenic claudication     Presence of right artificial shoulder joint     Multiple sclerosis exacerbation (HCC)     Diarrhea     Chronic headache     Facial asymmetry     Asymptomatic bacteriuria     Hyperglycemia     EDINSON (acute kidney injury) ([facility] [address]         04

## 2021-07-19 NOTE — FLOWSHEET NOTE
[facility]   [address]   Phone: [phone]   Fax: [phone]    Physical Therapy Daily Treatment Note    Date:  2021  Patient Name:  Peggy Murillo    :  1934  MRN: [id number]  Physician: Brittany Ro DO (resident physician)  Natalie Padilla DO (attending physician)                                            Insurance: Medicare with [employer] Medicare Supplement secondary. Visits BMN and in accordance with Medicare guidelines  Medical Diagnosis: M47.816 (ICD-10-CM) - Lumbar spondylosis  Rehab Codes: M47.816  Onset Date:                    Next 's appt: 7/15/21  Visit# / total visits:  Cancels/No Shows:   Next progress note due by visit #16 or 21    Subjective:   Pain:  [x] Yes  [] No Location: Left lumbar back    Pain Rating: (0-10 scale) 5/10  Pain altered Tx:  [] No  [] Yes  Action:  Comments: : Pt reports feeling better today but still a little tired. Notes fatiguing quicker lately. Denies any issues after last therapy session. Continued with icreased pain first thing in morning and when going from sit-to-stand. Objective:    Modalities:   Precautions: Unsteady on feet. CGA to close supervision required at all times when ambulating for safety. Avoid over-exertion as pt has MS.   Exercises: Pt wore gait belt for all standing exercises  Exercise Reps/ Time Weight/ Level Comments Completed today   Supine SKTC 3x15\" (B)     Piriformis stretch 3x15\" L only     LTR 20 reps total      TrA activation w/ bridge 10x3\" hold      Supine marches with TrA activation 20 reps   L/R alternating each rep    Clamshells 10x  L only     BKFOs 20 reps ea  With TrA activation    Seated thoracic rotations 10 reps ea  Pain free range x   Seated scapular protraction 10 reps  Holding onto wooden dowel for mobilization through mid thoracic and scapular range    Seated marches at edge of table 10 reps x 3  Emphasis on upright posture  x Seated Scap retraction @ edge of mat       Nustep 5' L3  x   Standing balance unsupported 2'  CGA to min A; hovering hands above parallel bars x   Semi-Tandem stance 2x30\" ea  Alt LE forward, finger tip touch for balance x   Horizontal head turns in neutral NELDA 2'  Fingertip touch at parallel bars  x   Balance reaction training in neutral NELDA 2'  Very light trunk perturbations in all planes  UE support at parallel bars and CGA to intermittent min A    Slant board w/ toes on board 2x30\"      Standing 3-way hip 10 reps ea B LE In parallel bars and CGA for balance x   Heel raises x10 reps  Finger tip touch for balance     Sit to stand  10x    focusing on upright posture and standing straight up x    Bed mobility (supine > sit) 5'   Practice rolling to side prior to sitting up to minimize strain on back                         Other:  Manual therapy x 10'- MFR to L thoracolumbar paraspinals for pain control (in sitting)       Specific Instructions for next treatment: Emphasis on functional strengthening/stabilization of B LEs and core, with progressive static and dynamic balance interventions as safe/appropriate. May also benefit from soft tissue mobilization/MFR as needed to L lumbar/hip region. Assessment: [x] Progressing toward goals. [] No change. [] Other:    [x] Patient would continue to benefit from skilled physical therapy services in order to: modulate sx as well as improve strength, gait/balance and functional tolerances necessary to improve safe ADL/self-care participation in her home. 7/19: Continued with MFR to Left thoracolumbar paraspinals and left superior gluts/buttocks area. Notes less pain and tightness after manual.  Better tolerance to sit to stand with scooting to end and standing straight up-avoiding excessive forward flexion. Continued working on balance to patient's tolerance. STG: (to be met in 8 treatments)  1.  Pt will self report worst pain no greater than 4/10 in order to better tolerate ADLs/self-care tasks with minimal dysfunction GOAL PROGRESSING AND EXTENDED THROUGH POC 7/6  2. Pt will improve lumbar AROM to 75% or greater in all planes in order to demonstrate ability to move/reach in all planes with minimal restriction GOAL MET 7/6  3. Pt will demonstrate good understanding of all fall precaution education (using RW at all times, life alert button, clear paths and good lighting) to demonstrate improved safety in home GOAL MET 7/6  4. Pt will self report ability to be on feet for 10 min or greater to improve ability to perform light household upkeep tasks around home at prior level [address] [facility] POC 7/6  LTG: (to be met in 16 treatments)  1. Pt will demonstrate improved B LE strength to 4-/5 or greater (modified MMT in sitting) in order to demonstrate improved stability/strength necessary for unrestricted ADLs/work activities GOAL PROGRESSING 7/6  2. Pt will decrease DAREK to 40% impaired or less in order to demonstrate improved functional tolerances at PLOF with minimal restriction/dysfunction   3. Pt will improve Tinetti Balance score to 19 or greater to demonstrate decreased fall risk in home GOAL PROGRESSING 7/6  4. Pt will self report no falls x 4 weeks to demonstrate better safety in home and overall functional mobility  5. Pt will demonstrate independence with a long term HEP for continued progress/maintenance after completion of PT GOAL PROGRESSING 7/6    Pt. Education:  [x] Yes  [] No  [x] Reviewed Prior HEP/Ed  Method of Education: [x] Verbal  [x] Demo  [] Written  Comprehension of Education:  [x] Verbalizes understanding. [x] Demonstrates understanding. [] Needs review. [] Demonstrates/verbalizes HEP/Ed previously given. Plan: [x] Continue per plan of care.    [] Other:      Treatment Charges: Mins Units   []  Modalities     [x]  Ther Exercise 20 1   [x]  Manual Therapy 10 1   []  Ther Activities     []  Aquatics     [x] Neuromuscular 10 1   [] Vasocompression     [] Gait Training     [] Dry needling        [] 1 or 2 muscles        [] 3 or more muscles     []  Other     Total Treatment time 42 3     Time In: 3:30 pm           Time Out: 4:15 pm    Electronically signed by:  Moo Payne PTA

## 2021-07-21 ENCOUNTER — HOSPITAL ENCOUNTER (OUTPATIENT)
Dept: PHYSICAL THERAPY | Age: 86
Setting detail: THERAPIES SERIES
Discharge: HOME | End: 2021-07-21
Payer: MEDICARE

## 2021-07-21 PROCEDURE — 97140 MANUAL THERAPY 1/> REGIONS: CPT

## 2021-07-21 PROCEDURE — 97110 THERAPEUTIC EXERCISES: CPT

## 2021-07-21 PROCEDURE — 97112 NEUROMUSCULAR REEDUCATION: CPT

## 2021-07-21 NOTE — FLOWSHEET NOTE
[facility]   [address]   Phone: [phone]   Fax: [phone]    Physical Therapy Daily Treatment Note    Date:  2021  Patient Name:  Zulema Forbes    :  1934  MRN: [id number]  Physician: Evita Covarrubias DO (resident physician)  Sil Herzog DO (attending physician)                                            Insurance: Medicare with [employer] [address] [employer] Medicare Supplement secondary. Visits BMN and in accordance with Medicare guidelines  Medical Diagnosis: M47.816 (ICD-10-CM) - Lumbar spondylosis  Rehab Codes: M47.816  Onset Date:                    Next 's appt: 7/15/21  Visit# / total visits:  Cancels/No Shows: 10  Next progress note due by visit #16 or 21    Subjective:   Pain:  [x] Yes  [] No Location: Left lumbar back    Pain Rating: (0-10 scale) 5/10  Pain altered Tx:  [] No  [] Yes  Action:  Comments: : Pt states that she's been working on her posture and trying to stay more upright with her sit to stand transfers. Still reports back pain but that overall is seems to have gotten more manageable. Objective:    Modalities:   Precautions: Unsteady on feet. CGA to close supervision required at all times when ambulating for safety. Avoid over-exertion as pt has MS.   Exercises: Pt wore gait belt for all standing exercises  Exercise Reps/ Time Weight/ Level Comments Completed today   Supine SKTC 3x15\" (B)     Piriformis stretch 3x15\" L only     LTR 20 reps total      TrA activation w/ bridge 10x3\" hold      Supine marches with TrA activation 20 reps   L/R alternating each rep    Clamshells 10x  L only     BKFOs 20 reps ea  With TrA activation    Seated thoracic rotations 10 reps ea  Pain free range x   Seated scapular protraction 10 reps  Holding onto wooden dowel for mobilization through mid thoracic and scapular range    Seated marches at edge of table 10 reps x 3  Emphasis on upright posture  x   Seated Scap retraction @ edge of mat       Nustep 5' L3  x   Standing balance unsupported 2'  CGA to min A; hovering hands above parallel bars x   Semi-Tandem stance 2x30\" ea  Alt LE forward, finger tip touch for balance x   Horizontal head turns in neutral NELDA 2'  Fingertip touch at parallel bars  x   Balance reaction training in neutral NELDA 2'  Very light trunk perturbations in all planes  UE support at parallel bars and CGA to intermittent min A    Slant board w/ toes on board 2x30\"      Standing 3-way hip 10 reps ea B LE In parallel bars and CGA for balance    Standing marches 30 reps  In parallel bars and CGA for balance x   Heel raises x10 reps  Finger tip touch for balance     Sit to stand  10 reps x 2    focusing on upright posture and standing straight up x    Bed mobility (supine > sit) 5'   Practice rolling to side prior to sitting up to minimize strain on back                         Other:  Manual therapy x 10'- MFR to L thoracolumbar paraspinals for pain control (in sitting)       Specific Instructions for next treatment: Emphasis on functional strengthening/stabilization of B LEs and core, with progressive static and dynamic balance interventions as safe/appropriate. May also benefit from soft tissue mobilization/MFR as needed to L lumbar/hip region. Assessment: [x] Progressing toward goals. [] No change. [] Other:    [x] Patient would continue to benefit from skilled physical therapy services in order to: modulate sx as well as improve strength, gait/balance and functional tolerances necessary to improve safe ADL/self-care participation in her home. 7/21: Improved postural awareness with sit to stands but continues to demonstrate difficulty maintaining an engaged core and upright trunk with dynamic sitting and standing stabilization activities.  Balance continues to be poor with all static and dynamic standing activities but overall demonstrates good safety when using RW which pt has been significantly more compliant with. Plan to transition to an independent HEP for maintenance after completion of 2 remaining visits on schedule. STG: (to be met in 8 treatments)  1. Pt will self report worst pain no greater than 4/10 in order to better tolerate ADLs/self-care tasks with minimal dysfunction GOAL PROGRESSING AND EXTENDED THROUGH POC 7/6  2. Pt will improve lumbar AROM to 75% or greater in all planes in order to demonstrate ability to move/reach in all planes with minimal restriction GOAL MET 7/6  3. Pt will demonstrate good understanding of all fall precaution education (using RW at all times, life alert button, clear paths and good lighting) to demonstrate improved safety in home GOAL MET 7/6  4. Pt will self report ability to be on feet for 10 min or greater to improve ability to perform light household upkeep tasks around home at prior level [facility] POC 7/6  LTG: (to be met in 16 treatments)  1. Pt will demonstrate improved B LE strength to 4-/5 or greater (modified MMT in sitting) in order to demonstrate improved stability/strength necessary for unrestricted ADLs/work activities GOAL PROGRESSING 7/6  2. Pt will decrease DAREK to 40% impaired or less in order to demonstrate improved functional tolerances at PLOF with minimal restriction/dysfunction   3. Pt will improve Tinetti Balance score to 19 or greater to demonstrate decreased fall risk in home GOAL PROGRESSING 7/6  4. Pt will self report no falls x 4 weeks to demonstrate better safety in home and overall functional mobility  5. Pt will demonstrate independence with a long term HEP for continued progress/maintenance after completion of PT GOAL PROGRESSING 7/6    Pt. Education:  [x] Yes  [] No  [x] Reviewed Prior HEP/Ed  Method of Education: [x] Verbal  [x] Demo  [] Written  Comprehension of Education:  [x] Verbalizes understanding. [x] Demonstrates understanding. [] Needs review.   [] Demonstrates/verbalizes HEP/Ed previously given.     Plan: [x] Continue per plan of care.    [] Other:      Treatment Charges: Mins Units   []  Modalities     [x]  Ther Exercise 20 1   [x]  Manual Therapy 10 1   []  Ther Activities     []  Aquatics     [x]  Neuromuscular 10 1   [] Vasocompression     [] Gait Training     [] Dry needling        [] 1 or 2 muscles        [] 3 or more muscles     []  Other     Total Treatment time 40 3     Time In: 3:30 pm           Time Out: 4:10 pm    Electronically signed by:  Emmanuel Ramon PT

## 2021-07-22 ENCOUNTER — PATIENT MESSAGE (OUTPATIENT)
Dept: FAMILY MEDICINE CLINIC | Age: 86
End: 2021-07-22

## 2021-07-22 DIAGNOSIS — M15.9 GENERALIZED OSTEOARTHRITIS: ICD-10-CM

## 2021-07-22 RX ORDER — TRAMADOL HYDROCHLORIDE 50 MG/1
50 TABLET ORAL EVERY 6 HOURS PRN
Qty: 120 TABLET | Refills: 0 | Status: SHIPPED | OUTPATIENT
Start: 2021-07-22 | End: 2021-09-03 | Stop reason: SDUPTHER

## 2021-07-22 RX ORDER — CELECOXIB 200 MG/1
200 CAPSULE ORAL DAILY
Qty: 90 CAPSULE | Refills: 1 | Status: SHIPPED | OUTPATIENT
Start: 2021-07-22 | End: 2022-01-16 | Stop reason: SDUPTHER

## 2021-07-22 RX ORDER — PREDNISONE 1 MG/1
5 TABLET ORAL DAILY
Qty: 90 TABLET | Refills: 1 | OUTPATIENT
Start: 2021-07-22

## 2021-07-22 NOTE — TELEPHONE ENCOUNTER
From: Brittany Phipps  To: Macrina Moran MD  Sent: 7/22/2021 2:51 PM EDT  Subject: Prescription Question    My mom is in need of a refill by next week for her tramodol. It is not on the list of medications to request a refill. Please send prescription to [facility] on [address]. thanks.

## 2021-07-22 NOTE — TELEPHONE ENCOUNTER
Last visit: 4/21/21  Last Med refill: 3/30/21  Does patient have enough medication for 72 hours: Yes    Next Visit Date:  Future Appointments   Date Time Provider Cj Tara   7/26/2021  3:30 PM Natalya Beams, PTA [facility] [place]   7/28/2021  3:30 PM Susi Soto, PT [facility] [place]   8/5/2021  2:00 PM SCHEDULE, [facility] [address] Maintenance   Topic Date Due    Lipid screen  05/21/2021    Shingles Vaccine (1 of 2) 07/30/2021 (Originally 12/8/1984)    DTaP/Tdap/Td vaccine (1 - Tdap) 03/30/2022 (Originally 12/8/1953)    Flu vaccine (1) 09/01/2021    Potassium monitoring  03/10/2022    Creatinine monitoring  03/10/2022    Annual Wellness Visit (AWV)  04/22/2022    Pneumococcal 65+ years Vaccine  Completed    COVID-19 Vaccine  Completed    Hepatitis A vaccine  Aged Out    Hib vaccine  Aged Out    Meningococcal (ACWY) vaccine  Aged Out       Hemoglobin A1C (%)   Date Value   03/30/2021 6.7   05/21/2020 6.2 (H)   02/14/2019 6.0             ( goal A1C is < 7)   No results found for: LABMICR  LDL Cholesterol (mg/dL)   Date Value   05/21/2020 86   05/24/2019 75       (goal LDL is <100)   AST (U/L)   Date Value   02/25/2021 15     ALT (U/L)   Date Value   02/25/2021 14     BUN (mg/dL)   Date Value   03/10/2021 15     BP Readings from Last 3 Encounters:   04/21/21 118/76   04/08/21 103/60   03/30/21 126/62          (goal 120/80)    All Future Testing planned in [facility]              Patient Active Problem List:     Ataxia     Multiple sclerosis ([address]     Essential hypertension     Hyperlipidemia     Atelectasis     Debility     Abnormal gait     Actinic keratosis     Enthesopathy of hip region     Generalized osteoarthritis     Idiopathic peripheral neuropathy     Vitamin D deficiency     Primary localized osteoarthrosis of the hip, left     Primary osteoarthritis of left hip     Acquired spondylolisthesis     Chronic midline low back pain with left-sided sciatica     Spinal stenosis of lumbar region with neurogenic claudication     Presence of right artificial shoulder joint     Multiple sclerosis exacerbation (HCC)     Diarrhea     Chronic headache     Facial asymmetry     Asymptomatic bacteriuria     Hyperglycemia     EDINSON (acute kidney injury) ([facility] [address]

## 2021-07-26 ENCOUNTER — HOSPITAL ENCOUNTER (OUTPATIENT)
Dept: PHYSICAL THERAPY | Age: 86
Setting detail: THERAPIES SERIES
Discharge: HOME | End: 2021-07-26
Payer: MEDICARE

## 2021-07-26 PROCEDURE — 97140 MANUAL THERAPY 1/> REGIONS: CPT

## 2021-07-26 PROCEDURE — 97112 NEUROMUSCULAR REEDUCATION: CPT

## 2021-07-26 PROCEDURE — 97110 THERAPEUTIC EXERCISES: CPT

## 2021-07-26 NOTE — FLOWSHEET NOTE
[address] [place] [address] Outpatient Physical Therapy   [facility] [address] [facility] [address]   Phone: [phone]   Fax: [phone]    Physical Therapy Daily Treatment Note    Date:  2021  Patient Name:  Wade Au    :  1934  MRN: [id number]  Physician: Gerda Sweet DO (resident physician)  Adonis Carrillo DO (attending physician)                                            Insurance: Medicare with [facility] Medicare Supplement secondary. Visits BMN and in accordance with Medicare guidelines  Medical Diagnosis: M47.816 (ICD-10-CM) - Lumbar spondylosis  Rehab Codes: M47.816  Onset Date:                    Next 's appt: 7/15/21  Visit# / total visits:  Cancels/No Shows:   Next progress note due by visit #16 or 21    Subjective:   Pain:  [x] Yes  [] No Location: Left lumbar back    Pain Rating: (0-10 scale) 6/10  Pain altered Tx:  [] No  [] Yes  Action:  Comments: : Pt states improved pain overall throughout the day with sit to stands. Does continue to work on posture with walking and sitting. Continued c/o pain first thing in the morning when exiting bed. Objective:    Modalities:   Precautions: Unsteady on feet. CGA to close supervision required at all times when ambulating for safety. Avoid over-exertion as pt has MS.   Exercises: Pt wore gait belt for all standing exercises  Exercise Reps/ Time Weight/ Level Comments Completed today   Supine SKTC 3x15\" (B)     Piriformis stretch 3x15\" L only     LTR 20 reps total      TrA activation w/ bridge 10x3\" hold      Supine marches with TrA activation 20 reps   L/R alternating each rep    Clamshells 10x  L only     BKFOs 20 reps ea  With TrA activation    Seated thoracic rotations 10 reps ea  Pain free range x   Seated scapular protraction 10 reps  Holding onto wooden dowel for mobilization through mid thoracic and scapular range    Seated marches at edge of table 10 reps x 3  Emphasis on upright posture  x   Seated Scap retraction @ edge During I&O's patient received 7 doses from PCA greater than 4/10 in order to better tolerate ADLs/self-care tasks with minimal dysfunction GOAL PROGRESSING AND EXTENDED THROUGH POC 7/6  2. Pt will improve lumbar AROM to 75% or greater in all planes in order to demonstrate ability to move/reach in all planes with minimal restriction GOAL MET 7/6  3. Pt will demonstrate good understanding of all fall precaution education (using RW at all times, life alert button, clear paths and good lighting) to demonstrate improved safety in home GOAL MET 7/6  4. Pt will self report ability to be on feet for 10 min or greater to improve ability to perform light household upkeep tasks around home at prior level [facility] POC 7/6  LTG: (to be met in 16 treatments)  1. Pt will demonstrate improved B LE strength to 4-/5 or greater (modified MMT in sitting) in order to demonstrate improved stability/strength necessary for unrestricted ADLs/work activities GOAL PROGRESSING 7/6  2. Pt will decrease DAREK to 40% impaired or less in order to demonstrate improved functional tolerances at PLOF with minimal restriction/dysfunction   3. Pt will improve Tinetti Balance score to 19 or greater to demonstrate decreased fall risk in home GOAL PROGRESSING 7/6  4. Pt will self report no falls x 4 weeks to demonstrate better safety in home and overall functional mobility  5. Pt will demonstrate independence with a long term HEP for continued progress/maintenance after completion of PT GOAL PROGRESSING 7/6    Pt. Education:  [x] Yes  [] No  [x] Reviewed Prior HEP/Ed  Method of Education: [x] Verbal  [x] Demo  [] Written  Comprehension of Education:  [x] Verbalizes understanding. [x] Demonstrates understanding. [] Needs review. [] Demonstrates/verbalizes HEP/Ed previously given. Plan: [x] Continue per plan of care.    [] Other:      Treatment Charges: Mins Units   []  Modalities     [x]  Ther Exercise 20 1   [x]  Manual Therapy 10 1   []  Ther Activities     [] Aquatics     [x]  Neuromuscular 10 1   [] Vasocompression     [] Gait Training     [] Dry needling        [] 1 or 2 muscles        [] 3 or more muscles     []  Other     Total Treatment time 40 3     Time In: 3:15 pm           Time Out:  4:00 pm    Electronically signed by:  Kelsey Newman PTA

## 2021-07-28 ENCOUNTER — HOSPITAL ENCOUNTER (OUTPATIENT)
Dept: PHYSICAL THERAPY | Age: 86
Setting detail: THERAPIES SERIES
Discharge: HOME | End: 2021-07-28
Payer: MEDICARE

## 2021-07-28 PROCEDURE — 97110 THERAPEUTIC EXERCISES: CPT

## 2021-07-28 NOTE — DISCHARGE SUMMARY
[facility]   [address]   Phone: [phone]   Fax: [phone]    Physical Therapy Daily Treatment Note    Date:  2021  Patient Name:  Zulema Forbes    :  1934  MRN: [id number]  Physician: Evita Covarrubias DO (resident physician)  Sil Herzog DO (attending physician)                                            Insurance: Medicare with [facility] Medicare Supplement secondary. Visits BMN and in accordance with Medicare guidelines  Medical Diagnosis: M47.816 (ICD-10-CM) - Lumbar spondylosis  Rehab Codes: M47.816  Onset Date:                    Next 's appt: 21  Visit# / total visits:  Cancels/No Shows: 1  Next progress note due by visit #16 or 21    Subjective:   Pain:  [x] Yes  [] No Location: Left lumbar back    Pain Rating: (0-10 scale) 4/10  Pain altered Tx:  [] No  [] Yes  Action:  Comments: : Pt states that overall she feels like PT has been very helpful in improving her posture and making her \"think about things differently\" in regard to her back. Has been feeling steadier on her feet since she started using the walker full time and denies any recent falls. Standing tolerance limited to ~15-20 min secondary to back pain which is mostly unchanged since she started coming to PT. Follows up with an orthopedist next week and pt expresses that she would like to finish PT at this time. Formal progress note performed today for reporting period - to determine readiness for d/c. DAREK score (): 28% impaired    Objective:  All below objectives re-assessed 21 by Shanta Whitley PT       Range of Motion  Left Range of Motion  Right Strength  Left Strength  Right   Lumbar Flexion 75%  Pain/discomfort 75%  Pain/discomfort       Lumbar Extension 50%   Loses balance 50%  Loses balance       Lumbar Rotation 75%  Stretch 75%  Tightness/stretch       Lumbar Side Bend 75%  Tightness 75%  Tightness       Hip Flexion     3/5 3+/5   Hip Abduction     3/5 3+/5   Hip Adduction     4-/5 4/5   Hip Extension     3/5 3+/5   Hip ER           Hip IR           Knee Flexion     4-/5 4/5   Knee Extension     4-/5 4/5   Dorsiflexion     3+/5 3+/5   Plantar flexion     3+/5 3+/5   Inversion           Eversion             Functional Testing:   Five Time Sit to Stand (FTSTS): 24 seconds from standard chair pushing up from 2WW  Timed Up and Go (TUG): 23 seconds with walker    Tinetti Balance & Gait Assessment       0 1 2   Sitting balance Leans/slides in chair  []?  Steady; safe  [x]?       Rises from chair Unable without help  []?  Uses arms  [x]?  Able without arms  []?    Attempts to rise Unable without help  []?  >1 attempt  []?  1 attempt  [x]?     Immediate standing balance Unsteady  []?  Steady w/AD  [x]?  Steady; Independent  []?    Standing balance Unsteady  []?  Wide NELDA  [x]?  Steady; neutral NELDA  []?    Nudged Begins to fall  []?  Staggers/Grabs/Catches  [x]?  Steady  []?    Standing (eyes closed) Unsteady  [x]?  Steady  []?      360 deg Turn (steps) Discontinuous steps  []?  Continuous steps  [x]?      360 deg Turn (steadiness) Unsteady  []?  Steady  [x]?   (WITH AD)     Sitting Down Unsafe  []?  Uses arms; not smooth  [x]?   Safe and smooth  []?              Gait initiation Hesitancy  []?  No hesitancy  [x]?       Step Length (R) R swing foot does not pass stance foot  []?  R swing foot passes stance foot  [x]?       Step Length (L) L swing foot does not pass stance foot  []?  L swing foot passes stance foot  [x]?       Step Height (R) R foot does not clear completely  []?  R foot clears completely  [x]?       Step Height (L) L foot does not clear completely  []?  L foot clears completely  [x]?       Step Symmetry Not equal  []?  Equal  [x]?       Step Continuity Discontinuous  []?  Continuous  [x]?       Walking Path Marked deviation  []?  AD  [x]?  Straight w/o AD  []?    Trunk Marked sway or AD  [x]?  Flexed knees/back or arms out  []?  No sway/deviation  []?    Walking Stance Heels apart  [x]?  Heels almost touch  []?      Balance score: 10 / 16  Gait score: 8 / 12  Total score: 18 / 28  Fall risk: Low (>/=24)  []? Moderate (19-23)  []? High (</=18)  [x]? Modalities:   Precautions: Unsteady on feet. CGA to close supervision required at all times when ambulating for safety. Avoid over-exertion as pt has MS.   Exercises: Pt wore gait belt for all standing exercises  Exercise Reps/ Time Weight/ Level Comments Completed today   Supine SKTC 3x15\" (B)     Piriformis stretch 3x15\" L only     LTR 20 reps total      TrA activation w/ bridge 10x3\" hold      Supine marches with TrA activation 20 reps   L/R alternating each rep    Clamshells 10x  L only     BKFOs 20 reps ea  With TrA activation    Seated thoracic rotations 10 reps ea  Pain free range    Seated scapular protraction 10 reps  Holding onto wooden dowel for mobilization through mid thoracic and scapular range    Seated marches at edge of table 10 reps x 3  Emphasis on upright posture  X   Seated Scap retraction @ edge of mat       Nustep 5' L3     Standing balance unsupported 2'  CGA to min A; hovering hands above parallel bars    Semi-Tandem stance 2x30\" ea  Alt LE forward, finger tip touch for balance    Horizontal head turns in neutral NELDA 2'  Fingertip touch at parallel bars     Balance reaction training in neutral NELDA 2'  Very light trunk perturbations in all planes  UE support at parallel bars and CGA to intermittent min A    Slant board w/ toes on board 2x30\"      Standing 3-way hip 10 reps ea B LE In parallel bars and CGA for balance    Standing marches 30 reps  In parallel bars and CGA for balance    Heel raises x10 reps  Finger tip touch for balance     Sit to stand  10 reps x 2    focusing on upright posture and standing straight up     Bed mobility (supine > sit) 5'   Practice rolling to side prior to sitting up to minimize strain on back                      Other:    Re-evaluation of all subjective & objective measures + final HEP review- billed as therex       Specific Instructions for next treatment: Emphasis on functional strengthening/stabilization of B LEs and core, with progressive static and dynamic balance interventions as safe/appropriate. May also benefit from soft tissue mobilization/MFR as needed to L lumbar/hip region. Assessment: [x] Progressing toward goals. [] No change. [x] Other: Appropriate to d/c to independent HEP for continued maintenance    [] Patient would continue to benefit from skilled physical therapy services in order to: modulate sx as well as improve strength, gait/balance and functional tolerances necessary to improve safe ADL/self-care participation in her home. 7/28: Pt has been seen for 14 PT visits to date for her chronic back pain as well as general weakness/mobility concerns. Pt overall demonstrates better safety and postural awareness since starting PT with no recent falls or near misses, and pt stating that she's been much more mindful of the role that her posture plays in her back pain. However, continues to struggle with moderate to high levels of pain daily, especially getting out of bed first thing in the morning and getting up from a period of prolonged sitting. Minimal improvement in strength and balance with continued deficits likely consistent with chronic dx of MS. However, pt has been much more compliant with walker use and is modified independent with this device, exhibiting good safety today. At this time, pt is appropriate to transition to an independent HEP for continued maintenance. Updated written HEP provided today with good understanding of emphasis on gentle mobility and functional strengthening/balance. Reinforced that all functional balance activities are to be completed with UE support and family supervision as necessary.  Expresses good understanding and pt will be discharged from PT at this time. STG: (to be met in 8 treatments)  1. Pt will self report worst pain no greater than 4/10 in order to better tolerate ADLs/self-care tasks with minimal dysfunction NO CHANGE, FOLLOWING UP WITH ORTHO NEXT WEEK 7/28  2. Pt will improve lumbar AROM to 75% or greater in all planes in order to demonstrate ability to move/reach in all planes with minimal restriction GOAL MET 7/6 (except extension)  3. Pt will demonstrate good understanding of all fall precaution education (using RW at all times, life alert button, clear paths and good lighting) to demonstrate improved safety in home GOAL MET 7/6  4. Pt will self report ability to be on feet for 10 min or greater to improve ability to perform light household upkeep tasks around home at prior level GOAL MET 7/28  LTG: (to be met in 16 treatments)  1. Pt will demonstrate improved B LE strength to 4-/5 or greater (modified MMT in sitting) in order to demonstrate improved stability/strength necessary for unrestricted ADLs/work activities GOAL PROGRESSING W/INDEPENDENT HEP 7/28  2. Pt will decrease DAREK to 40% impaired or less in order to demonstrate improved functional tolerances at PLOF with minimal restriction/dysfunction NO CHANGE; FOLLOWING UP WITH ORTHO NEXT WEEK 7/28  3. Pt will improve Tinetti Balance score to 19 or greater to demonstrate decreased fall risk in home GOAL MOSTLY MET; PROGRESSING W/INDEPENDENT HEP 7/28  4. Pt will self report no falls x 4 weeks to demonstrate better safety in home and overall functional mobility GOAL MET 7/28  5. Pt will demonstrate independence with a long term HEP for continued progress/maintenance after completion of PT GOAL PROGRESSING WITH INDEPENDENT HEP 7/28    Pt. Education:  [x] Yes  [] No  [x] Reviewed Prior HEP/Ed  Method of Education: [x] Verbal  [x] Demo  [x] Written ([address] Access Code [facility])  Comprehension of Education:  [x] Verbalizes understanding. [x] Demonstrates understanding. [] Needs review.   [] Demonstrates/verbalizes HEP/Ed previously given. Plan: [] Continue per plan of care.    [x] Other: Discharge to independent HEP      Treatment Charges: Mins Units   []  Modalities     [x]  Ther Exercise 30 2   []  Manual Therapy     []  Ther Activities     []  Aquatics     []  Neuromuscular     [] Vasocompression     [] Gait Training     [] Dry needling        [] 1 or 2 muscles        [] 3 or more muscles     []  Other     Total Treatment time 30 2     Time In: 3:30 pm           Time Out:  4:00 pm    Electronically signed by:  Andry Kumar PT

## 2021-08-04 DIAGNOSIS — M47.816 LUMBAR SPONDYLOSIS: Primary | ICD-10-CM

## 2021-08-05 ENCOUNTER — OFFICE VISIT (OUTPATIENT)
Dept: ORTHOPEDIC SURGERY | Age: 86
End: 2021-08-05
Payer: MEDICARE

## 2021-08-05 VITALS — WEIGHT: 128 LBS | HEIGHT: 57 IN | BODY MASS INDEX: 27.61 KG/M2

## 2021-08-05 DIAGNOSIS — M47.816 SPONDYLOSIS OF LUMBAR JOINT: Primary | ICD-10-CM

## 2021-08-05 DIAGNOSIS — G89.29 CHRONIC LEFT-SIDED LOW BACK PAIN WITHOUT SCIATICA: ICD-10-CM

## 2021-08-05 DIAGNOSIS — M54.50 CHRONIC LEFT-SIDED LOW BACK PAIN WITHOUT SCIATICA: ICD-10-CM

## 2021-08-05 PROCEDURE — 1123F ACP DISCUSS/DSCN MKR DOCD: CPT | Performed by: STUDENT IN AN ORGANIZED HEALTH CARE EDUCATION/TRAINING PROGRAM

## 2021-08-05 PROCEDURE — G8417 CALC BMI ABV UP PARAM F/U: HCPCS | Performed by: STUDENT IN AN ORGANIZED HEALTH CARE EDUCATION/TRAINING PROGRAM

## 2021-08-05 PROCEDURE — 99213 OFFICE O/P EST LOW 20 MIN: CPT | Performed by: STUDENT IN AN ORGANIZED HEALTH CARE EDUCATION/TRAINING PROGRAM

## 2021-08-05 PROCEDURE — G8427 DOCREV CUR MEDS BY ELIG CLIN: HCPCS | Performed by: STUDENT IN AN ORGANIZED HEALTH CARE EDUCATION/TRAINING PROGRAM

## 2021-08-05 PROCEDURE — 4040F PNEUMOC VAC/ADMIN/RCVD: CPT | Performed by: STUDENT IN AN ORGANIZED HEALTH CARE EDUCATION/TRAINING PROGRAM

## 2021-08-05 PROCEDURE — 1090F PRES/ABSN URINE INCON ASSESS: CPT | Performed by: STUDENT IN AN ORGANIZED HEALTH CARE EDUCATION/TRAINING PROGRAM

## 2021-08-05 PROCEDURE — 1036F TOBACCO NON-USER: CPT | Performed by: STUDENT IN AN ORGANIZED HEALTH CARE EDUCATION/TRAINING PROGRAM

## 2021-08-05 NOTE — PROGRESS NOTES
[facility]  [facility]  [address] [place]  [address]  Dept: [phone]  Dept Fax: [phone]        Orthopaedic Clinic Follow Up      Subjective:     Brennan Gibbons is a 80y.o. year old female who presents to the clinic today for routine followup regarding her low back pain. She recently completed a course of physical therapy to try to alleviate some of her pain. She states that physical therapy did in fact help with her balance but she is still having significant back pain at this time. She complains of pain in the left side of the low back as well as the left buttock. At times, it radiates to her left \"hip\". It does not radiate down the legs. She denies any numbness or tingling other than some baseline numbness from the toes to the midfoot which is secondary to her neuropathy. She is currently taking tramadol, prednisone, Celebrex, and Norco for various ailments and states that none of these really seem to help. She does occasionally get Medrol Dosepaks from her primary care provider, which does provide relief. However this is not something that she can take on a long-term basis. She states her biggest complaint is pain first thing in the morning and she has difficulty getting out of bed secondary to the pain. She states that it takes 2-3 attempts before she can get up. Her goal is not to be pain-free, but just not tolerable pain. She states she has had injections in her hip prior which only provided relief for about a day. She has never had spinal injections. She has never seen a spine surgeon before.     ROS:  Gen: no fevers, chills   Cardio: no chest pain   Lungs: no shortness of breath   MSK: pain in low back, left buttock   Neuro: no radiating numbness or tingling down leg     Objective :   Physical exam  Gen: AAOx3, no acute distress  Cardio: regular rate  Respiratory: symmetrical chest expansion, no audible wheezing   MSK:   Lumbar Spine: Spinous processes noted to be in scoliotic positioning. Paraspinal hypertonicity to the left lumbar musculature with associated tenderness to palpation at approximately the L4/L5/S1 levels. Tenderness to palpation also along the left buttock which reproduced her pain. Sensation intact to light touch in L3-S2 distribution. Able to perform straight leg raise without pain. No muscular atrophy. DP pulse 2+. Negative log roll. No pain with FABERE/FADIR. Radiology:  History: Low back pain     Comparison: None    Findings: 3 views (AP and two lateral views) of the lumbar spine showing evidence of spondylosis and degenerative changes. There is decreased disc space noted diffusely throughout the lumbar spine. Scoliotic curve noted. Facet hypertrophy appreciated on both AP and lateral views. Grade 1 spondylolisthesis noted at L5-S1 level. Osteophyte formation along inferior endplate of L4 and superior endplate of L5. No obvious fractures or bony lesions noted. Impression: Spondylosis of the lumbar spine      Assessment:   80y.o. year old female with lumbar spine spondylosis   Plan:      Patient seen and examined today. Discussed possible etiologies of patient's pain with the patient and her daughter. Informed patient that there is not much from a medical standpoint that we can do for her, as she is on multiple medications for pain at this time. We did discuss the potential for needing referral to either pain management or a spine surgeon for further evaluation. Before making this recommendation, we will evaluate her with an MRI, she has not had one in several years and her back pain has actually worsened within the past year, and she does have some radicular symptoms of pain radiating in the buttock and around the hip. She was amenable to this plan. We will follow-up to discuss MRI results and make recommendations at that time. Follow up:Return for MRI results - lumbar spine .     No orders of the defined types were placed in this encounter. Orders Placed This Encounter   Procedures    MRI LUMBAR SPINE WO CONTRAST     Standing Status:   Future     Standing Expiration Date:   8/5/2022     Order Specific Question:   Reason for exam:     Answer:   Low back pain; Low back pain, no complicating feature; Chronic LBP duration >= 3 months; Worsening or not improving; Follow-up in the last 28 - 60 day for conservative therapy;  No known/automatically detected potential contraindications to imaging       Electronically signed by Joe Domingo DO on 8/5/2021 at 3:21 PM

## 2021-08-09 ENCOUNTER — PATIENT MESSAGE (OUTPATIENT)
Dept: FAMILY MEDICINE CLINIC | Age: 86
End: 2021-08-09

## 2021-08-09 DIAGNOSIS — K21.9 GASTROESOPHAGEAL REFLUX DISEASE: ICD-10-CM

## 2021-08-09 RX ORDER — OMEPRAZOLE 20 MG/1
20 CAPSULE, DELAYED RELEASE ORAL DAILY
Qty: 90 CAPSULE | Refills: 1 | Status: SHIPPED | OUTPATIENT
Start: 2021-08-09 | End: 2022-01-30 | Stop reason: SDUPTHER

## 2021-08-09 NOTE — TELEPHONE ENCOUNTER
From: Maureen Bazzi  To: Pura Krabbe, MD  Sent: 8/9/2021 10:06 AM EDT  Subject: Prescription Question    My mom is in need of a refill on her omeprazole - 20 mg once a day pill. It is not available in her medication list to request a refill. If this could be taken care of I would appreciate it.  Thanks!!

## 2021-08-10 ENCOUNTER — HOSPITAL ENCOUNTER (OUTPATIENT)
Dept: MRI IMAGING | Age: 86
Discharge: HOME | End: 2021-08-12
Payer: MEDICARE

## 2021-08-10 DIAGNOSIS — M47.816 SPONDYLOSIS OF LUMBAR JOINT: ICD-10-CM

## 2021-08-10 PROCEDURE — 72148 MRI LUMBAR SPINE W/O DYE: CPT

## 2021-08-11 ENCOUNTER — E-VISIT (OUTPATIENT)
Dept: FAMILY MEDICINE CLINIC | Age: 86
End: 2021-08-11
Payer: MEDICARE

## 2021-08-11 DIAGNOSIS — B37.31 VULVOVAGINAL CANDIDIASIS: ICD-10-CM

## 2021-08-11 DIAGNOSIS — N89.8 VAGINA ITCHING: Primary | ICD-10-CM

## 2021-08-11 PROCEDURE — 99421 OL DIG E/M SVC 5-10 MIN: CPT | Performed by: INTERNAL MEDICINE

## 2021-08-11 RX ORDER — FLUCONAZOLE 150 MG/1
150 TABLET ORAL ONCE
Qty: 1 TABLET | Refills: 0 | Status: SHIPPED | OUTPATIENT
Start: 2021-08-11 | End: 2021-08-11

## 2021-08-11 RX ORDER — CLOTRIMAZOLE AND BETAMETHASONE DIPROPIONATE 10; .64 MG/G; MG/G
CREAM TOPICAL
Qty: 15 G | Refills: 1 | Status: SHIPPED | OUTPATIENT
Start: 2021-08-11 | End: 2021-10-26 | Stop reason: ALTCHOICE

## 2021-08-11 NOTE — TELEPHONE ENCOUNTER
From: Justine Hall MD  To: Brennan Gibbons  Sent: 8/11/2021 1:46 PM EDT    I have sent in a prescription for Diflucan for you to take by mouth once. If your symptoms are still persistent after the weekend, let me know and we can send in a course of Flagyl followed by another round of Diflucan to treat any bacterial vaginosis that may be causing your symptoms.

## 2021-08-19 ENCOUNTER — OFFICE VISIT (OUTPATIENT)
Dept: ORTHOPEDIC SURGERY | Age: 86
End: 2021-08-19
Payer: MEDICARE

## 2021-08-19 ENCOUNTER — E-VISIT (OUTPATIENT)
Dept: FAMILY MEDICINE CLINIC | Age: 86
End: 2021-08-19
Payer: MEDICARE

## 2021-08-19 VITALS — BODY MASS INDEX: 28.69 KG/M2 | WEIGHT: 133 LBS | HEIGHT: 57 IN

## 2021-08-19 DIAGNOSIS — M48.062 SPINAL STENOSIS OF LUMBAR REGION WITH NEUROGENIC CLAUDICATION: Primary | ICD-10-CM

## 2021-08-19 DIAGNOSIS — R30.0 DYSURIA: Primary | ICD-10-CM

## 2021-08-19 PROCEDURE — 1090F PRES/ABSN URINE INCON ASSESS: CPT | Performed by: ORTHOPAEDIC SURGERY

## 2021-08-19 PROCEDURE — 1036F TOBACCO NON-USER: CPT | Performed by: ORTHOPAEDIC SURGERY

## 2021-08-19 PROCEDURE — 99421 OL DIG E/M SVC 5-10 MIN: CPT | Performed by: INTERNAL MEDICINE

## 2021-08-19 PROCEDURE — 99213 OFFICE O/P EST LOW 20 MIN: CPT | Performed by: ORTHOPAEDIC SURGERY

## 2021-08-19 PROCEDURE — 1123F ACP DISCUSS/DSCN MKR DOCD: CPT | Performed by: ORTHOPAEDIC SURGERY

## 2021-08-19 PROCEDURE — G8417 CALC BMI ABV UP PARAM F/U: HCPCS | Performed by: ORTHOPAEDIC SURGERY

## 2021-08-19 PROCEDURE — 4040F PNEUMOC VAC/ADMIN/RCVD: CPT | Performed by: ORTHOPAEDIC SURGERY

## 2021-08-19 PROCEDURE — G8427 DOCREV CUR MEDS BY ELIG CLIN: HCPCS | Performed by: ORTHOPAEDIC SURGERY

## 2021-08-19 RX ORDER — CEPHALEXIN 500 MG/1
500 CAPSULE ORAL 3 TIMES DAILY
Qty: 21 CAPSULE | Refills: 0 | Status: SHIPPED | OUTPATIENT
Start: 2021-08-19 | End: 2021-08-26

## 2021-08-19 RX ORDER — FLUCONAZOLE 150 MG/1
150 TABLET ORAL ONCE
Qty: 1 TABLET | Refills: 0 | Status: SHIPPED | OUTPATIENT
Start: 2021-08-19 | End: 2021-08-19

## 2021-08-19 NOTE — PROGRESS NOTES
[facility]  [facility]  [address] [place] [address]  Dept: [phone]  Dept Fax: [phone]        Orthopaedic Clinic Follow Up      Subjective:     Jhonny Morales is a 80y.o. year old female who presents to the clinic today for routine followup regarding her low back pain with radiculopathy. Patient is here today for follow-up of her MRI to the lumbar spine where she was last seen by us on 8/5/2021. Patient reports continued pain in the low back that does radiate to the buttock. There is pain while getting out of bed as well. Patient is on chronic low-dose prednisone which she finds relief that is being managed by primary care physician. Patient has never had a spinal injection or has never seen a spine specialist before. Otherwise, patient is no orthopedic complaints. Review of Systems  Gen: no fever, chills, malaise  CV: no chest pain or palpitations  Resp: no cough or shortness of breath  GI: no nausea, vomiting, diarrhea, or constipation  Neuro: no numbness, tingling, or weakness  Msk: As described in HPI  10 remaining systems reviewed and negative    Objective : There were no vitals filed for this visit. Body mass index is 28.78 kg/m². General: No acute distress, resting comfortably in the clinic  Neuro: alert. oriented  Eyes: Extra-ocular muscles intact  Pulm: Respirations unlabored and regular. Skin: warm, well perfused  Psych:   Patient has good fund of knowledge and displays understanging of exam, diagnosis, and plan. MSK: Low back:  Please refer to previous progress note. Patient is here today for discussion of MRI follow-up. Radiology:  None taken in office     Assessment:   80y.o. year old female with severe L5-S1 stenosis. Plan:      Patient is here today for follow-up of MRI of her lumbar spine for the aforementioned pathologies noted above. MRI was reviewed in length with the patient.   At this time, we believe it is reasonable for patient to see a spine specialist so we will refer her to Dr. Dali Tubbs for further eval.  Patient expressed her interest to not proceed with surgery therefore her treatment options will likely be a form of corticosteroid injection to the spine or a radiofrequency ablation. Patient is opted for the after mentioned treatment plan. She was amenable to all recommendations and was encouraged to call the office with any questions. Follow up:Return if symptoms worsen or fail to improve. No orders of the defined types were placed in this encounter.          Orders Placed This Encounter   Procedures   Sita Fragoso MD, Orthopedic Surgery, [place]     Referral Priority:   Routine     Referral Type:   Eval and Treat     Referral Reason:   Specialty Services Required     Referred to Provider:   Mj Dorsey MD     Requested Specialty:   Orthopedic Surgery     Number of Visits Requested:   1       Electronically signed by Cheng Moscoso DO on 8/19/2021 at 3:26 PM

## 2021-08-23 ENCOUNTER — PATIENT MESSAGE (OUTPATIENT)
Dept: FAMILY MEDICINE CLINIC | Age: 86
End: 2021-08-23

## 2021-08-23 DIAGNOSIS — E78.5 HYPERLIPIDEMIA, UNSPECIFIED HYPERLIPIDEMIA TYPE: ICD-10-CM

## 2021-08-23 RX ORDER — SIMVASTATIN 20 MG
20 TABLET ORAL NIGHTLY
Qty: 90 TABLET | Refills: 1 | Status: ON HOLD | OUTPATIENT
Start: 2021-08-23 | End: 2022-02-20 | Stop reason: SDUPTHER

## 2021-08-23 RX ORDER — SIMVASTATIN 20 MG
20 TABLET ORAL NIGHTLY
Qty: 90 TABLET | Refills: 1 | OUTPATIENT
Start: 2021-08-23

## 2021-08-23 NOTE — TELEPHONE ENCOUNTER
From: Isabela Heath  To: Arti Harley MD  Sent: 8/23/2021 4:10 PM EDT  Subject: Prescription Question    Why was her prescription for simvastin denied I checked with pharmacy at [facility] and they have no refills on file for her.  We do not go to [place] [address] any longer

## 2021-08-28 DIAGNOSIS — K21.9 GASTROESOPHAGEAL REFLUX DISEASE: ICD-10-CM

## 2021-08-30 RX ORDER — OMEPRAZOLE 20 MG/1
20 CAPSULE, DELAYED RELEASE ORAL DAILY
Qty: 90 CAPSULE | Refills: 1 | OUTPATIENT
Start: 2021-08-30

## 2021-09-07 ENCOUNTER — OFFICE VISIT (OUTPATIENT)
Dept: ORTHOPEDIC SURGERY | Age: 86
End: 2021-09-07
Payer: MEDICARE

## 2021-09-07 VITALS — WEIGHT: 133 LBS | HEIGHT: 57 IN | BODY MASS INDEX: 28.69 KG/M2

## 2021-09-07 DIAGNOSIS — M54.50 CHRONIC LEFT-SIDED LOW BACK PAIN WITHOUT SCIATICA: ICD-10-CM

## 2021-09-07 DIAGNOSIS — M48.062 SPINAL STENOSIS OF LUMBAR REGION WITH NEUROGENIC CLAUDICATION: Primary | ICD-10-CM

## 2021-09-07 DIAGNOSIS — M43.10 ACQUIRED SPONDYLOLISTHESIS: ICD-10-CM

## 2021-09-07 DIAGNOSIS — G89.29 CHRONIC LEFT-SIDED LOW BACK PAIN WITHOUT SCIATICA: ICD-10-CM

## 2021-09-07 DIAGNOSIS — M47.816 LUMBAR SPONDYLOSIS: ICD-10-CM

## 2021-09-07 PROCEDURE — 4040F PNEUMOC VAC/ADMIN/RCVD: CPT | Performed by: ORTHOPAEDIC SURGERY

## 2021-09-07 PROCEDURE — G8417 CALC BMI ABV UP PARAM F/U: HCPCS | Performed by: ORTHOPAEDIC SURGERY

## 2021-09-07 PROCEDURE — 1123F ACP DISCUSS/DSCN MKR DOCD: CPT | Performed by: ORTHOPAEDIC SURGERY

## 2021-09-07 PROCEDURE — 1036F TOBACCO NON-USER: CPT | Performed by: ORTHOPAEDIC SURGERY

## 2021-09-07 PROCEDURE — 99213 OFFICE O/P EST LOW 20 MIN: CPT | Performed by: ORTHOPAEDIC SURGERY

## 2021-09-07 PROCEDURE — 1090F PRES/ABSN URINE INCON ASSESS: CPT | Performed by: ORTHOPAEDIC SURGERY

## 2021-09-07 PROCEDURE — G8427 DOCREV CUR MEDS BY ELIG CLIN: HCPCS | Performed by: ORTHOPAEDIC SURGERY

## 2021-09-07 NOTE — PROGRESS NOTES
Patient ID: Kapil Arredondo is a 80 y.o. female    Chief Compliant:  No chief complaint on file. Diagnostic imaging:    MRI lumbar spine reviewed chronic T12 compression deformity grade 1 spondylolisthesis L4-5 associated with severe lumbar spinal stenosis    Assessment and Plan:  1. Spinal stenosis of lumbar region with neurogenic claudication    2. Chronic left-sided low back pain without sciatica    3. Lumbar spondylosis    4. Primary localized osteoarthrosis of the hip, left    5. Spondylosis of lumbar joint      Refer to pain management for lumbar epidural steroid injections     Consider L4-5  PLIF if she does not improve    Follow up in 10 weeks    HPI:  This is a 80 y.o. female who presents to the clinic today as a new patient for lower back evaluation. Patient with initially left lateral hip pain which has now progressed to left her left buttock. Pain is worst in the morning. She admits difficulty with standing in one position or walking for extended periods of time. She has underwent PT for 8 weeks with improvement in her gait however not much pain relief. She has received     She is ambulating with a walker    Review of Systems   All other systems reviewed and are negative. Past History:    Current Outpatient Medications:     celecoxib (CELEBREX) 200 MG capsule, Take 1 capsule by mouth daily, Disp: 90 capsule, Rfl: 1    traMADol (ULTRAM) 50 MG tablet, Take 1 tablet by mouth every 6 hours as needed for Pain for up to 60 days. , Disp: 120 tablet, Rfl: 1    simvastatin (ZOCOR) 20 MG tablet, Take 1 tablet by mouth nightly, Disp: 90 tablet, Rfl: 1    clotrimazole-betamethasone (LOTRISONE) 1-0.05 % cream, Apply topically 2 times daily. , Disp: 15 g, Rfl: 1    omeprazole (PRILOSEC) 20 MG delayed release capsule, Take 1 capsule by mouth daily, Disp: 90 capsule, Rfl: 1    docusate sodium (COLACE) 100 MG capsule, Take 1 capsule by mouth daily, Disp: 90 capsule, Rfl: 1    Handicap Placard [facility], by Does not apply route Exp: 5/20/2026, Disp: 1 each, Rfl: 0    predniSONE (DELTASONE) 5 MG tablet, Take 1 tablet by mouth daily, Disp: 90 tablet, Rfl: 1    calcipotriene (DOVONEX) 0.005 % cream, Apply topically 2 times daily. , Disp: 1 Tube, Rfl: 4    furosemide (LASIX) 20 MG tablet, Take 1 tablet by mouth daily for 7 days, Disp: 7 tablet, Rfl: 0    clobetasol (TEMOVATE) 0.05 % cream, Apply topically 2 times daily. , Disp: 15 g, Rfl: 0    gabapentin (NEURONTIN) 100 MG capsule, Take 1 capsule by mouth 2 times daily for 30 days. , Disp: 60 capsule, Rfl: 0    amitriptyline (ELAVIL) 100 MG tablet, Take 1 tablet by mouth nightly, Disp: 30 tablet, Rfl: 0    albuterol sulfate HFA (VENTOLIN HFA) 108 (90 Base) MCG/ACT inhaler, Inhale 2 puffs into the lungs every 6 hours as needed for Wheezing, Disp: 1 Inhaler, Rfl: 0    HYDROcodone-acetaminophen (NORCO) 5-325 MG per tablet, Take 1 tablet by mouth 3 times daily. , Disp: , Rfl:   Allergies   Allergen Reactions    Bactrim [Sulfamethoxazole-Trimethoprim] Other (See Comments)     BLISTERS IN MOUTH    Lactose Intolerance (Gi) Diarrhea    Pcn [Penicillins] Hives    Lidoderm [Lidocaine] Rash    Macrobid [Nitrofurantoin] Nausea And Vomiting     Social History     Socioeconomic History    Marital status:       Spouse name: Not on file    Number of children: 3    Years of education: Not on file    Highest education level: Not on file   Occupational History    Not on file   Tobacco Use    Smoking status: Never Smoker    Smokeless tobacco: Never Used   Vaping Use    Vaping Use: Never used   Substance and Sexual Activity    Alcohol use: No    Drug use: No    Sexual activity: Not on file   Other Topics Concern    Not on file   Social History Narrative    Not on file     Social Determinants of Health     Financial Resource Strain: Low Risk     Difficulty of Paying Living Expenses: Not hard at all   Food Insecurity: No Food Insecurity    Worried About Running Out of Food in the Last Year: Never true    Ran Out of Food in the Last Year: Never true   Transportation Needs: No Transportation Needs    Lack of Transportation (Medical): No    Lack of Transportation (Non-Medical): No   Physical Activity:     Days of Exercise per Week:     Minutes of Exercise per Session:    Stress:     Feeling of Stress :    Social Connections:     Frequency of Communication with Friends and Family:     Frequency of Social Gatherings with Friends and Family:     Attends Baptism Services:     Active Member of Clubs or Organizations:     Attends Club or Organization Meetings:     Marital Status:    Intimate Partner Violence:     Fear of Current or Ex-Partner:     Emotionally Abused:     Physically Abused:     Sexually Abused:      Past Medical History:   Diagnosis Date    Acute cystitis without hematuria 10/1/2017    Arthritis     Chronic daily headache     GERD (gastroesophageal reflux disease)     Hyperlipidemia     Hypertension     Moderate malnutrition ([address] 3/2/2019    Multiple sclerosis ([address]     Neuropathy     Pneumonia 2017    states had double pneumonia    Vitamin D deficiency      Past Surgical History:   Procedure Laterality Date    CATARACT REMOVAL WITH IMPLANT Right 11/6/2014    [facility]    CATARACT REMOVAL WITH IMPLANT Left 12/2/2014    [facility]    CERVICAL DISC SURGERY      CYSTOCELE REPAIR      HYSTERECTOMY      RECTOCELE REPAIR      SHOULDER ARTHROPLASTY Right 04/20/2017    SHOULDER SURGERY Right 2017     No family history on file. Physical Exam:  Vitals signs and nursing note reviewed. Constitutional:       Appearance: well-developed. HENT:      Head: Normocephalic and atraumatic. Nose: Nose normal.   Eyes:      Conjunctiva/sclera: Conjunctivae normal.   Neck:      Musculoskeletal: Normal range of motion and neck supple. Pulmonary:      Effort: Pulmonary effort is normal. No respiratory distress. Musculoskeletal:      Comments: Normal gait     Skin:     General: Skin is warm and dry. Neurological:      Mental Status: Alert and oriented to person, place, and time. Sensory: No sensory deficit. Psychiatric:         Behavior: Behavior normal.         Thought Content: Thought content normal.        Provider Attestation:  Gautam Tristan, personally performed the services described in this documentation. All medical record entries made by the scribe were at my direction and in my presence. I have reviewed the chart and discharge instructions and agree that the records reflect my personal performance and is accurate and complete. Jose Jimenez MD 9/7/21     Scribe Attestation:  By signing my name below, Loreto Gunter, attest that this documentation has been prepared under the direction and in the presence of Dr. Radha Wright. Electronically signed: Aaron Locke, 9/7/21     Please note that this chart was generated using voice recognition Dragon dictation software. Although every effort was made to ensure the accuracy of this automated transcription, some errors in transcription may have occurred.

## 2021-09-18 DIAGNOSIS — M54.42 CHRONIC MIDLINE LOW BACK PAIN WITH LEFT-SIDED SCIATICA: ICD-10-CM

## 2021-09-18 DIAGNOSIS — G89.29 CHRONIC MIDLINE LOW BACK PAIN WITH LEFT-SIDED SCIATICA: ICD-10-CM

## 2021-09-20 RX ORDER — AMITRIPTYLINE HYDROCHLORIDE 100 MG/1
TABLET, FILM COATED ORAL
Qty: 90 TABLET | Refills: 1 | Status: ON HOLD | OUTPATIENT
Start: 2021-09-20 | End: 2022-02-20 | Stop reason: SDUPTHER

## 2021-09-20 NOTE — TELEPHONE ENCOUNTER
Last visit: 8/19/21  Last Med refill: 3/11/21  Does patient have enough medication for 72 hours: No-has one day left     Next Visit Date:  Future Appointments   Date Time Provider Cj Tara   10/4/2021  2:30 PM Yuly Jang MD [address]   10/26/2021  4:45 PM Arti Sheryle Danas, MD [id number]   11/16/2021  3:20 PM Bandar Zavala MD [facility] [address] Maintenance   Topic Date Due    Shingles Vaccine (1 of 2) Never done    Lipid screen  05/21/2021    Flu vaccine (1) 09/01/2021    DTaP/Tdap/Td vaccine (1 - Tdap) 03/30/2022 (Originally 12/8/1953)    Potassium monitoring  03/10/2022    Creatinine monitoring  03/10/2022    Annual Wellness Visit (AWV)  04/22/2022    Pneumococcal 65+ years Vaccine  Completed    COVID-19 Vaccine  Completed    Hepatitis A vaccine  Aged Out    Hib vaccine  Aged Out    Meningococcal (ACWY) vaccine  Aged Out       Hemoglobin A1C (%)   Date Value   03/30/2021 6.7   05/21/2020 6.2 (H)   02/14/2019 6.0             ( goal A1C is < 7)   No results found for: LABMICR  LDL Cholesterol (mg/dL)   Date Value   05/21/2020 86   05/24/2019 75       (goal LDL is <100)   AST (U/L)   Date Value   02/25/2021 15     ALT (U/L)   Date Value   02/25/2021 14     BUN (mg/dL)   Date Value   03/10/2021 15     BP Readings from Last 3 Encounters:   04/21/21 118/76   04/08/21 103/60   03/30/21 126/62          (goal 120/80)    All Future Testing planned in [facility]              Patient Active Problem List:     Ataxia     Multiple sclerosis ([address]     Essential hypertension     Hyperlipidemia     Atelectasis     Debility     Abnormal gait     Actinic keratosis     Enthesopathy of hip region     Generalized osteoarthritis     Idiopathic peripheral neuropathy     Vitamin D deficiency     Primary localized osteoarthrosis of the hip, left     Primary osteoarthritis of left hip     Acquired spondylolisthesis     Chronic midline low back pain with left-sided sciatica Spinal stenosis of lumbar region with neurogenic claudication     Presence of right artificial shoulder joint     Multiple sclerosis exacerbation (HCC)     Diarrhea     Chronic headache     Facial asymmetry     Asymptomatic bacteriuria     Hyperglycemia     EDINSON (acute kidney injury) ([facility] [facility] [address]

## 2021-09-27 ASSESSMENT — ENCOUNTER SYMPTOMS: BACK PAIN: 1

## 2021-09-27 NOTE — CONSULTS
Francisco Berrios is a 80 y.o. female evaluated on 10/4/2021. Patient is referred by Sandi Luna MD    Patient identification was verified at the start of the visit: Yes    Chief complaint: Francisco Berrios is 80 y.o.,  female, with chief complaint of pain involving low back    Referring diagnosis  M48.062 (ICD-10-CM) - Spinal stenosis of lumbar region with neurogenic claudication  M54.5, G89.29 (ICD-10-CM) - Chronic left-sided low back pain without sciatica  M47.816 (ICD-10-CM) - Lumbar spondylosis  M43.10 (ICD-10-CM) - Acquired spondylolisthesis    Patient is an 80-year-old female referred to the pain clinic in consultation for back pain of longstanding duration. Patient was previously seen in the pain clinic in 2019 and had hip injections done. Patient reports she had 1 week of relief following the hip injection but since then the pain returned and hence she did not follow-up. Lately she reports her pain is getting worse and hence she she was evaluated by Dr. Issac Andres who referred her back to the pain clinic. Patient's pain is mostly in the left lower back radiating anteriorly into the groin area towards the lower abdomen. Patient has been very little pain in the left lower extremity. Patient also denies any tingling numbness or weakness in her lower extremity. Patient reports she cannot function because of the severe pain in the low back. She reports she has to use a walker or a cane to help with ambulation. Back Pain  This is a chronic problem. The current episode started more than 1 year ago. The problem occurs constantly. The problem has been gradually worsening since onset. The pain is present in the lumbar spine and sacro-iliac. The quality of the pain is described as aching (sharp). The pain is at a severity of 8/10 (7-10). The pain is severe. The pain is worse during the day. The symptoms are aggravated by standing, bending and sitting (walking,lifting,ADLs).  Pertinent negatives include no abdominal pain, chest pain, dysuria, numbness, paresthesias, weakness or weight loss. Risk factors include sedentary lifestyle, menopause and lack of exercise. Alleviating factors:rest   Lifestyle changes experienced with pain: Prevents or limits ADLs, Increases w/activity., Increases w/prolonged sitting/standing/walking  Mood changes,none  Patient currently unemployed. Physical therapy did not help the pain. Are you under psychological counseling at present: No  Goals for treatment include:  Decrease in pain  Enjoy daily and recreational activities, return to previous status. Current Pain Assessment  Pain Assessment  Pain Assessment: 0-10  Pain Level: 7  Patient's Stated Pain Goal:  (Decrease pain level to 3 and increase activity)  Pain Type: Chronic pain  Pain Location: Back, Hip  Pain Orientation: Left  Pain Radiating Towards: none  Pain Descriptors: Constant, Sharp, Shooting  Pain Frequency: Continuous  Pain Onset: On-going  Clinical Progression: Gradually worsening  Functional Pain Assessment: Prevents or interferes some active activities and ADLs  Non-Pharmaceutical Pain Intervention(s): Heat applied, Rest, Repositioned                ADVERSE MEDICATION EFFECTS:   Constipation: no  Bowel Regimen: No:   Diet: common adult  Appetite:  ok  Sedation:  no  Urinary Retention: no     FOCUSED PAINSCALE:  Highest : 10  Lowest :5  Average: Range-depends on activity  When and What  was your last procedure:  8/2/19 Left hip joint    Was your procedure effective:  Yes 80% for 3 days     ACTIVITY/SOCIAL/EMOTIONAL:  Sleep Pattern: 8 hours per night. generally restful sleep  Energy Level:  Tired/Fatigued  Currently attending Physical Therapy:  No  Home Exercises:  Stretches in morning  Mobility: Increased pain with prolonged walking and standing   Do you use assistive devices?   Yes  Have you fallen in the last 30 days?:  No  Currently seeing a Psychiatrist or Psychologist:  No  Emotional Issues: normal Mood: appropriate      ABERRANT BEHAVIORS SINCE LAST VISIT:  Have you ever been treated in another Pain Clinic no  Refills for prescriptions appropriate: not applicable  Lost rx/pills:not applicable  Taking more medication than prescribed:  not applicable  Are you receiving PAIN medications from  other doctors: yes Neurologist giving Roma Zack for MS  Last Urine/Serum Drug Screen :   Was Serum/UDS as anticipated? Not applicable  Brought pill bottles in :not applicable   Was Pill count appropriate? :not applicable   Are currently pregnant?not applicable  Recent ER visits: No     SOAP: 0     BP (!) 139/97   Pulse 82   Temp 98.2 °F (36.8 °C) (Infrared)   Resp 20   Ht 4' 9\" (1.448 m)   Wt 132 lb (59.9 kg)   BMI 28.56 kg/m²          ACTIVITY/SOCIAL/EMOTIONAL:  Sleep Pattern: 8 hours per night. generally restful sleep  Home Exercises:  no regular exercise  Activity:unchanged  Emotional Issues: normal.   Currently seeing a Psychiatrist or Psychologist:  No    Past Medical History:   Diagnosis Date    Acute cystitis without hematuria 10/1/2017    Arthritis     Chronic daily headache     GERD (gastroesophageal reflux disease)     Hyperlipidemia     Hypertension     Moderate malnutrition ([address] 3/2/2019    Multiple sclerosis ([address]     Neuropathy     Pneumonia 2017    states had double pneumonia    Vitamin D deficiency        Past Surgical History:   Procedure Laterality Date    CATARACT REMOVAL WITH IMPLANT Right 11/6/2014    [facility]    CATARACT REMOVAL WITH IMPLANT Left 12/2/2014    [facility]    CERVICAL DISC SURGERY      CYSTOCELE REPAIR      HYSTERECTOMY      RECTOCELE REPAIR      SHOULDER ARTHROPLASTY Right 04/20/2017    SHOULDER SURGERY Right 2017       History reviewed. No pertinent family history. Social History     Socioeconomic History    Marital status:       Spouse name: None    Number of children: 3    Years of education: None    Highest education level: None   Occupational History    None   Tobacco Use    Smoking status: Never Smoker    Smokeless tobacco: Never Used   Vaping Use    Vaping Use: Never used   Substance and Sexual Activity    Alcohol use: No    Drug use: No    Sexual activity: None   Other Topics Concern    None   Social History Narrative    None     Social Determinants of Health     Financial Resource Strain: Low Risk     Difficulty of Paying Living Expenses: Not hard at all   Food Insecurity: No Food Insecurity    Worried About Running Out of Food in the Last Year: Never true    Sofiya of Food in the Last Year: Never true   Transportation Needs: No Transportation Needs    Lack of Transportation (Medical): No    Lack of Transportation (Non-Medical): No   Physical Activity:     Days of Exercise per Week:     Minutes of Exercise per Session:    Stress:     Feeling of Stress :    Social Connections:     Frequency of Communication with Friends and Family:     Frequency of Social Gatherings with Friends and Family:     Attends [employer] Services:     Active Member of Clubs or Organizations:     Attends Club or Organization Meetings:     Marital Status:    Intimate Partner Violence:     Fear of Current or Ex-Partner:     Emotionally Abused:     Physically Abused:     Sexually Abused: Allergies   Allergen Reactions    Bactrim [Sulfamethoxazole-Trimethoprim] Other (See Comments)     BLISTERS IN MOUTH    Lactose Intolerance (Gi) Diarrhea    Pcn [Penicillins] Hives    Lidoderm [Lidocaine] Rash    Macrobid [Nitrofurantoin] Nausea And Vomiting       Current Outpatient Medications on File Prior to Encounter   Medication Sig Dispense Refill    celecoxib (CELEBREX) 200 MG capsule Take 1 capsule by mouth daily 90 capsule 1    amitriptyline (ELAVIL) 100 MG tablet TAKE ONE TABLET BY MOUTH ONCE NIGHTLY 90 tablet 1    traMADol (ULTRAM) 50 MG tablet Take 1 tablet by mouth every 6 hours as needed for Pain for up to 60 days. 120 tablet 1    simvastatin (ZOCOR) 20 MG tablet Take 1 tablet by mouth nightly 90 tablet 1    clotrimazole-betamethasone (LOTRISONE) 1-0.05 % cream Apply topically 2 times daily. 15 g 1    omeprazole (PRILOSEC) 20 MG delayed release capsule Take 1 capsule by mouth daily 90 capsule 1    docusate sodium (COLACE) 100 MG capsule Take 1 capsule by mouth daily 90 capsule 1    Handicap Placard MISC by Does not apply route Exp: 5/20/2026 1 each 0    predniSONE (DELTASONE) 5 MG tablet Take 1 tablet by mouth daily 90 tablet 1    calcipotriene (DOVONEX) 0.005 % cream Apply topically 2 times daily. 1 Tube 4    furosemide (LASIX) 20 MG tablet Take 1 tablet by mouth daily for 7 days 7 tablet 0    clobetasol (TEMOVATE) 0.05 % cream Apply topically 2 times daily. 15 g 0    gabapentin (NEURONTIN) 100 MG capsule Take 1 capsule by mouth 2 times daily for 30 days. 60 capsule 0    albuterol sulfate HFA (VENTOLIN HFA) 108 (90 Base) MCG/ACT inhaler Inhale 2 puffs into the lungs every 6 hours as needed for Wheezing 1 Inhaler 0    HYDROcodone-acetaminophen (NORCO) 5-325 MG per tablet Take 1 tablet by mouth 3 times daily. No current facility-administered medications on file prior to encounter. Review of Systems   Constitutional: Negative. Negative for activity change, appetite change, fatigue, unexpected weight change and weight loss. HENT: Negative. Negative for congestion, hearing loss and sore throat. Eyes: Negative for redness and visual disturbance. Respiratory: Negative for cough and shortness of breath. Cardiovascular: Negative for chest pain and palpitations. Gastrointestinal: Negative for abdominal pain, constipation, nausea and vomiting. Endocrine: Negative for heat intolerance and polyuria. Genitourinary: Negative for dysuria and hematuria. Musculoskeletal: Positive for arthralgias, back pain and gait problem. Skin: Negative for rash.    Neurological: Negative for weakness, numbness and paresthesias. History of multiple sclerosis with weakness involving left side of the body   Hematological: Does not bruise/bleed easily. Psychiatric/Behavioral: Negative for self-injury, sleep disturbance and suicidal ideas. The patient is not hyperactive. Physical Exam  Constitutional:       Appearance: Normal appearance. HENT:      Head: Normocephalic. Eyes:      Extraocular Movements: Extraocular movements intact. Conjunctiva/sclera: Conjunctivae normal.      Pupils: Pupils are equal, round, and reactive to light. Cardiovascular:      Rate and Rhythm: Normal rate and regular rhythm. Pulses: Normal pulses. Pulmonary:      Effort: Pulmonary effort is normal. No respiratory distress. Abdominal:      General: There is no distension. Tenderness: There is no abdominal tenderness. Musculoskeletal:      Cervical back: No rigidity. Lumbar back: Positive left straight leg raise test. Negative right straight leg raise test.      Right lower leg: No edema. Left lower leg: No edema. Lymphadenopathy:      Cervical: No cervical adenopathy. Skin:     Findings: No rash. Neurological:      Mental Status: She is alert and oriented to person, place, and time. Cranial Nerves: Cranial nerves are intact. No cranial nerve deficit. Sensory: Sensation is intact. No sensory deficit. Motor: No weakness or tremor. Coordination: Coordination is intact. Psychiatric:         Mood and Affect: Mood normal.         Behavior: Behavior normal.         Thought Content: Thought content normal.         Cognition and Memory: Cognition normal.         Judgment: Judgment normal.        Right Ankle Exam     Muscle Strength   The patient has normal right ankle strength. Left Ankle Exam     Muscle Strength   The patient has normal left ankle strength. Right Knee Exam     Muscle Strength   The patient has normal right knee strength.       Left Knee Exam Muscle Strength   The patient has normal left knee strength. Right Hip Exam     Muscle Strength   The patient has normal right hip strength. Left Hip Exam     Tenderness   The patient is experiencing tenderness in the anterior and greater trochanter. Muscle Strength   The patient has normal left hip strength. Comments:  Patient has slight decrease in the range of motion in the left hip with pain. Back Exam     Tenderness   The patient is experiencing tenderness in the lumbar and sacroiliac. Range of Motion   Back extension: Limited and painful. Back flexion: Limited and painful. Back lateral bend right: Limited and painful. Back lateral bend left: Limited and painful. Back rotation right: Limited and painful. Back rotation left: Limited and painful. Tests   Straight leg raise right: negative  Straight leg raise left: positive    Other   Sensation: normal  Gait: antalgic (Is a walker to help with ambulation)     Comments:  Skin --normal appearance  Surgical Scar --Absent   kyphosis present and scoliosis present  Alignment of her shoulders, scapulae and iliac crests--asymmetric  Paraspinal tenderness:Present  Lumbar lordosis-----------Increased  Movements of the lumbar spine indicated above are diminished range with pain   Facet loading---  : Right side--    Pain-Moderate                                   Left side----   Pain  Moderate  Arsenio's test -------------- Right side---negative                                           Left side-----negative            DATA:  LAB.:  3/10/2021  7:08 AM - Kurt Cai Incoming Lab Results From [facility]    Component Value Ref Range & Units Status Collected Lab   Glucose 116High   70 - 99 mg/dL Final 03/10/2021  6:10 AM [facility]   BUN 15  8 - 23 mg/dL Final 03/10/2021  6:10 AM [place] [id number] 12High   0.50 - 0.90 mg/dL Final 03/10/2021  6:10 AM [facility]   Bun/Cre Ratio NOT REPORTED  9 - 20 Final 03/10/2021  6:10 AM [facility]   Calcium 9.0  8.6 - 10.4 mg/dL Final 03/10/2021  6:10 AM [facility]   Sodium 131Low   135 - 144 mmol/L Final 03/10/2021  6:10  [facility]   Potassium 4.8  3.7 - 5.3 mmol/L Final 03/10/2021  6:10 AM [facility]   Chloride 94Low   98 - 107 mmol/L Final 03/10/2021  6:10 AM [facility]   CO2 27  20 - 31 mmol/L Final 03/10/2021  6:10 AM [facility]   Anion Gap 10  9 - 17 mmol/L Final 03/10/2021  6:10 AM [facility]   GFR Non- 46Low   >60 mL/min Final 03/10/2021  6:10 AM [facility]   GFR  56Low   >60 mL/min Final 03/10/2021  6:10 AM [facility]   GFR Comment                 X-Ray reports:     EXAMINATION:   MRI OF THE LUMBAR SPINE WITHOUT CONTRAST, 8/10/2021 12:49 pm       TECHNIQUE:   Multiplanar multisequence MRI of the lumbar spine was performed without the   administration of intravenous contrast.       COMPARISON:   12/06/2018       HISTORY:   ORDERING SYSTEM PROVIDED HISTORY: Spondylosis of lumbar joint   TECHNOLOGIST PROVIDED HISTORY:   Low back pain; Low back pain, no complicating feature; Chronic LBP duration   >= 3 months; Worsening or not improving; Follow-up in the last 28 - 60 day   for conservative therapy; No known/automatically detected potential   contraindications to imaging   Reason for Exam: Spondylosis of lumbar joint   Additional signs and symptoms: gradual onset of lower back pain over the past   year       FINDINGS:   BONES/ALIGNMENT: For the purposes of this dictation, there are 5 lumbar   vertebral bodies.  There is dextrocurvature in the upper lumbar spine.  There   is retrolisthesis at T12-L1 and L1-L2.  There is grade 1 anterolisthesis at   L4-5.  Degenerative marrow signal changes are noted at multiple levels, most   pronounced at L1-2.  There is anterior compression deformity at T12.  Lumbar   vertebral body heights are maintained.       SPINAL CORD: Conus terminates at L1-2.  No nerve root thickening in the cauda   equina.       SOFT TISSUES: No paraspinal mass identified.       L1-L2: Disc protrusion, facet DJD, and ligamentum flavum hypertrophy with   mild spinal canal stenosis.  Mild bilateral neural foraminal stenosis.       L2-L3: Disc protrusion, facet DJD, and ligamentum flavum hypertrophy without   significant spinal canal stenosis.  Mild bilateral neural foraminal stenosis.       L3-L4: Disc protrusion, facet DJD, and ligamentum flavum hypertrophy with   mild spinal canal stenosis.  Mild-to-moderate bilateral neural foraminal   stenosis.       L4-L5: Disc protrusion, facet DJD, and ligamentum flavum hypertrophy result   in severe spinal canal stenosis.  There is effacement of both lateral   recesses.  Mild bilateral neural foraminal stenosis.       L5-S1: Disc protrusion, facet DJD, and ligamentum flavum hypertrophy with   mild spinal canal stenosis and mild narrowing of both lateral recesses.  Mild   bilateral neural foraminal stenosis.         Impression   1. Multilevel degenerative changes of the lumbar spine with severe spinal   canal stenosis at L4-5.  There is mild spinal canal narrowing at L1-2, L3-4,   and L5-S1.  This appears slightly progressed in the interval.   2. Compression deformity at T12 is chronic in appearance, but new from prior   MRI. 3. Multilevel neural foraminal stenosis and lateral recess narrowing. 4. Scoliosis with grade 1 anterolisthesis at L4-5.  There is grade 1   retrolisthesis at T12-L1 and L1-L2.           Clinical  impression:  1. Lumbar radiculopathy    2. Spinal stenosis of lumbar region with neurogenic claudication    3. Lumbar degenerative disc disease    4. Lumbar spondylosis    5.  Arthropathy of lumbar facet joint        Plan of care:  Patient's   [] x-ray    [] CT scan    [x] MRI  Were/was Reviewed. These findings are consistent with the patient's symptoms and physical examination. [] Patient's findings on the x-ray were explained to the patient   Other reports reviewed include    [] Bone scan   [] EMG and nerve conduction studies   [x] Referral reports-  I also discussed with him the following treatment options Including advantages and disadvantages of each:    [x] Physical therapy    [x] Interventional pain treatment    [] Medication management    [] Surgical options    Patient's OARRS were reviewed. It is acceptable and appears patient is not receiving prescriptions from multiple prescribers. Patient is  forthcoming regarding prescriptions for pain medication in the past  Controlled Substances Monitoring: Periodic Controlled Substance Monitoring: No signs of potential drug abuse or diversion identified. , Assessed functional status. (Nathan Rangel MD)    The following screens were also reviewed  SOAPP- the score is 0. (Values:cates patient is  <4minimal potential  4-7 Moderate potential  >7 High potential  for drug addiction  Counselling/Preventive measures for pain  Control:    [x]  Spine strengthening exercises are discussed with patient in detail. Patient is counseled on importance of exercise and,core strengthening. Some  specific exercises to strengthen the abdominal muscles and low back muscles Were discussed. Also aquatic (water) physical therapy and benefits were explained to patient. including \" Water supports the body and minimizes the effect of gravity, making it easier for patients to start an exercise program.\"   The following important principles were discussed with patient:  1. Limit Bed Rest--Studies show that people with short-term low-back pain who rest feel more pain and have a harder time with daily tasks than those who stay active.   2. Keep Exercising-patient is advised to stay away from strenuous activities like gardening and avoid whatever motion caused the pain in the first place.   3. Maintain Good Posture-Exercises  to maintain good posture were shown to patient. 4. To do exercises learned in PT regularly. []Information (handout) on exercise was  given to patient. The following treatment plan was developed after discussion with patient:    We discussed Lumbar Epidural steroid Injections x 1  at L4 - L5/L3-4    Patient tried and failed NSAIDS,Home exercises, Physical Therapy, Chiropractic manipulations without relief. Patient exhibited signs of radiculopathy with positive straight leg raising test on left    Patient has not had prior Lumbar Surgery. We will see the patient in 2 weeks after the procedures and re-evaluate symptoms. Orders Placed This Encounter   Procedures    Lumbar Epidural Steroid Injection/Caudal     Standing Status:   Future     Standing Expiration Date:   10/4/2022    FLUORO FOR SURGICAL PROCEDURES     Standing Status:   Future     Standing Expiration Date:   10/4/2022    Saline lock IV     Standing Status:   Future     Standing Expiration Date:   4/4/2023       Decision Making Process : Patient's health history and referral records thoroughly reviewed before focused physical examination and discussion with patient. Over 50% of today's visit is spent on examining the patient and counseling. Level of complexity of date to be reviewed is Moderate. The chart date reviewed include the following: Imaging Reports. Summary of Care. Time spent reviewing with patient the below reports:   Medication safety, Treatment options. Level of diagnosis and management options of this case is multiple: involving the following management options: Interventions as needed, medication management as appropriate, future visits, activity modification, heat/ice as needed, Urine drug screen as required. [x]The patient's questions were answered to the best of my abilities.     Documentation:  I communicated with the patient and/or health care decision maker about plan of care  Details of this discussion including any medical advice provided: Total Time: 45-55 minutes    I affirm this is a Patient Initiated Episode with an Established Patient who has not had a related appointment within my department in the past 7 days or scheduled within the next 24 hours. This note was created using voice recognition software. There may be inaccuracies of transcription  that are inadvertently overlooked prior to the signature. There is any questions about the transcription please contact me.     Electronically signed by Josette Valente MD on 10/4/2021 at 6:39 PM

## 2021-09-28 ENCOUNTER — NURSE ONLY (OUTPATIENT)
Dept: FAMILY MEDICINE CLINIC | Age: 86
End: 2021-09-28
Payer: MEDICARE

## 2021-09-28 DIAGNOSIS — Z23 NEED FOR INFLUENZA VACCINATION: Primary | ICD-10-CM

## 2021-09-28 PROCEDURE — G0008 ADMIN INFLUENZA VIRUS VAC: HCPCS | Performed by: INTERNAL MEDICINE

## 2021-09-28 PROCEDURE — 90694 VACC AIIV4 NO PRSRV 0.5ML IM: CPT | Performed by: INTERNAL MEDICINE

## 2021-09-29 ASSESSMENT — ENCOUNTER SYMPTOMS
BACK PAIN: 1
ALLERGIC/IMMUNOLOGIC NEGATIVE: 1
GASTROINTESTINAL NEGATIVE: 1
RESPIRATORY NEGATIVE: 1

## 2021-09-29 ASSESSMENT — PAIN DESCRIPTION - LOCATION: LOCATION: BACK;HIP

## 2021-09-29 ASSESSMENT — PAIN DESCRIPTION - DESCRIPTORS: DESCRIPTORS: CONSTANT;SHARP;SHOOTING

## 2021-09-29 ASSESSMENT — PAIN DESCRIPTION - ORIENTATION: ORIENTATION: LEFT

## 2021-09-29 ASSESSMENT — PAIN SCALES - GENERAL: PAINLEVEL_OUTOF10: 7

## 2021-09-29 ASSESSMENT — PAIN DESCRIPTION - PAIN TYPE: TYPE: CHRONIC PAIN

## 2021-09-29 ASSESSMENT — PAIN - FUNCTIONAL ASSESSMENT: PAIN_FUNCTIONAL_ASSESSMENT: PREVENTS OR INTERFERES SOME ACTIVE ACTIVITIES AND ADLS

## 2021-09-29 ASSESSMENT — PAIN DESCRIPTION - PROGRESSION: CLINICAL_PROGRESSION: GRADUALLY WORSENING

## 2021-09-29 ASSESSMENT — PAIN DESCRIPTION - ONSET: ONSET: ON-GOING

## 2021-09-29 ASSESSMENT — PAIN DESCRIPTION - FREQUENCY: FREQUENCY: CONTINUOUS

## 2021-09-29 NOTE — PROGRESS NOTES
[facility] Pain Management  Patient Pain Assessment  RECHECK - Dr. Eri Sommers    Primary Care Physician: Lennox Hook, MD    Chief complaint:   Chief Complaint   Patient presents with    Lower Back Pain   . HISTORY OF PRESENT ILLNESS:  Colonel Perez is 80 y.o. female with    HPI    RX Monitoring 9/27/2021   Attestation -   Acute Pain Prescriptions -   Periodic Controlled Substance Monitoring No signs of potential drug abuse or diversion identified. ;Assessed functional status. Chronic Pain > 80 MEDD -       Current Pain Assessment  Pain Assessment  Pain Assessment: 0-10  Pain Level: 7  Patient's Stated Pain Goal:  (Decrease pain level to 3 and increase activity)  Pain Type: Chronic pain  Pain Location: Back, Hip  Pain Orientation: Left  Pain Radiating Towards: none  Pain Descriptors: Constant, Sharp, Shooting  Pain Frequency: Continuous  Pain Onset: On-going  Clinical Progression: Gradually worsening  Functional Pain Assessment: Prevents or interferes some active activities and ADLs  Non-Pharmaceutical Pain Intervention(s): Heat applied, Rest, Repositioned                    ADVERSE MEDICATION EFFECTS:   Constipation: no  Bowel Regimen: No:   Diet: common adult  Appetite:  ok  Sedation:  no  Urinary Retention: no    FOCUSED PAINSCALE:  Highest : 10  Lowest :5  Average: Range-depends on activity  When and What  was your last procedure:  8/2/19 Left hip joint    Was your procedure effective:  Yes 80% for 3 days    ACTIVITY/SOCIAL/EMOTIONAL:  Sleep Pattern: 8 hours per night. generally restful sleep  Energy Level:  Tired/Fatigued  Currently attending Physical Therapy:  No  Home Exercises:  Stretches in morning  Mobility: Increased pain with prolonged walking and standing   Do you use assistive devices?   Yes  Have you fallen in the last 30 days?:  No  Currently seeing a Psychiatrist or Psychologist:  No  Emotional Issues: normal   Mood: appropriate     ABERRANT BEHAVIORS SINCE LAST VISIT:  Have you ever been treated in another Pain Clinic no  Refills for prescriptions appropriate: not applicable  Lost rx/pills:not applicable  Taking more medication than prescribed:  not applicable  Are you receiving PAIN medications from  other doctors: yes Neurologist giving [facility] for MS  Last Urine/Serum Drug Screen :   Was Serum/UDS as anticipated? Not applicable  Brought pill bottles in :not applicable   Was Pill count appropriate? :not applicable   Are currently pregnant?not applicable  Recent ER visits: No    SOAP: 0    BP (!) 139/97   Pulse 82   Temp 98.2 °F (36.8 °C) (Infrared)   Resp 20   Ht 4' 9\" (1.448 m)   Wt 132 lb (59.9 kg)   BMI 28.56 kg/m²              Past Medical History      Diagnosis Date    Acute cystitis without hematuria 10/1/2017    Arthritis     Chronic daily headache     GERD (gastroesophageal reflux disease)     Hyperlipidemia     Hypertension     Moderate malnutrition ([address] 3/2/2019    Multiple sclerosis [address]     Neuropathy     Pneumonia 2017    states had double pneumonia    Vitamin D deficiency        Surgical History  Past Surgical History:   Procedure Laterality Date    CATARACT REMOVAL [facility] IMPLANT [facility]2014    [facility]    CATARACT REMOVAL WITH IMPLANT Left 12/2/2014    [facility]    CERVICAL DISC SURGERY      CYSTOCELE REPAIR      HYSTERECTOMY      RECTOCELE REPAIR      SHOULDER ARTHROPLASTY Right 04/20/2017    SHOULDER SURGERY Right 2017       Medications  Current Outpatient Medications   Medication Sig Dispense Refill    celecoxib (CELEBREX) 200 MG capsule Take 1 capsule by mouth daily 90 capsule 1    amitriptyline (ELAVIL) 100 MG tablet TAKE ONE TABLET BY MOUTH ONCE NIGHTLY 90 tablet 1    traMADol (ULTRAM) 50 MG tablet Take 1 tablet by mouth every 6 hours as needed for Pain for up to 60 days.  120 tablet 1    simvastatin (ZOCOR) 20 MG tablet Take 1 tablet by mouth nightly 90 tablet 1    clotrimazole-betamethasone (LOTRISONE) 1-0.05 % cream Apply topically 2 times daily. 15 g 1    omeprazole (PRILOSEC) 20 MG delayed release capsule Take 1 capsule by mouth daily 90 capsule 1    docusate sodium (COLACE) 100 MG capsule Take 1 capsule by mouth daily 90 capsule 1    Handicap Placard MISC by Does not apply route Exp: 5/20/2026 1 each 0    predniSONE (DELTASONE) 5 MG tablet Take 1 tablet by mouth daily 90 tablet 1    calcipotriene (DOVONEX) 0.005 % cream Apply topically 2 times daily. 1 Tube 4    furosemide (LASIX) 20 MG tablet Take 1 tablet by mouth daily for 7 days 7 tablet 0    clobetasol (TEMOVATE) 0.05 % cream Apply topically 2 times daily. 15 g 0    gabapentin (NEURONTIN) 100 MG capsule Take 1 capsule by mouth 2 times daily for 30 days. 60 capsule 0    albuterol sulfate HFA (VENTOLIN HFA) 108 (90 Base) MCG/ACT inhaler Inhale 2 puffs into the lungs every 6 hours as needed for Wheezing 1 Inhaler 0    HYDROcodone-acetaminophen (NORCO) 5-325 MG per tablet Take 1 tablet by mouth 3 times daily. No current facility-administered medications for this encounter. Allergies  Bactrim [sulfamethoxazole-trimethoprim], Lactose intolerance (gi), Pcn [penicillins], Lidoderm [lidocaine], and Macrobid [nitrofurantoin]    Family History  family history is not on file. Social History  Social History     Socioeconomic History    Marital status:       Spouse name: None    Number of children: 3    Years of education: None    Highest education level: None   Occupational History    None   Tobacco Use    Smoking status: Never Smoker    Smokeless tobacco: Never Used   Vaping Use    Vaping Use: Never used   Substance and Sexual Activity    Alcohol use: No    Drug use: No    Sexual activity: None   Other Topics Concern    None   Social History Narrative    None     Social Determinants of Health     Financial Resource Strain: Low Risk     Difficulty of Paying Living Expenses: Not hard at all   Food Insecurity: No Food Insecurity    Worried About [employer] in the Last Year: Never true    Sofiya of Food in the Last Year: Never true   Transportation Needs: No Transportation Needs    Lack of Transportation (Medical): No    Lack of Transportation (Non-Medical): No   Physical Activity:     Days of Exercise per Week:     Minutes of Exercise per Session:    Stress:     Feeling of Stress :    Social Connections:     Frequency of Communication with Friends and Family:     Frequency of Social Gatherings with Friends and Family:     Attends [employer] Services:     Active Member of Clubs or Organizations:     Attends Club or Organization Meetings:     Marital Status:    Intimate Partner Violence:     Fear of Current or Ex-Partner:     Emotionally Abused:     Physically Abused:     Sexually Abused:       reports no history of drug use. REVIEW OF SYSTEMS:  Review of Systems   Constitutional: Negative. HENT: Negative. Respiratory: Negative. Cardiovascular: Negative. Gastrointestinal: Negative. Taking stool softeners   Endocrine: Negative. Genitourinary: Negative. Musculoskeletal: Positive for back pain. Skin: Negative. Allergic/Immunologic: Negative. Neurological: Positive for light-headedness and headaches. Patient has MS   Psychiatric/Behavioral: Negative.

## 2021-10-04 ENCOUNTER — HOSPITAL ENCOUNTER (OUTPATIENT)
Dept: PAIN MANAGEMENT | Age: 86
Discharge: HOME | End: 2021-10-04
Payer: MEDICARE

## 2021-10-04 VITALS
TEMPERATURE: 98.2 F | HEIGHT: 57 IN | DIASTOLIC BLOOD PRESSURE: 97 MMHG | SYSTOLIC BLOOD PRESSURE: 139 MMHG | RESPIRATION RATE: 20 BRPM | HEART RATE: 82 BPM | WEIGHT: 132 LBS | BODY MASS INDEX: 28.48 KG/M2

## 2021-10-04 DIAGNOSIS — M54.16 LUMBAR RADICULOPATHY: Primary | ICD-10-CM

## 2021-10-04 DIAGNOSIS — M48.062 SPINAL STENOSIS OF LUMBAR REGION WITH NEUROGENIC CLAUDICATION: ICD-10-CM

## 2021-10-04 DIAGNOSIS — M47.816 LUMBAR SPONDYLOSIS: ICD-10-CM

## 2021-10-04 DIAGNOSIS — M51.36 LUMBAR DEGENERATIVE DISC DISEASE: ICD-10-CM

## 2021-10-04 DIAGNOSIS — M47.816 ARTHROPATHY OF LUMBAR FACET JOINT: ICD-10-CM

## 2021-10-04 PROCEDURE — 99215 OFFICE O/P EST HI 40 MIN: CPT

## 2021-10-04 PROCEDURE — 99215 OFFICE O/P EST HI 40 MIN: CPT | Performed by: PAIN MEDICINE

## 2021-10-04 ASSESSMENT — ENCOUNTER SYMPTOMS
EYE REDNESS: 0
VOMITING: 0
COUGH: 0
NAUSEA: 0
ABDOMINAL PAIN: 0
SHORTNESS OF BREATH: 0
CONSTIPATION: 0
SORE THROAT: 0

## 2021-10-04 ASSESSMENT — PAIN DESCRIPTION - ORIENTATION: ORIENTATION: LEFT;RIGHT

## 2021-10-18 NOTE — PRE-PROCEDURE INSTRUCTIONS
[facility]. Attempted to outreach pt. To review pre-procedure instructions. VM obtained and message left with apt. Reminder. Pt. Instructed to wear a mask on entrance into the hospital, instructed which entrances are open for pts, to have a  to take home, and to wear loose fitting clothes. Pt. Instructed to call [facility] if recently had flu vaccination, and / or has questions or concerns.

## 2021-10-19 ENCOUNTER — HOSPITAL ENCOUNTER (OUTPATIENT)
Dept: PAIN MANAGEMENT | Age: 86
Discharge: HOME | End: 2021-10-19
Payer: MEDICARE

## 2021-10-19 ENCOUNTER — HOSPITAL ENCOUNTER (OUTPATIENT)
Dept: GENERAL RADIOLOGY | Age: 86
Discharge: HOME | End: 2021-10-21
Payer: MEDICARE

## 2021-10-19 VITALS
WEIGHT: 132 LBS | OXYGEN SATURATION: 99 % | HEIGHT: 57 IN | HEART RATE: 88 BPM | RESPIRATION RATE: 18 BRPM | DIASTOLIC BLOOD PRESSURE: 78 MMHG | TEMPERATURE: 97.5 F | BODY MASS INDEX: 28.48 KG/M2 | SYSTOLIC BLOOD PRESSURE: 135 MMHG

## 2021-10-19 DIAGNOSIS — M47.816 LUMBAR SPONDYLOSIS: ICD-10-CM

## 2021-10-19 DIAGNOSIS — M54.16 LUMBAR RADICULOPATHY: Primary | ICD-10-CM

## 2021-10-19 DIAGNOSIS — M48.062 SPINAL STENOSIS OF LUMBAR REGION WITH NEUROGENIC CLAUDICATION: ICD-10-CM

## 2021-10-19 DIAGNOSIS — M51.36 LUMBAR DEGENERATIVE DISC DISEASE: ICD-10-CM

## 2021-10-19 DIAGNOSIS — M47.816 ARTHROPATHY OF LUMBAR FACET JOINT: ICD-10-CM

## 2021-10-19 DIAGNOSIS — M54.16 LUMBAR RADICULOPATHY: ICD-10-CM

## 2021-10-19 PROCEDURE — 3209999900 FLUORO FOR SURGICAL PROCEDURES

## 2021-10-19 PROCEDURE — 6360000002 HC RX W HCPCS: Performed by: PAIN MEDICINE

## 2021-10-19 PROCEDURE — 62323 NJX INTERLAMINAR LMBR/SAC: CPT

## 2021-10-19 PROCEDURE — 62323 NJX INTERLAMINAR LMBR/SAC: CPT | Performed by: PAIN MEDICINE

## 2021-10-19 PROCEDURE — 6360000004 HC RX CONTRAST MEDICATION: Performed by: PAIN MEDICINE

## 2021-10-19 RX ORDER — TRIAMCINOLONE ACETONIDE 40 MG/ML
INJECTION, SUSPENSION INTRA-ARTICULAR; INTRAMUSCULAR
Status: COMPLETED | OUTPATIENT
Start: 2021-10-19 | End: 2021-10-19

## 2021-10-19 RX ORDER — HYDROCODONE BITARTRATE AND ACETAMINOPHEN 5; 325 MG/1; MG/1
1 TABLET ORAL EVERY 8 HOURS PRN
COMMUNITY
Start: 2021-09-14

## 2021-10-19 RX ORDER — FLUCONAZOLE 150 MG/1
TABLET ORAL
COMMUNITY
Start: 2021-08-19 | End: 2021-10-26 | Stop reason: ALTCHOICE

## 2021-10-19 RX ADMIN — IOHEXOL 4 ML: 180 INJECTION INTRAVENOUS at 11:25

## 2021-10-19 RX ADMIN — TRIAMCINOLONE ACETONIDE 80 MG: 40 INJECTION, SUSPENSION INTRA-ARTICULAR; INTRAMUSCULAR at 11:24

## 2021-10-19 ASSESSMENT — PAIN SCALES - GENERAL
PAINLEVEL_OUTOF10: 7
PAINLEVEL_OUTOF10: 2

## 2021-10-19 ASSESSMENT — PAIN - FUNCTIONAL ASSESSMENT: PAIN_FUNCTIONAL_ASSESSMENT: PREVENTS OR INTERFERES SOME ACTIVE ACTIVITIES AND ADLS

## 2021-10-19 ASSESSMENT — PAIN DESCRIPTION - LOCATION: LOCATION: BACK

## 2021-10-19 ASSESSMENT — PAIN DESCRIPTION - PROGRESSION: CLINICAL_PROGRESSION: GRADUALLY WORSENING

## 2021-10-19 ASSESSMENT — PAIN DESCRIPTION - DESCRIPTORS: DESCRIPTORS: ACHING;CONSTANT;SHOOTING

## 2021-10-19 ASSESSMENT — PAIN DESCRIPTION - PAIN TYPE: TYPE: CHRONIC PAIN

## 2021-10-19 ASSESSMENT — PAIN DESCRIPTION - ORIENTATION: ORIENTATION: RIGHT;LEFT

## 2021-10-19 ASSESSMENT — PAIN DESCRIPTION - FREQUENCY: FREQUENCY: CONTINUOUS

## 2021-10-19 ASSESSMENT — PAIN DESCRIPTION - ONSET: ONSET: ON-GOING

## 2021-10-19 NOTE — PROCEDURES
Pre-Procedure Note    Patient Name: Annabel Mason   YOB: 1934  Room/Bed: Room/bed info not found  Medical Record Number: [id number]  Date: 10/19/2021       Indication:    1. Lumbar radiculopathy    2. Lumbar degenerative disc disease    3. Lumbar spondylosis    4. Arthropathy of lumbar facet joint    5. Spinal stenosis of lumbar region with neurogenic claudication        Consent: On file. Vital Signs:   Vitals:    10/19/21 1127   BP: 135/78   Pulse: 88   Resp: 18   Temp:    SpO2:        Past Medical History:   has a past medical history of Acute cystitis without hematuria, Arthritis, Chronic daily headache, GERD (gastroesophageal reflux disease), Hyperlipidemia, Hypertension, Moderate malnutrition ([address] Multiple sclerosis ([address] Neuropathy, Pneumonia, and Vitamin D deficiency. Past Surgical History:   has a past surgical history that includes Cystocele repair; Rectocele repair; Hysterectomy; Cervical disc surgery; Cataract removal with implant (Right, 11/6/2014); Cataract removal with implant (Left, 12/2/2014); Total shoulder arthroplasty (Right, 04/20/2017); and shoulder surgery (Right, 2017). Pre-Sedation Documentation and Exam:   Vital signs have been reviewed (see flow sheet for vitals). Mallampati Airway Assessment:  normal    ASA Classification:  Class 3 - A patient with severe systemic disease that limits activity but is not incapacitating    Sedation/ Anesthesia Plan:   intravenous sedation   as needed. Medications Planned:   midazolam (Versed) / Fentanyl  Intravenously  as needed. Patient is an appropriate candidate for plan of sedation: yes  Patient's History and Physical examination was reviewed and there is no change. Electronically signed by Marsha Tejada MD on 10/19/2021 at 11:30 AM        Preoperative Diagnosis:    1. Lumbar radiculopathy    2. Lumbar degenerative disc disease    3. Lumbar spondylosis    4. Arthropathy of lumbar facet joint    5.  Spinal stenosis of lumbar region with neurogenic claudication        Postoperative Diagnosis:    1. Lumbar radiculopathy    2. Lumbar degenerative disc disease    3. Lumbar spondylosis    4. Arthropathy of lumbar facet joint    5. Spinal stenosis of lumbar region with neurogenic claudication        Procedure Performed:  Lumbar epidural steroid injection under fluoroscopy guidance without IV sedation. Indication for the Procedure: The patient failed conservative management  for pain in the low back radiating to lower extremities. As the patient is not responding to conservative management and it is interfering with activities of daily living we decided to proceed with lumbar epidural steroid injection. The procedure and risks were discussed with the patient and an informed consent was obtained  Current Pain Assessment  Pain Assessment  Pain Assessment: 0-10  Pain Level: 2  Patient's Stated Pain Goal: 2 (decrease pain and increase activity)  Pain Type: Chronic pain  Pain Location: Back  Pain Orientation: Right, Left  Pain Radiating Towards: left greater than the right  Pain Descriptors: Aching, Constant, Shooting  Pain Frequency: Continuous  Pain Onset: On-going  Clinical Progression: Gradually worsening  Functional Pain Assessment: Prevents or interferes some active activities and ADLs  Non-Pharmaceutical Pain Intervention(s): Rest     Procedure:    . Patient's vital signs including BP, EKG and SaO2 were monitored by the RN and they remained stable during the procedure. A meaningful communication was kept up with the patient throughout  the procedure. The patient is placed in prone position. Skin over the back was prepped and draped in sterile manner. Then using fluoroscopy the L4, L5 interspace was observed and the skin and deep tissues in the left paramedian area were infiltrated with 4 ml of 1% lidocaine.  The #20-gauge, 3-1/2 inch Tuohy needle was inserted through the skin wheal and the epidural space was identified using loss of resistance technique with normal saline. This was confirmed with AP and lateral views using fluoroscopy after injecting about 4 ml of Omnipaque-180 and observing the spread of the contrast in the epidural space. Then after negative aspiration a total of 80 mg of triamcinolone with 8 ml of normal saline was injected into the epidural space. The needle is removed and a Band-Aid was placed over the needle insertion site. Patient's vital signs remained stable and the patient tolerated the procedure well. The patient was discharged home in stable condition and will be followed in the pain clinic in the next few weeks for further planning.     Electronically signed by Tennille Huddleston MD on 10/19/2021 at 11:30 AM

## 2021-10-20 ENCOUNTER — TELEPHONE (OUTPATIENT)
Dept: PAIN MANAGEMENT | Age: 86
End: 2021-10-20

## 2021-10-25 RX ORDER — PREDNISONE 1 MG/1
5 TABLET ORAL DAILY
Qty: 90 TABLET | Refills: 1 | Status: SHIPPED | OUTPATIENT
Start: 2021-10-25 | End: 2021-10-26

## 2021-10-25 NOTE — TELEPHONE ENCOUNTER
Last visit: 08/19/2021  Last Med refill: 08/19/2021  Does patient have enough medication for 72 hours: No:     Next Visit Date:  Future Appointments   Date Time Provider Cj Gilberti   10/26/2021  4:45 PM Pauly Hooks MD [id number]   10/28/2021  3:00 PM Lila Duncan, APRN - CNP [facility] [address]   11/16/2021  3:20 PM Liliana Camilo MD [facility] [address] Maintenance   Topic Date Due    Shingles Vaccine (1 of 2) Never done    Lipid screen  05/21/2021    COVID-19 Vaccine (3 - Pfizer booster) 08/11/2021    DTaP/Tdap/Td vaccine (1 - Tdap) 03/30/2022 (Originally 12/8/1953)    Potassium monitoring  03/10/2022    Creatinine monitoring  03/10/2022    Annual Wellness Visit (AWV)  04/22/2022    Flu vaccine  Completed    Pneumococcal 65+ years Vaccine  Completed    Hepatitis A vaccine  Aged Out    Hib vaccine  Aged Out    Meningococcal (ACWY) vaccine  Aged Out       Hemoglobin A1C (%)   Date Value   03/30/2021 6.7   05/21/2020 6.2 (H)   02/14/2019 6.0             ( goal A1C is < 7)   No results found for: LABMICR  LDL Cholesterol (mg/dL)   Date Value   05/21/2020 86   05/24/2019 75       (goal LDL is <100)   AST (U/L)   Date Value   02/25/2021 15     ALT (U/L)   Date Value   02/25/2021 14     BUN (mg/dL)   Date Value   03/10/2021 15     BP Readings from Last 3 Encounters:   10/19/21 135/78   10/04/21 (!) 139/97   04/21/21 118/76          (goal 120/80)    All Future Testing planned in [facility]  Lab Frequency Next Occurrence   Saline lock IV Once 04/04/2022   Lumbar Epidural Steroid Injection/Caudal Once 10/04/2021               Patient Active Problem List:     Ataxia     Multiple sclerosis ([address]     Essential hypertension     Hyperlipidemia     Atelectasis     Debility     Abnormal gait     Actinic keratosis     Enthesopathy of hip region     Generalized osteoarthritis     Idiopathic peripheral neuropathy     Vitamin D deficiency     Primary localized osteoarthrosis of the hip, left     Primary osteoarthritis of left hip     Acquired spondylolisthesis     Chronic midline low back pain with left-sided sciatica     Spinal stenosis of lumbar region with neurogenic claudication     Presence of right artificial shoulder joint     Multiple sclerosis exacerbation (HCC)     Diarrhea     Chronic headache     Facial asymmetry     Asymptomatic bacteriuria     Hyperglycemia     EDINSON (acute kidney injury) ([address]     Lumbar radiculopathy

## 2021-10-26 ENCOUNTER — OFFICE VISIT (OUTPATIENT)
Dept: FAMILY MEDICINE CLINIC | Age: 86
End: 2021-10-26
Payer: MEDICARE

## 2021-10-26 ENCOUNTER — HOSPITAL ENCOUNTER (OUTPATIENT)
Age: 86
Setting detail: SPECIMEN
Discharge: HOME | End: 2021-10-26
Payer: MEDICARE

## 2021-10-26 VITALS
TEMPERATURE: 98.1 F | OXYGEN SATURATION: 98 % | BODY MASS INDEX: 27.01 KG/M2 | HEIGHT: 59 IN | WEIGHT: 134 LBS | DIASTOLIC BLOOD PRESSURE: 100 MMHG | SYSTOLIC BLOOD PRESSURE: 186 MMHG | HEART RATE: 93 BPM

## 2021-10-26 DIAGNOSIS — G35 MULTIPLE SCLEROSIS (HCC): ICD-10-CM

## 2021-10-26 DIAGNOSIS — R03.0 ELEVATED BLOOD PRESSURE, SITUATIONAL: ICD-10-CM

## 2021-10-26 DIAGNOSIS — R30.0 DYSURIA: Primary | ICD-10-CM

## 2021-10-26 DIAGNOSIS — G89.29 CHRONIC MIDLINE LOW BACK PAIN WITH LEFT-SIDED SCIATICA: ICD-10-CM

## 2021-10-26 DIAGNOSIS — E11.9 TYPE 2 DIABETES MELLITUS WITHOUT COMPLICATION, WITHOUT LONG-TERM CURRENT USE OF INSULIN (HCC): ICD-10-CM

## 2021-10-26 DIAGNOSIS — Z71.89 BEREAVEMENT COUNSELING: ICD-10-CM

## 2021-10-26 DIAGNOSIS — R30.0 DYSURIA: ICD-10-CM

## 2021-10-26 DIAGNOSIS — E78.5 HYPERLIPIDEMIA, UNSPECIFIED HYPERLIPIDEMIA TYPE: ICD-10-CM

## 2021-10-26 DIAGNOSIS — M54.42 CHRONIC MIDLINE LOW BACK PAIN WITH LEFT-SIDED SCIATICA: ICD-10-CM

## 2021-10-26 LAB
BILIRUBIN, POC: ABNORMAL
BLOOD URINE, POC: ABNORMAL
CLARITY, POC: ABNORMAL
COLOR, POC: ABNORMAL
GLUCOSE URINE, POC: ABNORMAL
KETONES, POC: ABNORMAL
LEUKOCYTE EST, POC: ABNORMAL
NITRITE, POC: ABNORMAL
PH, POC: 6.52
PROTEIN, POC: ABNORMAL
SPECIFIC GRAVITY, POC: >=1.03
UROBILINOGEN, POC: 0.2

## 2021-10-26 PROCEDURE — G8427 DOCREV CUR MEDS BY ELIG CLIN: HCPCS | Performed by: INTERNAL MEDICINE

## 2021-10-26 PROCEDURE — 1036F TOBACCO NON-USER: CPT | Performed by: INTERNAL MEDICINE

## 2021-10-26 PROCEDURE — 99214 OFFICE O/P EST MOD 30 MIN: CPT | Performed by: INTERNAL MEDICINE

## 2021-10-26 PROCEDURE — G8417 CALC BMI ABV UP PARAM F/U: HCPCS | Performed by: INTERNAL MEDICINE

## 2021-10-26 PROCEDURE — 4040F PNEUMOC VAC/ADMIN/RCVD: CPT | Performed by: INTERNAL MEDICINE

## 2021-10-26 PROCEDURE — G8484 FLU IMMUNIZE NO ADMIN: HCPCS | Performed by: INTERNAL MEDICINE

## 2021-10-26 PROCEDURE — 1123F ACP DISCUSS/DSCN MKR DOCD: CPT | Performed by: INTERNAL MEDICINE

## 2021-10-26 PROCEDURE — 1090F PRES/ABSN URINE INCON ASSESS: CPT | Performed by: INTERNAL MEDICINE

## 2021-10-26 PROCEDURE — 81003 URINALYSIS AUTO W/O SCOPE: CPT | Performed by: INTERNAL MEDICINE

## 2021-10-26 RX ORDER — PREDNISONE 10 MG/1
10 TABLET ORAL DAILY
Qty: 90 TABLET | Refills: 1 | Status: ON HOLD
Start: 2021-10-26 | End: 2022-02-20 | Stop reason: HOSPADM

## 2021-10-26 RX ORDER — CEPHALEXIN 500 MG/1
500 CAPSULE ORAL 2 TIMES DAILY
Qty: 20 CAPSULE | Refills: 0 | Status: SHIPPED | OUTPATIENT
Start: 2021-10-26 | End: 2021-11-05

## 2021-10-26 NOTE — PROGRESS NOTES
Subjective:       Patient ID:     Herman Fleming is a 80 y.o. female who presents for   Chief Complaint   Patient presents with    Urinary Tract Infection       HPI:  Nursing note reviewed and discussed with patient. Here for follow-up today   81 y/o sister is in hospice and is not expected to survive to tomorrow - going to visit with her after finishing visit today. Estela Corrigan will be the only sibling left alive after this sister passes and she is having a hard time with it. Seen Dr Ernesto Arthur who is recommending back surgery - Estela Corrigan does not want any surgery or hospital admission. She has questions about whether surgery or nonsurgical management is advisable. Hs been having urinary burning and frequency again for past week. No fevers, chills, nausea, vomiting, diarrhea. GERD - well controlled with omeprazole. Denies heartburn with food, nocturnal reflux, dysphagia, weight changes, anorexia, melena, hematochezia, diarrhea, nausea, vomiting. HLD - tolerating simvastatin without myalgais, dyspepsia, jaundice   Would like to try a higher dose of daily prednisone to help manage her pain through the winter, currently on 5mg daily. Patient's medications, allergies, past medical, surgical, social and family histories were reviewed and updated as appropriate.     Past Medical History:   Diagnosis Date    Acute cystitis without hematuria 10/1/2017    Arthritis     Chronic daily headache     GERD (gastroesophageal reflux disease)     Hyperlipidemia     Hypertension     Moderate malnutrition ([address] 3/2/2019    Multiple sclerosis ([facility] [address]     Neuropathy     Pneumonia 2017    states had double pneumonia    Vitamin D deficiency      Past Surgical History:   Procedure Laterality Date    CATARACT REMOVAL WITH IMPLANT [facility]2014    [facility]    CATARACT REMOVAL WITH IMPLANT Left 12/2/2014    [facility]    CERVICAL DISC SURGERY      CYSTOCELE REPAIR      HYSTERECTOMY      RECTOCELE REPAIR  SHOULDER ARTHROPLASTY Right 04/20/2017    SHOULDER SURGERY Right 2017       Social History     Tobacco Use    Smoking status: Never Smoker    Smokeless tobacco: Never Used   Substance Use Topics    Alcohol use: No      Patient Active Problem List   Diagnosis    Ataxia    [facility] sclerosis ([facility]    Essential hypertension    Hyperlipidemia    Atelectasis    Debility    Abnormal gait    Actinic keratosis    Enthesopathy of hip region    Generalized osteoarthritis    Idiopathic peripheral neuropathy    Vitamin D deficiency    Primary localized osteoarthrosis of the hip, left    Primary osteoarthritis of left hip    Acquired spondylolisthesis    Chronic midline low back pain with left-sided sciatica    Spinal stenosis of lumbar region with neurogenic claudication    Presence of right artificial shoulder joint    Multiple sclerosis exacerbation (HCC)    Diarrhea    Chronic headache    Facial asymmetry    Asymptomatic bacteriuria    Hyperglycemia    EDINSON (acute kidney injury) ([facility]    Lumbar radiculopathy         Prior to Visit Medications    Medication Sig Taking? Authorizing Provider   predniSONE (DELTASONE) 5 MG tablet Take 1 tablet by mouth daily Yes Pauly Harper MD   HYDROcodone-acetaminophen (NORCO) 5-325 MG per tablet Take 1 tablet by mouth every 8 hours as needed. Yes Historical Provider, MD   amitriptyline (ELAVIL) 100 MG tablet TAKE ONE TABLET BY MOUTH ONCE NIGHTLY Yes Pauly Harper MD   traMADol (ULTRAM) 50 MG tablet Take 1 tablet by mouth every 6 hours as needed for Pain for up to 60 days.  Yes Pauly Harper MD   simvastatin (ZOCOR) 20 MG tablet Take 1 tablet by mouth nightly Yes Daron Carlson MD   omeprazole (PRILOSEC) 20 MG delayed release capsule Take 1 capsule by mouth daily Yes Pauly Harper MD   celecoxib (CELEBREX) 200 MG capsule Take 1 capsule by mouth daily Yes Pauly Harper MD   docusate sodium (COLACE) 100 MG capsule Take 1 capsule by mouth daily Yes Tiffanie Bryant MD   Handicap Placard MISC by Does not apply route Exp: 5/20/2026 Yes Tiffanie Bryant MD   calcipotriene (DOVONEX) 0.005 % cream Apply topically 2 times daily. Yes Pauly Klein MD   clobetasol (TEMOVATE) 0.05 % cream Apply topically 2 times daily. Yes Tiffanie Bryant MD   gabapentin (NEURONTIN) 100 MG capsule Take 1 capsule by mouth 2 times daily for 30 days. Yes Bernardino Scott MD   albuterol sulfate HFA (VENTOLIN HFA) 108 (90 Base) MCG/ACT inhaler Inhale 2 puffs into the lungs every 6 hours as needed for Wheezing Yes Bernardino Scott MD     Review of Systems  Review of Systems   Constitutional: Negative for fatigue, fever and unexpected weight change. Respiratory: Negative for cough, choking, chest tightness, shortness of breath and wheezing. Cardiovascular: Negative for chest pain, palpitations and leg swelling. Gastrointestinal: Negative for abdominal pain, anal bleeding, blood in stool, constipation, diarrhea, nausea and vomiting. Endocrine: Negative. Musculoskeletal: Positive for arthralgias, back pain and gait problem. Negative for joint swelling and myalgias. Skin: Negative. Neurological: Negative for dizziness. Psychiatric/Behavioral: Positive for agitation and dysphoric mood. Negative for sleep disturbance. The patient is nervous/anxious. All other systems reviewed and are negative. Objective:       Physical Exam:  BP (!) 200/100 (Site: Left Upper Arm, Position: Sitting, Cuff Size: Medium Adult)   Pulse 93   Temp 98.1 °F (36.7 °C) (Temporal)   Ht 4' 10.66\" (1.49 m)   Wt 134 lb (60.8 kg)   SpO2 98%   BMI 27.38 kg/m²   Physical Exam  Vitals and nursing note reviewed. Constitutional:       General: She is not in acute distress. Appearance: She is well-developed. She is not ill-appearing. Comments: Ambulating with walker   Eyes:      General: Lids are normal. Vision grossly intact.    Cardiovascular:      Rate and Rhythm: Normal rate and regular rhythm. Heart sounds: Normal heart sounds, S1 normal and S2 normal. No murmur heard. No friction rub. No gallop. Pulmonary:      Effort: Pulmonary effort is normal. No respiratory distress. Breath sounds: Normal breath sounds. No wheezing. Abdominal:      General: Bowel sounds are normal.      Palpations: Abdomen is soft. There is no mass. Tenderness: There is no abdominal tenderness. There is no guarding. Musculoskeletal:         General: Normal range of motion. Skin:     General: Skin is warm and dry. Capillary Refill: Capillary refill takes less than 2 seconds. Neurological:      General: No focal deficit present. Mental Status: She is alert and oriented to person, place, and time. Data Review  ortho notes reviewed in chart. UA - + nitrites, leuk esterase     Assessment/Plan:      1. Dysuria  - POCT Urinalysis No Micro (Auto)  - Culture, Urine; Future  - cephALEXin (KEFLEX) 500 MG capsule; Take 1 capsule by mouth 2 times daily for 10 days  Dispense: 20 capsule; Refill: 0    2. Hyperlipidemia, unspecified hyperlipidemia type  Continue simvastatin  - Lipid, Fasting; Future  - Comprehensive Metabolic Panel; Future    3. Type 2 diabetes mellitus without complication, without long-term current use of insulin (HCC)  Continue diet and lifestyle management  - Hemoglobin A1C; Future    4. Chronic midline low back pain with left-sided sciatica  Reviewed that it is okay to not following surgery. She is 80years old and she may not recover from major surgery without difficulty. - predniSONE (DELTASONE) 10 MG tablet; Take 1 tablet by mouth daily  Dispense: 90 tablet; Refill: 1    5. Elevated blood pressure, situational  Monitor blood pressure at home, will call with results.     6. Bereavement counseling  Every 15 minutes spent with patient and daughter reviewing that death is a normal part of life, and that her sister's passing after living a full life, and she is having the kind of death she wanted -in her home, surrounded by family, with hospice care so she is dying with dignity and peace    7. Multiple sclerosis ([address]  Continue current management per neurology. Would benefit from getting a new walker since current one is heavy and doesn't have a seat, would benefit from having walker with seat and wheels so she can sit when she is tired.  Using to ambulate around the house from room to room, getting to bathroom, general ADLS including meal prep, grooming, etc.            Health Maintenance Due   Topic Date Due    Shingles Vaccine (1 of 2) Never done    Lipid screen  05/21/2021    COVID-19 Vaccine (3 - Pfizer booster) 08/11/2021       Electronically signed by Jseus Concepcion MD on 10/26/2021 at 5:06 PM

## 2021-10-28 ENCOUNTER — HOSPITAL ENCOUNTER (OUTPATIENT)
Dept: PAIN MANAGEMENT | Age: 86
Discharge: HOME | End: 2021-10-28
Payer: MEDICARE

## 2021-10-28 DIAGNOSIS — M54.42 CHRONIC MIDLINE LOW BACK PAIN WITH LEFT-SIDED SCIATICA: ICD-10-CM

## 2021-10-28 DIAGNOSIS — M54.16 LUMBAR RADICULOPATHY: ICD-10-CM

## 2021-10-28 DIAGNOSIS — M16.12 PRIMARY LOCALIZED OSTEOARTHROSIS OF THE HIP, LEFT: ICD-10-CM

## 2021-10-28 DIAGNOSIS — G89.29 CHRONIC MIDLINE LOW BACK PAIN WITH LEFT-SIDED SCIATICA: ICD-10-CM

## 2021-10-28 DIAGNOSIS — M48.062 SPINAL STENOSIS OF LUMBAR REGION WITH NEUROGENIC CLAUDICATION: ICD-10-CM

## 2021-10-28 DIAGNOSIS — G60.9 IDIOPATHIC PERIPHERAL NEUROPATHY: Primary | ICD-10-CM

## 2021-10-28 LAB
CULTURE: ABNORMAL
Lab: ABNORMAL
SPECIMEN DESCRIPTION: ABNORMAL

## 2021-10-28 PROCEDURE — 99213 OFFICE O/P EST LOW 20 MIN: CPT

## 2021-10-28 PROCEDURE — 99441 PR PHYS/QHP TELEPHONE EVALUATION 5-10 MIN: CPT | Performed by: NURSE PRACTITIONER

## 2021-10-28 ASSESSMENT — ENCOUNTER SYMPTOMS
RESPIRATORY NEGATIVE: 1
EYES NEGATIVE: 1
GASTROINTESTINAL NEGATIVE: 1
BACK PAIN: 1

## 2021-10-28 NOTE — PROGRESS NOTES
PROVIDED HISTORY:   Low back pain; Low back pain, no complicating feature; Chronic LBP duration   >= 3 months; Worsening or not improving; Follow-up in the last 28 - 60 day   for conservative therapy; No known/automatically detected potential   contraindications to imaging   Reason for Exam: Spondylosis of lumbar joint   Additional signs and symptoms: gradual onset of lower back pain over the past   year       FINDINGS:   BONES/ALIGNMENT: For the purposes of this dictation, there are 5 lumbar   vertebral bodies.  There is dextrocurvature in the upper lumbar spine.  There   is retrolisthesis at T12-L1 and L1-L2.  There is grade 1 anterolisthesis at   L4-5.  Degenerative marrow signal changes are noted at multiple levels, most   pronounced at L1-2. There is anterior compression deformity at T12.  Lumbar   vertebral body heights are maintained.       SPINAL CORD: Conus terminates at L1-2.  No nerve root thickening in the cauda   equina.       SOFT TISSUES: No paraspinal mass identified.       L1-L2: Disc protrusion, facet DJD, and ligamentum flavum hypertrophy with   mild spinal canal stenosis.  Mild bilateral neural foraminal stenosis.       L2-L3: Disc protrusion, facet DJD, and ligamentum flavum hypertrophy without   significant spinal canal stenosis.  Mild bilateral neural foraminal stenosis.       L3-L4: Disc protrusion, facet DJD, and ligamentum flavum hypertrophy with   mild spinal canal stenosis.  Mild-to-moderate bilateral neural foraminal   stenosis.       L4-L5: Disc protrusion, facet DJD, and ligamentum flavum hypertrophy result   in severe spinal canal stenosis.  There is effacement of both lateral   recesses.  Mild bilateral neural foraminal stenosis.       L5-S1: Disc protrusion, facet DJD, and ligamentum flavum hypertrophy with   mild spinal canal stenosis and mild narrowing of both lateral recesses.  Mild   bilateral neural foraminal stenosis.         Impression   1.  Multilevel degenerative changes of the lumbar spine with severe spinal   canal stenosis at L4-5.  There is mild spinal canal narrowing at L1-2, L3-4,   and L5-S1.  This appears slightly progressed in the interval.   2. Compression deformity at T12 is chronic in appearance, but new from prior   MRI. 3. Multilevel neural foraminal stenosis and lateral recess narrowing. 4. Scoliosis with grade 1 anterolisthesis at L4-5.  There is grade 1   retrolisthesis at T12-L1 and L1-L2.                           Pill count: appropriate    fill date : none    Morphine equivalent dose as reported on OARRS:     Review ofOARRS does not show any aberrant prescription behavior. Medication is helping the patient stay active. Patient denies any side effects and reports adequate analgesia. No sign of misuse/abuse.             Past Medical History:   Diagnosis Date    Acute cystitis without hematuria 10/1/2017    Arthritis     Chronic daily headache     GERD (gastroesophageal reflux disease)     Hyperlipidemia     Hypertension     Moderate malnutrition ([address] 3/2/2019    Multiple sclerosis ([address]     Neuropathy     Pneumonia 2017    states had double pneumonia    Vitamin D deficiency        Past Surgical History:   Procedure Laterality Date    CATARACT [facility]PLANT [facility]2014    [facility]    CATARACT REMOVAL WITH IMPLANT Left 12/2/2014    [facility]    CERVICAL DISC SURGERY      CYSTOCELE REPAIR      HYSTERECTOMY      RECTOCELE REPAIR      SHOULDER ARTHROPLASTY Right 04/20/2017    SHOULDER SURGERY Right 2017       Allergies   Allergen Reactions    Bactrim [Sulfamethoxazole-Trimethoprim] Other (See Comments)     BLISTERS IN MOUTH    Lactose Intolerance (Gi) Diarrhea    Pcn [Penicillins] Hives    Lidoderm [Lidocaine] Rash    Macrobid [Nitrofurantoin] Nausea And Vomiting         Current Outpatient Medications:     predniSONE (DELTASONE) 10 MG tablet, Take 1 tablet by mouth daily, Disp: 90 tablet, Rfl: 1   cephALEXin (KEFLEX) 500 MG capsule, Take 1 capsule by mouth 2 times daily for 10 days, Disp: 20 capsule, Rfl: 0    HYDROcodone-acetaminophen (NORCO) 5-325 MG per tablet, Take 1 tablet by mouth every 8 hours as needed. , Disp: , Rfl:     amitriptyline (ELAVIL) 100 MG tablet, TAKE ONE TABLET BY MOUTH ONCE NIGHTLY, Disp: 90 tablet, Rfl: 1    traMADol (ULTRAM) 50 MG tablet, Take 1 tablet by mouth every 6 hours as needed for Pain for up to 60 days. , Disp: 120 tablet, Rfl: 1    simvastatin (ZOCOR) 20 MG tablet, Take 1 tablet by mouth nightly, Disp: 90 tablet, Rfl: 1    omeprazole (PRILOSEC) 20 MG delayed release capsule, Take 1 capsule by mouth daily, Disp: 90 capsule, Rfl: 1    celecoxib (CELEBREX) 200 MG capsule, Take 1 capsule by mouth daily, Disp: 90 capsule, Rfl: 1    docusate sodium (COLACE) 100 MG capsule, Take 1 capsule by mouth daily, Disp: 90 capsule, Rfl: 1    Handicap Placard MISC, by Does not apply route Exp: 5/20/2026, Disp: 1 each, Rfl: 0    calcipotriene (DOVONEX) 0.005 % cream, Apply topically 2 times daily. , Disp: 1 Tube, Rfl: 4    clobetasol (TEMOVATE) 0.05 % cream, Apply topically 2 times daily. , Disp: 15 g, Rfl: 0    gabapentin (NEURONTIN) 100 MG capsule, Take 1 capsule by mouth 2 times daily for 30 days. , Disp: 60 capsule, Rfl: 0    albuterol sulfate HFA (VENTOLIN HFA) 108 (90 Base) MCG/ACT inhaler, Inhale 2 puffs into the lungs every 6 hours as needed for Wheezing, Disp: 1 Inhaler, Rfl: 0    History reviewed. No pertinent family history. Social History     Socioeconomic History    Marital status:       Spouse name: Not on file    Number of children: 3    Years of education: Not on file    Highest education level: Not on file   Occupational History    Not on file   Tobacco Use    Smoking status: Never Smoker    Smokeless tobacco: Never Used   Vaping Use    Vaping Use: Never used   Substance and Sexual Activity    Alcohol use: No    Drug use: No    Sexual activity: Not Spinal stenosis of lumbar region with neurogenic claudication    Primary localized osteoarthrosis of the hip, left    Lumbar radiculopathy    Idiopathic peripheral neuropathy - Primary    Chronic midline low back pain with left-sided sciatica            Treatment Plan:  DISCUSSION: Treatment options discussed withpatient and all questions answered to patient's satisfaction. Possible side effects, risk of tolerance and or dependence and alternative treatments discussed    Obtaining appropriate analgesic effect of treatment   No signs of potential drug abuse or diversion identified    [x] Ill effects of being on chronic pain medications such as sleep disturbances, respiratory depression, hormonal changes, withdrawal symptoms, chronic opioid dependence and tolerance as well as risk of taking opioids with Benzodiazepines and taking opioids along with alcohol,  werediscussed with patient. I had asked the patient to minimize medication use and utilize pain medications only for uncontrolled rest pain or pain with exertional activities. I advised patient not to self-escalate painmedications without consulting with us. At each of patient's future visits we will try to taper pain medications, while adjusting the adjunct medications, and re-evaluating for Physical Therapy to improve spinal andjoint strength. We will continue to have discussions to decrease pain medications as tolerated. Counseled patient on effects their pain medication and /or their medical condition mayhave on their  ability to drive or operate machinery. Instructed not to drive or operate machinery if drowsy     I also discussed with the patient regarding the dangers of combining narcotic pain medication with tranquilizers, alcohol or illegal drugs or taking the medication any way other than prescribed. The dangers were discussed  including respiratory depression and death.  Patient was told to tell  all  physicians regarding the medications he is getting from pain clinic. Patient is warned not to take any unprescribed medications over-the-countermedications that can depress breathing . Patient is advised to talk to the pharmacist or physicians if planning to take any over-the-counter medications before  takeing them. Patient is strongly advised to avoid tranquilizers or  relaxants, illegal drugs  or any medications that can depress breathing  Patient is also advised to tell us if there is any changes in their medications from other physicians.       1, call if pain worsens      TREATMENT OPTIONS:       See as needed

## 2021-10-30 ASSESSMENT — ENCOUNTER SYMPTOMS
CHOKING: 0
COUGH: 0
ANAL BLEEDING: 0
CONSTIPATION: 0
WHEEZING: 0
SHORTNESS OF BREATH: 0
NAUSEA: 0
BLOOD IN STOOL: 0
ABDOMINAL PAIN: 0
VOMITING: 0
BACK PAIN: 1
CHEST TIGHTNESS: 0
DIARRHEA: 0

## 2021-10-30 ASSESSMENT — VISUAL ACUITY: OU: 1

## 2021-11-07 LAB
ALBUMIN SERPL-MCNC: 4.3 G/DL
ALP BLD-CCNC: 46 U/L
ALT SERPL-CCNC: 19 U/L
ANION GAP SERPL CALCULATED.3IONS-SCNC: 6 MMOL/L
AST SERPL-CCNC: 15 U/L
AVERAGE GLUCOSE: 137
BILIRUB SERPL-MCNC: 0.5 MG/DL (ref 0.1–1.4)
BUN BLDV-MCNC: 20 MG/DL
CALCIUM SERPL-MCNC: 8.7 MG/DL
CHLORIDE BLD-SCNC: 102 MMOL/L
CHOLESTEROL, FASTING: 203
CO2: 31 MMOL/L
CREAT SERPL-MCNC: 1.12 MG/DL
GFR CALCULATED: 46
GLUCOSE BLD-MCNC: 95 MG/DL
HBA1C MFR BLD: 6.4 %
HDLC SERPL-MCNC: 74 MG/DL (ref 35–70)
LDL CHOLESTEROL CALCULATED: 100 MG/DL (ref 0–160)
POTASSIUM SERPL-SCNC: 4.3 MMOL/L
SODIUM BLD-SCNC: 139 MMOL/L
TOTAL PROTEIN: 6.4
TRIGLYCERIDE, FASTING: 146

## 2021-11-12 ENCOUNTER — E-VISIT (OUTPATIENT)
Dept: FAMILY MEDICINE CLINIC | Age: 86
End: 2021-11-12
Payer: MEDICARE

## 2021-11-12 DIAGNOSIS — N30.00 ACUTE CYSTITIS WITHOUT HEMATURIA: Primary | ICD-10-CM

## 2021-11-12 PROCEDURE — 99422 OL DIG E/M SVC 11-20 MIN: CPT | Performed by: FAMILY MEDICINE

## 2021-11-12 RX ORDER — CIPROFLOXACIN 250 MG/1
250 TABLET, FILM COATED ORAL 2 TIMES DAILY
Qty: 10 TABLET | Refills: 0 | Status: SHIPPED | OUTPATIENT
Start: 2021-11-12 | End: 2021-11-17

## 2021-11-13 NOTE — PROGRESS NOTES
The above-named patient has requested evaluation and management services through an electronic visit by way of [employer] powered by [employer] and provided by [facility] [place]. The history of present illness provided by the patient as well as medications, allergies, past medical history have been reviewed in this electronic health record. Following evaluation and management recommendations have been made and communicated to the patient via [employer]: Thank you for submitting an Evisit, Ms. Morris Gaming. I'm sorry you aren't feeling well. It sounds like you most likely have another/or the same urinary tract infection. I will send in a prescription for Cipro to your pharmacy. Contact your primary care physician's office if you do not see any improvement with the medication within the next 3 days. Please seek more urgent medical attention if you develop high fever, vomiting, or severe back/flank pain. Diagnoses and all orders for this visit:    Acute cystitis without hematuria  -     ciprofloxacin (CIPRO) 250 MG tablet; Take 1 tablet by mouth 2 times daily for 5 days      11-20 minutes were spent on the digital evaluation and management of this patient.

## 2021-11-16 ENCOUNTER — OFFICE VISIT (OUTPATIENT)
Dept: ORTHOPEDIC SURGERY | Age: 86
End: 2021-11-16
Payer: MEDICARE

## 2021-11-16 VITALS — RESPIRATION RATE: 14 BRPM | BODY MASS INDEX: 28.13 KG/M2 | WEIGHT: 134 LBS | HEIGHT: 58 IN

## 2021-11-16 DIAGNOSIS — G89.29 CHRONIC LEFT-SIDED LOW BACK PAIN WITHOUT SCIATICA: ICD-10-CM

## 2021-11-16 DIAGNOSIS — M47.816 LUMBAR SPONDYLOSIS: ICD-10-CM

## 2021-11-16 DIAGNOSIS — M48.062 SPINAL STENOSIS OF LUMBAR REGION WITH NEUROGENIC CLAUDICATION: Primary | ICD-10-CM

## 2021-11-16 DIAGNOSIS — S22.080D COMPRESSION FRACTURE OF T12 VERTEBRA WITH ROUTINE HEALING, SUBSEQUENT ENCOUNTER: ICD-10-CM

## 2021-11-16 DIAGNOSIS — M54.50 CHRONIC LEFT-SIDED LOW BACK PAIN WITHOUT SCIATICA: ICD-10-CM

## 2021-11-16 PROCEDURE — 99213 OFFICE O/P EST LOW 20 MIN: CPT | Performed by: ORTHOPAEDIC SURGERY

## 2021-11-16 PROCEDURE — 4040F PNEUMOC VAC/ADMIN/RCVD: CPT | Performed by: ORTHOPAEDIC SURGERY

## 2021-11-16 PROCEDURE — G8484 FLU IMMUNIZE NO ADMIN: HCPCS | Performed by: ORTHOPAEDIC SURGERY

## 2021-11-16 PROCEDURE — 1090F PRES/ABSN URINE INCON ASSESS: CPT | Performed by: ORTHOPAEDIC SURGERY

## 2021-11-16 PROCEDURE — G8417 CALC BMI ABV UP PARAM F/U: HCPCS | Performed by: ORTHOPAEDIC SURGERY

## 2021-11-16 PROCEDURE — 1036F TOBACCO NON-USER: CPT | Performed by: ORTHOPAEDIC SURGERY

## 2021-11-16 PROCEDURE — G8427 DOCREV CUR MEDS BY ELIG CLIN: HCPCS | Performed by: ORTHOPAEDIC SURGERY

## 2021-11-16 PROCEDURE — 1123F ACP DISCUSS/DSCN MKR DOCD: CPT | Performed by: ORTHOPAEDIC SURGERY

## 2021-11-16 NOTE — PROGRESS NOTES
Patient ID: Cash Pascal is a 80 y.o. female    Chief Compliant:  No chief complaint on file. Diagnostic imaging:  MRI again reviewed compared with prior MRI this juncture I do see a little bit of uptake on the left-hand side at T12 suggesting a subacute fracture patient with ongoing severe lumbar spinal stenosis and a grade 1 spondylolisthesis L4-5      Assessment and Plan:  1. Spinal stenosis of lumbar region with neurogenic claudication    2. Chronic left-sided low back pain without sciatica    3. Lumbar spondylosis    4. Compression fracture of T12 vertebra with routine healing, subsequent encounter      Lower back pain significantly improved with LESI    Follow up 10 weeks    HPI:  This is a 80 y.o. female who presents to the clinic today for lower back pain. Patient notes significant relief of lower back pain with lumbar epidural steroid injections. She is also following a home exercise program provided by pain management    Patient's reports her lower back pain is tolerable at this time    Review of Systems   All other systems reviewed and are negative. Past History:    Current Outpatient Medications:     traMADol (ULTRAM) 50 MG tablet, Take 1 tablet by mouth every 6 hours as needed for Pain for up to 60 days. , Disp: 120 tablet, Rfl: 0    ciprofloxacin (CIPRO) 250 MG tablet, Take 1 tablet by mouth 2 times daily for 5 days, Disp: 10 tablet, Rfl: 0    predniSONE (DELTASONE) 10 MG tablet, Take 1 tablet by mouth daily, Disp: 90 tablet, Rfl: 1    HYDROcodone-acetaminophen (NORCO) 5-325 MG per tablet, Take 1 tablet by mouth every 8 hours as needed. , Disp: , Rfl:     amitriptyline (ELAVIL) 100 MG tablet, TAKE ONE TABLET BY MOUTH ONCE NIGHTLY, Disp: 90 tablet, Rfl: 1    simvastatin (ZOCOR) 20 MG tablet, Take 1 tablet by mouth nightly, Disp: 90 tablet, Rfl: 1    omeprazole (PRILOSEC) 20 MG delayed release capsule, Take 1 capsule by mouth daily, Disp: 90 capsule, Rfl: 1    celecoxib (CELEBREX) 200 MG capsule, Take 1 capsule by mouth daily, Disp: 90 capsule, Rfl: 1    docusate sodium (COLACE) 100 MG capsule, Take 1 capsule by mouth daily, Disp: 90 capsule, Rfl: 1    Handicap Placard MISC, by Does not apply route Exp: 5/20/2026, Disp: 1 each, Rfl: 0    calcipotriene (DOVONEX) 0.005 % cream, Apply topically 2 times daily. , Disp: 1 Tube, Rfl: 4    clobetasol (TEMOVATE) 0.05 % cream, Apply topically 2 times daily. , Disp: 15 g, Rfl: 0    gabapentin (NEURONTIN) 100 MG capsule, Take 1 capsule by mouth 2 times daily for 30 days. , Disp: 60 capsule, Rfl: 0    albuterol sulfate HFA (VENTOLIN HFA) 108 (90 Base) MCG/ACT inhaler, Inhale 2 puffs into the lungs every 6 hours as needed for Wheezing, Disp: 1 Inhaler, Rfl: 0  Allergies   Allergen Reactions    Bactrim [Sulfamethoxazole-Trimethoprim] Other (See Comments)     BLISTERS IN MOUTH    Lactose Intolerance (Gi) Diarrhea    Pcn [Penicillins] Hives    Lidoderm [Lidocaine] Rash    Macrobid [Nitrofurantoin] Nausea And Vomiting     Social History     Socioeconomic History    Marital status:      Spouse name: Not on file    Number of children: 3    Years of education: Not on file    Highest education level: Not on file   Occupational History    Not on file   Tobacco Use    Smoking status: Never Smoker    Smokeless tobacco: Never Used   Vaping Use    Vaping Use: Never used   Substance and Sexual Activity    Alcohol use: No    Drug use: No    Sexual activity: Not on file   Other Topics Concern    Not on file   Social History Narrative    Not on file     Social Determinants of Health     Financial Resource Strain: Low Risk     Difficulty of Paying Living Expenses: Not hard at all   Food Insecurity: No Food Insecurity    Worried About [address] in the Last Year: Never true    Sofiya of Food in the Last Year: Never true   Transportation Needs: No Transportation Needs    Lack of Transportation (Medical):  No    Lack of Transportation (Non-Medical): No   Physical Activity:     Days of Exercise per Week: Not on file    Minutes of Exercise per Session: Not on file   Stress:     Feeling of Stress : Not on file   Social Connections:     Frequency of Communication with Friends and Family: Not on file    Frequency of Social Gatherings with Friends and Family: Not on file    Attends Confucianist Services: Not on file    Active Member of Clubs or Organizations: Not on file    Attends Club or Organization Meetings: Not on file    Marital Status: Not on file   Intimate Partner Violence:     Fear of Current or Ex-Partner: Not on file    Emotionally Abused: Not on file    Physically Abused: Not on file    Sexually Abused: Not on file   Housing Stability:     Unable to Pay for Housing in the Last Year: Not on file    Number of [place] in the Last Year: Not on file    Unstable Housing in the Last Year: Not on file     Past Medical History:   Diagnosis Date    Acute cystitis without hematuria 10/1/2017    Arthritis     Chronic daily headache     GERD (gastroesophageal reflux disease)     Hyperlipidemia     Hypertension     Moderate malnutrition ([address] 3/2/2019    Multiple sclerosis ([facility]     Neuropathy     Pneumonia 2017    states had double pneumonia    Vitamin D deficiency      Past Surgical History:   Procedure Laterality Date    CATARACT REM[facility]AL WITH IMPLANT [facility]2014    [facility]    CATARACT REMOVAL WITH IMPLANT Left 12/2/2014    [facility]    CERVICAL DISC SURGERY      CYSTOCELE REPAIR      HYSTERECTOMY      RECTOCELE REPAIR      SHOULDER ARTHROPLASTY Right 04/20/2017    SHOULDER SURGERY Right 2017     No family history on file. Physical Exam:  Vitals signs and nursing note reviewed. Constitutional:       Appearance: well-developed. HENT:      Head: Normocephalic and atraumatic.       Nose: Nose normal.   Eyes:      Conjunctiva/sclera: Conjunctivae normal.   Neck: Musculoskeletal: Normal range of motion and neck supple. Pulmonary:      Effort: Pulmonary effort is normal. No respiratory distress. Musculoskeletal:      Comments: Normal gait     Skin:     General: Skin is warm and dry. Neurological:      Mental Status: Alert and oriented to person, place, and time. Sensory: No sensory deficit. Psychiatric:         Behavior: Behavior normal.         Thought Content: Thought content normal.        Provider Attestation:  Manuel Tristan, personally performed the services described in this documentation. All medical record entries made by the scribe were at my direction and in my presence. I have reviewed the chart and discharge instructions and agree that the records reflect my personal performance and is accurate and complete. Trecia Goldberg Beau Harder, MD 11/16/21     Scribe Attestation:  By signing my name below, Katelyn Khan, attest that this documentation has been prepared under the direction and in the presence of Dr. Jayy Pascal. Electronically signed: Aaron Espinoza, 11/16/21     Please note that this chart was generated using voice recognition Dragon dictation software. Although every effort was made to ensure the accuracy of this automated transcription, some errors in transcription may have occurred.

## 2021-11-18 DIAGNOSIS — E78.5 HYPERLIPIDEMIA, UNSPECIFIED HYPERLIPIDEMIA TYPE: ICD-10-CM

## 2021-11-18 DIAGNOSIS — E11.9 TYPE 2 DIABETES MELLITUS WITHOUT COMPLICATION, WITHOUT LONG-TERM CURRENT USE OF INSULIN (HCC): ICD-10-CM

## 2021-11-25 ENCOUNTER — PATIENT MESSAGE (OUTPATIENT)
Dept: FAMILY MEDICINE CLINIC | Age: 86
End: 2021-11-25

## 2021-11-26 NOTE — TELEPHONE ENCOUNTER
From: Jay Potter  To: Dr. Vicky Haas: 11/25/2021 12:56 PM EST  Subject: MS issues    I believe my moms MS is acting up she has had a headache every day for few days which is not that unusual but has some swelling on her left side of her face and her balance is a bit off. Would it be possible to get a steroid boost sent in [address] [facility].  This has worked in the past.

## 2021-11-29 RX ORDER — PREDNISONE 10 MG/1
TABLET ORAL
Qty: 30 TABLET | Refills: 0 | Status: ON HOLD
Start: 2021-11-29 | End: 2022-02-20 | Stop reason: HOSPADM

## 2022-01-05 ENCOUNTER — E-VISIT (OUTPATIENT)
Dept: FAMILY MEDICINE CLINIC | Age: 87
End: 2022-01-05
Payer: MEDICARE

## 2022-01-05 DIAGNOSIS — B96.89 ACUTE BACTERIAL SINUSITIS: Primary | ICD-10-CM

## 2022-01-05 DIAGNOSIS — J01.90 ACUTE BACTERIAL SINUSITIS: Primary | ICD-10-CM

## 2022-01-05 PROCEDURE — 99421 OL DIG E/M SVC 5-10 MIN: CPT | Performed by: INTERNAL MEDICINE

## 2022-01-05 RX ORDER — DOXYCYCLINE HYCLATE 100 MG
100 TABLET ORAL 2 TIMES DAILY
Qty: 14 TABLET | Refills: 0 | Status: SHIPPED | OUTPATIENT
Start: 2022-01-05 | End: 2022-01-12

## 2022-01-05 ASSESSMENT — LIFESTYLE VARIABLES: SMOKING_STATUS: NO, I'VE NEVER SMOKED

## 2022-01-10 RX ORDER — LEVOFLOXACIN 750 MG/1
750 TABLET ORAL DAILY
Qty: 5 TABLET | Refills: 0 | Status: SHIPPED | OUTPATIENT
Start: 2022-01-10 | End: 2022-01-15

## 2022-01-10 NOTE — TELEPHONE ENCOUNTER
From: Dr. Farshad Malagon  To: Elma Henderson  Sent: 1/5/2022 10:59 AM EST    I called in a prescription for doxycycline, its an antibiotic with better sinus coverage.    Hope you feel better soon!    - AS

## 2022-01-16 DIAGNOSIS — M15.9 GENERALIZED OSTEOARTHRITIS: ICD-10-CM

## 2022-01-17 RX ORDER — CELECOXIB 200 MG/1
200 CAPSULE ORAL DAILY
Qty: 90 CAPSULE | Refills: 1 | Status: ON HOLD
Start: 2022-01-17 | End: 2022-02-20 | Stop reason: HOSPADM

## 2022-01-17 NOTE — TELEPHONE ENCOUNTER
Last visit: 10/26/21  Last Med refill: 7/22/21  Does patient have enough medication for 72 hours: Yes    Next Visit Date:  Future Appointments   Date Time Provider Cj Tara   1/25/2022  3:20 PM Pranav Walsh MD [id number]   4/25/2022  3:30 PM Pauly Dorsey  [address] [facility]   Topic Date Due    Shingles Vaccine (1 of 2) Never done    DTaP/Tdap/Td vaccine (1 - Tdap) 03/30/2022 (Originally 12/8/1953)    Depression Screen  04/18/2022    Annual Wellness Visit (AWV)  04/22/2022    Lipid screen  11/06/2022    Potassium monitoring  11/06/2022    Creatinine monitoring  11/06/2022    Flu vaccine  Completed    Pneumococcal 65+ years Vaccine  Completed    COVID-19 Vaccine  Completed    Hepatitis A vaccine  Aged Out    Hib vaccine  Aged Out    Meningococcal (ACWY) vaccine  Aged Out       Hemoglobin A1C (%)   Date Value   11/06/2021 6.4   03/30/2021 6.7   05/21/2020 6.2 (H)             ( goal A1C is < 7)   No results found for: LABMICR  LDL Cholesterol (mg/dL)   Date Value   05/21/2020 86   05/24/2019 75     LDL Calculated (mg/dL)   Date Value   11/06/2021 100       (goal LDL is <100)   AST (U/L)   Date Value   11/06/2021 15     ALT (U/L)   Date Value   11/06/2021 19     BUN (mg/dL)   Date Value   11/06/2021 20     BP Readings from Last 3 Encounters:   10/26/21 (!) 186/100   10/19/21 135/78   10/04/21 (!) 139/97          (goal 120/80)    All Future Testing planned in [facility]  Lab Frequency Next Occurrence   Saline lock IV Once 04/04/2022   Lumbar Epidural Steroid Injection/Caudal Once 10/04/2021               Patient Active Problem List:     Ataxia     Multiple sclerosis ([address]     Essential hypertension     Hyperlipidemia     Atelectasis     Debility     Abnormal gait     Actinic keratosis     Enthesopathy of hip region     Generalized osteoarthritis     Idiopathic peripheral neuropathy     Vitamin D deficiency     Primary localized osteoarthrosis of the hip, left Primary osteoarthritis of left hip     Acquired spondylolisthesis     Chronic midline low back pain with left-sided sciatica     Spinal stenosis of lumbar region with neurogenic claudication     Presence of right artificial shoulder joint     Multiple sclerosis exacerbation (HCC)     Diarrhea     Chronic headache     Facial asymmetry     Asymptomatic bacteriuria     Hyperglycemia     EDINSON (acute kidney injury) ([facility] [address]     Lumbar radiculopathy     Type 2 diabetes mellitus

## 2022-01-25 ENCOUNTER — OFFICE VISIT (OUTPATIENT)
Dept: ORTHOPEDIC SURGERY | Age: 87
End: 2022-01-25
Payer: MEDICARE

## 2022-01-25 VITALS — WEIGHT: 134 LBS | RESPIRATION RATE: 16 BRPM | HEIGHT: 58 IN | BODY MASS INDEX: 28.13 KG/M2

## 2022-01-25 DIAGNOSIS — M47.816 LUMBAR SPONDYLOSIS: ICD-10-CM

## 2022-01-25 DIAGNOSIS — M48.062 SPINAL STENOSIS OF LUMBAR REGION WITH NEUROGENIC CLAUDICATION: Primary | ICD-10-CM

## 2022-01-25 DIAGNOSIS — M43.10 ACQUIRED SPONDYLOLISTHESIS: ICD-10-CM

## 2022-01-25 PROCEDURE — 1090F PRES/ABSN URINE INCON ASSESS: CPT | Performed by: ORTHOPAEDIC SURGERY

## 2022-01-25 PROCEDURE — G8484 FLU IMMUNIZE NO ADMIN: HCPCS | Performed by: ORTHOPAEDIC SURGERY

## 2022-01-25 PROCEDURE — 4040F PNEUMOC VAC/ADMIN/RCVD: CPT | Performed by: ORTHOPAEDIC SURGERY

## 2022-01-25 PROCEDURE — 1036F TOBACCO NON-USER: CPT | Performed by: ORTHOPAEDIC SURGERY

## 2022-01-25 PROCEDURE — 99213 OFFICE O/P EST LOW 20 MIN: CPT | Performed by: ORTHOPAEDIC SURGERY

## 2022-01-25 PROCEDURE — G8427 DOCREV CUR MEDS BY ELIG CLIN: HCPCS | Performed by: ORTHOPAEDIC SURGERY

## 2022-01-25 PROCEDURE — G8417 CALC BMI ABV UP PARAM F/U: HCPCS | Performed by: ORTHOPAEDIC SURGERY

## 2022-01-25 PROCEDURE — 1123F ACP DISCUSS/DSCN MKR DOCD: CPT | Performed by: ORTHOPAEDIC SURGERY

## 2022-01-25 NOTE — PROGRESS NOTES
Patient ID: Garry Singh is a 80 y.o. female    Chief Compliant:  Chief Complaint   Patient presents with    Back Pain        Diagnostic imaging:        Assessment and Plan:  1. Spinal stenosis of lumbar region with neurogenic claudication    2. Lumbar spondylosis    3. Acquired spondylolisthesis      Patient with fairly severe lumbar spinal stenosis L4-5 associated with spondylolisthesis excellent result with lumbar epidural steroid injection proximately 4 months ago      Refer to pain management for lumbar epidural steroid injections    Follow up 12 weeks    HPI:  This is a 80 y.o. female who presents to the clinic today for lower back pain. Patient's lower back pain has worsened since the last visit. LESI provided about 3 months of relief    Patient still not ready to proceed with surgery. Review of Systems   All other systems reviewed and are negative. Past History:    Current Outpatient Medications:     celecoxib (CELEBREX) 200 MG capsule, Take 1 capsule by mouth daily, Disp: 90 capsule, Rfl: 1    traMADol (ULTRAM) 50 MG tablet, Take 1 tablet by mouth every 6 hours as needed for Pain for up to 60 days. , Disp: 120 tablet, Rfl: 1    predniSONE (DELTASONE) 10 MG tablet, 4 tabs by mouth daily for 3 days, then 3 tabs daily for 3 days, then 2 tabs daily for 3 days, then 1 tab daily till gone., Disp: 30 tablet, Rfl: 0    predniSONE (DELTASONE) 10 MG tablet, Take 1 tablet by mouth daily, Disp: 90 tablet, Rfl: 1    HYDROcodone-acetaminophen (NORCO) 5-325 MG per tablet, Take 1 tablet by mouth every 8 hours as needed. , Disp: , Rfl:     amitriptyline (ELAVIL) 100 MG tablet, TAKE ONE TABLET BY MOUTH ONCE NIGHTLY, Disp: 90 tablet, Rfl: 1    simvastatin (ZOCOR) 20 MG tablet, Take 1 tablet by mouth nightly, Disp: 90 tablet, Rfl: 1    omeprazole (PRILOSEC) 20 MG delayed release capsule, Take 1 capsule by mouth daily, Disp: 90 capsule, Rfl: 1    docusate sodium (COLACE) 100 MG capsule, Take 1 capsule by mouth daily, Disp: 90 capsule, Rfl: 1    Handicap Placard MISC, by Does not apply route Exp: 5/20/2026, Disp: 1 each, Rfl: 0    calcipotriene (DOVONEX) 0.005 % cream, Apply topically 2 times daily. , Disp: 1 Tube, Rfl: 4    clobetasol (TEMOVATE) 0.05 % cream, Apply topically 2 times daily. , Disp: 15 g, Rfl: 0    gabapentin (NEURONTIN) 100 MG capsule, Take 1 capsule by mouth 2 times daily for 30 days. , Disp: 60 capsule, Rfl: 0    albuterol sulfate HFA (VENTOLIN HFA) 108 (90 Base) MCG/ACT inhaler, Inhale 2 puffs into the lungs every 6 hours as needed for Wheezing, Disp: 1 Inhaler, Rfl: 0  Allergies   Allergen Reactions    Bactrim [Sulfamethoxazole-Trimethoprim] Other (See Comments)     BLISTERS IN MOUTH    Lactose Intolerance (Gi) Diarrhea    Pcn [Penicillins] Hives    Lidoderm [Lidocaine] Rash    Macrobid [Nitrofurantoin] Nausea And Vomiting     Social History     Socioeconomic History    Marital status:      Spouse name: Not on file    Number of children: 3    Years of education: Not on file    Highest education level: Not on file   Occupational History    Not on file   Tobacco Use    Smoking status: Never Smoker    Smokeless tobacco: Never Used   Vaping Use    Vaping Use: Never used   Substance and Sexual Activity    Alcohol use: No    Drug use: No    Sexual activity: Not on file   Other Topics Concern    Not on file   Social History Narrative    Not on file     Social Determinants of Health     Financial Resource Strain: Low Risk     Difficulty of Paying Living Expenses: Not hard at all   Food Insecurity: No Food Insecurity    Worried About [facility] in the Last Year: Never true    Sofiya of Food in the Last Year: Never true   Transportation Needs: No Transportation Needs    Lack of Transportation (Medical): No    Lack of Transportation (Non-Medical):  No   Physical Activity:     Days of Exercise per Week: Not on file    Minutes of Exercise per Session: Not on file Stress:     Feeling of Stress : Not on file   Social Connections:     Frequency of Communication with Friends and Family: Not on file    Frequency of Social Gatherings with Friends and Family: Not on file    Attends Bahai Services: Not on file    Active Member of Clubs or Organizations: Not on file    Attends Club or Organization Meetings: Not on file    Marital Status: Not on file   Intimate Partner Violence:     Fear of Current or Ex-Partner: Not on file    Emotionally Abused: Not on file    Physically Abused: Not on file    Sexually Abused: Not on file   Housing Stability:     Unable to Pay for Housing in the Last Year: Not on file    Number of [place] in the Last Year: Not on file    Unstable Housing in the Last Year: Not on file     Past Medical History:   Diagnosis Date    Acute cystitis without hematuria 10/1/2017    Arthritis     Chronic daily headache     GERD (gastroesophageal reflux disease)     Hyperlipidemia     Hypertension     Moderate malnutrition ([facility] [address] 3/2/2019    Multiple sclerosis ([facility] [address]     Neuropathy     Pneumonia 2017    states had double pneumonia    Vitamin D deficiency      Past Surgical History:   Procedure Laterality Date    [facility]ARACT [facility]PL[facility]    CATARACT REMOVAL WITH IMPLANT Left 12/2/2014    [facility]    CERVICAL DISC SURGERY      CYSTOCELE REPAIR      HYSTERECTOMY      RECTOCELE REPAIR      SHOULDER ARTHROPLASTY Right 04/20/2017    SHOULDER SURGERY Right 2017     No family history on file. Physical Exam:  Vitals signs and nursing note reviewed. Constitutional:       Appearance: well-developed. HENT:      Head: Normocephalic and atraumatic. Nose: Nose normal.   Eyes:      Conjunctiva/sclera: Conjunctivae normal.   Neck:      Musculoskeletal: Normal range of motion and neck supple. Pulmonary:      Effort: Pulmonary effort is normal. No respiratory distress. Musculoskeletal:      Comments: Normal gait     Skin:     General: Skin is warm and dry. Neurological:      Mental Status: Alert and oriented to person, place, and time. Sensory: No sensory deficit. Psychiatric:         Behavior: Behavior normal.         Thought Content: Thought content normal.        Provider Attestation:  Jonah Tristan, personally performed the services described in this documentation. All medical record entries made by the scribe were at my direction and in my presence. I have reviewed the chart and discharge instructions and agree that the records reflect my personal performance and is accurate and complete. Torres Hdez MD 22     Scribe Attestation:  By signing my name below, Felipe Collette, attest that this documentation has been prepared under the direction and in the presence of Dr. Patricia Ventura. Electronically signed: Aaron Alexandra, 22     Please note that this chart was generated using voice recognition Dragon dictation software. Although every effort was made to ensure the accuracy of this automated transcription, some errors in transcription may have occurred.

## 2022-01-27 ENCOUNTER — PATIENT MESSAGE (OUTPATIENT)
Dept: FAMILY MEDICINE CLINIC | Age: 87
End: 2022-01-27

## 2022-01-27 DIAGNOSIS — G89.29 CHRONIC MIDLINE LOW BACK PAIN WITH LEFT-SIDED SCIATICA: ICD-10-CM

## 2022-01-27 DIAGNOSIS — M54.42 CHRONIC MIDLINE LOW BACK PAIN WITH LEFT-SIDED SCIATICA: ICD-10-CM

## 2022-01-27 DIAGNOSIS — M16.12 PRIMARY OSTEOARTHRITIS OF LEFT HIP: ICD-10-CM

## 2022-01-27 DIAGNOSIS — G35 MULTIPLE SCLEROSIS (HCC): Primary | ICD-10-CM

## 2022-01-27 NOTE — TELEPHONE ENCOUNTER
From: Janice Simpson  To: Dr. Bravo Hsu: 1/27/2022 6:48 AM EST  Subject: Estefany Dutton    I would like to get a new walker for my mom. The one she is using was not even hers from the start and is very old. Not sure what we need to do to get this done. Or even where I go to get one. I know her insurance will cover it. Thanks and let us know.

## 2022-01-30 DIAGNOSIS — K59.03 THERAPEUTIC OPIOID-INDUCED CONSTIPATION (OIC): ICD-10-CM

## 2022-01-30 DIAGNOSIS — K21.9 GASTROESOPHAGEAL REFLUX DISEASE: ICD-10-CM

## 2022-01-30 DIAGNOSIS — T40.2X5A THERAPEUTIC OPIOID-INDUCED CONSTIPATION (OIC): ICD-10-CM

## 2022-01-31 NOTE — TELEPHONE ENCOUNTER
Last visit: 10/26/21  Last Med refill: 5/25/21, 8/9/21  Does patient have enough medication for 72 hours: No:     Next Visit Date:  Future Appointments   Date Time Provider Cj Gilberti   2/1/2022  4:20 PM Andrés Galaviz MD [facility]   4/19/2022  3:30 PM Taylor Diehl MD [facility]   4/25/2022  3:30 PM Pauly Keita  [address] Maintenance   Topic Date Due    Shingles Vaccine (1 of 2) Never done    DTaP/Tdap/Td vaccine (1 - Tdap) 03/30/2022 (Originally 12/8/1953)    Depression Screen  04/18/2022    Annual Wellness Visit (AWV)  04/22/2022    Lipid screen  11/06/2022    Potassium monitoring  11/06/2022    Creatinine monitoring  11/06/2022    Flu vaccine  Completed    Pneumococcal 65+ years Vaccine  Completed    COVID-19 Vaccine  Completed    Hepatitis A vaccine  Aged Out    Hib vaccine  Aged Out    Meningococcal (ACWY) vaccine  Aged Out       Hemoglobin A1C (%)   Date Value   11/06/2021 6.4   03/30/2021 6.7   05/21/2020 6.2 (H)             ( goal A1C is < 7)   No results found for: LABMICR  LDL Cholesterol (mg/dL)   Date Value   05/21/2020 86   05/24/2019 75     LDL Calculated (mg/dL)   Date Value   11/06/2021 100       (goal LDL is <100)   AST (U/L)   Date Value   11/06/2021 15     ALT (U/L)   Date Value   11/06/2021 19     BUN (mg/dL)   Date Value   11/06/2021 20     BP Readings from Last 3 Encounters:   10/26/21 (!) 186/100   10/19/21 135/78   10/04/21 (!) 139/97          (goal 120/80)    All Future Testing planned in [facility]  Lab Frequency Next Occurrence   Saline lock IV Once 04/04/2022   Lumbar Epidural Steroid Injection/Caudal Once 10/04/2021               Patient Active Problem List:     Ataxia     Multiple sclerosis ([address]     Essential hypertension     Hyperlipidemia     Atelectasis     Debility     Abnormal gait     Actinic keratosis     Enthesopathy of hip region     Generalized osteoarthritis     Idiopathic peripheral neuropathy     Vitamin D deficiency     Primary localized osteoarthrosis of the hip, left     Primary osteoarthritis of left hip     Acquired spondylolisthesis     Chronic midline low back pain with left-sided sciatica     Spinal stenosis of lumbar region with neurogenic claudication     Presence of right artificial shoulder joint     Multiple sclerosis exacerbation (HCC)     Diarrhea     Chronic headache     Facial asymmetry     Asymptomatic bacteriuria     Hyperglycemia     EDINSON (acute kidney injury) ([facility] [address]     Lumbar radiculopathy     Type 2 diabetes mellitus

## 2022-02-01 ENCOUNTER — INITIAL CONSULT (OUTPATIENT)
Dept: PAIN MANAGEMENT | Age: 87
End: 2022-02-01
Payer: MEDICARE

## 2022-02-01 VITALS
BODY MASS INDEX: 27.01 KG/M2 | WEIGHT: 134 LBS | SYSTOLIC BLOOD PRESSURE: 160 MMHG | HEIGHT: 59 IN | DIASTOLIC BLOOD PRESSURE: 82 MMHG | OXYGEN SATURATION: 96 % | HEART RATE: 90 BPM

## 2022-02-01 DIAGNOSIS — M48.062 SPINAL STENOSIS OF LUMBAR REGION WITH NEUROGENIC CLAUDICATION: Primary | ICD-10-CM

## 2022-02-01 DIAGNOSIS — S32.010G COMPRESSION FRACTURE OF L1 VERTEBRA WITH DELAYED HEALING, SUBSEQUENT ENCOUNTER: ICD-10-CM

## 2022-02-01 PROCEDURE — 1123F ACP DISCUSS/DSCN MKR DOCD: CPT | Performed by: ANESTHESIOLOGY

## 2022-02-01 PROCEDURE — G8427 DOCREV CUR MEDS BY ELIG CLIN: HCPCS | Performed by: ANESTHESIOLOGY

## 2022-02-01 PROCEDURE — 99205 OFFICE O/P NEW HI 60 MIN: CPT | Performed by: ANESTHESIOLOGY

## 2022-02-01 PROCEDURE — G8417 CALC BMI ABV UP PARAM F/U: HCPCS | Performed by: ANESTHESIOLOGY

## 2022-02-01 PROCEDURE — G8484 FLU IMMUNIZE NO ADMIN: HCPCS | Performed by: ANESTHESIOLOGY

## 2022-02-01 PROCEDURE — 1036F TOBACCO NON-USER: CPT | Performed by: ANESTHESIOLOGY

## 2022-02-01 PROCEDURE — 4040F PNEUMOC VAC/ADMIN/RCVD: CPT | Performed by: ANESTHESIOLOGY

## 2022-02-01 PROCEDURE — 1090F PRES/ABSN URINE INCON ASSESS: CPT | Performed by: ANESTHESIOLOGY

## 2022-02-01 RX ORDER — OMEPRAZOLE 20 MG/1
20 CAPSULE, DELAYED RELEASE ORAL DAILY
Qty: 90 CAPSULE | Refills: 1 | Status: ON HOLD | OUTPATIENT
Start: 2022-02-01 | End: 2022-02-20 | Stop reason: SDUPTHER

## 2022-02-01 RX ORDER — DOCUSATE SODIUM 100 MG/1
100 CAPSULE, LIQUID FILLED ORAL DAILY
Qty: 90 CAPSULE | Refills: 1 | Status: SHIPPED | OUTPATIENT
Start: 2022-02-01 | End: 2022-08-24 | Stop reason: SDUPTHER

## 2022-02-01 ASSESSMENT — ENCOUNTER SYMPTOMS
ALLERGIC/IMMUNOLOGIC NEGATIVE: 1
BACK PAIN: 1
GASTROINTESTINAL NEGATIVE: 1
SHORTNESS OF BREATH: 1
EYES NEGATIVE: 1

## 2022-02-01 NOTE — PROGRESS NOTES
The patient is a 80 y. o. Non- / non  female. Chief Complaint   Patient presents with    Back Pain        HPI  Requesting physician for the evaluation of Nikko Robertson 1934: Cecilio Wadsworth MD    Pain History  71-year-old woman with a past medical history significant for multiple sclerosis  For recent flareup she has been on a steroid taking prednisone 10 mg states that tomorrow is her last day  Review of the chart shows that patient is on several medication for pain including Celebrex, gabapentin, tramadol and Norco  She is referred to our clinic for evaluation of back pain  Back pain is chronic onset many years ago located in the lower lumbar area across midline affect both sides  Reports extension of pain down both hips and gluteal region  Pain aggravated with standing and walking and alleviated with sitting and rest  Report no numbness or tingling in legs  Denies any changes in bladder or bowel control  Have noticed progressive gait weakness and worsening  Now ambulating with walker  No previous lumbar spine surgical history  Had recent MRI lumbar spine that has shown severe lumbar spinal stenosis at L4-L5 level  Patient had epidural steroid injection at [facility] in October that she found helpful for 3 months  She was evaluated by orthopedic spine surgeon Dr. Stiven segura who did not advise for any surgery  Patient is here requesting a repeat lumbar epidural injection  Pain score today  8  1. Location:back  2. Radiation:no   3. Character:aching   5. Duration:constant  6. Onset: years  7. Did an injury cause pain: no  8. Aggravating factors:walking standing bending twisting   9. Alleviating factors: norco, gabapentin  10. Associated symptoms (numbness / tingling / weakness):  no  -Where at:n/a  -Down into finger tips or toes (specify which finger or toes):tingling neuropathy  -constant or intermitting: constant  11.  Red Flags: (weight loss / chills / loss of bladder or bowel control):no     Previous management history  1. Previous diagnostic workup: (Imaging/EMG)   CT, MRI, or Xray: xrays 8/21 MRI 8/21  What part of the body:back  What facility did they have it at:[facility]  What year or specific date: 8/21  EMG:  no    2. Previous non interventional treatments tried:  chiropractor or physical therapy: Pt   What part of the body:back  What facility was it done at: [facility]  How long ago was it last tried:year  Did it work: Yes  Did they complete it:Yes    3. Previous Medications tried  NSAID's: no  Neurontin: yes  Lyrica: no  Trycyclic antidepressant (Ellavil / Pamelor ): yes  Cymbalta: no  Opioids (Ultram / Vicodin / Percocet / Morphine / Dilaudid / Oramorph/ Fentanyl etc.):norco   Last Pain medication taken 2/1/22  4. Previous Interventional pain procedures tried:  What kind of injection:yes   Who did the injection: dr Rebeca Sidhu   did the injection help: yes  Last time injection was done: 3 months ago    5.  Previous surgeries for pain  What part of the body did they have the surgery:no  What physician did the surgery: Lila did they have the surgery done: n/a  Date of Surgery:N/A    Social History:  Marital status:single 2  Employment History:retired  Working  No  Full time Or Part time:  n/a  Disability  No   Legal Issues related to pain complaint: No     Pain Disability Index score : 70    Lab Results   Component Value Date    LABA1C 6.4 11/06/2021     Lab Results   Component Value Date     05/21/2020         Informant: patient      Past Medical History:   Diagnosis Date    Acute cystitis without hematuria 10/1/2017    Arthritis     Chronic daily headache     GERD (gastroesophageal reflux disease)     Hyperlipidemia     Hypertension     Moderate malnutrition ([address] 3/2/2019    Multiple sclerosis ([address]     Neuropathy     Pneumonia 2017    states had double pneumonia    Vitamin D deficiency         Past Surgical History:   Procedure Laterality Date    CATARACT REMOVAL WITH IMPLANT Right 11/6/2014    [facility]    CATARACT REMOVAL WITH IMPLANT Left 12/2/2014    [facility]    CERVICAL DISC SURGERY      CYSTOCELE REPAIR      HYSTERECTOMY      RECTOCELE REPAIR      SHOULDER ARTHROPLASTY Right 04/20/2017    SHOULDER SURGERY Right 2017       Social History     Socioeconomic History    Marital status:      Spouse name: None    Number of children: 3    Years of education: None    Highest education level: None   Occupational History    None   Tobacco Use    Smoking status: Never Smoker    Smokeless tobacco: Never Used   Vaping Use    Vaping Use: Never used   Substance and Sexual Activity    Alcohol use: No    Drug use: No    Sexual activity: None   Other Topics Concern    None   Social History Narrative    None     Social Determinants of Health     Financial Resource Strain:     Difficulty of Paying Living Expenses: Not on file   Food Insecurity:     Worried About Running Out of Food in the Last Year: Not on file    Sofiya of Food in the Last Year: Not on file   Transportation Needs:     Lack of Transportation (Medical): Not on file    Lack of Transportation (Non-Medical):  Not on file   Physical Activity:     Days of Exercise per Week: Not on file    Minutes of Exercise per Session: Not on file   Stress:     Feeling of Stress : Not on file   Social Connections:     Frequency of Communication with Friends and Family: Not on file    Frequency of Social Gatherings with Friends and Family: Not on file    Attends Synagogue Services: Not on file    Active Member of Clubs or Organizations: Not on file    Attends Club or Organization Meetings: Not on file    Marital Status: Not on file   Intimate Partner Violence:     Fear of Current or Ex-Partner: Not on file    Emotionally Abused: Not on file    Physically Abused: Not on file    Sexually Abused: Not on file   Housing Stability:     Unable to Pay for Housing in the Last Year: Not on file    Number of Places Lived in the Last Year: Not on file    Unstable Housing in the Last Year: Not on file       History reviewed. No pertinent family history. Allergies   Allergen Reactions    Bactrim [Sulfamethoxazole-Trimethoprim] Other (See Comments)     BLISTERS IN MOUTH    Lactose Intolerance (Gi) Diarrhea    Pcn [Penicillins] Hives    Lidoderm [Lidocaine] Rash    Macrobid [Nitrofurantoin] Nausea And Vomiting       Vitals:    02/01/22 1151   BP: (!) 160/82   Pulse: 90   SpO2: 96%       Current Outpatient Medications   Medication Sig Dispense Refill    omeprazole (PRILOSEC) 20 MG delayed release capsule Take 1 capsule by mouth daily 90 capsule 1    docusate sodium (COLACE) 100 MG capsule Take 1 capsule by mouth daily 90 capsule 1    celecoxib (CELEBREX) 200 MG capsule Take 1 capsule by mouth daily 90 capsule 1    traMADol (ULTRAM) 50 MG tablet Take 1 tablet by mouth every 6 hours as needed for Pain for up to 60 days. 120 tablet 1    predniSONE (DELTASONE) 10 MG tablet 4 tabs by mouth daily for 3 days, then 3 tabs daily for 3 days, then 2 tabs daily for 3 days, then 1 tab daily till gone. 30 tablet 0    predniSONE (DELTASONE) 10 MG tablet Take 1 tablet by mouth daily 90 tablet 1    HYDROcodone-acetaminophen (NORCO) 5-325 MG per tablet Take 1 tablet by mouth every 8 hours as needed.  amitriptyline (ELAVIL) 100 MG tablet TAKE ONE TABLET BY MOUTH ONCE NIGHTLY 90 tablet 1    simvastatin (ZOCOR) 20 MG tablet Take 1 tablet by mouth nightly 90 tablet 1    Handicap Placard MISC by Does not apply route Exp: 5/20/2026 1 each 0    calcipotriene (DOVONEX) 0.005 % cream Apply topically 2 times daily. 1 Tube 4    clobetasol (TEMOVATE) 0.05 % cream Apply topically 2 times daily.  15 g 0    albuterol sulfate HFA (VENTOLIN HFA) 108 (90 Base) MCG/ACT inhaler Inhale 2 puffs into the lungs every 6 hours as needed for Wheezing 1 Inhaler 0    gabapentin (NEURONTIN) 100 MG capsule Take 1 capsule by mouth 2 times daily for 30 days. (Patient not taking: Reported on 2/1/2022) 60 capsule 0     No current facility-administered medications for this visit. Review of Systems   Constitutional: Negative. Negative for fever. HENT: Negative. Eyes: Negative. Respiratory: Positive for shortness of breath. Cardiovascular: Negative. Gastrointestinal: Negative. Endocrine: Negative. Genitourinary: Negative. Musculoskeletal: Positive for back pain. Skin: Negative. Allergic/Immunologic: Negative. Neurological: Positive for weakness and numbness. Hematological: Negative. Psychiatric/Behavioral: Negative. Objective:  General Appearance:  Uncomfortable and in pain. Vital signs: (most recent): Blood pressure (!) 160/82, pulse 90, height 4' 11\" (1.499 m), weight 134 lb (60.8 kg), SpO2 96 %. Vital signs are normal.  No fever. Output: Producing urine and producing stool. HEENT: Normal HEENT exam.    Lungs:  Normal effort. She is not in respiratory distress. Heart: Normal rate. Extremities: Normal range of motion. There is no deformity. Neurological: Patient is alert and oriented to person, place and time. Patient has normal coordination. Pupils:  Pupils are equal, round, and reactive to light. Pupils are equal.   Skin:  Warm and dry. No rash or cyanosis.      Lumbar spine examination  No apparent deformity on inspection  Range of motion is limited and associated with pain  Positive tenderness to palpation in the midline in the  lower thoracic and upper lumbar area  Gait unstable ambulating with walker  Assessment & Plan     Pain History  80-year-old woman with a past medical history significant for multiple sclerosis  For recent flareup she has been on a steroid taking prednisone 10 mg states that tomorrow is her last day  Review of the chart shows that patient is on several medication for pain including Celebrex, gabapentin, tramadol and Norco  She is referred to our clinic for evaluation of back pain  Back pain is chronic onset many years ago located in the lower lumbar area across midline affect both sides  Reports extension of pain down both hips and gluteal region  Pain aggravated with standing and walking and alleviated with sitting and rest  Report no numbness or tingling in legs  Denies any changes in bladder or bowel control  Have noticed progressive gait weakness and worsening  Now ambulating with walker  No previous lumbar spine surgical history  Had recent MRI lumbar spine that has shown severe lumbar spinal stenosis at L4-L5 level  Patient had epidural steroid injection at [facility] in October that she found helpful for 3 months  She was evaluated by orthopedic spine surgeon Dr. Anthony segura who did not advise for any surgery  Patient is here requesting a repeat lumbar epidural injection  Pain score today  8    EXAMINATION: MRI OF THE LUMBAR SPINE WITHOUT CONTRAST, 8/10/2021 12:49 pm    Impression 1. Multilevel degenerative changes of the lumbar spine with severe spinal canal stenosis at L4-5. There is mild spinal canal narrowing at L1-2, L3-4, and L5-S1. This appears slightly progressed in the interval. 2. Compression deformity at T12 is chronic in appearance, but new from prior MRI. 3. Multilevel neural foraminal stenosis and lateral recess narrowing. 4. Scoliosis with grade 1 anterolisthesis at L4-5. There is grade 1 retrolisthesis at T12-L1 and L1-L2. 1. Spinal stenosis of lumbar region with neurogenic claudication    2.  Compression fracture of L1 vertebra with delayed healing, subsequent encounter        -MRI lumbar spine  Report is reviewed  Images reviewed independently  Finding discussed in detail with patient and accompanying son  Type I Modic changes involving T12-L1 and L2 vertebrae with endplate changes and possible compression fracture noticed at T12-L1 and L2    Severe spinal stenosis at L4-L5    Main issue today is back pain that extends down both legs aggravated with standing and walking and alleviates with rest  Symptoms consistent with neurogenic claudication  Imaging confirmed spinal stenosis  Patient failed physical therapy  Had epidural injection with good benefit  We will schedule patient for repeat lumbar epidural injection    She is a high risk for any major spine surgery  I think she can benefit from minimally invasive lumbar decompression using mild device  Procedure discussed at length with patient  Information material provided    She also have this is a new flareup of her back pain located in the midline in the upper back area  This coincides with this Modic changes and compression deformity noticed on MRI imaging  I think she has osteoporotic fracture involving at T12 L1-L2 level  She is high risk for osteoporotic fracture considering chronic steroid use    May require kyphoplasty in future    -Medication  Review of chart shows several medication that increase the risk of fall  Explained to patient that I think long-term use of NSAID was very detrimental for her renal function  I will advised to discontinue Celebrex and discontinue gabapentin for risk of fall  She is taking 2 different controlled substance tramadol and Norco  I recommended her to use 1 and discontinue the other 1  This will simplify her medication management  We will defer the decision to the prescribing physicians at this time    She is reporting high pain score 8/10 despite of this multiple and significant amount of pain medications with the daily morphine equaling 35 mg a day  This represent opioid ineffectiveness and opiate tolerance  Consultation note sent to the referring physician  Orders Placed This Encounter   Procedures    SC NJX DX/THER SBST INTRLMNR LMBR/SAC W/IMG GDN      No orders of the defined types were placed in this encounter.            Electronically signed by Patricia Ramos MD on 2/1/2022 at 1:00 PM

## 2022-02-07 ENCOUNTER — TELEPHONE (OUTPATIENT)
Dept: FAMILY MEDICINE CLINIC | Age: 87
End: 2022-02-07

## 2022-02-07 NOTE — TELEPHONE ENCOUNTER
----- Message from Johan Bateman sent at 2/3/2022  2:19 PM EST -----  Subject: Message to Provider    QUESTIONS  Information for Provider? José Manuel Littlejohn is   requesting call back from PCP Pauly Day in regards order for   walker; stated that she needs the face-to-face notes to support the need   of the rollator. Fax #[phone]  ---------------------------------------------------------------------------  --------------  Alessandra SCHNEIDER  What is the best way for the office to contact you? OK to leave message on   voicemail  Preferred Call Back Phone Number?  [phone]  ---------------------------------------------------------------------------  --------------  SCRIPT ANSWERS  undefined

## 2022-02-07 NOTE — TELEPHONE ENCOUNTER
Please advise. We received a call regarding this from [facility] stating they need face to face notes supporting the walker. Patient was last seen 10/26/21 for UTI.

## 2022-02-07 NOTE — TELEPHONE ENCOUNTER
Order for a walker was done and sent to Kev Choudhury. Last office note is needed to support why walker is needed and cane won't work for her for [employer] to pay for new walker.     patients last OV was 10/21 and next one is 4/22    I sent a my chart message to Sandy Sharp

## 2022-02-08 ENCOUNTER — HOSPITAL ENCOUNTER (INPATIENT)
Age: 87
LOS: 3 days | Discharge: NURSING, REHAB OR LONG-TERM CARE | DRG: 058 | End: 2022-02-11
Attending: STUDENT IN AN ORGANIZED HEALTH CARE EDUCATION/TRAINING PROGRAM | Admitting: INTERNAL MEDICINE
Payer: MEDICARE

## 2022-02-08 ENCOUNTER — APPOINTMENT (OUTPATIENT)
Dept: CT IMAGING | Age: 87
DRG: 058 | End: 2022-02-08
Payer: MEDICARE

## 2022-02-08 ENCOUNTER — APPOINTMENT (OUTPATIENT)
Dept: GENERAL RADIOLOGY | Age: 87
DRG: 058 | End: 2022-02-08
Payer: MEDICARE

## 2022-02-08 DIAGNOSIS — N39.0 URINARY TRACT INFECTION WITHOUT HEMATURIA, SITE UNSPECIFIED: Primary | ICD-10-CM

## 2022-02-08 DIAGNOSIS — G35 HX OF MULTIPLE SCLEROSIS (HCC): ICD-10-CM

## 2022-02-08 LAB
-: ABNORMAL
ABSOLUTE EOS #: 0.1 K/UL (ref 0–0.4)
ABSOLUTE IMMATURE GRANULOCYTE: ABNORMAL K/UL (ref 0–0.3)
ABSOLUTE LYMPH #: 1.5 K/UL (ref 1–4.8)
ABSOLUTE MONO #: 0.6 K/UL (ref 0.1–1.3)
ALBUMIN SERPL-MCNC: 3.9 G/DL (ref 3.5–5.2)
ALBUMIN/GLOBULIN RATIO: ABNORMAL (ref 1–2.5)
ALP BLD-CCNC: 47 U/L (ref 35–104)
ALT SERPL-CCNC: 21 U/L (ref 5–33)
AMORPHOUS: ABNORMAL
ANION GAP SERPL CALCULATED.3IONS-SCNC: 8 MMOL/L (ref 9–17)
AST SERPL-CCNC: 18 U/L
BACTERIA: ABNORMAL
BASOPHILS # BLD: 1 % (ref 0–2)
BASOPHILS ABSOLUTE: 0.1 K/UL (ref 0–0.2)
BILIRUB SERPL-MCNC: 0.27 MG/DL (ref 0.3–1.2)
BILIRUBIN URINE: NEGATIVE
BUN BLDV-MCNC: 20 MG/DL (ref 8–23)
BUN/CREAT BLD: ABNORMAL (ref 9–20)
CALCIUM SERPL-MCNC: 9.1 MG/DL (ref 8.6–10.4)
CASTS UA: ABNORMAL /LPF
CHLORIDE BLD-SCNC: 101 MMOL/L (ref 98–107)
CO2: 29 MMOL/L (ref 20–31)
COLOR: YELLOW
COMMENT UA: ABNORMAL
CREAT SERPL-MCNC: 1.06 MG/DL (ref 0.5–0.9)
CRYSTALS, UA: ABNORMAL /HPF
DIFFERENTIAL TYPE: ABNORMAL
EOSINOPHILS RELATIVE PERCENT: 2 % (ref 0–4)
EPITHELIAL CELLS UA: ABNORMAL /HPF
GFR AFRICAN AMERICAN: 59 ML/MIN
GFR NON-AFRICAN AMERICAN: 49 ML/MIN
GFR SERPL CREATININE-BSD FRML MDRD: ABNORMAL ML/MIN/{1.73_M2}
GFR SERPL CREATININE-BSD FRML MDRD: ABNORMAL ML/MIN/{1.73_M2}
GLUCOSE BLD-MCNC: 114 MG/DL (ref 70–99)
GLUCOSE URINE: NEGATIVE
HCT VFR BLD CALC: 34.2 % (ref 36–46)
HEMOGLOBIN: 11.7 G/DL (ref 12–16)
IMMATURE GRANULOCYTES: ABNORMAL %
KETONES, URINE: NEGATIVE
LEUKOCYTE ESTERASE, URINE: ABNORMAL
LYMPHOCYTES # BLD: 28 % (ref 24–44)
MCH RBC QN AUTO: 31 PG (ref 26–34)
MCHC RBC AUTO-ENTMCNC: 34.2 G/DL (ref 31–37)
MCV RBC AUTO: 90.8 FL (ref 80–100)
MONOCYTES # BLD: 12 % (ref 1–7)
MUCUS: ABNORMAL
NITRITE, URINE: NEGATIVE
NRBC AUTOMATED: ABNORMAL PER 100 WBC
OTHER OBSERVATIONS UA: ABNORMAL
PDW BLD-RTO: 13.7 % (ref 11.5–14.9)
PH UA: 7 (ref 5–8)
PLATELET # BLD: 224 K/UL (ref 150–450)
PLATELET ESTIMATE: ABNORMAL
PMV BLD AUTO: 7.1 FL (ref 6–12)
POTASSIUM SERPL-SCNC: 4.5 MMOL/L (ref 3.7–5.3)
PROTEIN UA: NEGATIVE
RBC # BLD: 3.76 M/UL (ref 4–5.2)
RBC # BLD: ABNORMAL 10*6/UL
RBC UA: ABNORMAL /HPF
RENAL EPITHELIAL, UA: ABNORMAL /HPF
SARS-COV-2, RAPID: NOT DETECTED
SEG NEUTROPHILS: 57 % (ref 36–66)
SEGMENTED NEUTROPHILS ABSOLUTE COUNT: 3.1 K/UL (ref 1.3–9.1)
SODIUM BLD-SCNC: 138 MMOL/L (ref 135–144)
SPECIFIC GRAVITY UA: 1.01 (ref 1–1.03)
SPECIMEN DESCRIPTION: NORMAL
TOTAL PROTEIN: 6.2 G/DL (ref 6.4–8.3)
TRICHOMONAS: ABNORMAL
TURBIDITY: CLEAR
URINE HGB: NEGATIVE
UROBILINOGEN, URINE: NORMAL
WBC # BLD: 5.5 K/UL (ref 3.5–11)
WBC # BLD: ABNORMAL 10*3/UL
WBC UA: ABNORMAL /HPF
YEAST: ABNORMAL

## 2022-02-08 PROCEDURE — 87086 URINE CULTURE/COLONY COUNT: CPT

## 2022-02-08 PROCEDURE — 2580000003 HC RX 258: Performed by: STUDENT IN AN ORGANIZED HEALTH CARE EDUCATION/TRAINING PROGRAM

## 2022-02-08 PROCEDURE — 6360000004 HC RX CONTRAST MEDICATION: Performed by: STUDENT IN AN ORGANIZED HEALTH CARE EDUCATION/TRAINING PROGRAM

## 2022-02-08 PROCEDURE — 70496 CT ANGIOGRAPHY HEAD: CPT

## 2022-02-08 PROCEDURE — 70450 CT HEAD/BRAIN W/O DYE: CPT

## 2022-02-08 PROCEDURE — 6360000002 HC RX W HCPCS

## 2022-02-08 PROCEDURE — 2580000003 HC RX 258

## 2022-02-08 PROCEDURE — 36415 COLL VENOUS BLD VENIPUNCTURE: CPT

## 2022-02-08 PROCEDURE — 99284 EMERGENCY DEPT VISIT MOD MDM: CPT

## 2022-02-08 PROCEDURE — 2060000000 HC ICU INTERMEDIATE R&B

## 2022-02-08 PROCEDURE — 99223 1ST HOSP IP/OBS HIGH 75: CPT | Performed by: INTERNAL MEDICINE

## 2022-02-08 PROCEDURE — 6370000000 HC RX 637 (ALT 250 FOR IP)

## 2022-02-08 PROCEDURE — 81001 URINALYSIS AUTO W/SCOPE: CPT

## 2022-02-08 PROCEDURE — 71045 X-RAY EXAM CHEST 1 VIEW: CPT

## 2022-02-08 PROCEDURE — 6360000002 HC RX W HCPCS: Performed by: STUDENT IN AN ORGANIZED HEALTH CARE EDUCATION/TRAINING PROGRAM

## 2022-02-08 PROCEDURE — 87635 SARS-COV-2 COVID-19 AMP PRB: CPT

## 2022-02-08 PROCEDURE — 99222 1ST HOSP IP/OBS MODERATE 55: CPT | Performed by: PSYCHIATRY & NEUROLOGY

## 2022-02-08 PROCEDURE — 80053 COMPREHEN METABOLIC PANEL: CPT

## 2022-02-08 PROCEDURE — 85025 COMPLETE CBC W/AUTO DIFF WBC: CPT

## 2022-02-08 PROCEDURE — 96365 THER/PROPH/DIAG IV INF INIT: CPT

## 2022-02-08 RX ORDER — ONDANSETRON 4 MG/1
4 TABLET, ORALLY DISINTEGRATING ORAL EVERY 8 HOURS PRN
Status: DISCONTINUED | OUTPATIENT
Start: 2022-02-08 | End: 2022-02-11 | Stop reason: HOSPADM

## 2022-02-08 RX ORDER — SODIUM CHLORIDE 0.9 % (FLUSH) 0.9 %
5-40 SYRINGE (ML) INJECTION EVERY 12 HOURS SCHEDULED
Status: DISCONTINUED | OUTPATIENT
Start: 2022-02-08 | End: 2022-02-11 | Stop reason: HOSPADM

## 2022-02-08 RX ORDER — ALBUTEROL SULFATE 90 UG/1
2 AEROSOL, METERED RESPIRATORY (INHALATION) EVERY 6 HOURS PRN
Status: DISCONTINUED | OUTPATIENT
Start: 2022-02-08 | End: 2022-02-11 | Stop reason: HOSPADM

## 2022-02-08 RX ORDER — POLYETHYLENE GLYCOL 3350 17 G/17G
17 POWDER, FOR SOLUTION ORAL DAILY PRN
Status: DISCONTINUED | OUTPATIENT
Start: 2022-02-08 | End: 2022-02-11 | Stop reason: HOSPADM

## 2022-02-08 RX ORDER — SODIUM CHLORIDE 0.9 % (FLUSH) 0.9 %
10 SYRINGE (ML) INJECTION PRN
Status: DISCONTINUED | OUTPATIENT
Start: 2022-02-08 | End: 2022-02-11 | Stop reason: HOSPADM

## 2022-02-08 RX ORDER — SODIUM CHLORIDE 0.9 % (FLUSH) 0.9 %
5-40 SYRINGE (ML) INJECTION PRN
Status: DISCONTINUED | OUTPATIENT
Start: 2022-02-08 | End: 2022-02-11 | Stop reason: HOSPADM

## 2022-02-08 RX ORDER — TRAMADOL HYDROCHLORIDE 50 MG/1
50 TABLET ORAL EVERY 6 HOURS PRN
Status: DISCONTINUED | OUTPATIENT
Start: 2022-02-08 | End: 2022-02-11 | Stop reason: HOSPADM

## 2022-02-08 RX ORDER — SODIUM CHLORIDE 9 MG/ML
INJECTION, SOLUTION INTRAVENOUS CONTINUOUS
Status: DISCONTINUED | OUTPATIENT
Start: 2022-02-08 | End: 2022-02-10

## 2022-02-08 RX ORDER — PANTOPRAZOLE SODIUM 40 MG/1
40 TABLET, DELAYED RELEASE ORAL
Status: DISCONTINUED | OUTPATIENT
Start: 2022-02-09 | End: 2022-02-11 | Stop reason: HOSPADM

## 2022-02-08 RX ORDER — ONDANSETRON 2 MG/ML
4 INJECTION INTRAMUSCULAR; INTRAVENOUS EVERY 6 HOURS PRN
Status: DISCONTINUED | OUTPATIENT
Start: 2022-02-08 | End: 2022-02-11 | Stop reason: HOSPADM

## 2022-02-08 RX ORDER — ACETAMINOPHEN 650 MG/1
650 SUPPOSITORY RECTAL EVERY 6 HOURS PRN
Status: DISCONTINUED | OUTPATIENT
Start: 2022-02-08 | End: 2022-02-11 | Stop reason: HOSPADM

## 2022-02-08 RX ORDER — AMITRIPTYLINE HYDROCHLORIDE 50 MG/1
100 TABLET, FILM COATED ORAL NIGHTLY
Status: DISCONTINUED | OUTPATIENT
Start: 2022-02-08 | End: 2022-02-11 | Stop reason: HOSPADM

## 2022-02-08 RX ORDER — ACETAMINOPHEN 325 MG/1
650 TABLET ORAL EVERY 6 HOURS PRN
Status: DISCONTINUED | OUTPATIENT
Start: 2022-02-08 | End: 2022-02-11 | Stop reason: HOSPADM

## 2022-02-08 RX ORDER — CELECOXIB 200 MG/1
200 CAPSULE ORAL DAILY
Status: DISCONTINUED | OUTPATIENT
Start: 2022-02-08 | End: 2022-02-11 | Stop reason: HOSPADM

## 2022-02-08 RX ORDER — ATORVASTATIN CALCIUM 20 MG/1
20 TABLET, FILM COATED ORAL DAILY
Status: DISCONTINUED | OUTPATIENT
Start: 2022-02-08 | End: 2022-02-11 | Stop reason: HOSPADM

## 2022-02-08 RX ORDER — 0.9 % SODIUM CHLORIDE 0.9 %
80 INTRAVENOUS SOLUTION INTRAVENOUS ONCE
Status: COMPLETED | OUTPATIENT
Start: 2022-02-08 | End: 2022-02-08

## 2022-02-08 RX ORDER — HYDROCODONE BITARTRATE AND ACETAMINOPHEN 5; 325 MG/1; MG/1
1 TABLET ORAL EVERY 8 HOURS PRN
Status: DISCONTINUED | OUTPATIENT
Start: 2022-02-08 | End: 2022-02-11 | Stop reason: HOSPADM

## 2022-02-08 RX ORDER — DOCUSATE SODIUM 100 MG/1
100 CAPSULE, LIQUID FILLED ORAL DAILY
Status: DISCONTINUED | OUTPATIENT
Start: 2022-02-08 | End: 2022-02-11 | Stop reason: HOSPADM

## 2022-02-08 RX ORDER — SODIUM CHLORIDE 9 MG/ML
25 INJECTION, SOLUTION INTRAVENOUS PRN
Status: DISCONTINUED | OUTPATIENT
Start: 2022-02-08 | End: 2022-02-11 | Stop reason: HOSPADM

## 2022-02-08 RX ORDER — METOPROLOL TARTRATE 5 MG/5ML
5 INJECTION INTRAVENOUS EVERY 6 HOURS PRN
Status: DISCONTINUED | OUTPATIENT
Start: 2022-02-08 | End: 2022-02-11 | Stop reason: HOSPADM

## 2022-02-08 RX ADMIN — HYDROCODONE BITARTRATE AND ACETAMINOPHEN 1 TABLET: 5; 325 TABLET ORAL at 22:23

## 2022-02-08 RX ADMIN — ACETAMINOPHEN 650 MG: 325 TABLET, FILM COATED ORAL at 19:25

## 2022-02-08 RX ADMIN — IOPAMIDOL 75 ML: 755 INJECTION, SOLUTION INTRAVENOUS at 14:30

## 2022-02-08 RX ADMIN — ATORVASTATIN CALCIUM 20 MG: 20 TABLET, FILM COATED ORAL at 22:22

## 2022-02-08 RX ADMIN — CELECOXIB 200 MG: 200 CAPSULE ORAL at 22:22

## 2022-02-08 RX ADMIN — SODIUM CHLORIDE: 9 INJECTION, SOLUTION INTRAVENOUS at 20:56

## 2022-02-08 RX ADMIN — ENOXAPARIN SODIUM 40 MG: 100 INJECTION SUBCUTANEOUS at 22:22

## 2022-02-08 RX ADMIN — SODIUM CHLORIDE 500 MG: 9 INJECTION, SOLUTION INTRAVENOUS at 22:26

## 2022-02-08 RX ADMIN — CEFTRIAXONE SODIUM 1000 MG: 1 INJECTION, POWDER, FOR SOLUTION INTRAMUSCULAR; INTRAVENOUS at 17:00

## 2022-02-08 RX ADMIN — SODIUM CHLORIDE 80 ML: 9 INJECTION, SOLUTION INTRAVENOUS at 14:30

## 2022-02-08 RX ADMIN — SODIUM CHLORIDE, PRESERVATIVE FREE 10 ML: 5 INJECTION INTRAVENOUS at 14:30

## 2022-02-08 RX ADMIN — DOCUSATE SODIUM 100 MG: 100 CAPSULE, LIQUID FILLED ORAL at 22:22

## 2022-02-08 RX ADMIN — AMITRIPTYLINE HYDROCHLORIDE 100 MG: 50 TABLET, FILM COATED ORAL at 22:22

## 2022-02-08 ASSESSMENT — PAIN DESCRIPTION - FREQUENCY: FREQUENCY: CONTINUOUS

## 2022-02-08 ASSESSMENT — ENCOUNTER SYMPTOMS
COUGH: 0
ABDOMINAL PAIN: 0
NAUSEA: 0
SHORTNESS OF BREATH: 0
BACK PAIN: 0
EYE ITCHING: 0
FACIAL SWELLING: 0
CHEST TIGHTNESS: 0
PHOTOPHOBIA: 0
COLOR CHANGE: 0
VOMITING: 0
CONSTIPATION: 0
ABDOMINAL DISTENTION: 0
WHEEZING: 0
DIARRHEA: 0
RHINORRHEA: 0

## 2022-02-08 ASSESSMENT — PAIN DESCRIPTION - LOCATION: LOCATION: HEAD

## 2022-02-08 ASSESSMENT — PAIN SCALES - GENERAL
PAINLEVEL_OUTOF10: 8
PAINLEVEL_OUTOF10: 7
PAINLEVEL_OUTOF10: 9
PAINLEVEL_OUTOF10: 8

## 2022-02-08 ASSESSMENT — PAIN - FUNCTIONAL ASSESSMENT: PAIN_FUNCTIONAL_ASSESSMENT: PREVENTS OR INTERFERES SOME ACTIVE ACTIVITIES AND ADLS

## 2022-02-08 ASSESSMENT — PAIN DESCRIPTION - PROGRESSION: CLINICAL_PROGRESSION: NOT CHANGED

## 2022-02-08 ASSESSMENT — PAIN DESCRIPTION - PAIN TYPE: TYPE: CHRONIC PAIN

## 2022-02-08 ASSESSMENT — PAIN DESCRIPTION - DESCRIPTORS: DESCRIPTORS: POUNDING

## 2022-02-08 ASSESSMENT — PAIN DESCRIPTION - ONSET: ONSET: ON-GOING

## 2022-02-08 NOTE — ED PROVIDER NOTES
EMERGENCY DEPARTMENT ENCOUNTER    Pt Name: Serenity Damon  MRN: [id number]  [place] 1934  Date of evaluation: 2/8/22  CHIEF COMPLAINT       Chief Complaint   Patient presents with    Dizziness     HISTORY OF PRESENT ILLNESS   HPI  68-year-old female history of hypertension, hyperlipidemia, suspected MS, headaches presents for evaluation of some generalized fatigue, weakness, dizziness, headaches. Symptoms progressing over the past several days. Reported history of similar symptoms with MS flares in the past.  No fever chills. No cough or shortness of breath. Symptoms are moderate and progressive. No urinary symptoms. No abdominal pain nausea or vomiting. No home treatment prior to arrival.  No other recent illness      REVIEW OF SYSTEMS     Review of Systems   Constitutional: Positive for fatigue. Negative for chills. HENT: Negative for facial swelling, postnasal drip and rhinorrhea. Eyes: Negative for photophobia and itching. Respiratory: Negative for cough and shortness of breath. Cardiovascular: Negative for chest pain and leg swelling. Gastrointestinal: Negative for abdominal pain, diarrhea, nausea and vomiting. Genitourinary: Negative for dysuria, flank pain and hematuria. Musculoskeletal: Negative for arthralgias and joint swelling. Skin: Negative for color change and rash. Neurological: Positive for weakness. Negative for dizziness, numbness and headaches.      PASTMEDICAL HISTORY     Past Medical History:   Diagnosis Date    Acute cystitis without hematuria 10/1/2017    Arthritis     Chronic daily headache     GERD (gastroesophageal reflux disease)     Hyperlipidemia     Hypertension     Moderate malnutrition ([address] 3/2/2019    Multiple sclerosis ([address]     Neuropathy     Pneumonia 2017    states had double pneumonia    Vitamin D deficiency      Past Problem List  Patient Active Problem List   Diagnosis Code    Ataxia R27.0    Multiple sclerosis ([address] G35    Essential hypertension I10    Hyperlipidemia E78.5    Atelectasis J98.11    Debility R53.81    Abnormal gait R26.9    Actinic keratosis L57.0    Enthesopathy of hip region M76.899    Generalized osteoarthritis M15.9    Idiopathic peripheral neuropathy G60.9    Vitamin D deficiency E55.9    Primary localized osteoarthrosis of the hip, left M16.12    Primary osteoarthritis of left hip M16.12    Acquired spondylolisthesis M43.10    Chronic midline low back pain with left-sided sciatica M54.42, G89.29    Spinal stenosis of lumbar region with neurogenic claudication M48.062    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    Multiple sclerosis exacerbation (HCC) G35    Diarrhea R19.7    Chronic headache R51.9, G89.29    Facial asymmetry Q67.0    Asymptomatic bacteriuria R82.71    Hyperglycemia R73.9    EDINSON (acute kidney injury) ([facility] N17.9    Lumbar radiculopathy M54.16    Type 2 diabetes mellitus E11.9    Recurrent urinary tract infection N39.0     SURGICAL HISTORY       Past Surgical History:   Procedure Laterality Date    CATARACT REMOVAL WITH IMPLANT Right 11/6/2014    [facility]    CATARACT REMOVAL WITH IMPLANT Left 12/2/2014    [facility]    CERVICAL DISC SURGERY      CYSTOCELE REPAIR      HYSTERECTOMY      RECTOCELE REPAIR      SHOULDER ARTHROPLASTY Right 04/20/2017    SHOULDER SURGERY Right 2017     CURRENT MEDICATIONS       Previous Medications    ALBUTEROL SULFATE HFA (VENTOLIN HFA) 108 (90 BASE) MCG/ACT INHALER    Inhale 2 puffs into the lungs every 6 hours as needed for Wheezing    AMITRIPTYLINE (ELAVIL) 100 MG TABLET    TAKE ONE TABLET BY MOUTH ONCE NIGHTLY    CALCIPOTRIENE (DOVONEX) 0.005 % CREAM    Apply topically 2 times daily. CELECOXIB (CELEBREX) 200 MG CAPSULE    Take 1 capsule by mouth daily    CLOBETASOL (TEMOVATE) 0.05 % CREAM    Apply topically 2 times daily.     DOCUSATE SODIUM (COLACE) 100 MG CAPSULE    Take 1 capsule by mouth daily GABAPENTIN (NEURONTIN) 100 MG CAPSULE    Take 1 capsule by mouth 2 times daily for 30 days. HANDICAP PLACARD MISC    by Does not apply route Exp: 2026    HYDROCODONE-ACETAMINOPHEN (NORCO) 5-325 MG PER TABLET    Take 1 tablet by mouth every 8 hours as needed. OMEPRAZOLE (PRILOSEC) 20 MG DELAYED RELEASE CAPSULE    Take 1 capsule by mouth daily    PREDNISONE (DELTASONE) 10 MG TABLET    Take 1 tablet by mouth daily    PREDNISONE (DELTASONE) 10 MG TABLET    4 tabs by mouth daily for 3 days, then 3 tabs daily for 3 days, then 2 tabs daily for 3 days, then 1 tab daily till gone. SIMVASTATIN (ZOCOR) 20 MG TABLET    Take 1 tablet by mouth nightly    TRAMADOL (ULTRAM) 50 MG TABLET    Take 1 tablet by mouth every 6 hours as needed for Pain for up to 60 days. ALLERGIES     is allergic to bactrim [sulfamethoxazole-trimethoprim], lactose intolerance (gi), pcn [penicillins], lidoderm [lidocaine], and macrobid [nitrofurantoin]. FAMILY HISTORY     She indicated that her mother is . She indicated that her father is . She indicated that her sister is alive. She indicated that her brother is alive. SOCIAL HISTORY       Social History     Tobacco Use    Smoking status: Never Smoker    Smokeless tobacco: Never Used   Vaping Use    Vaping Use: Never used   Substance Use Topics    Alcohol use: No    Drug use: No     PHYSICAL EXAM     INITIAL VITALS: BP (!) 184/84   Pulse 94   Temp 98.6 °F (37 °C) (Oral)   Resp 15   Ht 4' 11\" (1.499 m)   Wt 134 lb (60.8 kg)   SpO2 93%   BMI 27.06 kg/m²    Physical Exam  Vitals and nursing note reviewed. Constitutional:       Appearance: She is normal weight. HENT:      Head: Normocephalic and atraumatic. Eyes:      Extraocular Movements: Extraocular movements intact. Pupils: Pupils are equal, round, and reactive to light. Cardiovascular:      Rate and Rhythm: Normal rate and regular rhythm.    Pulmonary:      Effort: Pulmonary effort is normal. Breath sounds: Normal breath sounds. Abdominal:      General: Abdomen is flat. There is no distension. Palpations: There is no mass. Musculoskeletal:         General: No swelling. Normal range of motion. Cervical back: Normal range of motion and neck supple. Skin:     General: Skin is warm and dry. Neurological:      General: No focal deficit present. Mental Status: She is alert. Mental status is at baseline. Comments: 4/5 left sided strength. Intact sensation, right sided strength. Mild left sided facial droop. Mild left hand ataxia. MEDICAL DECISION MAKIN-year-old female presents for evaluation with weakness and some dizziness and ataxic gait progressing over the past several days. Will work-up for infection, MS flare, CVA. Symptoms present for multiple days out of the window for stroke alert criteria. Work-up is consistent with urinary tract infection. Urinalysis shows positive leuk esterase, white cells and bacteria. CNS imaging is unremarkable. With patient's symptoms we will plan for admission for neurology consultation, IV antibiotics. CRITICAL CARE:       PROCEDURES:    Procedures    DIAGNOSTIC RESULTS   EKG:All EKG's are interpreted by the Emergency Department Physician who either signs or Co-signs this chart in the absence of a cardiologist.        RADIOLOGY:All plain film, CT, MRI, and formal ultrasound images (except ED bedside ultrasound) are read by the radiologist, see reports below, unless otherwisenoted in MDM or here. CTA HEAD NECK W CONTRAST   Final Result   1. No acute arterial abnormality or hemodynamically significant arterial   stenosis in the head or neck. 2. Mild edema in the visualized upper lungs suggesting volume overload. CT Head WO Contrast   Final Result   Cerebral atrophy and chronic microvascular disease without acute intracranial   abnormality.       RECOMMENDATIONS:   Unavailable         XR CHEST PORTABLE Final Result   Chronic pulmonary change without acute cardiopulmonary process. LABS: All lab results were reviewed by myself, and all abnormals are listed below. Labs Reviewed   CBC WITH AUTO DIFFERENTIAL - Abnormal; Notable for the following components:       Result Value    RBC 3.76 (*)     Hemoglobin 11.7 (*)     Hematocrit 34.2 (*)     Monocytes 12 (*)     All other components within normal limits   COMPREHENSIVE METABOLIC PANEL W/ REFLEX TO MG FOR LOW K - Abnormal; Notable for the following components:    Glucose 114 (*)     CREATININE 1.06 (*)     Anion Gap 8 (*)     Total Bilirubin 0.27 (*)     Total Protein 6.2 (*)     GFR Non- 49 (*)     GFR  59 (*)     All other components within normal limits   URINALYSIS - Abnormal; Notable for the following components:    Leukocyte Esterase, Urine SMALL (*)     All other components within normal limits   COVID-19, RAPID   CULTURE, URINE   MICROSCOPIC URINALYSIS       EMERGENCY DEPARTMENTCOURSE:         Vitals:    Vitals:    02/08/22 1234 02/08/22 1330 02/08/22 1600 02/08/22 1700   BP: (!) 164/77 (!) 181/66 (!) 180/86 (!) 184/84   Pulse: 89 87 96 94   Resp: 13 18 17 15   Temp:       TempSrc:       SpO2: 92%  93% 93%   Weight:       Height:           The patient was given the following medications while in the emergency department:  Orders Placed This Encounter   Medications    0.9 % sodium chloride bolus    sodium chloride flush 0.9 % injection 10 mL    iopamidol (ISOVUE-370) 76 % injection 75 mL    cefTRIAXone (ROCEPHIN) 1000 mg IVPB in 50 mL D5W minibag     Order Specific Question:   Antimicrobial Indications     Answer:   Urinary Tract Infection     CONSULTS:  IP CONSULT TO INTERNAL MEDICINE  IP CONSULT TO NEUROLOGY    FINAL IMPRESSION      1. Urinary tract infection without hematuria, site unspecified    2.  Hx of multiple sclerosis [facility])          DISPOSITION/PLAN   DISPOSITION Admitted 02/08/2022 05:20:27 PM      PATIENT REFERRED TO:  No follow-up provider specified. DISCHARGE MEDICATIONS:  New Prescriptions    No medications on file     The care is provided during an unprecedented national emergency due to the novel coronavirus, COVID 19.   MD Nay Rodrigues MD  02/08/22 2027

## 2022-02-08 NOTE — ED TRIAGE NOTES
Mode of arrival (squad #, walk in, police, etc) : wheelchair        Chief complaint(s): Dizziness, and generalized body discomfort, HA (chronic)        Arrival Note (brief scenario, treatment PTA, etc). : patient arrived to ED from home with C/O \"lightheadedness\", generalized body discomfort, and HA. Patient has MS and states she feels like her head has more pressure than usual. Patient denies any confusion, and was recently given new eye glasses for her worsening vision. Patient denies any recent falls, or any CP, SOB, N/V/D, abd painm fevers or chills. Patient is alert and oriented. Patient states \" I just don't feel right\"        C= \"Have you ever felt that you should Cut down on your drinking? \"  No  A= \"Have people Annoyed you by criticizing your drinking? \"  No  G= \"Have you ever felt bad or Guilty about your drinking? \"  No  E= \"Have you ever had a drink as an Eye-opener first thing in the morning to steady your nerves or to help a hangover? \"  No      Deferred []      Reason for deferring: N/A    *If yes to two or more: probable alcohol abuse. *

## 2022-02-08 NOTE — H&P
Medical History:     Past Medical History:   Diagnosis Date    Acute cystitis without hematuria 10/1/2017    Arthritis     Chronic daily headache     GERD (gastroesophageal reflux disease)     Hyperlipidemia     Hypertension     Moderate malnutrition ([facility] [address] 3/2/2019    Multiple sclerosis ([facility] [address]     Neuropathy     Pneumonia 2017    states had double pneumonia    Vitamin D deficiency         Past SurgicalHistory:     Past Surgical History:   Procedure Laterality Date    CATARACT REMOVAL WITH IMPLANT [facility] 11/6/2014    [facility]    CATARACT REMOVAL WITH IMPLANT Left 12/2/2014    [facility]    CERVICAL DISC SURGERY      CYSTOCELE REPAIR      HYSTERECTOMY      RECTOCELE REPAIR      SHOULDER ARTHROPLASTY Right 04/20/2017    SHOULDER SURGERY Right 2017        Medications Prior to Admission:        Prior to Admission medications    Medication Sig Start Date End Date Taking? Authorizing Provider   omeprazole (PRILOSEC) 20 MG delayed release capsule Take 1 capsule by mouth daily 2/1/22   Pauly Connelly MD   docusate sodium (COLACE) 100 MG capsule Take 1 capsule by mouth daily 2/1/22   Pauly Connelly MD   celecoxib (CELEBREX) 200 MG capsule Take 1 capsule by mouth daily 1/17/22 1/17/23  Pauly Connelly MD   traMADol (ULTRAM) 50 MG tablet Take 1 tablet by mouth every 6 hours as needed for Pain for up to 60 days. 12/20/21 2/18/22  Pauly Connelly MD   predniSONE (DELTASONE) 10 MG tablet 4 tabs by mouth daily for 3 days, then 3 tabs daily for 3 days, then 2 tabs daily for 3 days, then 1 tab daily till gone. 11/29/21   Pauly Connelly MD   predniSONE (DELTASONE) 10 MG tablet Take 1 tablet by mouth daily 10/26/21   Pauly oCnnelly MD   HYDROcodone-acetaminophen (NORCO) 5-325 MG per tablet Take 1 tablet by mouth every 8 hours as needed.  9/14/21   Historical Provider, MD   amitriptyline (ELAVIL) 100 MG tablet TAKE ONE TABLET BY MOUTH ONCE NIGHTLY 9/20/21   Pauly Connelly MD simvastatin (ZOCOR) 20 MG tablet Take 1 tablet by mouth nightly 8/23/21   Pauly Sharp MD   Handicap Placard MISC by Does not apply route Exp: 5/20/2026 5/24/21   Pauly Sharp MD   calcipotriene (DOVONEX) 0.005 % cream Apply topically 2 times daily. 4/21/21   Pauly Sharp MD   clobetasol (TEMOVATE) 0.05 % cream Apply topically 2 times daily. 3/30/21   Pauly Sharp MD   gabapentin (NEURONTIN) 100 MG capsule Take 1 capsule by mouth 2 times daily for 30 days. Patient not taking: Reported on 2/1/2022 3/11/21 10/28/21  Jesika Logan MD   albuterol sulfate HFA (VENTOLIN HFA) 108 (90 Base) MCG/ACT inhaler Inhale 2 puffs into the lungs every 6 hours as needed for Wheezing 3/11/21 3/11/22  Jesika Logan MD        Allergies:     Bactrim [sulfamethoxazole-trimethoprim], Lactose intolerance (gi), Pcn [penicillins], Lidoderm [lidocaine], and Macrobid [nitrofurantoin]    Social History:     Tobacco:    reports that she has never smoked. She has never used smokeless tobacco.  Alcohol:      reports no history of alcohol use. Drug Use:  reports no history of drug use. Family History:     No family history on file. Review of Systems:     Positive and Negative as described in HPI. Review of Systems   Constitutional: Negative for activity change, appetite change, chills, fatigue and fever. Respiratory: Negative for cough, chest tightness, shortness of breath and wheezing. Cardiovascular: Negative for chest pain, palpitations and leg swelling. Gastrointestinal: Negative for abdominal distention, abdominal pain, constipation, diarrhea, nausea and vomiting. Genitourinary: Negative for difficulty urinating, dysuria, flank pain, frequency and urgency. Musculoskeletal: Positive for joint swelling. Negative for back pain, myalgias, neck pain and neck stiffness. Skin: Negative for pallor and rash.    Neurological: Positive for dizziness, facial asymmetry, weakness, light-headedness and baseline. Cranial Nerves: Facial asymmetry (Left facial droop) present. Sensory: No sensory deficit. Motor: Weakness (4 out of 5 strength in bilateral lower limbs) present. No tremor or abnormal muscle tone. Coordination: Finger-Nose-Finger Test and Heel to [employer] normal.   Psychiatric:         Mood and Affect: Mood normal.         Behavior: Behavior normal.         Thought Content:  Thought content normal.         Judgment: Judgment normal.         Investigations:     Laboratory Testing:  Recent Results (from the past 24 hour(s))   CBC Auto Differential    Collection Time: 02/08/22  1:00 PM   Result Value Ref Range    WBC 5.5 3.5 - 11.0 k/uL    RBC 3.76 (L) 4.0 - 5.2 m/uL    Hemoglobin 11.7 (L) 12.0 - 16.0 g/dL    Hematocrit 34.2 (L) 36 - 46 %    MCV 90.8 80 - 100 fL    MCH 31.0 26 - 34 pg    MCHC 34.2 31 - 37 g/dL    RDW 13.7 11.5 - 14.9 %    Platelets 487 818 - 380 k/uL    MPV 7.1 6.0 - 12.0 fL    NRBC Automated NOT REPORTED per 100 WBC    Differential Type NOT REPORTED     Seg Neutrophils 57 36 - 66 %    Lymphocytes 28 24 - 44 %    Monocytes 12 (H) 1 - 7 %    Eosinophils % 2 0 - 4 %    Basophils 1 0 - 2 %    Immature Granulocytes NOT REPORTED 0 %    Segs Absolute 3.10 1.3 - 9.1 k/uL    Absolute Lymph # 1.50 1.0 - 4.8 k/uL    Absolute Mono # 0.60 0.1 - 1.3 k/uL    Absolute Eos # 0.10 0.0 - 0.4 k/uL    Basophils Absolute 0.10 0.0 - 0.2 k/uL    Absolute Immature Granulocyte NOT REPORTED 0.00 - 0.30 k/uL    WBC Morphology NOT REPORTED     RBC Morphology NOT REPORTED     Platelet Estimate NOT REPORTED    Comprehensive Metabolic Panel w/ Reflex to MG    Collection Time: 02/08/22  1:00 PM   Result Value Ref Range    Glucose 114 (H) 70 - 99 mg/dL    BUN 20 8 - 23 mg/dL    CREATININE 1.06 (H) 0.50 - 0.90 mg/dL    Bun/Cre Ratio NOT REPORTED 9 - 20    Calcium 9.1 8.6 - 10.4 mg/dL    Sodium 138 135 - 144 mmol/L    Potassium 4.5 3.7 - 5.3 mmol/L    Chloride 101 98 - 107 mmol/L    CO2 29 20 - 31 mmol/L Anion Gap 8 (L) 9 - 17 mmol/L    Alkaline Phosphatase 47 35 - 104 U/L    ALT 21 5 - 33 U/L    AST 18 <32 U/L    Total Bilirubin 0.27 (L) 0.3 - 1.2 mg/dL    Total Protein 6.2 (L) 6.4 - 8.3 g/dL    Albumin 3.9 3.5 - 5.2 g/dL    Albumin/Globulin Ratio NOT REPORTED 1.0 - 2.5    GFR Non- 49 (L) >60 mL/min    GFR  59 (L) >60 mL/min    GFR Comment          GFR Staging NOT REPORTED    SARS-CoV-2 NAAT (Rapid)    Collection Time: 02/08/22  1:08 PM    Specimen: Nasopharyngeal Swab   Result Value Ref Range    Specimen Description . NASOPHARYNGEAL SWAB     SARS-CoV-2, Rapid Not Detected Not Detected   Urinalysis, reflex to microscopic    Collection Time: 02/08/22  1:56 PM   Result Value Ref Range    Color, UA Yellow Yellow    Turbidity UA Clear Clear    Glucose, Ur NEGATIVE NEGATIVE    Bilirubin Urine NEGATIVE NEGATIVE    Ketones, Urine NEGATIVE NEGATIVE    Specific Gravity, UA 1.008 1.000 - 1.030    Urine Hgb NEGATIVE NEGATIVE    pH, UA 7.0 5.0 - 8.0    Protein, UA NEGATIVE NEGATIVE    Urobilinogen, Urine Normal Normal    Nitrite, Urine NEGATIVE NEGATIVE    Leukocyte Esterase, Urine SMALL (A) NEGATIVE    Urinalysis Comments NOT REPORTED        Imaging/Diagnostics:  CT Head WO Contrast    Result Date: 2/8/2022  EXAMINATION: CT OF THE HEAD WITHOUT CONTRAST  2/8/2022 1:36 pm TECHNIQUE: CT of the head was performed without the administration of intravenous contrast. Dose modulation, iterative reconstruction, and/or weight based adjustment of the mA/kV was utilized to reduce the radiation dose to as low as reasonably achievable. COMPARISON: CT brain performed 02/25/2021. HISTORY: ORDERING SYSTEM PROVIDED HISTORY: dizziness TECHNOLOGIST PROVIDED HISTORY: dizziness Decision Support Exception - unselect if not a suspected or confirmed emergency medical condition->Emergency Medical Condition (MA) Reason for Exam: dizziness Additional signs and symptoms: Pt states dizziness. NKI.  Hx of MS and no hx of surgery FINDINGS: BRAIN/VENTRICLES: There is no acute intracranial hemorrhage, mass effect, or midline shift. There is satisfactory overall gray-white matter differentiation. There is cerebral atrophy and chronic microvascular disease. The ventricular structures are symmetric and unremarkable. The infratentorial structures are unremarkable. ORBITS: The visualized portion of the orbits demonstrate no acute abnormality. SINUSES: The visualized paranasal sinuses and mastoid air cells demonstrate no acute abnormality. SOFT TISSUES/SKULL:  No acute abnormality of the visualized skull or soft tissues. Cerebral atrophy and chronic microvascular disease without acute intracranial abnormality. RECOMMENDATIONS: Unavailable     XR CHEST PORTABLE    Result Date: 2/8/2022  EXAMINATION: ONE XRAY VIEW OF THE CHEST 2/8/2022 12:55 pm COMPARISON: Chest radiograph performed 02/25/2021. HISTORY: ORDERING SYSTEM PROVIDED HISTORY: ams TECHNOLOGIST PROVIDED HISTORY: ams Reason for Exam: Headaches and increased confusion; pt has MS FINDINGS: There is chronic pulmonary change. There is no acute consolidation or effusion. There is no pneumothorax. The mediastinal structures are stable. The upper abdomen unremarkable. The extrathoracic soft tissues are unremarkable. There are remote left-sided rib fractures. Chronic pulmonary change without acute cardiopulmonary process. CTA HEAD NECK W CONTRAST    Result Date: 2/8/2022  EXAMINATION: CTA OF THE HEAD AND NECK WITH CONTRAST 2/8/2022 1:35 pm: TECHNIQUE: CTA of the head and neck was performed with the administration of intravenous contrast. Multiplanar reformatted images are provided for review. MIP images are provided for review. Stenosis of the internal carotid arteries measured using NASCET criteria. Dose modulation, iterative reconstruction, and/or weight based adjustment of the mA/kV was utilized to reduce the radiation dose to as low as reasonably achievable. COMPARISON: None. HISTORY: ORDERING SYSTEM PROVIDED HISTORY: dizziness TECHNOLOGIST PROVIDED HISTORY: dizziness Decision Support Exception - unselect if not a suspected or confirmed emergency medical condition->Emergency Medical Condition (MA) Reason for Exam: Dizziness Additional signs and symptoms: Pt states dizziness. NKI. PT states hx of MS and no known surgeries in the area FINDINGS: CTA NECK: AORTIC ARCH/ARCH VESSELS: Mild atherosclerotic disease. No dissection or arterial injury. No significant stenosis of the brachiocephalic or subclavian arteries. CAROTID ARTERIES: Mild atherosclerotic plaque at the carotid bifurcations and bulbs. No dissection, arterial injury, or hemodynamically significant stenosis by NASCET criteria. VERTEBRAL ARTERIES: No dissection, arterial injury, or significant stenosis. SOFT TISSUES: The visualized upper lungs demonstrate right greater than left ground-glass opacity with interlobular septal thickening suggesting volume overload. No cervical or superior mediastinal lymphadenopathy. The larynx and pharynx are unremarkable. No acute abnormality of the salivary and thyroid glands. BONES: No acute osseous abnormality. Prior anterior discectomies and fusion at C4-5 and C5-6. Chronic appearing mild T4 anterior vertebral body height loss. CTA HEAD: ANTERIOR CIRCULATION: No significant stenosis of the intracranial internal carotid, anterior cerebral, or middle cerebral arteries. No aneurysm. POSTERIOR CIRCULATION: No significant stenosis of the vertebral, basilar, or posterior cerebral arteries. The left vertebral artery is dominant. The right vertebral artery is hypoplastic and largely terminates in the right posterior inferior cerebellar artery. No aneurysm. OTHER: No dural venous sinus thrombosis on this non-dedicated study. BRAIN: No mass effect or midline shift. No extra-axial fluid collection. The gray-white differentiation is maintained.      1. No acute arterial abnormality or hemodynamically significant arterial stenosis in the head or neck. 2. Mild edema in the visualized upper lungs suggesting volume overload. Assessment :      Primary Problem  Multiple sclerosis ([facility] [address]    Active Hospital Problems    Diagnosis Date Noted    Recurrent urinary tract infection [N39.0] 02/08/2022    Chronic headache [R51.9, G89.29] 02/25/2021    Multiple sclerosis ([facility] [address] Deward Read 10/01/2017       Plan:     Patient status Admit as inpatient in the  Progressive Unit/Step down    Multiple sclerosis flareup  Diagnosis multiple sclerosis at age 44  Presented with severe headache and worsening facial droop, dizziness and uncoordination  Was taking 10 mg prednisone daily but stopped for pain management procedure  High-dose Solu-Medrol 500 mg daily  Neurology consult    Recurrent UTI  Small leukocyte Estrace  No dysuria, frequency, urgency  1 g Rocephin daily for 3 days    Dispo: Home  Diet: regular  DVT prophylaxis: Lovenox    Consultations:   IP CONSULT TO INTERNAL MEDICINE  IP CONSULT TO NEUROLOGY  IP CONSULT TO SOCIAL WORK    Patient is admitted as inpatient status because of co-morbiditieslisted above, severity of signs and symptoms as outlined, requirement for current medical therapies and most importantly because of direct risk to patient if care not provided in a hospital setting. Yannick Copeland MD  2/8/2022  6:38 PM    Copy sent to Dr. Jeri Asif MD  Attending Physician Statement  I have discussed the care of Tierney Payne and I have examined the patient myselft and taken ros and hpi , including pertinent history and exam findings,  with the resident. I have reviewed the key elements of all parts of the encounter with the resident. I agree with the assessment, plan and orders as documented by the resident.       Electronically signed by Yuri Cash MD

## 2022-02-09 ENCOUNTER — APPOINTMENT (OUTPATIENT)
Dept: MRI IMAGING | Age: 87
DRG: 058 | End: 2022-02-09
Payer: MEDICARE

## 2022-02-09 LAB
ANION GAP SERPL CALCULATED.3IONS-SCNC: 13 MMOL/L (ref 9–17)
BUN BLDV-MCNC: 16 MG/DL (ref 8–23)
BUN/CREAT BLD: ABNORMAL (ref 9–20)
CALCIUM SERPL-MCNC: 8.9 MG/DL (ref 8.6–10.4)
CHLORIDE BLD-SCNC: 103 MMOL/L (ref 98–107)
CO2: 24 MMOL/L (ref 20–31)
CREAT SERPL-MCNC: 1.09 MG/DL (ref 0.5–0.9)
CULTURE: NORMAL
GFR AFRICAN AMERICAN: 58 ML/MIN
GFR NON-AFRICAN AMERICAN: 47 ML/MIN
GFR SERPL CREATININE-BSD FRML MDRD: ABNORMAL ML/MIN/{1.73_M2}
GFR SERPL CREATININE-BSD FRML MDRD: ABNORMAL ML/MIN/{1.73_M2}
GLUCOSE BLD-MCNC: 224 MG/DL (ref 70–99)
GLUCOSE BLD-MCNC: 254 MG/DL (ref 65–105)
GLUCOSE BLD-MCNC: 290 MG/DL (ref 65–105)
HCT VFR BLD CALC: 37.8 % (ref 36–46)
HEMOGLOBIN: 12.9 G/DL (ref 12–16)
Lab: NORMAL
MCH RBC QN AUTO: 31 PG (ref 26–34)
MCHC RBC AUTO-ENTMCNC: 34.1 G/DL (ref 31–37)
MCV RBC AUTO: 91.1 FL (ref 80–100)
NRBC AUTOMATED: NORMAL PER 100 WBC
PDW BLD-RTO: 13.8 % (ref 11.5–14.9)
PLATELET # BLD: 236 K/UL (ref 150–450)
PMV BLD AUTO: 7.5 FL (ref 6–12)
POTASSIUM SERPL-SCNC: 5 MMOL/L (ref 3.7–5.3)
RBC # BLD: 4.14 M/UL (ref 4–5.2)
SODIUM BLD-SCNC: 140 MMOL/L (ref 135–144)
SPECIMEN DESCRIPTION: NORMAL
WBC # BLD: 3.8 K/UL (ref 3.5–11)

## 2022-02-09 PROCEDURE — 97116 GAIT TRAINING THERAPY: CPT

## 2022-02-09 PROCEDURE — A9579 GAD-BASE MR CONTRAST NOS,1ML: HCPCS | Performed by: PSYCHIATRY & NEUROLOGY

## 2022-02-09 PROCEDURE — 82947 ASSAY GLUCOSE BLOOD QUANT: CPT

## 2022-02-09 PROCEDURE — 99233 SBSQ HOSP IP/OBS HIGH 50: CPT | Performed by: PSYCHIATRY & NEUROLOGY

## 2022-02-09 PROCEDURE — 2500000003 HC RX 250 WO HCPCS: Performed by: STUDENT IN AN ORGANIZED HEALTH CARE EDUCATION/TRAINING PROGRAM

## 2022-02-09 PROCEDURE — 2580000003 HC RX 258: Performed by: PSYCHIATRY & NEUROLOGY

## 2022-02-09 PROCEDURE — 6370000000 HC RX 637 (ALT 250 FOR IP)

## 2022-02-09 PROCEDURE — 97162 PT EVAL MOD COMPLEX 30 MIN: CPT

## 2022-02-09 PROCEDURE — 2060000000 HC ICU INTERMEDIATE R&B

## 2022-02-09 PROCEDURE — 36415 COLL VENOUS BLD VENIPUNCTURE: CPT

## 2022-02-09 PROCEDURE — 80048 BASIC METABOLIC PNL TOTAL CA: CPT

## 2022-02-09 PROCEDURE — 6360000004 HC RX CONTRAST MEDICATION: Performed by: PSYCHIATRY & NEUROLOGY

## 2022-02-09 PROCEDURE — 6360000002 HC RX W HCPCS: Performed by: PSYCHIATRY & NEUROLOGY

## 2022-02-09 PROCEDURE — 70553 MRI BRAIN STEM W/O & W/DYE: CPT

## 2022-02-09 PROCEDURE — 97166 OT EVAL MOD COMPLEX 45 MIN: CPT

## 2022-02-09 PROCEDURE — 72156 MRI NECK SPINE W/O & W/DYE: CPT

## 2022-02-09 PROCEDURE — 2580000003 HC RX 258

## 2022-02-09 PROCEDURE — 85027 COMPLETE CBC AUTOMATED: CPT

## 2022-02-09 PROCEDURE — 97535 SELF CARE MNGMENT TRAINING: CPT

## 2022-02-09 PROCEDURE — 6360000002 HC RX W HCPCS

## 2022-02-09 RX ORDER — DEXTROSE MONOHYDRATE 50 MG/ML
100 INJECTION, SOLUTION INTRAVENOUS PRN
Status: DISCONTINUED | OUTPATIENT
Start: 2022-02-09 | End: 2022-02-11 | Stop reason: HOSPADM

## 2022-02-09 RX ORDER — SODIUM CHLORIDE 0.9 % (FLUSH) 0.9 %
10 SYRINGE (ML) INJECTION PRN
Status: DISCONTINUED | OUTPATIENT
Start: 2022-02-09 | End: 2022-02-11 | Stop reason: HOSPADM

## 2022-02-09 RX ORDER — DEXTROSE MONOHYDRATE 25 G/50ML
12.5 INJECTION, SOLUTION INTRAVENOUS PRN
Status: DISCONTINUED | OUTPATIENT
Start: 2022-02-09 | End: 2022-02-11 | Stop reason: HOSPADM

## 2022-02-09 RX ORDER — NICOTINE POLACRILEX 4 MG
15 LOZENGE BUCCAL PRN
Status: DISCONTINUED | OUTPATIENT
Start: 2022-02-09 | End: 2022-02-11 | Stop reason: HOSPADM

## 2022-02-09 RX ADMIN — PANTOPRAZOLE SODIUM 40 MG: 40 TABLET, DELAYED RELEASE ORAL at 06:14

## 2022-02-09 RX ADMIN — CELECOXIB 200 MG: 200 CAPSULE ORAL at 09:01

## 2022-02-09 RX ADMIN — DOCUSATE SODIUM 100 MG: 100 CAPSULE, LIQUID FILLED ORAL at 08:59

## 2022-02-09 RX ADMIN — HYDROCODONE BITARTRATE AND ACETAMINOPHEN 1 TABLET: 5; 325 TABLET ORAL at 21:27

## 2022-02-09 RX ADMIN — AMITRIPTYLINE HYDROCHLORIDE 100 MG: 50 TABLET, FILM COATED ORAL at 21:27

## 2022-02-09 RX ADMIN — METOPROLOL TARTRATE 5 MG: 5 INJECTION INTRAVENOUS at 13:07

## 2022-02-09 RX ADMIN — SODIUM CHLORIDE 1000 MG: 9 INJECTION, SOLUTION INTRAVENOUS at 09:09

## 2022-02-09 RX ADMIN — METOPROLOL TARTRATE 5 MG: 5 INJECTION INTRAVENOUS at 00:11

## 2022-02-09 RX ADMIN — SODIUM CHLORIDE: 9 INJECTION, SOLUTION INTRAVENOUS at 19:35

## 2022-02-09 RX ADMIN — METOPROLOL TARTRATE 25 MG: 25 TABLET, FILM COATED ORAL at 08:59

## 2022-02-09 RX ADMIN — ATORVASTATIN CALCIUM 20 MG: 20 TABLET, FILM COATED ORAL at 08:59

## 2022-02-09 RX ADMIN — INSULIN LISPRO 2 UNITS: 100 INJECTION, SOLUTION INTRAVENOUS; SUBCUTANEOUS at 21:22

## 2022-02-09 RX ADMIN — HYDROCODONE BITARTRATE AND ACETAMINOPHEN 1 TABLET: 5; 325 TABLET ORAL at 06:14

## 2022-02-09 RX ADMIN — SODIUM CHLORIDE, PRESERVATIVE FREE 10 ML: 5 INJECTION INTRAVENOUS at 15:45

## 2022-02-09 RX ADMIN — ENOXAPARIN SODIUM 40 MG: 100 INJECTION SUBCUTANEOUS at 08:59

## 2022-02-09 RX ADMIN — SODIUM CHLORIDE, PRESERVATIVE FREE 10 ML: 5 INJECTION INTRAVENOUS at 09:00

## 2022-02-09 RX ADMIN — TRAMADOL HYDROCHLORIDE 50 MG: 50 TABLET, COATED ORAL at 23:42

## 2022-02-09 RX ADMIN — GADOTERIDOL 13 ML: 279.3 INJECTION, SOLUTION INTRAVENOUS at 15:45

## 2022-02-09 RX ADMIN — INSULIN LISPRO 3 UNITS: 100 INJECTION, SOLUTION INTRAVENOUS; SUBCUTANEOUS at 17:21

## 2022-02-09 RX ADMIN — METOPROLOL TARTRATE 25 MG: 25 TABLET, FILM COATED ORAL at 21:34

## 2022-02-09 ASSESSMENT — PAIN SCALES - GENERAL
PAINLEVEL_OUTOF10: 4
PAINLEVEL_OUTOF10: 9
PAINLEVEL_OUTOF10: 7
PAINLEVEL_OUTOF10: 5
PAINLEVEL_OUTOF10: 4
PAINLEVEL_OUTOF10: 5

## 2022-02-09 ASSESSMENT — PAIN DESCRIPTION - LOCATION
LOCATION: BACK
LOCATION: BACK

## 2022-02-09 ASSESSMENT — PAIN DESCRIPTION - PROGRESSION
CLINICAL_PROGRESSION: NOT CHANGED
CLINICAL_PROGRESSION: NOT CHANGED

## 2022-02-09 ASSESSMENT — PAIN DESCRIPTION - FREQUENCY
FREQUENCY: CONTINUOUS
FREQUENCY: CONTINUOUS

## 2022-02-09 ASSESSMENT — PAIN DESCRIPTION - PAIN TYPE
TYPE: CHRONIC PAIN
TYPE: CHRONIC PAIN

## 2022-02-09 NOTE — CONSULTS
and was told that she has MS.     3. Since then, left hemiparesis persists. Diplopia resoved after a few years, until 2008, when she again developed double vision. She saw an ophthalmologist who added prism to her glasses. In August 2009, even with the prism, she was having double vision, so he furthered adjusted the prisms, so that currently with the glasses she denies any double vision. She continued to have headaches since then. Initially her headaches were not daily and she was on Tylenol, Xanax and Flexeril more than 10 years ago. She has been seen by Dr. Gloria Hsu (neurologist) in [id number] who took her off the Xanax. For the headaches, she was initially started on Pamelor with which she developed oral ulcerations, so it was discontinued. She was then started on Elavil and the dose of Elavil was subsequently increased. She has been using Tylenol No. 4 over more than 10-15 years, daily for her headaches. Her headaches have become daily headaches for the last 5 to 6 years. They mainly start from the neck, involve the occipital region and then spread to the top of the head and behind the eyes. She wakes up in the morning with a headache. She describes them as a dull pain, about 9 out of 10. Sometimes she gets nausea with them. Currently she takes about 1 to 2 tablets of Tylenol No. 4 during the day and on some days she uses 1 tablet of Valium 5 mg to help with the headaches. As per her, Tylenol No. 4 sometime decreases the headache intensity from 9 to 7 or 8. She takes amitriptyline about 100 mg at bed time which helps her to go to sleep, but she mentions when she lies down her head is still hurting and she tries not to lie on the left side of the head. She is able to sleep well at night. 4. 2003: She also had a complaint of neck pain going to her left upper extremity.  She underwent C4-C5, C5-C6 cervical fusion in 2003 and she states that after the surgery her arm pain was resolved, but she continues to have daily headaches. In 1983, she was diagnosed with a possible multiple sclerosis. She mentions that she had a workup for MS at that time at [facility], and after LP, was told she had MS. We do not have any records to confirm that. As per her, she was never started on any medication for multiple sclerosis. She denies any history of visual loss. She denies any dysphagia or dysarthria. She denies any pain in her extremities or muscle spasms. She has decreased range of motion in her neck and feels a lot of stiffness in her neck muscles. She does complain of urgency of micturition and wears a pad all the time. She reports that she had prolapse of the bladder for which she had bladder suspension surgery in 1990's, but after 8 months again she had the bladder prolapse and since then she feels the urgency. She also complains of numbness in both feet up to the mid level since last 4 to 5 years, but denies any numbness or tingling in her legs, arms or hands. 5. 9/2021: She developed bilateral leg weakness, imbalance, dizziness, diplopia and dysphagia and sought medical attention. She was hospitalized for a week, then a week of rehab, both at [facility]. She was treated with intravenouos steroids while in hospital. After rehab, she is seeing physical therapy once a week. Symptoms improved except for left leg residual weakness. She required catheterization (urinary retention; symptoms resolved. Since 1995 or 2000, she has used only Tylenol No. 4 and amitriptyline for headache treatment; she denies using any other medication. She has tried physical therapy and aquatherapy in the past and she mentions that heat and massage therapy gives her a spinning sensation, so she tries to avoid the heat. Also she mentions that sometimes she gets a feeling of warmth from her shoulders going upwards. She becomes dizzy and sits down, but denied any loss of consciousness.             Past Medical History:     Past Medical History:   Diagnosis Date    Acute cystitis without hematuria 10/1/2017    Arthritis     Chronic daily headache     GERD (gastroesophageal reflux disease)     Hyperlipidemia     Hypertension     Moderate malnutrition ([facility] [address] 3/2/2019    Multiple sclerosis ([facility] [address]     Neuropathy     Pneumonia 2017    states had double pneumonia    Vitamin D deficiency         Past Surgical History:     Past Surgical History:   Procedure Laterality Date    CATARACT REMOVAL WITH IMPLANT Right 11/6/2014    [facility]    CATARACT REMOVAL WITH IMPLANT Left 12/2/2014    [facility]    CERVICAL DISC SURGERY      CYSTOCELE REPAIR      HYSTERECTOMY      RECTOCELE REPAIR      SHOULDER ARTHROPLASTY Right 04/20/2017    SHOULDER SURGERY Right 2017        Medications Prior to Admission:     Prior to Admission medications    Medication Sig Start Date End Date Taking? Authorizing Provider   omeprazole (PRILOSEC) 20 MG delayed release capsule Take 1 capsule by mouth daily 2/1/22   Pauly Garza MD   docusate sodium (COLACE) 100 MG capsule Take 1 capsule by mouth daily 2/1/22   Pauly Garza MD   celecoxib (CELEBREX) 200 MG capsule Take 1 capsule by mouth daily 1/17/22 1/17/23  Pauly Garza MD   traMADol (ULTRAM) 50 MG tablet Take 1 tablet by mouth every 6 hours as needed for Pain for up to 60 days. 12/20/21 2/18/22  Pauly Garza MD   predniSONE (DELTASONE) 10 MG tablet 4 tabs by mouth daily for 3 days, then 3 tabs daily for 3 days, then 2 tabs daily for 3 days, then 1 tab daily till gone. 11/29/21   Pauly Garza MD   predniSONE (DELTASONE) 10 MG tablet Take 1 tablet by mouth daily 10/26/21   Pauly Garza MD   HYDROcodone-acetaminophen (NORCO) 5-325 MG per tablet Take 1 tablet by mouth every 8 hours as needed.  9/14/21   Historical Provider, MD   amitriptyline (ELAVIL) 100 MG tablet TAKE ONE TABLET BY MOUTH ONCE NIGHTLY 9/20/21   Pauly Garza MD   simvastatin (ZOCOR) 20 MG tablet Take 1 tablet by mouth nightly 8/23/21   Pauly Dodson MD   Handicap Placard MISC by Does not apply route Exp: 5/20/2026 5/24/21   Pauly Dodson MD   calcipotriene (DOVONEX) 0.005 % cream Apply topically 2 times daily. 4/21/21   Pauly Dodson MD   clobetasol (TEMOVATE) 0.05 % cream Apply topically 2 times daily. 3/30/21   Pauly Dodson MD   gabapentin (NEURONTIN) 100 MG capsule Take 1 capsule by mouth 2 times daily for 30 days. Patient not taking: Reported on 2/1/2022 3/11/21 10/28/21  Alissa Ngo MD   albuterol sulfate HFA (VENTOLIN HFA) 108 (90 Base) MCG/ACT inhaler Inhale 2 puffs into the lungs every 6 hours as needed for Wheezing 3/11/21 3/11/22  Alissa Ngo MD        Allergies:     Bactrim [sulfamethoxazole-trimethoprim], Lactose intolerance (gi), Pcn [penicillins], Lidoderm [lidocaine], and Macrobid [nitrofurantoin]    Social History:     Tobacco:    reports that she has never smoked. She has never used smokeless tobacco.  Alcohol:      reports no history of alcohol use. Drug Use:  reports no history of drug use. Family History:     No family history on file. Review of Systems:       Constitutional Negative for fever and chills   HEENT Negative for ear discharge, ear pain, nosebleed. Negative for photophobia, headache. Musculoskeletal Negative for joint pain, negative for myalgia   Respiratory Negative for cough, dyspnea. Negative for hemoptysis and sputum. Cardiovascular Negative for palpitations, chest pain. Negative for orthopnea, claudication. Gastrointestinal Negative for nausea, vomiting. Negative for abdominal pain, diarrhea, blood in stool   Genitourinary  Negative for dysuria, hematuria. Negative for suprapubic pain. Negative for bladder incontinence. Skin Negative for rash or itching   Hematology Negative for ecchymosis, anemia   Psychiatric Negative for anxiety, depression.  Negative for suicidal ideation, hallucinations         Physical Exam:   BP (!) 178/96   Pulse 90   Temp 98.5 °F (36.9 °C) (Oral)   Resp 16   Ht 4' 11\" (1.499 m)   Wt 134 lb (60.8 kg)   SpO2 95%   BMI 27.06 kg/m²   Temp (24hrs), Av.6 °F (37 °C), Min:98.5 °F (36.9 °C), Max:98.6 °F (37 °C)        General examination:      General Appearance:  alert, well appearing, and in no acute distress  HEENT: Normocephalic, atraumatic, moist mucus membranes  Neck: supple, no carotid bruits, (-) nuchal rigidity  Lungs:  Respirations unlabored, chest wall no deformity, BS normal  Cardiovascular: normal rate, regular rhythm  Abdomen: Soft, nontender, nondistended, normal bowel sounds  Skin: No gross lesions, rashes, bruising or bleeding on exposed skin area  Extremities:  peripheral pulses palpable, no cyanosis, clubbing or edema  Psych: normal affect      Neurological examination:    Mental status   Alert and oriented x 3; following all commands; speech is fluent, no dysarthria, aphasia. Cranial nerves   II - visual fields intact to confrontation; pupils reactive  III, IV, VI - extraocular muscles intact; no MOJGAN; no nystagmus; no ptosis   V - normal facial sensation                                                               VII - mild decrease right nasolabial fold                                                            VIII - intact hearing                                                                             IX, X - symmetrical palate elevation                                               XI - symmetrical shoulder shrug                                                       XII - midline tongue without atrophy or fasciculation     Motor function  Strength: 5/5 RUE, 5/5 RLE, 5/5 LUE, 5/5  LLE  Normal bulk and tone. No tremors                      Sensory function Intact to touch, pin, vibration, proprioception throughout     Cerebellar Intact finger-nose-finger testing. Intact heel-shin testing. No dysdiadochokinesia present.       Reflex function Not tested   Gait Not tested             Diagnostics:      Laboratory Testing:  CBC:   Recent Labs     02/08/22  1300   WBC 5.5   HGB 11.7*        BMP:    Recent Labs     02/08/22  1300      K 4.5      CO2 29   BUN 20   CREATININE 1.06*   GLUCOSE 114*         Lab Results   Component Value Date    CHOL 235 (H) 01/04/2012    LDLCHOLESTEROL 86 05/21/2020    HDL 74 (A) 11/06/2021    TRIG 119 01/04/2012    ALT 21 02/08/2022    AST 18 02/08/2022    TSH 1.54 05/24/2019    INR 0.9 02/25/2021    LABA1C 6.4 11/06/2021    LFGCDZHL85 220 10/02/2017       No results found for: PHENYTOIN, PHENYTOIN, VALPROATE, CBMZ      Imaging/Diagnostics:      EEG:       No results found for this or any previous visit. No results found for this or any previous visit. No results found for this or any previous visit. No results found for this or any previous visit. Results for orders placed during the hospital encounter of 07/11/20    MRI BRAIN WO CONTRAST    Narrative  EXAMINATION:  MRI OF THE BRAIN WITHOUT CONTRAST  7/11/2020 9:29 am    TECHNIQUE:  Multiplanar multisequence MRI of the brain was performed without the  administration of intravenous contrast.    COMPARISON:  10/02/2017. HISTORY:  ORDERING SYSTEM PROVIDED HISTORY: Transient ischemic attack (TIA)  TECHNOLOGIST PROVIDED HISTORY:  Reason for Exam: tia  Additional signs and symptoms: over the past few months she has had episodes  of facial droop and upper extremity numbness. Numbness sometimes affects  right and sometimes the left. FINDINGS:  INTRACRANIAL STRUCTURES/VENTRICLES: There is no acute infarct. No mass effect  or midline shift. No evidence of an acute intracranial hemorrhage. The  ventricles and sulci are normal in size and configuration. The  sellar/suprasellar regions appear unremarkable. The normal signal voids  within the major intracranial vessels appear maintained.  There are scattered  periventricular and deep subcortical white matter T2/FLAIR hyperintensities,  consistent with microangiopathic change. There is mild parenchymal volume  loss. ORBITS: The visualized portion of the orbits demonstrate no acute abnormality. SINUSES: The visualized paranasal sinuses and mastoid air cells are well  aerated. BONES/SOFT TISSUES: The bone marrow signal intensity appears normal. The soft  tissues demonstrate no acute abnormality. Impression  No acute intracranial abnormality. Microangiopathic change. Results for orders placed during the hospital encounter of 10/01/17    MRI C/ Dilia 29    Narrative  Radiology exam is complete. No Radiologist dictation. Please follow up with ordering provider. No results found for this or any previous visit. Results for orders placed during the hospital encounter of 02/08/22    CT Head WO Contrast    Narrative  EXAMINATION:  CT OF THE HEAD WITHOUT CONTRAST  2/8/2022 1:36 pm    TECHNIQUE:  CT of the head was performed without the administration of intravenous  contrast. Dose modulation, iterative reconstruction, and/or weight based  adjustment of the mA/kV was utilized to reduce the radiation dose to as low  as reasonably achievable. COMPARISON:  CT brain performed 02/25/2021. HISTORY:  ORDERING SYSTEM PROVIDED HISTORY: dizziness  TECHNOLOGIST PROVIDED HISTORY:  dizziness    Decision Support Exception - unselect if not a suspected or confirmed  emergency medical condition->Emergency Medical Condition (MA)  Reason for Exam: dizziness  Additional signs and symptoms: Pt states dizziness. NKI. Hx of MS and no hx  of surgery    FINDINGS:  BRAIN/VENTRICLES: There is no acute intracranial hemorrhage, mass effect, or  midline shift. There is satisfactory overall gray-white matter  differentiation. There is cerebral atrophy and chronic microvascular  disease. The ventricular structures are symmetric and unremarkable. The  infratentorial structures are unremarkable.     ORBITS: The visualized portion of the orbits demonstrate no acute abnormality. SINUSES: The visualized paranasal sinuses and mastoid air cells demonstrate  no acute abnormality. SOFT TISSUES/SKULL:  No acute abnormality of the visualized skull or soft  tissues. Impression  Cerebral atrophy and chronic microvascular disease without acute intracranial  abnormality. RECOMMENDATIONS:  Unavailable      CT head:     CTA head and neck:     MRI brain in 2/10/2017:      2D echo:      I personally reviewed all of the above medications, clinical laboratory, imaging and other diagnostic tests. Impression:      80year old woman with diagnosis of MS since 44years old, hx of UTI, depression, chronic daily headache, now p/w with 4 days of worsening headache, gait instability and generalized weakness. Plan: It is quite likely that patient has MS exacerbation  I reviewed her old MRI brain, pictures pasted above, based on the MRI brain done in 10/2017, pt had multiple lesions at that time that most likely signified she had MS  Therefore despite her MS was never confirmed by any lab, she indeed met the criteria of MS diagnosis based on her symptoms and multiple MRI images  Given her current symptoms and pt was never on any DMT, will get her a repeat MRI brain w/wo contrast and MRI cervical w/wo contrast to look for new MS lesion  Meanwhile, it looked like the steroid had helped her, however, the standard dose of pulse steroid is 1g daily for 3-5 doses  Will place on methylprednisone 1g x 5 doses  PT/OT and acute rehab consult  If pt is deemed to be safe for discharge from the PT/OT standpoint, she can get her pulse steroid in infusion center  Agree to continuing treating her UTI as it could make MS worse, recommend consider PO abx       Thank you for this very interesting consultation.       Electronically signed by Gabriel Black MD on 2/8/2022 at 11:40 PM      MD Jackson Shelby  22.  Neurology no

## 2022-02-09 NOTE — PROGRESS NOTES
[address] [email or web address]    PROGRESS NOTE             2/9/2022    7:43 AM    Name:   Sonya Jang  MRN:     [id number]     Acct:      [de-identified]   Room:   2097/2097-01  IP Day:  1  Admit Date:  2/8/2022 12:27 PM    PCP:  Siva Peraza MD  Code Status:  Full Code    Subjective:     C/C:   Chief Complaint   Patient presents with    Dizziness     Interval History Status: improved. Patient seen and examined at bedside. Headache is greatly improved with steroids. Facial droop improving as well and patient feels more strength in her legs. MRI brain and cervical spine with and without contrast.  Hypertensive today, will start Lopressor oral and as needed. Microscopic urinalysis not convincing for UTI, will DC Rocephin. Brief History:       The patient is a 80 y.o. Non- / non  female who presents with Dizziness   and she is admitted to the hospital for the management of multiple sclerosis flareup and UTI. Patient was diagnosed with multiple sclerosis at the age of 44. Past medical history significant for multiple sclerosis, hypertension, chronic osteoarthritis of the spine. Patient reportedly woke up this morning with worsening facial droop, severe headache, and an unsteady gait. She has history of chronic headaches but this was much worse. She took 2 Norco pills but that did not alleviate the headache. It is located frontally and in the back of her head and neck and throbbing. Patient was taking 10 mg of prednisone daily per her PCP for spinal stenosis and chronic vertebral fracture. She sees a pain medicine specialist who gives her spinal injections. He told her to stop taking the prednisone for 5 days prior to injection. This was her fifth day without taking steroids. In the ED she had a CT and a CTA done. CT showed no acute abnormalities.   CTA head and neck shows no hemodynamically significant stenosis.     Urinalysis showed small leukocyte esterase. She has a history of UTI with Proteus mirabilis 4 months ago. Denies any dysuria, frequency, urgency. Chronic intermittent urinary incontinence. She was started on Rocephin in the ED. Review of Systems:     Review of Systems      Medications: Allergies: Allergies   Allergen Reactions    Bactrim [Sulfamethoxazole-Trimethoprim] Other (See Comments)     BLISTERS IN MOUTH    Lactose Intolerance (Gi) Diarrhea    Pcn [Penicillins] Hives    Lidoderm [Lidocaine] Rash    Macrobid [Nitrofurantoin] Nausea And Vomiting       Current Meds:   Scheduled Meds:    methylPREDNISolone  1,000 mg IntraVENous Daily    metoprolol tartrate  25 mg Oral BID    amitriptyline  100 mg Oral Nightly    celecoxib  200 mg Oral Daily    docusate sodium  100 mg Oral Daily    pantoprazole  40 mg Oral QAM AC    atorvastatin  20 mg Oral Daily    sodium chloride flush  5-40 mL IntraVENous 2 times per day    enoxaparin  40 mg SubCUTAneous Daily    cefTRIAXone (ROCEPHIN) IV  1,000 mg IntraVENous Q24H     Continuous Infusions:    sodium chloride      sodium chloride 50 mL/hr at 02/08/22 2056     PRN Meds: sodium chloride flush, albuterol sulfate HFA, HYDROcodone-acetaminophen, traMADol, sodium chloride flush, sodium chloride, ondansetron **OR** ondansetron, polyethylene glycol, acetaminophen **OR** acetaminophen, metoprolol    Data:     Past Medical History:   has a past medical history of Acute cystitis without hematuria, Arthritis, Chronic daily headache, GERD (gastroesophageal reflux disease), Hyperlipidemia, Hypertension, Moderate malnutrition (HCC), Multiple sclerosis ([facility] [address] Neuropathy, Pneumonia, and Vitamin D deficiency. Social History:   reports that she has never smoked. She has never used smokeless tobacco. She reports that she does not drink alcohol and does not use drugs. Family History: No family history on file.     Vitals:  BP (!) 193/93   Pulse 91   Temp 98.7 °F (37.1 °C) (Oral) Resp 16   Ht 4' 11\" (1.499 m)   Wt 134 lb (60.8 kg)   SpO2 92%   BMI 27.06 kg/m²   Temp (24hrs), Av.6 °F (37 °C), Min:98.5 °F (36.9 °C), Max:98.7 °F (37.1 °C)    No results for input(s): POCGLU in the last 72 hours. I/O(24Hr): No intake or output data in the 24 hours ending 22 0743    Labs:    [unfilled]    Lab Results   Component Value Date/Time    SPECIAL NOT REPORTED 10/26/2021 07:48 PM     Lab Results   Component Value Date/Time    CULTURE PROTEUS MIRABILIS 50 to 100,000 CFU/ML (A) 10/26/2021 07:48 PM       [unfilled]    Radiology:    CT Head WO Contrast    Result Date: 2022  EXAMINATION: CT OF THE HEAD WITHOUT CONTRAST  2022 1:36 pm TECHNIQUE: CT of the head was performed without the administration of intravenous contrast. Dose modulation, iterative reconstruction, and/or weight based adjustment of the mA/kV was utilized to reduce the radiation dose to as low as reasonably achievable. COMPARISON: CT brain performed 2021. HISTORY: ORDERING SYSTEM PROVIDED HISTORY: dizziness TECHNOLOGIST PROVIDED HISTORY: dizziness Decision Support Exception - unselect if not a suspected or confirmed emergency medical condition->Emergency Medical Condition (MA) Reason for Exam: dizziness Additional signs and symptoms: Pt states dizziness. NKI. Hx of MS and no hx of surgery FINDINGS: BRAIN/VENTRICLES: There is no acute intracranial hemorrhage, mass effect, or midline shift. There is satisfactory overall gray-white matter differentiation. There is cerebral atrophy and chronic microvascular disease. The ventricular structures are symmetric and unremarkable. The infratentorial structures are unremarkable. ORBITS: The visualized portion of the orbits demonstrate no acute abnormality. SINUSES: The visualized paranasal sinuses and mastoid air cells demonstrate no acute abnormality. SOFT TISSUES/SKULL:  No acute abnormality of the visualized skull or soft tissues.      Cerebral atrophy and chronic microvascular disease without acute intracranial abnormality. RECOMMENDATIONS: Unavailable     XR CHEST PORTABLE    Result Date: 2/8/2022  EXAMINATION: ONE XRAY VIEW OF THE CHEST 2/8/2022 12:55 pm COMPARISON: Chest radiograph performed 02/25/2021. HISTORY: ORDERING SYSTEM PROVIDED HISTORY: ams TECHNOLOGIST PROVIDED HISTORY: ams Reason for Exam: Headaches and increased confusion; pt has MS FINDINGS: There is chronic pulmonary change. There is no acute consolidation or effusion. There is no pneumothorax. The mediastinal structures are stable. The upper abdomen unremarkable. The extrathoracic soft tissues are unremarkable. There are remote left-sided rib fractures. Chronic pulmonary change without acute cardiopulmonary process. CTA HEAD NECK W CONTRAST    Result Date: 2/8/2022  EXAMINATION: CTA OF THE HEAD AND NECK WITH CONTRAST 2/8/2022 1:35 pm: TECHNIQUE: CTA of the head and neck was performed with the administration of intravenous contrast. Multiplanar reformatted images are provided for review. MIP images are provided for review. Stenosis of the internal carotid arteries measured using NASCET criteria. Dose modulation, iterative reconstruction, and/or weight based adjustment of the mA/kV was utilized to reduce the radiation dose to as low as reasonably achievable. COMPARISON: None. HISTORY: ORDERING SYSTEM PROVIDED HISTORY: dizziness TECHNOLOGIST PROVIDED HISTORY: dizziness Decision Support Exception - unselect if not a suspected or confirmed emergency medical condition->Emergency Medical Condition (MA) Reason for Exam: Dizziness Additional signs and symptoms: Pt states dizziness. NKI. PT states hx of MS and no known surgeries in the area FINDINGS: CTA NECK: AORTIC ARCH/ARCH VESSELS: Mild atherosclerotic disease. No dissection or arterial injury. No significant stenosis of the brachiocephalic or subclavian arteries. CAROTID ARTERIES: Mild atherosclerotic plaque at the carotid bifurcations and bulbs. No dissection, arterial injury, or hemodynamically significant stenosis by NASCET criteria. VERTEBRAL ARTERIES: No dissection, arterial injury, or significant stenosis. SOFT TISSUES: The visualized upper lungs demonstrate right greater than left ground-glass opacity with interlobular septal thickening suggesting volume overload. No cervical or superior mediastinal lymphadenopathy. The larynx and pharynx are unremarkable. No acute abnormality of the salivary and thyroid glands. BONES: No acute osseous abnormality. Prior anterior discectomies and fusion at C4-5 and C5-6. Chronic appearing mild T4 anterior vertebral body height loss. CTA HEAD: ANTERIOR CIRCULATION: No significant stenosis of the intracranial internal carotid, anterior cerebral, or middle cerebral arteries. No aneurysm. POSTERIOR CIRCULATION: No significant stenosis of the vertebral, basilar, or posterior cerebral arteries. The left vertebral artery is dominant. The right vertebral artery is hypoplastic and largely terminates in the right posterior inferior cerebellar artery. No aneurysm. OTHER: No dural venous sinus thrombosis on this non-dedicated study. BRAIN: No mass effect or midline shift. No extra-axial fluid collection. The gray-white differentiation is maintained. 1. No acute arterial abnormality or hemodynamically significant arterial stenosis in the head or neck. 2. Mild edema in the visualized upper lungs suggesting volume overload.          Physical Examination:        Physical Exam      Assessment:        Primary Problem  Multiple sclerosis [facility])    Active Hospital Problems    Diagnosis Date Noted    Recurrent urinary tract infection [N39.0] 02/08/2022    Chronic headache [R51.9, G89.29] 02/25/2021    Multiple sclerosis ([facility] [address] Merline Perla 10/01/2017       Plan:        Multiple sclerosis Exacerbation  Diagnosis multiple sclerosis at age 44  Presented with severe headache and worsening facial droop, dizziness and uncoordination  Was taking 10 mg prednisone daily but stopped for pain management procedure  High-dose Solu-Medrol 1g daily  Neurology consult  MRI brain and cervical spine w wo      Recurrent UTI  Small leukocyte Estrace  No dysuria, frequency, urgency  Microscopic UA not suggestive of UTI  DC rocephin     Hypertension  - Not previously taking antihypertensives at home  - Will add lopressor 25 mg BID  - PRN lopressor    Dispo: Home  Diet: regular  DVT prophylaxis: Lovenox    Gosia Parks MD  2/9/2022  7:43 AM     Attending Physician Statement  I have discussed the care of Ana Hou and I have examined the patient myselft and taken ros and hpi , including pertinent history and exam findings,  with the resident. I have reviewed the key elements of all parts of the encounter with the resident. I agree with the assessment, plan and orders as documented by the resident.       Electronically signed by Darlene Garcia MD

## 2022-02-09 NOTE — DISCHARGE INSTR - COC
Continuity of Care Form    Patient Name: Cassie Pressley   :  1934  MRN:  [id number]    Admit date:  2022  Discharge date:  ***    Code Status Order: Full Code   Advance Directives:      Admitting Physician:  Lexy Can MD  PCP: Edis Díaz MD    Discharging Nurse: [facility] Unit/Room#: 2097/2097-01  Discharging Unit Phone Number: ***    Emergency Contact:   Extended Emergency Contact Information  Primary Emergency Contact: [facility]  Address: [place] [address] Phone: [phone]  Work Phone: [phone]  Mobile Phone: [phone]  Relation: Child  Hearing or visual needs: None  Other needs: None  Preferred language: English   needed? No  Secondary Emergency Contact: Justino Raza  Address: [place] [address] Phone: [phone]  Relation: Child  Hearing or visual needs: None  Other needs: None  Preferred language: English   needed?  No    Past Surgical History:  Past Surgical History:   Procedure Laterality Date    CATARACT REMOVAL WITH IMPLANT Right 2014    [facility]    CATARACT REMOVAL WITH IMPLANT Left 2014    [facility]    CERVICAL DISC SURGERY      CYSTOCELE REPAIR      HYSTERECTOMY      RECTOCELE REPAIR      SHOULDER ARTHROPLASTY Right 2017    SHOULDER SURGERY Right 2017       Immunization History:   Immunization History   Administered Date(s) Administered    COVID-19, Pfizer Purple top, DILUTE for use, 12+ yrs, 30mcg/0.3mL dose 2021, 2021, 2021    Influenza A ([id number]) Vaccine PF IM 2010    Influenza Vaccine, unspecified formulation 2011, 10/25/2012, 10/01/2013, 2015    Influenza Virus Vaccine 2011, 10/25/2012, 10/01/2013, 2015, 2017    Influenza, High Dose (Fluzone 65 yrs and older) 10/17/2015, 2016, 2017, 10/10/2018    Influenza, Quadv, IM, PF (6 mo and older Fluzone, Flulaval, Fluarix, and 3 yrs and older Afluria) 10/08/2019    Influenza, Quadv, adjuvanted, 65 yrs +, IM, PF (Fluad) 09/23/2020, 09/28/2021    Pneumococcal Conjugate 13-valent ([id number]) 01/27/2017    Pneumococcal Polysaccharide ([id number]) 12/01/2010       Active Problems:  Patient Active Problem List   Diagnosis Code    Ataxia R27.0    Multiple sclerosis ([facility] [address] G35    Essential hypertension I10    Hyperlipidemia E78.5    Atelectasis J98.11    Debility R53.81    Abnormal gait R26.9    Actinic keratosis L57.0    Enthesopathy of hip region M76.899    Generalized osteoarthritis M15.9    Idiopathic peripheral neuropathy G60.9    Vitamin D deficiency E55.9    Primary localized osteoarthrosis of the hip, left M16.12    Primary osteoarthritis of left hip M16.12    Acquired spondylolisthesis M43.10    Chronic midline low back pain with left-sided sciatica M54.42, G89.29    Spinal stenosis of lumbar region with neurogenic claudication M48.062    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    Multiple sclerosis exacerbation (HCC) G35    Diarrhea R19.7    Chronic headache R51.9, G89.29    Facial asymmetry Q67.0    Asymptomatic bacteriuria R82.71    Hyperglycemia R73.9    EDINSON (acute kidney injury) ([facility] [address] N17.9    Lumbar radiculopathy M54.16    Type 2 diabetes mellitus E11.9    Recurrent urinary tract infection N39.0       Isolation/Infection:   Isolation            Contact          Patient Infection Status       Infection Onset Added Last Indicated Last Indicated By Review Planned Expiration Resolved Resolved By    MRSA  11/07/14 11/07/14 Chad Samuels RN        07/2013 rt shin    Resolved    COVID-19 (Rule Out) 02/08/22 02/08/22 02/08/22 SARS-CoV-2 NAAT (Rapid) (Ordered)   02/08/22 Rule-Out Test Resulted    COVID-19 (Rule Out) 02/25/21 02/25/21 02/25/21 COVID-19, Rapid (Ordered)   02/25/21 Rule-Out Test Resulted            Nurse Assessment:  Last Vital Signs: BP (!) 149/95   Pulse 97   Temp 98.6 °F (37 °C) (Oral)   Resp 18   Ht 4' 11\" (1.499 m) Wt 134 lb (60.8 kg)   SpO2 96%   BMI 27.06 kg/m²     Last documented pain score (0-10 scale): Pain Level: 4  Last Weight:   Wt Readings from Last 1 Encounters:   02/08/22 134 lb (60.8 kg)     Mental Status:  {IP PT MENTAL STATUS:96705}    IV Access:  { SEEMA IV ACCESS:483283732}    Nursing Mobility/ADLs:  Walking   {[facility] DME SLRR:913093788}  Transfer  {[facility] DME RSMB:822337417}  Bathing  {P DME CBIR:182045290}  Dressing  {P DME UNZS:140132402}  Toileting  {P DME LQZO:389320719}  Feeding  {[facility] DME BISM:956048127}  Med Admin  {[facility] DME PSXT:007325384}  Med Delivery   { SEEMA MED Delivery:740164119}    Wound Care Documentation and Therapy:        Elimination:  Continence: Bowel: {YES / YASMINE:64075}  Bladder: {YES / QQ:14139}  Urinary Catheter: {Urinary Catheter:353804150}   Colostomy/Ileostomy/Ileal Conduit: {YES / LZ:36963}       Date of Last BM: ***  No intake or output data in the 24 hours ending 02/09/22 1031  No intake/output data recorded. Safety Concerns:     [employer] Safety Concerns:209826162}    Impairments/Disabilities:      [employer] Impairments/Disabilities:986900390}    Nutrition Therapy:  Current Nutrition Therapy:   [employer] Diet List:428953456}    Routes of Feeding: {[facility] DME Other Feedings:410885724}  Liquids: {Slp liquid thickness:70207}  Daily Fluid Restriction: {CHP DME Yes amt example:332209129}  Last Modified Barium Swallow with Video (Video Swallowing Test): {Done Not Done TMAJ:836362834}    Treatments at the Time of Hospital Discharge:   Respiratory Treatments: ***  Oxygen Therapy:  {Therapy; copd oxygen:90076}  Ventilator:    { CC Vent EZWA:811495624}    Rehab Therapies: Physical Therapy and Occupational Therapy  Weight Bearing Status/Restrictions: Other Medical Equipment (for information only, NOT a DME order):     Other Treatments: skilled nursing assessment per protocol medication education     Patient's personal belongings (please select all that are sent with patient):  {MOISE DME Belongings:875900805}    RN SIGNATURE:  {Esignature:867069510}    CASE MANAGEMENT/SOCIAL WORK SECTION    Inpatient Status Date: 2/8/22    Readmission Risk Assessment Score:  Readmission Risk              Risk of Unplanned Readmission:  10           Discharging to Facility/ [address]  Phone: [phone]  Fax [phone]      Dialysis Facility (if applicable)   Name:  Address:  Dialysis Schedule:  Phone:  Fax:    / signature: Electronically signed by Saba Hernandes RN on 2/9/22 at 10:32 AM EST    PHYSICIAN SECTION    Prognosis: {Prognosis:3856898570}    Condition at Discharge: [address] [place] [address] Patient Condition:495398771}    Rehab Potential (if transferring to Rehab): {Prognosis:3486352791}    Recommended Labs or Other Treatments After Discharge: ***    Physician Certification: I certify the above information and transfer of Jessica Velasquez  is necessary for the continuing treatment of the diagnosis listed and that she requires {Admit to Appropriate Level of Care:28927} for {GREATER/LESS:992409030} 30 days.      Update Admission H&P: {CHP DME Changes in JMDBO:092341238}    PHYSICIAN SIGNATURE:  {Esignature:459411867}

## 2022-02-09 NOTE — PLAN OF CARE
Please fill out the MRI Screening form and fax to dept @ [phone]. Any questions. .please call [facility] MRI @ [phone]. MRI exam will be scheduled after receiving the completed screening form.  Thank you!!

## 2022-02-09 NOTE — PROGRESS NOTES
[id number] [facility] Neurology   IN-PATIENT SERVICE      NEUROLOGY PROGRESS  NOTE            Date:   2/9/2022  Patient name:  Aneta Sanchez  Date of admission:  2/8/2022  YOB: 1934      Interval History:     Patient stated she feels much stronger and better,  Able to walk better, no more headache    History of Present Illness: The patient is a 80 y.o. female who presents with Dizziness  . The patient was seen and examined and the chart was reviewed. 80year old woman who was told she had MS when she was 44year old, she was never on any DMT, over the decades she reported that she got big dose steroid that make her feel better when she had a flare. Other significant hx include chronic daily headache and patient take norco 3 times per day for it. She also had recent UTI and was found to have a small UTI on admission.     Pt also f/u neurologist in [address] Trang Michel.     Patient is very tangential upon interviewing, she easily change the direction of the question. What I could get from her was that she c/o worsening gait and generalized weakness, she also c/o worsening headache. The headache largely resolved after pt received 500mg of methylprednisone.      As pt is not apparent a good historian, most of the history was obtained from chart reviews and old neuro images reviews.     Symptom History (from prior notes from Dr. Eleazar Obrien from [address]):    [address]: She was diagnosed with Bell's palsy affecting the left side of her face with incomplete recovery. 2. 1983: She had symptoms of dizziness followed by double vision and headache. She developed tingling in her right leg which then involved her right arm and an area behind her right ear. She became unresponsive and fell to the floor, and was taken to [facility]. She continued to be dizzy and was later found to be weak on the left side of her body.  She indicates that she underwent brain and spine imaging and had other testing done and was told that she has MS.     3. Since then, left hemiparesis persists. Diplopia resoved after a few years, until 2008, when she again developed double vision. She saw an ophthalmologist who added prism to her glasses. In August 2009, even with the prism, she was having double vision, so he furthered adjusted the prisms, so that currently with the glasses she denies any double vision. She continued to have headaches since then. Initially her headaches were not daily and she was on Tylenol, Xanax and Flexeril more than 10 years ago. She has been seen by Dr. Dayna Henderson (neurologist) in 0483/2898 who took her off the Xanax. For the headaches, she was initially started on Pamelor with which she developed oral ulcerations, so it was discontinued. She was then started on Elavil and the dose of Elavil was subsequently increased. She has been using Tylenol No. 4 over more than 10-15 years, daily for her headaches. Her headaches have become daily headaches for the last 5 to 6 years. They mainly start from the neck, involve the occipital region and then spread to the top of the head and behind the eyes. She wakes up in the morning with a headache. She describes them as a dull pain, about 9 out of 10. Sometimes she gets nausea with them. Currently she takes about 1 to 2 tablets of Tylenol No. 4 during the day and on some days she uses 1 tablet of Valium 5 mg to help with the headaches. As per her, Tylenol No. 4 sometime decreases the headache intensity from 9 to 7 or 8. She takes amitriptyline about 100 mg at bed time which helps her to go to sleep, but she mentions when she lies down her head is still hurting and she tries not to lie on the left side of the head. She is able to sleep well at night. 4. 2003: She also had a complaint of neck pain going to her left upper extremity.  She underwent C4-C5, C5-C6 cervical fusion in 2003 and she states that after the surgery her arm pain was resolved, but she continues to have daily headaches. In 1983, she was diagnosed with a possible multiple sclerosis. She mentions that she had a workup for MS at that time at [facility], and after LP, was told she had MS. We do not have any records to confirm that. As per her, she was never started on any medication for multiple sclerosis. She denies any history of visual loss. She denies any dysphagia or dysarthria. She denies any pain in her extremities or muscle spasms. She has decreased range of motion in her neck and feels a lot of stiffness in her neck muscles. She does complain of urgency of micturition and wears a pad all the time. She reports that she had prolapse of the bladder for which she had bladder suspension surgery in 1990's, but after 8 months again she had the bladder prolapse and since then she feels the urgency. She also complains of numbness in both feet up to the mid level since last 4 to 5 years, but denies any numbness or tingling in her legs, arms or hands. 5. 9/2021: She developed bilateral leg weakness, imbalance, dizziness, diplopia and dysphagia and sought medical attention. She was hospitalized for a week, then a week of rehab, both at [address] [place] [address]. She was treated with intravenouos steroids while in hospital. After rehab, she is seeing physical therapy once a week. Symptoms improved except for left leg residual weakness. She required catheterization (urinary retention; symptoms resolved. Since 1995 or 2000, she has used only Tylenol No. 4 and amitriptyline for headache treatment; she denies using any other medication. She has tried physical therapy and aquatherapy in the past and she mentions that heat and massage therapy gives her a spinning sensation, so she tries to avoid the heat. Also she mentions that sometimes she gets a feeling of warmth from her shoulders going upwards. She becomes dizzy and sits down, but denied any loss of consciousness.       Past Medical History:     Past Medical History: Diagnosis Date    Acute cystitis without hematuria 10/1/2017    Arthritis     Chronic daily headache     GERD (gastroesophageal reflux disease)     Hyperlipidemia     Hypertension     Moderate malnutrition ([facility] 3/2/2019    Multiple sclerosis ([facility]     Neuropathy     Pneumonia 2017    states had double pneumonia    Vitamin D deficiency         Past Surgical History:     Past Surgical History:   Procedure Laterality Date    [facility]OVAL [facility]PLANT [facility] 2014    [facility]    CATARACT REMOVAL WITH IMPLANT Left 2014    [facility]    CERVICAL DISC SURGERY      CYSTOCELE REPAIR      HYSTERECTOMY      RECTOCELE REPAIR      SHOULDER ARTHROPLASTY Right 2017    SHOULDER SURGERY Right 2017        Medications during admission:      methylPREDNISolone  1,000 mg IntraVENous Daily    metoprolol tartrate  25 mg Oral BID    amitriptyline  100 mg Oral Nightly    celecoxib  200 mg Oral Daily    docusate sodium  100 mg Oral Daily    pantoprazole  40 mg Oral QAM AC    atorvastatin  20 mg Oral Daily    sodium chloride flush  5-40 mL IntraVENous 2 times per day    enoxaparin  40 mg SubCUTAneous Daily         Physical Exam:   BP (!) 149/95   Pulse 97   Temp 98.6 °F (37 °C) (Oral)   Resp 18   Ht 4' 11\" (1.499 m)   Wt 134 lb (60.8 kg)   SpO2 96%   BMI 27.06 kg/m²   Temp (24hrs), Av.6 °F (37 °C), Min:98.5 °F (36.9 °C), Max:98.7 °F (37.1 °C)          Neurological examination:    Mental status   Alert and oriented x 3; following all commands; speech is fluent, no dysarthria, aphasia.       Cranial nerves   II - visual fields intact to confrontation; pupils reactive  III, IV, VI - extraocular muscles intact; no MOJGAN; no nystagmus; no ptosis   V - normal facial sensation                                                               VII - mild decrease right nasolabial                                                            VIII - intact hearing IX, X - symmetrical palate elevation                                               XI - symmetrical shoulder shrug                                                       XII - midline tongue without atrophy or fasciculation     Motor function  Strength: 5/5 RUE, 5/5 RLE, 5/5 LUE, 5/5  LLE  Normal bulk and tone. No tremors                      Sensory function Intact to touch, pin, vibration, proprioception throughout     Cerebellar Intact finger-nose-finger testing. Intact heel-shin testing. No dysdiadochokinesia present. Reflex function Not tested   Gait                  Not tested             Diagnostics:      Laboratory Testing:  CBC:   Recent Labs     02/08/22  1300 02/09/22  0535   WBC 5.5 3.8   HGB 11.7* 12.9    236     BMP:    Recent Labs     02/08/22  1300 02/09/22  0535    140   K 4.5 5.0    103   CO2 29 24   BUN 20 16   CREATININE 1.06* 1.09*   GLUCOSE 114* 224*         Lab Results   Component Value Date    CHOL 235 (H) 01/04/2012    LDLCHOLESTEROL 86 05/21/2020    HDL 74 (A) 11/06/2021    TRIG 119 01/04/2012    ALT 21 02/08/2022    AST 18 02/08/2022    TSH 1.54 05/24/2019    INR 0.9 02/25/2021    LABA1C 6.4 11/06/2021    LOTYGVQH23 220 10/02/2017       No results found for: PHENYTOIN, PHENYTOIN, VALPROATE, CBMZ        Imaging/Diagnostics:      EEG:       No results found for this or any previous visit. No results found for this or any previous visit. No results found for this or any previous visit. No results found for this or any previous visit. Results for orders placed during the hospital encounter of 07/11/20    MRI BRAIN WO CONTRAST    Narrative  EXAMINATION:  MRI OF THE BRAIN WITHOUT CONTRAST  7/11/2020 9:29 am    TECHNIQUE:  Multiplanar multisequence MRI of the brain was performed without the  administration of intravenous contrast.    COMPARISON:  10/02/2017.     HISTORY:  ORDERING SYSTEM PROVIDED HISTORY: Transient ischemic attack (TIA)  TECHNOLOGIST PROVIDED HISTORY:  Reason for Exam: tia  Additional signs and symptoms: over the past few months she has had episodes  of facial droop and upper extremity numbness. Numbness sometimes affects  right and sometimes the left. FINDINGS:  INTRACRANIAL STRUCTURES/VENTRICLES: There is no acute infarct. No mass effect  or midline shift. No evidence of an acute intracranial hemorrhage. The  ventricles and sulci are normal in size and configuration. The  sellar/suprasellar regions appear unremarkable. The normal signal voids  within the major intracranial vessels appear maintained. There are scattered  periventricular and deep subcortical white matter T2/FLAIR hyperintensities,  consistent with microangiopathic change. There is mild parenchymal volume  loss. ORBITS: The visualized portion of the orbits demonstrate no acute abnormality. SINUSES: The visualized paranasal sinuses and mastoid air cells are well  aerated. BONES/SOFT TISSUES: The bone marrow signal intensity appears normal. The soft  tissues demonstrate no acute abnormality. Impression  No acute intracranial abnormality. Microangiopathic change. Results for orders placed during the hospital encounter of 10/01/17    MRI C/ [address]    Narrative  Radiology exam is complete. No Radiologist dictation. Please follow up with ordering provider. No results found for this or any previous visit. Results for orders placed during the hospital encounter of 02/08/22    CT Head WO Contrast    Narrative  EXAMINATION:  CT OF THE HEAD WITHOUT CONTRAST  2/8/2022 1:36 pm    TECHNIQUE:  CT of the head was performed without the administration of intravenous  contrast. Dose modulation, iterative reconstruction, and/or weight based  adjustment of the mA/kV was utilized to reduce the radiation dose to as low  as reasonably achievable.     COMPARISON:  CT brain performed 02/25/2021. HISTORY:  ORDERING SYSTEM PROVIDED HISTORY: dizziness  TECHNOLOGIST PROVIDED HISTORY:  dizziness    Decision Support Exception - unselect if not a suspected or confirmed  emergency medical condition->Emergency Medical Condition (MA)  Reason for Exam: dizziness  Additional signs and symptoms: Pt states dizziness. NKI. Hx of MS and no hx  of surgery    FINDINGS:  BRAIN/VENTRICLES: There is no acute intracranial hemorrhage, mass effect, or  midline shift. There is satisfactory overall gray-white matter  differentiation. There is cerebral atrophy and chronic microvascular  disease. The ventricular structures are symmetric and unremarkable. The  infratentorial structures are unremarkable. ORBITS: The visualized portion of the orbits demonstrate no acute abnormality. SINUSES: The visualized paranasal sinuses and mastoid air cells demonstrate  no acute abnormality. SOFT TISSUES/SKULL:  No acute abnormality of the visualized skull or soft  tissues. Impression  Cerebral atrophy and chronic microvascular disease without acute intracranial  abnormality. RECOMMENDATIONS:  Unavailable        CT head:     CTA head and neck:     MRI brain:     2D echo:      I personally reviewed all of the above medications, clinical laboratory, imaging and other diagnostic tests. Impression:      80year old woman [facility] [address] [facility], hx of UTI, depression, chronic daily headache, now p/w with 4 days of worsening headache, gait instability and generalized weakness.     Plan:     Much better after second dose of methylprednisone  It is quite likely that patient has MS exacerbation  I reviewed her old MRI brain, pictures pasted above, based on the MRI brain done in 10/2017, pt had multiple lesions at that time that most likely signified she had MS  Therefore despite her MS was never confirmed by any lab, she indeed met the criteria of MS diagnosis based on her symptoms and multiple MRI images  Given her current symptoms and pt was never on any DMT, will get her a repeat MRI brain w/wo contrast and MRI cervical w/wo contrast to look for new MS lesion  Will her place on methylprednisone 1g x 5 doses  PT/OT and acute rehab consult  If pt is deemed to be safe for discharge from the PT/OT standpoint, she can get the remaining dose of pulse steroid in infusion center          Electronically signed by Nai Babin MD on 2/9/2022 at 11:25 MD Jackson Cota  22.  Neurology

## 2022-02-09 NOTE — PROGRESS NOTES
Physical Therapy    Facility/Department: [facility] PROGRESSIVE CARE  Initial Assessment    NAME: Forestine Felty  : 1934  MRN: [id number]    Date of Service: 2022    Discharge Recommendations:  Patient would benefit from continued therapy after discharge        Assessment   Body structures, Functions, Activity limitations: Decreased functional mobility ; Decreased strength;Decreased safe awareness;Decreased endurance;Decreased balance;Decreased posture;Decreased cognition  Assessment: Pt presents with weakness, decreaed tolerance to activity and balance deficts. Pt needs assit for fucntional mobility, and is fall risk without assist for mobility. Will benefit from continued therapy. Treatment Diagnosis: Impaired fucntion  Decision Making: Medium Complexity  History: Hx MS  REQUIRES PT FOLLOW UP: Yes       Patient Diagnosis(es): The primary encounter diagnosis was Urinary tract infection without hematuria, site unspecified. A diagnosis of Hx of multiple sclerosis ([address] was also pertinent to this visit. has a past medical history of Acute cystitis without hematuria, Arthritis, Chronic daily headache, GERD (gastroesophageal reflux disease), Hyperlipidemia, Hypertension, Moderate malnutrition ([address] Multiple sclerosis ([address] Neuropathy, Pneumonia, and Vitamin D deficiency. has a past surgical history that includes Cystocele repair; Rectocele repair; Hysterectomy; Cervical disc surgery; Cataract removal with implant (Right, 2014); Cataract removal with implant (Left, 2014); Total shoulder arthroplasty (Right, 2017); and shoulder surgery (Right, ).     Restrictions  Restrictions/Precautions  Restrictions/Precautions: Fall Risk,General Precautions  Required Braces or Orthoses?: Yes (Pt has a back brace that she wears PRN)  Implants present? : Metal implants (R TSA)  Vision/Hearing  Vision: Impaired  Vision Exceptions: Wears glasses at all times  Hearing: Within functional limits Subjective  General  Patient assessed for rehabilitation services?: Yes  Additional Pertinent Hx: The patient is a 80 y.o. Non- / non  female who presents withDizziness and she is admitted to the hospital for the management of multiple sclerosis flareup and UTI. Patient was diagnosed with multiple sclerosis at the age of 44. Past medical history significant for multiple sclerosis, hypertension, chronic osteoarthritis of the spine. Patient reportedly woke up this morning with worsening facial droop, severe headache, and an unsteady gait. She has history of chronic headaches but this was much worse. She took 2 Norco pills but that did not alleviate the headache. It is located frontally and in the back of her head and neck and throbbing. Patient was taking 10 mg of prednisone daily per her PCP for spinal stenosis and chronic vertebral fracture. She sees a pain medicine specialist who gives her spinal injections. He told her to stop taking the prednisone for 5 days prior to injection. This was her fifth day without taking steroids. In the ED she had a CT and a CTA done. CT showed no acute abnormalities. CTA head and neck shows no hemodynamically significant stenosis. Referral Date : 02/08/22  Diagnosis: recurrent UTI  Follows Commands: Impaired  Other (Comment): Pt often goes tangential during conversation.   Pain Screening  Patient Currently in Pain: Yes  Pain Assessment  Pain Assessment: 0-10  Pain Level: 4  Pain Type: Chronic pain  Pain Location: Back  Pain Descriptors:  (Aggravating)  Pain Frequency: Continuous  Clinical Progression: Not changed  Response to Pain Intervention: Patient Satisfied  Vital Signs  BP Location: Left upper arm  Level of Consciousness: Alert (0)  Patient Currently in Pain: Yes  Oxygen Therapy  O2 Device: None (Room air)       Orientation     Social/Functional History  Social/Functional History  Lives With: Alone  Type of Home: House  Home Layout: Two level,Able to Live on Main level with bedroom/bathroom  Home Access: Stairs to enter with rails  Entrance Stairs - Number of Steps: 3  Entrance Stairs - Rails: Right  Bathroom Shower/Tub: Tub/Shower unit,Shower chair with back,Curtain  Bathroom Toilet: Standard  Bathroom Equipment: Grab bars in shower,Hand-held shower,Grab bars around toilet  Bathroom Accessibility: Accessible  Home Equipment: [address] Help From: Family  ADL Assistance: Independent (Daughter helps with showers if needed)  Homemaking Assistance: Needs assistance (Daughter A w/cleaning, shopping, and laundry)  Homemaking Responsibilities: Yes (Pt does microwave/simple meal prep)  Ambulation Assistance: Independent (w/RW at all times)  Transfer Assistance: Independent  Active : No  Patient's  Info: Pt's daughter  Occupation: Retired  IADL Comments: Pt sleeps in a flat bed. Additional Comments: Son and daughter both work during the day, but daughter calls her every morning. Cognition        Objective          AROM RLE (degrees)  RLE AROM: WFL  AROM LLE (degrees)  LLE AROM : WFL  AROM RUE (degrees)  RUE General AROM: See OT eval  AROM LUE (degrees)  LUE General AROM: See OT eval.  Strength RLE  Comment: Hip 3+/5, knee and ankle 4-/5  Strength LLE  Comment: Hip 3/5, knee and ankle 3+/5           Transfers  Sit to Stand: Contact guard assistance;Minimal Assistance  Stand to sit: Contact guard assistance  Bed to Chair: Contact guard assistance;Minimal assistance (LOB while standing up at EOB to adjust her undergarment)  Ambulation  Ambulation?: Yes  Ambulation 1  Surface: level tile  Device: Rolling Walker  Assistance: Minimal assistance;Contact guard assistance  Quality of Gait: Unsteady gait, minor LOB, requiring in A fo rbalance, fatiques easily, pt took one standing break due to fatique  Distance: 40 ft  Comments: Decreased awareness of deficts.        Balance  Posture: Fair  Sitting - Static: Good  Sitting - Dynamic: Fair;+  Standing - Static: Fair (RW)  Standing - Dynamic: Fair;- (RW)        Plan   Plan  Times per week: 5 to 6 x/week  Current Treatment Recommendations: Strengthening,Balance Training,Functional Mobility Training,Transfer Training,Endurance Training,Gait Training,Patient/Caregiver Education & Training,Safety Education & Training  Safety Devices  Type of devices: Left in chair,Gait belt,Call light within reach    G-Code       OutComes Score                                                  AM-PAC Score             Goals  Short term goals  Time Frame for Short term goals: 5 to 7 visits  Short term goal 1: Pt able to perform bed mobility independently  Short term goal 2: Pt able to perform sit<>stand SBA  Short term goal 3: transfers CGA  Short term goal 4: Gait with Rolling walekr distance for 50 ft x 2 without taking standing break,   Patient Goals   Patient goals : I need more therapy.        Therapy Time        02/09/22 1015 02/09/22 1044   PT Individual Minutes   Time In 1009 1025   Time Out 1015 1044   Minutes 6 19   Time Code Minutes   Timed Code Treatment Minutes  --  9 Minutes     Cassy Ward, PT

## 2022-02-09 NOTE — PROGRESS NOTES
[address] [facility]    Occupational Therapy Evaluation  Date: 22  Patient Name: Cassie Pressley       Room: 9035/8032-27  MRN: [id number]  Account: [de-identified]   : 1934  (80 y.o.) Gender: female     Discharge Recommendations: The patient would benefit from an intensive level of therapy after discharge from the facility. They should be able to tolerate 3-hours of Combined OT/PT/ST over 5 days/week or at least 900 minutes of  Combined Therapy over 7 days. This patient would benefit from a Physical Medicine and Rehab Consult. Equipment Needed:  (TBD)    Referring Practitioner: Dr. Kiersten Eisenberg  Diagnosis: MS exacerbation, recurrent UTI    Treatment Diagnosis: Impaired self-care status  Past Medical History:  has a past medical history of Acute cystitis without hematuria, Arthritis, Chronic daily headache, GERD (gastroesophageal reflux disease), Hyperlipidemia, Hypertension, Moderate malnutrition ([address] Multiple sclerosis ([address] Neuropathy, Pneumonia, and Vitamin D deficiency. Past Surgical History:   has a past surgical history that includes Cystocele repair; Rectocele repair; Hysterectomy; Cervical disc surgery; Cataract removal with implant (Right, 2014); Cataract removal with implant (Left, 2014); Total shoulder arthroplasty (Right, 2017); and shoulder surgery (Right, ).     Restrictions  Restrictions/Precautions: Fall Risk,General Precautions  Implants present? : Metal implants (R TSA)  Required Braces or Orthoses?: Yes (Pt has a back brace that she wears PRN)     Vitals  Temp: 98.6 °F (37 °C)  Pulse: 97  Resp: 18  BP: (!) 149/95  Height: 4' 11\" (149.9 cm)  Weight: 134 lb (60.8 kg)  BMI (Calculated): 27.1  Oxygen Therapy  SpO2: 96 %  Pulse Oximeter Device Mode: Intermittent  Pulse Oximeter Device Location: Finger  O2 Device: None (Room air)  Level of Consciousness: Alert (0)    Subjective  Subjective: \"A couple months ago I could do this and it wasn't so hard\"  Comments: Referring to don/doffing socks. Pt completed task with increased time becoming visibly SOB and requiring rest breaks throughout. Overall Orientation Status: Within Functional Limits  Vision  Vision: Impaired  Vision Exceptions: Wears glasses at all times  Hearing  Hearing: Within functional limits  Social/Functional History  Lives With: Alone  Type of Home: [address] [place] [address],[address]: Two level,Able to Live on Main level with bedroom/bathroom  Home Access: Stairs to enter with rails  Entrance Stairs - Number of Steps: 3  Entrance Stairs - Rails: Right  Bathroom Shower/Tub: Tub/Shower unit,Shower chair with back,Curtain  Bathroom Toilet: Standard  Bathroom Equipment: Grab bars in shower,Hand-held shower,Grab bars around toilet  Bathroom Accessibility: Accessible  [facility] [address] [facility] Help From: Family  ADL Assistance: Independent (Daughter helps with showers if needed)  Homemaking Assistance: Needs assistance (Daughter A w/cleaning, shopping, and laundry)  Homemaking Responsibilities: Yes (Pt does microwave/simple meal prep)  Ambulation Assistance: Independent (w/RW at all times)  Transfer Assistance: Independent  Active : No  Patient's  Info: Pt's daughter  Occupation: Retired  IADL Comments: Pt sleeps in a flat bed. Additional Comments: Son and daughter both work during the day, but daughter calls her every morning. Pain Assessment  Pain Assessment: 0-10  Pain Level: 4  Pain Type: Chronic pain  Pain Location: Back  Pain Descriptors:  (\"Aggravating\")  Pain Frequency: Continuous  Clinical Progression: Not changed    Objective  Vision - Basic Assessment  Patient Visual Report: No visual complaint reported. Cognition  Overall Cognitive Status: Impaired  Attention Span: Attends with cues to redirect  Following Commands:  Follows one step commands with repetition (Difficulty following commands during MMT)  Safety Judgement: Decreased awareness of need for assistance  Insights: Decreased awareness of deficits (Decreased insight into fall risk despite multiple LOB)  Additional Comments: Pt occasionally tangential in conversation with decreased focus. Perception  Overall Perceptual Status: WFL   ADL  Feeding: Independent  Grooming: Setup  UE Bathing: Stand by assistance  LE Bathing: Minimal assistance  UE Dressing: Stand by assistance  LE Dressing: Moderate assistance (Inc'd time for socks, A to thread BLE, Min A in stdg)  Toileting: Minimal assistance (for balance)  Additional Comments: Above levels based on pt report, observation, and clinical reasoning unless otherwise noted. Pt able to change socks at EOB with increased time and rest breaks throughout. OT assisted with threading BLE into hospital briefs, and pt required Min A for balance while pulling them up over hips. UE Function  LUE Strength  Gross LUE Strength: WFL  L Hand General: 4+/5     LUE Tone: Normotonic     LUE AROM (degrees)  LUE AROM : WFL     Left Hand AROM (degrees)  Left Hand AROM: WFL     RUE Strength  Gross RUE Strength: Exceptions to [facility]  R Hand General: 4+/5  RUE Strength Comment: Shoulder NT      RUE Tone: Normotonic     RUE AROM (degrees)  RUE AROM : Exceptions  RUE General AROM: Active shoulder ROM limited to ~50% (baseline deficit post TSA years ago), Other joints WFL     Right Hand AROM (degrees)  Right Hand AROM: WFL    Fine Motor Skills  Coordination  Movements Are Fluid And Coordinated: No  Coordination and Movement description: Gross motor impairments,Right UE (Limited AROM of shoulder post TSA years ago)     Mobility  Balance  Sitting Balance: Supervision (During dynamic reaching/bending, otherwise IND)  Standing Balance: Minimal assistance (w/RW, several small LOB when walking/pulling up briefs)     Functional Mobility  Functional Mobility Comments: Pt ambulated to door and back with RW and Min A for balance.   Bed mobility  Comment: Pt sitting EOB when OT entered and left sitting up in bedside chair at end of session. Transfers  Stand Step Transfers: Minimal assistance  Stand Pivot Transfers: Minimal assistance  Sit to stand: Contact guard assistance  Stand to sit: Contact guard assistance  Transfer Comments: w/RW, decreased recognition of fall risk despite numerous LOB  Functional Activity Tolerance  Functional Activity Tolerance: Tolerates < 10 Min exercise, no significant change in vital signs  Additional Comments: Pt visibly SOB after don/doffing socks, frequent rest breaks required. Assessment  Assessment  Performance deficits / Impairments: Decreased functional mobility ,Decreased ADL status,Decreased safe awareness,Decreased cognition,Decreased endurance,Decreased balance,Decreased high-level IADLs  Treatment Diagnosis: Impaired self-care status  Prognosis: Good  Decision Making: Medium Complexity  REQUIRES OT FOLLOW UP: Yes  Discharge Recommendations: Patient would benefit from continued therapy after discharge  Activity Tolerance: Patient Tolerated treatment well    Goals  Patient Goals   Patient goals : Pt back and forth between agreeing to rehab and wanting to go home at discharge. Short term goals  Time Frame for Short term goals: By Discharge  Short term goal 1: Pt will complete LB ADL's with CGA and Good safety using AE if needed. Short term goal 2: Pt will complete toilet transfer and toileting with CGA and Good safety using least restrictive device. Short term goal 3: Pt will tolerate standing for 3-4 minutes with 1-2 UE support and CGA while completing a functional task. Short term goal 4: Pt will V/D at least 3 fall prevention strategies that she can utilize during daily routines. Short term goal 5: Pt will actively participate in 15-20 minutes of therapeutic exercise/activity to promote increased independence and safety with self-care and mobility.     Plan  Safety Devices  Safety Devices in place: Yes  Type of devices: Patient at risk for falls,Gait belt,Left in chair,Call light within reach     Plan  Times per week: 5-7  Times per day: Daily  Current Treatment Recommendations: Balance Training,Functional Mobility Training,Endurance Training,Pain Management,Safety Education & Training,Patient/Caregiver Education & Training,Equipment Evaluation, Education, & procurement,Self-Care / ADL,Home Management Training    Equipment Recommendations  Equipment Needed:  (TBD)  OT Individual Minutes  Time In: 1007  Time Out: 0833  Minutes: 37    Electronically signed by Guillermina Palmer on 2/9/22 at 12:16 PM EST

## 2022-02-09 NOTE — CARE COORDINATION
CASE MANAGEMENT NOTE:    Admission Date:  2/8/2022 Whitney Barry is a 80 y.o.  female    Admitted for : Recurrent urinary tract infection [N39.0]  Hx of multiple sclerosis ([facility] [address] [G35]  Urinary tract infection without hematuria, site unspecified [N39.0]    Met with:  Patient    PCP:  Dr. Cheikh Meneses:  Medicare      Is patient alert and oriented at time of discussion:  Yes    Current Residence/ Living Arrangements:  independently at home             Current Services PTA:  No    Does patient go to outpatient dialysis: No  If yes, location and chair time:     Is patient agreeable to VNS: Yes    Freedom of choice provided:  Yes    List of [address] provided: Yes    VNS chosen:  Yes    DME:  straight cane, walker and wheelchair    Home Oxygen: No    Nebulizer: No    CPAP/BIPAP: No    Supplier: N/A    Potential Assistance Needed: No    SNF needed: No    Freedom of choice and list provided: NA    Pharmacy:  [facility]       Does Patient want to use MEDS to BEDS? No    Is patient currently receiving oral anticoagulation therapy? No    Is the Patient an L[facility] with Readmission Risk Score greater than 14%? Yes  If yes, pt needs a follow up appointment made within 7 days. Family Members/Caregivers that pt would like involved in their care:    Yes    If yes, list name here:  Amarjit Stone    Transportation Provider:  Family             Discharge Plan: 2/9/22 Medicare Pt is from home alone in a two story home DME walker cane and w/c VNS would like [address] has had in the past.  Plan is to discharge to home with no needs will continue to follow .//tv                  Electronically signed by:  Carolin Dawson RN on 2/9/2022 at 10:27 AM P

## 2022-02-09 NOTE — PROGRESS NOTES
Patient admitted to room 2097 from ED by stretcher. Assessment and vitals complete. Patient oriented to room.

## 2022-02-10 ENCOUNTER — TELEPHONE (OUTPATIENT)
Dept: ADMINISTRATIVE | Age: 87
End: 2022-02-10

## 2022-02-10 ENCOUNTER — APPOINTMENT (OUTPATIENT)
Dept: GENERAL RADIOLOGY | Age: 87
DRG: 058 | End: 2022-02-10
Payer: MEDICARE

## 2022-02-10 PROBLEM — I50.30 DIASTOLIC CONGESTIVE HEART FAILURE (HCC): Status: ACTIVE | Noted: 2022-02-10

## 2022-02-10 LAB
ALLEN TEST: ABNORMAL
ANION GAP SERPL CALCULATED.3IONS-SCNC: 8 MMOL/L (ref 9–17)
BUN BLDV-MCNC: 25 MG/DL (ref 8–23)
BUN/CREAT BLD: ABNORMAL (ref 9–20)
CALCIUM SERPL-MCNC: 8.1 MG/DL (ref 8.6–10.4)
CARBOXYHEMOGLOBIN: 0.8 % (ref 0–5)
CHLORIDE BLD-SCNC: 101 MMOL/L (ref 98–107)
CO2: 23 MMOL/L (ref 20–31)
CREAT SERPL-MCNC: 1.2 MG/DL (ref 0.5–0.9)
D-DIMER QUANTITATIVE: 0.81 MG/L FEU (ref 0–0.59)
FIO2: ABNORMAL
GFR AFRICAN AMERICAN: 52 ML/MIN
GFR NON-AFRICAN AMERICAN: 42 ML/MIN
GFR SERPL CREATININE-BSD FRML MDRD: ABNORMAL ML/MIN/{1.73_M2}
GFR SERPL CREATININE-BSD FRML MDRD: ABNORMAL ML/MIN/{1.73_M2}
GLUCOSE BLD-MCNC: 183 MG/DL (ref 65–105)
GLUCOSE BLD-MCNC: 197 MG/DL (ref 65–105)
GLUCOSE BLD-MCNC: 204 MG/DL (ref 70–99)
GLUCOSE BLD-MCNC: 221 MG/DL (ref 65–105)
GLUCOSE BLD-MCNC: 227 MG/DL (ref 65–105)
HCO3 ARTERIAL: 20.6 MMOL/L (ref 22–26)
HCT VFR BLD CALC: 30.1 % (ref 36–46)
HEMOGLOBIN: 10.6 G/DL (ref 12–16)
LACTIC ACID: 3.2 MMOL/L (ref 0.5–2.2)
LACTIC ACID: 4.1 MMOL/L (ref 0.5–2.2)
MCH RBC QN AUTO: 31.7 PG (ref 26–34)
MCHC RBC AUTO-ENTMCNC: 35 G/DL (ref 31–37)
MCV RBC AUTO: 90.5 FL (ref 80–100)
METHEMOGLOBIN: 0.8 % (ref 0–1.9)
MODE: ABNORMAL
NEGATIVE BASE EXCESS, ART: 4.5 MMOL/L (ref 0–2)
NOTIFICATION TIME: ABNORMAL
NOTIFICATION: ABNORMAL
NRBC AUTOMATED: ABNORMAL PER 100 WBC
O2 DEVICE/FLOW/%: ABNORMAL
O2 SAT, ARTERIAL: 89.9 % (ref 95–98)
OXYHEMOGLOBIN: ABNORMAL % (ref 95–98)
PATIENT TEMP: 37
PCO2 ARTERIAL: 34.7 MMHG (ref 35–45)
PCO2, ART, TEMP ADJ: ABNORMAL (ref 35–45)
PDW BLD-RTO: 13.7 % (ref 11.5–14.9)
PEEP/CPAP: ABNORMAL
PH ARTERIAL: 7.38 (ref 7.35–7.45)
PH, ART, TEMP ADJ: ABNORMAL (ref 7.35–7.45)
PLATELET # BLD: 208 K/UL (ref 150–450)
PMV BLD AUTO: 7.8 FL (ref 6–12)
PO2 ARTERIAL: 56.7 MMHG (ref 80–100)
PO2, ART, TEMP ADJ: ABNORMAL MMHG (ref 80–100)
POSITIVE BASE EXCESS, ART: ABNORMAL MMOL/L (ref 0–2)
POTASSIUM SERPL-SCNC: 3.8 MMOL/L (ref 3.7–5.3)
PSV: ABNORMAL
PT. POSITION: ABNORMAL
RBC # BLD: 3.33 M/UL (ref 4–5.2)
RESPIRATORY RATE: 25
SAMPLE SITE: ABNORMAL
SET RATE: ABNORMAL
SODIUM BLD-SCNC: 132 MMOL/L (ref 135–144)
TEXT FOR RESPIRATORY: ABNORMAL
TOTAL HB: ABNORMAL G/DL (ref 12–16)
TOTAL RATE: ABNORMAL
TROPONIN INTERP: ABNORMAL
TROPONIN INTERP: ABNORMAL
TROPONIN T: ABNORMAL NG/ML
TROPONIN T: ABNORMAL NG/ML
TROPONIN, HIGH SENSITIVITY: 36 NG/L (ref 0–14)
TROPONIN, HIGH SENSITIVITY: 39 NG/L (ref 0–14)
VT: ABNORMAL
WBC # BLD: 10.7 K/UL (ref 3.5–11)

## 2022-02-10 PROCEDURE — 6370000000 HC RX 637 (ALT 250 FOR IP)

## 2022-02-10 PROCEDURE — 2060000000 HC ICU INTERMEDIATE R&B

## 2022-02-10 PROCEDURE — 83605 ASSAY OF LACTIC ACID: CPT

## 2022-02-10 PROCEDURE — 99222 1ST HOSP IP/OBS MODERATE 55: CPT | Performed by: PHYSICAL MEDICINE & REHABILITATION

## 2022-02-10 PROCEDURE — 85379 FIBRIN DEGRADATION QUANT: CPT

## 2022-02-10 PROCEDURE — 94640 AIRWAY INHALATION TREATMENT: CPT

## 2022-02-10 PROCEDURE — 2700000000 HC OXYGEN THERAPY PER DAY

## 2022-02-10 PROCEDURE — 97110 THERAPEUTIC EXERCISES: CPT

## 2022-02-10 PROCEDURE — 99232 SBSQ HOSP IP/OBS MODERATE 35: CPT | Performed by: INTERNAL MEDICINE

## 2022-02-10 PROCEDURE — 71045 X-RAY EXAM CHEST 1 VIEW: CPT

## 2022-02-10 PROCEDURE — 6360000002 HC RX W HCPCS: Performed by: PSYCHIATRY & NEUROLOGY

## 2022-02-10 PROCEDURE — 82805 BLOOD GASES W/O2 SATURATION: CPT

## 2022-02-10 PROCEDURE — 80048 BASIC METABOLIC PNL TOTAL CA: CPT

## 2022-02-10 PROCEDURE — 2580000003 HC RX 258

## 2022-02-10 PROCEDURE — 2580000003 HC RX 258: Performed by: PSYCHIATRY & NEUROLOGY

## 2022-02-10 PROCEDURE — 36415 COLL VENOUS BLD VENIPUNCTURE: CPT

## 2022-02-10 PROCEDURE — 99233 SBSQ HOSP IP/OBS HIGH 50: CPT | Performed by: PSYCHIATRY & NEUROLOGY

## 2022-02-10 PROCEDURE — 2500000003 HC RX 250 WO HCPCS: Performed by: STUDENT IN AN ORGANIZED HEALTH CARE EDUCATION/TRAINING PROGRAM

## 2022-02-10 PROCEDURE — 6360000002 HC RX W HCPCS

## 2022-02-10 PROCEDURE — 2580000003 HC RX 258: Performed by: STUDENT IN AN ORGANIZED HEALTH CARE EDUCATION/TRAINING PROGRAM

## 2022-02-10 PROCEDURE — 82947 ASSAY GLUCOSE BLOOD QUANT: CPT

## 2022-02-10 PROCEDURE — 36600 WITHDRAWAL OF ARTERIAL BLOOD: CPT

## 2022-02-10 PROCEDURE — 84484 ASSAY OF TROPONIN QUANT: CPT

## 2022-02-10 PROCEDURE — 85027 COMPLETE CBC AUTOMATED: CPT

## 2022-02-10 RX ORDER — FUROSEMIDE 10 MG/ML
20 INJECTION INTRAMUSCULAR; INTRAVENOUS ONCE
Status: COMPLETED | OUTPATIENT
Start: 2022-02-10 | End: 2022-02-10

## 2022-02-10 RX ORDER — LISINOPRIL 20 MG/1
40 TABLET ORAL DAILY
Status: DISCONTINUED | OUTPATIENT
Start: 2022-02-10 | End: 2022-02-11 | Stop reason: HOSPADM

## 2022-02-10 RX ORDER — SODIUM CHLORIDE 9 MG/ML
INJECTION, SOLUTION INTRAVENOUS CONTINUOUS
Status: DISCONTINUED | OUTPATIENT
Start: 2022-02-10 | End: 2022-02-10

## 2022-02-10 RX ORDER — INSULIN GLARGINE 100 [IU]/ML
5 INJECTION, SOLUTION SUBCUTANEOUS NIGHTLY
Status: DISCONTINUED | OUTPATIENT
Start: 2022-02-10 | End: 2022-02-11 | Stop reason: HOSPADM

## 2022-02-10 RX ORDER — GABAPENTIN 100 MG/1
100 CAPSULE ORAL DAILY
Status: DISCONTINUED | OUTPATIENT
Start: 2022-02-10 | End: 2022-02-11 | Stop reason: HOSPADM

## 2022-02-10 RX ORDER — METOPROLOL TARTRATE 50 MG/1
50 TABLET, FILM COATED ORAL 2 TIMES DAILY
Status: DISCONTINUED | OUTPATIENT
Start: 2022-02-10 | End: 2022-02-11 | Stop reason: HOSPADM

## 2022-02-10 RX ADMIN — METOPROLOL TARTRATE 5 MG: 5 INJECTION INTRAVENOUS at 01:36

## 2022-02-10 RX ADMIN — AMITRIPTYLINE HYDROCHLORIDE 100 MG: 50 TABLET, FILM COATED ORAL at 20:05

## 2022-02-10 RX ADMIN — DOCUSATE SODIUM 100 MG: 100 CAPSULE, LIQUID FILLED ORAL at 07:38

## 2022-02-10 RX ADMIN — CELECOXIB 200 MG: 200 CAPSULE ORAL at 07:42

## 2022-02-10 RX ADMIN — FUROSEMIDE 20 MG: 10 INJECTION, SOLUTION INTRAMUSCULAR; INTRAVENOUS at 17:40

## 2022-02-10 RX ADMIN — ENOXAPARIN SODIUM 40 MG: 100 INJECTION SUBCUTANEOUS at 07:38

## 2022-02-10 RX ADMIN — HYDROCODONE BITARTRATE AND ACETAMINOPHEN 1 TABLET: 5; 325 TABLET ORAL at 17:41

## 2022-02-10 RX ADMIN — INSULIN LISPRO 1 UNITS: 100 INJECTION, SOLUTION INTRAVENOUS; SUBCUTANEOUS at 12:07

## 2022-02-10 RX ADMIN — HYDROCODONE BITARTRATE AND ACETAMINOPHEN 1 TABLET: 5; 325 TABLET ORAL at 07:38

## 2022-02-10 RX ADMIN — INSULIN LISPRO 2 UNITS: 100 INJECTION, SOLUTION INTRAVENOUS; SUBCUTANEOUS at 17:41

## 2022-02-10 RX ADMIN — METOPROLOL TARTRATE 25 MG: 25 TABLET, FILM COATED ORAL at 07:59

## 2022-02-10 RX ADMIN — SODIUM CHLORIDE, PRESERVATIVE FREE 10 ML: 5 INJECTION INTRAVENOUS at 01:36

## 2022-02-10 RX ADMIN — SODIUM CHLORIDE 1000 MG: 9 INJECTION, SOLUTION INTRAVENOUS at 09:47

## 2022-02-10 RX ADMIN — ATORVASTATIN CALCIUM 20 MG: 20 TABLET, FILM COATED ORAL at 07:38

## 2022-02-10 RX ADMIN — LISINOPRIL 40 MG: 20 TABLET ORAL at 13:06

## 2022-02-10 RX ADMIN — PANTOPRAZOLE SODIUM 40 MG: 40 TABLET, DELAYED RELEASE ORAL at 06:50

## 2022-02-10 RX ADMIN — SODIUM CHLORIDE: 9 INJECTION, SOLUTION INTRAVENOUS at 16:11

## 2022-02-10 RX ADMIN — INSULIN LISPRO 1 UNITS: 100 INJECTION, SOLUTION INTRAVENOUS; SUBCUTANEOUS at 07:39

## 2022-02-10 RX ADMIN — INSULIN GLARGINE 5 UNITS: 100 INJECTION, SOLUTION SUBCUTANEOUS at 20:04

## 2022-02-10 RX ADMIN — ACETAMINOPHEN 650 MG: 325 TABLET, FILM COATED ORAL at 03:49

## 2022-02-10 RX ADMIN — METOPROLOL TARTRATE 5 MG: 5 INJECTION INTRAVENOUS at 11:04

## 2022-02-10 RX ADMIN — SODIUM CHLORIDE, PRESERVATIVE FREE 10 ML: 5 INJECTION INTRAVENOUS at 20:06

## 2022-02-10 RX ADMIN — GABAPENTIN 100 MG: 100 CAPSULE ORAL at 09:44

## 2022-02-10 RX ADMIN — INSULIN LISPRO 2 UNITS: 100 INJECTION, SOLUTION INTRAVENOUS; SUBCUTANEOUS at 20:04

## 2022-02-10 RX ADMIN — ALBUTEROL SULFATE 2 PUFF: 90 AEROSOL, METERED RESPIRATORY (INHALATION) at 16:19

## 2022-02-10 RX ADMIN — METOPROLOL TARTRATE 50 MG: 50 TABLET ORAL at 20:04

## 2022-02-10 ASSESSMENT — ENCOUNTER SYMPTOMS
ABDOMINAL DISTENTION: 0
CHEST TIGHTNESS: 0
ABDOMINAL PAIN: 0
COUGH: 0
VOMITING: 0
CONSTIPATION: 0
SHORTNESS OF BREATH: 0
WHEEZING: 0
DIARRHEA: 0
NAUSEA: 0
BACK PAIN: 0

## 2022-02-10 ASSESSMENT — PAIN DESCRIPTION - LOCATION
LOCATION: HEAD
LOCATION: HEAD

## 2022-02-10 ASSESSMENT — PAIN SCALES - GENERAL
PAINLEVEL_OUTOF10: 0
PAINLEVEL_OUTOF10: 0
PAINLEVEL_OUTOF10: 8
PAINLEVEL_OUTOF10: 7
PAINLEVEL_OUTOF10: 5
PAINLEVEL_OUTOF10: 7
PAINLEVEL_OUTOF10: 0
PAINLEVEL_OUTOF10: 5
PAINLEVEL_OUTOF10: 5

## 2022-02-10 ASSESSMENT — PAIN DESCRIPTION - ORIENTATION: ORIENTATION: MID

## 2022-02-10 ASSESSMENT — PAIN DESCRIPTION - PAIN TYPE
TYPE: ACUTE PAIN
TYPE: ACUTE PAIN

## 2022-02-10 ASSESSMENT — PAIN DESCRIPTION - DESCRIPTORS
DESCRIPTORS: HEADACHE
DESCRIPTORS: HEADACHE

## 2022-02-10 ASSESSMENT — PAIN DESCRIPTION - FREQUENCY: FREQUENCY: INTERMITTENT

## 2022-02-10 NOTE — PROGRESS NOTES
[facility] Neurology   IN-PATIENT SERVICE      NEUROLOGY PROGRESS  NOTE            Date:   2/10/2022  Patient name:  Ana Hou  Date of admission:  2/8/2022  YOB: 1934      Interval History:     Patient stated she feels much stronger and better, eating well too  Able to walk better, no more headache    History of Present Illness: The patient is a 80 y.o. female who presents with Dizziness  . The patient was seen and examined and the chart was reviewed. 80year old woman who was told she had MS when she was 44year old, she was never on any DMT, over the decades she reported that she got big dose steroid that make her feel better when she had a flare. Other significant hx include chronic daily headache and patient take norco 3 times per day for it. She also had recent UTI and was found to have a small UTI on admission.     Pt also f/u neurologist in [address] Zenaida Wayne.     Patient is very tangential upon interviewing, she easily change the direction of the question. What I could get from her was that she c/o worsening gait and generalized weakness, she also c/o worsening headache. The headache largely resolved after pt received 500mg of methylprednisone.      As pt is not apparent a good historian, most of the history was obtained from chart reviews and old neuro images reviews.     Symptom History (from prior notes from Dr. Gena Navarro from [address]):    [address]: She was diagnosed with Bell's palsy affecting the left side of her face with incomplete recovery. 2. 1983: She had symptoms of dizziness followed by double vision and headache. She developed tingling in her right leg which then involved her right arm and an area behind her right ear. She became unresponsive and fell to the floor, and was taken to [facility]. She continued to be dizzy and was later found to be weak on the left side of her body.  She indicates that she underwent brain and spine imaging and had other testing done and was told that she has MS.     3. Since then, left hemiparesis persists. Diplopia resoved after a few years, until 2008, when she again developed double vision. She saw an ophthalmologist who added prism to her glasses. In August 2009, even with the prism, she was having double vision, so he furthered adjusted the prisms, so that currently with the glasses she denies any double vision. She continued to have headaches since then. Initially her headaches were not daily and she was on Tylenol, Xanax and Flexeril more than 10 years ago. She has been seen by Dr. Nils Sebastian (neurologist) in [id number]/[id number] who took her off the Xanax. For the headaches, she was initially started on Pamelor with which she developed oral ulcerations, so it was discontinued. She was then started on Elavil and the dose of Elavil was subsequently increased. She has been using Tylenol No. 4 over more than 10-15 years, daily for her headaches. Her headaches have become daily headaches for the last 5 to 6 years. They mainly start from the neck, involve the occipital region and then spread to the top of the head and behind the eyes. She wakes up in the morning with a headache. She describes them as a dull pain, about 9 out of 10. Sometimes she gets nausea with them. Currently she takes about 1 to 2 tablets of Tylenol No. 4 during the day and on some days she uses 1 tablet of Valium 5 mg to help with the headaches. As per her, Tylenol No. 4 sometime decreases the headache intensity from 9 to 7 or 8. She takes amitriptyline about 100 mg at bed time which helps her to go to sleep, but she mentions when she lies down her head is still hurting and she tries not to lie on the left side of the head. She is able to sleep well at night. 4. 2003: She also had a complaint of neck pain going to her left upper extremity.  She underwent C4-C5, C5-C6 cervical fusion in 2003 and she states that after the surgery her arm pain was resolved, but she continues to have daily headaches. In 1983, she was diagnosed with a possible multiple sclerosis. She mentions that she had a workup for MS at that time at [facility], and after LP, was told she had MS. We do not have any records to confirm that. As per her, she was never started on any medication for multiple sclerosis. She denies any history of visual loss. She denies any dysphagia or dysarthria. She denies any pain in her extremities or muscle spasms. She has decreased range of motion in her neck and feels a lot of stiffness in her neck muscles. She does complain of urgency of micturition and wears a pad all the time. She reports that she had prolapse of the bladder for which she had bladder suspension surgery in 1990's, but after 8 months again she had the bladder prolapse and since then she feels the urgency. She also complains of numbness in both feet up to the mid level since last 4 to 5 years, but denies any numbness or tingling in her legs, arms or hands. 5. 9/2021: She developed bilateral leg weakness, imbalance, dizziness, diplopia and dysphagia and sought medical attention. She was hospitalized for a week, then a week of rehab, both at [facility]. She was treated with intravenouos steroids while in hospital. After rehab, she is seeing physical therapy once a week. Symptoms improved except for left leg residual weakness. She required catheterization (urinary retention; symptoms resolved. Since 1995 or 2000, she has used only Tylenol No. 4 and amitriptyline for headache treatment; she denies using any other medication. She has tried physical therapy and aquatherapy in the past and she mentions that heat and massage therapy gives her a spinning sensation, so she tries to avoid the heat. Also she mentions that sometimes she gets a feeling of warmth from her shoulders going upwards. She becomes dizzy and sits down, but denied any loss of consciousness.       Past Medical History:     Past Medical History:   Diagnosis Date    Acute cystitis without hematuria 10/1/2017    Arthritis     Chronic daily headache     GERD (gastroesophageal reflux disease)     Hyperlipidemia     Hypertension     Moderate malnutrition ([facility] 3/2/2019    Multiple sclerosis ([facility]     Neuropathy     Pneumonia 2017    states had double pneumonia    Vitamin D deficiency         Past Surgical History:     Past Surgical History:   Procedure Laterality Date    [facility]AL [facility]PLANT [facility] 2014    [facility]    CATARACT REMOVAL WITH IMPLANT Left 2014    [facility]    CERVICAL DISC SURGERY      CYSTOCELE REPAIR      HYSTERECTOMY      RECTOCELE REPAIR      SHOULDER ARTHROPLASTY Right 2017    SHOULDER SURGERY Right 2017        Medications during admission:      metoprolol tartrate  50 mg Oral BID    insulin glargine  5 Units SubCUTAneous Nightly    gabapentin  100 mg Oral Daily    [START ON 2022] enoxaparin  30 mg SubCUTAneous Daily    lisinopril  40 mg Oral Daily    methylPREDNISolone  1,000 mg IntraVENous Daily    insulin lispro  0-6 Units SubCUTAneous TID WC    insulin lispro  0-3 Units SubCUTAneous Nightly    amitriptyline  100 mg Oral Nightly    celecoxib  200 mg Oral Daily    docusate sodium  100 mg Oral Daily    pantoprazole  40 mg Oral QAM AC    atorvastatin  20 mg Oral Daily    sodium chloride flush  5-40 mL IntraVENous 2 times per day         Physical Exam:   BP (!) 187/99   Pulse 80   Temp 97.7 °F (36.5 °C) (Oral)   Resp 18   Ht 4' 11\" (1.499 m)   Wt 134 lb (60.8 kg)   SpO2 96%   BMI 27.06 kg/m²   Temp (24hrs), Av.6 °F (36.4 °C), Min:97.3 °F (36.3 °C), Max:98.2 °F (36.8 °C)          Neurological examination:    Mental status   Alert and oriented x 3; following all commands; speech is fluent, no dysarthria, aphasia.       Cranial nerves   II - visual fields intact to confrontation; pupils reactive  III, IV, VI - extraocular muscles intact; no MOJGAN; no nystagmus; no ptosis   V - normal facial sensation                                                               VII - mild decrease right nasolabial                                                            VIII - intact hearing                                                                             IX, X - symmetrical palate elevation                                               XI - symmetrical shoulder shrug                                                       XII - midline tongue without atrophy or fasciculation     Motor function  Strength: 5/5 RUE, 5/5 RLE, 5/5 LUE, 5/5  LLE  Normal bulk and tone. No tremors                      Sensory function Intact to touch, pin, vibration, proprioception throughout     Cerebellar Intact finger-nose-finger testing. Intact heel-shin testing. No dysdiadochokinesia present. Reflex function Not tested   Gait                  Not tested             Diagnostics:      Laboratory Testing:  CBC:   Recent Labs     02/08/22  1300 02/09/22  0535 02/10/22  0533   WBC 5.5 3.8 10.7   HGB 11.7* 12.9 10.6*    236 208     BMP:    Recent Labs     02/08/22  1300 02/09/22  0535 02/10/22  0533    140 132*   K 4.5 5.0 3.8    103 101   CO2 29 24 23   BUN 20 16 25*   CREATININE 1.06* 1.09* 1.20*   GLUCOSE 114* 224* 204*         Lab Results   Component Value Date    CHOL 235 (H) 01/04/2012    LDLCHOLESTEROL 86 05/21/2020    HDL 74 (A) 11/06/2021    TRIG 119 01/04/2012    ALT 21 02/08/2022    AST 18 02/08/2022    TSH 1.54 05/24/2019    INR 0.9 02/25/2021    LABA1C 6.4 11/06/2021    IUNTUOGF32 220 10/02/2017       No results found for: PHENYTOIN, PHENYTOIN, VALPROATE, CBMZ        Imaging/Diagnostics:      EEG:       No results found for this or any previous visit. No results found for this or any previous visit. No results found for this or any previous visit. No results found for this or any previous visit.     Results for orders placed during the hospital encounter of 07/11/20    MRI BRAIN WO CONTRAST    Narrative  EXAMINATION:  MRI OF THE BRAIN WITHOUT CONTRAST  7/11/2020 9:29 am    TECHNIQUE:  Multiplanar multisequence MRI of the brain was performed without the  administration of intravenous contrast.    COMPARISON:  10/02/2017. HISTORY:  ORDERING SYSTEM PROVIDED HISTORY: Transient ischemic attack (TIA)  TECHNOLOGIST PROVIDED HISTORY:  Reason for Exam: tia  Additional signs and symptoms: over the past few months she has had episodes  of facial droop and upper extremity numbness. Numbness sometimes affects  right and sometimes the left. FINDINGS:  INTRACRANIAL STRUCTURES/VENTRICLES: There is no acute infarct. No mass effect  or midline shift. No evidence of an acute intracranial hemorrhage. The  ventricles and sulci are normal in size and configuration. The  sellar/suprasellar regions appear unremarkable. The normal signal voids  within the major intracranial vessels appear maintained. There are scattered  periventricular and deep subcortical white matter T2/FLAIR hyperintensities,  consistent with microangiopathic change. There is mild parenchymal volume  loss. ORBITS: The visualized portion of the orbits demonstrate no acute abnormality. SINUSES: The visualized paranasal sinuses and mastoid air cells are well  aerated. BONES/SOFT TISSUES: The bone marrow signal intensity appears normal. The soft  tissues demonstrate no acute abnormality. Impression  No acute intracranial abnormality. Microangiopathic change. Results for orders placed during the hospital encounter of 10/01/17    MRI C/ Dilia 29    Narrative  Radiology exam is complete. No Radiologist dictation. Please follow up with ordering provider. No results found for this or any previous visit.     Results for orders placed during the hospital encounter of 02/08/22    CT Head WO Contrast    Narrative  EXAMINATION:  CT OF THE HEAD WITHOUT CONTRAST 2/8/2022 1:36 pm    TECHNIQUE:  CT of the head was performed without the administration of intravenous  contrast. Dose modulation, iterative reconstruction, and/or weight based  adjustment of the mA/kV was utilized to reduce the radiation dose to as low  as reasonably achievable. COMPARISON:  CT brain performed 02/25/2021. HISTORY:  ORDERING SYSTEM PROVIDED HISTORY: dizziness  TECHNOLOGIST PROVIDED HISTORY:  dizziness    Decision Support Exception - unselect if not a suspected or confirmed  emergency medical condition->Emergency Medical Condition (MA)  Reason for Exam: dizziness  Additional signs and symptoms: Pt states dizziness. NKI. Hx of MS and no hx  of surgery    FINDINGS:  BRAIN/VENTRICLES: There is no acute intracranial hemorrhage, mass effect, or  midline shift. There is satisfactory overall gray-white matter  differentiation. There is cerebral atrophy and chronic microvascular  disease. The ventricular structures are symmetric and unremarkable. The  infratentorial structures are unremarkable. ORBITS: The visualized portion of the orbits demonstrate no acute abnormality. SINUSES: The visualized paranasal sinuses and mastoid air cells demonstrate  no acute abnormality. SOFT TISSUES/SKULL:  No acute abnormality of the visualized skull or soft  tissues. Impression  Cerebral atrophy and chronic microvascular disease without acute intracranial  abnormality. RECOMMENDATIONS:  Unavailable        CT head:     CTA head and neck:     MRI brain:     Smith fingers on MRI     2D echo:      I personally reviewed all of the above medications, clinical laboratory, imaging and other diagnostic tests. Impression:      80year old woman with diagnosis of MS since 44years old, hx of UTI, depression, chronic daily headache, now p/w with 4 days of worsening headache, gait instability and generalized weakness.     Plan:     Much better after second dose of methylprednisone  It is quite likely that patient has MS exacerbation  I reviewed her old MRI brain, pictures pasted above, based on the MRI brain done in 10/2017, pt had multiple lesions at that time that most likely signified she had MS  I disagree with radiologist read.  Current MRI didn't show any acute lesions, but clearly re-demonstrate multiple lesions, T2 Flair clearly showing Verónica Jimmy fingers, which is a classic MS feature  Therefore despite her MS was never confirmed by any lab, she indeed met the criteria of MS diagnosis based on her symptoms and multiple MRI images  Given her current symptoms and pt was never on any DMT, will get her a repeat MRI brain w/wo contrast and MRI cervical w/wo contrast to look for new MS lesion  Will her place on methylprednisone 1g x  For total 5 doses, pt can get her dose in either acute rehab or outpatient  PT/OT and acute rehab consult  If pt is deemed to be safe for discharge from the PT/OT standpoint, she can get the remaining dose of pulse steroid in infusion center          Electronically signed by Corine Langley MD on 2/10/2022 at 1:21 PM      MD Arlette NayakN[facility] [address]  Neurology

## 2022-02-10 NOTE — PLAN OF CARE
Attempted to contact patient's daughter Radha Young at 2:45 pm to update her on patient's condition and current plan.  Will attempt to contact again later

## 2022-02-10 NOTE — PROGRESS NOTES
Report handoff received face-to-face from [address]. This RN to continue to monitor patient. Electronically signed by Kecia Paz RN.

## 2022-02-10 NOTE — PLAN OF CARE
Problem: Falls - Risk of:  Goal: Will remain free from falls  Description: Will remain free from falls  2/10/2022 0459 by Wisam Wilson RN  Outcome: Met This Shift     Problem: Falls - Risk of:  Goal: Absence of physical injury  Description: Absence of physical injury  Outcome: Met This Shift     Problem: Pain:  Goal: Pain level will decrease  Description: Pain level will decrease  2/10/2022 0459 by Wisam Wilson RN  Outcome: Ongoing     Problem: Pain:  Goal: Control of acute pain  Description: Control of acute pain  Outcome: Ongoing     Problem: Pain:  Goal: Control of chronic pain  Description: Control of chronic pain  Outcome: Ongoing     Problem: Skin Integrity:  Goal: Will show no infection signs and symptoms  Description: Will show no infection signs and symptoms  2/10/2022 0459 by Wisam Wilson RN  Outcome: Ongoing     Problem: Skin Integrity:  Goal: Absence of new skin breakdown  Description: Absence of new skin breakdown  Outcome: Ongoing     Problem: Musculor/Skeletal Functional Status  Goal: Highest potential functional level  2/10/2022 0459 by Wisam Wilson RN  Outcome: Ongoing     Problem: Musculor/Skeletal Functional Status  Goal: Absence of falls  Outcome: Ongoing

## 2022-02-10 NOTE — PROGRESS NOTES
Notified [facility] RICHARD, that per Dr Primitivo Arredondo pt is approp for ARU. Writer requested when pt will be medically ready for ARU. Cosme Arita states pt might be medically ready today or tomorrow. Writer requested d/c readmit be completed with continuation of DVT prophylaxis and [place] be called to [phone]. Writer will complete Admission Assessment in anticipation of pt's admit to ARU. Admission Assessment completed and Dr Primitivo Arredondo notified.

## 2022-02-10 NOTE — PROGRESS NOTES
Physical Therapy  Facility/Department: [facility] PROGRESSIVE CARE  Daily Treatment Note  NAME: Jessica Velasquez  : 1934  MRN: [id number]    Date of Service: 2/10/2022    Discharge Recommendations:  Patient would benefit from continued therapy after discharge        Assessment   Body structures, Functions, Activity limitations: Decreased functional mobility ; Decreased strength;Decreased safe awareness;Decreased endurance;Decreased balance;Decreased posture;Decreased cognition  History: Hx MS  REQUIRES PT FOLLOW UP: Yes     Patient Diagnosis(es): The primary encounter diagnosis was Urinary tract infection without hematuria, site unspecified. A diagnosis of Hx of multiple sclerosis ([address] was also pertinent to this visit. has a past medical history of Acute cystitis without hematuria, Arthritis, Chronic daily headache, GERD (gastroesophageal reflux disease), Hyperlipidemia, Hypertension, Moderate malnutrition ([address] Multiple sclerosis ([address] Neuropathy, Pneumonia, and Vitamin D deficiency. has a past surgical history that includes Cystocele repair; Rectocele repair; Hysterectomy; Cervical disc surgery; Cataract removal with implant (Right, 2014); Cataract removal with implant (Left, 2014); Total shoulder arthroplasty (Right, 2017); and shoulder surgery (Right, 2017). Restrictions  Restrictions/Precautions  Restrictions/Precautions: Fall Risk,General Precautions  Required Braces or Orthoses?: Yes (Pt has a back brace that she wears PRN)  Implants present? : Metal implants (R TSA)  Subjective   General  Additional Pertinent Hx: The patient is a 80 y.o. Non- / non  female who presents withDizziness and she is admitted to the hospital for the management of multiple sclerosis flareup and UTI. Patient was diagnosed with multiple sclerosis at the age of 44. Past medical history significant for multiple sclerosis, hypertension, chronic osteoarthritis of the spine.  Patient reportedly woke up this morning with worsening facial droop, severe headache, and an unsteady gait. She has history of chronic headaches but this was much worse. She took 2 Norco pills but that did not alleviate the headache. It is located frontally and in the back of her head and neck and throbbing. Patient was taking 10 mg of prednisone daily per her PCP for spinal stenosis and chronic vertebral fracture. She sees a pain medicine specialist who gives her spinal injections. He told her to stop taking the prednisone for 5 days prior to injection. This was her fifth day without taking steroids. In the ED she had a CT and a CTA done. CT showed no acute abnormalities. CTA head and neck shows no hemodynamically significant stenosis. Family / Caregiver Present: No  Subjective  Subjective: Pt seated in chair upon arrival. Pt stating she has a headache and her BP was high earlier today. Pt is pleasant and agreeable. Pt reports daughter is very helpful at home. General Comment  Comments: Writer took /86, informed RN Ileana Dose. Okay per RN to perform seated exercises this date as BP is still high. Pain Screening  Patient Currently in Pain: Yes  Pain Assessment  Pain Assessment: 0-10  Pain Level: 5  Pain Type: Acute pain  Pain Location: Head  Pain Descriptors: Headache  Vital Signs  Patient Currently in Pain: Yes          Objective   Bed mobility  Comment: Unable to asssess - pt seated in chair at start and end of session. Transfers  Comment: Did not attempt, as patient's BP high this date.            Other exercises  Other exercises?: Yes  Other exercises 1: Seated B LE exercises x10-15 reps  Other exercises 2: Seated functional reaching crossing midline task                                                      Goals  Short term goals  Time Frame for Short term goals: 5 to 7 visits  Short term goal 1: Pt able to perform bed mobility independently  Short term goal 2: Pt able to perform sit<>stand SBA  Short term goal 3: transfers CGA  Short term goal 4: Gait with Rolling walekr distance for 50 ft x 2 without taking standing break,   Patient Goals   Patient goals : I need more therapy.     Plan    Plan  Times per week: 5 to 6 x/week  Current Treatment Recommendations: Strengthening,Balance Training,Functional Mobility Training,Transfer Training,Endurance Training,Gait Training,Patient/Caregiver Education & Training,Safety Education & Training  Safety Devices  Type of devices: Left in chair,Gait belt,Call light within reach     Therapy Time   Individual Concurrent Group Co-treatment   Time In 1046         Time Out Kyrie BRENNAN Del Rosario [address] [place] [address]

## 2022-02-10 NOTE — PROGRESS NOTES
(w/RW at all times)  Transfer Assistance: Independent  Additional Comments: Son and daughter both work during the day, but daughter calls her every morning. History of current illness, per PM&R Consult:  Berto Jackson is a 80 y.o. RHD female admitted to [facility] on 2/8/2022.       Patient admitted for MS flareup with facial droop severe headache, unsteady gait, and UTI. She was diagnosed at 44years old. IM treating hypertension. Further microscopic analysis was not consistent with UTI and Rocephin was discontinued.      Neurology consulted - she follows as outpatient with Dr. Sunita Coelho. Treating with methylprednisone.      She reports that her MS exacerbations typically present as worsening SOB and BLE weakness. Prognosis: Fair    Current functional status for upper extremity ADLs:  UE Bathing: Stand by assistance  UE Dressing: Stand by assistance    Current functional status for lower extremity ADLs:  LE Bathing: Minimal assistance  LE Dressing: Moderate assistance (Inc'd time for socks, A to thread BLE, Min A in stdg)    Current functional status for bed, chair, wheelchair transfers:  Transfers  Sit to Stand: Contact guard assistance,Minimal Assistance  Stand to sit: Contact guard assistance  Bed to Chair: Contact guard assistance,Minimal assistance (LOB while standing up at EOB to adjust her undergarment)  Comment: Did not attempt, as patient's BP high this date. Current functional status for toilet transfers:   Minimal Assistance    Current functional status for locomotion:  Ambulation  Ambulation?: Yes  Ambulation 1  Surface: level tile  Device: Rolling Walker  Assistance: Minimal assistance,Contact guard assistance  Quality of Gait: Unsteady gait, minor LOB, requiring in A fo rbalance, fatiques easily, pt took one standing break due to fatique  Distance: 40 ft  Comments: Decreased awareness of deficts.       Current functional status for comprehension: Complete independence    Current functional status for expression: Complete independence    Current functional status for social interaction: Complete independence    Current functional status for problem solving: Complete independence    Current functional status for memory: Complete independence    Current Deficits R/T Impairment: Impaired Functional Mobility and Decreased ADLs    Required Therapy:   [x] Physical Therapy  [x] Occupational Therapy   [] Speech Therapy, as appropriate    Additional Services:    [x]     [] Recreational Therapy, as appropriate    [x] Nutrition    [] Dialysis  [] Cultural Needs Identified  [] Special Equipment Needs  [x] Other:  O2 as ordered    Rehab Justification:  Needs 3 hrs therapy per day or 15 hours per week:  Yes  Identified Rehab Nursing needs: Yes  Intense Interdisciplinary need:  Yes  Need for 24 hr physician supervision:  Yes  Measurable improved quality of life:  Yes  Willingness to participate:  Yes  Medical Necessity:  Yes  Patient able to tolerate care proposed:  Yes    Expected Discharge Destination/Functional Level:  Home with assist  Expected length of time to achieve that level of improvement: 1-2 weeks  Expected Post Discharge Treatments: Home with possible Home Care    Other information relevant to patient's care needs:  N/A    Acute Inpatient Rehabilitation Disclosure Statement will be provided to patient upon admission to ARU with patient's verbalization of understanding. I have reviewed and concur with the findings and results of the pre-admission screening assessment completed by the Inpatient Rehabilitation Admissions Coordinator.

## 2022-02-10 NOTE — PROGRESS NOTES
Rn notified residents of pt elevated BP. Pt BP at 11am 159/86 HR94, recheck BP at 12pm 187/99 HR 80. New orders: Lisinopril 40mg tab QD.

## 2022-02-10 NOTE — PLAN OF CARE
Problem: Falls - Risk of:  Goal: Will remain free from falls  Description: Will remain free from falls  Outcome: Ongoing     Problem: Pain:  Goal: Pain level will decrease  Description: Pain level will decrease  Outcome: Ongoing     Problem: Skin Integrity:  Goal: Will show no infection signs and symptoms  Description: Will show no infection signs and symptoms  Outcome: Ongoing     Problem: Musculor/Skeletal Functional Status  Goal: Highest potential functional level  Outcome: Ongoing

## 2022-02-10 NOTE — FLOWSHEET NOTE
Chaplains remain available for spiritual or emotional support as needed.       02/10/22 1116   Encounter Summary   Services provided to: Patient   Referral/Consult From: [facility] Family members   Continue Visiting   (2/10/2022)   Complexity of Encounter Low   Length of Encounter 15 minutes   Spiritual Assessment Completed Yes   Routine   Type Initial   Assessment Coping   Intervention Active listening;Nurtured hope;[place]   Outcome Expressed gratitude

## 2022-02-10 NOTE — CONSULTS
Physical Medicine & Rehabilitation  Consult Note      Admitting Physician:   Susan Romero MD    Primary Care Provider:   Shyanne Dinero MD     Reason for Consult:  Acute Inpatient Rehabilitation    Chief Complaint: MS relapse    History of Present Illness:  Referring Provider is requesting an evaluation for appropriate placement upon discharge from acute care. History from chart review and patient. Marivel Garber is a 80 y.o. RHD female admitted to [facility] on 2/8/2022. Patient admitted for MS flareup with facial droop severe headache, unsteady gait, and UTI. She was diagnosed at 44years old. IM treating hypertension. Further microscopic analysis was not consistent with UTI and Rocephin was discontinued. Neurology consulted - she follows as outpatient with Dr. Isabel Boucher. Treating with methylprednisone. She reports that her MS exacerbations typically present as worsening SOB and BLE weakness.      Review of Systems:  Constitutional: negative for anorexia, chills, fatigue, fevers, sweats and weight loss  Eyes: negative for redness and visual disturbance  Ears, nose, mouth, throat, and face: negative for earaches, sore throat and tinnitus  Respiratory: negative for cough and positive for shortness of breath  Cardiovascular: negative for chest pain, dyspnea and palpitations  Gastrointestinal: negative for abdominal pain, change in bowel habits, constipation, nausea and vomiting  Genitourinary:negative for dysuria, frequency, hesitancy and urinary incontinence  Integument/breast: negative for pruritus and rash  Musculoskeletal: negative for stiff joints  Neurological: negative for dizziness, headaches   Behavioral/Psych: negative for decreased appetite, depression and fatigue       Premorbid function:  Modified Independent    Current function:    PT:  Restrictions/Precautions: Fall Risk,General Precautions  Implants present? : Metal implants (R TSA)   Transfers  Sit to Stand: Contact guard assistance,Minimal Assistance  Stand to sit: Contact guard assistance  Bed to Chair: Contact guard assistance,Minimal assistance (LOB while standing up at EOB to adjust her undergarment)  Ambulation 1  Surface: level tile  Device: Rolling Walker  Assistance: Minimal assistance,Contact guard assistance  Quality of Gait: Unsteady gait, minor LOB, requiring in A fo rbalance, fatiques easily, pt took one standing break due to fatique  Distance: 40 ft  Comments: Decreased awareness of deficts. Transfers  Sit to Stand: Contact guard assistance,Minimal Assistance  Stand to sit: Contact guard assistance  Bed to Chair: Contact guard assistance,Minimal assistance (LOB while standing up at EOB to adjust her undergarment)  Ambulation  Ambulation?: Yes  Ambulation 1  Surface: level tile  Device: Rolling Walker  Assistance: Minimal assistance,Contact guard assistance  Quality of Gait: Unsteady gait, minor LOB, requiring in A fo rbalance, fatiques easily, pt took one standing break due to fatique  Distance: 40 ft  Comments: Decreased awareness of deficts. Surface: level tile  Ambulation 1  Surface: level tile  Device: Rolling Walker  Assistance: Minimal assistance,Contact guard assistance  Quality of Gait: Unsteady gait, minor LOB, requiring in A fo rbalance, fatiques easily, pt took one standing break due to fatique  Distance: 40 ft  Comments: Decreased awareness of deficts. OT:   ADL  Feeding: Independent  Grooming: Setup  UE Bathing: Stand by assistance  LE Bathing: Minimal assistance  UE Dressing: Stand by assistance  LE Dressing: Moderate assistance (Inc'd time for socks, A to thread BLE, Min A in stdg)  Toileting: Minimal assistance (for balance)  Additional Comments: Above levels based on pt report, observation, and clinical reasoning unless otherwise noted. Pt able to change socks at EOB with increased time and rest breaks throughout.   OT assisted with threading BLE into hospital briefs, and pt required Min A for balance while pulling them up over hips. Balance  Sitting Balance: Supervision (During dynamic reaching/bending, otherwise IND)  Standing Balance: Minimal assistance (w/RW, several small LOB when walking/pulling up briefs)      Functional Mobility  Functional Mobility Comments: Pt ambulated to door and back with RW and Min A for balance. Bed mobility  Comment: Pt sitting EOB when OT entered and left sitting up in bedside chair at end of session. Transfers  Stand Step Transfers: Minimal assistance  Stand Pivot Transfers: Minimal assistance  Sit to stand: Contact guard assistance  Stand to sit: Contact guard assistance  Transfer Comments: w/RW, decreased recognition of fall risk despite numerous LOB                 SLP:      Past Medical History:        Diagnosis Date    Acute cystitis without hematuria 10/1/2017    Arthritis     Chronic daily headache     GERD (gastroesophageal reflux disease)     Hyperlipidemia     Hypertension     Moderate malnutrition ([address] 3/2/2019    Multiple sclerosis ([facility] [address]     Neuropathy     Pneumonia 2017    states had double pneumonia    Vitamin D deficiency        Past Surgical History:        Procedure Laterality Date    CATARACT REMOVAL WITH IMPLANT Right 11/6/2014    [facility]    CATARACT REMOVAL WITH IMPLANT Left 12/2/2014    [facility]    CERVICAL DISC SURGERY      CYSTOCELE REPAIR      HYSTERECTOMY      RECTOCELE REPAIR      SHOULDER ARTHROPLASTY Right 04/20/2017    SHOULDER SURGERY Right 2017       Allergies:     Allergies   Allergen Reactions    Bactrim [Sulfamethoxazole-Trimethoprim] Other (See Comments)     BLISTERS IN MOUTH    Lactose Intolerance (Gi) Diarrhea    Pcn [Penicillins] Hives    Lidoderm [Lidocaine] Rash    Macrobid [Nitrofurantoin] Nausea And Vomiting        Current Medications:   Current Facility-Administered Medications: metoprolol tartrate (LOPRESSOR) tablet 50 mg, 50 mg, Oral, BID  insulin glargine (LANTUS) injection vial 5 Units, 5 Units, SubCUTAneous, Nightly  gabapentin (NEURONTIN) capsule 100 mg, 100 mg, Oral, Daily  methylPREDNISolone sodium (SOLU-MEDROL) 1,000 mg in sodium chloride 0.9 % 250 mL IVPB, 1,000 mg, IntraVENous, Daily  insulin lispro (HUMALOG) injection vial 0-6 Units, 0-6 Units, SubCUTAneous, TID WC  insulin lispro (HUMALOG) injection vial 0-3 Units, 0-3 Units, SubCUTAneous, Nightly  glucose (GLUTOSE) 40 % oral gel 15 g, 15 g, Oral, PRN  dextrose 50 % IV solution, 12.5 g, IntraVENous, PRN  glucagon (rDNA) injection 1 mg, 1 mg, IntraMUSCular, PRN  dextrose 5 % solution, 100 mL/hr, IntraVENous, PRN  sodium chloride flush 0.9 % injection 10 mL, 10 mL, IntraVENous, PRN  sodium chloride flush 0.9 % injection 10 mL, 10 mL, IntraVENous, PRN  amitriptyline (ELAVIL) tablet 100 mg, 100 mg, Oral, Nightly  albuterol sulfate  (90 Base) MCG/ACT inhaler 2 puff, 2 puff, Inhalation, Q6H PRN  celecoxib (CELEBREX) capsule 200 mg, 200 mg, Oral, Daily  docusate sodium (COLACE) capsule 100 mg, 100 mg, Oral, Daily  HYDROcodone-acetaminophen (NORCO) 5-325 MG per tablet 1 tablet, 1 tablet, Oral, Q8H PRN  pantoprazole (PROTONIX) tablet 40 mg, 40 mg, Oral, QAM AC  atorvastatin (LIPITOR) tablet 20 mg, 20 mg, Oral, Daily  traMADol (ULTRAM) tablet 50 mg, 50 mg, Oral, Q6H PRN  sodium chloride flush 0.9 % injection 5-40 mL, 5-40 mL, IntraVENous, 2 times per day  sodium chloride flush 0.9 % injection 5-40 mL, 5-40 mL, IntraVENous, PRN  0.9 % sodium chloride infusion, 25 mL, IntraVENous, PRN  enoxaparin (LOVENOX) injection 40 mg, 40 mg, SubCUTAneous, Daily  ondansetron (ZOFRAN-ODT) disintegrating tablet 4 mg, 4 mg, Oral, Q8H PRN **OR** ondansetron (ZOFRAN) injection 4 mg, 4 mg, IntraVENous, Q6H PRN  polyethylene glycol (GLYCOLAX) packet 17 g, 17 g, Oral, Daily PRN  acetaminophen (TYLENOL) tablet 650 mg, 650 mg, Oral, Q6H PRN **OR** acetaminophen (TYLENOL) suppository 650 mg, 650 mg, Rectal, Q6H PRN  0.9 % sodium chloride infusion, , IntraVENous, Continuous  metoprolol (LOPRESSOR) injection 5 mg, 5 mg, IntraVENous, Q6H PRN    Social History:  Lives With: Alone  Type of Home: House  Home Layout: Two level,Able to Live on Main level with bedroom/bathroom  Home Access: Stairs to enter with rails  Entrance Stairs - Number of Steps: 3  Entrance Stairs - Rails: Right  Bathroom Shower/Tub: Tub/Shower unit,Shower chair with back,Curtain  Bathroom Toilet: Standard  Bathroom Equipment: Grab bars in shower,Hand-held shower,Grab bars around toilet  Bathroom Accessibility: Accessible  Home Equipment: [address] Help From: Family  ADL Assistance: Independent (Daughter helps with showers if needed)  Homemaking Assistance: Needs assistance (Daughter A w/cleaning, shopping, and laundry)  Homemaking Responsibilities: Yes (Pt does microwave/simple meal prep)  Ambulation Assistance: Independent (w/RW at all times)  Transfer Assistance: Independent  Active : No  Patient's  Info: Pt's daughter  Occupation: Retired  IADL Comments: Pt sleeps in a flat bed. Additional Comments: Son and daughter both work during the day, but daughter calls her every morning. Social History     Socioeconomic History    Marital status:       Spouse name: Not on file    Number of children: 3    Years of education: Not on file    Highest education level: Not on file   Occupational History    Not on file   Tobacco Use    Smoking status: Never Smoker    Smokeless tobacco: Never Used   Vaping Use    Vaping Use: Never used   Substance and Sexual Activity    Alcohol use: No    Drug use: No    Sexual activity: Not on file   Other Topics Concern    Not on file   Social History Narrative    Not on file     Social Determinants of Health     Financial Resource Strain:     Difficulty of Paying Living Expenses: Not on file   Food Insecurity:     Worried About Running Out of Food in the Last Year: Not on file   Valencia Elizondo Ran Out of Food in the Last Year: Not on file   Transportation Needs:     Lack of Transportation (Medical): Not on file    Lack of Transportation (Non-Medical): Not on file   Physical Activity:     Days of Exercise per Week: Not on file    Minutes of Exercise per Session: Not on file   Stress:     Feeling of Stress : Not on file   Social Connections:     Frequency of Communication with Friends and Family: Not on file    Frequency of Social Gatherings with Friends and Family: Not on file    Attends Hindu Services: Not on file    Active Member of [address] [place] [address] Numerous or Organizations: Not on file    Attends Club or Organization Meetings: Not on file    Marital Status: Not on file   Intimate Partner Violence:     Fear of Current or Ex-Partner: Not on file    Emotionally Abused: Not on file    Physically Abused: Not on file    Sexually Abused: Not on file   Housing Stability:     Unable to Pay for Housing in the Last Year: Not on file    Number of [place] in the Last Year: Not on file    Unstable Housing in the Last Year: Not on file       Family History:   No family history on file. Diagnostics:    MRI BRAIN 2/9/22  FINDINGS:   INTRACRANIAL STRUCTURES/VENTRICLES: [place] Sack is no acute infarct. Moderate   involutional changes.  Moderate periventricular subcorticalT2 prolongation   identified, which is nonspecific.  No shift of midline structure basal   cisterns are patent.  There is motion degradation which challenge evaluation.    Major intracranial vascular flow voids are preserved.       administration no abnormal postcontrast enhancement identified.       ORBITS: Cataract surgery.       SINUSES: The visualized paranasal sinuses and mastoid air cells demonstrate   no acute abnormality.       BONES/SOFT TISSUES: The bone marrow signal intensity appears normal. The soft   tissues demonstrate no acute abnormality.           Impression   Motion degradation challenge evaluation.  No acute stroke, midline shift or   mass effect.       There is no change in the chronic microvascular disease.  The findings are   nonspecific, favor chronic small vessel ischemic disease rather than   underlying demyelinating process. CBC:   Recent Labs     02/08/22  1300 02/09/22  0535 02/10/22  0533   WBC 5.5 3.8 10.7   RBC 3.76* 4.14 3.33*   HGB 11.7* 12.9 10.6*   HCT 34.2* 37.8 30.1*   MCV 90.8 91.1 90.5   RDW 13.7 13.8 13.7    236 208     BMP:   Recent Labs     02/08/22  1300 02/09/22  0535 02/10/22  0533    140 132*   K 4.5 5.0 3.8    103 101   CO2 29 24 23   BUN 20 16 25*   CREATININE 1.06* 1.09* 1.20*   GLUCOSE 114* 224* 204*      HbA1c:   Lab Results   Component Value Date    LABA1C 6.4 11/06/2021     BNP: No results for input(s): BNP in the last 72 hours. PT/INR: No results for input(s): PROTIME, INR in the last 72 hours. APTT: No results for input(s): APTT in the last 72 hours. CARDIAC ENZYMES: No results for input(s): CKMB, CKMBINDEX, TROPONINT in the last 72 hours. Invalid input(s): CKTOTAL;3 troponins    FASTING LIPID PANEL:  Lab Results   Component Value Date    CHOL 235 (H) 01/04/2012    HDL 74 (A) 11/06/2021    TRIG 119 01/04/2012     LIVER PROFILE:   Recent Labs     02/08/22  1300   AST 18   ALT 21   BILITOT 0.27*   ALKPHOS 47          Physical Exam:  BP (!) 174/96   Pulse 98   Temp 97.7 °F (36.5 °C) (Oral)   Resp 18   Ht 4' 11\" (1.499 m)   Wt 134 lb (60.8 kg)   SpO2 96%   BMI 27.06 kg/m²     GEN: Well developed, well nourished, no acute distress. HEENT: NCAT, PERRL, EOMI, mucous membranes pink and moist.  RESP: Lungs clear to auscultation. No rales or rhonchi. Respirations WNL and unlabored. CV: Regular rate rhythm. No murmurs, rubs, or gallops. ABD: soft, non-distended, non-tender. BS+ and equal.  NEURO: A&O x 3. Sensation intact to light touch. DTRs 2+  MSK: Functional ROM. Muscle tone and bulk are normal bilaterally. Strength 4+/5 key muscles BUEs. 4/5 key muscles BLEs.    EXT: No

## 2022-02-10 NOTE — FLOWSHEET NOTE
02/10/22 0130   Vital Signs   Temp 98.2 °F (36.8 °C)   Temp Source Oral   Pulse 91   Heart Rate Source Monitor   Resp 16   BP (!) 164/85   BP Location Right upper arm   MAP (mmHg) 107   Patient Position Semi fowlers   Level of Consciousness Alert (0)   MEWS Score 1   Patient Currently in Pain Denies   Pain Assessment   Pain Assessment 0-10   Pain Level 0   Patient's Stated Pain Goal No pain   Oxygen Therapy   SpO2 93 %   Pulse Oximeter Device Mode Intermittent   Pulse Oximeter Device Location Finger   O2 Device None (Room air)     PRN metoprolol given after this RN assessed patient and measured vital signs. Repeat BP to be obtained 2 hours after med admin. RN will continue to monitor. Electronically signed by Bill Nugent RN.

## 2022-02-10 NOTE — PLAN OF CARE
Problem: Falls - Risk of:  Goal: Will remain free from falls  Description: Will remain free from falls  2/10/2022 1814 by Shakeel Hawkins RN  Outcome: Ongoing     Problem: Pain:  Goal: Pain level will decrease  Description: Pain level will decrease  2/10/2022 1814 by Shakeel Hawkins RN  Outcome: Ongoing     Problem: Skin Integrity:  Goal: Will show no infection signs and symptoms  Description: Will show no infection signs and symptoms  2/10/2022 1814 by Shakeel Hawkins RN  Outcome: Ongoing     Problem: Musculor/Skeletal Functional Status  Goal: Highest potential functional level  2/10/2022 1814 by Shakeel Hawkins RN  Outcome: Ongoing

## 2022-02-11 ENCOUNTER — APPOINTMENT (OUTPATIENT)
Dept: NON INVASIVE DIAGNOSTICS | Age: 87
DRG: 058 | End: 2022-02-11
Payer: MEDICARE

## 2022-02-11 ENCOUNTER — HOSPITAL ENCOUNTER (INPATIENT)
Age: 87
LOS: 10 days | Discharge: HOME | DRG: 059 | End: 2022-02-21
Attending: PHYSICAL MEDICINE & REHABILITATION | Admitting: PHYSICAL MEDICINE & REHABILITATION
Payer: MEDICARE

## 2022-02-11 VITALS
DIASTOLIC BLOOD PRESSURE: 72 MMHG | WEIGHT: 134 LBS | TEMPERATURE: 97.7 F | HEIGHT: 59 IN | HEART RATE: 79 BPM | BODY MASS INDEX: 27.01 KG/M2 | SYSTOLIC BLOOD PRESSURE: 139 MMHG | OXYGEN SATURATION: 96 % | RESPIRATION RATE: 16 BRPM

## 2022-02-11 DIAGNOSIS — M54.42 CHRONIC MIDLINE LOW BACK PAIN WITH LEFT-SIDED SCIATICA: ICD-10-CM

## 2022-02-11 DIAGNOSIS — E78.5 HYPERLIPIDEMIA, UNSPECIFIED HYPERLIPIDEMIA TYPE: ICD-10-CM

## 2022-02-11 DIAGNOSIS — G89.29 CHRONIC MIDLINE LOW BACK PAIN WITH LEFT-SIDED SCIATICA: ICD-10-CM

## 2022-02-11 DIAGNOSIS — M15.9 GENERALIZED OSTEOARTHRITIS: ICD-10-CM

## 2022-02-11 DIAGNOSIS — K21.9 GASTROESOPHAGEAL REFLUX DISEASE: ICD-10-CM

## 2022-02-11 DIAGNOSIS — G62.9 PERIPHERAL POLYNEUROPATHY: ICD-10-CM

## 2022-02-11 LAB
ANION GAP SERPL CALCULATED.3IONS-SCNC: 14 MMOL/L (ref 9–17)
BNP INTERPRETATION: ABNORMAL
BUN BLDV-MCNC: 28 MG/DL (ref 8–23)
BUN/CREAT BLD: ABNORMAL (ref 9–20)
CALCIUM SERPL-MCNC: 8 MG/DL (ref 8.6–10.4)
CHLORIDE BLD-SCNC: 100 MMOL/L (ref 98–107)
CO2: 21 MMOL/L (ref 20–31)
CREAT SERPL-MCNC: 1.24 MG/DL (ref 0.5–0.9)
GFR AFRICAN AMERICAN: 50 ML/MIN
GFR NON-AFRICAN AMERICAN: 41 ML/MIN
GFR SERPL CREATININE-BSD FRML MDRD: ABNORMAL ML/MIN/{1.73_M2}
GFR SERPL CREATININE-BSD FRML MDRD: ABNORMAL ML/MIN/{1.73_M2}
GLUCOSE BLD-MCNC: 112 MG/DL (ref 65–105)
GLUCOSE BLD-MCNC: 146 MG/DL (ref 65–105)
GLUCOSE BLD-MCNC: 164 MG/DL (ref 65–105)
GLUCOSE BLD-MCNC: 169 MG/DL (ref 65–105)
GLUCOSE BLD-MCNC: 184 MG/DL (ref 70–99)
GLUCOSE BLD-MCNC: 256 MG/DL (ref 65–105)
HCT VFR BLD CALC: 34.2 % (ref 36–46)
HEMOGLOBIN: 11.6 G/DL (ref 12–16)
LV EF: 55 %
LVEF MODALITY: NORMAL
MCH RBC QN AUTO: 31.2 PG (ref 26–34)
MCHC RBC AUTO-ENTMCNC: 33.8 G/DL (ref 31–37)
MCV RBC AUTO: 92.4 FL (ref 80–100)
NRBC AUTOMATED: ABNORMAL PER 100 WBC
PDW BLD-RTO: 14 % (ref 11.5–14.9)
PLATELET # BLD: 245 K/UL (ref 150–450)
PMV BLD AUTO: 7.9 FL (ref 6–12)
POTASSIUM SERPL-SCNC: 3.6 MMOL/L (ref 3.7–5.3)
PRO-BNP: 4953 PG/ML
RBC # BLD: 3.71 M/UL (ref 4–5.2)
SODIUM BLD-SCNC: 135 MMOL/L (ref 135–144)
WBC # BLD: 12.8 K/UL (ref 3.5–11)

## 2022-02-11 PROCEDURE — 6360000002 HC RX W HCPCS: Performed by: PSYCHIATRY & NEUROLOGY

## 2022-02-11 PROCEDURE — 99232 SBSQ HOSP IP/OBS MODERATE 35: CPT | Performed by: PSYCHIATRY & NEUROLOGY

## 2022-02-11 PROCEDURE — 1180000000 HC REHAB R&B

## 2022-02-11 PROCEDURE — 6370000000 HC RX 637 (ALT 250 FOR IP)

## 2022-02-11 PROCEDURE — 2500000003 HC RX 250 WO HCPCS: Performed by: STUDENT IN AN ORGANIZED HEALTH CARE EDUCATION/TRAINING PROGRAM

## 2022-02-11 PROCEDURE — 1200000000 HC SEMI PRIVATE

## 2022-02-11 PROCEDURE — 97110 THERAPEUTIC EXERCISES: CPT

## 2022-02-11 PROCEDURE — 6360000002 HC RX W HCPCS

## 2022-02-11 PROCEDURE — 97116 GAIT TRAINING THERAPY: CPT

## 2022-02-11 PROCEDURE — 6370000000 HC RX 637 (ALT 250 FOR IP): Performed by: INTERNAL MEDICINE

## 2022-02-11 PROCEDURE — 99239 HOSP IP/OBS DSCHRG MGMT >30: CPT | Performed by: INTERNAL MEDICINE

## 2022-02-11 PROCEDURE — 85027 COMPLETE CBC AUTOMATED: CPT

## 2022-02-11 PROCEDURE — 97530 THERAPEUTIC ACTIVITIES: CPT

## 2022-02-11 PROCEDURE — 83880 ASSAY OF NATRIURETIC PEPTIDE: CPT

## 2022-02-11 PROCEDURE — 2580000003 HC RX 258

## 2022-02-11 PROCEDURE — 97535 SELF CARE MNGMENT TRAINING: CPT

## 2022-02-11 PROCEDURE — 82947 ASSAY GLUCOSE BLOOD QUANT: CPT

## 2022-02-11 PROCEDURE — 36415 COLL VENOUS BLD VENIPUNCTURE: CPT

## 2022-02-11 PROCEDURE — 93306 TTE W/DOPPLER COMPLETE: CPT

## 2022-02-11 PROCEDURE — 80048 BASIC METABOLIC PNL TOTAL CA: CPT

## 2022-02-11 PROCEDURE — 2580000003 HC RX 258: Performed by: PSYCHIATRY & NEUROLOGY

## 2022-02-11 RX ORDER — LISINOPRIL 20 MG/1
40 TABLET ORAL DAILY
Status: DISCONTINUED | OUTPATIENT
Start: 2022-02-12 | End: 2022-02-21 | Stop reason: HOSPADM

## 2022-02-11 RX ORDER — METOPROLOL TARTRATE 50 MG/1
50 TABLET, FILM COATED ORAL 2 TIMES DAILY
Status: CANCELLED | OUTPATIENT
Start: 2022-02-11

## 2022-02-11 RX ORDER — ACETAMINOPHEN 650 MG/1
650 SUPPOSITORY RECTAL EVERY 6 HOURS PRN
Status: CANCELLED | OUTPATIENT
Start: 2022-02-11

## 2022-02-11 RX ORDER — POLYETHYLENE GLYCOL 3350 17 G/17G
17 POWDER, FOR SOLUTION ORAL DAILY
Status: DISCONTINUED | OUTPATIENT
Start: 2022-02-11 | End: 2022-02-14

## 2022-02-11 RX ORDER — AMLODIPINE BESYLATE 10 MG/1
10 TABLET ORAL DAILY
Status: DISCONTINUED | OUTPATIENT
Start: 2022-02-12 | End: 2022-02-21 | Stop reason: HOSPADM

## 2022-02-11 RX ORDER — AMITRIPTYLINE HYDROCHLORIDE 50 MG/1
100 TABLET, FILM COATED ORAL NIGHTLY
Status: DISCONTINUED | OUTPATIENT
Start: 2022-02-11 | End: 2022-02-21 | Stop reason: HOSPADM

## 2022-02-11 RX ORDER — DOCUSATE SODIUM 100 MG/1
100 CAPSULE, LIQUID FILLED ORAL DAILY
Status: DISCONTINUED | OUTPATIENT
Start: 2022-02-12 | End: 2022-02-14

## 2022-02-11 RX ORDER — POLYETHYLENE GLYCOL 3350 17 G/17G
17 POWDER, FOR SOLUTION ORAL DAILY PRN
Status: DISCONTINUED | OUTPATIENT
Start: 2022-02-11 | End: 2022-02-20

## 2022-02-11 RX ORDER — ACETAMINOPHEN 650 MG/1
650 SUPPOSITORY RECTAL EVERY 6 HOURS PRN
Status: DISCONTINUED | OUTPATIENT
Start: 2022-02-11 | End: 2022-02-11

## 2022-02-11 RX ORDER — DEXTROSE MONOHYDRATE 50 MG/ML
100 INJECTION, SOLUTION INTRAVENOUS PRN
Status: DISCONTINUED | OUTPATIENT
Start: 2022-02-11 | End: 2022-02-21 | Stop reason: HOSPADM

## 2022-02-11 RX ORDER — LISINOPRIL 20 MG/1
40 TABLET ORAL DAILY
Status: CANCELLED | OUTPATIENT
Start: 2022-02-11

## 2022-02-11 RX ORDER — GABAPENTIN 100 MG/1
100 CAPSULE ORAL DAILY
Status: DISCONTINUED | OUTPATIENT
Start: 2022-02-12 | End: 2022-02-21 | Stop reason: HOSPADM

## 2022-02-11 RX ORDER — DEXTROSE MONOHYDRATE 50 MG/ML
100 INJECTION, SOLUTION INTRAVENOUS PRN
Status: CANCELLED | OUTPATIENT
Start: 2022-02-11

## 2022-02-11 RX ORDER — ACETAMINOPHEN 325 MG/1
650 TABLET ORAL EVERY 6 HOURS PRN
Status: CANCELLED | OUTPATIENT
Start: 2022-02-11

## 2022-02-11 RX ORDER — SODIUM CHLORIDE 0.9 % (FLUSH) 0.9 %
5-40 SYRINGE (ML) INJECTION EVERY 12 HOURS SCHEDULED
Status: CANCELLED | OUTPATIENT
Start: 2022-02-11

## 2022-02-11 RX ORDER — FUROSEMIDE 10 MG/ML
40 INJECTION INTRAMUSCULAR; INTRAVENOUS ONCE
Status: COMPLETED | OUTPATIENT
Start: 2022-02-11 | End: 2022-02-11

## 2022-02-11 RX ORDER — METOPROLOL TARTRATE 5 MG/5ML
5 INJECTION INTRAVENOUS EVERY 6 HOURS PRN
Status: CANCELLED | OUTPATIENT
Start: 2022-02-11

## 2022-02-11 RX ORDER — METOPROLOL TARTRATE 50 MG/1
50 TABLET, FILM COATED ORAL 2 TIMES DAILY
Status: DISCONTINUED | OUTPATIENT
Start: 2022-02-11 | End: 2022-02-21 | Stop reason: HOSPADM

## 2022-02-11 RX ORDER — ATORVASTATIN CALCIUM 20 MG/1
20 TABLET, FILM COATED ORAL DAILY
Status: CANCELLED | OUTPATIENT
Start: 2022-02-11

## 2022-02-11 RX ORDER — AMLODIPINE BESYLATE 10 MG/1
10 TABLET ORAL DAILY
Status: DISCONTINUED | OUTPATIENT
Start: 2022-02-11 | End: 2022-02-11 | Stop reason: HOSPADM

## 2022-02-11 RX ORDER — DOCUSATE SODIUM 100 MG/1
100 CAPSULE, LIQUID FILLED ORAL DAILY
Status: CANCELLED | OUTPATIENT
Start: 2022-02-11

## 2022-02-11 RX ORDER — SODIUM CHLORIDE 0.9 % (FLUSH) 0.9 %
5-40 SYRINGE (ML) INJECTION EVERY 12 HOURS SCHEDULED
Status: DISCONTINUED | OUTPATIENT
Start: 2022-02-11 | End: 2022-02-12

## 2022-02-11 RX ORDER — ONDANSETRON 2 MG/ML
4 INJECTION INTRAMUSCULAR; INTRAVENOUS EVERY 6 HOURS PRN
Status: CANCELLED | OUTPATIENT
Start: 2022-02-11

## 2022-02-11 RX ORDER — GABAPENTIN 100 MG/1
100 CAPSULE ORAL DAILY
Status: CANCELLED | OUTPATIENT
Start: 2022-02-11

## 2022-02-11 RX ORDER — NICOTINE POLACRILEX 4 MG
15 LOZENGE BUCCAL PRN
Status: CANCELLED | OUTPATIENT
Start: 2022-02-11

## 2022-02-11 RX ORDER — INSULIN GLARGINE 100 [IU]/ML
5 INJECTION, SOLUTION SUBCUTANEOUS NIGHTLY
Status: DISCONTINUED | OUTPATIENT
Start: 2022-02-11 | End: 2022-02-14

## 2022-02-11 RX ORDER — NICOTINE POLACRILEX 4 MG
15 LOZENGE BUCCAL PRN
Status: DISCONTINUED | OUTPATIENT
Start: 2022-02-11 | End: 2022-02-21 | Stop reason: HOSPADM

## 2022-02-11 RX ORDER — SODIUM CHLORIDE 0.9 % (FLUSH) 0.9 %
10 SYRINGE (ML) INJECTION PRN
Status: CANCELLED | OUTPATIENT
Start: 2022-02-11

## 2022-02-11 RX ORDER — TRAMADOL HYDROCHLORIDE 50 MG/1
50 TABLET ORAL EVERY 6 HOURS PRN
Status: CANCELLED | OUTPATIENT
Start: 2022-02-11

## 2022-02-11 RX ORDER — SENNA PLUS 8.6 MG/1
2 TABLET ORAL DAILY PRN
Status: DISCONTINUED | OUTPATIENT
Start: 2022-02-11 | End: 2022-02-21 | Stop reason: HOSPADM

## 2022-02-11 RX ORDER — ONDANSETRON 4 MG/1
4 TABLET, ORALLY DISINTEGRATING ORAL EVERY 8 HOURS PRN
Status: DISCONTINUED | OUTPATIENT
Start: 2022-02-11 | End: 2022-02-21 | Stop reason: HOSPADM

## 2022-02-11 RX ORDER — INSULIN GLARGINE 100 [IU]/ML
5 INJECTION, SOLUTION SUBCUTANEOUS NIGHTLY
Status: CANCELLED | OUTPATIENT
Start: 2022-02-11

## 2022-02-11 RX ORDER — ONDANSETRON 4 MG/1
4 TABLET, ORALLY DISINTEGRATING ORAL EVERY 8 HOURS PRN
Status: CANCELLED | OUTPATIENT
Start: 2022-02-11

## 2022-02-11 RX ORDER — ACETAMINOPHEN 325 MG/1
650 TABLET ORAL EVERY 6 HOURS PRN
Status: DISCONTINUED | OUTPATIENT
Start: 2022-02-11 | End: 2022-02-11

## 2022-02-11 RX ORDER — POLYETHYLENE GLYCOL 3350 17 G/17G
17 POWDER, FOR SOLUTION ORAL DAILY PRN
Status: CANCELLED | OUTPATIENT
Start: 2022-02-11

## 2022-02-11 RX ORDER — AMLODIPINE BESYLATE 10 MG/1
10 TABLET ORAL DAILY
Status: CANCELLED | OUTPATIENT
Start: 2022-02-11

## 2022-02-11 RX ORDER — BISACODYL 10 MG
10 SUPPOSITORY, RECTAL RECTAL DAILY PRN
Status: DISCONTINUED | OUTPATIENT
Start: 2022-02-11 | End: 2022-02-21 | Stop reason: HOSPADM

## 2022-02-11 RX ORDER — ACETAMINOPHEN 325 MG/1
650 TABLET ORAL EVERY 4 HOURS PRN
Status: DISCONTINUED | OUTPATIENT
Start: 2022-02-11 | End: 2022-02-21 | Stop reason: HOSPADM

## 2022-02-11 RX ORDER — HYDROCODONE BITARTRATE AND ACETAMINOPHEN 5; 325 MG/1; MG/1
1 TABLET ORAL EVERY 8 HOURS PRN
Status: DISCONTINUED | OUTPATIENT
Start: 2022-02-11 | End: 2022-02-13

## 2022-02-11 RX ORDER — SODIUM CHLORIDE 0.9 % (FLUSH) 0.9 %
10 SYRINGE (ML) INJECTION PRN
Status: DISCONTINUED | OUTPATIENT
Start: 2022-02-11 | End: 2022-02-21 | Stop reason: HOSPADM

## 2022-02-11 RX ORDER — PANTOPRAZOLE SODIUM 40 MG/1
40 TABLET, DELAYED RELEASE ORAL
Status: DISCONTINUED | OUTPATIENT
Start: 2022-02-12 | End: 2022-02-21 | Stop reason: HOSPADM

## 2022-02-11 RX ORDER — DEXTROSE MONOHYDRATE 25 G/50ML
12.5 INJECTION, SOLUTION INTRAVENOUS PRN
Status: CANCELLED | OUTPATIENT
Start: 2022-02-11

## 2022-02-11 RX ORDER — PANTOPRAZOLE SODIUM 40 MG/1
40 TABLET, DELAYED RELEASE ORAL
Status: CANCELLED | OUTPATIENT
Start: 2022-02-12

## 2022-02-11 RX ORDER — AMITRIPTYLINE HYDROCHLORIDE 50 MG/1
100 TABLET, FILM COATED ORAL NIGHTLY
Status: CANCELLED | OUTPATIENT
Start: 2022-02-11

## 2022-02-11 RX ORDER — DEXTROSE MONOHYDRATE 25 G/50ML
12.5 INJECTION, SOLUTION INTRAVENOUS PRN
Status: DISCONTINUED | OUTPATIENT
Start: 2022-02-11 | End: 2022-02-21 | Stop reason: HOSPADM

## 2022-02-11 RX ORDER — ATORVASTATIN CALCIUM 20 MG/1
20 TABLET, FILM COATED ORAL DAILY
Status: DISCONTINUED | OUTPATIENT
Start: 2022-02-12 | End: 2022-02-21 | Stop reason: HOSPADM

## 2022-02-11 RX ORDER — ONDANSETRON 2 MG/ML
4 INJECTION INTRAMUSCULAR; INTRAVENOUS EVERY 6 HOURS PRN
Status: DISCONTINUED | OUTPATIENT
Start: 2022-02-11 | End: 2022-02-21 | Stop reason: HOSPADM

## 2022-02-11 RX ORDER — ALBUTEROL SULFATE 90 UG/1
2 AEROSOL, METERED RESPIRATORY (INHALATION) EVERY 6 HOURS PRN
Status: DISCONTINUED | OUTPATIENT
Start: 2022-02-11 | End: 2022-02-21 | Stop reason: HOSPADM

## 2022-02-11 RX ORDER — HYDROCODONE BITARTRATE AND ACETAMINOPHEN 5; 325 MG/1; MG/1
1 TABLET ORAL EVERY 8 HOURS PRN
Status: CANCELLED | OUTPATIENT
Start: 2022-02-11

## 2022-02-11 RX ORDER — TRAMADOL HYDROCHLORIDE 50 MG/1
50 TABLET ORAL EVERY 6 HOURS PRN
Status: DISCONTINUED | OUTPATIENT
Start: 2022-02-11 | End: 2022-02-21 | Stop reason: HOSPADM

## 2022-02-11 RX ORDER — HYDROCODONE BITARTRATE AND ACETAMINOPHEN 5; 325 MG/1; MG/1
1 TABLET ORAL EVERY 8 HOURS PRN
Status: DISCONTINUED | OUTPATIENT
Start: 2022-02-11 | End: 2022-02-11 | Stop reason: SDUPTHER

## 2022-02-11 RX ORDER — METOPROLOL TARTRATE 5 MG/5ML
5 INJECTION INTRAVENOUS EVERY 6 HOURS PRN
Status: DISCONTINUED | OUTPATIENT
Start: 2022-02-11 | End: 2022-02-11

## 2022-02-11 RX ORDER — ALBUTEROL SULFATE 90 UG/1
2 AEROSOL, METERED RESPIRATORY (INHALATION) EVERY 6 HOURS PRN
Status: CANCELLED | OUTPATIENT
Start: 2022-02-11

## 2022-02-11 RX ADMIN — INSULIN LISPRO 2 UNITS: 100 INJECTION, SOLUTION INTRAVENOUS; SUBCUTANEOUS at 21:41

## 2022-02-11 RX ADMIN — PANTOPRAZOLE SODIUM 40 MG: 40 TABLET, DELAYED RELEASE ORAL at 05:52

## 2022-02-11 RX ADMIN — ATORVASTATIN CALCIUM 20 MG: 20 TABLET, FILM COATED ORAL at 08:08

## 2022-02-11 RX ADMIN — METOPROLOL TARTRATE 5 MG: 5 INJECTION INTRAVENOUS at 00:21

## 2022-02-11 RX ADMIN — INSULIN LISPRO 1 UNITS: 100 INJECTION, SOLUTION INTRAVENOUS; SUBCUTANEOUS at 11:58

## 2022-02-11 RX ADMIN — ENOXAPARIN SODIUM 30 MG: 100 INJECTION SUBCUTANEOUS at 08:07

## 2022-02-11 RX ADMIN — SODIUM CHLORIDE 1000 MG: 9 INJECTION, SOLUTION INTRAVENOUS at 10:13

## 2022-02-11 RX ADMIN — METOPROLOL TARTRATE 50 MG: 50 TABLET, FILM COATED ORAL at 21:17

## 2022-02-11 RX ADMIN — FUROSEMIDE 40 MG: 10 INJECTION, SOLUTION INTRAVENOUS at 10:35

## 2022-02-11 RX ADMIN — GABAPENTIN 100 MG: 100 CAPSULE ORAL at 08:08

## 2022-02-11 RX ADMIN — LISINOPRIL 40 MG: 20 TABLET ORAL at 08:08

## 2022-02-11 RX ADMIN — HYDROCODONE BITARTRATE AND ACETAMINOPHEN 1 TABLET: 5; 325 TABLET ORAL at 04:00

## 2022-02-11 RX ADMIN — INSULIN GLARGINE 5 UNITS: 100 INJECTION, SOLUTION SUBCUTANEOUS at 21:41

## 2022-02-11 RX ADMIN — CELECOXIB 200 MG: 200 CAPSULE ORAL at 08:07

## 2022-02-11 RX ADMIN — SODIUM CHLORIDE, PRESERVATIVE FREE 10 ML: 5 INJECTION INTRAVENOUS at 08:06

## 2022-02-11 RX ADMIN — TRAMADOL HYDROCHLORIDE 50 MG: 50 TABLET, COATED ORAL at 08:06

## 2022-02-11 RX ADMIN — METOPROLOL TARTRATE 50 MG: 50 TABLET ORAL at 08:08

## 2022-02-11 RX ADMIN — AMLODIPINE BESYLATE 10 MG: 10 TABLET ORAL at 04:20

## 2022-02-11 RX ADMIN — DOCUSATE SODIUM 100 MG: 100 CAPSULE, LIQUID FILLED ORAL at 08:08

## 2022-02-11 RX ADMIN — INSULIN LISPRO 1 UNITS: 100 INJECTION, SOLUTION INTRAVENOUS; SUBCUTANEOUS at 08:08

## 2022-02-11 RX ADMIN — AMITRIPTYLINE HYDROCHLORIDE 100 MG: 50 TABLET, FILM COATED ORAL at 21:17

## 2022-02-11 ASSESSMENT — PAIN SCALES - GENERAL
PAINLEVEL_OUTOF10: 7
PAINLEVEL_OUTOF10: 0
PAINLEVEL_OUTOF10: 0
PAINLEVEL_OUTOF10: 8
PAINLEVEL_OUTOF10: 0

## 2022-02-11 ASSESSMENT — ENCOUNTER SYMPTOMS
WHEEZING: 0
CHEST TIGHTNESS: 0
DIARRHEA: 0
ABDOMINAL DISTENTION: 0
COUGH: 0
BACK PAIN: 0
NAUSEA: 0
VOMITING: 0
ABDOMINAL PAIN: 0
SHORTNESS OF BREATH: 0
CONSTIPATION: 0

## 2022-02-11 NOTE — PROGRESS NOTES
RN called medicine residents and updated that if patient is to discharge to ARU today the discharge readmit will need to be redone ASAP. MD states will assess patient and complete orders.

## 2022-02-11 NOTE — PLAN OF CARE
Problem: Falls - Risk of:  Goal: Will remain free from falls  Description: Will remain free from falls  2/11/2022 1648 by Raymond Lin RN  Outcome: Ongoing     Problem: Falls - Risk of:  Goal: Absence of physical injury  Description: Absence of physical injury  2/11/2022 1648 by Raymond Lin RN  Outcome: Ongoing     Problem: Pain:  Goal: Pain level will decrease  Description: Pain level will decrease  2/11/2022 1648 by Raymond Lin RN  Outcome: Ongoing     Problem: Pain:  Goal: Control of acute pain  Description: Control of acute pain  2/11/2022 1648 by Raymond Lin RN  Outcome: Ongoing     Problem: Musculor/Skeletal Functional Status  Goal: Highest potential functional level  2/11/2022 1648 by Raymond Lin RN  Outcome: Ongoing

## 2022-02-11 NOTE — DISCHARGE SUMMARY
[address]    Discharge Summary     Patient ID: Melanie Melendez  :  1934   MRN: [id number]     ACCOUNT:  [de-identified]   Patient's PCP: Rodrick Andujar MD  Admit Date: 2022   Discharge Date: 2022  Length of Stay: 3  Code Status:  Full Code  Admitting Physician: Nitesh Lyon MD  Discharge Physician: Maribell Otero MD     Active Discharge Diagnoses:       Primary Problem  Multiple sclerosis [facility])      [place] Problems    Diagnosis Date Noted    Diastolic congestive heart failure ([facility] [I50.30] 02/10/2022    Recurrent urinary tract infection [N39.0] 2022    Chronic headache [R51.9, G89.29] 2021    Multiple sclerosis ([facility] [place] 10/01/2017       Admission Condition:  poor     Discharged Condition: stable    Hospital Stay:       Hospital Course:  Melanie Melendez is a 80 y.o. female who was admitted for the management of   Multiple sclerosis ([facility] , presented to ER with Dizziness    The patient is I 22 y.o.  Non- / non  female who presents with Dizziness   and she is admitted to the hospital for the management of multiple sclerosis exacerbation and UTI. Patient was diagnosed with multiple sclerosis at the age of 44. Past medical history significant for multiple sclerosis, hypertension, chronic osteoarthritis of the spine. Patient reportedly woke up this morning with worsening facial droop, severe headache, and an unsteady gait. She has history of chronic headaches but this was much worse. She took 2 Norco pills but that did not alleviate the headache. It is located frontally and in the back of her head and neck and throbbing. Patient was taking 10 mg of prednisone daily per her PCP for spinal stenosis and chronic vertebral fracture. She sees a pain medicine specialist who gives her spinal injections. He told her to stop taking the prednisone for 5 days prior to injection.  This was her fifth day without taking steroids. In the ED she had a CT and a CTA done. CT showed no acute abnormalities.  CTA head and neck shows no hemodynamically significant stenosis.     Urinalysis showed small leukocyte esterase. She has a history of UTI with Proteus mirabilis 4 months ago. Delcie Pleasure any dysuria, frequency, urgency.  Chronic intermittent urinary incontinence. Roni Sanders was started on Rocephin in the ED.     2/10: Headache is greatly improved with steroids. Facial droop improving as well and patient feels more strength in her legs. MRI brain and cervical spine with and without contrast ordered. Hypertensive today, will start Lopressor oral and as needed. Microscopic urinalysis not convincing for UTI, DC Rocephin.     2/11:  Patients headache slightly worse today from previous. Blood glucose still high with Sliding scale insulin, will add 5 units lantus and reassess. BP still elevated, increase lopressor to 50 BID. Complaining of neuropathic pain in her foot, for which she takes gabapentin 100 mg only when it gets bad. Patient became short of breath yesterday and went into respiratory distress. Chest x-ray showed clear volume overload. Given a dose of Lasix and patient's condition improved but held overnight to be reassessed this morning. proBNP was elevated.     Giving another dose of lasix. Patient's breathing improved. Cardiac echo was done but at the time of discharge was not resulted.   Patient stable for discharge to ARU      Significant therapeutic interventions: High-dose steroids 1 g Solu-Medrol daily for 5 days    Significant Diagnostic Studies:   Labs / Micro:  CBC:   Lab Results   Component Value Date    WBC 12.8 02/11/2022    RBC 3.71 02/11/2022    RBC 3.45 01/05/2012    HGB 11.6 02/11/2022    HCT 34.2 02/11/2022    MCV 92.4 02/11/2022    MCH 31.2 02/11/2022    MCHC 33.8 02/11/2022    RDW 14.0 02/11/2022     02/11/2022     01/05/2012     BMP:    Lab Results   Component Value Date GLUCOSE 184 02/11/2022    GLUCOSE 112 01/05/2012     02/11/2022    K 3.6 02/11/2022     02/11/2022    CO2 21 02/11/2022    ANIONGAP 14 02/11/2022    BUN 28 02/11/2022    CREATININE 1.24 02/11/2022    BUNCRER NOT REPORTED 02/11/2022    CALCIUM 8.0 02/11/2022    LABGLOM 41 02/11/2022    GFRAA 50 02/11/2022    GFR      02/11/2022    GFR NOT REPORTED 02/11/2022         Radiology:    ECHO Complete 2D W Doppler W Color    Result Date: 2/11/2022  [facility] Transthoracic Echocardiography Report (TTE)  Patient Name Vikki Davidson Date of Study               02/11/2022               C   Date of      1934  Gender                      Female  Birth   Age          80 year(s)  Race                           Room Number  2097        Height:                     59 inch, 149.86 cm   Corporate ID [id number]    Weight:                     134 pounds, 60.8 kg  #   Patient Acct [de-identified]   BSA:          1.56 m^2      BMI:      27.06  #                                                              kg/m^2   MR #         [id number]      Sonographer                 MerariyaniqueKim cox   Accession #  [id number]  Interpreting Physician      Miguelina Asher   Fellow                   Referring Nurse                           Practitioner   Interpreting             Referring Physician         Susie Tolliver  Fellow  Type of Study   TTE procedure:2D Echocardiogram, M-Mode, Doppler, Color Doppler. Procedure Date Date: 02/11/2022 Start: 01:49 PM Study Location: [address] [place] [address] Technical Quality: Fair visualization Indications:Congestive heart failure, diastolic dysfunction. Patient Status: Inpatient Height: 59 inches Weight: 134 pounds BSA: 1.56 m^2 BMI: 27.06 kg/m^2 Rhythm: Within normal limits HR: 80 bpm BP: 139/72 mmHg CONCLUSIONS Summary Small LV cavity. Normal left ventricular ejection fraction (>55%). Moderate left ventricular hypertrophy. Normal right ventricular size and function.  Normal wall motions. Left atrium is mildly dilated. Aortic valve is trileaflet. Mild aortic stenosis. Peak instantaneous gradient 18 mmHg and mean gradient 8 mmHg. Moderate aortic insufficiency. Aortic root is mildly dilated. (3.8 cm) Mitral annular calcification is seen. Moderate mitral stenosis. Mean 7 mmHg, max 18 mmHg. Moderate mitral regurgitation. Moderate tricuspid regurgitation. Estimated right ventricular systolic pressure is 50 mmHg. Moderately elevated right ventricular systolic pressure. IVC normal diameter & inspiratory collapse indicating normal RA filling pressure . No significant pericardial effusion is seen. Signature ----------------------------------------------------------------------------  Electronically signed by Kim Dockery(Sonographer) on 02/11/2022 03:32  PM ---------------------------------------------------------------------------- ----------------------------------------------------------------------------  Electronically signed by Chelsie Gamino(Interpreting physician) on  02/11/2022 03:42 PM ---------------------------------------------------------------------------- FINDINGS Left Atrium Left atrium is mildly dilated. Left Ventricle Small LV cavity. Normal left ventricular ejection fraction (>55%). Moderate left ventricular hypertrophy. Normal wall motions. Right Atrium Right atrium is normal in size. Right Ventricle Normal right ventricular size and function. Mitral Valve Mitral annular calcification is seen. Moderate mitral stenosis. Mean 7 mmHg, max 18 mmHg. Moderate mitral regurgitation. Aortic Valve Aortic valve is trileaflet. Mild aortic stenosis. Peak instantaneous gradient 18 mmHg and mean gradient 8 mmHg. Moderate aortic insufficiency. Tricuspid Valve Moderate tricuspid regurgitation. Estimated right ventricular systolic pressure is 50 mmHg. Moderately elevated right ventricular systolic pressure. Pulmonic Valve The pulmonic valve is normal in structure.  Trivial pulmonic insufficiency. Pericardial Effusion No significant pericardial effusion is seen. Miscellaneous Aortic root is mildly dilated. (3.8 cm) E/e' average 28 IVC normal diameter & inspiratory collapse indicating normal RA filling pressure .  M-mode / 2D Measurements & Calculations:   LVIDd:3.52 cm(3.7 - 5.6 cm)       Diastolic JKBUEV:13.5 ml  XZAYX:5.41 cm(2.2 - 4.0 cm)       Systolic CFFEOJ:64.8 ml  RPMP:5.46 cm(0.6 - 1.1 cm)        Aortic Root:3.8 cm(2.0 - 3.7 cm)  LVPWd:1.51 cm(0.6 - 1.1 cm)       LA Dimension: 4 cm(1.9 - 4.0 cm)  Fractional Shortenin.27 %     LA volume/Index: 51 ml /33m^2  Calculated LVEF (%): 61.47 %      LVOT:1.9 cm   Mitral:                                 Aortic   Valve Area (P1/2-Time): 3.14 cm^2       Peak Velocity: 2.12 m/s  Peak E-Wave: 1.82 m/s                   Mean Velocity: 1.29 m/s  Peak A-Wave: 1.67 m/s                   Peak Gradient: 17.98 mmHg  E/A Ratio: 1.09                         Mean Gradient: 8 mmHg  Peak Gradient: 13.25 mmHg               AI P1/2t: 369 msec  Mean Gradient: 7 mmHg  Deceleration Time: 180 msec             Area (continuity): 1.65 cm^2  P1/2t: 70 msec                          AV VTI: 40.1 cm   MR Velocity: 5.79 m/s  LARS Volumetric: 0.11 cm^2  Area (continuity): 1.31 cm^2  Mean Velocity: 1.26 m/s  MR VTI: 175 cm   Tricuspid:                              Pulmonic:   Estimated RVSP: 50 mmHg  Peak TR Velocity: 3.41 m/s  Peak TR Gradient: 46.5124 mmHg  Estimated RA Pressure: 3 mmHg                                          Estimated PASP: 49.51 mmHg  Septal Wall E' velocity:0.06 m/s Lateral Wall E' velocity:0.07 m/s    CT Head WO Contrast    Result Date: 2022  EXAMINATION: CT OF THE HEAD WITHOUT CONTRAST  2022 1:36 pm TECHNIQUE: CT of the head was performed without the administration of intravenous contrast. Dose modulation, iterative reconstruction, and/or weight based adjustment of the mA/kV was utilized to reduce the radiation dose to as low as reasonably achievable. COMPARISON: CT brain performed 02/25/2021. HISTORY: ORDERING SYSTEM PROVIDED HISTORY: dizziness TECHNOLOGIST PROVIDED HISTORY: dizziness Decision Support Exception - unselect if not a suspected or confirmed emergency medical condition->Emergency Medical Condition (MA) Reason for Exam: dizziness Additional signs and symptoms: Pt states dizziness. NKI. Hx of MS and no hx of surgery FINDINGS: BRAIN/VENTRICLES: There is no acute intracranial hemorrhage, mass effect, or midline shift. There is satisfactory overall gray-white matter differentiation. There is cerebral atrophy and chronic microvascular disease. The ventricular structures are symmetric and unremarkable. The infratentorial structures are unremarkable. ORBITS: The visualized portion of the orbits demonstrate no acute abnormality. SINUSES: The visualized paranasal sinuses and mastoid air cells demonstrate no acute abnormality. SOFT TISSUES/SKULL:  No acute abnormality of the visualized skull or soft tissues. Cerebral atrophy and chronic microvascular disease without acute intracranial abnormality. RECOMMENDATIONS: Unavailable     MRI CERVICAL SPINE W WO CONTRAST    Result Date: 2/9/2022  EXAMINATION: MRI OF THE CERVICAL SPINE WITHOUT AND WITH CONTRAST  2/9/2022 3:42 pm: TECHNIQUE: Multiplanar multisequence MRI of the cervical spine was performed without and with the administration of intravenous contrast. COMPARISON: CT cervical spine 11/16/2016 HISTORY: ORDERING SYSTEM PROVIDED HISTORY: look for MS flare TECHNOLOGIST PROVIDED HISTORY: look for MS flare Reason for Exam: look for MS flare FINDINGS: BONES/ALIGNMENT: Post surgical changes status post previous discectomy and fusion C4 through C6. There are junctional degenerate degenerative changes identified at C6-C7. There is partial fusion at C3-C4 disc interspace. . There is fusion of the posterior elements/facet identified C3-C4 on the left and C3 through C5 on the right, as a result there are advanced degenerative changes at the facet joints at C2-C3 with hypertrophic changes of ligamentum flavum identified at this level causing dorsal indentation of the thecal sac at that level. There is anterolisthesis of C7 on T1 measuring 2 mm and T1-T2 measuring 1-2 mm. SPINAL CORD: No cord signal abnormality identified on this exam.  There is mild cord flattening at C5-C6 and at C6-C7. Canal narrowing is most pronounced at C6-C7. No abnormal cord signal or abnormal enhancement of the cord identified. SOFT TISSUES: No abnormal enhancement of the cervical spine. No paraspinal mass identified. C2-C3: Mild disc space disease. Advanced facet arthropathy identified with ligamentum flavum hypertrophy identified. This does indent the dorsal thecal sac otherwise there is evidence of mild right-sided and mild-to-moderate left-sided foraminal narrowing. No central disc protrusion. C3-C4: Partial inter disc fusion identified at this level. Mild bilateral foraminal narrowing. There is evidence of fusion / ankylosis involving bilateral facet identified at this level. C4-C5: Previous discectomy and fusion. Minimal osteophytic spurring involving both neural foramina. No central canal or foraminal narrowing identified. C5-C6: Previous discectomy/fusion. Minimal diffuse osteophytic bridging. Mild right-sided and moderate left-sided foraminal narrowing. Due to osteophytic spurring. C6-C7: Moderate-to- severe disc space disease identified. Diffuse disc bulge and disc osteophyte complex formation identified. Moderate right-sided and severe left-sided foraminal encroachment. There is bilateral facet arthropathy as well. This contributes to the foraminal encroachment bilaterally. C7-T1: Moderate disc space disease. Mild diffuse disc bulge. No central canal stenosis or foraminal narrowing identified. Advanced facet arthropathy identified. Moderate-to-severe facet arthropathy identified at this level.      Multilevel degenerative changes and post surgical changes identified with evidence of previous anterior discectomy and fusion C4 through C6 with advanceddegenerative changes C6-C7. There is moderate canal narrowing at C6-C7. Foraminal encroachment greatest on the left at C6-C7. No cord signal abnormalities or abnormal enhancement involving the cord identified to suggest an underlying demyelinating process. XR CHEST PORTABLE    Result Date: 2/10/2022  EXAMINATION: ONE XRAY VIEW OF THE CHEST 2/10/2022 1:58 pm COMPARISON: 02/08/2022 HISTORY: ORDERING SYSTEM PROVIDED HISTORY: Shortness of breath TECHNOLOGIST PROVIDED HISTORY: Shortness of breath Reason for Exam: Shortness of breath FINDINGS: The pulmonary vasculature is congested and there is increased interstitial opacity. Mild bibasilar airspace disease is seen, greater on the left. No acute bony abnormality detected. Right shoulder prosthesis is noted. Findings most compatible with pulmonary interstitial edema. XR CHEST PORTABLE    Result Date: 2/8/2022  EXAMINATION: ONE XRAY VIEW OF THE CHEST 2/8/2022 12:55 pm COMPARISON: Chest radiograph performed 02/25/2021. HISTORY: ORDERING SYSTEM PROVIDED HISTORY: ams TECHNOLOGIST PROVIDED HISTORY: ams Reason for Exam: Headaches and increased confusion; pt has MS FINDINGS: There is chronic pulmonary change. There is no acute consolidation or effusion. There is no pneumothorax. The mediastinal structures are stable. The upper abdomen unremarkable. The extrathoracic soft tissues are unremarkable. There are remote left-sided rib fractures. Chronic pulmonary change without acute cardiopulmonary process. CTA HEAD NECK W CONTRAST    Result Date: 2/8/2022  EXAMINATION: CTA OF THE HEAD AND NECK WITH CONTRAST 2/8/2022 1:35 pm: TECHNIQUE: CTA of the head and neck was performed with the administration of intravenous contrast. Multiplanar reformatted images are provided for review. MIP images are provided for review. Stenosis of the internal carotid arteries measured using NASCET criteria. Dose modulation, iterative reconstruction, and/or weight based adjustment of the mA/kV was utilized to reduce the radiation dose to as low as reasonably achievable. COMPARISON: None. HISTORY: ORDERING SYSTEM PROVIDED HISTORY: dizziness TECHNOLOGIST PROVIDED HISTORY: dizziness Decision Support Exception - unselect if not a suspected or confirmed emergency medical condition->Emergency Medical Condition (MA) Reason for Exam: Dizziness Additional signs and symptoms: Pt states dizziness. NKI. PT states hx of MS and no known surgeries in the area FINDINGS: CTA NECK: AORTIC ARCH/ARCH VESSELS: Mild atherosclerotic disease. No dissection or arterial injury. No significant stenosis of the brachiocephalic or subclavian arteries. CAROTID ARTERIES: Mild atherosclerotic plaque at the carotid bifurcations and bulbs. No dissection, arterial injury, or hemodynamically significant stenosis by NASCET criteria. VERTEBRAL ARTERIES: No dissection, arterial injury, or significant stenosis. SOFT TISSUES: The visualized upper lungs demonstrate right greater than left ground-glass opacity with interlobular septal thickening suggesting volume overload. No cervical or superior mediastinal lymphadenopathy. The larynx and pharynx are unremarkable. No acute abnormality of the salivary and thyroid glands. BONES: No acute osseous abnormality. Prior anterior discectomies and fusion at C4-5 and C5-6. Chronic appearing mild T4 anterior vertebral body height loss. CTA HEAD: ANTERIOR CIRCULATION: No significant stenosis of the intracranial internal carotid, anterior cerebral, or middle cerebral arteries. No aneurysm. POSTERIOR CIRCULATION: No significant stenosis of the vertebral, basilar, or posterior cerebral arteries. The left vertebral artery is dominant.   The right vertebral artery is hypoplastic and largely terminates in the right posterior inferior cerebellar artery. No aneurysm. OTHER: No dural venous sinus thrombosis on this non-dedicated study. BRAIN: No mass effect or midline shift. No extra-axial fluid collection. The gray-white differentiation is maintained. 1. No acute arterial abnormality or hemodynamically significant arterial stenosis in the head or neck. 2. Mild edema in the visualized upper lungs suggesting volume overload. MRI BRAIN W WO CONTRAST    Result Date: 2/9/2022  EXAMINATION: MRI OF THE BRAIN WITHOUT AND WITH CONTRAST  2/9/2022 3:41 pm TECHNIQUE: Multiplanar multisequence MRI of the head/brain was performed without and with the administration of intravenous contrast. COMPARISON: MRI brain 07/11/2020 HISTORY: ORDERING SYSTEM PROVIDED HISTORY: look for MS flare TECHNOLOGIST PROVIDED HISTORY: look for MS flare Reason for Exam: look for MS flare FINDINGS: INTRACRANIAL STRUCTURES/VENTRICLES:  There is no acute infarct. Moderate involutional changes. Moderate periventricular subcorticalT2 prolongation identified, which is nonspecific. No shift of midline structure basal cisterns are patent. There is motion degradation which challenge evaluation. Major intracranial vascular flow voids are preserved. administration no abnormal postcontrast enhancement identified. ORBITS: Cataract surgery. SINUSES: The visualized paranasal sinuses and mastoid air cells demonstrate no acute abnormality. BONES/SOFT TISSUES: The bone marrow signal intensity appears normal. The soft tissues demonstrate no acute abnormality. Motion degradation challenge evaluation. No acute stroke, midline shift or mass effect. There is no change in the chronic microvascular disease. The findings are nonspecific, favor chronic small vessel ischemic disease rather than underlying demyelinating process.          Consultations:    Consults:     Final Specialist Recommendations/Findings:   IP CONSULT TO INTERNAL MEDICINE  IP CONSULT TO NEUROLOGY  IP CONSULT TO SOCIAL WORK  IP CONSULT TO PHYSICAL MEDICINE REHAB  IP CONSULT TO INTERNAL MEDICINE      The patient was seen and examined on day of discharge and this discharge summary is in conjunction with any daily progress note from day of discharge. Discharge plan:       Disposition: Discharge/Readmit    Physician Follow Up:     [facility]  [address] [facility]  [phone]           Requiring Further Evaluation/Follow Up POST HOSPITALIZATION/Incidental Findings: Follow-up echo results to assess for diastolic heart failure    Diet: cardiac diet fluid restriction    Activity: As tolerated    Instructions to Patient: Take medication as prescribed, please return to the ED if symptoms recur or worsen. Follow-up with PCP for assessment of heart failure    Discharge Medications:      Medication List      ASK your doctor about these medications    albuterol sulfate  (90 Base) MCG/ACT inhaler  Commonly known as: Ventolin HFA  Inhale 2 puffs into the lungs every 6 hours as needed for Wheezing     amitriptyline 100 MG tablet  Commonly known as: ELAVIL  TAKE ONE TABLET BY MOUTH ONCE NIGHTLY     calcipotriene 0.005 % cream  Commonly known as: Dovonex  Apply topically 2 times daily. celecoxib 200 MG capsule  Commonly known as: CeleBREX  Take 1 capsule by mouth daily     clobetasol 0.05 % cream  Commonly known as: TEMOVATE  Apply topically 2 times daily. docusate sodium 100 MG capsule  Commonly known as: COLACE  Take 1 capsule by mouth daily     gabapentin 100 MG capsule  Commonly known as: Neurontin  Take 1 capsule by mouth 2 times daily for 30 days.      Handicap Placard Misc  by Does not apply route Exp: 5/20/2026     HYDROcodone-acetaminophen 5-325 MG per tablet  Commonly known as: NORCO     omeprazole 20 MG delayed release capsule  Commonly known as: PRILOSEC  Take 1 capsule by mouth daily     * predniSONE 10 MG tablet  Commonly known as: DELTASONE  Take 1 tablet by mouth daily     * predniSONE 10 MG tablet  Commonly known as: DELTASONE  4 tabs by mouth daily for 3 days, then 3 tabs daily for 3 days, then 2 tabs daily for 3 days, then 1 tab daily till gone. simvastatin 20 MG tablet  Commonly known as: ZOCOR  Take 1 tablet by mouth nightly     traMADol 50 MG tablet  Commonly known as: ULTRAM  Take 1 tablet by mouth every 6 hours as needed for Pain for up to 60 days. * This list has 2 medication(s) that are the same as other medications prescribed for you. Read the directions carefully, and ask your doctor or other care provider to review them with you. Electronically signed by   Maribell Otero MD  2/11/2022  4:18 PM      Thank you Dr. Rodrick Andujar MD for the opportunity to be involved in this patient's care. Attending Physician Statement  I have discussed the care of Melanie Melendez and I have examined the patient myselft and taken ros and hpi , including pertinent history and exam findings,  with the resident. I have reviewed the key elements of all parts of the encounter with the resident. I agree with the assessment, plan and orders as documented by the resident.       Electronically signed by Nitesh Lyon MD

## 2022-02-11 NOTE — PLAN OF CARE
Problem: Falls - Risk of:  Goal: Will remain free from falls  Description: Will remain free from falls  2/11/2022 0622 by Bj Pardo RN  Outcome: Ongoing  2/11/2022 0622 by Bj Pardo RN  Outcome: Ongoing  2/10/2022 1814 by Eunice Garcia RN  Outcome: Ongoing  Goal: Absence of physical injury  Description: Absence of physical injury  2/11/2022 0622 by Bj Pardo RN  Outcome: Ongoing  2/11/2022 0622 by Bj Pardo RN  Outcome: Ongoing     Problem: Pain:  Goal: Pain level will decrease  Description: Pain level will decrease  2/11/2022 0622 by Bj Pardo RN  Outcome: Ongoing  2/11/2022 0622 by Bj Pardo RN  Outcome: Ongoing  2/10/2022 1814 by Eunice Garcia RN  Outcome: Ongoing  Goal: Control of acute pain  Description: Control of acute pain  2/11/2022 0622 by Bj Pardo RN  Outcome: Ongoing  2/11/2022 0622 by Bj Pardo RN  Outcome: Ongoing  Goal: Control of chronic pain  Description: Control of chronic pain  2/11/2022 0622 by Bj Pardo RN  Outcome: Ongoing  2/11/2022 0622 by Bj Pardo RN  Outcome: Ongoing     Problem: Skin Integrity:  Goal: Will show no infection signs and symptoms  Description: Will show no infection signs and symptoms  2/11/2022 0622 by Bj Pardo RN  Outcome: Ongoing  2/11/2022 0622 by Bj Pardo RN  Outcome: Ongoing  2/10/2022 1814 by Eunice Garcia RN  Outcome: Ongoing  Goal: Absence of new skin breakdown  Description: Absence of new skin breakdown  2/11/2022 0622 by Bj Pardo RN  Outcome: Ongoing  2/11/2022 0622 by Bj Pardo RN  Outcome: Ongoing     Problem: Musculor/Skeletal Functional Status  Goal: Highest potential functional level  2/11/2022 0622 by Bj Pardo RN  Outcome: Ongoing  2/11/2022 0622 by Bj Pardo RN  Outcome: Ongoing  2/10/2022 1814 by Eunice Garcia RN  Outcome: Ongoing  Goal: Absence of falls  2/11/2022 0622 by Bj Pardo RN  Outcome: Ongoing  2/11/2022 0622 by Bj Pardo RN  Outcome: Ongoing     Problem: Breathing Pattern - Ineffective:  Goal: Ability to achieve and maintain a regular respiratory rate will improve  Description: Ability to achieve and maintain a regular respiratory rate will improve  Outcome: Ongoing

## 2022-02-11 NOTE — PROGRESS NOTES
[id number]    PROGRESS NOTE             2/11/2022    8:18 AM    Name:   Mago Boyce  MRN:     [id number]     Acct:      [de-identified]   Room:   2097/2097-01  IP Day:  3  Admit Date:  2/8/2022 12:27 PM    PCP:  Shima Heller MD  Code Status:  Full Code    Subjective:     C/C:   Chief Complaint   Patient presents with    Dizziness     Interval History Status: improved. Patient seen and examined at bedside. Patient became short of breath yesterday and went into respiratory distress. Chest x-ray showed clear volume overload. Given a dose of Lasix and patient's condition improved but held overnight to be reassessed this morning. proBNP was elevated. Giving another dose of lasix, echo and f/u chest xray  Brief History:       The patient is a 80 y.o. Non- / non  female who presents with Dizziness   and she is admitted to the hospital for the management of multiple sclerosis flareup and UTI. Patient was diagnosed with multiple sclerosis at the age of 44. Past medical history significant for multiple sclerosis, hypertension, chronic osteoarthritis of the spine. Patient reportedly woke up this morning with worsening facial droop, severe headache, and an unsteady gait. She has history of chronic headaches but this was much worse. She took 2 Norco pills but that did not alleviate the headache. It is located frontally and in the back of her head and neck and throbbing. Patient was taking 10 mg of prednisone daily per her PCP for spinal stenosis and chronic vertebral fracture. She sees a pain medicine specialist who gives her spinal injections. He told her to stop taking the prednisone for 5 days prior to injection. This was her fifth day without taking steroids. In the ED she had a CT and a CTA done. CT showed no acute abnormalities.   CTA head and neck shows no hemodynamically significant stenosis.     Urinalysis showed small leukocyte esterase. She has a history of UTI with Proteus mirabilis 4 months ago. Denies any dysuria, frequency, urgency. Chronic intermittent urinary incontinence. She was started on Rocephin in the ED.    2/10: Headache is greatly improved with steroids. Facial droop improving as well and patient feels more strength in her legs. MRI brain and cervical spine with and without contrast ordered. Hypertensive today, will start Lopressor oral and as needed. Microscopic urinalysis not convincing for UTI, DC Rocephin.    2/11:  Patients headache slightly worse today from previous. Blood glucose still high with Sliding scale insulin, will add 5 units lantus and reassess. BP still elevated, increase lopressor to 50 BID. Complaining of neuropathic pain in her foot, for which she takes gabapentin 100 mg only when it gets bad. Review of Systems:     Review of Systems   Constitutional: Negative for activity change, appetite change, chills, fatigue and fever. Respiratory: Negative for cough, chest tightness, shortness of breath and wheezing. Cardiovascular: Negative for chest pain, palpitations and leg swelling. Gastrointestinal: Negative for abdominal distention, abdominal pain, constipation, diarrhea, nausea and vomiting. Genitourinary: Negative for difficulty urinating, dysuria, flank pain, frequency and urgency. Musculoskeletal: Positive for joint swelling. Negative for back pain, myalgias, neck pain and neck stiffness. Skin: Negative for pallor and rash. Neurological: Positive for dizziness, facial asymmetry, weakness, light-headedness and headaches. Negative for tremors, seizures, syncope and numbness. Psychiatric/Behavioral: Negative for agitation, behavioral problems, dysphoric mood and sleep disturbance. The patient is not nervous/anxious. Medications: Allergies:     Allergies   Allergen Reactions    Bactrim [Sulfamethoxazole-Trimethoprim] Other (See Comments)     BLISTERS IN MOUTH    Lactose Intolerance (Gi) Diarrhea    Pcn [Penicillins] Hives    Lidoderm [Lidocaine] Rash    Macrobid [Nitrofurantoin] Nausea And Vomiting       Current Meds:   Scheduled Meds:    amLODIPine  10 mg Oral Daily    metoprolol tartrate  50 mg Oral BID    insulin glargine  5 Units SubCUTAneous Nightly    gabapentin  100 mg Oral Daily    enoxaparin  30 mg SubCUTAneous Daily    lisinopril  40 mg Oral Daily    methylPREDNISolone  1,000 mg IntraVENous Daily    insulin lispro  0-6 Units SubCUTAneous TID WC    insulin lispro  0-3 Units SubCUTAneous Nightly    amitriptyline  100 mg Oral Nightly    celecoxib  200 mg Oral Daily    docusate sodium  100 mg Oral Daily    pantoprazole  40 mg Oral QAM AC    atorvastatin  20 mg Oral Daily    sodium chloride flush  5-40 mL IntraVENous 2 times per day     Continuous Infusions:    dextrose      sodium chloride       PRN Meds: perflutren lipid microspheres, glucose, dextrose, glucagon (rDNA), dextrose, sodium chloride flush, sodium chloride flush, albuterol sulfate HFA, HYDROcodone-acetaminophen, traMADol, sodium chloride flush, sodium chloride, ondansetron **OR** ondansetron, polyethylene glycol, acetaminophen **OR** acetaminophen, metoprolol    Data:     Past Medical History:   has a past medical history of Acute cystitis without hematuria, Arthritis, Chronic daily headache, GERD (gastroesophageal reflux disease), Hyperlipidemia, Hypertension, Moderate malnutrition (HCC), Multiple sclerosis ([address] Neuropathy, Pneumonia, and Vitamin D deficiency. Social History:   reports that she has never smoked. She has never used smokeless tobacco. She reports that she does not drink alcohol and does not use drugs. Family History: No family history on file.     Vitals:  BP (!) 147/83   Pulse 92   Temp 97.8 °F (36.6 °C) (Oral)   Resp 24   Ht 4' 11\" (1.499 m)   Wt 134 lb (60.8 kg)   SpO2 92%   BMI 27.06 kg/m²   Temp (24hrs), Av.7 °F (36.5 °C), Min:97.5 °F (36.4 °C), Max:97.9 °F (36.6 °C)    Recent Labs     02/10/22  1056 02/10/22  1620 02/10/22  1954 02/11/22  0738   POCGLU 197* 227* 221* 164*       I/O(24Hr): Intake/Output Summary (Last 24 hours) at 2/11/2022 0818  Last data filed at 2/11/2022 9293  Gross per 24 hour   Intake 210 ml   Output 600 ml   Net -390 ml       Labs:    [unfilled]    Lab Results   Component Value Date/Time    SPECIAL NOT REPORTED 02/08/2022 04:40 PM     Lab Results   Component Value Date/Time    CULTURE NO SIGNIFICANT GROWTH 02/08/2022 04:40 PM       [unfilled]    Radiology:    CT Head WO Contrast    Result Date: 2/8/2022  EXAMINATION: CT OF THE HEAD WITHOUT CONTRAST  2/8/2022 1:36 pm TECHNIQUE: CT of the head was performed without the administration of intravenous contrast. Dose modulation, iterative reconstruction, and/or weight based adjustment of the mA/kV was utilized to reduce the radiation dose to as low as reasonably achievable. COMPARISON: CT brain performed 02/25/2021. HISTORY: ORDERING SYSTEM PROVIDED HISTORY: dizziness TECHNOLOGIST PROVIDED HISTORY: dizziness Decision Support Exception - unselect if not a suspected or confirmed emergency medical condition->Emergency Medical Condition (MA) Reason for Exam: dizziness Additional signs and symptoms: Pt states dizziness. NKI. Hx of MS and no hx of surgery FINDINGS: BRAIN/VENTRICLES: There is no acute intracranial hemorrhage, mass effect, or midline shift. There is satisfactory overall gray-white matter differentiation. There is cerebral atrophy and chronic microvascular disease. The ventricular structures are symmetric and unremarkable. The infratentorial structures are unremarkable. ORBITS: The visualized portion of the orbits demonstrate no acute abnormality. SINUSES: The visualized paranasal sinuses and mastoid air cells demonstrate no acute abnormality. SOFT TISSUES/SKULL:  No acute abnormality of the visualized skull or soft tissues.      Cerebral atrophy and chronic microvascular disease without acute intracranial abnormality. RECOMMENDATIONS: Unavailable     XR CHEST PORTABLE    Result Date: 2/8/2022  EXAMINATION: ONE XRAY VIEW OF THE CHEST 2/8/2022 12:55 pm COMPARISON: Chest radiograph performed 02/25/2021. HISTORY: ORDERING SYSTEM PROVIDED HISTORY: ams TECHNOLOGIST PROVIDED HISTORY: ams Reason for Exam: Headaches and increased confusion; pt has MS FINDINGS: There is chronic pulmonary change. There is no acute consolidation or effusion. There is no pneumothorax. The mediastinal structures are stable. The upper abdomen unremarkable. The extrathoracic soft tissues are unremarkable. There are remote left-sided rib fractures. Chronic pulmonary change without acute cardiopulmonary process. CTA HEAD NECK W CONTRAST    Result Date: 2/8/2022  EXAMINATION: CTA OF THE HEAD AND NECK WITH CONTRAST 2/8/2022 1:35 pm: TECHNIQUE: CTA of the head and neck was performed with the administration of intravenous contrast. Multiplanar reformatted images are provided for review. MIP images are provided for review. Stenosis of the internal carotid arteries measured using NASCET criteria. Dose modulation, iterative reconstruction, and/or weight based adjustment of the mA/kV was utilized to reduce the radiation dose to as low as reasonably achievable. COMPARISON: None. HISTORY: ORDERING SYSTEM PROVIDED HISTORY: dizziness TECHNOLOGIST PROVIDED HISTORY: dizziness Decision Support Exception - unselect if not a suspected or confirmed emergency medical condition->Emergency Medical Condition (MA) Reason for Exam: Dizziness Additional signs and symptoms: Pt states dizziness. NKI. PT states hx of MS and no known surgeries in the area FINDINGS: CTA NECK: AORTIC ARCH/ARCH VESSELS: Mild atherosclerotic disease. No dissection or arterial injury. No significant stenosis of the brachiocephalic or subclavian arteries. CAROTID ARTERIES: Mild atherosclerotic plaque at the carotid bifurcations and bulbs. No dissection, arterial injury, or hemodynamically significant stenosis by NASCET criteria. VERTEBRAL ARTERIES: No dissection, arterial injury, or significant stenosis. SOFT TISSUES: The visualized upper lungs demonstrate right greater than left ground-glass opacity with interlobular septal thickening suggesting volume overload. No cervical or superior mediastinal lymphadenopathy. The larynx and pharynx are unremarkable. No acute abnormality of the salivary and thyroid glands. BONES: No acute osseous abnormality. Prior anterior discectomies and fusion at C4-5 and C5-6. Chronic appearing mild T4 anterior vertebral body height loss. CTA HEAD: ANTERIOR CIRCULATION: No significant stenosis of the intracranial internal carotid, anterior cerebral, or middle cerebral arteries. No aneurysm. POSTERIOR CIRCULATION: No significant stenosis of the vertebral, basilar, or posterior cerebral arteries. The left vertebral artery is dominant. The right vertebral artery is hypoplastic and largely terminates in the right posterior inferior cerebellar artery. No aneurysm. OTHER: No dural venous sinus thrombosis on this non-dedicated study. BRAIN: No mass effect or midline shift. No extra-axial fluid collection. The gray-white differentiation is maintained. 1. No acute arterial abnormality or hemodynamically significant arterial stenosis in the head or neck. 2. Mild edema in the visualized upper lungs suggesting volume overload. Physical Examination:        Physical Exam  Constitutional:       Appearance: Normal appearance. HENT:      Head: Normocephalic and atraumatic. Mouth/Throat:      Mouth: Mucous membranes are moist.      Pharynx: Oropharynx is clear. Eyes:      General: No visual field deficit. Extraocular Movements: Extraocular movements intact. Conjunctiva/sclera: Conjunctivae normal.      Pupils: Pupils are equal, round, and reactive to light.    Cardiovascular:      Rate and Rhythm: Normal rate and regular rhythm. Pulses: Normal pulses. Heart sounds: Normal heart sounds. No murmur heard. Pulmonary:      Effort: Pulmonary effort is normal.      Breath sounds: Normal breath sounds. No wheezing or rales. Abdominal:      General: Abdomen is flat. There is no distension. Palpations: Abdomen is soft. Tenderness: There is no abdominal tenderness. There is no guarding. Musculoskeletal:         General: No tenderness. Normal range of motion. Cervical back: Normal range of motion and neck supple. Right lower leg: No edema. Left lower leg: No edema. Skin:     General: Skin is warm and dry. Capillary Refill: Capillary refill takes less than 2 seconds. Findings: No bruising or lesion. Neurological:      General: No focal deficit present. Mental Status: She is alert and oriented to person, place, and time. Mental status is at baseline. Cranial Nerves: Facial asymmetry (Left facial droop - improving) present. Sensory: No sensory deficit. Motor: Weakness (4 out of 5 strength in bilateral lower limbs) present. No tremor or abnormal muscle tone. Coordination: Finger-Nose-Finger Test and Heel to [employer] normal.   Psychiatric:         Mood and Affect: Mood normal.         Behavior: Behavior normal.         Thought Content:  Thought content normal.         Judgment: Judgment normal.           Assessment:        Primary Problem  Multiple sclerosis [facility])    Active Hospital Problems    Diagnosis Date Noted    Diastolic congestive heart failure ([facility] [I50.30] 02/10/2022    Recurrent urinary tract infection [N39.0] 02/08/2022    Chronic headache [R51.9, G89.29] 02/25/2021    Multiple sclerosis ([facility] Lurdes Zuniga 10/01/2017       Plan:        Multiple sclerosis Exacerbation  - Diagnosis multiple sclerosis at age 44  - Presented with severe headache and worsening facial droop, dizziness and uncoordination  - Was taking 10 mg prednisone daily but stopped for pain management procedure  - High-dose Solu-Medrol 1g daily  - Neurology consult  - MRI brain and cervical spine w wo, read as chronic microvascular disease, Neurology input  - PM&R evaluation, appreciate recommendations     Recurrent UTI  - Small leukocyte Estrace  - No dysuria, frequency, urgency  - Microscopic UA not suggestive of UTI  - DC rocephin     Hypertension  - Not previously taking antihypertensives at home  - BP continues to be elevated, slightly tachy  - Increase Lopressor to 50 BID  - PRN lopressor    Steroid Induced Diabetes  - Not previously on antidiabetic medication  - Last A1C in 11/2021 6.4  - Low dose insulin sliding scale  - Add 5 units lantus     Diastolic Heart Failure  - previous echo 7/2020 shows EF >75%, grade II diastolic dysfunction  - Became short of breath after gentle hydration  - CXR showed volume overload  - one dose of lasix 20 mg 2/10  - second dose of lasix 40mg today  - Echo    Dispo:  To ARU once medically stable  Diet: regular  DVT prophylaxis: Lovenox    Mary Red MD  2/11/2022  8:18 AM     Electronically signed by Mary Red MD

## 2022-02-11 NOTE — PROGRESS NOTES
[address] [facility]    INPATIENT OCCUPATIONAL THERAPY  PROGRESS NOTE  Date: 2022  Patient Name: Jessica Velasquez      Room: 8927/9919-29  MRN: [id number]    : 1934  (80 y.o.) Gender: female     Discharge Recommendations: The patient would benefit from an intensive level of therapy after discharge from the facility. They should be able to tolerate 3-hours of Combined OT/PT/ST over 5 days/week or at least 900 minutes of  Combined Therapy over 7 days. Equipment Needed:  (TBD)    Referring Practitioner: Dr. Brooke Hou  Diagnosis: MS exacerbation, recurrent UTI  General  Chart Reviewed: Yes,Orders,Progress Notes,Previous Admission  Patient assessed for rehabilitation services?: Yes  Family / Caregiver Present: No  Referring Practitioner: Dr. Brooke Hou  Diagnosis: MS exacerbation, recurrent UTI    Restrictions  Restrictions/Precautions: Fall Risk,General Precautions  Implants present? : Metal implants (R TSA)  Required Braces or Orthoses?: Yes (Pt has a back brace that she wears PRN)      Subjective  Subjective: \"I am just so short of breath. They are working to get this fluid off of me\"  Comments: Pt now on 1L of O2 via nasal cannula. Patient Currently in Pain: Denies  Overall Orientation Status: Within Functional Limits     Pain Assessment  Pain Assessment: 0-10  Pain Level: 4  Pain Type: Chronic pain  Pain Location: Back  Pain Descriptors:  (\"Aggravating\")  Pain Frequency: Continuous  Clinical Progression: Not changed    Objective  Cognition  Overall Cognitive Status: WFL  Additional Comments: Cognition improved this date compared to previous session. Bed mobility  Supine to Sit: Stand by assistance  Sit to Supine: Stand by assistance  Scooting: Stand by assistance  Balance  Sitting Balance: Supervision     ADL  Feeding: Independent  Grooming: Setup  UE Bathing: Stand by assistance  Additional Comments: Pt sat EOB and washed face/UB and changed gowns at above levels.   Multiple rest breaks required 2* SOB with activity. Vision  Patient Visual Report: No visual complaint reported. Assessment  Performance deficits / Impairments: Decreased functional mobility ; Decreased ADL status; Decreased safe awareness;Decreased cognition;Decreased endurance;Decreased balance;Decreased high-level IADLs  Prognosis: Good  Discharge Recommendations: Patient would benefit from continued therapy after discharge  Activity Tolerance: Patient Tolerated treatment well;Treatment limited secondary to medical complications (free text)  Activity Tolerance: Frequent rest breaks required 2* SOB with minimal exertion, 1L O2, SpO2 maintained 94%+. Safety Devices in place: Yes  Type of devices: Patient at risk for falls; Left in bed;Call light within reach; Bed alarm in place;Nurse notified    Patient Education:  Activity promotion, monitoring vitals, pacing  Learner:patient  Method: demonstration and explanation       Outcome: demonstrated understanding     Plan  Safety Devices  Safety Devices in place: Yes  Type of devices: Patient at risk for falls,Left in bed,Call light within reach,Bed alarm in place,Nurse notified  Plan  Times per week: 5-7  Times per day: Daily  Current Treatment Recommendations: Balance Training,Functional Mobility Training,Endurance Training,Pain Management,Safety Education & Training,Patient/Caregiver Education & Training,Equipment Evaluation, Education, & procurement,Self-Care / ADL,Home Management Training    Goals  Short term goals  Time Frame for Short term goals: By Discharge  Short term goal 1: Pt will complete LB ADL's with CGA and Good safety using AE if needed. Short term goal 2: Pt will complete toilet transfer and toileting with CGA and Good safety using least restrictive device. Short term goal 3: Pt will tolerate standing for 3-4 minutes with 1-2 UE support and CGA while completing a functional task.   Short term goal 4: Pt will V/D at least 3 fall prevention strategies that she can utilize during daily routines. Short term goal 5: Pt will actively participate in 15-20 minutes of therapeutic exercise/activity to promote increased independence and safety with self-care and mobility.     OT Individual Minutes  Time In: 7585  Time Out: [address]  Minutes: 28    Electronically signed by Praveen Bejarano on 2/11/22 at 12:57 PM EST

## 2022-02-11 NOTE — PROGRESS NOTES
[facility] Neurology   IN-PATIENT SERVICE      NEUROLOGY PROGRESS  NOTE            Date:   2/11/2022  Patient name:  Melanie Melendez  Date of admission:  2/8/2022  YOB: 1934      Interval History:     Patient stated she con't to feels much stronger and better, eating well too  Able to walk better, no more headache    History of Present Illness: The patient is a 80 y.o. female who presents with Dizziness  . The patient was seen and examined and the chart was reviewed. 80year old woman who was told she had MS when she was 44year old, she was never on any DMT, over the decades she reported that she got big dose steroid that make her feel better when she had a flare. Other significant hx include chronic daily headache and patient take norco 3 times per day for it. She also had recent UTI and was found to have a small UTI on admission.     Pt also f/u neurologist in [address] Prashant Luna.     Patient is very tangential upon interviewing, she easily change the direction of the question. What I could get from her was that she c/o worsening gait and generalized weakness, she also c/o worsening headache. The headache largely resolved after pt received 500mg of methylprednisone.      As pt is not apparent a good historian, most of the history was obtained from chart reviews and old neuro images reviews.     Symptom History (from prior notes from Dr. Robert Stearns from [address]):    [address]: She was diagnosed with Bell's palsy affecting the left side of her face with incomplete recovery. 2. 1983: She had symptoms of dizziness followed by double vision and headache. She developed tingling in her right leg which then involved her right arm and an area behind her right ear. She became unresponsive and fell to the floor, and was taken to [facility]. She continued to be dizzy and was later found to be weak on the left side of her body.  She indicates that she underwent brain and spine imaging and had other testing done and was told that she has MS.     3. Since then, left hemiparesis persists. Diplopia resoved after a few years, until 2008, when she again developed double vision. She saw an ophthalmologist who added prism to her glasses. In August 2009, even with the prism, she was having double vision, so he furthered adjusted the prisms, so that currently with the glasses she denies any double vision. She continued to have headaches since then. Initially her headaches were not daily and she was on Tylenol, Xanax and Flexeril more than 10 years ago. She has been seen by Dr. Nadja Shields (neurologist) in 2375/3608 who took her off the Xanax. For the headaches, she was initially started on Pamelor with which she developed oral ulcerations, so it was discontinued. She was then started on Elavil and the dose of Elavil was subsequently increased. She has been using Tylenol No. 4 over more than 10-15 years, daily for her headaches. Her headaches have become daily headaches for the last 5 to 6 years. They mainly start from the neck, involve the occipital region and then spread to the top of the head and behind the eyes. She wakes up in the morning with a headache. She describes them as a dull pain, about 9 out of 10. Sometimes she gets nausea with them. Currently she takes about 1 to 2 tablets of Tylenol No. 4 during the day and on some days she uses 1 tablet of Valium 5 mg to help with the headaches. As per her, Tylenol No. 4 sometime decreases the headache intensity from 9 to 7 or 8. She takes amitriptyline about 100 mg at bed time which helps her to go to sleep, but she mentions when she lies down her head is still hurting and she tries not to lie on the left side of the head. She is able to sleep well at night. 4. 2003: She also had a complaint of neck pain going to her left upper extremity.  She underwent C4-C5, C5-C6 cervical fusion in 2003 and she states that after the surgery her arm pain was resolved, but she continues to have daily headaches. In 1983, she was diagnosed with a possible multiple sclerosis. She mentions that she had a workup for MS at that time at [place], and after LP, was told she had MS. We do not have any records to confirm that. As per her, she was never started on any medication for multiple sclerosis. She denies any history of visual loss. She denies any dysphagia or dysarthria. She denies any pain in her extremities or muscle spasms. She has decreased range of motion in her neck and feels a lot of stiffness in her neck muscles. She does complain of urgency of micturition and wears a pad all the time. She reports that she had prolapse of the bladder for which she had bladder suspension surgery in 1990's, but after 8 months again she had the bladder prolapse and since then she feels the urgency. She also complains of numbness in both feet up to the mid level since last 4 to 5 years, but denies any numbness or tingling in her legs, arms or hands. 5. 9/2021: She developed bilateral leg weakness, imbalance, dizziness, diplopia and dysphagia and sought medical attention. She was hospitalized for a week, then a week of rehab, both at [facility]. She was treated with intravenouos steroids while in hospital. After rehab, she is seeing physical therapy once a week. Symptoms improved except for left leg residual weakness. She required catheterization (urinary retention; symptoms resolved. Since 1995 or 2000, she has used only Tylenol No. 4 and amitriptyline for headache treatment; she denies using any other medication. She has tried physical therapy and aquatherapy in the past and she mentions that heat and massage therapy gives her a spinning sensation, so she tries to avoid the heat. Also she mentions that sometimes she gets a feeling of warmth from her shoulders going upwards. She becomes dizzy and sits down, but denied any loss of consciousness.       Past Medical History: Past Medical History:   Diagnosis Date    Acute cystitis without hematuria 10/1/2017    Arthritis     Chronic daily headache     GERD (gastroesophageal reflux disease)     Hyperlipidemia     Hypertension     Moderate malnutrition ([facility] 3/2/2019    Multiple sclerosis ([facility]     Neuropathy     Pneumonia 2017    states had double pneumonia    Vitamin D deficiency         Past Surgical History:     Past Surgical History:   Procedure Laterality Date    [facility]ACT [facility]OVAL [facility] IMPLANT [facility] 2014    [facility]    CATARACT REMOVAL WITH IMPLANT Left 2014    [facility]    CERVICAL DISC SURGERY      CYSTOCELE REPAIR      HYSTERECTOMY      RECTOCELE REPAIR      SHOULDER ARTHROPLASTY Right 2017    SHOULDER SURGERY Right 2017        Medications during admission:      amLODIPine  10 mg Oral Daily    metoprolol tartrate  50 mg Oral BID    insulin glargine  5 Units SubCUTAneous Nightly    gabapentin  100 mg Oral Daily    enoxaparin  30 mg SubCUTAneous Daily    lisinopril  40 mg Oral Daily    methylPREDNISolone  1,000 mg IntraVENous Daily    insulin lispro  0-6 Units SubCUTAneous TID WC    insulin lispro  0-3 Units SubCUTAneous Nightly    amitriptyline  100 mg Oral Nightly    celecoxib  200 mg Oral Daily    docusate sodium  100 mg Oral Daily    pantoprazole  40 mg Oral QAM AC    atorvastatin  20 mg Oral Daily    sodium chloride flush  5-40 mL IntraVENous 2 times per day         Physical Exam:   /72   Pulse 79   Temp 97.7 °F (36.5 °C) (Axillary)   Resp 16   Ht 4' 11\" (1.499 m)   Wt 134 lb (60.8 kg)   SpO2 95%   BMI 27.06 kg/m²   Temp (24hrs), Av.7 °F (36.5 °C), Min:97.5 °F (36.4 °C), Max:97.9 °F (36.6 °C)          Neurological examination:    Mental status   Alert and oriented x 3; following all commands; speech is fluent, no dysarthria, aphasia.       Cranial nerves   II - visual fields intact to confrontation; pupils reactive  III, IV, VI - extraocular muscles intact; no MOJGAN; no nystagmus; no ptosis   V - normal facial sensation                                                               VII - mild decrease right nasolabial                                                            VIII - intact hearing                                                                             IX, X - symmetrical palate elevation                                               XI - symmetrical shoulder shrug                                                       XII - midline tongue without atrophy or fasciculation     Motor function  Strength: 5/5 RUE, 5/5 RLE, 5/5 LUE, 5/5  LLE  Normal bulk and tone. No tremors                      Sensory function Intact to touch, pin, vibration, proprioception throughout     Cerebellar Intact finger-nose-finger testing. Intact heel-shin testing. No dysdiadochokinesia present. Reflex function Not tested   Gait                  Not tested             Diagnostics:      Laboratory Testing:  CBC:   Recent Labs     02/09/22  0535 02/10/22  0533 02/11/22  0646   WBC 3.8 10.7 12.8*   HGB 12.9 10.6* 11.6*    208 245     BMP:    Recent Labs     02/09/22  0535 02/10/22  0533 02/11/22  0646    132* 135   K 5.0 3.8 3.6*    101 100   CO2 24 23 21   BUN 16 25* 28*   CREATININE 1.09* 1.20* 1.24*   GLUCOSE 224* 204* 184*         Lab Results   Component Value Date    CHOL 235 (H) 01/04/2012    LDLCHOLESTEROL 86 05/21/2020    HDL 74 (A) 11/06/2021    TRIG 119 01/04/2012    ALT 21 02/08/2022    AST 18 02/08/2022    TSH 1.54 05/24/2019    INR 0.9 02/25/2021    LABA1C 6.4 11/06/2021    UARTLCYZ33 220 10/02/2017       No results found for: PHENYTOIN, PHENYTOIN, VALPROATE, CBMZ        Imaging/Diagnostics:      EEG:       No results found for this or any previous visit. No results found for this or any previous visit. No results found for this or any previous visit.     No results found for this or any previous visit. Results for orders placed during the hospital encounter of 07/11/20    MRI BRAIN WO CONTRAST    Narrative  EXAMINATION:  MRI OF THE BRAIN WITHOUT CONTRAST  7/11/2020 9:29 am    TECHNIQUE:  Multiplanar multisequence MRI of the brain was performed without the  administration of intravenous contrast.    COMPARISON:  10/02/2017. HISTORY:  ORDERING SYSTEM PROVIDED HISTORY: Transient ischemic attack (TIA)  TECHNOLOGIST PROVIDED HISTORY:  Reason for Exam: tia  Additional signs and symptoms: over the past few months she has had episodes  of facial droop and upper extremity numbness. Numbness sometimes affects  right and sometimes the left. FINDINGS:  INTRACRANIAL STRUCTURES/VENTRICLES: There is no acute infarct. No mass effect  or midline shift. No evidence of an acute intracranial hemorrhage. The  ventricles and sulci are normal in size and configuration. The  sellar/suprasellar regions appear unremarkable. The normal signal voids  within the major intracranial vessels appear maintained. There are scattered  periventricular and deep subcortical white matter T2/FLAIR hyperintensities,  consistent with microangiopathic change. There is mild parenchymal volume  loss. ORBITS: The visualized portion of the orbits demonstrate no acute abnormality. SINUSES: The visualized paranasal sinuses and mastoid air cells are well  aerated. BONES/SOFT TISSUES: The bone marrow signal intensity appears normal. The soft  tissues demonstrate no acute abnormality. Impression  No acute intracranial abnormality. Microangiopathic change. Results for orders placed during the hospital encounter of 10/01/17    MRI C/ Dilia 29    Narrative  Radiology exam is complete. No Radiologist dictation. Please follow up with ordering provider. No results found for this or any previous visit.     Results for orders placed during the hospital encounter of 02/08/22    CT Head WO Contrast    Narrative  EXAMINATION:  CT methylprednisone  It is quite likely that patient has MS exacerbation  I reviewed her old MRI brain, pictures pasted above, based on the MRI brain done in 10/2017, pt had multiple lesions at that time that most likely signified she had MS  I disagree with radiologist read.  Current MRI didn't show any acute lesions, but clearly re-demonstrate multiple lesions, T2 Flair clearly showing Norlene Frames fingers, which is a classic MS feature  Therefore despite her MS was never confirmed by any lab, she indeed met the criteria of MS diagnosis based on her symptoms and multiple MRI images  Given her current symptoms and pt was never on any DMT, will get her a repeat MRI brain w/wo contrast and MRI cervical w/wo contrast to look for new MS lesion  con't methylprednisone 1g x  For total 5 doses, pt can get her dose in either acute rehab or outpatient  PT/OT and acute rehab consult            Electronically signed by Khushi Mcmullen MD on 2/11/2022 at 1:46 PM      Khushi Mcmullen MD  [facility]  Neurology

## 2022-02-11 NOTE — PROGRESS NOTES
Physical Therapy  [facility]  Daily Treatment Note  NAME: Gustavo Pitt  : 1934  MRN: [id number]    Date of Service: 2022    Discharge Recommendations:  Patient would benefit from continued therapy after discharge        Assessment   Body structures, Functions, Activity limitations: Decreased functional mobility ; Decreased strength;Decreased safe awareness;Decreased endurance;Decreased balance;Decreased posture;Decreased cognition  History: Hx MS  REQUIRES PT FOLLOW UP: Yes  Activity Tolerance  Activity Tolerance: Patient Tolerated treatment well;Patient limited by endurance     Patient Diagnosis(es): The primary encounter diagnosis was Urinary tract infection without hematuria, site unspecified. A diagnosis of Hx of multiple sclerosis ([address] was also pertinent to this visit. has a past medical history of Acute cystitis without hematuria, Arthritis, Chronic daily headache, GERD (gastroesophageal reflux disease), Hyperlipidemia, Hypertension, Moderate malnutrition ([address] Multiple sclerosis ([address] Neuropathy, Pneumonia, and Vitamin D deficiency. has a past surgical history that includes Cystocele repair; Rectocele repair; Hysterectomy; Cervical disc surgery; Cataract removal with implant (Right, 2014); Cataract removal with implant (Left, 2014); Total shoulder arthroplasty (Right, 2017); and shoulder surgery (Right, ). Restrictions  Restrictions/Precautions  Restrictions/Precautions: Fall Risk,General Precautions  Required Braces or Orthoses?: Yes (Pt has a back brace that she wears PRN)  Implants present? : Metal implants (R TSA)  Subjective   General  Additional Pertinent Hx: The patient is a 80 y.o. Non- / non  female who presents withDizziness and she is admitted to the hospital for the management of multiple sclerosis flareup and UTI. Patient was diagnosed with multiple sclerosis at the age of 44.  Past medical history significant for multiple sclerosis, hypertension, chronic osteoarthritis of the spine. Patient reportedly woke up this morning with worsening facial droop, severe headache, and an unsteady gait. She has history of chronic headaches but this was much worse. She took 2 Norco pills but that did not alleviate the headache. It is located frontally and in the back of her head and neck and throbbing. Patient was taking 10 mg of prednisone daily per her PCP for spinal stenosis and chronic vertebral fracture. She sees a pain medicine specialist who gives her spinal injections. He told her to stop taking the prednisone for 5 days prior to injection. This was her fifth day without taking steroids. In the ED she had a CT and a CTA done. CT showed no acute abnormalities. CTA head and neck shows no hemodynamically significant stenosis. Family / Caregiver Present: No  Subjective  Subjective: Pt in bed upon arrival. Pt states she gets a little SOB. 2 L O2. SaO2 93% resting, HR 73. Pt agreeable for therapy. General Comment  Comments: /83 supine. RN okay's Pt for PT. Pain Screening  Patient Currently in Pain: Denies  Vital Signs  Patient Currently in Pain: Denies  Oxygen Therapy  O2 Device: Nasal cannula  O2 Flow Rate (L/min): 2 L/min       Orientation     Cognition      Objective   Bed mobility  Bridging: Contact guard assistance  Rolling to Left: Stand by assistance  Rolling to Right: Stand by assistance  Supine to Sit: Contact guard assistance  Sit to Supine: Contact guard assistance  Scooting: Stand by assistance (towards [facility])  Comment: Head of bed elevated, use of bed rails.   Transfers  Sit to Stand: Contact guard assistance;Minimal Assistance  Stand to sit: Contact guard assistance  Comment: Transfers with RW, cues for hand placement  Ambulation  Ambulation?: Yes  Ambulation 1  Surface: level tile  Device: Rolling Walker  Assistance: Minimal assistance;Contact guard assistance  Quality of Gait: Unsteady gait, minor LOB, requiring in A for balance, fatiques easily, pt took one standing break due to fatique  Distance: 20' x 1  Comments: Decreased awareness of deficts. Balance  Posture: Fair  Sitting - Static: Good  Sitting - Dynamic: Fair;+  Standing - Static: Fair (RW)  Standing - Dynamic: Fair;- (RW)  Other exercises  Other exercises?: Yes  Other exercises 1: Seated B LE exercises x10-15 reps  Other exercises 2: Rolling L/ R for pericare, bridging x4  Other exercises 3: STS with RW x 2                           Goals  Short term goals  Time Frame for Short term goals: 5 to 7 visits  Short term goal 1: Pt able to perform bed mobility independently  Short term goal 2: Pt able to perform sit<>stand SBA  Short term goal 3: transfers CGA  Short term goal 4: Gait with Rolling walekr distance for 50 ft x 2 without taking standing break,   Patient Goals   Patient goals : I need more therapy.     Plan    Plan  Times per week: 5 to 6 x/week  Current Treatment Recommendations: Strengthening,Balance Training,Functional Mobility Training,Transfer Training,Endurance Training,Gait Training,Patient/Caregiver Education & Training,Safety Education & Training  Safety Devices  Type of devices: Gait belt,Call light within reach,Left in bed,Nurse notified (left EOB to eat lunch)     Therapy Time   Individual Concurrent Group Co-treatment   Time In 7091         Time Out 1209         Minutes 2000 [facility], [facility]

## 2022-02-11 NOTE — PROGRESS NOTES
Pt transferred to unit from PCU. Dtr accompanied Pt. Pt oriented to unit, call light, & safety protocols. Pt educated on calling for assistance. Pt verbalizes understanding. Assessment completed. Medications reviewed with dtr and pt. Pt denies needs/concerns at this time. Will cont. to monitor.

## 2022-02-11 NOTE — PLAN OF CARE
Problem: Falls - Risk of:  Goal: Will remain free from falls  Description: Will remain free from falls  Outcome: Ongoing, Patient remains free of incidence/ injury. Bed remains in low position. Side rails up x2. Problem: Skin Integrity:  Goal: Will show no infection signs and symptoms  Description: Will show no infection signs and symptoms  Outcome: Ongoing, Patient displays no new signs/ symptoms of infection during this shift. Problem: Pain:  Goal: Patient's pain/discomfort is manageable  Description: Patient's pain/discomfort is manageable  Outcome: Ongoing, Pt educated on non-pharmacological pain interventions. PRN pain medications administered.

## 2022-02-12 PROBLEM — E87.6 HYPOKALEMIA: Status: ACTIVE | Noted: 2022-02-12

## 2022-02-12 LAB
ANION GAP SERPL CALCULATED.3IONS-SCNC: 13 MMOL/L (ref 9–17)
BUN BLDV-MCNC: 37 MG/DL (ref 8–23)
BUN/CREAT BLD: ABNORMAL (ref 9–20)
CALCIUM SERPL-MCNC: 7.7 MG/DL (ref 8.6–10.4)
CHLORIDE BLD-SCNC: 99 MMOL/L (ref 98–107)
CO2: 25 MMOL/L (ref 20–31)
CREAT SERPL-MCNC: 1.33 MG/DL (ref 0.5–0.9)
GFR AFRICAN AMERICAN: 46 ML/MIN
GFR NON-AFRICAN AMERICAN: 38 ML/MIN
GFR SERPL CREATININE-BSD FRML MDRD: ABNORMAL ML/MIN/{1.73_M2}
GFR SERPL CREATININE-BSD FRML MDRD: ABNORMAL ML/MIN/{1.73_M2}
GLUCOSE BLD-MCNC: 118 MG/DL (ref 65–105)
GLUCOSE BLD-MCNC: 160 MG/DL (ref 70–99)
GLUCOSE BLD-MCNC: 186 MG/DL (ref 65–105)
GLUCOSE BLD-MCNC: 85 MG/DL (ref 65–105)
GLUCOSE BLD-MCNC: 94 MG/DL (ref 65–105)
HCT VFR BLD CALC: 35.5 % (ref 36–46)
HEMOGLOBIN: 12.2 G/DL (ref 12–16)
LACTOFERRIN, QUAL: ABNORMAL
MAGNESIUM: 1.8 MG/DL (ref 1.6–2.6)
MCH RBC QN AUTO: 31.2 PG (ref 26–34)
MCHC RBC AUTO-ENTMCNC: 34.2 G/DL (ref 31–37)
MCV RBC AUTO: 91.2 FL (ref 80–100)
NRBC AUTOMATED: ABNORMAL PER 100 WBC
PDW BLD-RTO: 14 % (ref 11.5–14.9)
PLATELET # BLD: 270 K/UL (ref 150–450)
PMV BLD AUTO: 7.9 FL (ref 6–12)
POTASSIUM SERPL-SCNC: 3.2 MMOL/L (ref 3.7–5.3)
RBC # BLD: 3.9 M/UL (ref 4–5.2)
SODIUM BLD-SCNC: 137 MMOL/L (ref 135–144)
WBC # BLD: 12.9 K/UL (ref 3.5–11)

## 2022-02-12 PROCEDURE — 36415 COLL VENOUS BLD VENIPUNCTURE: CPT

## 2022-02-12 PROCEDURE — 97535 SELF CARE MNGMENT TRAINING: CPT

## 2022-02-12 PROCEDURE — 92610 EVALUATE SWALLOWING FUNCTION: CPT

## 2022-02-12 PROCEDURE — 1200000000 HC SEMI PRIVATE

## 2022-02-12 PROCEDURE — 83630 LACTOFERRIN FECAL (QUAL): CPT

## 2022-02-12 PROCEDURE — 97530 THERAPEUTIC ACTIVITIES: CPT

## 2022-02-12 PROCEDURE — 97162 PT EVAL MOD COMPLEX 30 MIN: CPT

## 2022-02-12 PROCEDURE — 85027 COMPLETE CBC AUTOMATED: CPT

## 2022-02-12 PROCEDURE — 80048 BASIC METABOLIC PNL TOTAL CA: CPT

## 2022-02-12 PROCEDURE — 6360000002 HC RX W HCPCS: Performed by: INTERNAL MEDICINE

## 2022-02-12 PROCEDURE — 6370000000 HC RX 637 (ALT 250 FOR IP): Performed by: PHYSICAL MEDICINE & REHABILITATION

## 2022-02-12 PROCEDURE — 97116 GAIT TRAINING THERAPY: CPT

## 2022-02-12 PROCEDURE — 1180000000 HC REHAB R&B

## 2022-02-12 PROCEDURE — 82947 ASSAY GLUCOSE BLOOD QUANT: CPT

## 2022-02-12 PROCEDURE — 83735 ASSAY OF MAGNESIUM: CPT

## 2022-02-12 PROCEDURE — 6370000000 HC RX 637 (ALT 250 FOR IP): Performed by: INTERNAL MEDICINE

## 2022-02-12 PROCEDURE — 6360000002 HC RX W HCPCS

## 2022-02-12 PROCEDURE — 99232 SBSQ HOSP IP/OBS MODERATE 35: CPT | Performed by: INTERNAL MEDICINE

## 2022-02-12 PROCEDURE — 6370000000 HC RX 637 (ALT 250 FOR IP)

## 2022-02-12 PROCEDURE — 97166 OT EVAL MOD COMPLEX 45 MIN: CPT

## 2022-02-12 RX ORDER — CIPROFLOXACIN 500 MG/1
500 TABLET, FILM COATED ORAL
Status: COMPLETED | OUTPATIENT
Start: 2022-02-12 | End: 2022-02-16

## 2022-02-12 RX ORDER — SODIUM CHLORIDE AND POTASSIUM CHLORIDE .9; .15 G/100ML; G/100ML
SOLUTION INTRAVENOUS CONTINUOUS
Status: DISCONTINUED | OUTPATIENT
Start: 2022-02-12 | End: 2022-02-12

## 2022-02-12 RX ORDER — LOPERAMIDE HYDROCHLORIDE 2 MG/1
2 CAPSULE ORAL 4 TIMES DAILY PRN
Status: DISCONTINUED | OUTPATIENT
Start: 2022-02-12 | End: 2022-02-21 | Stop reason: HOSPADM

## 2022-02-12 RX ORDER — SODIUM CHLORIDE AND POTASSIUM CHLORIDE .9; .15 G/100ML; G/100ML
SOLUTION INTRAVENOUS CONTINUOUS
Status: DISCONTINUED | OUTPATIENT
Start: 2022-02-12 | End: 2022-02-13

## 2022-02-12 RX ADMIN — LOPERAMIDE HYDROCHLORIDE 2 MG: 2 CAPSULE ORAL at 10:11

## 2022-02-12 RX ADMIN — POTASSIUM CHLORIDE AND SODIUM CHLORIDE: 900; 150 INJECTION, SOLUTION INTRAVENOUS at 17:40

## 2022-02-12 RX ADMIN — LISINOPRIL 40 MG: 20 TABLET ORAL at 08:04

## 2022-02-12 RX ADMIN — AMITRIPTYLINE HYDROCHLORIDE 100 MG: 50 TABLET, FILM COATED ORAL at 22:34

## 2022-02-12 RX ADMIN — GABAPENTIN 100 MG: 100 CAPSULE ORAL at 08:04

## 2022-02-12 RX ADMIN — HYDROCODONE BITARTRATE AND ACETAMINOPHEN 1 TABLET: 5; 325 TABLET ORAL at 05:45

## 2022-02-12 RX ADMIN — AMLODIPINE BESYLATE 10 MG: 10 TABLET ORAL at 08:03

## 2022-02-12 RX ADMIN — ENOXAPARIN SODIUM 30 MG: 100 INJECTION SUBCUTANEOUS at 08:04

## 2022-02-12 RX ADMIN — METOPROLOL TARTRATE 50 MG: 50 TABLET, FILM COATED ORAL at 08:04

## 2022-02-12 RX ADMIN — METOPROLOL TARTRATE 50 MG: 50 TABLET, FILM COATED ORAL at 21:32

## 2022-02-12 RX ADMIN — PANTOPRAZOLE SODIUM 40 MG: 40 TABLET, DELAYED RELEASE ORAL at 05:45

## 2022-02-12 RX ADMIN — ATORVASTATIN CALCIUM 20 MG: 20 TABLET, FILM COATED ORAL at 08:04

## 2022-02-12 RX ADMIN — CIPROFLOXACIN 500 MG: 500 TABLET, FILM COATED ORAL at 17:43

## 2022-02-12 RX ADMIN — INSULIN LISPRO 1 UNITS: 100 INJECTION, SOLUTION INTRAVENOUS; SUBCUTANEOUS at 11:06

## 2022-02-12 RX ADMIN — LOPERAMIDE HYDROCHLORIDE 2 MG: 2 CAPSULE ORAL at 14:06

## 2022-02-12 ASSESSMENT — PAIN SCALES - GENERAL
PAINLEVEL_OUTOF10: 7
PAINLEVEL_OUTOF10: 0
PAINLEVEL_OUTOF10: 8

## 2022-02-12 ASSESSMENT — 9 HOLE PEG TEST
TESTTIME_SECONDS: 31
TESTTIME_SECONDS: 36

## 2022-02-12 ASSESSMENT — PAIN DESCRIPTION - DESCRIPTORS: DESCRIPTORS: HEADACHE

## 2022-02-12 ASSESSMENT — PAIN DESCRIPTION - ONSET: ONSET: ON-GOING

## 2022-02-12 ASSESSMENT — PAIN - FUNCTIONAL ASSESSMENT: PAIN_FUNCTIONAL_ASSESSMENT: ACTIVITIES ARE NOT PREVENTED

## 2022-02-12 ASSESSMENT — PAIN DESCRIPTION - FREQUENCY: FREQUENCY: CONTINUOUS

## 2022-02-12 ASSESSMENT — PAIN DESCRIPTION - PAIN TYPE: TYPE: ACUTE PAIN

## 2022-02-12 ASSESSMENT — PAIN DESCRIPTION - PROGRESSION: CLINICAL_PROGRESSION: NOT CHANGED

## 2022-02-12 ASSESSMENT — PAIN DESCRIPTION - LOCATION: LOCATION: HEAD

## 2022-02-12 NOTE — PLAN OF CARE
Problem: Falls - Risk of:  Goal: Will remain free from falls  Description: Will remain free from falls  2/12/2022 1335 by Abraham Srivastava LPN  Outcome: Ongoing  2/12/2022 0345 by Qamar Conklin LPN  Outcome: Ongoing  Note: No falls or injuries sustained at this time. No attempts to get out of bed without nursing assistance. Call light within reach and pt. uses appropriately for assistance. Siderails up x 2. Nonskid footwear remains on. Bed in low and locked position. Hourly nursing rounds made. Pt. Alert and oriented, aware of limitations, and exhibits good safety judgement. Pt. uses assistive devices appropriately. Pt. understands individual fall risk factors. Pt. reoriented to surroundings and reminded to use call light with each nurse/patient interaction. Goal: Absence of physical injury  Description: Absence of physical injury  Outcome: Ongoing     Problem: Skin Integrity:  Goal: Will show no infection signs and symptoms  Description: Will show no infection signs and symptoms  Outcome: Ongoing  Goal: Absence of new skin breakdown  Description: Absence of new skin breakdown  2/12/2022 1335 by Abraham Srivastava LPN  Outcome: Ongoing  2/12/2022 0345 by Qamar Conklin LPN  Outcome: Ongoing  Note: Skin assessment completed this shift. Nutrition and Hydration status assessed with adequate intake. José Miguel Score as charted. Patient able to reposition self for comfort and to prevent breakdown. Patient verbalizes understanding of pressure ulcer prevention measures. Skin integrity maintained. No new skin breakdown noted. Skin to high risk pressure areas including coccyx and heels are clear.        Problem: Infection:  Goal: Will remain free from infection  Description: Will remain free from infection  2/12/2022 1335 by Lubna England II, LPN  Outcome: Ongoing  2/12/2022 0345 by Qamar Conklin LPN  Outcome: Ongoing     Problem: Safety:  Goal: Free from accidental physical injury  Description: Free from accidental physical injury  Outcome: Ongoing  Goal: Free from intentional harm  Description: Free from intentional harm  Outcome: Ongoing     Problem: Pain:  Goal: Patient's pain/discomfort is manageable  Description: Patient's pain/discomfort is manageable  2/12/2022 1335 by Lewis Pradhan LPN  Outcome: Ongoing  2/12/2022 0345 by Ligia Patterson LPN  Outcome: Ongoing  Note: Pain assessment completed. Nonverbal cues indicate pain relief. Pt. Rests quietly with eyes closed after pain medication administration. Respirations easy and unlabored. Appears free from distress.        Problem: Neurological  Goal: Maximum potential motor/sensory/cognitive function  Outcome: Ongoing

## 2022-02-12 NOTE — PROGRESS NOTES
Speech Language Pathology  Facility/Department: [address] [place] [address]   CLINICAL BEDSIDE SWALLOW EVALUATION    NAME: Berto Jackson  : 1934  MRN: [id number]    ADMISSION DATE: 2022  ADMITTING DIAGNOSIS: has Ataxia; Multiple sclerosis ([facility] [address] Essential hypertension; Hyperlipidemia; Atelectasis; Debility; Abnormal gait; Actinic keratosis; Enthesopathy of hip region; Generalized osteoarthritis; Idiopathic peripheral neuropathy; Vitamin D deficiency; Primary localized osteoarthrosis of the hip, left; Primary osteoarthritis of left hip; Acquired spondylolisthesis; Chronic midline low back pain with left-sided sciatica; Spinal stenosis of lumbar region with neurogenic claudication; Presence of right artificial shoulder joint; Multiple sclerosis exacerbation ([address] Diarrhea; Chronic headache; Facial asymmetry; Asymptomatic bacteriuria; Hyperglycemia; EDINSON (acute kidney injury) ([facility] [address] Lumbar radiculopathy; Type 2 diabetes mellitus; Recurrent urinary tract infection; and Diastolic congestive heart failure (HCC) on their problem list.    Recent Chest Xray/CT of Chest:  02/10- CXR- Findings most compatible with pulmonary interstitial edema. Date of Eval: 2022  Evaluating Therapist: MARK Dye    Current Diet level:  Current Diet : Regular  Current Liquid Diet : Thin      Primary Complaint   Per PM&R H&P: Berto Jackson  is a 80 y.o. right-handed single    female admitted to the [address] [place] [address] on 2022.          Patient admitted for MS flareup with facial droop s, unsteady gait, and UTI. She was diagnosed at 44years old. Further microscopic analysis was not consistent with UTI and Rocephin was discontinued.      Neurology consulted - she follows as outpatient with Dr. Sunita Coelho. Treating with methylprednisone.      She reports that her MS exacerbations typically present as worsening SOB and BLE weakness.   She seems to experience some difficulties swallowing.      is currently requiring assistance for self-care activities and mobility prompting this admission. Pain:  Pt. denies    Reason for Referral  Rommle Hyde was referred for a bedside swallow evaluation to assess the efficiency of her swallow function, identify signs and symptoms of aspiration and make recommendations regarding safe dietary consistencies, effective compensatory strategies, and safe eating environment. Impression  Dysphagia Diagnosis: Swallow function appears grossly intact  Dysphagia Impression : Pt. demonstrated no overt s/s aspiration or swallowing difficulty. Pt. reports she feels she is \"swallowing much better now. \"  Pt. reported difficulty last night with dinner and this am with taking pills where she felt her food was \"getting stuck\". Pt. reports she has been taking pills crushed in applesauce or broken in half in applesauce. Dysphagia Outcome Severity Scale: Level 6: Within functional limits/Modified independence     Treatment Plan  Requires SLP Intervention: No             Recommended Diet and Intervention  Diet Solids Recommendation: Regular  Liquid Consistency Recommendation: Thin  Recommended Form of Meds: Whole with puree (or crushed in applesauce, whichever pt. prefers)          Compensatory Swallowing Strategies  Compensatory Swallowing Strategies: Small bites/sips;Upright as possible for all oral intake      General  Behavior/Cognition: Alert; Cooperative  Respiratory Status: Room air  Breath Sounds: Clear  O2 Device: None (Room air)  Communication Observation: Functional  Follows Directions: Complex  Dentition: Adequate  Patient Positioning: Upright in chair  Baseline Vocal Quality: Normal  Consistencies Administered: Reg solid;Pudding - teaspoon; Thin - straw           Vision/Hearing  Vision  Vision: Impaired  Vision Exceptions: Wears glasses at all times  Hearing  Hearing: Within functional limits    Oral Motor Deficits  Oral/Motor  Oral Motor:  Within functional limits    Oral Phase Dysfunction  Oral Phase  Oral Phase: WNL     Indicators of Pharyngeal Phase Dysfunction   Pharyngeal Phase  Pharyngeal Phase: WNL      Education  Patient Education Response: Verbalizes understanding             Therapy Time  SLP Individual Minutes  Time In: 3822  Time Out: [facility]  Minutes: 10          Jagdish MCKEON A.CCC/SLP    2/12/2022 12:00 PM

## 2022-02-12 NOTE — PROGRESS NOTES
Physical Therapy    Facility/Department: [facility]UTE [facility]  Initial Assessment    NAME: Devon Worthington  : 1934  MRN: [id number]    Date of Service: 2022    Discharge Recommendations:  Patient would benefit from continued therapy after discharge   PT Equipment Recommendations  Other: TBD - rollator? Assessment   Body structures, Functions, Activity limitations: Decreased functional mobility ; Decreased strength;Decreased safe awareness;Decreased endurance;Decreased balance;Decreased posture;Decreased cognition;Decreased ADL status; Decreased coordination; Increased pain  Assessment: Pt most limited by her endurance and fatigue but is extremely motivated and IND. pt would benefit from intense therapy with rest breaks as needed to return home. Treatment Diagnosis: Impaired functional mobility 2* MS exacerbation  Specific instructions for Next Treatment: progress gait, stairs, balance, nustep, virtual reality  Prognosis: Good  Decision Making: Medium Complexity  History: Hx MS  Exam: ROM, MMT, bed mobility, transfers, amb, balance, endurance  Clinical Presentation: Pt alert, pleasant, motivated  Barriers to Learning: none  REQUIRES PT FOLLOW UP: Yes  Activity Tolerance  Activity Tolerance: Patient Tolerated treatment well;Patient limited by endurance       Patient Diagnosis(es): There were no encounter diagnoses. has a past medical history of Acute cystitis without hematuria, Arthritis, Chronic daily headache, GERD (gastroesophageal reflux disease), Hyperlipidemia, Hypertension, Moderate malnutrition ([address] Multiple sclerosis ([address] Neuropathy, Pneumonia, and Vitamin D deficiency. has a past surgical history that includes Cystocele repair; Rectocele repair; Hysterectomy; Cervical disc surgery; Cataract removal with implant (Right, 2014); Cataract removal with implant (Left, 2014);  Total shoulder arthroplasty (Right, 2017); and shoulder surgery (Right, 2017). Restrictions  Restrictions/Precautions  Restrictions/Precautions: Fall Risk,General Precautions,Contact Precautions  Required Braces or Orthoses?: Yes  Implants present? : Metal implants (R total shoulder arthroplasty)  Position Activity Restriction  Other position/activity restrictions: O2 per nasal cannula  Vision/Hearing  Vision: Impaired  Vision Exceptions: Wears glasses at all times  Hearing: Within functional limits     Subjective  General  Chart Reviewed: Yes  Patient assessed for rehabilitation services?: Yes  Additional Pertinent Hx: The patient is a 80 y.o. Non- / non  female who presents withDizziness and she is admitted to the hospital for the management of multiple sclerosis flareup and UTI. Patient was diagnosed with multiple sclerosis at the age of 44. Past medical history significant for multiple sclerosis, hypertension, chronic osteoarthritis of the spine. Patient reportedly woke up this morning with worsening facial droop, severe headache, and an unsteady gait. She has history of chronic headaches but this was much worse. She took 2 Norco pills but that did not alleviate the headache. It is located frontally and in the back of her head and neck and throbbing. Patient was taking 10 mg of prednisone daily per her PCP for spinal stenosis and chronic vertebral fracture. She sees a pain medicine specialist who gives her spinal injections. He told her to stop taking the prednisone for 5 days prior to injection. This was her fifth day without taking steroids. In the ED she had a CT and a CTA done. CT showed no acute abnormalities. CTA head and neck shows no hemodynamically significant stenosis. Pt admitted to [facility] 2/11/22  Family / Caregiver Present: No  Referring Practitioner: Enriqueta Gomez MD  Referral Date : 02/11/22  Diagnosis: multiple sclerosis exacerbation  Follows Commands: Within Functional Limits  Subjective  Subjective: Pt in bed, agreeable to PT jim. Nurse Pawel Cannon. Pt is on 2L O2 at 99%  Pain Screening  Patient Currently in Pain: Denies  Pain Assessment  Pain Assessment: 0-10  Pain Level: 7  Pain Type: Acute pain  Pain Location: Head  Pain Descriptors: Headache  Pain Frequency: Continuous  Pain Onset: On-going  Clinical Progression: Not changed  Functional Pain Assessment: Activities are not prevented  Non-Pharmaceutical Pain Intervention(s): Ambulation/Increased Activity; Distraction;Repositioned; Rest  Response to Pain Intervention: None  Vital Signs  Patient Currently in Pain: Yes  Oxygen Therapy  SpO2: 99 %  O2 Device: Nasal cannula  O2 Flow Rate (L/min): 2 L/min  Patient Observation  Observations: bruising on abdomen, diarhea episodes today, shortness of breath with activity (O2 sats maintained 94% and above throughout PT evaluation)       Orientation  Orientation  Overall Orientation Status: Within Functional Limits  Social/Functional History  Social/Functional History  Lives With: Alone  Type of Home: House  Home Layout: Two level,Able to Live on Main level with bedroom/bathroom,Performs ADL's on one level (does not use 2nd floor)  Home Access: Stairs to enter with rails,Ramped entrance (ramp at back entrance)  Entrance Stairs - Number of Steps: 3  Entrance Stairs - Rails: Right  Bathroom Shower/Tub: Tub/Shower unit,Shower chair with back,Curtain  Bathroom Toilet: Standard  Bathroom Equipment: Grab bars in shower,Hand-held shower,Grab bars around [employer] chair  Bathroom Accessibility: Accessible (walker does not fit - uses grab bars)  Home Equipment: Rolling walker,Reacher,Cane  Receives Help From: Family  ADL Assistance: Needs assistance (daughter A with bathing, pt IND dressing/toileting)  Homemaking Assistance: Needs assistance (Daughter A w/cleaning, shopping, and laundry)  Homemaking Responsibilities: Yes (Pt does microwave/simple meal prep)  Meal Prep Responsibility: Secondary (Pt does microwave/simple meal prep)  Ambulation Assistance: Independent (w/RW at all times)  Transfer Assistance: Independent  Active : No  Patient's  Info: Pt's daughter  Mode of Transportation: Family  Occupation: Retired  Type of occupation: bank, cleaning  IADL Comments: Pt sleeps in a flat bed. Additional Comments: Son and daughter both work during the day, but daughter calls her every morning. Daughter very helpful. Both kids work during the day. Cognition        Objective          AROM RLE (degrees)  RLE AROM: WFL  AROM LLE (degrees)  LLE AROM : WFL  AROM RUE (degrees)  RUE General AROM: See OT eval  AROM LUE (degrees)  LUE General AROM: See OT eval.  Strength RLE  Strength RLE: WFL  Comment: Hip 3+/5, knee and ankle 4-/5  Strength LLE  Strength LLE: WFL  Comment: Hip 3/5, knee and ankle 3+/5  Strength RUE  Comment: See OT  Strength LUE  Comment: See OT  Tone RLE  RLE Tone: Normotonic  Tone LLE  LLE Tone: Normotonic  Coordination  Coordination and Movement description: mild dysmetria finger to nose R UE, BLE WFL  Motor Control  Gross Motor?: WFL     Bed mobility  Rolling to Right: Stand by assistance  Supine to Sit: Stand by assistance  Sit to Supine: Unable to assess  Scooting: Stand by assistance  Comment: SBA with head of bed elevated. Pt up in w/c end of session. Daughter in room and LASHAUN Gu notified. Transfers  Sit to Stand: Contact guard assistance;Minimal Assistance  Stand to sit: Contact guard assistance;Minimal Assistance  Bed to Chair: Contact guard assistance;Minimal assistance  Stand Pivot Transfers: Contact guard assistance;Minimal Assistance  Comment: Cues for technique. Pt performs several sit to stands iwth RW. Pt does fatigue easily. Difficulty from lower bed surface compared to more firm w/c.   Ambulation  Ambulation?: Yes  Ambulation 1  Surface: level tile  Device: Rolling Walker  Other Apparatus: O2 (2L)  Assistance: Minimal assistance;Contact guard assistance  Quality of Gait: Unsteady gait, minor LOB, requiring in A for balance, fatiques easily, pt took one standing break due to fatique  Gait Deviations: Slow Emily  Distance: 2MWT:85' , 15'x2 to restroom. Comments: Decreased awareness of deficits, easily fatigues. O2 sats 94-95% post amb, HR 75. Increased shortness of breath. Initial amb- pt rushing to bathroom due to GI issues (BSC in room for emergencies to use with staff)  Stairs/Curb  Stairs?: No     Balance  Posture: Fair  Sitting - Static: Good  Sitting - Dynamic: Fair;+  Standing - Static: Fair (RW)  Standing - Dynamic: Fair;- (RW)  Comments: HIGH fall risk per Tinetti. Standing balance with RW  Other exercises  Other exercises?: Yes  Other exercises 1: 5x sit to stand test = 25 seconds  Other exercises 2: HIGH fall risk Tinetti score of 11  Other exercises 3: Education on pursed lip breathing  Other exercises 4: Static standing x2 for 1 min each at sink and at toilet for pericare     Plan   Plan  Times per week: 1.5 hr/day, 5-7 days/week  Specific instructions for Next Treatment: progress gait, stairs, balance, nustep, virtual reality  Current Treatment Recommendations: Strengthening,Balance Training,Functional Mobility Training,Transfer Training,Endurance Training,Gait Training,Patient/Caregiver Education & Training,Safety Education & Training,Stair training,Home Exercise Program,Equipment Evaluation, Education, & procurement  Safety Devices  Type of devices: Gait belt,Call light within reach,Nurse notified,All fall risk precautions in place,Left in chair (LPN Jose Antonio Garica; daughter in room)    G-Code       OutComes Score  Balance Score: 3 (02/12/22 1025)  Gait Score: 8 (02/12/22 1025)        Tinetti Total Score: 11 (02/12/22 1025)  2MWT: 80' with RW CGA to min x1  Goals  Short term goals  Time Frame for Short term goals: 5-7 days  Short term goal 1: Pt to perform bed mobility IND from flat surface. Short term goal 2: Pt to perform transfers CGA/SBA from varied surfaces with good technique.   Short term goal 3: Pt to amb 75'-100; on

## 2022-02-12 NOTE — PROGRESS NOTES
[facility] Internal Medicine    Progress Note  Chart Reviewed: Yes  Patient assessed for rehabilitation services?: Yes  Additional Pertinent Hx: The patient is a 80 y.o. Non- / non  female who presents withDizziness and she is admitted to the hospital for the management of multiple sclerosis flareup and UTI. Patient was diagnosed with multiple sclerosis at the age of 44. Past medical history significant for multiple sclerosis, hypertension, chronic osteoarthritis of the spine. Patient reportedly woke up this morning with worsening facial droop, severe headache, and an unsteady gait. She has history of chronic headaches but this was much worse. She took 2 Norco pills but that did not alleviate the headache. It is located frontally and in the back of her head and neck and throbbing. Patient was taking 10 mg of prednisone daily per her PCP for spinal stenosis and chronic vertebral fracture. She sees a pain medicine specialist who gives her spinal injections. He told her to stop taking the prednisone for 5 days prior to injection. This was her fifth day without taking steroids. In the ED she had a CT and a CTA done. CT showed no acute abnormalities. CTA head and neck shows no hemodynamically significant stenosis. Pt admitted to [facility] 2/11/22  Family / Caregiver Present: No  Referring Practitioner: Yovanny Moss MD  Referral Date : 02/11/22  Diagnosis: multiple sclerosis exacerbation  2/12/2022    3:30 PM    Name:   Melanie Melendez  MRN:     [id number]     Acct:      [de-identified]   Room:   2608/2608-01   Day:  1  Admit Date:  2/11/2022  5:26 PM    PCP:   Rodrick Andujar MD  Code Status:  Full Code    Subjective:     C/C: No chief complaint on file. Active Problems:    Multiple sclerosis exacerbation (HCC)    Diarrhea of presumed infectious origin  Resolved Problems:    * No resolved hospital problems.  *      Has had 5-6 loose bowel movements .  Mild abd discomfort   Low k 3.2        Significant last 24 hr data reviewed ;   Vitals:    02/11/22 1742 02/12/22 0645 02/12/22 1025   BP: (!) 160/89 (!) 149/80    Pulse: 94 91    Resp: 16 19    Temp: 98.6 °F (37 °C) 98 °F (36.7 °C)    TempSrc: Oral Oral    SpO2: 99% 98% 99%   Weight: 135 lb 1.6 oz (61.3 kg)     Height: 4' 11\" (1.499 m)        Recent Results (from the past 24 hour(s))   POC Glucose Fingerstick    Collection Time: 02/11/22  4:57 PM   Result Value Ref Range    POC Glucose 169 (H) 65 - 105 mg/dL   POC Glucose Fingerstick    Collection Time: 02/11/22  9:00 PM   Result Value Ref Range    POC Glucose 256 (H) 65 - 105 mg/dL   POC Glucose Fingerstick    Collection Time: 02/12/22  6:15 AM   Result Value Ref Range    POC Glucose 186 (H) 65 - 105 mg/dL   Basic Metabolic Panel w/ Reflex to MG    Collection Time: 02/12/22  6:33 AM   Result Value Ref Range    Glucose 160 (H) 70 - 99 mg/dL    BUN 37 (H) 8 - 23 mg/dL    CREATININE 1.33 (H) 0.50 - 0.90 mg/dL    Bun/Cre Ratio NOT REPORTED 9 - 20    Calcium 7.7 (L) 8.6 - 10.4 mg/dL    Sodium 137 135 - 144 mmol/L    Potassium 3.2 (L) 3.7 - 5.3 mmol/L    Chloride 99 98 - 107 mmol/L    CO2 25 20 - 31 mmol/L    Anion Gap 13 9 - 17 mmol/L    GFR Non-African American 38 (L) >60 mL/min    GFR  46 (L) >60 mL/min    GFR Comment          GFR Staging NOT REPORTED    CBC    Collection Time: 02/12/22  6:33 AM   Result Value Ref Range    WBC 12.9 (H) 3.5 - 11.0 k/uL    RBC 3.90 (L) 4.0 - 5.2 m/uL    Hemoglobin 12.2 12.0 - 16.0 g/dL    Hematocrit 35.5 (L) 36 - 46 %    MCV 91.2 80 - 100 fL    MCH 31.2 26 - 34 pg    MCHC 34.2 31 - 37 g/dL    RDW 14.0 11.5 - 14.9 %    Platelets 337 343 - 602 k/uL    MPV 7.9 6.0 - 12.0 fL    NRBC Automated NOT REPORTED per 100 WBC   Magnesium    Collection Time: 02/12/22  6:33 AM   Result Value Ref Range    Magnesium 1.8 1.6 - 2.6 mg/dL   POC Glucose Fingerstick    Collection Time: 02/12/22 10:56 AM   Result Value Ref Range    POC Glucose 118 (H) 65 - 105 mg/dL     Recent Labs     02/11/22  1336 02/11/22  1657 02/11/22  2100 02/12/22  0615 02/12/22  1056   POCGLU 112* 169* 256* 186* 118*        ECHO Complete 2D W Doppler W Color    Result Date: 2/11/2022  1604 [facility] Transthoracic Echocardiography Report (TTE)  Patient Name Felicita Schwartz Date of Study               02/11/2022               C   Date of      1934  Gender                      Female  Birth   Age          80 year(s)  Race                           Room Number  2097        Height:                     59 inch, 149.86 cm   Corporate ID [id number]    Weight:                     134 pounds, 60.8 kg  #   Patient Acct [de-identified]   BSA:          1.56 m^2      BMI:      27.06  #                                                              kg/m^2   MR #         [id number]      Sonographer                 Kim Dockery   Accession #  [id number]  Interpreting Physician      Kingsley Calvert                   Referring Nurse                           Practitioner   Interpreting             Referring Physician         Guevara Calvert  Type of Study   TTE procedure:2D Echocardiogram, M-Mode, Doppler, Color Doppler. Procedure Date Date: 02/11/2022 Start: 01:49 PM Study Location: [address] [place] [address] Technical Quality: Fair visualization Indications:Congestive heart failure, diastolic dysfunction. Patient Status: Inpatient Height: 59 inches Weight: 134 pounds BSA: 1.56 m^2 BMI: 27.06 kg/m^2 Rhythm: Within normal limits HR: 80 bpm BP: 139/72 mmHg CONCLUSIONS Summary Small LV cavity. Normal left ventricular ejection fraction (>55%). Moderate left ventricular hypertrophy. Normal right ventricular size and function. Normal wall motions. Left atrium is mildly dilated. Aortic valve is trileaflet. Mild aortic stenosis. Peak instantaneous gradient 18 mmHg and mean gradient 8 mmHg. Moderate aortic insufficiency. Aortic root is mildly dilated. (3.8 cm) Mitral annular calcification is seen. Moderate mitral stenosis. Mean 7 mmHg, max 18 mmHg. Moderate mitral regurgitation. Moderate tricuspid regurgitation. Estimated right ventricular systolic pressure is 50 mmHg. Moderately elevated right ventricular systolic pressure. IVC normal diameter & inspiratory collapse indicating normal RA filling pressure . No significant pericardial effusion is seen. Signature ----------------------------------------------------------------------------  Electronically signed by Kim Dockery(Sonographer) on 02/11/2022 03:32  PM ---------------------------------------------------------------------------- ----------------------------------------------------------------------------  Electronically signed by Olya Gamino(Interpreting physician) on  02/11/2022 03:42 PM ---------------------------------------------------------------------------- FINDINGS Left Atrium Left atrium is mildly dilated. Left Ventricle Small LV cavity. Normal left ventricular ejection fraction (>55%). Moderate left ventricular hypertrophy. Normal wall motions. Right Atrium Right atrium is normal in size. Right Ventricle Normal right ventricular size and function. Mitral Valve Mitral annular calcification is seen. Moderate mitral stenosis. Mean 7 mmHg, max 18 mmHg. Moderate mitral regurgitation. Aortic Valve Aortic valve is trileaflet. Mild aortic stenosis. Peak instantaneous gradient 18 mmHg and mean gradient 8 mmHg. Moderate aortic insufficiency. Tricuspid Valve Moderate tricuspid regurgitation. Estimated right ventricular systolic pressure is 50 mmHg. Moderately elevated right ventricular systolic pressure. Pulmonic Valve The pulmonic valve is normal in structure. Trivial pulmonic insufficiency. Pericardial Effusion No significant pericardial effusion is seen. Miscellaneous Aortic root is mildly dilated. (3.8 cm) E/e' average 28 IVC normal diameter & inspiratory collapse indicating normal RA filling pressure .  M-mode / 2D Measurements & Calculations:   LVIDd:3.52 cm(3.7 - 5.6 cm)       Diastolic LAWRKP:29.6 ml  KZYHK:2.40 cm(2.2 - 4.0 cm)       Systolic JBTYDE:98.2 ml  BRZ.55 cm(0.6 - 1.1 cm)        Aortic Root:3.8 cm(2.0 - 3.7 cm)  LVPWd:1.51 cm(0.6 - 1.1 cm)       LA Dimension: 4 cm(1.9 - 4.0 cm)  Fractional Shortenin.27 %     LA volume/Index: 51 ml /33m^2  Calculated LVEF (%): 61.47 %      LVOT:1.9 cm   Mitral:                                 Aortic   Valve Area (P1/2-Time): 3.14 cm^2       Peak Velocity: 2.12 m/s  Peak E-Wave: 1.82 m/s                   Mean Velocity: 1.29 m/s  Peak A-Wave: 1.67 m/s                   Peak Gradient: 17.98 mmHg  E/A Ratio: 1.09                         Mean Gradient: 8 mmHg  Peak Gradient: 13.25 mmHg               AI P1/2t: 369 msec  Mean Gradient: 7 mmHg  Deceleration Time: 180 msec             Area (continuity): 1.65 cm^2  P1/2t: 70 msec                          AV VTI: 40.1 cm   MR Velocity: 5.79 m/s  LARS Volumetric: 0.11 cm^2  Area (continuity): 1.31 cm^2  Mean Velocity: 1.26 m/s  MR VTI: 175 cm   Tricuspid:                              Pulmonic:   Estimated RVSP: 50 mmHg  Peak TR Velocity: 3.41 m/s  Peak TR Gradient: 46.5124 mmHg  Estimated RA Pressure: 3 mmHg                                          Estimated PASP: 49.51 mmHg  Septal Wall E' velocity:0.06 m/s Lateral Wall E' velocity:0.07 m/s    XR CHEST PORTABLE    Result Date: 2/10/2022  EXAMINATION: ONE XRAY VIEW OF THE CHEST 2/10/2022 1:58 pm COMPARISON: 2022 HISTORY: ORDERING SYSTEM PROVIDED HISTORY: Shortness of breath TECHNOLOGIST PROVIDED HISTORY: Shortness of breath Reason for Exam: Shortness of breath FINDINGS: The pulmonary vasculature is congested and there is increased interstitial opacity. Mild bibasilar airspace disease is seen, greater on the left. No acute bony abnormality detected. Right shoulder prosthesis is noted. Findings most compatible with pulmonary interstitial edema.              On admission Review of Systems:     Constitutional:  negative for chills, fevers, sweats  Respiratory:  negative for cough, dyspnea on exertion, hemoptysis, shortness of breath, wheezing  Cardiovascular:  negative for chest pain, chest pressure/discomfort, lower extremity edema, palpitations  Gastrointestinal:  negative for abdominal pain, constipation, diarrhea, nausea, vomiting  Neurological:  negative for dizziness, headache  Data:     Past Medical History:  no change     Social History:  no change    Family History: @no change    Vitals:      I/O (24Hr): Intake/Output Summary (Last 24 hours) at 2022 1530  Last data filed at 2022 2100  Gross per 24 hour   Intake 240 ml   Output --   Net 240 ml       Labs:    Radiology:    Medications:      Allergies:      Current Meds:   Scheduled Meds:    enoxaparin  30 mg SubCUTAneous Daily    amitriptyline  100 mg Oral Nightly    amLODIPine  10 mg Oral Daily    atorvastatin  20 mg Oral Daily    docusate sodium  100 mg Oral Daily    gabapentin  100 mg Oral Daily    insulin glargine  5 Units SubCUTAneous Nightly    insulin lispro  0-3 Units SubCUTAneous Nightly    insulin lispro  0-6 Units SubCUTAneous TID WC    lisinopril  40 mg Oral Daily    methylPREDNISolone  1,000 mg IntraVENous Daily    metoprolol tartrate  50 mg Oral BID    pantoprazole  40 mg Oral QAM AC    polyethylene glycol  17 g Oral Daily     Continuous Infusions:    0.9% NaCl with KCl 20 mEq      dextrose       PRN Meds: loperamide, ondansetron **OR** ondansetron, polyethylene glycol, albuterol sulfate HFA, dextrose, dextrose, glucagon (rDNA), glucose, HYDROcodone-acetaminophen, sodium chloride flush, traMADol, acetaminophen, senna, bisacodyl      Physical Examination:        BP (!) 149/80   Pulse 91   Temp 98 °F (36.7 °C) (Oral)   Resp 19   Ht 4' 11\" (1.499 m)   Wt 135 lb 1.6 oz (61.3 kg)   SpO2 99%   BMI 27.29 kg/m²   Temp (24hrs), Av.3 °F (36.8 °C), Min:98 °F (36.7 °C), Max:98.6 °F (37 °C)    Recent Labs     02/11/22  1657 02/11/22  2100 02/12/22  0615 02/12/22  1056   POCGLU 169* 256* 186* 118*       Intake/Output Summary (Last 24 hours) at 2/12/2022 1530  Last data filed at 2/11/2022 2100  Gross per 24 hour   Intake 240 ml   Output --   Net 240 ml       General Appearance:  alert, well appearing, and in no acute distress  Mental status:   Head:  normocephalic, atraumatic. Eye: no icterus, redness, pupils equal and reactive, extraocular eye movements intact, conjunctiva clear  Ear: normal external ear, no discharge, hearing intact  Nose:  no drainage noted  Mouth: mucous membranes moist  Neck: supple, no carotid bruits, thyroid not palpable  Lungs: Bilateral equal air entry, clear to ausculation, no wheezing, rales or rhonchi, normal effort  Cardiovascular: normal rate, regular rhythm, no murmur, gallop, rub. Abdomen: Soft, nontender, nondistended, normal bowel sounds, no hepatomegaly or splenomegaly  Neurologic: There are no new focal motor or sensory deficits,   Skin: No gross lesions, rashes, bruising or bleeding on exposed skin area  Extremities:  peripheral pulses palpable, no pedal edema or calf pain with palpation  Psych:             Assessment:        Primary Problem  <principal problem not specified>    Active Problems:    Multiple sclerosis exacerbation (HCC)    Diarrhea of presumed infectious origin  Resolved Problems:    * No resolved hospital problems. *       Plan:          2/12/22    ·       . Hospital Problems           Last Modified POA    Multiple [facility] exacerbation ([facility] 2/11/2022 Yes    Diarrhea of presumed infectious origin 2/12/2022 Yes                         Thanks for consulting us . Will monitor vitals and clinical course , and  Optimize therapy  as needed .            Makenna Lane MD

## 2022-02-12 NOTE — H&P
Physical Medicine & Rehabilitation History and Physical  [facility] Unit     Primary care provider: Donnie Garay MD     Chief Complaint and Reason for Rehabilitation Admission:   MS Exacerbation    History of Present Illness:  Tarah Alberto  is a 80 y.o. right-handed single    female admitted to the [address] [place] [address] on 2/11/2022. Patient admitted for MS flareup with facial droop s, unsteady gait, and UTI. She was diagnosed at 44years old. Further microscopic analysis was not consistent with UTI and Rocephin was discontinued.      Neurology consulted - she follows as outpatient with Dr. Yesenia Puri. Treating with methylprednisone.      She reports that her MS exacerbations typically present as worsening SOB and BLE weakness. She seems to experience some difficulties swallowing. is currently requiring assistance for self-care activities and mobility prompting this admission. Premorbid function:  Modified Independent       Current Function:  PT:           Bed mobility  Bridging: Contact guard assistance  Rolling to Left: Stand by assistance  Rolling to Right: Stand by assistance  Supine to Sit: Contact guard assistance  Sit to Supine: Contact guard assistance  Scooting: Stand by assistance (towards [facility])  Comment: Head of bed elevated, use of bed rails. Transfers  Sit to Stand: Contact guard assistance;Minimal Assistance  Stand to sit: Contact guard assistance  Comment: Transfers with RW, cues for hand placement  Ambulation  Ambulation?: Yes  Ambulation 1  Surface: level tile  Device: Rolling Walker  Assistance: Minimal assistance;Contact guard assistance  Quality of Gait: Unsteady gait, minor LOB, requiring in A for balance, fatiques easily, pt took one standing break due to fatique  Distance: 20' x 1  Comments: Decreased awareness of deficts.     Balance  Posture: Fair  Sitting - Static: Good  Sitting - Dynamic: Fair;+  Standing - Static: Fair (RW)  Standing - Dynamic: Fair;- (RW)                           OT:     Cognition  Overall Cognitive Status: WFL  Additional Comments: Cognition improved this date compared to previous session. Bed mobility  Supine to Sit: Stand by assistance  Sit to Supine: Stand by assistance  Scooting: Stand by assistance  Balance  Sitting Balance: Supervision  ADL  Feeding: Independent  Grooming: Setup  UE Bathing: Stand by assistance  Additional Comments: Pt sat EOB and washed face/UB and changed gowns at above levels. Multiple rest breaks required 2* SOB with activity.      Vision  Patient Visual Report: No visual complaint reported.     Assessment  Performance deficits / Impairments: Decreased functional mobility ; Decreased ADL status; Decreased safe awareness;Decreased cognition;Decreased endurance;Decreased balance;Decreased high-level IADLs  Prognosis: Good  Discharge Recommendations: Patient would benefit from continued therapy after discharge  Activity Tolerance: Patient Tolerated treatment well;Treatment limited secondary to medical complications (free text)  Activity Tolerance: Frequent rest breaks required 2* SOB with minimal exertion, 1L O2, SpO2 maintained 94%+. Safety Devices in place: Yes  Type of devices: Patient at risk for falls; Left in bed;Call light within reach; Bed alarm in place;Nurse notified       ST:  Pending eval        Past Medical History:      Diagnosis Date    Acute cystitis without hematuria 10/1/2017    Arthritis     Chronic daily headache     GERD (gastroesophageal reflux disease)     Hyperlipidemia     Hypertension     Moderate malnutrition ([address] 3/2/2019    Multiple sclerosis ([address]     Neuropathy     Pneumonia 2017    states had double pneumonia    Vitamin D deficiency        Past Surgical History:      Procedure Laterality Date    CATARACT REMOVAL WITH IMPLANT Right 11/6/2014    [facility]    CATARACT REMOVAL WITH IMPLANT Left 12/2/2014 [facility]    CERVICAL DISC SURGERY      CYSTOCELE REPAIR      HYSTERECTOMY      RECTOCELE REPAIR      SHOULDER ARTHROPLASTY Right 04/20/2017    SHOULDER SURGERY Right 2017       Allergies:    Bactrim [sulfamethoxazole-trimethoprim], Lactose intolerance (gi), Pcn [penicillins], Lidoderm [lidocaine], and Macrobid [nitrofurantoin]    Medications   Scheduled Meds:   enoxaparin  30 mg SubCUTAneous Daily    sodium chloride flush  5-40 mL IntraVENous 2 times per day    amitriptyline  100 mg Oral Nightly    amLODIPine  10 mg Oral Daily    atorvastatin  20 mg Oral Daily    docusate sodium  100 mg Oral Daily    gabapentin  100 mg Oral Daily    insulin glargine  5 Units SubCUTAneous Nightly    insulin lispro  0-3 Units SubCUTAneous Nightly    insulin lispro  0-6 Units SubCUTAneous TID WC    lisinopril  40 mg Oral Daily    methylPREDNISolone  1,000 mg IntraVENous Daily    metoprolol tartrate  50 mg Oral BID    pantoprazole  40 mg Oral QAM AC    polyethylene glycol  17 g Oral Daily     Continuous Infusions:   dextrose       PRN Meds:. loperamide, ondansetron **OR** ondansetron, polyethylene glycol, albuterol sulfate HFA, dextrose, dextrose, glucagon (rDNA), glucose, HYDROcodone-acetaminophen, sodium chloride flush, traMADol, acetaminophen, senna, bisacodyl       Diagnostics:       CBC:   Recent Labs     02/10/22  0533 02/11/22  0646 02/12/22  0633   WBC 10.7 12.8* 12.9*   RBC 3.33* 3.71* 3.90*   HGB 10.6* 11.6* 12.2   HCT 30.1* 34.2* 35.5*   MCV 90.5 92.4 91.2   RDW 13.7 14.0 14.0    245 270     BMP:   Recent Labs     02/10/22  0533 02/11/22  0646 02/12/22  0633   * 135 137   K 3.8 3.6* 3.2*    100 99   CO2 23 21 25   BUN 25* 28* 37*   CREATININE 1.20* 1.24* 1.33*     BNP: No results for input(s): BNP in the last 72 hours. PT/INR: No results for input(s): PROTIME, INR in the last 72 hours. APTT: No results for input(s): APTT in the last 72 hours.   CARDIAC ENZYMES:   Recent Labs     02/10/22  1728 02/10/22  1850   TROPONINT NOT REPORTED NOT REPORTED     FASTING LIPID PANEL:  Lab Results   Component Value Date    CHOL 235 (H) 01/04/2012    HDL 74 (A) 11/06/2021    TRIG 119 01/04/2012     LIVER PROFILE: No results for input(s): AST, ALT, ALB, BILIDIR, BILITOT, ALKPHOS in the last 72 hours. I/O (24Hr): Intake/Output Summary (Last 24 hours) at 2/12/2022 0923  Last data filed at 2/11/2022 2100  Gross per 24 hour   Intake 240 ml   Output --   Net 240 ml       Glu last 24 hour  Recent Labs     02/11/22  1336 02/11/22  1657 02/11/22  2100 02/12/22  0615   POCGLU 112* 169* 256* 186*       No results for input(s): CLARITYU, COLORU, PHUR, SPECGRAV, PROTEINU, RBCUA, BLOODU, BACTERIA, NITRU, WBCUA, LEUKOCYTESUR, YEAST, GLUCOSEU, BILIRUBINUR in the last 72 hours. Social History:    Lives With: Alone  Type of Home: House  Home Layout: Two level,Able to Live on Main level with bedroom/bathroom  Home Access: Stairs to enter with rails  Entrance Stairs - Number of Steps: 3  Entrance Stairs - Rails: Right  Bathroom Shower/Tub: Tub/Shower unit,Shower chair with back,Curtain  Bathroom Toilet: Standard  Bathroom Equipment: Grab bars in shower,Hand-held shower,Grab bars around toilet  Bathroom Accessibility: Accessible  Home Equipment: [address] [place] [address],[address] Help From: Family  ADL Assistance: Independent (Daughter helps with showers if needed)  Homemaking Assistance: Needs assistance (Daughter A w/cleaning, shopping, and laundry)  Homemaking Responsibilities: Yes (Pt does microwave/simple meal prep)  Ambulation Assistance: Independent (w/RW at all times)  Transfer Assistance: Independent  Active : No  Patient's  Info: Pt's daughter  Occupation: Retired  IADL Comments: Pt sleeps in a flat bed. Additional Comments: Son and daughter both work during the day, but daughter calls her every morning. Family History:   No family history on file.     Review of Systems:  CONSTITUTIONAL:  Denies fevers, chills, sweats or fatigue. EYES:  Denies diplopia, blind spots, blurring. HEENT:  Denies hearing loss, trouble chewing or swallowing. RESPIRATORY:  No wheezing, coughing, shortness of breath. CARDIOVASCULAR:  Denies chest pain, palpitations, lightheadedness. GASTROINTESTINAL:  Denies heartburn, nausea, constipation, diarrhea, abdominal pain. GENITOURINARY:  No urgency, frequency, incontinence, dysuria. ENDOCRINE:  Denies hot or cold intolerance. MUSCULOSKELETAL:  Denies focal joint pain, back pain, neck pain. NEUROLOGICAL:   Weakness LE>.UE , denies numbness, tingling, balance loss, chronic headache. BEHAVIOR/PSYCH:  Denies depression, anxiety, memory loss, insomnia. SKIN:  No ulcers, rash, bruises. Physical Exam:  BP (!) 149/80   Pulse 91   Temp 98 °F (36.7 °C) (Oral)   Resp 19   Ht 4' 11\" (1.499 m)   Wt 135 lb 1.6 oz (61.3 kg)   SpO2 98%   BMI 27.29 kg/m²     GEN: Well developed, well nourished, no acute distress. HEENT: NCAT, PERRL, EOMI, mucous membranes pink and moist.  RESP: Lungs clear to auscultation. No rales or rhonchi. Respirations WNL and unlabored. CV: Regular rate rhythm. No murmurs, rubs, or gallops. ABD: soft, non-distended, non-tender. BS+ and equal.  NEURO: A&O x 3. Sensation intact to light touch. DTRs 2+  MSK: Functional ROM. Muscle tone and bulk are normal bilaterally. Strength 4+/5 key muscles BUEs. 4/5 key muscles BLEs. EXT: No calf tenderness to palpation or edema BLEs. SKIN: Warm dry and intact with good turgor. PSYCH: Mood WNL. Affect WNL. Appropriately interactive.     Principal Diagnosis/plan:  MS exacerbation       She will benefit from intensive interdisciplinary therapies and rehab nursing care and is appropriate for inpatient rehabilitation. The post admission physician evaluation (EDWINA) is consistent with the pre-admission assessment. See above findings to reflect the elements required in the EDWINA.   Patient's admitting condition is consistent with the findings of the preadmission assessment by the rehabilitation admissions coordinator. Other Diagnoses/plan:    Ambulatory and ADL dysfunction due toMS exacerbation . Work with PT/OT/Nsg  to improve on transfers, ambulation,  steps, ADLs, bowel, bladder, monitor skin, monitor for dvt, family training and close medical monitoring /management by IM/FP as below. 1. Multiple Sclerosis exacerbation with impaired gait  2. HTN/Hyperlipidemia  3. Neuropathy  4. GERD  5. Chronic daily headache  6. ? Dysphagia. Speech eval  7. Hypokalemia, kidney Disease  8. IM consult for med mgt      DVT Prophylaxis:  low molecular weight heparin    Estimated Length of Stay:  2 weeks.     Prognosis  fair        Isaias Bryan MD

## 2022-02-12 NOTE — PLAN OF CARE
Nutrition Problem #1: Inadequate oral intake  Intervention: Food and/or Nutrient Delivery: Continue Current Diet  Nutritional Goals: PO intake to meet % of estimated needs

## 2022-02-12 NOTE — PROGRESS NOTES
Writer attempted IV x 1 to LFA with no success. R.N. attempted x1 with no success. House supervisor notified for assistance.

## 2022-02-12 NOTE — PROGRESS NOTES
Physical Therapy  Facility/Department: [facility] ACUTE REHAB  Daily Treatment Note  NAME: Ana Hou  : 1934  MRN: [id number]    Date of Service: 2022    Discharge Recommendations:  Patient would benefit from continued therapy after discharge   PT Equipment Recommendations  Other: TBD - rollator? Assessment   Treatment Diagnosis: Impaired functional mobility 2* MS exacerbation  History: Hx MS  REQUIRES PT FOLLOW UP: Yes  Activity Tolerance  Activity Tolerance: Treatment limited secondary to medical complications (free text) (diahrrea)     Patient Diagnosis(es): There were no encounter diagnoses. has a past medical history of Acute cystitis without hematuria, Arthritis, Chronic daily headache, GERD (gastroesophageal reflux disease), Hyperlipidemia, Hypertension, Moderate malnutrition ([address] Multiple sclerosis ([address] Neuropathy, Pneumonia, and Vitamin D deficiency. has a past surgical history that includes Cystocele repair; Rectocele repair; Hysterectomy; Cervical disc surgery; Cataract removal with implant (Right, 2014); Cataract removal with implant (Left, 2014); Total shoulder arthroplasty (Right, 2017); and shoulder surgery (Right, ). Restrictions  Restrictions/Precautions  Restrictions/Precautions: Fall Risk,General Precautions,Contact Precautions  Required Braces or Orthoses?: Yes  Implants present? : Metal implants (R total shoulder arthroplasty)  Position Activity Restriction  Other position/activity restrictions: O2 per nasal cannula  Subjective   General  Chart Reviewed: Yes  Additional Pertinent Hx: The patient is a 80 y.o. Non- / non  female who presents withDizziness and she is admitted to the hospital for the management of multiple sclerosis flareup and UTI. Patient was diagnosed with multiple sclerosis at the age of 44. Past medical history significant for multiple sclerosis, hypertension, chronic osteoarthritis of the spine.  Patient reportedly woke up this morning with worsening facial droop, severe headache, and an unsteady gait. She has history of chronic headaches but this was much worse. She took 2 Norco pills but that did not alleviate the headache. It is located frontally and in the back of her head and neck and throbbing. Patient was taking 10 mg of prednisone daily per her PCP for spinal stenosis and chronic vertebral fracture. She sees a pain medicine specialist who gives her spinal injections. He told her to stop taking the prednisone for 5 days prior to injection. This was her fifth day without taking steroids. In the ED she had a CT and a CTA done. CT showed no acute abnormalities. CTA head and neck shows no hemodynamically significant stenosis. Pt admitted to [facility] 2/11/22  Family / Caregiver Present: No  Referring Practitioner: Aureliano Vaughan MD  Subjective  Subjective: Pt positioned supine in bed, agreeable to tx in bed d/t continuous diahrrea pt doesn't want to walk. Pain Screening  Patient Currently in Pain: Denies  Vital Signs  Patient Currently in Pain: Denies  Patient Observation  Observations: bruising on abdomen and B AC, diarhea episodes today, shortness of breath with activity       Orientation  Orientation  Overall Orientation Status: Within Functional Limits  Objective   Bed mobility  Rolling to Left: Stand by assistance  Supine to Sit: Stand by assistance  Scooting: Stand by assistance  Comment: Pt completing bed mobility w HOB elevated slightly aand use of bed rails.   Transfers  Sit to Stand: Contact guard assistance  Stand to sit: Contact guard assistance  Bed to Chair: Contact guard assistance  Stand Pivot Transfers: Contact guard assistance  Squat Pivot Transfers: Contact guard assistance  Comment: VC for straight posture and B TKE,   Ambulation  Ambulation?: Yes  Ambulation 1  Surface: level tile  Device: Rolling Walker  Other Apparatus: O2 (2L)  Assistance: Contact guard assistance  Quality of Gait: Unsteady, decreased safety awareness  Gait Deviations: Slow Emily  Distance: 5ft  Comments: Pt ambulates from bed >commode w B Knee flexed and hunched forward in attempt to hold in BM. Stairs/Curb  Stairs?: No     Balance  Posture: Fair  Sitting - Static: Good  Sitting - Dynamic: Fair;+  Standing - Static: Fair (RW)  Standing - Dynamic: Fair;- (RW)  Comments: HIGH fall risk per Tinetti. Standing balance with RW  Other exercises  Other exercises?: Yes  Other exercises 1: Education provided on utilizing call llight to get out of bed and potential risks associated w rushing to toilet or commode without assist.   Goals  Short term goals  Time Frame for Short term goals: 5-7 days  Short term goal 1: Pt to perform bed mobility IND from flat surface. Short term goal 2: Pt to perform transfers CGA/SBA from varied surfaces with good technique. Short term goal 3: Pt to amb 75'-100; on level surfaces with device, SBA/CGA. Short term goal 4: Pt to perform 5 minutes on nustep without change in symptoms for coordination and endurance. Short term goal 5: Pt to improve posture and sitting balance to GOOD. Long term goals  Time Frame for Long term goals : by D/C  Long term goal 1: Pt to perform transfers MOD I with least restrictive device from varied surface heights (toilet, bed, w/c, etc). Long term goal 2: Pt to amb 125'-150'  MOD I with least restrictive device on varied terrain. Long term goal 3: Pt to ascend/descend at least 3 stairs 1 HR, SBA. Long term goal 4: Pt to improve BLE strength by 1/2 MMG. Long term goal 5: Pt to improve Tinetti score to at least a 22 and improve standing balance to GOOD- to reduce fall risk. Long term goal 6: Pt to improve 5xSTS test to 15 seconds or less. Long term goal 7: Pt to improve 2MWT to at least 125' and 6MWT to at least 225' with device, MOD I. Patient Goals   Patient goals :  To get stronger to go home    Plan    Plan  Times per week: 1.5 hr/day, 5-7 days/week  Safety Devices  Type of devices: Gait belt,Call light within reach,Nurse notified,All fall risk precautions in place,Left in chair (Pt on commode)     Therapy Time     02/12/22 1612   PT Individual Minutes   Time In 1535   Time Out 1555   Minutes 20   Minute Variance   Variance 10   Reason   (diahrrea)       Barbara López, PTA

## 2022-02-12 NOTE — PROGRESS NOTES
Patient complained of feeling like something was stuck in throat after Norco was given with apple sauce . Patient was asymptomatic , good skin color, no sob, no coughing, bilateral chest rises and non labor breathing .  Writer put consult in for therapy due to patient having difficulty swallowing

## 2022-02-12 NOTE — PLAN OF CARE
Problem: Falls - Risk of:  Goal: Will remain free from falls  Description: Will remain free from falls  2/12/2022 0345 by Diaz Pace LPN  Outcome: Ongoing  Note: No falls or injuries sustained at this time. No attempts to get out of bed without nursing assistance. Call light within reach and pt. uses appropriately for assistance. Siderails up x 2. Nonskid footwear remains on. Bed in low and locked position. Hourly nursing rounds made. Pt. Alert and oriented, aware of limitations, and exhibits good safety judgement. Pt. uses assistive devices appropriately. Pt. understands individual fall risk factors. Pt. reoriented to surroundings and reminded to use call light with each nurse/patient interaction. Problem: Skin Integrity:  Goal: Absence of new skin breakdown  Description: Absence of new skin breakdown  2/12/2022 0345 by Diaz Pace LPN  Outcome: Ongoing  Note: Skin assessment completed this shift. Nutrition and Hydration status assessed with adequate intake. José Miguel Score as charted. Patient able to reposition self for comfort and to prevent breakdown. Patient verbalizes understanding of pressure ulcer prevention measures. Skin integrity maintained. No new skin breakdown noted. Skin to high risk pressure areas including coccyx and heels are clear. Problem: Infection:  Goal: Will remain free from infection  Description: Will remain free from infection  2/12/2022 0345 by Diaz Pace LPN  Outcome: Ongoing     Problem: Pain:  Goal: Patient's pain/discomfort is manageable  Description: Patient's pain/discomfort is manageable  2/12/2022 0345 by Diaz Pace LPN  Outcome: Ongoing  Note: Pain assessment completed. Nonverbal cues indicate pain relief. Pt. Rests quietly with eyes closed after pain medication administration. Respirations easy and unlabored. Appears free from distress.

## 2022-02-12 NOTE — FLOWSHEET NOTE
02/12/22 1335   Encounter Summary   Services provided to: Patient not available  (PT is eating lunch)   Referral/Consult From: Brady

## 2022-02-12 NOTE — PROGRESS NOTES
Comprehensive Nutrition Assessment    Type and Reason for Visit:  Initial,Consult (Evaluate and treat)    Nutrition Recommendations/Plan: Continue diet as ordered. Allow milk in foods as tolerated, once diet advances. Nutrition Assessment:  Pt admitted to rehab due to Multiple Sclerosis Exacerbation. Pt on commode x 2 attempted visit. Pt stated she is having bowel troubles; nurse reports very soft stools with flecks. Physician has ordred Clear Liquid diet. Pt known from previous admission and at that time indicated she cannot drink liquid milk or eat ice cream due to lactose intolerance. Pt was able to tolerate milk cooked in foods. Swallowing concerns noted per chart review, however, Bedside Swallow Study indicated regular consistency with thin liquids acceptable. Will allow pt to select foods best tolerated once diet advances again. Malnutrition Assessment:  Malnutrition Status: At risk for malnutrition (Comment)    Context:  Acute Illness     Findings of the 6 clinical characteristics of malnutrition:  Energy Intake:  Mild decrease in energy intake (Comment)  Weight Loss:  No significant weight loss     Body Fat Loss:  No significant body fat loss     Muscle Mass Loss:  No significant muscle mass loss    Fluid Accumulation:  No significant fluid accumulation     Strength:  Not Performed    Estimated Daily Nutrient Needs:  Energy (kcal):  1240 based on [place]-St. Jeor with 1.3 factor; Weight Used for Energy Requirements:  Admission     Protein (g):  61 based on 1 gm per kg; Weight Used for Protein Requirements:  Admission          Nutrition Related Findings:  No edema. Glucose: 160. Lantus, Solumedrol. IV: NS with 20 KCl at 75 mL per hr (once IV access achieved). Wounds:  None       Current Nutrition Therapies:    ADULT DIET;  Clear Liquid    Anthropometric Measures:  · Height: 4' 11.02\" (149.9 cm)  · Current Body Weight: 135 lb 2.3 oz (61.3 kg)   · Admission Body Weight: 135 lb 2.3 oz (61.3 kg)    · BMI: 27.3  · BMI Categories: Overweight (BMI 25.0-29. 9)       Nutrition Diagnosis:   · Inadequate oral intake related to altered GI function as evidenced by NPO or clear liquid status due to medical condition    Nutrition Interventions:   Food and/or Nutrient Delivery:  Continue Current Diet  Nutrition Education/Counseling:  No recommendation at this time   Coordination of Nutrition Care:  Continue to monitor while inpatient    Goals:  PO intake to meet % of estimated needs       Nutrition Monitoring and Evaluation:   Behavioral-Environmental Outcomes:  None Identified   Food/Nutrient Intake Outcomes:     Physical Signs/Symptoms Outcomes:  Chewing or Swallowing,Biochemical Data,Diarrhea,GI Status,Weight     Discharge Planning: Too soon to determine     Some areas of assessment may be incomplete due to standard COVID-19 Precautions. Jonatan Granda R.D., L.D.   Phone: [phone]

## 2022-02-12 NOTE — PROGRESS NOTES
[facility]: [facility]   Acute Rehabilitation OT Evaluation  Date: 22  Patient Name: Marivel Garber       Room: 7939/8288-62  MRN: [id number]  Account: [de-identified]   : 1934  (80 y.o.) Gender: female     Referring Practitioner: Dr Leonila Murphy  Diagnosis: MS exacerbation, recurrent UTI  Additional Pertinent Hx: Multiple Sclerosis since she was 52years old    Treatment Diagnosis: Impaired self-care status   Past Medical History:  has a past medical history of Acute cystitis without hematuria, Arthritis, Chronic daily headache, GERD (gastroesophageal reflux disease), Hyperlipidemia, Hypertension, Moderate malnutrition ([address] Multiple sclerosis ([address] Neuropathy, Pneumonia, and Vitamin D deficiency. Past Surgical History:   has a past surgical history that includes Cystocele repair; Rectocele repair; Hysterectomy; Cervical disc surgery; Cataract removal with implant (Right, 2014); Cataract removal with implant (Left, 2014); Total shoulder arthroplasty (Right, 2017); and shoulder surgery (Right, ). Restrictions  Restrictions/Precautions: Fall Risk,General Precautions,Contact Precautions  Other position/activity restrictions: O2 per nasal cannula  Required Braces or Orthoses?: Yes    Vitals  Temp: 98 °F (36.7 °C)  Pulse: 91  Resp: 19  BP: (!) 149/80  Height: 4' 11\" (149.9 cm)  Weight: 135 lb 1.6 oz (61.3 kg)  BMI (Calculated): 27.3  Oxygen Therapy  SpO2: 99 %  O2 Device: Nasal cannula  O2 Flow Rate (L/min): 2 L/min  Level of Consciousness: Alert (0)    Subjective  Comments: Pt now on 2L of O2 via nasal cannula.   Fatigues with activity  Orientation  Overall Orientation Status: Within Functional Limits  Vision  Vision: Impaired  Vision Exceptions: Wears glasses at all times  Hearing  Hearing: Within functional limits  Social/Functional History  Lives With: Alone  Type of Home: House  Home Layout: Two level,Able to Live on Main level with bedroom/bathroom,Performs ADL's on one level,Laundry in basement  Home Access: Stairs to enter with rails,Ramped entrance  Entrance Stairs - Number of Steps: 3 steps to level with litter box for cat  Entrance Stairs - Rails: Right  Bathroom Shower/Tub: Tub/Shower unit,Shower chair with back,Curtain  Bathroom Toilet: Standard  Bathroom Equipment: Grab bars in shower,Hand-held shower,Grab bars around [place] chair  Bathroom Accessibility: Not accessible Ruddy Mealing does not fit - uses grab bars)  Home Equipment: [address] Help From: Family  ADL Assistance: Needs assistance (REcently she had had her daughter with her when she showers for safety)  Homemaking Assistance: Needs assistance (Daughter A w/cleaning, shopping, and laundry)  Homemaking Responsibilities: Yes  Meal Prep Responsibility: Secondary (Pt does microwave/simple meal prep)  Ambulation Assistance: Independent  Transfer Assistance: Independent  Active : No  Patient's  Info: Pt's daughter  Mode of Transportation: Family  Occupation: Retired  Type of occupation: bank, [address] [employer]  IADL Comments: Pt sleeps in a flat bed. Additional Comments: Son and daughter both work during the day, but daughter calls her every morning. She showers 3x week when her daughter is available       Objective      Cognition  Overall Cognitive Status: WFL  Following Commands:  Follows one step commands with increased time  Attention Span: Appears intact  Memory: Decreased recall of biographical Information,Decreased recall of precautions,Decreased recall of recent events  Safety Judgement: Decreased awareness of need for assistance  Problem Solving: Assistance required to generate solutions,Assistance required to implement solutions  Insights: Decreased awareness of deficits  Initiation: Requires cues for some  Sequencing: Requires cues for some         UE Function  Hand Dominance  Hand Dominance: Right        LUE Strength  Gross LUE Strength: WFL  L Hand General: 4-/5  Left Hand Strength -  (lbs)  Handle Setting 2: 14# (norms 31-50# )  LUE Tone: Normotonic     LUE AROM (degrees)  LUE AROM : WFL     Left Hand AROM (degrees)  Left Hand AROM: WFL  RUE Strength  Gross RUE Strength: WFL  R Hand General: 4-/5   Right Hand Strength -  (lbs)  Handle Setting 2: 18#  (norms 31-50# )  RUE Tone: Normotonic     RUE AROM (degrees)  RUE AROM : WFL  RUE General AROM: Active shoulder ROM limited to ~50% (baseline deficit post TSA years ago), Other joints WFL     Right Hand AROM (degrees)  Right Hand AROM: WFL  Right Hand General AROM: hand arthritis              Left 9 Hole Peg Test Time (secs): 31  Right 9 Hole Peg Test Time (secs): 36 (NOrms 22-31Secs)         Fine Motor Skills  Coordination  Movements Are Fluid And Coordinated: No  Coordination and Movement description: Gross motor impairments,Right UE   Comment: 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               Quality of Movement Other  Comment: 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  Hand Assessment Comment: Altered tolerance to care - seeking O2 / rests    Mobility  Supine to Sit: Contact guard assistance  Sit to Supine: Contact guard assistance       Balance  Sitting Balance: Supervision  Standing Balance: Minimal assistance (occasional loss of balance)  Standing Balance  Comment: Increased dizziness  with turning tasks  Functional Mobility  Functional - Mobility Device: Rolling Walker  Activity: To/from bathroom  Assist Level: Minimal assistance  Bed mobility  Rolling to Left: Stand by assistance  Rolling to Right: Stand by assistance  Supine to Sit: Contact guard assistance  Sit to Supine: Contact guard assistance  Scooting: Contact guard assistance     Transfers  Stand Step Transfers: Minimal assistance  Sit to stand: Contact guard assistance  Stand to sit: Contact guard assistance      Activity Tolerance: Patient limited by fatigue  Activity Tolerance: Frequent rest breaks required 2* SOB with minimal exertion, 1L O2, SpO2 maintained 94%+. ADL     ADL  Feeding: Setup; Increased time to complete;Dentures  Grooming: Setup  UE Bathing: Stand by assistance  LE Bathing: Minimal assistance  UE Dressing: Contact guard assistance (Cuing for technquie - touch assist to smooth clothing over bask)  LE Dressing: Moderate assistance (Assist for  over foot level care)  Toileting: Minimal assistance  Additional Comments: Pt sat EOB for assessment. Multiple rest breaks required 2* SOB with activity. Goals  Patient Goals   Patient goals : Regain abiilty to care for self to return Home  Short term goals  Time Frame for Short term goals: One week  Short term goal 1: Pt will complete LB ADL's with CGA and Good safety using AE if needed. Short term goal 2: Pt will complete toilet transfer and toileting with CGA and Good safety using least restrictive device. Short term goal 3: Pt will tolerate standing for 3-4 minutes with 1-2 UE support and CGA while completing a functional task to support participation in care  Short term goal 4: Pt will V/D at least 3 fall prevention strategies that she can utilize during daily routines. Assessment  Performance deficits / Impairments: Decreased functional mobility ,Decreased ADL status,Decreased strength,Decreased safe awareness,Decreased coordination,Decreased endurance,Decreased balance  Treatment Diagnosis: Impaired self-care status  Prognosis: Good  Decision Making: Medium Complexity  History:  Moderate chart Review  Exam: 7 areas of altered performance  REQUIRES OT FOLLOW UP: Yes  Discharge Recommendations: Patient would benefit from continued therapy after discharge  Plan  Times per week: 5-7  Times per day: Twice a day  Current Treatment Recommendations: [address] [facility] Management,Patient/Caregiver Education & Training,Self-Care / ADL,Home Management Training  Plan Comment: Fatigues with activity, New use of Oxygen;  OT Education  OT Education: Plan of Care,Home Exercise Program,Precautions             02/12/22 0900 02/12/22 1515   OT Individual Minutes   Time In 0900 1400   Time Out 1000 1430   Minutes 60 30       Electronically signed by Serene Harvey OT on 2/12/22 at 3:21 PM EST

## 2022-02-13 PROBLEM — E87.6 HYPOKALEMIA: Status: RESOLVED | Noted: 2022-02-12 | Resolved: 2022-02-13

## 2022-02-13 LAB
ANION GAP SERPL CALCULATED.3IONS-SCNC: 12 MMOL/L (ref 9–17)
BUN BLDV-MCNC: 26 MG/DL (ref 8–23)
BUN/CREAT BLD: ABNORMAL (ref 9–20)
CALCIUM SERPL-MCNC: 7.6 MG/DL (ref 8.6–10.4)
CHLORIDE BLD-SCNC: 107 MMOL/L (ref 98–107)
CO2: 24 MMOL/L (ref 20–31)
CREAT SERPL-MCNC: 1.08 MG/DL (ref 0.5–0.9)
GFR AFRICAN AMERICAN: 58 ML/MIN
GFR NON-AFRICAN AMERICAN: 48 ML/MIN
GFR SERPL CREATININE-BSD FRML MDRD: ABNORMAL ML/MIN/{1.73_M2}
GFR SERPL CREATININE-BSD FRML MDRD: ABNORMAL ML/MIN/{1.73_M2}
GLUCOSE BLD-MCNC: 101 MG/DL (ref 65–105)
GLUCOSE BLD-MCNC: 118 MG/DL (ref 65–105)
GLUCOSE BLD-MCNC: 73 MG/DL (ref 65–105)
GLUCOSE BLD-MCNC: 80 MG/DL (ref 65–105)
GLUCOSE BLD-MCNC: 91 MG/DL (ref 70–99)
POTASSIUM SERPL-SCNC: 3.9 MMOL/L (ref 3.7–5.3)
SODIUM BLD-SCNC: 143 MMOL/L (ref 135–144)

## 2022-02-13 PROCEDURE — 36415 COLL VENOUS BLD VENIPUNCTURE: CPT

## 2022-02-13 PROCEDURE — 97116 GAIT TRAINING THERAPY: CPT

## 2022-02-13 PROCEDURE — 97535 SELF CARE MNGMENT TRAINING: CPT

## 2022-02-13 PROCEDURE — 82947 ASSAY GLUCOSE BLOOD QUANT: CPT

## 2022-02-13 PROCEDURE — 6360000002 HC RX W HCPCS

## 2022-02-13 PROCEDURE — 1180000000 HC REHAB R&B

## 2022-02-13 PROCEDURE — 6370000000 HC RX 637 (ALT 250 FOR IP): Performed by: PHYSICAL MEDICINE & REHABILITATION

## 2022-02-13 PROCEDURE — 99232 SBSQ HOSP IP/OBS MODERATE 35: CPT | Performed by: INTERNAL MEDICINE

## 2022-02-13 PROCEDURE — 6370000000 HC RX 637 (ALT 250 FOR IP): Performed by: INTERNAL MEDICINE

## 2022-02-13 PROCEDURE — 1200000000 HC SEMI PRIVATE

## 2022-02-13 PROCEDURE — 97530 THERAPEUTIC ACTIVITIES: CPT

## 2022-02-13 PROCEDURE — 80048 BASIC METABOLIC PNL TOTAL CA: CPT

## 2022-02-13 PROCEDURE — 97110 THERAPEUTIC EXERCISES: CPT

## 2022-02-13 PROCEDURE — 6370000000 HC RX 637 (ALT 250 FOR IP)

## 2022-02-13 RX ORDER — HYDROCODONE BITARTRATE AND ACETAMINOPHEN 5; 325 MG/1; MG/1
1 TABLET ORAL 2 TIMES DAILY PRN
Status: DISCONTINUED | OUTPATIENT
Start: 2022-02-13 | End: 2022-02-21 | Stop reason: HOSPADM

## 2022-02-13 RX ORDER — HYDROCODONE BITARTRATE AND ACETAMINOPHEN 5; 325 MG/1; MG/1
2 TABLET ORAL DAILY PRN
Status: DISCONTINUED | OUTPATIENT
Start: 2022-02-13 | End: 2022-02-21 | Stop reason: HOSPADM

## 2022-02-13 RX ADMIN — CIPROFLOXACIN 500 MG: 500 TABLET, FILM COATED ORAL at 08:07

## 2022-02-13 RX ADMIN — AMLODIPINE BESYLATE 10 MG: 10 TABLET ORAL at 08:05

## 2022-02-13 RX ADMIN — INSULIN GLARGINE 5 UNITS: 100 INJECTION, SOLUTION SUBCUTANEOUS at 21:00

## 2022-02-13 RX ADMIN — LISINOPRIL 40 MG: 20 TABLET ORAL at 08:05

## 2022-02-13 RX ADMIN — TRAMADOL HYDROCHLORIDE 50 MG: 50 TABLET, COATED ORAL at 09:34

## 2022-02-13 RX ADMIN — HYDROCODONE BITARTRATE AND ACETAMINOPHEN 1 TABLET: 5; 325 TABLET ORAL at 12:26

## 2022-02-13 RX ADMIN — AMITRIPTYLINE HYDROCHLORIDE 100 MG: 50 TABLET, FILM COATED ORAL at 20:59

## 2022-02-13 RX ADMIN — METOPROLOL TARTRATE 50 MG: 50 TABLET, FILM COATED ORAL at 08:05

## 2022-02-13 RX ADMIN — ENOXAPARIN SODIUM 30 MG: 100 INJECTION SUBCUTANEOUS at 08:06

## 2022-02-13 RX ADMIN — ACETAMINOPHEN 650 MG: 325 TABLET, FILM COATED ORAL at 20:37

## 2022-02-13 RX ADMIN — ATORVASTATIN CALCIUM 20 MG: 20 TABLET, FILM COATED ORAL at 08:05

## 2022-02-13 RX ADMIN — GABAPENTIN 100 MG: 100 CAPSULE ORAL at 08:07

## 2022-02-13 RX ADMIN — METOPROLOL TARTRATE 50 MG: 50 TABLET, FILM COATED ORAL at 20:37

## 2022-02-13 RX ADMIN — TRAMADOL HYDROCHLORIDE 50 MG: 50 TABLET, COATED ORAL at 20:37

## 2022-02-13 RX ADMIN — PANTOPRAZOLE SODIUM 40 MG: 40 TABLET, DELAYED RELEASE ORAL at 06:38

## 2022-02-13 ASSESSMENT — PAIN DESCRIPTION - DESCRIPTORS: DESCRIPTORS: HEADACHE

## 2022-02-13 ASSESSMENT — PAIN SCALES - GENERAL
PAINLEVEL_OUTOF10: 8
PAINLEVEL_OUTOF10: 6
PAINLEVEL_OUTOF10: 8

## 2022-02-13 ASSESSMENT — PAIN DESCRIPTION - PAIN TYPE: TYPE: ACUTE PAIN;CHRONIC PAIN

## 2022-02-13 ASSESSMENT — PAIN DESCRIPTION - FREQUENCY: FREQUENCY: CONTINUOUS

## 2022-02-13 ASSESSMENT — PAIN DESCRIPTION - LOCATION: LOCATION: HEAD

## 2022-02-13 NOTE — PROGRESS NOTES
Individualized Plan of [address] [facility] Inpatient Rehabilitation Unit    Rehabilitation physician: Dr. Rommel Andres Date: 2/11/2022     Rehabilitation Diagnosis: Multiple sclerosis exacerbation [address] [address] [G35]     Rehabilitation impairments: self care, mobility, motor dysfunction, pain management and safety    Factors facilitating achievement of predicted outcomes: Family support, Motivated, Cooperative, Pleasant and Good insight into deficits  Barriers to the achievement of predicted outcomes: Pain, Decreased endurance, Decreased proprioception, Upper extremity weakness, Lower extremity weakness and Long standing deficits    Patient Goals: Improve independence with mobility, Improvement of mobility at a wheelchair level, Increase overall strength and endurance, Increase balance, Increase endurance, Increase independence with activities of daily living, Improve cognition, Increase self-awareness, Increase safety awareness, Increase community integration, Increase socialization, Functional communication with caregivers, Integrate appropriate pain management plan, Assure adequate nutritional option for discharge, Continence of bowel and bladder and Provide appropriate patient and family education      NURSING:  Nursing goals for Jessica Budds while on the rehabilitation unit will include:  Continence of bowel and bladder, Adequate number of bowel movements, Urinate with no urinary retention >300ml in bladder, Complete bladder protocol with francisco removal, Maintain O2 SATs at an acceptable level during stay, Effective pain management while on the rehabilitation unit, Establish adequate pain control plan for discharge, Absence of skin breakdown while on the rehabilitation unit, Improved skin integrity via assessments including wound measurements, Avoidance of any hospital acquired infections, No signs/symptoms of infection at the wound site, Freedom from injury during hospitalization and Complete education with interventions may include therapeutic exercises, gait training, neuromuscular re-ed, transfer training, community reintegration, bed mobility, w/c mobility and training. OCCUPATIONAL THERAPY:  Goals:             Short term goals  Time Frame for Short term goals: One week  Short term goal 1: Pt will complete LB ADL's with CGA and Good safety using AE if needed. Short term goal 2: Pt will complete toilet transfer and toileting with CGA and Good safety using least restrictive device. Short term goal 3: Pt will tolerate standing for 3-4 minutes with 1-2 UE support and CGA while completing a functional task to support participation in care  Short term goal 4: Pt will V/D at least 3 fall prevention strategies that she can utilize during daily routines. Plan of Care: Patient to be seen by occupational therapy services 1 Hour per day at least 5 out of 7 days per week     Anticipated interventions may include ADL and IADL retraining, strengthening, safety education and training, patient/caregiver education and training, equipment evaluation/ training/procurement, neuromuscular reeducation, wheelchair mobility training. CASE MANAGEMENT:  Goals:   Assist patient/family with discharge planning, patient/family counseling,  and coordination with insurance during the inpatient rehabilitation stay. Other members of the multidisciplinary rehabilitation team that will be involved in the patient's plan of care include recreational therapy, dietary, respiratory therapy, and neuropsychology.     Medical issues being managed closely and that require 24 hour availability of a physician:  Pain management, Infection protection, DVT prophylaxis, Fall precautions, Fluid/Electrolyte balance and Nutritional status                                           Physician anticipated functional outcomes: Improved independence with functional measures   Estimated length of stay for this admission 1-2 weeks  Medical

## 2022-02-13 NOTE — PROGRESS NOTES
Physical Medicine & Rehabilitation  Progress Note    2/13/2022 9:33 AM     Subjective:     Doing well , no more pb with swallowing , seen and evaluated by speech , Pt. demonstrated no overt s/s aspiration or swallowing difficulty. H/A is responding well to pain meds as needed  Has had multiple episodes of Diarrhea yesterday , resolved today. ROS:    CONSTITUTIONAL:  Denies fevers, chills, sweats or fatigue. EYES:  Denies diplopia, blind spots, blurring. HEENT:  Denies hearing loss, trouble chewing or swallowing. RESPIRATORY:  No wheezing, coughing, shortness of breath. CARDIOVASCULAR:  Denies chest pain, palpitations, lightheadedness. GASTROINTESTINAL:  Denies heartburn, nausea, constipation, diarrhea, abdominal pain. GENITOURINARY:  No urgency, frequency, incontinence, dysuria. ENDOCRINE:  Denies hot or cold intolerance. MUSCULOSKELETAL:  Denies focal joint pain, back pain, neck pain. NEUROLOGICAL:   Weakness LE>.UE , denies numbness, tingling, balance loss, chronic headache. BEHAVIOR/PSYCH:  Denies depression, anxiety, memory loss, insomnia. SKIN:  No ulcers, rash, bruises. Rehabilitation:     PT    AROM RLE (degrees)  RLE AROM: WFL  AROM LLE (degrees)  LLE AROM : WFL  AROM RUE (degrees)  RUE General AROM: See OT eval  AROM LUE (degrees)  LUE General AROM: See OT eval.  Strength RLE  Strength RLE: WFL  Comment: Hip 3+/5, knee and ankle 4-/5  Strength LLE  Strength LLE: WFL  Comment: Hip 3/5, knee and ankle 3+/5  Strength RUE  Comment: See OT  Strength LUE  Comment: See OT  Tone RLE  RLE Tone: Normotonic  Tone LLE  LLE Tone: Normotonic  Coordination  Coordination and Movement description: mild dysmetria finger to nose R UE, BLE WFL  Motor Control  Gross Motor?: WFL  Bed mobility  Rolling to Right: Stand by assistance  Supine to Sit: Stand by assistance  Sit to Supine: Unable to assess  Scooting: Stand by assistance  Comment: SBA with head of bed elevated. Pt up in w/c end of session.  Daughter in room and RN Yessenia Chiu notified. Transfers  Sit to Stand: Contact guard assistance;Minimal Assistance  Stand to sit: Contact guard assistance;Minimal Assistance  Bed to Chair: Contact guard assistance;Minimal assistance  Stand Pivot Transfers: Contact guard assistance;Minimal Assistance  Comment: Cues for technique. Pt performs several sit to stands iwth RW. Pt does fatigue easily. Difficulty from lower bed surface compared to more firm w/c. Ambulation  Ambulation?: Yes  Ambulation 1  Surface: level tile  Device: Rolling Walker  Other Apparatus: O2 (2L)  Assistance: Minimal assistance;Contact guard assistance  Quality of Gait: Unsteady gait, minor LOB, requiring in A for balance, fatiques easily, pt took one standing break due to fatique  Gait Deviations: Slow Emily  Distance: 2MWT:85' , 15'x2 to restroom. Comments: Decreased awareness of deficits, easily fatigues. O2 sats 94-95% post amb, HR 75. Increased shortness of breath. Initial amb- pt rushing to bathroom due to GI issues (BSC in room for emergencies to use with staff)  Stairs/Curb  Stairs?: No  Balance  Posture: Fair  Sitting - Static: Good  Sitting - Dynamic: Fair;+  Standing - Static: Fair (RW)  Standing - Dynamic: Fair;- (RW)  Comments: HIGH fall risk per Tinetti. Standing balance with RW    OT    Cognition  Overall Cognitive Status: WFL  Following Commands:  Follows one step commands with increased time  Attention Span: Appears intact  Memory: Decreased recall of biographical Information,Decreased recall of precautions,Decreased recall of recent events  Safety Judgement: Decreased awareness of need for assistance  Problem Solving: Assistance required to generate solutions,Assistance required to implement solutions  Insights: Decreased awareness of deficits  Initiation: Requires cues for some  Sequencing: Requires cues for some          UE Function  Hand Dominance  Hand Dominance: Right  LUE Strength  Gross LUE Strength: WFL  L Hand General: 4-/5  Left Hand Strength -  (lbs)  Handle Setting 2: 14# (norms 31-50# )  LUE Tone: Normotonic  LUE AROM (degrees)  LUE AROM : WFL  Left Hand AROM (degrees)  Left Hand AROM: WFL  RUE Strength  Gross RUE Strength: WFL  R Hand General: 4-/5   Right Hand Strength -  (lbs)  Handle Setting 2: 18#  (norms 31-50# )  RUE Tone: Normotonic  RUE AROM (degrees)  RUE AROM : WFL  RUE General AROM: Active shoulder ROM limited to ~50% (baseline deficit post TSA years ago), Other joints WFL  Right Hand AROM (degrees)  Right Hand AROM: WFL  Right Hand General AROM: hand arthritis  Left 9 Hole Peg Test Time (secs): 31  Right 9 Hole Peg Test Time (secs): 36 (NOrms 22-31Secs)        Fine Motor Skills  Coordination  Movements Are Fluid And Coordinated: No  Coordination and Movement description: Gross motor impairments,Right UE   Comment: 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   Quality of Movement Other  Comment: 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  Hand Assessment Comment: Altered tolerance to care - seeking O2 / rests     Mobility  Supine to Sit: Contact guard assistance  Sit to Supine: Contact guard assistance       Balance  Sitting Balance: Supervision  Standing Balance: Minimal assistance (occasional loss of balance)  Standing Balance  Comment: Increased dizziness  with turning tasks  Functional Mobility  Functional - Mobility Device: Rolling Walker  Activity: To/from bathroom  Assist Level: Minimal assistance  Bed mobility  Rolling to Left: Stand by assistance  Rolling to Right: Stand by assistance  Supine to Sit: Contact guard assistance  Sit to Supine: Contact guard assistance  Scooting: Contact guard assistance  Transfers  Stand Step Transfers: Minimal assistance  Sit to stand: Contact guard assistance  Stand to sit: Contact guard assistance      Activity Tolerance: Patient limited by fatigue  Activity Tolerance: Frequent rest breaks required 2* SOB with minimal exertion, 1L O2, SpO2 maintained 94%+.        ADL  ADL  Feeding: Setup; Increased time to complete;Dentures  Grooming: Setup  UE Bathing: Stand by assistance  LE Bathing: Minimal assistance  UE Dressing: Contact guard assistance (Cuing for technquie - touch assist to smooth clothing over bask)  LE Dressing: Moderate assistance (Assist for  over foot level care)  Toileting: Minimal assistance  Additional Comments: Pt sat EOB for assessment. Multiple rest breaks required 2* SOB with activity.       ST Raj Arauz was referred for a bedside swallow evaluation to assess the efficiency of her swallow function, identify signs and symptoms of aspiration and make recommendations regarding safe dietary consistencies, effective compensatory strategies, and safe eating environment.     Impression  Dysphagia Diagnosis: Swallow function appears grossly intact  Dysphagia Impression : Pt. demonstrated no overt s/s aspiration or swallowing difficulty. Pt. reports she feels she is \"swallowing much better now. \"  Pt. reported difficulty last night with dinner and this am with taking pills where she felt her food was \"getting stuck\". Pt. reports she has been taking pills crushed in applesauce or broken in half in applesauce. Dysphagia Outcome Severity Scale: Level 6: Within functional limits/Modified independence      Treatment Plan  Requires SLP Intervention: No     Recommended Diet and Intervention  Diet Solids Recommendation: Regular  Liquid Consistency Recommendation: Thin  Recommended Form of Meds: Whole with puree (or crushed in applesauce, whichever pt. prefers)     Compensatory Swallowing Strategies  Compensatory Swallowing Strategies: Small bites/sips;Upright as possible for all oral intake        General  Behavior/Cognition: Alert; Cooperative  Respiratory Status: Room air  Breath Sounds: Clear  O2 Device: None (Room air)  Communication Observation: Functional  Follows Directions: Complex  Dentition: Adequate  Patient Positioning: Upright in chair  Baseline Vocal Quality: Normal  Consistencies Administered: Reg solid;Pudding - teaspoon; Thin - straw           Vision/Hearing  Vision  Vision: Impaired  Vision Exceptions: Wears glasses at all times  Hearing  Hearing: Within functional limits     Oral Motor Deficits  Oral/Motor  Oral Motor: Within functional limits     Oral Phase Dysfunction  Oral Phase  Oral Phase: WNL     Indicators of Pharyngeal Phase Dysfunction   Pharyngeal Phase  Pharyngeal Phase: WNL         Objective:  BP (!) 149/70   Pulse 77   Temp 97.9 °F (36.6 °C)   Resp 19   Ht 4' 11.02\" (1.499 m)   Wt 135 lb 1.6 oz (61.3 kg)   SpO2 95%   BMI 27.27 kg/m²      HEENT: NCAT, PERRL, EOMI, mucous membranes pink and moist.  RESP: Lungs clear to auscultation. No rales or rhonchi. Respirations WNL and unlabored. CV: Regular rate rhythm. No murmurs, rubs, or gallops. ABD: soft, non-distended, non-tender. BS+ and equal.  NEURO: A&O x 3. Sensation intact to light touch. DTRs 2+  MSK: Functional ROM. Muscle tone and bulk are normal bilaterally. Strength 4+/5 key muscles BUEs. 4/5 key muscles BLEs.   EXT: No calf tenderness to palpation or edema BLEs. SKIN: Warm dry and intact with good turgor. PSYCH: Mood WNL. Affect WNL. Appropriately interactive.       Diagnostics:   Recent Results (from the past 72 hour(s))   POC Glucose Fingerstick    Collection Time: 02/10/22 10:56 AM   Result Value Ref Range    POC Glucose 197 (H) 65 - 105 mg/dL   POC Glucose Fingerstick    Collection Time: 02/10/22  4:20 PM   Result Value Ref Range    POC Glucose 227 (H) 65 - 105 mg/dL   Arterial Blood Gases    Collection Time: 02/10/22  5:05 PM   Result Value Ref Range    pH, Arterial 7.382 7.350 - 7.450    pCO2, Arterial 34.7 (L) 35.0 - 45.0 mmHg    pO2, Arterial 56.7 (LL) 80.0 - 100.0 mmHg    HCO3, Arterial 20.6 (L) 22.0 - 26.0 mmol/L    Positive Base Excess, Art NOT REPORTED 0.0 - 2.0 mmol/L    Negative Base Excess, Art 4.5 (H) 0.0 - 2.0 mmol/L O2 Sat, Arterial 89.9 (L) 95 - 98 %    Total Hb NOT REPORTED 12.0 - 16.0 g/dl    Oxyhemoglobin NOT REPORTED 95.0 - 98.0 %    Carboxyhemoglobin 0.8 0 - 5 %    Methemoglobin 0.8 0.0 - 1.9 %    Pt Temp 37     pH, Art, Temp Adj NOT REPORTED 7.350 - 7.450    pCO2, Art, Temp Adj NOT REPORTED 35.0 - 45.0    pO2, Art, Temp Adj NOT REPORTED 80.0 - 100.0 mmHg    O2 Device/Flow/% Cannula     Respiratory Rate 25     Romeo Test PASS     Sample Site Right Radial Artery     Pt.  Position SEMI-FOWLERS     Mode NOT REPORTED     Set Rate NOT REPORTED     Total Rate NOT REPORTED     VT NOT REPORTED     FIO2 NOT REPORTED     Peep/Cpap NOT REPORTED     PSV NOT REPORTED     Text for Respiratory NOT REPORTED     NOTIFICATION NOT REPORTED     NOTIFICATION TIME NOT REPORTED    Lactic Acid    Collection Time: 02/10/22  5:28 PM   Result Value Ref Range    Lactic Acid 4.1 (H) 0.5 - 2.2 mmol/L   Troponin    Collection Time: 02/10/22  5:28 PM   Result Value Ref Range    Troponin, High Sensitivity 36 (H) 0 - 14 ng/L    Troponin T NOT REPORTED <0.03 ng/mL    Troponin Interp NOT REPORTED    D-Dimer Test    Collection Time: 02/10/22  5:28 PM   Result Value Ref Range    D-Dimer, Quant 0.81 (H) 0.00 - 0.59 mg/L FEU   Troponin    Collection Time: 02/10/22  6:50 PM   Result Value Ref Range    Troponin, High Sensitivity 39 (H) 0 - 14 ng/L    Troponin T NOT REPORTED <0.03 ng/mL    Troponin Interp NOT REPORTED    Lactic Acid    Collection Time: 02/10/22  6:50 PM   Result Value Ref Range    Lactic Acid 3.2 (H) 0.5 - 2.2 mmol/L   POC Glucose Fingerstick    Collection Time: 02/10/22  7:54 PM   Result Value Ref Range    POC Glucose 221 (H) 65 - 105 mg/dL   Basic Metabolic Panel w/ Reflex to MG    Collection Time: 02/11/22  6:46 AM   Result Value Ref Range    Glucose 184 (H) 70 - 99 mg/dL    BUN 28 (H) 8 - 23 mg/dL    CREATININE 1.24 (H) 0.50 - 0.90 mg/dL    Bun/Cre Ratio NOT REPORTED 9 - 20    Calcium 8.0 (L) 8.6 - 10.4 mg/dL    Sodium 135 135 - 144 mmol/L Potassium 3.6 (L) 3.7 - 5.3 mmol/L    Chloride 100 98 - 107 mmol/L    CO2 21 20 - 31 mmol/L    Anion Gap 14 9 - 17 mmol/L    GFR Non-African American 41 (L) >60 mL/min    GFR African American 50 (L) >60 mL/min    GFR Comment          GFR Staging NOT REPORTED    CBC    Collection Time: 02/11/22  6:46 AM   Result Value Ref Range    WBC 12.8 (H) 3.5 - 11.0 k/uL    RBC 3.71 (L) 4.0 - 5.2 m/uL    Hemoglobin 11.6 (L) 12.0 - 16.0 g/dL    Hematocrit 34.2 (L) 36 - 46 %    MCV 92.4 80 - 100 fL    MCH 31.2 26 - 34 pg    MCHC 33.8 31 - 37 g/dL    RDW 14.0 11.5 - 14.9 %    Platelets 513 757 - 614 k/uL    MPV 7.9 6.0 - 12.0 fL    NRBC Automated NOT REPORTED per 100 WBC   Brain Natriuretic Peptide    Collection Time: 02/11/22  6:46 AM   Result Value Ref Range    Pro-BNP 4,953 (H) <300 pg/mL    BNP Interpretation NOT REPORTED    POC Glucose Fingerstick    Collection Time: 02/11/22  7:38 AM   Result Value Ref Range    POC Glucose 164 (H) 65 - 105 mg/dL   POC Glucose Fingerstick    Collection Time: 02/11/22 11:23 AM   Result Value Ref Range    POC Glucose 146 (H) 65 - 105 mg/dL   POC Glucose Fingerstick    Collection Time: 02/11/22  1:36 PM   Result Value Ref Range    POC Glucose 112 (H) 65 - 105 mg/dL   POC Glucose Fingerstick    Collection Time: 02/11/22  4:57 PM   Result Value Ref Range    POC Glucose 169 (H) 65 - 105 mg/dL   POC Glucose Fingerstick    Collection Time: 02/11/22  9:00 PM   Result Value Ref Range    POC Glucose 256 (H) 65 - 105 mg/dL   POC Glucose Fingerstick    Collection Time: 02/12/22  6:15 AM   Result Value Ref Range    POC Glucose 186 (H) 65 - 105 mg/dL   Basic Metabolic Panel w/ Reflex to MG    Collection Time: 02/12/22  6:33 AM   Result Value Ref Range    Glucose 160 (H) 70 - 99 mg/dL    BUN 37 (H) 8 - 23 mg/dL    CREATININE 1.33 (H) 0.50 - 0.90 mg/dL    Bun/Cre Ratio NOT REPORTED 9 - 20    Calcium 7.7 (L) 8.6 - 10.4 mg/dL    Sodium 137 135 - 144 mmol/L    Potassium 3.2 (L) 3.7 - 5.3 mmol/L    Chloride 99 98 - 107 mmol/L    CO2 25 20 - 31 mmol/L    Anion Gap 13 9 - 17 mmol/L    GFR Non-African American 38 (L) >60 mL/min    GFR  46 (L) >60 mL/min    GFR Comment          GFR Staging NOT REPORTED    CBC    Collection Time: 02/12/22  6:33 AM   Result Value Ref Range    WBC 12.9 (H) 3.5 - 11.0 k/uL    RBC 3.90 (L) 4.0 - 5.2 m/uL    Hemoglobin 12.2 12.0 - 16.0 g/dL    Hematocrit 35.5 (L) 36 - 46 %    MCV 91.2 80 - 100 fL    MCH 31.2 26 - 34 pg    MCHC 34.2 31 - 37 g/dL    RDW 14.0 11.5 - 14.9 %    Platelets 230 655 - 278 k/uL    MPV 7.9 6.0 - 12.0 fL    NRBC Automated NOT REPORTED per 100 WBC   Magnesium    Collection Time: 02/12/22  6:33 AM   Result Value Ref Range    Magnesium 1.8 1.6 - 2.6 mg/dL   POC Glucose Fingerstick    Collection Time: 02/12/22 10:56 AM   Result Value Ref Range    POC Glucose 118 (H) 65 - 105 mg/dL   POC Glucose Fingerstick    Collection Time: 02/12/22  4:15 PM   Result Value Ref Range    POC Glucose 94 65 - 105 mg/dL   Fecal lactoferrin    Collection Time: 02/12/22  4:47 PM   Result Value Ref Range    Lactoferrin, Qual (A) NEGATIVE for fecal lactoferrin. POSITIVE for fecal lactoferrin, a marker for fecal leukocytes and an indicator of intestinal inflammation.    POC Glucose Fingerstick    Collection Time: 02/12/22  9:10 PM   Result Value Ref Range    POC Glucose 85 65 - 105 mg/dL   POC Glucose Fingerstick    Collection Time: 02/13/22  6:21 AM   Result Value Ref Range    POC Glucose 73 65 - 105 mg/dL   BASIC METABOLIC PANEL    Collection Time: 02/13/22  7:37 AM   Result Value Ref Range    Glucose 91 70 - 99 mg/dL    BUN 26 (H) 8 - 23 mg/dL    CREATININE 1.08 (H) 0.50 - 0.90 mg/dL    Bun/Cre Ratio NOT REPORTED 9 - 20    Calcium 7.6 (L) 8.6 - 10.4 mg/dL    Sodium 143 135 - 144 mmol/L    Potassium 3.9 3.7 - 5.3 mmol/L    Chloride 107 98 - 107 mmol/L    CO2 24 20 - 31 mmol/L    Anion Gap 12 9 - 17 mmol/L    GFR Non-African American 48 (L) >60 mL/min    GFR  58 (L) >60 mL/min    GFR Comment          GFR Staging NOT REPORTED        Impression/Plan:      1. Multiple Sclerosis exacerbation with impaired gait  2. HTN/Hyperlipidemia  3. Neuropathy  4. GERD  5. Diarrhea, resolving, stool study is POSITIVE for fecal lactoferrin, a marker for fecal leukocytes and an indicator of intestinal inflammation. Started on cipro  6. Chronic daily headach  7. Hypokalemia, resolving kidney Fx improving after hydration , will monitor, IV D/C, will monitor  8. DVT prophylaxis , on Lovenox  9.  IM consult for mona Vaughn MD

## 2022-02-13 NOTE — PROGRESS NOTES
[address] [facility]    ACUTE REHABILITATION OCCUPATIONAL THERAPY  DAILY NOTE    Date: 22  Patient Name: Monica Davidsno      Room: 6601/8726-42    MRN: [id number]   : 1934  (80 y.o.)  Gender: female   Referring Practitioner: Dr Shea Daniels  Diagnosis: MS exacerbation, recurrent UTI       Restrictions  Restrictions/Precautions: Fall Risk,General Precautions,Contact Precautions  Implants present? : Metal implants  Other position/activity restrictions: NO O2 today;  Headache,  feels weak in her legs this morning, better in afternoon session  Required Braces or Orthoses?: Yes    Subjective  Subjective: Complaining of a severe headache- reports she did not get her medications as she does at home (nursing notified and schedule modified for medications),  No supplemental O2 i use and IV not running;  Patient Currently in Pain: Yes  Pain Level: 8  Pain Location: Head  Restrictions/Precautions: Fall Risk;General Precautions;Contact Precautions  Overall Orientation Status: Within Functional Limits     Pain Assessment  Pain Level: 8  Pain Type: Acute pain,Chronic pain  Pain Location: Head  Pain Descriptors: Headache  Pain Frequency: Continuous    Objective  Cognition  Overall Cognitive Status: WFL     Transfers  Sit to stand: Contact guard assistance        Fine Motor: Good hand skills for general care tasks  Type of ROM/Therapeutic Exercise  Type of ROM/Therapeutic Exercise: AROM; Free weights  Comment: Completed her Foam block program for hand stengthening with reach and general UE range of motion - attempts all 4 resistances of foam (has a blue block at home)                       ADL  Feeding: Setup; Increased time to complete;Dentures  Grooming: Setup  UE Bathing: Setup  LE Bathing: None  UE Dressing: Contact guard assistance (uses overhead then inserting method for donning her clothing)  LE Dressing: Contact guard assistance (Assist for manuvering clothing over feet while sitting on tall toilet (feet do not touch the floor))  Toileting: Contact guard assistance (Assist for placement of Pull-up  over feet, then SBA)          Assessment     Activity Tolerance: Patient limited by fatigue;Patient limited by pain  Activity Tolerance: No supplemental O2 Use during session  Type of devices: Left in bed;Call light within reach          Patient Education:  Patient Goals   Patient goals : Regain abiilty to care for self to return Home  Learner:family and patient  Method: demonstration and explanation       Outcome: needs reinforcement and asked questions        Plan  Plan  Times per week: 5-7  Times per day: Twice a day  Current Treatment Recommendations: Strengthening,ROM,Functional Mobility Training,Safety Education & [place] Management,Patient/Caregiver Education & Training,Self-Care / ADL,Home Management Training  Plan Comment: Fatigues with activity, New use of Oxygen; Patient Goals   Patient goals : Regain abiilty to care for self to return Home  Short term goals  Time Frame for Short term goals: One week  Short term goal 1: Pt will complete LB ADL's with CGA and Good safety using AE if needed. Short term goal 2: Pt will complete toilet transfer and toileting with CGA and Good safety using least restrictive device. Short term goal 3: Pt will tolerate standing for 3-4 minutes with 1-2 UE support and CGA while completing a functional task to support participation in care  Short term goal 4: Pt will V/D at least 3 fall prevention strategies that she can utilize during daily routines.         02/13/22 1332 02/13/22 1608   OT Individual Minutes   Time In 1050 1430   Time Out 1130 1500   Minutes 40 30   Minute Variance   Variance 20  --    Reason Illness  (Severe headache)  --      Electronically signed by Leela Moreno OT on 2/13/22 at 4:24 PM EST

## 2022-02-13 NOTE — PROGRESS NOTES
[place] [address] Internal Medicine    Progress Note  Chart Reviewed: Yes  Additional Pertinent Hx: The patient is a 80 y.o. Non- / non  female who presents withDizziness and she is admitted to the hospital for the management of multiple sclerosis flareup and UTI. Patient was diagnosed with multiple sclerosis at the age of 44. Past medical history significant for multiple sclerosis, hypertension, chronic osteoarthritis of the spine. Patient reportedly woke up this morning with worsening facial droop, severe headache, and an unsteady gait. She has history of chronic headaches but this was much worse. She took 2 Norco pills but that did not alleviate the headache. It is located frontally and in the back of her head and neck and throbbing. Patient was taking 10 mg of prednisone daily per her PCP for spinal stenosis and chronic vertebral fracture. She sees a pain medicine specialist who gives her spinal injections. He told her to stop taking the prednisone for 5 days prior to injection. This was her fifth day without taking steroids. In the ED she had a CT and a CTA done. CT showed no acute abnormalities. CTA head and neck shows no hemodynamically significant stenosis. Pt admitted to [facility] 2/11/22  Family / Caregiver Present: No  Referring Practitioner: Felisha Orellana MD  2/13/2022    3:39 PM    Name:   Xiomara Fam  MRN:     [id number]     Ziggyide:      [de-identified]   Room:   [facility] Day:  2  Admit Date:  2/11/2022  5:26 PM    PCP:   Melissa Elder MD  Code Status:  Full Code    Subjective:     C/C: No chief complaint on file. Active Problems:    Multiple sclerosis exacerbation (HCC)    Diarrhea of presumed infectious origin    Hypokalemia  Resolved Problems:    * No resolved hospital problems. *      Has had 5-6 loose bowel movements .   Mild abd discomfort   Low k 3.2        Significant last 24 hr data reviewed ;   Vitals:    02/12/22 1025 02/12/22 1742 02/12/22 1754 02/13/22 0645   BP:   (!) 154/74 (!) 149/70   Pulse:   78 77   Resp:   16 19   Temp:   97.7 °F (36.5 °C) 97.9 °F (36.6 °C)   TempSrc:   Oral    SpO2: 99%  95% 95%   Weight:       Height:  4' 11.02\" (1.499 m)        Recent Results (from the past 24 hour(s))   POC Glucose Fingerstick    Collection Time: 02/12/22  4:15 PM   Result Value Ref Range    POC Glucose 94 65 - 105 mg/dL   Fecal lactoferrin    Collection Time: 02/12/22  4:47 PM   Result Value Ref Range    Lactoferrin, Qual (A) NEGATIVE for fecal lactoferrin. POSITIVE for fecal lactoferrin, a marker for fecal leukocytes and an indicator of intestinal inflammation.    POC Glucose Fingerstick    Collection Time: 02/12/22  9:10 PM   Result Value Ref Range    POC Glucose 85 65 - 105 mg/dL   POC Glucose Fingerstick    Collection Time: 02/13/22  6:21 AM   Result Value Ref Range    POC Glucose 73 65 - 105 mg/dL   BASIC METABOLIC PANEL    Collection Time: 02/13/22  7:37 AM   Result Value Ref Range    Glucose 91 70 - 99 mg/dL    BUN 26 (H) 8 - 23 mg/dL    CREATININE 1.08 (H) 0.50 - 0.90 mg/dL    Bun/Cre Ratio NOT REPORTED 9 - 20    Calcium 7.6 (L) 8.6 - 10.4 mg/dL    Sodium 143 135 - 144 mmol/L    Potassium 3.9 3.7 - 5.3 mmol/L    Chloride 107 98 - 107 mmol/L    CO2 24 20 - 31 mmol/L    Anion Gap 12 9 - 17 mmol/L    GFR Non-African American 48 (L) >60 mL/min    GFR  58 (L) >60 mL/min    GFR Comment          GFR Staging NOT REPORTED    POC Glucose Fingerstick    Collection Time: 02/13/22 10:53 AM   Result Value Ref Range    POC Glucose 80 65 - 105 mg/dL     Recent Labs     02/12/22  1056 02/12/22  1615 02/12/22  2110 02/13/22  0621 02/13/22  1053   POCGLU 118* 94 85 73 80        ECHO Complete 2D W Doppler W Color    Result Date: 2/11/2022  [address] Transthoracic Echocardiography Report (TTE)  Patient Name Surya Born Date of Study               02/11/2022               C   Date of      1934  Gender Female  Birth   Age          80 year(s)  Race                           Room Number  2097        Height:                     59 inch, 149.86 cm   Corporate ID [id number]    Weight:                     134 pounds, 60.8 kg  #   Patient Acct [de-identified]   BSA:          1.56 m^2      BMI:      27.06  #                                                              kg/m^2   MR #         [id number]      Sonographer                 Kim Dockery   Accession #  [id number]  Interpreting Physician      Sebastien Hernandez   Fellow                   Referring Nurse                           Practitioner   Interpreting             Referring Physician         Anatoly Mahajan  Fellow  Type of Study   TTE procedure:2D Echocardiogram, M-Mode, Doppler, Color Doppler. Procedure Date Date: 02/11/2022 Start: 01:49 PM Study Location: [address] [place] [address] Technical Quality: Fair visualization Indications:Congestive heart failure, diastolic dysfunction. Patient Status: Inpatient Height: 59 inches Weight: 134 pounds BSA: 1.56 m^2 BMI: 27.06 kg/m^2 Rhythm: Within normal limits HR: 80 bpm BP: 139/72 mmHg CONCLUSIONS Summary Small LV cavity. Normal left ventricular ejection fraction (>55%). Moderate left ventricular hypertrophy. Normal right ventricular size and function. Normal wall motions. Left atrium is mildly dilated. Aortic valve is trileaflet. Mild aortic stenosis. Peak instantaneous gradient 18 mmHg and mean gradient 8 mmHg. Moderate aortic insufficiency. Aortic root is mildly dilated. (3.8 cm) Mitral annular calcification is seen. Moderate mitral stenosis. Mean 7 mmHg, max 18 mmHg. Moderate mitral regurgitation. Moderate tricuspid regurgitation. Estimated right ventricular systolic pressure is 50 mmHg. Moderately elevated right ventricular systolic pressure. IVC normal diameter & inspiratory collapse indicating normal RA filling pressure . No significant pericardial effusion is seen.  Signature ----------------------------------------------------------------------------  Electronically signed by Kim Dockery(Sonographer) on 2022 03:32  PM ---------------------------------------------------------------------------- ----------------------------------------------------------------------------  Electronically signed by Roxann Gamino(Interpreting physician) on  2022 03:42 PM ---------------------------------------------------------------------------- FINDINGS Left Atrium Left atrium is mildly dilated. Left Ventricle Small LV cavity. Normal left ventricular ejection fraction (>55%). Moderate left ventricular hypertrophy. Normal wall motions. Right Atrium Right atrium is normal in size. Right Ventricle Normal right ventricular size and function. Mitral Valve Mitral annular calcification is seen. Moderate mitral stenosis. Mean 7 mmHg, max 18 mmHg. Moderate mitral regurgitation. Aortic Valve Aortic valve is trileaflet. Mild aortic stenosis. Peak instantaneous gradient 18 mmHg and mean gradient 8 mmHg. Moderate aortic insufficiency. Tricuspid Valve Moderate tricuspid regurgitation. Estimated right ventricular systolic pressure is 50 mmHg. Moderately elevated right ventricular systolic pressure. Pulmonic Valve The pulmonic valve is normal in structure. Trivial pulmonic insufficiency. Pericardial Effusion No significant pericardial effusion is seen. Miscellaneous Aortic root is mildly dilated. (3.8 cm) E/e' average 28 IVC normal diameter & inspiratory collapse indicating normal RA filling pressure .  M-mode / 2D Measurements & Calculations:   LVIDd:3.52 cm(3.7 - 5.6 cm)       Diastolic ZOBRNW:66.5 ml  BCRAW:7.12 cm(2.2 - 4.0 cm)       Systolic TUHZXN:93.1 ml  IWWN:9.82 cm(0.6 - 1.1 cm)        Aortic Root:3.8 cm(2.0 - 3.7 cm)  LVPWd:1.51 cm(0.6 - 1.1 cm)       LA Dimension: 4 cm(1.9 - 4.0 cm)  Fractional Shortenin.27 %     LA volume/Index: 51 ml /33m^2  Calculated LVEF (%): 61.47 %      LVOT:1.9 cm Mitral:                                 Aortic   Valve Area (P1/2-Time): 3.14 cm^2       Peak Velocity: 2.12 m/s  Peak E-Wave: 1.82 m/s                   Mean Velocity: 1.29 m/s  Peak A-Wave: 1.67 m/s                   Peak Gradient: 17.98 mmHg  E/A Ratio: 1.09                         Mean Gradient: 8 mmHg  Peak Gradient: 13.25 mmHg               AI P1/2t: 369 msec  Mean Gradient: 7 mmHg  Deceleration Time: 180 msec             Area (continuity): 1.65 cm^2  P1/2t: 70 msec                          AV VTI: 40.1 cm   MR Velocity: 5.79 m/s  LARS Volumetric: 0.11 cm^2  Area (continuity): 1.31 cm^2  Mean Velocity: 1.26 m/s  MR VTI: 175 cm   Tricuspid:                              Pulmonic:   Estimated RVSP: 50 mmHg  Peak TR Velocity: 3.41 m/s  Peak TR Gradient: 46.5124 mmHg  Estimated RA Pressure: 3 mmHg                                          Estimated PASP: 49.51 mmHg  Septal Wall E' velocity:0.06 m/s Lateral Wall E' velocity:0.07 m/s    XR CHEST PORTABLE    Result Date: 2/10/2022  EXAMINATION: ONE XRAY VIEW OF THE CHEST 2/10/2022 1:58 pm COMPARISON: 02/08/2022 HISTORY: ORDERING SYSTEM PROVIDED HISTORY: Shortness of breath TECHNOLOGIST PROVIDED HISTORY: Shortness of breath Reason for Exam: Shortness of breath FINDINGS: The pulmonary vasculature is congested and there is increased interstitial opacity. Mild bibasilar airspace disease is seen, greater on the left. No acute bony abnormality detected. Right shoulder prosthesis is noted. Findings most compatible with pulmonary interstitial edema.              On admission         Review of Systems:     Constitutional:  negative for chills, fevers, sweats  Respiratory:  negative for cough, dyspnea on exertion, hemoptysis, shortness of breath, wheezing  Cardiovascular:  negative for chest pain, chest pressure/discomfort, lower extremity edema, palpitations  Gastrointestinal:  negative for abdominal pain, constipation, diarrhea, nausea, vomiting  Neurological:  negative for dizziness, headache  Data:     Past Medical History:  no change     Social History:  no change    Family History: @no change    Vitals:      I/O (24Hr): Intake/Output Summary (Last 24 hours) at 2022 1539  Last data filed at 2022 8255  Gross per 24 hour   Intake 933 ml   Output --   Net 933 ml       Labs:    Radiology:    Medications: Allergies:      Current Meds:   Scheduled Meds:    ciprofloxacin  500 mg Oral Daily    enoxaparin  30 mg SubCUTAneous Daily    amitriptyline  100 mg Oral Nightly    amLODIPine  10 mg Oral Daily    atorvastatin  20 mg Oral Daily    docusate sodium  100 mg Oral Daily    gabapentin  100 mg Oral Daily    insulin glargine  5 Units SubCUTAneous Nightly    insulin lispro  0-3 Units SubCUTAneous Nightly    insulin lispro  0-6 Units SubCUTAneous TID WC    lisinopril  40 mg Oral Daily    metoprolol tartrate  50 mg Oral BID    pantoprazole  40 mg Oral QAM AC    polyethylene glycol  17 g Oral Daily     Continuous Infusions:    dextrose       PRN Meds: HYDROcodone-acetaminophen, HYDROcodone-acetaminophen, loperamide, ondansetron **OR** ondansetron, polyethylene glycol, albuterol sulfate HFA, dextrose, dextrose, glucagon (rDNA), glucose, sodium chloride flush, traMADol, acetaminophen, senna, bisacodyl      Physical Examination:        BP (!) 149/70   Pulse 77   Temp 97.9 °F (36.6 °C)   Resp 19   Ht 4' 11.02\" (1.499 m)   Wt 135 lb 1.6 oz (61.3 kg)   SpO2 95%   BMI 27.27 kg/m²   Temp (24hrs), Av.8 °F (36.6 °C), Min:97.7 °F (36.5 °C), Max:97.9 °F (36.6 °C)    Recent Labs     22  1615 22  2110 22  0621 22  1053   POCGLU 94 85 73 80       Intake/Output Summary (Last 24 hours) at 2022 1539  Last data filed at 2022 6502  Gross per 24 hour   Intake 933 ml   Output --   Net 933 ml       General Appearance:  alert, well appearing, and in no acute distress  Mental status:   Head:  normocephalic, atraumatic.   Eye: no icterus, redness, pupils equal and reactive, extraocular eye movements intact, conjunctiva clear  Ear: normal external ear, no discharge, hearing intact  Nose:  no drainage noted  Mouth: mucous membranes moist  Neck: supple, no carotid bruits, thyroid not palpable  Lungs: Bilateral equal air entry, clear to ausculation, no wheezing, rales or rhonchi, normal effort  Cardiovascular: normal rate, regular rhythm, no murmur, gallop, rub. Abdomen: Soft, nontender, nondistended, normal bowel sounds, no hepatomegaly or splenomegaly  Neurologic: There are no new focal motor or sensory deficits,   Skin: No gross lesions, rashes, bruising or bleeding on exposed skin area  Extremities:  peripheral pulses palpable, no pedal edema or calf pain with palpation  Psych:             Assessment:        Primary Problem  <principal problem not specified>    Active Problems:    Multiple sclerosis exacerbation (HCC)    Diarrhea of presumed infectious origin    Hypokalemia  Resolved Problems:    * No resolved hospital problems. *       Plan:          2/13/22    · Lactoferrin positive   · Infectious diarrhea improved . · On cipro complete course  · Hypokalemia resolved  · Diet resumed      . Hospital Problems           Last Modified POA    Multiple sclerosis exacerbation ([facility] [address] 2/11/2022 Yes    Diarrhea of presumed infectious origin 2/12/2022 Yes    Hypokalemia 2/12/2022 Yes                         Thanks for consulting us . Will monitor vitals and clinical course , and  Optimize therapy  as needed .            Alex Fox MD

## 2022-02-13 NOTE — PROGRESS NOTES
Physical Therapy  Facility/Department: [facility]  Daily Treatment Note  NAME: Asif Perry  : 1934  MRN: [id number]    Date of Service: 2022    Discharge Recommendations:  Patient would benefit from continued therapy after discharge   PT Equipment Recommendations  Other: TBD - rollator? Assessment   Treatment Diagnosis: Impaired functional mobility 2* MS exacerbation  REQUIRES PT FOLLOW UP: Yes  Activity Tolerance  Activity Tolerance: Patient limited by endurance     Patient Diagnosis(es): There were no encounter diagnoses. has a past medical history of Acute cystitis without hematuria, Arthritis, Chronic daily headache, GERD (gastroesophageal reflux disease), Hyperlipidemia, Hypertension, Moderate malnutrition ([address] Multiple sclerosis ([address] Neuropathy, Pneumonia, and Vitamin D deficiency. has a past surgical history that includes Cystocele repair; Rectocele repair; Hysterectomy; Cervical disc surgery; Cataract removal with implant (Right, 2014); Cataract removal with implant (Left, 2014); Total shoulder arthroplasty (Right, 2017); and shoulder surgery (Right, ). Restrictions  Restrictions/Precautions  Restrictions/Precautions: Fall Risk,General Precautions,Contact Precautions  Required Braces or Orthoses?: Yes  Implants present? : Metal implants (R total shoulder arthroplasty)  Position Activity Restriction  Other position/activity restrictions: O2 per nasal cannula  Subjective   General  Chart Reviewed: Yes  Additional Pertinent Hx: The patient is a 80 y.o. Non- / non  female who presents withDizziness and she is admitted to the hospital for the management of multiple sclerosis flareup and UTI. Patient was diagnosed with multiple sclerosis at the age of 44. Past medical history significant for multiple sclerosis, hypertension, chronic osteoarthritis of the spine.  Patient reportedly woke up this morning with worsening facial droop, severe headache, and an unsteady gait. She has history of chronic headaches but this was much worse. She took 2 Norco pills but that did not alleviate the headache. It is located frontally and in the back of her head and neck and throbbing. Patient was taking 10 mg of prednisone daily per her PCP for spinal stenosis and chronic vertebral fracture. She sees a pain medicine specialist who gives her spinal injections. He told her to stop taking the prednisone for 5 days prior to injection. This was her fifth day without taking steroids. In the ED she had a CT and a CTA done. CT showed no acute abnormalities. CTA head and neck shows no hemodynamically significant stenosis. Pt admitted to [facility] 2/11/22  Family / Caregiver Present: No  Referring Practitioner: Oskar Meyer MD  Subjective  Subjective: Pt positioned R sidelying in bed, c/o headache and requesting to take pain RX prior to tx. LASHAUN uG present to pass medication nd pt agreeable to have tx in PT/OT gym. Pain Screening  Patient Currently in Pain: Yes  Pain Assessment  Pain Assessment: 0-10  Pain Level: 8  Pain Location: Head  Pain Orientation: Posterior  Pain Descriptors: Headache  Vital Signs  Patient Currently in Pain: Yes       Orientation  Orientation  Overall Orientation Status: Within Functional Limits  Objective   Bed mobility  Rolling to Right: Stand by assistance  Supine to Sit: Stand by assistance  Sit to Supine: Stand by assistance  Scooting: Stand by assistance  Comment: Pt completing bed mobility w HOB elevated slightly and no use of bed rails. Transfers  Sit to Stand: Contact guard assistance  Stand to sit: Contact guard assistance  Bed to Chair: Stand by assistance  Stand Pivot Transfers: Stand by assistance  Comment: Pt completes transfers ed MACDONALD.    Ambulation  Ambulation?: Yes  Ambulation 1  Surface: level tile  Device: Rolling Walker  Assistance: Contact guard assistance  Quality of Gait: Unsteady, decreased safety awareness  Gait Deviations: Slow Emily  Distance: 75ft  Comments: Pt unsteady, VC to correct R ankle supination, distance limited to fatigue. Ambulation 2  Surface - 2: level tile  Device 2: Rolling Walker  Other Apparatus 2: Wheelchair follow  Assistance 2: Stand by assistance  Gait Deviations: Decreased step length;Decreased step height;Slow Emily  Distance: 153'  Comments: Improved stability during gait w slippers donned,   Stairs/Curb  Stairs?: No     Balance  Posture: Fair  Sitting - Static: Good  Sitting - Dynamic: Fair;+  Standing - Static: Fair (RW)  Standing - Dynamic: Fair;- (RW)  Comments: HIGH fall risk per Tinetti. Standing balance with RW  Other exercises  Other exercises?: Yes  Other exercises 1: Seated B LE ex's x10 (#1 and Orange theraband)   Other exercises 2: Toilet transfer x1   Other exercises 3: NuStep L1 x5 min  Other exercises 4: core lateral bending and trunk rotation x5 bilat. Comment: Rest breaks PRN d/t low endurance. Goals  Short term goals  Time Frame for Short term goals: 5-7 days  Short term goal 1: Pt to perform bed mobility IND from flat surface. Short term goal 2: Pt to perform transfers CGA/SBA from varied surfaces with good technique. Short term goal 3: Pt to amb 75'-100; on level surfaces with device, SBA/CGA. Short term goal 4: Pt to perform 5 minutes on nustep without change in symptoms for coordination and endurance. Short term goal 5: Pt to improve posture and sitting balance to GOOD. Long term goals  Time Frame for Long term goals : by D/C  Long term goal 1: Pt to perform transfers MOD I with least restrictive device from varied surface heights (toilet, bed, w/c, etc). Long term goal 2: Pt to amb 125'-150'  MOD I with least restrictive device on varied terrain. Long term goal 3: Pt to ascend/descend at least 3 stairs 1 HR, SBA. Long term goal 4: Pt to improve BLE strength by 1/2 MMG.   Long term goal 5: Pt to improve Tinetti score to at least a 22 and improve standing balance to GOOD- to reduce fall risk. Long term goal 6: Pt to improve 5xSTS test to 15 seconds or less. Long term goal 7: Pt to improve 2MWT to at least 125' and 6MWT to at least 225' with device, MOD I. Patient Goals   Patient goals :  To get stronger to go home    Plan    Plan  Times per week: 1.5 hr/day, 5-7 days/week  Safety Devices  Type of devices: Gait belt,Call light within reach,Nurse notified,All fall risk precautions in place,Left in chair     Therapy Time         02/13/22 1552 02/13/22 1553   PT Individual Minutes   Time In 0800 1403   Time Out 0900 1433   Minutes [address], [place]

## 2022-02-14 LAB
GLUCOSE BLD-MCNC: 79 MG/DL (ref 65–105)
GLUCOSE BLD-MCNC: 88 MG/DL (ref 65–105)

## 2022-02-14 PROCEDURE — 97110 THERAPEUTIC EXERCISES: CPT

## 2022-02-14 PROCEDURE — 99232 SBSQ HOSP IP/OBS MODERATE 35: CPT | Performed by: INTERNAL MEDICINE

## 2022-02-14 PROCEDURE — 6370000000 HC RX 637 (ALT 250 FOR IP): Performed by: INTERNAL MEDICINE

## 2022-02-14 PROCEDURE — 6360000002 HC RX W HCPCS

## 2022-02-14 PROCEDURE — 1180000000 HC REHAB R&B

## 2022-02-14 PROCEDURE — 97535 SELF CARE MNGMENT TRAINING: CPT

## 2022-02-14 PROCEDURE — 6370000000 HC RX 637 (ALT 250 FOR IP)

## 2022-02-14 PROCEDURE — 97530 THERAPEUTIC ACTIVITIES: CPT

## 2022-02-14 PROCEDURE — 82947 ASSAY GLUCOSE BLOOD QUANT: CPT

## 2022-02-14 PROCEDURE — 99232 SBSQ HOSP IP/OBS MODERATE 35: CPT | Performed by: PHYSICAL MEDICINE & REHABILITATION

## 2022-02-14 PROCEDURE — 6370000000 HC RX 637 (ALT 250 FOR IP): Performed by: PHYSICAL MEDICINE & REHABILITATION

## 2022-02-14 PROCEDURE — 97116 GAIT TRAINING THERAPY: CPT

## 2022-02-14 RX ORDER — HYDROCHLOROTHIAZIDE 25 MG/1
12.5 TABLET ORAL DAILY
Status: DISCONTINUED | OUTPATIENT
Start: 2022-02-14 | End: 2022-02-19

## 2022-02-14 RX ADMIN — GABAPENTIN 100 MG: 100 CAPSULE ORAL at 07:58

## 2022-02-14 RX ADMIN — METOPROLOL TARTRATE 50 MG: 50 TABLET, FILM COATED ORAL at 20:24

## 2022-02-14 RX ADMIN — HYDROCODONE BITARTRATE AND ACETAMINOPHEN 1 TABLET: 5; 325 TABLET ORAL at 20:24

## 2022-02-14 RX ADMIN — ATORVASTATIN CALCIUM 20 MG: 20 TABLET, FILM COATED ORAL at 07:57

## 2022-02-14 RX ADMIN — HYDROCHLOROTHIAZIDE 12.5 MG: 25 TABLET ORAL at 12:58

## 2022-02-14 RX ADMIN — LISINOPRIL 40 MG: 20 TABLET ORAL at 07:57

## 2022-02-14 RX ADMIN — AMLODIPINE BESYLATE 10 MG: 10 TABLET ORAL at 07:57

## 2022-02-14 RX ADMIN — ENOXAPARIN SODIUM 30 MG: 100 INJECTION SUBCUTANEOUS at 07:56

## 2022-02-14 RX ADMIN — DOCUSATE SODIUM 100 MG: 100 CAPSULE ORAL at 07:58

## 2022-02-14 RX ADMIN — HYDROCODONE BITARTRATE AND ACETAMINOPHEN 1 TABLET: 5; 325 TABLET ORAL at 06:30

## 2022-02-14 RX ADMIN — METOPROLOL TARTRATE 50 MG: 50 TABLET, FILM COATED ORAL at 07:59

## 2022-02-14 RX ADMIN — TRAMADOL HYDROCHLORIDE 50 MG: 50 TABLET, COATED ORAL at 15:15

## 2022-02-14 RX ADMIN — CIPROFLOXACIN 500 MG: 500 TABLET, FILM COATED ORAL at 08:00

## 2022-02-14 RX ADMIN — PANTOPRAZOLE SODIUM 40 MG: 40 TABLET, DELAYED RELEASE ORAL at 06:30

## 2022-02-14 RX ADMIN — AMITRIPTYLINE HYDROCHLORIDE 100 MG: 50 TABLET, FILM COATED ORAL at 20:24

## 2022-02-14 ASSESSMENT — PAIN SCALES - GENERAL
PAINLEVEL_OUTOF10: 3
PAINLEVEL_OUTOF10: 7
PAINLEVEL_OUTOF10: 7

## 2022-02-14 ASSESSMENT — PAIN DESCRIPTION - LOCATION
LOCATION: GENERALIZED
LOCATION: HEAD

## 2022-02-14 ASSESSMENT — PAIN DESCRIPTION - DESCRIPTORS
DESCRIPTORS: ACHING
DESCRIPTORS: HEADACHE

## 2022-02-14 ASSESSMENT — PAIN DESCRIPTION - PAIN TYPE
TYPE: CHRONIC PAIN
TYPE: CHRONIC PAIN

## 2022-02-14 NOTE — PLAN OF CARE
Problem: Falls - Risk of:  Goal: Will remain free from falls  Description: Will remain free from falls  2/14/2022 1224 by Shubham Villa  Outcome: Ongoing     Problem: Falls - Risk of:  Goal: Absence of physical injury  Description: Absence of physical injury  2/14/2022 1224 by Shubham Villa  Outcome: Ongoing     Problem: Skin Integrity:  Goal: Absence of new skin breakdown  Description: Absence of new skin breakdown  2/14/2022 1224 by RENETTA Joe  Outcome: Ongoing     Problem: Safety:  Goal: Free from intentional harm  Description: Free from intentional harm  Outcome: Ongoing     Problem: Pain:  Goal: Patient's pain/discomfort is manageable  Description: Patient's pain/discomfort is manageable  2/14/2022 1224 by Shubham Villa  Outcome: Ongoing

## 2022-02-14 NOTE — PROGRESS NOTES
Physical Medicine & Rehabilitation  Progress Note      Subjective:      80year-old female with MS exacerbation. Patient is well, and has had no acute complaints or problems. She is observed in physical therapy gym on [address] today. She reports improvement in her strength and mobility. No new issues with pain, sleep, appetite, bowel, or bladder. ROS:  Denies fevers, chills, sweats. No chest pain, palpitations, lightheadedness. Denies coughing, wheezing or shortness of breath. Denies abdominal pain, nausea, diarrhea or constipation. No new areas of joint pain. Denies new areas of numbness or weakness. Denies new anxiety or depression issues. No new skin problems. Rehabilitation:   Progressing in therapies. PT:  Restrictions/Precautions: Fall Risk,General Precautions,Contact Precautions  Implants present? : Metal implants  Other position/activity restrictions: NO O2 today;  Headache,  feels weak in her legs this morning, better in afternoon session   Transfers  Sit to Stand: Contact guard assistance  Stand to sit: Contact guard assistance  Bed to Chair: Stand by assistance  Stand Pivot Transfers: Stand by assistance  Squat Pivot Transfers: Contact guard assistance  Comment: Pt completes transfers [address] [place] [address]. Ambulation 1  Surface: level tile  Device: Rolling Walker  Other Apparatus: O2 (2L)  Assistance: Contact guard assistance  Quality of Gait: Unsteady, decreased safety awareness  Gait Deviations: Slow Emily  Distance: 75ft  Comments: Pt unsteady, VC to correct R ankle supination, distance limited to fatigue. Transfers  Sit to Stand: Contact guard assistance  Stand to sit: Contact guard assistance  Bed to Chair: Stand by assistance  Stand Pivot Transfers: Stand by assistance  Squat Pivot Transfers: Contact guard assistance  Comment: Pt completes transfers [address] [place] [address].    Ambulation  Ambulation?: Yes  Ambulation 1  Surface: level tile  Device: Rolling Walker  Other Apparatus: O2 (2L)  Assistance: Contact guard assistance  Quality of Gait: Unsteady, decreased safety awareness  Gait Deviations: Slow Emily  Distance: 75ft  Comments: Pt unsteady, VC to correct R ankle supination, distance limited to fatigue. Surface: level tile  Ambulation 1  Surface: level tile  Device: Rolling Walker  Other Apparatus: O2 (2L)  Assistance: Contact guard assistance  Quality of Gait: Unsteady, decreased safety awareness  Gait Deviations: Slow Emily  Distance: 75ft  Comments: Pt unsteady, VC to correct R ankle supination, distance limited to fatigue. OT:  ADL  Feeding: Setup,Increased time to complete,Dentures  Grooming: Setup  UE Bathing: Setup  LE Bathing: None  UE Dressing: Contact guard assistance (uses overhead then inserting method for donning her clothing)  LE Dressing: Contact guard assistance (Assist for manuvering clothing over feet while sitting on tall toilet (feet do not touch the floor))  Toileting: Contact guard assistance (Assist for placement of Pull-up  over feet, then SBA)  Additional Comments: Pt sat EOB for assessment. Multiple rest breaks required 2* SOB with activity. Balance  Sitting Balance: Supervision  Standing Balance: Minimal assistance (occasional loss of balance)   Standing Balance  Comment: Increased dizziness  with turning tasks  Functional Mobility  Functional - Mobility Device: Rolling Walker  Activity: To/from bathroom  Assist Level: Minimal assistance  Apparatus Needs  Apparatus Needs: O2  Bed mobility  Rolling to Left: Stand by assistance  Rolling to Right: Stand by assistance  Supine to Sit: Stand by assistance  Sit to Supine: Stand by assistance  Scooting: Stand by assistance  Comment: Pt completing bed mobility w HOB elevated slightly and no use of bed rails.   Transfers  Stand Step Transfers: Minimal assistance  Sit to stand: Contact guard assistance  Stand to sit: Contact guard assistance                 SPEECH:      Objective:  BP (!) 168/82   Pulse 73   Temp 98.1 °F (36.7 °C)   Resp 16   Ht 4' 11.02\" (1.499 m)   Wt 135 lb 1.6 oz (61.3 kg)   SpO2 94%   BMI 27.27 kg/m²       GEN: Well developed, well nourished, no acute distress. HEENT: NCAT, PERRL, EOMI, mucous membranes pink and moist.  RESP: Lungs clear to auscultation. No rales or rhonchi. Respirations WNL and unlabored. CV: Regular rate rhythm. No murmurs, rubs, or gallops. ABD: soft, non-distended, non-tender. BS+ and equal.  NEURO: A&O x 3. Sensation intact to light touch. DTRs 2+  MSK: Functional ROM. Muscle tone and bulk are normal bilaterally. Strength 4+/5 key muscles BUEs. 4/5 key muscles BLEs. EXT: No calf tenderness to palpation or edema BLEs. SKIN: Warm dry and intact with good turgor. PSYCH: Mood WNL. Affect WNL. Appropriately interactive. Diagnostics:     CBC: Recent Labs     02/12/22  0633   WBC 12.9*   RBC 3.90*   HGB 12.2   HCT 35.5*   MCV 91.2   RDW 14.0        BMP:   Recent Labs     02/12/22  0633 02/13/22  0737    143   K 3.2* 3.9   CL 99 107   CO2 25 24   BUN 37* 26*   CREATININE 1.33* 1.08*   GLUCOSE 160* 91     BNP: No results for input(s): BNP in the last 72 hours. PT/INR: No results for input(s): PROTIME, INR in the last 72 hours. APTT: No results for input(s): APTT in the last 72 hours. CARDIAC ENZYMES: No results for input(s): CKMB, CKMBINDEX, TROPONINT in the last 72 hours. Invalid input(s): CKTOTAL;3 troponins   FASTING LIPID PANEL:  Lab Results   Component Value Date    CHOL 235 (H) 01/04/2012    HDL 74 (A) 11/06/2021    TRIG 119 01/04/2012     LIVER PROFILE: No results for input(s): AST, ALT, ALB, BILIDIR, BILITOT, ALKPHOS in the last 72 hours.      Current Medications:   Current Facility-Administered Medications: HYDROcodone-acetaminophen (NORCO) 5-325 MG per tablet 2 tablet, 2 tablet, Oral, Daily PRN  HYDROcodone-acetaminophen (NORCO) 5-325 MG per tablet 1 tablet, 1 tablet, Oral, BID PRN  loperamide (IMODIUM) capsule 2 mg, 2 mg, Oral, 4x Daily PRN  ciprofloxacin (CIPRO) tablet 500 mg, 500 mg, Oral, Daily  enoxaparin (LOVENOX) injection 30 mg, 30 mg, SubCUTAneous, Daily  ondansetron (ZOFRAN-ODT) disintegrating tablet 4 mg, 4 mg, Oral, Q8H PRN **OR** ondansetron (ZOFRAN) injection 4 mg, 4 mg, IntraVENous, Q6H PRN  polyethylene glycol (GLYCOLAX) packet 17 g, 17 g, Oral, Daily PRN  albuterol sulfate  (90 Base) MCG/ACT inhaler 2 puff, 2 puff, Inhalation, Q6H PRN  amitriptyline (ELAVIL) tablet 100 mg, 100 mg, Oral, Nightly  amLODIPine (NORVASC) tablet 10 mg, 10 mg, Oral, Daily  atorvastatin (LIPITOR) tablet 20 mg, 20 mg, Oral, Daily  dextrose 5 % solution, 100 mL/hr, IntraVENous, PRN  dextrose 50 % IV solution, 12.5 g, IntraVENous, PRN  docusate sodium (COLACE) capsule 100 mg, 100 mg, Oral, Daily  gabapentin (NEURONTIN) capsule 100 mg, 100 mg, Oral, Daily  glucagon (rDNA) injection 1 mg, 1 mg, IntraMUSCular, PRN  glucose (GLUTOSE) 40 % oral gel 15 g, 15 g, Oral, PRN  insulin glargine (LANTUS) injection vial 5 Units, 5 Units, SubCUTAneous, Nightly  insulin lispro (HUMALOG) injection vial 0-3 Units, 0-3 Units, SubCUTAneous, Nightly  insulin lispro (HUMALOG) injection vial 0-6 Units, 0-6 Units, SubCUTAneous, TID WC  lisinopril (PRINIVIL;ZESTRIL) tablet 40 mg, 40 mg, Oral, Daily  metoprolol tartrate (LOPRESSOR) tablet 50 mg, 50 mg, Oral, BID  pantoprazole (PROTONIX) tablet 40 mg, 40 mg, Oral, QAM AC  sodium chloride flush 0.9 % injection 10 mL, 10 mL, IntraVENous, PRN  traMADol (ULTRAM) tablet 50 mg, 50 mg, Oral, Q6H PRN  acetaminophen (TYLENOL) tablet 650 mg, 650 mg, Oral, Q4H PRN  polyethylene glycol (GLYCOLAX) packet 17 g, 17 g, Oral, Daily  senna (SENOKOT) tablet 17.2 mg, 2 tablet, Oral, Daily PRN  bisacodyl (DULCOLAX) suppository 10 mg, 10 mg, Rectal, Daily PRN      Impression/Plan:   Impaired ADLs, gait, and mobility due to:      1. MS exacerbation with impaired gait: PT/OT  for gait, mobility, strengthening, endurance, ADLs, and self care. 2. HTN/Hyperlipidemia: on amlodipine, atorvastatin, lisinopril  3. Neuropathy: on amitriptyline  4. GERD  5. Hyperglycemia: resolved after steroid discontinued. Was on Lantus, sliding scale - IM has discontinued. 6. Chronic daily headache: Norco and Ultram prn pain, on amitriptyline  7. Hypokalemia: Resolved  8. Diarrhea: Infectious - on Cipro until 2/17/22, imodium prn. Will discontinue stool softeners - docusate and polyethylene glycol. Will review pantoprazole with IM. 9. Bowel Management: Miralax daily, Senokot prn, Dulcolax prn  10. Internal medicine for medical management  11. DVT prophylaxis:  On lovenox      Electronically signed by Sunday Finch MD on 2/14/2022 at 10:05 AM      This note is created with the assistance of a speech recognition program.  While intending to generate a document that actually reflects the content of the visit, the document can still have some errors including those of syntax and sound a like substitutions which may escape proof reading. In such instances, actual meaning can be extrapolated by contextual diversion.

## 2022-02-14 NOTE — PATIENT CARE CONFERENCE
[address]   ACUTE REHABILITATION  TEAM CONFERENCE NOTE  Date: 2/15/22  Patient Name: Nikko Robertson       Room: 1442/4238-50  MRN: [id number]       : 1934  (80 y.o.)     Gender: female   Referring Practitioner: Aureliano Vaughan MD   Multiple sclerosis exacerbation [facility]) [G35]  Diagnosis: MS exacerbation, recurrent UTI     NURSING  Bladder  Always Continent  Bowel   Always Continent  Date of Last BM:   Intervention    Both Bowel & Bladder Program     Wounds/Incisions/Ulcers: No skin issues identified  Medication Education Program: Patient able to manage medications and being educated by nursing  Pain: Norco effective    Fall Risk:  Falling star program initiated    PHYSICAL THERAPY  Bed mobility  Rolling to Right: Stand by assistance  Supine to Sit: Stand by assistance  Sit to Supine: Stand by assistance  Scooting: Stand by assistance  Comment: HOB elevated slightly, no use of bed rails  Transfers:  Sit to Stand: Contact guard assistance  Stand to sit: Contact guard assistance  Bed to Chair: Contact guard assistance  Comment: Rest breaks PRN d/t low endurance. Ambulation 1  Surface: level tile  Device: Rolling Walker  Assistance: Contact guard assistance  Quality of Gait: cues to correct forward flexed posture, good response from pt  Gait Deviations: Slow Emily; Increased NELDA; Decreased step length;Decreased step height  Distance: 203 feet, 225 feet (pt required seated rest breaks to recover after AMB)  Comments: Pt unsteady, VC to correct R ankle supination, distance limited to fatigue.    Ambulation 2  Surface - 2: level tile  Device 2: Rolling Walker  Other Apparatus 2: Wheelchair follow  Assistance 2: Stand by assistance  Gait Deviations: Decreased step length;Decreased step height;Slow Emily  Distance: 153'  Comments: Improved stability during gait w slippers donned,     # Steps : 10  Stairs Height:  (4\" and 6\")  Rails: Bilateral  Device: No Device  Assistance: Contact guard assistance           Other: TBD   Pt most limited by her endurance and fatigue but is extremely motivated and IND. pt would benefit from intense therapy with rest breaks as needed to return home. Goals  Time Frame for Short term goals: 5-7 days  Short term goal 1: Pt to perform bed mobility IND from flat surface. Short term goal 2: Pt to perform transfers CGA/SBA from varied surfaces with good technique. Short term goal 3: Pt to amb 75'-100; on level surfaces with device, SBA/CGA. Short term goal 4: Pt to perform 5 minutes on nustep without change in symptoms for coordination and endurance. Short term goal 5: Pt to improve posture and sitting balance to GOOD. OCCUPATIONAL THERAPY  SELF CARE      Eating            Setup; Increased time to complete;Dentures (per report)   Oral Hygiene            Setup (daughter assists with blow drying/stying hair per baseline. )   Shower/Bathe Self             UE Bathing: Setup  LE Bathing: Setup;Stand by assistance;Contact guard assistance (standing in shower for teofilo/buttocks. cross leg tech for feet)   Upper Body Dressing            Setup   Lower Body Dressing            Putting On/Taking Off Footwear             Stand by assistance;Minimal assistance (Assist with donning B TEDs, SBA otherwise. )   Toilet Transfer             Toilet - Technique: Ambulating  Equipment Used: Raised toilet seat without rails (grab bar on R side )  Toilet Transfer: Contact guard assistance;Stand by assistance   Toileting Hygiene            Stand by assistance          Shower Transfers: Minimal assistance;Verbal cues  Balance  Sitting Balance: Supervision  Standing Balance: Contact guard assistance (SBA-CGA pending task )  Standing Balance  Time: AM: 1-2 min x 4 PM: 1-2 min x2  Activity: ADL tasks, functional mobility/transfers  Comment: 0-2 UE support, encouraged use of grab bar when available in shower however no LOB.      Equipment Recommendations  Equipment Needed:  (TBD) Short term goals  Time Frame for Short term goals: One week  Short term goal 1: Pt will complete LB ADL's with CGA and Good safety using AE if needed. Short term goal 2: Pt will complete toilet transfer and toileting with CGA and Good safety using least restrictive device. Short term goal 3: Pt will tolerate standing for 3-4 minutes with 1-2 UE support and CGA while completing a functional task to support participation in care  Short term goal 4: Pt will V/D at least 3 fall prevention strategies that she can utilize during daily routines. Short term goal 5: Pt will actively participate in 15-20 minutes of therapeutic exercise/activity to promote increased independence and safety with self-care and mobility. SPEECH THERAPY  Tolerating regular diet, thin liquids. No further ST recommended  Short Term Goal: n/a    NUTRITION  Weight: 135 lb 1.6 oz (61.3 kg) / Body mass index is 27.27 kg/m². Diet: Regular- pt approximating nutrition needs with 50-75% intake. Please see nutrition note for details.     SOCIAL WORK ASSESSMENT  Assessment: alone  Pre-Admission Status:  Lives With: Alone  Type of Home: House  Home Layout: Two level,Able to Live on Main level with bedroom/bathroom,Performs ADL's on one level (does not use 2nd floor)  Home Access: Stairs to enter with rails,Ramped entrance (ramp at back entrance)  Entrance Stairs - Number of Steps: 3  Entrance Stairs - Rails: Right  Bathroom Shower/Tub: Tub/Shower unit,Shower chair with back,Curtain  Bathroom Toilet: Standard  Bathroom Equipment: Grab bars in shower,Hand-held shower,Grab bars around Jun Group chair  Bathroom Accessibility: Accessible (walker does not fit - uses grab bars)  Home Equipment: [address] [facility],[address] Help From: Family  ADL Assistance: Needs assistance (daughter A with bathing, pt IND dressing/toileting)  Homemaking Assistance: Needs assistance (Daughter A w/cleaning, shopping, and laundry)  Homemaking Responsibilities: Yes (Pt does microwave/simple meal prep)  Meal Prep Responsibility: Secondary (Pt does microwave/simple meal prep)  Ambulation Assistance: Independent (w/RW at all times)  Transfer Assistance: Independent  Active : No  Patient's  Info: Pt's daughter  Mode of Transportation: Family  Occupation: Retired  Type of occupation: bank, cleaning  IADL Comments: Pt sleeps in a flat bed. Additional Comments: Son and daughter both work during the day, but daughter calls her every morning. Daughter very helpful. Both kids work during the day. Family Education: Need to make contact with family to initiate education    Percentage Risk for Readmission: Low 0 - 18%   Readmission Risk              Risk of Unplanned Readmission:  16       %    Critical Items: None       Problem / Barrier Intervention / Plan  Results   Impaired function related to weakness, decreased tolerance to activity Strengthening, endurance activities, rest PRN, functional mobility training    Altered Self Care Tolerance and safety Training in [facility] care technqiues and use of devices for ADL tasks                                Total Self Care Score    Total Mobility Score  Admission Score:  24      Admission Score:  31  Goal:  40/42         Goal:  77/90   `  Discharge Plan   Estimated Discharge Date: 2/21/2022  Home evaluation needed? Home Evaluation Indication (NO, Requires ReEval, YES/Date): No home evaluation need indicated for patient at this time  Overnight or Day Pass: No  Factors facilitating achievement of predicted outcomes:  Motivated, Cooperative, Pleasant, Sense of humor, Good insight into deficits, Knowledge about rehab and Has homemaker services  Barriers to the achievement of predicted outcomes: Decreased endurance, Lower extremity weakness, Medical complications and Medication managment    Functional Goals at discharge:  Predicted Outcome: Home alonePATIENT'S LEVEL OF ASSISTANCE: Modified independence  Discharge therapy goals:  PT: Long term goals  Time Frame for Long term goals : by D/C  Long term goal 1: Pt to perform transfers MOD I with least restrictive device from varied surface heights (toilet, bed, w/c, etc). Long term goal 2: Pt to amb 125'-150'  MOD I with least restrictive device on varied terrain. Long term goal 3: Pt to ascend/descend at least 3 stairs 1 HR, SBA. Long term goal 4: Pt to improve BLE strength by 1/2 MMG. Long term goal 5: Pt to improve Tinetti score to at least a 22 and improve standing balance to GOOD- to reduce fall risk. Long term goal 6: Pt to improve 5xSTS test to 15 seconds or less. Long term goal 7: Pt to improve 2MWT to at least 125' and 6MWT to at least 225' with device, MOD I.  OT:Long term goals  Time Frame for Long term goals : By Discharge  Long term goal 1: Setup assist for self care tasks of athing and dressing  Long term goal 2: Mod I for Fucntional Self care transfers  Long term goal 3: Mod I for light meal prep and household mobility  Long term goal 4: Independent use of Home program for Hand/UE strengthening and conditioning to support continued indenepdnent in ADL tasks  ST: n/a    Participating Team Members:  /:  [facility]   Occupational Therapist: Elbert Billy OT   Physical Therapist: Maria Isabel Schmitt PT  Speech Therapist: Haja YEECCC/SLP  Nurse: Reginald Turpin RN   Dietary/Nutrition: Sweta Walker RD, LD  Pastoral Care: Isidro Barnett  CMG: Madeline Smith RN    I approve the established interdisciplinary plan of care as documented within the medical record of Xiomara Fam.     Naya Wong MD

## 2022-02-14 NOTE — PROGRESS NOTES
history significant for multiple sclerosis, hypertension, chronic osteoarthritis of the spine. Patient reportedly woke up this morning with worsening facial droop, severe headache, and an unsteady gait. She has history of chronic headaches but this was much worse. She took 2 Norco pills but that did not alleviate the headache. It is located frontally and in the back of her head and neck and throbbing. Patient was taking 10 mg of prednisone daily per her PCP for spinal stenosis and chronic vertebral fracture. She sees a pain medicine specialist who gives her spinal injections. He told her to stop taking the prednisone for 5 days prior to injection. This was her fifth day without taking steroids. In the ED she had a CT and a CTA done. CT showed no acute abnormalities. CTA head and neck shows no hemodynamically significant stenosis. Pt admitted to [facility] 2/11/22  Response To Previous Treatment: Patient with no complaints from previous session. Family / Caregiver Present: No  Referring Practitioner: Anabell Ly MD  Subjective  Subjective: Pt reported that she is here due to MS (exacerbation). Pt reported that her L side is weaker, after AMB,she feels lightheaded and dizzy like shes still moving, and her balance is worse. Pain Screening  Patient Currently in Pain: Other (comment) (Pt did not c/o pain)  Vital Signs  Patient Currently in Pain: Other (comment) (Pt did not c/o pain)       Orientation  Orientation  Overall Orientation Status: Within Functional Limits    Objective   Bed mobility  Supine to Sit: Stand by assistance  Sit to Supine: Stand by assistance  Scooting: Stand by assistance  Comment: HOB elevated slightly, no use of bed rails  Transfers  Sit to Stand: Contact guard assistance  Stand to sit: Contact guard assistance  Bed to Chair: Contact guard assistance  Stand Pivot Transfers: Contact guard assistance  Comment: uses RW.   Ambulation  Ambulation?: Yes  Ambulation 1  Surface: level tile  Device: Rolling Walker  Assistance: Contact guard assistance  Quality of Gait: cues to correct forward flexed posture, good response from pt  Gait Deviations: Slow Emily; Increased NELDA; Decreased step length;Decreased step height  Distance: 203 feet, 225 feet (pt required seated rest breaks to recover after AMB)  Stairs/Curb  Stairs?: Yes  Stairs  # Steps : 10  Stairs Height:  (4\" and 6\")  Rails: Bilateral  Device: No Device  Assistance: Contact guard assistance     Balance  Posture: Fair  Sitting - Static: Good  Sitting - Dynamic: Fair;+  Standing - Static: Fair;+  Standing - Dynamic: Fair  Other exercises  Other exercises?: Yes  Other exercises 1: Seated B LE ex's x15 (#1.5 and Orange theraband)  Other exercises 2: Standing B LE ex's x10  Other exercises 3: NuStep: L2 x10 min       Goals  Short term goals  Time Frame for Short term goals: 5-7 days  Short term goal 1: Pt to perform bed mobility IND from flat surface. Short term goal 2: Pt to perform transfers CGA/SBA from varied surfaces with good technique. Short term goal 3: Pt to amb 75'-100; on level surfaces with device, SBA/CGA. Short term goal 4: Pt to perform 5 minutes on nustep without change in symptoms for coordination and endurance. Short term goal 5: Pt to improve posture and sitting balance to GOOD. Long term goals  Time Frame for Long term goals : by D/C  Long term goal 1: Pt to perform transfers MOD I with least restrictive device from varied surface heights (toilet, bed, w/c, etc). Long term goal 2: Pt to amb 125'-150'  MOD I with least restrictive device on varied terrain. Long term goal 3: Pt to ascend/descend at least 3 stairs 1 HR, SBA. Long term goal 4: Pt to improve BLE strength by 1/2 MMG. Long term goal 5: Pt to improve Tinetti score to at least a 22 and improve standing balance to GOOD- to reduce fall risk. Long term goal 6: Pt to improve 5xSTS test to 15 seconds or less.   Long term goal 7: Pt to improve 2MWT to at least 125' and 6MWT to at least 225' with device, MOD I. Patient Goals   Patient goals :  To get stronger to go home    Plan    Plan  Times per week: 1.5 hr/day, 5-7 days/week  Specific instructions for Next Treatment: progress gait, stairs, balance, nustep, virtual reality  Current Treatment Recommendations: Strengthening,Balance Training,Functional Mobility Training,Transfer Training,Endurance Training,Gait Training,Patient/Caregiver Education & Training,Safety Education & Training,Stair training,Home Exercise Program,Equipment Evaluation, Education, & procurement  Safety Devices  Type of devices: Gait belt,Call light within reach,All fall risk precautions in place,Left in chair,Patient at risk for falls     Therapy Time     02/14/22 0815 02/14/22 1303   PT Individual Minutes   Time In 0815 1303   Time Out 0910 1340   Minutes 54 1425 Kasandra Hui, PTA

## 2022-02-14 NOTE — PLAN OF CARE
Nutrition Problem #1: Inadequate oral intake  Intervention: Food and/or Nutrient Delivery: Continue Current Diet,Start Oral Nutrition Supplement  Nutritional Goals: PO intake to meet % of estimated needs

## 2022-02-14 NOTE — PROGRESS NOTES
Comprehensive Nutrition Assessment    Type and Reason for Visit:  Reassess    Nutrition Recommendations/Plan: Continue Regular diet. Will add Ensure Clear twice daily. Nutrition Assessment:  Pt observed with lunch tray which she is eating well. Nursing documents intake 50-75% suggesting adequate intake. Liquid milk intolerance noted. Malnutrition Assessment:  Malnutrition Status: At risk for malnutrition (Comment)    Context:  Acute Illness     Findings of the 6 clinical characteristics of malnutrition:  Energy Intake:  Mild decrease in energy intake (Comment)  Weight Loss:  No significant weight loss     Body Fat Loss:  No significant body fat loss     Muscle Mass Loss:  No significant muscle mass loss    Fluid Accumulation:  No significant fluid accumulation     Strength:  Not Performed    Estimated Daily Nutrient Needs:  Energy (kcal):  1240 based on [place]-St. Jeor with 1.3 factor; Weight Used for Energy Requirements:  Admission     Protein (g):  61 based on 1 gm per kg; Weight Used for Protein Requirements:  Admission          Nutrition Related Findings:  no edema, Labs/Meds: Reviewed, BM2/12      Wounds:  None       Current Nutrition Therapies:    ADULT DIET;  Regular    Anthropometric Measures:  · Height: 4' 11.02\" (149.9 cm)  · Current Body Weight: 135 lb 2.3 oz (61.3 kg)   · Admission Body Weight: 135 lb 2.3 oz (61.3 kg)    Ideal Body Weight: 95 lbs;     Nutrition Diagnosis:   · Inadequate oral intake related to altered GI function as evidenced by intake 51-75%    Nutrition Interventions:   Food and/or Nutrient Delivery:  Continue Current Diet,Start Oral Nutrition Supplement  Nutrition Education/Counseling:  No recommendation at this time   Coordination of Nutrition Care:  Continue to monitor while inpatient    Goals:  PO intake to meet % of estimated needs       Nutrition Monitoring and Evaluation:   Food/Nutrient Intake Outcomes:  Food and Nutrient Intake,Supplement Intake  Physical Signs/Symptoms Outcomes:  Biochemical Data,GI Status,Fluid Status or Edema,Skin,Weight,Chewing or Swallowing     Discharge Planning:    Continue current diet     Electronically signed by Juan Carpio RD, LD on 2/14/22 at 12:54 PM EST    Contact: [phone]

## 2022-02-14 NOTE — PROGRESS NOTES
[address] [facility]    ACUTE REHABILITATION OCCUPATIONAL THERAPY  DAILY NOTE    Date: 22  Patient Name: Janice Simpson      Room: 3502/6800-71    MRN: [id number]   : 1934  (80 y.o.)  Gender: female   Referring Practitioner: Dr Amanda Cortes  Diagnosis: MS exacerbation, recurrent UTI     Restrictions  Restrictions/Precautions: Fall Risk,General Precautions,Contact Precautions  Implants present? : Metal implants  Other position/activity restrictions: NO O2 today;  Headache,  feels weak in her legs this morning, better in afternoon session  Required Braces or Orthoses?: Yes    Subjective  Comments: Pt pleasant and cooperative, motivated to shower today. Patient Currently in Pain: Yes  Pain Level: 3  Pain Location: Generalized  Restrictions/Precautions: Fall Risk;General Precautions;Contact Precautions  Overall Orientation Status: Within Functional Limits     Pain Assessment  Pain Assessment: 0-10  Pain Level: 3  Pain Type: Chronic pain  Pain Location: Generalized  Pain Descriptors: Aching (arthiritic pain )    Objective  Cognition  Overall Cognitive Status: Impaired  Safety Judgement: Decreased awareness of need for safety  Perception  Overall Perceptual Status: WFL  Balance  Sitting Balance: Supervision  Standing Balance: Contact guard assistance (SBA-CGA pending task )  Transfers  Sit to stand: Contact guard assistance  Stand to sit: Contact guard assistance  Standing Balance  Time: AM: 1-2 min x 4 PM: 1-2 min x2  Activity: ADL tasks, functional mobility/transfers  Comment: 0-2 UE support, encouraged use of grab bar when available in shower however no LOB. Functional Mobility  Functional - Mobility Device: Rolling Walker  Activity: To/from bathroom  Assist Level: Contact guard assistance  Functional Mobility Comments: minimal safety cues for RW mgmt.    Toilet Transfers  Toilet - Technique: Ambulating  Equipment Used: Raised toilet seat without rails (grab bar on R side )  Toilet Transfer: Contact guard assistance;Stand by assistance  Shower Transfers  Shower - Transfer From: Candis De Jesus (RW)  Shower - Transfer Type: To and From  Shower - Transfer To: Transfer tub bench  Shower - Technique: Ambulating  Shower Transfers: Minimal assistance;Verbal cues  Shower Transfers Comments: Pt daughter states she provided supervision when pt showers at home. She has shower chair for use in tub unit. (Recommended TTB for increased safety/ease. )     Type of ROM/Therapeutic Exercise  Type of ROM/Therapeutic Exercise: AROM; Free weights (x15 reps. 1#-2# dumbell as able. )  Comment: Pt engaged in UB ex's to promote increased strength/endurance for functional tasks; with limited tolerance, extended rest breaks required due to fatigue. RUE more limited, some ex's completed without weight. Exercises  Scapular Protraction: x  Scapular Retraction: x  Shoulder Extension: x  Shoulder ABduction: x  Horizontal ABduction: x  Horizontal ADduction: x  Elbow Flexion: x  Elbow Extension: x  Grasp/Release: spring hand gripper, 2nd setting x15 reps B hands. ADL  Feeding: Setup; Increased time to complete;Dentures (per report)  Grooming: Setup (daughter assists with blow drying/stying hair per baseline. )  UE Bathing: Setup  LE Bathing: Setup;Stand by assistance;Contact guard assistance (standing in shower for teofilo/buttocks. cross leg tech for feet)  UE Dressing: Setup  LE Dressing: Stand by assistance;Minimal assistance (Assist with donning MY Mcmahon, SBA otherwise. )  Toileting: Stand by assistance        Assessment  Performance deficits / Impairments: Decreased functional mobility ; Decreased ADL status; Decreased strength;Decreased safe awareness;Decreased coordination;Decreased endurance;Decreased balance  Prognosis: Good  Discharge Recommendations: Patient would benefit from continued therapy after discharge  Activity Tolerance: Patient Tolerated treatment well  Activity Tolerance: Room air for full ADL session  Safety Devices in place: Yes  Type of devices: Left in chair;Call light within reach; All fall risk precautions in place;Gait belt (daughter present)  Equipment Recommendations  Equipment Needed:  (TBD)        Plan  Plan  Times per week: 5-7  Times per day: Twice a day  Current Treatment Recommendations: [id number] Management,Patient/Caregiver Education & Training,Self-Care / ADL,Home Management Training  Plan Comment: Fatigues with activity, New use of Oxygen; Patient Goals   Patient goals : Regain abiilty to care for self to return Home  Short term goals  Time Frame for Short term goals: One week  Short term goal 1: Pt will complete LB ADL's with CGA and Good safety using AE if needed. Short term goal 2: Pt will complete toilet transfer and toileting with CGA and Good safety using least restrictive device. Short term goal 3: Pt will tolerate standing for 3-4 minutes with 1-2 UE support and CGA while completing a functional task to support participation in care  Short term goal 4: Pt will V/D at least 3 fall prevention strategies that she can utilize during daily routines.         02/14/22 1103 02/14/22 1518   OT Individual Minutes   Time In 1010 1410   Time Out 1100 1449   Minutes 50 39     Electronically signed by SANDI Perez on 2/14/22 at 4:12 PM EST

## 2022-02-14 NOTE — PROGRESS NOTES
[facility] Internal Medicine    CONSULTATION / HISTORY AND PHYSICAL EXAMINATION            Date:   2/14/2022  Patient name:  Ana Hou  Date of admission:  2/11/2022  5:26 PM  MRN:   [id number]  Account:  [de-identified]  YOB: 1934  PCP:    Ariel Wright MD  Room:   2608/2608-01  Code Status:    Full Code    Physician Requesting Consult: Torres Simon*    Reason for Consult: Medical management    Chief Complaint:     No chief complaint on file. Debility    History Obtained From:     patient, electronic medical record    History of Present Illness/ Interval History:     80 y.o. Non- / non  female who initially presented with MS flareup with facial droop unsteady gait, difficulty swallowing concern for UTI initially but further urinalysis not consistent with UTI antibiotics discontinued during admission. Patient was diagnosed with multiple sclerosis at the age of 44. She was seen by neurology and started on methylprednisone  Past medical history significant for multiple sclerosis, hypertension, chronic osteoarthritis of the spine.       Past Medical History:     Past Medical History:   Diagnosis Date    Acute cystitis without hematuria 10/1/2017    Arthritis     Chronic daily headache     GERD (gastroesophageal reflux disease)     Hyperlipidemia     Hypertension     Moderate malnutrition ([address] 3/2/2019    Multiple sclerosis ([address]     Neuropathy     Pneumonia 2017    states had double pneumonia    Vitamin D deficiency         Past Surgical History:     Past Surgical History:   Procedure Laterality Date    CATARACT REMOVAL WITH IMPLANT Right 11/6/2014    [facility]    CATARACT REMOVAL WITH IMPLANT Left 12/2/2014    [facility]    CERVICAL DISC SURGERY      CYSTOCELE REPAIR      HYSTERECTOMY      RECTOCELE REPAIR      SHOULDER ARTHROPLASTY Right 04/20/2017    SHOULDER SURGERY Right 2017 Medications Prior to Admission:     Prior to Admission medications    Medication Sig Start Date End Date Taking? Authorizing Provider   omeprazole (PRILOSEC) 20 MG delayed release capsule Take 1 capsule by mouth daily 2/1/22  Yes Pauly Hurtado MD   docusate sodium (COLACE) 100 MG capsule Take 1 capsule by mouth daily 2/1/22  Yes Pauly Hurtado MD   celecoxib (CELEBREX) 200 MG capsule Take 1 capsule by mouth daily 1/17/22 1/17/23 Yes Pauly Hurtado MD   traMADol (ULTRAM) 50 MG tablet Take 1 tablet by mouth every 6 hours as needed for Pain for up to 60 days. 12/20/21 2/18/22 Yes Pauly Hurtado MD   predniSONE (DELTASONE) 10 MG tablet 4 tabs by mouth daily for 3 days, then 3 tabs daily for 3 days, then 2 tabs daily for 3 days, then 1 tab daily till gone. 11/29/21  Yes Pauly Hurtado MD   predniSONE (DELTASONE) 10 MG tablet Take 1 tablet by mouth daily 10/26/21  Yes Pauly Hurtado MD   HYDROcodone-acetaminophen (NORCO) 5-325 MG per tablet Take 1 tablet by mouth every 8 hours as needed. 9/14/21  Yes Historical Provider, MD   amitriptyline (ELAVIL) 100 MG tablet TAKE ONE TABLET BY MOUTH ONCE NIGHTLY 9/20/21  Yes Pauly Hurtado MD   simvastatin (ZOCOR) 20 MG tablet Take 1 tablet by mouth nightly 8/23/21  Yes Pauly Hurtado MD   calcipotriene (DOVONEX) 0.005 % cream Apply topically 2 times daily. 4/21/21  Yes Pauly Hurtado MD   clobetasol (TEMOVATE) 0.05 % cream Apply topically 2 times daily. 3/30/21  Yes Pauly Hurtado MD   gabapentin (NEURONTIN) 100 MG capsule Take 1 capsule by mouth 2 times daily for 30 days. Patient taking differently: Take 100 mg by mouth 2 times daily as needed.   3/11/21 2/11/22 Yes Lord Nidhi MD   albuterol sulfate HFA (VENTOLIN HFA) 108 (90 Base) MCG/ACT inhaler Inhale 2 puffs into the lungs every 6 hours as needed for Wheezing 3/11/21 3/11/22 Yes Lord Nidhi MD   Handicap Placard MISC by Does not apply route Exp: 5/20/2026 5/24/21   Pauly Hurtado, specified>    Active Hospital Problems    Diagnosis Date Noted    Multiple sclerosis exacerbation ([facility] Eddie Read 02/25/2021    Diarrhea of presumed infectious origin [R19.7] 02/25/2021       Plan: Active Problems:    Multiple sclerosis exacerbation (HCC)    Diarrhea of presumed infectious origin  Resolved Problems:    Hypokalemia        1. Symptoms of MS exacerbation -completed pulse dose steroids 2/13  2. Diarrhea resolved  3. Type 2 diabetes-blood sugar low normal; methylprednisone course has been completed, will DC Lantus and sliding scale. Patient was not on any diabetes medication prior to presentation HbA1c 6.4 on 11/6/2021  4. Hypertension= suboptimal control-on Norvasc, lisinopril, metoprolol, add HCTZ      DVT prophylaxis-Lovenox    Consultations:   Juana Jalloh MD  2/14/2022  11:58 AM    Copy sent to Dr. Jeri Asif MD    Please note that this chart was generated using voice recognition Dragon dictation software. Although every effort was made to ensure the accuracy of this automated transcription, some errors in transcription may have occurred.

## 2022-02-14 NOTE — PLAN OF CARE
Problem: Falls - Risk of:  Goal: Will remain free from falls  Description: Will remain free from falls  Outcome: Ongoing  Goal: Absence of physical injury  Description: Absence of physical injury  Outcome: Ongoing     Problem: Skin Integrity:  Goal: Will show no infection signs and symptoms  Description: Will show no infection signs and symptoms  Outcome: Ongoing  Goal: Absence of new skin breakdown  Description: Absence of new skin breakdown  Outcome: Ongoing     Problem: Pain:  Goal: Patient's pain/discomfort is manageable  Description: Patient's pain/discomfort is manageable  Outcome: Ongoing  Goal: Pain level will decrease  Description: Pain level will decrease  Outcome: Ongoing  Goal: Control of acute pain  Description: Control of acute pain  Outcome: Ongoing  Goal: Control of chronic pain  Description: Control of chronic pain  Outcome: Ongoing

## 2022-02-15 PROCEDURE — 99232 SBSQ HOSP IP/OBS MODERATE 35: CPT | Performed by: PHYSICAL MEDICINE & REHABILITATION

## 2022-02-15 PROCEDURE — 6370000000 HC RX 637 (ALT 250 FOR IP): Performed by: PHYSICAL MEDICINE & REHABILITATION

## 2022-02-15 PROCEDURE — 97530 THERAPEUTIC ACTIVITIES: CPT

## 2022-02-15 PROCEDURE — 6370000000 HC RX 637 (ALT 250 FOR IP)

## 2022-02-15 PROCEDURE — 97110 THERAPEUTIC EXERCISES: CPT

## 2022-02-15 PROCEDURE — 97535 SELF CARE MNGMENT TRAINING: CPT

## 2022-02-15 PROCEDURE — 97116 GAIT TRAINING THERAPY: CPT

## 2022-02-15 PROCEDURE — 99231 SBSQ HOSP IP/OBS SF/LOW 25: CPT | Performed by: INTERNAL MEDICINE

## 2022-02-15 PROCEDURE — 6370000000 HC RX 637 (ALT 250 FOR IP): Performed by: INTERNAL MEDICINE

## 2022-02-15 PROCEDURE — 6360000002 HC RX W HCPCS

## 2022-02-15 PROCEDURE — 1180000000 HC REHAB R&B

## 2022-02-15 RX ADMIN — HYDROCODONE BITARTRATE AND ACETAMINOPHEN 1 TABLET: 5; 325 TABLET ORAL at 15:10

## 2022-02-15 RX ADMIN — CIPROFLOXACIN 500 MG: 500 TABLET, FILM COATED ORAL at 09:29

## 2022-02-15 RX ADMIN — AMITRIPTYLINE HYDROCHLORIDE 100 MG: 50 TABLET, FILM COATED ORAL at 20:29

## 2022-02-15 RX ADMIN — AMLODIPINE BESYLATE 10 MG: 10 TABLET ORAL at 09:30

## 2022-02-15 RX ADMIN — ATORVASTATIN CALCIUM 20 MG: 20 TABLET, FILM COATED ORAL at 09:30

## 2022-02-15 RX ADMIN — GABAPENTIN 100 MG: 100 CAPSULE ORAL at 09:29

## 2022-02-15 RX ADMIN — HYDROCHLOROTHIAZIDE 12.5 MG: 25 TABLET ORAL at 09:29

## 2022-02-15 RX ADMIN — LISINOPRIL 40 MG: 20 TABLET ORAL at 09:30

## 2022-02-15 RX ADMIN — TRAMADOL HYDROCHLORIDE 50 MG: 50 TABLET, COATED ORAL at 05:57

## 2022-02-15 RX ADMIN — ENOXAPARIN SODIUM 30 MG: 100 INJECTION SUBCUTANEOUS at 09:29

## 2022-02-15 RX ADMIN — HYDROCODONE BITARTRATE AND ACETAMINOPHEN 1 TABLET: 5; 325 TABLET ORAL at 09:34

## 2022-02-15 RX ADMIN — PANTOPRAZOLE SODIUM 40 MG: 40 TABLET, DELAYED RELEASE ORAL at 05:53

## 2022-02-15 RX ADMIN — METOPROLOL TARTRATE 50 MG: 50 TABLET, FILM COATED ORAL at 09:29

## 2022-02-15 RX ADMIN — TRAMADOL HYDROCHLORIDE 50 MG: 50 TABLET, COATED ORAL at 20:29

## 2022-02-15 RX ADMIN — METOPROLOL TARTRATE 50 MG: 50 TABLET, FILM COATED ORAL at 20:29

## 2022-02-15 ASSESSMENT — PAIN DESCRIPTION - LOCATION
LOCATION: HEAD;NECK
LOCATION: HEAD
LOCATION: HEAD;NECK
LOCATION: HAND

## 2022-02-15 ASSESSMENT — PAIN DESCRIPTION - ORIENTATION
ORIENTATION: POSTERIOR
ORIENTATION: POSTERIOR

## 2022-02-15 ASSESSMENT — PAIN DESCRIPTION - DESCRIPTORS
DESCRIPTORS: HEADACHE
DESCRIPTORS: HEADACHE

## 2022-02-15 ASSESSMENT — PAIN SCALES - GENERAL
PAINLEVEL_OUTOF10: 8
PAINLEVEL_OUTOF10: 7
PAINLEVEL_OUTOF10: 2
PAINLEVEL_OUTOF10: 8
PAINLEVEL_OUTOF10: 10
PAINLEVEL_OUTOF10: 7
PAINLEVEL_OUTOF10: 8
PAINLEVEL_OUTOF10: 7

## 2022-02-15 ASSESSMENT — PAIN DESCRIPTION - PAIN TYPE
TYPE: CHRONIC PAIN
TYPE: ACUTE PAIN
TYPE: ACUTE PAIN

## 2022-02-15 NOTE — PLAN OF CARE
Problem: Falls - Risk of:  Goal: Will remain free from falls  Description: Will remain free from falls  Outcome: Ongoing     Problem: Falls - Risk of:  Goal: Absence of physical injury  Description: Absence of physical injury  Outcome: Ongoing     Problem: Skin Integrity:  Goal: Will show no infection signs and symptoms  Description: Will show no infection signs and symptoms  Outcome: Ongoing     Problem: Skin Integrity:  Goal: Absence of new skin breakdown  Description: Absence of new skin breakdown  Outcome: Ongoing     Problem: Infection:  Goal: Will remain free from infection  Description: Will remain free from infection  Outcome: Ongoing     Problem: Safety:  Goal: Free from accidental physical injury  Description: Free from accidental physical injury  Outcome: Ongoing     Problem: Safety:  Goal: Free from intentional harm  Description: Free from intentional harm  Outcome: Ongoing     Problem: Pain:  Goal: Patient's pain/discomfort is manageable  Description: Patient's pain/discomfort is manageable  Outcome: Ongoing     Problem: Pain:  Goal: Pain level will decrease  Description: Pain level will decrease  Outcome: Ongoing     Problem: Pain:  Goal: Control of acute pain  Description: Control of acute pain  Outcome: Ongoing     Problem: Pain:  Goal: Control of chronic pain  Description: Control of chronic pain  Outcome: Ongoing

## 2022-02-15 NOTE — PROGRESS NOTES
Physical Medicine & Rehabilitation  Progress Note      Subjective:      80year-old female with MS exacerbation. Patient is doing well today. She denies any new issues with sleep. Diarrhea has resolved. She had increase in headache pain after using virtual reality headset in therapy. Otherwise her Norco is controlling chronic headache pain. ROS:  Denies fevers, chills, sweats. No chest pain, palpitations, lightheadedness. Denies coughing, wheezing or shortness of breath. Denies abdominal pain, nausea, diarrhea or constipation. No new areas of joint pain. Denies new areas of numbness or weakness. Denies new anxiety or depression issues. No new skin problems. Rehabilitation:   Progressing in therapies. PT:  Restrictions/Precautions: Fall Risk,General Precautions,Contact Precautions  Implants present? : Metal implants  Other position/activity restrictions: NO O2 today;  Headache,  feels weak in her legs this morning, better in afternoon session   Transfers  Sit to Stand: Contact guard assistance  Stand to sit: Contact guard assistance  Bed to Chair: Contact guard assistance  Stand Pivot Transfers: Contact guard assistance  Squat Pivot Transfers: Contact guard assistance  Comment: uses RW. Ambulation 1  Surface: level tile  Device: Rolling Walker  Other Apparatus: O2 (2L)  Assistance: Contact guard assistance  Quality of Gait: cues to correct forward flexed posture, good response from pt  Gait Deviations: Slow Emily,Increased NELDA,Decreased step length,Decreased step height  Distance: 203 feet, 225 feet (pt required seated rest breaks to recover after AMB)  Comments: Pt unsteady, VC to correct R ankle supination, distance limited to fatigue.      Transfers  Sit to Stand: Contact guard assistance  Stand to sit: Contact guard assistance  Bed to Chair: Contact guard assistance  Stand Pivot Transfers: Contact guard assistance  Squat Pivot Transfers: Contact guard assistance  Comment: uses RW.  Ambulation  Ambulation?: Yes  Ambulation 1  Surface: level tile  Device: Rolling Walker  Other Apparatus: O2 (2L)  Assistance: Contact guard assistance  Quality of Gait: cues to correct forward flexed posture, good response from pt  Gait Deviations: Slow Emily,Increased NELDA,Decreased step length,Decreased step height  Distance: 203 feet, 225 feet (pt required seated rest breaks to recover after AMB)  Comments: Pt unsteady, VC to correct R ankle supination, distance limited to fatigue. Surface: level tile  Ambulation 1  Surface: level tile  Device: Rolling Walker  Other Apparatus: O2 (2L)  Assistance: Contact guard assistance  Quality of Gait: cues to correct forward flexed posture, good response from pt  Gait Deviations: Slow Emily,Increased NELDA,Decreased step length,Decreased step height  Distance: 203 feet, 225 feet (pt required seated rest breaks to recover after AMB)  Comments: Pt unsteady, VC to correct R ankle supination, distance limited to fatigue. OT:  ADL  Feeding: Setup,Increased time to complete,Dentures (per report)  Grooming: Setup (daughter assists with blow drying/stying hair per baseline. )  UE Bathing: Setup  LE Bathing: Setup,Stand by assistance,Contact guard assistance (standing in shower for teofilo/buttocks. cross leg tech for feet)  UE Dressing: Setup  LE Dressing: Stand by assistance,Minimal assistance (Assist with cristhian Mcmahon, SBA otherwise. )  Toileting: Stand by assistance  Additional Comments: Pt sat EOB for assessment. Multiple rest breaks required 2* SOB with activity. Balance  Sitting Balance: Supervision  Standing Balance: Contact guard assistance (SBA-CGA pending task )   Standing Balance  Time: AM: 1-2 min x 4 PM: 1-2 min x2  Activity: ADL tasks, functional mobility/transfers  Comment: 0-2 UE support, encouraged use of grab bar when available in shower however no LOB.    Functional Mobility  Functional - Mobility Device: Rolling Walker  Activity: To/from bathroom  Assist Level: Contact guard assistance  Functional Mobility Comments: minimal safety cues for RW mgmt. Apparatus Needs  Apparatus Needs: O2  Bed mobility  Rolling to Left: Stand by assistance  Rolling to Right: Stand by assistance  Supine to Sit: Stand by assistance  Sit to Supine: Stand by assistance  Scooting: Stand by assistance  Comment: HOB elevated slightly, no use of bed rails  Transfers  Stand Step Transfers: Minimal assistance  Sit to stand: Contact guard assistance  Stand to sit: Contact guard assistance   Toilet Transfers  Toilet - Technique: Ambulating  Equipment Used: Raised toilet seat without rails (grab bar on R side )  Toilet Transfer: Contact guard assistance,Stand by assistance     Shower Transfers  Shower - Transfer From: Clarnce Masker (RW)  Shower - Transfer Type: To and From  Shower - Transfer To: Transfer tub bench  Shower - Technique: Ambulating  Shower Transfers: Minimal assistance,Verbal cues  Shower Transfers Comments: Pt daughter states she provided supervision when pt showers at home. She has shower chair for use in tub unit. (Recommended TTB for increased safety/ease. )       SPEECH:      Objective:  BP (!) 153/82   Pulse 75   Temp 98.1 °F (36.7 °C)   Resp 16   Ht 4' 11.02\" (1.499 m)   Wt 135 lb 1.6 oz (61.3 kg)   SpO2 97%   BMI 27.27 kg/m²       GEN: Well developed, well nourished, no acute distress. HEENT: NCAT, PERRL, EOMI, mucous membranes pink and moist.  RESP: Lungs clear to auscultation. No rales or rhonchi. Respirations WNL and unlabored. CV: Regular rate rhythm. No murmurs, rubs, or gallops. ABD: soft, non-distended, non-tender. BS+ and equal.  NEURO: A&O x 3. Sensation intact to light touch. MSK: Functional ROM. Muscle tone and bulk are normal bilaterally. Strength 4+/5 key muscles BUEs. 4+/5 key muscles BLEs. EXT: No calf tenderness to palpation or edema BLEs. SKIN: Warm dry and intact with good turgor. PSYCH: Mood WNL. Affect WNL.  Appropriately interactive. Diagnostics:     CBC: No results for input(s): WBC, RBC, HGB, HCT, MCV, RDW, PLT in the last 72 hours. BMP:   Recent Labs     02/13/22  0737      K 3.9      CO2 24   BUN 26*   CREATININE 1.08*   GLUCOSE 91     BNP: No results for input(s): BNP in the last 72 hours. PT/INR: No results for input(s): PROTIME, INR in the last 72 hours. APTT: No results for input(s): APTT in the last 72 hours. CARDIAC ENZYMES: No results for input(s): CKMB, CKMBINDEX, TROPONINT in the last 72 hours. Invalid input(s): CKTOTAL;3 troponins   FASTING LIPID PANEL:  Lab Results   Component Value Date    CHOL 235 (H) 01/04/2012    HDL 74 (A) 11/06/2021    TRIG 119 01/04/2012     LIVER PROFILE: No results for input(s): AST, ALT, ALB, BILIDIR, BILITOT, ALKPHOS in the last 72 hours.      Current Medications:   Current Facility-Administered Medications: hydroCHLOROthiazide (HYDRODIURIL) tablet 12.5 mg, 12.5 mg, Oral, Daily  HYDROcodone-acetaminophen (NORCO) 5-325 MG per tablet 2 tablet, 2 tablet, Oral, Daily PRN  HYDROcodone-acetaminophen (NORCO) 5-325 MG per tablet 1 tablet, 1 tablet, Oral, BID PRN  loperamide (IMODIUM) capsule 2 mg, 2 mg, Oral, 4x Daily PRN  ciprofloxacin (CIPRO) tablet 500 mg, 500 mg, Oral, Daily  enoxaparin (LOVENOX) injection 30 mg, 30 mg, SubCUTAneous, Daily  ondansetron (ZOFRAN-ODT) disintegrating tablet 4 mg, 4 mg, Oral, Q8H PRN **OR** ondansetron (ZOFRAN) injection 4 mg, 4 mg, IntraVENous, Q6H PRN  polyethylene glycol (GLYCOLAX) packet 17 g, 17 g, Oral, Daily PRN  albuterol sulfate  (90 Base) MCG/ACT inhaler 2 puff, 2 puff, Inhalation, Q6H PRN  amitriptyline (ELAVIL) tablet 100 mg, 100 mg, Oral, Nightly  amLODIPine (NORVASC) tablet 10 mg, 10 mg, Oral, Daily  atorvastatin (LIPITOR) tablet 20 mg, 20 mg, Oral, Daily  dextrose 5 % solution, 100 mL/hr, IntraVENous, PRN  dextrose 50 % IV solution, 12.5 g, IntraVENous, PRN  gabapentin (NEURONTIN) capsule 100 mg, 100 mg, Oral,

## 2022-02-15 NOTE — PROGRESS NOTES
Physical Therapy  Facility/Department: [facility] ACUTE REHAB  Daily Treatment Note  NAME: Marivel Garber  : 1934  MRN: [id number]    Date of Service: 2/15/2022    Discharge Recommendations:  Patient would benefit from continued therapy after discharge        Assessment      PT Education: Goals; General Safety;Equipment;Precautions  Patient Education: safe use of REAL VR system  Activity Tolerance  Activity Tolerance: Patient limited by endurance; Patient limited by fatigue; Other  Activity Tolerance: increased headache after use of VR system     Patient Diagnosis(es): There were no encounter diagnoses. has a past medical history of Acute cystitis without hematuria, Arthritis, Chronic daily headache, GERD (gastroesophageal reflux disease), Hyperlipidemia, Hypertension, Moderate malnutrition ([address] Multiple sclerosis ([address] Neuropathy, Pneumonia, and Vitamin D deficiency. has a past surgical history that includes Cystocele repair; Rectocele repair; Hysterectomy; Cervical disc surgery; Cataract removal with implant (Right, 2014); Cataract removal with implant (Left, 2014); Total shoulder arthroplasty (Right, 2017); and shoulder surgery (Right, ). Restrictions  Restrictions/Precautions  Restrictions/Precautions: Fall Risk,General Precautions,Contact Precautions  Required Braces or Orthoses?: Yes  Implants present? : Metal implants  Position Activity Restriction  Other position/activity restrictions: -     Subjective   General  Chart Reviewed: Yes  Additional Pertinent Hx: The patient is a 80 y.o. Non- / non  female who presents with Dizziness and she is admitted to the hospital for the management of multiple sclerosis flareup and UTI. Patient was diagnosed with multiple sclerosis at the age of 44. Past medical history significant for multiple sclerosis, hypertension, chronic osteoarthritis of the spine.  Patient reportedly woke up this morning with worsening facial droop, severe headache, and an unsteady gait. She has history of chronic headaches but this was much worse. She took 2 Norco pills but that did not alleviate the headache. It is located frontally and in the back of her head and neck and throbbing. Patient was taking 10 mg of prednisone daily per her PCP for spinal stenosis and chronic vertebral fracture. She sees a pain medicine specialist who gives her spinal injections. He told her to stop taking the prednisone for 5 days prior to injection. This was her fifth day without taking steroids. In the ED she had a CT and a CTA done. CT showed no acute abnormalities. CTA head and neck shows no hemodynamically significant stenosis. Pt admitted to [address] [place] [address] 2/11/22  Response To Previous Treatment: Patient reporting fatigue but able to participate. Family / Caregiver Present: No  Referring Practitioner: Enriqueta Gomez MD  Subjective  Subjective: AM: Pt reported feeling a little better today, but she still gets a headache and gets lightheaded sometimes Pt agreed to using REAL / virtual reality system. Pt reported feeling a little more dizzy/lightheaded after ~30 min of use ; PM: Pt reported increased headache and neck pain, that she believes is from the VR headset  Pain Screening  Patient Currently in Pain: Yes  Pain Assessment  Pain Assessment: 0-10  Pain Level: 8  Pain Type: Acute pain  Pain Location: Head;Neck  Pain Orientation: Posterior  Pain Descriptors: Headache  Non-Pharmaceutical Pain Intervention(s): Repositioned;Rest;Ambulation/Increased Activity; Distraction  Response to Pain Intervention: Patient Satisfied  Vital Signs  Patient Currently in Pain: Yes       Orientation  Orientation  Overall Orientation Status: Within Functional Limits    Objective   Bed mobility  Supine to Sit: Stand by assistance  Sit to Supine: Stand by assistance  Scooting: Stand by assistance  Transfers  Sit to Stand: Stand by assistance  Stand to sit: Stand by assistance  Bed to Chair: Stand by assistance  Stand Pivot Transfers: Stand by assistance  Comment: uses RW  Ambulation  Ambulation?: Yes  Ambulation 1  Surface: level tile  Device: Rolling Walker  Assistance: Contact guard assistance  Quality of Gait: downward gaze; cues to correct forward flexed posture, good response from pt  Gait Deviations: Slow Emily; Increased NELDA; Decreased step length;Decreased step height  Distance: 220 feet, 225 feet (pt required seated rest breaks to recover after each AMB)  Stairs/Curb  Stairs?: Yes  Stairs  # Steps : 10  Stairs Height:  (4\" and 6\")  Rails: Bilateral  Device: No Device  Assistance: Contact guard assistance  Comment: seated rest break following use of stairs     Balance  Posture: Fair  Sitting - Static: Good  Sitting - Dynamic: Fair;+  Standing - Static: Fair;+  Standing - Dynamic: Fair  Other exercises  Other exercises?: Yes  Other exercises 1: Seated B LE ex's x15 (#1.5 and Orange theraband)  Other exercises 2: Standing B LE ex's x10  Other exercises 3: NuStep: L2, 10 min AM, 10 min PM (seat: 8, hands: 8)  Other exercises 4: Core strengthening / sitting balance: REAL VR system - seated reaching and trunk flexion/extension       Goals  Short term goals  Time Frame for Short term goals: 5-7 days  Short term goal 1: Pt to perform bed mobility IND from flat surface. Short term goal 2: Pt to perform transfers CGA/SBA from varied surfaces with good technique. Short term goal 3: Pt to amb 75'-100; on level surfaces with device, SBA/CGA. Short term goal 4: Pt to perform 5 minutes on nustep without change in symptoms for coordination and endurance. Short term goal 5: Pt to improve posture and sitting balance to GOOD. Long term goals  Time Frame for Long term goals : by D/C  Long term goal 1: Pt to perform transfers MOD I with least restrictive device from varied surface heights (toilet, bed, w/c, etc).    Long term goal 2: Pt to amb 125'-150'  MOD I with least restrictive device on varied terrain. Long term goal 3: Pt to ascend/descend at least 3 stairs 1 HR, SBA. Long term goal 4: Pt to improve BLE strength by 1/2 MMG. Long term goal 5: Pt to improve Tinetti score to at least a 22 and improve standing balance to GOOD- to reduce fall risk. Long term goal 6: Pt to improve 5xSTS test to 15 seconds or less. Long term goal 7: Pt to improve 2MWT to at least 125' and 6MWT to at least 225' with device, MOD I. Patient Goals   Patient goals :  To get stronger to go home    Plan    Plan  Times per week: 1.5 hr/day, 5-7 days/week  Specific instructions for Next Treatment: progress gait, stairs, balance, nustep, virtual reality  Current Treatment Recommendations: Strengthening,Balance Training,Functional Mobility Training,Transfer Training,Endurance Training,Gait Training,Patient/Caregiver Education & Training,Safety Education & Training,Stair training,Home Exercise Program,Equipment Evaluation, Education, & procurement  Safety Devices  Type of devices: Gait belt,Call light within reach,All fall risk precautions in place,Left in chair,Patient at risk for falls     Therapy Time     02/15/22 0757 02/15/22 1415   PT Individual Minutes   Time In [facility] [phone]   Time Out 0911 2050 [address] Keli Pina PTA

## 2022-02-15 NOTE — PROGRESS NOTES
[facility] Internal Medicine    CONSULTATION / HISTORY AND PHYSICAL EXAMINATION            Date:   2/15/2022  Patient name:  Tiago Chavarria  Date of admission:  2/11/2022  5:26 PM  MRN:   [id number]  Account:  [de-identified]  YOB: 1934  PCP:    Komal Brock MD  Room:   2608/2608-01  Code Status:    Full Code    Physician Requesting Consult: Carson Simon*    Reason for Consult: Medical management    Chief Complaint:     No chief complaint on file. Debility    History Obtained From:     patient, electronic medical record    History of Present Illness/ Interval History:     80 y.o. Non- / non  female who initially presented with MS flareup with facial droop unsteady gait, difficulty swallowing concern for UTI initially but further urinalysis not consistent with UTI antibiotics discontinued during admission. Patient was diagnosed with multiple sclerosis at the age of 44. She was seen by neurology and started on methylprednisone  Past medical history significant for multiple sclerosis, hypertension, chronic osteoarthritis of the spine.       Past Medical History:     Past Medical History:   Diagnosis Date    Acute cystitis without hematuria 10/1/2017    Arthritis     Chronic daily headache     GERD (gastroesophageal reflux disease)     Hyperlipidemia     Hypertension     Moderate malnutrition ([address] 3/2/2019    Multiple sclerosis ([address]     Neuropathy     Pneumonia 2017    states had double pneumonia    Vitamin D deficiency         Past Surgical History:     Past Surgical History:   Procedure Laterality Date    CATARACT REMOVAL WITH IMPLANT Right 11/6/2014    [facility]    CATARACT REMOVAL WITH IMPLANT Left 12/2/2014    [facility]    CERVICAL DISC SURGERY      CYSTOCELE REPAIR      HYSTERECTOMY      RECTOCELE REPAIR      SHOULDER ARTHROPLASTY Right 04/20/2017    SHOULDER SURGERY Right 2017 Medications Prior to Admission:     Prior to Admission medications    Medication Sig Start Date End Date Taking? Authorizing Provider   omeprazole (PRILOSEC) 20 MG delayed release capsule Take 1 capsule by mouth daily 2/1/22  Yes Pauly Keita MD   docusate sodium (COLACE) 100 MG capsule Take 1 capsule by mouth daily 2/1/22  Yes Pauly Keita MD   celecoxib (CELEBREX) 200 MG capsule Take 1 capsule by mouth daily 1/17/22 1/17/23 Yes Pauly Keita MD   traMADol (ULTRAM) 50 MG tablet Take 1 tablet by mouth every 6 hours as needed for Pain for up to 60 days. 12/20/21 2/18/22 Yes Pauly Keita MD   predniSONE (DELTASONE) 10 MG tablet 4 tabs by mouth daily for 3 days, then 3 tabs daily for 3 days, then 2 tabs daily for 3 days, then 1 tab daily till gone. 11/29/21  Yes Pauly Keita MD   predniSONE (DELTASONE) 10 MG tablet Take 1 tablet by mouth daily 10/26/21  Yes Pauly Keita MD   HYDROcodone-acetaminophen (NORCO) 5-325 MG per tablet Take 1 tablet by mouth every 8 hours as needed. 9/14/21  Yes Historical Provider, MD   amitriptyline (ELAVIL) 100 MG tablet TAKE ONE TABLET BY MOUTH ONCE NIGHTLY 9/20/21  Yes Pauly Keita MD   simvastatin (ZOCOR) 20 MG tablet Take 1 tablet by mouth nightly 8/23/21  Yes Pauly Keita MD   calcipotriene (DOVONEX) 0.005 % cream Apply topically 2 times daily. 4/21/21  Yes Pauly Keita MD   clobetasol (TEMOVATE) 0.05 % cream Apply topically 2 times daily. 3/30/21  Yes Pauly Keita MD   gabapentin (NEURONTIN) 100 MG capsule Take 1 capsule by mouth 2 times daily for 30 days. Patient taking differently: Take 100 mg by mouth 2 times daily as needed.   3/11/21 2/11/22 Yes Cristino Queen MD   albuterol sulfate HFA (VENTOLIN HFA) 108 (90 Base) MCG/ACT inhaler Inhale 2 puffs into the lungs every 6 hours as needed for Wheezing 3/11/21 3/11/22 Yes PrecMD Alexi Nolen MISC by Does not apply route Exp: 5/20/2026 5/24/21   Pauly Keita, MD        Allergies:     Bactrim [sulfamethoxazole-trimethoprim], Other, Pcn [penicillins], Lidoderm [lidocaine], and Macrobid [nitrofurantoin]    Social History:     Tobacco:    reports that she has never smoked. She has never used smokeless tobacco.  Alcohol:      reports no history of alcohol use. Drug Use:  reports no history of drug use. Family History:     No family history on file. Review of Systems:     Positive and Negative as described in HPI. Denies any shortness of breath or cough  Denies chest pain or palpitations  Denies abdominal pain, diarrhea vomiting  Denies any new numbness tremors or weakness. Physical Exam:     BP (!) 153/82   Pulse 75   Temp 98.1 °F (36.7 °C)   Resp 16   Ht 4' 11.02\" (1.499 m)   Wt 135 lb 1.6 oz (61.3 kg)   SpO2 97%   BMI 27.27 kg/m²   Temp (24hrs), Av.1 °F (36.7 °C), Min:98.1 °F (36.7 °C), Max:98.1 °F (36.7 °C)      General appearance:  alert, cooperative and no distress  Eyes: Anicteric sclera. Pupils are equally round and reactive to light. Extraocular movements are intact.   Lungs:  clear to auscultation bilaterally, normal effort  Heart:  regular rate and rhythm, systolic murmur  Abdomen:  soft, nontender, nondistended, normal bowel sounds, no masses, hepatomegaly, splenomegaly  Extremities:  no edema, redness, tenderness in the calves  Skin:  no gross lesions, rashes, induration  Neuro:  Alert, oriented X 3, no new focal weakness    Investigations:      Laboratory Testing:  Lab Results   Component Value Date    WBC 12.9 (H) 2022    HGB 12.2 2022    HCT 35.5 (L) 2022    MCV 91.2 2022     2022     Lab Results   Component Value Date     2022    K 3.9 2022     2022    CO2 24 2022    BUN 26 2022    CREATININE 1.08 2022    GLUCOSE 91 2022    GLUCOSE 112 2012    CALCIUM 7.6 2022         Assessment :      Primary Problem  <principal problem not specified>    Active Hospital Problems    Diagnosis Date Noted    Multiple sclerosis exacerbation ([facility] Kaushal Fernandez 02/25/2021    Diarrhea of presumed infectious origin [R19.7] 02/25/2021       Plan: Active Problems:    Multiple sclerosis exacerbation (HCC)    Diarrhea of presumed infectious origin  Resolved Problems:    Hypokalemia        1. Symptoms of MS exacerbation -completed pulse dose steroids 2/13  2. Diarrhea resolved  3. Type 2 diabetes-blood sugar low normal; methylprednisone course has been completed, will DC Lantus and sliding scale. Patient was not on any diabetes medication prior to presentation HbA1c 6.4 on 11/6/2021  4. Hypertension= suboptimal control-on Norvasc, lisinopril, metoprolol, add HCTZ      DVT prophylaxis-Lovenox      2/15  Blood pressure and blood sugar control good  Patient reports no new complaints. Engaging with physical therapy. Labs and vitals reviewed. No new issues. Continue with current care. Consultations:   Laura Fernandez MD  2/15/2022  3:24 PM    Copy sent to Dr. Kyaw Alfredo MD    Please note that this chart was generated using voice recognition Dragon dictation software. Although every effort was made to ensure the accuracy of this automated transcription, some errors in transcription may have occurred. 100

## 2022-02-15 NOTE — CARE COORDINATION
Rick Santa RN   Registered Nurse   Case Management   Progress Notes      Signed   Date of Service:  2/10/2022  4:13 PM         Related encounter: ED to Hosp-Admission (Discharged) from 2022 in [address] [place] [address]  Acute Inpatient Rehab Preadmission Assessment     Patient Name: Raj Arauz        MRN:   [id number]    : 1934  (80 y.o.)  Gender: female      Admitted from:   [x]? [facility]  []? [facility] [address]   []? [address]   []? [facility]   []? Outside Admission - Location:                                 [x]? Initial         []? Updated     Date of Onset / Admission to the acute hospital:  22     Inpatient Rehabilitation Admitting Diagnosis:  MS Exacerbation     Did patient have surgery/procedures? [x]? No  []? Yes:       Physicians: Juan Hummel for clinical complications: Moderate      Co-morbidities:       1. HTN  2. Hyperlipidemia  3. Neuropathy  4. GERD  5. Chronic daily headache        Financial Information  Primary insurance:  [x]? Medicare     []? Medicare HMO      []? [employer]    []? Medicaid      []? Medicaid HMO       []? Workers Compensation        []? Personal Pay     Secondary Insurance:  []? Medicare     []? Medicare HMO      [x]? [employer]    []? Medicaid      []? Medicaid HMO        []? Workers Compensation      []? None     Precautions:   []? Cardiac Precautions:            []? Total hip precautions:           []? Weight Bearing status:  [x]? Safety Precautions/Concerns:  Fall Risk, General Precautions, Pt has a back brace that she wears PRN, Metal implants (R TSA)  [x]? Visually impaired:   Wears glasses at all times        []? Hard of Hearing:      Isolation Precautions:         [x]? Yes              []?No  If Yes:   []? Droplet  [x]? Contact           []? Airborne     []? VRE     [x]? MRSA        []? C-diff         []? TB             []? ESBL         []? MDRO          []? Other:                        Physiatrist:  []?    Dr. Miriam Mike [x]?   Dr. Maureen Lawson  []? Dr. Bonner  []? Dr. Karen Baird     Patients Occupation: Retired     Reviewed Lab and Diagnostic reports from Current Admission: Yes     Patients Prior Functional  Level: Prior Function  Receives Help From: Family  ADL Assistance: Independent (Daughter helps with showers if needed)  Homemaking Assistance: Needs assistance (Daughter A w/cleaning, shopping, and laundry)  Ambulation Assistance: Independent (w/RW at all times)  Transfer Assistance: Independent  Additional Comments: Son and daughter both work during the day, but daughter calls her every morning.     History of current illness, per PM&R Consult:  Jacky Raza is a 80 y.o. RHD female admitted to [facility] 2/8/2022.       Patient admitted for MS flareup with facial droop severe headache, unsteady gait, and UTI. She was diagnosed at 44years old. IM treating hypertension. Further microscopic analysis was not consistent with UTI and Rocephin was discontinued.      Neurology consulted - she follows as outpatient with Dr. Elinor Gowers. Treating with methylprednisone.      She reports that her MS exacerbations typically present as worsening SOB and BLE weakness.     Prognosis: Fair     Current functional status for upper extremity ADLs:  UE Bathing: Stand by assistance  UE Dressing: Stand by assistance     Current functional status for lower extremity ADLs:  LE Bathing: Minimal assistance  LE Dressing:  Moderate assistance (Inc'd time for socks, A to thread BLE, Min A in stdg)     Current functional status for bed, chair, wheelchair transfers:  Transfers  Sit to Stand: Contact guard assistance,Minimal Assistance  Stand to sit: Contact guard assistance  Bed to Chair: Contact guard assistance,Minimal assistance (LOB while standing up at EOB to adjust her undergarment)  Comment: Did not attempt, as patient's BP high this date.     Current functional status for toilet transfers:   Minimal Assistance     Current functional status for locomotion:  Ambulation  Ambulation?: Yes  Ambulation 1  Surface: level tile  Device: Rolling Walker  Assistance: Minimal assistance,Contact guard assistance  Quality of Gait: Unsteady gait, minor LOB, requiring in A fo rbalance, fatiques easily, pt took one standing break due to fatique  Distance: 40 ft  Comments: Decreased awareness of deficts.       Current functional status for comprehension: Complete independence     Current functional status for expression: Complete independence     Current functional status for social interaction: Complete independence     Current functional status for problem solving: Complete independence     Current functional status for memory: Complete independence     Current Deficits R/T Impairment: Impaired Functional Mobility and Decreased ADLs     Required Therapy:   [x]? Physical Therapy  [x]? Occupational Therapy   []? Speech Therapy, as appropriate     Additional Services:    [x]?     []? Recreational Therapy, as appropriate    [x]? Nutrition    []? Dialysis  []? Cultural Needs Identified  []? Special Equipment Needs  [x]? Other:  O2 as ordered     Rehab Justification:  Needs 3 hrs therapy per day or 15 hours per week:  Yes  Identified Rehab Nursing needs: Yes  Intense Interdisciplinary need:  Yes  Need for 24 hr physician supervision:  Yes  Measurable improved quality of life:  Yes  Willingness to participate:  Yes  Medical Necessity:  Yes  Patient able to tolerate care proposed:   Yes     Expected Discharge Destination/Functional Level:  Home with assist  Expected length of time to achieve that level of improvement: 1-2 weeks  Expected Post Discharge Treatments: Home with possible Home Care     Other information relevant to patient's care needs:  N/A     Acute Inpatient Rehabilitation Disclosure Statement will be provided to patient upon admission to ARU with patient's verbalization of understanding.       I have reviewed and concur with the findings and results of the pre-admission screening assessment completed by the Inpatient Rehabilitation Admissions Coordinator.                     Cosigned by: Vishnu Kaye MD at 2/10/2022  4:23 PM

## 2022-02-15 NOTE — PROGRESS NOTES
[address] [facility]    ACUTE REHABILITATION OCCUPATIONAL THERAPY  DAILY NOTE    Date: 2/15/22  Patient Name: Pablo Garber      Room: 9829/7793-12    MRN: [id number]   : 1934  (80 y.o.)  Gender: female   Referring Practitioner: Dr Simi Javier  Diagnosis: MS exacerbation, recurrent UTI  Additional Pertinent Hx: Multiple Sclerosis since she was 52years old    Restrictions  Restrictions/Precautions: Fall Risk,General Precautions,Contact Precautions  Implants present? : Metal implants  Other position/activity restrictions: NO O2 today;  Headache,  feels weak in her legs this morning, better in afternoon session  Required Braces or Orthoses?: Yes    Subjective  Comments: pt states she feels limited from headache this date after VR session. Patient Currently in Pain: Yes  Pain Level: 8  Pain Location: Hand  Restrictions/Precautions: Fall Risk;General Precautions;Contact Precautions  Overall Orientation Status: Within Functional Limits  Patient Observation  Observations: pt requires rest breaks in between activities this date. unable to tolerate full 90 mins in AM.   Pain Assessment  Pain Assessment: 0-10  Pain Level: 8  Pain Location: Hand    Objective  Cognition  Overall Cognitive Status: Impaired  Attention Span: Attends with cues to redirect  Following Commands:  Follows multistep commands with repetition  Safety Judgement: Decreased awareness of need for safety  Insights: Decreased awareness of deficits  Perception  Overall Perceptual Status: WFL  Balance  Sitting Balance: Stand by assistance  Standing Balance: Stand by assistance  Bed mobility  Supine to Sit: Stand by assistance  Sit to Supine: Stand by assistance  Transfers  Sit to stand: Contact guard assistance  Stand to sit: Stand by assistance  Transfer Comments: vc for hand placement and use of RW at times  Standing Balance  Time: AM: ~10 min,   Activity: ADL tasks, functional mobility  Comment: 0-2 UE support, vc to keep RW in front, pt states she is forgetful of RW because she has good balance. Education provided  Functional Mobility  Functional - Mobility Device: Rolling Walker  Activity: To/from bathroom  Assist Level: Contact guard assistance  Functional Mobility Comments: minimal safety cues for RW mgmt. Type of ROM/Therapeutic Exercise  Type of ROM/Therapeutic Exercise: AROM; Free weights  Comment: Pt engaged in UB ex's to promote increased strength/endurance for functional tasks; with limited tolerance, extended rest breaks required due to fatigue. RUE more limited, some ex's completed without weight. Exercises  Scapular Protraction: x  Scapular Retraction: x  Shoulder Extension: x  Shoulder ABduction: x  Horizontal ABduction: x  Horizontal ADduction: x  Elbow Flexion: x  Elbow Extension: x  Grasp/Release: spring hand gripper, 2nd setting x10 reps B hands. PM: spring gripper hold and release to remove pegs from board. 10 pegs each hand            ADL  Feeding: Setup; Increased time to complete;Dentures  Grooming: Setup  UE Bathing: Setup  LE Bathing: None  UE Dressing: Setup  LE Dressing: Stand by assistance;Minimal assistance (Min A with RW this date. )  Additional Comments: pt standing at sink with RW and wc behind for rest breaks as needed throughout ADLs this AM           Assessment  Performance deficits / Impairments: Decreased functional mobility ; Decreased ADL status; Decreased strength;Decreased safe awareness;Decreased coordination;Decreased endurance;Decreased balance  Prognosis: Good  Discharge Recommendations: Patient would benefit from continued therapy after discharge  Activity Tolerance: Patient Tolerated treatment well  Activity Tolerance: Room air for full ADL session  Safety Devices in place: Yes  Type of devices: Left in chair;Call light within reach; All fall risk precautions in place;Gait belt             Plan  Plan  Times per week: 5-7  Times per day: Twice a day  Current Treatment Recommendations: Strengthening,ROM,Functional Mobility Training,Safety Education & [place] Management,Patient/Caregiver Education & Training,Self-Care / ADL,Home Management Training  Plan Comment: Fatigues with activity, New use of Oxygen; Patient Goals   Patient goals : Regain abiilty to care for self to return Home  Short term goals  Time Frame for Short term goals: One week  Short term goal 1: Pt will complete LB ADL's with CGA and Good safety using AE if needed. Short term goal 2: Pt will complete toilet transfer and toileting with CGA and Good safety using least restrictive device. Short term goal 3: Pt will tolerate standing for 3-4 minutes with 1-2 UE support and CGA while completing a functional task to support participation in care  Short term goal 4: Pt will V/D at least 3 fall prevention strategies that she can utilize during daily routines. Short term goal 5: Pt will actively participate in 15-20 minutes of therapeutic exercise/activity to promote increased independence and safety with self-care and mobility. Long term goals  Time Frame for Long term goals : By Discharge  Long term goal 1: Setup assist for self care tasks of athing and dressing  Long term goal 2: Mod I for Fucntional Self care transfers  Long term goal 3: Mod I for light meal prep and household mobility  Long term goal 4:  Independent use of Home program for Hand/UE strengthening and conditioning to support continued indenepdnent in ADL tasks        02/15/22 1126 02/15/22 1317   OT Individual Minutes   Time In 0950 1300   Time Out 1109 1315   Minutes 79 15     Electronically signed by SANDI Bui on 2/15/22 at 2:57 PM EST

## 2022-02-16 PROCEDURE — 97110 THERAPEUTIC EXERCISES: CPT

## 2022-02-16 PROCEDURE — 6360000002 HC RX W HCPCS

## 2022-02-16 PROCEDURE — 99231 SBSQ HOSP IP/OBS SF/LOW 25: CPT | Performed by: INTERNAL MEDICINE

## 2022-02-16 PROCEDURE — 1180000000 HC REHAB R&B

## 2022-02-16 PROCEDURE — 97535 SELF CARE MNGMENT TRAINING: CPT

## 2022-02-16 PROCEDURE — 6370000000 HC RX 637 (ALT 250 FOR IP): Performed by: INTERNAL MEDICINE

## 2022-02-16 PROCEDURE — 99232 SBSQ HOSP IP/OBS MODERATE 35: CPT | Performed by: PHYSICAL MEDICINE & REHABILITATION

## 2022-02-16 PROCEDURE — 97116 GAIT TRAINING THERAPY: CPT

## 2022-02-16 PROCEDURE — 97530 THERAPEUTIC ACTIVITIES: CPT

## 2022-02-16 PROCEDURE — 6370000000 HC RX 637 (ALT 250 FOR IP)

## 2022-02-16 RX ADMIN — GABAPENTIN 100 MG: 100 CAPSULE ORAL at 07:35

## 2022-02-16 RX ADMIN — ENOXAPARIN SODIUM 30 MG: 100 INJECTION SUBCUTANEOUS at 07:36

## 2022-02-16 RX ADMIN — LISINOPRIL 40 MG: 20 TABLET ORAL at 07:35

## 2022-02-16 RX ADMIN — CIPROFLOXACIN 500 MG: 500 TABLET, FILM COATED ORAL at 07:35

## 2022-02-16 RX ADMIN — TRAMADOL HYDROCHLORIDE 50 MG: 50 TABLET, COATED ORAL at 19:52

## 2022-02-16 RX ADMIN — AMLODIPINE BESYLATE 10 MG: 10 TABLET ORAL at 07:36

## 2022-02-16 RX ADMIN — HYDROCHLOROTHIAZIDE 12.5 MG: 25 TABLET ORAL at 07:36

## 2022-02-16 RX ADMIN — ATORVASTATIN CALCIUM 20 MG: 20 TABLET, FILM COATED ORAL at 07:35

## 2022-02-16 RX ADMIN — AMITRIPTYLINE HYDROCHLORIDE 100 MG: 50 TABLET, FILM COATED ORAL at 19:52

## 2022-02-16 RX ADMIN — METOPROLOL TARTRATE 50 MG: 50 TABLET, FILM COATED ORAL at 19:52

## 2022-02-16 RX ADMIN — TRAMADOL HYDROCHLORIDE 50 MG: 50 TABLET, COATED ORAL at 09:09

## 2022-02-16 RX ADMIN — METOPROLOL TARTRATE 50 MG: 50 TABLET, FILM COATED ORAL at 07:35

## 2022-02-16 RX ADMIN — PANTOPRAZOLE SODIUM 40 MG: 40 TABLET, DELAYED RELEASE ORAL at 06:27

## 2022-02-16 ASSESSMENT — PAIN DESCRIPTION - ONSET: ONSET: ON-GOING

## 2022-02-16 ASSESSMENT — PAIN DESCRIPTION - PAIN TYPE: TYPE: CHRONIC PAIN

## 2022-02-16 ASSESSMENT — PAIN DESCRIPTION - ORIENTATION: ORIENTATION: DISTAL;LOWER

## 2022-02-16 ASSESSMENT — PAIN DESCRIPTION - DESCRIPTORS: DESCRIPTORS: ACHING

## 2022-02-16 ASSESSMENT — PAIN DESCRIPTION - FREQUENCY: FREQUENCY: INTERMITTENT

## 2022-02-16 ASSESSMENT — PAIN SCALES - GENERAL
PAINLEVEL_OUTOF10: 5
PAINLEVEL_OUTOF10: 6
PAINLEVEL_OUTOF10: 3
PAINLEVEL_OUTOF10: 5

## 2022-02-16 ASSESSMENT — PAIN DESCRIPTION - LOCATION: LOCATION: HEAD

## 2022-02-16 NOTE — PROGRESS NOTES
Toilet Transfers  Toilet - Technique: Ambulating (with RW)  Equipment Used: Raised toilet seat without rails  Toilet Transfer: Contact guard assistance;Stand by assistance  Fine Motor: Pt unable to open jars and milk jug. Provided pt with dycem to assist, pt lacks coordination to utilize at this time. Pt grabbing both cap and jug   Type of ROM/Therapeutic Exercise  Type of ROM/Therapeutic Exercise: AROM; Free weights  Comment: Pt engaged in UB ex's to promote increased strength/endurance for functional tasks; with limited tolerance, extended rest breaks required due to fatigue. RUE more limited, some ex's completed without weight. Exercises  Scapular Protraction: x  Scapular Retraction: x  Shoulder Extension: x  Shoulder ABduction: x  Horizontal ABduction: x  Horizontal ADduction: x  Elbow Flexion: x  Elbow Extension: x        ADL  Feeding: Setup; Increased time to complete;Dentures  Grooming: Stand by assistance  UE Bathing: Stand by assistance  LE Bathing: Stand by assistance  UE Dressing: Stand by assistance  LE Dressing: Stand by assistance;Minimal assistance (difficulty threading RLE in/out pants due to limited ROM)  Toileting: Stand by assistance  Additional Comments: pt standing at sink with RW and wc behind for rest breaks as needed throughout ADLs this AM  (Min A for teds )          Assessment  Performance deficits / Impairments: Decreased functional mobility ; Decreased ADL status; Decreased strength;Decreased safe awareness;Decreased coordination;Decreased endurance;Decreased balance  Prognosis: Good  Discharge Recommendations: Patient would benefit from continued therapy after discharge  Activity Tolerance: Patient Tolerated treatment well  Activity Tolerance: Room air for full ADL session  Safety Devices in place: Yes  Type of devices: Left in chair;Call light within reach; All fall risk precautions in place;Gait belt          Plan  Plan  Times per week: 5-7  Times per day: Twice a day  Current Treatment Recommendations: Strengthening,ROM,Functional Mobility Training,Safety Education & [place] Management,Patient/Caregiver Education & Training,Self-Care / ADL,Home Management Training  Plan Comment: Fatigues with activity, New use of Oxygen; Patient Goals   Patient goals : Regain abiilty to care for self to return Home  Short term goals  Time Frame for Short term goals: One week  Short term goal 1: Pt will complete LB ADL's with CGA and Good safety using AE if needed. Short term goal 2: Pt will complete toilet transfer and toileting with CGA and Good safety using least restrictive device. Short term goal 3: Pt will tolerate standing for 3-4 minutes with 1-2 UE support and CGA while completing a functional task to support participation in care  Short term goal 4: Pt will V/D at least 3 fall prevention strategies that she can utilize during daily routines. Short term goal 5: Pt will actively participate in 15-20 minutes of therapeutic exercise/activity to promote increased independence and safety with self-care and mobility. Long term goals  Time Frame for Long term goals : By Discharge  Long term goal 1: Setup assist for self care tasks of athing and dressing  Long term goal 2: Mod I for Fucntional Self care transfers  Long term goal 3: Mod I for light meal prep and household mobility  Long term goal 4:  Independent use of Home program for Hand/UE strengthening and conditioning to support continued indenepdnent in ADL tasks        02/16/22 0904 02/16/22 1527   OT Individual Minutes   Time In 0807 1351   Time Out 0907 1420   Minutes 60 29     Electronically signed by SANDI Brooks on 2/16/22 at 3:28 PM EST

## 2022-02-16 NOTE — PLAN OF CARE
Problem: Falls - Risk of:  Goal: Will remain free from falls  Description: Will remain free from falls  Outcome: Ongoing  Goal: Absence of physical injury  Description: Absence of physical injury  Outcome: Ongoing   Patient has remained free from fall this shift. Problem: Skin Integrity:  Goal: Will show no infection signs and symptoms  Description: Will show no infection signs and symptoms  Outcome: Ongoing   No evidence of infection this shift. Problem: Safety:  Goal: Free from accidental physical injury  Description: Free from accidental physical injury  Outcome: Ongoing  Goal: Free from intentional harm  Description: Free from intentional harm  Outcome: Ongoing   Patient is able to verbalize needs appropriately, remains in bed most of shift able to go to the bath room with minimal assistance. No evidence or verbal expression to harm self or others. Patent is accepting of medication and assistance this shift.

## 2022-02-16 NOTE — PROGRESS NOTES
Physical Medicine & Rehabilitation  Progress Note      Subjective:      80year-old female with MS exacerbation. Patient is noting some improvement in her headache pain today. No new issues with sleep, appetite, bowel, or bladder. She continues to note improvement in her leg strength with therapy. ROS:  Denies fevers, chills, sweats. No chest pain, palpitations, lightheadedness. Denies coughing, wheezing or shortness of breath. Denies abdominal pain, nausea, diarrhea or constipation. No new areas of joint pain. Denies new areas of numbness or weakness. Denies new anxiety or depression issues. No new skin problems. Rehabilitation:   Progressing in therapies. PT:  Restrictions/Precautions: Fall Risk,General Precautions,Contact Precautions  Implants present? : Metal implants  Other position/activity restrictions: -   Transfers  Sit to Stand: Stand by assistance  Stand to sit: Stand by assistance  Bed to Chair: Stand by assistance  Stand Pivot Transfers: Stand by assistance  Squat Pivot Transfers: Contact guard assistance  Comment: uses RW  Ambulation 1  Surface: level tile  Device: Rolling Walker  Other Apparatus: O2 (2L)  Assistance: Contact guard assistance  Quality of Gait: downward gaze; cues to correct forward flexed posture, good response from pt  Gait Deviations: Slow Emily,Increased NELDA,Decreased step length,Decreased step height  Distance: 220 feet, 225 feet (pt required seated rest breaks to recover after each AMB)  Comments: Pt unsteady, VC to correct R ankle supination, distance limited to fatigue.      Transfers  Sit to Stand: Stand by assistance  Stand to sit: Stand by assistance  Bed to Chair: Stand by assistance  Stand Pivot Transfers: Stand by assistance  Squat Pivot Transfers: Contact guard assistance  Comment: uses RW  Ambulation  Ambulation?: Yes  Ambulation 1  Surface: level tile  Device: Rolling Walker  Other Apparatus: O2 (2L)  Assistance: Contact guard assistance  Quality of Gait: downward gaze; cues to correct forward flexed posture, good response from pt  Gait Deviations: Slow Emily,Increased NELDA,Decreased step length,Decreased step height  Distance: 220 feet, 225 feet (pt required seated rest breaks to recover after each AMB)  Comments: Pt unsteady, VC to correct R ankle supination, distance limited to fatigue. Surface: level tile  Ambulation 1  Surface: level tile  Device: Rolling Walker  Other Apparatus: O2 (2L)  Assistance: Contact guard assistance  Quality of Gait: downward gaze; cues to correct forward flexed posture, good response from pt  Gait Deviations: Slow Emily,Increased NELDA,Decreased step length,Decreased step height  Distance: 220 feet, 225 feet (pt required seated rest breaks to recover after each AMB)  Comments: Pt unsteady, VC to correct R ankle supination, distance limited to fatigue. OT:  ADL  Feeding: Setup,Increased time to complete,Dentures  Grooming: Stand by assistance  UE Bathing: Stand by assistance  LE Bathing: Stand by assistance  UE Dressing: Stand by assistance  LE Dressing: Stand by assistance,Minimal assistance (difficulty threading RLE in/out pants due to limited ROM)  Toileting: Stand by assistance  Additional Comments: pt standing at sink with RW and wc behind for rest breaks as needed throughout ADLs this AM  (Min A for teds )         Balance  Sitting Balance: Stand by assistance  Standing Balance: Stand by assistance   Standing Balance  Time: AM: ~10 min,   Activity: ADL tasks, functional mobility  Comment: 0-2 UE support, vc to keep RW in front, pt states she is forgetful of RW because she has good balance. Education provided  Functional Mobility  Functional - Mobility Device: Rolling Walker  Activity: To/from bathroom  Assist Level: Contact guard assistance  Functional Mobility Comments: minimal safety cues for RW mgmt.    Apparatus Needs  Apparatus Needs: O2  Bed mobility  Rolling to Left: Stand by assistance  Rolling to Right: Stand by assistance  Supine to Sit: Stand by assistance  Sit to Supine: Stand by assistance  Scooting: Stand by assistance  Comment: HOB elevated slightly, no use of bed rails  Transfers  Stand Step Transfers: Minimal assistance  Sit to stand: Contact guard assistance  Stand to sit: Stand by assistance  Transfer Comments: vc for hand palcement and use of RW at times   Toilet Transfers  Toilet - Technique: Ambulating  Equipment Used: Raised toilet seat without rails (grab bar on R side )  Toilet Transfer: Contact guard assistance,Stand by assistance     Shower Transfers  Shower - Transfer From: Walker (RW)  Shower - Transfer Type: To and From  Shower - Transfer To: Transfer tub bench  Shower - Technique: Ambulating  Shower Transfers: Minimal assistance,Verbal cues  Shower Transfers Comments: Pt daughter states she provided supervision when pt showers at home. She has shower chair for use in tub unit. (Recommended TTB for increased safety/ease. )       SPEECH:      Objective:  BP (!) 129/59   Pulse 79   Temp 98 °F (36.7 °C)   Resp 18   Ht 4' 11.02\" (1.499 m)   Wt 135 lb 1.6 oz (61.3 kg)   SpO2 92%   BMI 27.27 kg/m²       GEN: Well developed, well nourished, no acute distress. HEENT: NCAT, PERRL, EOMI, mucous membranes pink and moist.  RESP: Lungs clear to auscultation. No rales or rhonchi. Respirations WNL and unlabored. CV: Regular rate rhythm. No murmurs, rubs, or gallops. ABD: soft, non-distended, non-tender. BS+ and equal.  NEURO: A&O x 3. Sensation intact to light touch. MSK: Functional ROM. Muscle tone and bulk are normal bilaterally. Strength 4+/5 key muscles BUEs. 4+/5 key muscles BLEs. EXT: No calf tenderness to palpation or edema BLEs. SKIN: Warm dry and intact with good turgor. PSYCH: Mood WNL. Affect WNL. Appropriately interactive. Diagnostics:     CBC: No results for input(s): WBC, RBC, HGB, HCT, MCV, RDW, PLT in the last 72 hours.   BMP:   No results for input(s): NA, K, CL, CO2, PHOS, BUN, CREATININE, CA, GLUCOSE in the last 72 hours. BNP: No results for input(s): BNP in the last 72 hours. PT/INR: No results for input(s): PROTIME, INR in the last 72 hours. APTT: No results for input(s): APTT in the last 72 hours. CARDIAC ENZYMES: No results for input(s): CKMB, CKMBINDEX, TROPONINT in the last 72 hours. Invalid input(s): CKTOTAL;3 troponins   FASTING LIPID PANEL:  Lab Results   Component Value Date    CHOL 235 (H) 01/04/2012    HDL 74 (A) 11/06/2021    TRIG 119 01/04/2012     LIVER PROFILE: No results for input(s): AST, ALT, ALB, BILIDIR, BILITOT, ALKPHOS in the last 72 hours.      Current Medications:   Current Facility-Administered Medications: hydroCHLOROthiazide (HYDRODIURIL) tablet 12.5 mg, 12.5 mg, Oral, Daily  HYDROcodone-acetaminophen (NORCO) 5-325 MG per tablet 2 tablet, 2 tablet, Oral, Daily PRN  HYDROcodone-acetaminophen (NORCO) 5-325 MG per tablet 1 tablet, 1 tablet, Oral, BID PRN  loperamide (IMODIUM) capsule 2 mg, 2 mg, Oral, 4x Daily PRN  enoxaparin (LOVENOX) injection 30 mg, 30 mg, SubCUTAneous, Daily  ondansetron (ZOFRAN-ODT) disintegrating tablet 4 mg, 4 mg, Oral, Q8H PRN **OR** ondansetron (ZOFRAN) injection 4 mg, 4 mg, IntraVENous, Q6H PRN  polyethylene glycol (GLYCOLAX) packet 17 g, 17 g, Oral, Daily PRN  albuterol sulfate  (90 Base) MCG/ACT inhaler 2 puff, 2 puff, Inhalation, Q6H PRN  amitriptyline (ELAVIL) tablet 100 mg, 100 mg, Oral, Nightly  amLODIPine (NORVASC) tablet 10 mg, 10 mg, Oral, Daily  atorvastatin (LIPITOR) tablet 20 mg, 20 mg, Oral, Daily  dextrose 5 % solution, 100 mL/hr, IntraVENous, PRN  dextrose 50 % IV solution, 12.5 g, IntraVENous, PRN  gabapentin (NEURONTIN) capsule 100 mg, 100 mg, Oral, Daily  glucagon (rDNA) injection 1 mg, 1 mg, IntraMUSCular, PRN  glucose (GLUTOSE) 40 % oral gel 15 g, 15 g, Oral, PRN  lisinopril (PRINIVIL;ZESTRIL) tablet 40 mg, 40 mg, Oral, Daily  metoprolol tartrate (LOPRESSOR) tablet 50 mg, 50 mg, Oral, BID  pantoprazole (PROTONIX) tablet 40 mg, 40 mg, Oral, QAM AC  sodium chloride flush 0.9 % injection 10 mL, 10 mL, IntraVENous, PRN  traMADol (ULTRAM) tablet 50 mg, 50 mg, Oral, Q6H PRN  acetaminophen (TYLENOL) tablet 650 mg, 650 mg, Oral, Q4H PRN  senna (SENOKOT) tablet 17.2 mg, 2 tablet, Oral, Daily PRN  bisacodyl (DULCOLAX) suppository 10 mg, 10 mg, Rectal, Daily PRN      Impression/Plan:   Impaired ADLs, gait, and mobility due to:      1. MS exacerbation with impaired gait: PT/OT  for gait, mobility, strengthening, endurance, ADLs, and self care. 2. HTN/Hyperlipidemia: on amlodipine, atorvastatin, lisinopril. Improvement after IM added HCTZ. 3. Neuropathy: on amitriptyline  4. GERD: on pantoprazole  5. Hyperglycemia: resolved after steroid discontinued. Was on Lantus, sliding scale - IM has discontinued. 6. Chronic daily headache: Norco and Ultram prn pain, on amitriptyline. Follows outpatient with pain management. 7. Hypokalemia: Resolved  8. Diarrhea: Infectious - improved on Cipro until 2/17/22, imodium prn. Discontinued stool softeners - docusate and polyethylene glycol 2/14. 9. Bowel Management: Miralax daily prn, Senokot prn, Dulcolax prn  10. Internal medicine for medical management  11. DVT prophylaxis:  On lovenox      Electronically signed by Germania Garcia MD on 2/16/2022 at 9:37 AM      This note is created with the assistance of a speech recognition program.  While intending to generate a document that actually reflects the content of the visit, the document can still have some errors including those of syntax and sound a like substitutions which may escape proof reading. In such instances, actual meaning can be extrapolated by contextual diversion.

## 2022-02-16 NOTE — FLOWSHEET NOTE
Patient talked about her medical issues and her rehab experience; patient shared that her sister  in  and talked about how much she misses her; patient stated she had four siblings and she is the last to survive; patient talked about her life: the death of her son 5 years ago, her dad's suicide; her 's death 15 years ago, and outliving all of her friends and relatives; patient is grateful for the support of her two children; listening presence and support;     02/15/22 1928   Encounter Summary   Services provided to: Patient   Referral/Consult From: [address] [place] [address] Road Visiting   (2/15/22)   Complexity of Encounter Moderate   Length of Encounter 30 minutes   Spiritual Assessment Completed Yes   Grief and Life Adjustment   Type Adjustment to illness;Grief and loss   Assessment Approachable;Grieving; Hopeful;Coping;Helplessness   Intervention Active listening;Explored feelings, thoughts, concerns;Prayer;Sustaining presence/ Ministry of presence;Grief care; Discussed illness/injury and it's impact   Outcome Expressed gratitude;Engaged in conversation; Shared life review;Expressed feelings/needs/concerns;Coping; Hopeful;Receptive;Grieving

## 2022-02-16 NOTE — PROGRESS NOTES
Physical Therapy  Facility/Department: [facility] ACUTE REHAB  Daily Treatment Note  NAME: Tommy Rosenthal  : 1934  MRN: [id number]    Date of Service: 2022    Discharge Recommendations:  Patient would benefit from continued therapy after discharge        Assessment      PT Education: Goals;PT Role;Plan of Care;Home Exercise Program;Gait Training;General Safety;Transfer Training;Energy Conservation; Adaptive Device Training; Injury Prevention;Pressure Relief; Functional Mobility Training;Precautions  Patient Education: safe use of RW for transfers and AMB  Activity Tolerance  Activity Tolerance: Patient limited by endurance; Patient limited by fatigue     Patient Diagnosis(es): There were no encounter diagnoses. has a past medical history of Acute cystitis without hematuria, Arthritis, Chronic daily headache, GERD (gastroesophageal reflux disease), Hyperlipidemia, Hypertension, Moderate malnutrition ([address] Multiple sclerosis ([address] Neuropathy, Pneumonia, and Vitamin D deficiency. has a past surgical history that includes Cystocele repair; Rectocele repair; Hysterectomy; Cervical disc surgery; Cataract removal with implant (Right, 2014); Cataract removal with implant (Left, 2014); Total shoulder arthroplasty (Right, 2017); and shoulder surgery (Right, 2017). Restrictions  Restrictions/Precautions  Restrictions/Precautions: Fall Risk,General Precautions,Contact Precautions  Required Braces or Orthoses?: Yes  Implants present? : Metal implants  Position Activity Restriction  Other position/activity restrictions: -     Subjective   General  Chart Reviewed: Yes  Additional Pertinent Hx: The patient is a 80 y.o. Non- / non  female who presents with Dizziness and she is admitted to the hospital for the management of multiple sclerosis flareup and UTI. Patient was diagnosed with multiple sclerosis at the age of 44.  Past medical history significant for multiple sclerosis, hypertension, chronic osteoarthritis of the spine. Patient reportedly woke up this morning with worsening facial droop, severe headache, and an unsteady gait. She has history of chronic headaches but this was much worse. She took 2 Norco pills but that did not alleviate the headache. It is located frontally and in the back of her head and neck and throbbing. Patient was taking 10 mg of prednisone daily per her PCP for spinal stenosis and chronic vertebral fracture. She sees a pain medicine specialist who gives her spinal injections. He told her to stop taking the prednisone for 5 days prior to injection. This was her fifth day without taking steroids. In the ED she had a CT and a CTA done. CT showed no acute abnormalities. CTA head and neck shows no hemodynamically significant stenosis. Pt admitted to [facility] 2/11/22  Response To Previous Treatment: Patient reporting fatigue but able to participate. Family / Caregiver Present: No  Referring Practitioner: Prashant Rodríguez MD  Subjective  Subjective: Pt reported feeling better today. Pt reports fatigue, but is motived to push forward. Orientation  Orientation  Overall Orientation Status: Within Functional Limits     Objective   Bed mobility  Supine to Sit: Stand by assistance  Sit to Supine: Stand by assistance  Scooting: Stand by assistance  Transfers  Sit to Stand: Stand by assistance  Stand to sit: Stand by assistance  Bed to Chair: Stand by assistance  Stand Pivot Transfers: Stand by assistance  Comment: uses RW  Ambulation  Ambulation?: Yes  Ambulation 1  Surface: level tile  Device: Rolling Walker  Assistance: Contact guard assistance  Quality of Gait: cues to correct forward flexed posture and downward gaze, good response from pt  Gait Deviations: Slow Emily; Increased NELDA; Decreased step length;Decreased step height  Distance: 225 feet, 225 feet (pt required seated rest breaks to recover after each AMB) + shorter distances in the gym  Stairs/Curb  Stairs?: Yes  Stairs  # Steps : 10  Stairs Height:  (4\" and 6\")  Rails: Bilateral  Device: No Device  Assistance: Contact guard assistance  Comment: seated rest break following use of stairs     Balance  Posture: Fair  Sitting - Static: Good  Sitting - Dynamic: Fair;+  Standing - Static: Fair;+  Standing - Dynamic: Fair  Other exercises  Other exercises?: Yes  Other exercises 1: Seated B LE ex's x15 (#1.5 and Orange theraband)  Other exercises 2: Standing balance Ex: BLE x10 (on blue balance foam)  Other exercises 3: NuStep: L2, 10 min AM, 10 min PM (seat: 8, hands: 8)  Other exercises 4: Supine Ex: BLE x10  Other exercises 5: Bed mobility x3  Other exercises 6: Sit<>stand transfers with RW: x8       Goals  Short term goals  Time Frame for Short term goals: 5-7 days  Short term goal 1: Pt to perform bed mobility IND from flat surface. Short term goal 2: Pt to perform transfers CGA/SBA from varied surfaces with good technique. Short term goal 3: Pt to amb 75'-100; on level surfaces with device, SBA/CGA. Short term goal 4: Pt to perform 5 minutes on nustep without change in symptoms for coordination and endurance. Short term goal 5: Pt to improve posture and sitting balance to GOOD. Long term goals  Time Frame for Long term goals : by D/C  Long term goal 1: Pt to perform transfers MOD I with least restrictive device from varied surface heights (toilet, bed, w/c, etc). Long term goal 2: Pt to amb 125'-150'  MOD I with least restrictive device on varied terrain. Long term goal 3: Pt to ascend/descend at least 3 stairs 1 HR, SBA. Long term goal 4: Pt to improve BLE strength by 1/2 MMG. Long term goal 5: Pt to improve Tinetti score to at least a 22 and improve standing balance to GOOD- to reduce fall risk. Long term goal 6: Pt to improve 5xSTS test to 15 seconds or less. Long term goal 7: Pt to improve 2MWT to at least 125' and 6MWT to at least 225' with device, MOD I.   Patient Goals Patient goals :  To get stronger to go home    Plan    Plan  Times per week: 1.5 hr/day, 5-7 days/week  Specific instructions for Next Treatment: progress gait, stairs, balance, nustep, virtual reality  Current Treatment Recommendations: Strengthening,Balance Training,Functional Mobility Training,Transfer Training,Endurance Training,Gait Training,Patient/Caregiver Education & Training,Safety Education & Training,Stair training,Home Exercise Program,Equipment Evaluation, Education, & procurement  Safety Devices  Type of devices: Gait belt,Call light within reach,All fall risk precautions in place,Left in chair,Patient at risk for falls     Therapy Time     02/16/22 1008 02/16/22 1300   PT Individual Minutes   Time In 1008 1300   Time Out 1108 1335   Minutes [facility] Kristina Ramirze, PTA

## 2022-02-16 NOTE — PROGRESS NOTES
[facility]    CONSULTATION / HISTORY AND PHYSICAL EXAMINATION            Date:   2/16/2022  Patient name:  Berto Jackson  Date of admission:  2/11/2022  5:26 PM  MRN:   [id number]  Account:  [de-identified]  YOB: 1934  PCP:    Romelia Rasheed MD  Room:   2608/2608-01  Code Status:    Full Code    Physician Requesting Consult: Silvano Simon*    Reason for Consult: Medical management    Chief Complaint:     No chief complaint on file. Debility    History Obtained From:     patient, electronic medical record    History of Present Illness/ Interval History:     80 y.o. Non- / non  female who initially presented with MS flareup with facial droop unsteady gait, difficulty swallowing concern for UTI initially but further urinalysis not consistent with UTI antibiotics discontinued during admission. Patient was diagnosed with multiple sclerosis at the age of 44. She was seen by neurology and started on methylprednisone  Past medical history significant for multiple sclerosis, hypertension, chronic osteoarthritis of the spine.       Past Medical History:     Past Medical History:   Diagnosis Date    Acute cystitis without hematuria 10/1/2017    Arthritis     Chronic daily headache     GERD (gastroesophageal reflux disease)     Hyperlipidemia     Hypertension     Moderate malnutrition ([address] 3/2/2019    Multiple sclerosis ([address]     Neuropathy     Pneumonia 2017    states had double pneumonia    Vitamin D deficiency         Past Surgical History:     Past Surgical History:   Procedure Laterality Date    CATARACT REMOVAL WITH IMPLANT Right 11/6/2014    [facility]    CATARACT REMOVAL WITH IMPLANT Left 12/2/2014    [facility]    CERVICAL DISC SURGERY      CYSTOCELE REPAIR      HYSTERECTOMY      RECTOCELE REPAIR      SHOULDER ARTHROPLASTY Right 04/20/2017    SHOULDER SURGERY Right 2017 Medications Prior to Admission:     Prior to Admission medications    Medication Sig Start Date End Date Taking? Authorizing Provider   omeprazole (PRILOSEC) 20 MG delayed release capsule Take 1 capsule by mouth daily 2/1/22  Yes Pauly Salcedo MD   docusate sodium (COLACE) 100 MG capsule Take 1 capsule by mouth daily 2/1/22  Yes Pauly Salcedo MD   celecoxib (CELEBREX) 200 MG capsule Take 1 capsule by mouth daily 1/17/22 1/17/23 Yes Pauly Salcedo MD   traMADol (ULTRAM) 50 MG tablet Take 1 tablet by mouth every 6 hours as needed for Pain for up to 60 days. 12/20/21 2/18/22 Yes Pauly Salcedo MD   predniSONE (DELTASONE) 10 MG tablet 4 tabs by mouth daily for 3 days, then 3 tabs daily for 3 days, then 2 tabs daily for 3 days, then 1 tab daily till gone. 11/29/21  Yes Pauly Salcedo MD   predniSONE (DELTASONE) 10 MG tablet Take 1 tablet by mouth daily 10/26/21  Yes Pauly Salcedo MD   HYDROcodone-acetaminophen (NORCO) 5-325 MG per tablet Take 1 tablet by mouth every 8 hours as needed. 9/14/21  Yes Historical Provider, MD   amitriptyline (ELAVIL) 100 MG tablet TAKE ONE TABLET BY MOUTH ONCE NIGHTLY 9/20/21  Yes Pauly Salcedo MD   simvastatin (ZOCOR) 20 MG tablet Take 1 tablet by mouth nightly 8/23/21  Yes Pauly Salcedo MD   calcipotriene (DOVONEX) 0.005 % cream Apply topically 2 times daily. 4/21/21  Yes Pauly Salcedo MD   clobetasol (TEMOVATE) 0.05 % cream Apply topically 2 times daily. 3/30/21  Yes Pauly Salcedo MD   gabapentin (NEURONTIN) 100 MG capsule Take 1 capsule by mouth 2 times daily for 30 days. Patient taking differently: Take 100 mg by mouth 2 times daily as needed.   3/11/21 2/11/22 Yes Janiya Fragoso MD   albuterol sulfate HFA (VENTOLIN HFA) 108 (90 Base) MCG/ACT inhaler Inhale 2 puffs into the lungs every 6 hours as needed for Wheezing 3/11/21 3/11/22 Yes Janiya Fragoso MD   Handicap Placard MISC by Does not apply route Exp: 5/20/2026 5/24/21   Pauly Salcedo, MD        Allergies:     Bactrim [sulfamethoxazole-trimethoprim], Other, Pcn [penicillins], Lidoderm [lidocaine], and Macrobid [nitrofurantoin]    Social History:     Tobacco:    reports that she has never smoked. She has never used smokeless tobacco.  Alcohol:      reports no history of alcohol use. Drug Use:  reports no history of drug use. Family History:     No family history on file. Review of Systems:     Positive and Negative as described in HPI. Denies any shortness of breath or cough  Denies chest pain or palpitations  Denies abdominal pain, diarrhea vomiting  Denies any new numbness tremors or weakness. Physical Exam:     BP (!) 129/59   Pulse 79   Temp 98 °F (36.7 °C)   Resp 18   Ht 4' 11.02\" (1.499 m)   Wt 135 lb 1.6 oz (61.3 kg)   SpO2 92%   BMI 27.27 kg/m²   Temp (24hrs), Av.2 °F (36.8 °C), Min:98 °F (36.7 °C), Max:98.3 °F (36.8 °C)      General appearance:  alert, cooperative and no distress  Eyes: Anicteric sclera. Pupils are equally round and reactive to light. Extraocular movements are intact.   Lungs:  clear to auscultation bilaterally, normal effort  Heart:  regular rate and rhythm,   Abdomen:  soft, nontender, nondistended, normal bowel sounds, no masses, hepatomegaly, splenomegaly  Extremities:  no edema, redness, tenderness in the calves  Skin:  no gross lesions, rashes, induration  Neuro:  Alert, oriented X 3, no new focal weakness    Investigations:      Laboratory Testing:  Lab Results   Component Value Date    WBC 12.9 (H) 2022    HGB 12.2 2022    HCT 35.5 (L) 2022    MCV 91.2 2022     2022     Lab Results   Component Value Date     2022    K 3.9 2022     2022    CO2 24 2022    BUN 26 2022    CREATININE 1.08 2022    GLUCOSE 91 2022    GLUCOSE 112 2012    CALCIUM 7.6 2022         Assessment :      Primary Problem  <principal problem not specified>    Active Hospital Problems    Diagnosis Date Noted    Multiple sclerosis exacerbation ([facility] Romie Meigs 02/25/2021    Diarrhea of presumed infectious origin [R19.7] 02/25/2021       Plan: Active Problems:    Multiple sclerosis exacerbation (HCC)    Diarrhea of presumed infectious origin  Resolved Problems:    Hypokalemia        1. Symptoms of MS exacerbation -completed pulse dose steroids 2/13  2. Diarrhea resolved  3. Type 2 diabetes-blood sugar low normal; methylprednisone course has been completed, will DC Lantus and sliding scale. Patient was not on any diabetes medication prior to presentation HbA1c 6.4 on 11/6/2021  4. Hypertension= suboptimal control-on Norvasc, lisinopril, metoprolol, add HCTZ      DVT prophylaxis-Lovenox      2/16  Pressure better controlled  No new issues continue with current management      Consultations:   Salinas Garay MD  2/16/2022  2:49 PM    Copy sent to Dr. Monika Homans, MD    Please note that this chart was generated using voice recognition Dragon dictation software. Although every effort was made to ensure the accuracy of this automated transcription, some errors in transcription may have occurred.

## 2022-02-17 PROCEDURE — 99231 SBSQ HOSP IP/OBS SF/LOW 25: CPT | Performed by: PHYSICAL MEDICINE & REHABILITATION

## 2022-02-17 PROCEDURE — 99231 SBSQ HOSP IP/OBS SF/LOW 25: CPT | Performed by: INTERNAL MEDICINE

## 2022-02-17 PROCEDURE — 1180000000 HC REHAB R&B

## 2022-02-17 PROCEDURE — 6370000000 HC RX 637 (ALT 250 FOR IP)

## 2022-02-17 PROCEDURE — 6370000000 HC RX 637 (ALT 250 FOR IP): Performed by: PHYSICAL MEDICINE & REHABILITATION

## 2022-02-17 PROCEDURE — 97535 SELF CARE MNGMENT TRAINING: CPT

## 2022-02-17 PROCEDURE — 6370000000 HC RX 637 (ALT 250 FOR IP): Performed by: INTERNAL MEDICINE

## 2022-02-17 PROCEDURE — 97530 THERAPEUTIC ACTIVITIES: CPT

## 2022-02-17 PROCEDURE — 6360000002 HC RX W HCPCS

## 2022-02-17 PROCEDURE — 97116 GAIT TRAINING THERAPY: CPT

## 2022-02-17 PROCEDURE — 97110 THERAPEUTIC EXERCISES: CPT

## 2022-02-17 RX ADMIN — HYDROCODONE BITARTRATE AND ACETAMINOPHEN 1 TABLET: 5; 325 TABLET ORAL at 05:48

## 2022-02-17 RX ADMIN — SENNOSIDES 17.2 MG: 8.6 TABLET, COATED ORAL at 21:59

## 2022-02-17 RX ADMIN — HYDROCODONE BITARTRATE AND ACETAMINOPHEN 1 TABLET: 5; 325 TABLET ORAL at 21:59

## 2022-02-17 RX ADMIN — PANTOPRAZOLE SODIUM 40 MG: 40 TABLET, DELAYED RELEASE ORAL at 05:49

## 2022-02-17 RX ADMIN — ATORVASTATIN CALCIUM 20 MG: 20 TABLET, FILM COATED ORAL at 07:29

## 2022-02-17 RX ADMIN — LISINOPRIL 40 MG: 20 TABLET ORAL at 07:29

## 2022-02-17 RX ADMIN — GABAPENTIN 100 MG: 100 CAPSULE ORAL at 07:31

## 2022-02-17 RX ADMIN — METOPROLOL TARTRATE 50 MG: 50 TABLET, FILM COATED ORAL at 21:59

## 2022-02-17 RX ADMIN — AMITRIPTYLINE HYDROCHLORIDE 100 MG: 50 TABLET, FILM COATED ORAL at 21:59

## 2022-02-17 RX ADMIN — HYDROCHLOROTHIAZIDE 12.5 MG: 25 TABLET ORAL at 07:29

## 2022-02-17 RX ADMIN — METOPROLOL TARTRATE 50 MG: 50 TABLET, FILM COATED ORAL at 07:29

## 2022-02-17 RX ADMIN — ENOXAPARIN SODIUM 30 MG: 100 INJECTION SUBCUTANEOUS at 07:28

## 2022-02-17 RX ADMIN — TRAMADOL HYDROCHLORIDE 50 MG: 50 TABLET, COATED ORAL at 10:22

## 2022-02-17 RX ADMIN — AMLODIPINE BESYLATE 10 MG: 10 TABLET ORAL at 07:29

## 2022-02-17 ASSESSMENT — PAIN SCALES - GENERAL
PAINLEVEL_OUTOF10: 9
PAINLEVEL_OUTOF10: 5
PAINLEVEL_OUTOF10: 8

## 2022-02-17 ASSESSMENT — PAIN DESCRIPTION - ONSET: ONSET: ON-GOING

## 2022-02-17 ASSESSMENT — PAIN DESCRIPTION - PAIN TYPE: TYPE: CHRONIC PAIN

## 2022-02-17 ASSESSMENT — PAIN DESCRIPTION - FREQUENCY: FREQUENCY: INTERMITTENT

## 2022-02-17 ASSESSMENT — PAIN DESCRIPTION - DESCRIPTORS: DESCRIPTORS: ACHING;DISCOMFORT;HEADACHE

## 2022-02-17 ASSESSMENT — PAIN DESCRIPTION - LOCATION: LOCATION: HEAD

## 2022-02-17 ASSESSMENT — PAIN DESCRIPTION - ORIENTATION: ORIENTATION: DISTAL;POSTERIOR

## 2022-02-17 NOTE — PROGRESS NOTES
Physical Medicine & Rehabilitation  Progress Note      Subjective:      80year-old female with MS exacerbation. Patient is reporting some worsening of her headache today but it is still responding to her home medications of Ultram and [place]. She reports good sleep last night. No new issues with appetite, bowel, or bladder. ROS:  Denies fevers, chills, sweats. No chest pain, palpitations, lightheadedness. Denies coughing, wheezing or shortness of breath. Denies abdominal pain, nausea, diarrhea or constipation. No new areas of joint pain. Denies new areas of numbness or weakness. Denies new anxiety or depression issues. No new skin problems. Rehabilitation:   Progressing in therapies. PT:  Restrictions/Precautions: Fall Risk,General Precautions,Contact Precautions  Implants present? : Metal implants  Other position/activity restrictions: -   Transfers  Sit to Stand: Stand by assistance  Stand to sit: Stand by assistance  Bed to Chair: Stand by assistance  Stand Pivot Transfers: Stand by assistance  Squat Pivot Transfers: Contact guard assistance  Comment: uses RW  Ambulation 1  Surface: level tile  Device: Rolling Walker  Other Apparatus: O2 (2L)  Assistance: Contact guard assistance  Quality of Gait: cues to correct forward flexed posture and downward gaze, good response from pt  Gait Deviations: Slow Emily,Increased NELDA,Decreased step length,Decreased step height  Distance: 225 feet, 225 feet (pt required seated rest breaks to recover after each AMB) + shorter distances in the gym  Comments: Pt unsteady, VC to correct R ankle supination, distance limited to fatigue.      Transfers  Sit to Stand: Stand by assistance  Stand to sit: Stand by assistance  Bed to Chair: Stand by assistance  Stand Pivot Transfers: Stand by assistance  Squat Pivot Transfers: Contact guard assistance  Comment: uses RW  Ambulation  Ambulation?: Yes  Ambulation 1  Surface: level tile  Device: Rolling Walker  Other Apparatus: O2 (2L)  Assistance: Contact guard assistance  Quality of Gait: cues to correct forward flexed posture and downward gaze, good response from pt  Gait Deviations: Slow Emily,Increased NELDA,Decreased step length,Decreased step height  Distance: 225 feet, 225 feet (pt required seated rest breaks to recover after each AMB) + shorter distances in the gym  Comments: Pt unsteady, VC to correct R ankle supination, distance limited to fatigue. Surface: level tile  Ambulation 1  Surface: level tile  Device: Rolling Walker  Other Apparatus: O2 (2L)  Assistance: Contact guard assistance  Quality of Gait: cues to correct forward flexed posture and downward gaze, good response from pt  Gait Deviations: Slow Emily,Increased NELDA,Decreased step length,Decreased step height  Distance: 225 feet, 225 feet (pt required seated rest breaks to recover after each AMB) + shorter distances in the gym  Comments: Pt unsteady, VC to correct R ankle supination, distance limited to fatigue. OT:  ADL  Feeding: Setup,Increased time to complete,Dentures  Grooming: Stand by assistance  UE Bathing: Stand by assistance  LE Bathing: Stand by assistance  UE Dressing: Stand by assistance  LE Dressing: Stand by assistance,Minimal assistance (difficulty threading RLE in/out pants due to limited ROM)  Toileting: Stand by assistance  Additional Comments: pt standing at sink with RW and wc behind for rest breaks as needed throughout ADLs this AM  (Min A for teds )         Balance  Sitting Balance: Modified independent   Standing Balance: Stand by assistance   Standing Balance  Time: AM: ~10 min,   Activity: ADL tasks, functional mobility  Comment: 0-2 UE support onm RW, vc for hand placement   Functional Mobility  Functional - Mobility Device: Rolling Walker  Activity: To/from bathroom  Assist Level: Contact guard assistance  Functional Mobility Comments: minimal safety cues for RW mgmt.    Apparatus Needs  Apparatus Needs: O2  Bed mobility  Rolling to Left: Stand by assistance  Rolling to Right: Stand by assistance  Supine to Sit: Stand by assistance  Sit to Supine: Stand by assistance  Scooting: Stand by assistance  Comment: HOB elevated slightly, no use of bed rails  Transfers  Stand Step Transfers: Minimal assistance  Sit to stand: Stand by assistance  Stand to sit: Stand by assistance  Transfer Comments: vc for hand palcement and use of RW at times   Toilet Transfers  Toilet - Technique: Ambulating (with RW)  Equipment Used: Raised toilet seat without rails  Toilet Transfer: Contact guard assistance,Stand by assistance     Shower Transfers  Shower - Transfer From: Madeline Smith (RW)  Shower - Transfer Type: To and From  Shower - Transfer To: Transfer tub bench  Shower - Technique: Ambulating  Shower Transfers: Minimal assistance,Verbal cues  Shower Transfers Comments: Pt daughter states she provided supervision when pt showers at home. She has shower chair for use in tub unit. (Recommended TTB for increased safety/ease. )       SPEECH:      Objective:  /60   Pulse 66   Temp 98.2 °F (36.8 °C)   Resp 16   Ht 4' 11.02\" (1.499 m)   Wt 135 lb 1.6 oz (61.3 kg)   SpO2 94%   BMI 27.27 kg/m²       GEN: Well developed, well nourished, no acute distress. HEENT: NCAT, PERRL, EOMI, mucous membranes pink and moist.  RESP: Lungs clear to auscultation. No rales or rhonchi. Respirations WNL and unlabored. CV: Regular rate rhythm. No murmurs, rubs, or gallops. ABD: soft, non-distended, non-tender. BS+ and equal.  NEURO: A&O x 3. Sensation intact to light touch. MSK: Functional ROM. Muscle tone and bulk are normal bilaterally. Strength 4+/5 key muscles BUEs. 4+/5 key muscles BLEs. EXT: No calf tenderness to palpation or edema BLEs. SKIN: Warm dry and intact with good turgor. PSYCH: Mood WNL. Affect WNL. Appropriately interactive. Diagnostics:     CBC: No results for input(s): WBC, RBC, HGB, HCT, MCV, RDW, PLT in the last 72 hours.   BMP:   No results for input(s): NA, K, CL, CO2, PHOS, BUN, CREATININE, CA, GLUCOSE in the last 72 hours. BNP: No results for input(s): BNP in the last 72 hours. PT/INR: No results for input(s): PROTIME, INR in the last 72 hours. APTT: No results for input(s): APTT in the last 72 hours. CARDIAC ENZYMES: No results for input(s): CKMB, CKMBINDEX, TROPONINT in the last 72 hours. Invalid input(s): CKTOTAL;3 troponins   FASTING LIPID PANEL:  Lab Results   Component Value Date    CHOL 235 (H) 01/04/2012    HDL 74 (A) 11/06/2021    TRIG 119 01/04/2012     LIVER PROFILE: No results for input(s): AST, ALT, ALB, BILIDIR, BILITOT, ALKPHOS in the last 72 hours.      Current Medications:   Current Facility-Administered Medications: hydroCHLOROthiazide (HYDRODIURIL) tablet 12.5 mg, 12.5 mg, Oral, Daily  HYDROcodone-acetaminophen (NORCO) 5-325 MG per tablet 2 tablet, 2 tablet, Oral, Daily PRN  HYDROcodone-acetaminophen (NORCO) 5-325 MG per tablet 1 tablet, 1 tablet, Oral, BID PRN  loperamide (IMODIUM) capsule 2 mg, 2 mg, Oral, 4x Daily PRN  enoxaparin (LOVENOX) injection 30 mg, 30 mg, SubCUTAneous, Daily  ondansetron (ZOFRAN-ODT) disintegrating tablet 4 mg, 4 mg, Oral, Q8H PRN **OR** ondansetron (ZOFRAN) injection 4 mg, 4 mg, IntraVENous, Q6H PRN  polyethylene glycol (GLYCOLAX) packet 17 g, 17 g, Oral, Daily PRN  albuterol sulfate  (90 Base) MCG/ACT inhaler 2 puff, 2 puff, Inhalation, Q6H PRN  amitriptyline (ELAVIL) tablet 100 mg, 100 mg, Oral, Nightly  amLODIPine (NORVASC) tablet 10 mg, 10 mg, Oral, Daily  atorvastatin (LIPITOR) tablet 20 mg, 20 mg, Oral, Daily  dextrose 5 % solution, 100 mL/hr, IntraVENous, PRN  dextrose 50 % IV solution, 12.5 g, IntraVENous, PRN  gabapentin (NEURONTIN) capsule 100 mg, 100 mg, Oral, Daily  glucagon (rDNA) injection 1 mg, 1 mg, IntraMUSCular, PRN  glucose (GLUTOSE) 40 % oral gel 15 g, 15 g, Oral, PRN  lisinopril (PRINIVIL;ZESTRIL) tablet 40 mg, 40 mg, Oral, Daily  metoprolol tartrate (LOPRESSOR) tablet 50 mg, 50 mg, Oral, BID  pantoprazole (PROTONIX) tablet 40 mg, 40 mg, Oral, QAM AC  sodium chloride flush 0.9 % injection 10 mL, 10 mL, IntraVENous, PRN  traMADol (ULTRAM) tablet 50 mg, 50 mg, Oral, Q6H PRN  acetaminophen (TYLENOL) tablet 650 mg, 650 mg, Oral, Q4H PRN  senna (SENOKOT) tablet 17.2 mg, 2 tablet, Oral, Daily PRN  bisacodyl (DULCOLAX) suppository 10 mg, 10 mg, Rectal, Daily PRN      Impression/Plan:   Impaired ADLs, gait, and mobility due to:      1. MS exacerbation with impaired gait: PT/OT  for gait, mobility, strengthening, endurance, ADLs, and self care. 2. HTN/Hyperlipidemia: on amlodipine, atorvastatin, lisinopril. Improvement after IM added HCTZ. 3. Neuropathy: on amitriptyline  4. GERD: on pantoprazole  5. Hyperglycemia: resolved after steroid discontinued. Was on Lantus, sliding scale - IM has discontinued. 6. Chronic daily headache: Norco and Ultram prn pain, on amitriptyline. Follows outpatient with pain management. 7. Hypokalemia: Resolved  8. Diarrhea: Infectious - improved on Cipro - to complete on 2/17/22, has imodium prn. Discontinued routine stool softeners - docusate and polyethylene glycol 2/14. 9. Bowel Management: Miralax daily prn, Senokot prn, Dulcolax prn  10. Internal medicine for medical management  11. DVT prophylaxis:  On lovenox      Electronically signed by Abraham Aguilar MD on 2/17/2022 at 8:58 AM      This note is created with the assistance of a speech recognition program.  While intending to generate a document that actually reflects the content of the visit, the document can still have some errors including those of syntax and sound a like substitutions which may escape proof reading. In such instances, actual meaning can be extrapolated by contextual diversion.

## 2022-02-17 NOTE — PLAN OF CARE
Problem: Falls - Risk of:  Goal: Will remain free from falls  Description: Will remain free from falls  Outcome: Ongoing     Problem: Skin Integrity:  Goal: Will show no infection signs and symptoms  Description: Will show no infection signs and symptoms  Outcome: Ongoing     Problem: Infection:  Goal: Will remain free from infection  Description: Will remain free from infection  Outcome: Ongoing     Problem: Pain:  Goal: Patient's pain/discomfort is manageable  Description: Patient's pain/discomfort is manageable  Outcome: Ongoing     Problem: Pain:  Goal: Control of chronic pain  Description: Control of chronic pain  Outcome: Ongoing     Problem: Neurological  Goal: Maximum potential motor/sensory/cognitive function  Outcome: Ongoing

## 2022-02-17 NOTE — PROGRESS NOTES
[place] [address] Internal Medicine    CONSULTATION / HISTORY AND PHYSICAL EXAMINATION            Date:   2/17/2022  Patient name:  Forestine Felty  Date of admission:  2/11/2022  5:26 PM  MRN:   [id number]  Account:  [de-identified]  YOB: 1934  PCP:    Beryl Florez MD  Room:   2608/2608-01  Code Status:    Full Code    Physician Requesting Consult: Rodrigue Simon*    Reason for Consult: Medical management    Chief Complaint:     No chief complaint on file. Debility    History Obtained From:     patient, electronic medical record    History of Present Illness/ Interval History:     80 y.o. Non- / non  female who initially presented with MS flareup with facial droop unsteady gait, difficulty swallowing concern for UTI initially but further urinalysis not consistent with UTI antibiotics discontinued during admission. Patient was diagnosed with multiple sclerosis at the age of 44. She was seen by neurology and started on methylprednisone  Past medical history significant for multiple sclerosis, hypertension, chronic osteoarthritis of the spine.       Past Medical History:     Past Medical History:   Diagnosis Date    Acute cystitis without hematuria 10/1/2017    Arthritis     Chronic daily headache     GERD (gastroesophageal reflux disease)     Hyperlipidemia     Hypertension     Moderate malnutrition ([address] 3/2/2019    Multiple sclerosis ([address]     Neuropathy     Pneumonia 2017    states had double pneumonia    Vitamin D deficiency         Past Surgical History:     Past Surgical History:   Procedure Laterality Date    CATARACT REMOVAL WITH IMPLANT Right 11/6/2014    [facility]    CATARACT REMOVAL WITH IMPLANT Left 12/2/2014    [facility]    CERVICAL DISC SURGERY      CYSTOCELE REPAIR      HYSTERECTOMY      RECTOCELE REPAIR      SHOULDER ARTHROPLASTY Right 04/20/2017    SHOULDER SURGERY Right 2017 Medications Prior to Admission:     Prior to Admission medications    Medication Sig Start Date End Date Taking? Authorizing Provider   omeprazole (PRILOSEC) 20 MG delayed release capsule Take 1 capsule by mouth daily 2/1/22  Yes Pauly Sherman MD   docusate sodium (COLACE) 100 MG capsule Take 1 capsule by mouth daily 2/1/22  Yes Pauly Sherman MD   celecoxib (CELEBREX) 200 MG capsule Take 1 capsule by mouth daily 1/17/22 1/17/23 Yes Pauly Sherman MD   traMADol (ULTRAM) 50 MG tablet Take 1 tablet by mouth every 6 hours as needed for Pain for up to 60 days. 12/20/21 2/18/22 Yes Pauly Sherman MD   predniSONE (DELTASONE) 10 MG tablet 4 tabs by mouth daily for 3 days, then 3 tabs daily for 3 days, then 2 tabs daily for 3 days, then 1 tab daily till gone. 11/29/21  Yes Pauly Sherman MD   predniSONE (DELTASONE) 10 MG tablet Take 1 tablet by mouth daily 10/26/21  Yes Pauly Sherman MD   HYDROcodone-acetaminophen (NORCO) 5-325 MG per tablet Take 1 tablet by mouth every 8 hours as needed. 9/14/21  Yes Historical Provider, MD   amitriptyline (ELAVIL) 100 MG tablet TAKE ONE TABLET BY MOUTH ONCE NIGHTLY 9/20/21  Yes Pauly Sherman MD   simvastatin (ZOCOR) 20 MG tablet Take 1 tablet by mouth nightly 8/23/21  Yes Pauly Sherman MD   calcipotriene (DOVONEX) 0.005 % cream Apply topically 2 times daily. 4/21/21  Yes Pauly Sherman MD   clobetasol (TEMOVATE) 0.05 % cream Apply topically 2 times daily. 3/30/21  Yes Pauly Sherman MD   gabapentin (NEURONTIN) 100 MG capsule Take 1 capsule by mouth 2 times daily for 30 days. Patient taking differently: Take 100 mg by mouth 2 times daily as needed.   3/11/21 2/11/22 Yes Garima Garvin MD   albuterol sulfate HFA (VENTOLIN HFA) 108 (90 Base) MCG/ACT inhaler Inhale 2 puffs into the lungs every 6 hours as needed for Wheezing 3/11/21 3/11/22 Yes Garima Garvin MD   Handicap Placard MISC by Does not apply route Exp: 5/20/2026 5/24/21   Pauly Sherman, not specified>    Active Hospital Problems    Diagnosis Date Noted    Multiple sclerosis exacerbation ([facility] Kaushal Fernandez 02/25/2021    Diarrhea of presumed infectious origin [R19.7] 02/25/2021       Plan: Active Problems:    Multiple sclerosis exacerbation (HCC)    Diarrhea of presumed infectious origin  Resolved Problems:    Hypokalemia        1. Symptoms of MS exacerbation -completed pulse dose steroids 2/13  2. Diarrhea resolved  3. Type 2 diabetes-blood sugar low normal; methylprednisone course has been completed, will DC Lantus and sliding scale. Patient was not on any diabetes medication prior to presentation HbA1c 6.4 on 11/6/2021  4. Hypertension= suboptimal control-on Norvasc, lisinopril, metoprolol, add HCTZ  5. Systolic murmur-moderate aortic insufficiency      DVT prophylaxis-Lovenox    2/17  Tolerating HCTZ, blood pressure controlled  Patient reports no new complaints. Engaging with physical therapy. Labs and vitals reviewed. No new issues. Continue with current care. Consultations:   Laura Fernandez MD  2/17/2022  1:58 PM    Copy sent to Dr. Kyaw Alfredo MD    Please note that this chart was generated using voice recognition Dragon dictation software. Although every effort was made to ensure the accuracy of this automated transcription, some errors in transcription may have occurred.

## 2022-02-17 NOTE — PLAN OF CARE
Problem: Falls - Risk of:  Goal: Will remain free from falls  Description: Will remain free from falls  2/17/2022 1159 by Jeb Sebastian  Outcome: Ongoing     Problem: Falls - Risk of:  Goal: Absence of physical injury  Description: Absence of physical injury  2/17/2022 1159 by RENETTA Jose  Outcome: Ongoing     Problem: Skin Integrity:  Goal: Will show no infection signs and symptoms  Description: Will show no infection signs and symptoms  2/17/2022 1159 by RENETTA CARABALLO  Outcome: Ongoing     Problem: Skin Integrity:  Goal: Absence of new skin breakdown  Description: Absence of new skin breakdown  2/17/2022 1159 by RENETTA Jose  Outcome: Ongoing     Problem: Infection:  Goal: Will remain free from infection  Description: Will remain free from infection  2/17/2022 1159 by RENETTA CARABALLO  Outcome: Ongoing     Problem: Pain:  Goal: Control of acute pain  Description: Control of acute pain  2/17/2022 1159 by RENETTA CARABALLO  Outcome: Ongoing     Problem: Nutrition  Goal: Optimal nutrition therapy  2/17/2022 1159 by Jeb Sebastian  Outcome: Ongoing

## 2022-02-17 NOTE — PROGRESS NOTES
Physical Therapy  Facility/Department: [facility] ACUTE REHAB  Daily Treatment Note  NAME: Tabby Mane  : 1934  MRN: [id number]    Date of Service: 2022    Discharge Recommendations:  Patient would benefit from continued therapy after discharge        Assessment   Body structures, Functions, Activity limitations: Decreased functional mobility ; Decreased strength;Decreased safe awareness;Decreased endurance;Decreased balance;Decreased posture;Decreased cognition;Decreased ADL status; Decreased coordination; Increased pain  Specific instructions for Next Treatment: progress gait, stairs, balance, nustep, virtual reality  REQUIRES PT FOLLOW UP: Yes  Activity Tolerance  Activity Tolerance: Patient limited by endurance; Patient limited by fatigue     Patient Diagnosis(es): There were no encounter diagnoses. has a past medical history of Acute cystitis without hematuria, Arthritis, Chronic daily headache, GERD (gastroesophageal reflux disease), Hyperlipidemia, Hypertension, Moderate malnutrition ([address] Multiple sclerosis ([address] Neuropathy, Pneumonia, and Vitamin D deficiency. has a past surgical history that includes Cystocele repair; Rectocele repair; Hysterectomy; Cervical disc surgery; Cataract removal with implant (Right, 2014); Cataract removal with implant (Left, 2014); Total shoulder arthroplasty (Right, 2017); and shoulder surgery (Right, ). Restrictions  Restrictions/Precautions  Restrictions/Precautions: Fall Risk,General Precautions,Contact Precautions  Required Braces or Orthoses?: Yes  Implants present? : Metal implants  Position Activity Restriction  Other position/activity restrictions: -  Subjective   General  Chart Reviewed: Yes  Additional Pertinent Hx: The patient is a 80 y.o. Non- / non  female who presents with Dizziness and she is admitted to the hospital for the management of multiple sclerosis flareup and UTI.  Patient was diagnosed with multiple sclerosis at the age of 44. Past medical history significant for multiple sclerosis, hypertension, chronic osteoarthritis of the spine. Patient reportedly woke up this morning with worsening facial droop, severe headache, and an unsteady gait. She has history of chronic headaches but this was much worse. She took 2 Norco pills but that did not alleviate the headache. It is located frontally and in the back of her head and neck and throbbing. Patient was taking 10 mg of prednisone daily per her PCP for spinal stenosis and chronic vertebral fracture. She sees a pain medicine specialist who gives her spinal injections. He told her to stop taking the prednisone for 5 days prior to injection. This was her fifth day without taking steroids. In the ED she had a CT and a CTA done. CT showed no acute abnormalities. CTA head and neck shows no hemodynamically significant stenosis. Pt admitted to [facility] 2/11/22  Response To Previous Treatment: Patient reporting fatigue but able to participate. Family / Caregiver Present: No  Referring Practitioner: Lisbet Meek MD  Subjective  Subjective: Pt reports she woke up with neck stiffness after sleeping in a bad position at night  General Comment  Comments: Pt states she feels better in PM   Pain Screening  Patient Currently in Pain: Denies  Vital Signs  Patient Currently in Pain: Denies       Orientation     Cognition      Objective      Transfers  Sit to Stand: Stand by assistance  Stand to sit: Stand by assistance  Bed to Chair: Stand by assistance  Stand Pivot Transfers: Stand by assistance  Comment: uses RW  Ambulation  Ambulation?: Yes  Ambulation 1  Surface: level tile  Device: Rolling Walker  Assistance: Contact guard assistance  Quality of Gait: Steady gait. Good heel/toe strike  Gait Deviations: Slow Emily; Increased NELDA; Decreased step length;Decreased step height;Deviated path  Distance: 215ft AM and PM, + smaller distances in the gym  Stairs/Curb  Stairs?: Yes  Stairs  # Steps : 10  Stairs Height:  (4\" and 6\")  Rails: Bilateral  Device: No Device  Assistance: Contact guard assistance  Comment: Pt demos correct technique. Required seaated rest following steps     Balance  Posture: Fair  Sitting - Static: Good  Sitting - Dynamic: Fair;+  Standing - Static: Fair;+  Standing - Dynamic: Fair  Comments: HIGH fall risk per Tinetti. Standing balance with RW  Other exercises  Other exercises?: Yes  Other exercises 1: Seated B LE ex's x15 (#1.5 and Orange theraband)  Other exercises 2: Standing balance Ex: BLE x10 (on blue balance foam)  Other exercises 3: NuStep: L2, 10 min AM, (seat: 8, hands: 8)  Other exercises 6: Sit<>stand transfers with RW: x10         Comment: Rest breaks PRN d/t low endurance. Goals  Short term goals  Time Frame for Short term goals: 5-7 days  Short term goal 1: Pt to perform bed mobility IND from flat surface. Short term goal 2: Pt to perform transfers CGA/SBA from varied surfaces with good technique. Short term goal 3: Pt to amb 75'-100; on level surfaces with device, SBA/CGA. Short term goal 4: Pt to perform 5 minutes on nustep without change in symptoms for coordination and endurance. Short term goal 5: Pt to improve posture and sitting balance to GOOD. Long term goals  Time Frame for Long term goals : by D/C  Long term goal 1: Pt to perform transfers MOD I with least restrictive device from varied surface heights (toilet, bed, w/c, etc). Long term goal 2: Pt to amb 125'-150'  MOD I with least restrictive device on varied terrain. Long term goal 3: Pt to ascend/descend at least 3 stairs 1 HR, SBA. Long term goal 4: Pt to improve BLE strength by 1/2 MMG. Long term goal 5: Pt to improve Tinetti score to at least a 22 and improve standing balance to GOOD- to reduce fall risk. Long term goal 6: Pt to improve 5xSTS test to 15 seconds or less.   Long term goal 7: Pt to improve 2MWT to at least 125' and 6MWT to at least 225' with device, MOD I. Patient Goals   Patient goals :  To get stronger to go home    Plan    Plan  Times per week: 1.5 hr/day, 5-7 days/week  Specific instructions for Next Treatment: progress gait, stairs, balance, nustep, virtual reality  Current Treatment Recommendations: Strengthening,Balance Training,Functional Mobility Training,Transfer Training,Endurance Training,Gait Training,Patient/Caregiver Education & Training,Safety Education & Training,Stair training,Home Exercise Program,Equipment Evaluation, Education, & procurement  Safety Devices  Type of devices: Gait belt,Call light within reach,All fall risk precautions in place,Left in chair,Patient at risk for falls     Therapy Time     02/17/22 0807 02/17/22 1300   PT Individual Minutes   Time In 0807 1300   Time Out 0902 1335   Minutes [address], PTA

## 2022-02-17 NOTE — PROGRESS NOTES
[address] [facility]    ACUTE REHABILITATION OCCUPATIONAL THERAPY  DAILY NOTE    Date: 22  Patient Name: Asif Perry      Room: 1616/0718-21    MRN: [id number]   : 1934  (80 y.o.)  Gender: female   Referring Practitioner: Dr Saige Salmeron  Diagnosis: MS exacerbation, recurrent UTI  Additional Pertinent Hx: Multiple Sclerosis since she was 52years old    Restrictions  Restrictions/Precautions: Fall Risk,General Precautions,Contact Precautions  Implants present? : Metal implants  Other position/activity restrictions: NO O2 today;  Headache,  feels weak in her legs this morning, better in afternoon session  Required Braces or Orthoses?: Yes    Subjective  Subjective: pt complaining of sever headache, RN notified. Comments: AM pt presenting with increased fatigue, lower tolerance for activity. Patient Currently in Pain: Denies  Restrictions/Precautions: Fall Risk;General Precautions;Contact Precautions  Overall Orientation Status: Within Functional Limits  Patient Observation  Observations: pt requires frequent rest breaks   Pain Assessment  Pain Assessment: 0-10  Pain Level: 8  Pain Location: Hand    Objective  Cognition  Overall Cognitive Status: Impaired  Attention Span: Attends with cues to redirect  Following Commands:  Follows one step commands with repetition  Safety Judgement: Decreased awareness of need for safety  Insights: Decreased awareness of deficits  Perception  Overall Perceptual Status: WFL  Balance  Sitting Balance: Modified independent   Standing Balance: Stand by assistance  Bed mobility  Supine to Sit: Modified independent  Sit to Supine: Modified independent  Transfers  Sit to stand: Stand by assistance  Stand to sit: Stand by assistance  Transfer Comments: vc for hand placement and use of RW at times  Standing Balance  Time: AM: ~8 min x 2  Activity: ADL tasks, functional mobility  Comment: 0-2 UE support on RW, vc for hand placement   Functional Mobility  Functional - Mobility Device: Rolling Walker  Activity: To/from bathroom; Retrieve items;Transport items  Assist Level: Contact guard assistance  Functional Mobility Comments: SBA most times, CGA required 1-2 time throughout day due to safety concerns   Toilet Transfers  Toilet - Technique: Ambulating  Equipment Used: Raised toilet seat without rails  Toilet Transfer: Stand by assistance     Type of ROM/Therapeutic Exercise  Type of ROM/Therapeutic Exercise: AROM; Free weights  Comment: Pt engaged in UB ex's to promote increased strength/endurance for functional tasks; with limited tolerance, extended rest breaks required due to fatigue. RUE more limited, some ex's completed without weight. Exercises  Scapular Protraction: x  Scapular Retraction: x  Shoulder Extension: x  Shoulder ABduction: x  Horizontal ABduction: x  Horizontal ADduction: x  Elbow Flexion: x  Elbow Extension: x  Grasp/Release: ultra , 2nd setting x10 reps B hands. PM: Ultra  hold and release to remove pegs from board. 10 pegs each hand                        Vision - Basic Assessment  Patient Visual Report: No visual complaint reported. Vision  Patient Visual Report: No visual complaint reported. ADL  Grooming: Stand by assistance  UE Bathing: Stand by assistance  LE Bathing: Stand by assistance  UE Dressing: Stand by assistance  LE Dressing: Stand by assistance; Increased time to complete  Toileting: Stand by assistance  Additional Comments: pt standing at sink with RW and wc behind for rest breaks as needed throughout ADLs this AM      Instrumental ADL's  Instrumental ADLs: Yes  Meal Prep  Meal Prep Level: Walker  Meal Prep Level of Assistance: Contact guard assistance  Meal Preparation: simulated meal prep tasks retrieving milk from fridge, bowls from cabinets, and various items from lower cabinets.  Pt requires CGA fro retrieval of lower items, good safety v/d     Assessment  Performance deficits / Impairments: Decreased functional mobility ;Decreased ADL status; Decreased strength;Decreased safe awareness;Decreased coordination;Decreased endurance;Decreased balance  Prognosis: Good  Discharge Recommendations: Patient would benefit from continued therapy after discharge  Activity Tolerance: Patient Tolerated treatment well  Activity Tolerance: Room air for full ADL session  Safety Devices in place: Yes       Plan  Plan  Times per week: 5-7  Times per day: Twice a day  Current Treatment Recommendations: [id number] Management,Patient/Caregiver Education & Training,Self-Care / ADL,Home Management Training  Plan Comment: Fatigues with activity, New use of Oxygen; Patient Goals   Patient goals : Regain abiilty to care for self to return Home  Short term goals  Time Frame for Short term goals: One week  Short term goal 1: Pt will complete LB ADL's with CGA and Good safety using AE if needed. Short term goal 2: Pt will complete toilet transfer and toileting with CGA and Good safety using least restrictive device. Short term goal 3: Pt will tolerate standing for 3-4 minutes with 1-2 UE support and CGA while completing a functional task to support participation in care  Short term goal 4: Pt will V/D at least 3 fall prevention strategies that she can utilize during daily routines. Short term goal 5: Pt will actively participate in 15-20 minutes of therapeutic exercise/activity to promote increased independence and safety with self-care and mobility. Long term goals  Time Frame for Long term goals : By Discharge  Long term goal 1: Setup assist for self care tasks of athing and dressing  Long term goal 2: Mod I for Fucntional Self care transfers  Long term goal 3: Mod I for light meal prep and household mobility  Long term goal 4:  Independent use of Home program for Hand/UE strengthening and conditioning to support continued indenepdnent in ADL tasks        02/17/22 1108 02/17/22 1429   OT Individual Minutes   Time In 1007 1346   Time Out [id number]   Minutes 56 31     Electronically signed by SANDI Clifton on 2/17/22 at 3:11 PM EST

## 2022-02-18 PROCEDURE — 6370000000 HC RX 637 (ALT 250 FOR IP): Performed by: INTERNAL MEDICINE

## 2022-02-18 PROCEDURE — 97530 THERAPEUTIC ACTIVITIES: CPT

## 2022-02-18 PROCEDURE — 6360000002 HC RX W HCPCS

## 2022-02-18 PROCEDURE — 6370000000 HC RX 637 (ALT 250 FOR IP)

## 2022-02-18 PROCEDURE — 97110 THERAPEUTIC EXERCISES: CPT

## 2022-02-18 PROCEDURE — 99232 SBSQ HOSP IP/OBS MODERATE 35: CPT | Performed by: PHYSICAL MEDICINE & REHABILITATION

## 2022-02-18 PROCEDURE — 97116 GAIT TRAINING THERAPY: CPT

## 2022-02-18 PROCEDURE — 1180000000 HC REHAB R&B

## 2022-02-18 PROCEDURE — 97535 SELF CARE MNGMENT TRAINING: CPT

## 2022-02-18 PROCEDURE — 6370000000 HC RX 637 (ALT 250 FOR IP): Performed by: PHYSICAL MEDICINE & REHABILITATION

## 2022-02-18 RX ADMIN — AMITRIPTYLINE HYDROCHLORIDE 100 MG: 50 TABLET, FILM COATED ORAL at 20:27

## 2022-02-18 RX ADMIN — POLYETHYLENE GLYCOL 3350 17 G: 17 POWDER, FOR SOLUTION ORAL at 09:48

## 2022-02-18 RX ADMIN — LISINOPRIL 40 MG: 20 TABLET ORAL at 09:21

## 2022-02-18 RX ADMIN — HYDROCODONE BITARTRATE AND ACETAMINOPHEN 1 TABLET: 5; 325 TABLET ORAL at 20:27

## 2022-02-18 RX ADMIN — TRAMADOL HYDROCHLORIDE 50 MG: 50 TABLET, COATED ORAL at 09:48

## 2022-02-18 RX ADMIN — ATORVASTATIN CALCIUM 20 MG: 20 TABLET, FILM COATED ORAL at 09:21

## 2022-02-18 RX ADMIN — GABAPENTIN 100 MG: 100 CAPSULE ORAL at 09:21

## 2022-02-18 RX ADMIN — HYDROCHLOROTHIAZIDE 12.5 MG: 25 TABLET ORAL at 09:21

## 2022-02-18 RX ADMIN — METOPROLOL TARTRATE 50 MG: 50 TABLET, FILM COATED ORAL at 09:21

## 2022-02-18 RX ADMIN — ENOXAPARIN SODIUM 30 MG: 100 INJECTION SUBCUTANEOUS at 09:20

## 2022-02-18 RX ADMIN — AMLODIPINE BESYLATE 10 MG: 10 TABLET ORAL at 09:21

## 2022-02-18 RX ADMIN — METOPROLOL TARTRATE 50 MG: 50 TABLET, FILM COATED ORAL at 20:27

## 2022-02-18 RX ADMIN — PANTOPRAZOLE SODIUM 40 MG: 40 TABLET, DELAYED RELEASE ORAL at 06:02

## 2022-02-18 RX ADMIN — HYDROCODONE BITARTRATE AND ACETAMINOPHEN 1 TABLET: 5; 325 TABLET ORAL at 06:07

## 2022-02-18 ASSESSMENT — PAIN DESCRIPTION - LOCATION
LOCATION: HEAD
LOCATION: HEAD

## 2022-02-18 ASSESSMENT — PAIN SCALES - GENERAL
PAINLEVEL_OUTOF10: 8
PAINLEVEL_OUTOF10: 7
PAINLEVEL_OUTOF10: 0
PAINLEVEL_OUTOF10: 0
PAINLEVEL_OUTOF10: 8
PAINLEVEL_OUTOF10: 7
PAINLEVEL_OUTOF10: 0

## 2022-02-18 ASSESSMENT — PAIN DESCRIPTION - FREQUENCY: FREQUENCY: INTERMITTENT

## 2022-02-18 ASSESSMENT — PAIN DESCRIPTION - PAIN TYPE
TYPE: CHRONIC PAIN
TYPE: CHRONIC PAIN

## 2022-02-18 ASSESSMENT — PAIN DESCRIPTION - ONSET: ONSET: GRADUAL

## 2022-02-18 NOTE — PROGRESS NOTES
[facility] Medicine    CONSULTATION / HISTORY AND PHYSICAL EXAMINATION            Date:   2/18/2022  Patient name:  Gustavo Pitt  Date of admission:  2/11/2022  5:26 PM  MRN:   [id number]  Account:  [de-identified]  YOB: 1934  PCP:    Rima Li MD  Room:   2608/2608-01  Code Status:    Full Code    Physician Requesting Consult: Elizabeth Simon*    Reason for Consult: Medical management    Chief Complaint:     No chief complaint on file. Debility    History Obtained From:     patient, electronic medical record    History of Present Illness/ Interval History:     80 y.o. Non- / non  female who initially presented with MS flareup with facial droop unsteady gait, difficulty swallowing concern for UTI initially but further urinalysis not consistent with UTI antibiotics discontinued during admission. Patient was diagnosed with multiple sclerosis at the age of 44. She was seen by neurology and started on methylprednisone  Past medical history significant for multiple sclerosis, hypertension, chronic osteoarthritis of the spine.       Past Medical History:     Past Medical History:   Diagnosis Date    Acute cystitis without hematuria 10/1/2017    Arthritis     Chronic daily headache     GERD (gastroesophageal reflux disease)     Hyperlipidemia     Hypertension     Moderate malnutrition ([address] 3/2/2019    Multiple sclerosis ([address]     Neuropathy     Pneumonia 2017    states had double pneumonia    Vitamin D deficiency         Past Surgical History:     Past Surgical History:   Procedure Laterality Date    CATARACT REMOVAL WITH IMPLANT Right 11/6/2014    Raffoul/Ole    CATARACT REMOVAL WITH IMPLANT Left 12/2/2014    Raffoul/Ole    CERVICAL DISC SURGERY      CYSTOCELE REPAIR      HYSTERECTOMY      RECTOCELE REPAIR      SHOULDER ARTHROPLASTY Right 04/20/2017    SHOULDER SURGERY Right 2017 Medications Prior to Admission:     Prior to Admission medications    Medication Sig Start Date End Date Taking? Authorizing Provider   omeprazole (PRILOSEC) 20 MG delayed release capsule Take 1 capsule by mouth daily 2/1/22  Yes Pauly Day MD   docusate sodium (COLACE) 100 MG capsule Take 1 capsule by mouth daily 2/1/22  Yes Pauly Day MD   celecoxib (CELEBREX) 200 MG capsule Take 1 capsule by mouth daily 1/17/22 1/17/23 Yes Pauly Day MD   traMADol (ULTRAM) 50 MG tablet Take 1 tablet by mouth every 6 hours as needed for Pain for up to 60 days. 12/20/21 2/18/22 Yes Pauly Day MD   predniSONE (DELTASONE) 10 MG tablet 4 tabs by mouth daily for 3 days, then 3 tabs daily for 3 days, then 2 tabs daily for 3 days, then 1 tab daily till gone. 11/29/21  Yes Pauly Day MD   predniSONE (DELTASONE) 10 MG tablet Take 1 tablet by mouth daily 10/26/21  Yes Pauly Day MD   HYDROcodone-acetaminophen (NORCO) 5-325 MG per tablet Take 1 tablet by mouth every 8 hours as needed. 9/14/21  Yes Historical Provider, MD   amitriptyline (ELAVIL) 100 MG tablet TAKE ONE TABLET BY MOUTH ONCE NIGHTLY 9/20/21  Yes Pauly Day MD   simvastatin (ZOCOR) 20 MG tablet Take 1 tablet by mouth nightly 8/23/21  Yes Pauly Day MD   calcipotriene (DOVONEX) 0.005 % cream Apply topically 2 times daily. 4/21/21  Yes Pauly Day MD   clobetasol (TEMOVATE) 0.05 % cream Apply topically 2 times daily. 3/30/21  Yes Pauly Day MD   gabapentin (NEURONTIN) 100 MG capsule Take 1 capsule by mouth 2 times daily for 30 days. Patient taking differently: Take 100 mg by mouth 2 times daily as needed.   3/11/21 2/11/22 Yes Do Martinez MD   albuterol sulfate HFA (VENTOLIN HFA) 108 (90 Base) MCG/ACT inhaler Inhale 2 puffs into the lungs every 6 hours as needed for Wheezing 3/11/21 3/11/22 Yes MD Alexi Souza MISC by Does not apply route Exp: 5/20/2026 5/24/21   Pauly Gay Records, MD        Allergies:     Bactrim [sulfamethoxazole-trimethoprim], Other, Pcn [penicillins], Lidoderm [lidocaine], and Macrobid [nitrofurantoin]    Social History:     Tobacco:    reports that she has never smoked. She has never used smokeless tobacco.  Alcohol:      reports no history of alcohol use. Drug Use:  reports no history of drug use. Family History:     No family history on file. Review of Systems:     Positive and Negative as described in HPI. Denies any shortness of breath or cough  Denies chest pain or palpitations  Denies abdominal pain, diarrhea vomiting  Denies any new numbness tremors or weakness. Physical Exam:     /63   Pulse 71   Temp 98.3 °F (36.8 °C) (Oral)   Resp 18   Ht 4' 11.02\" (1.499 m)   Wt 135 lb 1.6 oz (61.3 kg)   SpO2 97%   BMI 27.27 kg/m²   Temp (24hrs), Av.3 °F (36.8 °C), Min:98.3 °F (36.8 °C), Max:98.3 °F (36.8 °C)      General appearance:  alert, cooperative and no distress  Eyes: Anicteric sclera. Pupils are equally round and reactive to light. Extraocular movements are intact.   Lungs:  clear to auscultation bilaterally, normal effort  Heart:  regular rate and rhythm, systolic aorticmurmur present  Abdomen:  soft, nontender, nondistended, normal bowel sounds, no masses, hepatomegaly, splenomegaly  Extremities:  no edema, redness, tenderness in the calves  Skin:  no gross lesions, rashes, induration  Neuro:  Alert, oriented X 3, no new focal weakness    Investigations:      Laboratory Testing:  Lab Results   Component Value Date    WBC 12.9 (H) 2022    HGB 12.2 2022    HCT 35.5 (L) 2022    MCV 91.2 2022     2022     Lab Results   Component Value Date     2022    K 3.9 2022     2022    CO2 24 2022    BUN 26 2022    CREATININE 1.08 2022    GLUCOSE 91 2022    GLUCOSE 112 2012    CALCIUM 7.6 2022         Assessment :      Primary Problem  <principal problem not specified>    Active Hospital Problems    Diagnosis Date Noted    Multiple sclerosis exacerbation ([facility] Merline Perla 02/25/2021    Diarrhea of presumed infectious origin [R19.7] 02/25/2021       Plan: Active Problems:    Multiple sclerosis exacerbation (HCC)    Diarrhea of presumed infectious origin  Resolved Problems:    Hypokalemia        1. Symptoms of MS exacerbation -completed pulse dose steroids 2/13  2. Diarrhea resolved  3. Type 2 diabetes-blood sugar low normal; methylprednisone course has been completed, will DC Lantus and sliding scale. Patient was not on any diabetes medication prior to presentation HbA1c 6.4 on 11/6/2021  4. Hypertension= suboptimal control-on Norvasc, lisinopril, metoprolol, add HCTZ  5. Systolic murmur-moderate aortic insufficiency      DVT prophylaxis-Lovenox    2/17  Tolerating HCTZ, blood pressure controlled  . 2/18  Patient reports no new complaints. Engaging with physical therapy. Labs and vitals reviewed. No new issues. Continue with current care. Consultations:   Sunil Hurst MD  2/18/2022  4:38 PM    Copy sent to Dr. Lia Neves MD    Please note that this chart was generated using voice recognition Dragon dictation software. Although every effort was made to ensure the accuracy of this automated transcription, some errors in transcription may have occurred.

## 2022-02-18 NOTE — PROGRESS NOTES
Physical Medicine & Rehabilitation  Progress Note      Subjective:      80year-old female with MS exacerbation. Patient is reporting stable chronic headache pain today. She continues to note improved BLE strength. She has not had BM for 4 days despite taking Miralax and Senokot. She is agreeable to milk of magnesia prn if no BM today. No new issues with sleep, bladder, or appetite. ROS:  Denies fevers, chills, sweats. No chest pain, palpitations, lightheadedness. Denies coughing, wheezing or shortness of breath. Denies abdominal pain, nausea, diarrhea or constipation. No new areas of joint pain. Denies new areas of numbness or weakness. Denies new anxiety or depression issues. No new skin problems. Rehabilitation:   Progressing in therapies. PT:  Restrictions/Precautions: Fall Risk,General Precautions,Contact Precautions  Implants present? : Metal implants  Other position/activity restrictions: -   Transfers  Sit to Stand: Stand by assistance  Stand to sit: Stand by assistance  Bed to Chair: Stand by assistance  Stand Pivot Transfers: Stand by assistance  Squat Pivot Transfers: Contact guard assistance  Comment: uses RW  Ambulation 1  Surface: level tile  Device: Rolling Walker  Other Apparatus: O2 (2L)  Assistance: Contact guard assistance  Quality of Gait: Steady gait. Good heel/toe strike  Gait Deviations: Slow Emily,Increased NELDA,Decreased step length,Decreased step height,Deviated path  Distance: 215ft AM and PM, + smaller distances in the gym  Comments: Pt unsteady, VC to correct R ankle supination, distance limited to fatigue.      Transfers  Sit to Stand: Stand by assistance  Stand to sit: Stand by assistance  Bed to Chair: Stand by assistance  Stand Pivot Transfers: Stand by assistance  Squat Pivot Transfers: Contact guard assistance  Comment: uses RW  Ambulation  Ambulation?: Yes  Ambulation 1  Surface: level tile  Device: Rolling Walker  Other Apparatus: O2 (2L)  Assistance: Contact guard assistance  Quality of Gait: Steady gait. Good heel/toe strike  Gait Deviations: Slow Emily,Increased NELDA,Decreased step length,Decreased step height,Deviated path  Distance: 215ft AM and PM, + smaller distances in the gym  Comments: Pt unsteady, VC to correct R ankle supination, distance limited to fatigue. Surface: level tile  Ambulation 1  Surface: level tile  Device: Rolling Walker  Other Apparatus: O2 (2L)  Assistance: Contact guard assistance  Quality of Gait: Steady gait. Good heel/toe strike  Gait Deviations: Slow Emily,Increased NELDA,Decreased step length,Decreased step height,Deviated path  Distance: 215ft AM and PM, + smaller distances in the gym  Comments: Pt unsteady, VC to correct R ankle supination, distance limited to fatigue.      OT:  ADL  Feeding: Setup,Increased time to complete,Dentures  Grooming: Supervision (standing sinkside. )  UE Bathing: Supervision,Setup (seated/standing sinkside)  LE Bathing: Supervision,Setup (seated/standing sinkside. )  UE Dressing: Stand by assistance (SBA for item retrieval )  LE Dressing: Stand by assistance,Minimal assistance (SBA for item retrieval, A to ld Mcmahon)  Toileting: Stand by assistance  Additional Comments: pt standing at sink with RW and wc behind for rest breaks as needed throughout ADLs this AM    Instrumental ADL's  Instrumental ADLs: Yes     Balance  Sitting Balance: Modified independent   Standing Balance: Stand by assistance   Standing Balance  Time: AM: ~8 min x 2  Activity: ADL tasks, functional mobility  Comment: 0-2 UE support on RW, vc for hand placement   Functional Mobility  Functional - Mobility Device: Rolling Walker  Activity: To/from bathroom,Retrieve items,Transport items  Assist Level: Contact guard assistance  Functional Mobility Comments: SBA most times, CGA required 1-2 time throughout day due to safety concerns   Apparatus Needs  Apparatus Needs: O2  Bed mobility  Rolling to Left: Stand by assistance  Rolling to Right: Stand by assistance  Supine to Sit: Modified independent  Sit to Supine: Modified independent  Scooting: Stand by assistance  Comment: HOB elevated slightly, no use of bed rails  Transfers  Stand Step Transfers: Minimal assistance  Sit to stand: Stand by assistance  Stand to sit: Stand by assistance  Transfer Comments: vc for hand palcement and use of RW at times   Toilet Transfers  Toilet - Technique: Ambulating  Equipment Used: Raised toilet seat without rails  Toilet Transfer: Stand by assistance     Shower Transfers  Shower - Transfer From: Danya Toribio (RW)  Shower - Transfer Type: To and From  Shower - Transfer To: Transfer tub bench  Shower - Technique: Ambulating  Shower Transfers: Minimal assistance,Verbal cues  Shower Transfers Comments: Pt daughter states she provided supervision when pt showers at home. She has shower chair for use in tub unit. (Recommended TTB for increased safety/ease. )       SPEECH:      Objective:  /63   Pulse 71   Temp 98.3 °F (36.8 °C) (Oral)   Resp 18   Ht 4' 11.02\" (1.499 m)   Wt 135 lb 1.6 oz (61.3 kg)   SpO2 97%   BMI 27.27 kg/m²       GEN: Well developed, well nourished, no acute distress. HEENT: NCAT, PERRL, EOMI, mucous membranes pink and moist.  RESP: Lungs clear to auscultation. No rales or rhonchi. Respirations WNL and unlabored. CV: Regular rate rhythm. No murmurs, rubs, or gallops. ABD: soft, non-distended, non-tender. BS+ and equal.  NEURO: A&O x 3. Sensation intact to light touch. MSK: Functional ROM. Muscle tone and bulk are normal bilaterally. Strength 5/5 key muscles BUEs. 5/5 key muscles BLEs. EXT: No calf tenderness to palpation or edema BLEs. SKIN: Warm dry and intact with good turgor. PSYCH: Mood WNL. Affect WNL. Appropriately interactive. Diagnostics:     CBC: No results for input(s): WBC, RBC, HGB, HCT, MCV, RDW, PLT in the last 72 hours.   BMP:   No results for input(s): NA, K, CL, CO2, PHOS, BUN, CREATININE, CA, GLUCOSE in the last 72 hours. BNP: No results for input(s): BNP in the last 72 hours. PT/INR: No results for input(s): PROTIME, INR in the last 72 hours. APTT: No results for input(s): APTT in the last 72 hours. CARDIAC ENZYMES: No results for input(s): CKMB, CKMBINDEX, TROPONINT in the last 72 hours. Invalid input(s): CKTOTAL;3 troponins   FASTING LIPID PANEL:  Lab Results   Component Value Date    CHOL 235 (H) 01/04/2012    HDL 74 (A) 11/06/2021    TRIG 119 01/04/2012     LIVER PROFILE: No results for input(s): AST, ALT, ALB, BILIDIR, BILITOT, ALKPHOS in the last 72 hours.      Current Medications:   Current Facility-Administered Medications: hydroCHLOROthiazide (HYDRODIURIL) tablet 12.5 mg, 12.5 mg, Oral, Daily  HYDROcodone-acetaminophen (NORCO) 5-325 MG per tablet 2 tablet, 2 tablet, Oral, Daily PRN  HYDROcodone-acetaminophen (NORCO) 5-325 MG per tablet 1 tablet, 1 tablet, Oral, BID PRN  loperamide (IMODIUM) capsule 2 mg, 2 mg, Oral, 4x Daily PRN  enoxaparin (LOVENOX) injection 30 mg, 30 mg, SubCUTAneous, Daily  ondansetron (ZOFRAN-ODT) disintegrating tablet 4 mg, 4 mg, Oral, Q8H PRN **OR** ondansetron (ZOFRAN) injection 4 mg, 4 mg, IntraVENous, Q6H PRN  polyethylene glycol (GLYCOLAX) packet 17 g, 17 g, Oral, Daily PRN  albuterol sulfate  (90 Base) MCG/ACT inhaler 2 puff, 2 puff, Inhalation, Q6H PRN  amitriptyline (ELAVIL) tablet 100 mg, 100 mg, Oral, Nightly  amLODIPine (NORVASC) tablet 10 mg, 10 mg, Oral, Daily  atorvastatin (LIPITOR) tablet 20 mg, 20 mg, Oral, Daily  dextrose 5 % solution, 100 mL/hr, IntraVENous, PRN  dextrose 50 % IV solution, 12.5 g, IntraVENous, PRN  gabapentin (NEURONTIN) capsule 100 mg, 100 mg, Oral, Daily  glucagon (rDNA) injection 1 mg, 1 mg, IntraMUSCular, PRN  glucose (GLUTOSE) 40 % oral gel 15 g, 15 g, Oral, PRN  lisinopril (PRINIVIL;ZESTRIL) tablet 40 mg, 40 mg, Oral, Daily  metoprolol tartrate (LOPRESSOR) tablet 50 mg, 50 mg, Oral, BID  pantoprazole (PROTONIX) tablet 40 mg, 40 mg,

## 2022-02-18 NOTE — PROGRESS NOTES
[address] [facility]    ACUTE REHABILITATION OCCUPATIONAL THERAPY  DAILY NOTE    Date: 22  Patient Name: Pablo Garber      Room: 8662/1399-53    MRN: [id number]   : 1934  (80 y.o.)  Gender: female   Referring Practitioner: Dr Simi Javier  Diagnosis: MS exacerbation, recurrent UTI  Additional Pertinent Hx: Multiple Sclerosis since she was 52years old    Restrictions  Restrictions/Precautions: Fall Risk,General Precautions,Contact Precautions  Implants present? : Metal implants  Other position/activity restrictions: NO O2 today;  Headache,  feels weak in her legs this morning, better in afternoon session  Required Braces or Orthoses?: Yes    Subjective  Subjective: Pt reporting headache this morning however able to tolerate therapy. RN provided pain medication during session   Comments: Pt pleasant and cooperative for session. Patient Currently in Pain: Yes  Pain Level: 7  Pain Location: Head  Restrictions/Precautions: Fall Risk;General Precautions;Contact Precautions  Overall Orientation Status: Within Functional Limits     Pain Assessment  Pain Assessment: 0-10  Pain Level: 7  Pain Type: Chronic pain  Pain Location: Head  Pain Descriptors: Aching (arthiritic pain )    Objective  Cognition  Overall Cognitive Status: Impaired  Safety Judgement: Decreased awareness of need for safety  Perception  Overall Perceptual Status: WFL  Balance  Sitting Balance: Modified independent   Standing Balance: Stand by assistance (supervision to SBA pending set up and task)  Bed mobility  Supine to Sit: Modified independent  Transfers  Sit to stand: Stand by assistance  Stand to sit: Stand by assistance  Transfer Comments: vc's for hand placement with good return. Standing Balance  Time: AM: 3-4 min x 3, <1 min x 2 PM: 6 min, 8 min   Activity: AM: functional mobility and tasks in room PM: functional standing reaching for balance and tolerance.     Comment: 0-2 UE support, no LOB noted however some safety concern choices. Functional Mobility  Functional - Mobility Device: Rolling Walker  Activity: To/from bathroom; Retrieve items;Transport items  Assist Level: Contact guard assistance (SBA-CGA)  Functional Mobility Comments: minimal cueing for safety  Toilet Transfers  Toilet - Technique: Ambulating  Equipment Used: Raised toilet seat without rails  Toilet Transfer: Stand by assistance     Type of ROM/Therapeutic Exercise  Type of ROM/Therapeutic Exercise: AROM; Free weights (2#, x10 reps as tolerated. )  Comment: Pt engaged in UB ex's to promote increased strength/endurance for functional tasks; with limited tolerance, extended rest breaks required due to fatigue. RUE more limited, some ex's completed without weight. Exercises  Scapular Protraction: x  Scapular Retraction: x  Shoulder Flexion: x  Shoulder Extension: x  Elbow Flexion: x  Elbow Extension: x  Supination: x  Pronation: x  Wrist Flexion: x  Wrist Extension: x  Other: 1.5# B wrist weights donned while reaching forward across tabletop during therapeutic task. ADL  Feeding: Setup; Increased time to complete;Dentures  Grooming: Supervision (standing sinkside. )  UE Bathing: Supervision;Setup (seated/standing sinkside)  LE Bathing: Supervision;Setup (seated/standing sinkside. )  UE Dressing: Stand by assistance (SBA for item retrieval )  LE Dressing: Stand by assistance;Minimal assistance (SBA for item retrieval, A to ld TEDs)  Toileting: Stand by assistance          Assessment  Performance deficits / Impairments: Decreased functional mobility ; Decreased ADL status; Decreased strength;Decreased safe awareness;Decreased coordination;Decreased endurance;Decreased balance  Prognosis: Good  Discharge Recommendations: Patient would benefit from continued therapy after discharge  Activity Tolerance: Patient Tolerated treatment well  Safety Devices in place: Yes  Type of devices: Call light within reach; All fall risk precautions in place;Gait belt;Left in bed;Left in chair             Plan  Plan  Times per week: 5-7  Times per day: Twice a day  Current Treatment Recommendations: [id number] Management,Patient/Caregiver Education & Training,Self-Care / ADL,Home Management Training  Plan Comment: Fatigues with activity, New use of Oxygen; Patient Goals   Patient goals : Regain abiilty to care for self to return Home  Short term goals  Time Frame for Short term goals: One week  Short term goal 1: Pt will complete LB ADL's with CGA and Good safety using AE if needed. Short term goal 2: Pt will complete toilet transfer and toileting with CGA and Good safety using least restrictive device. Short term goal 3: Pt will tolerate standing for 3-4 minutes with 1-2 UE support and CGA while completing a functional task to support participation in care  Short term goal 4: Pt will V/D at least 3 fall prevention strategies that she can utilize during daily routines. Short term goal 5: Pt will actively participate in 15-20 minutes of therapeutic exercise/activity to promote increased independence and safety with self-care and mobility. Long term goals  Time Frame for Long term goals : By Discharge  Long term goal 1: Setup assist for self care tasks of athing and dressing  Long term goal 2: Mod I for Fucntional Self care transfers  Long term goal 3: Mod I for light meal prep and household mobility  Long term goal 4:  Independent use of Home program for Hand/UE strengthening and conditioning to support continued indenepdnent in ADL tasks        02/18/22 1031 02/18/22 1533   OT Individual Minutes   Time In [id number]   Time Out 1030 1420   Minutes 61 31     Electronically signed by SANDI Perez on 2/18/22 at 3:51 PM EST

## 2022-02-18 NOTE — PROGRESS NOTES
Physical Therapy  [address]   Acute Rehabilitation Physical Therapy Progress Note    Date: 22  Patient Name: Berto Jackson       Room: 4745/6564-54  MRN: [id number]   Account: [de-identified]   : 1934  (80 y.o.) Gender: female     Referring Practitioner: Dr Sandip Grijalva  Diagnosis: MS exacerbation, recurrent UTI  Past Medical History:  has a past medical history of Acute cystitis without hematuria, Arthritis, Chronic daily headache, GERD (gastroesophageal reflux disease), Hyperlipidemia, Hypertension, Moderate malnutrition ([address] Multiple sclerosis ([address] Neuropathy, Pneumonia, and Vitamin D deficiency. Past Surgical History:   has a past surgical history that includes Cystocele repair; Rectocele repair; Hysterectomy; Cervical disc surgery; Cataract removal with implant (Right, 2014); Cataract removal with implant (Left, 2014); Total shoulder arthroplasty (Right, 2017); and shoulder surgery (Right, ). Additional Pertinent Hx: The patient is a 80 y.o. Non- / non  female who presents with Dizziness and she is admitted to the hospital for the management of multiple sclerosis flareup and UTI. Patient was diagnosed with multiple sclerosis at the age of 44. Past medical history significant for multiple sclerosis, hypertension, chronic osteoarthritis of the spine. Patient reportedly woke up this morning with worsening facial droop, severe headache, and an unsteady gait. She has history of chronic headaches but this was much worse. She took 2 Norco pills but that did not alleviate the headache. It is located frontally and in the back of her head and neck and throbbing. Patient was taking 10 mg of prednisone daily per her PCP for spinal stenosis and chronic vertebral fracture. She sees a pain medicine specialist who gives her spinal injections. He told her to stop taking the prednisone for 5 days prior to injection.  This was her fifth day without taking steroids. In the ED she had a CT and a CTA done. CT showed no acute abnormalities. CTA head and neck shows no hemodynamically significant stenosis. Pt admitted to [facility] 2/11/22    Overall Orientation Status: Within Functional Limits  Restrictions/Precautions  Restrictions/Precautions: Fall Risk;General Precautions;Contact Precautions  Required Braces or Orthoses?: Yes  Implants present? : Metal implants            Vital Signs  Patient Currently in Pain: Denies                   Bed Mobility:   Rolling: Supervision  Supine to Sit: Stand by assistance  Sit to Supine: Stand by assistance  Scooting: Stand by assistance    Transfers:  Sit to Stand: Stand by assistance  Stand to sit: Stand by assistance  Bed to Chair: Stand by assistance              Ambulation 1  Surface: level tile (ramp)  Device: Rolling Walker  Assistance: Stand by assistance  Quality of Gait: Slow steady gait with no LOB. Safely completed ramp. Gait Deviations: Slow Emily; Increased NELDA; Decreased step length;Decreased step height;Deviated path  Distance: 283 ft.,  200 ft + smaller distances in the gym  Comments: Cues for pacing. Stairs/Curb  Stairs?: Yes  Stairs  # Steps : 10  Stairs Height:  (4\" and 6\")  Rails: Bilateral  Device: No Device  Assistance: Contact guard assistance  Comment: Pt demos correct technique. Required seaated rest following steps          BALANCE Posture: Fair  Sitting - Static: Good  Sitting - Dynamic: Fair;+  Standing - Static: Fair;+  Standing - Dynamic: Fair  Comments: HIGH fall risk per Tinetti. Standing balance with RW    EXERCISES    Other exercises?: Yes  Other exercises 1: Seated B LE ex's x15 (#1.5 and Orange theraband)  Other exercises 2: Standing balance Ex: BLE x10 (on blue balance foam)  Other exercises 3: NuStep: L3, 10 min AM, (seat: 8, hands: 8)  Other exercises 6: Sit<>stand transfers with RW: x10  Other Activities  Comment: Rest breaks PRN d/t low endurance.          Activity Tolerance: Patient limited by endurance,Patient limited by fatigue  PT Equipment Recommendations  Other: TBD - rollator? Current Treatment Recommendations: Strengthening,Balance Training,Functional Mobility Training,Transfer Training,Endurance Training,Gait Training,Patient/Caregiver Education & Training,Safety Education & Training,Stair training,Home Exercise Program,Equipment Evaluation, Education, & procurement    Conditions Requiring Skilled Therapeutic Intervention  Body structures, Functions, Activity limitations: Decreased functional mobility ; Decreased strength;Decreased safe awareness;Decreased endurance;Decreased balance;Decreased posture;Decreased cognition;Decreased ADL status; Decreased coordination; Increased pain  REQUIRES PT FOLLOW UP: Yes  Discharge Recommendations: Patient would benefit from continued therapy after discharge    Goals  Short term goals  Time Frame for Short term goals: 5-7 days  Short term goal 1: Pt to perform bed mobility IND from flat surface. Short term goal 2: Pt to perform transfers CGA/SBA from varied surfaces with good technique. Short term goal 3: Pt to amb 75'-100; on level surfaces with device, SBA/CGA. Short term goal 4: Pt to perform 5 minutes on nustep without change in symptoms for coordination and endurance. Short term goal 5: Pt to improve posture and sitting balance to GOOD. Long term goals  Time Frame for Long term goals : by D/C  Long term goal 1: Pt to perform transfers MOD I with least restrictive device from varied surface heights (toilet, bed, w/c, etc). Long term goal 2: Pt to amb 125'-150'  MOD I with least restrictive device on varied terrain. Long term goal 3: Pt to ascend/descend at least 3 stairs 1 HR, SBA. Long term goal 4: Pt to improve BLE strength by 1/2 MMG. Long term goal 5: Pt to improve Tinetti score to at least a 22 and improve standing balance to GOOD- to reduce fall risk.   Long term goal 6: Pt to improve 5xSTS test to 15 seconds or less.  Long term goal 7: Pt to improve 2MWT to at least 125' and 6MWT to at least 225' with device, MOD I.       02/18/22 0800 02/18/22 1315   PT Individual Minutes   Time In 0800 1315   Time Out 0900 1345   Minutes 60 30       Electronically signed by Chelsey Langston PTA on 2/18/22 at 3:23 PM EST

## 2022-02-18 NOTE — PLAN OF CARE
Problem: Falls - Risk of:  Goal: Will remain free from falls  Description: Will remain free from falls  Outcome: Ongoing no falls  Goal: Absence of physical injury  Description: Absence of physical injury  Outcome: Ongoing     Problem: Skin Integrity:  Goal: Will show no infection signs and symptoms  Description: Will show no infection signs and symptoms  Outcome: Ongoing no new skin issues  Goal: Absence of new skin breakdown  Description: Absence of new skin breakdown  Outcome: Ongoing     Problem: Infection:  Goal: Will remain free from infection  Description: Will remain free from infection  Outcome: Ongoing contact precautions      Problem: Safety:  Goal: Free from accidental physical injury  Description: Free from accidental physical injury  Outcome: Ongoing  Goal: Free from intentional harm  Description: Free from intentional harm  Outcome: Ongoing     Problem: Pain:  Goal: Patient's pain/discomfort is manageable  Description: Patient's pain/discomfort is manageable  Outcome: Ongoing denies pain  Goal: Pain level will decrease  Description: Pain level will decrease  Outcome: Ongoing  Goal: Control of acute pain  Description: Control of acute pain  Outcome: Ongoing  Goal: Control of chronic pain  Description: Control of chronic pain  Outcome: Ongoing     Problem: Neurological  Goal: Maximum potential motor/sensory/cognitive function  Outcome: Ongoing     Problem: Nutrition  Goal: Optimal nutrition therapy  Outcome: Ongoing ate majority of meals

## 2022-02-18 NOTE — PLAN OF CARE
Problem: Falls - Risk of:  Goal: Will remain free from falls  Description: Will remain free from falls  2/17/2022 2313 by Elana Chan RN  Outcome: Ongoing     Problem: Falls - Risk of:  Goal: Absence of physical injury  Description: Absence of physical injury  2/17/2022 2313 by Elana Chan RN  Outcome: Ongoing     Problem: Skin Integrity:  Goal: Will show no infection signs and symptoms  Description: Will show no infection signs and symptoms  2/17/2022 2313 by Elana Chan RN  Outcome: Ongoing     Problem: Skin Integrity:  Goal: Absence of new skin breakdown  Description: Absence of new skin breakdown  2/17/2022 2313 by Elana Chan RN  Outcome: Ongoing     Problem: Infection:  Goal: Will remain free from infection  Description: Will remain free from infection  2/17/2022 2313 by Elana Chan RN  Outcome: Ongoing     Problem: Safety:  Goal: Free from accidental physical injury  Description: Free from accidental physical injury  2/17/2022 2313 by Elana Chan RN  Outcome: Ongoing     Problem: Safety:  Goal: Free from intentional harm  Description: Free from intentional harm  2/17/2022 2313 by Elana Chan RN  Outcome: Ongoing     Problem: Pain:  Goal: Patient's pain/discomfort is manageable  Description: Patient's pain/discomfort is manageable  2/17/2022 2313 by Elana Chan RN  Outcome: Ongoing     Problem: Pain:  Goal: Control of acute pain  Description: Control of acute pain  2/17/2022 2313 by Elana Chan RN  Outcome: Ongoing     Problem: Pain:  Goal: Control of chronic pain  Description: Control of chronic pain  2/17/2022 2313 by Elana Chan RN  Outcome: Ongoing

## 2022-02-19 LAB
ANION GAP SERPL CALCULATED.3IONS-SCNC: 10 MMOL/L (ref 9–17)
BUN BLDV-MCNC: 17 MG/DL (ref 8–23)
BUN/CREAT BLD: ABNORMAL (ref 9–20)
CALCIUM SERPL-MCNC: 8.8 MG/DL (ref 8.6–10.4)
CHLORIDE BLD-SCNC: 98 MMOL/L (ref 98–107)
CO2: 29 MMOL/L (ref 20–31)
CREAT SERPL-MCNC: 1.24 MG/DL (ref 0.5–0.9)
GFR AFRICAN AMERICAN: 50 ML/MIN
GFR NON-AFRICAN AMERICAN: 41 ML/MIN
GFR SERPL CREATININE-BSD FRML MDRD: ABNORMAL ML/MIN/{1.73_M2}
GFR SERPL CREATININE-BSD FRML MDRD: ABNORMAL ML/MIN/{1.73_M2}
GLUCOSE BLD-MCNC: 118 MG/DL (ref 70–99)
HCT VFR BLD CALC: 35.3 % (ref 36–46)
HEMOGLOBIN: 12.2 G/DL (ref 12–16)
MCH RBC QN AUTO: 31.7 PG (ref 26–34)
MCHC RBC AUTO-ENTMCNC: 34.7 G/DL (ref 31–37)
MCV RBC AUTO: 91.3 FL (ref 80–100)
NRBC AUTOMATED: ABNORMAL PER 100 WBC
PDW BLD-RTO: 13.6 % (ref 11.5–14.9)
PLATELET # BLD: 338 K/UL (ref 150–450)
PMV BLD AUTO: 8 FL (ref 6–12)
POTASSIUM SERPL-SCNC: 3.8 MMOL/L (ref 3.7–5.3)
RBC # BLD: 3.87 M/UL (ref 4–5.2)
SODIUM BLD-SCNC: 137 MMOL/L (ref 135–144)
WBC # BLD: 7.3 K/UL (ref 3.5–11)

## 2022-02-19 PROCEDURE — 85027 COMPLETE CBC AUTOMATED: CPT

## 2022-02-19 PROCEDURE — 6370000000 HC RX 637 (ALT 250 FOR IP): Performed by: STUDENT IN AN ORGANIZED HEALTH CARE EDUCATION/TRAINING PROGRAM

## 2022-02-19 PROCEDURE — 97110 THERAPEUTIC EXERCISES: CPT

## 2022-02-19 PROCEDURE — 1180000000 HC REHAB R&B

## 2022-02-19 PROCEDURE — 99232 SBSQ HOSP IP/OBS MODERATE 35: CPT | Performed by: INTERNAL MEDICINE

## 2022-02-19 PROCEDURE — 6370000000 HC RX 637 (ALT 250 FOR IP): Performed by: PHYSICAL MEDICINE & REHABILITATION

## 2022-02-19 PROCEDURE — 97530 THERAPEUTIC ACTIVITIES: CPT

## 2022-02-19 PROCEDURE — 6360000002 HC RX W HCPCS

## 2022-02-19 PROCEDURE — 6370000000 HC RX 637 (ALT 250 FOR IP): Performed by: INTERNAL MEDICINE

## 2022-02-19 PROCEDURE — 99232 SBSQ HOSP IP/OBS MODERATE 35: CPT | Performed by: STUDENT IN AN ORGANIZED HEALTH CARE EDUCATION/TRAINING PROGRAM

## 2022-02-19 PROCEDURE — 80048 BASIC METABOLIC PNL TOTAL CA: CPT

## 2022-02-19 PROCEDURE — 97116 GAIT TRAINING THERAPY: CPT

## 2022-02-19 PROCEDURE — 36415 COLL VENOUS BLD VENIPUNCTURE: CPT

## 2022-02-19 PROCEDURE — 6370000000 HC RX 637 (ALT 250 FOR IP)

## 2022-02-19 PROCEDURE — 97535 SELF CARE MNGMENT TRAINING: CPT

## 2022-02-19 RX ORDER — CALCIUM CARBONATE 200(500)MG
500 TABLET,CHEWABLE ORAL 3 TIMES DAILY PRN
Status: DISCONTINUED | OUTPATIENT
Start: 2022-02-19 | End: 2022-02-21 | Stop reason: HOSPADM

## 2022-02-19 RX ADMIN — METOPROLOL TARTRATE 50 MG: 50 TABLET, FILM COATED ORAL at 07:33

## 2022-02-19 RX ADMIN — PANTOPRAZOLE SODIUM 40 MG: 40 TABLET, DELAYED RELEASE ORAL at 06:22

## 2022-02-19 RX ADMIN — HYDROCHLOROTHIAZIDE 12.5 MG: 25 TABLET ORAL at 07:33

## 2022-02-19 RX ADMIN — GABAPENTIN 100 MG: 100 CAPSULE ORAL at 10:18

## 2022-02-19 RX ADMIN — HYDROCODONE BITARTRATE AND ACETAMINOPHEN 1 TABLET: 5; 325 TABLET ORAL at 06:48

## 2022-02-19 RX ADMIN — ENOXAPARIN SODIUM 30 MG: 100 INJECTION SUBCUTANEOUS at 07:30

## 2022-02-19 RX ADMIN — METOPROLOL TARTRATE 50 MG: 50 TABLET, FILM COATED ORAL at 20:35

## 2022-02-19 RX ADMIN — ATORVASTATIN CALCIUM 20 MG: 20 TABLET, FILM COATED ORAL at 07:30

## 2022-02-19 RX ADMIN — ANTACID TABLETS 500 MG: 500 TABLET, CHEWABLE ORAL at 15:19

## 2022-02-19 RX ADMIN — MAGNESIUM HYDROXIDE 15 ML: 400 SUSPENSION ORAL at 18:27

## 2022-02-19 RX ADMIN — AMLODIPINE BESYLATE 10 MG: 10 TABLET ORAL at 07:33

## 2022-02-19 RX ADMIN — HYDROCODONE BITARTRATE AND ACETAMINOPHEN 1 TABLET: 5; 325 TABLET ORAL at 20:35

## 2022-02-19 RX ADMIN — AMITRIPTYLINE HYDROCHLORIDE 100 MG: 50 TABLET, FILM COATED ORAL at 20:35

## 2022-02-19 RX ADMIN — LISINOPRIL 40 MG: 20 TABLET ORAL at 07:33

## 2022-02-19 ASSESSMENT — PAIN SCALES - GENERAL
PAINLEVEL_OUTOF10: 5
PAINLEVEL_OUTOF10: 3
PAINLEVEL_OUTOF10: 8

## 2022-02-19 ASSESSMENT — PAIN DESCRIPTION - LOCATION: LOCATION: HEAD

## 2022-02-19 ASSESSMENT — PAIN - FUNCTIONAL ASSESSMENT: PAIN_FUNCTIONAL_ASSESSMENT: ACTIVITIES ARE NOT PREVENTED

## 2022-02-19 ASSESSMENT — PAIN DESCRIPTION - PAIN TYPE
TYPE: ACUTE PAIN
TYPE: CHRONIC PAIN

## 2022-02-19 ASSESSMENT — PAIN DESCRIPTION - DESCRIPTORS: DESCRIPTORS: ACHING

## 2022-02-19 ASSESSMENT — PAIN DESCRIPTION - ONSET: ONSET: ON-GOING

## 2022-02-19 ASSESSMENT — PAIN DESCRIPTION - FREQUENCY: FREQUENCY: INTERMITTENT

## 2022-02-19 NOTE — PLAN OF CARE
Problem: Falls - Risk of:  Goal: Will remain free from falls  Description: Will remain free from falls  2/19/2022 1057 by Sherrell Oquendo  Outcome: Ongoing     Problem: Falls - Risk of:  Goal: Absence of physical injury  Description: Absence of physical injury  2/19/2022 1057 by RENETTA Mares  Outcome: Ongoing     Problem: Skin Integrity:  Goal: Will show no infection signs and symptoms  Description: Will show no infection signs and symptoms  2/19/2022 1057 by RENETTA CARABALLO  Outcome: Ongoing     Problem: Skin Integrity:  Goal: Absence of new skin breakdown  Description: Absence of new skin breakdown  2/19/2022 1057 by RENETTA Mares  Outcome: Ongoing     Problem: Infection:  Goal: Will remain free from infection  Description: Will remain free from infection  2/19/2022 1057 by RENETTA Mares  Outcome: Ongoing     Problem: Pain:  Goal: Pain level will decrease  Description: Pain level will decrease  2/19/2022 1057 by RENETTA CARABALLO  Outcome: Ongoing     Problem: Nutrition  Goal: Optimal nutrition therapy  2/19/2022 1057 by Sherrell Oquendo  Outcome: Ongoing

## 2022-02-19 NOTE — PLAN OF CARE
Problem: Falls - Risk of:  Goal: Will remain free from falls  Description: Will remain free from falls  2/19/2022 0347 by Rachelle Thomson RN  Outcome: Ongoing  2/18/2022 1809 by Daniel Leach RN  Outcome: Ongoing  Goal: Absence of physical injury  Description: Absence of physical injury  2/19/2022 0347 by Rachelle Thomson RN  Outcome: Ongoing  2/18/2022 1809 by Daniel Leach RN  Outcome: Ongoing     Problem: Skin Integrity:  Goal: Will show no infection signs and symptoms  Description: Will show no infection signs and symptoms  2/19/2022 0347 by Rachelle Thomson RN  Outcome: Ongoing  2/18/2022 1809 by Daniel Leach RN  Outcome: Ongoing  Goal: Absence of new skin breakdown  Description: Absence of new skin breakdown  2/19/2022 0347 by Rachelle Thomson RN  Outcome: Ongoing  2/18/2022 1809 by Daniel Leach RN  Outcome: Ongoing     Problem: Infection:  Goal: Will remain free from infection  Description: Will remain free from infection  2/19/2022 0347 by Rachelle Thomson RN  Outcome: Ongoing  2/18/2022 1809 by Daniel Leach RN  Outcome: Ongoing     Problem: Safety:  Goal: Free from accidental physical injury  Description: Free from accidental physical injury  2/19/2022 0347 by Rachelle Thomson RN  Outcome: Ongoing  2/18/2022 1809 by Daniel Leach RN  Outcome: Ongoing  Goal: Free from intentional harm  Description: Free from intentional harm  2/19/2022 0347 by Rachelle Thomson RN  Outcome: Ongoing  2/18/2022 1809 by Daniel Leach RN  Outcome: Ongoing     Problem: Pain:  Goal: Patient's pain/discomfort is manageable  Description: Patient's pain/discomfort is manageable  2/19/2022 0347 by Rachelle Thomson RN  Outcome: Ongoing  2/18/2022 1809 by Daniel Leach RN  Outcome: Ongoing  Goal: Pain level will decrease  Description: Pain level will decrease  2/19/2022 0347 by Rachelle Thomson RN  Outcome: Ongoing  2/18/2022 1809 by Daniel Leach RN  Outcome: Ongoing  Goal: Control of acute pain  Description: Control of acute pain  2/19/2022 0347 by Shell Pineda Kellie Petit RN  Outcome: Ongoing  2/18/2022 1809 by Deandre Salinas RN  Outcome: Ongoing  Goal: Control of chronic pain  Description: Control of chronic pain  2/19/2022 0347 by Dorian Pugh RN  Outcome: Ongoing  2/18/2022 1809 by Deandre Salinas RN  Outcome: Ongoing     Problem: Neurological  Goal: Maximum potential motor/sensory/cognitive function  2/19/2022 0347 by Dorian Pugh RN  Outcome: Ongoing  2/18/2022 1809 by Deandre Salinas RN  Outcome: Ongoing     Problem: Nutrition  Goal: Optimal nutrition therapy  2/19/2022 0347 by Dorian Pugh RN  Outcome: Ongoing  2/18/2022 1809 by Deandre Salinas RN  Outcome: Ongoing

## 2022-02-19 NOTE — PROGRESS NOTES
[address]    ACUTE REHABILITATION OCCUPATIONAL THERAPY  DAILY NOTE    Date: 22  Patient Name: Ange Mccabe      Room: 8432/5919-73    MRN: [id number]   : 1934  (80 y.o.)  Gender: female   Referring Practitioner: Dr David Coates  Diagnosis: MS exacerbation, recurrent UTI  Additional Pertinent Hx: Multiple Sclerosis since she was 52years old    Restrictions  Restrictions/Precautions: Fall Risk,General Precautions,Contact Precautions  Implants present? : Metal implants  Other position/activity restrictions: NO O2 today;  Headache,  feels weak in her legs this morning, better in afternoon session  Required Braces or Orthoses?: Yes    Subjective  Subjective: \"Just heartburn. \" Pt states when asked how she is doing   Comments: Pt pleasant and cooperative for session. Patient Currently in Pain: Yes  Pain Level: 5  Pain Location:  (\"It's just heart burn\" )  Restrictions/Precautions: Fall Risk;General Precautions;Contact Precautions  Overall Orientation Status: Within Functional Limits     Pain Assessment  Pain Assessment: 0-10  Pain Level: 5  Pain Type: Chronic pain  Pain Location:  (\"It's just heart burn\" )    Objective  Cognition  Overall Cognitive Status: Impaired  Attention Span: Appears intact  Following Commands:  Follows one step commands without difficulty  Safety Judgement: Decreased awareness of need for safety  Insights: Decreased awareness of deficits  Perception  Overall Perceptual Status: WFL  Balance  Sitting Balance: Modified independent   Standing Balance: Stand by assistance (w/RW)  Bed mobility  Sit to Supine: Modified independent  Comment: Bed flat for mobility this date   Transfers  Sit to stand: Stand by assistance  Stand to sit: Stand by assistance  Transfer Comments: VC for hand placement and walker positioning G return   Standing Balance  Time: 1-2 mins x2, <1 min x4  Activity: Functional mobility item retrieval in room in prep for ADLs, functional transfers dressing tasks   Comment: 0-2 UE support, no LOB noted however some safety concern choices. Functional Mobility  Functional - Mobility Device: Rolling Walker  Activity: To/from bathroom; Retrieve items;Transport items  Assist Level: Stand by assistance  Functional Mobility Comments: Pt engaged in functional mobility in/out restroom and around room to gather/transport neccessary items needed in order to engage in ADLs. Pt req 2-3 vc for walker mgt/positioning during task to increase safety and indpendnence with F carryover noted. Toilet Transfers  Toilet - Technique: Ambulating  Equipment Used: Raised toilet seat without rails  Toilet Transfer: Stand by assistance  Toilet Transfers Comments: No LOB noted, vc for walker postitioning for increased safety     Type of ROM/Therapeutic Exercise  Type of ROM/Therapeutic Exercise: AROM; Free weights (1.5 lbs LUE and AROM RUE)  Comment: Pt engaged in UB ex's to promote increased strength/endurance for functional tasks RB req due to increased fatigue and weakness   Exercises  Scapular Protraction: x  Scapular Retraction: x  Shoulder Flexion: x  Shoulder Extension: x  Shoulder ABduction: x  Shoulder ADduction: x  Horizontal ABduction: x  Horizontal ADduction: x  Elbow Flexion: x  Elbow Extension: x  Supination: x  Pronation: x                       ADL  Grooming: Supervision (seated in w/c sink side)   UE Bathing: Supervision;Setup (seated sink side)  LE Bathing: Supervision;Setup (Standing for teofilo care, declines BLEs)  UE Dressing: Supervision (seated )  LE Dressing: Minimal assistance (A for TEDs and doffing pant off R foot )  Toileting: Stand by assistance (standing for teofilo care)          Assessment  Performance deficits / Impairments: Decreased functional mobility ; Decreased ADL status; Decreased strength;Decreased safe awareness;Decreased coordination;Decreased endurance;Decreased balance  Prognosis: Good  Discharge Recommendations: Patient would benefit from continued therapy after discharge  Activity Tolerance: Patient Tolerated treatment well  Activity Tolerance: Room air for full ADL session  Safety Devices in place: Yes  Type of devices: Call light within reach; All fall risk precautions in place; Left in bed;          Patient Goals   Patient goals : Regain abiilty to care for self to return Home  Learner:patient  Method: demonstration and explanation       Outcome: needs reinforcement        Plan  Plan  Times per week: 5-7  Times per day: Twice a day  Current Treatment Recommendations: [id number] Management,Patient/Caregiver Education & Training,Self-Care / ADL,Home Management Training  Plan Comment: Fatigues with activity, New use of Oxygen; Patient Goals   Patient goals : Regain abiilty to care for self to return Home  Short term goals  Time Frame for Short term goals: One week  Short term goal 1: Pt will complete LB ADL's with CGA and Good safety using AE if needed. Short term goal 2: Pt will complete toilet transfer and toileting with CGA and Good safety using least restrictive device. Short term goal 3: Pt will tolerate standing for 3-4 minutes with 1-2 UE support and CGA while completing a functional task to support participation in care  Short term goal 4: Pt will V/D at least 3 fall prevention strategies that she can utilize during daily routines. Short term goal 5: Pt will actively participate in 15-20 minutes of therapeutic exercise/activity to promote increased independence and safety with self-care and mobility. Long term goals  Time Frame for Long term goals : By Discharge  Long term goal 1: Setup assist for self care tasks of athing and dressing  Long term goal 2: Mod I for Fucntional Self care transfers  Long term goal 3: Mod I for light meal prep and household mobility  Long term goal 4:  Independent use of Home program for Hand/UE strengthening and conditioning to support continued indenepdnent in ADL tasks        02/19/22 1334   OT Individual Minutes   Time In 1334   Time Out 1504   Minutes 90     Electronically signed by RISSA Nunes on 2/19/22 at 3:40 PM EST

## 2022-02-19 NOTE — PROGRESS NOTES
[facility]    CONSULTATION / HISTORY AND PHYSICAL EXAMINATION            Date:   2/19/2022  Patient name:  Starlin Blizzard  Date of admission:  2/11/2022  5:26 PM  MRN:   [id number]  Account:  [de-identified]  YOB: 1934  PCP:    Lyn Mendez MD  Room:   [facility]  Code Status:    Full Code    Physician Requesting Consult: Ely Older Abou-Chakra*    Reason for Consult: Medical management    Chief Complaint:     No chief complaint on file. Debility    History Obtained From:     patient, electronic medical record    History of Present Illness/ Interval History:     80 y.o. Non- / non  female who initially presented with MS flareup with facial droop unsteady gait, difficulty swallowing concern for UTI initially but further urinalysis not consistent with UTI antibiotics discontinued during admission. Patient was diagnosed with multiple sclerosis at the age of 44. She was seen by neurology and started on methylprednisone  Past medical history significant for multiple sclerosis, hypertension, chronic osteoarthritis of the spine.       Past Medical History:     Past Medical History:   Diagnosis Date    Acute cystitis without hematuria 10/1/2017    Arthritis     Chronic daily headache     GERD (gastroesophageal reflux disease)     Hyperlipidemia     Hypertension     Moderate malnutrition ([address] 3/2/2019    Multiple sclerosis ([address]     Neuropathy     Pneumonia 2017    states had double pneumonia    Vitamin D deficiency         Past Surgical History:     Past Surgical History:   Procedure Laterality Date    CATARACT REMOVAL WITH IMPLANT Right 11/6/2014    [facility]oul[facility]    CATARACT REMOVAL WITH IMPLANT Left 12/2/2014    Raffoul/[facility]becarDavid    CERVICAL DISC SURGERY      CYSTOCELE REPAIR      HYSTERECTOMY      RECTOCELE REPAIR      SHOULDER ARTHROPLASTY Right 04/20/2017    SHOULDER SURGERY Right 2017 Medications Prior to Admission:     Prior to Admission medications    Medication Sig Start Date End Date Taking? Authorizing Provider   omeprazole (PRILOSEC) 20 MG delayed release capsule Take 1 capsule by mouth daily 2/1/22  Yes Pauly Browne MD   docusate sodium (COLACE) 100 MG capsule Take 1 capsule by mouth daily 2/1/22  Yes Pauly Browne MD   celecoxib (CELEBREX) 200 MG capsule Take 1 capsule by mouth daily 1/17/22 1/17/23 Yes Pauly Browne MD   predniSONE (DELTASONE) 10 MG tablet 4 tabs by mouth daily for 3 days, then 3 tabs daily for 3 days, then 2 tabs daily for 3 days, then 1 tab daily till gone. 11/29/21  Yes Pauly Browne MD   predniSONE (DELTASONE) 10 MG tablet Take 1 tablet by mouth daily 10/26/21  Yes Pauly Browne MD   HYDROcodone-acetaminophen (NORCO) 5-325 MG per tablet Take 1 tablet by mouth every 8 hours as needed. 9/14/21  Yes Historical Provider, MD   amitriptyline (ELAVIL) 100 MG tablet TAKE ONE TABLET BY MOUTH ONCE NIGHTLY 9/20/21  Yes Pauly Browne MD   simvastatin (ZOCOR) 20 MG tablet Take 1 tablet by mouth nightly 8/23/21  Yes Pauly Browne MD   calcipotriene (DOVONEX) 0.005 % cream Apply topically 2 times daily. 4/21/21  Yes Pauly Browne MD   clobetasol (TEMOVATE) 0.05 % cream Apply topically 2 times daily. 3/30/21  Yes Pauly Browne MD   gabapentin (NEURONTIN) 100 MG capsule Take 1 capsule by mouth 2 times daily for 30 days. Patient taking differently: Take 100 mg by mouth 2 times daily as needed.   3/11/21 2/11/22 Yes Ginny Zimmerman MD   albuterol sulfate HFA (VENTOLIN HFA) 108 (90 Base) MCG/ACT inhaler Inhale 2 puffs into the lungs every 6 hours as needed for Wheezing 3/11/21 3/11/22 Yes Ginny Zimmerman MD   [facility] by Does not apply route Exp: 5/20/2026 5/24/21   Pauly Browne MD        Allergies:     Bactrim [sulfamethoxazole-trimethoprim], Other, Pcn [penicillins], Lidoderm [lidocaine], and Macrobid [nitrofurantoin]    Social History:     Tobacco:    reports that she has never smoked. She has never used smokeless tobacco.  Alcohol:      reports no history of alcohol use. Drug Use:  reports no history of drug use. Family History:     No family history on file. Review of Systems:     Positive and Negative as described in HPI. Denies any shortness of breath or cough  Denies chest pain or palpitations  Denies abdominal pain, diarrhea vomiting  Denies any new numbness tremors or weakness. Physical Exam:     /65   Pulse 65   Temp 98.4 °F (36.9 °C)   Resp 16   Ht 4' 11.02\" (1.499 m)   Wt 135 lb (61.2 kg)   SpO2 93%   BMI 27.25 kg/m²   Temp (24hrs), Av.2 °F (36.8 °C), Min:97.9 °F (36.6 °C), Max:98.4 °F (36.9 °C)      General appearance:  alert, cooperative and no distress  Eyes: Anicteric sclera. Pupils are equally round and reactive to light. Extraocular movements are intact.   Lungs:  clear to auscultation bilaterally, normal effort  Heart:  regular rate and rhythm, systolic aorticmurmur present  Abdomen:  soft, nontender, nondistended, normal bowel sounds, no masses, hepatomegaly, splenomegaly  Extremities:  no edema, redness, tenderness in the calves  Skin:  no gross lesions, rashes, induration  Neuro:  Alert, oriented X 3, no new focal weakness    Investigations:      Laboratory Testing:  Lab Results   Component Value Date    WBC 7.3 2022    HGB 12.2 2022    HCT 35.3 (L) 2022    MCV 91.3 2022     2022     Lab Results   Component Value Date     2022    K 3.8 2022    CL 98 2022    CO2 29 2022    BUN 17 2022    CREATININE 1.24 2022    GLUCOSE 118 2022    GLUCOSE 112 2012    CALCIUM 8.8 2022         Assessment :      Primary Problem  <principal problem not specified>    Active Hospital Problems    Diagnosis Date Noted    [facility] [G35] 2021    Diarrhea of presumed infectious origin [R19.7] 02/25/2021       Plan: Active Problems:    Multiple sclerosis exacerbation (HCC)    Diarrhea of presumed infectious origin  Resolved Problems:    Hypokalemia        1. Symptoms of MS exacerbation -completed pulse dose steroids 2/13  2. Diarrhea resolved  3. Type 2 diabetes-blood sugar low normal; methylprednisone course has been completed, will DC Lantus and sliding scale. Patient was not on any diabetes medication prior to presentation HbA1c 6.4 on 11/6/2021  4. Hypertension= suboptimal control-on Norvasc, lisinopril, metoprolol, add HCTZ  5. Systolic murmur-moderate aortic insufficiency      DVT prophylaxis-Lovenox    2/17  Tolerating HCTZ, blood pressure controlled  . 2/18  Patient reports no new complaints. Engaging with physical therapy. Labs and vitals reviewed. No new issues. Continue with current care. 2/19  1 reading of low blood pressure noted today systolic 90, creatinine bump to 1.24 from 1.08; also patient complaining of lightheadedness, will discontinue hydrochlorothiazide          Consultations:   Lizeth Vazquez MD  2/19/2022  4:33 PM    Copy sent to Dr. Hector Andino MD    Please note that this chart was generated using voice recognition Dragon dictation software. Although every effort was made to ensure the accuracy of this automated transcription, some errors in transcription may have occurred.

## 2022-02-19 NOTE — PROGRESS NOTES
Physical Therapy  Facility/Department: [facility] ACUTE REHAB  Daily Treatment Note  NAME: Forestine Felty  : 1934  MRN: [id number]    Date of Service: 2022    Discharge Recommendations:  Patient would benefit from continued therapy after discharge        Assessment   Body structures, Functions, Activity limitations: Decreased functional mobility ; Decreased strength;Decreased safe awareness;Decreased endurance;Decreased balance;Decreased posture;Decreased cognition;Decreased ADL status; Decreased coordination; Increased pain  Specific instructions for Next Treatment: progress gait, stairs, balance, nustep, virtual reality  REQUIRES PT FOLLOW UP: Yes  Activity Tolerance  Activity Tolerance: Patient limited by endurance; Patient limited by fatigue  Activity Tolerance: Pt fatigues more quickly on this date. More frequent rest breaks PRN     Patient Diagnosis(es): There were no encounter diagnoses. has a past medical history of Acute cystitis without hematuria, Arthritis, Chronic daily headache, GERD (gastroesophageal reflux disease), Hyperlipidemia, Hypertension, Moderate malnutrition ([address] Multiple sclerosis ([address] Neuropathy, Pneumonia, and Vitamin D deficiency. has a past surgical history that includes Cystocele repair; Rectocele repair; Hysterectomy; Cervical disc surgery; Cataract removal with implant (Right, 2014); Cataract removal with implant (Left, 2014); Total shoulder arthroplasty (Right, 2017); and shoulder surgery (Right, ). Restrictions  Restrictions/Precautions  Restrictions/Precautions: Fall Risk,General Precautions,Contact Precautions  Required Braces or Orthoses?: Yes  Implants present? : Metal implants  Position Activity Restriction  Other position/activity restrictions: -  Subjective   General  Chart Reviewed: Yes  Additional Pertinent Hx: The patient is a 80 y.o.   Non- / non  female who presents with Dizziness and she is admitted to the hospital for the management of multiple sclerosis flareup and UTI. Patient was diagnosed with multiple sclerosis at the age of 44. Past medical history significant for multiple sclerosis, hypertension, chronic osteoarthritis of the spine. Patient reportedly woke up this morning with worsening facial droop, severe headache, and an unsteady gait. She has history of chronic headaches but this was much worse. She took 2 Norco pills but that did not alleviate the headache. It is located frontally and in the back of her head and neck and throbbing. Patient was taking 10 mg of prednisone daily per her PCP for spinal stenosis and chronic vertebral fracture. She sees a pain medicine specialist who gives her spinal injections. He told her to stop taking the prednisone for 5 days prior to injection. This was her fifth day without taking steroids. In the ED she had a CT and a CTA done. CT showed no acute abnormalities. CTA head and neck shows no hemodynamically significant stenosis. Pt admitted to [facility] 2[facility]11/22  Family / Caregiver Present: No  Referring Practitioner: Amira Sutherland MD  Subjective  Subjective: Pt having no new complaints this date  General Comment  Comments: AM: Pt reports feeling fine but facial expressions show fatigue. PM: pt c/o heartburn after eating lunch  Pain Screening  Patient Currently in Pain: Denies  Vital Signs  Patient Currently in Pain: Denies       Orientation  Orientation  Overall Orientation Status: Within Functional Limits  Cognition      Objective      Transfers  Sit to Stand: Stand by assistance  Stand to sit: Stand by assistance  Stand Pivot Transfers: Stand by assistance  Comment: uses RW  Ambulation  Ambulation?: Yes  Ambulation 1  Surface: level tile  Device: Rolling Walker  Assistance: Stand by assistance  Quality of Gait: Slow steady gait with no LOB. Gait Deviations: Slow Emily; Increased NELDA; Decreased step length;Decreased step height  Distance: 283' AM and PM  Comments: Pt reports feeling fatigued following ambulation. Seated rest break needed  Stairs/Curb  Stairs?: Yes  Stairs  # Steps : 20 (10 AM, 10 PM)  Rails: Right ascending  Device: No Device  Assistance: Contact guard assistance  Comment: Pt demos step over step technique. Balance  Posture: Fair  Sitting - Static: Good  Sitting - Dynamic: Fair;+  Standing - Static: Fair;+  Standing - Dynamic: Fair  Comments: HIGH fall risk per Tinetti. Standing balance with RW  Other exercises  Other exercises?: Yes  Other exercises 1: Seated B LE ex's x 10-15 to tolerance (#1.5 and Orange theraband)  Other exercises 3: NuStep: L3, 10 min (seat: 7, hands: 8)  Other exercises 6: Sit<>stand transfers with RW: x10  Other exercises 7: Seated UBE 3' Fwd/retro for endurance     Goals  Short term goals  Time Frame for Short term goals: 5-7 days  Short term goal 1: Pt to perform bed mobility IND from flat surface. Short term goal 2: Pt to perform transfers CGA/SBA from varied surfaces with good technique. Short term goal 3: Pt to amb 75'-100; on level surfaces with device, SBA/CGA. Short term goal 4: Pt to perform 5 minutes on nustep without change in symptoms for coordination and endurance. Short term goal 5: Pt to improve posture and sitting balance to GOOD. Long term goals  Time Frame for Long term goals : by D/C  Long term goal 1: Pt to perform transfers MOD I with least restrictive device from varied surface heights (toilet, bed, w/c, etc). Long term goal 2: Pt to amb 125'-150'  MOD I with least restrictive device on varied terrain. Long term goal 3: Pt to ascend/descend at least 3 stairs 1 HR, SBA. Long term goal 4: Pt to improve BLE strength by 1/2 MMG. Long term goal 5: Pt to improve Tinetti score to at least a 22 and improve standing balance to GOOD- to reduce fall risk. Long term goal 6: Pt to improve 5xSTS test to 15 seconds or less.   Long term goal 7: Pt to improve 2MWT to at least 125' and 6MWT to at least 225' with device, MOD I. Patient Goals   Patient goals :  To get stronger to go home    Plan    Plan  Times per week: 1.5 hr/day, 5-7 days/week  Specific instructions for Next Treatment: progress gait, stairs, balance, nustep, virtual reality  Current Treatment Recommendations: Strengthening,Balance Training,Functional Mobility Training,Transfer Training,Endurance Training,Gait Training,Patient/Caregiver Education & Training,Safety Education & Training,Stair training,Home Exercise Program,Equipment Evaluation, Education, & procurement  Safety Devices  Type of devices: Gait belt,Call light within reach,All fall risk precautions in place,Left in chair,Patient at risk for falls     Therapy Time   Individual Concurrent Group Co-treatment   Time In 1300         Time Out 1333         Minutes 288 [facility] Korina. Benjamin, PTA

## 2022-02-19 NOTE — PROGRESS NOTES
Physical Medicine & Rehabilitation  Progress Note      Subjective:      Serenity Damon is a 80 y.o. female with acute exacerbation of multiple sclerosis. She reports doing pretty well today. She feels like she has made improvements with therapy. She is ready for discharge on Monday. She has not had a bowel movement in several days but she denies any abdominal pain or nausea. She denies any other acute concerns. ROS:  Denies fevers, chills, sweats. No chest pain, palpitations, lightheadedness. Denies coughing, wheezing or shortness of breath.  +constipation; Denies abdominal pain, nausea  No new areas of joint pain. Denies new areas of numbness or weakness. Denies new anxiety or depression issues. No new skin problems. Rehabilitation:   Progressing in therapies. PT:  Restrictions/Precautions: Fall Risk,General Precautions,Contact Precautions  Implants present? : Metal implants  Other position/activity restrictions: -   Transfers  Sit to Stand: Stand by assistance  Stand to sit: Stand by assistance  Bed to Chair: Stand by assistance  Stand Pivot Transfers: Stand by assistance  Squat Pivot Transfers: Contact guard assistance  Comment: uses RW  Ambulation 1  Surface: level tile  Device: Rolling Walker  Other Apparatus: O2 (2L)  Assistance: Stand by assistance  Quality of Gait: Slow steady gait with no LOB. Gait Deviations: Slow Emily,Increased NELDA,Decreased step length,Decreased step height  Distance: 283' AM and PM  Comments: Pt reports feeling fatigued following ambulation.  Seated rest break needed    Transfers  Sit to Stand: Stand by assistance  Stand to sit: Stand by assistance  Bed to Chair: Stand by assistance  Stand Pivot Transfers: Stand by assistance  Squat Pivot Transfers: Contact guard assistance  Comment: uses RW  Ambulation  Ambulation?: Yes  Ambulation 1  Surface: level tile  Device: Rolling Walker  Other Apparatus: O2 (2L)  Assistance: Stand by assistance  Quality of Gait: Slow steady gait with no LOB. Gait Deviations: Slow Emily,Increased NELDA,Decreased step length,Decreased step height  Distance: 283' AM and PM  Comments: Pt reports feeling fatigued following ambulation. Seated rest break needed    Surface: level tile  Ambulation 1  Surface: level tile  Device: Rolling Walker  Other Apparatus: O2 (2L)  Assistance: Stand by assistance  Quality of Gait: Slow steady gait with no LOB. Gait Deviations: Slow Emily,Increased NELDA,Decreased step length,Decreased step height  Distance: 283' AM and PM  Comments: Pt reports feeling fatigued following ambulation. Seated rest break needed    OT:  ADL  Feeding: Setup,Increased time to complete,Dentures  Grooming: Supervision  UE Bathing: Supervision,Setup (seated sink side)  LE Bathing: Supervision,Setup (Standing for teofilo care, declines BLEs)  UE Dressing: Supervision (seated )  LE Dressing: Minimal assistance (A for TEDs and doffing pant off R foot )  Toileting: Stand by assistance (standing for teofilo care)  Additional Comments: pt standing at sink with RW and wc behind for rest breaks as needed throughout ADLs this AM    Instrumental ADL's  Instrumental ADLs: Yes     Balance  Sitting Balance: Modified independent   Standing Balance: Stand by assistance (w/RW)   Standing Balance  Time: 1-2 mins x2, <1 min 2  Activity: Functional mobility item retrieval in room in prep for ADLs, functional transfers dressing tasks   Comment: 0-2 UE support, no LOB noted however some safety concern choices. Functional Mobility  Functional - Mobility Device: Rolling Walker  Activity: To/from bathroom,Retrieve [employer]  Assist Level: Stand by assistance  Functional Mobility Comments: Pt engaged in functional mobility in/out restroom and around room to gather/transport neccessary items needed in order to engage in ADLs. Pt req 2-3 vc for walker mgt/positioning during task to increase safety and indpendnence with F carryover noted.    Apparatus Needs  Apparatus Needs: O2  Bed mobility  Rolling to Left: Stand by assistance  Rolling to Right: Stand by assistance  Supine to Sit: Modified independent  Sit to Supine: Modified independent  Scooting: Stand by assistance  Comment: Bed flat this date   Transfers  Stand Step Transfers: Minimal assistance  Sit to stand: Stand by assistance  Stand to sit: Stand by assistance  Transfer Comments: VC for hand placement and walker positioning G return    Toilet Transfers  Toilet - Technique: Ambulating  Equipment Used: Raised toilet seat without rails  Toilet Transfer: Stand by assistance  Toilet Transfers Comments: No LOB noted, vc for walker postitioning for increased safety     Shower Transfers  Shower - Transfer From: Johnnie Rader ()  Shower - Transfer Type: To and From  Shower - Transfer To: Transfer tub bench  Shower - Technique: Ambulating  Shower Transfers: Minimal assistance,Verbal cues  Shower Transfers Comments: Pt daughter states she provided supervision when pt showers at home. She has shower chair for use in tub unit.   (Recommended TTB for increased safety/ease. )       SPEECH:      Current Medications:   Current Facility-Administered Medications: calcium carbonate (TUMS) chewable tablet 500 mg, 500 mg, Oral, TID PRN  magnesium hydroxide (MILK OF MAGNESIA) 400 MG/5ML suspension 15 mL, 15 mL, Oral, Daily PRN  HYDROcodone-acetaminophen (NORCO) 5-325 MG per tablet 2 tablet, 2 tablet, Oral, Daily PRN  HYDROcodone-acetaminophen (NORCO) 5-325 MG per tablet 1 tablet, 1 tablet, Oral, BID PRN  loperamide (IMODIUM) capsule 2 mg, 2 mg, Oral, 4x Daily PRN  enoxaparin (LOVENOX) injection 30 mg, 30 mg, SubCUTAneous, Daily  ondansetron (ZOFRAN-ODT) disintegrating tablet 4 mg, 4 mg, Oral, Q8H PRN **OR** ondansetron (ZOFRAN) injection 4 mg, 4 mg, IntraVENous, Q6H PRN  polyethylene glycol (GLYCOLAX) packet 17 g, 17 g, Oral, Daily PRN  albuterol sulfate  (90 Base) MCG/ACT inhaler 2 puff, 2 puff, Inhalation, Q6H PRN  amitriptyline (ELAVIL) tablet 100 mg, 100 mg, Oral, Nightly  amLODIPine (NORVASC) tablet 10 mg, 10 mg, Oral, Daily  atorvastatin (LIPITOR) tablet 20 mg, 20 mg, Oral, Daily  dextrose 5 % solution, 100 mL/hr, IntraVENous, PRN  dextrose 50 % IV solution, 12.5 g, IntraVENous, PRN  gabapentin (NEURONTIN) capsule 100 mg, 100 mg, Oral, Daily  glucagon (rDNA) injection 1 mg, 1 mg, IntraMUSCular, PRN  glucose (GLUTOSE) 40 % oral gel 15 g, 15 g, Oral, PRN  lisinopril (PRINIVIL;ZESTRIL) tablet 40 mg, 40 mg, Oral, Daily  metoprolol tartrate (LOPRESSOR) tablet 50 mg, 50 mg, Oral, BID  pantoprazole (PROTONIX) tablet 40 mg, 40 mg, Oral, QAM AC  sodium chloride flush 0.9 % injection 10 mL, 10 mL, IntraVENous, PRN  traMADol (ULTRAM) tablet 50 mg, 50 mg, Oral, Q6H PRN  acetaminophen (TYLENOL) tablet 650 mg, 650 mg, Oral, Q4H PRN  senna (SENOKOT) tablet 17.2 mg, 2 tablet, Oral, Daily PRN  bisacodyl (DULCOLAX) suppository 10 mg, 10 mg, Rectal, Daily PRN      Objective:  BP (!) 121/59   Pulse 72   Temp 98.5 °F (36.9 °C) (Oral)   Resp 18   Ht 4' 11.02\" (1.499 m)   Wt 135 lb (61.2 kg)   SpO2 95%   BMI 27.25 kg/m²       GEN: Well developed, well nourished, no acute distress  HEENT: NCAT. EOM grossly intact. Hearing grossly intact. Mucous membranes pink and moist.  RESP: Normal breath sounds with no wheezing, rales, or rhonchi. Respirations WNL and unlabored. CV: Regular rate and rhythm. No murmurs, rubs, or gallops. ABD: Soft, non-distended, BS+ and equal.  NEURO: Alert. Speech fluent. Sensation to light touch intact. MSK:  Muscle tone and bulk are normal bilaterally. Strength 4/5 in bilateral upper limbs. Strength 4+/5 in bilateral lower limbs. LIMBS: No edema in bilateral lower limbs. SKIN: Warm and dry with good turgor. PSYCH: Mood WNL. Affect WNL. Appropriately interactive.     Diagnostics:     CBC:   Recent Labs     02/19/22  0644   WBC 7.3   RBC 3.87*   HGB 12.2   HCT 35.3*   MCV 91.3   RDW 13.6    BMP:   Recent Labs     02/19/22  0644      K 3.8   CL 98   CO2 29   BUN 17   CREATININE 1.24*   GLUCOSE 118*     BNP: No results for input(s): BNP in the last 72 hours. PT/INR: No results for input(s): PROTIME, INR in the last 72 hours. APTT: No results for input(s): APTT in the last 72 hours. CARDIAC ENZYMES: No results for input(s): CKMB, CKMBINDEX, TROPONINT in the last 72 hours. Invalid input(s): CKTOTAL;3  FASTING LIPID PANEL:  Lab Results   Component Value Date    CHOL 235 (H) 01/04/2012    HDL 74 (A) 11/06/2021    TRIG 119 01/04/2012     LIVER PROFILE: No results for input(s): AST, ALT, ALB, BILIDIR, BILITOT, ALKPHOS in the last 72 hours. Impression/Plan:   Impaired ADLs, gait, and mobility due to:    1. Acute exacerbation of multiple sclerosis with impaired gait:  S/p steroid treatment in acute care. PT/OT  for gait, mobility, strengthening, endurance, ADLs, and self care. 2. HTN/Hyperlipidemia: On amlodipine, atorvastatin, lisinopril. IM discontinued hydrochlorothiazide on 2/19. 3. Neuropathy: On amitriptyline  4. GERD: On pantoprazole  5. Hyperglycemia:  Resolved after steroid discontinued. Was on Lantus, sliding scale - IM has discontinued. 6. Chronic daily headache: Norco and Ultram prn pain, on amitriptyline. Follows outpatient with pain management. 7. Hypokalemia: Resolved  8. Diarrhea: Infectious - improved on Cipro - completed on 2/17/22, has imodium prn. Discontinued routine stool softeners - docusate and polyethylene glycol 2/14. Has milk of magnesia prn.  9. Bowel Management: Miralax daily prn, Senokot prn, Dulcolax prn  10. Internal medicine for medical management  11.  DVT prophylaxis:  On lovenox      Electronically signed by Alissa Ngo MD on 2/19/2022 at 8:34 PM

## 2022-02-20 PROCEDURE — 97110 THERAPEUTIC EXERCISES: CPT

## 2022-02-20 PROCEDURE — 97530 THERAPEUTIC ACTIVITIES: CPT

## 2022-02-20 PROCEDURE — 1180000000 HC REHAB R&B

## 2022-02-20 PROCEDURE — 99232 SBSQ HOSP IP/OBS MODERATE 35: CPT | Performed by: STUDENT IN AN ORGANIZED HEALTH CARE EDUCATION/TRAINING PROGRAM

## 2022-02-20 PROCEDURE — 6370000000 HC RX 637 (ALT 250 FOR IP)

## 2022-02-20 PROCEDURE — 97116 GAIT TRAINING THERAPY: CPT

## 2022-02-20 PROCEDURE — 6370000000 HC RX 637 (ALT 250 FOR IP): Performed by: STUDENT IN AN ORGANIZED HEALTH CARE EDUCATION/TRAINING PROGRAM

## 2022-02-20 PROCEDURE — 6360000002 HC RX W HCPCS

## 2022-02-20 PROCEDURE — 97535 SELF CARE MNGMENT TRAINING: CPT

## 2022-02-20 PROCEDURE — 6370000000 HC RX 637 (ALT 250 FOR IP): Performed by: PHYSICAL MEDICINE & REHABILITATION

## 2022-02-20 PROCEDURE — 99231 SBSQ HOSP IP/OBS SF/LOW 25: CPT | Performed by: INTERNAL MEDICINE

## 2022-02-20 RX ORDER — AMLODIPINE BESYLATE 10 MG/1
10 TABLET ORAL DAILY
Qty: 30 TABLET | Refills: 0 | Status: SHIPPED | OUTPATIENT
Start: 2022-02-21 | End: 2022-02-23

## 2022-02-20 RX ORDER — TRAMADOL HYDROCHLORIDE 50 MG/1
50 TABLET ORAL EVERY 6 HOURS PRN
COMMUNITY
Start: 2022-02-20 | End: 2022-03-22 | Stop reason: SDUPTHER

## 2022-02-20 RX ORDER — AMITRIPTYLINE HYDROCHLORIDE 100 MG/1
100 TABLET, FILM COATED ORAL NIGHTLY
Qty: 30 TABLET | Refills: 0 | Status: SHIPPED | OUTPATIENT
Start: 2022-02-20 | End: 2022-03-29 | Stop reason: SDUPTHER

## 2022-02-20 RX ORDER — GABAPENTIN 100 MG/1
100 CAPSULE ORAL DAILY
Qty: 30 CAPSULE | Refills: 0 | Status: SHIPPED | OUTPATIENT
Start: 2022-02-20 | End: 2022-03-29 | Stop reason: SDUPTHER

## 2022-02-20 RX ORDER — LISINOPRIL 40 MG/1
40 TABLET ORAL DAILY
Qty: 30 TABLET | Refills: 0 | Status: SHIPPED | OUTPATIENT
Start: 2022-02-21 | End: 2022-02-23 | Stop reason: SDUPTHER

## 2022-02-20 RX ORDER — SIMVASTATIN 20 MG
20 TABLET ORAL DAILY
Qty: 30 TABLET | Refills: 0 | Status: SHIPPED | OUTPATIENT
Start: 2022-02-20 | End: 2022-03-16 | Stop reason: SDUPTHER

## 2022-02-20 RX ORDER — OMEPRAZOLE 20 MG/1
20 CAPSULE, DELAYED RELEASE ORAL DAILY
Qty: 30 CAPSULE | Refills: 0 | Status: SHIPPED | OUTPATIENT
Start: 2022-02-20 | End: 2022-03-03 | Stop reason: ALTCHOICE

## 2022-02-20 RX ORDER — POLYETHYLENE GLYCOL 3350 17 G/17G
17 POWDER, FOR SOLUTION ORAL DAILY
Status: DISCONTINUED | OUTPATIENT
Start: 2022-02-20 | End: 2022-02-21 | Stop reason: HOSPADM

## 2022-02-20 RX ORDER — METOPROLOL TARTRATE 50 MG/1
50 TABLET, FILM COATED ORAL 2 TIMES DAILY
Qty: 60 TABLET | Refills: 0 | Status: SHIPPED | OUTPATIENT
Start: 2022-02-21 | End: 2022-02-23

## 2022-02-20 RX ORDER — POLYETHYLENE GLYCOL 3350 17 G/17G
17 POWDER, FOR SOLUTION ORAL DAILY PRN
COMMUNITY
Start: 2022-02-20

## 2022-02-20 RX ORDER — ACETAMINOPHEN 325 MG/1
650 TABLET ORAL EVERY 6 HOURS PRN
COMMUNITY
Start: 2022-02-20 | End: 2022-03-07

## 2022-02-20 RX ADMIN — LISINOPRIL 40 MG: 20 TABLET ORAL at 07:15

## 2022-02-20 RX ADMIN — METOPROLOL TARTRATE 50 MG: 50 TABLET, FILM COATED ORAL at 07:15

## 2022-02-20 RX ADMIN — AMLODIPINE BESYLATE 10 MG: 10 TABLET ORAL at 07:15

## 2022-02-20 RX ADMIN — BISACODYL 10 MG: 10 SUPPOSITORY RECTAL at 18:00

## 2022-02-20 RX ADMIN — PANTOPRAZOLE SODIUM 40 MG: 40 TABLET, DELAYED RELEASE ORAL at 06:14

## 2022-02-20 RX ADMIN — ATORVASTATIN CALCIUM 20 MG: 20 TABLET, FILM COATED ORAL at 07:15

## 2022-02-20 RX ADMIN — POLYETHYLENE GLYCOL 3350 17 G: 17 POWDER, FOR SOLUTION ORAL at 11:45

## 2022-02-20 RX ADMIN — AMITRIPTYLINE HYDROCHLORIDE 100 MG: 50 TABLET, FILM COATED ORAL at 20:44

## 2022-02-20 RX ADMIN — GABAPENTIN 100 MG: 100 CAPSULE ORAL at 09:18

## 2022-02-20 RX ADMIN — TRAMADOL HYDROCHLORIDE 50 MG: 50 TABLET, COATED ORAL at 06:14

## 2022-02-20 RX ADMIN — ENOXAPARIN SODIUM 30 MG: 100 INJECTION SUBCUTANEOUS at 07:25

## 2022-02-20 RX ADMIN — HYDROCODONE BITARTRATE AND ACETAMINOPHEN 1 TABLET: 5; 325 TABLET ORAL at 20:47

## 2022-02-20 ASSESSMENT — PAIN DESCRIPTION - ONSET: ONSET: ON-GOING

## 2022-02-20 ASSESSMENT — PAIN DESCRIPTION - PROGRESSION: CLINICAL_PROGRESSION: NOT CHANGED

## 2022-02-20 ASSESSMENT — PAIN SCALES - GENERAL
PAINLEVEL_OUTOF10: 8
PAINLEVEL_OUTOF10: 0
PAINLEVEL_OUTOF10: 8

## 2022-02-20 ASSESSMENT — PAIN DESCRIPTION - LOCATION: LOCATION: HEAD

## 2022-02-20 ASSESSMENT — PAIN DESCRIPTION - FREQUENCY: FREQUENCY: INTERMITTENT

## 2022-02-20 ASSESSMENT — PAIN DESCRIPTION - PAIN TYPE: TYPE: ACUTE PAIN

## 2022-02-20 ASSESSMENT — PAIN DESCRIPTION - ORIENTATION: ORIENTATION: POSTERIOR;DISTAL

## 2022-02-20 ASSESSMENT — PAIN - FUNCTIONAL ASSESSMENT: PAIN_FUNCTIONAL_ASSESSMENT: ACTIVITIES ARE NOT PREVENTED

## 2022-02-20 ASSESSMENT — PAIN DESCRIPTION - DESCRIPTORS: DESCRIPTORS: ACHING

## 2022-02-20 NOTE — PROGRESS NOTES
Physical Medicine & Rehabilitation  Progress Note      Subjective:      Tabby Mane is a 80 y.o. female with acute exacerbation of multiple sclerosis. She reports doing well today. She states that she is ready to go home tomorrow. She did not have any bowel movements yesterday or this morning - discussed trialing additional medications to help her have a bowel movement before she goes home. She denies any acute concerns. ROS:  Denies fevers, chills, sweats. No chest pain, palpitations, lightheadedness. Denies coughing, wheezing or shortness of breath.  +constipation; Denies abdominal pain, nausea  No new areas of joint pain. Denies new areas of numbness or weakness. Denies new anxiety or depression issues. No new skin problems. Rehabilitation:   Progressing in therapies. PT:  Restrictions/Precautions: Fall Risk,General Precautions,Contact Precautions  Implants present? : Metal implants  Other position/activity restrictions: -   Transfers  Sit to Stand: Supervision  Stand to sit: Supervision  Bed to Chair: Supervision  Stand Pivot Transfers: Supervision  Squat Pivot Transfers: Contact guard assistance  Comment: uses RW  Ambulation 1  Surface: level tile,ramp  Device: Rolling Walker  Other Apparatus: O2 (2L)  Assistance: Stand by assistance  Quality of Gait: Slow steady gait with no LOB. Gait Deviations: Slow Emily,Increased NELDA,Decreased step length,Decreased step height  Distance: 443' 6MWT  Comments: Pt ambulated 135' in 2MWT. Pt finished ambulation with 30 seconds left in 6MWT    Transfers  Sit to Stand: Supervision  Stand to sit: Supervision  Bed to Chair: Supervision  Stand Pivot Transfers: Supervision  Squat Pivot Transfers: Contact guard assistance  Comment: uses RW  Ambulation  Ambulation?: Yes  Ambulation 1  Surface: level tile,ramp  Device: Rolling Walker  Other Apparatus: O2 (2L)  Assistance: Stand by assistance  Quality of Gait: Slow steady gait with no LOB.     Gait Deviations: Slow Emily,Increased NELDA,Decreased step length,Decreased step height  Distance: 443' 6MWT  Comments: Pt ambulated 135' in 2MWT. Pt finished ambulation with 30 seconds left in 6MWT    Surface: level tile,ramp  Ambulation 1  Surface: level tile,ramp  Device: Rolling Walker  Other Apparatus: O2 (2L)  Assistance: Stand by assistance  Quality of Gait: Slow steady gait with no LOB. Gait Deviations: Slow Emily,Increased NELDA,Decreased step length,Decreased step height  Distance: 443' 6MWT  Comments: Pt ambulated 135' in 2MWT. Pt finished ambulation with 30 seconds left in 6MWT    OT:  ADL  Feeding: Setup,Increased time to complete,Dentures  Grooming: Supervision (seated in w/c sinklevel )  UE Bathing: Supervision (seated in wc sink level )  LE Bathing: None (declines)  UE Dressing: Supervision  LE Dressing: Minimal assistance (A for TEDs only, able to don/doff pants/underwear figure 4 )  Toileting: None  Additional Comments: pt standing at sink with RW and wc behind for rest breaks as needed throughout ADLs this AM    Instrumental ADL's  Instrumental ADLs: Yes     Balance  Sitting Balance: Modified independent   Standing Balance: Stand by assistance   Standing Balance  Time: AM: <1 min x2 and 5-6 mins  Activity: Functional mobility item retrieval in room in prep for ADLs, functional transfers dressing tasks   Comment: 0-1 UE support with R/W and sink for balance maintenence, slightly shakiness n BLEs with no LOB noted   Functional Mobility  Functional - Mobility Device: Rolling Walker  Activity: To/from bathroom,Retrieve [employer]  Assist Level: Stand by assistance  Functional Mobility Comments: Pt engaged in functional mobility in/out restroom and around room to gather/transport neccessary items needed in order to engage in ADLs. Pt req 1-2 vc for walker mgt/positioning during task to increase safety and indpendnence with F carryover noted. Continued education on R/W mgt and safety recommended. PRN  amitriptyline (ELAVIL) tablet 100 mg, 100 mg, Oral, Nightly  amLODIPine (NORVASC) tablet 10 mg, 10 mg, Oral, Daily  atorvastatin (LIPITOR) tablet 20 mg, 20 mg, Oral, Daily  dextrose 5 % solution, 100 mL/hr, IntraVENous, PRN  dextrose 50 % IV solution, 12.5 g, IntraVENous, PRN  gabapentin (NEURONTIN) capsule 100 mg, 100 mg, Oral, Daily  glucagon (rDNA) injection 1 mg, 1 mg, IntraMUSCular, PRN  glucose (GLUTOSE) 40 % oral gel 15 g, 15 g, Oral, PRN  lisinopril (PRINIVIL;ZESTRIL) tablet 40 mg, 40 mg, Oral, Daily  metoprolol tartrate (LOPRESSOR) tablet 50 mg, 50 mg, Oral, BID  pantoprazole (PROTONIX) tablet 40 mg, 40 mg, Oral, QAM AC  sodium chloride flush 0.9 % injection 10 mL, 10 mL, IntraVENous, PRN  traMADol (ULTRAM) tablet 50 mg, 50 mg, Oral, Q6H PRN  acetaminophen (TYLENOL) tablet 650 mg, 650 mg, Oral, Q4H PRN  senna (SENOKOT) tablet 17.2 mg, 2 tablet, Oral, Daily PRN  bisacodyl (DULCOLAX) suppository 10 mg, 10 mg, Rectal, Daily PRN      Objective:  /78   Pulse 75   Temp 98.2 °F (36.8 °C)   Resp 16   Ht 4' 11.02\" (1.499 m)   Wt 135 lb (61.2 kg)   SpO2 97%   BMI 27.25 kg/m²       GEN: Well developed, well nourished, no acute distress  HEENT: NCAT. EOM grossly intact. Hearing grossly intact. Mucous membranes pink and moist.  RESP: Normal breath sounds with no wheezing, rales, or rhonchi. Respirations WNL and unlabored. CV: Regular rate and rhythm. No murmurs, rubs, or gallops. ABD: Soft, non-distended, BS+ and equal.  NEURO: Alert. Speech fluent. Sensation to light touch intact. MSK:  Muscle tone and bulk are normal bilaterally. Strength 4/5 in bilateral upper limbs. Strength 4+/5 in bilateral lower limbs. LIMBS: No edema in bilateral lower limbs. SKIN: Warm and dry with good turgor. PSYCH: Mood WNL. Affect WNL. Appropriately interactive.     Diagnostics:     CBC:   Recent Labs     02/19/22  0644   WBC 7.3   RBC 3.87*   HGB 12.2   HCT 35.3*   MCV 91.3   RDW 13.6        BMP: Recent Labs     02/19/22  0644      K 3.8   CL 98   CO2 29   BUN 17   CREATININE 1.24*   GLUCOSE 118*     BNP: No results for input(s): BNP in the last 72 hours. PT/INR: No results for input(s): PROTIME, INR in the last 72 hours. APTT: No results for input(s): APTT in the last 72 hours. CARDIAC ENZYMES: No results for input(s): CKMB, CKMBINDEX, TROPONINT in the last 72 hours. Invalid input(s): CKTOTAL;3  FASTING LIPID PANEL:  Lab Results   Component Value Date    CHOL 235 (H) 01/04/2012    HDL 74 (A) 11/06/2021    TRIG 119 01/04/2012     LIVER PROFILE: No results for input(s): AST, ALT, ALB, BILIDIR, BILITOT, ALKPHOS in the last 72 hours. Impression/Plan:   Impaired ADLs, gait, and mobility due to:    1. Acute exacerbation of multiple sclerosis with impaired gait:  S/p steroid treatment in acute care. PT/OT  for gait, mobility, strengthening, endurance, ADLs, and self care. 2. HTN/Hyperlipidemia: On amlodipine, atorvastatin, lisinopril. IM discontinued hydrochlorothiazide on 2/19. 3. Neuropathy: On amitriptyline  4. GERD: On pantoprazole  5. Hyperglycemia:  Resolved after steroid discontinued. Was on Lantus, sliding scale - IM has discontinued. 6. Chronic daily headache: Norco and Ultram prn pain, on amitriptyline. Follows outpatient with pain management. 7. Hypokalemia: Resolved  8. Diarrhea/constipation: Had infectious diarrhea - improved on Cipro - completed on 2/17/22, has imodium prn. Discontinued routine stool softeners - docusate and polyethylene glycol 2/14 - changed miralax back to scheduled on 2/20 due to constipation. Has milk of magnesia prn. Will plan to give dulcolax suppository this afternoon/evening for constipation. 9. Internal medicine for medical management  10.  DVT prophylaxis:  On lovenox      Electronically signed by Janiya Fragoso MD on 2/20/2022 at 2:08 PM

## 2022-02-20 NOTE — PROGRESS NOTES
[address]   ACUTE REHABILITATION OCCUPATIONAL THERAPY  DAILY NOTE    Date: 22  Patient Name: Rommel Hyde      Room: 5768/6291-25    MRN: [id number]   : 1934  (80 y.o.)  Gender: female   Referring Practitioner: Dr Tomy Temple  Diagnosis: MS exacerbation, recurrent UTI  Additional Pertinent Hx: Multiple Sclerosis since she was 52years old    Restrictions  Restrictions/Precautions: Fall Risk,General Precautions,Contact Precautions  Implants present? : Metal implants (R TSA)  Other position/activity restrictions: NO O2 today  Required Braces or Orthoses?: Yes (Pt has a back brace that she wears PRN)    Subjective  Comments: Pt pleasant and cooperative for session. Patient Currently in Pain: Denies  Restrictions/Precautions: Fall Risk;General Precautions;Contact Precautions  Overall Orientation Status: Within Functional Limits       Objective  Cognition  Overall Cognitive Status: Impaired  Attention Span: Appears intact  Following Commands: Follows one step commands without difficulty  Safety Judgement: Decreased awareness of need for safety  Insights: Decreased awareness of deficits  Perception  Overall Perceptual Status: WFL  Balance  Sitting Balance: Modified independent   Standing Balance: Stand by assistance  Bed mobility  Supine to Sit: Modified independent  Sit to Supine: Modified independent  Scooting: Supervision (to EOB)  Comment: Bed flat and bed rails   Transfers  Sit to stand: Stand by assistance  Stand to sit: Stand by assistance  Standing Balance  Time: AM: <1 min x2 and 5-6 mins;PM <1 min 4 and 1-2 mins x4  Activity: Functional mobility item retrieval in room in prep for ADLs, functional transfers dressing tasks   Comment: 0-1 UE support with R/W and sink for balance maintenence, slightly shakiness n BLEs with no LOB noted   Functional Mobility  Functional - Mobility Device: Rolling Walker  Activity: To/from bathroom; Retrieve items;Transport items  Assist Level: Stand by assistance  Functional Mobility Comments: Pt engaged in functional mobility in/out restroom and around room to gather/transport neccessary items needed in order to engage in ADLs. Pt req 1-2 vc for walker mgt/positioning during task to increase safety and indpendnence with F carryover noted. Continued education on R/W mgt and safety recommended. Toilet Transfers  Toilet - Technique: Ambulating  Equipment Used: Standard toilet (GB on wall)  Toilet Transfer: Stand by assistance  Toilet Transfers Comments: No LOB noted, vc for walker postitioning for increased safety  Tub Transfers  Tub - Transfer From: Rolling walker  Tub - Transfer Type: To and From  Tub - Transfer To: Shower seat with back  Tub - Technique: Ambulating  Tub Transfers: Minimal assistance  Tub Transfers Comments: WHITE faciliatetd in tub transfers this PM to increase safety and independence in ADLs. WHITE provided verbal instruction and demonstraion of transfer with pt able to redemo with MIN A for balance and over tub threshold, req vc for safety and sequencing of task. Pt displayed diffulculty lifting BLEs over tub threshold, no LOB noted. Pt then states that her tub/threshold is lower ~2-4 inches off the ground and she is able to lift legs over tub with ease, Pt also verbalized that daughter will be present during shower for increased safety. Exercises  Grasp/Release: Spring hand gripper with 1st setting for x10 reps with bren hands facilitated to support hand/ strength needed to participate in ADLs and IADLs independently. Other: Red thera band (up/down/twist) utilized to support bren hand strength. Pt comleted each exercise fo x10 reps RB req PRN due to fatigue/weakness. Additional Activities: WHITE facilitated home safety/fall prevention education  to support independence and safety at private residence. Pt verbalized G reception of all education with handout provided.               .  ADL  Grooming: Supervision (seated in w/c sinklevel )  UE Bathing: Supervision (seated in wc sink level )  LE Bathing: Supervision (used figure 4 tech for BLEs and SBA for teofilo care)  UE Dressing: Supervision  LE Dressing: Minimal assistance (A for TEDs only, able to don/doff pants/underwear figure 4 )  Toileting: Stand by assistance          Assessment  Performance deficits / Impairments: Decreased functional mobility ; Decreased ADL status; Decreased strength;Decreased safe awareness;Decreased coordination;Decreased endurance;Decreased balance  Prognosis: Good  Discharge Recommendations: Patient would benefit from continued therapy after discharge  Activity Tolerance: Patient Tolerated treatment well  Safety Devices in place: Yes  Type of devices: Call light within reach; All fall risk precautions in place;Gait belt;Left in bed;Left in chair          Patient Goals   Patient goals : Regain abiilty to care for self to return Home  Learner:patient  Method: demonstration, explanation and handout       Outcome: needs reinforcement        Plan  Plan  Times per week: 5-7  Times per day: Twice a day  Current Treatment Recommendations: [id number] [facility] [employer] Management,Patient/Caregiver Education & Training,Self-Care / ADL,Home Management Training  Plan Comment: Fatigues with activity, New use of Oxygen; Patient Goals   Patient goals : Regain abiilty to care for self to return Home  Short term goals  Time Frame for Short term goals: One week  Short term goal 1: Pt will complete LB ADL's with CGA and Good safety using AE if needed. Short term goal 2: Pt will complete toilet transfer and toileting with CGA and Good safety using least restrictive device.   Short term goal 3: Pt will tolerate standing for 3-4 minutes with 1-2 UE support and CGA while completing a functional task to support participation in care  Short term goal 4: Pt will V/D at least 3 fall prevention strategies that she can utilize during daily routines. Short term goal 5: Pt will actively participate in 15-20 minutes of therapeutic exercise/activity to promote increased independence and safety with self-care and mobility. Long term goals  Time Frame for Long term goals : By Discharge  Long term goal 1: Setup assist for self care tasks of athing and dressing  Long term goal 2: Mod I for Fucntional Self care transfers  Long term goal 3: Mod I for light meal prep and household mobility  Long term goal 4:  Independent use of Home program for Hand/UE strengthening and conditioning to support continued indenepdnent in ADL tasks        02/20/22 1011 02/20/22 1337   OT Individual Minutes   Time In 1011 1337   Time Out [id number]   Minutes 60 35     Electronically signed by RISSA Simon on 2/20/22 at 3:50 PM EST

## 2022-02-20 NOTE — PROGRESS NOTES
[facility]    CONSULTATION / HISTORY AND PHYSICAL EXAMINATION            Date:   2/20/2022  Patient name:  Pretty Archer  Date of admission:  2/11/2022  5:26 PM  MRN:   [id number]  Account:  [de-identified]  YOB: 1934  PCP:    Brent Samuels MD  Room:   [id number]  Code Status:    Full Code    Physician Requesting Consult: Malachi Kehr Abou-Chakra*    Reason for Consult: Medical management    Chief Complaint:     No chief complaint on file. Debility    History Obtained From:     patient, electronic medical record    History of Present Illness/ Interval History:     80 y.o. Non- / non  female who initially presented with MS flareup with facial droop unsteady gait, difficulty swallowing concern for UTI initially but further urinalysis not consistent with UTI antibiotics discontinued during admission. Patient was diagnosed with multiple sclerosis at the age of 44. She was seen by neurology and started on methylprednisone  Past medical history significant for multiple sclerosis, hypertension, chronic osteoarthritis of the spine.       Past Medical History:     Past Medical History:   Diagnosis Date    Acute cystitis without hematuria 10/1/2017    Arthritis     Chronic daily headache     GERD (gastroesophageal reflux disease)     Hyperlipidemia     Hypertension     Moderate malnutrition ([address] 3/2/2019    Multiple sclerosis ([address]     Neuropathy     Pneumonia 2017    states had double pneumonia    Vitamin D deficiency         Past Surgical History:     Past Surgical History:   Procedure Laterality Date    CATARACT REMOVAL WITH IMPLANT Right 11/6/2014    [facility]    CATARACT REMOVAL WITH IMPLANT Left 12/2/2014    [facility]    CERVICAL DISC SURGERY      CYSTOCELE REPAIR      HYSTERECTOMY      RECTOCELE REPAIR      SHOULDER ARTHROPLASTY Right 04/20/2017    SHOULDER SURGERY Right 2017 Medications Prior to Admission:     Prior to Admission medications    Medication Sig Start Date End Date Taking? Authorizing Provider   omeprazole (PRILOSEC) 20 MG delayed release capsule Take 1 capsule by mouth daily 2/1/22  Yes Pauly Dorsey MD   docusate sodium (COLACE) 100 MG capsule Take 1 capsule by mouth daily 2/1/22  Yes Pauly Dorsey MD   celecoxib (CELEBREX) 200 MG capsule Take 1 capsule by mouth daily 1/17/22 1/17/23 Yes Pauly Dorsey MD   predniSONE (DELTASONE) 10 MG tablet 4 tabs by mouth daily for 3 days, then 3 tabs daily for 3 days, then 2 tabs daily for 3 days, then 1 tab daily till gone. 11/29/21  Yes Pauly Dorsey MD   predniSONE (DELTASONE) 10 MG tablet Take 1 tablet by mouth daily 10/26/21  Yes Pauly Dorsey MD   HYDROcodone-acetaminophen (NORCO) 5-325 MG per tablet Take 1 tablet by mouth every 8 hours as needed. 9/14/21  Yes Historical Provider, MD   amitriptyline (ELAVIL) 100 MG tablet TAKE ONE TABLET BY MOUTH ONCE NIGHTLY 9/20/21  Yes Pauly Dorsey MD   simvastatin (ZOCOR) 20 MG tablet Take 1 tablet by mouth nightly 8/23/21  Yes Pauly Dorsey MD   calcipotriene (DOVONEX) 0.005 % cream Apply topically 2 times daily. 4/21/21  Yes Pauly Dorsey MD   clobetasol (TEMOVATE) 0.05 % cream Apply topically 2 times daily. 3/30/21  Yes Pauly Dorsey MD   gabapentin (NEURONTIN) 100 MG capsule Take 1 capsule by mouth 2 times daily for 30 days. Patient taking differently: Take 100 mg by mouth 2 times daily as needed.   3/11/21 2/11/22 Yes Nolan Osborn MD   albuterol sulfate HFA (VENTOLIN HFA) 108 (90 Base) MCG/ACT inhaler Inhale 2 puffs into the lungs every 6 hours as needed for Wheezing 3/11/21 3/11/22 Yes Nolan Osborn MD   Handicap [place] [id number] [facility] by Does not apply route Exp: 5/20/2026 5/24/21   Pauly Dorsey MD        Allergies:     Bactrim [sulfamethoxazole-trimethoprim], Other, Pcn [penicillins], Lidoderm [lidocaine], and Macrobid [nitrofurantoin]    Social History:     Tobacco:    reports that she has never smoked. She has never used smokeless tobacco.  Alcohol:      reports no history of alcohol use. Drug Use:  reports no history of drug use. Family History:     No family history on file. Review of Systems:     Positive and Negative as described in HPI. Denies any shortness of breath or cough  Denies chest pain or palpitations  Denies abdominal pain, diarrhea vomiting  Denies any new numbness tremors or weakness. Physical Exam:     /78   Pulse 75   Temp 98.2 °F (36.8 °C)   Resp 16   Ht 4' 11.02\" (1.499 m)   Wt 135 lb (61.2 kg)   SpO2 97%   BMI 27.25 kg/m²   Temp (24hrs), Av.4 °F (36.9 °C), Min:98.2 °F (36.8 °C), Max:98.5 °F (36.9 °C)      General appearance:  alert, cooperative and no distress  Eyes: Anicteric sclera. Pupils are equally round and reactive to light. Extraocular movements are intact.   Lungs:  clear to auscultation bilaterally, normal effort  Heart:  regular rate and rhythm, systolic aorticmurmur present  Abdomen:  soft, nontender, nondistended, normal bowel sounds, no masses, hepatomegaly, splenomegaly  Extremities:  no edema, redness, tenderness in the calves  Skin:  no gross lesions, rashes, induration  Neuro:  Alert, oriented X 3, no new focal weakness    Investigations:      Laboratory Testing:  Lab Results   Component Value Date    WBC 7.3 2022    HGB 12.2 2022    HCT 35.3 (L) 2022    MCV 91.3 2022     2022     Lab Results   Component Value Date     2022    K 3.8 2022    CL 98 2022    CO2 29 2022    BUN 17 2022    CREATININE 1.24 2022    GLUCOSE 118 2022    GLUCOSE 112 2012    CALCIUM 8.8 2022         Assessment :      Primary Problem  <principal problem not specified>    Active Hospital Problems    Diagnosis Date Noted    Multiple sclerosis exacerbation ([facility] [G35] 2021    Diarrhea of presumed infectious origin [R19.7] 02/25/2021       Plan: Active Problems:    Multiple sclerosis exacerbation (HCC)    Diarrhea of presumed infectious origin  Resolved Problems:    Hypokalemia        1. Symptoms of MS exacerbation -completed pulse dose steroids 2/13  2. Diarrhea resolved  3. Type 2 diabetes-blood sugar low normal; methylprednisone course has been completed, will DC Lantus and sliding scale. Patient was not on any diabetes medication prior to presentation HbA1c 6.4 on 11/6/2021  4. Hypertension= suboptimal control-on Norvasc, lisinopril, metoprolol, add HCTZ  5. Systolic murmur-moderate aortic insufficiency      DVT prophylaxis-Lovenox        2/19  1 reading of low blood pressure noted today systolic 90, creatinine bump to 1.24 from 1.08; also patient complaining of lightheadedness, will discontinue hydrochlorothiazide    2/20  Blood pressure normal continue current management  Patient reports no new complaints. Engaging with physical therapy. Labs and vitals reviewed. No new issues. Continue with current care. Consultations:   Michael Alvares MD  2/20/2022  3:04 PM    Copy sent to Dr. Rima Li MD    Please note that this chart was generated using voice recognition Dragon dictation software. Although every effort was made to ensure the accuracy of this automated transcription, some errors in transcription may have occurred.

## 2022-02-20 NOTE — PROGRESS NOTES
Physical Therapy  Facility/Department: [facility] ACUTE REHAB  Daily Treatment Note  NAME: Martha Goldstein  : 1934  MRN: [id number]    Date of Service: 2022    Discharge Recommendations:  Patient would benefit from continued therapy after discharge        Assessment   Body structures, Functions, Activity limitations: Decreased functional mobility ; Decreased strength;Decreased safe awareness;Decreased endurance;Decreased balance;Decreased posture;Decreased cognition;Decreased ADL status; Decreased coordination; Increased pain  Specific instructions for Next Treatment: progress gait, stairs, balance, nustep, virtual reality  REQUIRES PT FOLLOW UP: Yes  Activity Tolerance  Activity Tolerance: Patient Tolerated treatment well  Activity Tolerance: Rest breaks as needed     Patient Diagnosis(es): There were no encounter diagnoses. has a past medical history of Acute cystitis without hematuria, Arthritis, Chronic daily headache, GERD (gastroesophageal reflux disease), Hyperlipidemia, Hypertension, Moderate malnutrition ([address] Multiple sclerosis ([address] Neuropathy, Pneumonia, and Vitamin D deficiency. has a past surgical history that includes Cystocele repair; Rectocele repair; Hysterectomy; Cervical disc surgery; Cataract removal with implant (Right, 2014); Cataract removal with implant (Left, 2014); Total shoulder arthroplasty (Right, 2017); and shoulder surgery (Right, ). Restrictions  Restrictions/Precautions  Restrictions/Precautions: Fall Risk,General Precautions,Contact Precautions  Required Braces or Orthoses?: Yes  Implants present? : Metal implants  Position Activity Restriction  Other position/activity restrictions: -  Subjective   General  Chart Reviewed: Yes  Additional Pertinent Hx: The patient is a 80 y.o. Non- / non  female who presents with Dizziness and she is admitted to the hospital for the management of multiple sclerosis flareup and UTI.  Patient was diagnosed with multiple sclerosis at the age of 44. Past medical history significant for multiple sclerosis, hypertension, chronic osteoarthritis of the spine. Patient reportedly woke up this morning with worsening facial droop, severe headache, and an unsteady gait. She has history of chronic headaches but this was much worse. She took 2 Norco pills but that did not alleviate the headache. It is located frontally and in the back of her head and neck and throbbing. Patient was taking 10 mg of prednisone daily per her PCP for spinal stenosis and chronic vertebral fracture. She sees a pain medicine specialist who gives her spinal injections. He told her to stop taking the prednisone for 5 days prior to injection. This was her fifth day without taking steroids. In the ED she had a CT and a CTA done. CT showed no acute abnormalities. CTA head and neck shows no hemodynamically significant stenosis. Pt admitted to [facility] 2/11/22  Family / Caregiver Present: No  Referring Practitioner: Papo Victor MD  Subjective  Subjective: Pt reports having a good night of sleep  Pain Screening  Patient Currently in Pain: Yes (headache)  Vital Signs  Patient Currently in Pain: Yes (headache)       Orientation  Orientation  Overall Orientation Status: Within Functional Limits  Cognition      Objective   Bed mobility  Scooting: Supervision  Transfers  Sit to Stand: Supervision  Stand to sit: Supervision  Bed to Chair: Supervision  Stand Pivot Transfers: Supervision  Comment: uses RW  Ambulation  Ambulation?: Yes  Ambulation 1  Surface: level tile;ramp  Device: Rolling Walker  Assistance: Stand by assistance  Quality of Gait: Slow steady gait with no LOB. Gait Deviations: Slow Emily; Increased NELDA; Decreased step length;Decreased step height  Distance: 443' 6MWT (135' 2MWT)  Comments: Pt ambulated 135' in 2MWT.  Pt finished ambulation with 30 seconds left in 6MWT  Stairs/Curb  Stairs?: Yes  Stairs  # Steps : 15  Stairs Height: improve 5xSTS test to 15 seconds or less. Long term goal 7: Pt to improve 2MWT to at least 125' and 6MWT to at least 225' with device, MOD I. Patient Goals   Patient goals :  To get stronger to go home    Plan    Plan  Times per week: 1.5 hr/day, 5-7 days/week  Specific instructions for Next Treatment: progress gait, stairs, balance, nustep, virtual reality  Current Treatment Recommendations: Strengthening,Balance Training,Functional Mobility Training,Transfer Training,Endurance Training,Gait Training,Patient/Caregiver Education & Training,Safety Education & Training,Stair training,Home Exercise Program,Equipment Evaluation, Education, & procurement  Safety Devices  Type of devices: Gait belt,Call light within reach,All fall risk precautions in place,Left in chair,Patient at risk for falls     Therapy Time     02/20/22 0805 02/20/22 1303   PT Individual Minutes   Time In 0805 1303   Time Out 0904 1334   Minutes 61 Bebe 1978, PTA

## 2022-02-20 NOTE — PLAN OF CARE
Problem: Falls - Risk of:  Goal: Will remain free from falls  Description: Will remain free from falls  Outcome: Ongoing  Note: No falls or injuries sustained at this time. No attempts to get out of bed without nursing assistance. Call light within reach and pt. uses appropriately for assistance. Siderails up x 2. Nonskid footwear remains on. Bed in low and locked position. Hourly nursing rounds made. Pt. Alert and oriented, aware of limitations, and exhibits good safety judgement. Pt. uses assistive devices appropriately. Pt. understands individual fall risk factors. Pt. reoriented to surroundings and reminded to use call light with each nurse/patient interaction. Problem: Falls - Risk of:  Goal: Absence of physical injury  Description: Absence of physical injury  Outcome: Ongoing  Note: No falls or injuries sustained at this time. No attempts to get out of bed without nursing assistance. Call light within reach and pt. uses appropriately for assistance. Siderails up x 2. Nonskid footwear remains on. Bed in low and locked position. Hourly nursing rounds made. Pt. Alert and oriented, aware of limitations, and exhibits good safety judgement. Pt. uses assistive devices appropriately. Pt. understands individual fall risk factors. Problem: Skin Integrity:  Goal: Absence of new skin breakdown  Description: Absence of new skin breakdown  Outcome: Ongoing  Note: Skin assessment completed this shift. Nutrition and Hydration status assessed with adequate intake. José Miguel Score as charted. Patient able to reposition self for comfort and to prevent breakdown. Patient verbalizes understanding of pressure ulcer prevention measures. Skin integrity maintained. No new skin breakdown noted. Skin to high risk pressure areas including coccyx and heels are clear.      Problem: Infection:  Goal: Will remain free from infection  Description: Will remain free from infection  Outcome: Ongoing  Note: Patient has no symptoms of infection     Problem: Pain:  Goal: Control of chronic pain  Description: Control of chronic pain  Outcome: Ongoing  Note: Pain assessment completed. Pt. able to rest.  Patient medicated with  [address] for pain this shift as ordered. Patient relates a tolerable level of discomfort with the current medication. Pt. Repositions  per self for comfort. Nonverbal cues indicate pain relief. Pt. Rests quietly with eyes closed after pain medication administration. Respirations easy and unlabored. Appears free from distress. Problem: Nutrition  Goal: Optimal nutrition therapy  Outcome: Ongoing  Note: Patient has no complaints of nutritional needs at this time. Intake and output was done appropriately for shift.

## 2022-02-20 NOTE — PLAN OF CARE
Problem: Falls - Risk of:  Goal: Will remain free from falls  Description: Will remain free from falls  2/20/2022 1034 by Farzad Valerio  Outcome: Ongoing     Problem: Falls - Risk of:  Goal: Absence of physical injury  Description: Absence of physical injury  2/20/2022 1034 by Farzad Valerio  Outcome: Ongoing     Problem: Skin Integrity:  Goal: Will show no infection signs and symptoms  Description: Will show no infection signs and symptoms  Outcome: Ongoing     Problem: Skin Integrity:  Goal: Absence of new skin breakdown  Description: Absence of new skin breakdown  2/20/2022 1034 by RENETTA Collado  Outcome: Ongoing     Problem: Infection:  Goal: Will remain free from infection  Description: Will remain free from infection  2/20/2022 1034 by RENETTA CARABALLO  Outcome: Ongoing     Problem: Nutrition  Goal: Optimal nutrition therapy  2/20/2022 1034 by Farzad Valerio  Outcome: Ongoing

## 2022-02-21 VITALS
HEIGHT: 59 IN | HEART RATE: 81 BPM | OXYGEN SATURATION: 93 % | BODY MASS INDEX: 27.21 KG/M2 | TEMPERATURE: 98.4 F | WEIGHT: 135 LBS | RESPIRATION RATE: 16 BRPM | DIASTOLIC BLOOD PRESSURE: 59 MMHG | SYSTOLIC BLOOD PRESSURE: 121 MMHG

## 2022-02-21 PROCEDURE — 97530 THERAPEUTIC ACTIVITIES: CPT

## 2022-02-21 PROCEDURE — 6360000002 HC RX W HCPCS

## 2022-02-21 PROCEDURE — 6370000000 HC RX 637 (ALT 250 FOR IP)

## 2022-02-21 PROCEDURE — 6370000000 HC RX 637 (ALT 250 FOR IP): Performed by: PHYSICAL MEDICINE & REHABILITATION

## 2022-02-21 PROCEDURE — 97116 GAIT TRAINING THERAPY: CPT

## 2022-02-21 PROCEDURE — 97535 SELF CARE MNGMENT TRAINING: CPT

## 2022-02-21 PROCEDURE — 99238 HOSP IP/OBS DSCHRG MGMT 30/<: CPT | Performed by: STUDENT IN AN ORGANIZED HEALTH CARE EDUCATION/TRAINING PROGRAM

## 2022-02-21 PROCEDURE — 6370000000 HC RX 637 (ALT 250 FOR IP): Performed by: STUDENT IN AN ORGANIZED HEALTH CARE EDUCATION/TRAINING PROGRAM

## 2022-02-21 RX ADMIN — ATORVASTATIN CALCIUM 20 MG: 20 TABLET, FILM COATED ORAL at 07:42

## 2022-02-21 RX ADMIN — ENOXAPARIN SODIUM 30 MG: 100 INJECTION SUBCUTANEOUS at 07:42

## 2022-02-21 RX ADMIN — POLYETHYLENE GLYCOL 3350 17 G: 17 POWDER, FOR SOLUTION ORAL at 07:42

## 2022-02-21 RX ADMIN — METOPROLOL TARTRATE 50 MG: 50 TABLET, FILM COATED ORAL at 07:42

## 2022-02-21 RX ADMIN — AMLODIPINE BESYLATE 10 MG: 10 TABLET ORAL at 07:42

## 2022-02-21 RX ADMIN — GABAPENTIN 100 MG: 100 CAPSULE ORAL at 07:42

## 2022-02-21 RX ADMIN — PANTOPRAZOLE SODIUM 40 MG: 40 TABLET, DELAYED RELEASE ORAL at 06:16

## 2022-02-21 RX ADMIN — HYDROCODONE BITARTRATE AND ACETAMINOPHEN 1 TABLET: 5; 325 TABLET ORAL at 07:46

## 2022-02-21 RX ADMIN — LISINOPRIL 40 MG: 20 TABLET ORAL at 07:42

## 2022-02-21 ASSESSMENT — PAIN SCALES - GENERAL
PAINLEVEL_OUTOF10: 9
PAINLEVEL_OUTOF10: 7

## 2022-02-21 NOTE — PROGRESS NOTES
Physical Therapy  [address] [facility]   Acute Rehabilitation Physical Therapy Progress Note    Date: 22  Patient Name: Devon Worthington       Room: 8190/3986-17  MRN: [id number]   Account: [de-identified]   : 1934  (80 y.o.) Gender: female     Referring Practitioner: Dr Jason Fernandez  Diagnosis: MS exacerbation, recurrent UTI  Past Medical History:  has a past medical history of Acute cystitis without hematuria, Arthritis, Chronic daily headache, GERD (gastroesophageal reflux disease), Hyperlipidemia, Hypertension, Moderate malnutrition ([address] Multiple sclerosis ([address] Neuropathy, Pneumonia, and Vitamin D deficiency. Past Surgical History:   has a past surgical history that includes Cystocele repair; Rectocele repair; Hysterectomy; Cervical disc surgery; Cataract removal with implant (Right, 2014); Cataract removal with implant (Left, 2014); Total shoulder arthroplasty (Right, 2017); and shoulder surgery (Right, ). Additional Pertinent Hx: The patient is a 80 y.o. Non- / non  female who presents with Dizziness and she is admitted to the hospital for the management of multiple sclerosis flareup and UTI. Patient was diagnosed with multiple sclerosis at the age of 44. Past medical history significant for multiple sclerosis, hypertension, chronic osteoarthritis of the spine. Patient reportedly woke up this morning with worsening facial droop, severe headache, and an unsteady gait. She has history of chronic headaches but this was much worse. She took 2 Norco pills but that did not alleviate the headache. It is located frontally and in the back of her head and neck and throbbing. Patient was taking 10 mg of prednisone daily per her PCP for spinal stenosis and chronic vertebral fracture. She sees a pain medicine specialist who gives her spinal injections. He told her to stop taking the prednisone for 5 days prior to injection.  This was her fifth day without taking steroids. In the ED she had a CT and a CTA done. CT showed no acute abnormalities. CTA head and neck shows no hemodynamically significant stenosis. Pt admitted to [facility] 2/11/22    Overall Orientation Status: Within Functional Limits  Restrictions/Precautions  Restrictions/Precautions: Fall Risk;General Precautions;Contact Precautions  Required Braces or Orthoses?: Yes  Implants present? : Metal implants    Subjective: Patient reports she is ready for home DC this afternoon. States family will be picking her up when they are off of work. Vital Signs  Patient Currently in Pain: Denies                   Bed Mobility:   Rolling: Modified independent  Supine to Sit: Modified independent  Sit to Supine: Modified independent  Scooting: Modified independent  Comment: Nursing and patient informed she can be Mod Indep. in room with RW. Transfers:  Sit to Stand: Modified independent  Stand to sit: Modified independent  Bed to Chair: Modified independent     Squat Pivot Transfers: Modified independent        Ambulation 1  Surface: level tile  Device: Rolling Walker  Assistance: Modified Independent  Quality of Gait: Slow steady gait with no LOB. Gait Deviations: Slow Emily; Increased NELDA; Decreased step length;Decreased step height  Distance: 200 ft;  short distances within gym. Comments: Demonstrated safe use of rolling walker with amb. Stairs/Curb  Stairs?: Yes  Stairs  # Steps : 15  Stairs Height:  (6\" and 4\")  Rails: Right ascending  Device: No Device  Assistance: Modified independent   Comment: Pt demos step-to technique. No cueing required. BALANCE Posture: Fair  Sitting - Static: Good  Sitting - Dynamic: Fair;+  Standing - Static: Fair;+  Standing - Dynamic: Fair    EXERCISES    Other exercises?: Yes  Other exercises 1: Seated B LE ex's x 10-15 to tolerance (#1.5 and Orange theraband)  Other exercises 2: HEP handout given with demonstration.   Other Activities  Comment: Rest test to 15 seconds or less.   Long term goal 7: Pt to improve 2MWT to at least 125' and 6MWT to at least 225' with device, MOD I.       02/21/22 0810   PT Individual Minutes   Time In 0810   Time Out 0840   Minutes 30       Electronically signed by Vince Demarco PTA on 2/21/22 at 9:49 AM EST

## 2022-02-21 NOTE — PLAN OF CARE
Show:Clear all  []Manual[x]Template[]Copied    Added by:  Rohini Godinez LPN      []Jona for details       Problem: Falls - Risk of:  Goal: Will remain free from falls  Description: Will remain free from falls  Outcome: Ongoing  Note: No falls or injuries sustained at this time. No attempts to get out of bed without nursing assistance. Call light within reach and pt. uses appropriately for assistance. Siderails up x 2. Nonskid footwear remains on. Bed in low and locked position. Hourly nursing rounds made. Pt. Alert and oriented, aware of limitations, and exhibits good safety judgement. Pt. uses assistive devices appropriately. Pt. understands individual fall risk factors. Pt. reoriented to surroundings and reminded to use call light with each nurse/patient interaction. Problem: Falls - Risk of:  Goal: Absence of physical injury  Description: Absence of physical injury  Outcome: Ongoing  Note: No falls or injuries sustained at this time. No attempts to get out of bed without nursing assistance. Call light within reach and pt. uses appropriately for assistance. Siderails up x 2. Nonskid footwear remains on. Bed in low and locked position. Hourly nursing rounds made. Pt. Alert and oriented, aware of limitations, and exhibits good safety judgement. Pt. uses assistive devices appropriately. Pt. understands individual fall risk factors. Problem: Skin Integrity:  Goal: Absence of new skin breakdown  Description: Absence of new skin breakdown  Outcome: Ongoing  Note: Skin assessment completed this shift. Nutrition and Hydration status assessed with adequate intake. José Miguel Score as charted. Patient able to reposition self for comfort and to prevent breakdown. Patient verbalizes understanding of pressure ulcer prevention measures. Skin integrity maintained. No new skin breakdown noted. Skin to high risk pressure areas including coccyx and heels are clear.      Problem: Infection:  Goal: Will remain free from infection  Description: Will remain free from infection  Outcome: Ongoing  Note: Patient has no symptoms of infection     Problem: Pain:  Goal: Control of chronic pain  Description: Control of chronic pain  Outcome: Ongoing  Note: Pain assessment completed. Pt. able to rest.  Patient medicated with  [place] for pain this shift as ordered. Patient relates a tolerable level of discomfort with the current medication. Pt. Repositions  per self for comfort. Nonverbal cues indicate pain relief. Pt. Rests quietly with eyes closed after pain medication administration. Respirations easy and unlabored. Appears free from distress. Problem: Nutrition  Goal: Optimal nutrition therapy  Outcome: Ongoing  Note: Patient has no complaints of nutritional needs at this time. Intake and output was done appropriately for shift.

## 2022-02-21 NOTE — DISCHARGE INSTR - COC
Continuity of Care Form    Patient Name: Marivel Garber   :  1934  MRN:  [id number]    Admit date:  2022  Discharge date:      Code Status Order: Full Code   Advance Directives:      Admitting Physician:  Amira Sutherland MD  PCP: Shyanne Dinero MD    Discharging Nurse: [facility] Unit/Room#: 5377/1571-59  Discharging Unit Phone Number: [phone]    Emergency Contact:   Extended Emergency Contact Information  Primary Emergency Contact: Nadege Tejada  Address: [place] [address] Phone: [phone]  Work Phone: [phone]  Mobile Phone: [phone]  Relation: Child  Hearing or visual needs: None  Other needs: None  Preferred language: English   needed? No  Secondary Emergency Contact: Justino Raza  Address: [place] [address] Phone: [phone]  Relation: Child  Hearing or visual needs: None  Other needs: None  Preferred language: English   needed?  No    Past Surgical History:  Past Surgical History:   Procedure Laterality Date    CATARACT REMOVAL WITH IMPLANT Right 2014    [facility]    CATARACT REMOVAL WITH IMPLANT Left 2014    [facility]    CERVICAL DISC SURGERY      CYSTOCELE REPAIR      HYSTERECTOMY      RECTOCELE REPAIR      SHOULDER ARTHROPLASTY Right 2017    SHOULDER SURGERY Right 2017       Immunization History:   Immunization History   Administered Date(s) Administered    COVID-19, Pfizer Purple top, DILUTE for use, 12+ yrs, 30mcg/0.3mL dose 2021, 2021, 2021    Influenza A (L3W9-51) Vaccine PF IM 2010    Influenza Vaccine, unspecified formulation 2011, 10/25/2012, 10/01/2013, 2015    Influenza Virus Vaccine 2011, 10/25/2012, 10/01/2013, 2015, 2017    Influenza, High Dose (Fluzone 65 yrs and older) 10/17/2015, 2016, 2017, 10/10/2018    Influenza, Quadv, IM, PF (6 mo and older Fluzone, Flulaval, Fluarix, and 3 yrs and older Afluria) 10/08/2019    Influenza, Quadv, adjuvanted, 65 yrs +, IM, PF (Fluad) 09/23/2020, 09/28/2021    Pneumococcal Conjugate 13-valent ([id number]) 01/27/2017    Pneumococcal Polysaccharide ([id number]) 12/01/2010       Active Problems:  Patient Active Problem List   Diagnosis Code    Ataxia R27.0    Multiple sclerosis ([facility] G35    Essential hypertension I10    Hyperlipidemia E78.5    Atelectasis J98.11    Debility R53.81    Abnormal gait R26.9    Actinic keratosis L57.0    Enthesopathy of hip region M76.899    Generalized osteoarthritis M15.9    Idiopathic peripheral neuropathy G60.9    Vitamin D deficiency E55.9    Primary localized osteoarthrosis of the hip, left M16.12    Primary osteoarthritis of left hip M16.12    Acquired spondylolisthesis M43.10    Chronic midline low back pain with left-sided sciatica M54.42, G89.29    Spinal stenosis of lumbar region with neurogenic claudication M48.062    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    Multiple sclerosis exacerbation (HCC) G35    Diarrhea of presumed infectious origin R19.7    Chronic headache R51.9, G89.29    Facial asymmetry Q67.0    Asymptomatic bacteriuria R82.71    Hyperglycemia R73.9    EDINSON (acute kidney injury) ([facility] N17.9    Lumbar radiculopathy M54.16    Type 2 diabetes mellitus E11.9    Recurrent urinary tract infection H91.7    Diastolic congestive heart failure ([facility] I50.30       Isolation/Infection:   Isolation            Contact          Patient Infection Status       Infection Onset Added Last Indicated Last Indicated By Review Planned Expiration Resolved Resolved By    MRSA  11/07/14 11/07/14 Destini Lehman RN        07/2013 rt shin    Resolved    COVID-19 (Rule Out) 02/08/22 02/08/22 02/08/22 SARS-CoV-2 NAAT (Rapid) (Ordered)   02/08/22 Rule-Out Test Resulted    COVID-19 (Rule Out) 02/25/21 02/25/21 02/25/21 COVID-19, Rapid (Ordered)   02/25/21 Rule-Out Test Resulted            Nurse Assessment:  Last Vital Signs: BP (!) 121/59   Pulse 81   Temp 98.4 °F (36.9 °C)   Resp 16   Ht 4' 11.02\" (1.499 m)   Wt 135 lb (61.2 kg)   SpO2 93%   BMI 27.25 kg/m²     Last documented pain score (0-10 scale): Pain Level: 8  Last Weight:   Wt Readings from Last 1 Encounters:   02/18/22 135 lb (61.2 kg)     Mental Status:  oriented, alert, coherent, logical, thought processes intact, and able to concentrate and follow conversation    IV Access:  - None    Nursing Mobility/ADLs:  Walking   Assisted  Transfer  Assisted  Bathing  Assisted  Dressing  Assisted  Toileting  Assisted  Feeding  [address] [facility] Delivery   whole    Wound Care Documentation and Therapy:        Elimination:  Continence: Bowel: Yes  Bladder: Yes  Urinary Catheter: None   Colostomy/Ileostomy/Ileal Conduit: No       Date of Last BM: 2/20  No intake or output data in the 24 hours ending 02/21/22 0645  I/O last 3 completed shifts: In: 120 [P.O.:120]  Out: -     Safety Concerns: At Risk for Falls    Impairments/Disabilities:      None    Nutrition Therapy:  Current Nutrition Therapy:   - Oral Diet:  General    Routes of Feeding: Oral  Liquids: Thin Liquids  Daily Fluid Restriction: no  Last Modified Barium Swallow with Video (Video Swallowing Test): not done    Treatments at the Time of Hospital Discharge:   Respiratory Treatments: none  Oxygen Therapy:  is not on home oxygen therapy.   Ventilator:    - No ventilator support    Rehab Therapies: {THERAPEUTIC INTERVENTION:[id number]}  Weight Bearing Status/Restrictions: No weight bearing restirctions  Other Medical Equipment (for information only, NOT a DME order):  wheelchair and walker  Other Treatments: none    Patient's personal belongings (please select all that are sent with patient):  Glasses    RN SIGNATURE:  Electronically signed by Juan Reyes RN on 2/21/22 at 10:48 AM EST    CASE MANAGEMENT/SOCIAL WORK SECTION    Inpatient Status Date: 2/11/2022    Readmission Risk Assessment Score:  Readmission Risk              Risk of Unplanned Readmission:  17           Discharging to [place] [address]  Phone:  [phone]  Fax:      Dialysis Facility (if applicable)   Name:  Address:  Dialysis Schedule:  Phone:  Fax:    / signature: Electronically signed by ARTURO Meléndez on 2/21/22 at 9:07 AM EST    PHYSICIAN SECTION    Prognosis: Fair    Condition at Discharge: Stable    Rehab Potential (if transferring to Rehab): Good    Recommended Labs or Other Treatments After Discharge: Nursing for medication management and blood pressure monitoring. Physician Certification: I certify the above information and transfer of Whitney Barry  is necessary for the continuing treatment of the diagnosis listed and that she requires Home Care for less than 30 days.      Update Admission H&P: No change in H&P    PHYSICIAN SIGNATURE:  Electronically signed by Joyce Parrish MD on 2/21/22 at 8:34 AM EST

## 2022-02-21 NOTE — DISCHARGE SUMMARY
Physical Medicine & Rehabilitation  Discharge Summary     Patient Identification:  Tiago Chavarria  : 1934  Admit date: 2022  Discharge date: 22  Attending provider: Carson Simon*        Primary care provider: Komal Brock MD     Discharge Diagnoses:   Acute exacerbation of multiple sclerosis  HTN  HLD  Neuropathy  Chronic daily headache  GERD  Hyperglycemia - resolved  Hypokalemia - resolved  Infectious diarrhea - resolved    Discharge Functional Status:    Physical therapy:  Bed Mobility: Rolling: Modified independent  Supine to Sit: Modified independent  Sit to Supine: Modified independent  Scooting: Modified independent  Transfers: Sit to Stand: Modified independent  Stand to sit: Modified independent  Bed to Chair: Modified independent  Squat Pivot Transfers: Modified independent  Comment: Rest breaks PRN d/t low endurance. , Ambulation 1  Surface: level tile  Device: Rolling Walker  Other Apparatus: O2 (2L)  Assistance: Modified Independent  Quality of Gait: Slow steady gait with no LOB. Gait Deviations: Slow Emily,Increased NELDA,Decreased step length,Decreased step height  Distance: 200 ft;  short distances within gym. Comments: Demonstrated safe use of rolling walker with amb., Stairs  # Steps : 15  Stairs Height:  (6\" and 4\")  Rails: Right ascending  Device: No Device  Assistance: Modified independent   Comment: Pt demos step-to technique. No cueing required. Mobility:  , PT Equipment Recommendations  Other: TBD - rollator?, Assessment: Pt most limited by her endurance and fatigue but is extremely motivated and IND. pt would benefit from intense therapy with rest breaks as needed to return home.      Occupational therapy:  , Equipment Recommendations  Equipment Needed:  (TBD),      Speech therapy:         Inpatient Rehabilitation Course:   Tiago Chavarria is a 80 y.o. female admitted to inpatient rehabilitation on 2022 for rehab for acute exacerbation of multiple sclerosis. She was admitted to acute care for MS exacerbation with facial droop, severe headache, and unsteady gait. She has history of MS since age 44 years and follows with Dr. Kev Atkins. She was treated with a 5-day course of methylprenisolone. Hospital course was complicated by hyperglycemia, hypertension, and pulmonary edema treated with lasix.       Rehab course: The patient participated in an aggressive multidisciplinary inpatient rehabilitation program involving 3 hours per day, 5 days per week of rehabilitation. Patient benefited from inpatient rehab and was discharged in good stable condition. Pain was controlled with home amitriptyline, as-needed norco, and as-needed tramadol. She completed a course of cipro for infectious diarrhea during admission. Chronic conditions were otherwise stable on medications. Patient evaluated today:  GEN: Well developed, well nourished, no acute distress  HEENT: NCAT. EOM grossly intact. Hearing grossly intact. Mucous membranes pink and moist.  RESP: Normal breath sounds with no wheezing, rales, or rhonchi. Respirations WNL and unlabored. CV: Regular rate and rhythm. No murmurs, rubs, or gallops. ABD: Soft, non-distended, BS+ and equal.  NEURO: Alert. Speech fluent. Sensation to light touch intact. MSK:  Muscle tone and bulk are normal bilaterally. Strength 4/5 in bilateral upper limbs. Strength 4+/5 in bilateral lower limbs. LIMBS: No edema in bilateral lower limbs. SKIN: Warm and dry with good turgor. PSYCH: Mood WNL. Affect WNL. Appropriately interactive.       Consults:   Internal Medicine    Significant Diagnostics:   CBC:   Lab Results   Component Value Date    WBC 7.3 02/19/2022    RBC 3.87 02/19/2022    RBC 3.45 01/05/2012    HGB 12.2 02/19/2022    HCT 35.3 02/19/2022    MCV 91.3 02/19/2022    MCH 31.7 02/19/2022    MCHC 34.7 02/19/2022    RDW 13.6 02/19/2022     02/19/2022     01/05/2012    MPV 8.0 02/19/2022     BMP:    Lab Results   Component Value Date     02/19/2022    K 3.8 02/19/2022    CL 98 02/19/2022    CO2 29 02/19/2022    BUN 17 02/19/2022    LABALBU 3.9 02/08/2022    LABALBU 3.7 01/05/2012    CREATININE 1.24 02/19/2022    CALCIUM 8.8 02/19/2022    GFRAA 50 02/19/2022    LABGLOM 41 02/19/2022    GLUCOSE 118 02/19/2022    GLUCOSE 112 01/05/2012         No orders to display         Patient Instructions:    No driving until cleared by a physician    Medications, precautions and follow up reviewed with patient and family    Follow-up visits: See after visit summary from hospitalization  Follow up PCP 3/3/22, Dr. Margo Joiner 3/7/22, Dr. Zenaida Wayne 4/20/22    Disposition:  Home with home health nursing - patient declined home therapies      Discharge Medications:     Medication List      START taking these medications    acetaminophen 325 MG tablet  Commonly known as: TYLENOL  Take 2 tablets by mouth every 6 hours as needed for Pain     amLODIPine 10 MG tablet  Commonly known as: NORVASC  Take 1 tablet by mouth daily     lisinopril 40 MG tablet  Commonly known as: PRINIVIL;ZESTRIL  Take 1 tablet by mouth daily     metoprolol tartrate 50 MG tablet  Commonly known as: LOPRESSOR  Take 1 tablet by mouth 2 times daily     polyethylene glycol 17 g packet  Commonly known as: GLYCOLAX  Take 17 g by mouth daily as needed for Constipation        CHANGE how you take these medications    amitriptyline 100 MG tablet  Commonly known as: ELAVIL  Take 1 tablet by mouth nightly  What changed: See the new instructions. gabapentin 100 MG capsule  Commonly known as: Neurontin  Take 1 capsule by mouth daily for 30 days.   What changed: when to take this     simvastatin 20 MG tablet  Commonly known as: ZOCOR  Take 1 tablet by mouth daily  What changed: when to take this        CONTINUE taking these medications    docusate sodium 100 MG capsule  Commonly known as: COLACE  Take 1 capsule by mouth daily     Handicap Placard Misc  by Does not apply route Exp: 5/20/2026     HYDROcodone-acetaminophen 5-325 MG per tablet  Commonly known as: NORCO     omeprazole 20 MG delayed release capsule  Commonly known as: PRILOSEC  Take 1 capsule by mouth daily     traMADol 50 MG tablet  Commonly known as: ULTRAM        STOP taking these medications    albuterol sulfate  (90 Base) MCG/ACT inhaler  Commonly known as: Ventolin HFA     calcipotriene 0.005 % cream  Commonly known as: Dovonex     celecoxib 200 MG capsule  Commonly known as: CeleBREX     clobetasol 0.05 % cream  Commonly known as: TEMOVATE     predniSONE 10 MG tablet  Commonly known as: Lonell Box           Where to Get Your Medications      These medications were sent to [facility], [address], [address] [facility] [address]    Phone: [phone]   · amitriptyline 100 MG tablet  · amLODIPine 10 MG tablet  · gabapentin 100 MG capsule  · lisinopril 40 MG tablet  · metoprolol tartrate 50 MG tablet  · omeprazole 20 MG delayed release capsule  · simvastatin 20 MG tablet     You can get these medications from any pharmacy    You don't need a prescription for these medications  · acetaminophen 325 MG tablet  · polyethylene glycol 17 g packet           Tyesha Arizmendi MD

## 2022-02-21 NOTE — CARE COORDINATION
This writer schedules OP rehab follow-up appointment with Dr. Rickie Joaquin.  Appointment scheduled for Monday March 7th, at 3:30PM.    Electronically signed by Carrington Sebastian PT on 2/21/2022 at 1:58 PM

## 2022-02-21 NOTE — PROGRESS NOTES
- Mobility Device: Rolling Walker  Activity: To/from bathroom; Retrieve items;Transport items  Assist Level: Stand by assistance  Functional Mobility Comments: Pt engaged in functional mobility in/out restroom and around room to gather/transport neccessary items needed in order to engage in ADLs. Pt req MOD vc for walker mgt/positioning during task to increase safety and indpendnence with F carryover noted. Shower Transfers  Shower - Transfer From: Teddy Huber (TORRES)  Shower - Transfer Type: To and From  Shower - Transfer To: Transfer tub bench  Shower - Technique: Ambulating  Shower Transfers: Contact Guard;Verbal cues  Shower Transfers Comments: CGA and vc over shower threshold           Additional Activities: WHITE provided education and demonstration on foam block home program for hand strength  to support continued independence in ADL tasks.  Pt verbalized G reception to education and demonstration with handout provided           ADL  Feeding: Independent  Grooming: Modified independent  (seated for hair care, standing sink level denture care using forearms on sink  for support)  UE Bathing: Modified independent  (seated on tub transfer bench)  LE Bathing: Stand by assistance (SBA standing for teofilo care, seated for BLEs)  UE Dressing: Modified independent  (seated, able to don/doff OH shirt w/minimal difficulty)  LE Dressing: Minimal assistance (A to doff TEDs, increased time don/doff slippers)  Toileting: Stand by assistance (vc for walker mgt, SBA standing for teofilo hygine)     Instrumental ADL's  Instrumental ADLs: Yes  Light Housekeeping  Light Housekeeping Level: Bennett Mabry Housekeeping Level of Assistance: Stand by assistance  Light Housekeeping: WHITE facilitated light house keeping task ie packing personal items away and orgainzing personal space  to support ability to self correct should balance be lost, tolerance and endurance needed to participate in daily task and functional mobility/transfers safely and independently . Pt completed functional mobility in/around room and bathromm with RW . Pt req vc and education on walker placement/positioning while completing task to  decrease risk of falls preventing inury, F reception noted. Assessment  Performance deficits / Impairments: Decreased functional mobility ; Decreased ADL status; Decreased strength;Decreased safe awareness;Decreased coordination;Decreased endurance;Decreased balance  Prognosis: Good  Discharge Recommendations: Patient would benefit from continued therapy after discharge  Activity Tolerance: Patient Tolerated treatment well  Safety Devices in place: Yes  Type of devices: Call light within reach; All fall risk precautions in place;Gait belt;Left in bed;Left in chair          Patient Goals   Patient goals : Regain abiilty to care for self to return Home  Learner:patient  Method: demonstration, explanation and handout       Outcome: needs reinforcement on hand placement and walker mgt for increased safety         Plan  Plan  Times per week: 5-7  Times per day: Twice a day  Current Treatment Recommendations: [id number] [facility] [employer] Management,Patient/Caregiver Education & Training,Self-Care / ADL,Home Management Training  Plan Comment: Fatigues with activity, New use of Oxygen; Patient Goals   Patient goals : Regain abiilty to care for self to return Home  Short term goals  Time Frame for Short term goals: One week  Short term goal 1: Pt will complete LB ADL's with CGA and Good safety using AE if needed. Short term goal 2: Pt will complete toilet transfer and toileting with CGA and Good safety using least restrictive device.   Short term goal 3: Pt will tolerate standing for 3-4 minutes with 1-2 UE support and CGA while completing a functional task to support participation in care  Short term goal 4: Pt will V/D at least 3 fall prevention strategies that she can utilize during daily routines. Short term goal 5: Pt will actively participate in 15-20 minutes of therapeutic exercise/activity to promote increased independence and safety with self-care and mobility. Long term goals  Time Frame for Long term goals : By Discharge  Long term goal 1: Setup assist for self care tasks of athing and dressing  Long term goal 2: Mod I for Fucntional Self care transfers  Long term goal 3: Mod I for light meal prep and household mobility  Long term goal 4:  Independent use of Home program for Hand/UE strengthening and conditioning to support continued indenepdnent in ADL tasks        02/21/22 1023   OT Individual Minutes   Time In 1023   Time Out 1124   Minutes 61     Electronically signed by RISSA Oliveira on 2/21/22 at 12:44 PM EST

## 2022-02-21 NOTE — PROGRESS NOTES
Writer talked To Dr. Kiersten Eisenberg about low blood pressure of 90/64. Dr. Kiersten Eisenberg first ordered bolus of 250 NS per IV, but patient does not have IV . So Dr. Kiersten Eisenberg stated he is not worried about blood pressure if patient is asymptomatic .

## 2022-02-21 NOTE — PLAN OF CARE
Problem: Falls - Risk of:  Goal: Will remain free from falls  Description: Will remain free from falls  Outcome: Completed     Problem: Falls - Risk of:  Goal: Absence of physical injury  Description: Absence of physical injury  Outcome: Completed     Problem: Skin Integrity:  Goal: Will show no infection signs and symptoms  Description: Will show no infection signs and symptoms  Outcome: Completed  Goal: Absence of new skin breakdown  Description: Absence of new skin breakdown  Outcome: Completed     Problem: Infection:  Goal: Will remain free from infection  Description: Will remain free from infection  Outcome: Completed     Problem: Safety:  Goal: Free from accidental physical injury  Description: Free from accidental physical injury  Outcome: Completed  Goal: Free from intentional harm  Description: Free from intentional harm  Outcome: Completed     Problem: Pain:  Goal: Patient's pain/discomfort is manageable  Description: Patient's pain/discomfort is manageable  Outcome: Completed  Goal: Pain level will decrease  Description: Pain level will decrease  Outcome: Completed  Goal: Control of acute pain  Description: Control of acute pain  Outcome: Completed  Goal: Control of chronic pain  Description: Control of chronic pain  Outcome: Completed     Problem: Neurological  Goal: Maximum potential motor/sensory/cognitive function  Outcome: Completed     Problem: Nutrition  Goal: Optimal nutrition therapy  Outcome: Completed

## 2022-02-21 NOTE — PROGRESS NOTES
Discharge instructions reviewed with patient and patient's daughter. All personal belongings packed up and sent with patient. Patient discharged to home.

## 2022-02-21 NOTE — CARE COORDINATION
Writer spoke with patient to confirm pt's transportation needs are being met by family upon D/C. Pt reports that the Dtr will pick her up from ARU at 3:30 pm this date (02/21/22).     Electronically signed by Shala Velasquez PT on 2/21/2022 at 12:02 PM

## 2022-02-22 ENCOUNTER — TELEPHONE (OUTPATIENT)
Dept: FAMILY MEDICINE CLINIC | Age: 87
End: 2022-02-22

## 2022-02-22 NOTE — TELEPHONE ENCOUNTER
[facility] Transitions Initial Follow Up Call    Outreach made within 2 business days of discharge: Yes    Patient: Elma Henderson Patient : 1934   MRN: [id number]  Reason for Admission: There are no discharge diagnoses documented for the most recent discharge. Discharge Date: 22       Spoke with: daughter    Discharge department/facility: [facility]     TCM Interactive Patient Contact:  Was patient able to fill all prescriptions: Yes  Was patient instructed to bring all medications to the follow-up visit: Yes  Is patient taking all medications as directed in the discharge summary?  Yes  Does patient understand their discharge instructions: Yes  Does patient have questions or concerns that need addressed prior to 7-14 day follow up office visit: no    Scheduled appointment with PCP within 7-14 days    Follow Up  Future Appointments   Date Time Provider Cj Gilberti   3/3/2022  3:00 PM Rakesh Otter Tail, [facility]   3/7/2022  3:30 PM Ginny Zimmerman MD [address]   2022  3:30 PM Fareed Sweeney MD [id number]   2022  3:30 PM Pauly Browne MD [address] [facility], [address] [place] [address]

## 2022-02-23 ENCOUNTER — OFFICE VISIT (OUTPATIENT)
Dept: FAMILY MEDICINE CLINIC | Age: 87
End: 2022-02-23
Payer: MEDICARE

## 2022-02-23 ENCOUNTER — TELEPHONE (OUTPATIENT)
Dept: FAMILY MEDICINE CLINIC | Age: 87
End: 2022-02-23

## 2022-02-23 VITALS
DIASTOLIC BLOOD PRESSURE: 52 MMHG | WEIGHT: 133 LBS | OXYGEN SATURATION: 95 % | HEART RATE: 73 BPM | SYSTOLIC BLOOD PRESSURE: 92 MMHG | TEMPERATURE: 98.6 F | BODY MASS INDEX: 26.85 KG/M2

## 2022-02-23 DIAGNOSIS — G81.94 LEFT HEMIPARESIS (HCC): ICD-10-CM

## 2022-02-23 DIAGNOSIS — G89.29 CHRONIC MIDLINE LOW BACK PAIN WITH LEFT-SIDED SCIATICA: ICD-10-CM

## 2022-02-23 DIAGNOSIS — G35 MULTIPLE SCLEROSIS (HCC): ICD-10-CM

## 2022-02-23 DIAGNOSIS — E11.9 TYPE 2 DIABETES MELLITUS WITHOUT COMPLICATION, WITHOUT LONG-TERM CURRENT USE OF INSULIN (HCC): ICD-10-CM

## 2022-02-23 DIAGNOSIS — R42 DIZZINESS: ICD-10-CM

## 2022-02-23 DIAGNOSIS — M54.42 CHRONIC MIDLINE LOW BACK PAIN WITH LEFT-SIDED SCIATICA: ICD-10-CM

## 2022-02-23 DIAGNOSIS — G35 MULTIPLE SCLEROSIS EXACERBATION (HCC): ICD-10-CM

## 2022-02-23 DIAGNOSIS — I10 ESSENTIAL HYPERTENSION: Primary | ICD-10-CM

## 2022-02-23 PROCEDURE — 99496 TRANSJ CARE MGMT HIGH F2F 7D: CPT | Performed by: INTERNAL MEDICINE

## 2022-02-23 PROCEDURE — 1111F DSCHRG MED/CURRENT MED MERGE: CPT | Performed by: INTERNAL MEDICINE

## 2022-02-23 RX ORDER — LISINOPRIL 40 MG/1
40 TABLET ORAL DAILY
Qty: 30 TABLET | Refills: 3 | Status: SHIPPED | OUTPATIENT
Start: 2022-02-23 | End: 2022-04-27

## 2022-02-23 RX ORDER — MECLIZINE HYDROCHLORIDE 25 MG/1
25 TABLET ORAL 3 TIMES DAILY PRN
Qty: 30 TABLET | Refills: 1 | Status: SHIPPED | OUTPATIENT
Start: 2022-02-23 | End: 2022-03-05

## 2022-02-23 RX ORDER — PREDNISONE 10 MG/1
10 TABLET ORAL DAILY
Qty: 90 TABLET | Refills: 1 | Status: SHIPPED | OUTPATIENT
Start: 2022-02-23 | End: 2022-08-24 | Stop reason: SDUPTHER

## 2022-02-23 SDOH — ECONOMIC STABILITY: FOOD INSECURITY: WITHIN THE PAST 12 MONTHS, YOU WORRIED THAT YOUR FOOD WOULD RUN OUT BEFORE YOU GOT MONEY TO BUY MORE.: NEVER TRUE

## 2022-02-23 SDOH — ECONOMIC STABILITY: FOOD INSECURITY: WITHIN THE PAST 12 MONTHS, THE FOOD YOU BOUGHT JUST DIDN'T LAST AND YOU DIDN'T HAVE MONEY TO GET MORE.: NEVER TRUE

## 2022-02-23 ASSESSMENT — SOCIAL DETERMINANTS OF HEALTH (SDOH): HOW HARD IS IT FOR YOU TO PAY FOR THE VERY BASICS LIKE FOOD, HOUSING, MEDICAL CARE, AND HEATING?: NOT VERY HARD

## 2022-02-23 NOTE — TELEPHONE ENCOUNTER
Nurse from [facility] home health care called stated pt is taking  Metoprolol 50 BID, he suggested pt to Decrease to 25 BID     93/48 today    Dizzy   Pulse 64  95 oxygen     Scheduled pt to come in today to be seen

## 2022-02-23 NOTE — PROGRESS NOTES
Post-Discharge Transitional Care  Follow Up      Pretty Archer   YOB: 1934    Date of Office Visit:  2/23/2022  Date of Hospital Admission: 2/11/22  Date of Hospital Discharge: 2/21/22  Risk of hospital readmission (high >=14%. Medium >=10%) :Readmission Risk Score: 16.4 ( )      Care management risk score Rising risk (score 2-5) and Complex Care (Scores >=6): 6     Non face to face  following discharge, date last encounter closed (first attempt may have been earlier): 2/22/2022 10:34 AM    Call initiated 2 business days of discharge: Yes    ASSESSMENT/PLAN:   Essential hypertension  -     metoprolol tartrate (LOPRESSOR) 25 MG tablet; Take 1 tablet by mouth 2 times daily, Disp-60 tablet, R-3Normal  -     lisinopril (PRINIVIL;ZESTRIL) 40 MG tablet; Take 1 tablet by mouth daily, Disp-30 tablet, R-3Normal  -     NM DISCHARGE MEDS RECONCILED W/ CURRENT OUTPATIENT MED LIST  Multiple sclerosis (HCC)  -     NM DISCHARGE MEDS RECONCILED W/ CURRENT OUTPATIENT MED LIST  Chronic midline low back pain with left-sided sciatica  -     predniSONE (DELTASONE) 10 MG tablet; Take 1 tablet by mouth daily, Disp-90 tablet, R-1Normal  Left hemiparesis (HCC)  Type 2 diabetes mellitus without complication, without long-term current use of insulin (HCC)  Multiple sclerosis exacerbation (HCC)  Dizziness  -     meclizine (ANTIVERT) 25 MG tablet; Take 1 tablet by mouth 3 times daily as needed for Dizziness, Disp-30 tablet, R-1Normal      Medical Decision Making: high complexity  No follow-ups on file. On this date 2/23/2022 I have spent 20 minutes reviewing previous notes, test results and face to face with the patient discussing the diagnosis and importance of compliance with the treatment plan as well as documenting on the day of the visit.        Subjective:   HPI:  Follow up of Hospital problems/diagnosis(es): per d/c summary, \"  She was admitted to acute care for MS exacerbation with facial droop, severe headache, and unsteady gait. She has history of MS since age 44 years and follows with Dr. Khushi Judd. She was treated with a 5-day course of methylprenisolone. Hospital course was complicated by hyperglycemia, hypertension, and pulmonary edema treated with lasix.        Rehab course: The patient participated in an aggressive multidisciplinary inpatient rehabilitation program involving 3 hours per day, 5 days per week of rehabilitation. Patient benefited from inpatient rehab and was discharged in good stable condition. Pain was controlled with home amitriptyline, as-needed norco, and as-needed tramadol. She completed a course of cipro for infectious diarrhea during admission. Chronic conditions were otherwise stable on medications. \"    Inpatient course: Discharge summary reviewed- see chart. Interval history/Current status: took all three meds this morning and BP has been bottomed out, home health called in since she has been dizzy and hypotensive. She also continues to have pain that is not well controlled at this time. Would like to restart daily prednisone - will be getting laminectomy with Dr Tiffany Osborn and needs to be off prednisone prior to that. Will be starting PT with home health soon, pain and dizziness are current limiting issues.      Patient Active Problem List   Diagnosis    Ataxia    Multiple sclerosis ([address]    Essential hypertension    Hyperlipidemia    Atelectasis    Debility    Abnormal gait    Actinic keratosis    Enthesopathy of hip region    Generalized osteoarthritis    Idiopathic peripheral neuropathy    Vitamin D deficiency    Primary localized osteoarthrosis of the hip, left    Primary osteoarthritis of left hip    Acquired spondylolisthesis    Chronic midline low back pain with left-sided sciatica    Spinal stenosis of lumbar region with neurogenic claudication    Presence of right artificial shoulder joint    Multiple sclerosis exacerbation (HCC)    Diarrhea of presumed infectious origin    Chronic headache    Facial asymmetry    Asymptomatic bacteriuria    Hyperglycemia    EDINSON (acute kidney injury) (HCC)    Lumbar radiculopathy    Type 2 diabetes mellitus    Recurrent urinary tract infection    Diastolic congestive heart failure (HCC)       Medications listed as ordered at the time of discharge from hospital  [unfilled]      Medications marked \"taking\" at this time  Outpatient Medications Marked as Taking for the 2/23/22 encounter (Office Visit) with Hermann Galloway MD   Medication Sig Dispense Refill    traMADol (ULTRAM) 50 MG tablet Take 1 tablet by mouth every 6 hours as needed for Pain.  acetaminophen (TYLENOL) 325 MG tablet Take 2 tablets by mouth every 6 hours as needed for Pain      gabapentin (NEURONTIN) 100 MG capsule Take 1 capsule by mouth daily for 30 days. 30 capsule 0    amitriptyline (ELAVIL) 100 MG tablet Take 1 tablet by mouth nightly 30 tablet 0    simvastatin (ZOCOR) 20 MG tablet Take 1 tablet by mouth daily 30 tablet 0    lisinopril (PRINIVIL;ZESTRIL) 40 MG tablet Take 1 tablet by mouth daily 30 tablet 0    metoprolol tartrate (LOPRESSOR) 50 MG tablet Take 1 tablet by mouth 2 times daily 60 tablet 0    amLODIPine (NORVASC) 10 MG tablet Take 1 tablet by mouth daily 30 tablet 0    polyethylene glycol (GLYCOLAX) 17 g packet Take 17 g by mouth daily as needed for Constipation      omeprazole (PRILOSEC) 20 MG delayed release capsule Take 1 capsule by mouth daily 30 capsule 0    docusate sodium (COLACE) 100 MG capsule Take 1 capsule by mouth daily 90 capsule 1    HYDROcodone-acetaminophen (NORCO) 5-325 MG per tablet Take 1 tablet by mouth every 8 hours as needed.  Handicap Placard MISC by Does not apply route Exp: 5/20/2026 1 each 0        Medications patient taking as of now reconciled against medications ordered at time of hospital discharge:  Yes    A comprehensive review of systems was negative except for what was noted in the HPI. Objective:    BP (!) 90/58   Pulse 73   Temp 98.6 °F (37 °C) (Temporal)   Wt 133 lb (60.3 kg)   SpO2 95%   BMI 26.85 kg/m²   General Appearance: alert and oriented to person, place and time, well developed and well- nourished,. Dizzy, ambulating with walker   Skin: warm and dry, no rash or erythema  Head: normocephalic and atraumatic  Eyes: pupils equal, round, and reactive to light, extraocular eye movements intact, conjunctivae normal  ENT: tympanic membrane, external ear and ear canal normal bilaterally, nose without deformity, nasal mucosa and turbinates normal without polyps  Neck: supple and non-tender without mass, no thyromegaly or thyroid nodules, no cervical lymphadenopathy  Pulmonary/Chest: clear to auscultation bilaterally- no wheezes, rales or rhonchi, normal air movement, no respiratory distress  Cardiovascular: normal rate, regular rhythm, normal S1 and S2, no murmurs, rubs, clicks, or gallops, distal pulses intact, no carotid bruits  Abdomen: soft, non-tender, non-distended, normal bowel sounds, no masses or organomegaly  Extremities: no cyanosis, clubbing or edema  Musculoskeletal: normal range of motion, no joint swelling, deformity or tenderness  Neurologic: reflexes normal and symmetric, no cranial nerve deficit, gait, coordination and speech normal      An electronic signature was used to authenticate this note.   --Jaswant Thorpe MD

## 2022-02-23 NOTE — PROGRESS NOTES
Pt is here due to being dizzy. She was in the hospital for 2 weeks due to MS episodes,  Her BP was 90/48 when visiting nurse was at the home today. She was taken off Celebrex and prednisone while in the hospital. She states that does help with back pain and her MS.

## 2022-02-24 ENCOUNTER — TELEPHONE (OUTPATIENT)
Dept: PAIN MANAGEMENT | Age: 87
End: 2022-02-24

## 2022-02-24 NOTE — PROGRESS NOTES
Physician Progress Note      Dolores Sanchez  CSN #:                  [id number]  :                       1934  ADMIT DATE:       2022 12:27 PM  Amparo Fuller DATE:        2022 5:22 PM  RESPONDING  PROVIDER #:        Dennis Mirza MD          QUERY TEXT:    Pt admitted to the hospital for the management of multiple sclerosis   exacerbation. During hospital course, patient was treated with IV Lasix for   respiratory distress secondary to volume overload with a history of diastolic   HF. If possible, please document in progress notes and discharge summary further   specificity regarding the type and acuity of CHF treated: The medical record reflects the following:  Risk Factors: HFpEF, diastolic dysfunction, HTN    Clinical Indicators: Hx of HFpEF. During hospital course, pt became   hypertensive, had increased SOB and dependent edema. Pro-BNP 4,953. per dc summary  \" Pt became sob yesterday and went into respiratory   distress. Chest x-ray showed clear volume overload. Given a dose  of Lasix and pt's condition improved. proBNP was elevated. Giving another   dose of lasix. Pt's breathing improved. \"  ECHO: Small LV, LVH, EF >55%. Mod aortic insufficiency. Mod Mitral stenosis,   Mod MR & TR. Elevated RV systolic pressure, 73KNLR. Treatment: IV Lasix, Lopressor, Chest Xray, Cardiac Echo. hemodynamic   monitoring, supplemental O2 as needed. cardiac diet fluid restriction  Options provided:  -- Acute on Chronic Diastolic CHF/HFpEF  -- Acute Diastolic CHF/HFpEF  -- Chronic Diastolic CHF/HFpEF  -- Other - I will add my own diagnosis  -- Disagree - Not applicable / Not valid  -- Disagree - Clinically unable to determine / Unknown  -- Refer to Clinical Documentation Reviewer    PROVIDER RESPONSE TEXT:    This patient is in acute on chronic diastolic CHF/HFpEF.     Query created by: Seyd Conley on 2022 9:22 AM      Electronically signed by:  Dennis Mirza MD 2022 9:34 AM

## 2022-02-24 NOTE — TELEPHONE ENCOUNTER
Patients daughter, Jenny Laurent, called in stating at her moms consult appt the MILD procedure was talked about, pt would like to get set up for that. Please advise on the steps to get this done.

## 2022-02-24 NOTE — TELEPHONE ENCOUNTER
Spoke with Sendy Garvin patient's daughter and advised I have her Mom's surgery scheduled. PAT is 3/3 at noon here at [facility]. Surgery is scheduled at [facility] on 3/17 at 0700; arrive 0530. Basic instructions given; told Sendy Garvin to go with whatever PAT/Anesthesia advises. Patient will need follow up post procedure; not sure if she will come here or go back to [facility]. I will check with Dr. Esteban Fox. Daughter verbalizes understanding of all instructions.

## 2022-02-25 NOTE — TELEPHONE ENCOUNTER
Post op follow up in 2 weeks after surgery With sage or Xochitl Patterson either at [facility] or at [facility]  Patient preference  thanks

## 2022-02-28 ENCOUNTER — TELEPHONE (OUTPATIENT)
Dept: FAMILY MEDICINE CLINIC | Age: 87
End: 2022-02-28

## 2022-02-28 NOTE — TELEPHONE ENCOUNTER
Jose Raul Chaudhary with [facility], states they will see patient 2x a week for 3 weeks and 1x for 1 week.

## 2022-03-03 ENCOUNTER — HOSPITAL ENCOUNTER (OUTPATIENT)
Dept: PREADMISSION TESTING | Age: 87
Discharge: HOME | End: 2022-03-07
Payer: MEDICARE

## 2022-03-03 VITALS
SYSTOLIC BLOOD PRESSURE: 137 MMHG | BODY MASS INDEX: 26.81 KG/M2 | WEIGHT: 133 LBS | DIASTOLIC BLOOD PRESSURE: 70 MMHG | HEART RATE: 77 BPM | HEIGHT: 59 IN | OXYGEN SATURATION: 97 % | TEMPERATURE: 97.2 F | RESPIRATION RATE: 16 BRPM

## 2022-03-03 DIAGNOSIS — Z01.818 PREOP EXAMINATION: ICD-10-CM

## 2022-03-03 ASSESSMENT — ENCOUNTER SYMPTOMS
BACK PAIN: 1
RESPIRATORY NEGATIVE: 1
GASTROINTESTINAL NEGATIVE: 1

## 2022-03-03 ASSESSMENT — PAIN DESCRIPTION - LOCATION: LOCATION: BACK

## 2022-03-03 ASSESSMENT — PAIN DESCRIPTION - DESCRIPTORS: DESCRIPTORS: ACHING

## 2022-03-03 ASSESSMENT — PAIN SCALES - GENERAL: PAINLEVEL_OUTOF10: 7

## 2022-03-03 ASSESSMENT — PAIN DESCRIPTION - PAIN TYPE: TYPE: ACUTE PAIN

## 2022-03-03 NOTE — H&P (VIEW-ONLY)
gabapentin  10. Associated symptoms (numbness / tingling / weakness):  no  -Where at:n/a  -Down into finger tips or toes (specify which finger or toes):tingling neuropathy  -constant or intermitting: constant  11. Red Flags: (weight loss / chills / loss of bladder or bowel control):no      Today    Melanie Melendez is 80 y.o.,  female, present with her son . She state nothing changes since she was at Dr. Godinez [facility] office visit last month. She complain of constant aching pain in the lumbar spine area, radiates to the lower limbs without any numbness or tingling in her legs  Pain level today is 7-8 /10. Pain elevated by activity , standing or walking for long time pain decreased by taking oral pain medication as Celebrex, gabapentin, tramadol and Norco PRN and rest.The symptoms started  Many years ago. She had once steroid injection which it did relief her pain of three months only. Pt denies any loss or changes in bladder ot bowel control . No recent falls or trauma. No redness, swelling or rashes. Pt denies fever/chills, chest pain or SOB. Testing completed r/t condition:  MRI LUMBAR SPINE WO CONTRAST  FINDINGS:   BONES/ALIGNMENT: For the purposes of this dictation, there are 5 lumbar   vertebral bodies.  There is dextrocurvature in the upper lumbar spine.  There   is retrolisthesis at T12-L1 and L1-L2.  There is grade 1 anterolisthesis at   L4-5.  Degenerative marrow signal changes are noted at multiple levels, most   pronounced at L1-2.  There is anterior compression deformity at T12.  Lumbar   vertebral body heights are maintained.       SPINAL CORD: Conus terminates at L1-2.  No nerve root thickening in the cauda   equina.       SOFT TISSUES: No paraspinal mass identified.       L1-L2: Disc protrusion, facet DJD, and ligamentum flavum hypertrophy with   mild spinal canal stenosis.  Mild bilateral neural foraminal stenosis.       L2-L3: Disc protrusion, facet DJD, and ligamentum flavum hypertrophy without significant spinal canal stenosis.  Mild bilateral neural foraminal stenosis.       L3-L4: Disc protrusion, facet DJD, and ligamentum flavum hypertrophy with   mild spinal canal stenosis.  Mild-to-moderate bilateral neural foraminal   stenosis.       L4-L5: Disc protrusion, facet DJD, and ligamentum flavum hypertrophy result   in severe spinal canal stenosis.  There is effacement of both lateral   recesses.  Mild bilateral neural foraminal stenosis.       L5-S1: Disc protrusion, facet DJD, and ligamentum flavum hypertrophy with   mild spinal canal stenosis and mild narrowing of both lateral recesses.  Mild   bilateral neural foraminal stenosis.         Impression   1. Multilevel degenerative changes of the lumbar spine with severe spinal   canal stenosis at L4-5.  There is mild spinal canal narrowing at L1-2, L3-4,   and L5-S1.  This appears slightly progressed in the interval.   2. Compression deformity at T12 is chronic in appearance, but new from prior   MRI. 3. Multilevel neural foraminal stenosis and lateral recess narrowing. 4. Scoliosis with grade 1 anterolisthesis at L4-5.  There is grade 1   retrolisthesis at T12-L1 and L1-L2.             Review of additional significant medical hx:  GERD  CURRENTLY MEDICATION R/T CONDITION: PRILOSEC    HTN, HYPERLIPIDEMIA   CURRENTLY MEDICATION  R/T CONDITION:   LOPRESSOR, LISINOPRIL , ZOCOR    ECHO   CONCLUSIONS  Summary  Small LV cavity. Normal left ventricular ejection fraction (>55%). Moderate left ventricular hypertrophy. Normal right ventricular size and function. Normal wall motions. Left atrium is mildly dilated. Aortic valve is trileaflet. Mild aortic stenosis. Peak instantaneous gradient 18 mmHg and mean gradient 8 mmHg. Moderate aortic insufficiency. Aortic root is mildly dilated. (3.8 cm)  Mitral annular calcification is seen. Moderate mitral stenosis. Mean 7 mmHg, max 18 mmHg. Moderate mitral regurgitation.   Moderate tricuspid regurgitation. Estimated right ventricular systolic pressure is 50 mmHg. Moderately elevated right ventricular systolic pressure. IVC normal diameter & inspiratory collapse indicating normal RA filling pressure . No significant pericardial effusion is seen    EKG 2/25/21  Narrative & Impression  Normal sinus rhythm  Normal ECG  When compared with ECG of 25-FEB-2021 16:15, (unconfirmed)  No significant change was found  Specimen Collected: 02/25/21 16:16      Denies hx of MRSA infection. Denies hx of blood clots. Denies hx of any personal or family hx of complications w/anesthesia. PAST MEDICAL HISTORY     Past Medical History:   Diagnosis Date    Acute cystitis without hematuria 10/1/2017    Arthritis     Chronic daily headache     GERD (gastroesophageal reflux disease)     Hyperlipidemia     Hypertension     Moderate malnutrition ([address] 3/2/2019    Multiple sclerosis ([facility]     Neuropathy     Pneumonia 2017    states had double pneumonia    Vitamin D deficiency        SURGICAL HISTORY       Past Surgical History:   Procedure Laterality Date    CATARACT REMOVAL WITH IMPLANT [facility]/6/2014    [facility]    CATARACT REMOVAL WITH IMPLANT Left 12/2/2014    [facility]    CERVICAL DISC SURGERY      CYSTOCELE REPAIR      HYSTERECTOMY      RECTOCELE REPAIR      SHOULDER ARTHROPLASTY Right 04/20/2017    SHOULDER SURGERY Right 2017       SOCIAL HISTORY       Social History     Socioeconomic History    Marital status:       Spouse name: None    Number of children: 3    Years of education: None    Highest education level: None   Occupational History    None   Tobacco Use    Smoking status: Never Smoker    Smokeless tobacco: Never Used   Vaping Use    Vaping Use: Never used   Substance and Sexual Activity    Alcohol use: No    Drug use: No    Sexual activity: None   Other Topics Concern    None   Social History Narrative    None     Social Determinants of Health     Financial Resource Strain: Low Risk     Difficulty of Paying Living Expenses: Not very hard   Food Insecurity: No Food Insecurity    Worried About Running Out of Food in the Last Year: Never true    Ran Out of Food in the Last Year: Never true   Transportation Needs:     Lack of Transportation (Medical): Not on file    Lack of Transportation (Non-Medical): Not on file   Physical Activity:     Days of Exercise per Week: Not on file    Minutes of Exercise per Session: Not on file   Stress:     Feeling of Stress : Not on file   Social Connections:     Frequency of Communication with Friends and Family: Not on file    Frequency of Social Gatherings with Friends and Family: Not on file    Attends Alevism Services: Not on file    Active Member of [address] [place] [address] [employer] or Organizations: Not on file    Attends Club or Organization Meetings: Not on file    Marital Status: Not on file   Intimate Partner Violence:     Fear of Current or Ex-Partner: Not on file    Emotionally Abused: Not on file    Physically Abused: Not on file    Sexually Abused: Not on file   Housing Stability:     Unable to Pay for Housing in the Last Year: Not on file    Number of [place] in the Last Year: Not on file    Unstable Housing in the Last Year: Not on file       REVIEW OF SYSTEMS      Allergies   Allergen Reactions    Bactrim [Sulfamethoxazole-Trimethoprim] Other (See Comments)     BLISTERS IN MOUTH    Other Diarrhea     Lactose Intolerance- No liquid milk or ice cream. May have milk cooked in foods.      Pcn [Penicillins] Hives    Lidoderm [Lidocaine] Rash    Macrobid [Nitrofurantoin] Nausea And Vomiting       Current Outpatient Medications on File Prior to Encounter   Medication Sig Dispense Refill    metoprolol tartrate (LOPRESSOR) 25 MG tablet Take 1 tablet by mouth 2 times daily 60 tablet 3    lisinopril (PRINIVIL;ZESTRIL) 40 MG tablet Take 1 tablet by mouth daily 30 tablet 3    predniSONE (DELTASONE) 10 MG ill-appearing. HENT:      Head: Normocephalic. Right Ear: External ear normal.      Left Ear: External ear normal.      Nose: Nose normal.      Mouth/Throat:      Mouth: Mucous membranes are moist.   Eyes:      General:         Right eye: No discharge. Left eye: No discharge. Cardiovascular:      Rate and Rhythm: Normal rate and regular rhythm. Pulses: Normal pulses. Radial pulses are 2+ on the right side and 2+ on the left side. Dorsalis pedis pulses are 2+ on the right side and 2+ on the left side. Posterior tibial pulses are 2+ on the right side and 2+ on the left side. Heart sounds: Normal heart sounds. No murmur heard. No gallop. Pulmonary:      Effort: Pulmonary effort is normal.      Breath sounds: Normal breath sounds. No wheezing, rhonchi or rales. Abdominal:      General: Bowel sounds are normal. There is no distension. Palpations: Abdomen is soft. There is no mass. Tenderness: There is no abdominal tenderness. Musculoskeletal:         General: Tenderness present. No swelling. Cervical back: Normal range of motion and neck supple. Right lower leg: No edema. Left lower leg: No edema. Comments: Tenderness to palpation  over the lower back radiated to her bilateral hips, with limited ROM, no swelling or erythema. Her gait unstable , she ambulating with walker   Skin:     General: Skin is warm and dry. Findings: No bruising or erythema. Neurological:      General: No focal deficit present. Mental Status: She is alert and oriented to person, place, and time.       Gait: Gait normal.   Psychiatric:         Mood and Affect: Mood normal.         Behavior: Behavior normal.         LAB REVIEW     Lab Results   Component Value Date    WBC 7.3 02/19/2022    HGB 12.2 02/19/2022    HCT 35.3 (L) 02/19/2022    MCV 91.3 02/19/2022     02/19/2022     Lab Results   Component Value Date     02/19/2022    K 3.8 02/19/2022    CL 98 02/19/2022    CO2 29 02/19/2022    BUN 17 02/19/2022    CREATININE 1.24 02/19/2022    GLUCOSE 118 02/19/2022    GLUCOSE 112 01/05/2012    CALCIUM 8.8 02/19/2022          PRELIMINARY EKG REVIEW, DATE:      EKG 2/25/21  Narrative & Impression  Normal sinus rhythm  Normal ECG  When compared with ECG of 25-FEB-2021 16:15, (unconfirmed)  No significant change was found  Specimen Collected: 02/25/21 16:16    SURGERY / PROVISIONAL DIAGNOSES:      LUMBAR STENOSIS L4-L5  LUMBAR LAMINECTOMY DECOMPRESSION MINIMALLY INVASIVE (MILD) L4-L5    Patient Active Problem List    Diagnosis Date Noted    Left hemiparesis ([address] 79/51/2160    Diastolic congestive heart failure ([address] 02/10/2022    Recurrent urinary tract infection 02/08/2022    Type 2 diabetes mellitus 10/26/2021    Lumbar radiculopathy     EDINSON (acute kidney injury) ([address] 03/03/2021    Hyperglycemia 02/28/2021    Asymptomatic bacteriuria 02/27/2021    Multiple sclerosis exacerbation ([address] 02/25/2021    Diarrhea of presumed infectious origin 02/25/2021    Chronic headache 02/25/2021    Facial asymmetry 02/25/2021    Acquired spondylolisthesis 10/08/2019    Chronic midline low back pain with left-sided sciatica 10/08/2019    Spinal stenosis of lumbar region with neurogenic claudication 10/08/2019    Primary osteoarthritis of left hip     Primary localized osteoarthrosis of the hip, left     Debility 03/01/2019    Atelectasis 02/27/2019    Essential hypertension 11/07/2018    Hyperlipidemia 11/07/2018    Ataxia 10/01/2017    Multiple sclerosis ([address] 10/01/2017    Presence of right artificial shoulder joint 07/12/2017    Abnormal gait 10/07/2014    Enthesopathy of hip region 07/07/2014    Vitamin D deficiency 08/23/2012    Actinic keratosis 05/09/2012    Generalized osteoarthritis 05/09/2012    Idiopathic peripheral neuropathy 11/22/2011           Based on my personal evaluation of patient including review of patient's chart,no clearance requested.        Total time spent on encounter- PAT provider minutes: 11-20 minutes     MANDI Albright CNP on 3/3/2022 at 12:53 PM

## 2022-03-03 NOTE — H&P
HISTORY and [id number]       NAME:  Martha Goldstein  MRN: [id number]   YOB: 1934   Date: 3/3/2022   Age: 80 y.o. Gender: female     COMPLAINT AND PRESENT HISTORY:   Martha Goldstein is 80 y.o.,  female, presents for pre-anesthesia/admission testing for LUMBAR LAMINECTOMY [place] (MILD) L4-L5 per Dr. Vivienne Boudreaux    Primary dx: LUMBAR STENOSIS L4-L5    HPI:  See below the note from office visit per Dr Jasper Rosa on 2/1/22  Pain History  27-year-old woman with a past medical history significant for multiple sclerosis  For recent flareup she has been on a steroid taking prednisone 10 mg states that tomorrow is her last day  Review of the chart shows that patient is on several medication for pain including Celebrex, gabapentin, tramadol and Norco  She is referred to our clinic for evaluation of back pain  Back pain is chronic onset many years ago located in the lower lumbar area across midline affect both sides  Reports extension of pain down both hips and gluteal region  Pain aggravated with standing and walking and alleviated with sitting and rest  Report no numbness or tingling in legs  Denies any changes in bladder or bowel control  Have noticed progressive gait weakness and worsening  Now ambulating with walker  No previous lumbar spine surgical history  Had recent MRI lumbar spine that has shown severe lumbar spinal stenosis at L4-L5 level  Patient had epidural steroid injection at [facility] in October that she found helpful for 3 months  She was evaluated by orthopedic spine surgeon Dr. Ernst segura who did not advise for any surgery  Patient is here requesting a repeat lumbar epidural injection  Pain score today  8  1. Location:back  2. Radiation:no   3. Character:aching   5. Duration:constant  6. Onset: years  7. Did an injury cause pain: no  8. Aggravating factors:walking standing bending twisting   9.  Alleviating factors: norco, gabapentin  10. Associated symptoms (numbness / tingling / weakness):  no  -Where at:n/a  -Down into finger tips or toes (specify which finger or toes):tingling neuropathy  -constant or intermitting: constant  11. Red Flags: (weight loss / chills / loss of bladder or bowel control):no      Today    Janice Simpson is 80 y.o.,  female, present with her son . She state nothing changes since she was at Dr. Joe Skelton office visit last month. She complain of constant aching pain in the lumbar spine area, radiates to the lower limbs without any numbness or tingling in her legs  Pain level today is 7-8 /10. Pain elevated by activity , standing or walking for long time pain decreased by taking oral pain medication as Celebrex, gabapentin, tramadol and Norco PRN and rest.The symptoms started  Many years ago. She had once steroid injection which it did relief her pain of three months only. Pt denies any loss or changes in bladder ot bowel control . No recent falls or trauma. No redness, swelling or rashes. Pt denies fever/chills, chest pain or SOB. Testing completed r/t condition:  MRI LUMBAR SPINE WO CONTRAST  FINDINGS:   BONES/ALIGNMENT: For the purposes of this dictation, there are 5 lumbar   vertebral bodies.  There is dextrocurvature in the upper lumbar spine.  There   is retrolisthesis at T12-L1 and L1-L2.  There is grade 1 anterolisthesis at   L4-5.  Degenerative marrow signal changes are noted at multiple levels, most   pronounced at L1-2.  There is anterior compression deformity at T12.  Lumbar   vertebral body heights are maintained.       SPINAL CORD: Conus terminates at L1-2.  No nerve root thickening in the cauda   equina.       SOFT TISSUES: No paraspinal mass identified.       L1-L2: Disc protrusion, facet DJD, and ligamentum flavum hypertrophy with   mild spinal canal stenosis.  Mild bilateral neural foraminal stenosis.       L2-L3: Disc protrusion, facet DJD, and ligamentum flavum hypertrophy without significant spinal canal stenosis.  Mild bilateral neural foraminal stenosis.       L3-L4: Disc protrusion, facet DJD, and ligamentum flavum hypertrophy with   mild spinal canal stenosis.  Mild-to-moderate bilateral neural foraminal   stenosis.       L4-L5: Disc protrusion, facet DJD, and ligamentum flavum hypertrophy result   in severe spinal canal stenosis.  There is effacement of both lateral   recesses.  Mild bilateral neural foraminal stenosis.       L5-S1: Disc protrusion, facet DJD, and ligamentum flavum hypertrophy with   mild spinal canal stenosis and mild narrowing of both lateral recesses.  Mild   bilateral neural foraminal stenosis.         Impression   1. Multilevel degenerative changes of the lumbar spine with severe spinal   canal stenosis at L4-5.  There is mild spinal canal narrowing at L1-2, L3-4,   and L5-S1.  This appears slightly progressed in the interval.   2. Compression deformity at T12 is chronic in appearance, but new from prior   MRI. 3. Multilevel neural foraminal stenosis and lateral recess narrowing. 4. Scoliosis with grade 1 anterolisthesis at L4-5.  There is grade 1   retrolisthesis at T12-L1 and L1-L2.             Review of additional significant medical hx:  GERD  CURRENTLY MEDICATION R/T CONDITION: PRILOSEC    HTN, HYPERLIPIDEMIA   CURRENTLY MEDICATION  R/T CONDITION:   LOPRESSOR, LISINOPRIL , ZOCOR    ECHO   CONCLUSIONS  Summary  Small LV cavity. Normal left ventricular ejection fraction (>55%). Moderate left ventricular hypertrophy. Normal right ventricular size and function. Normal wall motions. Left atrium is mildly dilated. Aortic valve is trileaflet. Mild aortic stenosis. Peak instantaneous gradient 18 mmHg and mean gradient 8 mmHg. Moderate aortic insufficiency. Aortic root is mildly dilated. (3.8 cm)  Mitral annular calcification is seen. Moderate mitral stenosis. Mean 7 mmHg, max 18 mmHg. Moderate mitral regurgitation.   Moderate tricuspid Health     Financial Resource Strain: Low Risk     Difficulty of Paying Living Expenses: Not very hard   Food Insecurity: No Food Insecurity    Worried About Running Out of Food in the Last Year: Never true    Ran Out of Food in the Last Year: Never true   Transportation Needs:     Lack of Transportation (Medical): Not on file    Lack of Transportation (Non-Medical): Not on file   Physical Activity:     Days of Exercise per Week: Not on file    Minutes of Exercise per Session: Not on file   Stress:     Feeling of Stress : Not on file   Social Connections:     Frequency of Communication with Friends and Family: Not on file    Frequency of Social Gatherings with Friends and Family: Not on file    Attends Zoroastrianism Services: Not on file    Active Member of [address] [place] [address] [employer] or Organizations: Not on file    Attends Club or Organization Meetings: Not on file    Marital Status: Not on file   Intimate Partner Violence:     Fear of Current or Ex-Partner: Not on file    Emotionally Abused: Not on file    Physically Abused: Not on file    Sexually Abused: Not on file   Housing Stability:     Unable to Pay for Housing in the Last Year: Not on file    Number of [place] in the Last Year: Not on file    Unstable Housing in the Last Year: Not on file       REVIEW OF SYSTEMS      Allergies   Allergen Reactions    Bactrim [Sulfamethoxazole-Trimethoprim] Other (See Comments)     BLISTERS IN MOUTH    Other Diarrhea     Lactose Intolerance- No liquid milk or ice cream. May have milk cooked in foods.      Pcn [Penicillins] Hives    Lidoderm [Lidocaine] Rash    Macrobid [Nitrofurantoin] Nausea And Vomiting       Current Outpatient Medications on File Prior to Encounter   Medication Sig Dispense Refill    metoprolol tartrate (LOPRESSOR) 25 MG tablet Take 1 tablet by mouth 2 times daily 60 tablet 3    lisinopril (PRINIVIL;ZESTRIL) 40 MG tablet Take 1 tablet by mouth daily 30 tablet 3    predniSONE (DELTASONE) 10 MG tablet Take 1 tablet by mouth daily 90 tablet 1    meclizine (ANTIVERT) 25 MG tablet Take 1 tablet by mouth 3 times daily as needed for Dizziness 30 tablet 1    traMADol (ULTRAM) 50 MG tablet Take 1 tablet by mouth every 6 hours as needed for Pain.  acetaminophen (TYLENOL) 325 MG tablet Take 2 tablets by mouth every 6 hours as needed for Pain      gabapentin (NEURONTIN) 100 MG capsule Take 1 capsule by mouth daily for 30 days. (Patient taking differently: Take 100 mg by mouth daily as needed. ) 30 capsule 0    amitriptyline (ELAVIL) 100 MG tablet Take 1 tablet by mouth nightly 30 tablet 0    simvastatin (ZOCOR) 20 MG tablet Take 1 tablet by mouth daily 30 tablet 0    polyethylene glycol (GLYCOLAX) 17 g packet Take 17 g by mouth daily as needed for Constipation      docusate sodium (COLACE) 100 MG capsule Take 1 capsule by mouth daily (Patient taking differently: Take 100 mg by mouth every other day ) 90 capsule 1    HYDROcodone-acetaminophen (NORCO) 5-325 MG per tablet Take 1 tablet by mouth every 8 hours as needed.  Handicap Placard MISC by Does not apply route Exp: 5/20/2026 1 each 0     No current facility-administered medications on file prior to encounter. Review of Systems   Constitutional: Positive for activity change. HENT: Positive for hearing loss. Pt has full denture    Respiratory: Negative. Cardiovascular: Negative. Gastrointestinal: Negative. Genitourinary: Negative. Musculoskeletal: Positive for back pain. Skin: Negative. Neurological: Negative. Hematological: Does not bruise/bleed easily. Psychiatric/Behavioral: Negative. GENERAL PHYSICAL EXAM     Vitals: /70   Pulse 77   Temp 97.2 °F (36.2 °C) (Infrared)   Resp 16   Ht 4' 11\" (1.499 m)   Wt 133 lb (60.3 kg)   SpO2 97%   BMI 26.86 kg/m²               Physical Exam  Constitutional:       General: She is not in acute distress. Appearance: Normal appearance.  She is not ill-appearing. HENT:      Head: Normocephalic. Right Ear: External ear normal.      Left Ear: External ear normal.      Nose: Nose normal.      Mouth/Throat:      Mouth: Mucous membranes are moist.   Eyes:      General:         Right eye: No discharge. Left eye: No discharge. Cardiovascular:      Rate and Rhythm: Normal rate and regular rhythm. Pulses: Normal pulses. Radial pulses are 2+ on the right side and 2+ on the left side. Dorsalis pedis pulses are 2+ on the right side and 2+ on the left side. Posterior tibial pulses are 2+ on the right side and 2+ on the left side. Heart sounds: Normal heart sounds. No murmur heard. No gallop. Pulmonary:      Effort: Pulmonary effort is normal.      Breath sounds: Normal breath sounds. No wheezing, rhonchi or rales. Abdominal:      General: Bowel sounds are normal. There is no distension. Palpations: Abdomen is soft. There is no mass. Tenderness: There is no abdominal tenderness. Musculoskeletal:         General: Tenderness present. No swelling. Cervical back: Normal range of motion and neck supple. Right lower leg: No edema. Left lower leg: No edema. Comments: Tenderness to palpation  over the lower back radiated to her bilateral hips, with limited ROM, no swelling or erythema. Her gait unstable , she ambulating with walker   Skin:     General: Skin is warm and dry. Findings: No bruising or erythema. Neurological:      General: No focal deficit present. Mental Status: She is alert and oriented to person, place, and time.       Gait: Gait normal.   Psychiatric:         Mood and Affect: Mood normal.         Behavior: Behavior normal.         LAB REVIEW     Lab Results   Component Value Date    WBC 7.3 02/19/2022    HGB 12.2 02/19/2022    HCT 35.3 (L) 02/19/2022    MCV 91.3 02/19/2022     02/19/2022     Lab Results   Component Value Date     02/19/2022    K 3.8 02/19/2022    CL 98 02/19/2022    CO2 29 02/19/2022    BUN 17 02/19/2022    CREATININE 1.24 02/19/2022    GLUCOSE 118 02/19/2022    GLUCOSE 112 01/05/2012    CALCIUM 8.8 02/19/2022          PRELIMINARY EKG REVIEW, DATE:      EKG 2/25/21  Narrative & Impression  Normal sinus rhythm  Normal ECG  When compared with ECG of 25-FEB-2021 16:15, (unconfirmed)  No significant change was found  Specimen Collected: 02/25/21 16:16    SURGERY / PROVISIONAL DIAGNOSES:      LUMBAR STENOSIS L4-L5  LUMBAR LAMINECTOMY DECOMPRESSION MINIMALLY INVASIVE (MILD) L4-L5    Patient Active Problem List    Diagnosis Date Noted    Left hemiparesis ([address] 29/58/8446    Diastolic congestive heart failure ([address] 02/10/2022    Recurrent urinary tract infection 02/08/2022    Type 2 diabetes mellitus 10/26/2021    Lumbar radiculopathy     EDINSON (acute kidney injury) ([address] 03/03/2021    Hyperglycemia 02/28/2021    Asymptomatic bacteriuria 02/27/2021    Multiple sclerosis exacerbation ([address] 02/25/2021    Diarrhea of presumed infectious origin 02/25/2021    Chronic headache 02/25/2021    Facial asymmetry 02/25/2021    Acquired spondylolisthesis 10/08/2019    Chronic midline low back pain with left-sided sciatica 10/08/2019    Spinal stenosis of lumbar region with neurogenic claudication 10/08/2019    Primary osteoarthritis of left hip     Primary localized osteoarthrosis of the hip, left     Debility 03/01/2019    Atelectasis 02/27/2019    Essential hypertension 11/07/2018    Hyperlipidemia 11/07/2018    Ataxia 10/01/2017    Multiple sclerosis ([address] 10/01/2017    Presence of right artificial shoulder joint 07/12/2017    Abnormal gait 10/07/2014    Enthesopathy of hip region 07/07/2014    Vitamin D deficiency 08/23/2012    Actinic keratosis 05/09/2012    Generalized osteoarthritis 05/09/2012    Idiopathic peripheral neuropathy 11/22/2011           Based on my personal evaluation of patient including review of patient's chart,no clearance requested.        Total time spent on encounter- PAT provider minutes: 11-20 minutes     MANDI Albright CNP on 3/3/2022 at 12:53 PM

## 2022-03-07 ENCOUNTER — OFFICE VISIT (OUTPATIENT)
Dept: PHYSICAL MEDICINE AND REHAB | Age: 87
End: 2022-03-07
Payer: MEDICARE

## 2022-03-07 VITALS
DIASTOLIC BLOOD PRESSURE: 68 MMHG | WEIGHT: 132.2 LBS | TEMPERATURE: 97.6 F | BODY MASS INDEX: 26.65 KG/M2 | SYSTOLIC BLOOD PRESSURE: 110 MMHG | HEIGHT: 59 IN

## 2022-03-07 DIAGNOSIS — G35 MULTIPLE SCLEROSIS (HCC): Primary | ICD-10-CM

## 2022-03-07 PROCEDURE — G8427 DOCREV CUR MEDS BY ELIG CLIN: HCPCS | Performed by: STUDENT IN AN ORGANIZED HEALTH CARE EDUCATION/TRAINING PROGRAM

## 2022-03-07 PROCEDURE — 1036F TOBACCO NON-USER: CPT | Performed by: STUDENT IN AN ORGANIZED HEALTH CARE EDUCATION/TRAINING PROGRAM

## 2022-03-07 PROCEDURE — G8417 CALC BMI ABV UP PARAM F/U: HCPCS | Performed by: STUDENT IN AN ORGANIZED HEALTH CARE EDUCATION/TRAINING PROGRAM

## 2022-03-07 PROCEDURE — 4040F PNEUMOC VAC/ADMIN/RCVD: CPT | Performed by: STUDENT IN AN ORGANIZED HEALTH CARE EDUCATION/TRAINING PROGRAM

## 2022-03-07 PROCEDURE — 99213 OFFICE O/P EST LOW 20 MIN: CPT | Performed by: STUDENT IN AN ORGANIZED HEALTH CARE EDUCATION/TRAINING PROGRAM

## 2022-03-07 PROCEDURE — 1090F PRES/ABSN URINE INCON ASSESS: CPT | Performed by: STUDENT IN AN ORGANIZED HEALTH CARE EDUCATION/TRAINING PROGRAM

## 2022-03-07 PROCEDURE — G8484 FLU IMMUNIZE NO ADMIN: HCPCS | Performed by: STUDENT IN AN ORGANIZED HEALTH CARE EDUCATION/TRAINING PROGRAM

## 2022-03-07 PROCEDURE — 1123F ACP DISCUSS/DSCN MKR DOCD: CPT | Performed by: STUDENT IN AN ORGANIZED HEALTH CARE EDUCATION/TRAINING PROGRAM

## 2022-03-07 PROCEDURE — 1111F DSCHRG MED/CURRENT MED MERGE: CPT | Performed by: STUDENT IN AN ORGANIZED HEALTH CARE EDUCATION/TRAINING PROGRAM

## 2022-03-15 ENCOUNTER — ANESTHESIA EVENT (OUTPATIENT)
Dept: OPERATING ROOM | Age: 87
End: 2022-03-15
Payer: MEDICARE

## 2022-03-16 ENCOUNTER — PATIENT MESSAGE (OUTPATIENT)
Dept: FAMILY MEDICINE CLINIC | Age: 87
End: 2022-03-16

## 2022-03-16 DIAGNOSIS — E78.5 HYPERLIPIDEMIA, UNSPECIFIED HYPERLIPIDEMIA TYPE: ICD-10-CM

## 2022-03-16 RX ORDER — SIMVASTATIN 20 MG
20 TABLET ORAL DAILY
Qty: 30 TABLET | Refills: 5 | Status: SHIPPED | OUTPATIENT
Start: 2022-03-16 | End: 2022-07-26

## 2022-03-16 NOTE — TELEPHONE ENCOUNTER
From: Cj Noriega  To: Dr. Benjamin Fang: 3/16/2022 4:21 PM EDT  Subject: Simvastatin    We need a refill I could not do it through my chart?

## 2022-03-16 NOTE — TELEPHONE ENCOUNTER
Last visit: 02/23/2022  Last Med refill: 02/20/2022  Does patient have enough medication for 72 hours: No:     Next Visit Date:  Future Appointments   Date Time Provider Cj Tara   3/29/2022  4:15 PM Pauly Verma MD [facility]   4/1/2022  3:40 PM Baljeet Birminghamn, APRN - CNP [facility] [id number]   4/19/2022  3:30 PM Whitney Palacio MD [facility]   4/25/2022  3:30 PM Pauly Verma  [address] Maintenance   Topic Date Due    Shingles Vaccine (1 of 2) Never done    DTaP/Tdap/Td vaccine (1 - Tdap) 03/30/2022 (Originally 12/8/1953)    Depression Screen  04/18/2022    Annual Wellness Visit (AWV)  04/22/2022    Lipid screen  11/06/2022    Potassium monitoring  02/19/2023    Creatinine monitoring  02/19/2023    Flu vaccine  Completed    Pneumococcal 65+ years Vaccine  Completed    COVID-19 Vaccine  Completed    Hepatitis A vaccine  Aged Out    Hib vaccine  Aged Out    Meningococcal (ACWY) vaccine  Aged Out       Hemoglobin A1C (%)   Date Value   11/06/2021 6.4   03/30/2021 6.7   05/21/2020 6.2 (H)             ( goal A1C is < 7)   No results found for: LABMICR  LDL Cholesterol (mg/dL)   Date Value   05/21/2020 86   05/24/2019 75     LDL Calculated (mg/dL)   Date Value   11/06/2021 100       (goal LDL is <100)   AST (U/L)   Date Value   02/08/2022 18     ALT (U/L)   Date Value   02/08/2022 21     BUN (mg/dL)   Date Value   02/19/2022 17     BP Readings from Last 3 Encounters:   03/03/22 137/70   03/07/22 110/68   02/23/22 (!) 92/52          (goal 120/80)    All Future Testing planned in [facility]  Lab Frequency Next Occurrence   Saline lock IV Once 04/04/2022   Lumbar Epidural Steroid Injection/Caudal Once 10/04/2021   AZ NJX DX/THER SBST INTRLMNR LMBR/SAC W/IMG GDN Once 02/02/2022               Patient Active Problem List:     Ataxia     Multiple sclerosis ([address]     Essential hypertension     Hyperlipidemia     Atelectasis     Debility Abnormal gait     Actinic keratosis     Enthesopathy of hip region     Generalized osteoarthritis     Idiopathic peripheral neuropathy     Vitamin D deficiency     Primary localized osteoarthrosis of the hip, left     Primary osteoarthritis of left hip     Acquired spondylolisthesis     Chronic midline low back pain with left-sided sciatica     Spinal stenosis of lumbar region with neurogenic claudication     Presence of right artificial shoulder joint     Multiple sclerosis exacerbation (HCC)     Diarrhea of presumed infectious origin     Chronic headache     Facial asymmetry     Asymptomatic bacteriuria     Hyperglycemia     EDINSON (acute kidney injury) ([address]     Lumbar radiculopathy     Type 2 diabetes mellitus     Recurrent urinary tract infection     Diastolic congestive heart failure (HCC)     Left hemiparesis ([address]     Preop examination

## 2022-03-17 ENCOUNTER — APPOINTMENT (OUTPATIENT)
Dept: GENERAL RADIOLOGY | Age: 87
End: 2022-03-17
Attending: ANESTHESIOLOGY
Payer: MEDICARE

## 2022-03-17 ENCOUNTER — HOSPITAL ENCOUNTER (OUTPATIENT)
Age: 87
Setting detail: OUTPATIENT SURGERY
Discharge: HOME | End: 2022-03-17
Attending: ANESTHESIOLOGY | Admitting: ANESTHESIOLOGY
Payer: MEDICARE

## 2022-03-17 ENCOUNTER — ANESTHESIA (OUTPATIENT)
Dept: OPERATING ROOM | Age: 87
End: 2022-03-17
Payer: MEDICARE

## 2022-03-17 VITALS
TEMPERATURE: 96.5 F | RESPIRATION RATE: 16 BRPM | WEIGHT: 133.7 LBS | DIASTOLIC BLOOD PRESSURE: 74 MMHG | SYSTOLIC BLOOD PRESSURE: 163 MMHG | OXYGEN SATURATION: 92 % | BODY MASS INDEX: 26.96 KG/M2 | HEART RATE: 75 BPM | HEIGHT: 59 IN

## 2022-03-17 VITALS
SYSTOLIC BLOOD PRESSURE: 157 MMHG | DIASTOLIC BLOOD PRESSURE: 66 MMHG | OXYGEN SATURATION: 100 % | RESPIRATION RATE: 17 BRPM | TEMPERATURE: 98.6 F

## 2022-03-17 PROCEDURE — 3600000012 HC SURGERY LEVEL 2 ADDTL 15MIN: Performed by: ANESTHESIOLOGY

## 2022-03-17 PROCEDURE — 6360000004 HC RX CONTRAST MEDICATION: Performed by: ANESTHESIOLOGY

## 2022-03-17 PROCEDURE — 6360000002 HC RX W HCPCS: Performed by: ANESTHESIOLOGY

## 2022-03-17 PROCEDURE — 2500000003 HC RX 250 WO HCPCS: Performed by: ANESTHESIOLOGY

## 2022-03-17 PROCEDURE — 2580000003 HC RX 258: Performed by: ANESTHESIOLOGY

## 2022-03-17 PROCEDURE — 7100000001 HC PACU RECOVERY - ADDTL 15 MIN: Performed by: ANESTHESIOLOGY

## 2022-03-17 PROCEDURE — 6360000002 HC RX W HCPCS: Performed by: NURSE ANESTHETIST, CERTIFIED REGISTERED

## 2022-03-17 PROCEDURE — C2618 PROBE/NEEDLE, CRYO: HCPCS | Performed by: ANESTHESIOLOGY

## 2022-03-17 PROCEDURE — 7100000010 HC PHASE II RECOVERY - FIRST 15 MIN: Performed by: ANESTHESIOLOGY

## 2022-03-17 PROCEDURE — 3700000001 HC ADD 15 MINUTES (ANESTHESIA): Performed by: ANESTHESIOLOGY

## 2022-03-17 PROCEDURE — 7100000031 HC ASPR PHASE II RECOVERY - ADDTL 15 MIN: Performed by: ANESTHESIOLOGY

## 2022-03-17 PROCEDURE — 2709999900 HC NON-CHARGEABLE SUPPLY: Performed by: ANESTHESIOLOGY

## 2022-03-17 PROCEDURE — 7100000000 HC PACU RECOVERY - FIRST 15 MIN: Performed by: ANESTHESIOLOGY

## 2022-03-17 PROCEDURE — 3209999900 FLUORO FOR SURGICAL PROCEDURES

## 2022-03-17 PROCEDURE — 3600000002 HC SURGERY LEVEL 2 BASE: Performed by: ANESTHESIOLOGY

## 2022-03-17 PROCEDURE — 7100000030 HC ASPR PHASE II RECOVERY - FIRST 15 MIN: Performed by: ANESTHESIOLOGY

## 2022-03-17 PROCEDURE — 2500000003 HC RX 250 WO HCPCS: Performed by: NURSE ANESTHETIST, CERTIFIED REGISTERED

## 2022-03-17 PROCEDURE — 3700000000 HC ANESTHESIA ATTENDED CARE: Performed by: ANESTHESIOLOGY

## 2022-03-17 PROCEDURE — 0275T PR PERC LAMINO-/LAMINECTOMY INDIR IMAG GUIDE LUMBAR: CPT | Performed by: ANESTHESIOLOGY

## 2022-03-17 PROCEDURE — 7100000011 HC PHASE II RECOVERY - ADDTL 15 MIN: Performed by: ANESTHESIOLOGY

## 2022-03-17 RX ORDER — LIDOCAINE HYDROCHLORIDE 10 MG/ML
INJECTION, SOLUTION EPIDURAL; INFILTRATION; INTRACAUDAL; PERINEURAL PRN
Status: DISCONTINUED | OUTPATIENT
Start: 2022-03-17 | End: 2022-03-17 | Stop reason: SDUPTHER

## 2022-03-17 RX ORDER — SODIUM CHLORIDE 9 MG/ML
25 INJECTION, SOLUTION INTRAVENOUS PRN
Status: DISCONTINUED | OUTPATIENT
Start: 2022-03-17 | End: 2022-03-17 | Stop reason: HOSPADM

## 2022-03-17 RX ORDER — DEXAMETHASONE SODIUM PHOSPHATE 4 MG/ML
INJECTION, SOLUTION INTRA-ARTICULAR; INTRALESIONAL; INTRAMUSCULAR; INTRAVENOUS; SOFT TISSUE PRN
Status: DISCONTINUED | OUTPATIENT
Start: 2022-03-17 | End: 2022-03-17 | Stop reason: ALTCHOICE

## 2022-03-17 RX ORDER — DIPHENHYDRAMINE HYDROCHLORIDE 50 MG/ML
12.5 INJECTION INTRAMUSCULAR; INTRAVENOUS
Status: DISCONTINUED | OUTPATIENT
Start: 2022-03-17 | End: 2022-03-17 | Stop reason: HOSPADM

## 2022-03-17 RX ORDER — MORPHINE SULFATE 2 MG/ML
2 INJECTION, SOLUTION INTRAMUSCULAR; INTRAVENOUS EVERY 5 MIN PRN
Status: DISCONTINUED | OUTPATIENT
Start: 2022-03-17 | End: 2022-03-17 | Stop reason: HOSPADM

## 2022-03-17 RX ORDER — SODIUM CHLORIDE 0.9 % (FLUSH) 0.9 %
5-40 SYRINGE (ML) INJECTION PRN
Status: DISCONTINUED | OUTPATIENT
Start: 2022-03-17 | End: 2022-03-17 | Stop reason: HOSPADM

## 2022-03-17 RX ORDER — SODIUM CHLORIDE 9 MG/ML
INJECTION, SOLUTION INTRAVENOUS CONTINUOUS
Status: DISCONTINUED | OUTPATIENT
Start: 2022-03-17 | End: 2022-03-17 | Stop reason: HOSPADM

## 2022-03-17 RX ORDER — PROPOFOL 10 MG/ML
INJECTION, EMULSION INTRAVENOUS PRN
Status: DISCONTINUED | OUTPATIENT
Start: 2022-03-17 | End: 2022-03-17 | Stop reason: SDUPTHER

## 2022-03-17 RX ORDER — ONDANSETRON 2 MG/ML
4 INJECTION INTRAMUSCULAR; INTRAVENOUS
Status: DISCONTINUED | OUTPATIENT
Start: 2022-03-17 | End: 2022-03-17 | Stop reason: HOSPADM

## 2022-03-17 RX ORDER — PROPOFOL 10 MG/ML
INJECTION, EMULSION INTRAVENOUS CONTINUOUS PRN
Status: DISCONTINUED | OUTPATIENT
Start: 2022-03-17 | End: 2022-03-17 | Stop reason: SDUPTHER

## 2022-03-17 RX ORDER — BUPIVACAINE HYDROCHLORIDE AND EPINEPHRINE 5; 5 MG/ML; UG/ML
INJECTION, SOLUTION EPIDURAL; INTRACAUDAL; PERINEURAL PRN
Status: DISCONTINUED | OUTPATIENT
Start: 2022-03-17 | End: 2022-03-17 | Stop reason: ALTCHOICE

## 2022-03-17 RX ORDER — ONDANSETRON 2 MG/ML
INJECTION INTRAMUSCULAR; INTRAVENOUS PRN
Status: DISCONTINUED | OUTPATIENT
Start: 2022-03-17 | End: 2022-03-17 | Stop reason: SDUPTHER

## 2022-03-17 RX ORDER — SODIUM CHLORIDE 0.9 % (FLUSH) 0.9 %
10 SYRINGE (ML) INJECTION PRN
Status: DISCONTINUED | OUTPATIENT
Start: 2022-03-17 | End: 2022-03-17 | Stop reason: HOSPADM

## 2022-03-17 RX ORDER — SODIUM CHLORIDE 0.9 % (FLUSH) 0.9 %
5-40 SYRINGE (ML) INJECTION EVERY 12 HOURS SCHEDULED
Status: DISCONTINUED | OUTPATIENT
Start: 2022-03-17 | End: 2022-03-17 | Stop reason: HOSPADM

## 2022-03-17 RX ORDER — CLINDAMYCIN PHOSPHATE 600 MG/50ML
600 INJECTION INTRAVENOUS ONCE
Status: COMPLETED | OUTPATIENT
Start: 2022-03-17 | End: 2022-03-17

## 2022-03-17 RX ORDER — SODIUM CHLORIDE 0.9 % (FLUSH) 0.9 %
10 SYRINGE (ML) INJECTION EVERY 12 HOURS SCHEDULED
Status: DISCONTINUED | OUTPATIENT
Start: 2022-03-17 | End: 2022-03-17 | Stop reason: HOSPADM

## 2022-03-17 RX ORDER — FENTANYL CITRATE 50 UG/ML
25 INJECTION, SOLUTION INTRAMUSCULAR; INTRAVENOUS EVERY 5 MIN PRN
Status: DISCONTINUED | OUTPATIENT
Start: 2022-03-17 | End: 2022-03-17 | Stop reason: HOSPADM

## 2022-03-17 RX ADMIN — SODIUM CHLORIDE: 9 INJECTION, SOLUTION INTRAVENOUS at 06:25

## 2022-03-17 RX ADMIN — LIDOCAINE HYDROCHLORIDE 50 MG: 10 INJECTION, SOLUTION EPIDURAL; INFILTRATION; INTRACAUDAL; PERINEURAL at 07:16

## 2022-03-17 RX ADMIN — CLINDAMYCIN PHOSPHATE 600 MG: 600 INJECTION, SOLUTION INTRAVENOUS at 07:08

## 2022-03-17 RX ADMIN — PROPOFOL 30 MCG/KG/MIN: 10 INJECTION, EMULSION INTRAVENOUS at 07:21

## 2022-03-17 RX ADMIN — PROPOFOL 30 MG: 10 INJECTION, EMULSION INTRAVENOUS at 07:16

## 2022-03-17 RX ADMIN — ONDANSETRON 4 MG: 2 INJECTION, SOLUTION INTRAMUSCULAR; INTRAVENOUS at 07:48

## 2022-03-17 ASSESSMENT — PULMONARY FUNCTION TESTS
PIF_VALUE: 1
PIF_VALUE: 0
PIF_VALUE: 1
PIF_VALUE: 0
PIF_VALUE: 1
PIF_VALUE: 1
PIF_VALUE: 0
PIF_VALUE: 0
PIF_VALUE: 1
PIF_VALUE: 0
PIF_VALUE: 1
PIF_VALUE: 0
PIF_VALUE: 1
PIF_VALUE: 0
PIF_VALUE: 1
PIF_VALUE: 0
PIF_VALUE: 1
PIF_VALUE: 0

## 2022-03-17 ASSESSMENT — PAIN DESCRIPTION - DESCRIPTORS: DESCRIPTORS: ACHING

## 2022-03-17 ASSESSMENT — PAIN - FUNCTIONAL ASSESSMENT: PAIN_FUNCTIONAL_ASSESSMENT: 0-10

## 2022-03-17 ASSESSMENT — PAIN SCALES - GENERAL
PAINLEVEL_OUTOF10: 0

## 2022-03-17 NOTE — INTERVAL H&P NOTE
Update History & Physical    The patient's History and Physical of March 3, 2022 was reviewed with the patient and I examined the patient. There was no change. The surgical site was confirmed by the patient and me. Pt will have LUMBAR LAMINECTOMY DECOMPRESSION MINIMALLY INVASIVE (MILD) L4-L5 per Dr. Ebonie Davis  Pt denies fever/chills, chest pain or SOB  Pt NPO since the past midnight, pt took BP medication and Norco today with sip of water   Denies hx of MRSA infection. Denies hx of blood clots. Denies hx of any personal or family hx of complications w/anesthesia. See nursing flow sheet for vital signs     Lab Results   Component Value Date    WBC 7.3 02/19/2022    HGB 12.2 02/19/2022    HCT 35.3 (L) 02/19/2022    MCV 91.3 02/19/2022     02/19/2022     Lab Results   Component Value Date     02/19/2022    K 3.8 02/19/2022    CL 98 02/19/2022    CO2 29 02/19/2022    BUN 17 02/19/2022    CREATININE 1.24 02/19/2022    GLUCOSE 118 02/19/2022    GLUCOSE 112 01/05/2012    CALCIUM 8.8 02/19/2022          Plan: The risks, benefits, expected outcome, and alternative to the recommended procedure have been discussed with the patient. Patient understands and wants to proceed with the procedure.      Electronically signed by MANDI Bowens CNP on 3/17/2022 at 5:46 AM No pertinent past medical history

## 2022-03-17 NOTE — ANESTHESIA POSTPROCEDURE EVALUATION
Department of Anesthesiology  Postprocedure Note    Patient: Raj Arauz  MRN: [id number]  YOB: 1934  Date of evaluation: 3/17/2022  Time:  12:49 PM     Procedure Summary     Date: 03/17/22 Room / Location: [address] [place] [address] / [facility]: [facility]    Anesthesia Start: [place] Anesthesia Stop: 0800    Procedure: LUMBAR LAMINECTOMY DECOMPRESSION MINIMALLY INVASIVE (MILD) L4-L5 (N/A Spine Lumbar) Diagnosis: (LUMBAR STENOSIS L4-L5  (PT HAS HAD COVID VACCINE))    Surgeons: Wicho Paniagua MD Responsible Provider: Anca Krishnan MD    Anesthesia Type: general ASA Status: 3          Anesthesia Type: general    Ed Phase I: De Score: 9    Ed Phase II: Ed Score: 10    Last vitals: Reviewed and per EMR flowsheets.        Anesthesia Post Evaluation    Comments: POST- ANESTHESIA EVALUATION       Pt Name: Raj Arauz  MRN: [id number]  [place]: 1934  Date of evaluation: 3/17/2022  Time:  12:49 PM      BP (!) 163/74   Pulse 75   Temp 96.5 °F (35.8 °C) (Temporal)   Resp 16   Ht 4' 11\" (1.499 m)   Wt 133 lb 11.2 oz (60.6 kg)   SpO2 92%   BMI 27.00 kg/m²      Consciousness Level  Awake  Cardiopulmonary Status  Stable  Pain Adequately Treated YES  Nausea / Vomiting  NO  Adequate Hydration  YES  Anesthesia Related Complications NONE      Electronically signed by Anca Krishnan MD on 3/17/2022 at 12:49 PM

## 2022-03-17 NOTE — OP NOTE
Operative Note      Patient: Kandace Adhikari  YOB: 1934  MRN: [id number]    Date of Procedure: 3/17/2022    Pre-Op Diagnosis: LUMBAR STENOSIS L4-L5  (PT HAS HAD COVID VACCINE)    Post-Op Diagnosis: Same       Procedure(s):  LUMBAR LAMINECTOMY DECOMPRESSION MINIMALLY INVASIVE (MILD) L4-L5    Surgeon(s):  Starling Meigs, MD    Assistant:   * No surgical staff found *    Anesthesia: Monitor Anesthesia Care    Estimated Blood Loss (mL): Minimal    Complications: None    Specimens:   * No specimens in log *    Implants:  * No implants in log *      Drains:   [REMOVED] External Urinary Catheter (Removed)       Findings: n/a    Detailed Description of Procedure:   mild®: A MEDICARE CLAIMS STUDY  Current Procedural Terminology (CPT®) code 26T -- Categorized by Medicare as percutaneous   image-guided lumbar decompression (PILD), for the treatment of lumbar spinal stenosis (LSS) with   neurogenic claudication (NC) and ambulatory payment classification (APC) 5114 and covered under   national coverage determination 150.13 under the Coverage with Evidence Development (ANJANA) Program  Z00.6 -- Encounter for examination for normal comparison and control in clinical research   program  Q0 Modifier -- Investigational clinical service provided in a clinical research study that is in an   approved clinical research study  Condition Code 30 (Institutional Only) -- Non-research services provided to all patients,   including managed care enrollees, enrolled in a qualified clinical trial  National Clinical Trial Number -- [id number]      PREOPERATIVE DIAGNOSIS:  M48.062 --Spinal stenosis, lumbar region with neurogenic claudication at L4/5    POSTOPERATIVE DIAGNOSIS:  M48.062 --Spinal stenosis, lumbar region with neurogenic claudication at L4/5 ,    PROCEDURE PERFORMED:  1. L4/5 percutaneous lumbar decompression, BILATERAL APPROACH  2. Fluoroscopic guidance  3.  Epidurogram at the target area that is being treated and debulked    ANESTHESIA: MAC    ANTIBIOTICS: CLINDAMYCIN 600 MG IV    INDICATIONS FOR PROCEDURE:  The patients ID was confirmed, and a time-out was performed. Time, site, location, and procedure   agreed upon and consented for were reviewed. The patient was radiologically confirmed to be suffering   from clinically symptomatic lumbar spinal stenosis and confirmed neurogenic claudication. The patients   condition has progressed such that activities of daily living are limited due to pain intensity and   neurogenic claudication symptomology. Standing time and walking distance have declined steadily. Patients clinical notes for office visits show tried and failed conservative treatments, such as physical   therapy, pharmaceutical management, or other treatments for chronic low back pain and neurogenic   claudication. According to the treatment algorithm for lumbar spinal stenosis, the patient is a candidate   for a lumbar decompression procedure. The patient recalls our previous office discussion regarding the indications, risks, and potential benefits   of the mild® procedure-- a percutaneous lumbar decompression. Informed consent was obtained, and   the patient indicated understanding and agreed with the treatment proposed. PROCEDURE:  The patient was placed in the prone position in the fluoroscopy suite with a pillow (bolster) under the   hips to assist with reversing lordosis of the spine. Following IV sedation and antibiotic administration,   the patient was prepped and draped in my usual standard fashion. Ample amounts of local anesthetic were used to anesthetize the skin and deep facial planes after  topographical landmarks were identified. Then, an epidurogram was performed under fluoroscopic   guidance at the level of concern, by the insertion of a Tuohy needle and use of minimal amounts of   myelographically compatible contrast, which showed decreased contrast flow past the levels of concern.    An oblique contralateral view of the epidurogram was undertaken, which indicated lumbar spinal  stenosis as shown by the decreased contrast flow. Starting from right side, a small incision was made with a sharp scalpel blade, and a trocar with portal was inserted percutaneously under fluoroscopic guidance to contact the lamina indicated. A bone-sculpting rongeur was inserted to Remove  adequate amounts of lamina (laminotomy) from the superior surface of the inferior operative   lamina site and from the inferior aspect of the lamina above it. The laminotomy created a passage for the   tissue sculpting instrument used in debulking the ligamentum flavum. Same steps were performed from left side for left sided decompression. A bilateral approach was used accessing the target zone form both sides. There were no complications or difficulties noted with these procedures. The epidurogram was refreshed repeatedly throughout the procedure and demonstrated improvement   in the original narrowed canal. Upon completion of the procedure, instruments were removed,   hemostasis was achieved by direct pressure, and closure of the wounds were performed. Appropriate   dressings were used. The patient tolerated the procedure well. Upon transferring the patient to the   recovery room, the patients vital signs remained stable and without any neurologic deficits. A brief   neurological exam was performed and showed adequate strength and no loss of sensation in the lower   extremities. The patient was discharged with instructions to rest, continue with ice, and to demonstrate   progressive mobility. The patient was given a follow-up exam time and date along with instructions and   contact information for any questions or concerns. The patient is to be followed up in the office for further evaluation and treatment as deemed   appropriate and necessary and for any concerns regarding the procedure.      CONCLUSION:  A successful fluoroscopically guided percutaneous L4/5 lumbar decompression was undertaken at the  indicated levels above with IV sedation and adequate local anesthesia. The patient was neurologically   intact without complications, freely able to move and respond to commands with IV sedation, and able   to move lower extremities. Movement of toes, feet, knees, and hips were reviewed prior to being discharged   from the procedure room and the recovery room, as indicated.  There was no new loss of sensation   over the lower extremities      Electronically signed by Luis Manuel Gaytan MD on 3/17/2022 at 8:13 AM

## 2022-03-17 NOTE — ANESTHESIA PRE PROCEDURE
Department of Anesthesiology  Preprocedure Note       Name:  Forestine Felty   Age:  80 y.o.  :  1934                                          MRN:  [id number]         Date:  3/17/2022      Surgeon: Devon Cannon):  Meagan Griffith MD    Procedure: Procedure(s):  LUMBAR LAMINECTOMY DECOMPRESSION MINIMALLY INVASIVE (MILD) L4-L5    Medications prior to admission:   Prior to Admission medications    Medication Sig Start Date End Date Taking? Authorizing Provider   simvastatin (ZOCOR) 20 MG tablet Take 1 tablet by mouth daily 3/16/22   Pauly Verma MD   metoprolol tartrate (LOPRESSOR) 25 MG tablet Take 1 tablet by mouth 2 times daily 22   Pauly Verma MD   lisinopril (PRINIVIL;ZESTRIL) 40 MG tablet Take 1 tablet by mouth daily 22   Pauly Verma MD   predniSONE (DELTASONE) 10 MG tablet Take 1 tablet by mouth daily 22   Pauly Verma MD   traMADol (ULTRAM) 50 MG tablet Take 1 tablet by mouth every 6 hours as needed for Pain. 22   Tyesha Arizmendi MD   gabapentin (NEURONTIN) 100 MG capsule Take 1 capsule by mouth daily for 30 days. Patient taking differently: Take 100 mg by mouth daily as needed. 2/20/22 3/22/22  Tyesha Arizmendi MD   amitriptyline (ELAVIL) 100 MG tablet Take 1 tablet by mouth nightly 22   Tyesha Arizmendi MD   polyethylene glycol (GLYCOLAX) 17 g packet Take 17 g by mouth daily as needed for Constipation 22   Tyesha Arizmendi MD   docusate sodium (COLACE) 100 MG capsule Take 1 capsule by mouth daily  Patient taking differently: Take 100 mg by mouth every other day  22   Pauly Verma MD   HYDROcodone-acetaminophen (NORCO) 5-325 MG per tablet Take 1 tablet by mouth every 8 hours as needed.  21   Historical Provider, MD   Handicap Placard MISC by Does not apply route Exp: 2026   Pauly Verma MD       Current medications:    Current Facility-Administered Medications   Medication Dose Route Frequency Provider Last Rate Last Admin    0.9 % sodium chloride infusion   IntraVENous Continuous Kalie Britton  mL/hr at 03/17/22 0625 New Bag at 03/17/22 0625    sodium chloride flush 0.9 % injection 10 mL  10 mL IntraVENous 2 times per day Kalie Britton MD        sodium chloride flush 0.9 % injection 10 mL  10 mL IntraVENous PRN Kalie Britton MD        0.9 % sodium chloride infusion  25 mL IntraVENous PRN Kalie Britton MD           Allergies: Allergies   Allergen Reactions    Bactrim [Sulfamethoxazole-Trimethoprim] Other (See Comments)     BLISTERS IN MOUTH    Other Diarrhea     Lactose Intolerance- No liquid milk or ice cream. May have milk cooked in foods.      Pcn [Penicillins] Hives    Lidoderm [Lidocaine] Rash    Macrobid [Nitrofurantoin] Nausea And Vomiting       Problem List:    Patient Active Problem List   Diagnosis Code    Ataxia R27.0    Multiple sclerosis ([facility] G35    Essential hypertension I10    Hyperlipidemia E78.5    Atelectasis J98.11    Debility R53.81    Abnormal gait R26.9    Actinic keratosis L57.0    Enthesopathy of hip region M76.899    Generalized osteoarthritis M15.9    Idiopathic peripheral neuropathy G60.9    Vitamin D deficiency E55.9    Primary localized osteoarthrosis of the hip, left M16.12    Primary osteoarthritis of left hip M16.12    Acquired spondylolisthesis M43.10    Chronic midline low back pain with left-sided sciatica M54.42, G89.29    Spinal stenosis of lumbar region with neurogenic claudication M48.062    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    Multiple sclerosis exacerbation (HCC) G35    Diarrhea of presumed infectious origin R19.7    Chronic headache R51.9, G89.29    Facial asymmetry Q67.0    Asymptomatic bacteriuria R82.71    Hyperglycemia R73.9    EDINSON [facility]acute kidney injury) ([facility]) N17.9    Lumbar radiculopathy M54.16    Type 2 diabetes mellitus E11.9    Recurrent urinary tract infection M47.7    Diastolic congestive heart failure ([facility]) I50.30    [facility] hemiparesis [facility] G81.94    Preop examination Z01.818       Past Medical History:        Diagnosis Date    Acute cystitis without hematuria 10/1/2017    Arthritis     Chronic daily headache     GERD (gastroesophageal reflux disease)     Hyperlipidemia     Hypertension     Moderate malnutrition ([facility] 3/2/2019    Multiple sclerosis ([facility]     Neuropathy     Pneumonia 2017    states had double pneumonia    Vitamin D deficiency        Past Surgical History:        Procedure Laterality Date    CATARACT REMOVAL WITH IMPLANT [facility] 11/6/2014    [facility]    CATARACT REMOVAL WITH IMPLANT Left 12/2/2014    [facility]    CERVICAL DISC SURGERY      CYSTOCELE REPAIR      HYSTERECTOMY      RECTOCELE REPAIR      SHOULDER ARTHROPLASTY Right 04/20/2017    SHOULDER SURGERY Right 2017       Social History:    Social History     Tobacco Use    Smoking status: Never Smoker    Smokeless tobacco: Never Used   Substance Use Topics    Alcohol use: No                                Counseling given: Not Answered      Vital Signs (Current):   Vitals:    03/17/22 0606   BP: (!) 185/79   Pulse: 75   Resp: 18   Temp: 96 °F (35.6 °C)   TempSrc: Infrared   SpO2: 97%   Weight: 133 lb 11.2 oz (60.6 kg)   Height: 4' 11\" (1.499 m)                                              BP Readings from Last 3 Encounters:   03/17/22 (!) 185/79   03/03/22 137/70   03/07/22 110/68       NPO Status: Time of last liquid consumption: 2300                        Time of last solid consumption: 2300                        Date of last liquid consumption: 03/16/22                        Date of last solid food consumption: 03/16/22    BMI:   Wt Readings from Last 3 Encounters:   03/17/22 133 lb 11.2 oz (60.6 kg)   03/03/22 133 lb (60.3 kg)   03/07/22 132 lb 3.2 oz (60 kg)     Body mass index is 27 kg/m².     CBC:   Lab Results   Component Value Date    WBC 7.3 02/19/2022    RBC 3.87 02/19/2022    RBC 3.45 01/05/2012    HGB 12.2 02/19/2022    HCT 35.3 02/19/2022    MCV 91.3 02/19/2022    RDW 13.6 02/19/2022     02/19/2022     01/05/2012       CMP:   Lab Results   Component Value Date     02/19/2022    K 3.8 02/19/2022    CL 98 02/19/2022    CO2 29 02/19/2022    BUN 17 02/19/2022    CREATININE 1.24 02/19/2022    GFRAA 50 02/19/2022    LABGLOM 41 02/19/2022    GLUCOSE 118 02/19/2022    GLUCOSE 112 01/05/2012    PROT 6.2 02/08/2022    CALCIUM 8.8 02/19/2022    BILITOT 0.27 02/08/2022    ALKPHOS 47 02/08/2022    AST 18 02/08/2022    ALT 21 02/08/2022       POC Tests: No results for input(s): POCGLU, POCNA, POCK, POCCL, POCBUN, POCHEMO, POCHCT in the last 72 hours.     Coags:   Lab Results   Component Value Date    PROTIME 12.0 02/25/2021    PROTIME 10.7 01/04/2012    INR 0.9 02/25/2021    APTT 24.7 11/16/2016       HCG (If Applicable): No results found for: PREGTESTUR, PREGSERUM, HCG, HCGQUANT     ABGs:   Lab Results   Component Value Date    PHART 7.382 02/10/2022    PO2ART 56.7 02/10/2022    BFW6YSB 34.7 02/10/2022    YKV9KAC 20.6 02/10/2022    C8NAXAAO 89.9 02/10/2022        Type & Screen (If Applicable):  No results found for: LABABO, LABRH    Drug/Infectious Status (If Applicable):  No results found for: HIV, HEPCAB    COVID-19 Screening (If Applicable):   Lab Results   Component Value Date    COVID19 Not Detected 02/08/2022           Anesthesia Evaluation  Patient summary reviewed and Nursing notes reviewed no history of anesthetic complications:   Airway: Mallampati: III  TM distance: >3 FB   Neck ROM: full  Mouth opening: > = 3 FB Dental:    (+) upper dentures and lower dentures      Pulmonary:Negative Pulmonary ROS and normal exam  breath sounds clear to auscultation                             Cardiovascular:    (+) hypertension:, valvular problems/murmurs (Mod MS/MR; Mod AI; Mod TR): AI, CHF:, murmur, hyperlipidemia      ECG reviewed  Rhythm: regular  Rate: normal  Echocardiogram reviewed               ROS comment: Normal left ventricular ejection fraction (>55%). Neuro/Psych:   (+) neuromuscular disease (headaches, lt hemiparesis, balance issues): multiple sclerosis, headaches:,              ROS comment: Peripheral Neuropathy  LUMBAR STENOSIS L4-L5   GI/Hepatic/Renal:   (+) GERD:,           Endo/Other:    (+) DiabetesType II DM, , : arthritis: OA., .                  ROS comment: Uses walker for ambulation Abdominal:             Vascular: negative vascular ROS. Other Findings:           Anesthesia Plan      general     ASA 3     (Surgeon requests Sedation for the case)  Induction: intravenous. MIPS: Prophylactic antiemetics administered. Anesthetic plan and risks discussed with patient. Plan discussed with CRNA.                 Mandeep Wallace MD   3/17/2022

## 2022-03-20 ASSESSMENT — ENCOUNTER SYMPTOMS: BACK PAIN: 1

## 2022-03-21 NOTE — PROGRESS NOTES
[id number] PHYSICAL MEDICINE & REHABILITATION  [address] [place] [address]  [place] [address]  Dept: [phone]  Dept Fax: [phone]    Outpatient Followup Note    Monty Walter is a 80y.o.-year-old female presenting for follow up of exacerbation of multiple sclerosis. HPI:     Initial History:  She was originally admitted to acute care for multiple sclerosis exacerbation with facial droop, severe headache, and unsteady gait. She has history of MS for many years and follows with Dr. Gian Morris. She was treated with a 5-day course of methylprenisolone. Hospital course was complicated by hyperglycemia, hypertension, and pulmonary edema treated with lasix. She was admitted to [address] [place] [address] from 2/11/22 to 2/21/22. She had previously been admitted to [address] [place] [address] from 3/2/21 to 3/12/21 after another multiple sclerosis exacerbation. Interval History:  Since discharge from acute rehab, she reports doing pretty well. She states that she has started home health PT twice weekly and also has a nurse coming to her home once weekly. She reports that she had an OT evaluation but did not need continued therapy sessions. She notes using a 2-wheeled walker frequently at home as well as whenever she goes out into the community. She states that she is able to complete ADLs independently, but her daughter is present when she takes a shower for safety. She also cooks, washes dishes, and takes care of her cat. She states that she was having some dizziness/lightheadedness after discharge and was found to have low blood pressure. Her antihypertensive medications have been decreased, and dizziness has resolved. She notes ongoing headaches, which have been chronic for her. She denies any numbness/tingling and weakness. She denies any issues with constipation, as long as she is taking colace. She also denies any changes in bladder control. Year: Never true   Transportation Needs:     Lack of Transportation (Medical): Not on file    Lack of Transportation (Non-Medical): Not on file   Physical Activity:     Days of Exercise per Week: Not on file    Minutes of Exercise per Session: Not on file   Stress:     Feeling of Stress : Not on file   Social Connections:     Frequency of Communication with Friends and Family: Not on file    Frequency of Social Gatherings with Friends and Family: Not on file    Attends [employer] Services: Not on file    Active Member of [address] [place] [address] Intr[employer] or Organizations: Not on file    Attends Club or Organization Meetings: Not on file    Marital Status: Not on file   Intimate Partner Violence:     Fear of Current or Ex-Partner: Not on file    Emotionally Abused: Not on file    Physically Abused: Not on file    Sexually Abused: Not on file   Housing Stability:     Unable to Pay for Housing in the Last Year: Not on file    Number of [place] in the Last Year: Not on file    Unstable Housing in the Last Year: Not on file       Allergies   Allergen Reactions    Bactrim [Sulfamethoxazole-Trimethoprim] Other (See Comments)     BLISTERS IN MOUTH    Other Diarrhea     Lactose Intolerance- No liquid milk or ice cream. May have milk cooked in foods.  Pcn [Penicillins] Hives    Lidoderm [Lidocaine] Rash    Macrobid [Nitrofurantoin] Nausea And Vomiting       Current Outpatient Medications   Medication Sig Dispense Refill    metoprolol tartrate (LOPRESSOR) 25 MG tablet Take 1 tablet by mouth 2 times daily 60 tablet 3    lisinopril (PRINIVIL;ZESTRIL) 40 MG tablet Take 1 tablet by mouth daily 30 tablet 3    predniSONE (DELTASONE) 10 MG tablet Take 1 tablet by mouth daily 90 tablet 1    traMADol (ULTRAM) 50 MG tablet Take 1 tablet by mouth every 6 hours as needed for Pain.  gabapentin (NEURONTIN) 100 MG capsule Take 1 capsule by mouth daily for 30 days. (Patient taking differently: Take 100 mg by mouth daily as needed. ) 30 capsule 0    amitriptyline (ELAVIL) 100 MG tablet Take 1 tablet by mouth nightly 30 tablet 0    polyethylene glycol (GLYCOLAX) 17 g packet Take 17 g by mouth daily as needed for Constipation      docusate sodium (COLACE) 100 MG capsule Take 1 capsule by mouth daily (Patient taking differently: Take 100 mg by mouth every other day ) 90 capsule 1    HYDROcodone-acetaminophen (NORCO) 5-325 MG per tablet Take 1 tablet by mouth every 8 hours as needed.  Handicap Placard MISC by Does not apply route Exp: 5/20/2026 1 each 0    simvastatin (ZOCOR) 20 MG tablet Take 1 tablet by mouth daily 30 tablet 5     No current facility-administered medications for this visit. Subjective:      Review of Systems   Constitutional: Positive for activity change (improving). Musculoskeletal: Positive for back pain. Neurological: Positive for headaches. Negative for weakness and numbness. Objective:     Physical Exam  /68   Temp 97.6 °F (36.4 °C)   Ht 4' 11\" (1.499 m)   Wt 132 lb 3.2 oz (60 kg)   BMI 26.70 kg/m²     Constitutional: She appears well-developed and well-nourished. In no distress. HEENT: NCAT, EOM grossly intact. Hearing grossly intact. Pulmonary/Chest: Respirations WNL and unlabored. MSK:  Functional ROM in all limbs. Strength 4+/5 in all limbs. Neurological: She is alert. Sensation to light touch intact in all limbs. Normal gait with a 2-wheeled walker. Skin: Skin is warm dry and intact with good turgor. Psychiatric: She has a normal mood and affect. Her behavior is normal. Thought content normal.    Nursing note and vitals reviewed. Reviewed notes from PCP. Assessment:       Diagnosis Orders   1. Multiple sclerosis ([facility]          Plan:      - Continue home PT  - Discussed that if she feels like she needs additional therapy after finishing home PT, she could transition to outpatient PT in the future. She can call the office for a referral if needed.   - Continue use of 2-wheeled walker for assistance with ambulation  - Follow up with neurology as directed  - Follow up in this clinic as needed      Electronically signed by Joyce Parrish MD on 3/20/2022 at 11:18 PM.

## 2022-03-22 ENCOUNTER — PATIENT MESSAGE (OUTPATIENT)
Dept: FAMILY MEDICINE CLINIC | Age: 87
End: 2022-03-22

## 2022-03-22 DIAGNOSIS — M15.9 GENERALIZED OSTEOARTHRITIS: ICD-10-CM

## 2022-03-22 RX ORDER — TRAMADOL HYDROCHLORIDE 50 MG/1
50 TABLET ORAL EVERY 6 HOURS PRN
Qty: 120 TABLET | Refills: 1 | Status: SHIPPED | OUTPATIENT
Start: 2022-03-22 | End: 2022-06-17 | Stop reason: SDUPTHER

## 2022-03-22 NOTE — TELEPHONE ENCOUNTER
From: Bandar Lundberg  To: Dr. Tarango Levo: 3/22/2022 6:21 AM EDT  Subject: Tramadol     My mom is in need of a refill on her tramadol please.  Thanks

## 2022-03-29 ENCOUNTER — OFFICE VISIT (OUTPATIENT)
Dept: FAMILY MEDICINE CLINIC | Age: 87
End: 2022-03-29
Payer: MEDICARE

## 2022-03-29 VITALS
BODY MASS INDEX: 27.02 KG/M2 | SYSTOLIC BLOOD PRESSURE: 142 MMHG | HEART RATE: 81 BPM | OXYGEN SATURATION: 97 % | DIASTOLIC BLOOD PRESSURE: 80 MMHG | WEIGHT: 133.8 LBS | TEMPERATURE: 97.9 F

## 2022-03-29 DIAGNOSIS — G62.9 PERIPHERAL POLYNEUROPATHY: ICD-10-CM

## 2022-03-29 DIAGNOSIS — E11.9 TYPE 2 DIABETES MELLITUS WITHOUT COMPLICATION, WITHOUT LONG-TERM CURRENT USE OF INSULIN (HCC): ICD-10-CM

## 2022-03-29 DIAGNOSIS — G89.29 CHRONIC MIDLINE LOW BACK PAIN WITH LEFT-SIDED SCIATICA: ICD-10-CM

## 2022-03-29 DIAGNOSIS — R30.0 BURNING WITH URINATION: Primary | ICD-10-CM

## 2022-03-29 DIAGNOSIS — M54.42 CHRONIC MIDLINE LOW BACK PAIN WITH LEFT-SIDED SCIATICA: ICD-10-CM

## 2022-03-29 DIAGNOSIS — F51.5 NIGHTMARES: ICD-10-CM

## 2022-03-29 LAB
BILIRUBIN, POC: ABNORMAL
BLOOD URINE, POC: ABNORMAL
CLARITY, POC: CLEAR
COLOR, POC: ABNORMAL
GLUCOSE URINE, POC: ABNORMAL
HBA1C MFR BLD: 6.7 %
KETONES, POC: ABNORMAL
LEUKOCYTE EST, POC: ABNORMAL
NITRITE, POC: ABNORMAL
PH, POC: 7
PROTEIN, POC: ABNORMAL
SPECIFIC GRAVITY, POC: <=1.005
UROBILINOGEN, POC: 0.2

## 2022-03-29 PROCEDURE — 1036F TOBACCO NON-USER: CPT | Performed by: INTERNAL MEDICINE

## 2022-03-29 PROCEDURE — 81003 URINALYSIS AUTO W/O SCOPE: CPT | Performed by: INTERNAL MEDICINE

## 2022-03-29 PROCEDURE — 99214 OFFICE O/P EST MOD 30 MIN: CPT | Performed by: INTERNAL MEDICINE

## 2022-03-29 PROCEDURE — G8427 DOCREV CUR MEDS BY ELIG CLIN: HCPCS | Performed by: INTERNAL MEDICINE

## 2022-03-29 PROCEDURE — 3044F HG A1C LEVEL LT 7.0%: CPT | Performed by: INTERNAL MEDICINE

## 2022-03-29 PROCEDURE — G8484 FLU IMMUNIZE NO ADMIN: HCPCS | Performed by: INTERNAL MEDICINE

## 2022-03-29 PROCEDURE — 4040F PNEUMOC VAC/ADMIN/RCVD: CPT | Performed by: INTERNAL MEDICINE

## 2022-03-29 PROCEDURE — 1123F ACP DISCUSS/DSCN MKR DOCD: CPT | Performed by: INTERNAL MEDICINE

## 2022-03-29 PROCEDURE — 1090F PRES/ABSN URINE INCON ASSESS: CPT | Performed by: INTERNAL MEDICINE

## 2022-03-29 PROCEDURE — G8417 CALC BMI ABV UP PARAM F/U: HCPCS | Performed by: INTERNAL MEDICINE

## 2022-03-29 PROCEDURE — 83036 HEMOGLOBIN GLYCOSYLATED A1C: CPT | Performed by: INTERNAL MEDICINE

## 2022-03-29 RX ORDER — AMITRIPTYLINE HYDROCHLORIDE 100 MG/1
100 TABLET, FILM COATED ORAL NIGHTLY
Qty: 30 TABLET | Refills: 2 | Status: SHIPPED | OUTPATIENT
Start: 2022-03-29 | End: 2022-06-02 | Stop reason: SDUPTHER

## 2022-03-29 RX ORDER — HYDROXYZINE HYDROCHLORIDE 10 MG/1
10 TABLET, FILM COATED ORAL NIGHTLY PRN
Qty: 15 TABLET | Refills: 0 | Status: SHIPPED | OUTPATIENT
Start: 2022-03-29 | End: 2022-04-25 | Stop reason: ALTCHOICE

## 2022-03-29 RX ORDER — GABAPENTIN 100 MG/1
100 CAPSULE ORAL DAILY
Qty: 30 CAPSULE | Refills: 2 | Status: SHIPPED | OUTPATIENT
Start: 2022-03-29 | End: 2022-08-24

## 2022-03-29 RX ORDER — CEPHALEXIN 500 MG/1
500 CAPSULE ORAL 3 TIMES DAILY
Qty: 21 CAPSULE | Refills: 0 | Status: SHIPPED | OUTPATIENT
Start: 2022-03-29 | End: 2022-04-05

## 2022-03-29 ASSESSMENT — ENCOUNTER SYMPTOMS
VOMITING: 0
NAUSEA: 0

## 2022-03-29 NOTE — PROGRESS NOTES
Subjective:       Patient ID:     Claudene Lacrosse is a 80 y.o. female who presents for   Chief Complaint   Patient presents with    Hypertension    Urinary Tract Infection       HPI:  Nursing note reviewed and discussed with patient. Diabetes - doing well with current regimen - diet and lifestyle. Denies hypoglycemia, hyperglycemia, fatigue, polyuria, polydipsia, paresthesias, vision changes, dizziness. Eye exam was done. Not following with podiatry. Patient is taking ACE inhibitor/ARB. Complications of diabetes include none. Hypertension-tolerating current regimen without chest pain, palpitations, dizziness, peripheral edema, dyspnea on exertion, orthopnea, paroxysmal nocturnal dyspnea. Hyperlipidemia-tolerating current regimen without myalgias, dyspepsia, jaundice. Mostly compliant with diet recommendations for low carb diet, not very compliant with exercise recommendations. Cardiovascular risk factors: advanced age (older than 54 for men, 72 for women), diabetes mellitus, dyslipidemia, hypertension and sedentary lifestyle    Urinary Tract Infection   This is a new problem. The current episode started in the past 7 days. The problem occurs every urination. The problem has been gradually worsening. The quality of the pain is described as burning. There has been no fever. She is not sexually active. There is no history of pyelonephritis. Associated symptoms include frequency. Pertinent negatives include no chills, discharge, flank pain, hematuria, hesitancy, nausea, possible pregnancy, sweats, urgency or vomiting. She has tried increased fluids for the symptoms. The treatment provided no relief. Her past medical history is significant for recurrent UTIs. admits she doesn't empty her bladder all the way, has to grunt and push to get urine out       Patient's medications, allergies, past medical, surgical, social and family histories were reviewed and updated as appropriate.     Past Medical History: Diagnosis Date    Acute cystitis without hematuria 10/1/2017    Arthritis     Chronic daily headache     GERD (gastroesophageal reflux disease)     Hyperlipidemia     Hypertension     Moderate malnutrition ([address] 3/2/2019    Multiple sclerosis ([address]     Neuropathy     Pneumonia 2017    states had double pneumonia    Vitamin D deficiency      Past Surgical History:   Procedure Laterality Date    CATARACT REMOVAL WITH IMPLANT [facility] 11/6/2014    [facility]    CATARACT REMOVAL WITH IMPLANT Left 12/2/2014    [facility]    CERVICAL DISC SURGERY      CYSTOCELE REPAIR      HYSTERECTOMY      LUMBAR LAMINECTOMY N/A 3/17/2022    LUMBAR LAMINECTOMY DECOMPRESSION MINIMALLY INVASIVE (MILD) L4-L5 performed by Alexandria Mckeon MD at [address] [place] [address],[address] Right 04/20/2017    SHOULDER SURGERY Right 2017       Social History     Tobacco Use    Smoking status: Never Smoker    Smokeless tobacco: Never Used   Substance Use Topics    Alcohol use: No      Patient Active Problem List   Diagnosis    Ataxia    Multiple sclerosis ([address]    Essential hypertension    Hyperlipidemia    Atelectasis    Debility    Abnormal gait    Actinic keratosis    Enthesopathy of hip region    Generalized osteoarthritis    Idiopathic peripheral neuropathy    Vitamin D deficiency    Primary localized osteoarthrosis of the hip, left    Primary osteoarthritis of left hip    Acquired spondylolisthesis    Chronic midline low back pain with left-sided sciatica    Spinal stenosis of lumbar region with neurogenic claudication    Presence of right artificial shoulder joint    Multiple sclerosis exacerbation (HCC)    Diarrhea of presumed infectious origin    Chronic headache    Facial asymmetry    Asymptomatic bacteriuria    Hyperglycemia    EDINSON (acute kidney injury) ([address]    Lumbar radiculopathy    Type 2 diabetes mellitus    Recurrent urinary tract infection    Diastolic congestive heart failure (HCC)    Left hemiparesis (HCC)    Preop examination         Prior to Visit Medications    Medication Sig Taking? Authorizing Provider   traMADol (ULTRAM) 50 MG tablet Take 1 tablet by mouth every 6 hours as needed for Pain for up to 60 days. Yes Pauly Nowak MD   simvastatin (ZOCOR) 20 MG tablet Take 1 tablet by mouth daily Yes Pauly Nowak MD   metoprolol tartrate (LOPRESSOR) 25 MG tablet Take 1 tablet by mouth 2 times daily Yes Pauly Nowak MD   lisinopril (PRINIVIL;ZESTRIL) 40 MG tablet Take 1 tablet by mouth daily Yes Pauly Nowak MD   predniSONE (DELTASONE) 10 MG tablet Take 1 tablet by mouth daily Yes Reji Nunez MD   gabapentin (NEURONTIN) 100 MG capsule Take 1 capsule by mouth daily for 30 days. Patient taking differently: Take 100 mg by mouth daily as needed. Yes Katina Romero MD   amitriptyline (ELAVIL) 100 MG tablet Take 1 tablet by mouth nightly Yes Katina Romero MD   polyethylene glycol (GLYCOLAX) 17 g packet Take 17 g by mouth daily as needed for Constipation Yes Katina Romero MD   docusate sodium (COLACE) 100 MG capsule Take 1 capsule by mouth daily  Patient taking differently: Take 100 mg by mouth every other day  Yes Pauly Nowak MD   HYDROcodone-acetaminophen (NORCO) 5-325 MG per tablet Take 1 tablet by mouth every 8 hours as needed. Yes Historical Provider, MD   Handicap Placard MISC by Does not apply route Exp: 5/20/2026 Yes Reji Nunez MD     Review of Systems  Review of Systems   Constitutional: Negative for chills, fatigue, fever and unexpected weight change. Respiratory: Negative for cough, choking, chest tightness, shortness of breath and wheezing. Cardiovascular: Negative for chest pain, palpitations and leg swelling. Gastrointestinal: Negative for abdominal pain, anal bleeding, blood in stool, constipation, diarrhea, nausea and vomiting. Endocrine: Negative.     Genitourinary: Positive for frequency. Negative for flank pain, hematuria, hesitancy and urgency. Musculoskeletal: Negative for joint swelling and myalgias. Skin: Negative. Neurological: Negative for dizziness. Psychiatric/Behavioral: Negative for sleep disturbance. All other systems reviewed and are negative. Objective:       Physical Exam:  BP (!) 142/80   Pulse 81   Temp 97.9 °F (36.6 °C) (Temporal)   Wt 133 lb 12.8 oz (60.7 kg)   SpO2 97%   BMI 27.02 kg/m²   Wt Readings from Last 3 Encounters:   03/29/22 133 lb 12.8 oz (60.7 kg)   03/17/22 133 lb 11.2 oz (60.6 kg)   03/03/22 133 lb (60.3 kg)         Physical Exam  Vitals and nursing note reviewed. Constitutional:       General: She is not in acute distress. Appearance: She is well-developed. She is not ill-appearing. Eyes:      General: Lids are normal. Vision grossly intact. Cardiovascular:      Rate and Rhythm: Normal rate and regular rhythm. Heart sounds: Normal heart sounds, S1 normal and S2 normal. No murmur heard. No friction rub. No gallop. Pulmonary:      Effort: Pulmonary effort is normal. No respiratory distress. Breath sounds: Normal breath sounds. No wheezing. Abdominal:      General: Bowel sounds are normal.      Palpations: Abdomen is soft. There is no mass. Tenderness: There is no abdominal tenderness. There is no guarding. Musculoskeletal:         General: Normal range of motion. Skin:     General: Skin is warm and dry. Capillary Refill: Capillary refill takes less than 2 seconds. Neurological:      General: No focal deficit present. Mental Status: She is alert and oriented to person, place, and time. Data Review    Lab Results   Component Value Date    CHOL 235 01/04/2012    TRIG 119 01/04/2012    HDL 74 11/06/2021     A1c today 6.7% - 6.4% 11/2021      Assessment/Plan:      1. Burning with urination  - POCT Urinalysis No Micro (Auto)  - cephALEXin (KEFLEX) 500 MG capsule;  Take 1 capsule by mouth 3 times daily for 7 days  Dispense: 21 capsule; Refill: 0    2. Chronic midline low back pain with left-sided sciatica  - amitriptyline (ELAVIL) 100 MG tablet; Take 1 tablet by mouth nightly  Dispense: 30 tablet; Refill: 2    3. Peripheral polyneuropathy  - gabapentin (NEURONTIN) 100 MG capsule; Take 1 capsule by mouth daily for 30 days. Dispense: 30 capsule; Refill: 2    4. Nightmares  - hydrOXYzine (ATARAX) 10 MG tablet; Take 1 tablet by mouth nightly as needed for Anxiety  Dispense: 15 tablet; Refill: 0    5.  Type 2 diabetes mellitus without complication, without long-term current use of insulin ([facility])  - POCT glycosylated hemoglobin (Hb A1C)           Health Maintenance Due   Topic Date Due    Depression Screen  04/18/2022    Annual Wellness Visit (AWV)  04/22/2022       Electronically signed by Nigel Stinson MD on 3/29/2022 at 4:29 PM

## 2022-03-29 NOTE — PROGRESS NOTES
Pt is here due to having some Isymptoms. URINARY    Patient calling with symptoms of possible urinary tract infection  Symptoms include burning  Symptoms have persisted for 1 day  Patient is also complaining of back pain and burning  When was their last UTI been a while    *This condition is eligible for an eVisit. If not already active, sign patient up for MyChart to improve access and communication with the provider. *  She had a spine surgery 2 weeks ago. She had frequency and didn't realized she was voiding, next day she had hard time voiding.  She had 3 nightmares since the surgery, latest one was last night, this is bothersome for her

## 2022-04-01 ENCOUNTER — OFFICE VISIT (OUTPATIENT)
Dept: PAIN MANAGEMENT | Age: 87
End: 2022-04-01
Payer: MEDICARE

## 2022-04-01 VITALS
WEIGHT: 131.8 LBS | OXYGEN SATURATION: 93 % | HEART RATE: 79 BPM | BODY MASS INDEX: 26.57 KG/M2 | HEIGHT: 59 IN | DIASTOLIC BLOOD PRESSURE: 78 MMHG | SYSTOLIC BLOOD PRESSURE: 150 MMHG

## 2022-04-01 DIAGNOSIS — G89.29 CHRONIC MIDLINE LOW BACK PAIN WITH LEFT-SIDED SCIATICA: Primary | ICD-10-CM

## 2022-04-01 DIAGNOSIS — M54.42 CHRONIC MIDLINE LOW BACK PAIN WITH LEFT-SIDED SCIATICA: Primary | ICD-10-CM

## 2022-04-01 DIAGNOSIS — M48.062 SPINAL STENOSIS OF LUMBAR REGION WITH NEUROGENIC CLAUDICATION: ICD-10-CM

## 2022-04-01 PROCEDURE — G8428 CUR MEDS NOT DOCUMENT: HCPCS | Performed by: NURSE PRACTITIONER

## 2022-04-01 PROCEDURE — 1090F PRES/ABSN URINE INCON ASSESS: CPT | Performed by: NURSE PRACTITIONER

## 2022-04-01 PROCEDURE — G8417 CALC BMI ABV UP PARAM F/U: HCPCS | Performed by: NURSE PRACTITIONER

## 2022-04-01 PROCEDURE — 99213 OFFICE O/P EST LOW 20 MIN: CPT | Performed by: NURSE PRACTITIONER

## 2022-04-01 PROCEDURE — 1036F TOBACCO NON-USER: CPT | Performed by: NURSE PRACTITIONER

## 2022-04-01 PROCEDURE — 1123F ACP DISCUSS/DSCN MKR DOCD: CPT | Performed by: NURSE PRACTITIONER

## 2022-04-01 PROCEDURE — 4040F PNEUMOC VAC/ADMIN/RCVD: CPT | Performed by: NURSE PRACTITIONER

## 2022-04-01 ASSESSMENT — ENCOUNTER SYMPTOMS
BACK PAIN: 1
CONSTIPATION: 0
SHORTNESS OF BREATH: 0
COUGH: 0

## 2022-04-01 NOTE — PROGRESS NOTES
WITH IMPLANT Left 12/2/2014    [facility]    CERVICAL DISC SURGERY      CYSTOCELE REPAIR      HYSTERECTOMY      LUMBAR LAMINECTOMY N/A 3/17/2022    LUMBAR LAMINECTOMY DECOMPRESSION MINIMALLY INVASIVE (MILD) L4-L5 performed by Ariel Valiente MD at [address] [place] [address],[address] Right 04/20/2017    SHOULDER SURGERY Right 2017       Allergies   Allergen Reactions    Bactrim [Sulfamethoxazole-Trimethoprim] Other (See Comments)     BLISTERS IN MOUTH    Other Diarrhea     Lactose Intolerance- No liquid milk or ice cream. May have milk cooked in foods.  Pcn [Penicillins] Hives    Lidoderm [Lidocaine] Rash    Macrobid [Nitrofurantoin] Nausea And Vomiting         Current Outpatient Medications:     amitriptyline (ELAVIL) 100 MG tablet, Take 1 tablet by mouth nightly, Disp: 30 tablet, Rfl: 2    gabapentin (NEURONTIN) 100 MG capsule, Take 1 capsule by mouth daily for 30 days. , Disp: 30 capsule, Rfl: 2    hydrOXYzine (ATARAX) 10 MG tablet, Take 1 tablet by mouth nightly as needed for Anxiety, Disp: 15 tablet, Rfl: 0    cephALEXin (KEFLEX) 500 MG capsule, Take 1 capsule by mouth 3 times daily for 7 days, Disp: 21 capsule, Rfl: 0    traMADol (ULTRAM) 50 MG tablet, Take 1 tablet by mouth every 6 hours as needed for Pain for up to 60 days. , Disp: 120 tablet, Rfl: 1    simvastatin (ZOCOR) 20 MG tablet, Take 1 tablet by mouth daily, Disp: 30 tablet, Rfl: 5    metoprolol tartrate (LOPRESSOR) 25 MG tablet, Take 1 tablet by mouth 2 times daily, Disp: 60 tablet, Rfl: 3    lisinopril (PRINIVIL;ZESTRIL) 40 MG tablet, Take 1 tablet by mouth daily, Disp: 30 tablet, Rfl: 3    predniSONE (DELTASONE) 10 MG tablet, Take 1 tablet by mouth daily, Disp: 90 tablet, Rfl: 1    polyethylene glycol (GLYCOLAX) 17 g packet, Take 17 g by mouth daily as needed for Constipation, Disp: , Rfl:     docusate sodium (COLACE) 100 MG capsule, Take 1 capsule by mouth daily (Patient taking differently: Not on file   Housing Stability:     Unable to Pay for Housing in the Last Year: Not on file    Number of Places Lived in the Last Year: Not on file    Unstable Housing in the Last Year: Not on file       Review of Systems:  Review of Systems   Constitutional: Negative for chills and fever. Cardiovascular: Negative for chest pain. Respiratory: Negative for cough and shortness of breath. Musculoskeletal: Positive for back pain. Gastrointestinal: Negative for constipation. Neurological: Negative for numbness and paresthesias. Physical Exam:  BP (!) 150/78   Pulse 79   Ht 4' 11\" (1.499 m)   Wt 131 lb 12.8 oz (59.8 kg)   SpO2 93%   BMI 26.62 kg/m²     Physical Exam  Cardiovascular:      Rate and Rhythm: Normal rate. Pulmonary:      Effort: Pulmonary effort is normal.   Musculoskeletal:         General: Normal range of motion. Skin:     General: Skin is warm and dry. Neurological:      Mental Status: She is alert and oriented to person, place, and time. Assessment:  Problem List Items Addressed This Visit     Chronic midline low back pain with left-sided sciatica - Primary    Spinal stenosis of lumbar region with neurogenic claudication             Treatment Plan:    Patient reports significant improvement after recent procdure  Offered PT and pt declined at this time reports recent visits and will try HEP  F/u in 4-6 weeks     I have reviewed the chief complaint and history of present illness (including ROS and PFSH) and vital documentation by my staff and I agree with their documentation and have added where applicable.

## 2022-04-01 NOTE — PLAN OF CARE
Problem: Falls - Risk of  Goal: Absence of falls  Outcome: Ongoing  Pt up with assistance and walker,alert able to verbalize her needs    Problem: Pain:  Goal: Pain level will decrease  Pain level will decrease   Outcome: Ongoing  Pain tolerable when medicated with PRN medications    Problem: Mobility - Impaired:  Goal: Able to ambulate with minimal assistance  Able to ambulate with minimal assistance  Outcome: Ongoing  Pt has had gait problems for years due to MS. But now states has equilibrium and balance problems . Pt instructed to call out for assisstance,call light within reach 11

## 2022-04-02 PROBLEM — Z01.818 PREOP EXAMINATION: Status: RESOLVED | Noted: 2022-03-03 | Resolved: 2022-04-02

## 2022-04-03 ASSESSMENT — ENCOUNTER SYMPTOMS
COUGH: 0
ABDOMINAL PAIN: 0
DIARRHEA: 0
CHOKING: 0
SHORTNESS OF BREATH: 0
CONSTIPATION: 0
WHEEZING: 0
ANAL BLEEDING: 0
CHEST TIGHTNESS: 0
BLOOD IN STOOL: 0

## 2022-04-03 ASSESSMENT — VISUAL ACUITY: OU: 1

## 2022-04-21 SDOH — HEALTH STABILITY: PHYSICAL HEALTH: ON AVERAGE, HOW MANY DAYS PER WEEK DO YOU ENGAGE IN MODERATE TO STRENUOUS EXERCISE (LIKE A BRISK WALK)?: 0 DAYS

## 2022-04-21 SDOH — HEALTH STABILITY: PHYSICAL HEALTH: ON AVERAGE, HOW MANY MINUTES DO YOU ENGAGE IN EXERCISE AT THIS LEVEL?: 0 MIN

## 2022-04-21 ASSESSMENT — PATIENT HEALTH QUESTIONNAIRE - PHQ9
SUM OF ALL RESPONSES TO PHQ QUESTIONS 1-9: 0
1. LITTLE INTEREST OR PLEASURE IN DOING THINGS: 0
SUM OF ALL RESPONSES TO PHQ QUESTIONS 1-9: 0
SUM OF ALL RESPONSES TO PHQ QUESTIONS 1-9: 0
2. FEELING DOWN, DEPRESSED OR HOPELESS: 0
SUM OF ALL RESPONSES TO PHQ QUESTIONS 1-9: 0
SUM OF ALL RESPONSES TO PHQ9 QUESTIONS 1 & 2: 0

## 2022-04-21 ASSESSMENT — LIFESTYLE VARIABLES
HOW OFTEN DO YOU HAVE A DRINK CONTAINING ALCOHOL: 1
HOW OFTEN DO YOU HAVE A DRINK CONTAINING ALCOHOL: NEVER

## 2022-04-25 ENCOUNTER — OFFICE VISIT (OUTPATIENT)
Dept: FAMILY MEDICINE CLINIC | Age: 87
End: 2022-04-25
Payer: MEDICARE

## 2022-04-25 VITALS
BODY MASS INDEX: 27.5 KG/M2 | HEIGHT: 58 IN | OXYGEN SATURATION: 98 % | WEIGHT: 131 LBS | DIASTOLIC BLOOD PRESSURE: 78 MMHG | TEMPERATURE: 98.1 F | SYSTOLIC BLOOD PRESSURE: 130 MMHG | HEART RATE: 88 BPM

## 2022-04-25 DIAGNOSIS — G81.94 LEFT HEMIPARESIS (HCC): ICD-10-CM

## 2022-04-25 DIAGNOSIS — G35 MULTIPLE SCLEROSIS (HCC): ICD-10-CM

## 2022-04-25 DIAGNOSIS — M15.9 GENERALIZED OSTEOARTHRITIS: ICD-10-CM

## 2022-04-25 DIAGNOSIS — M48.062 SPINAL STENOSIS OF LUMBAR REGION WITH NEUROGENIC CLAUDICATION: ICD-10-CM

## 2022-04-25 DIAGNOSIS — I50.32 CHRONIC DIASTOLIC CONGESTIVE HEART FAILURE (HCC): ICD-10-CM

## 2022-04-25 DIAGNOSIS — H91.93 BILATERAL HEARING LOSS, UNSPECIFIED HEARING LOSS TYPE: ICD-10-CM

## 2022-04-25 DIAGNOSIS — Z00.00 MEDICARE ANNUAL WELLNESS VISIT, SUBSEQUENT: Primary | ICD-10-CM

## 2022-04-25 DIAGNOSIS — M16.12 PRIMARY LOCALIZED OSTEOARTHROSIS OF THE HIP, LEFT: ICD-10-CM

## 2022-04-25 DIAGNOSIS — I10 ESSENTIAL HYPERTENSION: ICD-10-CM

## 2022-04-25 PROBLEM — N17.9 AKI (ACUTE KIDNEY INJURY) (HCC): Status: RESOLVED | Noted: 2021-03-03 | Resolved: 2022-04-25

## 2022-04-25 PROCEDURE — 4040F PNEUMOC VAC/ADMIN/RCVD: CPT | Performed by: INTERNAL MEDICINE

## 2022-04-25 PROCEDURE — 1123F ACP DISCUSS/DSCN MKR DOCD: CPT | Performed by: INTERNAL MEDICINE

## 2022-04-25 PROCEDURE — G0439 PPPS, SUBSEQ VISIT: HCPCS | Performed by: INTERNAL MEDICINE

## 2022-04-25 RX ORDER — CELECOXIB 200 MG/1
200 CAPSULE ORAL DAILY
Qty: 90 CAPSULE | Refills: 1 | Status: SHIPPED | OUTPATIENT
Start: 2022-04-25 | End: 2022-08-24 | Stop reason: SDUPTHER

## 2022-04-25 NOTE — PATIENT INSTRUCTIONS
Personalized Preventive Plan for Candida Hinders - 4/25/2022  Medicare offers a range of preventive health benefits. Some of the tests and screenings are paid in full while other may be subject to a deductible, co-insurance, and/or copay. Some of these benefits include a comprehensive review of your medical history including lifestyle, illnesses that may run in your family, and various assessments and screenings as appropriate. After reviewing your medical record and screening and assessments performed today your provider may have ordered immunizations, labs, imaging, and/or referrals for you. A list of these orders (if applicable) as well as your Preventive Care list are included within your After Visit Summary for your review. Other Preventive Recommendations:    · A preventive eye exam performed by an eye specialist is recommended every 1-2 years to screen for glaucoma; cataracts, macular degeneration, and other eye disorders. · A preventive dental visit is recommended every 6 months. · Try to get at least 150 minutes of exercise per week or 10,000 steps per day on a pedometer . · Order or download the FREE \"Exercise & Physical Activity: Your Everyday Guide\" from [employer]. Call 1-952.136.1115 or search [employer] online. · You need 0782-2994 mg of calcium and 3689-7010 IU of vitamin D per day. It is possible to meet your calcium requirement with diet alone, but a vitamin D supplement is usually necessary to meet this goal.  · When exposed to the sun, use a sunscreen that protects against both UVA and UVB radiation with an SPF of 30 or greater. Reapply every 2 to 3 hours or after sweating, drying off with a towel, or swimming. · Always wear a seat belt when traveling in a car. Always wear a helmet when riding a bicycle or motorcycle.     Keeping Home a [facility]       As we get older, changes in balance, gait, strength, vision, hearing, and cognition make even the most youthful senior more prone to accidents. Falls are one of the leading health risks for older people. This increased risk of falling is related to:   Aging process (eg, decreased muscle strength, slowed reflexes)   Higher incidence of chronic health problems (eg, arthritis, diabetes) that may limit mobility, agility or sensory awareness   Side effects of medicine (eg, dizziness, blurred vision)especially medicines like prescription pain medicines and drugs used to treat mental health conditions   Depending on the brittleness of your bones, the consequences of a fall can be serious and long lasting. Home Life   Research by the Association of Aging [facility]) shows that some home accidents among older adults can be prevented by making simple lifestyle changes and basic modifications and repairs to the home environment. Here are some lifestyle changes that experts recommend:   Have your hearing and vision checked regularly. Be sure to wear prescription glasses that are right for you. Speak to your doctor or pharmacist about the possible side effects of your medicines. A number of medicines can cause dizziness. If you have problems with sleep, talk to your doctor. Limit your intake of alcohol. If necessary, use a cane or walker to help maintain your balance. Wear supportive, rubber-soled shoes, even at home. If you live in a region that gets wintry weather, you may want to put special cleats on your shoes to prevent you from slipping on the snow and ice. Exercise regularly to help maintain muscle tone, agility, and balance. Always hold the banister when going up or down stairs. Also, use  bars when getting in or out of the bath or shower, or using the toilet. To avoid dizziness, get up slowly from a lying down position. Sit up first, dangling your legs for a minute or two before rising to a standing position.    Overall Home Safety Check   According to the Consumer Product Safety Commision's \"Older Consumer Home Safety Checklist,\" it is important to check for potential hazards in each room. And remember, proper lighting is an essential factor in home safety. If you cannot see clearly, you are more likely to fall. Important questions to ask yourself include:   Are lamp, electric, extension, and telephone cords placed out of the flow of traffic and maintained in good condition? Have frayed cords been replaced? Are all small rugs and runners slip resistant? If not, you can secure them to the floor with a special double-sided carpet tape. Are smoke detectors properly locatedone on every floor of your home and one outside of every sleeping area? Are they in good working order? Are batteries replaced at least once a year? Do you have a well-maintained carbon monoxide detector outside every sleeping are in your home? Does your furniture layout leave plenty of space to maneuver between and around chairs, tables, beds, and sofas? Are hallways, stairs and passages between rooms well lit? Can you reach a lamp without getting out of bed? Are floor surfaces well maintained? Shag rugs, high-pile carpeting, tile floors, and polished wood floors can be particularly slippery. Stairs should always have handrails and be carpeted or fitted with a non-skid tread. Is your telephone easily reachable. Is the cord safely tucked away? Room by Room   According to the Association of Aging, bathrooms and meena are the two most potentially hazardous rooms in your home. In the Kitchen    Be sure your stove is in proper working order and always make sure burners and the oven are off before you go out or go to sleep. Keep pots on the back burners, turn handles away from the front of the stove, and keep stove clean and free of grease build-up. Kitchen ventilation systems and range exhausts should be working properly.     Keep flammable objects such as towels and pot holders away from the cooking area except when in use. Make sure kitchen curtains are tied back. Move cords and appliances away from the sink and hot surfaces. If extension cords are needed, install wiring guides so they do not hang over the sink, range, or working areas. Look for coffee pots, kettles and toaster ovens with automatic shut-offs. Keep a mop handy in the kitchen so you can wipe up spills instantly. You should also have a small fire extinguisher. Arrange your kitchen with frequently used items on lower shelves to avoid the need to stand on a stepstool to reach them. Make sure countertops are well-lit to avoid injuries while cutting and preparing food. In the Bathroom    Use a non-slip mat or decals in the tub and shower, since wet, soapy tile or porcelain surfaces are extremely slippery. Make sure bathroom rugs are non-skid or tape them firmly to the floor. Bathtubs should have at least one, preferably two, grab bars, firmly attached to structural supports in the wall. (Do not use built-in soap holders or glass shower doors as grab bars.)    Tub seats fitted with non-slip material on the legs allow you to wash sitting down. For people with limited mobility, bathtub transfer benches allow you to slide safely into the tub. Raised toilet seats and toilet safety rails are helpful for those with knee or hip problems. In the [facility]    Make sure you use a nightlight and that the area around your bed is clear of potential obstacles. Be careful with electric blankets and never go to sleep with a heating pad, which can cause serious burns even if on a low setting. Use fire-resistant mattress covers and pillows, and NEVER smoke in bed. Keep a phone next to the bed that is programmed to dial 911 at the push of a button. If you have a chronic condition, you may want to sign on with an automatic call-in service.  Typically the system includes a small pendant that connects directly to an emergency medical voice-response system. You should also make arrangements to stay in contact with someonefriend, neighbor, family memberon a regular schedule. Fire Prevention   According to the [employer]. (Smoke Alarms for Every) [facility], senior citizens are one of the two highest risk groups for death and serious injuries due to residential fires. When cooking, wear short-sleeved items, never a bulky long-sleeved robe. The [facility]'s Safety Checklist for Older Consumers emphasizes the importance of checking basements, garages, workshops and storage areas for fire hazards, such as volatile liquids, piles of old rags or clothing and overloaded circuits. Never smoke in bed or when lying down on a couch or recliner chair. Small portable electric or kerosene heaters are responsible for many home fires and should be used cautiously if at all. If you do use one, be sure to keep them away from flammable materials. In case of fire, make sure you have a pre-established emergency exit plan. Have a professional check your fireplace and other fuel-burning appliances yearly. [employer]   Baby boomers entering the robertson years will continue to see the development of new products to help older adults live safely and independently in spite of age-related changes. Making Life More Livable  , by [employer], lists over 1,000 products for \"living well in the mature years,\" such as bathing and mobility aids, household security devices, ergonomically designed knives and peelers, and faucet valves and knobs for temperature control. Medical supply stores and organizations are good sources of information about products that improve your quality of life and insure your safety.      Last Reviewed: November 2009 Oskar Singleton MD   Updated: 3/7/2011     ·

## 2022-04-25 NOTE — PROGRESS NOTES
Medicare Annual Wellness Visit    Julieta Meyer is here for Medicare AWV    Assessment & Plan   Medicare annual wellness visit, subsequent  Generalized osteoarthritis  -     celecoxib (CELEBREX) 200 MG capsule; Take 1 capsule by mouth daily, Disp-90 capsule, [id number]  Chronic diastolic congestive heart failure (HCC)  Left hemiparesis (HCC)  Spinal stenosis of lumbar region with neurogenic claudication  Primary localized osteoarthrosis of the hip, left  Essential hypertension  Multiple sclerosis (HCC)  Bilateral hearing loss, unspecified hearing loss type  -     AFL - Anna, Dominique, EDIN, Audiology, [employer] for [email or web address] Due: see orders and patient instructions/AVS.  Recommended screening schedule for the next 5-10 years is provided to the patient in written form: see Patient Instructions/AVS.     Return in 4 months (on 8/25/2022) for Medicare Annual Wellness Visit in 1 year. Subjective   The following acute and/or chronic problems were also addressed today:  Dizziness is resolved, BP is stable 120-130/70-80. Nightmares have resolved as well, has not needed to take any more hydroxyzine after the first week  Back on prednisone and celebrex as well, were stopped prior to back procedure     Patient's complete Health Risk Assessment and screening values have been reviewed and are found in Flowsheets. The following problems were reviewed today and where indicated follow up appointments were made and/or referrals ordered. Positive Risk Factor Screenings with Interventions:     Cognitive: Words recalled: 1 Word Recalled  Total Score Interpretation: Abnormal Mini-Cog  Did the patient refuse to take the cognition test?: No    Cognitive Impairment Interventions:  · Patient declines any further evaluation/treatment for cognitive impairment             Opioid Risk: (Low risk score <55) Opioid risk score: 18    Patient is low risk for opioid use disorder or overdose.   Last PDMP Flex as Reviewed:  Review User Review Instant Review Result   IFTIKHAR MCQUEEN 3/22/2022  4:24 PM Reviewed PDMP [1]     Last Controlled Substance Monitoring Documentation      Patient Message from 3/22/2022 in [address]   Periodic Controlled Substance Monitoring No signs of potential drug abuse or diversion identified., Obtaining appropriate analgesic effect of treatment.  filed at 03/22/2022 Jerald and ACP:  General  In general, how would you say your health is?: Good  In the past 7 days, have you experienced any of the following: New or Increased Pain, New or Increased Fatigue, Loneliness, Social Isolation, Stress or Anger?: No  Do you get the social and emotional support that you need?: Yes  Do you have a Living Will?: Yes    Advance Directives     Power of [facility] Will ACP-Advance Directive ACP-Power of     Not on File Not on File Not on File Not on File      General Health Risk Interventions:  · Pain issues: well controlled with current regimen     Health Habits/Nutrition:     Physical Activity: Inactive    Days of Exercise per Week: 0 days    Minutes of Exercise per Session: 0 min     Have you lost any weight without trying in the past 3 months?: No  Body mass index: (!) 27.38  Have you seen the dentist within the past year?: N/A - wear dentures    Health Habits/Nutrition Interventions:  · Inadequate physical activity:  educational materials provided to promote increased physical activity    Hearing/Vision:  Do you or your family notice any trouble with your hearing that hasn't been managed with hearing aids?: (!) Yes  Do you have difficulty driving, watching TV, or doing any of your daily activities because of your eyesight?: No  Have you had an eye exam within the past year?: (!) No  No exam data present    Hearing/Vision Interventions:  · Vision concerns:  patient encouraged to make appointment with his/her eye specialist     ADLs:  In the past 7 days, did you need help from others to perform any of the following everyday activities: Eating, dressing, grooming, bathing, toileting, or walking/balance?: No  In the past 7 days, did you need help from others to take care of any of the following: Laundry, housekeeping, banking/finances, shopping, telephone use, food preparation, transportation, or taking medications?: (!) Yes  Select all that apply: (!) Laundry,Housekeeping,Banking/Finances,Shopping,Taking Medications    ADL Interventions:  · Patient declines any further evaluation/treatment for this issue          Objective   Vitals:    04/25/22 1531   BP: 130/78   Pulse: 88   Temp: 98.1 °F (36.7 °C)   TempSrc: Temporal   SpO2: 98%   Weight: 131 lb (59.4 kg)   Height: 4' 10\" (1.473 m)      Body mass index is 27.38 kg/m².      Wt Readings from Last 3 Encounters:   04/25/22 131 lb (59.4 kg)   04/01/22 131 lb 12.8 oz (59.8 kg)   03/29/22 133 lb 12.8 oz (60.7 kg)          General Appearance: alert and oriented to person, place and time, well developed and well- nourished, in no acute distress  Skin: warm and dry, no rash or erythema  Head: normocephalic and atraumatic  Eyes: pupils equal, round, and reactive to light, extraocular eye movements intact, conjunctivae normal  ENT: tympanic membrane, external ear and ear canal normal bilaterally, nose without deformity, nasal mucosa and turbinates normal without polyps  Neck: supple and non-tender without mass, no thyromegaly or thyroid nodules, no cervical lymphadenopathy  Pulmonary/Chest: clear to auscultation bilaterally- no wheezes, rales or rhonchi, normal air movement, no respiratory distress  Cardiovascular: normal rate, regular rhythm, normal S1 and S2, no murmurs, rubs, clicks, or gallops, distal pulses intact, no carotid bruits  Abdomen: soft, non-tender, non-distended, normal bowel sounds, no masses or organomegaly  Extremities: no cyanosis, clubbing or edema  Musculoskeletal: normal range of motion, no joint swelling, deformity or tenderness  Neurologic: reflexes normal and symmetric, no cranial nerve deficit, gait, coordination and speech normal       Allergies   Allergen Reactions    Bactrim [Sulfamethoxazole-Trimethoprim] Other (See Comments)     BLISTERS IN MOUTH    Other Diarrhea     Lactose Intolerance- No liquid milk or ice cream. May have milk cooked in foods.  Pcn [Penicillins] Hives    Lidoderm [Lidocaine] Rash    Macrobid [Nitrofurantoin] Nausea And Vomiting     Prior to Visit Medications    Medication Sig Taking? Authorizing Provider   amitriptyline (ELAVIL) 100 MG tablet Take 1 tablet by mouth nightly Yes Pauly De Leon MD   gabapentin (NEURONTIN) 100 MG capsule Take 1 capsule by mouth daily for 30 days. Yes Pauly De Leon MD   traMADol (ULTRAM) 50 MG tablet Take 1 tablet by mouth every 6 hours as needed for Pain for up to 60 days. Yes Adonis Tejada MD   simvastatin (ZOCOR) 20 MG tablet Take 1 tablet by mouth daily Yes Pauly De Leon MD   metoprolol tartrate (LOPRESSOR) 25 MG tablet Take 1 tablet by mouth 2 times daily Yes Pauly De Leon MD   lisinopril (PRINIVIL;ZESTRIL) 40 MG tablet Take 1 tablet by mouth daily Yes Pauly De Leon MD   predniSONE (DELTASONE) 10 MG tablet Take 1 tablet by mouth daily Yes Adonis Tejada MD   polyethylene glycol (GLYCOLAX) 17 g packet Take 17 g by mouth daily as needed for Constipation Yes Cody Walker MD   docusate sodium (COLACE) 100 MG capsule Take 1 capsule by mouth daily  Patient taking differently: Take 100 mg by mouth every other day  Yes Pauly De Leon MD   HYDROcodone-acetaminophen (NORCO) 5-325 MG per tablet Take 1 tablet by mouth every 8 hours as needed.  Yes Historical Provider, MD   Handicap Placard MISC by Does not apply route Exp: 5/20/2026 Yes Adonis Tejada MD       [facility] (Including outside providers/suppliers regularly involved in providing care):   Patient Care Team:  Adonis Tejada MD as PCP - General (Internal Medicine)  César William MD as PCP - REHABILITATION [facility] Empaneled Provider  Sudha Napier as Neurologist (Neurology)    Reviewed and updated this visit:  Allergies  Meds

## 2022-04-27 DIAGNOSIS — I10 ESSENTIAL HYPERTENSION: ICD-10-CM

## 2022-04-27 RX ORDER — LISINOPRIL 40 MG/1
TABLET ORAL
Qty: 30 TABLET | Refills: 3 | Status: SHIPPED | OUTPATIENT
Start: 2022-04-27 | End: 2022-08-24 | Stop reason: SDUPTHER

## 2022-04-27 NOTE — TELEPHONE ENCOUNTER
Last visit: 04/25/2022  Last Med refill: 02/23/2022  Does patient have enough medication for 72 hours: Yes    Next Visit Date:  Future Appointments   Date Time Provider Cj Tara   5/18/2022  2:40 PM MANDI Oneill - CNP [facility]   8/24/2022  4:00 PM Pauly Elliott  [address] Maintenance   Topic Date Due    DTaP/Tdap/Td vaccine (1 - Tdap) Never done    Shingles Vaccine (1 of 2) 03/29/2023 (Originally [birth year]/8/1984)    Lipids  11/06/2022    Potassium  02/19/2023    Creatinine  02/19/2023    Depression Screen  04/21/2023    Annual Wellness Visit (AWV)  04/26/2023    Flu vaccine  Completed    Pneumococcal 65+ years Vaccine  Completed    COVID-19 Vaccine  Completed    Hepatitis A vaccine  Aged Out    Hib vaccine  Aged Out    Meningococcal (ACWY) vaccine  Aged Out       Hemoglobin A1C (%)   Date Value   03/29/2022 6.7   11/06/2021 6.4   03/30/2021 6.7             ( goal A1C is < 7)   No results found for: LABMICR  LDL Cholesterol (mg/dL)   Date Value   05/21/2020 86   05/24/2019 75     LDL Calculated (mg/dL)   Date Value   11/06/2021 100       (goal LDL is <100)   AST (U/L)   Date Value   02/08/2022 18     ALT (U/L)   Date Value   02/08/2022 21     BUN (mg/dL)   Date Value   02/19/2022 17     BP Readings from Last 3 Encounters:   04/25/22 130/78   04/01/22 (!) 150/78   03/29/22 (!) 142/80          (goal 120/80)    All Future Testing planned in [facility]  Lab Frequency Next Occurrence   Saline lock IV Once 04/04/2022   Lumbar Epidural Steroid Injection/Caudal Once 10/04/2021   [place] NJX DX/THER SBST INTRLMNR LMBR/SAC W/IMG GDN Once 02/02/2022               Patient Active Problem List:     Ataxia     Multiple sclerosis ([address]     Essential hypertension     Hyperlipidemia     Atelectasis     Debility     Abnormal gait     Actinic keratosis     Enthesopathy of hip region     Generalized osteoarthritis     Idiopathic peripheral neuropathy     Vitamin D deficiency Primary localized osteoarthrosis of the hip, left     Primary osteoarthritis of left hip     Acquired spondylolisthesis     Chronic midline low back pain with left-sided sciatica     Spinal stenosis of lumbar region with neurogenic claudication     Presence of right artificial shoulder joint     Diarrhea of presumed infectious origin     Chronic headache     Facial asymmetry     Asymptomatic bacteriuria     Hyperglycemia     Lumbar radiculopathy     Type 2 diabetes mellitus     Recurrent urinary tract infection     Diastolic congestive heart failure (HCC)     Left hemiparesis ([address]

## 2022-05-18 ENCOUNTER — OFFICE VISIT (OUTPATIENT)
Dept: PAIN MANAGEMENT | Age: 87
End: 2022-05-18
Payer: MEDICARE

## 2022-05-18 VITALS
HEART RATE: 79 BPM | WEIGHT: 133 LBS | HEIGHT: 58 IN | DIASTOLIC BLOOD PRESSURE: 78 MMHG | BODY MASS INDEX: 27.92 KG/M2 | OXYGEN SATURATION: 97 % | SYSTOLIC BLOOD PRESSURE: 163 MMHG

## 2022-05-18 DIAGNOSIS — M54.16 LUMBAR RADICULOPATHY: ICD-10-CM

## 2022-05-18 DIAGNOSIS — M48.062 SPINAL STENOSIS OF LUMBAR REGION WITH NEUROGENIC CLAUDICATION: Primary | ICD-10-CM

## 2022-05-18 PROCEDURE — 1090F PRES/ABSN URINE INCON ASSESS: CPT | Performed by: NURSE PRACTITIONER

## 2022-05-18 PROCEDURE — G8417 CALC BMI ABV UP PARAM F/U: HCPCS | Performed by: NURSE PRACTITIONER

## 2022-05-18 PROCEDURE — 1036F TOBACCO NON-USER: CPT | Performed by: NURSE PRACTITIONER

## 2022-05-18 PROCEDURE — 1123F ACP DISCUSS/DSCN MKR DOCD: CPT | Performed by: NURSE PRACTITIONER

## 2022-05-18 PROCEDURE — G8427 DOCREV CUR MEDS BY ELIG CLIN: HCPCS | Performed by: NURSE PRACTITIONER

## 2022-05-18 PROCEDURE — 4040F PNEUMOC VAC/ADMIN/RCVD: CPT | Performed by: NURSE PRACTITIONER

## 2022-05-18 PROCEDURE — 99213 OFFICE O/P EST LOW 20 MIN: CPT | Performed by: NURSE PRACTITIONER

## 2022-05-18 ASSESSMENT — ENCOUNTER SYMPTOMS
BACK PAIN: 1
NAUSEA: 0
DIARRHEA: 0
SORE THROAT: 0
SHORTNESS OF BREATH: 0
VOMITING: 0
COUGH: 0
CONSTIPATION: 0
WHEEZING: 0

## 2022-05-18 NOTE — PROGRESS NOTES
Chief Complaint   Patient presents with    Back Pain       PMH     Pt here today for f/u after L4 L5 LUMBAR LAMINECTOMY DECOMPRESSION MINIMALLY INVASIVE (MILD) done 3/17/22 and reports 75% relief of her pain. Reports no longer having difficulty getting out of bed, able to stand and walk longer without experiencing leg pain. Does still use a walker to help with balance  Norco prescribed by PCP     HPI:   Back Pain  This is a chronic problem. The current episode started more than 1 year ago. The problem occurs intermittently. The problem has been waxing and waning since onset. The pain is present in the lumbar spine. The quality of the pain is described as aching. The pain does not radiate. The pain is at a severity of 0/10. The patient is experiencing no pain. Pertinent negatives include no chest pain, fever, numbness or paresthesias. Risk factors include obesity and poor posture. She has tried analgesics for the symptoms. The treatment provided mild relief.          Past Medical History:   Diagnosis Date    Acute cystitis without hematuria 10/1/2017    EDINSON (acute kidney injury) ([address] 3/3/2021    Arthritis     Chronic daily headache     GERD (gastroesophageal reflux disease)     Hyperlipidemia     Hypertension     Moderate malnutrition ([address] 3/2/2019    Multiple sclerosis ([address]     Multiple sclerosis exacerbation ([address] 2/25/2021    Neuropathy     Pneumonia 2017    states had double pneumonia    Vitamin D deficiency        Past Surgical History:   Procedure Laterality Date    CATARACT REMOVAL WITH IMPLANT Right 11/6/2014    [facility]    CATARACT REMOVAL WITH IMPLANT Left 12/2/2014    [facility]    CERVICAL DISC SURGERY      CYSTOCELE REPAIR      HYSTERECTOMY      LUMBAR LAMINECTOMY N/A 3/17/2022    LUMBAR LAMINECTOMY DECOMPRESSION MINIMALLY INVASIVE (MILD) L4-L5 performed by Berna Sandoval MD at [address] [place] [address],[address] Right 04/20/2017    SHOULDER SURGERY Right 2017       Allergies   Allergen Reactions    Bactrim [Sulfamethoxazole-Trimethoprim] Other (See Comments)     BLISTERS IN MOUTH    Other Diarrhea     Lactose Intolerance- No liquid milk or ice cream. May have milk cooked in foods.  Pcn [Penicillins] Hives    Lidoderm [Lidocaine] Rash    Macrobid [Nitrofurantoin] Nausea And Vomiting         Current Outpatient Medications:     lisinopril (PRINIVIL;ZESTRIL) 40 MG tablet, TAKE ONE TABLET BY MOUTH DAILY, Disp: 30 tablet, Rfl: 3    celecoxib (CELEBREX) 200 MG capsule, Take 1 capsule by mouth daily, Disp: 90 capsule, Rfl: 1    amitriptyline (ELAVIL) 100 MG tablet, Take 1 tablet by mouth nightly, Disp: 30 tablet, Rfl: 2    gabapentin (NEURONTIN) 100 MG capsule, Take 1 capsule by mouth daily for 30 days. , Disp: 30 capsule, Rfl: 2    traMADol (ULTRAM) 50 MG tablet, Take 1 tablet by mouth every 6 hours as needed for Pain for up to 60 days. , Disp: 120 tablet, Rfl: 1    simvastatin (ZOCOR) 20 MG tablet, Take 1 tablet by mouth daily, Disp: 30 tablet, Rfl: 5    metoprolol tartrate (LOPRESSOR) 25 MG tablet, Take 1 tablet by mouth 2 times daily, Disp: 60 tablet, Rfl: 3    predniSONE (DELTASONE) 10 MG tablet, Take 1 tablet by mouth daily, Disp: 90 tablet, Rfl: 1    polyethylene glycol (GLYCOLAX) 17 g packet, Take 17 g by mouth daily as needed for Constipation, Disp: , Rfl:     docusate sodium (COLACE) 100 MG capsule, Take 1 capsule by mouth daily (Patient taking differently: Take 100 mg by mouth every other day ), Disp: 90 capsule, Rfl: 1    HYDROcodone-acetaminophen (NORCO) 5-325 MG per tablet, Take 1 tablet by mouth every 8 hours as needed. , Disp: , Rfl:     Handicap Placard MISC, by Does not apply route Exp: 5/20/2026, Disp: 1 each, Rfl: 0    No family history on file. Social History     Socioeconomic History    Marital status:       Spouse name: Not on file    Number of children: 3    Years of education: Not on file    Highest education level: Not on file   Occupational History    Not on file   Tobacco Use    Smoking status: Never Smoker    Smokeless tobacco: Never Used   Vaping Use    Vaping Use: Never used   Substance and Sexual Activity    Alcohol use: No    Drug use: No    Sexual activity: Not on file   Other Topics Concern    Not on file   Social History Narrative    Not on file     Social Determinants of Health     Financial Resource Strain: Low Risk     Difficulty of Paying Living Expenses: Not very hard   Food Insecurity: No Food Insecurity    Worried About Running Out of Food in the Last Year: Never true    Sofiya of Food in the Last Year: Never true   Transportation Needs:     Lack of Transportation (Medical): Not on file    Lack of Transportation (Non-Medical): Not on file   Physical Activity: Inactive    Days of Exercise per Week: 0 days    Minutes of Exercise per Session: 0 min   Stress:     Feeling of Stress : Not on file   Social Connections:     Frequency of Communication with Friends and Family: Not on file    Frequency of Social Gatherings with Friends and Family: Not on file    Attends Worship Services: Not on file    Active Member of Clubs or Organizations: Not on file    Attends Club or Organization Meetings: Not on file    Marital Status: Not on file   Intimate Partner Violence:     Fear of Current or Ex-Partner: Not on file    Emotionally Abused: Not on file    Physically Abused: Not on file    Sexually Abused: Not on file   Housing Stability:     Unable to Pay for Housing in the Last Year: Not on file    Number of [place] in the Last Year: Not on file    Unstable Housing in the Last Year: Not on file       Review of Systems:  Review of Systems   Constitutional: Negative for chills, decreased appetite and fever. HENT: Negative for congestion and sore throat. Cardiovascular: Negative for chest pain.    Respiratory: Negative for cough, shortness of breath and wheezing. Musculoskeletal: Positive for back pain. Gastrointestinal: Negative for constipation, diarrhea, nausea and vomiting. Neurological: Negative for numbness and paresthesias. Physical Exam:  BP (!) 163/78   Pulse 79   Ht 4' 10\" (1.473 m)   Wt 133 lb (60.3 kg)   SpO2 97%   BMI 27.80 kg/m²     Physical Exam  Cardiovascular:      Rate and Rhythm: Normal rate. Pulmonary:      Effort: Pulmonary effort is normal.   Musculoskeletal:         General: Normal range of motion. Skin:     General: Skin is warm and dry. Neurological:      Mental Status: She is alert and oriented to person, place, and time. Assessment:  Problem List Items Addressed This Visit     Spinal stenosis of lumbar region with neurogenic claudication - Primary    Lumbar radiculopathy             Treatment Plan:    Patient relates significant relief of pain since MILD and improvement in quality of life  Will call office with any further concerns    I have reviewed the chief complaint and history of present illness (including ROS and PFSH) and vital documentation by my staff and I agree with their documentation and have added where applicable.

## 2022-05-25 DIAGNOSIS — G89.29 CHRONIC MIDLINE LOW BACK PAIN WITH LEFT-SIDED SCIATICA: ICD-10-CM

## 2022-05-25 DIAGNOSIS — M54.42 CHRONIC MIDLINE LOW BACK PAIN WITH LEFT-SIDED SCIATICA: ICD-10-CM

## 2022-05-25 RX ORDER — PREDNISONE 10 MG/1
10 TABLET ORAL DAILY
Qty: 90 TABLET | Refills: 1 | OUTPATIENT
Start: 2022-05-25

## 2022-06-02 DIAGNOSIS — M54.42 CHRONIC MIDLINE LOW BACK PAIN WITH LEFT-SIDED SCIATICA: ICD-10-CM

## 2022-06-02 DIAGNOSIS — G89.29 CHRONIC MIDLINE LOW BACK PAIN WITH LEFT-SIDED SCIATICA: ICD-10-CM

## 2022-06-03 DIAGNOSIS — G89.29 CHRONIC MIDLINE LOW BACK PAIN WITH LEFT-SIDED SCIATICA: ICD-10-CM

## 2022-06-03 DIAGNOSIS — M54.42 CHRONIC MIDLINE LOW BACK PAIN WITH LEFT-SIDED SCIATICA: ICD-10-CM

## 2022-06-03 RX ORDER — AMITRIPTYLINE HYDROCHLORIDE 100 MG/1
100 TABLET, FILM COATED ORAL NIGHTLY
Qty: 30 TABLET | Refills: 2 | Status: SHIPPED
Start: 2022-06-03 | End: 2022-08-24 | Stop reason: SDUPTHER

## 2022-06-03 RX ORDER — AMITRIPTYLINE HYDROCHLORIDE 100 MG/1
100 TABLET, FILM COATED ORAL NIGHTLY
Qty: 30 TABLET | Refills: 2 | Status: SHIPPED | OUTPATIENT
Start: 2022-06-03 | End: 2022-08-24 | Stop reason: SDUPTHER

## 2022-06-03 NOTE — TELEPHONE ENCOUNTER
Last visit: 04/25/2022  Last Med refill: 05/29/2022  Does patient have enough medication for 72 hours: Yes    Next Visit Date:  Future Appointments   Date Time Provider Cj Tara   8/24/2022  4:00 PM Pauly Do  [address] Maintenance   Topic Date Due    DTaP/Tdap/Td vaccine (1 - Tdap) Never done    Shingles vaccine (1 of 2) 03/29/2023 (Originally 12/8/1984)    Lipids  11/06/2022    Depression Screen  04/21/2023    Annual Wellness Visit (AWV)  04/26/2023    Flu vaccine  Completed    Pneumococcal 65+ years Vaccine  Completed    COVID-19 Vaccine  Completed    Hepatitis A vaccine  Aged Out    Hib vaccine  Aged Out    Meningococcal (ACWY) vaccine  Aged Out       Hemoglobin A1C (%)   Date Value   03/29/2022 6.7   11/06/2021 6.4   03/30/2021 6.7             ( goal A1C is < 7)   No results found for: LABMICR  LDL Cholesterol (mg/dL)   Date Value   05/21/2020 86   05/24/2019 75     LDL Calculated (mg/dL)   Date Value   11/06/2021 100       (goal LDL is <100)   AST (U/L)   Date Value   02/08/2022 18     ALT (U/L)   Date Value   02/08/2022 21     BUN (mg/dL)   Date Value   02/19/2022 17     BP Readings from Last 3 Encounters:   05/18/22 (!) 163/78   04/25/22 130/78   04/01/22 (!) 150/78          (goal 120/80)    All Future Testing planned in [facility]  Lab Frequency Next Occurrence   Saline lock IV Once 04/04/2022   Lumbar Epidural Steroid Injection/Caudal Once 10/04/2021               Patient Active Problem List:     Ataxia     Multiple sclerosis ([address]     Essential hypertension     Hyperlipidemia     Atelectasis     Debility     Abnormal gait     Actinic keratosis     Enthesopathy of hip region     Generalized osteoarthritis     Idiopathic peripheral neuropathy     Vitamin D deficiency     Primary localized osteoarthrosis of the hip, left     Primary osteoarthritis of left hip     Acquired spondylolisthesis     Chronic midline low back pain with left-sided sciatica Spinal stenosis of lumbar region with neurogenic claudication     Presence of right artificial shoulder joint     Diarrhea of presumed infectious origin     Chronic headache     Facial asymmetry     Asymptomatic bacteriuria     Hyperglycemia     Lumbar radiculopathy     Type 2 diabetes mellitus     Recurrent urinary tract infection     Diastolic congestive heart failure (HCC)     Left hemiparesis ([address]

## 2022-06-03 NOTE — TELEPHONE ENCOUNTER
Last visit: 04/25/2022  Last Med refill: 05/29/2022  Does patient have enough medication for 72 hours: Yes    Next Visit Date:  Future Appointments   Date Time Provider Cj Tara   8/24/2022  4:00 PM Pauly Sommer  [address] Maintenance   Topic Date Due    DTaP/Tdap/Td vaccine (1 - Tdap) Never done    Shingles vaccine (1 of 2) 03/29/2023 (Originally [birth year]/8/1984)    Lipids  11/06/2022    Depression Screen  04/21/2023    Annual Wellness Visit (AWV)  04/26/2023    Flu vaccine  Completed    Pneumococcal 65+ years Vaccine  Completed    COVID-19 Vaccine  Completed    Hepatitis A vaccine  Aged Out    Hib vaccine  Aged Out    Meningococcal (ACWY) vaccine  Aged Out       Hemoglobin A1C (%)   Date Value   03/29/2022 6.7   11/06/2021 6.4   03/30/2021 6.7             ( goal A1C is < 7)   No results found for: LABMICR  LDL Cholesterol (mg/dL)   Date Value   05/21/2020 86   05/24/2019 75     LDL Calculated (mg/dL)   Date Value   11/06/2021 100       (goal LDL is <100)   AST (U/L)   Date Value   02/08/2022 18     ALT (U/L)   Date Value   02/08/2022 21     BUN (mg/dL)   Date Value   02/19/2022 17     BP Readings from Last 3 Encounters:   05/18/22 (!) 163/78   04/25/22 130/78   04/01/22 (!) 150/78          (goal 120/80)    All Future Testing planned in [facility]  Lab Frequency Next Occurrence   Saline lock IV Once 04/04/2022   Lumbar Epidural Steroid Injection/Caudal Once 10/04/2021               Patient Active Problem List:     Ataxia     Multiple sclerosis ([address]     Essential hypertension     Hyperlipidemia     Atelectasis     Debility     Abnormal gait     Actinic keratosis     Enthesopathy of hip region     Generalized osteoarthritis     Idiopathic peripheral neuropathy     Vitamin D deficiency     Primary localized osteoarthrosis of the hip, left     Primary osteoarthritis of left hip     Acquired spondylolisthesis     Chronic midline low back pain with left-sided sciatica Spinal stenosis of lumbar region with neurogenic claudication     Presence of right artificial shoulder joint     Diarrhea of presumed infectious origin     Chronic headache     Facial asymmetry     Asymptomatic bacteriuria     Hyperglycemia     Lumbar radiculopathy     Type 2 diabetes mellitus     Recurrent urinary tract infection     Diastolic congestive heart failure (HCC)     Left hemiparesis ([address]

## 2022-06-07 ENCOUNTER — PATIENT MESSAGE (OUTPATIENT)
Dept: FAMILY MEDICINE CLINIC | Age: 87
End: 2022-06-07

## 2022-06-07 RX ORDER — TIZANIDINE 2 MG/1
2 TABLET ORAL NIGHTLY PRN
Qty: 30 TABLET | Refills: 3 | Status: SHIPPED | OUTPATIENT
Start: 2022-06-07 | End: 2022-08-24 | Stop reason: SDUPTHER

## 2022-06-07 NOTE — TELEPHONE ENCOUNTER
From: Hayley Jacobo  To: Dr. Corona Board: 6/7/2022 11:47 AM EDT  Subject: tizanidine    My mom used to have on her prescription list this medicine called Tizanidine which she used for her restless leg syndrome. Can you send in a prescription for this muscle relaxer or something similar to help her without seeing her? Thanks.

## 2022-06-13 ENCOUNTER — E-VISIT (OUTPATIENT)
Dept: FAMILY MEDICINE CLINIC | Age: 87
End: 2022-06-13
Payer: MEDICARE

## 2022-06-13 DIAGNOSIS — R30.0 BURNING WITH URINATION: Primary | ICD-10-CM

## 2022-06-13 PROCEDURE — 99421 OL DIG E/M SVC 5-10 MIN: CPT | Performed by: INTERNAL MEDICINE

## 2022-06-13 RX ORDER — CEPHALEXIN 500 MG/1
500 CAPSULE ORAL 3 TIMES DAILY
Qty: 21 CAPSULE | Refills: 0 | Status: SHIPPED | OUTPATIENT
Start: 2022-06-13 | End: 2022-06-20

## 2022-06-17 ENCOUNTER — PATIENT MESSAGE (OUTPATIENT)
Dept: FAMILY MEDICINE CLINIC | Age: 87
End: 2022-06-17

## 2022-06-17 DIAGNOSIS — M15.9 GENERALIZED OSTEOARTHRITIS: ICD-10-CM

## 2022-06-17 RX ORDER — TRAMADOL HYDROCHLORIDE 50 MG/1
50 TABLET ORAL EVERY 6 HOURS PRN
Qty: 120 TABLET | Refills: 1 | Status: SHIPPED | OUTPATIENT
Start: 2022-06-17 | End: 2022-08-16

## 2022-06-17 NOTE — TELEPHONE ENCOUNTER
From: Isabela Heath  To: Dr. Juan Dallas: 6/17/2022 3:32 PM EDT  Subject: Tramadol     My mom needs a refill on her tramadol and there are none on her prescription she will be out tomorrow thanks!!

## 2022-07-25 DIAGNOSIS — E78.5 HYPERLIPIDEMIA, UNSPECIFIED HYPERLIPIDEMIA TYPE: ICD-10-CM

## 2022-07-26 RX ORDER — SIMVASTATIN 20 MG
TABLET ORAL
Qty: 90 TABLET | Refills: 1 | Status: SHIPPED | OUTPATIENT
Start: 2022-07-26

## 2022-07-26 NOTE — TELEPHONE ENCOUNTER
Last visit: 06/13/2022  Last Med refill: 07/20/2022  Does patient have enough medication for 72 hours: Yes    Next Visit Date:  Future Appointments   Date Time Provider Cj Tara   8/24/2022  4:00 PM Pauly Lozano  [address] [facility]   Topic Date Due    DTaP/Tdap/Td vaccine (1 - Tdap) Never done    COVID-19 Vaccine (4 - Booster for Pfizer series) 03/06/2022    Shingles vaccine (1 of 2) 03/29/2023 (Originally 12/8/1984)    Flu vaccine (1) 09/01/2022    Lipids  11/06/2022    Depression Screen  04/21/2023    Annual Wellness Visit (AWV)  04/26/2023    Pneumococcal 65+ years Vaccine  Completed    Hepatitis A vaccine  Aged Out    Hib vaccine  Aged Out    Meningococcal (ACWY) vaccine  Aged Out       Hemoglobin A1C (%)   Date Value   03/29/2022 6.7   11/06/2021 6.4   03/30/2021 6.7             ( goal A1C is < 7)   No results found for: LABMICR  LDL Cholesterol (mg/dL)   Date Value   05/21/2020 86   05/24/2019 75     LDL Calculated (mg/dL)   Date Value   11/06/2021 100       (goal LDL is <100)   AST (U/L)   Date Value   02/08/2022 18     ALT (U/L)   Date Value   02/08/2022 21     BUN (mg/dL)   Date Value   02/19/2022 17     BP Readings from Last 3 Encounters:   05/18/22 (!) 163/78   04/25/22 130/78   04/01/22 (!) 150/78          (goal 120/80)    All Future Testing planned in [facility]  Lab Frequency Next Occurrence   Saline lock IV Once 04/04/2022   Lumbar Epidural Steroid Injection/Caudal Once 10/04/2021               Patient Active Problem List:     Ataxia     Multiple sclerosis ([address]     Essential hypertension     Hyperlipidemia     Atelectasis     Debility     Abnormal gait     Actinic keratosis     Enthesopathy of hip region     Generalized osteoarthritis     Idiopathic peripheral neuropathy     Vitamin D deficiency     Primary localized osteoarthrosis of the hip, left     Primary osteoarthritis of left hip     Acquired spondylolisthesis     Chronic midline low back pain with left-sided sciatica     Spinal stenosis of lumbar region with neurogenic claudication     Presence of right artificial shoulder joint     Diarrhea of presumed infectious origin     Chronic headache     Facial asymmetry     Asymptomatic bacteriuria     Hyperglycemia     Lumbar radiculopathy     Type 2 diabetes mellitus     Recurrent urinary tract infection     Diastolic congestive heart failure (HCC)     Left hemiparesis ([address]

## 2022-07-29 ENCOUNTER — E-VISIT (OUTPATIENT)
Dept: FAMILY MEDICINE CLINIC | Age: 87
End: 2022-07-29
Payer: MEDICARE

## 2022-07-29 DIAGNOSIS — R30.0 DYSURIA: Primary | ICD-10-CM

## 2022-07-29 PROCEDURE — 99421 OL DIG E/M SVC 5-10 MIN: CPT | Performed by: INTERNAL MEDICINE

## 2022-07-29 RX ORDER — CEPHALEXIN 500 MG/1
500 CAPSULE ORAL 3 TIMES DAILY
Qty: 21 CAPSULE | Refills: 0 | Status: SHIPPED | OUTPATIENT
Start: 2022-07-29 | End: 2022-08-05

## 2022-07-30 ENCOUNTER — PATIENT MESSAGE (OUTPATIENT)
Dept: FAMILY MEDICINE CLINIC | Age: 87
End: 2022-07-30

## 2022-07-30 DIAGNOSIS — K21.9 GASTROESOPHAGEAL REFLUX DISEASE: ICD-10-CM

## 2022-08-01 RX ORDER — OMEPRAZOLE 20 MG/1
20 CAPSULE, DELAYED RELEASE ORAL DAILY
Qty: 90 CAPSULE | Refills: 1 | Status: SHIPPED | OUTPATIENT
Start: 2022-08-01

## 2022-08-15 ENCOUNTER — E-VISIT (OUTPATIENT)
Dept: FAMILY MEDICINE CLINIC | Age: 87
End: 2022-08-15
Payer: MEDICARE

## 2022-08-15 DIAGNOSIS — R30.0 DYSURIA: Primary | ICD-10-CM

## 2022-08-15 PROCEDURE — 99421 OL DIG E/M SVC 5-10 MIN: CPT | Performed by: INTERNAL MEDICINE

## 2022-08-15 RX ORDER — CEPHALEXIN 500 MG/1
500 CAPSULE ORAL 3 TIMES DAILY
Qty: 21 CAPSULE | Refills: 0 | Status: SHIPPED | OUTPATIENT
Start: 2022-08-15 | End: 2022-08-22

## 2022-08-21 DIAGNOSIS — I10 ESSENTIAL HYPERTENSION: ICD-10-CM

## 2022-08-22 NOTE — TELEPHONE ENCOUNTER
Last visit: 4/25/22  Last Med refill: 2/23/22  Does patient have enough medication for 72 hours: No:     Next Visit Date:  Future Appointments   Date Time Provider jC Tara   8/24/2022  4:00 PM Pauly Lincoln  [address] [facility]   Topic Date Due    DTaP/Tdap/Td vaccine (1 - Tdap) Never done    Shingles vaccine (1 of 2) 03/29/2023 (Originally 12/8/1984)    Flu vaccine (1) 09/01/2022    Lipids  11/06/2022    Depression Screen  04/21/2023    Annual Wellness Visit (AWV)  04/26/2023    Pneumococcal 65+ years Vaccine  Completed    COVID-19 Vaccine  Completed    Hepatitis A vaccine  Aged Out    Hib vaccine  Aged Out    Meningococcal (ACWY) vaccine  Aged Out       Hemoglobin A1C (%)   Date Value   03/29/2022 6.7   11/06/2021 6.4   03/30/2021 6.7             ( goal A1C is < 7)   No results found for: LABMICR  LDL Cholesterol (mg/dL)   Date Value   05/21/2020 86   05/24/2019 75     LDL Calculated (mg/dL)   Date Value   11/06/2021 100       (goal LDL is <100)   AST (U/L)   Date Value   02/08/2022 18     ALT (U/L)   Date Value   02/08/2022 21     BUN (mg/dL)   Date Value   02/19/2022 17     BP Readings from Last 3 Encounters:   05/18/22 (!) 163/78   04/25/22 130/78   04/01/22 (!) 150/78          (goal 120/80)    All Future Testing planned in [facility]  Lab Frequency Next Occurrence   Saline lock IV Once 04/04/2022   Lumbar Epidural Steroid Injection/Caudal Once 10/04/2021               Patient Active Problem List:     Ataxia     Multiple sclerosis ([address]     Essential hypertension     Hyperlipidemia     Atelectasis     Debility     Abnormal gait     Actinic keratosis     Enthesopathy of hip region     Generalized osteoarthritis     Idiopathic peripheral neuropathy     Vitamin D deficiency     Primary localized osteoarthrosis of the hip, left     Primary osteoarthritis of left hip     Acquired spondylolisthesis     Chronic midline low back pain with left-sided sciatica     Spinal stenosis of lumbar region with neurogenic claudication     Presence of right artificial shoulder joint     Diarrhea of presumed infectious origin     Chronic headache     Facial asymmetry     Asymptomatic bacteriuria     Hyperglycemia     Lumbar radiculopathy     Type 2 diabetes mellitus     Recurrent urinary tract infection     Diastolic congestive heart failure (HCC)     Left hemiparesis ([address]

## 2022-08-24 ENCOUNTER — OFFICE VISIT (OUTPATIENT)
Dept: FAMILY MEDICINE CLINIC | Age: 87
End: 2022-08-24
Payer: MEDICARE

## 2022-08-24 ENCOUNTER — HOSPITAL ENCOUNTER (OUTPATIENT)
Age: 87
Setting detail: SPECIMEN
Discharge: HOME | End: 2022-08-24

## 2022-08-24 VITALS
WEIGHT: 138 LBS | TEMPERATURE: 97.6 F | OXYGEN SATURATION: 98 % | DIASTOLIC BLOOD PRESSURE: 80 MMHG | SYSTOLIC BLOOD PRESSURE: 142 MMHG | HEART RATE: 76 BPM | BODY MASS INDEX: 28.84 KG/M2

## 2022-08-24 DIAGNOSIS — I50.32 CHRONIC DIASTOLIC CONGESTIVE HEART FAILURE (HCC): ICD-10-CM

## 2022-08-24 DIAGNOSIS — I10 ESSENTIAL HYPERTENSION: ICD-10-CM

## 2022-08-24 DIAGNOSIS — G89.29 CHRONIC MIDLINE LOW BACK PAIN WITH LEFT-SIDED SCIATICA: ICD-10-CM

## 2022-08-24 DIAGNOSIS — F51.5 NIGHTMARES: ICD-10-CM

## 2022-08-24 DIAGNOSIS — R30.0 DYSURIA: Primary | ICD-10-CM

## 2022-08-24 DIAGNOSIS — B37.0 ORAL THRUSH: ICD-10-CM

## 2022-08-24 DIAGNOSIS — I50.22 CHRONIC SYSTOLIC (CONGESTIVE) HEART FAILURE (HCC): ICD-10-CM

## 2022-08-24 DIAGNOSIS — K59.03 THERAPEUTIC OPIOID-INDUCED CONSTIPATION (OIC): ICD-10-CM

## 2022-08-24 DIAGNOSIS — T40.2X5A THERAPEUTIC OPIOID-INDUCED CONSTIPATION (OIC): ICD-10-CM

## 2022-08-24 DIAGNOSIS — G62.9 PERIPHERAL POLYNEUROPATHY: ICD-10-CM

## 2022-08-24 DIAGNOSIS — M54.42 CHRONIC MIDLINE LOW BACK PAIN WITH LEFT-SIDED SCIATICA: ICD-10-CM

## 2022-08-24 DIAGNOSIS — E11.9 TYPE 2 DIABETES MELLITUS WITHOUT COMPLICATION, WITHOUT LONG-TERM CURRENT USE OF INSULIN (HCC): ICD-10-CM

## 2022-08-24 DIAGNOSIS — N18.31 STAGE 3A CHRONIC KIDNEY DISEASE (HCC): ICD-10-CM

## 2022-08-24 DIAGNOSIS — R30.0 DYSURIA: ICD-10-CM

## 2022-08-24 DIAGNOSIS — M15.9 GENERALIZED OSTEOARTHRITIS: ICD-10-CM

## 2022-08-24 PROBLEM — N18.30 CHRONIC RENAL DISEASE, STAGE III (HCC): Status: ACTIVE | Noted: 2022-08-24

## 2022-08-24 LAB
BILIRUBIN, POC: NORMAL
BLOOD URINE, POC: NORMAL
CLARITY, POC: CLEAR
COLOR, POC: NORMAL
GLUCOSE URINE, POC: NORMAL
HBA1C MFR BLD: 6.4 %
KETONES, POC: NORMAL
LEUKOCYTE EST, POC: NORMAL
NITRITE, POC: NORMAL
PH, POC: 7
PROTEIN, POC: NORMAL
SPECIFIC GRAVITY, POC: <=1.005
UROBILINOGEN, POC: 0.2

## 2022-08-24 PROCEDURE — 1124F ACP DISCUSS-NO DSCNMKR DOCD: CPT | Performed by: INTERNAL MEDICINE

## 2022-08-24 PROCEDURE — G8417 CALC BMI ABV UP PARAM F/U: HCPCS | Performed by: INTERNAL MEDICINE

## 2022-08-24 PROCEDURE — G8427 DOCREV CUR MEDS BY ELIG CLIN: HCPCS | Performed by: INTERNAL MEDICINE

## 2022-08-24 PROCEDURE — 3044F HG A1C LEVEL LT 7.0%: CPT | Performed by: INTERNAL MEDICINE

## 2022-08-24 PROCEDURE — 1036F TOBACCO NON-USER: CPT | Performed by: INTERNAL MEDICINE

## 2022-08-24 PROCEDURE — 99214 OFFICE O/P EST MOD 30 MIN: CPT | Performed by: INTERNAL MEDICINE

## 2022-08-24 PROCEDURE — 83036 HEMOGLOBIN GLYCOSYLATED A1C: CPT | Performed by: INTERNAL MEDICINE

## 2022-08-24 PROCEDURE — 1090F PRES/ABSN URINE INCON ASSESS: CPT | Performed by: INTERNAL MEDICINE

## 2022-08-24 PROCEDURE — 81003 URINALYSIS AUTO W/O SCOPE: CPT | Performed by: INTERNAL MEDICINE

## 2022-08-24 RX ORDER — PREDNISONE 10 MG/1
10 TABLET ORAL DAILY
Qty: 90 TABLET | Refills: 1 | Status: SHIPPED | OUTPATIENT
Start: 2022-08-24

## 2022-08-24 RX ORDER — AMITRIPTYLINE HYDROCHLORIDE 100 MG/1
100 TABLET, FILM COATED ORAL NIGHTLY
Qty: 90 TABLET | Refills: 1 | Status: SHIPPED | OUTPATIENT
Start: 2022-08-24

## 2022-08-24 RX ORDER — HYDROXYZINE HYDROCHLORIDE 10 MG/1
10 TABLET, FILM COATED ORAL NIGHTLY PRN
Qty: 15 TABLET | Refills: 0 | Status: SHIPPED | OUTPATIENT
Start: 2022-08-24

## 2022-08-24 RX ORDER — LISINOPRIL 40 MG/1
TABLET ORAL
Qty: 90 TABLET | Refills: 1 | Status: SHIPPED | OUTPATIENT
Start: 2022-08-24

## 2022-08-24 RX ORDER — METOPROLOL SUCCINATE 50 MG/1
50 TABLET, EXTENDED RELEASE ORAL NIGHTLY
Qty: 90 TABLET | Refills: 1 | Status: SHIPPED | OUTPATIENT
Start: 2022-09-13

## 2022-08-24 RX ORDER — TRAMADOL HYDROCHLORIDE 50 MG/1
50 TABLET ORAL EVERY 6 HOURS PRN
Qty: 120 TABLET | Refills: 1 | Status: SHIPPED | OUTPATIENT
Start: 2022-08-24 | End: 2022-10-23

## 2022-08-24 RX ORDER — DOCUSATE SODIUM 100 MG/1
100 CAPSULE, LIQUID FILLED ORAL DAILY
Qty: 90 CAPSULE | Refills: 1 | Status: SHIPPED | OUTPATIENT
Start: 2022-08-24

## 2022-08-24 RX ORDER — CEPHALEXIN 500 MG/1
500 CAPSULE ORAL 3 TIMES DAILY
Qty: 21 CAPSULE | Refills: 0 | Status: SHIPPED | OUTPATIENT
Start: 2022-08-24 | End: 2022-08-31

## 2022-08-24 RX ORDER — TIZANIDINE 2 MG/1
2 TABLET ORAL NIGHTLY PRN
Qty: 90 TABLET | Refills: 1 | Status: SHIPPED | OUTPATIENT
Start: 2022-08-24

## 2022-08-24 RX ORDER — CELECOXIB 200 MG/1
200 CAPSULE ORAL DAILY
Qty: 90 CAPSULE | Refills: 1 | Status: SHIPPED | OUTPATIENT
Start: 2022-08-24

## 2022-08-24 ASSESSMENT — ENCOUNTER SYMPTOMS
COUGH: 0
CONSTIPATION: 0
DIARRHEA: 0
SHORTNESS OF BREATH: 0
CHEST TIGHTNESS: 0
WHEEZING: 0
CHOKING: 0
ANAL BLEEDING: 0
ABDOMINAL PAIN: 0
NAUSEA: 0
BLOOD IN STOOL: 0
VOMITING: 0

## 2022-08-24 ASSESSMENT — VISUAL ACUITY: OU: 1

## 2022-08-24 NOTE — PROGRESS NOTES
Pt is here for a 4 month F/U  She is still having irritation when voiding. She states just finished antibiotic.

## 2022-08-24 NOTE — PROGRESS NOTES
[facility]  [facility] [address]  [address] [address]  Dept: [phone]  Dept Fax: [phone]      Marcos Carmona is a 80 y.o. female who presents today for hermedical conditions/complaints as noted below. Marcos Carmona is c/o of Hypertension and Dysuria        Assessment/Plan:     1. Dysuria  -     POCT Urinalysis No Micro (Auto)  -     Culture, Urine; Future  -     cephALEXin (KEFLEX) 500 MG capsule; Take 1 capsule by mouth 3 times daily for 7 days, Disp-21 capsule, R-0Normal  2. Therapeutic opioid-induced constipation (OIC)  -     docusate sodium (COLACE) 100 MG capsule; Take 1 capsule by mouth daily, Disp-90 capsule, R-1Normal  3. Chronic midline low back pain with left-sided sciatica  -     predniSONE (DELTASONE) 10 MG tablet; Take 1 tablet by mouth daily, Disp-90 tablet, R-1Normal  -     amitriptyline (ELAVIL) 100 MG tablet; Take 1 tablet by mouth nightly, Disp-90 tablet, R-1Normal  -     tiZANidine (ZANAFLEX) 2 MG tablet; Take 1 tablet by mouth nightly as needed (restless legs), Disp-90 tablet, R-1Normal  4. Peripheral polyneuropathy  5. Generalized osteoarthritis  -     celecoxib (CELEBREX) 200 MG capsule; Take 1 capsule by mouth daily, Disp-90 capsule, R-1Normal  -     traMADol (ULTRAM) 50 MG tablet; Take 1 tablet by mouth every 6 hours as needed for Pain for up to 60 days. , Disp-120 tablet, R-1Normal  6. Essential hypertension  -     lisinopril (PRINIVIL;ZESTRIL) 40 MG tablet; TAKE ONE TABLET BY MOUTH DAILY, Disp-90 tablet, R-1Normal  -     metoprolol tartrate (LOPRESSOR) 25 MG tablet; Take 2 tablets by mouth 2 times daily, Disp-90 tablet, R-0Adjust Sig  -     metoprolol succinate (TOPROL XL) 50 MG extended release tablet; Take 1 tablet by mouth at bedtime, Disp-90 tablet, R-1Normal  7. Stage 3a chronic kidney disease ([address]  8. Chronic diastolic congestive heart failure ([address]  9. Oral thrush  -     nystatin (MYCOSTATIN) 980999 UNIT/ML suspension;  Take 5 mLs by mouth 4 times daily for 10 days, Oral, 4 TIMES DAILY Starting Wed 8/24/2022, Until Sat 9/3/2022, For 10 days, Disp-200 mL, R-0, Normal  10. Nightmares  -     hydrOXYzine HCl (ATARAX) 10 MG tablet; Take 1 tablet by mouth nightly as needed for Anxiety, Disp-15 tablet, R-0Normal  11. Chronic systolic (congestive) heart failure          No follow-ups on file. HPI     More headaches this past week - nothing is helping till about afternoon   Finished keflex for dysuria yesterday - today starting to have pain and burning for urination again   Mouth feels dry and sore, hurts to bit down on anything. Has full dentures. Diabetes - doing well with current regimen. Denies hypoglycemia, hyperglycemia, fatigue, polyuria, polydipsia, paresthesias, vision changes, dizziness. Eye exam was done. Not following with podiatry. Patient is taking ACE inhibitor/ARB. Complications of diabetes include HD:. Hypertension-tolerating current regimen without chest pain, palpitations, dizziness, peripheral edema, dyspnea on exertion, orthopnea, paroxysmal nocturnal dyspnea. Hyperlipidemia-tolerating current regimen without myalgias, dyspepsia, jaundice. Mostly compliant with diet recommendations for low carb diet, not very compliant with exercise recommendations.     Cardiovascular risk factors: advanced age (older than 54 for men, 72 for women), diabetes mellitus, dyslipidemia, hypertension, and sedentary lifestyle          BP Readings from Last 3 Encounters:   08/24/22 (!) 152/80   05/18/22 (!) 163/78   04/25/22 130/78              Past Medical History:   Diagnosis Date    Acute cystitis without hematuria 10/1/2017    EDINSON (acute kidney injury) ([address] 3/3/2021    Arthritis     Chronic daily headache     GERD (gastroesophageal reflux disease)     Hyperlipidemia     Hypertension     Moderate malnutrition ([address] 3/2/2019    Multiple sclerosis ([address]     Multiple sclerosis exacerbation ([address] 2/25/2021    Neuropathy     Pneumonia 2017 states had double pneumonia    Vitamin D deficiency       Past Surgical History:   Procedure Laterality Date    CATARACT REMOVAL WITH IMPLANT Right 11/6/2014    [facility]frankie/[facility]le    CATARACT REMOVAL WITH IMPLANT Left 12/2/2014    Harvinder/[facility]le    CERVICAL DISC SURGERY      CYSTOCELE REPAIR      HYSTERECTOMY (CERVIX STATUS UNKNOWN)      LUMBAR LAMINECTOMY N/A 3/17/2022    LUMBAR LAMINECTOMY DECOMPRESSION MINIMALLY INVASIVE (MILD) L4-L5 performed by Glenys West MD at [id number] Right 04/20/2017    SHOULDER SURGERY Right 2017       No family history on file. Social History     Tobacco Use    Smoking status: Never    Smokeless tobacco: Never   Substance Use Topics    Alcohol use: No        Allergies   Allergen Reactions    Bactrim [Sulfamethoxazole-Trimethoprim] Other (See Comments)     BLISTERS IN MOUTH    Other Diarrhea     Lactose Intolerance- No liquid milk or ice cream. May have milk cooked in foods. Pcn [Penicillins] Hives    Lidoderm [Lidocaine] Rash    Macrobid [Nitrofurantoin] Nausea And Vomiting     Prior to Visit Medications    Medication Sig Taking? Authorizing Provider   metoprolol tartrate (LOPRESSOR) 25 MG tablet Take 1 tablet by mouth 2 times daily Yes Sveta Rubio MD   omeprazole (PRILOSEC) 20 MG delayed release capsule Take 1 capsule by mouth in the morning.  Yes Sveta Rubio MD   simvastatin (ZOCOR) 20 MG tablet TAKE ONE TABLET BY MOUTH DAILY Yes Pauly Graves MD   tiZANidine (ZANAFLEX) 2 MG tablet Take 1 tablet by mouth nightly as needed (restless legs) Yes Pauly Graves MD   amitriptyline (ELAVIL) 100 MG tablet Take 1 tablet by mouth nightly Yes Sveta Rubio MD   lisinopril (PRINIVIL;ZESTRIL) 40 MG tablet TAKE ONE TABLET BY MOUTH DAILY Yes Pauly Graves MD   celecoxib (CELEBREX) 200 MG capsule Take 1 capsule by mouth daily Yes Pauly Graves MD   gabapentin (NEURONTIN) 100 MG capsule Take 1 capsule by mouth daily for 30 days. Yes Claudean Oscar, MD   predniSONE (DELTASONE) 10 MG tablet Take 1 tablet by mouth daily Yes Claudean Oscar, MD   polyethylene glycol (GLYCOLAX) 17 g packet Take 17 g by mouth daily as needed for Constipation Yes Reddy Jovel MD   docusate sodium (COLACE) 100 MG capsule Take 1 capsule by mouth daily  Patient taking differently: Take 100 mg by mouth every other day Yes Pauly Ramos MD   HYDROcodone-acetaminophen (NORCO) 5-325 MG per tablet Take 1 tablet by mouth every 8 hours as needed. Yes Historical Provider, MD   Handicap Placard MISC by Does not apply route Exp: 5/20/2026 Yes Claudean Oscar, MD       Review of Systems     Review of Systems   Constitutional:  Negative for fatigue, fever and unexpected weight change. HENT:  Positive for dental problem. Respiratory:  Negative for cough, choking, chest tightness, shortness of breath and wheezing. Cardiovascular:  Negative for chest pain, palpitations and leg swelling. Gastrointestinal:  Negative for abdominal pain, anal bleeding, blood in stool, constipation, diarrhea, nausea and vomiting. Endocrine: Negative. Musculoskeletal:  Negative for joint swelling and myalgias. Skin: Negative. Neurological:  Negative for dizziness. Psychiatric/Behavioral:  Negative for sleep disturbance. All other systems reviewed and are negative. Objective     BP (!) 152/80   Pulse 76   Temp 97.6 °F (36.4 °C) (Temporal)   Wt 138 lb (62.6 kg)   SpO2 98%   BMI 28.84 kg/m²   Physical Exam  Vitals and nursing note reviewed. Constitutional:       General: She is not in acute distress. Appearance: She is well-developed. She is not ill-appearing. HENT:      Mouth/Throat:      Comments: Oral thrush noted   Eyes:      General: Lids are normal. Vision grossly intact. Cardiovascular:      Rate and Rhythm: Normal rate and regular rhythm. Heart sounds: Normal heart sounds, S1 normal and S2 normal. No murmur heard.

## 2022-08-26 LAB
CULTURE: ABNORMAL
SPECIMEN DESCRIPTION: ABNORMAL

## 2022-09-29 ENCOUNTER — E-VISIT (OUTPATIENT)
Dept: FAMILY MEDICINE CLINIC | Age: 87
End: 2022-09-29
Payer: MEDICARE

## 2022-09-29 DIAGNOSIS — R30.0 BURNING WITH URINATION: Primary | ICD-10-CM

## 2022-09-29 PROCEDURE — 99421 OL DIG E/M SVC 5-10 MIN: CPT | Performed by: INTERNAL MEDICINE

## 2022-09-29 RX ORDER — CEPHALEXIN 500 MG/1
500 CAPSULE ORAL 3 TIMES DAILY
Qty: 21 CAPSULE | Refills: 0 | Status: SHIPPED | OUTPATIENT
Start: 2022-09-29 | End: 2022-10-06

## 2022-10-15 ENCOUNTER — E-VISIT (OUTPATIENT)
Dept: PRIMARY CARE CLINIC | Age: 87
End: 2022-10-15
Payer: MEDICARE

## 2022-10-15 DIAGNOSIS — R30.0 DYSURIA: Primary | ICD-10-CM

## 2022-10-15 PROCEDURE — 99422 OL DIG E/M SVC 11-20 MIN: CPT | Performed by: NURSE PRACTITIONER

## 2022-10-15 RX ORDER — CEPHALEXIN 500 MG/1
500 CAPSULE ORAL 2 TIMES DAILY
Qty: 14 CAPSULE | Refills: 0 | Status: SHIPPED | OUTPATIENT
Start: 2022-10-15 | End: 2022-10-26 | Stop reason: SDUPTHER

## 2022-10-15 NOTE — PROGRESS NOTES
Rashard Zimmerman (1934) initiated an asynchronous digital communication through [employer]. HPI: per patient questionnaire     Exam: not applicable    Diagnoses and all orders for this visit:  Diagnoses and all orders for this visit:    Dysuria    Other orders  -     cephALEXin (KEFLEX) 500 MG capsule; Take 1 capsule by mouth 2 times daily for 7 days    Increase fluids and f/u with pcp    Time: EV2 - 11-20 minutes were spent on the digital evaluation and management of this patient.  12 min     Isrrael Blanco, MANDI - CNP

## 2022-10-26 ENCOUNTER — E-VISIT (OUTPATIENT)
Dept: FAMILY MEDICINE CLINIC | Age: 87
End: 2022-10-26
Payer: MEDICARE

## 2022-10-26 DIAGNOSIS — R30.0 BURNING WITH URINATION: Primary | ICD-10-CM

## 2022-10-26 DIAGNOSIS — N39.0 RECURRENT UTI: ICD-10-CM

## 2022-10-26 PROCEDURE — 99421 OL DIG E/M SVC 5-10 MIN: CPT | Performed by: INTERNAL MEDICINE

## 2022-10-26 RX ORDER — CEPHALEXIN 250 MG/1
250 CAPSULE ORAL DAILY
Qty: 30 CAPSULE | Refills: 5 | Status: SHIPPED | OUTPATIENT
Start: 2022-10-26

## 2022-10-26 RX ORDER — CEPHALEXIN 500 MG/1
500 CAPSULE ORAL 2 TIMES DAILY
Qty: 14 CAPSULE | Refills: 0 | Status: SHIPPED | OUTPATIENT
Start: 2022-10-26 | End: 2022-11-02

## 2022-11-03 ENCOUNTER — PATIENT MESSAGE (OUTPATIENT)
Dept: FAMILY MEDICINE CLINIC | Age: 87
End: 2022-11-03

## 2022-11-03 RX ORDER — PREDNISONE 10 MG/1
TABLET ORAL
Qty: 30 TABLET | Refills: 0 | Status: SHIPPED | OUTPATIENT
Start: 2022-11-03

## 2022-11-03 NOTE — TELEPHONE ENCOUNTER
From: Oletha Hammans  To: Dr. Todd Salas: 11/3/2022 10:14 AM EDT  Subject: Steriods    Dr BRENNAN my mom has been having severe headaches every morning this week so as she cannot function. She believes its her MS acting up would it be possible to get a steroid burst for her to see if this would help.  Or should I schedule an appointment with you to see her first? Thanks

## 2022-11-12 DIAGNOSIS — M15.9 GENERALIZED OSTEOARTHRITIS: ICD-10-CM

## 2022-11-14 RX ORDER — CELECOXIB 200 MG/1
200 CAPSULE ORAL DAILY
Qty: 90 CAPSULE | Refills: 1 | OUTPATIENT
Start: 2022-11-14

## 2022-11-20 ENCOUNTER — PATIENT MESSAGE (OUTPATIENT)
Dept: FAMILY MEDICINE CLINIC | Age: 87
End: 2022-11-20

## 2022-11-20 DIAGNOSIS — M15.9 GENERALIZED OSTEOARTHRITIS: ICD-10-CM

## 2022-11-21 RX ORDER — TRAMADOL HYDROCHLORIDE 50 MG/1
50 TABLET ORAL EVERY 6 HOURS PRN
Qty: 120 TABLET | Refills: 1 | Status: SHIPPED | OUTPATIENT
Start: 2022-11-21 | End: 2023-01-20

## 2022-11-21 NOTE — TELEPHONE ENCOUNTER
From: Dorian Pereira  To: Dr. Anita Arambula: 11/20/2022 11:40 AM EST  Subject: Tramadol     My mom needs a refill called in on her tramadol as she is out and no refills on her bottle thanks

## 2022-11-23 NOTE — PROGRESS NOTES
[address]10 1[facility]Later    PROGRESS NOTE             2/10/2022    8:00 AM    Name:   Rommel Hyde  MRN:     [id number]     Acct:      [de-identified]   Room:   2097/2097-01  IP Day:  2  Admit Date:  2/8/2022 12:27 PM    PCP:  Kirby Bolaños MD  Code Status:  Full Code    Subjective:     C/C:   Chief Complaint   Patient presents with    Dizziness     Interval History Status: improved. Patient seen and examined at bedside. Patients headache slightly worse today from previous. Blood glucose still high with Sliding scale insulin, will add 5 units lantus and reassess. BP still elevated, increase lopressor to 50 BID. Complaining of neuropathic pain in her foot, for which she takes gabapentin 100 mg only when it gets bad. Brief History:       The patient is a 80 y.o. Non- / non  female who presents with Dizziness   and she is admitted to the hospital for the management of multiple sclerosis flareup and UTI. Patient was diagnosed with multiple sclerosis at the age of 44. Past medical history significant for multiple sclerosis, hypertension, chronic osteoarthritis of the spine. Patient reportedly woke up this morning with worsening facial droop, severe headache, and an unsteady gait. She has history of chronic headaches but this was much worse. She took 2 Norco pills but that did not alleviate the headache. It is located frontally and in the back of her head and neck and throbbing. Patient was taking 10 mg of prednisone daily per her PCP for spinal stenosis and chronic vertebral fracture. She sees a pain medicine specialist who gives her spinal injections. He told her to stop taking the prednisone for 5 days prior to injection. This was her fifth day without taking steroids. In the ED she had a CT and a CTA done. CT showed no acute abnormalities.   CTA head and neck shows no hemodynamically significant stenosis.     Urinalysis showed small Cardiac Clearance        Physician or Practice Requesting: Dr. Mata Number Phone Number: [phone]  Fax Number: [phone]  Date of Surgery/Procedure: 1/9/2023  Type of Surgery or Procedure:  Outpatient left knee surgery  Type of Anesthesia: Not listed  Type of Clearance Requested: Cardiac Clearance and Medication Hold  Medication to Hold: Plavix and Eliquis  Days to Hold: 3 prior leukocyte esterase. She has a history of UTI with Proteus mirabilis 4 months ago. Denies any dysuria, frequency, urgency. Chronic intermittent urinary incontinence. She was started on Rocephin in the ED.    2/10: Headache is greatly improved with steroids. Facial droop improving as well and patient feels more strength in her legs. MRI brain and cervical spine with and without contrast ordered. Hypertensive today, will start Lopressor oral and as needed. Microscopic urinalysis not convincing for UTI, DC Rocephin. Review of Systems:     Review of Systems   Constitutional: Negative for activity change, appetite change, chills, fatigue and fever. Respiratory: Negative for cough, chest tightness, shortness of breath and wheezing. Cardiovascular: Negative for chest pain, palpitations and leg swelling. Gastrointestinal: Negative for abdominal distention, abdominal pain, constipation, diarrhea, nausea and vomiting. Genitourinary: Negative for difficulty urinating, dysuria, flank pain, frequency and urgency. Musculoskeletal: Positive for joint swelling. Negative for back pain, myalgias, neck pain and neck stiffness. Skin: Negative for pallor and rash. Neurological: Positive for dizziness, facial asymmetry, weakness, light-headedness and headaches. Negative for tremors, seizures, syncope and numbness. Psychiatric/Behavioral: Negative for agitation, behavioral problems, dysphoric mood and sleep disturbance. The patient is not nervous/anxious. Medications: Allergies:     Allergies   Allergen Reactions    Bactrim [Sulfamethoxazole-Trimethoprim] Other (See Comments)     BLISTERS IN MOUTH    Lactose Intolerance (Gi) Diarrhea    Pcn [Penicillins] Hives    Lidoderm [Lidocaine] Rash    Macrobid [Nitrofurantoin] Nausea And Vomiting       Current Meds:   Scheduled Meds:    methylPREDNISolone  1,000 mg IntraVENous Daily    metoprolol tartrate  25 mg Oral BID    insulin lispro  0-6 Units SubCUTAneous TID WC    insulin lispro  0-3 Units SubCUTAneous Nightly    amitriptyline  100 mg Oral Nightly    celecoxib  200 mg Oral Daily    docusate sodium  100 mg Oral Daily    pantoprazole  40 mg Oral QAM AC    atorvastatin  20 mg Oral Daily    sodium chloride flush  5-40 mL IntraVENous 2 times per day    enoxaparin  40 mg SubCUTAneous Daily     Continuous Infusions:    dextrose      sodium chloride      sodium chloride 50 mL/hr at 02/10/22 0457     PRN Meds: glucose, dextrose, glucagon (rDNA), dextrose, sodium chloride flush, sodium chloride flush, albuterol sulfate HFA, HYDROcodone-acetaminophen, traMADol, sodium chloride flush, sodium chloride, ondansetron **OR** ondansetron, polyethylene glycol, acetaminophen **OR** acetaminophen, metoprolol    Data:     Past Medical History:   has a past medical history of Acute cystitis without hematuria, Arthritis, Chronic daily headache, GERD (gastroesophageal reflux disease), Hyperlipidemia, Hypertension, Moderate malnutrition ([address] Multiple sclerosis ([address] Neuropathy, Pneumonia, and Vitamin D deficiency. Social History:   reports that she has never smoked. She has never used smokeless tobacco. She reports that she does not drink alcohol and does not use drugs. Family History: No family history on file. Vitals:  BP (!) 174/96   Pulse 91   Temp 98.2 °F (36.8 °C) (Oral)   Resp 16   Ht 4' 11\" (1.499 m)   Wt 134 lb (60.8 kg)   SpO2 93%   BMI 27.06 kg/m²   Temp (24hrs), Av.7 °F (36.5 °C), Min:97.3 °F (36.3 °C), Max:98.2 °F (36.8 °C)    Recent Labs     22  1632 22  1949 02/10/22  0726   POCGLU 290* 254* 183*       I/O(24Hr): Intake/Output Summary (Last 24 hours) at 2/10/2022 0800  Last data filed at 2/10/2022 0457  Gross per 24 hour   Intake 1668. 11 ml   Output 200 ml   Net 1468. 11 ml       Labs:    [unfilled]    Lab Results   Component Value Date/Time    SPECIAL NOT REPORTED 2022 04:40 PM     Lab Results   Component Value Date/Time    CULTURE NO SIGNIFICANT GROWTH 02/08/2022 04:40 PM       [unfilled]    Radiology:    CT Head WO Contrast    Result Date: 2/8/2022  EXAMINATION: CT OF THE HEAD WITHOUT CONTRAST  2/8/2022 1:36 pm TECHNIQUE: CT of the head was performed without the administration of intravenous contrast. Dose modulation, iterative reconstruction, and/or weight based adjustment of the mA/kV was utilized to reduce the radiation dose to as low as reasonably achievable. COMPARISON: CT brain performed 02/25/2021. HISTORY: ORDERING SYSTEM PROVIDED HISTORY: dizziness TECHNOLOGIST PROVIDED HISTORY: dizziness Decision Support Exception - unselect if not a suspected or confirmed emergency medical condition->Emergency Medical Condition (MA) Reason for Exam: dizziness Additional signs and symptoms: Pt states dizziness. NKI. Hx of MS and no hx of surgery FINDINGS: BRAIN/VENTRICLES: There is no acute intracranial hemorrhage, mass effect, or midline shift. There is satisfactory overall gray-white matter differentiation. There is cerebral atrophy and chronic microvascular disease. The ventricular structures are symmetric and unremarkable. The infratentorial structures are unremarkable. ORBITS: The visualized portion of the orbits demonstrate no acute abnormality. SINUSES: The visualized paranasal sinuses and mastoid air cells demonstrate no acute abnormality. SOFT TISSUES/SKULL:  No acute abnormality of the visualized skull or soft tissues. Cerebral atrophy and chronic microvascular disease without acute intracranial abnormality. RECOMMENDATIONS: Unavailable     XR CHEST PORTABLE    Result Date: 2/8/2022  EXAMINATION: ONE XRAY VIEW OF THE CHEST 2/8/2022 12:55 pm COMPARISON: Chest radiograph performed 02/25/2021. HISTORY: ORDERING SYSTEM PROVIDED HISTORY: ams TECHNOLOGIST PROVIDED HISTORY: ams Reason for Exam: Headaches and increased confusion; pt has MS FINDINGS: There is chronic pulmonary change.   There is no acute consolidation or effusion. There is no pneumothorax. The mediastinal structures are stable. The upper abdomen unremarkable. The extrathoracic soft tissues are unremarkable. There are remote left-sided rib fractures. Chronic pulmonary change without acute cardiopulmonary process. CTA HEAD NECK W CONTRAST    Result Date: 2/8/2022  EXAMINATION: CTA OF THE HEAD AND NECK WITH CONTRAST 2/8/2022 1:35 pm: TECHNIQUE: CTA of the head and neck was performed with the administration of intravenous contrast. Multiplanar reformatted images are provided for review. MIP images are provided for review. Stenosis of the internal carotid arteries measured using NASCET criteria. Dose modulation, iterative reconstruction, and/or weight based adjustment of the mA/kV was utilized to reduce the radiation dose to as low as reasonably achievable. COMPARISON: None. HISTORY: ORDERING SYSTEM PROVIDED HISTORY: dizziness TECHNOLOGIST PROVIDED HISTORY: dizziness Decision Support Exception - unselect if not a suspected or confirmed emergency medical condition->Emergency Medical Condition (MA) Reason for Exam: Dizziness Additional signs and symptoms: Pt states dizziness. NKI. PT states hx of MS and no known surgeries in the area FINDINGS: CTA NECK: AORTIC ARCH/ARCH VESSELS: Mild atherosclerotic disease. No dissection or arterial injury. No significant stenosis of the brachiocephalic or subclavian arteries. CAROTID ARTERIES: Mild atherosclerotic plaque at the carotid bifurcations and bulbs. No dissection, arterial injury, or hemodynamically significant stenosis by NASCET criteria. VERTEBRAL ARTERIES: No dissection, arterial injury, or significant stenosis. SOFT TISSUES: The visualized upper lungs demonstrate right greater than left ground-glass opacity with interlobular septal thickening suggesting volume overload. No cervical or superior mediastinal lymphadenopathy. The larynx and pharynx are unremarkable.   No acute abnormality of the salivary and thyroid glands. BONES: No acute osseous abnormality. Prior anterior discectomies and fusion at C4-5 and C5-6. Chronic appearing mild T4 anterior vertebral body height loss. CTA HEAD: ANTERIOR CIRCULATION: No significant stenosis of the intracranial internal carotid, anterior cerebral, or middle cerebral arteries. No aneurysm. POSTERIOR CIRCULATION: No significant stenosis of the vertebral, basilar, or posterior cerebral arteries. The left vertebral artery is dominant. The right vertebral artery is hypoplastic and largely terminates in the right posterior inferior cerebellar artery. No aneurysm. OTHER: No dural venous sinus thrombosis on this non-dedicated study. BRAIN: No mass effect or midline shift. No extra-axial fluid collection. The gray-white differentiation is maintained. 1. No acute arterial abnormality or hemodynamically significant arterial stenosis in the head or neck. 2. Mild edema in the visualized upper lungs suggesting volume overload. Physical Examination:        Physical Exam  Constitutional:       Appearance: Normal appearance. HENT:      Head: Normocephalic and atraumatic. Mouth/Throat:      Mouth: Mucous membranes are moist.      Pharynx: Oropharynx is clear. Eyes:      General: No visual field deficit. Extraocular Movements: Extraocular movements intact. Conjunctiva/sclera: Conjunctivae normal.      Pupils: Pupils are equal, round, and reactive to light. Cardiovascular:      Rate and Rhythm: Normal rate and regular rhythm. Pulses: Normal pulses. Heart sounds: Normal heart sounds. No murmur heard. Pulmonary:      Effort: Pulmonary effort is normal.      Breath sounds: Normal breath sounds. No wheezing or rales. Abdominal:      General: Abdomen is flat. There is no distension. Palpations: Abdomen is soft. Tenderness: There is no abdominal tenderness. There is no guarding.    Musculoskeletal:         General: No tenderness. Normal range of motion. Cervical back: Normal range of motion and neck supple. Right lower leg: No edema. Left lower leg: No edema. Skin:     General: Skin is warm and dry. Capillary Refill: Capillary refill takes less than 2 seconds. Findings: No bruising or lesion. Neurological:      General: No focal deficit present. Mental Status: She is alert and oriented to person, place, and time. Mental status is at baseline. Cranial Nerves: Facial asymmetry (Left facial droop - improving) present. Sensory: No sensory deficit. Motor: Weakness (4 out of 5 strength in bilateral lower limbs) present. No tremor or abnormal muscle tone. Coordination: Finger-Nose-Finger Test and Heel to [employer] normal.   Psychiatric:         Mood and Affect: Mood normal.         Behavior: Behavior normal.         Thought Content:  Thought content normal.         Judgment: Judgment normal.           Assessment:        Primary Problem  Multiple sclerosis ([facility]    Active Hospital Problems    Diagnosis Date Noted    Recurrent urinary tract infection [N39.0] 02/08/2022    Chronic headache [R51.9, G89.29] 02/25/2021    Multiple sclerosis ([facility] [G35] 10/01/2017       Plan:        Multiple sclerosis Exacerbation  - Diagnosis multiple sclerosis at age 44  - Presented with severe headache and worsening facial droop, dizziness and uncoordination  - Was taking 10 mg prednisone daily but stopped for pain management procedure  - High-dose Solu-Medrol 1g daily  - Neurology consult  - MRI brain and cervical spine w wo, read as chronic microvascular disease, Neurology input  - PM&R evaluation, appreciate recommendations     Recurrent UTI  - Small leukocyte Estrace  - No dysuria, frequency, urgency  - Microscopic UA not suggestive of UTI  - DC rocephin     Hypertension  - Not previously taking antihypertensives at home  - BP continues to be elevated, slightly tachy  - Increase Lopressor to 50 BID  - PRN lopressor    Steroid Induced Diabetes  - Not previously on antidiabetic medication  - Last A1C in 11/2021 6.4  - Low dose insulin sliding scale  - Add 5 units lantus     Dispo: TBD  Diet: regular  DVT prophylaxis: Errol Zavala MD  2/10/2022  8:00 AM     Attending Physician Statement  I have discussed the care of Martha Goldstein and I have examined the patient myselft and taken ros and hpi , including pertinent history and exam findings,  with the resident. I have reviewed the key elements of all parts of the encounter with the resident. I agree with the assessment, plan and orders as documented by the resident.       Electronically signed by Aquilino Royal MD

## 2022-12-13 ENCOUNTER — E-VISIT (OUTPATIENT)
Dept: FAMILY MEDICINE CLINIC | Age: 87
End: 2022-12-13
Payer: MEDICARE

## 2022-12-13 DIAGNOSIS — L03.317 CELLULITIS OF BUTTOCK: Primary | ICD-10-CM

## 2022-12-13 PROCEDURE — 99422 OL DIG E/M SVC 11-20 MIN: CPT | Performed by: INTERNAL MEDICINE

## 2022-12-14 RX ORDER — CEPHALEXIN 500 MG/1
500 CAPSULE ORAL 3 TIMES DAILY
Qty: 21 CAPSULE | Refills: 0 | Status: SHIPPED | OUTPATIENT
Start: 2022-12-14 | End: 2022-12-21

## 2022-12-14 RX ORDER — CLINDAMYCIN PHOSPHATE 11.9 MG/ML
SOLUTION TOPICAL
Qty: 30 ML | Refills: 1 | Status: SHIPPED | OUTPATIENT
Start: 2022-12-14 | End: 2023-01-13

## 2022-12-28 ENCOUNTER — APPOINTMENT (OUTPATIENT)
Dept: GENERAL RADIOLOGY | Age: 87
DRG: 194 | End: 2022-12-28
Payer: MEDICARE

## 2022-12-28 ENCOUNTER — HOSPITAL ENCOUNTER (INPATIENT)
Age: 87
LOS: 1 days | Discharge: HOME HEALTH | DRG: 194 | End: 2022-12-29
Attending: EMERGENCY MEDICINE | Admitting: INTERNAL MEDICINE
Payer: MEDICARE

## 2022-12-28 DIAGNOSIS — R09.02 HYPOXIA: ICD-10-CM

## 2022-12-28 DIAGNOSIS — J10.1 INFLUENZA A: Primary | ICD-10-CM

## 2022-12-28 PROBLEM — J11.1 FLU: Status: ACTIVE | Noted: 2022-12-28

## 2022-12-28 LAB
ABSOLUTE EOS #: 0.1 K/UL (ref 0–0.4)
ABSOLUTE LYMPH #: 0.7 K/UL (ref 1–4.8)
ABSOLUTE MONO #: 0.6 K/UL (ref 0.1–1.3)
ANION GAP SERPL CALCULATED.3IONS-SCNC: 12 MMOL/L (ref 9–17)
BASOPHILS # BLD: 0 % (ref 0–2)
BASOPHILS ABSOLUTE: 0 K/UL (ref 0–0.2)
BUN BLDV-MCNC: 18 MG/DL (ref 8–23)
CALCIUM SERPL-MCNC: 9 MG/DL (ref 8.6–10.4)
CHLORIDE BLD-SCNC: 101 MMOL/L (ref 98–107)
CO2: 27 MMOL/L (ref 20–31)
CREAT SERPL-MCNC: 0.92 MG/DL (ref 0.5–0.9)
EOSINOPHILS RELATIVE PERCENT: 1 % (ref 0–4)
GFR SERPL CREATININE-BSD FRML MDRD: 60 ML/MIN/1.73M2
GLUCOSE BLD-MCNC: 140 MG/DL (ref 70–99)
HCT VFR BLD CALC: 36.5 % (ref 36–46)
HEMOGLOBIN: 12.5 G/DL (ref 12–16)
INFLUENZA A: DETECTED
INFLUENZA B: NOT DETECTED
LYMPHOCYTES # BLD: 8 % (ref 24–44)
MAGNESIUM: 1.5 MG/DL (ref 1.6–2.6)
MCH RBC QN AUTO: 31.4 PG (ref 26–34)
MCHC RBC AUTO-ENTMCNC: 34.2 G/DL (ref 31–37)
MCV RBC AUTO: 91.9 FL (ref 80–100)
MONOCYTES # BLD: 7 % (ref 1–7)
PDW BLD-RTO: 14 % (ref 11.5–14.9)
PLATELET # BLD: 226 K/UL (ref 150–450)
PMV BLD AUTO: 7.5 FL (ref 6–12)
POTASSIUM SERPL-SCNC: 4.1 MMOL/L (ref 3.7–5.3)
RBC # BLD: 3.97 M/UL (ref 4–5.2)
SARS-COV-2 RNA, RT PCR: NOT DETECTED
SEG NEUTROPHILS: 84 % (ref 36–66)
SEGMENTED NEUTROPHILS ABSOLUTE COUNT: 8.1 K/UL (ref 1.3–9.1)
SODIUM BLD-SCNC: 140 MMOL/L (ref 135–144)
SOURCE: ABNORMAL
SPECIMEN DESCRIPTION: ABNORMAL
WBC # BLD: 9.5 K/UL (ref 3.5–11)

## 2022-12-28 PROCEDURE — 2580000003 HC RX 258: Performed by: EMERGENCY MEDICINE

## 2022-12-28 PROCEDURE — 6370000000 HC RX 637 (ALT 250 FOR IP): Performed by: EMERGENCY MEDICINE

## 2022-12-28 PROCEDURE — 6370000000 HC RX 637 (ALT 250 FOR IP): Performed by: INTERNAL MEDICINE

## 2022-12-28 PROCEDURE — 71045 X-RAY EXAM CHEST 1 VIEW: CPT

## 2022-12-28 PROCEDURE — 6360000002 HC RX W HCPCS: Performed by: INTERNAL MEDICINE

## 2022-12-28 PROCEDURE — 85025 COMPLETE CBC W/AUTO DIFF WBC: CPT

## 2022-12-28 PROCEDURE — 1200000000 HC SEMI PRIVATE

## 2022-12-28 PROCEDURE — 87636 SARSCOV2 & INF A&B AMP PRB: CPT

## 2022-12-28 PROCEDURE — 83735 ASSAY OF MAGNESIUM: CPT

## 2022-12-28 PROCEDURE — 99223 1ST HOSP IP/OBS HIGH 75: CPT | Performed by: INTERNAL MEDICINE

## 2022-12-28 PROCEDURE — 36415 COLL VENOUS BLD VENIPUNCTURE: CPT

## 2022-12-28 PROCEDURE — 80048 BASIC METABOLIC PNL TOTAL CA: CPT

## 2022-12-28 PROCEDURE — 99285 EMERGENCY DEPT VISIT HI MDM: CPT

## 2022-12-28 PROCEDURE — 2500000003 HC RX 250 WO HCPCS: Performed by: INTERNAL MEDICINE

## 2022-12-28 RX ORDER — OSELTAMIVIR PHOSPHATE 75 MG/1
75 CAPSULE ORAL 2 TIMES DAILY
Status: DISCONTINUED | OUTPATIENT
Start: 2022-12-28 | End: 2022-12-28 | Stop reason: SDUPTHER

## 2022-12-28 RX ORDER — TIZANIDINE 2 MG/1
2 TABLET ORAL NIGHTLY PRN
Status: DISCONTINUED | OUTPATIENT
Start: 2022-12-28 | End: 2022-12-29 | Stop reason: HOSPADM

## 2022-12-28 RX ORDER — GABAPENTIN 100 MG/1
100 CAPSULE ORAL DAILY
Status: DISCONTINUED | OUTPATIENT
Start: 2022-12-28 | End: 2022-12-28

## 2022-12-28 RX ORDER — MAGNESIUM SULFATE HEPTAHYDRATE 40 MG/ML
2000 INJECTION, SOLUTION INTRAVENOUS ONCE
Status: COMPLETED | OUTPATIENT
Start: 2022-12-28 | End: 2022-12-28

## 2022-12-28 RX ORDER — OSELTAMIVIR PHOSPHATE 30 MG/1
30 CAPSULE ORAL 2 TIMES DAILY
Status: DISCONTINUED | OUTPATIENT
Start: 2022-12-28 | End: 2022-12-29 | Stop reason: HOSPADM

## 2022-12-28 RX ORDER — OSELTAMIVIR PHOSPHATE 75 MG/1
75 CAPSULE ORAL ONCE
Status: DISCONTINUED | OUTPATIENT
Start: 2022-12-28 | End: 2022-12-28

## 2022-12-28 RX ORDER — HYDROCODONE BITARTRATE AND ACETAMINOPHEN 5; 325 MG/1; MG/1
1 TABLET ORAL EVERY 8 HOURS PRN
Status: DISCONTINUED | OUTPATIENT
Start: 2022-12-28 | End: 2022-12-29

## 2022-12-28 RX ORDER — HYDROXYZINE HYDROCHLORIDE 10 MG/1
10 TABLET, FILM COATED ORAL NIGHTLY PRN
Status: DISCONTINUED | OUTPATIENT
Start: 2022-12-29 | End: 2022-12-29 | Stop reason: HOSPADM

## 2022-12-28 RX ORDER — PANTOPRAZOLE SODIUM 40 MG/1
40 TABLET, DELAYED RELEASE ORAL
Status: DISCONTINUED | OUTPATIENT
Start: 2022-12-29 | End: 2022-12-29 | Stop reason: HOSPADM

## 2022-12-28 RX ORDER — ACETAMINOPHEN 325 MG/1
650 TABLET ORAL ONCE
Status: COMPLETED | OUTPATIENT
Start: 2022-12-28 | End: 2022-12-28

## 2022-12-28 RX ORDER — LISINOPRIL 20 MG/1
40 TABLET ORAL DAILY
Status: DISCONTINUED | OUTPATIENT
Start: 2022-12-29 | End: 2022-12-29 | Stop reason: HOSPADM

## 2022-12-28 RX ORDER — 0.9 % SODIUM CHLORIDE 0.9 %
500 INTRAVENOUS SOLUTION INTRAVENOUS ONCE
Status: COMPLETED | OUTPATIENT
Start: 2022-12-28 | End: 2022-12-28

## 2022-12-28 RX ORDER — METOPROLOL SUCCINATE 50 MG/1
50 TABLET, EXTENDED RELEASE ORAL NIGHTLY
Status: DISCONTINUED | OUTPATIENT
Start: 2022-12-29 | End: 2022-12-29 | Stop reason: HOSPADM

## 2022-12-28 RX ORDER — AMITRIPTYLINE HYDROCHLORIDE 50 MG/1
100 TABLET, FILM COATED ORAL NIGHTLY
Status: DISCONTINUED | OUTPATIENT
Start: 2022-12-29 | End: 2022-12-29 | Stop reason: HOSPADM

## 2022-12-28 RX ORDER — ATORVASTATIN CALCIUM 40 MG/1
40 TABLET, FILM COATED ORAL NIGHTLY
Status: DISCONTINUED | OUTPATIENT
Start: 2022-12-29 | End: 2022-12-29 | Stop reason: HOSPADM

## 2022-12-28 RX ORDER — HEPARIN SODIUM 5000 [USP'U]/ML
5000 INJECTION, SOLUTION INTRAVENOUS; SUBCUTANEOUS EVERY 8 HOURS SCHEDULED
Status: DISCONTINUED | OUTPATIENT
Start: 2022-12-28 | End: 2022-12-29 | Stop reason: HOSPADM

## 2022-12-28 RX ADMIN — SODIUM CHLORIDE 500 ML: 9 INJECTION, SOLUTION INTRAVENOUS at 08:11

## 2022-12-28 RX ADMIN — HYDROCODONE BITARTRATE AND ACETAMINOPHEN 1 TABLET: 5; 325 TABLET ORAL at 21:06

## 2022-12-28 RX ADMIN — HEPARIN SODIUM 5000 UNITS: 5000 INJECTION INTRAVENOUS; SUBCUTANEOUS at 12:34

## 2022-12-28 RX ADMIN — HYDROCODONE BITARTRATE AND ACETAMINOPHEN 1 TABLET: 5; 325 TABLET ORAL at 12:39

## 2022-12-28 RX ADMIN — MAGNESIUM SULFATE HEPTAHYDRATE 2000 MG: 40 INJECTION, SOLUTION INTRAVENOUS at 12:34

## 2022-12-28 RX ADMIN — HEPARIN SODIUM 5000 UNITS: 5000 INJECTION INTRAVENOUS; SUBCUTANEOUS at 20:09

## 2022-12-28 RX ADMIN — OSELTAMIVIR PHOSPHATE 30 MG: 30 CAPSULE ORAL at 11:01

## 2022-12-28 RX ADMIN — ACETAMINOPHEN 650 MG: 325 TABLET ORAL at 07:56

## 2022-12-28 RX ADMIN — OSELTAMIVIR PHOSPHATE 30 MG: 30 CAPSULE ORAL at 20:09

## 2022-12-28 ASSESSMENT — PAIN DESCRIPTION - LOCATION
LOCATION: HEAD
LOCATION: HEAD

## 2022-12-28 ASSESSMENT — PAIN SCALES - GENERAL
PAINLEVEL_OUTOF10: 10
PAINLEVEL_OUTOF10: 7
PAINLEVEL_OUTOF10: 9

## 2022-12-28 ASSESSMENT — ENCOUNTER SYMPTOMS
COLOR CHANGE: 0
TROUBLE SWALLOWING: 0
CHEST TIGHTNESS: 0
SHORTNESS OF BREATH: 1
FACIAL SWELLING: 0
SORE THROAT: 0
SINUS PRESSURE: 0
EYE PAIN: 0
BACK PAIN: 0
VOMITING: 0
BLOOD IN STOOL: 0
COUGH: 1
CONSTIPATION: 0
EYE REDNESS: 0
WHEEZING: 0
EYE DISCHARGE: 0
NAUSEA: 1
ABDOMINAL PAIN: 0
RHINORRHEA: 0
DIARRHEA: 0

## 2022-12-28 ASSESSMENT — PAIN - FUNCTIONAL ASSESSMENT: PAIN_FUNCTIONAL_ASSESSMENT: NONE - DENIES PAIN

## 2022-12-28 ASSESSMENT — LIFESTYLE VARIABLES
HOW OFTEN DO YOU HAVE A DRINK CONTAINING ALCOHOL: NEVER
HOW MANY STANDARD DRINKS CONTAINING ALCOHOL DO YOU HAVE ON A TYPICAL DAY: PATIENT DOES NOT DRINK

## 2022-12-28 ASSESSMENT — PAIN DESCRIPTION - DESCRIPTORS: DESCRIPTORS: ACHING

## 2022-12-28 NOTE — ED PROVIDER NOTES
[facility]  eMERGENCY dEPARTMENT eNCOUnter      Pt Name: Estela Alvarez  MRN: [id number]  [place] 12/8/1934  Date of evaluation: 12/28/22      CHIEF COMPLAINT       Chief Complaint   Patient presents with    Nasal Congestion    Cough         HISTORY OF PRESENT ILLNESS    Estela Alvarez is a 80 y.o. female who presents complaining of cough. Patient started having a cough its not really productive of any sputum 3 days ago. Patient with this has been feeling a little nauseous and has had nasal congestion. Patient does have a bit of a fever also. Patient has been feeling extremely weak. Patient states this does not really feel like her multiple sclerosis which usually exacerbates with anything that she gets infection wise. She has had no recent ill contacts that the family knows of but she was around a bunch people at [place]. Patient states she is not having any chest pain or abdominal pain. Patient did state that she was having some shortness of breath this morning. No new numbness tingling or weakness. REVIEW OF SYSTEMS       Review of Systems   Constitutional:  Positive for fatigue. Negative for activity change, appetite change, chills, diaphoresis and fever. HENT:  Positive for congestion. Negative for ear pain, facial swelling, nosebleeds, rhinorrhea, sinus pressure, sore throat and trouble swallowing. Eyes:  Negative for pain, discharge and redness. Respiratory:  Positive for cough and shortness of breath. Negative for chest tightness and wheezing. Cardiovascular:  Negative for chest pain, palpitations and leg swelling. Gastrointestinal:  Positive for nausea. Negative for abdominal pain, blood in stool, constipation, diarrhea and vomiting. Genitourinary:  Negative for difficulty urinating, dysuria, flank pain, frequency, genital sores and hematuria. Musculoskeletal:  Negative for arthralgias, back pain, gait problem, joint swelling, myalgias and neck pain.    Skin:  Negative for color change, pallor, rash and wound. Neurological:  Negative for dizziness, tremors, seizures, syncope, speech difficulty, weakness, numbness and headaches. Psychiatric/Behavioral:  Negative for confusion, decreased concentration, hallucinations, self-injury, sleep disturbance and suicidal ideas. PAST MEDICAL HISTORY     Past Medical History:   Diagnosis Date    Acute cystitis without hematuria 10/1/2017    EDINSON (acute kidney injury) ([facility] 3/3/2021    Arthritis     Chronic daily headache     GERD (gastroesophageal reflux disease)     Hyperlipidemia     Hypertension     Moderate malnutrition ([facility] 3/2/2019    Multiple sclerosis ([facility]     Multiple sclerosis exacerbation ([facility] 2/25/2021    Neuropathy     Pneumonia 2017    states had double pneumonia    Vitamin D deficiency        SURGICAL HISTORY       Past Surgical History:   Procedure Laterality Date    CATARACT REMOVAL WITH IMPLANT Right 11/6/2014    [facility]    CATARACT REMOVAL WITH IMPLANT Left 12/2/2014    [facility]    CERVICAL DISC SURGERY      CYSTOCELE REPAIR      HYSTERECTOMY (CERVIX STATUS UNKNOWN)      LUMBAR LAMINECTOMY N/A 3/17/2022    LUMBAR LAMINECTOMY DECOMPRESSION MINIMALLY INVASIVE (MILD) L4-L5 performed by Bal Henriquez MD at [address] Right 04/20/2017    SHOULDER SURGERY Right 2017       CURRENT MEDICATIONS       Previous Medications    AMITRIPTYLINE (ELAVIL) 100 MG TABLET    Take 1 tablet by mouth nightly    CELECOXIB (CELEBREX) 200 MG CAPSULE    Take 1 capsule by mouth daily    CEPHALEXIN (KEFLEX) 250 MG CAPSULE    Take 1 capsule by mouth daily    CLINDAMYCIN (CLEOCIN-T) 1 % EXTERNAL SOLUTION    Apply topically 2 times daily. DOCUSATE SODIUM (COLACE) 100 MG CAPSULE    Take 1 capsule by mouth daily    GABAPENTIN (NEURONTIN) 100 MG CAPSULE    Take 1 capsule by mouth daily for 30 days.     HANDICAP PLACARD MISC    by Does not apply route Exp: 5/20/2026 HYDROCODONE-ACETAMINOPHEN (NORCO) 5-325 MG PER TABLET    Take 1 tablet by mouth every 8 hours as needed. HYDROXYZINE HCL (ATARAX) 10 MG TABLET    Take 1 tablet by mouth nightly as needed for Anxiety    LISINOPRIL (PRINIVIL;ZESTRIL) 40 MG TABLET    TAKE ONE TABLET BY MOUTH DAILY    METOPROLOL SUCCINATE (TOPROL XL) 50 MG EXTENDED RELEASE TABLET    Take 1 tablet by mouth at bedtime    METOPROLOL TARTRATE (LOPRESSOR) 25 MG TABLET    Take 2 tablets by mouth 2 times daily    OMEPRAZOLE (PRILOSEC) 20 MG DELAYED RELEASE CAPSULE    Take 1 capsule by mouth in the morning. POLYETHYLENE GLYCOL (GLYCOLAX) 17 G PACKET    Take 17 g by mouth daily as needed for Constipation    PREDNISONE (DELTASONE) 10 MG TABLET    Take 1 tablet by mouth daily    PREDNISONE (DELTASONE) 10 MG TABLET    4 tabs by mouth daily for 3 days, then 3 tabs daily for 3 days, then 2 tabs daily for 3 days, then 1 tab daily till gone. SIMVASTATIN (ZOCOR) 20 MG TABLET    TAKE ONE TABLET BY MOUTH DAILY    TIZANIDINE (ZANAFLEX) 2 MG TABLET    Take 1 tablet by mouth nightly as needed (restless legs)    TRAMADOL (ULTRAM) 50 MG TABLET    Take 1 tablet by mouth every 6 hours as needed for Pain for up to 60 days. ALLERGIES     is allergic to bactrim [sulfamethoxazole-trimethoprim], other, pcn [penicillins], lidoderm [lidocaine], and macrobid [nitrofurantoin]. FAMILY HISTORY     She indicated that her mother is . She indicated that her father is . She indicated that her sister is alive. She indicated that her brother is alive. SOCIAL HISTORY      reports that she has never smoked. She has never used smokeless tobacco. She reports that she does not drink alcohol and does not use drugs. PHYSICAL EXAM     INITIAL VITALS: BP (!) 173/85   Pulse 91   Temp (!) 100.6 °F (38.1 °C) (Oral)   Resp 22   Ht 4' 11\" (1.499 m)   Wt 135 lb (61.2 kg)   SpO2 93%   BMI 27.27 kg/m²      Physical Exam  Vitals and nursing note reviewed. Constitutional:       General: She is not in acute distress. Appearance: She is well-developed. She is not diaphoretic. HENT:      Head: Normocephalic and atraumatic. Eyes:      General: No scleral icterus. Right eye: No discharge. Left eye: No discharge. Conjunctiva/sclera: Conjunctivae normal.      Pupils: Pupils are equal, round, and reactive to light. Cardiovascular:      Rate and Rhythm: Normal rate and regular rhythm. Heart sounds: Normal heart sounds. No murmur heard. No friction rub. No gallop. Pulmonary:      Effort: Pulmonary effort is normal. No respiratory distress. Breath sounds: Normal breath sounds. No wheezing or rales. Chest:      Chest wall: No tenderness. Abdominal:      General: Bowel sounds are normal. There is no distension. Palpations: Abdomen is soft. There is no mass. Tenderness: There is no abdominal tenderness. There is no guarding or rebound. Musculoskeletal:         General: No tenderness. Normal range of motion. Skin:     General: Skin is warm and dry. Coloration: Skin is not pale. Findings: No erythema or rash. Neurological:      Mental Status: She is alert and oriented to person, place, and time. Cranial Nerves: No cranial nerve deficit. Sensory: No sensory deficit. Motor: No abnormal muscle tone. Coordination: Coordination normal.      Deep Tendon Reflexes: Reflexes normal.   Psychiatric:         Behavior: Behavior normal.         Thought Content:  Thought content normal.         Judgment: Judgment normal.       MEDICAL DECISION MAKING:     Summary of Patient Presentation:        1)  Number and Complexity of Problems  Problem List This Visit:  Cough, fevers, nausea    Differential Diagnosis:  Viral illness, COVID, flu, pneumonia    Diagnoses Considered but Do Not Suspect:  Multiple sclerosis flareup, CHF    Pertinent Comorbid Conditions:  Multiple sclerosis, hypertension, advanced age    2)  Data Reviewed  My EKG interpretation:  None    Decision Rules/Scores utilized:  None    Tests considered but not ordered and why:  None    External Documents Reviewed:  None    Imaging that is independently reviewed and interpreted by me as:  None    See more data below for the lab and radiology tests and orders. 3)  Treatment and Disposition    MIPS:  None    Social determinants of health impacting treatment or disposition:  None    Shared Decision Making:  Did discuss with the family and the patient need for admission and they are okay with this    Code Status Discussion:  None    \"ED Course\" Notes From Epic Narrator:  ED Course as of 12/28/22 0843   Wed Dec 28, 2022   0819 Was notified that the patient was pretty consistently staying down in the mid 80s so they placed the patient on oxygen [JL]   0843 Patient and family were updated on the rest of the results and her influenza. Since she is on the oxygen we will get her admitted. I spoke with Dr. Itzel Parham for the admitting physician who agrees to admit the patient. [JL]      ED Course User Index  [JL] Dora Mckenzie MD         CRITICAL CARE:   None    PROCEDURES:  None      DATA FOR LAB AND RADIOLOGY TESTS ORDERED BELOW ARE REVIEWED BY THE ED CLINICIAN:    RADIOLOGY: All x-rays, CT, MRI, and formal ultrasound images (except ED bedside ultrasound) are read by the radiologist, see reports below, unless otherwise noted in MDM or here. Reports below are reviewed by myself. XR CHEST PORTABLE   Final Result   Chronic findings in the chest without acute airspace disease appreciated. LABS: Lab orders shown below, the results are reviewed by myself, and all abnormals are listed below.   Labs Reviewed   COVID-19 & INFLUENZA COMBO - Abnormal; Notable for the following components:       Result Value    INFLUENZA A DETECTED (*)     All other components within normal limits   CBC WITH AUTO DIFFERENTIAL - Abnormal; Notable for the following components:    RBC 3.97 (*)     Seg Neutrophils 84 (*)     Lymphocytes 8 (*)     Absolute Lymph # 0.70 (*)     All other components within normal limits   BASIC METABOLIC PANEL - Abnormal; Notable for the following components:    Glucose 140 (*)     Creatinine 0.92 (*)     Est, Glom Filt Rate 60 (*)     All other components within normal limits   MAGNESIUM - Abnormal; Notable for the following components:    Magnesium 1.5 (*)     All other components within normal limits   URINALYSIS WITH REFLEX TO CULTURE       Vitals Reviewed:    Vitals:    12/28/22 0732   BP: (!) 173/85   Pulse: 91   Resp: 22   Temp: (!) 100.6 °F (38.1 °C)   TempSrc: Oral   SpO2: 93%   Weight: 135 lb (61.2 kg)   Height: 4' 11\" (1.499 m)     MEDICATIONS GIVEN TO PATIENT THIS ENCOUNTER:  Orders Placed This Encounter   Medications    acetaminophen (TYLENOL) tablet 650 mg    0.9 % sodium chloride bolus    oseltamivir (TAMIFLU) capsule 75 mg     DISCHARGE PRESCRIPTIONS:  New Prescriptions    No medications on file     PHYSICIAN CONSULTS ORDERED THIS ENCOUNTER:  IP CONSULT TO PRIMARY CARE PROVIDER    FINAL IMPRESSION      1. Influenza A    2. Hypoxia          DISPOSITION/PLAN   DISPOSITION Decision To Admit 12/28/2022 08:42:33 AM      PATIENT REFERRED TO:  No follow-up provider specified.     DISCHARGE MEDICATIONS:  New Prescriptions    No medications on file       (Please note that portions of this note were completed with a voice recognition program.  Efforts were made to edit the dictations but occasionally words are mis-transcribed.)    Savanah Corona MD  Attending Edilia Pinedo MD  12/28/22 [id number]

## 2022-12-28 NOTE — ED TRIAGE NOTES
Mode of arrival (squad #, walk in, police, etc) : [address]        Chief complaint(s): Cough and general malaise        Arrival Note (brief scenario, treatment PTA, etc). : Patient \"did not feel well\" at home for two days. Squad stated  lungs clear but wanted evaluated        C= \"Have you ever felt that you should Cut down on your drinking? \"  No  A= \"Have people Annoyed you by criticizing your drinking? \"  No  G= \"Have you ever felt bad or Guilty about your drinking? \"  No  E= \"Have you ever had a drink as an Eye-opener first thing in the morning to steady your nerves or to help a hangover? \"  No      Deferred []      Reason for deferring: N/A    *If yes to two or more: probable alcohol abuse. *

## 2022-12-28 NOTE — PROGRESS NOTES
ADMISSION NOTE       Patient admitted to room  2063. Time of admit:  0930    Admit from:  ER    Reason for admission:  Flu    Where patient has been residing for the last 24 hrs:  private residence    Family at bedside:  no    Patient is currently in pain no    Patient has been oriented to room, educated on how to use call light, and to call for assistance prior to getting up. Bed in lowest and locked position. 2 siderails up for safety. Call light within reach. Patient in no distress.

## 2022-12-28 NOTE — PROGRESS NOTES
Writer talked to Dr. Sia Guido regarding new admit. New order for code status, diet, and magnesium replacement 2 gram once.

## 2022-12-28 NOTE — H&P
[facility]    HISTORY AND PHYSICAL EXAMINATION            Date:   12/28/2022  Patient name:  Oletha Hammans  Date of admission:  12/28/2022  7:27 AM  MRN:   [id number]  Account:  [de-identified]  YOB: 1934  PCP:    Cora Colvin MD  Room:   04/04  Code Status:    Prior    Chief Complaint:     Chief Complaint   Patient presents with    Nasal Congestion    Cough       History Obtained From:     patient, electronic medical record    History of Present Illness: The patient is a 80 y.o.   Non- / non  female who presents with Nasal Congestion and Cough   and she is admitted to the hospital for the management of flu  Patient has, past medical history multiple medical problem which include hypertension, hyperlipidemia, chronic kidney disease, chronic back pain, mitral stenosis, heart failure with preserved ejection fraction, multiple sclerosis follows with Dr. Luma Lara  Patient [facility] with complaints of cough, shortness of breath, generalized fatigue, malaise, balance issues, symptoms are going on for last 3 days, patient symptoms progressively getting worse  In emergency room, patient was found to be positive for flu  Denying complaints of chest pain  Requiring oxygen,        Past Medical History:     Past Medical History:   Diagnosis Date    Acute cystitis without hematuria 10/1/2017    EDINSON (acute kidney injury) ([address] 3/3/2021    Arthritis     Chronic daily headache     GERD (gastroesophageal reflux disease)     Hyperlipidemia     Hypertension     Moderate malnutrition ([address] 3/2/2019    Multiple sclerosis ([address]     Multiple sclerosis exacerbation ([address] 2/25/2021    Neuropathy     Pneumonia 2017    states had double pneumonia    Vitamin D deficiency         Past Surgical History:     Past Surgical History:   Procedure Laterality Date    CATARACT REMOVAL WITH IMPLANT Right 11/6/2014 [facility]    CATARACT REMOVAL WITH IMPLANT Left 12/2/2014    [facility]    CERVICAL DISC SURGERY      CYSTOCELE REPAIR      HYSTERECTOMY (CERVIX STATUS UNKNOWN)      LUMBAR LAMINECTOMY N/A 3/17/2022    LUMBAR LAMINECTOMY DECOMPRESSION MINIMALLY INVASIVE (MILD) L4-L5 performed by Esdras Burgos MD at [address] [place] [address] Right 04/20/2017    SHOULDER SURGERY Right 2017        Medications Prior to Admission:     Prior to Admission medications    Medication Sig Start Date End Date Taking? Authorizing Provider   traMADol (ULTRAM) 50 MG tablet Take 1 tablet by mouth every 6 hours as needed for Pain for up to 60 days. 11/21/22 1/20/23  Pauly Barr MD   clindamycin (CLEOCIN-T) 1 % external solution Apply topically 2 times daily. 12/14/22 1/13/23  Pauly Barr MD   predniSONE (DELTASONE) 10 MG tablet 4 tabs by mouth daily for 3 days, then 3 tabs daily for 3 days, then 2 tabs daily for 3 days, then 1 tab daily till gone.  11/3/22   Pauly Barr MD   cephALEXin [facility]) 250 MG capsule Take 1 capsule by mouth daily 10/26/22   Pauly Barr MD   docusate sodium (COLACE) 100 MG capsule Take 1 capsule by mouth daily 8/24/22   Pauly Barr MD   predniSONE (DELTASONE) 10 MG tablet Take 1 tablet by mouth daily 8/24/22   Pauly Barr MD   celecoxib (CELEBREX) 200 MG capsule Take 1 capsule by mouth daily 8/24/22   Pauly Barr MD   lisinopril (PRINIVIL;ZESTRIL) 40 MG tablet TAKE ONE TABLET BY MOUTH DAILY 8/24/22   Pauly Barr MD   amitriptyline (ELAVIL) 100 MG tablet Take 1 tablet by mouth nightly 8/24/22   Pauly Barr MD   tiZANidine (ZANAFLEX) 2 MG tablet Take 1 tablet by mouth nightly as needed (restless legs) 8/24/22   Pauly Barr MD   metoprolol tartrate (LOPRESSOR) 25 MG tablet Take 2 tablets by mouth 2 times daily 8/24/22   Pauly Barr MD   metoprolol succinate (TOPROL XL) 50 MG extended release tablet Take 1 tablet by mouth at bedtime 9/13/22   Pauly Blackman MD   hydrOXYzine HCl (ATARAX) 10 MG tablet Take 1 tablet by mouth nightly as needed for Anxiety 8/24/22   Pauly Blackman MD   omeprazole (PRILOSEC) 20 MG delayed release capsule Take 1 capsule by mouth in the morning. 8/1/22   Pauly Blackman MD   simvastatin (ZOCOR) 20 MG tablet TAKE ONE TABLET BY MOUTH DAILY 7/26/22   Pauly Blackman MD   gabapentin (NEURONTIN) 100 MG capsule Take 1 capsule by mouth daily for 30 days. 3/29/22 8/24/22  Pauly Blackman MD   polyethylene glycol (GLYCOLAX) 17 g packet Take 17 g by mouth daily as needed for Constipation 2/20/22   Dee Ruelas MD   HYDROcodone-acetaminophen [facility]) 5-325 MG per tablet Take 1 tablet by mouth every 8 hours as needed. 9/14/21   Historical Provider, MD   Handicap Placard [address] [facility] by Does not apply route Exp: 5/20/2026 5/24/21   Pauly Blackman MD        Allergies:     Bactrim [sulfamethoxazole-trimethoprim], Other, Pcn [penicillins], Lidoderm [lidocaine], and Macrobid [nitrofurantoin]    Social History:     Tobacco:    reports that she has never smoked. She has never used smokeless tobacco.  Alcohol:      reports no history of alcohol use. Drug Use:  reports no history of drug use. Family History:     No family history on file. Review of Systems:     Positive and Negative as described in HPI. CONSTITUTIONAL: Positive subjective fever, malaise, balance issue  HEENT:  negative for vision, hearing changes, runny nose, throat pain  RESPIRATORY: Cough, shortness of breath  CARDIOVASCULAR:  negative for chest pain, palpitations.   GASTROINTESTINAL:  negative for nausea, vomiting, diarrhea, constipation, change in bowel habits, abdominal pain   GENITOURINARY:  negative for difficulty of urination, burning with urination, frequency   INTEGUMENT:  negative for rash, skin lesions, easy bruising   HEMATOLOGIC/LYMPHATIC:  negative for swelling/edema   ALLERGIC/IMMUNOLOGIC:  negative for urticaria , itching  ENDOCRINE:  negative increase in drinking, increase in urination, hot or cold intolerance  MUSCULOSKELETAL:  negative joint pains, muscle aches, swelling of joints  NEUROLOGICAL:  negative for headaches, dizziness, lightheadedness, numbness, pain, tingling extremities  BEHAVIOR/PSYCH:  negative for depression, anxiety    Physical Exam:   BP (!) 173/85   Pulse 91   Temp (!) 100.6 °F (38.1 °C) (Oral)   Resp 22   Ht 4' 11\" (1.499 m)   Wt 135 lb (61.2 kg)   SpO2 93%   BMI 27.27 kg/m²   Temp (24hrs), Av.6 °F (38.1 °C), Min:100.6 °F (38.1 °C), Max:100.6 °F (38.1 °C)    No results for input(s): POCGLU in the last 72 hours. No intake or output data in the 24 hours ending 22 0902    General Appearance:  alert, well appearing, and in no acute distress  Mental status: oriented to person, place, and time with normal affect  Head:  normocephalic, atraumatic. Eye: no icterus, redness, pupils equal and reactive, extraocular eye movements intact, conjunctiva clear  Ear: normal external ear, no discharge, hearing intact  Nose:  no drainage noted  Mouth: mucous membranes moist  Neck: supple, no carotid bruits, thyroid not palpable  Lungs: Bilateral equal air entry, clear to ausculation, no wheezing, rales or rhonchi, normal effort  Cardiovascular: normal rate, regular rhythm, no murmur, gallop, rub.   Abdomen: Soft, nontender, nondistended, normal bowel sounds, no hepatomegaly or splenomegaly  Neurologic: There are no new focal motor or sensory deficits,   Skin: No gross lesions, rashes, bruising or bleeding on exposed skin area  Extremities:  peripheral pulses palpable, no pedal edema or calf pain with palpation  Psych: normal affect     Investigations:      Laboratory Testing:  Recent Results (from the past 24 hour(s))   CBC with Auto Differential    Collection Time: 22  8:05 AM   Result Value Ref Range    WBC 9.5 3.5 - 11.0 k/uL    RBC 3.97 (L) 4.0 - 5.2 m/uL    Hemoglobin 12.5 12.0 - 16.0 g/dL    Hematocrit 36.5 36 - 46 %    MCV 91.9 80 - 100 fL    MCH 31.4 26 - 34 pg    MCHC 34.2 31 - 37 g/dL    RDW 14.0 11.5 - 14.9 %    Platelets 535 053 - 824 k/uL    MPV 7.5 6.0 - 12.0 fL    Seg Neutrophils 84 (H) 36 - 66 %    Lymphocytes 8 (L) 24 - 44 %    Monocytes 7 1 - 7 %    Eosinophils % 1 0 - 4 %    Basophils 0 0 - 2 %    Segs Absolute 8.10 1.3 - 9.1 k/uL    Absolute Lymph # 0.70 (L) 1.0 - 4.8 k/uL    Absolute Mono # 0.60 0.1 - 1.3 k/uL    Absolute Eos # 0.10 0.0 - 0.4 k/uL    Basophils Absolute 0.00 0.0 - 0.2 k/uL   Basic Metabolic Panel    Collection Time: 12/28/22  8:05 AM   Result Value Ref Range    Glucose 140 (H) 70 - 99 mg/dL    BUN 18 8 - 23 mg/dL    Creatinine 0.92 (H) 0.50 - 0.90 mg/dL    Est, Glom Filt Rate 60 (L) >60 mL/min/1.73m2    Calcium 9.0 8.6 - 10.4 mg/dL    Sodium 140 135 - 144 mmol/L    Potassium 4.1 3.7 - 5.3 mmol/L    Chloride 101 98 - 107 mmol/L    CO2 27 20 - 31 mmol/L    Anion Gap 12 9 - 17 mmol/L   Magnesium    Collection Time: 12/28/22  8:05 AM   Result Value Ref Range    Magnesium 1.5 (L) 1.6 - 2.6 mg/dL   COVID-19 & Influenza Combo    Collection Time: 12/28/22  8:07 AM    Specimen: Nasopharyngeal Swab   Result Value Ref Range    Specimen Description . NASOPHARYNGEAL SWAB     Source . NASOPHARYNGEAL SWAB     SARS-CoV-2 RNA, RT PCR Not Detected Not Detected    INFLUENZA A DETECTED (A) Not Detected    INFLUENZA B Not Detected Not Detected       Imaging/Diagnostics:        Assessment :      Primary Problem  Flu    Active Hospital Problems    Diagnosis Date Noted    Flu [J11.1] 12/28/2022     Priority: Medium    Chronic renal disease, stage III [facility]) [[id number]] [N18.30] 08/24/2022     Priority: Medium    Chronic systolic (congestive) heart failure [I50.22] 08/24/2022     Priority: Medium    Diastolic congestive heart failure ([email or web address] [I50.30] 02/10/2022    Chronic midline low back pain with left-sided sciatica [M54.42, G89.29] 10/08/2019    Essential hypertension [I10] 11/07/2018    Hyperlipidemia [E78.5] 11/07/2018    Multiple sclerosis ([facility] Melissa Peat 10/01/2017         Plan:     Patient status Admit as inpatient in the  Med/Surge    Admitted with flu, was given Tamiflu in the emergency room, will continue with 30 mg Tamiflu twice a day for 5 days  Chest x-ray negative for pneumonia  Patient requiring oxygen, will try to wean oxygen gradually  History of mitral stenosis, heart failure with preserved ejection fraction, compensated  Multiple sclerosis, patient follows with Dr. Phu Charlton as outpatient, not on medications for multiple sclerosis  Requested PT consult  Chronic kidney disease, creatinine stable,  Heparin 5000 every 8 for DVT prophylaxis    Consultations:   IP CONSULT TO PRIMARY CARE PROVIDER    Patient is admitted as inpatient status because of co-morbidities listed above, severity of signs and symptoms as outlined, requirement for current medical therapies and most importantly because of direct risk to patient if care not provided in a hospital setting. Ismael Moreno MD  12/28/2022  9:02 AM    Copy sent to Dr. Jet Lakhani MD    Please note that this chart was generated using voice recognition Dragon dictation software. Although every effort was made to ensure the accuracy of this automated transcription, some errors in transcription may have occurred.

## 2022-12-28 NOTE — ED NOTES
Report given to LASHAUN Wynne from Med Surg. Report method by phone   The following was reviewed with receiving RN:   Current vital signs:  BP (!) 173/85   Pulse 91   Temp (!) 100.6 °F (38.1 °C) (Oral)   Resp 22   Ht 4' 11\" (1.499 m)   Wt 135 lb (61.2 kg)   SpO2 93%   BMI 27.27 kg/m²                MEWS Score: 2     Any medication or safety alerts were reviewed. Any pending diagnostics and notifications were also reviewed, as well as any safety concerns or issues, abnormal labs, abnormal imaging, and abnormal assessment findings. Questions were answered.             Nam Shirley RN  12/28/22 5915

## 2022-12-29 VITALS
HEART RATE: 102 BPM | BODY MASS INDEX: 26.49 KG/M2 | SYSTOLIC BLOOD PRESSURE: 167 MMHG | HEIGHT: 59 IN | RESPIRATION RATE: 18 BRPM | WEIGHT: 131.39 LBS | OXYGEN SATURATION: 93 % | DIASTOLIC BLOOD PRESSURE: 78 MMHG | TEMPERATURE: 99.1 F

## 2022-12-29 PROCEDURE — 97161 PT EVAL LOW COMPLEX 20 MIN: CPT

## 2022-12-29 PROCEDURE — 2580000003 HC RX 258: Performed by: NURSE PRACTITIONER

## 2022-12-29 PROCEDURE — 97166 OT EVAL MOD COMPLEX 45 MIN: CPT

## 2022-12-29 PROCEDURE — 6360000002 HC RX W HCPCS: Performed by: INTERNAL MEDICINE

## 2022-12-29 PROCEDURE — 6370000000 HC RX 637 (ALT 250 FOR IP): Performed by: INTERNAL MEDICINE

## 2022-12-29 PROCEDURE — 99239 HOSP IP/OBS DSCHRG MGMT >30: CPT | Performed by: INTERNAL MEDICINE

## 2022-12-29 RX ORDER — DEXTROMETHORPHAN POLISTIREX 30 MG/5ML
60 SUSPENSION ORAL 2 TIMES DAILY PRN
Qty: 1 EACH | Refills: 2 | Status: SHIPPED | OUTPATIENT
Start: 2022-12-29 | End: 2023-01-08

## 2022-12-29 RX ORDER — 0.9 % SODIUM CHLORIDE 0.9 %
500 INTRAVENOUS SOLUTION INTRAVENOUS ONCE
Status: COMPLETED | OUTPATIENT
Start: 2022-12-29 | End: 2022-12-29

## 2022-12-29 RX ORDER — HYDROCODONE BITARTRATE AND ACETAMINOPHEN 5; 325 MG/1; MG/1
1 TABLET ORAL EVERY 6 HOURS PRN
Status: DISCONTINUED | OUTPATIENT
Start: 2022-12-29 | End: 2022-12-29

## 2022-12-29 RX ORDER — HYDROCODONE BITARTRATE AND ACETAMINOPHEN 5; 325 MG/1; MG/1
2 TABLET ORAL EVERY 6 HOURS PRN
Status: DISCONTINUED | OUTPATIENT
Start: 2022-12-29 | End: 2022-12-29 | Stop reason: HOSPADM

## 2022-12-29 RX ORDER — SODIUM CHLORIDE 9 MG/ML
INJECTION, SOLUTION INTRAVENOUS CONTINUOUS
Status: DISCONTINUED | OUTPATIENT
Start: 2022-12-29 | End: 2022-12-29 | Stop reason: HOSPADM

## 2022-12-29 RX ORDER — OSELTAMIVIR PHOSPHATE 30 MG/1
30 CAPSULE ORAL 2 TIMES DAILY
Qty: 7 CAPSULE | Refills: 0 | Status: SHIPPED | OUTPATIENT
Start: 2022-12-29 | End: 2023-01-02

## 2022-12-29 RX ADMIN — HYDROCODONE BITARTRATE AND ACETAMINOPHEN 1 TABLET: 5; 325 TABLET ORAL at 05:20

## 2022-12-29 RX ADMIN — OSELTAMIVIR PHOSPHATE 30 MG: 30 CAPSULE ORAL at 07:25

## 2022-12-29 RX ADMIN — LISINOPRIL 40 MG: 20 TABLET ORAL at 07:24

## 2022-12-29 RX ADMIN — HYDROCODONE BITARTRATE AND ACETAMINOPHEN 2 TABLET: 5; 325 TABLET ORAL at 09:50

## 2022-12-29 RX ADMIN — SODIUM CHLORIDE: 9 INJECTION, SOLUTION INTRAVENOUS at 10:47

## 2022-12-29 RX ADMIN — PANTOPRAZOLE SODIUM 40 MG: 40 TABLET, DELAYED RELEASE ORAL at 05:20

## 2022-12-29 RX ADMIN — SODIUM CHLORIDE 500 ML: 9 INJECTION, SOLUTION INTRAVENOUS at 08:07

## 2022-12-29 RX ADMIN — HEPARIN SODIUM 5000 UNITS: 5000 INJECTION INTRAVENOUS; SUBCUTANEOUS at 05:21

## 2022-12-29 ASSESSMENT — PAIN DESCRIPTION - DESCRIPTORS
DESCRIPTORS: SHARP
DESCRIPTORS: SHARP

## 2022-12-29 ASSESSMENT — PAIN SCALES - GENERAL
PAINLEVEL_OUTOF10: 9
PAINLEVEL_OUTOF10: 10
PAINLEVEL_OUTOF10: 7
PAINLEVEL_OUTOF10: 8

## 2022-12-29 ASSESSMENT — PAIN DESCRIPTION - LOCATION
LOCATION: GENERALIZED
LOCATION: HEAD

## 2022-12-29 NOTE — CARE COORDINATION
CASE MANAGEMENT NOTE:    Admission Date:  12/28/2022 Sonu Valerio is a 80 y.o.  female    Admitted for : Flu [J11.1]  Influenza A [J10.1]  Hypoxia [R09.02]    Met with:  Patient    PCP:  Cedric Ramirez MD                                Insurance:  Medicare/BCBS      Is patient alert and oriented at time of discussion:  Yes    Current Residence/ Living Arrangements:  independently at home alone, 2 story but living on main level            Current Services PTA:  No    Does patient go to outpatient dialysis: No  If yes, location and chair time:   Who is their nephrologist?     Is patient agreeable to VNS: Yes    Freedom of choice provided:  Yes    List of [address] provided: Yes    VNS chosen:  Yes, [address] in the past would like again    DME:  straight cane, walker, and shower chair    Home Oxygen: No    Nebulizer: No    CPAP/BIPAP: No    Supplier: N/A    Potential Assistance Needed: Yes, VNS;  oxygen  (Did not drop below 89% with exertion while working with therapy)    SNF needed: No    Freedom of choice and list provided: NA    Pharmacy:  [facility]       Is patient currently receiving oral anticoagulation therapy? No    Is the Patient an [facility] with Readmission Risk Score greater than 14%? No  If yes, pt needs a follow up appointment made within 7 days.     Family Members/Caregivers that pt would like involved in their care:    Yes    If yes, list name here:  Yolis-daughter    Transportation Provider:  Family             Discharge Plan:  12/29/2022 [employer]; independently at home alone, 2 story but living on main level; DME  cane, walker, and shower chair; VNS agreeable to [address] ref sent; +FLU A, tamiflu, admitted for hypoxia (Did not drop below 89% with exertion while working with therapy);  SEEMA NEEDS SIGNED/COMPLETED//KR IMM signed 12/29 @ [facility]                     Electronically signed by: India Wong RN on 12/29/2022 at 9:39 AM

## 2022-12-29 NOTE — PROGRESS NOTES
Dr. Rayne Serna notified about patient stating she is not getting any pain relief from one [place] q 8 hrs prn. Received new order for [address] [place] q 6 hrs patient notified.

## 2022-12-29 NOTE — PLAN OF CARE
Problem: Discharge Planning  Goal: Discharge to home or other facility with appropriate resources  Outcome: Completed     Problem: Safety - Adult  Goal: Free from fall injury  Outcome: Completed  Flowsheets (Taken 12/29/2022 0327 by Sherita Graham RN)  Free From Fall Injury: Instruct family/caregiver on patient safety     Problem: ABCDS Injury Assessment  Goal: Absence of physical injury  Outcome: Completed  Flowsheets (Taken 12/29/2022 0327 by Sherita Graham, RN)  Absence of Physical Injury: Implement safety measures based on patient assessment     Problem: Pain  Goal: Verbalizes/displays adequate comfort level or baseline comfort level  Outcome: Completed     Problem: ABCDS Injury Assessment  Goal: Absence of physical injury  Outcome: Completed  Flowsheets (Taken 12/29/2022 0327 by Sherita Graham RN)  Absence of Physical Injury: Implement safety measures based on patient assessment     Problem: Pain  Goal: Verbalizes/displays adequate comfort level or baseline comfort level  Outcome: Completed

## 2022-12-29 NOTE — DISCHARGE INSTR - COC
Continuity of Care Form    Patient Name: Preston Monge   :  1934  MRN:  [id number]    Admit date:  2022  Discharge date:  22    Code Status Order: Full Code   Advance Directives:     Admitting Physician:  Ismael Moreno MD  PCP: Jet Lakhani MD    Discharging Nurse: 22  [address] Unit/Room#: 2063/2063-01  Discharging Unit Phone Number: [phone]    Emergency Contact:   Extended Emergency Contact Information  Primary Emergency Contact: Leilajacqueline Matthew  Address: [place] Lawson [address] Phone: [phone]  Work Phone: [phone]  Mobile Phone: [phone]  Relation: Child  Hearing or visual needs: None  Other needs: None  Preferred language: English   needed? No  Secondary Emergency Contact: Justino Raza  Address: [place] of [address] [place] [address] Phone: [phone]  Relation: Child  Hearing or visual needs: None  Other needs: None  Preferred language: English   needed?  No    Past Surgical History:  Past Surgical History:   Procedure Laterality Date    CATARACT REMOVAL WITH IMPLANT Right 2014    [facility]    CATARACT REMOVAL WITH IMPLANT Left 2014    [facility]    CERVICAL DISC SURGERY      CYSTOCELE REPAIR      HYSTERECTOMY (CERVIX STATUS UNKNOWN)      LUMBAR LAMINECTOMY N/A 3/17/2022    LUMBAR LAMINECTOMY DECOMPRESSION MINIMALLY INVASIVE (MILD) L4-L5 performed by Maurilio Farr MD at [address] Right 2017    SHOULDER SURGERY Right 2017       Immunization History:   Immunization History   Administered Date(s) Administered    COVID-19, PFIZER GRAY top, DO NOT Dilute, (age 15 y+), IM, 30 mcg/0.3 mL 2022    COVID-19, PFIZER PURPLE top, DILUTE for use, (age 15 y+), 30mcg/0.3mL 2021, 2021, 2021    Influenza A (A0V2-51) Vaccine PF IM 2010    Influenza Vaccine, unspecified formulation 2011, 10/25/2012, 10/01/2013, 2015 Influenza Virus Vaccine 11/01/2011, 10/25/2012, 10/01/2013, 11/20/2015, 09/01/2017    Influenza, FLUAD, (age 72 y+), Adjuvanted, 0.5mL 09/23/2020, 09/28/2021    Influenza, FLUARIX, FLULAVAL, FLUZONE (age 10 mo+) AND AFLURIA, (age 1 y+), PF, 0.5mL 10/08/2019    Influenza, High Dose (Fluzone 65 yrs and older) 10/17/2015, 09/26/2016, 09/26/2017, 10/10/2018    Pneumococcal Conjugate 13-valent ([id number]) 01/27/2017    Pneumococcal Polysaccharide ([id number]) 12/01/2010       Active Problems:  Patient Active Problem List   Diagnosis Code    Ataxia R27.0    Multiple sclerosis ([facility] G35    Essential hypertension I10    Hyperlipidemia E78.5    Atelectasis J98.11    Debility R53.81    Abnormal gait R26.9    Actinic keratosis L57.0    Enthesopathy of hip region M76.899    Generalized osteoarthritis M15.9    Idiopathic peripheral neuropathy G60.9    Vitamin D deficiency E55.9    Primary localized osteoarthrosis of the hip, left M16.12    Primary osteoarthritis of left hip M16.12    Acquired spondylolisthesis M43.10    Chronic midline low back pain with left-sided sciatica M54.42, G89.29    Spinal stenosis of lumbar region with neurogenic claudication M48.062    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    Diarrhea of presumed infectious origin R19.7    Chronic headache R51.9, G89.29    Facial asymmetry Q67.0    Asymptomatic bacteriuria R82.71    Hyperglycemia R73.9    Lumbar radiculopathy M54.16    Type 2 diabetes mellitus E11.9    Recurrent urinary tract infection E98.9    Diastolic congestive heart failure (HCC) I50.30    Left hemiparesis (HCC) G81.94    Chronic renal disease, stage III ([facility] [122064] N18.30    Chronic systolic (congestive) heart failure I50.22    Flu J11.1       Isolation/Infection:   Isolation            Droplet  Contact          Patient Infection Status       Infection Onset Added Last Indicated Last Indicated By Review Planned Expiration Resolved Resolved By    Influenza 12/28/22 12/28/22 12/28/22 COVID-19 & Influenza Combo 01/04/23 01/07/23      MRSA  11/07/14 11/07/14 Donald Patel RN        07/2013 rt shin    Resolved    COVID-19 (Rule Out) 12/28/22 12/28/22 12/28/22 COVID-19 & Influenza Combo (Ordered)   12/28/22 Rule-Out Test Resulted    COVID-19 (Rule Out) 02/08/22 02/08/22 02/08/22 SARS-CoV-2 NAAT (Rapid) (Ordered)   02/08/22 Rule-Out Test Resulted    COVID-19 (Rule Out) 02/25/21 02/25/21 02/25/21 COVID-19, Rapid (Ordered)   02/25/21 Rule-Out Test Resulted            Nurse Assessment:  Last Vital Signs: BP (!) 167/78   Pulse (!) 102   Temp 99.1 °F (37.3 °C)   Resp 18   Ht 4' 11\" (1.499 m)   Wt 131 lb 6.3 oz (59.6 kg)   SpO2 93%   BMI 26.54 kg/m²     Last documented pain score (0-10 scale): Pain Level: 9  Last Weight:   Wt Readings from Last 1 Encounters:   12/28/22 131 lb 6.3 oz (59.6 kg)     Mental Status:  alert    IV Access:  - None    Nursing Mobility/ADLs:  Walking   Independent  Transfer  Independent  Bathing  Independent  Dressing  Independent  Toileting  Independent  Feeding  Independent  Med Admin  Independent  Med Delivery   whole    Wound Care Documentation and Therapy:  Incision 03/17/22 Back Lower;Medial (Active)   Number of days: 287        Elimination:  Continence: Bowel: Yes  Bladder: Yes  Urinary Catheter: None   Colostomy/Ileostomy/Ileal Conduit: No       Date of Last BM: 12/29/22    Intake/Output Summary (Last 24 hours) at 12/29/2022 0918  Last data filed at 12/28/2022 1924  Gross per 24 hour   Intake 480 ml   Output --   Net 480 ml     I/O last 3 completed shifts: In: 480 [P.O.:480]  Out: -     Safety Concerns:     None    Impairments/Disabilities:      None    Nutrition Therapy:  Current Nutrition Therapy:   - Oral Diet:  General    Routes of Feeding: Oral  Liquids:  Thin Liquids  Daily Fluid Restriction: no  Last Modified Barium Swallow with Video (Video Swallowing Test): not done    Treatments at the Time of Hospital Discharge:   Respiratory Treatments: n/a  Oxygen Therapy:  is not on home oxygen therapy. Ventilator:    - No ventilator support    Rehab Therapies: Physical Therapy and Occupational Therapy  Weight Bearing Status/Restrictions: No weight bearing restrictions  Other Medical Equipment (for information only, NOT a DME order):  walker  Other Treatments: n/a    Patient's personal belongings (please select all that are sent with patient):  Glasses, Dentures upper and lower    RN SIGNATURE:  Electronically signed by Fran Garcia RN on 12/29/22 at 12:03 PM EST    CASE MANAGEMENT/SOCIAL WORK SECTION    Inpatient Status Date: 12/28/2022    Readmission Risk Assessment Score:  Readmission Risk              Risk of Unplanned Readmission:  8           Discharging to [facility] [address], [address]  P: [phone]   F: [phone]     Dialysis Facility (if applicable)   Name:  Address:  Dialysis Schedule:  Phone:  Fax:    / signature: Electronically signed by Shantanu Bowles RN on 12/29/22 at 12:20 PM EST    PHYSICIAN SECTION    Prognosis: Fair    Condition at Discharge: Stable    Rehab Potential (if transferring to Rehab): Fair    Recommended Labs or Other Treatments After Discharge: n/a    Physician Certification: I certify the above information and transfer of Carlitos Cardoza  is necessary for the continuing treatment of the diagnosis listed and that she requires Home Care for less 30 days.      Update Admission H&P: No change in H&P    PHYSICIAN SIGNATURE:  Electronically signed by Tiny Valles MD on 12/29/22 at 9:18 AM EST

## 2022-12-29 NOTE — PROGRESS NOTES
[facility] [address]    HISTORY AND PHYSICAL EXAMINATION            Date:   12/29/2022  Patient name:  Oletha Hammans  Date of admission:  12/28/2022  7:27 AM  MRN:   [id number]  Account:  [de-identified]  YOB: 1934  PCP:    Cora Colvin MD  Room:   2063/2063-01  Code Status:    Full Code    Chief Complaint:     Chief Complaint   Patient presents with    Nasal Congestion    Cough       History Obtained From:     patient, electronic medical record    History of Present Illness: The patient is a 80 y.o.   Non- / non  female who presents with Nasal Congestion and Cough   and she is admitted to the hospital for the management of flu  Patient has, past medical history multiple medical problem which include hypertension, hyperlipidemia, chronic kidney disease, chronic back pain, mitral stenosis, heart failure with preserved ejection fraction, multiple sclerosis follows with Dr. Luma Lara  Patient admitted to [facility] with complaints of cough, shortness of breath, generalized fatigue, malaise, balance issues, symptoms are going on for last 3 days, patient symptoms progressively getting worse  In emergency room, patient was found to be positive for flu  Denying complaints of chest pain  Requiring oxygen,  12/29   Patient spiked low-grade fever  Complaining of headache complaining of generalized body ache, has history of MS  No nausea, vomiting  Tolerating diet  Off oxygen      Past Medical History:     Past Medical History:   Diagnosis Date    Acute cystitis without hematuria 10/1/2017    EDINSON (acute kidney injury) ([address] 3/3/2021    Arthritis     Chronic daily headache     GERD (gastroesophageal reflux disease)     Hyperlipidemia     Hypertension     Moderate malnutrition ([address] 3/2/2019    Multiple sclerosis ([address]     Multiple sclerosis exacerbation ([address] 2/25/2021    Neuropathy     Pneumonia 2017    states had double pneumonia    Vitamin D deficiency         Past Surgical History:     Past Surgical History:   Procedure Laterality Date    CATARACT REMOVAL WITH IMPLANT Right 11/6/2014    [facility]    CATARACT REMOVAL WITH IMPLANT Left 12/2/2014    [facility]    CERVICAL DISC SURGERY      CYSTOCELE REPAIR      HYSTERECTOMY (CERVIX STATUS UNKNOWN)      LUMBAR LAMINECTOMY N/A 3/17/2022    LUMBAR LAMINECTOMY DECOMPRESSION MINIMALLY INVASIVE (MILD) L4-L5 performed by Urmila Nazario MD at [address] [place] [address] Right 04/20/2017    SHOULDER SURGERY Right 2017        Medications Prior to Admission:     Prior to Admission medications    Medication Sig Start Date End Date Taking? Authorizing Provider   oseltamivir (TAMIFLU) 30 MG capsule Take 1 capsule by mouth 2 times daily for 7 doses 12/29/22 1/2/23 Yes Mario Reis MD   dextromethorphan (ROBITUSSIN 12 HOUR COUGH) 30 MG/5ML extended release liquid Take 10 mLs by mouth 2 times daily as needed for Cough 12/29/22 1/8/23 Yes Mario Reis MD   traMADol (ULTRAM) 50 MG tablet Take 1 tablet by mouth every 6 hours as needed for Pain for up to 60 days.  11/21/22 1/20/23  Pauly Reynolds MD   docusate sodium (COLACE) 100 MG capsule Take 1 capsule by mouth daily 8/24/22   Pauly Reynolds MD   predniSONE (DELTASONE) 10 MG tablet Take 1 tablet by mouth daily 8/24/22   Pauly Reynolds MD   celecoxib (CELEBREX) 200 MG capsule Take 1 capsule by mouth daily 8/24/22   Pauly Reynolds MD   lisinopril (PRINIVIL;ZESTRIL) 40 MG tablet TAKE ONE TABLET BY MOUTH DAILY 8/24/22   Pauly Reynolds MD   amitriptyline (ELAVIL) 100 MG tablet Take 1 tablet by mouth nightly 8/24/22   Pauly Reynolds MD   tiZANidine (ZANAFLEX) 2 MG tablet Take 1 tablet by mouth nightly as needed (restless legs) 8/24/22   Pauly Reynolds MD   metoprolol tartrate (LOPRESSOR) 25 MG tablet Take 2 tablets by mouth 2 times daily 8/24/22   Sameer Linares MD metoprolol succinate (TOPROL XL) 50 MG extended release tablet Take 1 tablet by mouth at bedtime 9/13/22   Pauly Recinos MD   hydrOXYzine HCl (ATARAX) 10 MG tablet Take 1 tablet by mouth nightly as needed for Anxiety 8/24/22   Pauly Recinos MD   omeprazole (PRILOSEC) 20 MG delayed release capsule Take 1 capsule by mouth in the morning. 8/1/22   Pauly Recinos MD   simvastatin (ZOCOR) 20 MG tablet TAKE ONE TABLET BY MOUTH DAILY 7/26/22   Pauly Recinos MD   gabapentin (NEURONTIN) 100 MG capsule Take 1 capsule by mouth daily for 30 days. Patient not taking: Reported on 12/28/2022 3/29/22 12/28/22  Pauly Recinos MD   polyethylene glycol (GLYCOLAX) 17 g packet Take 17 g by mouth daily as needed for Constipation 2/20/22   Kym Garcia MD   HYDROcodone-acetaminophen [facility]) 5-325 MG per tablet Take 1 tablet by mouth every 8 hours as needed. 9/14/21   Historical Provider, MD   Handicap Placard [address] by Does not apply route Exp: 5/20/2026 5/24/21   Pauly Recinos MD        Allergies:     Bactrim [sulfamethoxazole-trimethoprim], Other, Pcn [penicillins], Lidoderm [lidocaine], and Macrobid [nitrofurantoin]    Social History:     Tobacco:    reports that she has never smoked. She has never used smokeless tobacco.  Alcohol:      reports no history of alcohol use. Drug Use:  reports no history of drug use. Family History:     History reviewed. No pertinent family history. Review of Systems:     Positive and Negative as described in HPI. CONSTITUTIONAL: Positive subjective fever, malaise, balance issue  HEENT:  negative for vision, hearing changes, runny nose, throat pain  RESPIRATORY: Cough, shortness of breath  CARDIOVASCULAR:  negative for chest pain, palpitations.   GASTROINTESTINAL:  negative for nausea, vomiting, diarrhea, constipation, change in bowel habits, abdominal pain   GENITOURINARY:  negative for difficulty of urination, burning with urination, frequency   INTEGUMENT:  negative for rash, skin lesions, easy bruising   HEMATOLOGIC/LYMPHATIC:  negative for swelling/edema   ALLERGIC/IMMUNOLOGIC:  negative for urticaria , itching  ENDOCRINE:  negative increase in drinking, increase in urination, hot or cold intolerance  MUSCULOSKELETAL:  negative joint pains, muscle aches, swelling of joints  NEUROLOGICAL:  negative for headaches, dizziness, lightheadedness, numbness, pain, tingling extremities  BEHAVIOR/PSYCH:  negative for depression, anxiety    Physical Exam:   BP (!) 167/78   Pulse (!) 102   Temp 99.1 °F (37.3 °C)   Resp 18   Ht 4' 11\" (1.499 m)   Wt 131 lb 6.3 oz (59.6 kg)   SpO2 93%   BMI 26.54 kg/m²   Temp (24hrs), Av.9 °F (37.2 °C), Min:98.6 °F (37 °C), Max:99.1 °F (37.3 °C)    No results for input(s): POCGLU in the last 72 hours. Intake/Output Summary (Last 24 hours) at 2022 0916  Last data filed at 2022 1924  Gross per 24 hour   Intake 480 ml   Output --   Net 480 ml       General Appearance:  alert, well appearing, and in no acute distress  Mental status: oriented to person, place, and time with normal affect  Head:  normocephalic, atraumatic. Eye: no icterus, redness, pupils equal and reactive, extraocular eye movements intact, conjunctiva clear  Ear: normal external ear, no discharge, hearing intact  Nose:  no drainage noted  Mouth: mucous membranes moist  Neck: supple, no carotid bruits, thyroid not palpable  Lungs: Bilateral equal air entry, clear to ausculation, no wheezing, rales or rhonchi, normal effort  Cardiovascular: normal rate, regular rhythm, no murmur, gallop, rub.   Abdomen: Soft, nontender, nondistended, normal bowel sounds, no hepatomegaly or splenomegaly  Neurologic: There are no new focal motor or sensory deficits,   Skin: No gross lesions, rashes, bruising or bleeding on exposed skin area  Extremities:  peripheral pulses palpable, no pedal edema or calf pain with palpation  Psych: normal affect     Investigations:      Laboratory Testing:  No results found for this or any previous visit (from the past 24 hour(s)). Imaging/Diagnostics:        Assessment :      Primary Problem  Flu    Active Hospital Problems    Diagnosis Date Noted    Flu [J11.1] 12/28/2022     Priority: Medium    Chronic renal disease, stage III ([facility] [912047] [N18.30] 08/24/2022     Priority: Medium    Chronic systolic (congestive) heart failure [I50.22] 08/24/2022     Priority: Medium    Diastolic congestive heart failure ([facility] [I50.30] 02/10/2022    Chronic midline low back pain with left-sided sciatica [M54.42, G89.29] 10/08/2019    Essential hypertension [I10] 11/07/2018    Hyperlipidemia [E78.5] 11/07/2018    Multiple sclerosis ([facility] [place] 10/01/2017         Plan:     Patient status Admit as inpatient in the  Med/Surge    Admitted with flu, was given Tamiflu in the emergency room, will continue with 30 mg Tamiflu twice a day for 5 days  Chest x-ray negative for pneumonia  Patient requiring oxygen, will try to wean oxygen gradually  History of mitral stenosis, heart failure with preserved ejection fraction, compensated  Multiple sclerosis, patient follows with Dr. Alina Lundberg as outpatient, not on medications for multiple sclerosis  Requested PT consult  Chronic kidney disease, creatinine stable,  Heparin 5000 every 8 for DVT prophylaxis  12/29   Patient is off oxygen  Increasing Norco to 1 to 2 tablets every 6 as needed, she has generalized body ache and headache with history of MS  DC plan  Consultations:   IP CONSULT TO PRIMARY CARE PROVIDER    Patient is admitted as inpatient status because of co-morbidities listed above, severity of signs and symptoms as outlined, requirement for current medical therapies and most importantly because of direct risk to patient if care not provided in a hospital setting.     Biju Colon MD  12/29/2022  9:16 AM    Copy sent to Dr. Redd Toussaint MD    Please note that this chart was generated using voice recognition Dragon dictation software. Although every effort was made to ensure the accuracy of this automated transcription, some errors in transcription may have occurred.

## 2022-12-29 NOTE — CARE COORDINATION
SEEMA, face sheet and discharge orders faxed to [facility]. Stacey Elias with [facility] was notified of discharge. Electronically signed by Gideon Booth RN on 12/29/2022 at 12:23 PM

## 2022-12-29 NOTE — PROGRESS NOTES
Patient d/c'd home with family states she understands all d/c orders was instructed to give her one dose of Tamflu d/t her pharmacy will not hove it until tomorrow . Patient taken down stairs in wheelchair by writer.

## 2022-12-29 NOTE — PROGRESS NOTES
Physical Therapy  Facility/Department: [facility] MED SURG  Physical Therapy Initial Assessment    Name: Yuri Gaines  : 1934  MRN: [id number]  Date of Service: 2022    Discharge Recommendations:  24 hour supervision or assist   PT Equipment Recommendations  Equipment Needed: No      Patient Diagnosis(es): The primary encounter diagnosis was Influenza A. A diagnosis of Hypoxia was also pertinent to this visit. Past Medical History:  has a past medical history of Acute cystitis without hematuria, EDINSON (acute kidney injury) ([address] Arthritis, Chronic daily headache, GERD (gastroesophageal reflux disease), Hyperlipidemia, Hypertension, Moderate malnutrition ([address] Multiple sclerosis ([address] Multiple sclerosis exacerbation ([address] Neuropathy, Pneumonia, and Vitamin D deficiency. Past Surgical History:  has a past surgical history that includes Cystocele repair; Rectocele repair; Hysterectomy; Cervical disc surgery; Cataract removal with implant (Right, 2014); Cataract removal with implant (Left, 2014); Total shoulder arthroplasty (Right, 2017); shoulder surgery (Right, ); and lumbar laminectomy (N/A, 3/17/2022). Assessment   Assessment: pt demonstrates safe, functional mobility at this time.  pt's SpO2 to 88% after ambulating but very quickly increased to 90% then 93% after 1-2min  Decision Making: Low Complexity  History: MS; Influenza  Exam: [facility]  Clinical Presentation: stable  No Skilled PT: No PT goals identified  Requires PT Follow-Up: No  Activity Tolerance  Activity Tolerance: Patient tolerated evaluation without incident     Plan   Physcial Therapy Plan  Additional Comments: No further in-pt PT needed at this time  Safety Devices  Type of Devices: Call light within reach, Left in chair     Restrictions  Restrictions/Precautions  Restrictions/Precautions: Fall Risk, Isolation (Droplet precautions - Influenza A)  Required Braces or Orthoses?: No  Implants present? : Metal implants (R TSA) Subjective   Pain: pt reports having a 8/10 headache  General  Patient assessed for rehabilitation services?: Yes  Family / Caregiver Present: No  Follows Commands: Within Functional Limits  Subjective  Subjective: pt pleasant and cooperative         Social/Functional History  Social/Functional History  Lives With: Alone  Type of Home: House  Home Layout: Two level, Able to Live on Main level with bedroom/bathroom, Laundry in basement  Home Access: Ramped entrance, Stairs to enter with rails  Entrance Stairs - Number of Steps: 3  Entrance Stairs - Rails: Right  Bathroom Shower/Tub: Tub/Shower unit, Shower chair with back, Curtain  Bathroom Toilet: Standard  Bathroom Equipment: Grab bars in shower, Shower chair, Grab bars around toilet, Hand-held shower  Bathroom Accessibility: Accessible (Nereida Joshi does not fit, uses the grab bars)  Home Equipment: Walker, rolling, Reacher, [address], [address], Alert Button  Has the patient had two or more falls in the past year or any fall with injury in the past year?: No  Receives Help From: Family  ADL Assistance: Independent (Daughter comes over when she showers - doesn't A but is there just in case)  Homemaking Assistance: Needs assistance (Daughter A with cleaning, shopping, and laundry)  Homemaking Responsibilities: Yes (Pt does microwave/simple meal prep)  Ambulation Assistance: Independent (Uses RW in house when she gets headache/lightheaded)  Transfer Assistance: Independent  Active : No  Patient's  Info: Pt's daughter  Mode of Transportation: Family  Occupation: Retired  Leisure & Hobbies: Pt has one cat  IADL Comments: Pt sleeps in a flat bed  Additional Comments: Son and daughter both work during the day, but daughter calls her every morning.   Vision/Hearing  Vision  Vision: Impaired  Vision Exceptions: Wears glasses at all times (Prism in glasses to correct double vision)  Hearing  Hearing: Within functional limits    Cognition   Orientation  Overall Orientation Status: Within Functional Limits  Cognition  Overall Cognitive Status: WFL     Objective     AROM RLE (degrees)  RLE AROM: WFL  AROM LLE (degrees)  LLE AROM : WFL  Strength RLE  Comment: grossly 4/5  Strength LLE  Comment: grossly 4/5           Bed mobility  Supine to Sit: Stand by assistance  Sit to Supine:  (left up in chair)  Scooting: Stand by assistance  Bed Mobility Comments: pt denies dizziness with sitting up  Transfers  Sit to Stand: Stand by assistance  Stand to Sit: Stand by assistance  Bed to Chair: Stand by assistance (with RW)  Ambulation  Surface: Level tile  Device: Rolling Walker  Assistance: Stand by assistance  Distance: 25ft  More Ambulation?: No  Stairs/Curb  Stairs?: No     Balance  Sitting - Static: Good  Sitting - Dynamic: Good  Standing - Static: Fair;+ (with RW)  Standing - Dynamic: Fair;+ (with RW)            Therapy Time   Individual Concurrent Group Co-treatment   Time In Brien Ramsey 45         Time Out 0937         Minutes [address] [place] [address]

## 2022-12-29 NOTE — CARE COORDINATION
[address] Encounter Date/Time: 2022 [address] Account: [de-identified]    MRN: [id number]    Patient: Homar Taylor    Contact Serial #: [id number]      ENCOUNTER          Patient Class: I Private Enc? No Unit RM BD: Catherine 15    Hospital Service: MED   Encounter DX: Flu [J11.1]   ADM Provider: Kenny Soria MD   Procedure:     ATT Provider: Kenny Soria MD   REF Provider:        Admission DX: Flu, Influenza A, Hypoxia and DX codes: J11.1, J10.1, R09.02      PATIENT                 Name: Homar Taylor : 1934 (88 yrs)   Address: [address] [facility] [address] Sex: Female   [place] city: [place] [address]         Marital Status:    Employer: RETIRED         Muslim: Mu-ism   Primary Care Provider: Sara Parker MD         Primary Phone: [phone]   EMERGENCY CONTACT   Contact Name Legal Guardian? Relationship to Patient Home Phone Work Phone   1. Naveen Coelho  2. Justino Raza  No Child  Child [phone]            GUARANTOR            Guarantor: Homar Taylor     : 1934   Address: [address] [place] [address] Sex: Female     [facility] [address]     Relation to Patient: Self       Home Phone: [phone]   Guarantor ID: [id number]       Work Phone:     Guarantor Employer: RETIRED         Status: [address] [place] [address]   Payor: MEDICARE Plan: MEDICARE PART A AND B   Payor Address: [address] [place] [address]       Group Number:   Insurance Type: [id number] Name: Alda Mims : 1934   Subscriber ID: [id number] Kimble Burkitt. Rel. to Sub: Self   SECONDARY INSURANCE   Payor: [facility] Plan: [address] [place] [address]   Payor Address:  [facility] [id number], [address]          Group Number: [id number] Insurance Type: INDEMNITY   Subscriber Name: Alda Mims : 1934   Subscriber ID: [id number] Kimble Burkitt.  Rel. to Sub: SELF         CSN: [id number]        Medication List    START taking these medications    START taking these medications   dextromethorphan 30 MG/5ML extended release liquid  Commonly known as: Robitussin 12 Hour Cough  Take 10 mLs by mouth 2 times daily as needed for Cough   oseltamivir 30 MG capsule  Commonly known as: TAMIFLU  Take 1 capsule by mouth 2 times daily for 7 doses     CHANGE how you take these medications    CHANGE how you take these medications   predniSONE 10 MG tablet  Commonly known as: DELTASONE  Take 1 tablet by mouth daily  What changed: Another medication with the same name was removed. Continue taking this medication, and follow the directions you see here. CONTINUE taking these medications    CONTINUE taking these medications   amitriptyline 100 MG tablet  Commonly known as: ELAVIL  Take 1 tablet by mouth nightly   celecoxib 200 MG capsule  Commonly known as: CeleBREX  Take 1 capsule by mouth daily   docusate sodium 100 MG capsule  Commonly known as: COLACE  Take 1 capsule by mouth daily   gabapentin 100 MG capsule  Commonly known as: Neurontin  Take 1 capsule by mouth daily for 30 days. Handicap Placard Misc  by Does not apply route Exp: 5/20/2026   HYDROcodone-acetaminophen 5-325 MG per tablet  Commonly known as: NORCO  Take 1 tablet by mouth every 8 hours as needed. hydrOXYzine HCl 10 MG tablet  Commonly known as: ATARAX  Take 1 tablet by mouth nightly as needed for Anxiety   lisinopril 40 MG tablet  Commonly known as: PRINIVIL;ZESTRIL  TAKE ONE TABLET BY MOUTH DAILY   metoprolol succinate 50 MG extended release tablet  Commonly known as: Toprol XL  Take 1 tablet by mouth at bedtime   metoprolol tartrate 25 MG tablet  Commonly known as: LOPRESSOR  Take 2 tablets by mouth 2 times daily   omeprazole 20 MG delayed release capsule  Commonly known as: PRILOSEC  Take 1 capsule by mouth in the morning.    polyethylene glycol 17 g packet  Commonly known as: GLYCOLAX  Take 17 g by mouth daily as needed for Constipation   simvastatin 20 MG tablet  Commonly known as: ZOCOR  TAKE ONE TABLET BY MOUTH DAILY   tiZANidine 2 MG tablet  Commonly known as: ZANAFLEX  Take 1 tablet by mouth nightly as needed (restless legs)   traMADol 50 MG tablet  Commonly known as: ULTRAM  Take 1 tablet by mouth every 6 hours as needed for Pain for up to 60 days. STOP taking these medications    STOP taking these medications   cephALEXin 250 MG capsule  Commonly known as: KEFLEX   clindamycin 1 % external solution  Commonly known as: Cleocin-T   Where to Get Your Medications      These medications were sent to [address] [place] [address] Renetta Fried   [address], [address] [place] [address],[address]  Phone: [phone]       dextromethorphan 30 MG/5ML extended release liquid        oseltamivir 30 MG capsule             Printing Report    Report Name Print   Discharge Meds Print       Continuity of Care Form    Patient Name: Grant Smith   :  1934  MRN:  [id number]    Admit date:  2022  Discharge date:  22    Code Status Order: Full Code   Advance Directives:     Admitting Physician:  Julienne Giles MD  PCP: Alexis Muro MD    Discharging Nurse: 22  [address] Unit/Room#: 2063/2063-01  Discharging Unit Phone Number: [phone]    Emergency Contact:   Extended Emergency Contact Information  Primary Emergency Contact: Tania Dwyer  Address: [place] [address] Phone: [phone]  Work Phone: [phone]  Mobile Phone: [phone]  Relation: Child  Hearing or visual needs: None  Other needs: None  Preferred language: English   needed? No  Secondary Emergency Contact: Justino Raza  Address: [place] [address] Phone: [phone]  Relation: Child  Hearing or visual needs: None  Other needs: None  Preferred language: English   needed?  No    Past Surgical History:  Past Surgical History:   Procedure Laterality Date    CATARACT REMOVAL WITH IMPLANT Right 2014 Harvinder/Ole    CATARACT REMOVAL WITH IMPLANT Left 12/2/2014    Harvinder/Ole    CERVICAL DISC SURGERY      CYSTOCELE REPAIR      HYSTERECTOMY (CERVIX STATUS UNKNOWN)      LUMBAR LAMINECTOMY N/A 3/17/2022    LUMBAR LAMINECTOMY DECOMPRESSION MINIMALLY INVASIVE (MILD) L4-L5 performed by Bravo Aguayo MD at [address] Right 04/20/2017    SHOULDER SURGERY Right 2017       Immunization History:   Immunization History   Administered Date(s) Administered    COVID-19, PFIZER GRAY top, DO NOT Dilute, (age 15 y+), IM, 30 mcg/0.3 mL 08/13/2022    COVID-19, PFIZER PURPLE top, DILUTE for use, (age 15 y+), 30mcg/0.3mL 01/21/2021, 02/11/2021, 11/06/2021    Influenza A (T5E4-63) Vaccine PF IM 01/19/2010    Influenza Vaccine, unspecified formulation 11/01/2011, 10/25/2012, 10/01/2013, 11/20/2015    Influenza Virus Vaccine 11/01/2011, 10/25/2012, 10/01/2013, 11/20/2015, 09/01/2017    Influenza, FLUAD, (age 72 y+), Adjuvanted, 0.5mL 09/23/2020, 09/28/2021    Influenza, FLUARIX, FLULAVAL, FLUZONE (age 10 mo+) AND AFLURIA, (age 1 y+), PF, 0.5mL 10/08/2019    Influenza, High Dose (Fluzone 65 yrs and older) 10/17/2015, 09/26/2016, 09/26/2017, 10/10/2018    Pneumococcal Conjugate 13-valent ([id number]) 01/27/2017    Pneumococcal Polysaccharide (Lkepibbxz86) 12/01/2010       Active Problems:  Patient Active Problem List   Diagnosis Code    Ataxia R27.0    Multiple sclerosis ([facility] G35    Essential hypertension I10    Hyperlipidemia E78.5    Atelectasis J98.11    Debility R53.81    Abnormal gait R26.9    Actinic keratosis L57.0    Enthesopathy of hip region M76.899    Generalized osteoarthritis M15.9    Idiopathic peripheral neuropathy G60.9    Vitamin D deficiency E55.9    Primary localized osteoarthrosis of the hip, left M16.12    Primary osteoarthritis of left hip M16.12    Acquired spondylolisthesis M43.10    Chronic midline low back pain with left-sided sciatica M54.42, G89.29 Spinal stenosis of lumbar region with neurogenic claudication M48.062    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    Diarrhea of presumed infectious origin R19.7    Chronic headache R51.9, G89.29    Facial asymmetry Q67.0    Asymptomatic bacteriuria R82.71    Hyperglycemia R73.9    Lumbar radiculopathy M54.16    Type 2 diabetes mellitus E11.9    Recurrent urinary tract infection X25.5    Diastolic congestive heart failure (HCC) I50.30    Left hemiparesis ([facility]) G81.94    Chronic renal disease, stage III ([facility]) [801134] N18.30    Chronic systolic (congestive) heart failure I50.22    Flu J11.1       Isolation/Infection:   Isolation            Droplet  Contact          Patient Infection Status       Infection Onset Added Last Indicated Last Indicated By Review Planned Expiration Resolved Resolved By    Influenza 12/28/22 12/28/22 12/28/22 COVID-19 & Influenza Combo 01/04/23 01/07/23      MRSA  11/07/14 11/07/14 Ran Schaefer RN        07/2013 rt shin    Resolved    COVID-19 (Rule Out) 12/28/22 12/28/22 12/28/22 COVID-19 & Influenza Combo (Ordered)   12/28/22 Rule-Out Test Resulted    COVID-19 (Rule Out) 02/08/22 02/08/22 02/08/22 SARS-CoV-2 NAAT (Rapid) (Ordered)   02/08/22 Rule-Out Test Resulted    COVID-19 (Rule Out) 02/25/21 02/25/21 02/25/21 COVID-19, Rapid (Ordered)   02/25/21 Rule-Out Test Resulted            Nurse Assessment:  Last Vital Signs: BP (!) 167/78   Pulse (!) 102   Temp 99.1 °F (37.3 °C)   Resp 18   Ht 4' 11\" (1.499 m)   Wt 131 lb 6.3 oz (59.6 kg)   SpO2 93%   BMI 26.54 kg/m²     Last documented pain score (0-10 scale): Pain Level: 9  Last Weight:   Wt Readings from Last 1 Encounters:   12/28/22 131 lb 6.3 oz (59.6 kg)     Mental Status:  alert    IV Access:  - None    Nursing Mobility/ADLs:  Walking   Independent  Transfer  Independent  Bathing  Independent  Dressing  Independent  Toileting  Independent  Feeding  Independent  Med Admin  Independent  Med Delivery   whole    Wound Care Documentation and Therapy:  Incision 03/17/22 Back Lower;Medial (Active)   Number of days: 287        Elimination:  Continence: Bowel: Yes  Bladder: Yes  Urinary Catheter: None   Colostomy/Ileostomy/Ileal Conduit: No       Date of Last BM: 12/29/22    Intake/Output Summary (Last 24 hours) at 12/29/2022 0918  Last data filed at 12/28/2022 1924  Gross per 24 hour   Intake 480 ml   Output --   Net 480 ml     I/O last 3 completed shifts: In: 480 [P.O.:480]  Out: -     Safety Concerns:     None    Impairments/Disabilities:      None    Nutrition Therapy:  Current Nutrition Therapy:   - Oral Diet:  General    Routes of Feeding: Oral  Liquids: Thin Liquids  Daily Fluid Restriction: no  Last Modified Barium Swallow with Video (Video Swallowing Test): not done    Treatments at the Time of Hospital Discharge:   Respiratory Treatments: n/a  Oxygen Therapy:  is not on home oxygen therapy.   Ventilator:    - No ventilator support    Rehab Therapies: Physical Therapy and Occupational Therapy  Weight Bearing Status/Restrictions: No weight bearing restrictions  Other Medical Equipment (for information only, NOT a DME order):  walker  Other Treatments: n/a    Patient's personal belongings (please select all that are sent with patient):  Glasses, Dentures upper and lower    RN SIGNATURE:  Electronically signed by Dai Boswell RN on 12/29/22 at 12:03 PM EST    CASE MANAGEMENT/SOCIAL WORK SECTION    Inpatient Status Date: 12/28/2022    Readmission Risk Assessment Score:  Readmission Risk              Risk of Unplanned Readmission:  8           Discharging to [facility] [address], [address]  P: [phone]   F: [phone]     Dialysis Facility (if applicable)   Name:  Address:  Dialysis Schedule:  Phone:  Fax:    / signature: Electronically signed by Kasia Johnson RN on 12/29/22 at 12:20 PM EST    PHYSICIAN SECTION    Prognosis: Fair    Condition at Discharge: Stable    Rehab Potential (if transferring to Rehab): Fair    Recommended Labs or Other Treatments After Discharge: n/a    Physician Certification: I certify the above information and transfer of Kana Serrano  is necessary for the continuing treatment of the diagnosis listed and that she requires Home Care for less 30 days.      Update Admission H&P: No change in H&P    PHYSICIAN SIGNATURE:  Electronically signed by Min Gaytan MD on 12/29/22 at 9:18 AM EST

## 2022-12-30 ENCOUNTER — CARE COORDINATION (OUTPATIENT)
Dept: CASE MANAGEMENT | Age: 87
End: 2022-12-30

## 2022-12-30 DIAGNOSIS — J11.1 FLU: Primary | ICD-10-CM

## 2022-12-30 PROCEDURE — 1111F DSCHRG MED/CURRENT MED MERGE: CPT | Performed by: INTERNAL MEDICINE

## 2022-12-30 NOTE — CARE COORDINATION
[facility] Care Transitions Initial Follow Up Call    Call within 2 business days of discharge: Yes    Care Transition Nurse contacted the patient by telephone to perform post hospital discharge assessment. Verified name and  with patient as identifiers. Provided introduction to self, and explanation of the Care Transition Nurse role. Patient: Rashard Zimmerman   Patient : 1934   MRN: [id number]    Reason for Admission: Influenza A  Discharge Date: 22   RARS: Readmission Risk Score: 12.4      Last Discharge  Street       Date Complaint Diagnosis Description Type Department Provider    22 Nasal Congestion; Cough Influenza A . .. ED to Hosp-Admission (Discharged) (ADMITTED) Radha Mcdonald MD; Edilma Fonseca. .. Was this an external facility discharge? No     Challenges to be reviewed by the provider   Additional needs identified to be addressed with provider: No  none               Method of communication with provider: none. Patient discharged  from [facility] after 1 day hospitalization for influenza A. She reports she is doing \"pretty good. \" - Slept well last night - states she feels better after tamiflu & treatment at hospital.  Claims she is eating & taking fluids - no s/s of dehydration - denies lightheadedness. Slight MCBRIDE which is back to her baseline. Uses walker while up. No expectorant with cough, but sounds loose. She is starting cough medication after daughter picks up robitussin & tamiflu from pharmacy - will be ready today. [address] has contacted daughter & will plan initial visit tomorrow,   Informed of Care Transition + CTN contact #  Patient is scheduled for HFU on         Plan for next call: symptom management-reassess cough, fluid intake - check if tamiflu completed  follow-up appointment-review  appt      Care Transition Nurse reviewed discharge instructions and medical action plan with patient who verbalized understanding.  The patient was given an opportunity to ask questions and does not have any further questions or concerns at this time. Were discharge instructions available to patient? Yes. Reviewed appropriate site of care based on symptoms and resources available to patient including: PCP  Home health  CTN . The patient agrees to contact the PCP office for questions related to their healthcare. Advance Care Planning:   Does patient have an Advance Directive: not on file. Medication reconciliation was performed with patient, who verbalizes understanding of administration of home medications. Medications reviewed, 1111F entered: yes    Was patient discharged with a pulse oximeter? no    Non-face-to-face services provided:  Scheduled appointment with PCP-1/4  Obtained and reviewed discharge summary and/or continuity of care documents    Offered patient enrollment in the Remote Patient Monitoring (RPM) program for in-home monitoring:  Did not discuss on initial call . Care Transitions 24 Hour Call    Schedule Follow Up Appointment with PCP: Completed  Do you have a copy of your discharge instructions?: Yes  Do you have all of your prescriptions and are they filled?: Yes (Comment: daughter picking up today, 12/30)  Have you scheduled your follow up appointment?: Yes (Comment: 1/4)  How are you going to get to your appointment?: Car - family or friend to transport  Do you feel like you have everything you need to keep you well at home?: Yes  Care Transitions Interventions         Follow Up  Future Appointments   Date Time Provider Cj Pacheco   1/4/2023  3:15 PM [place] Black[place] [id number] [facility] Transition Nurse provided contact information. Plan for follow-up call in 5-7 days based on severity of symptoms and risk factors.   Plan for next call: symptom management-reassess cough, fluid intake - check if tamiflu completed  follow-up appointment-review 1/4 appt      Mikhail Mason RN

## 2023-01-04 ENCOUNTER — OFFICE VISIT (OUTPATIENT)
Dept: FAMILY MEDICINE CLINIC | Age: 88
End: 2023-01-04

## 2023-01-04 VITALS
SYSTOLIC BLOOD PRESSURE: 116 MMHG | HEART RATE: 80 BPM | TEMPERATURE: 97.3 F | DIASTOLIC BLOOD PRESSURE: 60 MMHG | WEIGHT: 138.8 LBS | BODY MASS INDEX: 28.03 KG/M2 | OXYGEN SATURATION: 95 %

## 2023-01-04 DIAGNOSIS — Z09 HOSPITAL DISCHARGE FOLLOW-UP: ICD-10-CM

## 2023-01-04 DIAGNOSIS — E11.9 TYPE 2 DIABETES MELLITUS WITHOUT COMPLICATION, WITHOUT LONG-TERM CURRENT USE OF INSULIN (HCC): ICD-10-CM

## 2023-01-04 DIAGNOSIS — N18.31 STAGE 3A CHRONIC KIDNEY DISEASE (HCC): ICD-10-CM

## 2023-01-04 DIAGNOSIS — I50.32 CHRONIC DIASTOLIC CONGESTIVE HEART FAILURE (HCC): ICD-10-CM

## 2023-01-04 DIAGNOSIS — G81.94 LEFT HEMIPARESIS (HCC): ICD-10-CM

## 2023-01-04 DIAGNOSIS — J10.1 INFLUENZA A H1N1 INFECTION: Primary | ICD-10-CM

## 2023-01-04 DIAGNOSIS — J20.8 ACUTE BRONCHITIS DUE TO OTHER SPECIFIED ORGANISMS: ICD-10-CM

## 2023-01-04 DIAGNOSIS — G35 MULTIPLE SCLEROSIS (HCC): ICD-10-CM

## 2023-01-04 DIAGNOSIS — E78.5 HYPERLIPIDEMIA, UNSPECIFIED HYPERLIPIDEMIA TYPE: ICD-10-CM

## 2023-01-04 LAB — HBA1C MFR BLD: 6.7 %

## 2023-01-04 RX ORDER — SIMVASTATIN 20 MG
20 TABLET ORAL NIGHTLY
Qty: 90 TABLET | Refills: 1 | Status: SHIPPED | OUTPATIENT
Start: 2023-01-04

## 2023-01-04 RX ORDER — PREDNISONE 10 MG/1
TABLET ORAL
Qty: 30 TABLET | Refills: 0 | Status: SHIPPED | OUTPATIENT
Start: 2023-01-04

## 2023-01-04 ASSESSMENT — PATIENT HEALTH QUESTIONNAIRE - PHQ9
SUM OF ALL RESPONSES TO PHQ QUESTIONS 1-9: 0
1. LITTLE INTEREST OR PLEASURE IN DOING THINGS: 0
SUM OF ALL RESPONSES TO PHQ9 QUESTIONS 1 & 2: 0
2. FEELING DOWN, DEPRESSED OR HOPELESS: 0
SUM OF ALL RESPONSES TO PHQ QUESTIONS 1-9: 0

## 2023-01-04 NOTE — PROGRESS NOTES
Pt is here today for a regular 4 mo f/u    Pt was in [facility], pt tested positive for influenza A, pt still has a dry cough, SOB, body feels very fatigued, urine appears normal, pt having headaches starting in the back by her neck, no ear issues, no sins pressure, no wheezing     Pt did have a chest x-ray     Home health came and did an assessment pt on 12/31/2022, will have couple more visits, first being tomorrow 01/05/2023

## 2023-01-04 NOTE — PROGRESS NOTES
Post-Discharge Transitional Care  Follow Up      Antonella Askew   YOB: 1934    Date of Office Visit:  1/4/2023  Date of Hospital Admission: 12/28/22  Date of Hospital Discharge: 12/29/22  Risk of hospital readmission (high >=14%. Medium >=10%) :Readmission Risk Score: 12.4      Care management risk score Rising risk (score 2-5) and Complex Care (Scores >=6): No Risk Score On File     Non face to face  following discharge, date last encounter closed (first attempt may have been earlier): 12/30/2022    Call initiated 2 business days of discharge: Yes    ASSESSMENT/PLAN:   Influenza A H1N1 infection  -     predniSONE (DELTASONE) 10 MG tablet; 4 tabs by mouth daily for 3 days, then 3 tabs daily for 3 days, then 2 tabs daily for 3 days, then 1 tab daily till gone., Disp-30 tablet, R-0Normal  Type 2 diabetes mellitus without complication, without long-term current use of insulin (HCC)  -     POCT glycosylated hemoglobin (Hb A1C)  Acute bronchitis due to other specified organisms  -     predniSONE (DELTASONE) 10 MG tablet; 4 tabs by mouth daily for 3 days, then 3 tabs daily for 3 days, then 2 tabs daily for 3 days, then 1 tab daily till gone., Disp-30 tablet, R-0Normal  Left hemiparesis (HCC)  Multiple sclerosis (HCC)  Chronic diastolic congestive heart failure (HCC)  Stage 3a chronic kidney disease [facility])  Hospital discharge follow-up  -     ND DISCHARGE MEDS RECONCILED W/ CURRENT OUTPATIENT MED LIST  Hyperlipidemia, unspecified hyperlipidemia type  -     simvastatin (ZOCOR) 20 MG tablet; Take 1 tablet by mouth nightly, Disp-90 tablet, R-1Normal      Medical Decision Making: moderate complexity  No follow-ups on file. On this date 1/4/2023 I have spent 15 minutes reviewing previous notes, test results and face to face with the patient discussing the diagnosis and importance of compliance with the treatment plan as well as documenting on the day of the visit.        Subjective:   HPI:  Follow up of Hospital problems/diagnosis(es): admitted with dyspnea and hypoxia due to influenza A, oxygen weaned during admission, discharged home with improvement in dyspnea and tamiflu for 5 days      Inpatient course: Discharge summary reviewed- see chart. Interval history/Current status: still coughing, everytime she tries to talk or move around the house. She notes the dyspnea started around [place], went to the ER four days later because she was suddenly unable to catch her breath. Finished tamiflu. Cough worse with moving around and laying down. No fevers, chills, nausea, vomiting  Has no appetite since she got sick     Patient Active Problem List   Diagnosis    Ataxia    Multiple sclerosis ([address]    Essential hypertension    Hyperlipidemia    Atelectasis    Debility    Abnormal gait    Actinic keratosis    Enthesopathy of hip region    Generalized osteoarthritis    Idiopathic peripheral neuropathy    Vitamin D deficiency    Primary localized osteoarthrosis of the hip, left    Primary osteoarthritis of left hip    Acquired spondylolisthesis    Chronic midline low back pain with left-sided sciatica    Spinal stenosis of lumbar region with neurogenic claudication    Presence of right artificial shoulder joint    Diarrhea of presumed infectious origin    Chronic headache    Facial asymmetry    Asymptomatic bacteriuria    Hyperglycemia    Lumbar radiculopathy    Type 2 diabetes mellitus    Recurrent urinary tract infection    Diastolic congestive heart failure (HCC)    Left hemiparesis (HCC)    Chronic renal disease, stage III ([address] [821832]    Chronic systolic (congestive) heart failure    Flu       Medications listed as ordered at the time of discharge from hospital     Medication List            Accurate as of January 4, 2023  3:40 PM. If you have any questions, ask your nurse or doctor.                 CONTINUE taking these medications      amitriptyline 100 MG tablet  Commonly known as: ELAVIL  Take 1 tablet by mouth nightly     celecoxib 200 MG capsule  Commonly known as: CeleBREX  Take 1 capsule by mouth daily     dextromethorphan 30 MG/5ML extended release liquid  Commonly known as: Robitussin 12 Hour Cough  Take 10 mLs by mouth 2 times daily as needed for Cough     docusate sodium 100 MG capsule  Commonly known as: COLACE  Take 1 capsule by mouth daily     gabapentin 100 MG capsule  Commonly known as: Neurontin  Take 1 capsule by mouth daily for 30 days. Handicap Placard Misc  by Does not apply route Exp: 5/20/2026     HYDROcodone-acetaminophen 5-325 MG per tablet  Commonly known as: NORCO     hydrOXYzine HCl 10 MG tablet  Commonly known as: ATARAX  Take 1 tablet by mouth nightly as needed for Anxiety     lisinopril 40 MG tablet  Commonly known as: PRINIVIL;ZESTRIL  TAKE ONE TABLET BY MOUTH DAILY     metoprolol succinate 50 MG extended release tablet  Commonly known as: Toprol XL  Take 1 tablet by mouth at bedtime     metoprolol tartrate 25 MG tablet  Commonly known as: LOPRESSOR  Take 2 tablets by mouth 2 times daily     omeprazole 20 MG delayed release capsule  Commonly known as: PRILOSEC  Take 1 capsule by mouth in the morning. polyethylene glycol 17 g packet  Commonly known as: GLYCOLAX  Take 17 g by mouth daily as needed for Constipation     predniSONE 10 MG tablet  Commonly known as: DELTASONE  Take 1 tablet by mouth daily     simvastatin 20 MG tablet  Commonly known as: ZOCOR  TAKE ONE TABLET BY MOUTH DAILY     tiZANidine 2 MG tablet  Commonly known as: ZANAFLEX  Take 1 tablet by mouth nightly as needed (restless legs)     traMADol 50 MG tablet  Commonly known as: ULTRAM  Take 1 tablet by mouth every 6 hours as needed for Pain for up to 60 days.                 Medications marked \"taking\" at this time  Outpatient Medications Marked as Taking for the 1/4/23 encounter (Office Visit) with Silverio Sommers MD   Medication Sig Dispense Refill    dextromethorphan (ROBITUSSIN 12 HOUR COUGH) 30 MG/5ML extended release liquid Take 10 mLs by mouth 2 times daily as needed for Cough 1 each 2    traMADol (ULTRAM) 50 MG tablet Take 1 tablet by mouth every 6 hours as needed for Pain for up to 60 days. 120 tablet 1    docusate sodium (COLACE) 100 MG capsule Take 1 capsule by mouth daily 90 capsule 1    predniSONE (DELTASONE) 10 MG tablet Take 1 tablet by mouth daily 90 tablet 1    celecoxib (CELEBREX) 200 MG capsule Take 1 capsule by mouth daily 90 capsule 1    lisinopril (PRINIVIL;ZESTRIL) 40 MG tablet TAKE ONE TABLET BY MOUTH DAILY 90 tablet 1    amitriptyline (ELAVIL) 100 MG tablet Take 1 tablet by mouth nightly 90 tablet 1    tiZANidine (ZANAFLEX) 2 MG tablet Take 1 tablet by mouth nightly as needed (restless legs) 90 tablet 1    metoprolol succinate (TOPROL XL) 50 MG extended release tablet Take 1 tablet by mouth at bedtime 90 tablet 1    omeprazole (PRILOSEC) 20 MG delayed release capsule Take 1 capsule by mouth in the morning. 90 capsule 1    simvastatin (ZOCOR) 20 MG tablet TAKE ONE TABLET BY MOUTH DAILY 90 tablet 1    HYDROcodone-acetaminophen (NORCO) 5-325 MG per tablet Take 1 tablet by mouth every 8 hours as needed. Handicap Placard MISC by Does not apply route Exp: 5/20/2026 1 each 0        Medications patient taking as of now reconciled against medications ordered at time of hospital discharge: Yes    A comprehensive review of systems was negative except for what was noted in the HPI. Objective:    /60 (Site: Left Upper Arm, Position: Sitting, Cuff Size: Medium Adult)   Pulse 80   Temp 97.3 °F (36.3 °C)   Wt 138 lb 12.8 oz (63 kg)   SpO2 95%   BMI 28.03 kg/m²   General Appearance: alert and oriented to person, place and time, well developed and well- nourished, in no acute distress.  Audible wheezing across the room, coughing with prolonged speech   Skin: warm and dry, no rash or erythema  Head: normocephalic and atraumatic  Eyes: pupils equal, round, and reactive to light, extraocular eye movements intact, conjunctivae normal  ENT: tympanic membrane, external ear and ear canal normal bilaterally, nose without deformity, nasal mucosa and turbinates normal without polyps. Clear postnasal drip   Neck: supple and non-tender without mass, no thyromegaly or thyroid nodules, no cervical lymphadenopathy  Pulmonary/Chest: clear to auscultation bilaterally- no wheezes, rales or rhonchi, normal air movement, no respiratory distress  Cardiovascular: normal rate, regular rhythm, normal S1 and S2, no murmurs, rubs, clicks, or gallops, distal pulses intact, no carotid bruits  Abdomen: soft, non-tender, non-distended, normal bowel sounds, no masses or organomegaly  Extremities: no cyanosis, clubbing or edema  Musculoskeletal: normal range of motion, no joint swelling, deformity or tenderness  Neurologic: reflexes normal and symmetric, no cranial nerve deficit, gait, coordination and speech normal      An electronic signature was used to authenticate this note.   --Thanh Lopez MD

## 2023-01-05 ENCOUNTER — CARE COORDINATION (OUTPATIENT)
Dept: CASE MANAGEMENT | Age: 88
End: 2023-01-05

## 2023-01-05 ENCOUNTER — PATIENT MESSAGE (OUTPATIENT)
Dept: FAMILY MEDICINE CLINIC | Age: 88
End: 2023-01-05

## 2023-01-05 DIAGNOSIS — J45.20 MILD INTERMITTENT REACTIVE AIRWAY DISEASE WITHOUT COMPLICATION: ICD-10-CM

## 2023-01-05 RX ORDER — ALBUTEROL SULFATE 90 UG/1
2 AEROSOL, METERED RESPIRATORY (INHALATION) EVERY 6 HOURS PRN
Qty: 1 EACH | Refills: 5 | Status: SHIPPED | OUTPATIENT
Start: 2023-01-05 | End: 2024-01-05

## 2023-01-05 NOTE — CARE COORDINATION
[facility] Care Transitions Follow Up Call    Care Transition Nurse contacted the patient by telephone to follow up after admission on 22. Verified name and  with patient as identifiers. Patient: Pham Terry  Patient : 1934   MRN: [id number]  Reason for Admission: Influenza A, hypoxia  Discharge Date: 22 RARS: Readmission Risk Score: 12.4      Needs to be reviewed by the provider   Additional needs identified to be addressed with provider: No  none             Method of communication with provider: none. Spoke briefly to [place] for transitions call. Pt is taking prednisone taper, on 12 day course. Informed her that PCP called in Ventolin inhaler for wheezing, SOB q  hr prn. Stated she will let her dtr know to pick it up. A1c slightly increased from 6.4 to 6.7. Remains on Zocor 20 MG hs. Has [address] visits. Starting to feel better today, still weak. Gets SOB with exertion, rests b/w. Unable to discuss RPM on this call. Pt in hurry to get off phone. Addressed changes since last contact:  medications-Ventolin called in to [facility]. Taking prednisone taper  Discussed follow-up appointments. Follow Up  Future Appointments   Date Time Provider Cj Pacheco   2023  3:00 PM Pauly Erazo MD [id number] [facility] Transition Nurse reviewed discharge instructions with patient and discussed any barriers to care and/or understanding of plan of care after discharge. Discussed appropriate site of care based on symptoms and resources available to patient including: PCP  When to call [address] The patient agrees to contact the PCP office for questions related to their healthcare.        Patients top risk factors for readmission: medical condition-CHF, DM2  Interventions to address risk factors: Obtained and reviewed discharge summary and/or continuity of care documents    Offered patient enrollment in the Remote Patient Monitoring (RPM) program for in-home monitoring:  did not discuss this call . Care Transitions Subsequent and Final Call    Subsequent and Final Calls  Do you have any ongoing symptoms?: Yes  Onset of Patient-reported symptoms: In the past 7 days  Patient-reported symptoms: Shortness of Breath  Interventions for patient-reported symptoms: Other  Have your medications changed?: Yes  Patient Reports: Ventolin inhaler q 6 hr prn, prednisone taper  Do you have any questions related to your medications?: No  Do you currently have any active services?: Yes  Are you currently active with any services?: Home Health  Do you have any needs or concerns that I can assist you with?: No  Identified Barriers: None  Care Transitions Interventions  Other Interventions:             Care Transition Nurse provided contact information for future needs. Plan for follow-up call in 5-7 days based on severity of symptoms and risk factors. Plan for next call: symptom management-on prednisone and has inhaler script. Improving? [facility] visits.  RPM- stefan Pablo RN

## 2023-01-05 NOTE — TELEPHONE ENCOUNTER
From: Barbi Kelly  To: Dr. Carlee Rubio: 2023 10:43 AM EST  Subject: Lyle Snowedn.   I know you wanted my mom to use her inhaler during the day for relieve some of her coughing and chest conjestion, however the one she currently has is . It was an albuterol inhaler. It is currently not on her medication list to refill.  Thanks!!

## 2023-01-06 ENCOUNTER — TELEPHONE (OUTPATIENT)
Dept: FAMILY MEDICINE CLINIC | Age: 88
End: 2023-01-06

## 2023-01-06 NOTE — TELEPHONE ENCOUNTER
Marea Homans from [facility] called and states they will be starting physical therapy the week of 01/09/2023 in the home 2x a week for three weeks.

## 2023-01-09 ENCOUNTER — CARE COORDINATION (OUTPATIENT)
Dept: CASE MANAGEMENT | Age: 88
End: 2023-01-09

## 2023-01-09 NOTE — CARE COORDINATION
[facility] Care Transitions Follow Up Call    Care Transition Nurse contacted the family by telephone to follow up after admission on 22. Verified name and  with family as identifiers. Patient: Pinky Felty  Patient : 1934   MRN: [id number]  Reason for Admission: Influenza A  Discharge Date: 22 RARS: Readmission Risk Score: 12.4      Needs to be reviewed by the provider   Additional needs identified to be addressed with provider: No  none             Method of communication with provider: none. Spoke to pt's daughter, stated pt is doing well, has [address] visits and is starting PT twice weekly x 3 weeks. Pt has some weakness, hoping to improve with PT. Denies increased SOB, cough, CP/pressure. Discussed RPM. Not interested at this time, will keep it in mind for future though. Addressed changes since last contact:  home health care-[facility], starting PT this week x 3 weeks  tele monitoring-RPM offered, declined  Discussed follow-up appointments. Follow Up  Future Appointments   Date Time Provider Cj Pacheco   2023  3:00 PM Pauly Ramos  [place] Transition Nurse reviewed discharge instructions with family and discussed any barriers to care and/or understanding of plan of care after discharge. Discussed appropriate site of care based on symptoms and resources available to patient including: PCP  Home health  When to call [address] The family agrees to contact the PCP office for questions related to their healthcare. Patients top risk factors for readmission: medical condition-CHF, MS  Interventions to address risk factors: Obtained and reviewed discharge summary and/or continuity of care documents    Offered patient enrollment in the Remote Patient Monitoring (RPM) program for in-home monitoring: Yes, but did not enroll at this time.      Care Transitions Subsequent and Final Call    Subsequent and Final Calls  Do you have any ongoing symptoms?: No  Have your medications changed?: No  Do you have any questions related to your medications?: No  Do you currently have any active services?: Yes  Are you currently active with any services?: Home Health  Do you have any needs or concerns that I can assist you with?: No  Identified Barriers: None  Care Transitions Interventions  Other Interventions:             Care Transition Nurse provided contact information for future needs. Plan for follow-up call in 5-7 days based on severity of symptoms and risk factors. Plan for next call: symptom management-weakness, PT helping? SOB? Cough? Final call?  Low risk    Surya Ceron RN

## 2023-01-16 ENCOUNTER — CARE COORDINATION (OUTPATIENT)
Dept: CASE MANAGEMENT | Age: 88
End: 2023-01-16

## 2023-01-16 NOTE — CARE COORDINATION
[facility] Care Transitions Follow Up Call    Care Transition Nurse contacted the patient by telephone to follow up after admission on 22. Verified name and  with patient as identifiers. Patient: Adal Chao  Patient : 1934   MRN: [id number]  Reason for Admission: Influenza A, Hypoxia  Discharge Date: 22 RARS: Readmission Risk Score: 12.4      Needs to be reviewed by the provider   Additional needs identified to be addressed with provider: No  none             Method of communication with provider: none. Spoke to [place] for transitions follow up. Has [facility]. Patient had PT visit today, has RN visit on 23. Weakness improving. Coughing improved, worse in a.m. Has rescue inhaler prn. Completing prednisone taper. Breathing is baseline    Stated appetite is OK, staying well hydrated. A1c completed, slight increase from previous check. Recent 6.7, previously 6.4. Overall improved since discharge. Addressed changes since last contact:  medications-completing prednisone taper, has PT,SN visits  Discussed follow-up appointments. Follow Up  Future Appointments   Date Time Provider Cj Pacheco   2023  3:00 PM Pauly Fletcher  [place] Transition Nurse reviewed discharge instructions with patient and discussed any barriers to care and/or understanding of plan of care after discharge. Discussed appropriate site of care based on symptoms and resources available to patient including: PCP  Specialist  Home health  When to call [address] The patient agrees to contact the PCP office for questions related to their healthcare.        Patients top risk factors for readmission: medical condition-DM2, 79 y/o, MS, CKD, HLD, CHF  Interventions to address risk factors: Obtained and reviewed discharge summary and/or continuity of care documents    Offered patient enrollment in the Remote Patient Monitoring (RPM) program for in-home monitoring: Patient declined. Care Transitions Subsequent and Final Call    Subsequent and Final Calls  Do you have any ongoing symptoms?: No  Have your medications changed?: No  Do you have any questions related to your medications?: No  Do you currently have any active services?: Yes  Are you currently active with any services?: Home Health  Do you have any needs or concerns that I can assist you with?: No  Identified Barriers: None  Care Transitions Interventions  Other Interventions:             Care Transition Nurse provided contact information for future needs. Plan for follow-up call in 7-10 days based on severity of symptoms and risk factors. Plan for next call: self management-weakness, cough, HHC, final call?     Jus Sebastian RN

## 2023-01-23 ENCOUNTER — HOSPITAL ENCOUNTER (OUTPATIENT)
Dept: GENERAL RADIOLOGY | Age: 88
Discharge: HOME | End: 2023-01-25
Payer: MEDICARE

## 2023-01-23 ENCOUNTER — OFFICE VISIT (OUTPATIENT)
Dept: FAMILY MEDICINE CLINIC | Age: 88
End: 2023-01-23

## 2023-01-23 ENCOUNTER — HOSPITAL ENCOUNTER (OUTPATIENT)
Facility: CLINIC | Age: 88
Discharge: HOME | End: 2023-01-25
Payer: MEDICARE

## 2023-01-23 VITALS
OXYGEN SATURATION: 96 % | SYSTOLIC BLOOD PRESSURE: 128 MMHG | TEMPERATURE: 97.5 F | WEIGHT: 138 LBS | DIASTOLIC BLOOD PRESSURE: 78 MMHG | BODY MASS INDEX: 27.87 KG/M2 | HEART RATE: 91 BPM

## 2023-01-23 DIAGNOSIS — Z13.220 SCREENING FOR HYPERLIPIDEMIA: ICD-10-CM
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
## 2023-01-23 DIAGNOSIS — M25.552 ACUTE HIP PAIN, LEFT: ICD-10-CM

## 2023-01-23 DIAGNOSIS — M25.552 ACUTE HIP PAIN, LEFT: Primary | ICD-10-CM

## 2023-01-23 DIAGNOSIS — K21.9 GASTROESOPHAGEAL REFLUX DISEASE, UNSPECIFIED WHETHER ESOPHAGITIS PRESENT: ICD-10-CM

## 2023-01-23 PROCEDURE — 73502 X-RAY EXAM HIP UNI 2-3 VIEWS: CPT

## 2023-01-23 RX ORDER — OMEPRAZOLE 20 MG/1
20 CAPSULE, DELAYED RELEASE ORAL DAILY
Qty: 90 CAPSULE | Refills: 1 | Status: SHIPPED | OUTPATIENT
Start: 2023-01-23

## 2023-01-23 ASSESSMENT — ENCOUNTER SYMPTOMS
WHEEZING: 0
VOMITING: 0
CHOKING: 0
NAUSEA: 0
SHORTNESS OF BREATH: 0
DIARRHEA: 0
CONSTIPATION: 0
CHEST TIGHTNESS: 0
COUGH: 0
ANAL BLEEDING: 0
BLOOD IN STOOL: 0
ABDOMINAL PAIN: 0
BACK PAIN: 1

## 2023-01-23 NOTE — PROGRESS NOTES
[facility]  [facility] PHYS POINT [address]  [address] [facility] [address]  Dept: [phone]  Dept Fax: [phone]      Lino Paez is a 80 y.o. female who presents today for hermedical conditions/complaints as noted below. Lino Paez is c/o of Hip Pain (LT side after fall )        Assessment/Plan:     1. Acute hip pain, left  -     XR HIP LEFT (2-3 VIEWS); Future  2. Gastroesophageal reflux disease  -     omeprazole (PRILOSEC) 20 MG delayed release capsule; Take 1 capsule by mouth daily, Disp-90 capsule, R-1Normal  3. Fall, initial encounter  -     XR HIP LEFT (2-3 VIEWS); Future  4. Screening for hyperlipidemia  -     Lipid, Fasting; Future      If no fracture - will call in medrol dosepak     No follow-ups on file. HPI     Hip Pain   The incident occurred 5 to 7 days ago. The incident occurred at home. The injury mechanism was a fall. The pain is present in the left hip. The quality of the pain is described as aching. The pain is moderate. The pain has been Constant since onset. Pertinent negatives include no inability to bear weight, loss of motion, loss of sensation, muscle weakness, numbness or tingling. She reports no foreign bodies present. The symptoms are aggravated by movement and weight bearing. Treatments tried: PT, rest, ice, heat. The treatment provided no relief.      BP Readings from Last 3 Encounters:   01/23/23 128/78   01/04/23 116/60   12/29/22 (!) 167/78              Past Medical History:   Diagnosis Date    Acute cystitis without hematuria 10/1/2017    EDINSON (acute kidney injury) ([address] 3/3/2021    Arthritis     Chronic daily headache     GERD (gastroesophageal reflux disease)     Hyperlipidemia     Hypertension     Moderate malnutrition ([address] 3/2/2019    Multiple sclerosis ([address]     Multiple sclerosis exacerbation ([address] 2/25/2021    Neuropathy     Pneumonia 2017    states had double pneumonia    Vitamin D deficiency       Past Surgical History:   Procedure Laterality Date    CATARACT REMOVAL WITH IMPLANT Right 11/6/2014    Raffoul/Ole    CATARACT REMOVAL WITH IMPLANT Left 12/2/2014    Harvinder/Ole    CERVICAL DISC SURGERY      CYSTOCELE REPAIR      HYSTERECTOMY (CERVIX STATUS UNKNOWN)      LUMBAR LAMINECTOMY N/A 3/17/2022    LUMBAR LAMINECTOMY DECOMPRESSION MINIMALLY INVASIVE (MILD) L4-L5 performed by Patel Sawant MD at [address] Right 04/20/2017    SHOULDER SURGERY Right 2017       No family history on file. Social History     Tobacco Use    Smoking status: Never    Smokeless tobacco: Never   Substance Use Topics    Alcohol use: No        Allergies   Allergen Reactions    Bactrim [Sulfamethoxazole-Trimethoprim] Other (See Comments)     BLISTERS IN MOUTH    Other Diarrhea     Lactose Intolerance- No liquid milk or ice cream. May have milk cooked in foods. Pcn [Penicillins] Hives    Lidoderm [Lidocaine] Rash    Macrobid [Nitrofurantoin] Nausea And Vomiting     Prior to Visit Medications    Medication Sig Taking?  Authorizing Provider   albuterol sulfate HFA (VENTOLIN HFA) 108 (90 Base) MCG/ACT inhaler Inhale 2 puffs into the lungs every 6 hours as needed for Wheezing Yes Ross Bryson MD   simvastatin (ZOCOR) 20 MG tablet Take 1 tablet by mouth nightly Yes Pauly Quiñonez MD   docusate sodium (COLACE) 100 MG capsule Take 1 capsule by mouth daily Yes Pauly Quiñonez MD   predniSONE (DELTASONE) 10 MG tablet Take 1 tablet by mouth daily Yes Pauly Quiñonez MD   celecoxib (CELEBREX) 200 MG capsule Take 1 capsule by mouth daily Yes Pauly Quiñonez MD   lisinopril (PRINIVIL;ZESTRIL) 40 MG tablet TAKE ONE TABLET BY MOUTH DAILY Yes Pauly Quiñonez MD   amitriptyline (ELAVIL) 100 MG tablet Take 1 tablet by mouth nightly Yes Pauly Quiñonez MD   tiZANidine (ZANAFLEX) 2 MG tablet Take 1 tablet by mouth nightly as needed (restless legs) Yes Pauly Quiñonez MD   metoprolol succinate (TOPROL XL) 50 MG extended release tablet Take 1 tablet by mouth at bedtime Yes Pauly Graves MD   omeprazole (PRILOSEC) 20 MG delayed release capsule Take 1 capsule by mouth in the morning. Yes Sveta Rubio MD   gabapentin (NEURONTIN) 100 MG capsule Take 1 capsule by mouth daily for 30 days. Yes Sveta Rubio MD   polyethylene glycol (GLYCOLAX) 17 g packet Take 17 g by mouth daily as needed for Constipation Yes Archana Cervantes MD   HYDROcodone-acetaminophen (NORCO) 5-325 MG per tablet Take 1 tablet by mouth every 8 hours as needed. Yes Historical Provider, MD   Handicap Placard MISC by Does not apply route Exp: 5/20/2026 Yes Sveta Rubio MD   predniSONE (DELTASONE) 10 MG tablet 4 tabs by mouth daily for 3 days, then 3 tabs daily for 3 days, then 2 tabs daily for 3 days, then 1 tab daily till gone. Patient not taking: Reported on 1/23/2023  Pauly Graves MD   metoprolol tartrate (LOPRESSOR) 25 MG tablet Take 2 tablets by mouth 2 times daily  Patient not taking: No sig reported  Pauly Graves MD   hydrOXYzine HCl (ATARAX) 10 MG tablet Take 1 tablet by mouth nightly as needed for Anxiety  Patient not taking: No sig reported  Sveta Rubio MD       Review of Systems     Review of Systems   Constitutional:  Negative for fatigue, fever and unexpected weight change. Respiratory:  Negative for cough, choking, chest tightness, shortness of breath and wheezing. Cardiovascular:  Negative for chest pain, palpitations and leg swelling. Gastrointestinal:  Negative for abdominal pain, anal bleeding, blood in stool, constipation, diarrhea, nausea and vomiting. Endocrine: Negative. Musculoskeletal:  Positive for arthralgias and back pain. Negative for joint swelling and myalgias. Skin: Negative. Neurological:  Negative for dizziness, tingling and numbness. Psychiatric/Behavioral:  Negative for sleep disturbance. All other systems reviewed and are negative.     Objective     /78 (Site: Right Upper Arm, Position: Sitting, Cuff Size: Medium Adult)   Pulse 91   Temp 97.5 °F (36.4 °C)   Wt 138 lb (62.6 kg)   SpO2 96%   BMI 27.87 kg/m²   Physical Exam  Vitals and nursing note reviewed. Constitutional:       General: She is in acute distress. Chest:      Comments: Resolving bruising L lateral ribs   Musculoskeletal:      Left hip: Crepitus present. No deformity, lacerations, tenderness or bony tenderness. Decreased range of motion. Decreased strength. Left knee: Normal.   Neurological:      Mental Status: She is alert. Data Review       Health Maintenance Due   Topic Date Due    Lipids  11/06/2022           Patient given educational materials- see patient instructions. Discussed use, benefit, and side effects of prescribedmedications. All patient questions answered. Pt voiced understanding. Reviewedhealth maintenance. Instructed to continue current medications, diet and exercise. Patient agreed with treatment plan. Follow up as directed.      Electronically signedby Stormy Gonzalez MD on 1/23/2023

## 2023-01-23 NOTE — PROGRESS NOTES
Pt is here today for LT hip pain, pt fell on 01/16/2023, pt tripped over the threshold going out onto her porch, pt had a bruise and a scratch on her back LT side,  pt struggling to get up in the morning, painful to put weight on it,  pt has a sharp pain that goes down her leg, hip is very painful, bruising has cleared up for the most part,

## 2023-01-24 ENCOUNTER — PATIENT MESSAGE (OUTPATIENT)
Dept: FAMILY MEDICINE CLINIC | Age: 88
End: 2023-01-24

## 2023-01-24 ENCOUNTER — CARE COORDINATION (OUTPATIENT)
Dept: CASE MANAGEMENT | Age: 88
End: 2023-01-24

## 2023-01-24 NOTE — TELEPHONE ENCOUNTER
From: Anika Sanz  To: Dr. Lizzie Curtis: 1/24/2023 7:14 AM EST  Subject: Jany Hickey. When we were in yesterday I forgot to mention that my mom's mouth is again sore I believe from the antibiotics she takes daily. You prescribed her a mouth rinse before. Can we get that again as it worked quite well the last time. Thanks!

## 2023-01-24 NOTE — CARE COORDINATION
[facility] Care Transitions Follow Up Call    Care Transition Nurse contacted the patient by telephone to follow up after admission on 22. Verified name and  with patient as identifiers. Patient: Jay Potter  Patient : 1934   MRN: [id number]  Reason for Admission: Influenza A, hypoxia  Discharge Date: 22 RARS: Readmission Risk Score: 12.4      Needs to be reviewed by the provider   Additional needs identified to be addressed with provider: No  none             Method of communication with provider: none. Spoke to [place] for final transitions call. Pt had PCP follow up yesterday, had fall @ a week ago, tripped over threshold at front door and fall on left hip. No fracture noted on X ray. Son is fixing threshold. Pt has been icing/heat to area. Continues with [facility] PT. Unsure when they revisit. Daughter notified PCP of mouth pain r/t atb. Requested Nystatin, awaiting response from office. Uses walker at home. Cough subsided, saying hydrated, appetite is good. No increased SOB. Will refer to [facility] for further care coordination. Addressed changes since last contact:  home health care-[facility] PT[facility]. Declined RPM  Discussed follow-up appointments. Follow Up  Future Appointments   Date Time Provider Cj Pacheco   2023  3:00 PM Pauly Palacios  [place] Transition Nurse reviewed discharge instructions with patient and discussed any barriers to care and/or understanding of plan of care after discharge. Discussed appropriate site of care based on symptoms and resources available to patient including: PCP  Home health  When to call [address] The patient agrees to contact the PCP office for questions related to their healthcare. Patients top risk factors for readmission: medical condition-CHF, DM2, over [de-identified], recent fall  Interventions to address risk factors: Obtained and reviewed discharge summary and/or continuity of care documents. Referral to [facility]    Offered patient enrollment in the Remote Patient Monitoring (RPM) program for in-home monitoring: Patient declined. Care Transitions Subsequent and Final Call    Subsequent and Final Calls  Do you have any ongoing symptoms?: Yes  Onset of Patient-reported symptoms: In the past 7 days  Patient-reported symptoms: Other  Interventions for patient-reported symptoms: Other  Have your medications changed?: No  Do you have any questions related to your medications?: No  Do you currently have any active services?: Yes  Are you currently active with any services?: Home Health  Do you have any needs or concerns that I can assist you with?: No  Identified Barriers: Other  Care Transitions Interventions  Other Interventions:             Care Transition Nurse provided contact information for future needs. No further follow-up call indicated based on severity of symptoms and risk factors.   Plan for next call: referral to ambulatory care Sapphire Martínez RN

## 2023-01-25 DIAGNOSIS — G89.29 CHRONIC MIDLINE LOW BACK PAIN WITH LEFT-SIDED SCIATICA: ICD-10-CM

## 2023-01-25 DIAGNOSIS — M54.42 CHRONIC MIDLINE LOW BACK PAIN WITH LEFT-SIDED SCIATICA: ICD-10-CM

## 2023-01-25 DIAGNOSIS — M25.552 LEFT HIP PAIN: Primary | ICD-10-CM

## 2023-01-25 RX ORDER — PREDNISONE 10 MG/1
TABLET ORAL
Qty: 30 TABLET | Refills: 0 | Status: SHIPPED | OUTPATIENT
Start: 2023-01-25

## 2023-01-25 RX ORDER — PREDNISONE 10 MG/1
10 TABLET ORAL DAILY
Qty: 90 TABLET | Refills: 1 | OUTPATIENT
Start: 2023-01-25

## 2023-01-26 ENCOUNTER — CARE COORDINATION (OUTPATIENT)
Dept: CARE COORDINATION | Age: 88
End: 2023-01-26

## 2023-01-26 SDOH — ECONOMIC STABILITY: HOUSING INSECURITY: IN THE LAST 12 MONTHS, HOW MANY PLACES HAVE YOU LIVED?: 1

## 2023-01-26 SDOH — ECONOMIC STABILITY: INCOME INSECURITY: IN THE LAST 12 MONTHS, WAS THERE A TIME WHEN YOU WERE NOT ABLE TO PAY THE MORTGAGE OR RENT ON TIME?: NO

## 2023-01-26 SDOH — ECONOMIC STABILITY: TRANSPORTATION INSECURITY
IN THE PAST 12 MONTHS, HAS THE LACK OF TRANSPORTATION KEPT YOU FROM MEDICAL APPOINTMENTS OR FROM GETTING MEDICATIONS?: NO

## 2023-01-26 SDOH — ECONOMIC STABILITY: HOUSING INSECURITY
IN THE LAST 12 MONTHS, WAS THERE A TIME WHEN YOU DID NOT HAVE A STEADY PLACE TO SLEEP OR SLEPT IN A SHELTER (INCLUDING NOW)?: NO

## 2023-01-26 SDOH — ECONOMIC STABILITY: TRANSPORTATION INSECURITY
IN THE PAST 12 MONTHS, HAS LACK OF TRANSPORTATION KEPT YOU FROM MEETINGS, WORK, OR FROM GETTING THINGS NEEDED FOR DAILY LIVING?: NO

## 2023-01-26 NOTE — CARE COORDINATION
She had an admit 12/28-12/29 for flu, hypoxia, fall. She took Tamiflu. Had left hip pain, x ray negative. Has MS, CHF, HTN, chronic pain, DM. Follows with neuro at [address] [place] [address] who prescribes Norco. Currently on prednisone taper from PCP. Does not see cardiology or any other providers besides PCP and neuro. A1c 6.4, does not check blood sugars at home. She recently took oral antibiotic for vaginal irritation then developed mouth sores so currently taking nystatin solution which is helping. No problems swallowing. Getting appetite back, eating and drinking ok. No additional falls since home. Using walker. Lives by herself. Daughter Anthony Coker drives her to [facility] [employer], manages her medications. No SDOH needs identified. Cough is gone, no dyspnea, feels at her normal right now. Patient declined need for care management calls. Is getting weekly nursing visits from [address] [place] [address], also getting PT and OT at home. Encouraged she call me for any future concerns or needs.

## 2023-01-31 ENCOUNTER — E-VISIT (OUTPATIENT)
Dept: FAMILY MEDICINE CLINIC | Age: 88
End: 2023-01-31
Payer: MEDICARE

## 2023-01-31 DIAGNOSIS — N39.0 RECURRENT URINARY TRACT INFECTION: Primary | ICD-10-CM

## 2023-01-31 PROCEDURE — 99421 OL DIG E/M SVC 5-10 MIN: CPT | Performed by: INTERNAL MEDICINE

## 2023-01-31 RX ORDER — CEPHALEXIN 500 MG/1
500 CAPSULE ORAL 2 TIMES DAILY
Qty: 14 CAPSULE | Refills: 0 | Status: SHIPPED | OUTPATIENT
Start: 2023-01-31 | End: 2023-02-07

## 2023-02-09 ENCOUNTER — PATIENT MESSAGE (OUTPATIENT)
Dept: FAMILY MEDICINE CLINIC | Age: 88
End: 2023-02-09

## 2023-02-09 DIAGNOSIS — M15.9 GENERALIZED OSTEOARTHRITIS: ICD-10-CM

## 2023-02-09 RX ORDER — TRAMADOL HYDROCHLORIDE 50 MG/1
50 TABLET ORAL EVERY 6 HOURS PRN
Qty: 120 TABLET | Refills: 1 | Status: SHIPPED | OUTPATIENT
Start: 2023-02-09 | End: 2023-04-10

## 2023-02-09 NOTE — TELEPHONE ENCOUNTER
From: Garrick Green  To: Dr. Karyle Police: 2/9/2023 2:58 PM EST  Subject: Florin Glaarmani    The tramadol is not on my moms medication list to refill but she is in need of a refill to be sent to the pharmacy please

## 2023-02-22 DIAGNOSIS — K59.03 THERAPEUTIC OPIOID-INDUCED CONSTIPATION (OIC): ICD-10-CM

## 2023-02-22 DIAGNOSIS — T40.2X5A THERAPEUTIC OPIOID-INDUCED CONSTIPATION (OIC): ICD-10-CM

## 2023-02-22 RX ORDER — DOCUSATE SODIUM 100 MG/1
100 CAPSULE, LIQUID FILLED ORAL DAILY
Qty: 90 CAPSULE | Refills: 1 | Status: SHIPPED | OUTPATIENT
Start: 2023-02-22

## 2023-02-22 NOTE — TELEPHONE ENCOUNTER
Last visit: 01/13/2023  Last Med refill: 08/26/2022  Does patient have enough medication for 72 hours: No:     Next Visit Date:  Future Appointments   Date Time Provider Cj Tara   4/26/2023  3:00 PM Pauly Landry  [address] Maintenance   Topic Date Due    Lipids  11/06/2022    Shingles vaccine (1 of 2) 03/29/2023 (Originally [birth year]8/1984)    DTaP/Tdap/Td vaccine (1 - Tdap) 01/04/2024 (Originally 12/8/1953)    COVID-19 Vaccine (5 - Booster for Pfizer series) 01/04/2024 (Originally 10/8/2022)    Annual Wellness Visit (AWV)  04/26/2023    Depression Screen  01/04/2024    Flu vaccine  Completed    Pneumococcal 65+ years Vaccine  Completed    Hepatitis A vaccine  Aged Out    Hib vaccine  Aged Out    Meningococcal (ACWY) vaccine  Aged Out       Hemoglobin A1C (%)   Date Value   01/04/2023 6.7   08/24/2022 6.4   03/29/2022 6.7             ( goal A1C is < 7)   No results found for: LABMICR  LDL Cholesterol (mg/dL)   Date Value   05/21/2020 86   05/24/2019 75     LDL Calculated (mg/dL)   Date Value   11/06/2021 100       (goal LDL is <100)   AST (U/L)   Date Value   02/08/2022 18     ALT (U/L)   Date Value   02/08/2022 21     BUN (mg/dL)   Date Value   12/28/2022 18     BP Readings from Last 3 Encounters:   01/23/23 128/78   01/04/23 116/60   12/29/22 (!) 167/78          (goal 120/80)    All Future Testing planned in [facility]  Lab Frequency Next Occurrence   Saline lock IV Once 04/04/2022   Lipid, Fasting Once 02/23/2023               Patient Active Problem List:     Ataxia     Multiple sclerosis ([address]     Essential hypertension     Hyperlipidemia     Atelectasis     Debility     Abnormal gait     Actinic keratosis     Enthesopathy of hip region     Generalized osteoarthritis     Idiopathic peripheral neuropathy     Vitamin D deficiency     Primary localized osteoarthrosis of the hip, left     Primary osteoarthritis of left hip     Acquired spondylolisthesis     Chronic midline low back pain with left-sided sciatica     Spinal stenosis of lumbar region with neurogenic claudication     Presence of right artificial shoulder joint     Diarrhea of presumed infectious origin     Chronic headache     Facial asymmetry     Asymptomatic bacteriuria     Hyperglycemia     Lumbar radiculopathy     Type 2 diabetes mellitus     Recurrent urinary tract infection     Diastolic congestive heart failure (HCC)     Left hemiparesis (HCC)     Chronic renal disease, stage III (HCC) [911801]     Chronic systolic (congestive) heart failure     Flu

## 2023-02-25 ENCOUNTER — E-VISIT (OUTPATIENT)
Dept: FAMILY MEDICINE CLINIC | Age: 88
End: 2023-02-25
Payer: MEDICARE

## 2023-02-25 DIAGNOSIS — G89.29 CHRONIC MIDLINE LOW BACK PAIN WITH LEFT-SIDED SCIATICA: ICD-10-CM

## 2023-02-25 DIAGNOSIS — L08.0 PUSTULAR RASH: Primary | ICD-10-CM

## 2023-02-25 DIAGNOSIS — M54.42 CHRONIC MIDLINE LOW BACK PAIN WITH LEFT-SIDED SCIATICA: ICD-10-CM

## 2023-02-25 PROCEDURE — 99421 OL DIG E/M SVC 5-10 MIN: CPT | Performed by: INTERNAL MEDICINE

## 2023-02-27 RX ORDER — CLINDAMYCIN PHOSPHATE 10 MG/G
GEL TOPICAL
Qty: 30 G | Refills: 0 | Status: SHIPPED | OUTPATIENT
Start: 2023-02-27 | End: 2023-03-06

## 2023-02-27 RX ORDER — AMITRIPTYLINE HYDROCHLORIDE 100 MG/1
100 TABLET, FILM COATED ORAL NIGHTLY
Qty: 90 TABLET | Refills: 1 | Status: SHIPPED | OUTPATIENT
Start: 2023-02-27

## 2023-02-27 NOTE — TELEPHONE ENCOUNTER
Last visit: 01/31/2023  Last Med refill: 11/2022  Does patient have enough medication for 72 hours: No:     Next Visit Date:  Future Appointments   Date Time Provider Cj Pacheco   4/26/2023  3:00 PM Pauly Dorsey  [address] Maintenance   Topic Date Due    Lipids  11/06/2022    Shingles vaccine (1 of 2) 03/29/2023 (Originally [birth year]/1984)    DTaP/Tdap/Td vaccine (1 - Tdap) 01/04/2024 (Originally 12/8/1953)    COVID-19 Vaccine (5 - Booster for Pfizer series) 01/04/2024 (Originally 10/8/2022)    Annual Wellness Visit (AWV)  04/26/2023    Depression Screen  01/04/2024    Flu vaccine  Completed    Pneumococcal 65+ years Vaccine  Completed    Hepatitis A vaccine  Aged Out    Hib vaccine  Aged Out    Meningococcal (ACWY) vaccine  Aged Out       Hemoglobin A1C (%)   Date Value   01/04/2023 6.7   08/24/2022 6.4   03/29/2022 6.7             ( goal A1C is < 7)   No results found for: LABMICR  LDL Cholesterol (mg/dL)   Date Value   05/21/2020 86   05/24/2019 75     LDL Calculated (mg/dL)   Date Value   11/06/2021 100       (goal LDL is <100)   AST (U/L)   Date Value   02/08/2022 18     ALT (U/L)   Date Value   02/08/2022 21     BUN (mg/dL)   Date Value   12/28/2022 18     BP Readings from Last 3 Encounters:   01/23/23 128/78   01/04/23 116/60   12/29/22 (!) 167/78          (goal 120/80)    All Future Testing planned in [facility]  Lab Frequency Next Occurrence   Saline lock IV Once 04/04/2022   Lipid, Fasting Once 02/23/2023               Patient Active Problem List:     Ataxia     Multiple sclerosis ([address]     Essential hypertension     Hyperlipidemia     Atelectasis     Debility     Abnormal gait     Actinic keratosis     Enthesopathy of hip region     Generalized osteoarthritis     Idiopathic peripheral neuropathy     Vitamin D deficiency     Primary localized osteoarthrosis of the hip, left     Primary osteoarthritis of left hip     Acquired spondylolisthesis     Chronic midline low back pain with left-sided sciatica     Spinal stenosis of lumbar region with neurogenic claudication     Presence of right artificial shoulder joint     Diarrhea of presumed infectious origin     Chronic headache     Facial asymmetry     Asymptomatic bacteriuria     Hyperglycemia     Lumbar radiculopathy     Type 2 diabetes mellitus     Recurrent urinary tract infection     Diastolic congestive heart failure (HCC)     Left hemiparesis (HCC)     Chronic renal disease, stage III (HCC) [[id number]]     Chronic systolic (congestive) heart failure     Flu

## 2023-03-15 DIAGNOSIS — I10 ESSENTIAL HYPERTENSION: ICD-10-CM

## 2023-03-15 DIAGNOSIS — E78.5 HYPERLIPIDEMIA, UNSPECIFIED HYPERLIPIDEMIA TYPE: ICD-10-CM

## 2023-03-15 DIAGNOSIS — G89.29 CHRONIC MIDLINE LOW BACK PAIN WITH LEFT-SIDED SCIATICA: ICD-10-CM

## 2023-03-15 DIAGNOSIS — M54.42 CHRONIC MIDLINE LOW BACK PAIN WITH LEFT-SIDED SCIATICA: ICD-10-CM

## 2023-03-15 DIAGNOSIS — M25.552 LEFT HIP PAIN: ICD-10-CM

## 2023-03-15 RX ORDER — SIMVASTATIN 20 MG
20 TABLET ORAL NIGHTLY
Qty: 90 TABLET | Refills: 1 | OUTPATIENT
Start: 2023-03-15

## 2023-03-15 RX ORDER — PREDNISONE 10 MG/1
TABLET ORAL
Qty: 30 TABLET | Refills: 0 | OUTPATIENT
Start: 2023-03-15

## 2023-03-15 RX ORDER — LISINOPRIL 40 MG/1
TABLET ORAL
Qty: 90 TABLET | Refills: 1 | Status: SHIPPED | OUTPATIENT
Start: 2023-03-15

## 2023-03-15 RX ORDER — PREDNISONE 10 MG/1
10 TABLET ORAL DAILY
Qty: 90 TABLET | Refills: 1 | Status: SHIPPED | OUTPATIENT
Start: 2023-03-15

## 2023-03-15 NOTE — TELEPHONE ENCOUNTER
Last visit: 02/25/2023  Last Med refill: 12/2022  Does patient have enough medication for 72 hours: No:     Next Visit Date:  Future Appointments   Date Time Provider Cj Gilberti   4/26/2023  3:00 PM Pauly Morales  [address] Maintenance   Topic Date Due    Lipids  11/06/2022    Shingles vaccine (1 of 2) 03/29/2023 (Originally [birth year]1984)    DTaP/Tdap/Td vaccine (1 - Tdap) 01/04/2024 (Originally 12/8/1953)    COVID-19 Vaccine (5 - Booster for Pfizer series) 01/04/2024 (Originally 10/8/2022)    Annual Wellness Visit (AWV)  04/26/2023    Depression Screen  01/04/2024    Flu vaccine  Completed    Pneumococcal 65+ years Vaccine  Completed    Hepatitis A vaccine  Aged Out    Hib vaccine  Aged Out    Meningococcal (ACWY) vaccine  Aged Out       Hemoglobin A1C (%)   Date Value   01/04/2023 6.7   08/24/2022 6.4   03/29/2022 6.7             ( goal A1C is < 7)   No results found for: LABMICR  LDL Cholesterol (mg/dL)   Date Value   05/21/2020 86   05/24/2019 75     LDL Calculated (mg/dL)   Date Value   11/06/2021 100       (goal LDL is <100)   AST (U/L)   Date Value   02/08/2022 18     ALT (U/L)   Date Value   02/08/2022 21     BUN (mg/dL)   Date Value   12/28/2022 18     BP Readings from Last 3 Encounters:   01/23/23 128/78   01/04/23 116/60   12/29/22 (!) 167/78          (goal 120/80)    All Future Testing planned in [facility]  Lab Frequency Next Occurrence   Saline lock IV Once 04/04/2022   Lipid, Fasting Once 02/23/2023               Patient Active Problem List:     Ataxia     Multiple sclerosis ([address]     Essential hypertension     Hyperlipidemia     Atelectasis     Debility     Abnormal gait     Actinic keratosis     Enthesopathy of hip region     Generalized osteoarthritis     Idiopathic peripheral neuropathy     Vitamin D deficiency     Primary localized osteoarthrosis of the hip, left     Primary osteoarthritis of left hip     Acquired spondylolisthesis     Chronic midline low back pain with left-sided sciatica     Spinal stenosis of lumbar region with neurogenic claudication     Presence of right artificial shoulder joint     Diarrhea of presumed infectious origin     Chronic headache     Facial asymmetry     Asymptomatic bacteriuria     Hyperglycemia     Lumbar radiculopathy     Type 2 diabetes mellitus     Recurrent urinary tract infection     Diastolic congestive heart failure (HCC)     Left hemiparesis (HCC)     Chronic renal disease, stage III (HCC) [[id number]]     Chronic systolic (congestive) heart failure     Flu

## 2023-04-02 DIAGNOSIS — I10 ESSENTIAL HYPERTENSION: ICD-10-CM

## 2023-04-03 NOTE — TELEPHONE ENCOUNTER
Last visit: 1/23/23  Last Med refill: 9/13/22  Does patient have enough medication for 72 hours: No:     Next Visit Date:  Future Appointments   Date Time Provider Cj Tara   4/26/2023  3:00 PM Pauly Blackman  [address] Maintenance   Topic Date Due    Shingles vaccine (1 of 2) Never done    Lipids  11/06/2022    Annual Wellness Visit (AWV)  04/26/2023    DTaP/Tdap/Td vaccine (1 - Tdap) 01/04/2024 (Originally 12/8/1953)    COVID-19 Vaccine (5 - Booster for Pfizer series) 01/04/2024 (Originally 10/8/2022)    Depression Screen  01/04/2024    Flu vaccine  Completed    Pneumococcal 65+ years Vaccine  Completed    Hepatitis A vaccine  Aged Out    Hib vaccine  Aged Out    Meningococcal (ACWY) vaccine  Aged Out    DEXA (modify frequency per FRAX score)  Discontinued       Hemoglobin A1C (%)   Date Value   01/04/2023 6.7   08/24/2022 6.4   03/29/2022 6.7             ( goal A1C is < 7)   No results found for: LABMICR  LDL Cholesterol (mg/dL)   Date Value   05/21/2020 86   05/24/2019 75     LDL Calculated (mg/dL)   Date Value   11/06/2021 100       (goal LDL is <100)   AST (U/L)   Date Value   02/08/2022 18     ALT (U/L)   Date Value   02/08/2022 21     BUN (mg/dL)   Date Value   12/28/2022 18     BP Readings from Last 3 Encounters:   01/23/23 128/78   01/04/23 116/60   12/29/22 (!) 167/78          (goal 120/80)    All Future Testing planned in [facility]  Lab Frequency Next Occurrence   Saline lock IV Once 04/04/2022   Lipid, Fasting Once 02/23/2023               Patient Active Problem List:     Ataxia     Multiple sclerosis ([address]     Essential hypertension     Hyperlipidemia     Atelectasis     Debility     Abnormal gait     Actinic keratosis     Enthesopathy of hip region     Generalized osteoarthritis     Idiopathic peripheral neuropathy     Vitamin D deficiency     Primary localized osteoarthrosis of the hip, left     Primary osteoarthritis of left hip     Acquired spondylolisthesis

## 2023-04-04 DIAGNOSIS — I10 ESSENTIAL HYPERTENSION: ICD-10-CM

## 2023-04-04 RX ORDER — METOPROLOL SUCCINATE 50 MG/1
50 TABLET, EXTENDED RELEASE ORAL NIGHTLY
Qty: 90 TABLET | Refills: 1 | Status: SHIPPED | OUTPATIENT
Start: 2023-04-04 | End: 2023-04-04 | Stop reason: SDUPTHER

## 2023-04-04 RX ORDER — METOPROLOL SUCCINATE 50 MG/1
TABLET, EXTENDED RELEASE ORAL
Qty: 90 TABLET | Refills: 1 | Status: SHIPPED | OUTPATIENT
Start: 2023-04-04

## 2023-04-04 NOTE — TELEPHONE ENCOUNTER
Chronic midline low back pain with left-sided sciatica     Spinal stenosis of lumbar region with neurogenic claudication     Presence of right artificial shoulder joint     Diarrhea of presumed infectious origin     Chronic headache     Facial asymmetry     Asymptomatic bacteriuria     Hyperglycemia     Lumbar radiculopathy     Type 2 diabetes mellitus     Recurrent urinary tract infection     Diastolic congestive heart failure (HCC)     Left hemiparesis (HCC)     Chronic renal disease, stage III (HCC) [[id number]]     Chronic systolic (congestive) heart failure     Flu

## 2023-04-06 SDOH — HEALTH STABILITY: PHYSICAL HEALTH: ON AVERAGE, HOW MANY DAYS PER WEEK DO YOU ENGAGE IN MODERATE TO STRENUOUS EXERCISE (LIKE A BRISK WALK)?: 0 DAYS

## 2023-04-06 SDOH — HEALTH STABILITY: PHYSICAL HEALTH: ON AVERAGE, HOW MANY MINUTES DO YOU ENGAGE IN EXERCISE AT THIS LEVEL?: 0 MIN

## 2023-04-06 ASSESSMENT — LIFESTYLE VARIABLES
HOW MANY STANDARD DRINKS CONTAINING ALCOHOL DO YOU HAVE ON A TYPICAL DAY: PATIENT DOES NOT DRINK
HOW OFTEN DO YOU HAVE A DRINK CONTAINING ALCOHOL: NEVER

## 2023-04-06 ASSESSMENT — PATIENT HEALTH QUESTIONNAIRE - PHQ9
2. FEELING DOWN, DEPRESSED OR HOPELESS: 0
SUM OF ALL RESPONSES TO PHQ QUESTIONS 1-9: 0
SUM OF ALL RESPONSES TO PHQ9 QUESTIONS 1 & 2: 0
SUM OF ALL RESPONSES TO PHQ QUESTIONS 1-9: 0
1. LITTLE INTEREST OR PLEASURE IN DOING THINGS: 0
SUM OF ALL RESPONSES TO PHQ QUESTIONS 1-9: 0
SUM OF ALL RESPONSES TO PHQ QUESTIONS 1-9: 0

## 2023-05-11 DIAGNOSIS — M15.9 GENERALIZED OSTEOARTHRITIS: ICD-10-CM

## 2023-05-11 RX ORDER — CELECOXIB 200 MG/1
CAPSULE ORAL
Qty: 90 CAPSULE | Refills: 1 | Status: SHIPPED | OUTPATIENT
Start: 2023-05-11

## 2023-05-11 NOTE — TELEPHONE ENCOUNTER
with left-sided sciatica     Spinal stenosis of lumbar region with neurogenic claudication     Presence of right artificial shoulder joint     Diarrhea of presumed infectious origin     Chronic headache     Facial asymmetry     Asymptomatic bacteriuria     Hyperglycemia     Lumbar radiculopathy     Type 2 diabetes mellitus     Recurrent urinary tract infection     Diastolic congestive heart failure (HCC)     Left hemiparesis (HCC)     Chronic renal disease, stage III (HCC) [[id number]]     Chronic systolic (congestive) heart failure     Flu

## 2023-05-14 SDOH — ECONOMIC STABILITY: FOOD INSECURITY: WITHIN THE PAST 12 MONTHS, THE FOOD YOU BOUGHT JUST DIDN'T LAST AND YOU DIDN'T HAVE MONEY TO GET MORE.: NEVER TRUE

## 2023-05-14 SDOH — HEALTH STABILITY: PHYSICAL HEALTH: ON AVERAGE, HOW MANY DAYS PER WEEK DO YOU ENGAGE IN MODERATE TO STRENUOUS EXERCISE (LIKE A BRISK WALK)?: 0 DAYS

## 2023-05-14 SDOH — HEALTH STABILITY: PHYSICAL HEALTH: ON AVERAGE, HOW MANY MINUTES DO YOU ENGAGE IN EXERCISE AT THIS LEVEL?: 0 MIN

## 2023-05-14 SDOH — ECONOMIC STABILITY: FOOD INSECURITY: WITHIN THE PAST 12 MONTHS, YOU WORRIED THAT YOUR FOOD WOULD RUN OUT BEFORE YOU GOT MONEY TO BUY MORE.: NEVER TRUE

## 2023-05-14 SDOH — ECONOMIC STABILITY: INCOME INSECURITY: HOW HARD IS IT FOR YOU TO PAY FOR THE VERY BASICS LIKE FOOD, HOUSING, MEDICAL CARE, AND HEATING?: NOT HARD AT ALL

## 2023-05-14 ASSESSMENT — LIFESTYLE VARIABLES
HOW OFTEN DO YOU HAVE SIX OR MORE DRINKS ON ONE OCCASION: 1
HOW OFTEN DO YOU HAVE A DRINK CONTAINING ALCOHOL: NEVER
HOW MANY STANDARD DRINKS CONTAINING ALCOHOL DO YOU HAVE ON A TYPICAL DAY: 0
HOW OFTEN DO YOU HAVE A DRINK CONTAINING ALCOHOL: 1
HOW MANY STANDARD DRINKS CONTAINING ALCOHOL DO YOU HAVE ON A TYPICAL DAY: PATIENT DOES NOT DRINK

## 2023-05-14 ASSESSMENT — PATIENT HEALTH QUESTIONNAIRE - PHQ9
SUM OF ALL RESPONSES TO PHQ QUESTIONS 1-9: 0
SUM OF ALL RESPONSES TO PHQ QUESTIONS 1-9: 0
1. LITTLE INTEREST OR PLEASURE IN DOING THINGS: 0
2. FEELING DOWN, DEPRESSED OR HOPELESS: 0
SUM OF ALL RESPONSES TO PHQ QUESTIONS 1-9: 0
SUM OF ALL RESPONSES TO PHQ QUESTIONS 1-9: 0
SUM OF ALL RESPONSES TO PHQ9 QUESTIONS 1 & 2: 0

## 2023-05-17 ENCOUNTER — HOSPITAL ENCOUNTER (OUTPATIENT)
Age: 88
Setting detail: SPECIMEN
Discharge: HOME | End: 2023-05-17

## 2023-05-17 ENCOUNTER — OFFICE VISIT (OUTPATIENT)
Dept: FAMILY MEDICINE CLINIC | Age: 88
End: 2023-05-17

## 2023-05-17 VITALS
BODY MASS INDEX: 28.46 KG/M2 | SYSTOLIC BLOOD PRESSURE: 146 MMHG | OXYGEN SATURATION: 96 % | WEIGHT: 135.6 LBS | DIASTOLIC BLOOD PRESSURE: 82 MMHG | HEIGHT: 58 IN | HEART RATE: 89 BPM | TEMPERATURE: 97.7 F

## 2023-05-17 DIAGNOSIS — F11.20 OPIOID DEPENDENCE WITH CURRENT USE (HCC): ICD-10-CM

## 2023-05-17 DIAGNOSIS — N39.0 RECURRENT UTI: ICD-10-CM

## 2023-05-17 DIAGNOSIS — Z00.00 MEDICARE ANNUAL WELLNESS VISIT, SUBSEQUENT: Primary | ICD-10-CM

## 2023-05-17 DIAGNOSIS — N18.31 STAGE 3A CHRONIC KIDNEY DISEASE (HCC): ICD-10-CM

## 2023-05-17 DIAGNOSIS — E11.9 TYPE 2 DIABETES MELLITUS WITHOUT COMPLICATION, WITHOUT LONG-TERM CURRENT USE OF INSULIN (HCC): ICD-10-CM

## 2023-05-17 DIAGNOSIS — Z71.89 ACP (ADVANCE CARE PLANNING): ICD-10-CM

## 2023-05-17 DIAGNOSIS — I10 ESSENTIAL HYPERTENSION: ICD-10-CM

## 2023-05-17 DIAGNOSIS — N18.32 TYPE 2 DIABETES MELLITUS WITH STAGE 3B CHRONIC KIDNEY DISEASE, WITHOUT LONG-TERM CURRENT USE OF INSULIN (HCC): ICD-10-CM

## 2023-05-17 DIAGNOSIS — K21.9 GASTROESOPHAGEAL REFLUX DISEASE, UNSPECIFIED WHETHER ESOPHAGITIS PRESENT: ICD-10-CM

## 2023-05-17 DIAGNOSIS — E78.5 HYPERLIPIDEMIA, UNSPECIFIED HYPERLIPIDEMIA TYPE: ICD-10-CM

## 2023-05-17 DIAGNOSIS — E11.22 TYPE 2 DIABETES MELLITUS WITH STAGE 3B CHRONIC KIDNEY DISEASE, WITHOUT LONG-TERM CURRENT USE OF INSULIN (HCC): ICD-10-CM

## 2023-05-17 DIAGNOSIS — I50.32 CHRONIC DIASTOLIC CONGESTIVE HEART FAILURE (HCC): ICD-10-CM

## 2023-05-17 DIAGNOSIS — N39.45 CONTINUOUS LEAKAGE OF URINE: ICD-10-CM

## 2023-05-17 DIAGNOSIS — G35 MULTIPLE SCLEROSIS (HCC): ICD-10-CM

## 2023-05-17 RX ORDER — SIMVASTATIN 20 MG
20 TABLET ORAL NIGHTLY
Qty: 90 TABLET | Refills: 1 | Status: CANCELLED | OUTPATIENT
Start: 2023-05-17

## 2023-05-17 RX ORDER — OMEPRAZOLE 20 MG/1
20 CAPSULE, DELAYED RELEASE ORAL DAILY
Qty: 90 CAPSULE | Refills: 1 | Status: SHIPPED | OUTPATIENT
Start: 2023-05-17

## 2023-05-17 RX ORDER — SOLIFENACIN SUCCINATE 5 MG/1
5 TABLET, FILM COATED ORAL DAILY
Qty: 90 TABLET | Refills: 1 | Status: SHIPPED | OUTPATIENT
Start: 2023-05-17

## 2023-05-18 LAB
EST. AVERAGE GLUCOSE BLD GHB EST-MCNC: 143 MG/DL
HBA1C MFR BLD: 6.6 % (ref 4–6)

## 2023-05-28 DIAGNOSIS — M15.9 GENERALIZED OSTEOARTHRITIS: ICD-10-CM

## 2023-05-30 ENCOUNTER — PATIENT MESSAGE (OUTPATIENT)
Dept: FAMILY MEDICINE CLINIC | Age: 88
End: 2023-05-30

## 2023-05-30 DIAGNOSIS — R42 DIZZINESS: ICD-10-CM

## 2023-05-30 RX ORDER — CELECOXIB 200 MG/1
200 CAPSULE ORAL DAILY
Qty: 90 CAPSULE | Refills: 1 | OUTPATIENT
Start: 2023-05-30

## 2023-05-30 NOTE — TELEPHONE ENCOUNTER
From: Judd Loza  To: Dr. Apolinar Fleischer: 5/30/2023 5:11 AM EDT  Subject: Meclizine    My mom is in need of an updated prescription for her meclizine the one we have is old and cannot be refilled.  Thanks

## 2023-05-31 RX ORDER — MECLIZINE HYDROCHLORIDE 25 MG/1
25 TABLET ORAL 3 TIMES DAILY PRN
Qty: 30 TABLET | Refills: 1 | Status: SHIPPED | OUTPATIENT
Start: 2023-05-31 | End: 2023-06-10

## 2023-06-03 ENCOUNTER — PATIENT MESSAGE (OUTPATIENT)
Dept: FAMILY MEDICINE CLINIC | Age: 88
End: 2023-06-03

## 2023-06-03 DIAGNOSIS — N39.0 RECURRENT UTI: ICD-10-CM

## 2023-06-05 NOTE — TELEPHONE ENCOUNTER
From: Sonu Valerio  To: Dr. Woodward Massimo: 6/3/2023 9:59 AM EDT  Subject: Cephalexin 250mg    My mom is out with no refills do you want to keep her on this?  If so we will need a refill called in thanks

## 2023-06-06 RX ORDER — CEPHALEXIN 250 MG/1
250 CAPSULE ORAL DAILY
Qty: 30 CAPSULE | Refills: 5 | Status: SHIPPED | OUTPATIENT
Start: 2023-06-06

## 2023-06-23 ENCOUNTER — PATIENT MESSAGE (OUTPATIENT)
Dept: FAMILY MEDICINE CLINIC | Age: 88
End: 2023-06-23

## 2023-06-23 DIAGNOSIS — R26.81 UNSTEADY GAIT: ICD-10-CM

## 2023-06-23 DIAGNOSIS — G35 MULTIPLE SCLEROSIS (HCC): Primary | ICD-10-CM

## 2023-06-26 DIAGNOSIS — M54.42 CHRONIC MIDLINE LOW BACK PAIN WITH LEFT-SIDED SCIATICA: ICD-10-CM

## 2023-06-26 DIAGNOSIS — G89.29 CHRONIC MIDLINE LOW BACK PAIN WITH LEFT-SIDED SCIATICA: ICD-10-CM

## 2023-06-26 DIAGNOSIS — G35 MULTIPLE SCLEROSIS (HCC): Primary | ICD-10-CM

## 2023-06-26 DIAGNOSIS — R42 DIZZINESS: ICD-10-CM

## 2023-06-26 RX ORDER — CALCIUM CARBONATE 160(400)MG
TABLET,CHEWABLE ORAL
Qty: 1 EACH | Refills: 0 | Status: SHIPPED | OUTPATIENT
Start: 2023-06-26

## 2023-06-26 NOTE — PROGRESS NOTES
Pts daughter Morganindiaalo Doran called stating they would like the walker that has a seat on it, the one pt has now does not have any wheels at all     Was told if they got an order pt could be fitted for one with a seat     Print order, daughter will , call daughter when ready

## 2023-06-26 NOTE — PROGRESS NOTES
Spoke with pt, she stated she has the regular silver metal walker with 2 wheels on the front,   Pt stated to call her daughter and get more info on what kind of walker they are trying to order     LM for daughter Jose Herndon to call office back

## 2023-07-14 ENCOUNTER — TELEPHONE (OUTPATIENT)
Dept: FAMILY MEDICINE CLINIC | Age: 88
End: 2023-07-14

## 2023-07-15 DIAGNOSIS — J45.20 MILD INTERMITTENT REACTIVE AIRWAY DISEASE WITHOUT COMPLICATION: ICD-10-CM

## 2023-07-15 DIAGNOSIS — K21.9 GASTROESOPHAGEAL REFLUX DISEASE, UNSPECIFIED WHETHER ESOPHAGITIS PRESENT: ICD-10-CM

## 2023-07-15 RX ORDER — ALBUTEROL SULFATE 90 UG/1
2 AEROSOL, METERED RESPIRATORY (INHALATION) EVERY 6 HOURS PRN
Qty: 1 EACH | Refills: 5 | Status: CANCELLED | OUTPATIENT
Start: 2023-07-15 | End: 2024-07-14

## 2023-07-17 RX ORDER — ALBUTEROL SULFATE 90 UG/1
2 AEROSOL, METERED RESPIRATORY (INHALATION) EVERY 6 HOURS PRN
Qty: 1 EACH | Refills: 5 | Status: SHIPPED | OUTPATIENT
Start: 2023-07-17 | End: 2024-07-16

## 2023-07-17 RX ORDER — OMEPRAZOLE 20 MG/1
20 CAPSULE, DELAYED RELEASE ORAL DAILY
Qty: 90 CAPSULE | Refills: 1 | OUTPATIENT
Start: 2023-07-17

## 2023-07-17 NOTE — TELEPHONE ENCOUNTER
pain with left-sided sciatica     Spinal stenosis of lumbar region with neurogenic claudication     Presence of right artificial shoulder joint     Diarrhea of presumed infectious origin     Chronic headache     Facial asymmetry     Asymptomatic bacteriuria     Hyperglycemia     Lumbar radiculopathy     Type 2 diabetes mellitus     Recurrent urinary tract infection     Diastolic congestive heart failure (HCC)     Left hemiparesis (HCC)     Chronic renal disease, stage III (HCC) [183990]     Chronic systolic (congestive) heart failure     Flu     Type 2 diabetes mellitus with chronic kidney disease     Opioid dependence with current use ([address])

## 2023-07-31 ENCOUNTER — PATIENT MESSAGE (OUTPATIENT)
Dept: FAMILY MEDICINE CLINIC | Age: 88
End: 2023-07-31

## 2023-07-31 DIAGNOSIS — M54.42 CHRONIC MIDLINE LOW BACK PAIN WITH LEFT-SIDED SCIATICA: ICD-10-CM

## 2023-07-31 DIAGNOSIS — G89.29 CHRONIC MIDLINE LOW BACK PAIN WITH LEFT-SIDED SCIATICA: ICD-10-CM

## 2023-07-31 DIAGNOSIS — M15.9 GENERALIZED OSTEOARTHRITIS: ICD-10-CM

## 2023-07-31 RX ORDER — TRAMADOL HYDROCHLORIDE 50 MG/1
50 TABLET ORAL EVERY 6 HOURS PRN
Qty: 120 TABLET | Refills: 1 | Status: SHIPPED | OUTPATIENT
Start: 2023-07-31 | End: 2023-09-29

## 2023-07-31 NOTE — TELEPHONE ENCOUNTER
From: Gasper Bey  To: Dr. Sandie Pablo: 7/31/2023 3:11 PM EDT  Subject: Jermain Colindres     My mom is in need of a Tramodol refill called in to pharmacy not on list of meds to do that way.  Thanks

## 2023-07-31 NOTE — TELEPHONE ENCOUNTER
Last visit: 05/17/2023  Last Med refill: 06/2023  Does patient have enough medication for 72 hours: Yes    Next Visit Date:  Future Appointments   Date Time Provider [address]   9/18/2023  3:15 PM Pauly Green MD [address] Maintenance   Topic Date Due    Shingles vaccine (1 of 2) Never done    Lipids  11/06/2022    DTaP/Tdap/Td vaccine (1 - Tdap) 01/04/2024 (Originally 12/8/1953)    COVID-19 Vaccine (5 - Booster for Pfizer series) 01/04/2024 (Originally 10/8/2022)    Flu vaccine (1) 08/01/2023    Depression Screen  05/14/2024    Annual Wellness Visit (AWV)  05/17/2024    Pneumococcal 65+ years Vaccine  Completed    Hepatitis A vaccine  Aged Out    Hib vaccine  Aged Out    Meningococcal (ACWY) vaccine  Aged Out    DEXA (modify frequency per FRAX score)  Discontinued       Hemoglobin A1C (%)   Date Value   05/17/2023 6.6 (H)   01/04/2023 6.7   08/24/2022 6.4             ( goal A1C is < 7)   No components found for: LABMICR  LDL Cholesterol (mg/dL)   Date Value   05/21/2020 86   05/24/2019 75     LDL Calculated (mg/dL)   Date Value   11/06/2021 100       (goal LDL is <100)   AST (U/L)   Date Value   02/08/2022 18     ALT (U/L)   Date Value   02/08/2022 21     BUN (mg/dL)   Date Value   12/28/2022 18     BP Readings from Last 3 Encounters:   05/17/23 (!) 146/82   01/23/23 128/78   01/04/23 116/60          (goal 120/80)    All Future Testing planned in [facility]  Lab Frequency Next Occurrence   Lipid, Fasting Once 02/23/2023               Patient Active Problem List:     Ataxia     Multiple sclerosis ([address])     Essential hypertension     Hyperlipidemia     Atelectasis     Debility     Abnormal gait     Actinic keratosis     Enthesopathy of hip region     Generalized osteoarthritis     Idiopathic peripheral neuropathy     Vitamin D deficiency     Primary localized osteoarthrosis of the hip, left     Primary osteoarthritis of left hip     Acquired spondylolisthesis     Chronic midline low

## 2023-08-01 DIAGNOSIS — G89.29 CHRONIC MIDLINE LOW BACK PAIN WITH LEFT-SIDED SCIATICA: ICD-10-CM

## 2023-08-01 DIAGNOSIS — M54.42 CHRONIC MIDLINE LOW BACK PAIN WITH LEFT-SIDED SCIATICA: ICD-10-CM

## 2023-08-01 RX ORDER — PREDNISONE 10 MG/1
10 TABLET ORAL DAILY
Qty: 90 TABLET | Refills: 1 | Status: SHIPPED | OUTPATIENT
Start: 2023-08-01

## 2023-08-01 NOTE — TELEPHONE ENCOUNTER
LAST VISIT:   5/17/2023     Future Appointments   Date Time Provider [address]   9/18/2023  3:15 PM Arti Aureliano Moritz, MD [facility]

## 2023-08-02 RX ORDER — PREDNISONE 10 MG/1
TABLET ORAL
Qty: 90 TABLET | Refills: 1 | OUTPATIENT
Start: 2023-08-02

## 2023-08-23 DIAGNOSIS — G89.29 CHRONIC MIDLINE LOW BACK PAIN WITH LEFT-SIDED SCIATICA: ICD-10-CM

## 2023-08-23 DIAGNOSIS — M54.42 CHRONIC MIDLINE LOW BACK PAIN WITH LEFT-SIDED SCIATICA: ICD-10-CM

## 2023-08-23 RX ORDER — AMITRIPTYLINE HYDROCHLORIDE 100 MG/1
TABLET ORAL
Qty: 90 TABLET | Refills: 1 | Status: SHIPPED | OUTPATIENT
Start: 2023-08-23

## 2023-08-23 NOTE — TELEPHONE ENCOUNTER
Last visit: 5/17/23  Last Med refill: 2/27/23  Does patient have enough medication for 72 hours: Yes    Next Visit Date:  Future Appointments   Date Time Provider [address]   9/18/2023  3:15 PM Pauly Butt MD [address] Maintenance   Topic Date Due    Shingles vaccine (1 of 2) Never done    Lipids  11/06/2022    Flu vaccine (1) 08/01/2023    DTaP/Tdap/Td vaccine (1 - Tdap) 01/04/2024 (Originally 12/8/1953)    COVID-19 Vaccine (5 - Booster for Pfizer series) 01/04/2024 (Originally 10/8/2022)    Depression Screen  05/14/2024    Annual Wellness Visit (AWV)  05/17/2024    Pneumococcal 65+ years Vaccine  Completed    Hepatitis A vaccine  Aged Out    Hib vaccine  Aged Out    Meningococcal (ACWY) vaccine  Aged Out    DEXA (modify frequency per FRAX score)  Discontinued       Hemoglobin A1C (%)   Date Value   05/17/2023 6.6 (H)   01/04/2023 6.7   08/24/2022 6.4             ( goal A1C is < 7)   No components found for: LABMICR  LDL Cholesterol (mg/dL)   Date Value   05/21/2020 86   05/24/2019 75     LDL Calculated (mg/dL)   Date Value   11/06/2021 100       (goal LDL is <100)   AST (U/L)   Date Value   02/08/2022 18     ALT (U/L)   Date Value   02/08/2022 21     BUN (mg/dL)   Date Value   12/28/2022 18     BP Readings from Last 3 Encounters:   05/17/23 (!) 146/82   01/23/23 128/78   01/04/23 116/60          (goal 120/80)    All Future Testing planned in [facility]  Lab Frequency Next Occurrence   Lipid, Fasting Once 02/23/2023               Patient Active Problem List:     Ataxia     Multiple sclerosis ([address])     Essential hypertension     Hyperlipidemia     Atelectasis     Debility     Abnormal gait     Actinic keratosis     Enthesopathy of hip region     Generalized osteoarthritis     Idiopathic peripheral neuropathy     Vitamin D deficiency     Primary localized osteoarthrosis of the hip, left     Primary osteoarthritis of left hip     Acquired spondylolisthesis     Chronic midline low

## 2023-09-09 DIAGNOSIS — I10 ESSENTIAL HYPERTENSION: ICD-10-CM

## 2023-09-11 NOTE — TELEPHONE ENCOUNTER
Acquired spondylolisthesis     Chronic midline low back pain with left-sided sciatica     Spinal stenosis of lumbar region with neurogenic claudication     Presence of right artificial shoulder joint     Diarrhea of presumed infectious origin     Chronic headache     Facial asymmetry     Asymptomatic bacteriuria     Hyperglycemia     Lumbar radiculopathy     Type 2 diabetes mellitus     Recurrent urinary tract infection     Diastolic congestive heart failure (HCC)     Left hemiparesis (HCC)     Chronic renal disease, stage III (HCC) [873512]     Chronic systolic (congestive) heart failure     Flu     Type 2 diabetes mellitus with chronic kidney disease     Opioid dependence with current use ([address])

## 2023-09-12 RX ORDER — LISINOPRIL 40 MG/1
TABLET ORAL
Qty: 90 TABLET | Refills: 1 | Status: SHIPPED | OUTPATIENT
Start: 2023-09-12

## 2023-09-14 DIAGNOSIS — I10 ESSENTIAL HYPERTENSION: ICD-10-CM

## 2023-09-14 DIAGNOSIS — E78.5 HYPERLIPIDEMIA, UNSPECIFIED HYPERLIPIDEMIA TYPE: ICD-10-CM

## 2023-09-14 RX ORDER — LISINOPRIL 40 MG/1
40 TABLET ORAL DAILY
Qty: 90 TABLET | Refills: 1 | OUTPATIENT
Start: 2023-09-14

## 2023-09-14 RX ORDER — SIMVASTATIN 20 MG
20 TABLET ORAL
Qty: 90 TABLET | Refills: 1 | Status: SHIPPED | OUTPATIENT
Start: 2023-09-14

## 2023-09-14 NOTE — TELEPHONE ENCOUNTER
Bradley Sarmiento is calling to request a refill on the following medication(s):    Medication Request:  Requested Prescriptions     Pending Prescriptions Disp Refills    simvastatin (ZOCOR) 20 MG tablet [Pharmacy Med Name: SIMVASTATIN 20 MG TABLET] 90 tablet 1     Sig: TAKE ONE TABLET BY MOUTH ONCE NIGHTLY       Last Visit Date (If Applicable):  4/51/5036    Next Visit Date:    10/16/2023

## 2023-09-20 ENCOUNTER — PATIENT MESSAGE (OUTPATIENT)
Dept: FAMILY MEDICINE CLINIC | Age: 88
End: 2023-09-20

## 2023-09-20 ENCOUNTER — E-VISIT (OUTPATIENT)
Dept: FAMILY MEDICINE CLINIC | Age: 88
End: 2023-09-20
Payer: MEDICARE

## 2023-09-20 DIAGNOSIS — G35 MULTIPLE SCLEROSIS (HCC): Primary | ICD-10-CM

## 2023-09-20 PROCEDURE — 99421 OL DIG E/M SVC 5-10 MIN: CPT | Performed by: INTERNAL MEDICINE

## 2023-09-20 NOTE — TELEPHONE ENCOUNTER
From: Win Rodas  To: Dr. Titus Bigger: 9/20/2023 12:37 PM EDT  Subject: MS Problems    Good Morning,    My mom is experiencing quite a bit of light headed feeling this week and causing her to off balance. I believe it is due to her MS. We have been using the meclizine that is prescribed for her light headed feeling however, steriods seem to work much better. Can we get a dose pack of steriods prescribed to see if this can kick it before I make an appointment with you. I tried to do an evisit but there is nothing on the evisit screen to use for her diagnosis. If you need me to do an evisit let me know what symptoms to use. She is scheduled for an appointment in October for a visit as of right now. thanks!

## 2023-09-21 ENCOUNTER — E-VISIT (OUTPATIENT)
Dept: FAMILY MEDICINE CLINIC | Age: 88
End: 2023-09-21
Payer: MEDICARE

## 2023-09-21 DIAGNOSIS — R30.0 DYSURIA: Primary | ICD-10-CM

## 2023-09-21 PROCEDURE — 99421 OL DIG E/M SVC 5-10 MIN: CPT | Performed by: INTERNAL MEDICINE

## 2023-09-21 RX ORDER — PREDNISONE 10 MG/1
TABLET ORAL
Qty: 30 TABLET | Refills: 0 | Status: SHIPPED | OUTPATIENT
Start: 2023-09-21

## 2023-09-21 RX ORDER — CEPHALEXIN 500 MG/1
500 CAPSULE ORAL 3 TIMES DAILY
Qty: 21 CAPSULE | Refills: 0 | Status: SHIPPED | OUTPATIENT
Start: 2023-09-21 | End: 2023-09-28

## 2023-09-29 DIAGNOSIS — I10 ESSENTIAL HYPERTENSION: ICD-10-CM

## 2023-09-29 RX ORDER — METOPROLOL SUCCINATE 50 MG/1
50 TABLET, EXTENDED RELEASE ORAL NIGHTLY
Qty: 90 TABLET | Refills: 1 | Status: SHIPPED | OUTPATIENT
Start: 2023-09-29

## 2023-09-29 NOTE — TELEPHONE ENCOUNTER
Last visit: 5/17/23  Last Med refill: 4/4/23  Does patient have enough medication for 72 hours: Yes    Next Visit Date:  Future Appointments   Date Time Provider [address]   10/16/2023 12:00 PM Pauly Fabián Thompson MD [address] Maintenance   Topic Date Due    Shingles vaccine (1 of 2) Never done    Hepatitis B vaccine (1 of 3 - Risk 3-dose series) Never done    Lipids  11/06/2022    Flu vaccine (1) 08/01/2023    DTaP/Tdap/Td vaccine (1 - Tdap) 01/04/2024 (Originally 12/8/1953)    COVID-19 Vaccine (5 - Pfizer series) 01/04/2024 (Originally 10/8/2022)    Depression Screen  05/14/2024    Annual Wellness Visit (AWV)  05/17/2024    Pneumococcal 65+ years Vaccine  Completed    Hepatitis A vaccine  Aged Out    Hib vaccine  Aged Out    Meningococcal (ACWY) vaccine  Aged Out    DEXA (modify frequency per FRAX score)  Discontinued       Hemoglobin A1C (%)   Date Value   05/17/2023 6.6 (H)   01/04/2023 6.7   08/24/2022 6.4             ( goal A1C is < 7)   No components found for: \"LABMICR\"  LDL Cholesterol (mg/dL)   Date Value   05/21/2020 86   05/24/2019 75     LDL Calculated (mg/dL)   Date Value   11/06/2021 100       (goal LDL is <100)   AST (U/L)   Date Value   02/08/2022 18     ALT (U/L)   Date Value   02/08/2022 21     BUN (mg/dL)   Date Value   12/28/2022 18     BP Readings from Last 3 Encounters:   05/17/23 (!) 146/82   01/23/23 128/78   01/04/23 116/60          (goal 120/80)    All Future Testing planned in [facility]  Lab Frequency Next Occurrence   Lipid, Fasting Once 02/23/2023               Patient Active Problem List:     Ataxia     Multiple sclerosis ([address])     Essential hypertension     Hyperlipidemia     Atelectasis     Debility     Abnormal gait     Actinic keratosis     Enthesopathy of hip region     Generalized osteoarthritis     Idiopathic peripheral neuropathy     Vitamin D deficiency     Primary localized osteoarthrosis of the hip, left     Primary osteoarthritis of left hip

## 2023-10-03 ENCOUNTER — E-VISIT (OUTPATIENT)
Dept: FAMILY MEDICINE CLINIC | Age: 88
End: 2023-10-03

## 2023-10-16 ENCOUNTER — OFFICE VISIT (OUTPATIENT)
Dept: FAMILY MEDICINE CLINIC | Age: 88
End: 2023-10-16
Payer: MEDICARE

## 2023-10-16 VITALS
BODY MASS INDEX: 27.34 KG/M2 | TEMPERATURE: 97.2 F | OXYGEN SATURATION: 95 % | DIASTOLIC BLOOD PRESSURE: 64 MMHG | HEART RATE: 80 BPM | WEIGHT: 130.8 LBS | SYSTOLIC BLOOD PRESSURE: 120 MMHG

## 2023-10-16 DIAGNOSIS — Z23 NEED FOR IMMUNIZATION AGAINST INFLUENZA: ICD-10-CM

## 2023-10-16 DIAGNOSIS — R42 DIZZINESS: ICD-10-CM

## 2023-10-16 DIAGNOSIS — N18.31 STAGE 3A CHRONIC KIDNEY DISEASE (HCC): ICD-10-CM

## 2023-10-16 DIAGNOSIS — E11.9 TYPE 2 DIABETES MELLITUS WITHOUT COMPLICATION, WITHOUT LONG-TERM CURRENT USE OF INSULIN (HCC): Primary | ICD-10-CM

## 2023-10-16 DIAGNOSIS — R21 SKIN RASH: ICD-10-CM

## 2023-10-16 DIAGNOSIS — G35 MULTIPLE SCLEROSIS (HCC): ICD-10-CM

## 2023-10-16 DIAGNOSIS — I50.32 CHRONIC DIASTOLIC CONGESTIVE HEART FAILURE (HCC): ICD-10-CM

## 2023-10-16 DIAGNOSIS — I50.22 CHRONIC SYSTOLIC (CONGESTIVE) HEART FAILURE (HCC): ICD-10-CM

## 2023-10-16 LAB — HBA1C MFR BLD: 6.2 %

## 2023-10-16 PROCEDURE — G0008 ADMIN INFLUENZA VIRUS VAC: HCPCS | Performed by: INTERNAL MEDICINE

## 2023-10-16 PROCEDURE — 1090F PRES/ABSN URINE INCON ASSESS: CPT | Performed by: INTERNAL MEDICINE

## 2023-10-16 PROCEDURE — 1036F TOBACCO NON-USER: CPT | Performed by: INTERNAL MEDICINE

## 2023-10-16 PROCEDURE — 1124F ACP DISCUSS-NO DSCNMKR DOCD: CPT | Performed by: INTERNAL MEDICINE

## 2023-10-16 PROCEDURE — 83036 HEMOGLOBIN GLYCOSYLATED A1C: CPT | Performed by: INTERNAL MEDICINE

## 2023-10-16 PROCEDURE — G8417 CALC BMI ABV UP PARAM F/U: HCPCS | Performed by: INTERNAL MEDICINE

## 2023-10-16 PROCEDURE — G8484 FLU IMMUNIZE NO ADMIN: HCPCS | Performed by: INTERNAL MEDICINE

## 2023-10-16 PROCEDURE — 99214 OFFICE O/P EST MOD 30 MIN: CPT | Performed by: INTERNAL MEDICINE

## 2023-10-16 PROCEDURE — 90694 VACC AIIV4 NO PRSRV 0.5ML IM: CPT | Performed by: INTERNAL MEDICINE

## 2023-10-16 PROCEDURE — G8427 DOCREV CUR MEDS BY ELIG CLIN: HCPCS | Performed by: INTERNAL MEDICINE

## 2023-10-16 PROCEDURE — 3044F HG A1C LEVEL LT 7.0%: CPT | Performed by: INTERNAL MEDICINE

## 2023-10-16 RX ORDER — MECLIZINE HYDROCHLORIDE 25 MG/1
25 TABLET ORAL PRN
Qty: 30 TABLET | Refills: 3 | Status: SHIPPED | OUTPATIENT
Start: 2023-10-16

## 2023-10-16 RX ORDER — MECLIZINE HYDROCHLORIDE 25 MG/1
25 TABLET ORAL PRN
COMMUNITY
Start: 2023-05-31 | End: 2023-10-16 | Stop reason: SDUPTHER

## 2023-10-16 RX ORDER — CLINDAMYCIN PHOSPHATE 10 MG/G
GEL TOPICAL
Qty: 30 G | Refills: 1 | Status: SHIPPED | OUTPATIENT
Start: 2023-10-16 | End: 2023-10-23

## 2023-10-16 ASSESSMENT — ENCOUNTER SYMPTOMS
BLOOD IN STOOL: 0
ABDOMINAL PAIN: 0
COUGH: 0
CHEST TIGHTNESS: 0
SHORTNESS OF BREATH: 0
CHOKING: 0
CONSTIPATION: 0
ANAL BLEEDING: 0
VOMITING: 0
WHEEZING: 0
DIARRHEA: 0
NAUSEA: 0

## 2023-10-16 ASSESSMENT — VISUAL ACUITY: OU: 1

## 2023-10-16 NOTE — PROGRESS NOTES
Pt is here today for a regular 4 mo f/u    Pt states she was given a prescription for Nystatin, it does work, but pt can not squeeze it to get medication out, she was given a roll on dose, pt would like a script for clindamycin called in if she can     Pt has been feeling light headed, comes and goes, feeling like this every day

## 2023-10-16 NOTE — PROGRESS NOTES
[facility]  [facility] [place] [address]  [facility] [facility] [address]  Dept: [phone]  Dept Fax: [phone]      Chalmers Burkitt is a 80 y.o. female who presents today for hermedical conditions/complaints as noted below. Chalmers Burkitt is c/o of Congestive Heart Failure (F/u), Diabetes (F/u), Hypertension (F/u), and Chronic Pain (Back )        Assessment/Plan:     1. Type 2 diabetes mellitus without complication, without long-term current use of insulin (HCC)  -     POCT glycosylated hemoglobin (Hb A1C)  2. Need for immunization against influenza  -     Influenza, FLUAD, (age 72 y+), IM, Preservative Free, 0.5 mL  3. Chronic diastolic congestive heart failure ([address])  4. Multiple sclerosis ([address])  5. Chronic systolic (congestive) heart failure  6. Stage 3a chronic kidney disease ([address])  7. Dizziness  -     meclizine (ANTIVERT) 25 MG tablet; Take 1 tablet by mouth as needed for Dizziness, Disp-30 tablet, R-3Normal  8. Skin rash  -     clindamycin (CLEOCIN-T) 1 % gel; Apply topically 2 times daily. , Disp-30 g, R-1, Normal          No follow-ups on file. HPI     Headaches are getting worse - neurology is trying to get her on monthly cGRP therapy - working on getting approved for [place]   Dizziness got better with medrol dosepak   Equilibrium is still off   Hypertension-tolerating current regimen without chest pain, palpitations, dizziness, peripheral edema, dyspnea on exertion, orthopnea, paroxysmal nocturnal dyspnea. Hyperlipidemia-tolerating current regimen without myalgias, dyspepsia, jaundice. Mostly compliant with diet recommendations for low salt diet, tries to limit greasy/cheesy/fried foods, not very compliant with exercise recommendations. Cardiovascular risk factors: advanced age (older than 54 for men, 72 for women), diabetes mellitus, dyslipidemia, hypertension, and sedentary lifestyle        Congestive Heart Failure  Presents for follow-up visit.  Associated symptoms include

## 2023-10-19 ENCOUNTER — PATIENT MESSAGE (OUTPATIENT)
Dept: FAMILY MEDICINE CLINIC | Age: 88
End: 2023-10-19

## 2023-10-19 DIAGNOSIS — M15.9 GENERALIZED OSTEOARTHRITIS: ICD-10-CM

## 2023-10-19 RX ORDER — TRAMADOL HYDROCHLORIDE 50 MG/1
50 TABLET ORAL EVERY 6 HOURS PRN
Qty: 120 TABLET | Refills: 1 | Status: SHIPPED | OUTPATIENT
Start: 2023-10-19 | End: 2023-12-18

## 2023-10-19 NOTE — TELEPHONE ENCOUNTER
From: Arturo Mohr  To: Dr. Nuno Dial: 10/19/2023 12:25 PM EDT  Subject: Adelina Wilks     My mom is in need of a refill on her Tramodol.  Thanks

## 2023-11-02 DIAGNOSIS — M15.9 GENERALIZED OSTEOARTHRITIS: ICD-10-CM

## 2023-11-02 DIAGNOSIS — N39.45 CONTINUOUS LEAKAGE OF URINE: ICD-10-CM

## 2023-11-02 RX ORDER — SOLIFENACIN SUCCINATE 5 MG/1
5 TABLET, FILM COATED ORAL DAILY
Qty: 90 TABLET | Refills: 1 | Status: SHIPPED | OUTPATIENT
Start: 2023-11-02

## 2023-11-02 RX ORDER — CELECOXIB 200 MG/1
200 CAPSULE ORAL DAILY
Qty: 90 CAPSULE | Refills: 1 | Status: SHIPPED | OUTPATIENT
Start: 2023-11-02

## 2023-11-02 NOTE — TELEPHONE ENCOUNTER
Last visit: 10/16/23  Last Med refill: 8/23  Does patient have enough medication for 72 hours: Yes    Next Visit Date:  Future Appointments   Date Time Provider [address]   2/19/2024 10:30 AM Gilson Garcia MD [address] Maintenance   Topic Date Due    Shingles vaccine (1 of 2) Never done    Hepatitis B vaccine (1 of 3 - Risk 3-dose series) Never done    Lipids  11/06/2022    DTaP/Tdap/Td vaccine (1 - Tdap) 01/04/2024 (Originally 12/8/1953)    COVID-19 Vaccine (5 - Pfizer series) 01/04/2024 (Originally 10/8/2022)    Depression Screen  05/14/2024    Annual Wellness Visit (AWV)  05/17/2024    Flu vaccine  Completed    Pneumococcal 65+ years Vaccine  Completed    Hepatitis A vaccine  Aged Out    Hib vaccine  Aged Out    Meningococcal (ACWY) vaccine  Aged Out    DEXA (modify frequency per FRAX score)  Discontinued       Hemoglobin A1C (%)   Date Value   10/16/2023 6.2   05/17/2023 6.6 (H)   01/04/2023 6.7             ( goal A1C is < 7)   No components found for: \"LABMICR\"  LDL Cholesterol (mg/dL)   Date Value   05/21/2020 86   05/24/2019 75     LDL Calculated (mg/dL)   Date Value   11/06/2021 100       (goal LDL is <100)   AST (U/L)   Date Value   02/08/2022 18     ALT (U/L)   Date Value   02/08/2022 21     BUN (mg/dL)   Date Value   12/28/2022 18     BP Readings from Last 3 Encounters:   10/16/23 120/64   05/17/23 (!) 146/82   01/23/23 128/78          (goal 120/80)    All Future Testing planned in [facility]  Lab Frequency Next Occurrence   Lipid, Fasting Once 02/23/2023               Patient Active Problem List:     Ataxia     Multiple sclerosis ([address])     Essential hypertension     Hyperlipidemia     Atelectasis     Debility     Abnormal gait     Actinic keratosis     Enthesopathy of hip region     Generalized osteoarthritis     Idiopathic peripheral neuropathy     Vitamin D deficiency     Primary localized osteoarthrosis of the hip, left     Primary osteoarthritis of left hip

## 2023-12-01 ENCOUNTER — E-VISIT (OUTPATIENT)
Dept: PRIMARY CARE CLINIC | Age: 88
End: 2023-12-01

## 2023-12-01 DIAGNOSIS — B34.9 VIRAL ILLNESS: Primary | ICD-10-CM

## 2023-12-01 RX ORDER — BENZONATATE 200 MG/1
200 CAPSULE ORAL 3 TIMES DAILY PRN
Qty: 30 CAPSULE | Refills: 0 | Status: SHIPPED | OUTPATIENT
Start: 2023-12-01 | End: 2023-12-08

## 2023-12-01 ASSESSMENT — LIFESTYLE VARIABLES: SMOKING_STATUS: NO, I'VE NEVER SMOKED

## 2023-12-01 NOTE — PROGRESS NOTES
Reviewed questionnaire    Reviewed meds/allergies    Dx Viral illness    Plan Symptoms x 3 days. Rest/fluids. Continue current meds. Rx given for tessalon for cough.  Follow up with PCP if symptoms change, worsen or last longer than 7 days    Time spent on visit 11 min

## 2023-12-13 DIAGNOSIS — M15.9 GENERALIZED OSTEOARTHRITIS: ICD-10-CM

## 2023-12-13 DIAGNOSIS — N39.0 RECURRENT UTI: ICD-10-CM

## 2023-12-13 RX ORDER — CELECOXIB 200 MG/1
200 CAPSULE ORAL DAILY
Qty: 90 CAPSULE | Refills: 1 | Status: CANCELLED | OUTPATIENT
Start: 2023-12-13

## 2023-12-14 RX ORDER — CEPHALEXIN 250 MG/1
250 CAPSULE ORAL DAILY
Qty: 30 CAPSULE | Refills: 5 | Status: SHIPPED | OUTPATIENT
Start: 2023-12-14

## 2023-12-14 NOTE — TELEPHONE ENCOUNTER
Last visit: 10/16/23  Last Med refill:   Does patient have enough medication for 72 hours: No:     Next Visit Date:  Future Appointments   Date Time Provider [address]   2/19/2024 10:30 AM Nik Villarreal MD [address] Maintenance   Topic Date Due    Shingles vaccine (1 of 2) Never done    Hepatitis B vaccine (1 of 3 - Risk 3-dose series) Never done    Respiratory Syncytial Virus (RSV) age 61 yrs+ (3 - 1-dose 60+ series) Never done    Lipids  11/06/2022    COVID-19 Vaccine (5 - 2023-24 season) 09/01/2023    DTaP/Tdap/Td vaccine (1 - Tdap) 01/04/2024 (Originally 12/8/1953)    Depression Screen  05/14/2024    Annual Wellness Visit (AWV)  05/17/2024    Flu vaccine  Completed    Pneumococcal 65+ years Vaccine  Completed    Hepatitis A vaccine  Aged Out    Hib vaccine  Aged Out    Meningococcal (ACWY) vaccine  Aged Out    DEXA (modify frequency per FRAX score)  Discontinued       Hemoglobin A1C (%)   Date Value   10/16/2023 6.2   05/17/2023 6.6 (H)   01/04/2023 6.7             ( goal A1C is < 7)   No components found for: \"LABMICR\"  LDL Cholesterol (mg/dL)   Date Value   05/21/2020 86   05/24/2019 75     LDL Calculated (mg/dL)   Date Value   11/06/2021 100       (goal LDL is <100)   AST (U/L)   Date Value   02/08/2022 18     ALT (U/L)   Date Value   02/08/2022 21     BUN (mg/dL)   Date Value   12/28/2022 18     BP Readings from Last 3 Encounters:   10/16/23 120/64   05/17/23 (!) 146/82   01/23/23 128/78          (goal 120/80)    All Future Testing planned in [facility]  Lab Frequency Next Occurrence   Lipid, Fasting Once 02/23/2023               Patient Active Problem List:     Ataxia     Multiple sclerosis ([address])     Essential hypertension     Hyperlipidemia     Atelectasis     Debility     Abnormal gait     Actinic keratosis     Enthesopathy of hip region     Generalized osteoarthritis     Idiopathic peripheral neuropathy     Vitamin D deficiency     Primary localized osteoarthrosis of

## 2024-01-02 ENCOUNTER — TELEPHONE (OUTPATIENT)
Dept: FAMILY MEDICINE CLINIC | Age: 89
End: 2024-01-02

## 2024-01-02 DIAGNOSIS — M54.42 CHRONIC MIDLINE LOW BACK PAIN WITH LEFT-SIDED SCIATICA: ICD-10-CM

## 2024-01-02 DIAGNOSIS — U07.1 COVID-19: Primary | ICD-10-CM

## 2024-01-02 DIAGNOSIS — G89.29 CHRONIC MIDLINE LOW BACK PAIN WITH LEFT-SIDED SCIATICA: ICD-10-CM

## 2024-01-02 NOTE — TELEPHONE ENCOUNTER
Patient's daughter called to inform that patient tested positive for covid today. She has been having sinus congestion and headaches, no noted fever. Today would be the third day of symptoms. She has been feeling the same for all three days (not better nor worse). She has been staying hydrated and taking mucenix. Daughter is asking if she would benefit with Paxlovid.      Jory Parrish

## 2024-01-03 RX ORDER — PREDNISONE 10 MG/1
10 TABLET ORAL DAILY
Qty: 90 TABLET | Refills: 1 | Status: SHIPPED | OUTPATIENT
Start: 2024-01-03

## 2024-01-03 NOTE — TELEPHONE ENCOUNTER
Last visit: 10/16/2023  Last Med refill: 08/01/2023  Does patient have enough medication for 72 hours: No:     Next Visit Date:  Future Appointments   Date Time Provider Department Center   2/19/2024 10:30 AM Pauly Bond MD [id number]       Health Maintenance   Topic Date Due    Shingles vaccine (1 of 2) Never done    Respiratory Syncytial Virus (RSV) Pregnant or age 60 yrs+ (1 - 1-dose 60+ series) Never done    Lipids  11/06/2022    COVID-19 Vaccine (5 - 2023-24 season) 09/01/2023    DTaP/Tdap/Td vaccine (1 - Tdap) 01/04/2024 (Originally 12/8/1953)    Depression Screen  05/14/2024    Annual Wellness Visit (Medicare)  05/17/2024    Flu vaccine  Completed    Pneumococcal 65+ years Vaccine  Completed    Hepatitis A vaccine  Aged Out    Hepatitis B vaccine  Aged Out    Hib vaccine  Aged Out    Polio vaccine  Aged Out    Meningococcal (ACWY) vaccine  Aged Out    DEXA (modify frequency per FRAX score)  Discontinued       Hemoglobin A1C (%)   Date Value   10/16/2023 6.2   05/17/2023 6.6 (H)   01/04/2023 6.7             ( goal A1C is < 7)   No components found for: \"LABMICR\"  LDL Cholesterol (mg/dL)   Date Value   05/21/2020 86   05/24/2019 75     LDL Calculated (mg/dL)   Date Value   11/06/2021 100       (goal LDL is <100)   AST (U/L)   Date Value   02/08/2022 18     ALT (U/L)   Date Value   02/08/2022 21     BUN (mg/dL)   Date Value   12/28/2022 18     BP Readings from Last 3 Encounters:   10/16/23 120/64   05/17/23 (!) 146/82   01/23/23 128/78          (goal 120/80)    All Future Testing planned in [facility]  Lab Frequency Next Occurrence   Lipid, Fasting Once 02/23/2023               Patient Active Problem List:     Ataxia     Multiple sclerosis (HCC)     Essential hypertension     Hyperlipidemia     Atelectasis     Debility     Abnormal gait     Actinic keratosis     Enthesopathy of hip region     Generalized osteoarthritis     Idiopathic peripheral neuropathy     Vitamin D deficiency     Primary

## 2024-01-03 NOTE — TELEPHONE ENCOUNTER
Patient's daughter calling again wanting to know what she should do. She says she is fine but just wants to know if she would need the antiviral. Patient is having congestion and headaches, afraid it will get to pneumonia. Please advise

## 2024-01-03 NOTE — TELEPHONE ENCOUNTER
Yolis was notified that Paxlovid called in   Hold simvastatin while on paxlovid, restart 24-48 hours after completing paxlovid course

## 2024-01-03 NOTE — TELEPHONE ENCOUNTER
Paxlovid called in   Hold simvastatin while on paxlovid, restart 24-48 hours after completing paxlovid course

## 2024-01-09 ENCOUNTER — APPOINTMENT (OUTPATIENT)
Dept: CT IMAGING | Age: 89
End: 2024-01-09
Payer: MEDICARE

## 2024-01-09 ENCOUNTER — APPOINTMENT (OUTPATIENT)
Dept: GENERAL RADIOLOGY | Age: 89
End: 2024-01-09
Payer: MEDICARE

## 2024-01-09 ENCOUNTER — HOSPITAL ENCOUNTER (INPATIENT)
Age: 89
LOS: 4 days | Discharge: HOME | End: 2024-01-13
Attending: EMERGENCY MEDICINE | Admitting: INTERNAL MEDICINE
Payer: MEDICARE

## 2024-01-09 DIAGNOSIS — G35 HISTORY OF MULTIPLE SCLEROSIS (HCC): ICD-10-CM

## 2024-01-09 DIAGNOSIS — R26.81 UNSTABLE GAIT: Primary | ICD-10-CM

## 2024-01-09 PROBLEM — R42 DIZZINESS: Status: ACTIVE | Noted: 2024-01-09

## 2024-01-09 LAB
25(OH)D3 SERPL-MCNC: 16.1 NG/ML
ALBUMIN SERPL-MCNC: 4.1 G/DL (ref 3.5–5.2)
ALP SERPL-CCNC: 54 U/L (ref 35–104)
ALT SERPL-CCNC: 23 U/L (ref 5–33)
ANION GAP SERPL CALCULATED.3IONS-SCNC: 11 MMOL/L (ref 9–17)
AST SERPL-CCNC: 28 U/L
BASOPHILS # BLD: 0 K/UL (ref 0–0.2)
BASOPHILS NFR BLD: 0 % (ref 0–2)
BILIRUB SERPL-MCNC: 0.3 MG/DL (ref 0.3–1.2)
BUN SERPL-MCNC: 17 MG/DL (ref 8–23)
CALCIUM SERPL-MCNC: 9 MG/DL (ref 8.6–10.4)
CHLORIDE SERPL-SCNC: 96 MMOL/L (ref 98–107)
CO2 SERPL-SCNC: 31 MMOL/L (ref 20–31)
CREAT SERPL-MCNC: 1.1 MG/DL (ref 0.5–0.9)
EOSINOPHIL # BLD: 0 K/UL (ref 0–0.4)
EOSINOPHILS RELATIVE PERCENT: 0 % (ref 0–4)
ERYTHROCYTE [DISTWIDTH] IN BLOOD BY AUTOMATED COUNT: 14.5 % (ref 11.5–14.9)
GFR SERPL CREATININE-BSD FRML MDRD: 48 ML/MIN/1.73M2
GLUCOSE SERPL-MCNC: 126 MG/DL (ref 70–99)
HCT VFR BLD AUTO: 38.9 % (ref 36–46)
HGB BLD-MCNC: 13 G/DL (ref 12–16)
LIPASE SERPL-CCNC: 18 U/L (ref 13–60)
LYMPHOCYTES NFR BLD: 1.2 K/UL (ref 1–4.8)
LYMPHOCYTES RELATIVE PERCENT: 12 % (ref 24–44)
MAGNESIUM SERPL-MCNC: 1.8 MG/DL (ref 1.6–2.6)
MCH RBC QN AUTO: 31 PG (ref 26–34)
MCHC RBC AUTO-ENTMCNC: 33.4 G/DL (ref 31–37)
MCV RBC AUTO: 93 FL (ref 80–100)
MONOCYTES NFR BLD: 0.5 K/UL (ref 0.1–1.3)
MONOCYTES NFR BLD: 5 % (ref 1–7)
NEUTROPHILS NFR BLD: 83 % (ref 36–66)
NEUTS SEG NFR BLD: 8.3 K/UL (ref 1.3–9.1)
PLATELET # BLD AUTO: 348 K/UL (ref 150–450)
PMV BLD AUTO: 7.7 FL (ref 6–12)
POTASSIUM SERPL-SCNC: 3.9 MMOL/L (ref 3.7–5.3)
PROT SERPL-MCNC: 6.6 G/DL (ref 6.4–8.3)
RBC # BLD AUTO: 4.19 M/UL (ref 4–5.2)
SODIUM SERPL-SCNC: 138 MMOL/L (ref 135–144)
TROPONIN I SERPL HS-MCNC: 24 NG/L (ref 0–14)
TROPONIN I SERPL HS-MCNC: 28 NG/L (ref 0–14)
TSH SERPL DL<=0.05 MIU/L-ACNC: 1.02 UIU/ML (ref 0.3–5)
WBC OTHER # BLD: 10 K/UL (ref 3.5–11)

## 2024-01-09 PROCEDURE — 36415 COLL VENOUS BLD VENIPUNCTURE: CPT

## 2024-01-09 PROCEDURE — 2580000003 HC RX 258: Performed by: EMERGENCY MEDICINE

## 2024-01-09 PROCEDURE — 71045 X-RAY EXAM CHEST 1 VIEW: CPT

## 2024-01-09 PROCEDURE — 70450 CT HEAD/BRAIN W/O DYE: CPT

## 2024-01-09 PROCEDURE — 83690 ASSAY OF LIPASE: CPT

## 2024-01-09 PROCEDURE — 2580000003 HC RX 258: Performed by: INTERNAL MEDICINE

## 2024-01-09 PROCEDURE — 82306 VITAMIN D 25 HYDROXY: CPT

## 2024-01-09 PROCEDURE — 99222 1ST HOSP IP/OBS MODERATE 55: CPT | Performed by: INTERNAL MEDICINE

## 2024-01-09 PROCEDURE — 85025 COMPLETE CBC W/AUTO DIFF WBC: CPT

## 2024-01-09 PROCEDURE — 6360000004 HC RX CONTRAST MEDICATION: Performed by: EMERGENCY MEDICINE

## 2024-01-09 PROCEDURE — 6360000002 HC RX W HCPCS: Performed by: INTERNAL MEDICINE

## 2024-01-09 PROCEDURE — 93005 ELECTROCARDIOGRAM TRACING: CPT | Performed by: EMERGENCY MEDICINE

## 2024-01-09 PROCEDURE — 83735 ASSAY OF MAGNESIUM: CPT

## 2024-01-09 PROCEDURE — 70496 CT ANGIOGRAPHY HEAD: CPT

## 2024-01-09 PROCEDURE — 6370000000 HC RX 637 (ALT 250 FOR IP): Performed by: INTERNAL MEDICINE

## 2024-01-09 PROCEDURE — 99285 EMERGENCY DEPT VISIT HI MDM: CPT

## 2024-01-09 PROCEDURE — 84484 ASSAY OF TROPONIN QUANT: CPT

## 2024-01-09 PROCEDURE — 1200000000 HC SEMI PRIVATE

## 2024-01-09 PROCEDURE — 84443 ASSAY THYROID STIM HORMONE: CPT

## 2024-01-09 PROCEDURE — 80053 COMPREHEN METABOLIC PANEL: CPT

## 2024-01-09 RX ORDER — GABAPENTIN 100 MG/1
100 CAPSULE ORAL DAILY
Status: DISCONTINUED | OUTPATIENT
Start: 2024-01-09 | End: 2024-01-13 | Stop reason: HOSPADM

## 2024-01-09 RX ORDER — LANOLIN ALCOHOL/MO/W.PET/CERES
400 CREAM (GRAM) TOPICAL DAILY
COMMUNITY

## 2024-01-09 RX ORDER — HYDROCODONE BITARTRATE AND ACETAMINOPHEN 5; 325 MG/1; MG/1
1 TABLET ORAL EVERY 8 HOURS PRN
Status: DISCONTINUED | OUTPATIENT
Start: 2024-01-09 | End: 2024-01-13 | Stop reason: HOSPADM

## 2024-01-09 RX ORDER — ONDANSETRON 2 MG/ML
4 INJECTION INTRAMUSCULAR; INTRAVENOUS EVERY 6 HOURS PRN
Status: DISCONTINUED | OUTPATIENT
Start: 2024-01-09 | End: 2024-01-09

## 2024-01-09 RX ORDER — ATORVASTATIN CALCIUM 10 MG/1
10 TABLET, FILM COATED ORAL DAILY
Status: DISCONTINUED | OUTPATIENT
Start: 2024-01-09 | End: 2024-01-13 | Stop reason: HOSPADM

## 2024-01-09 RX ORDER — SODIUM CHLORIDE, SODIUM LACTATE, POTASSIUM CHLORIDE, CALCIUM CHLORIDE 600; 310; 30; 20 MG/100ML; MG/100ML; MG/100ML; MG/100ML
INJECTION, SOLUTION INTRAVENOUS CONTINUOUS
Status: DISCONTINUED | OUTPATIENT
Start: 2024-01-09 | End: 2024-01-13 | Stop reason: HOSPADM

## 2024-01-09 RX ORDER — THIAMINE MONONITRATE (VIT B1) 100 MG
100 TABLET ORAL DAILY
COMMUNITY

## 2024-01-09 RX ORDER — ONDANSETRON 4 MG/1
4 TABLET, ORALLY DISINTEGRATING ORAL EVERY 8 HOURS PRN
Status: DISCONTINUED | OUTPATIENT
Start: 2024-01-09 | End: 2024-01-09

## 2024-01-09 RX ORDER — AMITRIPTYLINE HYDROCHLORIDE 50 MG/1
100 TABLET, FILM COATED ORAL NIGHTLY
Status: DISCONTINUED | OUTPATIENT
Start: 2024-01-09 | End: 2024-01-13 | Stop reason: HOSPADM

## 2024-01-09 RX ORDER — HYDRALAZINE HYDROCHLORIDE 20 MG/ML
10 INJECTION INTRAMUSCULAR; INTRAVENOUS EVERY 6 HOURS PRN
Status: DISCONTINUED | OUTPATIENT
Start: 2024-01-09 | End: 2024-01-13 | Stop reason: HOSPADM

## 2024-01-09 RX ORDER — SODIUM CHLORIDE 0.9 % (FLUSH) 0.9 %
5-40 SYRINGE (ML) INJECTION PRN
Status: DISCONTINUED | OUTPATIENT
Start: 2024-01-09 | End: 2024-01-13 | Stop reason: HOSPADM

## 2024-01-09 RX ORDER — DOXYCYCLINE 100 MG/1
100 CAPSULE ORAL EVERY 12 HOURS SCHEDULED
Status: DISCONTINUED | OUTPATIENT
Start: 2024-01-09 | End: 2024-01-13 | Stop reason: HOSPADM

## 2024-01-09 RX ORDER — ACETAMINOPHEN 325 MG/1
650 TABLET ORAL EVERY 6 HOURS PRN
Status: DISCONTINUED | OUTPATIENT
Start: 2024-01-09 | End: 2024-01-13 | Stop reason: HOSPADM

## 2024-01-09 RX ORDER — SODIUM CHLORIDE 9 MG/ML
INJECTION, SOLUTION INTRAVENOUS ONCE
Status: COMPLETED | OUTPATIENT
Start: 2024-01-09 | End: 2024-01-09

## 2024-01-09 RX ORDER — TRAMADOL HYDROCHLORIDE 50 MG/1
50 TABLET ORAL 4 TIMES DAILY
Status: ON HOLD | COMMUNITY
End: 2024-01-13 | Stop reason: HOSPADM

## 2024-01-09 RX ORDER — ACETAMINOPHEN 650 MG/1
650 SUPPOSITORY RECTAL EVERY 6 HOURS PRN
Status: DISCONTINUED | OUTPATIENT
Start: 2024-01-09 | End: 2024-01-13 | Stop reason: HOSPADM

## 2024-01-09 RX ORDER — SODIUM CHLORIDE 9 MG/ML
INJECTION, SOLUTION INTRAVENOUS PRN
Status: DISCONTINUED | OUTPATIENT
Start: 2024-01-09 | End: 2024-01-13 | Stop reason: HOSPADM

## 2024-01-09 RX ORDER — ALBUTEROL SULFATE 90 UG/1
2 AEROSOL, METERED RESPIRATORY (INHALATION) EVERY 6 HOURS PRN
Status: DISCONTINUED | OUTPATIENT
Start: 2024-01-09 | End: 2024-01-13 | Stop reason: HOSPADM

## 2024-01-09 RX ORDER — PANTOPRAZOLE SODIUM 40 MG/1
40 TABLET, DELAYED RELEASE ORAL
Status: DISCONTINUED | OUTPATIENT
Start: 2024-01-10 | End: 2024-01-13 | Stop reason: HOSPADM

## 2024-01-09 RX ORDER — METOPROLOL SUCCINATE 50 MG/1
50 TABLET, EXTENDED RELEASE ORAL NIGHTLY
Status: DISCONTINUED | OUTPATIENT
Start: 2024-01-09 | End: 2024-01-13 | Stop reason: HOSPADM

## 2024-01-09 RX ORDER — ENOXAPARIN SODIUM 100 MG/ML
30 INJECTION SUBCUTANEOUS DAILY
Status: DISCONTINUED | OUTPATIENT
Start: 2024-01-09 | End: 2024-01-10

## 2024-01-09 RX ORDER — TROSPIUM CHLORIDE 20 MG/1
20 TABLET, FILM COATED ORAL NIGHTLY
Status: DISCONTINUED | OUTPATIENT
Start: 2024-01-09 | End: 2024-01-13 | Stop reason: HOSPADM

## 2024-01-09 RX ORDER — POLYETHYLENE GLYCOL 3350 17 G/17G
17 POWDER, FOR SOLUTION ORAL DAILY PRN
Status: DISCONTINUED | OUTPATIENT
Start: 2024-01-09 | End: 2024-01-13 | Stop reason: HOSPADM

## 2024-01-09 RX ORDER — SODIUM CHLORIDE 0.9 % (FLUSH) 0.9 %
5-40 SYRINGE (ML) INJECTION EVERY 12 HOURS SCHEDULED
Status: DISCONTINUED | OUTPATIENT
Start: 2024-01-09 | End: 2024-01-13 | Stop reason: HOSPADM

## 2024-01-09 RX ORDER — SODIUM CHLORIDE 0.9 % (FLUSH) 0.9 %
10 SYRINGE (ML) INJECTION PRN
Status: COMPLETED | OUTPATIENT
Start: 2024-01-09 | End: 2024-01-09

## 2024-01-09 RX ADMIN — HYDRALAZINE HYDROCHLORIDE 10 MG: 20 INJECTION INTRAMUSCULAR; INTRAVENOUS at 19:44

## 2024-01-09 RX ADMIN — ENOXAPARIN SODIUM 30 MG: 100 INJECTION SUBCUTANEOUS at 19:53

## 2024-01-09 RX ADMIN — SODIUM CHLORIDE, POTASSIUM CHLORIDE, SODIUM LACTATE AND CALCIUM CHLORIDE: 600; 310; 30; 20 INJECTION, SOLUTION INTRAVENOUS at 18:30

## 2024-01-09 RX ADMIN — ATORVASTATIN CALCIUM 10 MG: 10 TABLET, FILM COATED ORAL at 19:44

## 2024-01-09 RX ADMIN — DOXYCYCLINE 100 MG: 100 CAPSULE ORAL at 19:44

## 2024-01-09 RX ADMIN — IOPAMIDOL 75 ML: 755 INJECTION, SOLUTION INTRAVENOUS at 15:56

## 2024-01-09 RX ADMIN — GABAPENTIN 100 MG: 100 CAPSULE ORAL at 19:43

## 2024-01-09 RX ADMIN — TROSPIUM CHLORIDE 20 MG: 20 TABLET, FILM COATED ORAL at 19:44

## 2024-01-09 RX ADMIN — AMITRIPTYLINE HYDROCHLORIDE 100 MG: 50 TABLET, FILM COATED ORAL at 19:52

## 2024-01-09 RX ADMIN — METOPROLOL SUCCINATE 50 MG: 50 TABLET, EXTENDED RELEASE ORAL at 19:43

## 2024-01-09 RX ADMIN — SODIUM CHLORIDE, PRESERVATIVE FREE 10 ML: 5 INJECTION INTRAVENOUS at 15:56

## 2024-01-09 RX ADMIN — HYDROCODONE BITARTRATE AND ACETAMINOPHEN 1 TABLET: 5; 325 TABLET ORAL at 19:42

## 2024-01-09 RX ADMIN — SODIUM CHLORIDE: 9 INJECTION, SOLUTION INTRAVENOUS at 15:56

## 2024-01-09 ASSESSMENT — ENCOUNTER SYMPTOMS
ABDOMINAL PAIN: 0
COUGH: 0
NAUSEA: 0
VOMITING: 0
SHORTNESS OF BREATH: 0

## 2024-01-09 ASSESSMENT — PAIN - FUNCTIONAL ASSESSMENT: PAIN_FUNCTIONAL_ASSESSMENT: 0-10

## 2024-01-09 ASSESSMENT — PAIN SCALES - GENERAL
PAINLEVEL_OUTOF10: 9
PAINLEVEL_OUTOF10: 4

## 2024-01-09 NOTE — H&P
[facility]   IN-PATIENT SERVICE   [facility]    HISTORY AND PHYSICAL EXAMINATION            Date:   1/9/2024  Patient name:  Nichelle Raza  Date of admission:  1/9/2024  2:15 PM  MRN:   [id number]  Account:  [id number]  YOB: 1934  PCP:    Pauly Bond MD  Room:   [address] [place] [address]  Code Status:    Full Code    Chief Complaint:     Chief Complaint   Patient presents with    Dizziness     Pt has c/o dizziness tested positive for covid last Tuesday and has MS. Daughter states she's very off balance and c/o dizziness which is how her MS episodes presents.        History Obtained From:     patient    History of Present Illness:     The patient is a 89 y.o.  Non- / non  female who presents with Dizziness (Pt has c/o dizziness tested positive for covid last Tuesday and has MS. Daughter states she's very off balance and c/o dizziness which is how her MS episodes presents. )   and she is admitted to the hospital for the management of this.    The patient reports that she had COVID last week,, was placed on Paxlovid.  She has completed the course of the medication, currently denies respiratory concerns however soon after the diagnosis the patient reports that she has been having dizziness, feels things are spinning around her.  Is any worsening focal weakness, has diplopia even at baseline.  She does report having a headache, which she reports happens when she has an MS flare.  Her last flare was a year back.  She denies any tinnitus, decrease in hearing.    Past Medical History:     Past Medical History:   Diagnosis Date    Acute cystitis without hematuria 10/1/2017    EDINSON (acute kidney injury) (HCC) 3/3/2021    Arthritis     Chronic daily headache     GERD (gastroesophageal reflux disease)     Hyperlipidemia     Hypertension     Moderate malnutrition ([facility]) 3/2/2019    Multiple sclerosis ([facility])     Multiple sclerosis exacerbation ([facility]) 2/25/2021    Neuropathy     Pneumonia  mucous membranes moist  Lungs: Bilateral equal air entry, clear to ausculation, no wheezing, rales or rhonchi, normal effort  Cardiovascular: normal rate, regular rhythm, no murmur, gallop, rub.  Abdomen: Soft, nontender, nondistended, normal bowel sounds, no hepatomegaly or splenomegaly  Neurologic: No slurring, reports diplopia at baseline for which she uses prism glasses.  Right-sided facial droop, left-sided upper and lower extremity weakness chronic  Skin: No gross lesions, rashes, bruising or bleeding on exposed skin area  Extremities:  peripheral pulses palpable, no pedal edema or calf pain with palpation    Investigations:      Laboratory Testing:  Recent Results (from the past 24 hour(s))   Comprehensive Metabolic Panel    Collection Time: 01/09/24  3:22 PM   Result Value Ref Range    Sodium 138 135 - 144 mmol/L    Potassium 3.9 3.7 - 5.3 mmol/L    Chloride 96 (L) 98 - 107 mmol/L    CO2 31 20 - 31 mmol/L    Anion Gap 11 9 - 17 mmol/L    Glucose 126 (H) 70 - 99 mg/dL    BUN 17 8 - 23 mg/dL    Creatinine 1.1 (H) 0.5 - 0.9 mg/dL    Est, Glom Filt Rate 48 (L) >60 mL/min/1.73m2    Calcium 9.0 8.6 - 10.4 mg/dL    Total Protein 6.6 6.4 - 8.3 g/dL    Albumin 4.1 3.5 - 5.2 g/dL    Total Bilirubin 0.3 0.3 - 1.2 mg/dL    Alkaline Phosphatase 54 35 - 104 U/L    ALT 23 5 - 33 U/L    AST 28 <32 U/L   CBC with Auto Differential    Collection Time: 01/09/24  3:22 PM   Result Value Ref Range    WBC 10.0 3.5 - 11.0 k/uL    RBC 4.19 4.0 - 5.2 m/uL    Hemoglobin 13.0 12.0 - 16.0 g/dL    Hematocrit 38.9 36 - 46 %    MCV 93.0 80 - 100 fL    MCH 31.0 26 - 34 pg    MCHC 33.4 31 - 37 g/dL    RDW 14.5 11.5 - 14.9 %    Platelets 348 150 - 450 k/uL    MPV 7.7 6.0 - 12.0 fL    Neutrophils % 83 (H) 36 - 66 %    Lymphocytes % 12 (L) 24 - 44 %    Monocytes % 5 1 - 7 %    Eosinophils % 0 0 - 4 %    Basophils % 0 0 - 2 %    Neutrophils Absolute 8.30 1.3 - 9.1 k/uL    Lymphocytes Absolute 1.20 1.0 - 4.8 k/uL    Monocytes Absolute 0.50 0.1 -

## 2024-01-09 NOTE — PROGRESS NOTES
Pharmacy Medication History Note      List of current medications patient is taking is complete.     Source of information: patient, dispense report, [facility]AR[facility]    Changes made to medication list:  Medications flagged for removal (include reason, ex. noncompliance):  Paxlovid - patient finished course yesterday    Medications removed (include reason, ex. therapy complete or physician discontinued):  None    Medications added/doses adjusted:  Tramadol 50mg   Vitamin B1  Magnesium Oxide    Other notes (ex. Recent course of antibiotics, Coumadin dosing):  OARRS Report - Norco 5-325 90 for 30 on 12/18/23    Denies use of other OTC or herbal medications.      Allergies clarified    Medication list provided to the patient:no  Medication education provided to the patient:none      Electronically signed by Doc Acevedo on 1/9/2024 at 6:03 PM

## 2024-01-09 NOTE — ED PROVIDER NOTES
EMERGENCY DEPARTMENT ENCOUNTER    Pt Name: Nichelle Raza  MRN: [id number]  Birthdate 12/8/1934  Date of evaluation: 1/9/24  CHIEF COMPLAINT       Chief Complaint   Patient presents with    Dizziness     Pt has c/o dizziness tested positive for covid last Tuesday and has MS. Daughter states she's very off balance and c/o dizziness which is how her MS episodes presents.      HISTORY OF PRESENT ILLNESS   89-year-old female with past medical history of multiple sclerosis presenting with chief complaint of an unsteady gait.  Her last known normal was 10:30 AM last night.  She woke up feeling this unsteadiness.  She said this has happened to her in the past and it usually from her multiple sclerosis.  She has no focal motor or sensory deficits.  She has no difficulty speaking.  She is alert and oriented x 4 and a pleasure to converse with.  She is accompanied by her daughter who reiterates that her MS can cause some gait instability.     The history is provided by the patient and a relative.           REVIEW OF SYSTEMS     Review of Systems   Constitutional:  Negative for chills and fever.   HENT:  Negative for congestion.    Eyes:  Negative for visual disturbance.   Respiratory:  Negative for cough and shortness of breath.    Cardiovascular:  Negative for chest pain.   Gastrointestinal:  Negative for abdominal pain, nausea and vomiting.   Genitourinary:  Negative for dysuria, flank pain, frequency and urgency.   Musculoskeletal:  Positive for gait problem. Negative for myalgias.   Neurological:  Negative for dizziness, tremors, seizures, syncope, facial asymmetry, speech difficulty, weakness, light-headedness, numbness and headaches.   Psychiatric/Behavioral:  Negative for behavioral problems.      PASTMEDICAL HISTORY     Past Medical History:   Diagnosis Date    Acute cystitis without hematuria 10/1/2017    EDINSON (acute kidney injury) ([facility]) 3/3/2021    Arthritis     Chronic daily headache     GERD (gastroesophageal reflux

## 2024-01-10 ENCOUNTER — APPOINTMENT (OUTPATIENT)
Dept: GENERAL RADIOLOGY | Age: 89
End: 2024-01-10
Payer: MEDICARE

## 2024-01-10 ENCOUNTER — APPOINTMENT (OUTPATIENT)
Dept: MRI IMAGING | Age: 89
End: 2024-01-10
Payer: MEDICARE

## 2024-01-10 LAB
ALBUMIN SERPL-MCNC: 3.6 G/DL (ref 3.5–5.2)
ALP SERPL-CCNC: 45 U/L (ref 35–104)
ALT SERPL-CCNC: 21 U/L (ref 5–33)
ANION GAP SERPL CALCULATED.3IONS-SCNC: 9 MMOL/L (ref 9–17)
AST SERPL-CCNC: 23 U/L
BASOPHILS # BLD: 0.1 K/UL (ref 0–0.2)
BASOPHILS NFR BLD: 1 % (ref 0–2)
BILIRUB SERPL-MCNC: 0.3 MG/DL (ref 0.3–1.2)
BILIRUB UR QL STRIP: NEGATIVE
BUN SERPL-MCNC: 18 MG/DL (ref 8–23)
CALCIUM SERPL-MCNC: 9 MG/DL (ref 8.6–10.4)
CHLORIDE SERPL-SCNC: 101 MMOL/L (ref 98–107)
CLARITY UR: CLEAR
CO2 SERPL-SCNC: 29 MMOL/L (ref 20–31)
COLOR UR: YELLOW
COMMENT: NORMAL
CREAT SERPL-MCNC: 1 MG/DL (ref 0.5–0.9)
EKG ATRIAL RATE: 81 BPM
EKG P AXIS: 26 DEGREES
EKG P-R INTERVAL: 148 MS
EKG Q-T INTERVAL: 418 MS
EKG QRS DURATION: 90 MS
EKG QTC CALCULATION (BAZETT): 485 MS
EKG R AXIS: 4 DEGREES
EKG T AXIS: 10 DEGREES
EKG VENTRICULAR RATE: 81 BPM
EOSINOPHIL # BLD: 0.1 K/UL (ref 0–0.4)
EOSINOPHILS RELATIVE PERCENT: 1 % (ref 0–4)
ERYTHROCYTE [DISTWIDTH] IN BLOOD BY AUTOMATED COUNT: 14.4 % (ref 11.5–14.9)
FOLATE SERPL-MCNC: >20 NG/ML
GFR SERPL CREATININE-BSD FRML MDRD: 54 ML/MIN/1.73M2
GLUCOSE SERPL-MCNC: 82 MG/DL (ref 70–99)
GLUCOSE UR STRIP-MCNC: NEGATIVE MG/DL
HCT VFR BLD AUTO: 38 % (ref 36–46)
HGB BLD-MCNC: 12.4 G/DL (ref 12–16)
HGB UR QL STRIP.AUTO: NEGATIVE
KETONES UR STRIP-MCNC: NEGATIVE MG/DL
LEUKOCYTE ESTERASE UR QL STRIP: NEGATIVE
LYMPHOCYTES NFR BLD: 3.3 K/UL (ref 1–4.8)
LYMPHOCYTES RELATIVE PERCENT: 40 % (ref 24–44)
MCH RBC QN AUTO: 30.5 PG (ref 26–34)
MCHC RBC AUTO-ENTMCNC: 32.7 G/DL (ref 31–37)
MCV RBC AUTO: 93.2 FL (ref 80–100)
MONOCYTES NFR BLD: 0.7 K/UL (ref 0.1–1.3)
MONOCYTES NFR BLD: 9 % (ref 1–7)
NEUTROPHILS NFR BLD: 49 % (ref 36–66)
NEUTS SEG NFR BLD: 4.1 K/UL (ref 1.3–9.1)
NITRITE UR QL STRIP: NEGATIVE
PH UR STRIP: 7 [PH] (ref 5–8)
PLATELET # BLD AUTO: 336 K/UL (ref 150–450)
PMV BLD AUTO: 7.7 FL (ref 6–12)
POTASSIUM SERPL-SCNC: 4.1 MMOL/L (ref 3.7–5.3)
PROT SERPL-MCNC: 5.8 G/DL (ref 6.4–8.3)
PROT UR STRIP-MCNC: NEGATIVE MG/DL
RBC # BLD AUTO: 4.08 M/UL (ref 4–5.2)
SODIUM SERPL-SCNC: 139 MMOL/L (ref 135–144)
SP GR UR STRIP: 1.01 (ref 1–1.03)
UROBILINOGEN UR STRIP-ACNC: NORMAL EU/DL (ref 0–1)
VIT B12 SERPL-MCNC: 668 PG/ML (ref 232–1245)
WBC OTHER # BLD: 8.3 K/UL (ref 3.5–11)

## 2024-01-10 PROCEDURE — 82746 ASSAY OF FOLIC ACID SERUM: CPT

## 2024-01-10 PROCEDURE — 2060000000 HC ICU INTERMEDIATE R&B

## 2024-01-10 PROCEDURE — 74230 X-RAY XM SWLNG FUNCJ C+: CPT

## 2024-01-10 PROCEDURE — 6370000000 HC RX 637 (ALT 250 FOR IP): Performed by: INTERNAL MEDICINE

## 2024-01-10 PROCEDURE — 85025 COMPLETE CBC W/AUTO DIFF WBC: CPT

## 2024-01-10 PROCEDURE — 92611 MOTION FLUOROSCOPY/SWALLOW: CPT

## 2024-01-10 PROCEDURE — 99223 1ST HOSP IP/OBS HIGH 75: CPT | Performed by: PSYCHIATRY & NEUROLOGY

## 2024-01-10 PROCEDURE — 81003 URINALYSIS AUTO W/O SCOPE: CPT

## 2024-01-10 PROCEDURE — 70551 MRI BRAIN STEM W/O DYE: CPT

## 2024-01-10 PROCEDURE — 93010 ELECTROCARDIOGRAM REPORT: CPT | Performed by: INTERNAL MEDICINE

## 2024-01-10 PROCEDURE — 82607 VITAMIN B-12: CPT

## 2024-01-10 PROCEDURE — 92610 EVALUATE SWALLOWING FUNCTION: CPT

## 2024-01-10 PROCEDURE — 6360000002 HC RX W HCPCS: Performed by: INTERNAL MEDICINE

## 2024-01-10 PROCEDURE — 2580000003 HC RX 258: Performed by: INTERNAL MEDICINE

## 2024-01-10 PROCEDURE — 80053 COMPREHEN METABOLIC PANEL: CPT

## 2024-01-10 PROCEDURE — 36415 COLL VENOUS BLD VENIPUNCTURE: CPT

## 2024-01-10 RX ORDER — ERGOCALCIFEROL 1.25 MG/1
50000 CAPSULE ORAL WEEKLY
Status: DISCONTINUED | OUTPATIENT
Start: 2024-01-10 | End: 2024-01-13 | Stop reason: HOSPADM

## 2024-01-10 RX ORDER — ASPIRIN 81 MG/1
81 TABLET, CHEWABLE ORAL DAILY
Status: DISCONTINUED | OUTPATIENT
Start: 2024-01-10 | End: 2024-01-13 | Stop reason: HOSPADM

## 2024-01-10 RX ORDER — ENOXAPARIN SODIUM 100 MG/ML
40 INJECTION SUBCUTANEOUS DAILY
Status: DISCONTINUED | OUTPATIENT
Start: 2024-01-11 | End: 2024-01-13 | Stop reason: HOSPADM

## 2024-01-10 RX ADMIN — PANTOPRAZOLE SODIUM 40 MG: 40 TABLET, DELAYED RELEASE ORAL at 06:04

## 2024-01-10 RX ADMIN — AMITRIPTYLINE HYDROCHLORIDE 100 MG: 50 TABLET, FILM COATED ORAL at 19:49

## 2024-01-10 RX ADMIN — ATORVASTATIN CALCIUM 10 MG: 10 TABLET, FILM COATED ORAL at 08:50

## 2024-01-10 RX ADMIN — ERGOCALCIFEROL 50000 UNITS: 1.25 CAPSULE ORAL at 08:55

## 2024-01-10 RX ADMIN — ASPIRIN 81 MG: 81 TABLET, CHEWABLE ORAL at 08:50

## 2024-01-10 RX ADMIN — METOPROLOL SUCCINATE 50 MG: 50 TABLET, EXTENDED RELEASE ORAL at 19:49

## 2024-01-10 RX ADMIN — GABAPENTIN 100 MG: 100 CAPSULE ORAL at 08:50

## 2024-01-10 RX ADMIN — ACETAMINOPHEN 650 MG: 325 TABLET ORAL at 06:21

## 2024-01-10 RX ADMIN — TROSPIUM CHLORIDE 20 MG: 20 TABLET, FILM COATED ORAL at 19:49

## 2024-01-10 RX ADMIN — SODIUM CHLORIDE, POTASSIUM CHLORIDE, SODIUM LACTATE AND CALCIUM CHLORIDE: 600; 310; 30; 20 INJECTION, SOLUTION INTRAVENOUS at 21:28

## 2024-01-10 RX ADMIN — ENOXAPARIN SODIUM 30 MG: 100 INJECTION SUBCUTANEOUS at 08:47

## 2024-01-10 RX ADMIN — DOXYCYCLINE 100 MG: 100 CAPSULE ORAL at 19:49

## 2024-01-10 RX ADMIN — HYDROCODONE BITARTRATE AND ACETAMINOPHEN 1 TABLET: 5; 325 TABLET ORAL at 19:49

## 2024-01-10 RX ADMIN — HYDROCODONE BITARTRATE AND ACETAMINOPHEN 1 TABLET: 5; 325 TABLET ORAL at 10:22

## 2024-01-10 RX ADMIN — DOXYCYCLINE 100 MG: 100 CAPSULE ORAL at 08:50

## 2024-01-10 ASSESSMENT — PAIN SCALES - GENERAL
PAINLEVEL_OUTOF10: 10
PAINLEVEL_OUTOF10: 10
PAINLEVEL_OUTOF10: 8

## 2024-01-10 ASSESSMENT — PAIN DESCRIPTION - DESCRIPTORS
DESCRIPTORS: THROBBING
DESCRIPTORS: ACHING

## 2024-01-10 ASSESSMENT — PAIN DESCRIPTION - LOCATION
LOCATION: HEAD
LOCATION: HEAD

## 2024-01-10 NOTE — CARE COORDINATION
Case Management Assessment  Initial Evaluation    Date/Time of Evaluation: 1/10/2024 4:21 PM  Assessment Completed by: Alma Conteh RN    If patient is discharged prior to next notation, then this note serves as note for discharge by case management.    Patient Name: Nichelle Raza                   YOB: 1934  Diagnosis: Dizziness [R42]  Unstable gait [R26.81]  History of multiple sclerosis (HCC) [G35]                   Date / Time: 1/9/2024  2:15 PM    Patient Admission Status: Inpatient   Readmission Risk (Low < 19, Mod (19-27), High > 27): Readmission Risk Score: 12.6    Current PCP: Pauly Bond MD  PCP verified by CM? Yes    Chart Reviewed: Yes      History Provided by: Child/Family (Daughter, Yolis)  Patient Orientation: Other (see comment) (Spoke to Pt's DaughterYolis)    Patient Cognition:      Hospitalization in the last 30 days (Readmission):  No    If yes, Readmission Assessment in CM Navigator will be completed.    Advance Directives:      Code Status: Full Code   Patient's Primary Decision Maker is:      Primary Decision Maker: Yolis Poon - Child - [phone]    Secondary Decision Maker: Justino Raza - Child - [phone]    Discharge Planning:    Patient lives with: Alone Type of Home: House  Primary Care Giver: Self  Patient Support Systems include: Children, Family Members   Current Financial resources: Medicare  Current community resources: None  Current services prior to admission: Durable Medical Equipment            Current DME: Walker, Shower Chair            Type of Home Care services:  None    ADLS  Prior functional level: Independent in ADLs/IADLs  Current functional level: Independent in ADLs/IADLs    PT AM-PAC:   /24  OT AM-PAC:   /24    Family can provide assistance at DC:    Would you like Case Management to discuss the discharge plan with any other family members/significant others, and if so, who?    Plans to Return to Present Housing: Yes  Other  Identified Issues/Barriers to RETURNING to current housing: None  Potential Assistance needed at discharge: Home Care (Referral made To [facility] Per Family request.)            Potential DME:    Patient expects to discharge to: House  Plan for transportation at discharge:  (Yolis Hernandez)    Financial    Payor: MEDICARE / Plan: MEDICARE PART A AND B / Product Type: *No Product type* /     Does insurance require precert for SNF: No    Potential assistance Purchasing Medications:    Meds-to-Beds request:        [facility] [id number] - [place], [place] - [address] - P [phone] - F [phone]  [address] [place] [address]  Phone: [phone] Fax: [phone]      Notes:    Factors facilitating achievement of predicted outcomes: Family support and Has needed Durable Medical Equipment at home    Barriers to discharge: None    Additional Case Management Notes: 1/10/24 Medicare. COVID+ 1/2/24 (remains   Afebrile, RA) Lives in 2 story home alone, Yolis Hernandez, is there every day or every other day. DME- Walker, [place], [place], Wants [facility]NS- [facility], Accepted. PT/OT/ST, HX of MS, MRI,Neuro, Swallow Study, [place] 12%,Srinivas will need signed/completed//KB     The Plan for Transition of Care is related to the following treatment goals of Dizziness [R42]  Unstable gait [R26.81]  History of multiple sclerosis (HCC) [G35]    IF APPLICABLE: The Patient and/or patient representative Nichelle and her family were provided with a choice of provider and agrees with the discharge plan. Freedom of choice list with basic dialogue that supports the patient's individualized plan of care/goals and shares the quality data associated with the providers was provided to: Patient Representative   Patient Representative Name: Yolis Hernandez     The Patient and/or Patient Representative Agree with the Discharge Plan? Yes    Alma Conteh RN  Case Management Department  Ph:  Fax:

## 2024-01-10 NOTE — PROGRESS NOTES
Pt COVID positive. This was not documented in chart earlier. To attempt to scan pt at end of night per protocol.

## 2024-01-10 NOTE — ACP (ADVANCE CARE PLANNING)
Advance Care Planning     Advance Care Planning Activator (Inpatient)  Conversation Note      Date of ACP Conversation: 1/10/2024     Conversation Conducted with: Pt's Daughter, Yolis Poon, [phone].    ACP Activator: Alma Conteh RN        Health Care Decision Maker:     Current Designated Health Care Decision Maker:     Primary Decision Maker: Yolis Poon - Child - [phone]    Secondary Decision Maker: Justino Raza - Child - [phone]        Care Preferences    Ventilation:  \"If you were in your present state of health and suddenly became very ill and were unable to breathe on your own, what would your preference be about the use of a ventilator (breathing machine) if it were available to you?\"      Would the patient desire the use of ventilator (breathing machine)?: yes    \"If your health worsens and it becomes clear that your chance of recovery is unlikely, what would your preference be about the use of a ventilator (breathing machine) if it were available to you?\"     Would the patient desire the use of ventilator (breathing machine)?: No      Resuscitation  \"CPR works best to restart the heart when there is a sudden event, like a heart attack, in someone who is otherwise healthy. Unfortunately, CPR does not typically restart the heart for people who have serious health conditions or who are very sick.\"    \"In the event your heart stopped as a result of an underlying serious health condition, would you want attempts to be made to restart your heart (answer \"yes\" for attempt to resuscitate) or would you prefer a natural death (answer \"no\" for do not attempt to resuscitate)?\" yes       [] Yes   [] No   Educated Patient / Decision Maker regarding differences between Advance Directives and portable DNR orders.    Length of ACP Conversation in minutes:      Conversation Outcomes:  ACP discussion completed    Follow-up plan:    [] Schedule follow-up conversation to continue planning  []

## 2024-01-10 NOTE — PROGRESS NOTES
[facility]   OCCUPATIONAL THERAPY MISSED TREATMENT NOTE   INPATIENT   Date: 1/10/24  Patient Name: Nichelle Raza       Room:   MRN: [id number]   Account #: [id number]    : 1934  (89 y.o.)  Gender: female                 REASON FOR MISSED TREATMENT:  Patient at testing and/or off the floor   -    Patient out of room for swallow study. OT will continue to follow and attempt as able.  1440        Electronically signed by FELICIANO Hanks on 1/10/24 at 3:26 PM EST

## 2024-01-10 NOTE — PROGRESS NOTES
SPEECH LANGUAGE PATHOLOGY  Facility/Department: [facility]   CLINICAL BEDSIDE SWALLOW EVALUATION    NAME: Nichelle Raza  : 1934  MRN: [id number]    ADMISSION DATE: 2024  ADMITTING DIAGNOSIS: has Ataxia; Multiple sclerosis (HCC); Essential hypertension; Hyperlipidemia; Atelectasis; Debility; Abnormal gait; Actinic keratosis; Enthesopathy of hip region; Generalized osteoarthritis; Idiopathic peripheral neuropathy; Vitamin D deficiency; Primary localized osteoarthrosis of the hip, left; Primary osteoarthritis of left hip; Acquired spondylolisthesis; Chronic midline low back pain with left-sided sciatica; Spinal stenosis of lumbar region with neurogenic claudication; Presence of right artificial shoulder joint; Diarrhea of presumed infectious origin; Chronic headache; Facial asymmetry; Asymptomatic bacteriuria; Hyperglycemia; Lumbar radiculopathy; Type 2 diabetes mellitus; Recurrent urinary tract infection; Diastolic congestive heart failure (HCC); Left hemiparesis (HCC); Chronic renal disease, stage III (HCC) [869461]; Chronic systolic (congestive) heart failure; Flu; Type 2 diabetes mellitus with chronic kidney disease; Opioid dependence with current use (HCC); and Dizziness on their problem list.    Recent Chest Xray: 2024  IMPRESSION:  No acute process.    Date of Eval: 1/10/2024  Evaluating Therapist: MARK Banks    Current Diet level:  Current Diet : NPO  Current Liquid Diet : NPO    Primary Complaint  The patient is a 89 y.o.  Non- / non  female who presents with Dizziness (Pt has c/o dizziness tested positive for covid last Tuesday and has MS. Daughter states she's very off balance and c/o dizziness which is how her MS episodes presents. )   and she is admitted to the hospital for the management of this.     The patient reports that she had COVID last week,, was placed on Paxlovid.  She has completed the course of the medication, currently denies respiratory concerns  however soon after the diagnosis the patient reports that she has been having dizziness, feels things are spinning around her.  Is any worsening focal weakness, has diplopia even at baseline.  She does report having a headache, which she reports happens when she has an MS flare.  Her last flare was a year back.  She denies any tinnitus, decrease in hearing.    Pain:  Pain Assessment  Pain Assessment: 0-10  Pain Level: 10  Patient's Stated Pain Goal: 0 - No pain  Pain Location: Head  Pain Descriptors: Throbbing  Non-Pharmaceutical Pain Intervention(s): Rest, Repositioned    Reason for Referral  Nichelle Raza was referred for a bedside swallow evaluation to assess the efficiency of her swallow function, identify signs and symptoms of aspiration and make recommendations regarding safe dietary consistencies, effective compensatory strategies, and safe eating environment.    Impression  Dysphagia Diagnosis: Mild oral stage dysphagia;Mild to moderate pharyngeal stage dysphagia;Suspected needs further assessment  Dysphagia Impression : Pt presents with overt s/s aspiration for some consistencies presented at bedside including immediate cough/choking for thin liquids via cup and throat clear for regular solids. No overt s/s aspiration for puree. ST recommends video swallow study for objective assessment of swallow function in order to determine safety of PO intake.  Dysphagia Outcome Severity Scale: Level 4: Mild moderate dysphagia- Intermittent supervision/cueing. One - two diet consistencies restricted     Treatment Plan  Requires SLP Intervention: Yes     D/C Recommendations: Ongoing speech therapy is recommended at next level of care;Ongoing speech therapy is recommended during this hospitalization       Recommended Diet and Intervention  Diet Solids Recommendation: NPO (pending MBS recommendation)  Liquid Consistency Recommendation: NPO (pending MBS recommendation)  Recommended Form of Meds: Other (pending MBS

## 2024-01-10 NOTE — PROCEDURES
INSTRUMENTAL SWALLOW REPORT  MODIFIED BARIUM SWALLOW    NAME: Nichelle Raza   : 1934  MRN: [id number]       Date of Eval: 1/10/2024        Radiologist: Dr. Jim     Referring Diagnosis(es):  Dysphagia    Past Medical History:  has a past medical history of Acute cystitis without hematuria, EDINSON (acute kidney injury) (HCC), Arthritis, Chronic daily headache, GERD (gastroesophageal reflux disease), Hyperlipidemia, Hypertension, Moderate malnutrition (HCC), Multiple sclerosis (HCC), Multiple sclerosis exacerbation (HCC), Neuropathy, Pneumonia, and Vitamin D deficiency.  Past Surgical History:  has a past surgical history that includes Cystocele repair; Rectocele repair; Hysterectomy; Cervical disc surgery; Cataract removal with implant (Right, 2014); Cataract removal with implant (Left, 2014); Total shoulder arthroplasty (Right, 2017); shoulder surgery (Right, ); and lumbar laminectomy (N/A, 3/17/2022).               Type of Study: Initial MBS       Recent CXR/CT of Chest: CXR ():  IMPRESSION:  No acute process.    Patient Complaints/Reason for Referral:  Nichelle Raza was referred for a MBS to assess the efficiency of his/her swallow function, assess for aspiration, and to make recommendations regarding safe dietary consistencies, effective compensatory strategies, and safe eating environment.  Patient complaints: Pt c/o trouble swallowing (globus sensation in throat).    Onset of problem: Per H&P:  The patient is a 89 y.o.  Non- / non  female who presents with Dizziness (Pt has c/o dizziness tested positive for covid last Tuesday and has MS. Daughter states she's very off balance and c/o dizziness which is how her MS episodes presents. )   and she is admitted to the hospital for the management of this.     The patient reports that she had COVID last week,, was placed on Paxlovid.  She has completed the course of the medication, currently denies respiratory concerns however

## 2024-01-10 NOTE — PROGRESS NOTES
Spoke to LASHAUN Sinha about getting pt down later tonight for their MRI. She is aware of pt saying she had contrast allergy to MRI. She said pt is unsure if it was CT or MRI. Pt stated she was wheezing after. We informed RN we would question pt to see if it was possible CT dye reaction. If pt had wheezing to MRI contrast we cannot give contrast even with prep due to higher level of contrast allergy severity.

## 2024-01-10 NOTE — CARE COORDINATION
Patient and/or primary caregiver participated in post-hospital care planning and their ability to address care needs was assessed. Yes    Family/caregiver is willing and able to care for patient at home.  Yes, Pt's Daughter, Yolis, is there every day or every other day.    Family/caregiver educated on resources available including durable medical equipment and respite care. Yes.    Daughter would Like VNS, [facility]. Referral made, Pt. Accepted.

## 2024-01-10 NOTE — CONSULTS
[facility] Neurology   IN-PATIENT SERVICE      NEUROLOGY CONSULT  NOTE            Date:   1/10/2024  Patient name:  Nichelle Raza  Date of admission:  1/9/2024  YOB: 1934      Chief Complaint:     Chief Complaint   Patient presents with    Dizziness     Pt has c/o dizziness tested positive for covid last Tuesday and has MS. Daughter states she's very off balance and c/o dizziness which is how her MS episodes presents.        Reason for Consult:      Generalized weakness, gait ataxia.    History of Present Illness:     The patient is a 89 y.o. female who presents with Dizziness (Pt has c/o dizziness tested positive for covid last Tuesday and has MS. Daughter states she's very off balance and c/o dizziness which is how her MS episodes presents. )  . The patient was seen and examined and the chart was reviewed.  Patient with history of MS for the last 4 years presents to the hospital with generalized weakness, difficulty ambulating since waking up yesterday morning on 1/9.  She was diagnosed with COVID infection about 1 week ago and was taking Paxlovid for this.  She also describes a feeling of a lightheaded/dizzy feeling.  She underwent CT head with no acute intracranial abnormalities.  CTA head and neck did show some areas of mild to moderate intracranial atherosclerosis.  Denies prior history of stroke.      She states she follows with Dr. Pulido, neurology.  Last seen by the CNP in June, 2023 for MS and headaches.  Pertinent home medications included amitriptyline, gabapentin, meclizine, prednisone, simvastatin, tizanidine, tramadol, Aimovig, Norco.  Did not seem to make mention of DMT for MS.  Patient states she has never been on DMT for MS.  She also states that she was told when she was diagnosed that her brain did not show lesions but that her spinal fluid did confirm it.    Currently she is resting comfortably in bed.  Continues to complain of lightheadedness.  Also currently awaiting her MRI to

## 2024-01-10 NOTE — PROGRESS NOTES
Called PCU to give report. Was told again that nurse is in room. Was informed that she will have nurse call me back

## 2024-01-10 NOTE — PLAN OF CARE
Please have patient or POA answer all MRI screening form questions in Epic. Exam will be scheduled after form has been completed and reviewed. Thank you.

## 2024-01-10 NOTE — DISCHARGE INSTR - COC
Continuity of Care Form    Patient Name: Nichelle Raza   :  1934  MRN:  [id number]    Admit date:  2024  Discharge date:  ***    Code Status Order: Full Code   Advance Directives:     Admitting Physician:  Adriana Flanagan MD  PCP: Pauly Bond MD    Discharging Nurse: ***  Discharging Hospital Unit/Room#: 2105/2105-01  Discharging Unit Phone Number: ***    Emergency Contact:   Extended Emergency Contact Information  Primary Emergency Contact: Yolis Poon  Address: [facility] [place]  Home Phone: [phone]  Work Phone: [phone]  Mobile Phone: [phone]  Relation: Child  Hearing or visual needs: None  Other needs: None  Preferred language: English   needed? No  Secondary Emergency Contact: RyJustino  Address: [facility]  Home Phone: [phone]  Relation: Child  Hearing or visual needs: None  Other needs: None  Preferred language: English   needed? No    Past Surgical History:  Past Surgical History:   Procedure Laterality Date    CATARACT REMOVAL WITH IMPLANT Right 2014    [facility]    CATARACT REMOVAL WITH IMPLANT Left 2014    [facility]    CERVICAL DISC SURGERY      CYSTOCELE REPAIR      HYSTERECTOMY (CERVIX STATUS UNKNOWN)      LUMBAR LAMINECTOMY N/A 3/17/2022    LUMBAR LAMINECTOMY DECOMPRESSION MINIMALLY INVASIVE (MILD) L4-L5 performed by Kervin Traore MD at [facility] OR    RECTOCELE REPAIR      SHOULDER ARTHROPLASTY Right 2017    SHOULDER SURGERY Right 2017       Immunization History:   Immunization History   Administered Date(s) Administered    COVID-19, PFIZER GRAY top, DO NOT Dilute, (age 12 y+), IM, 30 mcg/0.3 mL 2022    COVID-19, PFIZER PURPLE top, DILUTE for use, (age 12 y+), 30mcg/0.3mL 2021, 2021, 2021    Influenza A (I5T5-62) Vaccine PF IM 2010    Influenza Vaccine, unspecified formulation 2011, 10/25/2012, 10/01/2013, 2015    Influenza Virus      Patient Infection Status       Infection Onset Added Last Indicated Last Indicated By Review Planned Expiration Resolved Resolved By    MRSA  14 Riya Harper RN        2013 rt shin    Resolved    Influenza 22 COVID-19 & Influenza Combo   23 Infection                        Nurse Assessment:  Last Vital Signs: BP (!) 148/87   Pulse (!) 103   Temp 97.7 °F (36.5 °C) (Axillary)   Resp 16   Ht 1.48 m (4' 10.27\")   Wt 62.6 kg (138 lb)   SpO2 97%   BMI 28.58 kg/m²     Last documented pain score (0-10 scale): Pain Level: 10  Last Weight:   Wt Readings from Last 1 Encounters:   24 62.6 kg (138 lb)     Mental Status:  {IP PT MENTAL STATUS:}    IV Access:  { SEEMA IV ACCESS:324752347}    Nursing Mobility/ADLs:  Walking   {P DME ADLs:285599249}  Transfer  {P DME ADLs:253715896}  Bathing  {P DME ADLs:508218151}  Dressing  {CHP DME ADLs:013833950}  Toileting  {P DME ADLs:555339171}  Feeding  {P DME ADLs:171927961}  Med Admin  {P DME ADLs:567674071}  Med Delivery   { SEEMA MED Delivery:280293326}    Wound Care Documentation and Therapy:        Elimination:  Continence:   Bowel: {YES / NO:}  Bladder: {YES / NO:}  Urinary Catheter: {Urinary Catheter:346808160}   Colostomy/Ileostomy/Ileal Conduit: {YES / NO:}       Date of Last BM: ***  No intake or output data in the 24 hours ending 01/10/24 1627  I/O last 3 completed shifts:  In: 200 [I.V.:200]  Out: -     Safety Concerns:     { SEEMA Safety Concerns:433333502}    Impairments/Disabilities:      { SEEMA Impairments/Disabilities:614188579}    Nutrition Therapy:  Current Nutrition Therapy:   { SEEMA Diet List:605638822}    Routes of Feeding: {[facility] DME Other Feedings:603262705}  Liquids: {Slp liquid thickness:77761}  Daily Fluid Restriction: {CHP DME Yes amt example:710686626}  Last Modified Barium Swallow with Video (Video Swallowing Test): {Done Not Done

## 2024-01-10 NOTE — PROGRESS NOTES
Gave report to Ernestine. All questions answered. Patients personal belongings were gathered and patient was taken by wheelchair.

## 2024-01-10 NOTE — PROGRESS NOTES
Called to give report to PCU. Was told that nurse is in a room and she will have her call me back.

## 2024-01-10 NOTE — PROGRESS NOTES
[facility]  IN-PATIENT SERVICE  [facility]    PROGRESS NOTE             Date:   1/10/2024  Patient name:  Nichelle Raza  Date of admission:  1/9/2024  2:15 PM  MRN:   [id number]  YOB: 1934    INTERVAL HISTORY:      SUBJECTIVE:  Continues to report lightheadedness.    No other concerns.    Objective   Vitals:    01/09/24 1942 01/09/24 2145 01/09/24 2308 01/10/24 0602   BP: (!) 200/102 112/65 128/81 (!) 141/88   Pulse: 93 96 79 72   Resp:   16 16   Temp:   98.6 °F (37 °C) 98.2 °F (36.8 °C)   SpO2:   91% 94%   Weight:       Height:         BP Readings from Last 6 Encounters:   01/10/24 (!) 141/88   10/16/23 120/64   05/17/23 (!) 146/82   01/23/23 128/78   01/04/23 116/60   12/29/22 (!) 167/78          Intake/Output Summary (Last 24 hours) at 1/10/2024 0756  Last data filed at 1/9/2024 1600  Gross per 24 hour   Intake 200 ml   Output --   Net 200 ml     General appearance: well nourished  HEENT: Normocephalic, no lesions, without obvious abnormality.pupils equal and reactive, EOM normal  Lungs: clear to auscultation bilaterally  Heart: regular rate and rhythm, S1, S2 normal, no murmur, click, rub or gallop  Abdomen: soft, non-tender; bowel sounds normal; no masses,  no organomegaly  Extremities: extremities normal, atraumatic, no cyanosis or edema. Pulses 2+ and symmetrical in all 4 extremities  Neurological: Alert oriented x 3, moves all 4 extremities spontaneously.  Makes adequate conversation    CBC:   Recent Labs     01/09/24  1522 01/10/24  0634   WBC 10.0 8.3   HGB 13.0 12.4    336     BMP:    Recent Labs     01/09/24  1522 01/10/24  0634    139   K 3.9 4.1   CL 96* 101   CO2 31 29   BUN 17 18   CREATININE 1.1* 1.0*   CALCIUM 9.0 9.0     Magnesium:  Recent Labs     01/09/24  1735   MG 1.8     Recent Labs     01/09/24  1522 01/10/24  0634   AST 28 23   ALT 23 21     Phosphorus:No results for input(s): \"PHOS\" in the last 72 hours.  Glucose:No

## 2024-01-10 NOTE — PROGRESS NOTES
Called LASHAUN Wood to bring patient down for MRI. They are moving patients room to progressive. To call back later to attempt patients MRI. If there are any questions please call [phone].  Thanks!

## 2024-01-11 ENCOUNTER — TELEPHONE (OUTPATIENT)
Dept: FAMILY MEDICINE CLINIC | Age: 89
End: 2024-01-11

## 2024-01-11 PROBLEM — R51.9 HEADACHE, WORSENING: Status: ACTIVE | Noted: 2024-01-11

## 2024-01-11 PROBLEM — U07.1 COVID-19 VIRUS INFECTION: Status: ACTIVE | Noted: 2024-01-11

## 2024-01-11 PROBLEM — R26.81 UNSTABLE GAIT: Status: ACTIVE | Noted: 2024-01-11

## 2024-01-11 PROCEDURE — 99233 SBSQ HOSP IP/OBS HIGH 50: CPT | Performed by: PSYCHIATRY & NEUROLOGY

## 2024-01-11 PROCEDURE — 97165 OT EVAL LOW COMPLEX 30 MIN: CPT

## 2024-01-11 PROCEDURE — 97530 THERAPEUTIC ACTIVITIES: CPT

## 2024-01-11 PROCEDURE — 6360000002 HC RX W HCPCS: Performed by: NURSE PRACTITIONER

## 2024-01-11 PROCEDURE — 6360000002 HC RX W HCPCS: Performed by: PSYCHIATRY & NEUROLOGY

## 2024-01-11 PROCEDURE — 6370000000 HC RX 637 (ALT 250 FOR IP): Performed by: INTERNAL MEDICINE

## 2024-01-11 PROCEDURE — 2580000003 HC RX 258: Performed by: INTERNAL MEDICINE

## 2024-01-11 PROCEDURE — 2580000003 HC RX 258: Performed by: PSYCHIATRY & NEUROLOGY

## 2024-01-11 PROCEDURE — 97162 PT EVAL MOD COMPLEX 30 MIN: CPT

## 2024-01-11 PROCEDURE — 6360000002 HC RX W HCPCS: Performed by: INTERNAL MEDICINE

## 2024-01-11 PROCEDURE — 92526 ORAL FUNCTION THERAPY: CPT

## 2024-01-11 PROCEDURE — 2060000000 HC ICU INTERMEDIATE R&B

## 2024-01-11 PROCEDURE — 97535 SELF CARE MNGMENT TRAINING: CPT

## 2024-01-11 RX ORDER — KETOROLAC TROMETHAMINE 30 MG/ML
15 INJECTION, SOLUTION INTRAMUSCULAR; INTRAVENOUS ONCE
Status: COMPLETED | OUTPATIENT
Start: 2024-01-11 | End: 2024-01-11

## 2024-01-11 RX ADMIN — METHYLPREDNISOLONE SODIUM SUCCINATE 40 MG: 40 INJECTION, POWDER, LYOPHILIZED, FOR SOLUTION INTRAMUSCULAR; INTRAVENOUS at 22:27

## 2024-01-11 RX ADMIN — KETOROLAC TROMETHAMINE 15 MG: 30 INJECTION, SOLUTION INTRAMUSCULAR; INTRAVENOUS at 22:27

## 2024-01-11 RX ADMIN — ASPIRIN 81 MG: 81 TABLET, CHEWABLE ORAL at 09:34

## 2024-01-11 RX ADMIN — HYDROCODONE BITARTRATE AND ACETAMINOPHEN 1 TABLET: 5; 325 TABLET ORAL at 06:21

## 2024-01-11 RX ADMIN — HYDRALAZINE HYDROCHLORIDE 10 MG: 20 INJECTION INTRAMUSCULAR; INTRAVENOUS at 17:32

## 2024-01-11 RX ADMIN — HYDROCODONE BITARTRATE AND ACETAMINOPHEN 1 TABLET: 5; 325 TABLET ORAL at 17:26

## 2024-01-11 RX ADMIN — DOXYCYCLINE 100 MG: 100 CAPSULE ORAL at 09:35

## 2024-01-11 RX ADMIN — DOXYCYCLINE 100 MG: 100 CAPSULE ORAL at 20:42

## 2024-01-11 RX ADMIN — ATORVASTATIN CALCIUM 10 MG: 10 TABLET, FILM COATED ORAL at 09:34

## 2024-01-11 RX ADMIN — METHYLPREDNISOLONE SODIUM SUCCINATE 40 MG: 40 INJECTION, POWDER, LYOPHILIZED, FOR SOLUTION INTRAMUSCULAR; INTRAVENOUS at 17:38

## 2024-01-11 RX ADMIN — PANTOPRAZOLE SODIUM 40 MG: 40 TABLET, DELAYED RELEASE ORAL at 06:21

## 2024-01-11 RX ADMIN — METOPROLOL SUCCINATE 50 MG: 50 TABLET, EXTENDED RELEASE ORAL at 20:42

## 2024-01-11 RX ADMIN — ENOXAPARIN SODIUM 40 MG: 100 INJECTION SUBCUTANEOUS at 09:35

## 2024-01-11 RX ADMIN — GABAPENTIN 100 MG: 100 CAPSULE ORAL at 09:34

## 2024-01-11 RX ADMIN — AMITRIPTYLINE HYDROCHLORIDE 100 MG: 50 TABLET, FILM COATED ORAL at 20:42

## 2024-01-11 RX ADMIN — TROSPIUM CHLORIDE 20 MG: 20 TABLET, FILM COATED ORAL at 20:42

## 2024-01-11 RX ADMIN — SODIUM CHLORIDE, POTASSIUM CHLORIDE, SODIUM LACTATE AND CALCIUM CHLORIDE: 600; 310; 30; 20 INJECTION, SOLUTION INTRAVENOUS at 09:33

## 2024-01-11 ASSESSMENT — PAIN SCALES - GENERAL
PAINLEVEL_OUTOF10: 0
PAINLEVEL_OUTOF10: 10
PAINLEVEL_OUTOF10: 8

## 2024-01-11 ASSESSMENT — PAIN DESCRIPTION - LOCATION: LOCATION: HEAD

## 2024-01-11 ASSESSMENT — PAIN DESCRIPTION - DESCRIPTORS: DESCRIPTORS: THROBBING

## 2024-01-11 NOTE — PLAN OF CARE
Problem: Discharge Planning  Goal: Discharge to home or other facility with appropriate resources  1/11/2024 0119 by Jayda Herman RN  Outcome: Progressing     Problem: Pain  Goal: Verbalizes/displays adequate comfort level or baseline comfort level  1/11/2024 0119 by Jayda Herman RN  Outcome: Progressing     Problem: Skin/Tissue Integrity  Goal: Absence of new skin breakdown  Description: 1.  Monitor for areas of redness and/or skin breakdown  2.  Assess vascular access sites hourly  3.  Every 4-6 hours minimum:  Change oxygen saturation probe site  4.  Every 4-6 hours:  If on nasal continuous positive airway pressure, respiratory therapy assess nares and determine need for appliance change or resting period.  1/11/2024 0119 by Jayda Hermna RN  Outcome: Progressing     Problem: Safety - Adult  Goal: Free from fall injury  1/11/2024 0119 by Jayda Herman RN  Outcome: Progressing     Problem: ABCDS Injury Assessment  Goal: Absence of physical injury  1/11/2024 0119 by Jayda Herman RN  Outcome: Progressing     Problem: Discharge Planning  Goal: Discharge to home or other facility with appropriate resources  1/11/2024 0119 by Jayda Herman RN  Outcome: Progressing  1/10/2024 2142 by Kat Meneses RN  Outcome: Not Progressing     Problem: Pain  Goal: Verbalizes/displays adequate comfort level or baseline comfort level  1/11/2024 0119 by Jayda Herman RN  Outcome: Progressing  1/10/2024 2142 by Kat Meneses RN  Outcome: Not Progressing     Problem: Skin/Tissue Integrity  Goal: Absence of new skin breakdown  Description: 1.  Monitor for areas of redness and/or skin breakdown  2.  Assess vascular access sites hourly  3.  Every 4-6 hours minimum:  Change oxygen saturation probe site  4.  Every 4-6 hours:  If on nasal continuous positive airway pressure, respiratory therapy assess nares and determine need for appliance change or resting period.  1/11/2024 0119 by Jayda Herman RN  Outcome:

## 2024-01-11 NOTE — PROGRESS NOTES
Physical Therapy  Facility/Department: [facility] PROGRESSIVE CARE  Physical Therapy Initial Assessment    Name: Nichelle Raza  : 1934  MRN: [id number]  Date of Service: 2024    Discharge Recommendations:  No therapy recommended at discharge          Patient Diagnosis(es): The primary encounter diagnosis was Unstable gait. A diagnosis of History of multiple sclerosis (HCC) was also pertinent to this visit.  Past Medical History:  has a past medical history of Acute cystitis without hematuria, EDINSON (acute kidney injury) (HCC), Arthritis, Chronic daily headache, GERD (gastroesophageal reflux disease), Hyperlipidemia, Hypertension, Moderate malnutrition (HCC), Multiple sclerosis (HCC), Multiple sclerosis exacerbation (HCC), Neuropathy, Pneumonia, and Vitamin D deficiency.  Past Surgical History:  has a past surgical history that includes Cystocele repair; Rectocele repair; Hysterectomy; Cervical disc surgery; Cataract removal with implant (Right, 2014); Cataract removal with implant (Left, 2014); Total shoulder arthroplasty (Right, 2017); shoulder surgery (Right, ); and lumbar laminectomy (N/A, 3/17/2022).    Assessment   Assessment: pt presents with decreased endurance and is a fall risk. Pt requries SBA this date with use of WW for safety. pt is typically independent with mobility. Recommend cont therapy in acute setting to address deficits.  Therapy Prognosis: Good  Decision Making: Medium Complexity  Requires PT Follow-Up: Yes  Activity Tolerance  Activity Tolerance: Patient tolerated evaluation without incident;Patient tolerated treatment well     Plan   Physical Therapy Plan  General Plan: 5-7 times per week  Current Treatment Recommendations: Strengthening, ROM, Balance training, Functional mobility training, Transfer training, Gait training, Stair training, Endurance training, Home exercise program, Safety education & training, Patient/Caregiver education & training, Equipment evaluation,    Balance  Posture: Good  Sitting - Static: Good  Sitting - Dynamic: Good  Standing - Static: Good  Standing - Dynamic: Good;-  Exercise Treatment: Performed functional mobility, see above            AM-PAC - Mobility    AM-PAC Basic Mobility - Inpatient   How much help is needed turning from your back to your side while in a flat bed without using bedrails?: A Little  How much help is needed moving from lying on your back to sitting on the side of a flat bed without using bedrails?: A Little  How much help is needed moving to and from a bed to a chair?: A Little  How much help is needed standing up from a chair using your arms?: A Little  How much help is needed walking in hospital room?: A Little  How much help is needed climbing 3-5 steps with a railing?: A Little  AM-[facility] Inpatient Mobility Raw Score : 18  AM-PAC Inpatient T-Scale Score : 43.63  Mobility Inpatient CMS 0-100% Score: 46.58  Mobility Inpatient CMS G-Code Modifier : CK             Goals  Short Term Goals  Time Frame for Short Term Goals: 7 visits  Short Term Goal 1: pt to perform bed mobility indep  Short Term Goal 2: pt to trasnfer mod indep c WW  Short Term Goal 3: pt to ambulate 100ft mod indep c WW  Short Term Goal 4: pt to ambualte 4 stairs with railing mod indep  Short Term Goal 5: pt to be indep with HEP  Patient Goals   Patient Goals : to go home soon       Education  Patient Education  Education Given To: Patient  Education Provided: Role of Therapy;Plan of Care;Home Exercise Program;Precautions  Education Method: Verbal  Barriers to Learning: None  Education Outcome: Verbalized understanding      Therapy Time   Individual Concurrent Group Co-treatment   Time In 1425         Time Out 1450         Minutes 25         Timed Code Treatment Minutes: 9 Minutes       Fanta Bates, PT

## 2024-01-11 NOTE — PROGRESS NOTES
[facility]   Occupational Therapy Evaluation  Date: 24  Patient Name: Nichelle Raza       Room: 2105/2105-01  MRN: [id number]  Account: [id number]   : 1934  (89 y.o.) Gender: female     Discharge Recommendations:  The patient would benefit from additional Occupational Therapy after discharge from the facility upon return to their Home.    OT Equipment Recommendations  Equipment Needed: No       Diagnosis: dizziness, She was diagnosed with COVID infection about 1 week ago and was taking Paxlovid for this. Generalized weakness, gait ataxia suspect secondary to underlying COVID-19 infection; rule out stroke , History of multiple sclerosis; rule out acute exacerbation Additional Pertinent Hx: Pt has c/o dizziness tested positive for covid last Tuesday and has MS. Daughter states she's very off balance and c/o dizziness which is how her MS episodes presents.         Past Medical History:  has a past medical history of Acute cystitis without hematuria, EDINSON (acute kidney injury) (HCC), Arthritis, Chronic daily headache, GERD (gastroesophageal reflux disease), Hyperlipidemia, Hypertension, Moderate malnutrition (HCC), Multiple sclerosis (HCC), Multiple sclerosis exacerbation (HCC), Neuropathy, Pneumonia, and Vitamin D deficiency.    Past Surgical History:   has a past surgical history that includes Cystocele repair; Rectocele repair; Hysterectomy; Cervical disc surgery; Cataract removal with implant (Right, 2014); Cataract removal with implant (Left, 2014); Total shoulder arthroplasty (Right, 2017); shoulder surgery (Right, 2017); and lumbar laminectomy (N/A, 3/17/2022).    Restrictions  Restrictions/Precautions  Restrictions/Precautions: Fall Risk (droplet precautions)  Required Braces or Orthoses?: No      Vitals  Vitals  Pulse: 92  Heart Rate Source: Monitor  BP: 129/69  BP Location: Left upper arm  BP Method: Automatic  Patient Position: Semi fowlers  MAP (Calculated):  RW and SBA, able to step forward 3 feet and back, able to ambulate around bed to other side.  Skin Care: Wound cleanser    Gross Assessment  AROM: Generally decreased, functional (R shld with replacement, ROM limited to 80 at shld and 3/5 MMT.)  Strength: Generally decreased, functional (R shld with replacement, ROM limited to 80 at shld and 3/5 MMT.)  Coordination: Generally decreased, functional (pt with enlarged knuckles B hands limiting tight hand closure.)  Tone: Normal  Sensation: Intact        Bed Mobility  Bed mobility  Supine to Sit: Stand by assistance  Sit to Supine: Stand by assistance      Transfers  Transfers  Stand Step Transfers: Stand by assistance  Stand Pivot Transfers: Stand by assistance  Sit to stand: Stand by assistance  Stand to sit: Stand by assistance         Assessment  Assessment  Performance deficits / Impairments: Decreased functional mobility , Decreased endurance, Decreased ADL status, Decreased balance, Decreased high-level IADLs  Prognosis: Good  Decision Making: Low Complexity  History: dizziness, She was diagnosed with COVID infection about 1 week ago and was taking Paxlovid for this. Generalized weakness, gait ataxia suspect secondary to underlying COVID-19 infection; rule out stroke , History of multiple sclerosis; rule out acute exacerbation  Exam: 5 performance deficits  Assistance / Modification: low    Activity Tolerance  Activity Tolerance: Patient Tolerated treatment well  Activity Tolerance Comments: on room air    Safety Devices  Type of Devices: Bed alarm in place, Left in bed, Call light within reach    Patient Education  Patient Education  Education Given To: Patient  Education Provided: Role of Therapy, Plan of Care, ADL Adaptive Strategies, Fall Prevention Strategies  Education Provided Comments: ed re need for additional assist with d/c to home for fall prevention.  Education Method: Demonstration, Verbal  Barriers to Learning: None  Education Outcome: Verbalized

## 2024-01-11 NOTE — PROGRESS NOTES
SLP ALL NOTES  Speech Language Pathology  [facility]    Dysphagia Treatment Note    Date: 1/11/2024  Patient’s Name: Nichelle Raza  MRN: [id number]  Diagnosis: dysphagia  Patient Active Problem List   Diagnosis Code    Ataxia R27.0    Multiple sclerosis ([facility]) G35    Essential hypertension I10    Hyperlipidemia E78.5    Atelectasis J98.11    Debility R53.81    Abnormal gait R26.9    Actinic keratosis L57.0    Enthesopathy of hip region M76.899    Generalized osteoarthritis M15.9    Idiopathic peripheral neuropathy G60.9    Vitamin D deficiency E55.9    Primary localized osteoarthrosis of the hip, left M16.12    Primary osteoarthritis of left hip M16.12    Acquired spondylolisthesis M43.10    Chronic midline low back pain with left-sided sciatica M54.42, G89.29    Spinal stenosis of lumbar region with neurogenic claudication M48.062    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    Diarrhea of presumed infectious origin R19.7    Chronic headache R51.9, G89.29    Facial asymmetry Q67.0    Asymptomatic bacteriuria R82.71    Hyperglycemia R73.9    Lumbar radiculopathy M54.16    Type 2 diabetes mellitus E11.9    Recurrent urinary tract infection N39.0    Diastolic congestive heart failure ([facility]) I50.30    Left hemiparesis ([facility]) G81.94    Chronic renal disease, stage III ([facility]) [343344] N18.30    Chronic systolic (congestive) heart failure I50.22    Flu J11.1    Type 2 diabetes mellitus with chronic kidney disease E11.22    Opioid dependence with current use ([facility]) F11.20    Dizziness R42       Pain: pt. denies    Dysphagia Treatment  Treatment time: 3644-3273    Subjective: [x] Alert [x] Cooperative     [] Confused     [] Agitated    [] Lethargic    Objective/Assessment:    Pt. Seen for dysphagia treatment.  Pt. Completed Andreina maneuver x12 and simple tongue base strength exercises x4, 20 reps each.  ST encouraged pt. To complete exercises t/o the day.     Plan:  [x] Continue ST services    [] Discharge

## 2024-01-11 NOTE — PROGRESS NOTES
NEUROLOGY INPATIENT PROGRESS NOTE    1/11/2024         Subjective: Nichelle Raza is a  89 y.o. female admitted on 1/9/2024 with Dizziness [R42]  Unstable gait [R26.81]  History of multiple sclerosis (HCC) [G35]      Briefly, this is a  89 y.o. female with long standing hx of multiple sclerosis was admitted on 1/9/2024 with c/o dizziness with lightheadedness and intermittent room spinning sensation along with horizontal diplopia.  She also has been having moderately severe headache at 7-8/10 severity for the past few days.  Patient stated that she has extreme fatigue and generalized weakness along with difficulty ambulating since day before yesterday.  She was diagnosed with COVID infection about a week ago and she was on Paxlovid for this.  CT head on admit is unremarkable.  CTA head and neck demonstrated mild to moderate intracranial atherosclerosis.  Regarding multiple sclerosis; she was diagnosed about 40 years ago and she has not been on any disease modifying therapy.  She has been following up with neurologist, Dr. Pulido.  She gets steroids intermittently for progression of symptoms.  Last time she has taken steroids was about a year ago.      No current facility-administered medications on file prior to encounter.     Current Outpatient Medications on File Prior to Encounter   Medication Sig Dispense Refill    traMADol (ULTRAM) 50 MG tablet Take 1 tablet by mouth in the morning, at noon, in the evening, and at bedtime.      vitamin B-1 (THIAMINE) 100 MG tablet Take 1 tablet by mouth daily      magnesium oxide (MAG-OX) 400 (240 Mg) MG tablet Take 1 tablet by mouth daily      predniSONE (DELTASONE) 10 MG tablet Take 1 tablet by mouth daily 90 tablet 1    nirmatrelvir/ritonavir 300/100 (PAXLOVID, 300/100,) 20 x 150 MG & 10 x 100MG TBPK Take 3 tablets (two 150 mg nirmatrelvir and one 100 mg ritonavir tablets) by mouth every 12 hours for 5 days. (Patient not taking: Reported on 1/9/2024) 30 tablet 0    cephALEXin

## 2024-01-11 NOTE — PROGRESS NOTES
[facility]   IN-PATIENT SERVICE   [facility]    HISTORY AND PHYSICAL EXAMINATION            Date:   1/11/2024  Patient name:  Nichelle Raaz  Date of admission:  1/9/2024  2:15 PM  MRN:   [id number]  Account:  [id number]  YOB: 1934  PCP:    Pauly Bond MD  Room:   [address] [place] [address]  Code Status:    Full Code    Chief Complaint:     Chief Complaint   Patient presents with    Dizziness     Pt has c/o dizziness tested positive for covid last Tuesday and has MS. Daughter states she's very off balance and c/o dizziness which is how her MS episodes presents.        History Obtained From:     patient    History of Present Illness:     The patient is a 89 y.o.  Non- / non  female who presents with Dizziness (Pt has c/o dizziness tested positive for covid last Tuesday and has MS. Daughter states she's very off balance and c/o dizziness which is how her MS episodes presents. )   and she is admitted to the hospital for the management of this.    The patient reports that she had COVID last week,, was placed on Paxlovid.  She has completed the course of the medication, currently denies respiratory concerns however soon after the diagnosis the patient reports that she has been having dizziness, feels things are spinning around her.  Is any worsening focal weakness, has diplopia even at baseline.  She does report having a headache, which she reports happens when she has an MS flare.  Her last flare was a year back.  She denies any tinnitus, decrease in hearing.  1/11   Patient, clinically doing better  Still have very poor oral intake  On room air  Underwent MRI brain, concerning for increased plaques  Started on steroids  Plan noted for MRI with contrast  On IV fluids  Past Medical History:     Past Medical History:   Diagnosis Date    Acute cystitis without hematuria 10/1/2017    EDINSON (acute kidney injury) (HCC) 3/3/2021    Arthritis     Chronic daily headache     GERD  maxillary sinus begin doxycycline.  Noted to have 30% stenosis of the vertebral arteries.  MRI requested, blood precautions, PT OT consult.  Follow-up on further recommendations by neurology check vitamin D levels, TSH, B12 levels maintain on telemetry  Acute Bacterial sinusitis, begin doxycycline  History of MS, last flare was a year back.  Troponin elevation, suspect NSTEMI type II.  Denies chest pain, troponins have trended downwards.  CKD stage III, at baseline  Essential hypertension  covid 19 positive last week. Continue 3 more days of isolation. Completed course of paxlovid  1/11  We will continue with doxycycline to cover sinus infection   Started on IV steroid for MS flare  Plan noted for MRI with contrast  Hypertension, controlled  Patient is on soft and bite-size diet, speech pathologist following  On room air, no indication for IV steroids for COVID  Will repeat CBC /BMP   Consultations:   IP CONSULT TO NEUROLOGY  IP CONSULT TO PHARMACY    Patient is admitted as inpatient status because of co-morbidities listed above, severity of signs and symptoms as outlined, requirement for current medical therapies and most importantly because of direct risk to patient if care not provided in a hospital setting.    Dickson Mendoza MD  1/11/2024  4:36 PM    Copy sent to Pauly Choudhary MD    Please note that this chart was generated using voice recognition Dragon dictation software.  Although every effort was made to ensure the accuracy of this automated transcription, some errors in transcription may have occurred.

## 2024-01-11 NOTE — PLAN OF CARE
Problem: Discharge Planning  Goal: Discharge to home or other facility with appropriate resources  Outcome: Not Progressing     Problem: Pain  Goal: Verbalizes/displays adequate comfort level or baseline comfort level  Outcome: Not Progressing     Problem: Skin/Tissue Integrity  Goal: Absence of new skin breakdown  Description: 1.  Monitor for areas of redness and/or skin breakdown  2.  Assess vascular access sites hourly  3.  Every 4-6 hours minimum:  Change oxygen saturation probe site  4.  Every 4-6 hours:  If on nasal continuous positive airway pressure, respiratory therapy assess nares and determine need for appliance change or resting period.  Outcome: Not Progressing     Problem: Safety - Adult  Goal: Free from fall injury  Outcome: Not Progressing     Problem: ABCDS Injury Assessment  Goal: Absence of physical injury  Outcome: Not Progressing

## 2024-01-11 NOTE — TELEPHONE ENCOUNTER
[facility] nurse Elizabeth called asking if pcp will follow for home care. Please advise, call [phone] option 1

## 2024-01-12 LAB
ANION GAP SERPL CALCULATED.3IONS-SCNC: 10 MMOL/L (ref 9–17)
BASOPHILS # BLD: 0 K/UL (ref 0–0.2)
BASOPHILS NFR BLD: 0 % (ref 0–2)
BUN SERPL-MCNC: 16 MG/DL (ref 8–23)
CALCIUM SERPL-MCNC: 8.6 MG/DL (ref 8.6–10.4)
CHLORIDE SERPL-SCNC: 101 MMOL/L (ref 98–107)
CO2 SERPL-SCNC: 26 MMOL/L (ref 20–31)
CREAT SERPL-MCNC: 0.9 MG/DL (ref 0.5–0.9)
EOSINOPHIL # BLD: 0 K/UL (ref 0–0.4)
EOSINOPHILS RELATIVE PERCENT: 0 % (ref 0–4)
ERYTHROCYTE [DISTWIDTH] IN BLOOD BY AUTOMATED COUNT: 14.1 % (ref 11.5–14.9)
GFR SERPL CREATININE-BSD FRML MDRD: >60 ML/MIN/1.73M2
GLUCOSE SERPL-MCNC: 217 MG/DL (ref 70–99)
HCT VFR BLD AUTO: 38.5 % (ref 36–46)
HGB BLD-MCNC: 13 G/DL (ref 12–16)
LYMPHOCYTES NFR BLD: 0.6 K/UL (ref 1–4.8)
LYMPHOCYTES RELATIVE PERCENT: 11 % (ref 24–44)
MCH RBC QN AUTO: 31.4 PG (ref 26–34)
MCHC RBC AUTO-ENTMCNC: 33.9 G/DL (ref 31–37)
MCV RBC AUTO: 92.6 FL (ref 80–100)
MONOCYTES NFR BLD: 0.1 K/UL (ref 0.1–1.3)
MONOCYTES NFR BLD: 1 % (ref 1–7)
NEUTROPHILS NFR BLD: 88 % (ref 36–66)
NEUTS SEG NFR BLD: 4.6 K/UL (ref 1.3–9.1)
PLATELET # BLD AUTO: 325 K/UL (ref 150–450)
PMV BLD AUTO: 7.2 FL (ref 6–12)
POTASSIUM SERPL-SCNC: 4.5 MMOL/L (ref 3.7–5.3)
RBC # BLD AUTO: 4.16 M/UL (ref 4–5.2)
SODIUM SERPL-SCNC: 137 MMOL/L (ref 135–144)
WBC OTHER # BLD: 5.2 K/UL (ref 3.5–11)

## 2024-01-12 PROCEDURE — 2580000003 HC RX 258: Performed by: PSYCHIATRY & NEUROLOGY

## 2024-01-12 PROCEDURE — 99233 SBSQ HOSP IP/OBS HIGH 50: CPT | Performed by: PSYCHIATRY & NEUROLOGY

## 2024-01-12 PROCEDURE — 85025 COMPLETE CBC W/AUTO DIFF WBC: CPT

## 2024-01-12 PROCEDURE — 92526 ORAL FUNCTION THERAPY: CPT

## 2024-01-12 PROCEDURE — 2580000003 HC RX 258: Performed by: INTERNAL MEDICINE

## 2024-01-12 PROCEDURE — 80048 BASIC METABOLIC PNL TOTAL CA: CPT

## 2024-01-12 PROCEDURE — 92610 EVALUATE SWALLOWING FUNCTION: CPT

## 2024-01-12 PROCEDURE — 6370000000 HC RX 637 (ALT 250 FOR IP): Performed by: INTERNAL MEDICINE

## 2024-01-12 PROCEDURE — 6360000002 HC RX W HCPCS: Performed by: PSYCHIATRY & NEUROLOGY

## 2024-01-12 PROCEDURE — 97110 THERAPEUTIC EXERCISES: CPT

## 2024-01-12 PROCEDURE — 6360000002 HC RX W HCPCS: Performed by: INTERNAL MEDICINE

## 2024-01-12 PROCEDURE — 2060000000 HC ICU INTERMEDIATE R&B

## 2024-01-12 PROCEDURE — 36415 COLL VENOUS BLD VENIPUNCTURE: CPT

## 2024-01-12 PROCEDURE — 97530 THERAPEUTIC ACTIVITIES: CPT

## 2024-01-12 RX ORDER — LABETALOL HYDROCHLORIDE 5 MG/ML
20 INJECTION, SOLUTION INTRAVENOUS EVERY 6 HOURS PRN
Status: DISCONTINUED | OUTPATIENT
Start: 2024-01-12 | End: 2024-01-13 | Stop reason: HOSPADM

## 2024-01-12 RX ADMIN — DOXYCYCLINE 100 MG: 100 CAPSULE ORAL at 21:27

## 2024-01-12 RX ADMIN — LABETALOL HYDROCHLORIDE 20 MG: 5 INJECTION, SOLUTION INTRAVENOUS at 16:18

## 2024-01-12 RX ADMIN — METOPROLOL SUCCINATE 50 MG: 50 TABLET, EXTENDED RELEASE ORAL at 21:27

## 2024-01-12 RX ADMIN — AMITRIPTYLINE HYDROCHLORIDE 100 MG: 50 TABLET, FILM COATED ORAL at 21:27

## 2024-01-12 RX ADMIN — HYDROCODONE BITARTRATE AND ACETAMINOPHEN 1 TABLET: 5; 325 TABLET ORAL at 23:04

## 2024-01-12 RX ADMIN — GABAPENTIN 100 MG: 100 CAPSULE ORAL at 08:35

## 2024-01-12 RX ADMIN — HYDROCODONE BITARTRATE AND ACETAMINOPHEN 1 TABLET: 5; 325 TABLET ORAL at 02:00

## 2024-01-12 RX ADMIN — METHYLPREDNISOLONE SODIUM SUCCINATE 40 MG: 40 INJECTION, POWDER, LYOPHILIZED, FOR SOLUTION INTRAMUSCULAR; INTRAVENOUS at 06:17

## 2024-01-12 RX ADMIN — PANTOPRAZOLE SODIUM 40 MG: 40 TABLET, DELAYED RELEASE ORAL at 06:17

## 2024-01-12 RX ADMIN — SODIUM CHLORIDE, POTASSIUM CHLORIDE, SODIUM LACTATE AND CALCIUM CHLORIDE: 600; 310; 30; 20 INJECTION, SOLUTION INTRAVENOUS at 15:10

## 2024-01-12 RX ADMIN — TROSPIUM CHLORIDE 20 MG: 20 TABLET, FILM COATED ORAL at 21:27

## 2024-01-12 RX ADMIN — ATORVASTATIN CALCIUM 10 MG: 10 TABLET, FILM COATED ORAL at 08:35

## 2024-01-12 RX ADMIN — SODIUM CHLORIDE 125 MG: 9 INJECTION, SOLUTION INTRAVENOUS at 16:26

## 2024-01-12 RX ADMIN — DOXYCYCLINE 100 MG: 100 CAPSULE ORAL at 08:35

## 2024-01-12 RX ADMIN — ENOXAPARIN SODIUM 40 MG: 100 INJECTION SUBCUTANEOUS at 08:35

## 2024-01-12 RX ADMIN — SODIUM CHLORIDE 125 MG: 9 INJECTION, SOLUTION INTRAVENOUS at 23:08

## 2024-01-12 RX ADMIN — ASPIRIN 81 MG: 81 TABLET, CHEWABLE ORAL at 08:35

## 2024-01-12 RX ADMIN — WATER 125 MG: 1 INJECTION INTRAMUSCULAR; INTRAVENOUS; SUBCUTANEOUS at 10:51

## 2024-01-12 RX ADMIN — HYDROCODONE BITARTRATE AND ACETAMINOPHEN 1 TABLET: 5; 325 TABLET ORAL at 15:07

## 2024-01-12 ASSESSMENT — PAIN DESCRIPTION - LOCATION: LOCATION: HEAD

## 2024-01-12 ASSESSMENT — PAIN SCALES - GENERAL
PAINLEVEL_OUTOF10: 8
PAINLEVEL_OUTOF10: 7
PAINLEVEL_OUTOF10: 7

## 2024-01-12 ASSESSMENT — PAIN DESCRIPTION - ORIENTATION: ORIENTATION: ANTERIOR

## 2024-01-12 ASSESSMENT — PAIN - FUNCTIONAL ASSESSMENT: PAIN_FUNCTIONAL_ASSESSMENT: ACTIVITIES ARE NOT PREVENTED

## 2024-01-12 ASSESSMENT — PAIN DESCRIPTION - DESCRIPTORS: DESCRIPTORS: ACHING

## 2024-01-12 NOTE — PROGRESS NOTES
Patient complaining of worsening headache 8/10   /83  .  Dr. Antonio notified, orders received for prn labetalol and notify Dr. Myers.  Dr. Myers notified, continue with solumedrol as ordered.

## 2024-01-12 NOTE — PLAN OF CARE

## 2024-01-12 NOTE — CARE COORDINATION
ONGOING DISCHARGE PLAN:    Patient is alert and oriented x4.    Spoke with patient regarding discharge plan and patient confirms that plan is still to discharge to home with no needs     IV steroids     Labs ordered    BP elevated     Will continue to follow for additional discharge needs.    If patient is discharged prior to next notation, then this note serves as note for discharge by case management.    Electronically signed by Sandi Castellano RN on 1/12/2024 at 2:04 PM

## 2024-01-12 NOTE — PROGRESS NOTES
NEUROLOGY INPATIENT PROGRESS NOTE    1/12/2024         Subjective: Nichelle Raza is a  89 y.o. female admitted on 1/9/2024 with Dizziness [R42]  Unstable gait [R26.81]  History of multiple sclerosis (HCC) [G35]      Briefly, this is a  89 y.o. female with long standing hx of multiple sclerosis was admitted on 1/9/2024 with c/o dizziness with lightheadedness and intermittent room spinning sensation along with horizontal diplopia.  She also has been having moderately severe headache at 7-8/10 severity for the past few days.  Patient stated that she has extreme fatigue and generalized weakness along with difficulty ambulating since day before yesterday.  She was diagnosed with COVID infection about a week ago and she was on Paxlovid for this.  CT head on admit is unremarkable.  CTA head and neck demonstrated mild to moderate intracranial atherosclerosis.  Regarding multiple sclerosis; she was diagnosed about 40 years ago and she has not been on any disease modifying therapy.  She has been following up with neurologist, Dr. Pulido.  She gets steroids intermittently for progression of symptoms.  Last time she has taken steroids was about a year ago.    1/12/2024: Chart reviewed and discussed with caregivers. Patient's daughter at bedside.  Patient stated that she has had history of severe reaction with MRI contrast 3 years ago causing her to have wheezing, shortness of breath, etc.  Therefore contrasted MRI request is canceled.  As patient is still having dizziness with headaches; will optimize the dose of IV Solu-Medrol to be given at 125 mg every 6 hours x 4 doses and assess the response.  Patient is agreeable for that.  Patient also c/o pins and needles sensations in lower extremities \"acting up\" and she used to take 100 mg GBP at home.      No current facility-administered medications on file prior to encounter.     Current Outpatient Medications on File Prior to Encounter   Medication Sig Dispense Refill     nightly.     Acute bacterial sinusitis: Was started on doxycycline    COVID-19 infection: Completed Paxlovid today.  Comorbid conditions include CKD, hypertension.    Care plan is discussed with the patient and her nurse at bedside.  Will follow with you.      This note was partially created using voice recognition software and is inherently subject to errors including those of syntax and \"sound alike\" substitutions which may escape proofreading.  In such instances, original meaning may be extrapolated by contextual derivation.  Annette Myers MD 1/12/2024 11:24 AM

## 2024-01-12 NOTE — PROGRESS NOTES
01/12/24 1811   Encounter Summary   Encounter Overview/Reason  Spiritual/Emotional Needs   Service Provided For: Patient   Referral/Consult From: Rounding   Complexity of Encounter Low   Spiritual/Emotional needs   Type Spiritual Support   Assessment/Intervention/Outcome   Assessment Unable to assess  (covid isolation)   Intervention Prayer (assurance of)/[place]

## 2024-01-12 NOTE — PROGRESS NOTES
SPEECH LANGUAGE PATHOLOGY  Speech Language Pathology  ST PROGRESSIVE CARE    Dysphagia Treatment Note    Date: 1/12/2024  Patient’s Name: Nichelle Raza  MRN: [id number]  Diagnosis: Dysphagia  Patient Active Problem List   Diagnosis Code    Ataxia R27.0    Multiple sclerosis (HCC) G35    Essential hypertension I10    Hyperlipidemia E78.5    Atelectasis J98.11    Debility R53.81    Abnormal gait R26.9    Actinic keratosis L57.0    Enthesopathy of hip region M76.899    Generalized osteoarthritis M15.9    Idiopathic peripheral neuropathy G60.9    Vitamin D deficiency E55.9    Primary localized osteoarthrosis of the hip, left M16.12    Primary osteoarthritis of left hip M16.12    Acquired spondylolisthesis M43.10    Chronic midline low back pain with left-sided sciatica M54.42, G89.29    Spinal stenosis of lumbar region with neurogenic claudication M48.062    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    Diarrhea of presumed infectious origin R19.7    Chronic headache R51.9, G89.29    Facial asymmetry Q67.0    Asymptomatic bacteriuria R82.71    Hyperglycemia R73.9    Lumbar radiculopathy M54.16    Type 2 diabetes mellitus E11.9    Recurrent urinary tract infection N39.0    Diastolic congestive heart failure ([facility]) I50.30    Left hemiparesis ([facility]) G81.94    Chronic renal disease, stage III ([facility]) [888122] N18.30    Chronic systolic (congestive) heart failure I50.22    Flu J11.1    Type 2 diabetes mellitus with chronic kidney disease E11.22    Opioid dependence with current use ([facility]) F11.20    Dizziness R42    COVID-19 virus infection U07.1    Unstable gait R26.81    Headache, worsening R51.9       Pain: headache (nurse aware)    Dysphagia Treatment  Treatment time:[id number]    Subjective: [x] Alert [x] Cooperative     [] Confused     [] Agitated    [] Lethargic    Objective/Assessment:    Pt seen for dysphagia management. Pt declined exercises, citing headache however was attentive to introduction/explanation from

## 2024-01-12 NOTE — PROGRESS NOTES
[facility]   INPATIENT OCCUPATIONAL THERAPY  PROGRESS NOTE  Date: 2024  Patient Name: Nichelle Raza       Room:   MRN: [id number]    : 1934  (89 y.o.)  Gender: female      Diagnosis: dizziness, She was diagnosed with COVID infection about 1 week ago and was taking Paxlovid for this. Generalized weakness, gait ataxia suspect secondary to underlying COVID-19 infection; rule out stroke , History of multiple sclerosis; rule out acute exacerbation      Discharge Recommendations:  Further Occupational Therapy is recommended upon facility discharge.    OT Equipment Recommendations  Equipment Needed: No    Restrictions/Precautions  Restrictions/Precautions  Restrictions/Precautions: Fall Risk  Required Braces or Orthoses?: No    Pulse: (!) 120 (during UB Ex)  Heart Rate Source: Monitor  SpO2: 93 %  O2 Device: None (Room air)    Subjective  Subjective  Subjective: \"Oh I wish you could meet my daughter; she's a wonderful woman. SHe really cares for me and I know I will have good support at home.\"  Subjective  Pain: pt with no c/o pain  Comments: Marleen RN okays therapy this date    Objective  Orientation  Overall Orientation Status: Within Functional Limits  Orientation Level: Oriented X4  Cognition  Overall Cognitive Status: WFL    Activities of Daily Living  ADL  Feeding: Independent  Grooming: Independent  UE Bathing: Supervision  LE Bathing: Supervision  UE Dressing: Supervision  LE Dressing: Supervision  Toileting: Supervision  Functional Mobility: Supervision  Functional Mobility Skilled Clinical Factors: G pace, safety and technique with RW this date  Additional Comments: functional mobility/transfers  Skin Care: Soap and water  ADL scores based on skilled observation and clinical reasoning unless otherwise stated.   Balance  Balance  Sitting Balance: Modified independent   Standing Balance: Supervision  Standing Balance  Time: ~10 mins  Activity: mobility within  IADLs  Prognosis: Good  Decision Making: Low Complexity  History: dizziness, She was diagnosed with COVID infection about 1 week ago and was taking Paxlovid for this. Generalized weakness, gait ataxia suspect secondary to underlying COVID-19 infection; rule out stroke , History of multiple sclerosis; rule out acute exacerbation  Exam: 5 performance deficits  Assistance / Modification: low  OT Equipment Recommendations  Equipment Needed: No  Safety Devices  Type of Devices: Chair alarm in place, Call light within reach, Gait belt, Left in chair, Nurse notified    AM-[facility] Daily Activities Inpatient  AM-PAC Daily Activity - Inpatient   How much help is needed for putting on and taking off regular lower body clothing?: A Little  How much help is needed for bathing (which includes washing, rinsing, drying)?: A Little  How much help is needed for toileting (which includes using toilet, bedpan, or urinal)?: A Little  How much help is needed for putting on and taking off regular upper body clothing?: A Little  How much help is needed for taking care of personal grooming?: None  How much help for eating meals?: None  [facility] Inpatient Daily Activity Raw Score: 20  AM-PAC Inpatient ADL T-Scale Score : 42.03  ADL Inpatient CMS 0-100% Score: 38.32  ADL Inpatient CMS G-Code Modifier : CJ    OT Minutes  OT Individual Minutes  Time In: 1323  Time Out: 1349  Minutes: 26      Electronically signed by SANDI Zabala on 1/12/24 at 2:35 PM EST

## 2024-01-12 NOTE — PROGRESS NOTES
[facility]   IN-PATIENT SERVICE   [facility]    HISTORY AND PHYSICAL EXAMINATION            Date:   1/12/2024  Patient name:  Nichelle Raza  Date of admission:  1/9/2024  2:15 PM  MRN:   [id number]  Account:  [id number]  YOB: 1934  PCP:    Pauly Bond MD  Room:   [address] [place] [address]  Code Status:    Full Code    Chief Complaint:     Chief Complaint   Patient presents with    Dizziness     Pt has c/o dizziness tested positive for covid last Tuesday and has MS. Daughter states she's very off balance and c/o dizziness which is how her MS episodes presents.        History Obtained From:     patient    History of Present Illness:     The patient is a 89 y.o.  Non- / non  female who presents with Dizziness (Pt has c/o dizziness tested positive for covid last Tuesday and has MS. Daughter states she's very off balance and c/o dizziness which is how her MS episodes presents. )   and she is admitted to the hospital for the management of this.    The patient reports that she had COVID last week,, was placed on Paxlovid.  She has completed the course of the medication, currently denies respiratory concerns however soon after the diagnosis the patient reports that she has been having dizziness, feels things are spinning around her.  Is any worsening focal weakness, has diplopia even at baseline.  She does report having a headache, which she reports happens when she has an MS flare.  Her last flare was a year back.  She denies any tinnitus, decrease in hearing.  1/11   Patient, clinically doing better  Still have very poor oral intake  On room air  Underwent MRI brain, concerning for increased plaques  Started on steroids  Plan noted for MRI with contrast  On IV fluids  Past Medical History:     Past Medical History:   Diagnosis Date    Acute cystitis without hematuria 10/1/2017    EDINSON (acute kidney injury) (HCC) 3/3/2021    Arthritis     Chronic daily headache     GERD  mL/min/1.73m2    Calcium 8.6 8.6 - 10.4 mg/dL       Imaging/Diagnostics:    Reviewed    Assessment :      Primary Problem  Dizziness    Active Hospital Problems    Diagnosis Date Noted    COVID-19 virus infection [U07.1] 01/11/2024    Unstable gait [R26.81] 01/11/2024    Headache, worsening [R51.9] 01/11/2024    Dizziness [R42] 01/09/2024       Plan:     Patient status Admit as inpatient in the  Med/Surge    Dizziness, stroke versus MS flare.  CT of the head with no acute abnormality, opacification of the right maxillary sinus begin doxycycline.  Noted to have 30% stenosis of the vertebral arteries.  MRI requested, blood precautions, PT OT consult.  Follow-up on further recommendations by neurology check vitamin D levels, TSH, B12 levels maintain on telemetry  Acute Bacterial sinusitis, begin doxycycline  History of MS, last flare was a year back.  Troponin elevation, suspect NSTEMI type II.  Denies chest pain, troponins have trended downwards.  CKD stage III, at baseline  Essential hypertension  covid 19 positive last week. Continue 3 more days of isolation. Completed course of paxlovid    1/12  Seen and examined   doxycycline to cover sinus infection   Started on IV steroid for MS flare  Plan noted for MRI with contrast, cancelled now by neuro   Hypertension, controlled  Patient is on soft and bite-size diet, speech pathologist following  On room air, no indication for IV steroids for COVID  Will repeat CBC /BMP   BP runing high   Prn hydaralazine     Consultations:   IP CONSULT TO NEUROLOGY  IP CONSULT TO PHARMACY    Patient is admitted as inpatient status because of co-morbidities listed above, severity of signs and symptoms as outlined, requirement for current medical therapies and most importantly because of direct risk to patient if care not provided in a hospital setting.    Tony Antonio MD  1/12/2024  1:06 PM    Copy sent to Pauly Choudhary MD    Please note that this chart was generated using

## 2024-01-13 VITALS
HEIGHT: 58 IN | RESPIRATION RATE: 18 BRPM | BODY MASS INDEX: 28.97 KG/M2 | SYSTOLIC BLOOD PRESSURE: 151 MMHG | OXYGEN SATURATION: 94 % | TEMPERATURE: 97.9 F | WEIGHT: 138 LBS | HEART RATE: 106 BPM | DIASTOLIC BLOOD PRESSURE: 78 MMHG

## 2024-01-13 DIAGNOSIS — K21.9 GASTROESOPHAGEAL REFLUX DISEASE, UNSPECIFIED WHETHER ESOPHAGITIS PRESENT: ICD-10-CM

## 2024-01-13 PROCEDURE — 6360000002 HC RX W HCPCS: Performed by: INTERNAL MEDICINE

## 2024-01-13 PROCEDURE — 2580000003 HC RX 258: Performed by: PSYCHIATRY & NEUROLOGY

## 2024-01-13 PROCEDURE — 6370000000 HC RX 637 (ALT 250 FOR IP): Performed by: INTERNAL MEDICINE

## 2024-01-13 PROCEDURE — 99232 SBSQ HOSP IP/OBS MODERATE 35: CPT | Performed by: PSYCHIATRY & NEUROLOGY

## 2024-01-13 PROCEDURE — 6360000002 HC RX W HCPCS: Performed by: PSYCHIATRY & NEUROLOGY

## 2024-01-13 RX ORDER — AMLODIPINE BESYLATE 5 MG/1
5 TABLET ORAL DAILY
Qty: 90 TABLET | Refills: 1 | Status: SHIPPED | OUTPATIENT
Start: 2024-01-13

## 2024-01-13 RX ORDER — DOXYCYCLINE 100 MG/1
100 CAPSULE ORAL EVERY 12 HOURS SCHEDULED
Qty: 2 CAPSULE | Refills: 0 | Status: SHIPPED | OUTPATIENT
Start: 2024-01-13 | End: 2024-01-14

## 2024-01-13 RX ORDER — METHYLPREDNISOLONE 4 MG/1
TABLET ORAL
Qty: 1 KIT | Refills: 0 | Status: SHIPPED | OUTPATIENT
Start: 2024-01-13 | End: 2024-01-19

## 2024-01-13 RX ORDER — AMLODIPINE BESYLATE 5 MG/1
5 TABLET ORAL DAILY
Status: DISCONTINUED | OUTPATIENT
Start: 2024-01-13 | End: 2024-01-13 | Stop reason: HOSPADM

## 2024-01-13 RX ADMIN — HYDROCODONE BITARTRATE AND ACETAMINOPHEN 1 TABLET: 5; 325 TABLET ORAL at 08:01

## 2024-01-13 RX ADMIN — ENOXAPARIN SODIUM 40 MG: 100 INJECTION SUBCUTANEOUS at 08:04

## 2024-01-13 RX ADMIN — ASPIRIN 81 MG: 81 TABLET, CHEWABLE ORAL at 08:03

## 2024-01-13 RX ADMIN — PANTOPRAZOLE SODIUM 40 MG: 40 TABLET, DELAYED RELEASE ORAL at 08:03

## 2024-01-13 RX ADMIN — SODIUM CHLORIDE 125 MG: 9 INJECTION, SOLUTION INTRAVENOUS at 05:21

## 2024-01-13 RX ADMIN — AMLODIPINE BESYLATE 5 MG: 5 TABLET ORAL at 11:50

## 2024-01-13 RX ADMIN — GABAPENTIN 100 MG: 100 CAPSULE ORAL at 08:03

## 2024-01-13 RX ADMIN — HYDRALAZINE HYDROCHLORIDE 10 MG: 20 INJECTION INTRAMUSCULAR; INTRAVENOUS at 09:56

## 2024-01-13 RX ADMIN — DOXYCYCLINE 100 MG: 100 CAPSULE ORAL at 08:03

## 2024-01-13 RX ADMIN — ATORVASTATIN CALCIUM 10 MG: 10 TABLET, FILM COATED ORAL at 08:03

## 2024-01-13 ASSESSMENT — PAIN SCALES - GENERAL
PAINLEVEL_OUTOF10: 8
PAINLEVEL_OUTOF10: 6

## 2024-01-13 ASSESSMENT — PAIN DESCRIPTION - ORIENTATION: ORIENTATION: ANTERIOR

## 2024-01-13 ASSESSMENT — PAIN - FUNCTIONAL ASSESSMENT: PAIN_FUNCTIONAL_ASSESSMENT: ACTIVITIES ARE NOT PREVENTED

## 2024-01-13 ASSESSMENT — PAIN DESCRIPTION - LOCATION: LOCATION: HEAD

## 2024-01-13 ASSESSMENT — PAIN DESCRIPTION - DESCRIPTORS: DESCRIPTORS: ACHING

## 2024-01-13 NOTE — PROGRESS NOTES
Pt was recently scanned for a brain without in MRI on 1/11/24. We could not give contrast due to her stating she had a wheezing allergy to contrast. She stated it was about 2-3 years ago.  Note in chart documenting this as well from previous MRl. Wheezing is a high severity reaction pt cannot have contrast. If there are any questions please call [phone].

## 2024-01-13 NOTE — PROGRESS NOTES
Patient discharged to home per orders.  IV discontinued intact. Discharge instructions reviewed with patient daughter written and verbal.  Patient states all belongings are present.  Taken per wheelchair to awaiting vehicle.

## 2024-01-13 NOTE — DISCHARGE SUMMARY
[facility] Internal Medicine  Miller To MD; Sanjay Beckwith MD; Dajuan Guy MD; MD Mel Burden MD; Tony Antonio MD      [facility] Internal Medicine  IN-PATIENT SERVICE   [facility]    Discharge Summary     Patient ID: Nichelle Raza  :  1934   MRN: [id number]     ACCOUNT:  [id number]   Patient's PCP: Pauly Bond MD  Admit Date: 2024   Discharge Date: 23    Length of Stay: 4  Code Status:  Prior  Admitting Physician: Adriana Flanagan MD  Discharge Physician: Tony Antonio MD     Active Discharge Diagnoses:     Hospital Problem Lists:  Principal Problem:    Dizziness  Active Problems:    COVID-19 virus infection    Unstable gait    Headache, worsening  Resolved Problems:    * No resolved hospital problems. *      Admission Condition:  serious     Discharged Condition: stable    Hospital Stay:     Hospital Course:  Nichelle Raza is a 89 y.o. female who was admitted for the management of   Dizziness , presented to ER with Dizziness (Pt has c/o dizziness tested positive for covid last Tuesday and has MS. Daughter states she's very off balance and c/o dizziness which is how her MS episodes presents. )    The patient reports that she had COVID last week,, was placed on Paxlovid. She has completed the course of the medication, currently denies respiratory concerns however soon after the diagnosis the patient reports that she has been having dizziness, feels things are spinning around her. Is any worsening focal weakness, has diplopia even at baseline. She does report having a headache, which she reports happens when she has an MS flare. Her last flare was a year back. She denies any tinnitus, decrease in hearing.   Dizziness, stroke versus MS flare.  CT of the head with no acute abnormality, opacification of the right maxillary sinus begin doxycycline.  Noted to have 30% stenosis of the vertebral arteries.  MRI requested, blood

## 2024-01-13 NOTE — PROGRESS NOTES
Medrol dose pack called into [facility] on [address], spoke with Jim Sabillon.

## 2024-01-13 NOTE — PLAN OF CARE
Problem: Discharge Planning  Goal: Discharge to home or other facility with appropriate resources  1/13/2024 1216 by Marleen Laura RN  Outcome: Completed  1/13/2024 0309 by Jayda Herman RN  Outcome: Progressing     Problem: Pain  Goal: Verbalizes/displays adequate comfort level or baseline comfort level  1/13/2024 1216 by Marleen Laura RN  Outcome: Completed  1/13/2024 0309 by Jayda Herman RN  Outcome: Progressing     Problem: Skin/Tissue Integrity  Goal: Absence of new skin breakdown  Description: 1.  Monitor for areas of redness and/or skin breakdown  2.  Assess vascular access sites hourly  3.  Every 4-6 hours minimum:  Change oxygen saturation probe site  4.  Every 4-6 hours:  If on nasal continuous positive airway pressure, respiratory therapy assess nares and determine need for appliance change or resting period.  1/13/2024 1216 by Marleen Laura RN  Outcome: Completed  1/13/2024 0309 by Jayda Herman RN  Outcome: Progressing     Problem: Safety - Adult  Goal: Free from fall injury  1/13/2024 1216 by Marleen Laura RN  Outcome: Completed  1/13/2024 0309 by Jayda Herman RN  Outcome: Progressing     Problem: ABCDS Injury Assessment  Goal: Absence of physical injury  1/13/2024 1216 by Marleen Laura RN  Outcome: Completed  1/13/2024 0309 by Jayda Herman RN  Outcome: Progressing     Problem: Chronic Conditions and Co-morbidities  Goal: Patient's chronic conditions and co-morbidity symptoms are monitored and maintained or improved  1/13/2024 1216 by Marleen Laura RN  Outcome: Completed  1/13/2024 0309 by Jayda Herman RN  Outcome: Progressing

## 2024-01-13 NOTE — PLAN OF CARE
Problem: Discharge Planning  Goal: Discharge to home or other facility with appropriate resources  1/13/2024 0309 by Jayda Herman RN  Outcome: Progressing     Problem: Pain  Goal: Verbalizes/displays adequate comfort level or baseline comfort level  1/13/2024 0309 by Jayda Herman, RN  Outcome: Progressing     Problem: Skin/Tissue Integrity  Goal: Absence of new skin breakdown  Description: 1.  Monitor for areas of redness and/or skin breakdown  2.  Assess vascular access sites hourly  3.  Every 4-6 hours minimum:  Change oxygen saturation probe site  4.  Every 4-6 hours:  If on nasal continuous positive airway pressure, respiratory therapy assess nares and determine need for appliance change or resting period.  Outcome: Progressing     Problem: Safety - Adult  Goal: Free from fall injury  Outcome: Progressing     Problem: ABCDS Injury Assessment  Goal: Absence of physical injury  Outcome: Progressing     Problem: Chronic Conditions and Co-morbidities  Goal: Patient's chronic conditions and co-morbidity symptoms are monitored and maintained or improved  Outcome: Progressing

## 2024-01-13 NOTE — PROGRESS NOTES
NEUROLOGY INPATIENT PROGRESS NOTE    1/13/2024         Subjective: Nichelle Raza is a  89 y.o. female admitted on 1/9/2024 with Dizziness [R42]  Unstable gait [R26.81]  History of multiple sclerosis (HCC) [G35]      Briefly, this is a  89 y.o. female with long standing hx of multiple sclerosis was admitted on 1/9/2024 with c/o dizziness with lightheadedness and intermittent room spinning sensation along with horizontal diplopia.  She also has been having moderately severe headache at 7-8/10 severity for the past few days.  Patient stated that she has extreme fatigue and generalized weakness along with difficulty ambulating since day before yesterday.  She was diagnosed with COVID infection about a week ago and she was on Paxlovid for this.  CT head on admit is unremarkable.  CTA head and neck demonstrated mild to moderate intracranial atherosclerosis.  Regarding multiple sclerosis; she was diagnosed about 40 years ago and she has not been on any disease modifying therapy.  She has been following up with neurologist, Dr. Pulido.  She gets steroids intermittently for progression of symptoms.  Last time she has taken steroids was about a year ago.    1/12/2024: Chart reviewed and discussed with caregivers. Patient's daughter at bedside.  Patient stated that she has had history of severe reaction with MRI contrast 3 years ago causing her to have wheezing, shortness of breath, etc.  Therefore contrasted MRI request is canceled.  As patient is still having dizziness with headaches; will optimize the dose of IV Solu-Medrol to be given at 125 mg every 6 hours x 4 doses and assess the response.  Patient is agreeable for that.  Patient also c/o pins and needles sensations in lower extremities \"acting up\" and she used to take 100 mg GBP at home.    1/13/2024: Chart reviewed and discussed with caregivers.  Patient's daughter at bedside.  Patient stated that she could not tolerate higher doses of IV Solu-Medrol causing her to  L4-L5 performed by Kervin Traore MD at [facility] OR    RECTOCELE REPAIR      SHOULDER ARTHROPLASTY Right 2017    SHOULDER SURGERY Right 2017           Medications:     aspirin  81 mg Oral Daily    vitamin D  50,000 Units Oral Weekly    enoxaparin  40 mg SubCUTAneous Daily    sodium chloride flush  5-40 mL IntraVENous 2 times per day    amitriptyline  100 mg Oral Nightly    gabapentin  100 mg Oral Daily    metoprolol succinate  50 mg Oral Nightly    pantoprazole  40 mg Oral QAM AC    atorvastatin  10 mg Oral Daily    trospium  20 mg Oral Nightly    doxycycline monohydrate  100 mg Oral 2 times per day     PRN Meds include: labetalol, sodium chloride flush, sodium chloride, polyethylene glycol, acetaminophen **OR** acetaminophen, albuterol sulfate HFA, HYDROcodone-acetaminophen, hydrALAZINE    Objective:   BP (!) 192/96   Pulse 92   Temp 98.3 °F (36.8 °C) (Oral)   Resp 20   Ht 1.48 m (4' 10.27\")   Wt 62.6 kg (138 lb)   SpO2 95%   BMI 28.58 kg/m²     Blood pressure range: Systolic (24hrs), Av , Min:122 , Max:192   ; Diastolic (24hrs), Av, Min:68, Max:100        NEUROLOGIC EXAMINATION  GENERAL  Appears comfortable and in no distress   HEENT  NC/ AT   cardiovascular  S1 and S2 heard; palpation of pulses: radial pulse    NECK  Supple and no bruits heard   MENTAL STATUS:  Alert, oriented, intact memory, no confusion, normal speech, normal language, no hallucination or delusion   CRANIAL NERVES: II     -      PERRLA, optic discs; clear; posterior segments  Visual fields intact to confrontation  III,IV,VI -  EOMs full, no afferent defect, no MOJGAN, no ptosis  V     -     Normal facial sensation   VII    -     Normal facial symmetry  VIII   -     Intact hearing   IX,X -     Symmetrical palate  XI    -     Symmetrical shoulder shrug  XII   -     Midline tongue, no atrophy    MOTOR FUNCTION:  significant for mild weakness of grade 4/5 in left upper and left lower extremities; 5/5 in right upper and right lower  0 = swallows foods/liquids without difficulty

## 2024-01-13 NOTE — PROGRESS NOTES
SLP ALL NOTES  [facility]  Speech Language Pathology    Date: 1/13/2024  Patient Name: Nichelle Raza  YOB: 1934   AGE: 89 y.o.  MRN: [id number]        Patient Not Available for Speech Therapy     Due to:  [] Testing  [] Hemodialysis  [] Cancelled by RN  [] Surgery   [] Intubation/Sedation/Pain Medication  [] Medical instability  [x] Other:  RN with pt. And family going over d/c paperwork.  Recommend continued ST for dysphagia upon d/c.     Marina Malhotra M.A.CCC/SLP

## 2024-01-15 DIAGNOSIS — N39.0 RECURRENT UTI: ICD-10-CM

## 2024-01-15 RX ORDER — OMEPRAZOLE 20 MG/1
20 CAPSULE, DELAYED RELEASE ORAL DAILY
Qty: 90 CAPSULE | Refills: 1 | Status: SHIPPED | OUTPATIENT
Start: 2024-01-15

## 2024-01-15 NOTE — TELEPHONE ENCOUNTER
the hip, left     Primary osteoarthritis of left hip     Acquired spondylolisthesis     Chronic midline low back pain with left-sided sciatica     Spinal stenosis of lumbar region with neurogenic claudication     Presence of right artificial shoulder joint     Diarrhea of presumed infectious origin     Chronic headache     Facial asymmetry     Asymptomatic bacteriuria     Hyperglycemia     Lumbar radiculopathy     Type 2 diabetes mellitus     Recurrent urinary tract infection     Diastolic congestive heart failure (HCC)     Left hemiparesis ([facility])     Chronic renal disease, stage III ([facility]) [132430]     Chronic systolic (congestive) heart failure     Flu     Type 2 diabetes mellitus with chronic kidney disease     Opioid dependence with current use ([facility])     Dizziness     COVID-19 virus infection     Unstable gait     Headache, worsening

## 2024-01-16 ENCOUNTER — PATIENT MESSAGE (OUTPATIENT)
Dept: FAMILY MEDICINE CLINIC | Age: 89
End: 2024-01-16

## 2024-01-16 ENCOUNTER — CLINICAL DOCUMENTATION ONLY (OUTPATIENT)
Facility: CLINIC | Age: 89
End: 2024-01-16

## 2024-01-16 DIAGNOSIS — N39.0 RECURRENT URINARY TRACT INFECTION: Primary | ICD-10-CM

## 2024-01-16 RX ORDER — CEPHALEXIN 250 MG/1
250 CAPSULE ORAL DAILY
Qty: 30 CAPSULE | Refills: 5 | OUTPATIENT
Start: 2024-01-16

## 2024-01-16 RX ORDER — CEPHALEXIN 250 MG/1
250 CAPSULE ORAL DAILY
Qty: 90 CAPSULE | Refills: 3 | Status: SHIPPED | OUTPATIENT
Start: 2024-01-16

## 2024-01-16 NOTE — TELEPHONE ENCOUNTER
Nichelle Raza is calling to request a refill on the following medication(s):    Medication Request:  Requested Prescriptions     Pending Prescriptions Disp Refills    cephALEXin (KEFLEX) 250 MG capsule [Pharmacy Med Name: CEPHALEXIN 250 MG CAPSULE] 30 capsule 5     Sig: TAKE 1 CAPSULE BY MOUTH DAILY       Last Visit Date (If Applicable):  10/16/2023    Next Visit Date:    2/19/2024

## 2024-01-16 NOTE — TELEPHONE ENCOUNTER
From: Nichelle Raza  To: Dr. Pauly Bond  Sent: 1/16/2024 4:06 PM EST  Subject: Low dose Keflex    My mom is out of her low dose 250 mg Keflex and it is no longer on her medication list to refill. Can we please get one called in to the pharmacy. Thank you

## 2024-01-17 ENCOUNTER — TELEPHONE (OUTPATIENT)
Dept: FAMILY MEDICINE CLINIC | Age: 89
End: 2024-01-17

## 2024-01-17 ENCOUNTER — PATIENT MESSAGE (OUTPATIENT)
Dept: FAMILY MEDICINE CLINIC | Age: 89
End: 2024-01-17

## 2024-01-17 DIAGNOSIS — M54.42 CHRONIC MIDLINE LOW BACK PAIN WITH LEFT-SIDED SCIATICA: ICD-10-CM

## 2024-01-17 DIAGNOSIS — M15.9 GENERALIZED OSTEOARTHRITIS: Primary | ICD-10-CM

## 2024-01-17 DIAGNOSIS — G89.29 CHRONIC MIDLINE LOW BACK PAIN WITH LEFT-SIDED SCIATICA: ICD-10-CM

## 2024-01-17 RX ORDER — TRAMADOL HYDROCHLORIDE 50 MG/1
50 TABLET ORAL EVERY 6 HOURS PRN
Qty: 120 TABLET | Refills: 1 | Status: SHIPPED | OUTPATIENT
Start: 2024-01-17 | End: 2024-03-17

## 2024-01-17 NOTE — TELEPHONE ENCOUNTER
From: Nichelle Raza  To: Dr. Pauly Bond  Sent: 1/17/2024 3:30 PM EST  Subject: Tramadol     We are in need of a refill for Tramodol when you have time. Thanks

## 2024-01-17 NOTE — TELEPHONE ENCOUNTER
I left a message to schedule a hospital follow up  I scheduled  Nichelle in for 01/23/24 @ 3:30 we can cancel if that date and time doesn't work for them.

## 2024-01-17 NOTE — TELEPHONE ENCOUNTER
Patient's daughter Yolis called returning a missed call about [facility] appt. I did inform her that patient had been scheduled for 1/23 and Yolis states that is is to keep that appt.

## 2024-01-17 NOTE — TELEPHONE ENCOUNTER
----- Message from Maria L Espino sent at 1/17/2024 10:14 AM EST -----  Subject: Message to Provider    QUESTIONS  Information for Provider? [facility] called to check if PT has scheduled   a hospital follow up, none found. Requesting office call PT's daughter to   schedule. Alt. # [phone] if main doesn't work.  ---------------------------------------------------------------------------  --------------  CALL BACK INFO  [phone]; OK to leave message on voicemail  ---------------------------------------------------------------------------  --------------  SCRIPT ANSWERS  Relationship to Patient? Covered Entity  Covered Entity Type? Physician Office?  Representative Name? Igor ([facility])

## 2024-01-19 ENCOUNTER — TELEPHONE (OUTPATIENT)
Dept: FAMILY MEDICINE CLINIC | Age: 89
End: 2024-01-19

## 2024-01-19 NOTE — TELEPHONE ENCOUNTER
"  [facility]  Adult Nutrition  Consult Note    SUMMARY     Recommendations  1) Advance patient to a Regular diet (double portions) 2) Boost Plus TID 3) MVI daily 4) thiamin 100 mg or per MD 5) Honor food preferences 6) Monitor oral intake 7) Monitor weight    Goals: 1) patient to consume >=85% of EEN and EPN x5 days to promote weight gain  Nutrition Goal Status: new  Communication of RD Recs: reviewed with RN    Reason for Assessment    Reason for Assessment: consult (optimize nutrition)  Diagnosis: other (see comments) (myelopathy)  Relevant Medical History: ETOH abuse, depression, psyc care, HTN, PTSD    General Information Comments: RD covering remotely. Patient consuming 75 - 100% of meals per nursing. Patient is severely malnourished. .5 kg.     Nutrition Discharge Planning: Patient to discharge on a high-calorie, high-protein diet with ONS TID to promote weight gain.     Nutrition Risk Screen    Nutrition Risk Screen: no indicators present    Nutrition/Diet History    Food Preferences: [place]  Do you have any cultural, spiritual, Rastafari conflicts, given your current situation?: no  Factors Affecting Nutritional Intake: decreased appetite, depression, excessive alcohol intake (no diet order)    Anthropometrics    Temp: 98.2 °F (36.8 °C)  Height Method: Measured  Height: 6' 5" (195.6 cm)  Height (inches): 77 in  Weight Method: Bed Scale  Weight: 81.3 kg (179 lb 3.7 oz)  Weight (lb): 179.24 lb  Ideal Body Weight (IBW), Male: 208 lb  % Ideal Body Weight, Male (lb): 86.17 lb  BMI (Calculated): 21.3  BMI Grade: 18.5-24.9 - normal  Weight Loss: unintentional  Usual Body Weight (UBW), k.5 kg  Weight Change Amount: 48 lb 15.1 oz  % Usual Body Weight: 78.71  % Weight Change From Usual Weight: -21.45 %    Anthropometrics Special Consideration         Lab/Procedures/Meds    Pertinent Labs Reviewed: reviewed  Pertinent Labs Comments: Alb 2.2 (L), Prealbumin 9 (L)  Pertinent Medications " Iggy from [facility] called asking if it is ok to continue PT in the home for patient. Call back [phone]. Ok to leave msg. Please advise   "Reviewed: reviewed  Pertinent Medications Comments: folic acid, statin    Physical Findings/Assessment    Overall Physical Appearance:  (dorothy)  Skin: abrasion, incision(s) (Mike Score 18)    Estimated/Assessed Needs    Weight Used For Calorie Calculations: 81.3 kg (179 lb 3.7 oz)  Height: 6' 5" (195.6 cm)  Energy Need Method: [place]-St Jeor (2429 (RMR x 1.4))  RMR (Pined[place]-St. Jeor Equation): 1735.38  Protein Requirements: 100 - 120 g  Weight Used For Protein Calculations: 81.3 kg (179 lb 3.7 oz)  Fluid Need Method: RDA Method, other (see comments) (or per MD)  CHO Requirement: N/A       Nutrition Prescription Ordered    Current Diet Order: Regular    Evaluation of Received Nutrient/Fluid Intake    Energy Calories Required: not meeting needs  Protein Required: not meeting needs  Fluid Required: meeting needs  Tolerance: tolerating  % Intake of Estimated Energy Needs: 75 - 100 %  % Meal Intake: 75 - 100 %    Nutrition Risk    Level of Risk/Frequency of Follow-up:  (F/U 2x weekly)     Assessment and Plan    Severe malnutrition      Related to (etiology):   Inadequate oral intake    Signs and Symptoms (as evidenced by):   1) 21.44% unintentional weight loss over 1 month  2) Patient consumed <75% of estimated energy requirements for > 1 month     Interventions/Recommendations (treatment strategy):  1) Advance patient to a Regular diet (double portions) 2) Boost Plus TID 3) MVI daily 4) thiamin 100 mg or per MD 5) Honor food preferences 6) Monitor oral intake 7) Monitor weight    Nutrition Diagnosis Status:   New               Monitor and Evaluation    Food and Nutrient Intake: energy intake, food and beverage intake  Food and Nutrient Adminstration: diet order, other (specify) (supplement order)  Knowledge/Beliefs/Attitudes: food and nutrition knowledge/skill, beliefs and attitudes  Physical Activity and Function: nutrition-related ADLs and IADLs  Anthropometric Measurements: weight, weight change, body mass " index  Biochemical Data, Medical Tests and Procedures: electrolyte and renal panel, gastrointestinal profile, inflammatory profile  Nutrition-Focused Physical Findings: overall appearance, extremities, muscles and bones, head and eyes, skin     Nutrition Follow-Up    RD Follow-up?: Yes

## 2024-01-23 ENCOUNTER — TELEPHONE (OUTPATIENT)
Dept: FAMILY MEDICINE CLINIC | Age: 89
End: 2024-01-23

## 2024-01-23 ENCOUNTER — OFFICE VISIT (OUTPATIENT)
Dept: FAMILY MEDICINE CLINIC | Age: 89
End: 2024-01-23

## 2024-01-23 VITALS
HEART RATE: 88 BPM | WEIGHT: 126.6 LBS | OXYGEN SATURATION: 96 % | BODY MASS INDEX: 26.46 KG/M2 | TEMPERATURE: 97.9 F | DIASTOLIC BLOOD PRESSURE: 78 MMHG | SYSTOLIC BLOOD PRESSURE: 142 MMHG

## 2024-01-23 DIAGNOSIS — Z09 HOSPITAL DISCHARGE FOLLOW-UP: Primary | ICD-10-CM

## 2024-01-23 DIAGNOSIS — G89.29 CHRONIC MIDLINE LOW BACK PAIN WITH LEFT-SIDED SCIATICA: ICD-10-CM

## 2024-01-23 DIAGNOSIS — M54.42 CHRONIC MIDLINE LOW BACK PAIN WITH LEFT-SIDED SCIATICA: ICD-10-CM

## 2024-01-23 DIAGNOSIS — G81.94 LEFT HEMIPARESIS (HCC): ICD-10-CM

## 2024-01-23 DIAGNOSIS — E11.22 TYPE 2 DIABETES MELLITUS WITH STAGE 3B CHRONIC KIDNEY DISEASE, WITHOUT LONG-TERM CURRENT USE OF INSULIN (HCC): ICD-10-CM

## 2024-01-23 DIAGNOSIS — N18.32 TYPE 2 DIABETES MELLITUS WITH STAGE 3B CHRONIC KIDNEY DISEASE, WITHOUT LONG-TERM CURRENT USE OF INSULIN (HCC): ICD-10-CM

## 2024-01-23 DIAGNOSIS — I10 ESSENTIAL HYPERTENSION: ICD-10-CM

## 2024-01-23 DIAGNOSIS — I50.22 CHRONIC SYSTOLIC (CONGESTIVE) HEART FAILURE (HCC): ICD-10-CM

## 2024-01-23 DIAGNOSIS — G35 MULTIPLE SCLEROSIS (HCC): ICD-10-CM

## 2024-01-23 DIAGNOSIS — E78.5 HYPERLIPIDEMIA, UNSPECIFIED HYPERLIPIDEMIA TYPE: ICD-10-CM

## 2024-01-23 DIAGNOSIS — F11.20 OPIOID DEPENDENCE WITH CURRENT USE (HCC): ICD-10-CM

## 2024-01-23 DIAGNOSIS — Z71.89 ACP (ADVANCE CARE PLANNING): ICD-10-CM

## 2024-01-23 DIAGNOSIS — N18.31 STAGE 3A CHRONIC KIDNEY DISEASE (HCC): ICD-10-CM

## 2024-01-23 DIAGNOSIS — M15.9 GENERALIZED OSTEOARTHRITIS: ICD-10-CM

## 2024-01-23 RX ORDER — METHYLPREDNISOLONE 4 MG/1
TABLET ORAL
Qty: 1 KIT | Refills: 0 | Status: SHIPPED | OUTPATIENT
Start: 2024-01-23

## 2024-01-23 ASSESSMENT — ENCOUNTER SYMPTOMS
VOMITING: 0
CHEST TIGHTNESS: 0
BACK PAIN: 1
CONSTIPATION: 0
BLOOD IN STOOL: 0
COUGH: 0
CHOKING: 0
SHORTNESS OF BREATH: 0
NAUSEA: 0
ABDOMINAL PAIN: 0
ANAL BLEEDING: 0
WHEEZING: 0

## 2024-01-23 ASSESSMENT — PATIENT HEALTH QUESTIONNAIRE - PHQ9
SUM OF ALL RESPONSES TO PHQ QUESTIONS 1-9: 0
SUM OF ALL RESPONSES TO PHQ9 QUESTIONS 1 & 2: 0
1. LITTLE INTEREST OR PLEASURE IN DOING THINGS: 0
2. FEELING DOWN, DEPRESSED OR HOPELESS: 0
SUM OF ALL RESPONSES TO PHQ QUESTIONS 1-9: 0

## 2024-01-23 NOTE — PROGRESS NOTES
Post-Discharge Transitional Care Follow Up      Nichelle Raza   YOB: 1934    Date of Office Visit:  1/23/2024  Date of Hospital Admission: 1/9/24  Date of Hospital Discharge: 1/13/24  Readmission Risk Score (high >=14%. Medium >=10%):Readmission Risk Score: 11.1      Care management risk score Rising risk (score 2-5) and Complex Care (Scores >=6): No Risk Score On File     Non face to face  following discharge, date last encounter closed (first attempt may have been earlier): 01/16/2024     Call initiated 2 business days of discharge: Yes     Hospital discharge follow-up  -     [place] DISCHARGE MEDS RECONCILED W/ CURRENT OUTPATIENT MED LIST  Multiple sclerosis (HCC)  -     methylPREDNISolone (MEDROL DOSEPACK) 4 MG tablet; Take by mouth., Disp-1 kit, R-0Print  Chronic midline low back pain with left-sided sciatica  Generalized osteoarthritis  Essential hypertension  -     Comprehensive Metabolic Panel; Future  Stage 3a chronic kidney disease (HCC)  -     Comprehensive Metabolic Panel; Future  Chronic systolic (congestive) heart failure (HCC)  Hyperlipidemia, unspecified hyperlipidemia type  -     Lipid, Fasting; Future  -     Comprehensive Metabolic Panel; Future  Left hemiparesis (HCC)  Opioid dependence with current use (HCC)  Type 2 diabetes mellitus with stage 3b chronic kidney disease, without long-term current use of insulin (HCC)  -     Hemoglobin A1C; Future  ACP (advance care planning)  -     [place] Advanced Care Planning (16-30 minutes) [56375]      Medical Decision Making: high complexity  No follow-ups on file.    On this date 1/23/2024 I have spent 30 minutes reviewing previous notes, test results and face to face with the patient discussing the diagnosis and importance of compliance with the treatment plan as well as documenting on the day of the visit.       Subjective:   HPI    Inpatient course: Discharge summary reviewed- see chart.    Interval history/Current status: was doing well once

## 2024-01-23 NOTE — PATIENT INSTRUCTIONS
Learning About Living Geiger  What is a living will?     A living will, also called a declaration, is a legal form. It tells your family and your doctor your wishes when you can't speak for yourself. It's used by the health professionals who will treat you as you near the end of your life or if you get seriously hurt or ill.  If you put your wishes in writing, your loved ones and others will know what kind of care you want. They won't need to guess. This can ease your mind and be helpful to others. And you can change or cancel your living will at any time.  A living will is not the same as an estate or property will. An estate will explains what you want to happen with your money and property after you die.  How do you use it?  Keep these facts in mind about how a living will is used.  Your living will is used only if you can't speak or make decisions for yourself. Most often, one or more doctors must certify that you can't speak or decide for yourself before your living will takes effect.  If you get better and can speak for yourself again, you can accept or refuse any treatment. It doesn't matter what you said in your living will.  Some states may limit your right to refuse treatment in certain cases. For example, you may need to clearly state in your living will that you don't want artificial hydration and nutrition, such as being fed through a tube.  Is a living will a legal document?  A living will is a legal document. Each state has its own laws about living geiger. And a living will may be called something else in your state.  Here are some things to know about living geiger.  You don't need an  to complete a living will. But legal advice can be helpful if your state's laws are unclear. It can also help if your health history is complicated or your family can't agree on what should be in your living will.  You can change your living will at any time. Some people find that their wishes about

## 2024-01-23 NOTE — TELEPHONE ENCOUNTER
Received letter from [facility] in regards to home visit with pt     Pt had a few complaints, pt has an appt today, will discuss at appt    Letter attached

## 2024-01-24 ENCOUNTER — PATIENT MESSAGE (OUTPATIENT)
Dept: FAMILY MEDICINE CLINIC | Age: 89
End: 2024-01-24

## 2024-01-24 NOTE — TELEPHONE ENCOUNTER
From: Nichelle Raza  To: Dr. Pauly Bond  Sent: 1/24/2024 7:20 AM EST  Subject: Medical Power of /Living Will    Good Morning,    I have attached a copy of both my mom's Living Will and Medical Power of . There is a DNR Comfort Care form in the packet you gave us yesterday. Does this also need to be filling out or does her Living Will address that?    Thanks!

## 2024-01-30 ENCOUNTER — E-VISIT (OUTPATIENT)
Dept: FAMILY MEDICINE CLINIC | Age: 89
End: 2024-01-30
Payer: MEDICARE

## 2024-01-30 DIAGNOSIS — N39.0 ACUTE UTI: Primary | ICD-10-CM

## 2024-01-30 PROCEDURE — 99421 OL DIG E/M SVC 5-10 MIN: CPT | Performed by: INTERNAL MEDICINE

## 2024-01-30 RX ORDER — CEPHALEXIN 500 MG/1
500 CAPSULE ORAL 2 TIMES DAILY
Qty: 14 CAPSULE | Refills: 0 | Status: SHIPPED | OUTPATIENT
Start: 2024-01-30 | End: 2024-02-06

## 2024-02-05 ENCOUNTER — HOSPITAL ENCOUNTER (OUTPATIENT)
Age: 89
Setting detail: SPECIMEN
Discharge: HOME | End: 2024-02-05

## 2024-02-05 LAB
ALBUMIN SERPL-MCNC: 3.9 G/DL (ref 3.5–5.2)
ALBUMIN/GLOB SERPL: 2 {RATIO} (ref 1–2.5)
ALP SERPL-CCNC: 49 U/L (ref 35–104)
ALT SERPL-CCNC: 16 U/L (ref 10–35)
ANION GAP SERPL CALCULATED.3IONS-SCNC: 13 MMOL/L (ref 9–16)
AST SERPL-CCNC: 17 U/L (ref 10–35)
BILIRUB SERPL-MCNC: 0.2 MG/DL (ref 0–1.2)
BUN SERPL-MCNC: 17 MG/DL (ref 8–23)
CALCIUM SERPL-MCNC: 8.9 MG/DL (ref 8.6–10.4)
CHLORIDE SERPL-SCNC: 103 MMOL/L (ref 98–107)
CHOLEST SERPL-MCNC: 161 MG/DL (ref 0–199)
CHOLESTEROL/HDL RATIO: 3
CO2 SERPL-SCNC: 26 MMOL/L (ref 20–31)
CREAT SERPL-MCNC: 0.9 MG/DL (ref 0.5–0.9)
EST. AVERAGE GLUCOSE BLD GHB EST-MCNC: 131 MG/DL
GFR SERPL CREATININE-BSD FRML MDRD: >60 ML/MIN/1.73M2
GLUCOSE SERPL-MCNC: 121 MG/DL (ref 74–99)
HBA1C MFR BLD: 6.2 % (ref 4–6)
HDLC SERPL-MCNC: 61 MG/DL
LDLC SERPL CALC-MCNC: 46 MG/DL (ref 0–100)
POTASSIUM SERPL-SCNC: 3.9 MMOL/L (ref 3.7–5.3)
PROT SERPL-MCNC: 5.5 G/DL (ref 6.6–8.7)
SODIUM SERPL-SCNC: 142 MMOL/L (ref 136–145)
TRIGL SERPL-MCNC: 271 MG/DL
VLDLC SERPL CALC-MCNC: 54 MG/DL

## 2024-02-07 NOTE — RESULT ENCOUNTER NOTE
Notified via [employer] -     Your lab results were stable, we will repeat them next year or as needed. Will recheck A1c in three months. Protein levels low, likely from recent illness - encourage increased protein intake with protein shakes like Glucerna if tolerated.   Please call the office with any questions.     - Dr. BRENNAN

## 2024-02-17 DIAGNOSIS — R42 DIZZINESS: ICD-10-CM

## 2024-02-19 DIAGNOSIS — R42 DIZZINESS: ICD-10-CM

## 2024-02-19 RX ORDER — MECLIZINE HYDROCHLORIDE 25 MG/1
TABLET ORAL
Qty: 30 TABLET | Refills: 3 | Status: SHIPPED | OUTPATIENT
Start: 2024-02-19

## 2024-02-19 RX ORDER — MECLIZINE HYDROCHLORIDE 25 MG/1
25 TABLET ORAL PRN
Qty: 30 TABLET | Refills: 3 | OUTPATIENT
Start: 2024-02-19

## 2024-02-19 NOTE — TELEPHONE ENCOUNTER
Last visit: 1/30/2024  Last Med refill: 10/16/2023  Does patient have enough medication for 72 hours: No:     Next Visit Date:  Future Appointments   Date Time Provider Department Center   4/18/2024 10:15 AM Pauly Bond MD [email or web address]       Health Maintenance   Topic Date Due    DTaP/Tdap/Td vaccine (1 - Tdap) Never done    Shingles vaccine (1 of 2) Never done    Respiratory Syncytial Virus (RSV) Pregnant or age 60 yrs+ (1 - 1-dose 60+ series) Never done    COVID-19 Vaccine (5 - 2023-24 season) 09/01/2023    Annual Wellness Visit (Medicare)  05/17/2024    Depression Screen  01/23/2025    Lipids  02/05/2025    Flu vaccine  Completed    Pneumococcal 65+ years Vaccine  Completed    Hepatitis A vaccine  Aged Out    Hepatitis B vaccine  Aged Out    Hib vaccine  Aged Out    Polio vaccine  Aged Out    Meningococcal (ACWY) vaccine  Aged Out    DEXA (modify frequency per FRAX score)  Discontinued       Hemoglobin A1C (%)   Date Value   02/05/2024 6.2 (H)   10/16/2023 6.2   05/17/2023 6.6 (H)             ( goal A1C is < 7)   No components found for: \"LABMICR\"  LDL Cholesterol (mg/dL)   Date Value   02/05/2024 46   05/21/2020 86     LDL Calculated (mg/dL)   Date Value   11/06/2021 100       (goal LDL is <100)   AST (U/L)   Date Value   02/05/2024 17     ALT (U/L)   Date Value   02/05/2024 16     BUN (mg/dL)   Date Value   02/05/2024 17     BP Readings from Last 3 Encounters:   01/23/24 (!) 142/78   01/13/24 (!) 151/78   10/16/23 120/64          (goal 120/80)    All Future Testing planned in [facility]  Lab Frequency Next Occurrence   Lipid, Fasting Once 01/23/2024   Hemoglobin A1C Once 01/23/2024   Comprehensive Metabolic Panel Once 01/23/2024               Patient Active Problem List:     Ataxia     Multiple sclerosis (HCC)     Essential hypertension     Hyperlipidemia     Atelectasis     Debility     Abnormal gait     Actinic keratosis     Enthesopathy of hip region     Generalized osteoarthritis     Idiopathic

## 2024-02-20 DIAGNOSIS — M54.42 CHRONIC MIDLINE LOW BACK PAIN WITH LEFT-SIDED SCIATICA: ICD-10-CM

## 2024-02-20 DIAGNOSIS — G89.29 CHRONIC MIDLINE LOW BACK PAIN WITH LEFT-SIDED SCIATICA: ICD-10-CM

## 2024-02-20 RX ORDER — AMITRIPTYLINE HYDROCHLORIDE 100 MG/1
TABLET ORAL
Qty: 90 TABLET | Refills: 1 | Status: SHIPPED | OUTPATIENT
Start: 2024-02-20

## 2024-03-09 DIAGNOSIS — E78.5 HYPERLIPIDEMIA, UNSPECIFIED HYPERLIPIDEMIA TYPE: ICD-10-CM

## 2024-03-09 DIAGNOSIS — I10 ESSENTIAL HYPERTENSION: ICD-10-CM

## 2024-03-11 RX ORDER — SIMVASTATIN 20 MG
20 TABLET ORAL
Qty: 90 TABLET | Refills: 1 | Status: SHIPPED | OUTPATIENT
Start: 2024-03-11

## 2024-03-11 RX ORDER — LISINOPRIL 40 MG/1
TABLET ORAL
Qty: 90 TABLET | Refills: 1 | Status: SHIPPED | OUTPATIENT
Start: 2024-03-11

## 2024-03-12 ENCOUNTER — HOSPITAL ENCOUNTER (INPATIENT)
Age: 89
LOS: 1 days | Discharge: HOME | DRG: 641 | End: 2024-03-14
Attending: EMERGENCY MEDICINE | Admitting: INTERNAL MEDICINE
Payer: MEDICARE

## 2024-03-12 ENCOUNTER — APPOINTMENT (OUTPATIENT)
Dept: CT IMAGING | Age: 89
DRG: 641 | End: 2024-03-12
Payer: MEDICARE

## 2024-03-12 DIAGNOSIS — E86.0 DEHYDRATION: ICD-10-CM

## 2024-03-12 DIAGNOSIS — I10 ESSENTIAL HYPERTENSION: ICD-10-CM

## 2024-03-12 DIAGNOSIS — R11.2 NAUSEA AND VOMITING, UNSPECIFIED VOMITING TYPE: Primary | ICD-10-CM

## 2024-03-12 DIAGNOSIS — K52.9 ENTEROCOLITIS: ICD-10-CM

## 2024-03-12 LAB
ALBUMIN SERPL-MCNC: 4.1 G/DL (ref 3.5–5.2)
ALP SERPL-CCNC: 50 U/L (ref 35–104)
ALT SERPL-CCNC: 13 U/L (ref 5–33)
ANION GAP SERPL CALCULATED.3IONS-SCNC: 13 MMOL/L (ref 9–17)
AST SERPL-CCNC: 14 U/L
BASOPHILS # BLD: 0 K/UL (ref 0–0.2)
BASOPHILS NFR BLD: 0 % (ref 0–2)
BILIRUB SERPL-MCNC: 0.4 MG/DL (ref 0.3–1.2)
BUN SERPL-MCNC: 21 MG/DL (ref 8–23)
CALCIUM SERPL-MCNC: 8.8 MG/DL (ref 8.6–10.4)
CHLORIDE SERPL-SCNC: 100 MMOL/L (ref 98–107)
CO2 SERPL-SCNC: 25 MMOL/L (ref 20–31)
CREAT SERPL-MCNC: 0.9 MG/DL (ref 0.5–0.9)
EOSINOPHIL # BLD: 0 K/UL (ref 0–0.4)
EOSINOPHILS RELATIVE PERCENT: 0 % (ref 0–4)
ERYTHROCYTE [DISTWIDTH] IN BLOOD BY AUTOMATED COUNT: 14.5 % (ref 11.5–14.9)
FLUAV RNA RESP QL NAA+PROBE: NOT DETECTED
FLUBV RNA RESP QL NAA+PROBE: NOT DETECTED
GFR SERPL CREATININE-BSD FRML MDRD: >60 ML/MIN/1.73M2
GLUCOSE SERPL-MCNC: 169 MG/DL (ref 70–99)
HCT VFR BLD AUTO: 42.1 % (ref 36–46)
HGB BLD-MCNC: 13.9 G/DL (ref 12–16)
LACTATE BLDV-SCNC: 2.6 MMOL/L (ref 0.5–2.2)
LACTATE BLDV-SCNC: 2.7 MMOL/L (ref 0.5–2.2)
LIPASE SERPL-CCNC: 15 U/L (ref 13–60)
LYMPHOCYTES NFR BLD: 0.58 K/UL (ref 1–4.8)
LYMPHOCYTES RELATIVE PERCENT: 6 % (ref 24–44)
MCH RBC QN AUTO: 30.7 PG (ref 26–34)
MCHC RBC AUTO-ENTMCNC: 33 G/DL (ref 31–37)
MCV RBC AUTO: 93.1 FL (ref 80–100)
MONOCYTES NFR BLD: 0.49 K/UL (ref 0.1–1.3)
MONOCYTES NFR BLD: 5 % (ref 1–7)
MORPHOLOGY: NORMAL
NEUTROPHILS NFR BLD: 89 % (ref 36–66)
NEUTS SEG NFR BLD: 8.63 K/UL (ref 1.3–9.1)
PLATELET # BLD AUTO: 268 K/UL (ref 150–450)
PMV BLD AUTO: 8.6 FL (ref 6–12)
POTASSIUM SERPL-SCNC: 4.4 MMOL/L (ref 3.7–5.3)
PROT SERPL-MCNC: 6.3 G/DL (ref 6.4–8.3)
RBC # BLD AUTO: 4.52 M/UL (ref 4–5.2)
SARS-COV-2 RNA RESP QL NAA+PROBE: NOT DETECTED
SODIUM SERPL-SCNC: 138 MMOL/L (ref 135–144)
SOURCE: NORMAL
SPECIMEN DESCRIPTION: NORMAL
TROPONIN I SERPL HS-MCNC: 19 NG/L (ref 0–14)
TROPONIN I SERPL HS-MCNC: 20 NG/L (ref 0–14)
WBC OTHER # BLD: 9.7 K/UL (ref 3.5–11)

## 2024-03-12 PROCEDURE — 2580000003 HC RX 258: Performed by: NURSE PRACTITIONER

## 2024-03-12 PROCEDURE — 96365 THER/PROPH/DIAG IV INF INIT: CPT

## 2024-03-12 PROCEDURE — G0378 HOSPITAL OBSERVATION PER HR: HCPCS

## 2024-03-12 PROCEDURE — 96361 HYDRATE IV INFUSION ADD-ON: CPT

## 2024-03-12 PROCEDURE — 74177 CT ABD & PELVIS W/CONTRAST: CPT

## 2024-03-12 PROCEDURE — 6360000002 HC RX W HCPCS: Performed by: EMERGENCY MEDICINE

## 2024-03-12 PROCEDURE — 84484 ASSAY OF TROPONIN QUANT: CPT

## 2024-03-12 PROCEDURE — 80053 COMPREHEN METABOLIC PANEL: CPT

## 2024-03-12 PROCEDURE — 96374 THER/PROPH/DIAG INJ IV PUSH: CPT

## 2024-03-12 PROCEDURE — 99285 EMERGENCY DEPT VISIT HI MDM: CPT

## 2024-03-12 PROCEDURE — 83690 ASSAY OF LIPASE: CPT

## 2024-03-12 PROCEDURE — 87040 BLOOD CULTURE FOR BACTERIA: CPT

## 2024-03-12 PROCEDURE — 85025 COMPLETE CBC W/AUTO DIFF WBC: CPT

## 2024-03-12 PROCEDURE — 36415 COLL VENOUS BLD VENIPUNCTURE: CPT

## 2024-03-12 PROCEDURE — 87636 SARSCOV2 & INF A&B AMP PRB: CPT

## 2024-03-12 PROCEDURE — 83605 ASSAY OF LACTIC ACID: CPT

## 2024-03-12 PROCEDURE — 96375 TX/PRO/DX INJ NEW DRUG ADDON: CPT

## 2024-03-12 PROCEDURE — 6360000004 HC RX CONTRAST MEDICATION: Performed by: EMERGENCY MEDICINE

## 2024-03-12 PROCEDURE — 6370000000 HC RX 637 (ALT 250 FOR IP): Performed by: NURSE PRACTITIONER

## 2024-03-12 PROCEDURE — 93005 ELECTROCARDIOGRAM TRACING: CPT | Performed by: EMERGENCY MEDICINE

## 2024-03-12 PROCEDURE — 2580000003 HC RX 258: Performed by: EMERGENCY MEDICINE

## 2024-03-12 RX ORDER — 0.9 % SODIUM CHLORIDE 0.9 %
100 INTRAVENOUS SOLUTION INTRAVENOUS ONCE
Status: COMPLETED | OUTPATIENT
Start: 2024-03-12 | End: 2024-03-12

## 2024-03-12 RX ORDER — ACETAMINOPHEN 650 MG/1
650 SUPPOSITORY RECTAL EVERY 6 HOURS PRN
Status: DISCONTINUED | OUTPATIENT
Start: 2024-03-12 | End: 2024-03-14 | Stop reason: HOSPADM

## 2024-03-12 RX ORDER — SODIUM CHLORIDE 9 MG/ML
INJECTION, SOLUTION INTRAVENOUS PRN
Status: DISCONTINUED | OUTPATIENT
Start: 2024-03-12 | End: 2024-03-14 | Stop reason: HOSPADM

## 2024-03-12 RX ORDER — HYDROCODONE BITARTRATE AND ACETAMINOPHEN 5; 325 MG/1; MG/1
1 TABLET ORAL EVERY 8 HOURS PRN
Status: DISCONTINUED | OUTPATIENT
Start: 2024-03-12 | End: 2024-03-14 | Stop reason: HOSPADM

## 2024-03-12 RX ORDER — MORPHINE SULFATE 4 MG/ML
4 INJECTION, SOLUTION INTRAMUSCULAR; INTRAVENOUS ONCE
Status: COMPLETED | OUTPATIENT
Start: 2024-03-12 | End: 2024-03-12

## 2024-03-12 RX ORDER — ENOXAPARIN SODIUM 100 MG/ML
40 INJECTION SUBCUTANEOUS DAILY
Status: DISCONTINUED | OUTPATIENT
Start: 2024-03-13 | End: 2024-03-14 | Stop reason: HOSPADM

## 2024-03-12 RX ORDER — PREGABALIN 25 MG/1
25 CAPSULE ORAL DAILY PRN
Status: DISCONTINUED | OUTPATIENT
Start: 2024-03-12 | End: 2024-03-14 | Stop reason: HOSPADM

## 2024-03-12 RX ORDER — TROSPIUM CHLORIDE 20 MG/1
20 TABLET, FILM COATED ORAL
Status: DISCONTINUED | OUTPATIENT
Start: 2024-03-13 | End: 2024-03-14 | Stop reason: HOSPADM

## 2024-03-12 RX ORDER — CELECOXIB 100 MG/1
200 CAPSULE ORAL DAILY
Status: DISCONTINUED | OUTPATIENT
Start: 2024-03-13 | End: 2024-03-14 | Stop reason: HOSPADM

## 2024-03-12 RX ORDER — MECLIZINE HYDROCHLORIDE 25 MG/1
25 TABLET ORAL 3 TIMES DAILY PRN
Status: DISCONTINUED | OUTPATIENT
Start: 2024-03-12 | End: 2024-03-14 | Stop reason: HOSPADM

## 2024-03-12 RX ORDER — SODIUM CHLORIDE 0.9 % (FLUSH) 0.9 %
10 SYRINGE (ML) INJECTION PRN
Status: DISCONTINUED | OUTPATIENT
Start: 2024-03-12 | End: 2024-03-14 | Stop reason: HOSPADM

## 2024-03-12 RX ORDER — SODIUM CHLORIDE 9 MG/ML
INJECTION, SOLUTION INTRAVENOUS CONTINUOUS
Status: DISCONTINUED | OUTPATIENT
Start: 2024-03-12 | End: 2024-03-14 | Stop reason: HOSPADM

## 2024-03-12 RX ORDER — METOPROLOL SUCCINATE 50 MG/1
50 TABLET, EXTENDED RELEASE ORAL NIGHTLY
Status: DISCONTINUED | OUTPATIENT
Start: 2024-03-12 | End: 2024-03-14 | Stop reason: HOSPADM

## 2024-03-12 RX ORDER — ATOGEPANT 30 MG/1
30 TABLET ORAL NIGHTLY
COMMUNITY
Start: 2024-02-23

## 2024-03-12 RX ORDER — SODIUM CHLORIDE 0.9 % (FLUSH) 0.9 %
5-40 SYRINGE (ML) INJECTION EVERY 12 HOURS SCHEDULED
Status: DISCONTINUED | OUTPATIENT
Start: 2024-03-12 | End: 2024-03-14 | Stop reason: HOSPADM

## 2024-03-12 RX ORDER — ACETAMINOPHEN 325 MG/1
650 TABLET ORAL EVERY 6 HOURS PRN
Status: DISCONTINUED | OUTPATIENT
Start: 2024-03-12 | End: 2024-03-14 | Stop reason: HOSPADM

## 2024-03-12 RX ORDER — PANTOPRAZOLE SODIUM 40 MG/1
40 TABLET, DELAYED RELEASE ORAL
Status: DISCONTINUED | OUTPATIENT
Start: 2024-03-13 | End: 2024-03-14 | Stop reason: HOSPADM

## 2024-03-12 RX ORDER — DOCUSATE SODIUM 100 MG/1
100 CAPSULE, LIQUID FILLED ORAL NIGHTLY PRN
Status: DISCONTINUED | OUTPATIENT
Start: 2024-03-12 | End: 2024-03-14 | Stop reason: HOSPADM

## 2024-03-12 RX ORDER — ONDANSETRON 4 MG/1
4 TABLET, ORALLY DISINTEGRATING ORAL EVERY 8 HOURS PRN
Status: DISCONTINUED | OUTPATIENT
Start: 2024-03-12 | End: 2024-03-14 | Stop reason: HOSPADM

## 2024-03-12 RX ORDER — SODIUM CHLORIDE 0.9 % (FLUSH) 0.9 %
5-40 SYRINGE (ML) INJECTION PRN
Status: DISCONTINUED | OUTPATIENT
Start: 2024-03-12 | End: 2024-03-14 | Stop reason: HOSPADM

## 2024-03-12 RX ORDER — 0.9 % SODIUM CHLORIDE 0.9 %
1000 INTRAVENOUS SOLUTION INTRAVENOUS ONCE
Status: COMPLETED | OUTPATIENT
Start: 2024-03-12 | End: 2024-03-12

## 2024-03-12 RX ORDER — MAGNESIUM SULFATE HEPTAHYDRATE 40 MG/ML
2000 INJECTION, SOLUTION INTRAVENOUS PRN
Status: DISCONTINUED | OUTPATIENT
Start: 2024-03-12 | End: 2024-03-14 | Stop reason: HOSPADM

## 2024-03-12 RX ORDER — LISINOPRIL 40 MG/1
TABLET ORAL
Qty: 90 TABLET | Refills: 1 | OUTPATIENT
Start: 2024-03-12

## 2024-03-12 RX ORDER — AMLODIPINE BESYLATE 5 MG/1
5 TABLET ORAL DAILY
Status: DISCONTINUED | OUTPATIENT
Start: 2024-03-13 | End: 2024-03-14 | Stop reason: HOSPADM

## 2024-03-12 RX ORDER — PREDNISONE 10 MG/1
10 TABLET ORAL DAILY
Status: DISCONTINUED | OUTPATIENT
Start: 2024-03-13 | End: 2024-03-14 | Stop reason: HOSPADM

## 2024-03-12 RX ORDER — ONDANSETRON 2 MG/ML
4 INJECTION INTRAMUSCULAR; INTRAVENOUS EVERY 6 HOURS PRN
Status: DISCONTINUED | OUTPATIENT
Start: 2024-03-12 | End: 2024-03-14 | Stop reason: HOSPADM

## 2024-03-12 RX ORDER — POTASSIUM CHLORIDE 20 MEQ/1
40 TABLET, EXTENDED RELEASE ORAL PRN
Status: DISCONTINUED | OUTPATIENT
Start: 2024-03-12 | End: 2024-03-14 | Stop reason: HOSPADM

## 2024-03-12 RX ORDER — LISINOPRIL 20 MG/1
40 TABLET ORAL DAILY
Status: DISCONTINUED | OUTPATIENT
Start: 2024-03-13 | End: 2024-03-14 | Stop reason: HOSPADM

## 2024-03-12 RX ORDER — PREGABALIN 25 MG/1
25 CAPSULE ORAL DAILY PRN
COMMUNITY
Start: 2024-02-23 | End: 2025-02-22

## 2024-03-12 RX ORDER — CEPHALEXIN 250 MG/1
250 CAPSULE ORAL DAILY
Status: DISCONTINUED | OUTPATIENT
Start: 2024-03-13 | End: 2024-03-13

## 2024-03-12 RX ORDER — POLYETHYLENE GLYCOL 3350 17 G/17G
17 POWDER, FOR SOLUTION ORAL DAILY PRN
Status: DISCONTINUED | OUTPATIENT
Start: 2024-03-12 | End: 2024-03-14 | Stop reason: HOSPADM

## 2024-03-12 RX ORDER — ATORVASTATIN CALCIUM 10 MG/1
10 TABLET, FILM COATED ORAL NIGHTLY
Status: DISCONTINUED | OUTPATIENT
Start: 2024-03-12 | End: 2024-03-14 | Stop reason: HOSPADM

## 2024-03-12 RX ORDER — GAUZE BANDAGE 2" X 2"
100 BANDAGE TOPICAL DAILY
Status: DISCONTINUED | OUTPATIENT
Start: 2024-03-13 | End: 2024-03-14 | Stop reason: HOSPADM

## 2024-03-12 RX ORDER — ONDANSETRON 2 MG/ML
4 INJECTION INTRAMUSCULAR; INTRAVENOUS ONCE
Status: COMPLETED | OUTPATIENT
Start: 2024-03-12 | End: 2024-03-12

## 2024-03-12 RX ORDER — METRONIDAZOLE 500 MG/100ML
500 INJECTION, SOLUTION INTRAVENOUS ONCE
Status: COMPLETED | OUTPATIENT
Start: 2024-03-12 | End: 2024-03-13

## 2024-03-12 RX ORDER — POTASSIUM CHLORIDE 7.45 MG/ML
10 INJECTION INTRAVENOUS PRN
Status: DISCONTINUED | OUTPATIENT
Start: 2024-03-12 | End: 2024-03-14 | Stop reason: HOSPADM

## 2024-03-12 RX ORDER — CIPROFLOXACIN 2 MG/ML
400 INJECTION, SOLUTION INTRAVENOUS ONCE
Status: COMPLETED | OUTPATIENT
Start: 2024-03-12 | End: 2024-03-13

## 2024-03-12 RX ADMIN — SODIUM CHLORIDE, PRESERVATIVE FREE 10 ML: 5 INJECTION INTRAVENOUS at 19:20

## 2024-03-12 RX ADMIN — SODIUM CHLORIDE, PRESERVATIVE FREE 10 ML: 5 INJECTION INTRAVENOUS at 23:19

## 2024-03-12 RX ADMIN — METOPROLOL SUCCINATE 50 MG: 50 TABLET, EXTENDED RELEASE ORAL at 23:28

## 2024-03-12 RX ADMIN — ACETAMINOPHEN 650 MG: 325 TABLET ORAL at 23:27

## 2024-03-12 RX ADMIN — IOPAMIDOL 75 ML: 755 INJECTION, SOLUTION INTRAVENOUS at 19:20

## 2024-03-12 RX ADMIN — AMITRIPTYLINE HYDROCHLORIDE 100 MG: 75 TABLET, FILM COATED ORAL at 23:28

## 2024-03-12 RX ADMIN — ONDANSETRON 4 MG: 2 INJECTION INTRAMUSCULAR; INTRAVENOUS at 20:08

## 2024-03-12 RX ADMIN — CIPROFLOXACIN 400 MG: 400 INJECTION, SOLUTION INTRAVENOUS at 23:41

## 2024-03-12 RX ADMIN — ATORVASTATIN CALCIUM 10 MG: 10 TABLET, FILM COATED ORAL at 23:28

## 2024-03-12 RX ADMIN — HYDROCODONE BITARTRATE AND ACETAMINOPHEN 1 TABLET: 5; 325 TABLET ORAL at 23:37

## 2024-03-12 RX ADMIN — MORPHINE SULFATE 4 MG: 4 INJECTION, SOLUTION INTRAMUSCULAR; INTRAVENOUS at 20:08

## 2024-03-12 RX ADMIN — SODIUM CHLORIDE: 9 INJECTION, SOLUTION INTRAVENOUS at 23:18

## 2024-03-12 RX ADMIN — SODIUM CHLORIDE 100 ML: 9 INJECTION, SOLUTION INTRAVENOUS at 19:20

## 2024-03-12 RX ADMIN — SODIUM CHLORIDE 1000 ML: 9 INJECTION, SOLUTION INTRAVENOUS at 20:05

## 2024-03-12 ASSESSMENT — PAIN DESCRIPTION - LOCATION
LOCATION: ABDOMEN;HEAD
LOCATION: ABDOMEN
LOCATION: HEAD

## 2024-03-12 ASSESSMENT — PAIN - FUNCTIONAL ASSESSMENT
PAIN_FUNCTIONAL_ASSESSMENT: 0-10
PAIN_FUNCTIONAL_ASSESSMENT: PREVENTS OR INTERFERES SOME ACTIVE ACTIVITIES AND ADLS

## 2024-03-12 ASSESSMENT — PAIN DESCRIPTION - DESCRIPTORS
DESCRIPTORS: SHARP;PRESSURE
DESCRIPTORS: POUNDING
DESCRIPTORS: POUNDING;ACHING

## 2024-03-12 ASSESSMENT — PAIN DESCRIPTION - ONSET: ONSET: ON-GOING

## 2024-03-12 ASSESSMENT — PAIN DESCRIPTION - FREQUENCY: FREQUENCY: INTERMITTENT

## 2024-03-12 ASSESSMENT — PAIN SCALES - GENERAL
PAINLEVEL_OUTOF10: 7
PAINLEVEL_OUTOF10: 8
PAINLEVEL_OUTOF10: 8

## 2024-03-12 ASSESSMENT — PAIN DESCRIPTION - PAIN TYPE: TYPE: ACUTE PAIN

## 2024-03-12 ASSESSMENT — PAIN DESCRIPTION - ORIENTATION
ORIENTATION: RIGHT;LEFT;MID;UPPER
ORIENTATION: RIGHT;LEFT;ANTERIOR

## 2024-03-12 NOTE — ED TRIAGE NOTES
Mode of arrival (squad #, walk in, police, etc) : [facility]        Chief complaint(s): Abdominal pain        Arrival Note (brief scenario, treatment PTA, etc).: Patient arrived to ED bed with abdominal pain occurring for past two days. Patient states she has been throwing up all AM and now has dry heaves. Patient oriented to room and nurse call-light.         C= \"Have you ever felt that you should Cut down on your drinking?\"  No  A= \"Have people Annoyed you by criticizing your drinking?\"  No  G= \"Have you ever felt bad or Guilty about your drinking?\"  No  E= \"Have you ever had a drink as an Eye-opener first thing in the morning to steady your nerves or to help a hangover?\"  No      Deferred []      Reason for deferring: N/A    *If yes to two or more: probable alcohol abuse.*

## 2024-03-12 NOTE — ED NOTES
Verbal shift change report given to LASHAUN Borja (oncoming nurse) by LASHAUN Painting (offgoing nurse). Report included the following information Nurse Handoff Report, ED Encounter Summary, ED SBAR, Adult Overview, MAR, Recent Results, and Event Log.

## 2024-03-13 LAB
ANION GAP SERPL CALCULATED.3IONS-SCNC: 8 MMOL/L (ref 9–17)
BASOPHILS # BLD: 0 K/UL (ref 0–0.2)
BASOPHILS NFR BLD: 1 % (ref 0–2)
BUN SERPL-MCNC: 20 MG/DL (ref 8–23)
C DIFF GDH + TOXINS A+B STL QL IA.RAPID: NEGATIVE
CALCIUM SERPL-MCNC: 7.5 MG/DL (ref 8.6–10.4)
CHLORIDE SERPL-SCNC: 100 MMOL/L (ref 98–107)
CO2 SERPL-SCNC: 26 MMOL/L (ref 20–31)
CREAT SERPL-MCNC: 0.9 MG/DL (ref 0.5–0.9)
EOSINOPHIL # BLD: 0 K/UL (ref 0–0.4)
EOSINOPHILS RELATIVE PERCENT: 1 % (ref 0–4)
ERYTHROCYTE [DISTWIDTH] IN BLOOD BY AUTOMATED COUNT: 14.3 % (ref 11.5–14.9)
GFR SERPL CREATININE-BSD FRML MDRD: >60 ML/MIN/1.73M2
GLUCOSE SERPL-MCNC: 107 MG/DL (ref 70–99)
HCT VFR BLD AUTO: 33.6 % (ref 36–46)
HGB BLD-MCNC: 11.3 G/DL (ref 12–16)
LACTATE BLDV-SCNC: 1.2 MMOL/L (ref 0.5–1.9)
LYMPHOCYTES NFR BLD: 0.5 K/UL (ref 1–4.8)
LYMPHOCYTES RELATIVE PERCENT: 8 % (ref 24–44)
MCH RBC QN AUTO: 31 PG (ref 26–34)
MCHC RBC AUTO-ENTMCNC: 33.5 G/DL (ref 31–37)
MCV RBC AUTO: 92.6 FL (ref 80–100)
MONOCYTES NFR BLD: 0.4 K/UL (ref 0.1–1.3)
MONOCYTES NFR BLD: 6 % (ref 1–7)
NEUTROPHILS NFR BLD: 84 % (ref 36–66)
NEUTS SEG NFR BLD: 5.9 K/UL (ref 1.3–9.1)
PLATELET # BLD AUTO: 211 K/UL (ref 150–450)
PMV BLD AUTO: 7.6 FL (ref 6–12)
POTASSIUM SERPL-SCNC: 4.2 MMOL/L (ref 3.7–5.3)
RBC # BLD AUTO: 3.63 M/UL (ref 4–5.2)
SODIUM SERPL-SCNC: 134 MMOL/L (ref 135–144)
SPECIMEN DESCRIPTION: NORMAL
WBC OTHER # BLD: 7 K/UL (ref 3.5–11)

## 2024-03-13 PROCEDURE — 99223 1ST HOSP IP/OBS HIGH 75: CPT | Performed by: INTERNAL MEDICINE

## 2024-03-13 PROCEDURE — 83605 ASSAY OF LACTIC ACID: CPT

## 2024-03-13 PROCEDURE — 36415 COLL VENOUS BLD VENIPUNCTURE: CPT

## 2024-03-13 PROCEDURE — G0378 HOSPITAL OBSERVATION PER HR: HCPCS

## 2024-03-13 PROCEDURE — 87449 NOS EACH ORGANISM AG IA: CPT

## 2024-03-13 PROCEDURE — 96367 TX/PROPH/DG ADDL SEQ IV INF: CPT

## 2024-03-13 PROCEDURE — 97165 OT EVAL LOW COMPLEX 30 MIN: CPT

## 2024-03-13 PROCEDURE — 97161 PT EVAL LOW COMPLEX 20 MIN: CPT

## 2024-03-13 PROCEDURE — 6360000002 HC RX W HCPCS: Performed by: NURSE PRACTITIONER

## 2024-03-13 PROCEDURE — 80048 BASIC METABOLIC PNL TOTAL CA: CPT

## 2024-03-13 PROCEDURE — 96372 THER/PROPH/DIAG INJ SC/IM: CPT

## 2024-03-13 PROCEDURE — 87324 CLOSTRIDIUM AG IA: CPT

## 2024-03-13 PROCEDURE — 97116 GAIT TRAINING THERAPY: CPT

## 2024-03-13 PROCEDURE — 87506 IADNA-DNA/RNA PROBE TQ 6-11: CPT

## 2024-03-13 PROCEDURE — 6370000000 HC RX 637 (ALT 250 FOR IP): Performed by: NURSE PRACTITIONER

## 2024-03-13 PROCEDURE — 96366 THER/PROPH/DIAG IV INF ADDON: CPT

## 2024-03-13 PROCEDURE — 85025 COMPLETE CBC W/AUTO DIFF WBC: CPT

## 2024-03-13 PROCEDURE — 6360000002 HC RX W HCPCS: Performed by: EMERGENCY MEDICINE

## 2024-03-13 PROCEDURE — 2580000003 HC RX 258: Performed by: NURSE PRACTITIONER

## 2024-03-13 RX ORDER — METRONIDAZOLE 500 MG/100ML
500 INJECTION, SOLUTION INTRAVENOUS EVERY 8 HOURS
Status: DISCONTINUED | OUTPATIENT
Start: 2024-03-13 | End: 2024-03-14 | Stop reason: HOSPADM

## 2024-03-13 RX ORDER — CIPROFLOXACIN 2 MG/ML
400 INJECTION, SOLUTION INTRAVENOUS EVERY 12 HOURS
Status: DISCONTINUED | OUTPATIENT
Start: 2024-03-13 | End: 2024-03-13

## 2024-03-13 RX ADMIN — TROSPIUM CHLORIDE 20 MG: 20 TABLET, FILM COATED ORAL at 06:14

## 2024-03-13 RX ADMIN — THIAMINE HCL TAB 100 MG 100 MG: 100 TAB at 09:31

## 2024-03-13 RX ADMIN — PANTOPRAZOLE SODIUM 40 MG: 40 TABLET, DELAYED RELEASE ORAL at 06:14

## 2024-03-13 RX ADMIN — ENOXAPARIN SODIUM 40 MG: 100 INJECTION SUBCUTANEOUS at 09:32

## 2024-03-13 RX ADMIN — SODIUM CHLORIDE: 9 INJECTION, SOLUTION INTRAVENOUS at 16:12

## 2024-03-13 RX ADMIN — AMITRIPTYLINE HYDROCHLORIDE 100 MG: 75 TABLET, FILM COATED ORAL at 19:59

## 2024-03-13 RX ADMIN — LISINOPRIL 40 MG: 20 TABLET ORAL at 09:31

## 2024-03-13 RX ADMIN — METOPROLOL SUCCINATE 50 MG: 50 TABLET, EXTENDED RELEASE ORAL at 20:00

## 2024-03-13 RX ADMIN — CEPHALEXIN 250 MG: 250 CAPSULE ORAL at 09:31

## 2024-03-13 RX ADMIN — METRONIDAZOLE 500 MG: 500 INJECTION, SOLUTION INTRAVENOUS at 01:06

## 2024-03-13 RX ADMIN — TROSPIUM CHLORIDE 20 MG: 20 TABLET, FILM COATED ORAL at 16:12

## 2024-03-13 RX ADMIN — ATORVASTATIN CALCIUM 10 MG: 10 TABLET, FILM COATED ORAL at 20:00

## 2024-03-13 RX ADMIN — HYDROCODONE BITARTRATE AND ACETAMINOPHEN 1 TABLET: 5; 325 TABLET ORAL at 12:09

## 2024-03-13 RX ADMIN — PREDNISONE 10 MG: 10 TABLET ORAL at 16:12

## 2024-03-13 RX ADMIN — METRONIDAZOLE 500 MG: 500 INJECTION, SOLUTION INTRAVENOUS at 16:15

## 2024-03-13 RX ADMIN — AMLODIPINE BESYLATE 5 MG: 5 TABLET ORAL at 09:31

## 2024-03-13 RX ADMIN — METRONIDAZOLE 500 MG: 500 INJECTION, SOLUTION INTRAVENOUS at 09:34

## 2024-03-13 RX ADMIN — CELECOXIB 200 MG: 100 CAPSULE ORAL at 09:31

## 2024-03-13 RX ADMIN — SODIUM CHLORIDE, PRESERVATIVE FREE 10 ML: 5 INJECTION INTRAVENOUS at 09:32

## 2024-03-13 ASSESSMENT — PAIN DESCRIPTION - DESCRIPTORS: DESCRIPTORS: ACHING

## 2024-03-13 ASSESSMENT — PAIN SCALES - GENERAL
PAINLEVEL_OUTOF10: 8
PAINLEVEL_OUTOF10: 0

## 2024-03-13 ASSESSMENT — PAIN DESCRIPTION - LOCATION: LOCATION: HEAD

## 2024-03-13 NOTE — H&P
[facility]  Miller To MD; Sanjay Beckwith MD; Dajuan Guy MD; MD Mel Burden MD; Tony Antonio MD    [facility]   IN-PATIENT SERVICE   [facility]    HISTORY AND PHYSICAL EXAMINATION            Date:   3/13/2024  Patient name:  Nichelle Raza  Date of admission:  3/12/2024  5:10 PM  MRN:   [id number]  Account:  [id number]  YOB: 1934  PCP:    Pauly Bond MD  Room:   2046/2046-01  Code Status:    Full Code    Chief Complaint:     Chief Complaint   Patient presents with    Abdominal Pain    Nausea    Emesis       History Obtained From:     Patient medical record nursing staff    History of Present Illness:     Nichelle Raza is a 89 y.o. Non- / non  female who presents with Abdominal Pain, Nausea, and Emesis   and is admitted to the hospital for the management of Dehydration.    Nichelle Raza is a 89 y.o. Non- / non  female who presents with Abdominal Pain, Nausea, and Emesis and is admitted to the hospital for the management of Dehydration.  Medical history significant for HTN, HLD, multiple sclerosis, arthritis and neuropathy. According to patient, she suddenly developed vomiting earlier this morning which has been persistent throughout the day, now having dry heaves.  Endorses abdominal pain radiating across mid abdomen.  Denies chest pain or shortness of breath.  Denies urinary symptoms but she is on Keflex daily for UTI prophylaxis.  Reports last bowel movement 1 to 2 days ago.  Lactic acid is slightly elevated at 2.6.  But no leukocytosis with WBC 9.7.  CT abdomen reveals fluid-filled large and small bowel suggestive of diarrheal illness and enteroviral colitis. Initially suspected dehydration and viral illness but after admission orders placed patient became febrile at 102. Broad spectrum antibiotics for intraabdominal infection and blood cultures ordered.    acute diverticulitis. 4. Mild cardiomegaly. 5. Severe arterial atherosclerotic disease.       Assessment :      Hospital Problems             Last Modified POA    * (Principal) Dehydration 3/12/2024 Yes       Plan:     Patient status observation in the Med/Surge  89-year-old elderly lady who lives by herself admitted with less than 48-hour history of nausea vomiting diarrhea also has persistent cough but no dyspnea she has tested negative for any flu or COVID diarrhea was watery and persistent patient had a CT Abdo done which shows dilated bowel loops with loose stool  Patient had elevated lactic acid likely secondary to dehydration and diarrhea no signs of ischemia  Will check stool for C. difficile gastrointestinal panel IV fluid resuscitation  Advance diet to regular diet OT PT  Will discontinue Cipro and Keflex will continue IV metronidazole  Patient has history of multiple sclerosis on chronic steroid prednisone 10 mg daily with underlying diabetes mellitus multiple comorbid conditions with the diarrhea high lactic acid with multiple sclerosis and steroid use requires admission for observation fluid resuscitation and workup for colitis    Consultations:   None     Patient is admitted as inpatient status because of co-morbidities listed above, severity of signs and symptoms as outlined, requirement for current medical therapies and most importantly because of direct risk to patient if care not provided in a hospital setting.  Expected length of stay > 48 hours.    Dajuan Guy MD  3/13/2024  9:43 AM    Copy sent to Pauly Choudhary MD    Please note that this chart was generated using voice recognition Dragon dictation software.  Although every effort was made to ensure the accuracy of this automated transcription, some errors in transcription may have occurred.

## 2024-03-13 NOTE — PROGRESS NOTES
Pt arrived to floor and place in room 2046. Pt daughter is at bedside, but is not spending the night. Pt is alert x 4. Admission question and assessment completed. Pt does have a temp. of 102.2 upon arrival, writer did give pt tylenol and will recheck temp. Bed alarm, bedside table, and call light are in reach. No s/s of distress noted. Plan of care on going.

## 2024-03-13 NOTE — PLAN OF CARE
Problem: Safety - Adult  Goal: Free from fall injury  Outcome: Progressing   Pt fall risk, fall band present, falling star, safety alarm activated and in use as needed. Hourly rounding performed. Pt encouraged to use call light. See Stella fall risk assessment.     Problem: Chronic Conditions and Co-morbidities  Goal: Patient's chronic conditions and co-morbidity symptoms are monitored and maintained or improved  Outcome: Progressing     Problem: Pain  Goal: Verbalizes/displays adequate comfort level or baseline comfort level  Outcome: Progressing

## 2024-03-13 NOTE — CARE COORDINATION
Case Management Assessment  Initial Evaluation    Date/Time of Evaluation: 3/13/2024 1:24 PM  Assessment Completed by: Rosamaria Solomon RN    If patient is discharged prior to next notation, then this note serves as note for discharge by case management.    Patient Name: Nichelle Raza                   YOB: 1934  Diagnosis: Dehydration [E86.0]                   Date / Time: 3/12/2024  5:10 PM    Patient Admission Status: Observation   Readmission Risk (Low < 19, Mod (19-27), High > 27): Readmission Risk Score: 11.1    Current PCP: Pauly Bond MD  PCP verified by CM? Yes    Chart Reviewed: Yes      History Provided by: Patient  Patient Orientation: Alert and Oriented    Patient Cognition: Alert    Hospitalization in the last 30 days (Readmission):  No    If yes, Readmission Assessment in CM Navigator will be completed.    Advance Directives:      Code Status: Full Code   Patient's Primary Decision Maker is: Named in Scanned ACP Document    Primary Decision Maker: Yolis Poon - Child - [phone]    Secondary Decision Maker: Justino Raza - Child - [phone]    Discharge Planning:    Patient lives with: Alone Type of Home: House  Primary Care Giver: Self  Patient Support Systems include: Children   Current Financial resources: Medicare  Current community resources: None  Current services prior to admission: Durable Medical Equipment            Current DME: Cane, Walker, Shower Chair            Type of Home Care services:  None    ADLS  Prior functional level: Independent in ADLs/IADLs  Current functional level: Independent in ADLs/IADLs    PT AM-PAC: 20 /24  OT AM-PAC: 19 /24    Family can provide assistance at DC: Yes  Would you like Case Management to discuss the discharge plan with any other family members/significant others, and if so, who? No  Plans to Return to Present Housing: Yes  Other Identified Issues/Barriers to RETURNING to current housing: none  Potential Assistance needed at  discharge: N/A            Potential DME:    Patient expects to discharge to: House  Plan for transportation at discharge: Family    Financial    Payor: MEDICARE / Plan: MEDICARE PART A AND B / Product Type: *No Product type* /     Does insurance require precert for SNF: No    Potential assistance Purchasing Medications: No  Meds-to-Beds request:        [id number] - [place], [place] - [address] - P [phone] - F [phone]  [address] [place] [address]  Phone: [phone] Fax: [phone]      Notes:    Factors facilitating achievement of predicted outcomes: Family support, Motivated, Cooperative, Pleasant, and Has needed Durable Medical Equipment at home    Barriers to discharge: Medical complications    Additional Case Management Notes: 3/13/24 MEDICARE from home alone in 2 story home. Dtr Yolis checks in on pt daily. DME: walker, SC, GB, cane VNS: none, pt denies needs for VNS. HX: MS PO keflex, PO prednisone IV cipro and flagyl. Cl. Liq diet, PT/OT eval. [place] NAFollowing for needs//JF              The Plan for Transition of Care is related to the following treatment goals of Dehydration [E86.0]    IF APPLICABLE: The Patient and/or patient representative Nichelle and her family were provided with a choice of provider and agrees with the discharge plan. Freedom of choice list with basic dialogue that supports the patient's individualized plan of care/goals and shares the quality data associated with the providers was provided to: Patient   Patient Representative Name:       The Patient and/or Patient Representative Agree with the Discharge Plan? Yes    Rosamaria Solomon RN  Case Management Department  Ph: [phone]

## 2024-03-13 NOTE — ED NOTES
Report given to Honey sparks RN from meds Surg.   Report method by phone   The following was reviewed with receiving RN:   Current vital signs:  /72   Pulse 93   Temp 99.5 °F (37.5 °C) (Oral)   Resp 26   Ht 1.473 m (4' 9.99\")   Wt 57.4 kg (126 lb 8.7 oz)   SpO2 91%   BMI 26.45 kg/m²                MEWS Score: 1     Any medication or safety alerts were reviewed. Any pending diagnostics and notifications were also reviewed, as well as any safety concerns or issues, abnormal labs, abnormal imaging, and abnormal assessment findings. Questions were answered.

## 2024-03-13 NOTE — PROGRESS NOTES
forearm  Gross Assessment  Sensation: Impaired (C/O numbness and pins and needles bilateral feet)     AROM RLE (degrees)  RLE AROM: WFL  AROM LLE (degrees)  LLE AROM : WFL  AROM RUE (degrees)  RUE General AROM: see OT for UE assessment  AROM LUE (degrees)  LUE General AROM: see OT for UE assessment  Strength RLE  Comment: grossly 4/5  Strength LLE  Comment: grossly 4/5  Strength RUE  Comment: see OT for UE assessment  Strength LUE  Comment: see OT for UE assessment           Bed mobility  Bed Mobility Comments: Pt sitting EOB eating breakfast tray upon arrival and left sitting up in bedside chair at end of session.  Transfers  Sit to Stand: Stand by assistance  Stand to Sit: Stand by assistance  Stand Pivot Transfers: Stand by assistance (used wheeled walker)  Ambulation  Surface: Level tile  Device: Rolling Walker  Assistance: Contact guard assistance;Stand by assistance (assist improved from CGA x 1 to SBA x 1)  Gait Deviations: Slow Emily  Distance: 150'  Stairs/Curb  Stairs?: Yes  Stairs  # Steps : 4  Stairs Height: 8\"  Rails: None (used wheeled walker)  Device: Rolling walker  Assistance: Contact guard assistance  Comment: pt had no LOB on steps     Balance  Sitting - Static: Good  Sitting - Dynamic: Good  Standing - Static: Good (used wheeled walker)  Standing - Dynamic: Good;- (used wheeled walker)                                                             AM-PAC - Mobility    AM-PAC Basic Mobility - Inpatient   How much help is needed turning from your back to your side while in a flat bed without using bedrails?: None  How much help is needed moving from lying on your back to sitting on the side of a flat bed without using bedrails?: None  How much help is needed moving to and from a bed to a chair?: A Little  How much help is needed standing up from a chair using your arms?: A Little  How much help is needed walking in hospital room?: A Little  How much help is needed climbing 3-5 steps with a railing?:

## 2024-03-13 NOTE — PROGRESS NOTES
03/13/24 1332   Encounter Summary   Encounter Overview/Reason  Spiritual/Emotional Needs   Service Provided For: Patient   Referral/Consult From: [facility] [place]   Support System Children   Last Encounter  03/13/24   Complexity of Encounter Moderate   Spiritual/Emotional needs   Type Spiritual Support   Assessment/Intervention/Outcome   Assessment Coping;Calm;Peaceful   Intervention Active listening;Prayer (assurance of)/Ubly;Sustaining Presence/Ministry of presence;Explored/Affirmed feelings, thoughts, concerns   Outcome Acceptance;Coping;Engaged in conversation;Receptive

## 2024-03-13 NOTE — ACP (ADVANCE CARE PLANNING)
Advance Care Planning     Advance Care Planning Activator (Inpatient)  Conversation Note      Date of ACP Conversation: 3/13/2024     Conversation Conducted with: Patient with Decision Making Capacity    ACP Activator: Rosamaria Solomon RN        Health Care Decision Maker:     Current Designated Health Care Decision Maker:     Primary Decision Maker: Yolis Poon - Child - [phone]    Secondary Decision Maker: Justino Raza - Child - [phone]  Click here to complete Healthcare Decision Makers including section of the Healthcare Decision Maker Relationship (ie \"Primary\")  Today we documented Decision Maker(s) consistent with ACP documents on file.    Care Preferences    Ventilation:  \"If you were in your present state of health and suddenly became very ill and were unable to breathe on your own, what would your preference be about the use of a ventilator (breathing machine) if it were available to you?\"      Would the patient desire the use of ventilator (breathing machine)?: no    \"If your health worsens and it becomes clear that your chance of recovery is unlikely, what would your preference be about the use of a ventilator (breathing machine) if it were available to you?\"     Would the patient desire the use of ventilator (breathing machine)?: No      Resuscitation  \"CPR works best to restart the heart when there is a sudden event, like a heart attack, in someone who is otherwise healthy. Unfortunately, CPR does not typically restart the heart for people who have serious health conditions or who are very sick.\"    \"In the event your heart stopped as a result of an underlying serious health condition, would you want attempts to be made to restart your heart (answer \"yes\" for attempt to resuscitate) or would you prefer a natural death (answer \"no\" for do not attempt to resuscitate)?\" no       [] Yes   [] No   Educated Patient / Decision Maker regarding differences between Advance Directives and portable DNR

## 2024-03-13 NOTE — PROGRESS NOTES
[facility] Internal Medicine  Miller To MD; Sanjay Beckwith MD; Dajuan Guy MD; MD Mel Burden MD; Tony Antonio MD  [facility] Internal Medicine   IN-PATIENT SERVICE  [facility]                 Date:   3/13/2024  Patientname:  Nichelle Raza  Date of admission:  3/12/2024  5:10 PM  MRN:   [id number]  Account:  [id number]  YOB: 1934  PCP:    Pauly Bond MD  Room:   2046/2046-01  Code Status:    Full Code      Chief Complaint:     Chief Complaint   Patient presents with    Abdominal Pain    Nausea    Emesis       History of Present Illness:     Nichelle Raza is a 89 y.o. Non- / non  female who presents with Abdominal Pain, Nausea, and Emesis and is admitted to the hospital for the management of Dehydration.  Medical history significant for HTN, HLD, multiple sclerosis, arthritis and neuropathy. According to patient, she suddenly developed vomiting earlier this morning which has been persistent throughout the day, now having dry heaves.  Endorses abdominal pain radiating across mid abdomen.  Denies chest pain or shortness of breath.  Denies urinary symptoms but she is on Keflex daily for UTI prophylaxis.  Reports last bowel movement 1 to 2 days ago.  Lactic acid is slightly elevated at 2.6.  But no leukocytosis with WBC 9.7.  CT abdomen reveals fluid-filled large and small bowel suggestive of diarrheal illness and enteroviral colitis. Initially suspected dehydration and viral illness but after admission orders placed patient became febrile at 102. Broad spectrum antibiotics for intraabdominal infection and blood cultures ordered.   Denies chest pain, cough, diarrhea, and urinary symptoms.  Symptoms are reported as acute intermittent and moderate in severity.    Past Medical History:     Past Medical History:   Diagnosis Date    Acute cystitis without hematuria 10/1/2017    EDINSON (acute kidney injury) (HCC) 3/3/2021    Arthritis     Pauly Bond MD   polyethylene glycol (GLYCOLAX) 17 g packet Take 17 g by mouth daily as needed for Constipation 2/20/22   Bronwyn Bowles MD   [facility] Does not apply route Exp: 5/20/2026 5/24/21   Pauly Bond MD        Allergies:     Gadolinium derivatives, Bactrim [sulfamethoxazole-trimethoprim], Other, Pcn [penicillins], Lidoderm [lidocaine], and Macrobid [nitrofurantoin]    Social History:     Tobacco:    reports that she has never smoked. She has never used smokeless tobacco.  Alcohol:      reports no history of alcohol use.  Drug Use:  reports no history of drug use.    Family History:     History reviewed. No pertinent family history.    Investigations:      Laboratory Testing:  Recent Results (from the past 24 hour(s))   CBC with Auto Differential    Collection Time: 03/12/24  5:35 PM   Result Value Ref Range    WBC 9.7 3.5 - 11.0 k/uL    RBC 4.52 4.0 - 5.2 m/uL    Hemoglobin 13.9 12.0 - 16.0 g/dL    Hematocrit 42.1 36 - 46 %    MCV 93.1 80 - 100 fL    MCH 30.7 26 - 34 pg    MCHC 33.0 31 - 37 g/dL    RDW 14.5 11.5 - 14.9 %    Platelets 268 150 - 450 k/uL    MPV 8.6 6.0 - 12.0 fL    Neutrophils % 89 (H) 36 - 66 %    Lymphocytes % 6 (L) 24 - 44 %    Monocytes % 5 1 - 7 %    Eosinophils % 0 0 - 4 %    Basophils % 0 0 - 2 %    Neutrophils Absolute 8.63 1.3 - 9.1 k/uL    Lymphocytes Absolute 0.58 (L) 1.0 - 4.8 k/uL    Monocytes Absolute 0.49 0.1 - 1.3 k/uL    Eosinophils Absolute 0.00 0.0 - 0.4 k/uL    Basophils Absolute 0.00 0.0 - 0.2 k/uL    Morphology Normal    Comprehensive Metabolic Panel    Collection Time: 03/12/24  5:35 PM   Result Value Ref Range    Sodium 138 135 - 144 mmol/L    Potassium 4.4 3.7 - 5.3 mmol/L    Chloride 100 98 - 107 mmol/L    CO2 25 20 - 31 mmol/L    Anion Gap 13 9 - 17 mmol/L    Glucose 169 (H) 70 - 99 mg/dL    BUN 21 8 - 23 mg/dL    Creatinine 0.9 0.5 - 0.9 mg/dL    Est, Glom Filt Rate >60 >60 mL/min/1.73m2    Calcium 8.8 8.6 - 10.4 mg/dL    Total

## 2024-03-13 NOTE — PROGRESS NOTES
[facility]   Occupational Therapy Evaluation  Date: 3/13/24  Patient Name: Nichelle Raza       Room:   MRN: [id number]  Account: [id number]   : 1934  (89 y.o.) Gender: female     Discharge Recommendations:  The patient may need non-skilled ADL assistance after discharge.     OT Equipment Recommendations  Equipment Needed: No    Referring Practitioner: Dr. Aaron  Diagnosis: Dehydration; Emesis   Additional Pertinent Hx: MS    Treatment Diagnosis: Impaired self-care status    Past Medical History:  has a past medical history of Acute cystitis without hematuria, EDINSON (acute kidney injury) ([facility]), Arthritis, Chronic daily headache, GERD (gastroesophageal reflux disease), Hyperlipidemia, Hypertension, Moderate malnutrition ([facility]), Multiple sclerosis ([facility]), Multiple sclerosis exacerbation ([facility]), Neuropathy, Pneumonia, and Vitamin D deficiency.    Past Surgical History:   has a past surgical history that includes Cystocele repair; Rectocele repair; Hysterectomy; Cervical disc surgery; Cataract removal with implant (Right, 2014); Cataract removal with implant (Left, 2014); Total shoulder arthroplasty (Right, 2017); shoulder surgery (Right, ); and lumbar laminectomy (N/A, 3/17/2022).    Restrictions  Restrictions/Precautions  Restrictions/Precautions: Fall Risk, Isolation (MRSA, IV distal left posterior forearm)  Required Braces or Orthoses?: No  Implants present? : Metal implants (hx lumbar surgery L4L5)  Position Activity Restriction  Other position/activity restrictions: up w/ assist      Vitals  Vitals  O2 Device: None (Room air)     Subjective  Subjective: \"I am feeling much better\"  Comments: Pt sitting EOB eating breakfast of clear liquids, tolerating well, denies any nausea.  Subjective  Pain: denies    Social/Functional History  Social/Functional History  Lives With: Alone  Type of Home: House  Home Layout: Two level, Performs ADL's on one level, Able to

## 2024-03-13 NOTE — ED PROVIDER NOTES
mg    HYDROcodone-acetaminophen (NORCO) 5-325 MG per tablet 1 tablet    lisinopril (PRINIVIL;ZESTRIL) tablet 40 mg    meclizine (ANTIVERT) tablet 25 mg    metoprolol succinate (TOPROL XL) extended release tablet 50 mg    pantoprazole (PROTONIX) tablet 40 mg    predniSONE (DELTASONE) tablet 10 mg    pregabalin (LYRICA) capsule 25 mg    Atogepant TABS 30 mg    atorvastatin (LIPITOR) tablet 10 mg    trospium (SANCTURA) tablet 20 mg    thiamine mononitrate tablet 100 mg    sodium chloride flush 0.9 % injection 5-40 mL    sodium chloride flush 0.9 % injection 5-40 mL    0.9 % sodium chloride infusion    OR Linked Order Group     potassium chloride (KLOR-CON M) extended release tablet 40 mEq     potassium bicarb-citric acid (EFFER-K) effervescent tablet 40 mEq     potassium chloride 10 mEq/100 mL IVPB (Peripheral Line)    magnesium sulfate 2000 mg in water 50 mL IVPB    enoxaparin (LOVENOX) injection 40 mg     Order Specific Question:   Indication of Use     Answer:   Prophylaxis-DVT/PE    OR Linked Order Group     ondansetron (ZOFRAN-ODT) disintegrating tablet 4 mg     ondansetron (ZOFRAN) injection 4 mg    polyethylene glycol (GLYCOLAX) packet 17 g    OR Linked Order Group     acetaminophen (TYLENOL) tablet 650 mg     acetaminophen (TYLENOL) suppository 650 mg    0.9 % sodium chloride infusion    ciprofloxacin (CIPRO) IVPB 400 mg     Order Specific Question:   Antimicrobial Indications     Answer:   Intra-Abdominal Infection    metroNIDAZOLE (FLAGYL) 500 mg in 0.9% NaCl 100 mL IVPB premix     Order Specific Question:   Antimicrobial Indications     Answer:   Intra-Abdominal Infection     DISCHARGE PRESCRIPTIONS:  Current Discharge Medication List        PHYSICIAN CONSULTS ORDERED THIS ENCOUNTER:  None     FINAL IMPRESSION      1. Nausea and vomiting, unspecified vomiting type    2. Enterocolitis    3. Dehydration          DISPOSITION/PLAN   DISPOSITION Admitted 03/12/2024 09:46:56 PM      PATIENT REFERRED TO:  No  follow-up provider specified.    DISCHARGE MEDICATIONS:  Current Discharge Medication List            Justin Pulido MD  Attending Emergency Physician                     Justin Pulido MD  03/12/24 [id number]

## 2024-03-14 VITALS
TEMPERATURE: 97.9 F | HEIGHT: 58 IN | OXYGEN SATURATION: 92 % | RESPIRATION RATE: 17 BRPM | HEART RATE: 78 BPM | DIASTOLIC BLOOD PRESSURE: 66 MMHG | BODY MASS INDEX: 26.56 KG/M2 | SYSTOLIC BLOOD PRESSURE: 155 MMHG | WEIGHT: 126.54 LBS

## 2024-03-14 PROBLEM — N17.9 AKI (ACUTE KIDNEY INJURY) (HCC): Status: ACTIVE | Noted: 2024-03-14

## 2024-03-14 LAB
ANION GAP SERPL CALCULATED.3IONS-SCNC: 10 MMOL/L (ref 9–17)
BASOPHILS # BLD: 0 K/UL (ref 0–0.2)
BASOPHILS NFR BLD: 1 % (ref 0–2)
BUN SERPL-MCNC: 18 MG/DL (ref 8–23)
CALCIUM SERPL-MCNC: 7.5 MG/DL (ref 8.6–10.4)
CAMPYLOBACTER DNA SPEC NAA+PROBE: NORMAL
CHLORIDE SERPL-SCNC: 106 MMOL/L (ref 98–107)
CO2 SERPL-SCNC: 23 MMOL/L (ref 20–31)
CREAT SERPL-MCNC: 0.9 MG/DL (ref 0.5–0.9)
EKG ATRIAL RATE: 92 BPM
EKG P AXIS: 34 DEGREES
EKG P-R INTERVAL: 144 MS
EKG Q-T INTERVAL: 372 MS
EKG QRS DURATION: 82 MS
EKG QTC CALCULATION (BAZETT): 460 MS
EKG R AXIS: 16 DEGREES
EKG T AXIS: 58 DEGREES
EKG VENTRICULAR RATE: 92 BPM
EOSINOPHIL # BLD: 0 K/UL (ref 0–0.4)
EOSINOPHILS RELATIVE PERCENT: 1 % (ref 0–4)
ERYTHROCYTE [DISTWIDTH] IN BLOOD BY AUTOMATED COUNT: 14.4 % (ref 11.5–14.9)
ETEC ELTA+ESTB GENES STL QL NAA+PROBE: NORMAL
GFR SERPL CREATININE-BSD FRML MDRD: >60 ML/MIN/1.73M2
GLUCOSE SERPL-MCNC: 149 MG/DL (ref 70–99)
HCT VFR BLD AUTO: 33.4 % (ref 36–46)
HGB BLD-MCNC: 11.1 G/DL (ref 12–16)
LYMPHOCYTES NFR BLD: 0.8 K/UL (ref 1–4.8)
LYMPHOCYTES RELATIVE PERCENT: 13 % (ref 24–44)
MCH RBC QN AUTO: 31.1 PG (ref 26–34)
MCHC RBC AUTO-ENTMCNC: 33.2 G/DL (ref 31–37)
MCV RBC AUTO: 93.7 FL (ref 80–100)
MONOCYTES NFR BLD: 0.4 K/UL (ref 0.1–1.3)
MONOCYTES NFR BLD: 7 % (ref 1–7)
NEUTROPHILS NFR BLD: 78 % (ref 36–66)
NEUTS SEG NFR BLD: 4.8 K/UL (ref 1.3–9.1)
P SHIGELLOIDES DNA STL QL NAA+PROBE: NORMAL
PLATELET # BLD AUTO: 189 K/UL (ref 150–450)
PMV BLD AUTO: 7.6 FL (ref 6–12)
POTASSIUM SERPL-SCNC: 4.3 MMOL/L (ref 3.7–5.3)
RBC # BLD AUTO: 3.57 M/UL (ref 4–5.2)
SALMONELLA DNA SPEC QL NAA+PROBE: NORMAL
SHIGA TOXIN STX GENE SPEC NAA+PROBE: NORMAL
SHIGELLA DNA SPEC QL NAA+PROBE: NORMAL
SODIUM SERPL-SCNC: 139 MMOL/L (ref 135–144)
SPECIMEN DESCRIPTION: NORMAL
V CHOL+PARA RFBL+TRKH+TNAA STL QL NAA+PR: NORMAL
WBC OTHER # BLD: 6 K/UL (ref 3.5–11)
Y ENTERO RECN STL QL NAA+PROBE: NORMAL

## 2024-03-14 PROCEDURE — 96366 THER/PROPH/DIAG IV INF ADDON: CPT

## 2024-03-14 PROCEDURE — 99239 HOSP IP/OBS DSCHRG MGMT >30: CPT | Performed by: INTERNAL MEDICINE

## 2024-03-14 PROCEDURE — 6370000000 HC RX 637 (ALT 250 FOR IP): Performed by: NURSE PRACTITIONER

## 2024-03-14 PROCEDURE — 36415 COLL VENOUS BLD VENIPUNCTURE: CPT

## 2024-03-14 PROCEDURE — 1200000000 HC SEMI PRIVATE

## 2024-03-14 PROCEDURE — 6360000002 HC RX W HCPCS: Performed by: NURSE PRACTITIONER

## 2024-03-14 PROCEDURE — 80048 BASIC METABOLIC PNL TOTAL CA: CPT

## 2024-03-14 PROCEDURE — G0378 HOSPITAL OBSERVATION PER HR: HCPCS

## 2024-03-14 PROCEDURE — 93010 ELECTROCARDIOGRAM REPORT: CPT | Performed by: INTERNAL MEDICINE

## 2024-03-14 PROCEDURE — 85025 COMPLETE CBC W/AUTO DIFF WBC: CPT

## 2024-03-14 RX ADMIN — HYDROCODONE BITARTRATE AND ACETAMINOPHEN 1 TABLET: 5; 325 TABLET ORAL at 00:21

## 2024-03-14 RX ADMIN — LISINOPRIL 40 MG: 20 TABLET ORAL at 08:46

## 2024-03-14 RX ADMIN — PANTOPRAZOLE SODIUM 40 MG: 40 TABLET, DELAYED RELEASE ORAL at 06:34

## 2024-03-14 RX ADMIN — TROSPIUM CHLORIDE 20 MG: 20 TABLET, FILM COATED ORAL at 06:34

## 2024-03-14 RX ADMIN — PREDNISONE 10 MG: 10 TABLET ORAL at 08:46

## 2024-03-14 RX ADMIN — METRONIDAZOLE 500 MG: 500 INJECTION, SOLUTION INTRAVENOUS at 08:48

## 2024-03-14 RX ADMIN — METRONIDAZOLE 500 MG: 500 INJECTION, SOLUTION INTRAVENOUS at 00:23

## 2024-03-14 RX ADMIN — AMLODIPINE BESYLATE 5 MG: 5 TABLET ORAL at 08:46

## 2024-03-14 RX ADMIN — THIAMINE HCL TAB 100 MG 100 MG: 100 TAB at 08:46

## 2024-03-14 RX ADMIN — ENOXAPARIN SODIUM 40 MG: 100 INJECTION SUBCUTANEOUS at 08:46

## 2024-03-14 RX ADMIN — HYDROCODONE BITARTRATE AND ACETAMINOPHEN 1 TABLET: 5; 325 TABLET ORAL at 08:46

## 2024-03-14 RX ADMIN — CELECOXIB 200 MG: 100 CAPSULE ORAL at 08:46

## 2024-03-14 ASSESSMENT — PAIN DESCRIPTION - ORIENTATION: ORIENTATION: RIGHT;LEFT

## 2024-03-14 ASSESSMENT — PAIN SCALES - GENERAL
PAINLEVEL_OUTOF10: 7
PAINLEVEL_OUTOF10: 8

## 2024-03-14 ASSESSMENT — PAIN DESCRIPTION - DESCRIPTORS
DESCRIPTORS: ACHING
DESCRIPTORS: SHARP;ACHING

## 2024-03-14 ASSESSMENT — PAIN DESCRIPTION - LOCATION
LOCATION: HEAD
LOCATION: HEAD

## 2024-03-14 NOTE — CARE COORDINATION
ONGOING DISCHARGE PLAN:    Patient is alert and oriented x4.    Spoke with patient regarding discharge plan and patient confirms that plan is still to go home . Dtr Yolis will provide transportation.     Pt denied need for VNS.     DME: walker, SC, GB, cane    Pt to go home today on oral keflex    Will continue to follow for additional discharge needs.    If patient is discharged prior to next notation, then this note serves as note for discharge by case management.    Electronically signed by Rosamaria Solomon RN on 3/14/2024 at 9:14 AM

## 2024-03-14 NOTE — DISCHARGE SUMMARY
IN-PATIENT SERVICE   [facility] Internal Medicine    Discharge Summary     Patient ID: Nichelle Raza  :  1934   MRN: [id number]     ACCOUNT:  [id number]   Patient's PCP: Pauly Bond MD  Admit Date: 3/12/2024   Discharge Date: 3/14/2024    Length of Stay: 0  Code Status:  Full Code  Admitting Physician: Gayle Aaron MD  Discharge Physician: Dajuan Guy MD     Active Discharge Diagnoses:     Primary Problem  Dehydration      Hospital Problems  Active Hospital Problems    Diagnosis Date Noted    Dehydration [E86.0] 2024       Admission Condition:  fair     Discharged Condition: fair    Hospital Stay:     Hospital Course:  Nichelle Raza is a 89 y.o. female who was admitted for the management of Dehydration , presented to ER with Abdominal Pain, Nausea, and Emesis    89-year-old elderly lady who lives by herself admitted with less than 48-hour history of nausea vomiting diarrhea also has persistent cough but no dyspnea she has tested negative for any flu or COVID diarrhea was watery and persistent patient had a CT Abdo done which shows dilated bowel loops with loose stool  Patient had elevated lactic acid likely secondary to dehydration and diarrhea no signs of ischemia  Will check stool for C. difficile gastrointestinal panel IV fluid resuscitation  Advance diet to regular diet OT PT  Will discontinue Cipro and Keflex will continue IV metronidazole  Patient has history of multiple sclerosis on chronic steroid prednisone 10 mg daily with underlying diabetes mellitus multiple comorbid conditions with the diarrhea high lactic acid with multiple sclerosis and steroid use requires admission for observation fluid resuscitation and workup for colitis  Diarrhea resolved negative for C. difficile EDINSON resolved lactic acidosis resolved  Patient is improved faster than anticipated    Significant therapeutic interventions:     Significant Diagnostic Studies:   Labs /  Micro:        Radiology:    CT ABDOMEN PELVIS W IV CONTRAST Additional Contrast? None    Result Date: 3/12/2024  EXAMINATION: CT OF THE ABDOMEN AND PELVIS WITH CONTRAST 3/12/2024 7:17 pm TECHNIQUE: CT of the abdomen and pelvis was performed with the administration of intravenous contrast. Multiplanar reformatted images are provided for review. Automated exposure control, iterative reconstruction, and/or weight based adjustment of the mA/kV was utilized to reduce the radiation dose to as low as reasonably achievable. COMPARISON: CT chest/abdomen/pelvis study from 02/25/2019. HISTORY: ORDERING SYSTEM PROVIDED HISTORY: abdomianl pain, vomiting TECHNOLOGIST PROVIDED HISTORY: abdomianl pain, vomiting Decision Support Exception - unselect if not a suspected or confirmed emergency medical condition->Emergency Medical Condition (MA) Reason for Exam: abdomianl pain, vomiting Relevant Medical/Surgical History: hysterectomy FINDINGS: Lower thorax: Dependent and scattered subsegmental atelectasis.  Mild cardiomegaly. Liver: Normal morphology.  No acute findings. Gallbladder: Normal appearance.  No significant bile duct dilation. Spleen: Subcentimeter low-attenuation lesion in the medial aspect of spleen is too small to characterize but favored to represent a benign cyst, hemangioma, or other angiomatous lesion of no clinical significance.  No acute findings. Pancreas: Atrophic and difficult to assess.  No abnormal duct dilation.  No significant peripancreatic inflammatory stranding. Adrenal glands: Unremarkable. Kidneys: No cystic or solid masses.  No hydronephrosis.  No acute findings. Bladder: No acute findings. GI tract: Moderate stool burden in the rectum and sigmoid colon. Fluid-filled large and small bowel.  The large bowel demonstrates a redundant course.  Small hiatal hernia.  No definitive findings to suggest obstruction. The appendix is not definitively visualized but there are no secondary signs of appendicitis.

## 2024-03-14 NOTE — PROGRESS NOTES
Discharge teaching and instructions completed with patient using teachback method. AVS reviewed. Patient and daughter voiced understanding regarding prescriptions, follow up appointments, and care of self at home. Discharged home with daughter via wheelchair. All questions answered.

## 2024-03-14 NOTE — PLAN OF CARE
Problem: Discharge Planning  Goal: Discharge to home or other facility with appropriate resources  3/14/2024 0430 by Honey Chamorro, RN  Outcome: Progressing  Flowsheets (Taken 3/14/2024 0430)  Discharge to home or other facility with appropriate resources:   Identify barriers to discharge with patient and caregiver   Arrange for needed discharge resources and transportation as appropriate   Identify discharge learning needs (meds, wound care, etc)   Refer to discharge planning if patient needs post-hospital services based on physician order or complex needs related to functional status, cognitive ability or social support system  Note: Inform pt. Of discharge teaching and planned. Instructed pt. To inform me if further teaching need to be done.      Problem: Pain  Goal: Verbalizes/displays adequate comfort level or baseline comfort level  3/14/2024 0430 by Honey Chamorro, RN  Outcome: Progressing  Flowsheets (Taken 3/14/2024 0430)  Verbalizes/displays adequate comfort level or baseline comfort level:   Encourage patient to monitor pain and request assistance   Assess pain using appropriate pain scale   Administer analgesics based on type and severity of pain and evaluate response   Implement non-pharmacological measures as appropriate and evaluate response   Consider cultural and social influences on pain and pain management   Notify Licensed Independent Practitioner if interventions unsuccessful or patient reports new pain  Note: PT. Received pain meds in timely manner. Teach pt. Non-pharm. Methods to handle pain.      Problem: Safety - Adult  Goal: Free from fall injury  3/14/2024 0430 by Honey Chamorro, RN  Outcome: Progressing  Flowsheets (Taken 3/14/2024 0430)  Free From Fall Injury:   Instruct family/caregiver on patient safety   Based on caregiver fall risk screen, instruct family/caregiver to ask for assistance with transferring infant if caregiver noted to have fall risk factors  Note: Made sure  call light was in reach, a clear pathway and adequate lighting was provided. Also made sure that the pt. Was wearing non-slip socks.

## 2024-03-14 NOTE — CARE COORDINATION
IMM letter provided to patient.  Patient offered four hours to make informed decision regarding appeal process; patient agreeable to discharge.    Electronically signed by Rosamaria Solomon RN on 3/14/2024 at 10:53 AM

## 2024-03-14 NOTE — PLAN OF CARE
Problem: Discharge Planning  Goal: Discharge to home or other facility with appropriate resources  3/14/2024 1047 by Laura Odell, RN  Outcome: Progressing  Plans to discharge home today.  following.

## 2024-03-14 NOTE — DISCHARGE INSTR - DIET

## 2024-03-15 ENCOUNTER — PATIENT MESSAGE (OUTPATIENT)
Dept: FAMILY MEDICINE CLINIC | Age: 89
End: 2024-03-15

## 2024-03-15 DIAGNOSIS — I10 ESSENTIAL HYPERTENSION: ICD-10-CM

## 2024-03-15 DIAGNOSIS — G81.94 LEFT HEMIPARESIS (HCC): ICD-10-CM

## 2024-03-15 DIAGNOSIS — G35 MULTIPLE SCLEROSIS (HCC): ICD-10-CM

## 2024-03-15 DIAGNOSIS — N39.0 ACUTE UTI: Primary | ICD-10-CM

## 2024-03-15 NOTE — TELEPHONE ENCOUNTER
From: Nichelle Raza  To: Dr. Pauly Bond  Sent: 3/15/2024 10:30 AM EDT  Subject: Hospital Stay/Therapy    Good Morning,    My mom just got out of the hospital again for a two day stay due to a stomach bug. Would it be possible to order another round of in home therapy (physical and occupational). This helped her tremendously the last time she was in the hospital after she came home. Again I see weakness with her from being in bed for three days straight. We can schedule with the same group as before as they were quite helpful. Thanks.

## 2024-03-15 NOTE — TELEPHONE ENCOUNTER
Referral to home health placed, please fax as indicated to home helath company that was previously providing services.

## 2024-03-17 LAB
MICROORGANISM SPEC CULT: NORMAL
MICROORGANISM SPEC CULT: NORMAL
SERVICE CMNT-IMP: NORMAL
SERVICE CMNT-IMP: NORMAL
SPECIMEN DESCRIPTION: NORMAL
SPECIMEN DESCRIPTION: NORMAL

## 2024-03-18 ENCOUNTER — COMMUNITY CARE MANAGEMENT (OUTPATIENT)
Facility: CLINIC | Age: 89
End: 2024-03-18

## 2024-04-01 DIAGNOSIS — N39.0 RECURRENT UTI: ICD-10-CM

## 2024-04-02 ENCOUNTER — PATIENT MESSAGE (OUTPATIENT)
Dept: FAMILY MEDICINE CLINIC | Age: 89
End: 2024-04-02

## 2024-04-02 DIAGNOSIS — N39.0 RECURRENT URINARY TRACT INFECTION: ICD-10-CM

## 2024-04-02 RX ORDER — CEPHALEXIN 250 MG/1
250 CAPSULE ORAL DAILY
Qty: 30 CAPSULE | Refills: 5 | OUTPATIENT
Start: 2024-04-02

## 2024-04-02 RX ORDER — CEPHALEXIN 250 MG/1
250 CAPSULE ORAL DAILY
Qty: 90 CAPSULE | Refills: 1 | Status: SHIPPED | OUTPATIENT
Start: 2024-04-02

## 2024-04-02 NOTE — TELEPHONE ENCOUNTER
From: Nichelle Raza  To: Dr. Pauly Bond  Sent: 4/2/2024 12:18 PM EDT  Subject: keflex    My mom is in need of a refill on her 250mg Keflex she takes daily. I thought her bottle had refills on it but the pharmacy says no. Thanks

## 2024-04-05 ENCOUNTER — PATIENT MESSAGE (OUTPATIENT)
Dept: FAMILY MEDICINE CLINIC | Age: 89
End: 2024-04-05

## 2024-04-05 DIAGNOSIS — G35 MULTIPLE SCLEROSIS (HCC): ICD-10-CM

## 2024-04-05 RX ORDER — METHYLPREDNISOLONE 4 MG/1
TABLET ORAL
Qty: 1 KIT | Refills: 0 | OUTPATIENT
Start: 2024-04-05

## 2024-04-05 NOTE — TELEPHONE ENCOUNTER
From: Nichelle Raza  To: Dr. Pauly Bond  Sent: 4/5/2024 2:57 PM EDT  Subject: Prednisone Dose Pack    Would it be possible to get a refill on her dose pack she has been having some equilibrium issues along with no feeling well which is most likely from her MS and I would like to try the steriods to help. If this does not help we will schedule an appointment with you.

## 2024-04-07 RX ORDER — PREDNISONE 10 MG/1
TABLET ORAL
Qty: 30 TABLET | Refills: 0 | Status: SHIPPED | OUTPATIENT
Start: 2024-04-07

## 2024-04-11 PROBLEM — E86.0 DEHYDRATION: Status: RESOLVED | Noted: 2024-03-12 | Resolved: 2024-04-11

## 2024-04-18 ENCOUNTER — HOSPITAL ENCOUNTER (OUTPATIENT)
Age: 89
Setting detail: SPECIMEN
Discharge: HOME | End: 2024-04-18

## 2024-04-18 ENCOUNTER — OFFICE VISIT (OUTPATIENT)
Dept: FAMILY MEDICINE CLINIC | Age: 89
End: 2024-04-18
Payer: MEDICARE

## 2024-04-18 VITALS
BODY MASS INDEX: 26.51 KG/M2 | HEART RATE: 70 BPM | TEMPERATURE: 97.3 F | SYSTOLIC BLOOD PRESSURE: 130 MMHG | WEIGHT: 126.8 LBS | DIASTOLIC BLOOD PRESSURE: 82 MMHG | OXYGEN SATURATION: 93 %

## 2024-04-18 DIAGNOSIS — M54.42 CHRONIC MIDLINE LOW BACK PAIN WITH LEFT-SIDED SCIATICA: ICD-10-CM

## 2024-04-18 DIAGNOSIS — E55.9 VITAMIN D DEFICIENCY: ICD-10-CM

## 2024-04-18 DIAGNOSIS — N18.31 STAGE 3A CHRONIC KIDNEY DISEASE (HCC): ICD-10-CM

## 2024-04-18 DIAGNOSIS — N39.45 CONTINUOUS LEAKAGE OF URINE: ICD-10-CM

## 2024-04-18 DIAGNOSIS — G35 MULTIPLE SCLEROSIS (HCC): ICD-10-CM

## 2024-04-18 DIAGNOSIS — N39.0 RECURRENT URINARY TRACT INFECTION: ICD-10-CM

## 2024-04-18 DIAGNOSIS — G89.29 CHRONIC MIDLINE LOW BACK PAIN WITH LEFT-SIDED SCIATICA: ICD-10-CM

## 2024-04-18 DIAGNOSIS — I10 ESSENTIAL HYPERTENSION: ICD-10-CM

## 2024-04-18 DIAGNOSIS — M15.9 GENERALIZED OSTEOARTHRITIS: ICD-10-CM

## 2024-04-18 DIAGNOSIS — I50.32 CHRONIC DIASTOLIC CONGESTIVE HEART FAILURE (HCC): ICD-10-CM

## 2024-04-18 DIAGNOSIS — N18.32 TYPE 2 DIABETES MELLITUS WITH STAGE 3B CHRONIC KIDNEY DISEASE, WITHOUT LONG-TERM CURRENT USE OF INSULIN (HCC): Primary | ICD-10-CM

## 2024-04-18 DIAGNOSIS — E11.22 TYPE 2 DIABETES MELLITUS WITH STAGE 3B CHRONIC KIDNEY DISEASE, WITHOUT LONG-TERM CURRENT USE OF INSULIN (HCC): Primary | ICD-10-CM

## 2024-04-18 PROBLEM — R73.9 HYPERGLYCEMIA: Status: RESOLVED | Noted: 2021-02-28 | Resolved: 2024-04-18

## 2024-04-18 PROBLEM — E11.69 DYSLIPIDEMIA DUE TO TYPE 2 DIABETES MELLITUS (HCC): Status: ACTIVE | Noted: 2018-11-07

## 2024-04-18 LAB — HBA1C MFR BLD: 6.2 %

## 2024-04-18 PROCEDURE — 83036 HEMOGLOBIN GLYCOSYLATED A1C: CPT | Performed by: INTERNAL MEDICINE

## 2024-04-18 PROCEDURE — 1090F PRES/ABSN URINE INCON ASSESS: CPT | Performed by: INTERNAL MEDICINE

## 2024-04-18 PROCEDURE — G8427 DOCREV CUR MEDS BY ELIG CLIN: HCPCS | Performed by: INTERNAL MEDICINE

## 2024-04-18 PROCEDURE — G8417 CALC BMI ABV UP PARAM F/U: HCPCS | Performed by: INTERNAL MEDICINE

## 2024-04-18 PROCEDURE — 0509F URINE INCON PLAN DOCD: CPT | Performed by: INTERNAL MEDICINE

## 2024-04-18 PROCEDURE — 3044F HG A1C LEVEL LT 7.0%: CPT | Performed by: INTERNAL MEDICINE

## 2024-04-18 PROCEDURE — 99214 OFFICE O/P EST MOD 30 MIN: CPT | Performed by: INTERNAL MEDICINE

## 2024-04-18 PROCEDURE — 1036F TOBACCO NON-USER: CPT | Performed by: INTERNAL MEDICINE

## 2024-04-18 PROCEDURE — 1123F ACP DISCUSS/DSCN MKR DOCD: CPT | Performed by: INTERNAL MEDICINE

## 2024-04-18 RX ORDER — SOLIFENACIN SUCCINATE 10 MG/1
10 TABLET, FILM COATED ORAL DAILY
Qty: 90 TABLET | Refills: 1 | Status: SHIPPED | OUTPATIENT
Start: 2024-04-18

## 2024-04-18 RX ORDER — METOPROLOL SUCCINATE 50 MG/1
50 TABLET, EXTENDED RELEASE ORAL NIGHTLY
Qty: 90 TABLET | Refills: 1 | Status: SHIPPED | OUTPATIENT
Start: 2024-04-18

## 2024-04-18 NOTE — PROGRESS NOTES
[facility]  [facility]  [address] [address]  Dept: [phone]  Dept Fax: [phone]      Nichelle Raza is a 89 y.o. female who presents today for hermedical conditions/complaints as noted below.  Nichelle Raza is c/o of Diabetes (F/u), Hypertension (F/u), Lower Back Pain (Chronic f/u), Urinary Frequency (Pt having accidents, using about 4 pads a day, does wake up with a wet pad sometimes, would like to increase Vesicare if she can ), and Dizziness (Light headedness believes it may be from her MS )        Assessment/Plan:     1. Type 2 diabetes mellitus with stage 3b chronic kidney disease, without long-term current use of insulin ([facility])  2. Essential hypertension  The following orders have not been finalized:  -     metoprolol succinate (TOPROL XL) 50 MG extended release tablet          No follow-ups on file.      HPI     Admited 3/12-3/14 with gastroenteritis   Completed steroid taper for MS flae with resolution of smptoms   She was started on Qulipta for migraine prevention, but after one month stopped it because it made her brain feel foggy and she didn't feel good on it, she also did not notice a difference with her migraines.  Completed PT and nursing visits, and has a last visit with OT today   Urin frequency - worse than it used to be over last couple of months. She is soaking pads now and not realizing that she has to go. No burning or frequency  Diabetes - doing well with current regimen. Denies hypoglycemia, hyperglycemia, fatigue, polyuria, polydipsia, paresthesias, vision changes, dizziness.  Eye exam was done.  Not following with podiatry.  Patient is taking ACE inhibitor/ARB.  Complications of diabetes include HLD.   Hypertension-tolerating current regimen without chest pain, palpitations, dizziness, peripheral edema, dyspnea on exertion, orthopnea, paroxysmal nocturnal dyspnea.  Hyperlipidemia-tolerating current regimen without myalgias, dyspepsia,

## 2024-04-19 ENCOUNTER — COMMUNITY CARE MANAGEMENT (OUTPATIENT)
Facility: CLINIC | Age: 89
End: 2024-04-19

## 2024-04-20 LAB
MICROORGANISM SPEC CULT: ABNORMAL
SPECIMEN DESCRIPTION: ABNORMAL

## 2024-04-24 RX ORDER — LEVOFLOXACIN 250 MG/1
250 TABLET, FILM COATED ORAL DAILY
Qty: 7 TABLET | Refills: 0 | Status: SHIPPED | OUTPATIENT
Start: 2024-04-24 | End: 2024-05-01

## 2024-04-24 ASSESSMENT — ENCOUNTER SYMPTOMS
CONSTIPATION: 0
ABDOMINAL PAIN: 0
CHOKING: 0
WHEEZING: 0
SHORTNESS OF BREATH: 0
NAUSEA: 0
COUGH: 0
CHEST TIGHTNESS: 0
BLOOD IN STOOL: 0
DIARRHEA: 0
ANAL BLEEDING: 0
VOMITING: 0

## 2024-04-24 ASSESSMENT — VISUAL ACUITY: OU: 1

## 2024-04-27 DIAGNOSIS — N39.45 CONTINUOUS LEAKAGE OF URINE: ICD-10-CM

## 2024-04-27 DIAGNOSIS — M15.9 GENERALIZED OSTEOARTHRITIS: ICD-10-CM

## 2024-04-29 RX ORDER — CELECOXIB 200 MG/1
200 CAPSULE ORAL DAILY
Qty: 90 CAPSULE | Refills: 1 | Status: SHIPPED | OUTPATIENT
Start: 2024-04-29

## 2024-04-29 RX ORDER — SOLIFENACIN SUCCINATE 5 MG/1
5 TABLET, FILM COATED ORAL DAILY
Qty: 90 TABLET | Refills: 1 | OUTPATIENT
Start: 2024-04-29

## 2024-04-29 NOTE — TELEPHONE ENCOUNTER
Generalized osteoarthritis     Idiopathic peripheral neuropathy     Vitamin D deficiency     Primary localized osteoarthrosis of the hip, left     Primary osteoarthritis of left hip     Acquired spondylolisthesis     Chronic midline low back pain with left-sided sciatica     Spinal stenosis of lumbar region with neurogenic claudication     Presence of right artificial shoulder joint     Diarrhea of presumed infectious origin     Chronic headache     Facial asymmetry     Asymptomatic bacteriuria     Lumbar radiculopathy     Type 2 diabetes mellitus     Recurrent urinary tract infection     Diastolic congestive heart failure (HCC)     Left hemiparesis ([facility])     Chronic renal disease, stage III ([facility]) [697861]     Chronic systolic (congestive) heart failure     Flu     Type 2 diabetes mellitus with chronic kidney disease     Opioid dependence with current use ([facility])     Dizziness     COVID-19 virus infection     Unstable gait     Headache, worsening     EDINSON (acute kidney injury) ([facility])

## 2024-05-05 ENCOUNTER — PATIENT MESSAGE (OUTPATIENT)
Dept: FAMILY MEDICINE CLINIC | Age: 89
End: 2024-05-05

## 2024-05-05 DIAGNOSIS — M15.9 GENERALIZED OSTEOARTHRITIS: ICD-10-CM

## 2024-05-06 RX ORDER — TRAMADOL HYDROCHLORIDE 50 MG/1
50 TABLET ORAL EVERY 6 HOURS PRN
Qty: 120 TABLET | Refills: 1 | Status: SHIPPED | OUTPATIENT
Start: 2024-05-06 | End: 2024-07-05

## 2024-05-06 NOTE — TELEPHONE ENCOUNTER
From: Nichelle Raza  To: Dr. Pauly Bond  Sent: 5/5/2024 1:07 PM EDT  Subject: Tramodol     We need a refill called in on my mom’s Tramodol please. Thanks

## 2024-05-17 DIAGNOSIS — G89.29 CHRONIC MIDLINE LOW BACK PAIN WITH LEFT-SIDED SCIATICA: ICD-10-CM

## 2024-05-17 DIAGNOSIS — M54.42 CHRONIC MIDLINE LOW BACK PAIN WITH LEFT-SIDED SCIATICA: ICD-10-CM

## 2024-05-17 NOTE — TELEPHONE ENCOUNTER
Primary localized osteoarthrosis of the hip, left     Primary osteoarthritis of left hip     Acquired spondylolisthesis     Chronic midline low back pain with left-sided sciatica     Spinal stenosis of lumbar region with neurogenic claudication     Presence of right artificial shoulder joint     Diarrhea of presumed infectious origin     Chronic headache     Facial asymmetry     Asymptomatic bacteriuria     Lumbar radiculopathy     Type 2 diabetes mellitus     Recurrent urinary tract infection     Diastolic congestive heart failure (HCC)     Left hemiparesis (HCC)     Chronic renal disease, stage III ([facility]) [[id number]]     Chronic systolic (congestive) heart failure     Flu     Type 2 diabetes mellitus with chronic kidney disease     Opioid dependence with current use ([facility])     Dizziness     COVID-19 virus infection     Unstable gait     Headache, worsening     EDINSON (acute kidney injury) ([facility])

## 2024-05-20 RX ORDER — PREDNISONE 10 MG/1
10 TABLET ORAL DAILY
Qty: 90 TABLET | Refills: 1 | Status: SHIPPED | OUTPATIENT
Start: 2024-05-20

## 2024-05-28 ENCOUNTER — OFFICE VISIT (OUTPATIENT)
Dept: FAMILY MEDICINE CLINIC | Age: 89
End: 2024-05-28
Payer: MEDICARE

## 2024-05-28 VITALS
OXYGEN SATURATION: 95 % | DIASTOLIC BLOOD PRESSURE: 68 MMHG | BODY MASS INDEX: 26.34 KG/M2 | HEART RATE: 102 BPM | WEIGHT: 126 LBS | SYSTOLIC BLOOD PRESSURE: 110 MMHG

## 2024-05-28 DIAGNOSIS — I87.2 EDEMA OF RIGHT LOWER EXTREMITY DUE TO PERIPHERAL VENOUS INSUFFICIENCY: ICD-10-CM

## 2024-05-28 DIAGNOSIS — E11.22 TYPE 2 DIABETES MELLITUS WITH STAGE 3B CHRONIC KIDNEY DISEASE, WITHOUT LONG-TERM CURRENT USE OF INSULIN (HCC): ICD-10-CM

## 2024-05-28 DIAGNOSIS — N18.32 TYPE 2 DIABETES MELLITUS WITH STAGE 3B CHRONIC KIDNEY DISEASE, WITHOUT LONG-TERM CURRENT USE OF INSULIN (HCC): ICD-10-CM

## 2024-05-28 DIAGNOSIS — G63 POLYNEUROPATHY ASSOCIATED WITH UNDERLYING DISEASE (HCC): Primary | ICD-10-CM

## 2024-05-28 PROBLEM — U07.1 COVID-19 VIRUS INFECTION: Status: RESOLVED | Noted: 2024-01-11 | Resolved: 2024-05-28

## 2024-05-28 PROBLEM — N17.9 AKI (ACUTE KIDNEY INJURY) (HCC): Status: RESOLVED | Noted: 2024-03-14 | Resolved: 2024-05-28

## 2024-05-28 PROBLEM — J11.1 FLU: Status: RESOLVED | Noted: 2022-12-28 | Resolved: 2024-05-28

## 2024-05-28 PROCEDURE — 1123F ACP DISCUSS/DSCN MKR DOCD: CPT | Performed by: INTERNAL MEDICINE

## 2024-05-28 PROCEDURE — 3044F HG A1C LEVEL LT 7.0%: CPT | Performed by: INTERNAL MEDICINE

## 2024-05-28 PROCEDURE — 1090F PRES/ABSN URINE INCON ASSESS: CPT | Performed by: INTERNAL MEDICINE

## 2024-05-28 PROCEDURE — G8417 CALC BMI ABV UP PARAM F/U: HCPCS | Performed by: INTERNAL MEDICINE

## 2024-05-28 PROCEDURE — G8427 DOCREV CUR MEDS BY ELIG CLIN: HCPCS | Performed by: INTERNAL MEDICINE

## 2024-05-28 PROCEDURE — 99214 OFFICE O/P EST MOD 30 MIN: CPT | Performed by: INTERNAL MEDICINE

## 2024-05-28 PROCEDURE — 1036F TOBACCO NON-USER: CPT | Performed by: INTERNAL MEDICINE

## 2024-05-28 RX ORDER — FUROSEMIDE 20 MG/1
20 TABLET ORAL DAILY PRN
Qty: 15 TABLET | Refills: 0 | Status: SHIPPED | OUTPATIENT
Start: 2024-05-28

## 2024-05-28 RX ORDER — CLINDAMYCIN PHOSPHATE 10 MG/G
GEL TOPICAL
COMMUNITY
Start: 2024-05-14

## 2024-05-28 SDOH — ECONOMIC STABILITY: FOOD INSECURITY: WITHIN THE PAST 12 MONTHS, YOU WORRIED THAT YOUR FOOD WOULD RUN OUT BEFORE YOU GOT MONEY TO BUY MORE.: NEVER TRUE

## 2024-05-28 SDOH — ECONOMIC STABILITY: INCOME INSECURITY: HOW HARD IS IT FOR YOU TO PAY FOR THE VERY BASICS LIKE FOOD, HOUSING, MEDICAL CARE, AND HEATING?: NOT VERY HARD

## 2024-05-28 SDOH — ECONOMIC STABILITY: FOOD INSECURITY: WITHIN THE PAST 12 MONTHS, THE FOOD YOU BOUGHT JUST DIDN'T LAST AND YOU DIDN'T HAVE MONEY TO GET MORE.: NEVER TRUE

## 2024-05-28 NOTE — PROGRESS NOTES
[facility]  [facility]  [address] [place] [address]  Dept: [phone]  Dept Fax: [phone]      Nichelle Raza is a 89 y.o. female who presents today for hermedical conditions/complaints as noted below.  Nichelle Raza is c/o of Joint Swelling (On and off not constant)        Assessment/Plan:     1. Polyneuropathy associated with underlying disease ([facility])  -     Gabapentin 10 % CREA; Use 1g two times daily on feet and ankles, Disp-60 g, R-1Normal  2. Edema of right lower extremity due to peripheral venous insufficiency  -     furosemide (LASIX) 20 MG tablet; Take 1 tablet by mouth daily as needed (leg swelling), Disp-15 tablet, R-0Normal  3. Type 2 diabetes mellitus with stage 3b chronic kidney disease, without long-term current use of insulin ([facility])  -      DIABETES FOOT EXAM          No follow-ups on file.      HPI     Lasix was stopped while inpatient, she was taking as needed for leg swelling  Ran out sometime ago.  Legs are swelling now towards end of the day, no swelling when wakes up in the morning   No chest pain, palpitations, orthopnea, paroxymsal nocturnal dyspnea, dizziness. No change in baseline weakness or fatigue   Feet are tingling and burning kinsey at night, wakes her up. Does not tolerate gabapentin or lyrica orally due to dizziness.         BP Readings from Last 3 Encounters:   05/28/24 110/68   04/18/24 130/82   03/14/24 (!) 155/66              Past Medical History:   Diagnosis Date    Acute cystitis without hematuria 10/1/2017    EDINSON (acute kidney injury) ([facility]) 3/3/2021    Arthritis     Chronic daily headache     GERD (gastroesophageal reflux disease)     Hyperlipidemia     Hypertension     Moderate malnutrition ([facility]) 3/2/2019    Multiple sclerosis ([facility])     Multiple sclerosis exacerbation ([facility]) 2/25/2021    Neuropathy     Pneumonia 2017    states had double pneumonia    Vitamin D deficiency       Past Surgical History:   Procedure Laterality Date

## 2024-06-03 ASSESSMENT — ENCOUNTER SYMPTOMS
CONSTIPATION: 0
NAUSEA: 0
CHOKING: 0
ABDOMINAL PAIN: 0
ANAL BLEEDING: 0
BLOOD IN STOOL: 0
CHEST TIGHTNESS: 0
WHEEZING: 0
SHORTNESS OF BREATH: 0
COUGH: 0
VOMITING: 0
DIARRHEA: 0

## 2024-06-03 ASSESSMENT — VISUAL ACUITY: OU: 1

## 2024-06-04 ENCOUNTER — PATIENT MESSAGE (OUTPATIENT)
Dept: FAMILY MEDICINE CLINIC | Age: 89
End: 2024-06-04

## 2024-06-04 DIAGNOSIS — G63 POLYNEUROPATHY ASSOCIATED WITH UNDERLYING DISEASE (HCC): ICD-10-CM

## 2024-06-04 NOTE — TELEPHONE ENCOUNTER
From: Nichelle Raza  To: Dr. Pauly Bond  Sent: 6/4/2024 2:08 PM EDT  Subject: Gabapenten    The cream that was prescribed for my mom Jory is not able to fill. It requires mixing of some sort and they do not do this if there is anything else she can use let us know or if some other pharmacy is able to do this? Thanks

## 2024-06-05 NOTE — TELEPHONE ENCOUNTER
Yes this can be done at [facility]  Yolis is going to call to check cost.  She will let us know if she wants it sent [facility]

## 2024-06-05 NOTE — TELEPHONE ENCOUNTER
Can we check if they can get it from [facility] as a compounded medication and if they will accept it as electronic prescription or does it need to be faxed?

## 2024-06-10 DIAGNOSIS — I87.2 EDEMA OF RIGHT LOWER EXTREMITY DUE TO PERIPHERAL VENOUS INSUFFICIENCY: ICD-10-CM

## 2024-06-10 RX ORDER — FUROSEMIDE 20 MG/1
TABLET ORAL
Qty: 15 TABLET | Refills: 0 | Status: SHIPPED | OUTPATIENT
Start: 2024-06-10

## 2024-06-10 NOTE — TELEPHONE ENCOUNTER
Last visit: 05/28/2024  Last Med refill: 05/28/2024  Does patient have enough medication for 72 hours: No: 3 day supply    Next Visit Date:  Future Appointments   Date Time Provider Department Center   8/19/2024  4:30 PM Pauly Bond MD [facility]       Health Maintenance   Topic Date Due    Annual Wellness Visit (Medicare)  05/17/2024    DTaP/Tdap/Td vaccine (1 - Tdap) 06/18/2024 (Originally 12/8/1953)    Shingles vaccine (1 of 2) 05/28/2025 (Originally 12/8/1984)    COVID-19 Vaccine (5 - 2023-24 season) 05/28/2025 (Originally 9/1/2023)    Respiratory Syncytial Virus (RSV) Pregnant or age 60 yrs+ (1 - 1-dose 60+ series) 05/28/2025 (Originally 12/8/1994)    Depression Screen  01/23/2025    Lipids  02/05/2025    Flu vaccine  Completed    Pneumococcal 65+ years Vaccine  Completed    Hepatitis A vaccine  Aged Out    Hepatitis B vaccine  Aged Out    Hib vaccine  Aged Out    Polio vaccine  Aged Out    Meningococcal (ACWY) vaccine  Aged Out    DEXA (modify frequency per FRAX score)  Discontinued       Hemoglobin A1C (%)   Date Value   04/18/2024 6.2   02/05/2024 6.2 (H)   10/16/2023 6.2             ( goal A1C is < 7)   No components found for: \"LABMICR\"  No components found for: \"LDLCHOLESTEROL\", \"LDLCALC\"    (goal LDL is <100)   AST (U/L)   Date Value   03/12/2024 14     ALT (U/L)   Date Value   03/12/2024 13     BUN (mg/dL)   Date Value   03/14/2024 18     BP Readings from Last 3 Encounters:   05/28/24 110/68   04/18/24 130/82   03/14/24 (!) 155/66          (goal 120/80)    All Future Testing planned in [facility]  Lab Frequency Next Occurrence   Lipid, Fasting Once 01/23/2024   Hemoglobin A1C Once 01/23/2024   Comprehensive Metabolic Panel Once 01/23/2024               Patient Active Problem List:     Ataxia     Multiple sclerosis (HCC)     Essential hypertension     Dyslipidemia due to type 2 diabetes mellitus (HCC)     Atelectasis     Debility     Abnormal gait     Actinic keratosis     Enthesopathy of

## 2024-06-22 DIAGNOSIS — I87.2 EDEMA OF RIGHT LOWER EXTREMITY DUE TO PERIPHERAL VENOUS INSUFFICIENCY: ICD-10-CM

## 2024-06-24 RX ORDER — FUROSEMIDE 20 MG/1
TABLET ORAL
Qty: 15 TABLET | Refills: 0 | Status: SHIPPED | OUTPATIENT
Start: 2024-06-24

## 2024-06-24 NOTE — TELEPHONE ENCOUNTER
Last visit: 05/28/2024  Last Med refill: 06/10/2024  Does patient have enough medication for 72 hours: No:     Next Visit Date:  Future Appointments   Date Time Provider Department Center   8/19/2024  4:30 PM Pauly Bond MD [facility]       Health Maintenance   Topic Date Due    DTaP/Tdap/Td vaccine (1 - Tdap) Never done    Annual Wellness Visit (Medicare)  05/17/2024    Shingles vaccine (1 of 2) 05/28/2025 (Originally 12/8/1984)    COVID-19 Vaccine (5 - 2023-24 season) 05/28/2025 (Originally 9/1/2023)    Respiratory Syncytial Virus (RSV) Pregnant or age 60 yrs+ (1 - 1-dose 60+ series) 05/28/2025 (Originally 12/8/1994)    Depression Screen  01/23/2025    Lipids  02/05/2025    Flu vaccine  Completed    Pneumococcal 65+ years Vaccine  Completed    Hepatitis A vaccine  Aged Out    Hepatitis B vaccine  Aged Out    Hib vaccine  Aged Out    Polio vaccine  Aged Out    Meningococcal (ACWY) vaccine  Aged Out    DEXA (modify frequency per FRAX score)  Discontinued       Hemoglobin A1C (%)   Date Value   04/18/2024 6.2   02/05/2024 6.2 (H)   10/16/2023 6.2             ( goal A1C is < 7)   No components found for: \"LABMICR\"  No components found for: \"LDLCHOLESTEROL\", \"LDLCALC\"    (goal LDL is <100)   AST (U/L)   Date Value   03/12/2024 14     ALT (U/L)   Date Value   03/12/2024 13     BUN (mg/dL)   Date Value   03/14/2024 18     BP Readings from Last 3 Encounters:   05/28/24 110/68   04/18/24 130/82   03/14/24 (!) 155/66          (goal 120/80)    All Future Testing planned in [facility]  Lab Frequency Next Occurrence   Lipid, Fasting Once 01/23/2024   Hemoglobin A1C Once 01/23/2024   Comprehensive Metabolic Panel Once 01/23/2024               Patient Active Problem List:     Ataxia     Multiple sclerosis (HCC)     Essential hypertension     Dyslipidemia due to type 2 diabetes mellitus (HCC)     Atelectasis     Debility     Abnormal gait     Actinic keratosis     Enthesopathy of hip region     Generalized

## 2024-07-08 DIAGNOSIS — I87.2 EDEMA OF RIGHT LOWER EXTREMITY DUE TO PERIPHERAL VENOUS INSUFFICIENCY: ICD-10-CM

## 2024-07-08 NOTE — TELEPHONE ENCOUNTER
Last visit: 05/18/2024  Last Med refill: 06/24/2024  Does patient have enough medication for 72 hours: No: 15 tablets dispensed.       Next Visit Date:  Future Appointments   Date Time Provider Department Center   8/19/2024  4:30 PM Pauly Bond MD [facility]       Health Maintenance   Topic Date Due    DTaP/Tdap/Td vaccine (1 - Tdap) Never done    Annual Wellness Visit (Medicare)  05/17/2024    Shingles vaccine (1 of 2) 05/28/2025 (Originally [birth year]1984)    COVID-19 Vaccine (5 - 2023-24 season) 05/28/2025 (Originally 9/1/2023)    Respiratory Syncytial Virus (RSV) Pregnant or age 60 yrs+ (1 - 1-dose 60+ series) 05/28/2025 (Originally 12/8/1994)    Flu vaccine (1) 08/01/2024    Depression Screen  01/23/2025    Lipids  02/05/2025    Pneumococcal 65+ years Vaccine  Completed    Hepatitis A vaccine  Aged Out    Hepatitis B vaccine  Aged Out    Hib vaccine  Aged Out    Polio vaccine  Aged Out    Meningococcal (ACWY) vaccine  Aged Out    DEXA (modify frequency per FRAX score)  Discontinued       Hemoglobin A1C (%)   Date Value   04/18/2024 6.2   02/05/2024 6.2 (H)   10/16/2023 6.2             ( goal A1C is < 7)   No components found for: \"LABMICR\"  No components found for: \"LDLCHOLESTEROL\", \"LDLCALC\"    (goal LDL is <100)   AST (U/L)   Date Value   03/12/2024 14     ALT (U/L)   Date Value   03/12/2024 13     BUN (mg/dL)   Date Value   03/14/2024 18     BP Readings from Last 3 Encounters:   05/28/24 110/68   04/18/24 130/82   03/14/24 (!) 155/66          (goal 120/80)    All Future Testing planned in [facility]  Lab Frequency Next Occurrence   Lipid, Fasting Once 01/23/2024   Hemoglobin A1C Once 01/23/2024   Comprehensive Metabolic Panel Once 01/23/2024               Patient Active Problem List:     Ataxia     Multiple sclerosis (HCC)     Essential hypertension     Dyslipidemia due to type 2 diabetes mellitus (HCC)     Atelectasis     Debility     Abnormal gait     Actinic keratosis     Enthesopathy of hip

## 2024-07-08 NOTE — TELEPHONE ENCOUNTER
Med was stopped while admitted to the hospital for dehydration. Is only supposed to be using prn - this was just sent in 2 weeks ago which indicates daily use.

## 2024-07-09 RX ORDER — FUROSEMIDE 20 MG/1
TABLET ORAL
Qty: 15 TABLET | Refills: 0 | OUTPATIENT
Start: 2024-07-09

## 2024-07-10 DIAGNOSIS — K21.9 GASTROESOPHAGEAL REFLUX DISEASE, UNSPECIFIED WHETHER ESOPHAGITIS PRESENT: ICD-10-CM

## 2024-07-10 RX ORDER — OMEPRAZOLE 20 MG/1
20 CAPSULE, DELAYED RELEASE ORAL DAILY
Qty: 90 CAPSULE | Refills: 1 | Status: SHIPPED | OUTPATIENT
Start: 2024-07-10

## 2024-07-10 RX ORDER — AMLODIPINE BESYLATE 5 MG/1
5 TABLET ORAL DAILY
Qty: 90 TABLET | Refills: 1 | OUTPATIENT
Start: 2024-07-10

## 2024-07-10 NOTE — TELEPHONE ENCOUNTER
osteoarthritis     Idiopathic peripheral neuropathy     Vitamin D deficiency     Primary localized osteoarthrosis of the hip, left     Primary osteoarthritis of left hip     Acquired spondylolisthesis     Chronic midline low back pain with left-sided sciatica     Spinal stenosis of lumbar region with neurogenic claudication     Presence of right artificial shoulder joint     Diarrhea of presumed infectious origin     Chronic headache     Facial asymmetry     Asymptomatic bacteriuria     Lumbar radiculopathy     Type 2 diabetes mellitus     Recurrent urinary tract infection     Diastolic congestive heart failure (HCC)     Left hemiparesis (HCC)     Chronic renal disease, stage III ([facility]) [[id number]]     Chronic systolic (congestive) heart failure     Type 2 diabetes mellitus with chronic kidney disease     Opioid dependence with current use ([facility])     Dizziness     Unstable gait     Headache, worsening

## 2024-07-13 ENCOUNTER — PATIENT MESSAGE (OUTPATIENT)
Dept: FAMILY MEDICINE CLINIC | Age: 89
End: 2024-07-13

## 2024-07-15 RX ORDER — AMLODIPINE BESYLATE 5 MG/1
5 TABLET ORAL DAILY
Qty: 90 TABLET | Refills: 1 | Status: SHIPPED | OUTPATIENT
Start: 2024-07-15

## 2024-07-15 NOTE — TELEPHONE ENCOUNTER
From: Nichelle Raza  To: Dr. Pauly Bond  Sent: 7/13/2024 1:35 PM EDT  Subject: Amlodipine 5mg    My mom will need a refill on this. It won’t allow me to request it through my chart thanks

## 2024-07-18 ENCOUNTER — COMMUNITY CARE MANAGEMENT (OUTPATIENT)
Facility: CLINIC | Age: 89
End: 2024-07-18

## 2024-07-23 ENCOUNTER — PATIENT MESSAGE (OUTPATIENT)
Dept: FAMILY MEDICINE CLINIC | Age: 89
End: 2024-07-23

## 2024-07-23 NOTE — TELEPHONE ENCOUNTER
From: Nichelle Raza  To: Dr. Pauly Bond  Sent: 7/23/2024 8:56 AM EDT  Subject: Hospital Stay    I just wanted to let you know my mom was in [facility] for 4 days due to a fall which broke 2 ribs. The trauma team at [facility] ([place], [place]) took very good care of her and now she is at the [facility] in [place] for rehab. They want us to schedule a follow up appointment with you once her rehab is complete. Not sure when that will be. Just an FYI for Dr BRENNAN

## 2024-07-26 ENCOUNTER — OUTSIDE SERVICES (OUTPATIENT)
Dept: PRIMARY CARE CLINIC | Age: 89
End: 2024-07-26

## 2024-07-26 DIAGNOSIS — S22.080D COMPRESSION FRACTURE OF T12 VERTEBRA WITH ROUTINE HEALING, SUBSEQUENT ENCOUNTER: ICD-10-CM

## 2024-07-26 DIAGNOSIS — R29.6 FREQUENT FALLS: ICD-10-CM

## 2024-07-26 DIAGNOSIS — G35 MULTIPLE SCLEROSIS (HCC): Primary | ICD-10-CM

## 2024-07-26 DIAGNOSIS — S22.41XD CLOSED FRACTURE OF MULTIPLE RIBS OF RIGHT SIDE WITH ROUTINE HEALING, SUBSEQUENT ENCOUNTER: ICD-10-CM

## 2024-07-26 DIAGNOSIS — M48.062 SPINAL STENOSIS OF LUMBAR REGION WITH NEUROGENIC CLAUDICATION: ICD-10-CM

## 2024-07-26 ASSESSMENT — ENCOUNTER SYMPTOMS
NAUSEA: 0
DIARRHEA: 0
CONSTIPATION: 0
BACK PAIN: 1
SHORTNESS OF BREATH: 0
COUGH: 0

## 2024-07-26 NOTE — PROGRESS NOTES
Nichelle Raza is a 89 y.o. female being seen for her weekly follow up.  Location of visit: [facility]    Visit Date:  7/26/2024       Reason for Visit:    Chief Complaint   Patient presents with    Fall        Fall  Pertinent negatives include no fever or nausea.      Resident had fall at home and has multiple right rib fractures and compression fracture T12.  She has multiple sclerosis.  And she has history of chronic back pain due to spinal stenosis.  She was diagnosed with multiple sclerosis at age 49.    She has migraine headaches and tends to just deal with them.  Norco is somewhat effective.  She does not tolerate gabapentin or Lyrica.    Review of Systems   Constitutional:  Positive for activity change. Negative for chills and fever.   Respiratory:  Negative for cough and shortness of breath.    Cardiovascular:  Negative for chest pain, palpitations and leg swelling.   Gastrointestinal:  Negative for constipation, diarrhea and nausea.   Musculoskeletal:  Positive for arthralgias, back pain and gait problem.   Skin:  Negative for rash and wound.   Neurological:  Positive for weakness.   Psychiatric/Behavioral:  Positive for sleep disturbance. Negative for dysphoric mood. The patient is not nervous/anxious.         Physical Exam  Vitals and nursing note reviewed.   Constitutional:       Appearance: Normal appearance.   HENT:      Head: Normocephalic and atraumatic.      Right Ear: External ear normal.      Left Ear: External ear normal.   Cardiovascular:      Rate and Rhythm: Normal rate and regular rhythm.      Heart sounds: Normal heart sounds.   Pulmonary:      Breath sounds: Normal breath sounds.   Abdominal:      General: Bowel sounds are normal.      Palpations: Abdomen is soft.   Musculoskeletal:      Right lower leg: No edema.      Left lower leg: Edema present.   Skin:     General: Skin is warm and dry.   Neurological:      Mental Status: She is alert and oriented to person, place, and time.

## 2024-07-29 ENCOUNTER — COMMUNITY CARE MANAGEMENT (OUTPATIENT)
Facility: CLINIC | Age: 89
End: 2024-07-29

## 2024-07-29 NOTE — PROGRESS NOTES
TCM Care Coordination Summary  [facility] [phone] 07/29/24 Patient discharged to [facility] 07/26/24. Call made to [facility] and spoke with patient's nurse. Nurse confirmed that patient is at their facility and will be seen by healthcare provider there. Nurse reports no needs at this time.-PAPITO THORPE

## 2024-07-30 ENCOUNTER — OUTSIDE SERVICES (OUTPATIENT)
Dept: PRIMARY CARE CLINIC | Age: 89
End: 2024-07-30

## 2024-07-30 DIAGNOSIS — E87.1 HYPONATREMIA: ICD-10-CM

## 2024-07-30 DIAGNOSIS — M80.88XA PATHOLOGICAL FRACTURE OF THORACIC VERTEBRA DUE TO SECONDARY OSTEOPOROSIS (HCC): ICD-10-CM

## 2024-07-30 DIAGNOSIS — S22.41XA CLOSED FRACTURE OF MULTIPLE RIBS OF RIGHT SIDE, INITIAL ENCOUNTER: Primary | ICD-10-CM

## 2024-07-30 ASSESSMENT — ENCOUNTER SYMPTOMS
DIARRHEA: 0
COUGH: 0
VOMITING: 0
NAUSEA: 0
SHORTNESS OF BREATH: 0

## 2024-07-30 NOTE — PROGRESS NOTES
Nichelle Raza is a 89 y.o. female being seen for her weekly follow up.  Location of visit: [facility]    HPI:  New today:   Pt admitted for rehab after fall with rib and thoracic spine fractures due to osteoporosis.  Denies uncontrolled pain.  Doing IS and it is going okay.  No new complaints.          Review of Systems   Constitutional:  Negative for activity change, appetite change, chills, diaphoresis and fever.   HENT:  Negative for congestion.    Respiratory:  Negative for cough and shortness of breath.    Cardiovascular:  Negative for chest pain and palpitations.   Gastrointestinal:  Negative for diarrhea, nausea and vomiting.   Genitourinary:  Negative for hematuria.   Musculoskeletal:  Negative for arthralgias and myalgias.   Skin:  Negative for rash.   Neurological:  Negative for weakness and headaches.   Psychiatric/Behavioral:  Negative for agitation, dysphoric mood and sleep disturbance. The patient is not nervous/anxious.           Physical Exam  Vitals reviewed.   Constitutional:       General: She is not in acute distress.  HENT:      Head: Normocephalic and atraumatic.      Nose: No congestion or rhinorrhea.   Eyes:      General:         Right eye: No discharge.         Left eye: No discharge.   Cardiovascular:      Rate and Rhythm: Normal rate and regular rhythm.      Heart sounds: Murmur heard.   Pulmonary:      Effort: Pulmonary effort is normal. No respiratory distress.      Breath sounds: Normal breath sounds.   Abdominal:      Palpations: Abdomen is soft.      Tenderness: There is no abdominal tenderness.   Musculoskeletal:      Right lower leg: No edema.      Left lower leg: No edema.   Skin:     General: Skin is warm and dry.   Neurological:      Mental Status: She is alert. Mental status is at baseline.          ASSESSMENT:   Diagnosis Orders   1. Closed fracture of multiple ribs of right side, initial encounter        2. Pathological fracture of thoracic vertebra due to secondary

## 2024-08-08 ENCOUNTER — OUTSIDE SERVICES (OUTPATIENT)
Dept: PRIMARY CARE CLINIC | Age: 89
End: 2024-08-08

## 2024-08-08 DIAGNOSIS — G35 MULTIPLE SCLEROSIS (HCC): ICD-10-CM

## 2024-08-08 DIAGNOSIS — R29.6 FREQUENT FALLS: Primary | ICD-10-CM

## 2024-08-08 DIAGNOSIS — M48.062 SPINAL STENOSIS OF LUMBAR REGION WITH NEUROGENIC CLAUDICATION: ICD-10-CM

## 2024-08-08 ASSESSMENT — ENCOUNTER SYMPTOMS
VOMITING: 0
BACK PAIN: 1
EYE DISCHARGE: 0
WHEEZING: 0
COUGH: 0
DIARRHEA: 0
SHORTNESS OF BREATH: 0
RHINORRHEA: 0
EYE REDNESS: 0
NAUSEA: 0
SORE THROAT: 0
ABDOMINAL PAIN: 0

## 2024-08-09 NOTE — PROGRESS NOTES
Nichelle Raza is a 89 y.o. female being seen for her weekly follow up.  Location of visit: [facility]    Visit Date:  8/8/2024     Reason for Visit:    Chief Complaint   Patient presents with    Back Pain        Back Pain  Pertinent negatives include no abdominal pain, chest pain, dysuria, fever or headaches.      BMP reviewed  Edema improved  Pain improved  Mobility improved  Planned discharge on Saturday.    Review of Systems   Constitutional:  Negative for chills and fever.   HENT:  Negative for rhinorrhea and sore throat.    Eyes:  Negative for discharge and redness.   Respiratory:  Negative for cough, shortness of breath and wheezing.    Cardiovascular:  Positive for leg swelling. Negative for chest pain and palpitations.   Gastrointestinal:  Negative for abdominal pain, diarrhea, nausea and vomiting.   Genitourinary:  Negative for dysuria and frequency.   Musculoskeletal:  Positive for back pain. Negative for arthralgias and myalgias.   Neurological:  Negative for dizziness, light-headedness and headaches.   Psychiatric/Behavioral:  Negative for sleep disturbance. The patient is not nervous/anxious.         Physical Exam  Vitals and nursing note reviewed.   Constitutional:       Appearance: Normal appearance.   HENT:      Head: Normocephalic and atraumatic.   Cardiovascular:      Rate and Rhythm: Normal rate and regular rhythm.      Heart sounds: Normal heart sounds.   Pulmonary:      Effort: Pulmonary effort is normal.      Breath sounds: Normal breath sounds.   Abdominal:      General: Bowel sounds are normal.      Palpations: Abdomen is soft.   Musculoskeletal:      Right lower leg: Edema present.      Left lower leg: Edema present.      Comments: Foot swelling present but improved   Neurological:      Mental Status: She is alert and oriented to person, place, and time.      Gait: Gait abnormal.   Psychiatric:         Mood and Affect: Mood normal.         Behavior: Behavior normal.

## 2024-08-12 ENCOUNTER — COMMUNITY CARE MANAGEMENT (OUTPATIENT)
Facility: CLINIC | Age: 89
End: 2024-08-12

## 2024-08-12 NOTE — PROGRESS NOTES
Documentation on Initial call BACKGROUND: 89 year old female who on 7/17 had a fall and hit her walker, causing fractured ribs. Upon discharged from the hospital she was sent to in rehab then moved to [facility] for 3 days. on 7/22 she was sent to [facility] for rehab. Yolis stated they did not manage her pain, did not give her medications. Had complaints of N/V and dehydration. Was sent to [facility] followed by nursing facility for rehab at [facility]. [facility] Verified info in EPIC. HIPPA verified, disclaimer provided. Discharge instructions received and pt verbalized understanding. DISCHARGE DIAGNOSIS: Rehab s/p fall with fractured ribs PAST MEDICAL HISTORY: SUPPORT: Lives alone, but dtr Yolis is staying with her HHC is ordered, awaiting on SOC call DME- has walker for ambulation ASSESSMENT: She stated pt is doing well, denies pain Discussed below metrics WT: BP: SWELLING IN FEET/LEGS: BLOOD SUGAR: MEDICATION: Discontinued, new, changed medications reviewed with patient/POA. Patient reports they have received all medications from pharmacy and has ability to obtain refills, no further medication needs at this time. Medication reconciliation not completed today due to: TCM will plan to reconcile medications with patient at next weekly call. EDUCATION: -[facility] number provided to patient for any questions or needs, [phone] -Educated if short of breath, chest pain, near syncope, or worsening health conditions to call 911. -Educated patient on s/s of fluid overload, shortness of breath -Educated patient on monitoring salt intake, fluid intake and output. -Educated patient on daily weight checks, keep log. -Educated patient on daily BP checks, keep log. -Educated patient on monitoring feet/legs for swelling and elevating to reduce Fluid. -Educated patient on when to seek medical attention. -Educated on pain control -Discussed fall precautions

## 2024-08-19 ENCOUNTER — HOSPITAL ENCOUNTER (OUTPATIENT)
Age: 89
Setting detail: SPECIMEN
Discharge: HOME | End: 2024-08-19

## 2024-08-19 ENCOUNTER — OFFICE VISIT (OUTPATIENT)
Dept: FAMILY MEDICINE CLINIC | Age: 89
End: 2024-08-19

## 2024-08-19 VITALS
DIASTOLIC BLOOD PRESSURE: 76 MMHG | WEIGHT: 127.8 LBS | TEMPERATURE: 98 F | HEART RATE: 77 BPM | OXYGEN SATURATION: 92 % | SYSTOLIC BLOOD PRESSURE: 122 MMHG | BODY MASS INDEX: 26.72 KG/M2

## 2024-08-19 DIAGNOSIS — N18.32 TYPE 2 DIABETES MELLITUS WITH STAGE 3B CHRONIC KIDNEY DISEASE, WITHOUT LONG-TERM CURRENT USE OF INSULIN (HCC): ICD-10-CM

## 2024-08-19 DIAGNOSIS — M54.42 ACUTE BILATERAL LOW BACK PAIN WITH LEFT-SIDED SCIATICA: ICD-10-CM

## 2024-08-19 DIAGNOSIS — Z09 HOSPITAL DISCHARGE FOLLOW-UP: Primary | ICD-10-CM

## 2024-08-19 DIAGNOSIS — N39.0 RECURRENT UTI: ICD-10-CM

## 2024-08-19 DIAGNOSIS — E11.69 DYSLIPIDEMIA DUE TO TYPE 2 DIABETES MELLITUS (HCC): ICD-10-CM

## 2024-08-19 DIAGNOSIS — E11.22 TYPE 2 DIABETES MELLITUS WITH STAGE 3B CHRONIC KIDNEY DISEASE, WITHOUT LONG-TERM CURRENT USE OF INSULIN (HCC): ICD-10-CM

## 2024-08-19 DIAGNOSIS — E78.5 DYSLIPIDEMIA DUE TO TYPE 2 DIABETES MELLITUS (HCC): ICD-10-CM

## 2024-08-19 DIAGNOSIS — M80.88XD OSTEOPOROTIC COMPRESSION FRACTURE OF VERTEBRA WITH ROUTINE HEALING, SUBSEQUENT ENCOUNTER: ICD-10-CM

## 2024-08-19 DIAGNOSIS — I10 ESSENTIAL HYPERTENSION: ICD-10-CM

## 2024-08-19 LAB — HBA1C MFR BLD: 6.9 %

## 2024-08-19 RX ORDER — AMLODIPINE BESYLATE 5 MG/1
2.5 TABLET ORAL DAILY
Qty: 90 TABLET | Refills: 1
Start: 2024-08-19

## 2024-08-19 NOTE — PATIENT INSTRUCTIONS
Decrease amlodipine to 2.5 mg daily and see if the dizziness continues.   Talk to pain management about getting kyphoplasty for the compression fractures in your back

## 2024-08-19 NOTE — PROGRESS NOTES
unexpected weight change.   Respiratory:  Negative for cough, choking, chest tightness, shortness of breath and wheezing.    Cardiovascular:  Negative for chest pain, palpitations and leg swelling.   Gastrointestinal:  Negative for abdominal pain, anal bleeding, blood in stool, constipation, diarrhea, nausea and vomiting.   Endocrine: Negative.    Genitourinary:  Positive for dysuria and frequency.   Musculoskeletal:  Negative for joint swelling and myalgias.   Skin: Negative.    Neurological:  Negative for dizziness.   Psychiatric/Behavioral:  Negative for sleep disturbance.    All other systems reviewed and are negative.      Objective:    /76 (Site: Left Upper Arm, Position: Sitting, Cuff Size: Medium Adult)   Pulse 77   Temp 98 °F (36.7 °C)   Wt 58 kg (127 lb 12.8 oz)   SpO2 92%   BMI 26.72 kg/m²   Physical Exam  Vitals and nursing note reviewed.   Constitutional:       General: She is not in acute distress.     Appearance: She is well-developed. She is not ill-appearing.      Comments: Ambulating with walker   Eyes:      General: Lids are normal. Vision grossly intact.   Cardiovascular:      Rate and Rhythm: Normal rate and regular rhythm.      Heart sounds: Normal heart sounds, S1 normal and S2 normal. No murmur heard.     No friction rub. No gallop.   Pulmonary:      Effort: Pulmonary effort is normal. No respiratory distress.      Breath sounds: Normal breath sounds. No wheezing.   Abdominal:      General: Bowel sounds are normal.      Palpations: Abdomen is soft. There is no mass.      Tenderness: There is no abdominal tenderness. There is no right CVA tenderness, left CVA tenderness or guarding.   Musculoskeletal:         General: Normal range of motion.   Skin:     General: Skin is warm and dry.      Capillary Refill: Capillary refill takes less than 2 seconds.   Neurological:      General: No focal deficit present.      Mental Status: She is alert and oriented to person, place, and time.

## 2024-08-20 DIAGNOSIS — M54.42 CHRONIC MIDLINE LOW BACK PAIN WITH LEFT-SIDED SCIATICA: ICD-10-CM

## 2024-08-20 DIAGNOSIS — G89.29 CHRONIC MIDLINE LOW BACK PAIN WITH LEFT-SIDED SCIATICA: ICD-10-CM

## 2024-08-20 LAB
MICROORGANISM SPEC CULT: NORMAL
SERVICE CMNT-IMP: NORMAL
SPECIMEN DESCRIPTION: NORMAL

## 2024-08-20 RX ORDER — AMITRIPTYLINE HYDROCHLORIDE 100 MG/1
TABLET ORAL
Qty: 90 TABLET | Refills: 1 | Status: SHIPPED | OUTPATIENT
Start: 2024-08-20

## 2024-08-20 NOTE — TELEPHONE ENCOUNTER
Last visit: 8/19/2024  Last Med refill: 05/23/2024  Does patient have enough medication for 72 hours: No: \    Next Visit Date:  Future Appointments   Date Time Provider Department Center   11/26/2024  1:45 PM Pauly Bond MD [facility]       Health Maintenance   Topic Date Due    DTaP/Tdap/Td vaccine (1 - Tdap) Never done    Annual Wellness Visit (Medicare)  05/17/2024    Flu vaccine (1) 08/01/2024    Shingles vaccine (1 of 2) 05/28/2025 (Originally 12/8/1984)    COVID-19 Vaccine (5 - 2023-24 season) 05/28/2025 (Originally 9/1/2023)    Respiratory Syncytial Virus (RSV) Pregnant or age 60 yrs+ (1 - 1-dose 60+ series) 05/28/2025 (Originally 12/8/1994)    Depression Screen  01/23/2025    Lipids  02/05/2025    Pneumococcal 65+ years Vaccine  Completed    Hepatitis A vaccine  Aged Out    Hepatitis B vaccine  Aged Out    Hib vaccine  Aged Out    Polio vaccine  Aged Out    Meningococcal (ACWY) vaccine  Aged Out    DEXA (modify frequency per FRAX score)  Discontinued       Hemoglobin A1C (%)   Date Value   08/19/2024 6.9   04/18/2024 6.2   02/05/2024 6.2 (H)             ( goal A1C is < 7)   No components found for: \"LABMICR\"  No components found for: \"LDLCHOLESTEROL\", \"LDLCALC\"    (goal LDL is <100)   AST (U/L)   Date Value   03/12/2024 14     ALT (U/L)   Date Value   03/12/2024 13     BUN (mg/dL)   Date Value   03/14/2024 18     BP Readings from Last 3 Encounters:   08/19/24 122/76   05/28/24 110/68   04/18/24 130/82          (goal 120/80)    All Future Testing planned in [facility]  Lab Frequency Next Occurrence   Lipid, Fasting Once 01/23/2024   Hemoglobin A1C Once 01/23/2024   Comprehensive Metabolic Panel Once 01/23/2024               Patient Active Problem List:     Ataxia     Multiple sclerosis (HCC)     Essential hypertension     Dyslipidemia due to type 2 diabetes mellitus (HCC)     Atelectasis     Debility     Abnormal gait     Actinic keratosis     Enthesopathy of hip region     Generalized

## 2024-08-23 ASSESSMENT — ENCOUNTER SYMPTOMS
CHOKING: 0
COUGH: 0
NAUSEA: 0
CHEST TIGHTNESS: 0
DIARRHEA: 0
ABDOMINAL PAIN: 0
VOMITING: 0
WHEEZING: 0
BLOOD IN STOOL: 0
ANAL BLEEDING: 0
CONSTIPATION: 0
SHORTNESS OF BREATH: 0

## 2024-08-23 ASSESSMENT — VISUAL ACUITY: OU: 1

## 2024-09-04 ENCOUNTER — COMMUNITY CARE MANAGEMENT (OUTPATIENT)
Facility: CLINIC | Age: 89
End: 2024-09-04

## 2024-09-04 NOTE — PROGRESS NOTES
TCM 3 completed HIPPA verified, disclaimer provided Pt stated she was doing \"good\" No falls, still using walker to ambulate Denies pain /80, wt 130lb swelling- no Denies shortness of breath Follow up's with MD completed, no reported changes Pt stated she has completed her HHC and is no longer being seen. Medication- she verbalized understanding, stated she is able to get her refills and does not have difficulty paying for her meds. She declined to review at this time She was not sure that she rec'd stop light tools that were mailed out on 8/18/24. Reviewed educational info on stop light tools Declined NP referral at this time No further needs Education: TCM RN educated patient on contacting 911/ emergency services if she experiences SOB that doesn’t resolve with rest. TCM RN educated patient's daughter on alerting the patient's provider of weight gain > 2 lbs./ day or >5 lbs. /week. Discussed rescue plan and nurse line TCM 4 falls? BP, wt, s/s of fluid overload did u find stoplight tools TCM graduate-LCM warm handoff      Honey Hubbard, RN BSN  [facility]

## 2024-09-05 DIAGNOSIS — E78.5 HYPERLIPIDEMIA, UNSPECIFIED HYPERLIPIDEMIA TYPE: ICD-10-CM

## 2024-09-05 DIAGNOSIS — I10 ESSENTIAL HYPERTENSION: ICD-10-CM

## 2024-09-05 RX ORDER — SIMVASTATIN 20 MG
20 TABLET ORAL
Qty: 90 TABLET | Refills: 1 | Status: SHIPPED | OUTPATIENT
Start: 2024-09-05

## 2024-09-05 RX ORDER — LISINOPRIL 40 MG/1
TABLET ORAL
Qty: 90 TABLET | Refills: 1 | Status: SHIPPED | OUTPATIENT
Start: 2024-09-05

## 2024-09-05 NOTE — TELEPHONE ENCOUNTER
Enthesopathy of hip region     Generalized osteoarthritis     Idiopathic peripheral neuropathy     Vitamin D deficiency     Primary localized osteoarthrosis of the hip, left     Primary osteoarthritis of left hip     Acquired spondylolisthesis     Chronic midline low back pain with left-sided sciatica     Spinal stenosis of lumbar region with neurogenic claudication     Presence of right artificial shoulder joint     Diarrhea of presumed infectious origin     Chronic headache     Facial asymmetry     Asymptomatic bacteriuria     Lumbar radiculopathy     Type 2 diabetes mellitus     Recurrent urinary tract infection     Diastolic congestive heart failure (HCC)     Left hemiparesis ([facility])     Chronic renal disease, stage III ([facility]) [838994]     Chronic systolic (congestive) heart failure     Type 2 diabetes mellitus with chronic kidney disease     Opioid dependence with current use ([facility])     Dizziness     Unstable gait     Headache, worsening     Multiple closed fractures of ribs of right side     Hyponatremia     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

## 2024-09-12 ENCOUNTER — HOSPITAL ENCOUNTER (OUTPATIENT)
Dept: PAIN MANAGEMENT | Age: 89
Discharge: HOME | End: 2024-09-12
Payer: MEDICARE

## 2024-09-12 ENCOUNTER — PATIENT MESSAGE (OUTPATIENT)
Dept: FAMILY MEDICINE CLINIC | Age: 89
End: 2024-09-12

## 2024-09-12 VITALS — WEIGHT: 127.87 LBS | HEIGHT: 58 IN | BODY MASS INDEX: 26.84 KG/M2

## 2024-09-12 DIAGNOSIS — M47.817 LUMBOSACRAL SPONDYLOSIS WITHOUT MYELOPATHY: Primary | ICD-10-CM

## 2024-09-12 DIAGNOSIS — M80.88XD OSTEOPOROTIC COMPRESSION FRACTURE OF VERTEBRA WITH ROUTINE HEALING, SUBSEQUENT ENCOUNTER: ICD-10-CM

## 2024-09-12 DIAGNOSIS — M15.9 GENERALIZED OSTEOARTHRITIS: ICD-10-CM

## 2024-09-12 PROCEDURE — 99215 OFFICE O/P EST HI 40 MIN: CPT

## 2024-09-12 PROCEDURE — 99214 OFFICE O/P EST MOD 30 MIN: CPT | Performed by: ANESTHESIOLOGY

## 2024-09-12 ASSESSMENT — PAIN SCALES - GENERAL: PAINLEVEL_OUTOF10: 10

## 2024-09-12 ASSESSMENT — ENCOUNTER SYMPTOMS
SHORTNESS OF BREATH: 0
COUGH: 0
BACK PAIN: 1

## 2024-09-12 ASSESSMENT — PAIN DESCRIPTION - LOCATION: LOCATION: BACK

## 2024-09-12 NOTE — H&P (VIEW-ONLY)
Multiple levels of disc height loss with dehydration, for example moderate to severe at L1-L2 on the left.     Multifocal disc bulges/protrusions throughout the lumbosacral spine from L1-L2 through L5/S1 with associated redundancy of ligamentum flavum and bilateral facet arthrosis is to at least moderate spinal canal stenosis at L4-L5 and L1-L2. There is associated moderate to severe left foraminal stenosis   at L1-L2, moderate left foraminal stenosis at L2-L3. No high-grade foraminal stenosis throughout the remainder of the lumbosacral spine, with the limitations of curvature.     Paravertebral structures are within normal limits. Dilated urinary bladder. Fatty atrophy of the paraspinal musculature.     IMPRESSION:   1. Chronic compression deformity of the T12 vertebral body with approximately 5 mm retropulsed fragment, though without high-grade spinal canal stenosis.     2.  Multilevel degenerative changes and lumbosacral spine curvature leads to moderate to severe left foraminal stenosis at L1-L2, as described. Remainder of degenerative changes, as described.    1. Lumbosacral spondylosis without myelopathy        Very pleasant 89-year-old female accompanied today by her daughter  Patient had a history of chronic low back pain  She fell earlier this year that have exacerbated her pain  Predominantly axial located in the lumbar area across midline on both sides  No significant dermatomal radiation of pain in legs at this time  Patient have done multiple courses of physical therapy  Describes the back pain as aching throbbing stabbing sensation that aggravates with minimal activity  Rates intensity 10 out of 10  Clinical presentation suggest facet mediated pain  Patient had a recent MRI lumbar spine  Report was reviewed  Finding discussed with patient  Advised patient to bring the disc for uploading in PACS so we can review the imaging independently    Discussed diagnostic lumbar medial branch nerve block at

## 2024-09-13 ENCOUNTER — COMMUNITY CARE MANAGEMENT (OUTPATIENT)
Facility: CLINIC | Age: 89
End: 2024-09-13

## 2024-09-16 ENCOUNTER — PATIENT MESSAGE (OUTPATIENT)
Dept: FAMILY MEDICINE CLINIC | Age: 89
End: 2024-09-16

## 2024-09-17 RX ORDER — NYSTATIN 100000 [USP'U]/ML
500000 SUSPENSION ORAL 4 TIMES DAILY
Qty: 200 ML | Refills: 0 | Status: SHIPPED | OUTPATIENT
Start: 2024-09-17 | End: 2024-09-27

## 2024-09-17 RX ORDER — NYSTATIN 100000 [USP'U]/ML
SUSPENSION ORAL
Qty: 200 ML | Refills: 0 | OUTPATIENT
Start: 2024-09-17

## 2024-10-09 ENCOUNTER — HOSPITAL ENCOUNTER (OUTPATIENT)
Dept: PAIN MANAGEMENT | Facility: CLINIC | Age: 89
Discharge: HOME | End: 2024-10-09
Payer: MEDICARE

## 2024-10-09 VITALS
DIASTOLIC BLOOD PRESSURE: 74 MMHG | WEIGHT: 127 LBS | TEMPERATURE: 98 F | RESPIRATION RATE: 10 BRPM | OXYGEN SATURATION: 95 % | SYSTOLIC BLOOD PRESSURE: 164 MMHG | HEIGHT: 58 IN | BODY MASS INDEX: 26.66 KG/M2 | HEART RATE: 80 BPM

## 2024-10-09 DIAGNOSIS — R52 PAIN MANAGEMENT: ICD-10-CM

## 2024-10-09 DIAGNOSIS — M47.817 LUMBOSACRAL SPONDYLOSIS WITHOUT MYELOPATHY: Primary | ICD-10-CM

## 2024-10-09 PROCEDURE — 6360000004 HC RX CONTRAST MEDICATION: Performed by: ANESTHESIOLOGY

## 2024-10-09 PROCEDURE — 6360000002 HC RX W HCPCS: Performed by: ANESTHESIOLOGY

## 2024-10-09 PROCEDURE — 2500000003 HC RX 250 WO HCPCS: Performed by: ANESTHESIOLOGY

## 2024-10-09 RX ORDER — LIDOCAINE HYDROCHLORIDE 10 MG/ML
INJECTION, SOLUTION EPIDURAL; INFILTRATION; INTRACAUDAL; PERINEURAL
Status: COMPLETED | OUTPATIENT
Start: 2024-10-09 | End: 2024-10-09

## 2024-10-09 RX ORDER — DOCUSATE SODIUM 100 MG/1
100 CAPSULE, LIQUID FILLED ORAL 2 TIMES DAILY
COMMUNITY

## 2024-10-09 RX ORDER — BUPIVACAINE HYDROCHLORIDE 5 MG/ML
INJECTION, SOLUTION EPIDURAL; INTRACAUDAL
Status: COMPLETED | OUTPATIENT
Start: 2024-10-09 | End: 2024-10-09

## 2024-10-09 RX ADMIN — LIDOCAINE HYDROCHLORIDE 5 ML: 10 INJECTION, SOLUTION EPIDURAL; INFILTRATION; INTRACAUDAL at 12:03

## 2024-10-09 RX ADMIN — IOHEXOL 3 ML: 180 INJECTION INTRAVENOUS at 12:04

## 2024-10-09 RX ADMIN — BUPIVACAINE HYDROCHLORIDE 5 ML: 5 INJECTION, SOLUTION EPIDURAL; INTRACAUDAL; PERINEURAL at 12:06

## 2024-10-09 ASSESSMENT — PAIN SCALES - GENERAL: PAINLEVEL_OUTOF10: 6

## 2024-10-09 ASSESSMENT — PAIN DESCRIPTION - DESCRIPTORS: DESCRIPTORS: GNAWING

## 2024-10-09 ASSESSMENT — PAIN - FUNCTIONAL ASSESSMENT: PAIN_FUNCTIONAL_ASSESSMENT: 0-10

## 2024-10-09 NOTE — INTERVAL H&P NOTE
Update History & Physical    The patient's History and Physical of September 12, 2024 was reviewed with the patient and I examined the patient. There was no change. The surgical site was confirmed by the patient and me.     Plan: The risks, benefits, expected outcome, and alternative to the recommended procedure have been discussed with the patient. Patient understands and wants to proceed with the procedure.     ASA 3  MP 3    Electronically signed by Kervin Traore MD on 10/9/2024 at 11:32 AM

## 2024-10-09 NOTE — DISCHARGE INSTRUCTIONS
Discharge Instructions following Sedation or Anesthesia:  You have  received  a sedative/anesthetic therefore, you should not consume any alcoholic beverages for minimum of 12 hours.  Do not drive or operate machinery for 24 hours.  Do not sign legal documents for 24 hours.  Dizziness, drowsiness, and unsteadiness may occur.  Rest when need to.  Increase diet as tolerated.  Keep up on fluids if diet allows.      General Instructions:  Do not take a tub bath for 72 hours after procedure (this includes hot tubs and swimming pools).  You may shower, but avoid hot water to injection site.   Avoid strenuous activity TODAY especially if you experience dizziness.   Remove band-aid the next day.  Wash off any residual iodine   Do not use heat, heating pad, or any other heating device over the injection site for 3 days after the procedure.  If you experience pain after your procedure, you may continue with your current pain medication as prescribed.  (DO NOT INCREASE YOUR PAIN MEDICATION WITHOUT TALKING TO DOCTOR)  Soreness and pain at injection site is common, may use ice to reduce soreness.    Please complete pain diary as instructed.     Call [facility] at [phone] if you experience:   Fever, chills or temperature over 100    Vomiting, Headache, persistent stiff neck, nausea, blurred vision   Difficulty in urinating or unable to urinate with 8 hours   Increase in weakness, numbness or loss of function   Increased redness, swelling or drainage at the injection site

## 2024-10-10 ENCOUNTER — TELEPHONE (OUTPATIENT)
Dept: PAIN MANAGEMENT | Age: 88
End: 2024-10-10

## 2024-10-10 NOTE — TELEPHONE ENCOUNTER
Procedure: ENMA MBB L4,5,S1    DOS: 10/9/2024    Pain level before procedure with activity: 8    Pain with activity after procedure: 3    What activities done the day of procedure: walked around the house     What percentage of  pain relief from   procedure did you receive: 50%    Success: No     OV Scheduled: 11/7/2024 for office visit

## 2024-10-13 DIAGNOSIS — N39.45 CONTINUOUS LEAKAGE OF URINE: ICD-10-CM

## 2024-10-14 RX ORDER — SOLIFENACIN SUCCINATE 10 MG/1
10 TABLET, FILM COATED ORAL DAILY
Qty: 90 TABLET | Refills: 1 | Status: SHIPPED | OUTPATIENT
Start: 2024-10-14

## 2024-10-14 NOTE — TELEPHONE ENCOUNTER
Last visit: 08/19/2024  Last Med refill: 07/15/2024  Does patient have enough medication for 72 hours:     Next Visit Date:  Future Appointments   Date Time Provider Department Center   11/7/2024 11:40 AM Kervin Traore MD [facility] [place]   11/26/2024  1:45 PM Pauly Bond MD [facility] DEP       Health Maintenance   Topic Date Due    DTaP/Tdap/Td vaccine (1 - Tdap) Never done    Annual Wellness Visit (Medicare)  05/17/2024    Flu vaccine (1) 08/01/2024    COVID-19 Vaccine (5 - 2023-24 season) 09/01/2024    Shingles vaccine (1 of 2) 05/28/2025 (Originally 12/8/1984)    Respiratory Syncytial Virus (RSV) Pregnant or age 60 yrs+ (1 - 1-dose 60+ series) 05/28/2025 (Originally 12/8/1994)    Depression Screen  01/23/2025    Lipids  02/05/2025    Pneumococcal 65+ years Vaccine  Completed    Hepatitis A vaccine  Aged Out    Hepatitis B vaccine  Aged Out    Hib vaccine  Aged Out    Polio vaccine  Aged Out    Meningococcal (ACWY) vaccine  Aged Out    DEXA (modify frequency per FRAX score)  Discontinued       Hemoglobin A1C (%)   Date Value   08/19/2024 6.9   04/18/2024 6.2   02/05/2024 6.2 (H)             ( goal A1C is < 7)   No components found for: \"LABMICR\"  No components found for: \"LDLCHOLESTEROL\", \"LDLCALC\"    (goal LDL is <100)   AST (U/L)   Date Value   03/12/2024 14     ALT (U/L)   Date Value   03/12/2024 13     BUN (mg/dL)   Date Value   03/14/2024 18     BP Readings from Last 3 Encounters:   10/09/24 (!) 164/74   08/19/24 122/76   05/28/24 110/68          (goal 120/80)    All Future Testing planned in [facility]  Lab Frequency Next Occurrence   Lipid, Fasting Once 01/23/2024   Hemoglobin A1C Once 01/23/2024   Comprehensive Metabolic Panel Once 01/23/2024               Patient Active Problem List:     Ataxia     Multiple sclerosis (HCC)     Essential hypertension     Dyslipidemia due to type 2 diabetes mellitus (HCC)     Atelectasis     Debility     Abnormal gait     Actinic keratosis

## 2024-10-28 DIAGNOSIS — M15.9 GENERALIZED OSTEOARTHRITIS: ICD-10-CM

## 2024-10-28 RX ORDER — CELECOXIB 200 MG/1
200 CAPSULE ORAL DAILY
Qty: 90 CAPSULE | Refills: 1 | Status: SHIPPED | OUTPATIENT
Start: 2024-10-28

## 2024-10-28 NOTE — TELEPHONE ENCOUNTER
Last visit: 08/19/24  Last Med refill: 07/27/24  Does patient have enough medication for 72 hours: Yes    Next Visit Date:  Future Appointments   Date Time Provider Department Center   11/7/2024 11:40 AM Kervin Traore MD [facility] [place]   11/26/2024  1:45 PM Pauly Bond MD [facility] DEP       Health Maintenance   Topic Date Due    DTaP/Tdap/Td vaccine (1 - Tdap) Never done    Annual Wellness Visit (Medicare)  05/17/2024    Flu vaccine (1) 08/01/2024    COVID-19 Vaccine (5 - 2023-24 season) 09/01/2024    Shingles vaccine (1 of 2) 05/28/2025 (Originally 12/8/1984)    Respiratory Syncytial Virus (RSV) Pregnant or age 60 yrs+ (1 - 1-dose 60+ series) 05/28/2025 (Originally 12/8/1994)    Depression Screen  01/23/2025    Lipids  02/05/2025    Pneumococcal 65+ years Vaccine  Completed    Hepatitis A vaccine  Aged Out    Hepatitis B vaccine  Aged Out    Hib vaccine  Aged Out    Polio vaccine  Aged Out    Meningococcal (ACWY) vaccine  Aged Out    DEXA (modify frequency per FRAX score)  Discontinued       Hemoglobin A1C (%)   Date Value   08/19/2024 6.9   04/18/2024 6.2   02/05/2024 6.2 (H)             ( goal A1C is < 7)   No components found for: \"LABMICR\"  No components found for: \"LDLCHOLESTEROL\", \"LDLCALC\"    (goal LDL is <100)   AST (U/L)   Date Value   03/12/2024 14     ALT (U/L)   Date Value   03/12/2024 13     BUN (mg/dL)   Date Value   03/14/2024 18     BP Readings from Last 3 Encounters:   10/09/24 (!) 164/74   08/19/24 122/76   05/28/24 110/68          (goal 120/80)    All Future Testing planned in [facility]  Lab Frequency Next Occurrence   Lipid, Fasting Once 01/23/2024   Hemoglobin A1C Once 01/23/2024   Comprehensive Metabolic Panel Once 01/23/2024               Patient Active Problem List:     Ataxia     Multiple sclerosis (HCC)     Essential hypertension     Dyslipidemia due to type 2 diabetes mellitus (HCC)     Atelectasis     Debility     Abnormal gait     Actinic keratosis

## 2024-11-13 DIAGNOSIS — G89.29 CHRONIC MIDLINE LOW BACK PAIN WITH LEFT-SIDED SCIATICA: ICD-10-CM

## 2024-11-13 DIAGNOSIS — M54.42 CHRONIC MIDLINE LOW BACK PAIN WITH LEFT-SIDED SCIATICA: ICD-10-CM

## 2024-11-13 RX ORDER — PREDNISONE 10 MG/1
10 TABLET ORAL DAILY
Qty: 90 TABLET | Refills: 1 | Status: SHIPPED | OUTPATIENT
Start: 2024-11-13

## 2024-11-13 NOTE — TELEPHONE ENCOUNTER
Last visit: 08/19/2024  Last Med refill: 05/20/2024  Does patient have enough medication for 72 hours: No    Next Visit Date:  Future Appointments   Date Time Provider Department Center   11/26/2024  1:45 PM Pauly Bond MD [facility] DE[facility]       Health Maintenance   Topic Date Due    DTaP/Tdap/Td vaccine (1 - Tdap) Never done    Annual Wellness Visit (Medicare)  05/17/2024    Flu vaccine (1) 08/01/2024    COVID-19 Vaccine (5 - 2023-24 season) 09/01/2024    Shingles vaccine (1 of 2) 05/28/2025 (Originally 12/8/1984)    Respiratory Syncytial Virus (RSV) Pregnant or age 60 yrs+ (1 - 1-dose 60+ series) 05/28/2025 (Originally 12/8/1994)    Depression Screen  01/23/2025    Lipids  02/05/2025    Pneumococcal 65+ years Vaccine  Completed    Hepatitis A vaccine  Aged Out    Hepatitis B vaccine  Aged Out    Hib vaccine  Aged Out    Polio vaccine  Aged Out    Meningococcal (ACWY) vaccine  Aged Out    DEXA (modify frequency per FRAX score)  Discontinued       Hemoglobin A1C (%)   Date Value   08/19/2024 6.9   04/18/2024 6.2   02/05/2024 6.2 (H)             ( goal A1C is < 7)   No components found for: \"LABMICR\"  No components found for: \"LDLCHOLESTEROL\", \"LDLCALC\"    (goal LDL is <100)   AST (U/L)   Date Value   03/12/2024 14     ALT (U/L)   Date Value   03/12/2024 13     BUN (mg/dL)   Date Value   03/14/2024 18     BP Readings from Last 3 Encounters:   10/09/24 (!) 164/74   08/19/24 122/76   05/28/24 110/68          (goal 120/80)    All Future Testing planned in [facility]  Lab Frequency Next Occurrence   Lipid, Fasting Once 01/23/2024   Hemoglobin A1C Once 01/23/2024   Comprehensive Metabolic Panel Once 01/23/2024               Patient Active Problem List:     Ataxia     Multiple sclerosis (HCC)     Essential hypertension     Dyslipidemia due to type 2 diabetes mellitus (HCC)     Atelectasis     Debility     Abnormal gait     Actinic keratosis     Enthesopathy of hip region     Generalized osteoarthritis

## 2024-11-17 DIAGNOSIS — G89.29 CHRONIC MIDLINE LOW BACK PAIN WITH LEFT-SIDED SCIATICA: ICD-10-CM

## 2024-11-17 DIAGNOSIS — M54.42 CHRONIC MIDLINE LOW BACK PAIN WITH LEFT-SIDED SCIATICA: ICD-10-CM

## 2024-11-18 RX ORDER — TIZANIDINE 2 MG/1
2 TABLET ORAL NIGHTLY PRN
Qty: 30 TABLET | Refills: 0 | Status: SHIPPED | OUTPATIENT
Start: 2024-11-18

## 2024-11-18 NOTE — TELEPHONE ENCOUNTER
Last visit: 08/19/24  Last Med refill: 08/24/22  Does patient have enough medication for 72 hours: No:     Next Visit Date: PATIENT HAS AN APPT SCHEDULED ON 11/26/24  Future Appointments   Date Time Provider Department Center   11/26/2024  1:45 PM Pau[facility] MD [facility] DE[facility]       Health Maintenance   Topic Date Due    DTaP/Tdap/Td vaccine (1 - Tdap) Never done    Annual Wellness Visit (Medicare)  05/17/2024    Flu vaccine (1) 08/01/2024    COVID-19 Vaccine (5 - 2023-24 season) 09/01/2024    Shingles vaccine (1 of 2) 05/28/2025 (Originally 12/8/1984)    Respiratory Syncytial Virus (RSV) Pregnant or age 60 yrs+ (1 - 1-dose 75+ series) 05/28/2025 (Originally 12/8/2009)    Depression Screen  01/23/2025    Lipids  02/05/2025    Pneumococcal 65+ years Vaccine  Completed    Hepatitis A vaccine  Aged Out    Hepatitis B vaccine  Aged Out    Hib vaccine  Aged Out    Polio vaccine  Aged Out    Meningococcal (ACWY) vaccine  Aged Out    DEXA (modify frequency per FRAX score)  Discontinued       Hemoglobin A1C (%)   Date Value   08/19/2024 6.9   04/18/2024 6.2   02/05/2024 6.2 (H)             ( goal A1C is < 7)   No components found for: \"LABMICR\"  No components found for: \"LDLCHOLESTEROL\", \"LDLCALC\"    (goal LDL is <100)   AST (U/L)   Date Value   03/12/2024 14     ALT (U/L)   Date Value   03/12/2024 13     BUN (mg/dL)   Date Value   03/14/2024 18     BP Readings from Last 3 Encounters:   10/09/24 (!) 164/74   08/19/24 122/76   05/28/24 110/68          (goal 120/80)    All Future Testing planned in [facility]  Lab Frequency Next Occurrence   Lipid, Fasting Once 01/23/2024   Hemoglobin A1C Once 01/23/2024   Comprehensive Metabolic Panel Once 01/23/2024               Patient Active Problem List:     Ataxia     Multiple sclerosis (HCC)     Essential hypertension     Dyslipidemia due to type 2 diabetes mellitus (HCC)     Atelectasis     Debility     Abnormal gait     Actinic keratosis     Enthesopathy of hip

## 2024-11-20 DIAGNOSIS — R42 DIZZINESS: ICD-10-CM

## 2024-11-20 RX ORDER — MECLIZINE HYDROCHLORIDE 25 MG/1
TABLET ORAL
Qty: 30 TABLET | Refills: 3 | Status: SHIPPED | OUTPATIENT
Start: 2024-11-20

## 2024-11-20 NOTE — TELEPHONE ENCOUNTER
Last visit: 08/19/2024  Last Med refill: 02/19/2024  Does patient have enough medication for 72 hours: No    Next Visit Date:  Future Appointments   Date Time Provider Department Center   11/26/2024  1:45 PM Pauly Bond MD [facility] DE[facility]       Health Maintenance   Topic Date Due    DTaP/Tdap/Td vaccine (1 - Tdap) Never done    Annual Wellness Visit (Medicare)  05/17/2024    Flu vaccine (1) 08/01/2024    COVID-19 Vaccine (5 - 2023-24 season) 09/01/2024    Shingles vaccine (1 of 2) 05/28/2025 (Originally 12/8/1984)    Respiratory Syncytial Virus (RSV) Pregnant or age 60 yrs+ (1 - 1-dose 75+ series) 05/28/2025 (Originally 12/8/2009)    Depression Screen  01/23/2025    Lipids  02/05/2025    Pneumococcal 65+ years Vaccine  Completed    Hepatitis A vaccine  Aged Out    Hepatitis B vaccine  Aged Out    Hib vaccine  Aged Out    Polio vaccine  Aged Out    Meningococcal (ACWY) vaccine  Aged Out    DEXA (modify frequency per FRAX score)  Discontinued       Hemoglobin A1C (%)   Date Value   08/19/2024 6.9   04/18/2024 6.2   02/05/2024 6.2 (H)             ( goal A1C is < 7)   No components found for: \"LABMICR\"  No components found for: \"LDLCHOLESTEROL\", \"LDLCALC\"    (goal LDL is <100)   AST (U/L)   Date Value   03/12/2024 14     ALT (U/L)   Date Value   03/12/2024 13     BUN (mg/dL)   Date Value   03/14/2024 18     BP Readings from Last 3 Encounters:   10/09/24 (!) 164/74   08/19/24 122/76   05/28/24 110/68          (goal 120/80)    All Future Testing planned in [facility]  Lab Frequency Next Occurrence   Lipid, Fasting Once 01/23/2024   Hemoglobin A1C Once 01/23/2024   Comprehensive Metabolic Panel Once 01/23/2024               Patient Active Problem List:     Ataxia     Multiple sclerosis (HCC)     Essential hypertension     Dyslipidemia due to type 2 diabetes mellitus (HCC)     Atelectasis     Debility     Abnormal gait     Actinic keratosis     Enthesopathy of hip region     Generalized osteoarthritis

## 2024-11-29 SDOH — HEALTH STABILITY: PHYSICAL HEALTH: ON AVERAGE, HOW MANY DAYS PER WEEK DO YOU ENGAGE IN MODERATE TO STRENUOUS EXERCISE (LIKE A BRISK WALK)?: 0 DAYS

## 2024-11-29 SDOH — HEALTH STABILITY: PHYSICAL HEALTH: ON AVERAGE, HOW MANY MINUTES DO YOU ENGAGE IN EXERCISE AT THIS LEVEL?: 0 MIN

## 2024-11-29 ASSESSMENT — PATIENT HEALTH QUESTIONNAIRE - PHQ9
SUM OF ALL RESPONSES TO PHQ QUESTIONS 1-9: 0
SUM OF ALL RESPONSES TO PHQ QUESTIONS 1-9: 0
2. FEELING DOWN, DEPRESSED OR HOPELESS: NOT AT ALL
SUM OF ALL RESPONSES TO PHQ QUESTIONS 1-9: 0
SUM OF ALL RESPONSES TO PHQ QUESTIONS 1-9: 0
1. LITTLE INTEREST OR PLEASURE IN DOING THINGS: NOT AT ALL
SUM OF ALL RESPONSES TO PHQ9 QUESTIONS 1 & 2: 0

## 2024-11-29 ASSESSMENT — LIFESTYLE VARIABLES
HOW OFTEN DO YOU HAVE SIX OR MORE DRINKS ON ONE OCCASION: 1
HOW OFTEN DO YOU HAVE A DRINK CONTAINING ALCOHOL: 2
HOW OFTEN DO YOU HAVE A DRINK CONTAINING ALCOHOL: MONTHLY OR LESS
HOW MANY STANDARD DRINKS CONTAINING ALCOHOL DO YOU HAVE ON A TYPICAL DAY: 0
HOW MANY STANDARD DRINKS CONTAINING ALCOHOL DO YOU HAVE ON A TYPICAL DAY: PATIENT DOES NOT DRINK

## 2024-12-02 ENCOUNTER — OFFICE VISIT (OUTPATIENT)
Dept: FAMILY MEDICINE CLINIC | Age: 88
End: 2024-12-02
Payer: MEDICARE

## 2024-12-02 VITALS
BODY MASS INDEX: 27.04 KG/M2 | HEIGHT: 58 IN | DIASTOLIC BLOOD PRESSURE: 86 MMHG | HEART RATE: 82 BPM | SYSTOLIC BLOOD PRESSURE: 124 MMHG | TEMPERATURE: 97 F | WEIGHT: 128.8 LBS | OXYGEN SATURATION: 92 %

## 2024-12-02 DIAGNOSIS — Z00.00 MEDICARE ANNUAL WELLNESS VISIT, SUBSEQUENT: Primary | ICD-10-CM

## 2024-12-02 DIAGNOSIS — Z13.6 SCREENING FOR CARDIOVASCULAR CONDITION: ICD-10-CM

## 2024-12-02 DIAGNOSIS — N18.32 TYPE 2 DIABETES MELLITUS WITH STAGE 3B CHRONIC KIDNEY DISEASE, WITHOUT LONG-TERM CURRENT USE OF INSULIN (HCC): ICD-10-CM

## 2024-12-02 DIAGNOSIS — G35 MULTIPLE SCLEROSIS (HCC): ICD-10-CM

## 2024-12-02 DIAGNOSIS — M54.42 CHRONIC MIDLINE LOW BACK PAIN WITH LEFT-SIDED SCIATICA: ICD-10-CM

## 2024-12-02 DIAGNOSIS — B96.89 ACUTE BACTERIAL SINUSITIS: ICD-10-CM

## 2024-12-02 DIAGNOSIS — J01.90 ACUTE BACTERIAL SINUSITIS: ICD-10-CM

## 2024-12-02 DIAGNOSIS — Z91.81 AT HIGH RISK FOR FALLS: ICD-10-CM

## 2024-12-02 DIAGNOSIS — M48.062 SPINAL STENOSIS OF LUMBAR REGION WITH NEUROGENIC CLAUDICATION: ICD-10-CM

## 2024-12-02 DIAGNOSIS — E11.22 TYPE 2 DIABETES MELLITUS WITH STAGE 3B CHRONIC KIDNEY DISEASE, WITHOUT LONG-TERM CURRENT USE OF INSULIN (HCC): ICD-10-CM

## 2024-12-02 DIAGNOSIS — G89.29 CHRONIC MIDLINE LOW BACK PAIN WITH LEFT-SIDED SCIATICA: ICD-10-CM

## 2024-12-02 DIAGNOSIS — R06.09 DYSPNEA ON EXERTION: ICD-10-CM

## 2024-12-02 DIAGNOSIS — R26.81 UNSTEADY GAIT WHEN WALKING: ICD-10-CM

## 2024-12-02 LAB — HBA1C MFR BLD: 6.7 %

## 2024-12-02 PROCEDURE — 1159F MED LIST DOCD IN RCRD: CPT | Performed by: INTERNAL MEDICINE

## 2024-12-02 PROCEDURE — 99214 OFFICE O/P EST MOD 30 MIN: CPT | Performed by: INTERNAL MEDICINE

## 2024-12-02 PROCEDURE — 83036 HEMOGLOBIN GLYCOSYLATED A1C: CPT | Performed by: INTERNAL MEDICINE

## 2024-12-02 PROCEDURE — 1123F ACP DISCUSS/DSCN MKR DOCD: CPT | Performed by: INTERNAL MEDICINE

## 2024-12-02 PROCEDURE — G0439 PPPS, SUBSEQ VISIT: HCPCS | Performed by: INTERNAL MEDICINE

## 2024-12-02 PROCEDURE — G8427 DOCREV CUR MEDS BY ELIG CLIN: HCPCS | Performed by: INTERNAL MEDICINE

## 2024-12-02 PROCEDURE — 1090F PRES/ABSN URINE INCON ASSESS: CPT | Performed by: INTERNAL MEDICINE

## 2024-12-02 PROCEDURE — 3044F HG A1C LEVEL LT 7.0%: CPT | Performed by: INTERNAL MEDICINE

## 2024-12-02 PROCEDURE — G8417 CALC BMI ABV UP PARAM F/U: HCPCS | Performed by: INTERNAL MEDICINE

## 2024-12-02 PROCEDURE — G8484 FLU IMMUNIZE NO ADMIN: HCPCS | Performed by: INTERNAL MEDICINE

## 2024-12-02 PROCEDURE — 1036F TOBACCO NON-USER: CPT | Performed by: INTERNAL MEDICINE

## 2024-12-02 PROCEDURE — G0446 INTENS BEHAVE THER CARDIO DX: HCPCS | Performed by: INTERNAL MEDICINE

## 2024-12-02 RX ORDER — FLUTICASONE FUROATE AND VILANTEROL 100; 25 UG/1; UG/1
1 POWDER RESPIRATORY (INHALATION) DAILY
Qty: 3 EACH | Refills: 1 | Status: SHIPPED | OUTPATIENT
Start: 2024-12-02

## 2024-12-02 RX ORDER — CEFDINIR 300 MG/1
300 CAPSULE ORAL 2 TIMES DAILY
Qty: 14 CAPSULE | Refills: 0 | Status: SHIPPED | OUTPATIENT
Start: 2024-12-02 | End: 2024-12-09

## 2024-12-02 NOTE — PROGRESS NOTES
Medicare Annual Wellness Visit    Nichelle Raza is here for Medicare AWV    Assessment & Plan   Medicare annual wellness visit, subsequent  Type 2 diabetes mellitus with stage 3b chronic kidney disease, without long-term current use of insulin (HCC)  -     POCT glycosylated hemoglobin (Hb A1C)  Dyspnea on exertion  -     fluticasone furoate-vilanterol (BREO ELLIPTA) 100-25 MCG/ACT inhaler; Inhale 1 puff into the lungs daily, Disp-3 each, R-1Normal  Multiple sclerosis (HCC)  -     External Referral To Home Health  Chronic midline low back pain with left-sided sciatica  -     External Referral To Home Health  Spinal stenosis of lumbar region with neurogenic claudication  -     External Referral To Home Health  Unsteady gait when walking  -     External Referral To Home Health  Acute bacterial sinusitis  -     cefdinir (OMNICEF) 300 MG capsule; Take 1 capsule by mouth 2 times daily for 7 days, Disp-14 capsule, R-0Normal  At high risk for falls  Screening for cardiovascular condition  -     [facility] Intensive Behavior Counseling for Cardiovascular Diseases, 8-15 minutes []    Recommendations for Preventive Services Due: see orders and patient instructions/AVS.  Recommended screening schedule for the next 5-10 years is provided to the patient in written form: see Patient Instructions/AVS.     No follow-ups on file.     Subjective   The following acute and/or chronic problems were also addressed today:  Back is stiff when she wakes up, has to walk from the front to the back door to start loosening up. Notes pain that wraps around to the front of her abdomen as she is walking around the house.   Has been needing her inhaler more than she has needed it before - 4-5 times a week, usually when she is nervous and leaving the house, but also when she is moving around the house  lately walking from living room .   Diabetes - doing well with current regimen. Denies hypoglycemia, hyperglycemia, fatigue, polyuria, polydipsia,

## 2024-12-09 ENCOUNTER — TELEPHONE (OUTPATIENT)
Dept: FAMILY MEDICINE CLINIC | Age: 88
End: 2024-12-09

## 2024-12-09 NOTE — TELEPHONE ENCOUNTER
Sheela from [facility] called in asking if the office note from 12/2/2024 be signed. To start care for pt.    Contact info   [phone]

## 2024-12-18 DIAGNOSIS — J45.20 MILD INTERMITTENT REACTIVE AIRWAY DISEASE WITHOUT COMPLICATION: ICD-10-CM

## 2024-12-18 RX ORDER — ALBUTEROL SULFATE 90 UG/1
2 INHALANT RESPIRATORY (INHALATION) EVERY 6 HOURS PRN
Qty: 8.5 G | OUTPATIENT
Start: 2024-12-18

## 2024-12-18 NOTE — TELEPHONE ENCOUNTER
Last visit: 12/2/24  Last Med refill: 12/2/23  Does patient have enough medication for 72 hours: No:     Next Visit Date:  Future Appointments   Date Time Provider Department Center   4/2/2025  1:45 PM Pauly Bond MD [facility]       Health Maintenance   Topic Date Due    DTaP/Tdap/Td vaccine (1 - Tdap) Never done    Shingles vaccine (1 of 2) 05/28/2025 (Originally 12/8/1984)    Respiratory Syncytial Virus (RSV) Pregnant or age 60 yrs+ (1 - 1-dose 75+ series) 05/28/2025 (Originally 12/8/2009)    Lipids  02/05/2025    COVID-19 Vaccine (6 - 2023-24 season) 02/14/2025    Depression Screen  11/29/2025    Annual Wellness Visit (Medicare)  12/03/2025    Flu vaccine  Completed    Pneumococcal 65+ years Vaccine  Completed    Hepatitis A vaccine  Aged Out    Hepatitis B vaccine  Aged Out    Hib vaccine  Aged Out    Polio vaccine  Aged Out    Meningococcal (ACWY) vaccine  Aged Out    DEXA (modify frequency per FRAX score)  Discontinued       Hemoglobin A1C (%)   Date Value   12/02/2024 6.7   08/19/2024 6.9   04/18/2024 6.2             ( goal A1C is < 7)   No components found for: \"LABMICR\"  No components found for: \"LDLCHOLESTEROL\", \"LDLCALC\"    (goal LDL is <100)   AST (U/L)   Date Value   03/12/2024 14     ALT (U/L)   Date Value   03/12/2024 13     BUN (mg/dL)   Date Value   03/14/2024 18     BP Readings from Last 3 Encounters:   12/02/24 124/86   10/09/24 (!) 164/74   08/19/24 122/76          (goal 120/80)    All Future Testing planned in [facility]  Lab Frequency Next Occurrence   Lipid, Fasting Once 01/23/2024   Hemoglobin A1C Once 01/23/2024   Comprehensive Metabolic Panel Once 01/23/2024               Patient Active Problem List:     Ataxia     Multiple sclerosis (HCC)     Essential hypertension     Dyslipidemia due to type 2 diabetes mellitus (HCC)     Atelectasis     Debility     Abnormal gait     Actinic keratosis     Enthesopathy of hip region     Generalized osteoarthritis     Idiopathic

## 2024-12-19 RX ORDER — ALBUTEROL SULFATE 90 UG/1
2 INHALANT RESPIRATORY (INHALATION) EVERY 6 HOURS PRN
Qty: 1 EACH | Refills: 5 | Status: SHIPPED | OUTPATIENT
Start: 2024-12-19 | End: 2025-12-19

## 2025-01-01 ENCOUNTER — TELEPHONE (OUTPATIENT)
Dept: FAMILY MEDICINE CLINIC | Age: 89
End: 2025-01-01

## 2025-01-01 ENCOUNTER — HOSPITAL ENCOUNTER (EMERGENCY)
Age: 89
End: 2025-08-03
Attending: STUDENT IN AN ORGANIZED HEALTH CARE EDUCATION/TRAINING PROGRAM
Payer: MEDICARE

## 2025-01-01 ENCOUNTER — OUTSIDE SERVICES (OUTPATIENT)
Dept: PRIMARY CARE CLINIC | Age: 89
End: 2025-01-01

## 2025-01-01 ENCOUNTER — APPOINTMENT (OUTPATIENT)
Dept: CT IMAGING | Age: 89
End: 2025-01-01
Payer: MEDICARE

## 2025-01-01 VITALS
OXYGEN SATURATION: 46 % | SYSTOLIC BLOOD PRESSURE: 204 MMHG | RESPIRATION RATE: 16 BRPM | HEART RATE: 70 BPM | DIASTOLIC BLOOD PRESSURE: 39 MMHG

## 2025-01-01 DIAGNOSIS — I46.9 CARDIAC ARREST (HCC): ICD-10-CM

## 2025-01-01 DIAGNOSIS — R34 DECREASED URINATION: ICD-10-CM

## 2025-01-01 DIAGNOSIS — G44.229 CHRONIC TENSION-TYPE HEADACHE, NOT INTRACTABLE: Primary | ICD-10-CM

## 2025-01-01 DIAGNOSIS — I44.2 HEART BLOCK AV THIRD DEGREE (HCC): Primary | ICD-10-CM

## 2025-01-01 DIAGNOSIS — K13.70 ORAL LESION: Primary | ICD-10-CM

## 2025-01-01 DIAGNOSIS — I50.32 CHRONIC DIASTOLIC CONGESTIVE HEART FAILURE (HCC): ICD-10-CM

## 2025-01-01 DIAGNOSIS — E87.1 HYPONATREMIA: ICD-10-CM

## 2025-01-01 DIAGNOSIS — R09.02 HYPOXIA: ICD-10-CM

## 2025-01-01 DIAGNOSIS — E11.9 TYPE 2 DIABETES MELLITUS WITHOUT COMPLICATION, WITHOUT LONG-TERM CURRENT USE OF INSULIN (HCC): ICD-10-CM

## 2025-01-01 DIAGNOSIS — N39.0 URINARY TRACT INFECTION WITHOUT HEMATURIA, SITE UNSPECIFIED: ICD-10-CM

## 2025-01-01 LAB
ALBUMIN SERPL-MCNC: 3.3 G/DL (ref 3.5–5.2)
ALBUMIN/GLOB SERPL: 1.4 {RATIO} (ref 1–2.5)
ALP SERPL-CCNC: 90 U/L (ref 35–104)
ALT SERPL-CCNC: 28 U/L (ref 10–35)
ANION GAP SERPL CALCULATED.3IONS-SCNC: 22 MMOL/L (ref 9–16)
AST SERPL-CCNC: 44 U/L (ref 10–35)
BACTERIA URNS QL MICRO: NORMAL
BASOPHILS # BLD: 0.11 K/UL (ref 0–0.2)
BASOPHILS NFR BLD: 1 % (ref 0–2)
BILIRUB SERPL-MCNC: 0.2 MG/DL (ref 0–1.2)
BILIRUB UR QL STRIP: ABNORMAL
BUN BLD-MCNC: 30 MG/DL (ref 8–26)
BUN SERPL-MCNC: 30 MG/DL (ref 8–23)
CA-I BLD-SCNC: 1.26 MMOL/L (ref 1.15–1.33)
CALCIUM SERPL-MCNC: 9.2 MG/DL (ref 8.6–10.4)
CASTS #/AREA URNS LPF: NORMAL /LPF (ref 0–8)
CHLORIDE BLD-SCNC: 94 MMOL/L (ref 98–107)
CHLORIDE SERPL-SCNC: 92 MMOL/L (ref 98–107)
CLARITY UR: CLEAR
CO2 BLD CALC-SCNC: 20 MMOL/L (ref 22–30)
CO2 SERPL-SCNC: 18 MMOL/L (ref 20–31)
COLOR UR: ABNORMAL
CREAT SERPL-MCNC: 1.6 MG/DL (ref 0.6–0.9)
D DIMER PPP FEU-MCNC: 1.53 UG/ML FEU (ref 0–0.57)
EGFR, POC: 33 ML/MIN/1.73M2
EOSINOPHIL # BLD: 0.09 K/UL (ref 0–0.44)
EOSINOPHILS RELATIVE PERCENT: 1 % (ref 1–4)
EPI CELLS #/AREA URNS HPF: NORMAL /HPF (ref 0–5)
ERYTHROCYTE [DISTWIDTH] IN BLOOD BY AUTOMATED COUNT: 15.9 % (ref 11.8–14.4)
GFR, ESTIMATED: 30 ML/MIN/1.73M2
GLUCOSE BLD-MCNC: 535 MG/DL (ref 74–100)
GLUCOSE SERPL-MCNC: 503 MG/DL (ref 74–99)
GLUCOSE UR STRIP-MCNC: NEGATIVE MG/DL
HCO3 VENOUS: 19.8 MMOL/L (ref 22–29)
HCT VFR BLD AUTO: 29.2 % (ref 36.3–47.1)
HCT VFR BLD AUTO: 31 % (ref 36–46)
HGB BLD-MCNC: 9 G/DL (ref 11.9–15.1)
HGB UR QL STRIP.AUTO: NEGATIVE
IMM GRANULOCYTES # BLD AUTO: 0.17 K/UL (ref 0–0.3)
IMM GRANULOCYTES NFR BLD: 2 %
INR PPP: 1
KETONES UR STRIP-MCNC: NEGATIVE MG/DL
LACTIC ACID, SEPSIS WHOLE BLOOD: 12.1 MMOL/L (ref 0.5–1.9)
LEUKOCYTE ESTERASE UR QL STRIP: ABNORMAL
LYMPHOCYTES NFR BLD: 3.1 K/UL (ref 1.1–3.7)
LYMPHOCYTES RELATIVE PERCENT: 27 % (ref 24–43)
MCH RBC QN AUTO: 27.7 PG (ref 25.2–33.5)
MCHC RBC AUTO-ENTMCNC: 30.8 G/DL (ref 28.4–34.8)
MCV RBC AUTO: 89.8 FL (ref 82.6–102.9)
MONOCYTES NFR BLD: 1.13 K/UL (ref 0.1–1.2)
MONOCYTES NFR BLD: 10 % (ref 3–12)
NEGATIVE BASE EXCESS, VEN: 10 MMOL/L (ref 0–2)
NEUTROPHILS NFR BLD: 59 % (ref 36–65)
NEUTS SEG NFR BLD: 6.78 K/UL (ref 1.5–8.1)
NITRITE UR QL STRIP: POSITIVE
NRBC BLD-RTO: 0 PER 100 WBC
O2 SAT, VEN: 8.3 % (ref 60–85)
PARTIAL THROMBOPLASTIN TIME: 27.8 SEC (ref 23–36.5)
PCO2 VENOUS: 62.9 MM HG (ref 41–51)
PH UR STRIP: 5 [PH] (ref 5–8)
PH VENOUS: 7.11 (ref 7.32–7.43)
PLATELET # BLD AUTO: 315 K/UL (ref 138–453)
PMV BLD AUTO: 10.4 FL (ref 8.1–13.5)
PO2 VENOUS: 12.4 MM HG (ref 30–50)
POC ANION GAP: 22 MMOL/L (ref 7–16)
POC CREATININE: 1.5 MG/DL (ref 0.51–1.19)
POC HEMOGLOBIN (CALC): 10.6 G/DL (ref 12–16)
POC LACTIC ACID: 18.7 MMOL/L (ref 0.56–1.39)
POTASSIUM BLD-SCNC: 5.8 MMOL/L (ref 3.5–4.5)
POTASSIUM SERPL-SCNC: 5.9 MMOL/L (ref 3.7–5.3)
PROT SERPL-MCNC: 5.7 G/DL (ref 6.6–8.7)
PROT UR STRIP-MCNC: NEGATIVE MG/DL
PROTHROMBIN TIME: 13.7 SEC (ref 11.7–14.9)
RBC # BLD AUTO: 3.25 M/UL (ref 3.95–5.11)
RBC # BLD: ABNORMAL 10*6/UL
RBC #/AREA URNS HPF: NORMAL /HPF (ref 0–4)
SODIUM BLD-SCNC: 135 MMOL/L (ref 138–146)
SODIUM SERPL-SCNC: 132 MMOL/L (ref 136–145)
SP GR UR STRIP: 1.02 (ref 1–1.03)
TROPONIN I SERPL HS-MCNC: 62 NG/L (ref 0–14)
UROBILINOGEN UR STRIP-ACNC: NORMAL EU/DL (ref 0–1)
WBC #/AREA URNS HPF: NORMAL /HPF (ref 0–5)
WBC OTHER # BLD: 11.4 K/UL (ref 3.5–11.3)

## 2025-01-01 PROCEDURE — 83605 ASSAY OF LACTIC ACID: CPT

## 2025-01-01 PROCEDURE — 81001 URINALYSIS AUTO W/SCOPE: CPT

## 2025-01-01 PROCEDURE — 85025 COMPLETE CBC W/AUTO DIFF WBC: CPT

## 2025-01-01 PROCEDURE — 82803 BLOOD GASES ANY COMBINATION: CPT

## 2025-01-01 PROCEDURE — 84520 ASSAY OF UREA NITROGEN: CPT

## 2025-01-01 PROCEDURE — 85730 THROMBOPLASTIN TIME PARTIAL: CPT

## 2025-01-01 PROCEDURE — 82947 ASSAY GLUCOSE BLOOD QUANT: CPT

## 2025-01-01 PROCEDURE — 80051 ELECTROLYTE PANEL: CPT

## 2025-01-01 PROCEDURE — 99284 EMERGENCY DEPT VISIT MOD MDM: CPT | Performed by: STUDENT IN AN ORGANIZED HEALTH CARE EDUCATION/TRAINING PROGRAM

## 2025-01-01 PROCEDURE — 85379 FIBRIN DEGRADATION QUANT: CPT

## 2025-01-01 PROCEDURE — 84484 ASSAY OF TROPONIN QUANT: CPT

## 2025-01-01 PROCEDURE — 80053 COMPREHEN METABOLIC PANEL: CPT

## 2025-01-01 PROCEDURE — 85014 HEMATOCRIT: CPT

## 2025-01-01 PROCEDURE — 92950 HEART/LUNG RESUSCITATION CPR: CPT | Performed by: STUDENT IN AN ORGANIZED HEALTH CARE EDUCATION/TRAINING PROGRAM

## 2025-01-01 PROCEDURE — 82565 ASSAY OF CREATININE: CPT

## 2025-01-01 PROCEDURE — 92950 HEART/LUNG RESUSCITATION CPR: CPT

## 2025-01-01 PROCEDURE — 82330 ASSAY OF CALCIUM: CPT

## 2025-01-01 PROCEDURE — 85610 PROTHROMBIN TIME: CPT

## 2025-01-01 PROCEDURE — 2500000003 HC RX 250 WO HCPCS

## 2025-01-01 RX ORDER — 0.9 % SODIUM CHLORIDE 0.9 %
1000 INTRAVENOUS SOLUTION INTRAVENOUS ONCE
Status: DISCONTINUED | OUTPATIENT
Start: 2025-01-01 | End: 2025-01-01 | Stop reason: HOSPADM

## 2025-01-01 RX ORDER — CALCIUM GLUCONATE 20 MG/ML
2000 INJECTION, SOLUTION INTRAVENOUS ONCE
Status: DISCONTINUED | OUTPATIENT
Start: 2025-01-01 | End: 2025-01-01 | Stop reason: HOSPADM

## 2025-01-01 RX ORDER — CALCIUM GLUCONATE 20 MG/ML
INJECTION, SOLUTION INTRAVENOUS
Status: DISCONTINUED
Start: 2025-01-01 | End: 2025-01-01 | Stop reason: HOSPADM

## 2025-01-01 RX ORDER — PROPOFOL 10 MG/ML
INJECTION, EMULSION INTRAVENOUS
Status: DISCONTINUED
Start: 2025-01-01 | End: 2025-01-01 | Stop reason: HOSPADM

## 2025-01-01 RX ORDER — PROPOFOL 10 MG/ML
5-50 INJECTION, EMULSION INTRAVENOUS CONTINUOUS
Status: DISCONTINUED | OUTPATIENT
Start: 2025-01-01 | End: 2025-01-01 | Stop reason: HOSPADM

## 2025-01-01 RX ADMIN — Medication 50 MCG/HR: at 00:55

## 2025-01-01 ASSESSMENT — ENCOUNTER SYMPTOMS
BACK PAIN: 1
NAUSEA: 0
VOMITING: 0
SHORTNESS OF BREATH: 0
DIARRHEA: 0
COUGH: 0
NAUSEA: 0
SINUS PAIN: 0
DIARRHEA: 0

## 2025-01-01 ASSESSMENT — PULMONARY FUNCTION TESTS: PIF_VALUE: 24

## 2025-01-09 DIAGNOSIS — K21.9 GASTROESOPHAGEAL REFLUX DISEASE, UNSPECIFIED WHETHER ESOPHAGITIS PRESENT: ICD-10-CM

## 2025-01-09 NOTE — TELEPHONE ENCOUNTER
Last visit: 12/2/24  Last Med refill: 7/10/24  Does patient have enough medication for 72 hours: No:     Next Visit Date:  Future Appointments   Date Time Provider Department Center   4/2/2025  1:45 PM Pauly Bond MD [facility] DE[facility]       Health Maintenance   Topic Date Due    DTaP/Tdap/Td vaccine (1 - Tdap) Never done    Lipids  02/05/2025    Shingles vaccine (1 of 2) 05/28/2025 (Originally 12/8/1984)    Respiratory Syncytial Virus (RSV) Pregnant or age 60 yrs+ (1 - 1-dose 75+ series) 05/28/2025 (Originally 12/8/2009)    COVID-19 Vaccine (6 - 2023-24 season) 02/14/2025    Depression Screen  11/29/2025    Annual Wellness Visit (Medicare)  12/03/2025    Flu vaccine  Completed    Pneumococcal 65+ years Vaccine  Completed    Hepatitis A vaccine  Aged Out    Hepatitis B vaccine  Aged Out    Hib vaccine  Aged Out    Polio vaccine  Aged Out    Meningococcal (ACWY) vaccine  Aged Out    DEXA (modify frequency per FRAX score)  Discontinued       Hemoglobin A1C (%)   Date Value   12/02/2024 6.7   08/19/2024 6.9   04/18/2024 6.2             ( goal A1C is < 7)   No components found for: \"LABMICR\"  No components found for: \"LDLCHOLESTEROL\", \"LDLCALC\"    (goal LDL is <100)   AST (U/L)   Date Value   03/12/2024 14     ALT (U/L)   Date Value   03/12/2024 13     BUN (mg/dL)   Date Value   03/14/2024 18     BP Readings from Last 3 Encounters:   12/02/24 124/86   10/09/24 (!) 164/74   08/19/24 122/76          (goal 120/80)    All Future Testing planned in [facility]  Lab Frequency Next Occurrence   Lipid, Fasting Once 01/23/2024   Hemoglobin A1C Once 01/23/2024   Comprehensive Metabolic Panel Once 01/23/2024               Patient Active Problem List:     Ataxia     Multiple sclerosis (HCC)     Essential hypertension     Dyslipidemia due to type 2 diabetes mellitus (HCC)     Atelectasis     Debility     Abnormal gait     Actinic keratosis     Enthesopathy of hip region     Generalized osteoarthritis     Idiopathic

## 2025-01-21 ENCOUNTER — PATIENT MESSAGE (OUTPATIENT)
Dept: FAMILY MEDICINE CLINIC | Age: 89
End: 2025-01-21

## 2025-01-21 ENCOUNTER — E-VISIT (OUTPATIENT)
Dept: FAMILY MEDICINE CLINIC | Age: 89
End: 2025-01-21

## 2025-01-21 DIAGNOSIS — G35 MULTIPLE SCLEROSIS (HCC): Primary | ICD-10-CM

## 2025-01-21 DIAGNOSIS — R42 DIZZINESS: Primary | ICD-10-CM

## 2025-01-21 RX ORDER — METHYLPREDNISOLONE 4 MG/1
TABLET ORAL
Qty: 1 KIT | Refills: 0 | Status: SHIPPED | OUTPATIENT
Start: 2025-01-21

## 2025-01-21 NOTE — PROGRESS NOTES
On this date 1/21/2025 I have spent 8 minutes reviewing evisit questionnaire, previous notes, test results as well as documenting on the day of the visit.

## 2025-02-19 DIAGNOSIS — M54.42 CHRONIC MIDLINE LOW BACK PAIN WITH LEFT-SIDED SCIATICA: ICD-10-CM

## 2025-02-19 DIAGNOSIS — G89.29 CHRONIC MIDLINE LOW BACK PAIN WITH LEFT-SIDED SCIATICA: ICD-10-CM

## 2025-02-19 RX ORDER — AMITRIPTYLINE HYDROCHLORIDE 100 MG/1
TABLET ORAL
Qty: 90 TABLET | Refills: 1 | Status: SHIPPED | OUTPATIENT
Start: 2025-02-19

## 2025-02-19 NOTE — TELEPHONE ENCOUNTER
Last visit: 12/2/24  Last Med refill: 11/18/24  Does patient have enough medication for 72 hours: No:     Next Visit Date:  Future Appointments   Date Time Provider Department Center   4/2/2025  1:45 PM Pauly Bond MD [facility] DEP       Health Maintenance   Topic Date Due    DTaP/Tdap/Td vaccine (1 - Tdap) Never done    Lipids  02/05/2025    Shingles vaccine (1 of 2) 05/28/2025 (Originally 12/8/1984)    Respiratory Syncytial Virus (RSV) Pregnant or age 60 yrs+ (1 - 1-dose 75+ series) 05/28/2025 (Originally 12/8/2009)    Depression Screen  11/29/2025    Annual Wellness Visit (Medicare)  12/03/2025    Flu vaccine  Completed    Pneumococcal 50+ years Vaccine  Completed    COVID-19 Vaccine  Completed    Hepatitis A vaccine  Aged Out    Hepatitis B vaccine  Aged Out    Hib vaccine  Aged Out    Polio vaccine  Aged Out    Meningococcal (ACWY) vaccine  Aged Out    DEXA (modify frequency per FRAX score)  Discontinued       Hemoglobin A1C (%)   Date Value   12/02/2024 6.7   08/19/2024 6.9   04/18/2024 6.2             ( goal A1C is < 7)   No components found for: \"LABMICR\"  No components found for: \"LDLCHOLESTEROL\", \"LDLCALC\"    (goal LDL is <100)   AST (U/L)   Date Value   03/12/2024 14     ALT (U/L)   Date Value   03/12/2024 13     BUN (mg/dL)   Date Value   03/14/2024 18     BP Readings from Last 3 Encounters:   12/02/24 124/86   10/09/24 (!) 164/74   08/19/24 122/76          (goal 120/80)    All Future Testing planned in [facility]  Lab Frequency Next Occurrence               Patient Active Problem List:     Ataxia     Multiple sclerosis (HCC)     Essential hypertension     Dyslipidemia due to type 2 diabetes mellitus (HCC)     Atelectasis     Debility     Abnormal gait     Actinic keratosis     Enthesopathy of hip region     Generalized osteoarthritis     Idiopathic peripheral neuropathy     Vitamin D deficiency     Primary localized osteoarthrosis of the hip, left     Primary osteoarthritis of left

## 2025-02-19 NOTE — TELEPHONE ENCOUNTER
Last visit: 01/21/25  Last Med refill: 11/21/24  Does patient have enough medication for 72 hours: Yes    Next Visit Date:  Future Appointments   Date Time Provider Department Center   4/2/2025  1:45 PM Pauly Bond MD [facility] DE[facility]       Health Maintenance   Topic Date Due    DTaP/Tdap/Td vaccine (1 - Tdap) Never done    Lipids  02/05/2025    Shingles vaccine (1 of 2) 05/28/2025 (Originally 12/8/1984)    Respiratory Syncytial Virus (RSV) Pregnant or age 60 yrs+ (1 - 1-dose 75+ series) 05/28/2025 (Originally 12/8/2009)    Depression Screen  11/29/2025    Annual Wellness Visit (Medicare)  12/03/2025    Flu vaccine  Completed    Pneumococcal 50+ years Vaccine  Completed    COVID-19 Vaccine  Completed    Hepatitis A vaccine  Aged Out    Hepatitis B vaccine  Aged Out    Hib vaccine  Aged Out    Polio vaccine  Aged Out    Meningococcal (ACWY) vaccine  Aged Out    DEXA (modify frequency per FRAX score)  Discontinued       Hemoglobin A1C (%)   Date Value   12/02/2024 6.7   08/19/2024 6.9   04/18/2024 6.2             ( goal A1C is < 7)   No components found for: \"LABMICR\"  No components found for: \"LDLCHOLESTEROL\", \"LDLCALC\"    (goal LDL is <100)   AST (U/L)   Date Value   03/12/2024 14     ALT (U/L)   Date Value   03/12/2024 13     BUN (mg/dL)   Date Value   03/14/2024 18     BP Readings from Last 3 Encounters:   12/02/24 124/86   10/09/24 (!) 164/74   08/19/24 122/76          (goal 120/80)    All Future Testing planned in [facility]  Lab Frequency Next Occurrence               Patient Active Problem List:     Ataxia     Multiple sclerosis (HCC)     Essential hypertension     Dyslipidemia due to type 2 diabetes mellitus (HCC)     Atelectasis     Debility     Abnormal gait     Actinic keratosis     Enthesopathy of hip region     Generalized osteoarthritis     Idiopathic peripheral neuropathy     Vitamin D deficiency     Primary localized osteoarthrosis of the hip, left     Primary osteoarthritis of left

## 2025-02-20 RX ORDER — TIZANIDINE 2 MG/1
2 TABLET ORAL NIGHTLY PRN
Qty: 30 TABLET | Refills: 0 | Status: SHIPPED | OUTPATIENT
Start: 2025-02-20

## 2025-03-02 DIAGNOSIS — R42 DIZZINESS: ICD-10-CM

## 2025-03-03 RX ORDER — MECLIZINE HYDROCHLORIDE 25 MG/1
25 TABLET ORAL 3 TIMES DAILY PRN
Qty: 270 TABLET | Refills: 1 | Status: SHIPPED | OUTPATIENT
Start: 2025-03-03

## 2025-03-03 NOTE — TELEPHONE ENCOUNTER
Last visit: 01/21/2025  Last Med refill: 01/21/2025  Does patient have enough medication for 72 hours: Yes-    Patient comment: Can we get this in a 90 day supply       Next Visit Date:  Future Appointments   Date Time Provider Department Center   4/2/2025  1:45 PM Pauly [facility] MD [facility] DE[facility]       Health Maintenance   Topic Date Due    DTaP/Tdap/Td vaccine (1 - Tdap) Never done    Lipids  02/05/2025    Shingles vaccine (1 of 2) 05/28/2025 (Originally 12/8/1984)    Respiratory Syncytial Virus (RSV) Pregnant or age 60 yrs+ (1 - 1-dose 75+ series) 05/28/2025 (Originally 12/8/2009)    Depression Screen  11/29/2025    Annual Wellness Visit (Medicare)  12/03/2025    Flu vaccine  Completed    Pneumococcal 50+ years Vaccine  Completed    COVID-19 Vaccine  Completed    Hepatitis A vaccine  Aged Out    Hepatitis B vaccine  Aged Out    Hib vaccine  Aged Out    Polio vaccine  Aged Out    Meningococcal (ACWY) vaccine  Aged Out    DEXA (modify frequency per FRAX score)  Discontinued       Hemoglobin A1C (%)   Date Value   12/02/2024 6.7   08/19/2024 6.9   04/18/2024 6.2             ( goal A1C is < 7)   No components found for: \"LABMICR\"  No components found for: \"LDLCHOLESTEROL\", \"LDLCALC\"    (goal LDL is <100)   AST (U/L)   Date Value   03/12/2024 14     ALT (U/L)   Date Value   03/12/2024 13     BUN (mg/dL)   Date Value   03/14/2024 18     BP Readings from Last 3 Encounters:   12/02/24 124/86   10/09/24 (!) 164/74   08/19/24 122/76          (goal 120/80)    All Future Testing planned in [facility]  Lab Frequency Next Occurrence               Patient Active Problem List:     Ataxia     Multiple sclerosis (HCC)     Essential hypertension     Dyslipidemia due to type 2 diabetes mellitus (HCC)     Atelectasis     Debility     Abnormal gait     Actinic keratosis     Enthesopathy of hip region     Generalized osteoarthritis     Idiopathic peripheral neuropathy     Vitamin D deficiency     Primary localized

## 2025-03-05 DIAGNOSIS — I10 ESSENTIAL HYPERTENSION: ICD-10-CM

## 2025-03-05 DIAGNOSIS — E78.5 HYPERLIPIDEMIA, UNSPECIFIED HYPERLIPIDEMIA TYPE: ICD-10-CM

## 2025-03-05 RX ORDER — SIMVASTATIN 20 MG
20 TABLET ORAL
Qty: 90 TABLET | Refills: 1 | Status: SHIPPED | OUTPATIENT
Start: 2025-03-05

## 2025-03-05 RX ORDER — LISINOPRIL 40 MG/1
TABLET ORAL
Qty: 90 TABLET | Refills: 1 | Status: SHIPPED | OUTPATIENT
Start: 2025-03-05

## 2025-03-05 NOTE — TELEPHONE ENCOUNTER
Last visit: 12/2/24  Last Med refill: 9/5/24  Does patient have enough medication for 72 hours: No:     Next Visit Date:  Future Appointments   Date Time Provider Department Center   4/2/2025  1:45 PM Pauly Bond MD [facility] DE[facility]       Health Maintenance   Topic Date Due    DTaP/Tdap/Td vaccine (1 - Tdap) Never done    Lipids  02/05/2025    Shingles vaccine (1 of 2) 05/28/2025 (Originally 12/8/1984)    Respiratory Syncytial Virus (RSV) Pregnant or age 60 yrs+ (1 - 1-dose 75+ series) 05/28/2025 (Originally 12/8/2009)    Depression Screen  11/29/2025    Annual Wellness Visit (Medicare)  12/03/2025    Flu vaccine  Completed    Pneumococcal 50+ years Vaccine  Completed    COVID-19 Vaccine  Completed    Hepatitis A vaccine  Aged Out    Hepatitis B vaccine  Aged Out    Hib vaccine  Aged Out    Polio vaccine  Aged Out    Meningococcal (ACWY) vaccine  Aged Out    DEXA (modify frequency per FRAX score)  Discontinued       Hemoglobin A1C (%)   Date Value   12/02/2024 6.7   08/19/2024 6.9   04/18/2024 6.2             ( goal A1C is < 7)   No components found for: \"LABMICR\"  No components found for: \"LDLCHOLESTEROL\", \"LDLCALC\"    (goal LDL is <100)   AST (U/L)   Date Value   03/12/2024 14     ALT (U/L)   Date Value   03/12/2024 13     BUN (mg/dL)   Date Value   03/14/2024 18     BP Readings from Last 3 Encounters:   12/02/24 124/86   10/09/24 (!) 164/74   08/19/24 122/76          (goal 120/80)    All Future Testing planned in [facility]  Lab Frequency Next Occurrence               Patient Active Problem List:     Ataxia     Multiple sclerosis (HCC)     Essential hypertension     Dyslipidemia due to type 2 diabetes mellitus (HCC)     Atelectasis     Debility     Abnormal gait     Actinic keratosis     Enthesopathy of hip region     Generalized osteoarthritis     Idiopathic peripheral neuropathy     Vitamin D deficiency     Primary localized osteoarthrosis of the hip, left     Primary osteoarthritis of left hip

## 2025-03-30 DIAGNOSIS — I10 ESSENTIAL HYPERTENSION: ICD-10-CM

## 2025-03-30 SDOH — ECONOMIC STABILITY: INCOME INSECURITY: IN THE LAST 12 MONTHS, WAS THERE A TIME WHEN YOU WERE NOT ABLE TO PAY THE MORTGAGE OR RENT ON TIME?: NO

## 2025-03-30 SDOH — ECONOMIC STABILITY: FOOD INSECURITY: WITHIN THE PAST 12 MONTHS, THE FOOD YOU BOUGHT JUST DIDN'T LAST AND YOU DIDN'T HAVE MONEY TO GET MORE.: NEVER TRUE

## 2025-03-30 SDOH — ECONOMIC STABILITY: FOOD INSECURITY: WITHIN THE PAST 12 MONTHS, YOU WORRIED THAT YOUR FOOD WOULD RUN OUT BEFORE YOU GOT MONEY TO BUY MORE.: NEVER TRUE

## 2025-03-30 ASSESSMENT — PATIENT HEALTH QUESTIONNAIRE - PHQ9
1. LITTLE INTEREST OR PLEASURE IN DOING THINGS: NOT AT ALL
SUM OF ALL RESPONSES TO PHQ9 QUESTIONS 1 & 2: 0
SUM OF ALL RESPONSES TO PHQ QUESTIONS 1-9: 0
SUM OF ALL RESPONSES TO PHQ QUESTIONS 1-9: 0
2. FEELING DOWN, DEPRESSED OR HOPELESS: NOT AT ALL
2. FEELING DOWN, DEPRESSED OR HOPELESS: NOT AT ALL
SUM OF ALL RESPONSES TO PHQ QUESTIONS 1-9: 0
SUM OF ALL RESPONSES TO PHQ QUESTIONS 1-9: 0
1. LITTLE INTEREST OR PLEASURE IN DOING THINGS: NOT AT ALL

## 2025-03-31 RX ORDER — METOPROLOL SUCCINATE 50 MG/1
50 TABLET, EXTENDED RELEASE ORAL NIGHTLY
Qty: 90 TABLET | Refills: 1 | Status: SHIPPED | OUTPATIENT
Start: 2025-03-31

## 2025-03-31 NOTE — TELEPHONE ENCOUNTER
Last visit: 01/21/2025  Last Med refill: 04/18/2024  Does patient have enough medication for 72 hours: No    Next Visit Date:  Future Appointments   Date Time Provider Department Center   4/2/2025  1:45 PM Pauly Bond MD [facility] DEP       Health Maintenance   Topic Date Due    DTaP/Tdap/Td vaccine (1 - Tdap) Never done    Lipids  02/05/2025    Shingles vaccine (1 of 2) 05/28/2025 (Originally 12/8/1984)    Respiratory Syncytial Virus (RSV) Pregnant or age 60 yrs+ (1 - 1-dose 75+ series) 05/28/2025 (Originally 12/8/2009)    COVID-19 Vaccine (4 - 2024-25 season) 04/14/2025    Annual Wellness Visit (Medicare)  12/03/2025    Depression Screen  03/30/2026    Flu vaccine  Completed    Pneumococcal 50+ years Vaccine  Completed    Hepatitis A vaccine  Aged Out    Hepatitis B vaccine  Aged Out    Hib vaccine  Aged Out    Polio vaccine  Aged Out    Meningococcal (ACWY) vaccine  Aged Out    Meningococcal B vaccine  Aged Out    DEXA (modify frequency per FRAX score)  Discontinued       Hemoglobin A1C (%)   Date Value   12/02/2024 6.7   08/19/2024 6.9   04/18/2024 6.2             ( goal A1C is < 7)   No components found for: \"LABMICR\"  No components found for: \"LDLCHOLESTEROL\", \"LDLCALC\"    (goal LDL is <100)   AST (U/L)   Date Value   03/12/2024 14     ALT (U/L)   Date Value   03/12/2024 13     BUN (mg/dL)   Date Value   03/14/2024 18     BP Readings from Last 3 Encounters:   12/02/24 124/86   10/09/24 (!) 164/74   08/19/24 122/76          (goal 120/80)    All Future Testing planned in [facility]  Lab Frequency Next Occurrence               Patient Active Problem List:     Ataxia     Multiple sclerosis (HCC)     Essential hypertension     Dyslipidemia due to type 2 diabetes mellitus (HCC)     Atelectasis     Debility     Abnormal gait     Actinic keratosis     Enthesopathy of hip region     Generalized osteoarthritis     Idiopathic peripheral neuropathy     Vitamin D deficiency     Primary localized

## 2025-04-02 ENCOUNTER — OFFICE VISIT (OUTPATIENT)
Dept: FAMILY MEDICINE CLINIC | Age: 89
End: 2025-04-02

## 2025-04-02 VITALS
SYSTOLIC BLOOD PRESSURE: 138 MMHG | DIASTOLIC BLOOD PRESSURE: 86 MMHG | OXYGEN SATURATION: 93 % | HEART RATE: 77 BPM | TEMPERATURE: 97 F | WEIGHT: 124.2 LBS | BODY MASS INDEX: 25.96 KG/M2

## 2025-04-02 DIAGNOSIS — G81.94 LEFT HEMIPARESIS (HCC): ICD-10-CM

## 2025-04-02 DIAGNOSIS — I10 ESSENTIAL HYPERTENSION: ICD-10-CM

## 2025-04-02 DIAGNOSIS — Z13.0 SCREENING, ANEMIA, DEFICIENCY, IRON: ICD-10-CM

## 2025-04-02 DIAGNOSIS — E78.5 DYSLIPIDEMIA DUE TO TYPE 2 DIABETES MELLITUS (HCC): ICD-10-CM

## 2025-04-02 DIAGNOSIS — F11.20 OPIOID DEPENDENCE WITH CURRENT USE (HCC): ICD-10-CM

## 2025-04-02 DIAGNOSIS — M15.9 GENERALIZED OSTEOARTHRITIS: ICD-10-CM

## 2025-04-02 DIAGNOSIS — K21.9 GASTROESOPHAGEAL REFLUX DISEASE, UNSPECIFIED WHETHER ESOPHAGITIS PRESENT: ICD-10-CM

## 2025-04-02 DIAGNOSIS — M54.42 CHRONIC MIDLINE LOW BACK PAIN WITH LEFT-SIDED SCIATICA: ICD-10-CM

## 2025-04-02 DIAGNOSIS — E11.69 DYSLIPIDEMIA DUE TO TYPE 2 DIABETES MELLITUS (HCC): ICD-10-CM

## 2025-04-02 DIAGNOSIS — Z13.29 SCREENING FOR THYROID DISORDER: ICD-10-CM

## 2025-04-02 DIAGNOSIS — N18.32 TYPE 2 DIABETES MELLITUS WITH STAGE 3B CHRONIC KIDNEY DISEASE, WITHOUT LONG-TERM CURRENT USE OF INSULIN (HCC): ICD-10-CM

## 2025-04-02 DIAGNOSIS — I50.32 CHRONIC DIASTOLIC CONGESTIVE HEART FAILURE (HCC): ICD-10-CM

## 2025-04-02 DIAGNOSIS — L21.9 SEBORRHEIC DERMATITIS: Primary | ICD-10-CM

## 2025-04-02 DIAGNOSIS — G35 MULTIPLE SCLEROSIS (HCC): ICD-10-CM

## 2025-04-02 DIAGNOSIS — I50.22 CHRONIC SYSTOLIC (CONGESTIVE) HEART FAILURE: ICD-10-CM

## 2025-04-02 DIAGNOSIS — E11.22 TYPE 2 DIABETES MELLITUS WITH STAGE 3B CHRONIC KIDNEY DISEASE, WITHOUT LONG-TERM CURRENT USE OF INSULIN (HCC): ICD-10-CM

## 2025-04-02 DIAGNOSIS — G89.29 CHRONIC MIDLINE LOW BACK PAIN WITH LEFT-SIDED SCIATICA: ICD-10-CM

## 2025-04-02 DIAGNOSIS — N18.31 STAGE 3A CHRONIC KIDNEY DISEASE (HCC): ICD-10-CM

## 2025-04-02 DIAGNOSIS — N39.45 CONTINUOUS LEAKAGE OF URINE: ICD-10-CM

## 2025-04-02 LAB — HBA1C MFR BLD: 6.9 %

## 2025-04-02 RX ORDER — OMEPRAZOLE 20 MG/1
20 CAPSULE, DELAYED RELEASE ORAL DAILY
Qty: 90 CAPSULE | Refills: 1 | Status: SHIPPED | OUTPATIENT
Start: 2025-04-02

## 2025-04-02 RX ORDER — AMLODIPINE BESYLATE 5 MG/1
2.5 TABLET ORAL DAILY
Qty: 90 TABLET | Refills: 1 | Status: SHIPPED | OUTPATIENT
Start: 2025-04-02

## 2025-04-02 RX ORDER — SOLIFENACIN SUCCINATE 10 MG/1
10 TABLET, FILM COATED ORAL DAILY
Qty: 90 TABLET | Refills: 1 | Status: SHIPPED | OUTPATIENT
Start: 2025-04-02

## 2025-04-02 RX ORDER — KETOCONAZOLE 20 MG/G
CREAM TOPICAL
Qty: 30 G | Refills: 1 | Status: SHIPPED | OUTPATIENT
Start: 2025-04-02

## 2025-04-02 RX ORDER — PREDNISONE 10 MG/1
10 TABLET ORAL DAILY
Qty: 90 TABLET | Refills: 1 | Status: SHIPPED | OUTPATIENT
Start: 2025-04-02

## 2025-04-02 RX ORDER — CELECOXIB 200 MG/1
200 CAPSULE ORAL DAILY
Qty: 90 CAPSULE | Refills: 1 | Status: SHIPPED | OUTPATIENT
Start: 2025-04-02

## 2025-04-02 ASSESSMENT — VISUAL ACUITY: OU: 1

## 2025-04-02 NOTE — PROGRESS NOTES
[facility]  [facility]  [address] [place] [id number]  Dept: [phone]  Dept Fax: [phone]      Nichelle Raza is a 89 y.o. female who presents today for hermedical conditions/complaints as noted below.  Nichelle Raza is c/o of Hypertension (F/u), Diabetes (F/u), Rash (Has dry spot on the RT side of her nose, skin get flaky, no changes in size ), Tingling (About a month ago pt had some tingling in RT Arm, and a couple of weeks ago her whole body had some tingling ), and Pain (Having back and shoulder pain, thinks she may have pulled a muscle in her RT Arm, getting better )        Assessment/Plan:     1. Seborrheic dermatitis  -     ketoconazole (NIZORAL) 2 % cream; Apply topically daily., Disp-30 g, R-1, Normal  2. Essential hypertension  -     Comprehensive Metabolic Panel; Future  -     amLODIPine (NORVASC) 5 MG tablet; Take 0.5 tablets by mouth daily, Disp-90 tablet, R-1Normal  3. Generalized osteoarthritis  -     celecoxib (CELEBREX) 200 MG capsule; Take 1 capsule by mouth daily, Disp-90 capsule, R-1Normal  4. Gastroesophageal reflux disease, unspecified whether esophagitis present  -     omeprazole (PRILOSEC) 20 MG delayed release capsule; Take 1 capsule by mouth daily, Disp-90 capsule, R-1Normal  5. Continuous leakage of urine  -     solifenacin (VESICARE) 10 MG tablet; Take 1 tablet by mouth daily, Disp-90 tablet, R-1Normal  6. Chronic midline low back pain with left-sided sciatica  -     predniSONE (DELTASONE) 10 MG tablet; Take 1 tablet by mouth daily, Disp-90 tablet, R-1Normal  7. Dyslipidemia due to type 2 diabetes mellitus (HCC)  -     Lipid, Fasting; Future  8. Multiple sclerosis (HCC)  9. Chronic diastolic congestive heart failure (HCC)  10. Left hemiparesis (HCC)  11. Stage 3a chronic kidney disease (HCC)  -     Comprehensive Metabolic Panel; Future  12. Chronic systolic (congestive) heart failure  13. Type 2 diabetes mellitus with stage 3b chronic kidney

## 2025-04-04 ENCOUNTER — PATIENT MESSAGE (OUTPATIENT)
Dept: FAMILY MEDICINE CLINIC | Age: 89
End: 2025-04-04

## 2025-04-10 ENCOUNTER — PATIENT MESSAGE (OUTPATIENT)
Dept: FAMILY MEDICINE CLINIC | Age: 89
End: 2025-04-10

## 2025-04-15 RX ORDER — NYSTATIN 100000 [USP'U]/ML
500000 SUSPENSION ORAL 4 TIMES DAILY
Qty: 200 ML | Refills: 0 | Status: SHIPPED | OUTPATIENT
Start: 2025-04-15 | End: 2025-04-25

## 2025-05-06 DIAGNOSIS — I87.2 EDEMA OF RIGHT LOWER EXTREMITY DUE TO PERIPHERAL VENOUS INSUFFICIENCY: ICD-10-CM

## 2025-05-07 NOTE — TELEPHONE ENCOUNTER
Last visit: 4/2/25  Last Med refill: 6/24/24  Does patient have enough medication for 72 hours: No:     Next Visit Date:  Future Appointments   Date Time Provider Department Center   8/4/2025  1:00 PM Pauly Bond MD [facility] DE[facility]       Health Maintenance   Topic Date Due    DTaP/Tdap/Td vaccine (1 - Tdap) Never done    Lipids  02/05/2025    COVID-19 Vaccine (6 - 2024-25 season) 04/14/2025    Shingles vaccine (1 of 2) 05/28/2025 (Originally 12/8/1984)    Respiratory Syncytial Virus (RSV) Pregnant or age 60 yrs+ (1 - 1-dose 75+ series) 05/28/2025 (Originally 12/8/2009)    Annual Wellness Visit (Medicare)  12/03/2025    Depression Screen  03/30/2026    Flu vaccine  Completed    Pneumococcal 50+ years Vaccine  Completed    Hepatitis A vaccine  Aged Out    Hepatitis B vaccine  Aged Out    Hib vaccine  Aged Out    Polio vaccine  Aged Out    Meningococcal (ACWY) vaccine  Aged Out    Meningococcal B vaccine  Aged Out    DEXA (modify frequency per FRAX score)  Discontinued       Hemoglobin A1C (%)   Date Value   04/02/2025 6.9   12/02/2024 6.7   08/19/2024 6.9             ( goal A1C is < 7)   No components found for: \"LABMICR\"  No components found for: \"LDLCHOLESTEROL\", \"LDLCALC\"    (goal LDL is <100)   AST (U/L)   Date Value   03/12/2024 14     ALT (U/L)   Date Value   03/12/2024 13     BUN (mg/dL)   Date Value   03/14/2024 18     BP Readings from Last 3 Encounters:   04/02/25 138/86   12/02/24 124/86   10/09/24 (!) 164/74          (goal 120/80)    All Future Testing planned in [facility]  Lab Frequency Next Occurrence   Lipid, Fasting Once 04/02/2025   CBC with Auto Differential Once 04/02/2025   Comprehensive Metabolic Panel Once 04/02/2025   TSH reflex to FT4 Once 04/02/2025               Patient Active Problem List:     Ataxia     Multiple sclerosis (HCC)     Essential hypertension     Dyslipidemia due to type 2 diabetes mellitus (HCC)     Atelectasis     Debility     Abnormal gait     Actinic

## 2025-05-08 RX ORDER — FUROSEMIDE 20 MG/1
TABLET ORAL
Qty: 15 TABLET | Refills: 0 | Status: SHIPPED | OUTPATIENT
Start: 2025-05-08

## 2025-05-20 DIAGNOSIS — I87.2 EDEMA OF RIGHT LOWER EXTREMITY DUE TO PERIPHERAL VENOUS INSUFFICIENCY: ICD-10-CM

## 2025-05-21 RX ORDER — FUROSEMIDE 20 MG/1
TABLET ORAL
Qty: 15 TABLET | Refills: 0 | Status: SHIPPED | OUTPATIENT
Start: 2025-05-21

## 2025-05-21 NOTE — TELEPHONE ENCOUNTER
Last visit: 4/2/25  Last Med refill: 5/8/25  Does patient have enough medication for 72 hours: No:     Next Visit Date:  Future Appointments   Date Time Provider Department Center   8/4/2025  1:00 PM Pauly Bond MD [facility] DE[facility]       Health Maintenance   Topic Date Due    DTaP/Tdap/Td vaccine (1 - Tdap) Never done    Lipids  02/05/2025    COVID-19 Vaccine (6 - 2024-25 season) 04/14/2025    Shingles vaccine (1 of 2) 05/28/2025 (Originally 12/8/1984)    Respiratory Syncytial Virus (RSV) Pregnant or age 60 yrs+ (1 - 1-dose 75+ series) 05/28/2025 (Originally 12/8/2009)    Annual Wellness Visit (Medicare)  12/03/2025    Depression Screen  03/30/2026    Flu vaccine  Completed    Pneumococcal 50+ years Vaccine  Completed    Hepatitis A vaccine  Aged Out    Hepatitis B vaccine  Aged Out    Hib vaccine  Aged Out    Polio vaccine  Aged Out    Meningococcal (ACWY) vaccine  Aged Out    Meningococcal B vaccine  Aged Out    DEXA (modify frequency per FRAX score)  Discontinued       Hemoglobin A1C (%)   Date Value   04/02/2025 6.9   12/02/2024 6.7   08/19/2024 6.9             ( goal A1C is < 7)   No components found for: \"LABMICR\"  No components found for: \"LDLCHOLESTEROL\", \"LDLCALC\"    (goal LDL is <100)   AST (U/L)   Date Value   03/12/2024 14     ALT (U/L)   Date Value   03/12/2024 13     BUN (mg/dL)   Date Value   03/14/2024 18     BP Readings from Last 3 Encounters:   04/02/25 138/86   12/02/24 124/86   10/09/24 (!) 164/74          (goal 120/80)    All Future Testing planned in [facility]  Lab Frequency Next Occurrence   Lipid, Fasting Once 04/02/2025   CBC with Auto Differential Once 04/02/2025   Comprehensive Metabolic Panel Once 04/02/2025   TSH reflex to FT4 Once 04/02/2025               Patient Active Problem List:     Ataxia     Multiple sclerosis (HCC)     Essential hypertension     Dyslipidemia due to type 2 diabetes mellitus (HCC)     Atelectasis     Debility     Abnormal gait     Actinic 
(3) adequate

## 2025-05-26 ENCOUNTER — PATIENT MESSAGE (OUTPATIENT)
Dept: FAMILY MEDICINE CLINIC | Age: 89
End: 2025-05-26

## 2025-05-27 ENCOUNTER — CARE COORDINATION (OUTPATIENT)
Dept: CARE COORDINATION | Age: 89
End: 2025-05-27

## 2025-05-28 NOTE — CARE COORDINATION
SECURE email notification sent to identified IDT members at   [facility]

## 2025-05-30 ENCOUNTER — OUTSIDE SERVICES (OUTPATIENT)
Dept: PRIMARY CARE CLINIC | Age: 89
End: 2025-05-30

## 2025-05-30 DIAGNOSIS — M48.062 SPINAL STENOSIS OF LUMBAR REGION WITH NEUROGENIC CLAUDICATION: ICD-10-CM

## 2025-05-30 DIAGNOSIS — G35 MULTIPLE SCLEROSIS (HCC): ICD-10-CM

## 2025-05-30 DIAGNOSIS — K30 INDIGESTION: ICD-10-CM

## 2025-05-30 DIAGNOSIS — R06.09 DYSPNEA ON EXERTION: ICD-10-CM

## 2025-05-30 DIAGNOSIS — I50.22 CHRONIC SYSTOLIC (CONGESTIVE) HEART FAILURE (HCC): Primary | ICD-10-CM

## 2025-05-30 RX ORDER — FLUTICASONE FUROATE AND VILANTEROL TRIFENATATE 100; 25 UG/1; UG/1
POWDER RESPIRATORY (INHALATION)
Qty: 60 EACH | Refills: 1 | Status: SHIPPED | OUTPATIENT
Start: 2025-05-30

## 2025-05-30 NOTE — TELEPHONE ENCOUNTER
Last visit: 04/02/2025  Last Med refill: 02/27/2025  Does patient have enough medication for 72 hours: No:     Next Visit Date:  Future Appointments   Date Time Provider Department Center   8/4/2025  1:00 PM Pauly Bond MD [facility] DE[facility]       Health Maintenance   Topic Date Due    DTaP/Tdap/Td vaccine (1 - Tdap) Never done    Shingles vaccine (1 of 2) Never done    Respiratory Syncytial Virus (RSV) Pregnant or age 60 yrs+ (1 - 1-dose 75+ series) Never done    Lipids  02/05/2025    COVID-19 Vaccine (6 - 2024-25 season) 04/14/2025    Annual Wellness Visit (Medicare)  12/03/2025    Depression Screen  03/30/2026    Flu vaccine  Completed    Pneumococcal 50+ years Vaccine  Completed    Hepatitis A vaccine  Aged Out    Hepatitis B vaccine  Aged Out    Hib vaccine  Aged Out    Polio vaccine  Aged Out    Meningococcal (ACWY) vaccine  Aged Out    Meningococcal B vaccine  Aged Out    DEXA (modify frequency per FRAX score)  Discontinued       Hemoglobin A1C (%)   Date Value   04/02/2025 6.9   12/02/2024 6.7   08/19/2024 6.9             ( goal A1C is < 7)   No components found for: \"LABMICR\"  No components found for: \"LDLCHOLESTEROL\", \"LDLCALC\"    (goal LDL is <100)   AST (U/L)   Date Value   03/12/2024 14     ALT (U/L)   Date Value   03/12/2024 13     BUN (mg/dL)   Date Value   03/14/2024 18     BP Readings from Last 3 Encounters:   04/02/25 138/86   12/02/24 124/86   10/09/24 (!) 164/74          (goal 120/80)    All Future Testing planned in [facility]  Lab Frequency Next Occurrence   Lipid, Fasting Once 04/02/2025   CBC with Auto Differential Once 04/02/2025   Comprehensive Metabolic Panel Once 04/02/2025   TSH reflex to FT4 Once 04/02/2025               Patient Active Problem List:     Ataxia     Multiple sclerosis (HCC)     Essential hypertension     Dyslipidemia due to type 2 diabetes mellitus (HCC)     Atelectasis     Debility     Abnormal gait     Actinic keratosis     Enthesopathy of hip region

## 2025-05-31 ASSESSMENT — ENCOUNTER SYMPTOMS
RHINORRHEA: 0
NAUSEA: 0
COUGH: 0
SORE THROAT: 0
EYE REDNESS: 0
WHEEZING: 0
VOMITING: 0
EYE DISCHARGE: 0
SHORTNESS OF BREATH: 0
DIARRHEA: 0
BACK PAIN: 1
ABDOMINAL PAIN: 0

## 2025-05-31 NOTE — PROGRESS NOTES
Nichelle Raza is a 89 y.o. female being seen for her weekly follow up.  Location of visit: [facility]    Visit Date: May 30, 2025    Reason for Visit:    Chief Complaint   Patient presents with    Congestive Heart Failure        Congestive Heart Failure  Pertinent negatives include no abdominal pain, chest pain, palpitations or shortness of breath.      Resident was admitted to the hospital due to acute exasperation of CHF.  She received diuresis and is feeling much better now.  She is on oxygen.  She was not on oxygen at home.    She has Vanessa catheter in due to some urinary retention in the hospital.  She did not have a Vanessa catheter prior to hospitalization.  Orders are to discontinue Vanessa catheter.    She has multiple sclerosis.  She also has chronic back pain due to spinal stenosis.    Review of Systems   Constitutional:  Negative for chills and fever.   HENT:  Negative for rhinorrhea and sore throat.    Eyes:  Negative for discharge and redness.   Respiratory:  Negative for cough, shortness of breath and wheezing.    Cardiovascular:  Negative for chest pain and palpitations.   Gastrointestinal:  Negative for abdominal pain, diarrhea, nausea and vomiting.   Genitourinary:  Positive for difficulty urinating. Negative for dysuria and frequency.   Musculoskeletal:  Positive for arthralgias, back pain and gait problem. Negative for myalgias.   Skin:  Negative for rash and wound.   Neurological:  Negative for dizziness, light-headedness and headaches.   Psychiatric/Behavioral:  Negative for dysphoric mood and sleep disturbance. The patient is not nervous/anxious.         Physical Exam  Vitals and nursing note reviewed.   Constitutional:       General: She is not in acute distress.     Appearance: She is well-developed. She is not ill-appearing.   HENT:      Head: Normocephalic and atraumatic.      Right Ear: External ear normal.      Left Ear: External ear normal.   Eyes:      General: No scleral icterus.

## 2025-06-03 ENCOUNTER — OUTSIDE SERVICES (OUTPATIENT)
Dept: PRIMARY CARE CLINIC | Age: 89
End: 2025-06-03

## 2025-06-03 DIAGNOSIS — I50.32 CHRONIC DIASTOLIC CONGESTIVE HEART FAILURE (HCC): ICD-10-CM

## 2025-06-03 DIAGNOSIS — G44.229 CHRONIC TENSION-TYPE HEADACHE, NOT INTRACTABLE: Primary | ICD-10-CM

## 2025-06-03 DIAGNOSIS — E11.22 TYPE 2 DIABETES MELLITUS WITH STAGE 3B CHRONIC KIDNEY DISEASE, WITHOUT LONG-TERM CURRENT USE OF INSULIN (HCC): ICD-10-CM

## 2025-06-03 DIAGNOSIS — N39.0 RECURRENT URINARY TRACT INFECTION: ICD-10-CM

## 2025-06-03 DIAGNOSIS — N18.31 STAGE 3A CHRONIC KIDNEY DISEASE (HCC): ICD-10-CM

## 2025-06-03 DIAGNOSIS — N18.32 TYPE 2 DIABETES MELLITUS WITH STAGE 3B CHRONIC KIDNEY DISEASE, WITHOUT LONG-TERM CURRENT USE OF INSULIN (HCC): ICD-10-CM

## 2025-06-03 ASSESSMENT — ENCOUNTER SYMPTOMS
VOMITING: 0
DIARRHEA: 0
SHORTNESS OF BREATH: 0
NAUSEA: 0
COUGH: 0

## 2025-06-03 NOTE — ASSESSMENT & PLAN NOTE
Treated in hospital this stay as well.  Monitor for s/s of recurrence.  Most likely due to MS and bladder dysfunction

## 2025-06-03 NOTE — PROGRESS NOTES
Nichelle Raza is a 89 y.o. female being seen for her weekly follow up.  Location of visit: [facility]    HPI  New today:  Pt admitted for rehab after hospital stay for CHF with hypoxia.  Currently denies any complaints except for her chronic headache.  Waiting for the nurse to bring her a fioricet.  No CP or SOB today.  Has some edema in her feet.         Review of Systems   Constitutional:  Negative for activity change, appetite change, chills, diaphoresis and fever.   HENT:  Negative for congestion.    Respiratory:  Negative for cough and shortness of breath.    Cardiovascular:  Negative for chest pain and palpitations.   Gastrointestinal:  Negative for diarrhea, nausea and vomiting.   Genitourinary:  Negative for hematuria.   Musculoskeletal:  Negative for arthralgias and myalgias.   Skin:  Negative for rash.   Neurological:  Positive for headaches. Negative for weakness.   Psychiatric/Behavioral:  Negative for agitation, dysphoric mood and sleep disturbance. The patient is not nervous/anxious.           Physical Exam  Vitals reviewed.   Constitutional:       General: She is not in acute distress.  HENT:      Head: Normocephalic and atraumatic.      Nose: No congestion or rhinorrhea.   Eyes:      General:         Right eye: No discharge.         Left eye: No discharge.   Cardiovascular:      Rate and Rhythm: Normal rate and regular rhythm.   Pulmonary:      Effort: Pulmonary effort is normal. No respiratory distress.      Breath sounds: Normal breath sounds.   Abdominal:      Palpations: Abdomen is soft.      Tenderness: There is no abdominal tenderness.   Musculoskeletal:      Right lower leg: Edema present.      Left lower leg: Edema (trace bilaterally) present.   Skin:     General: Skin is warm and dry.   Neurological:      Mental Status: She is alert. Mental status is at baseline.          ASSESSMENT:   Diagnosis Orders   1. Chronic tension-type headache, not intractable        2. Stage 3a chronic

## 2025-06-03 NOTE — ASSESSMENT & PLAN NOTE
Acute exac on chronic- continue current meds.  Watch for worsening SOB.  Pt states her edema gets worse as the day goes on but denies any SOB

## 2025-06-09 DIAGNOSIS — I87.2 EDEMA OF RIGHT LOWER EXTREMITY DUE TO PERIPHERAL VENOUS INSUFFICIENCY: ICD-10-CM

## 2025-06-09 NOTE — TELEPHONE ENCOUNTER
Last visit: 04/02/25  Last Med refill: 05/21/25  Does patient have enough medication for 72 hours: No:     Next Visit Date:  Future Appointments   Date Time Provider Department Center   8/4/2025  1:00 PM Pauly Bond MD [facility]       Health Maintenance   Topic Date Due    DTaP/Tdap/Td vaccine (1 - Tdap) Never done    Shingles vaccine (1 of 2) Never done    Respiratory Syncytial Virus (RSV) Pregnant or age 60 yrs+ (1 - 1-dose 75+ series) Never done    Lipids  02/05/2025    COVID-19 Vaccine (6 - 2024-25 season) 04/14/2025    Annual Wellness Visit (Medicare)  12/03/2025    Depression Screen  03/30/2026    Flu vaccine  Completed    Pneumococcal 50+ years Vaccine  Completed    Hepatitis A vaccine  Aged Out    Hepatitis B vaccine  Aged Out    Hib vaccine  Aged Out    Polio vaccine  Aged Out    Meningococcal (ACWY) vaccine  Aged Out    Meningococcal B vaccine  Aged Out    DEXA (modify frequency per FRAX score)  Discontinued       Hemoglobin A1C (%)   Date Value   04/02/2025 6.9   12/02/2024 6.7   08/19/2024 6.9             ( goal A1C is < 7)   No components found for: \"LABMICR\"  No components found for: \"LDLCHOLESTEROL\", \"LDLCALC\"    (goal LDL is <100)   AST (U/L)   Date Value   03/12/2024 14     ALT (U/L)   Date Value   03/12/2024 13     BUN (mg/dL)   Date Value   03/14/2024 18     BP Readings from Last 3 Encounters:   04/02/25 138/86   12/02/24 124/86   10/09/24 (!) 164/74          (goal 120/80)    All Future Testing planned in [facility]  Lab Frequency Next Occurrence   Lipid, Fasting Once 04/02/2025   CBC with Auto Differential Once 04/02/2025   Comprehensive Metabolic Panel Once 04/02/2025   TSH reflex to FT4 Once 04/02/2025               Patient Active Problem List:     Ataxia     Multiple sclerosis (HCC)     Essential hypertension     Dyslipidemia due to type 2 diabetes mellitus (HCC)     Atelectasis     Debility     Abnormal gait     Actinic keratosis     Enthesopathy of hip region

## 2025-06-10 ENCOUNTER — OUTSIDE SERVICES (OUTPATIENT)
Dept: PRIMARY CARE CLINIC | Age: 89
End: 2025-06-10

## 2025-06-10 DIAGNOSIS — N18.32 TYPE 2 DIABETES MELLITUS WITH STAGE 3B CHRONIC KIDNEY DISEASE, WITHOUT LONG-TERM CURRENT USE OF INSULIN (HCC): ICD-10-CM

## 2025-06-10 DIAGNOSIS — G44.229 CHRONIC TENSION-TYPE HEADACHE, NOT INTRACTABLE: Primary | ICD-10-CM

## 2025-06-10 DIAGNOSIS — E11.22 TYPE 2 DIABETES MELLITUS WITH STAGE 3B CHRONIC KIDNEY DISEASE, WITHOUT LONG-TERM CURRENT USE OF INSULIN (HCC): ICD-10-CM

## 2025-06-10 DIAGNOSIS — N18.31 STAGE 3A CHRONIC KIDNEY DISEASE (HCC): ICD-10-CM

## 2025-06-10 DIAGNOSIS — I50.32 CHRONIC DIASTOLIC CONGESTIVE HEART FAILURE (HCC): ICD-10-CM

## 2025-06-10 RX ORDER — FUROSEMIDE 20 MG/1
TABLET ORAL
Qty: 30 TABLET | Refills: 0 | Status: SHIPPED | OUTPATIENT
Start: 2025-06-10

## 2025-06-10 NOTE — PROGRESS NOTES
Nichelle Raza is a 89 y.o. female being seen for her weekly follow up.  Location of visit: [facility]    HPI:  New today:  Pt currently sleeping very soundly. Did not wake up for exam.  Very [place] so did not hear me call her name either.         Review of Systems   Reason unable to perform ROS: pt sleeping soundly.          Physical Exam  Vitals reviewed.   Constitutional:       General: She is not in acute distress.  HENT:      Head: Normocephalic and atraumatic.      Nose: No congestion or rhinorrhea.   Eyes:      General:         Right eye: No discharge.         Left eye: No discharge.   Cardiovascular:      Rate and Rhythm: Normal rate and regular rhythm.      Heart sounds: Murmur heard.   Pulmonary:      Effort: Pulmonary effort is normal. No respiratory distress.      Breath sounds: Normal breath sounds.   Abdominal:      Palpations: Abdomen is soft.      Tenderness: There is no abdominal tenderness.   Musculoskeletal:      Right lower leg: No edema.      Left lower leg: No edema.   Skin:     General: Skin is warm and dry.   Neurological:      Mental Status: She is alert. Mental status is at baseline.          ASSESSMENT:   Diagnosis Orders   1. Chronic tension-type headache, not intractable        2. Stage 3a chronic kidney disease (HCC)        3. Chronic diastolic congestive heart failure (HCC)        4. Type 2 diabetes mellitus with stage 3b chronic kidney disease, without long-term current use of insulin (HCC)            PLAN:  1. Chronic tension-type headache, not intractable  Assessment & Plan:  Continue current orders.  Monitor for worsening or changes   2. Stage 3a chronic kidney disease (HCC)  Assessment & Plan:  Monitor labs for stability   3. Chronic diastolic congestive heart failure (HCC)  Assessment & Plan:  With aortic and mitral stenosis and regurg- nursing states pt has not had any increased SOB or CP.    4. Type 2 diabetes mellitus with stage 3b chronic kidney disease, without long-term

## 2025-06-10 NOTE — ASSESSMENT & PLAN NOTE
With aortic and mitral stenosis and regurg- nursing states pt has not had any increased SOB or CP.

## 2025-06-17 ENCOUNTER — OUTSIDE SERVICES (OUTPATIENT)
Dept: PRIMARY CARE CLINIC | Age: 89
End: 2025-06-17

## 2025-06-17 DIAGNOSIS — I50.32 CHRONIC DIASTOLIC CONGESTIVE HEART FAILURE (HCC): ICD-10-CM

## 2025-06-17 DIAGNOSIS — G44.229 CHRONIC TENSION-TYPE HEADACHE, NOT INTRACTABLE: Primary | ICD-10-CM

## 2025-06-17 DIAGNOSIS — G35 MULTIPLE SCLEROSIS (HCC): ICD-10-CM

## 2025-06-18 ASSESSMENT — ENCOUNTER SYMPTOMS
SINUS PRESSURE: 0
SHORTNESS OF BREATH: 0
NAUSEA: 0
SINUS PAIN: 0
COUGH: 0
BACK PAIN: 0
CONSTIPATION: 0
DIARRHEA: 0

## 2025-06-18 NOTE — PROGRESS NOTES
Nichelle Raza is a 89 y.o. female being seen for her weekly follow up.  Location of visit: [facility]    Visit Date: 2025    Reason for Visit:    Chief Complaint   Patient presents with    Headache        Headache     Resident has an occasional headache. She states recently headaches have not been bad.    She relates she thinks she is making progress with therapy.    Review of Systems   Constitutional:  Negative for chills, fatigue and fever.   HENT:  Negative for congestion, ear discharge, sinus pressure and sinus pain.    Respiratory:  Negative for cough and shortness of breath.    Cardiovascular:  Positive for leg swelling. Negative for chest pain and palpitations.   Gastrointestinal:  Negative for constipation, diarrhea and nausea.   Musculoskeletal:  Negative for back pain.   Skin:  Negative for rash and wound.   Neurological:  Positive for weakness and headaches.   Psychiatric/Behavioral:  Negative for dysphoric mood and sleep disturbance. The patient is not nervous/anxious.         Physical Exam  Vitals and nursing note reviewed.   Constitutional:       Appearance: She is obese.   HENT:      Head: Normocephalic and atraumatic.      Right Ear: Decreased hearing noted.      Left Ear: Decreased hearing noted.   Cardiovascular:      Rate and Rhythm: Normal rate and regular rhythm.      Heart sounds: Murmur heard.   Pulmonary:      Breath sounds: Normal breath sounds.   Abdominal:      General: Bowel sounds are normal.      Palpations: Abdomen is soft.   Musculoskeletal:      Right lower le+ Pitting Edema present.      Left lower le+ Pitting Edema present.   Skin:     General: Skin is warm.   Neurological:      Mental Status: She is alert and oriented to person, place, and time.      Cranial Nerves: No cranial nerve deficit.      Motor: Weakness present.      Gait: Gait abnormal.   Psychiatric:         Mood and Affect: Mood normal.         Behavior: Behavior normal.         Thought Content:

## 2025-06-20 ENCOUNTER — OUTSIDE SERVICES (OUTPATIENT)
Dept: PRIMARY CARE CLINIC | Age: 89
End: 2025-06-20

## 2025-06-20 DIAGNOSIS — I50.32 CHRONIC DIASTOLIC CONGESTIVE HEART FAILURE (HCC): ICD-10-CM

## 2025-06-20 DIAGNOSIS — J40 BRONCHITIS: Primary | ICD-10-CM

## 2025-06-20 ASSESSMENT — ENCOUNTER SYMPTOMS
CONSTIPATION: 0
DIARRHEA: 0
COUGH: 1
SHORTNESS OF BREATH: 1
SINUS PAIN: 0
BACK PAIN: 1
WHEEZING: 1
SINUS PRESSURE: 0

## 2025-06-20 NOTE — PROGRESS NOTES
Nichelle Raza is a 89 y.o. female being seen for her requested follow up.  Location of visit: [facility]    Visit Date:  6/20/2025     Reason for Visit:    Chief Complaint   Patient presents with    Cough        Cough  Associated symptoms include a fever, shortness of breath and wheezing. Pertinent negatives include no chest pain or chills.      Resident developed a harsh barky cough with little production and a slight temperature of 99.5 yesterday.  She was short of breath and was wheezing.  Chest x-ray was ordered and negative.  Aerosol treatments, azithromycin and Tessalon Perles were started.    Review of Systems   Constitutional:  Positive for activity change, fatigue and fever. Negative for chills.   HENT:  Negative for congestion, sinus pressure and sinus pain.    Respiratory:  Positive for cough, shortness of breath and wheezing.    Cardiovascular:  Negative for chest pain and palpitations.   Gastrointestinal:  Negative for constipation and diarrhea.   Genitourinary:  Negative for difficulty urinating.   Musculoskeletal:  Positive for arthralgias, back pain and gait problem.   Psychiatric/Behavioral:  Positive for sleep disturbance. Negative for dysphoric mood. The patient is not nervous/anxious.         Physical Exam  Vitals and nursing note reviewed.   Constitutional:       Appearance: She is obese.   HENT:      Head: Normocephalic and atraumatic.      Right Ear: External ear normal.      Left Ear: External ear normal.   Eyes:      Extraocular Movements: Extraocular movements intact.      Conjunctiva/sclera: Conjunctivae normal.   Cardiovascular:      Rate and Rhythm: Normal rate and regular rhythm.      Heart sounds: Normal heart sounds.   Pulmonary:      Effort: Pulmonary effort is normal.      Breath sounds: Rales present.   Abdominal:      Palpations: Abdomen is soft.   Musculoskeletal:      Right lower leg: Edema present.      Left lower leg: Edema present.   Neurological:      Mental Status:

## 2025-06-26 ENCOUNTER — CARE COORDINATION (OUTPATIENT)
Dept: CARE COORDINATION | Age: 89
End: 2025-06-26

## 2025-06-27 ENCOUNTER — OUTSIDE SERVICES (OUTPATIENT)
Dept: PRIMARY CARE CLINIC | Age: 89
End: 2025-06-27

## 2025-06-27 DIAGNOSIS — N18.31 STAGE 3A CHRONIC KIDNEY DISEASE (HCC): ICD-10-CM

## 2025-06-27 DIAGNOSIS — I50.32 CHRONIC DIASTOLIC CONGESTIVE HEART FAILURE (HCC): ICD-10-CM

## 2025-06-27 DIAGNOSIS — Z87.09 H/O ACUTE RESPIRATORY FAILURE: Primary | ICD-10-CM

## 2025-06-27 ASSESSMENT — ENCOUNTER SYMPTOMS
SINUS PRESSURE: 0
COUGH: 1
SINUS PAIN: 0
CONSTIPATION: 0
NAUSEA: 0
DIARRHEA: 0
SHORTNESS OF BREATH: 1

## 2025-06-27 NOTE — PROGRESS NOTES
Nichelle Raza is a 89 y.o. female being seen for her weekly follow up.  Location of visit: [facility]    Visit Date:  6/27/2025     Reason for Visit:    Chief Complaint   Patient presents with    Pneumonia        Pneumonia  She complains of cough and shortness of breath. Pertinent negatives include no chest pain, fever or headaches.      Staff received and report that she had human Metapneumonia virus however her cannot find in documentation  Antibiotics are completed.  She remains on oxygen at all times.  Family reports she is a little more alert today.  She continues to have a cough.  She states it is not productive.  Daughter at bedside    Review of Systems   Constitutional:  Negative for chills, fatigue and fever.   HENT:  Negative for congestion, sinus pressure and sinus pain.    Respiratory:  Positive for cough and shortness of breath.    Cardiovascular:  Positive for leg swelling. Negative for chest pain and palpitations.   Gastrointestinal:  Negative for constipation, diarrhea and nausea.   Genitourinary:  Negative for difficulty urinating and dysuria.   Skin:  Negative for rash and wound.   Neurological:  Positive for weakness. Negative for light-headedness and headaches.   Psychiatric/Behavioral:  Negative for sleep disturbance. The patient is not nervous/anxious.         Physical Exam  Vitals and nursing note reviewed.   Constitutional:       General: She is not in acute distress.     Appearance: She is well-developed. She is not ill-appearing.   HENT:      Head: Normocephalic and atraumatic.      Right Ear: Tympanic membrane, ear canal and external ear normal.      Left Ear: Tympanic membrane, ear canal and external ear normal.   Eyes:      General: No scleral icterus.        Right eye: No discharge.         Left eye: No discharge.      Conjunctiva/sclera: Conjunctivae normal.   Neck:      Thyroid: No thyromegaly.      Trachea: No tracheal deviation.   Cardiovascular:      Rate and Rhythm:

## 2025-07-01 ENCOUNTER — OUTSIDE SERVICES (OUTPATIENT)
Dept: PRIMARY CARE CLINIC | Age: 89
End: 2025-07-01

## 2025-07-01 DIAGNOSIS — R53.81 DEBILITY: ICD-10-CM

## 2025-07-01 DIAGNOSIS — J12.9 VIRAL PNEUMONIA: Primary | ICD-10-CM

## 2025-07-01 ASSESSMENT — ENCOUNTER SYMPTOMS
SHORTNESS OF BREATH: 0
COUGH: 1
DIARRHEA: 0
NAUSEA: 0
VOMITING: 0

## 2025-07-01 NOTE — PROGRESS NOTES
Nichelle Raza is a 89 y.o. female being seen for her weekly follow up.  Location of visit: [facility]    HPI:  New today: Pt had a lot of coughing and hypoxia this weekend, but is much better today. No c/o SOB or CP.  Cough drops are helping a lot.         Review of Systems   Constitutional:  Negative for activity change, appetite change, chills, diaphoresis and fever.   HENT:  Negative for congestion.    Respiratory:  Positive for cough. Negative for shortness of breath.    Cardiovascular:  Negative for chest pain and palpitations.   Gastrointestinal:  Negative for diarrhea, nausea and vomiting.   Genitourinary:  Negative for hematuria.   Musculoskeletal:  Negative for arthralgias and myalgias.   Skin:  Negative for rash.   Neurological:  Negative for weakness and headaches.   Psychiatric/Behavioral:  Negative for agitation, dysphoric mood and sleep disturbance. The patient is not nervous/anxious.           Physical Exam  Vitals reviewed.   Constitutional:       General: She is not in acute distress.  HENT:      Head: Normocephalic and atraumatic.      Nose: No congestion or rhinorrhea.   Eyes:      General:         Right eye: No discharge.         Left eye: No discharge.   Cardiovascular:      Rate and Rhythm: Normal rate and regular rhythm.   Pulmonary:      Effort: Pulmonary effort is normal. No respiratory distress.      Breath sounds: Normal breath sounds.   Abdominal:      Palpations: Abdomen is soft.      Tenderness: There is no abdominal tenderness.   Musculoskeletal:      Right lower leg: No edema.      Left lower leg: No edema.   Skin:     General: Skin is warm and dry.   Neurological:      Mental Status: She is alert. Mental status is at baseline.          ASSESSMENT:   Diagnosis Orders   1. Viral pneumonia      due to human metapneumovirus- with acute respiratory failure and hypoxia.  Doing much better today. continue cough meds and cough drops.  Oxygen as needed.      2. Debility

## 2025-07-08 ENCOUNTER — OUTSIDE SERVICES (OUTPATIENT)
Dept: PRIMARY CARE CLINIC | Age: 89
End: 2025-07-08

## 2025-07-08 DIAGNOSIS — R53.81 DEBILITY: ICD-10-CM

## 2025-07-08 DIAGNOSIS — J06.9 VIRAL UPPER RESPIRATORY TRACT INFECTION: Primary | ICD-10-CM

## 2025-07-08 DIAGNOSIS — N39.0 RECURRENT URINARY TRACT INFECTION: ICD-10-CM

## 2025-07-08 ASSESSMENT — ENCOUNTER SYMPTOMS
SHORTNESS OF BREATH: 0
COUGH: 0
VOMITING: 0
DIARRHEA: 0
NAUSEA: 0

## 2025-07-08 NOTE — PROGRESS NOTES
Nichelle Raza is a 89 y.o. female being seen for her weekly follow up.  Location of visit: [facility]    HPI:  New today:  Pt c/o congestion.  Some cough.  No SOB.          Review of Systems   Constitutional:  Negative for activity change, appetite change, chills, diaphoresis and fever.   HENT:  Negative for congestion.    Respiratory:  Negative for cough and shortness of breath.    Cardiovascular:  Negative for chest pain and palpitations.   Gastrointestinal:  Negative for diarrhea, nausea and vomiting.   Genitourinary:  Negative for hematuria.   Musculoskeletal:  Negative for arthralgias and myalgias.   Skin:  Negative for rash.   Neurological:  Negative for weakness and headaches.   Psychiatric/Behavioral:  Negative for agitation, dysphoric mood and sleep disturbance. The patient is not nervous/anxious.           Physical Exam  Vitals reviewed.   Constitutional:       General: She is not in acute distress.  HENT:      Head: Normocephalic and atraumatic.      Nose: No congestion or rhinorrhea.   Eyes:      General:         Right eye: No discharge.         Left eye: No discharge.   Cardiovascular:      Rate and Rhythm: Normal rate and regular rhythm.   Pulmonary:      Effort: Pulmonary effort is normal. No respiratory distress.      Breath sounds: Normal breath sounds.   Abdominal:      Palpations: Abdomen is soft.      Tenderness: There is no abdominal tenderness.   Musculoskeletal:      Right lower leg: No edema.      Left lower leg: No edema.   Skin:     General: Skin is warm and dry.   Neurological:      Mental Status: She is alert. Mental status is at baseline.          ASSESSMENT:   Diagnosis Orders   1. Viral upper respiratory tract infection      with cough and congestion.  continue to monitor for SOB.  tessalon prn.      2. Debility        3. Recurrent urinary tract infection            PLAN:  1. Viral upper respiratory tract infection  Comments:  with cough and congestion.  continue to monitor for

## 2025-07-11 ENCOUNTER — OUTSIDE SERVICES (OUTPATIENT)
Dept: PRIMARY CARE CLINIC | Age: 89
End: 2025-07-11

## 2025-07-11 DIAGNOSIS — H61.23 BILATERAL IMPACTED CERUMEN: Primary | ICD-10-CM

## 2025-07-11 DIAGNOSIS — N94.89 VAGINAL BURNING: ICD-10-CM

## 2025-07-12 ASSESSMENT — ENCOUNTER SYMPTOMS
DIARRHEA: 0
SINUS PAIN: 0
SINUS PRESSURE: 0
CONSTIPATION: 0
COUGH: 1

## 2025-07-12 NOTE — PROGRESS NOTES
separate note.    Folding Walker with Wheels and Seat    Current patient weight:  61.5kg  Current patient height:  4ft 10in   Duration: Purchase 1 each 0    polyethylene glycol (GLYCOLAX) 17 g packet Take 17 g by mouth daily as needed for Constipation      HYDROcodone-acetaminophen (NORCO) 5-325 MG per tablet Take 1 tablet by mouth every 8 hours as needed.      Handicap Placard MISC by Does not apply route Exp: 5/20/2026 1 each 0     No current facility-administered medications for this visit.        Allergies   Allergen Reactions    Gabapentin Dizziness or Vertigo    Gadolinium Derivatives Shortness Of Breath     Pt states wheezing/ SOB after having MRI contrast from previous MRI in about 2022/2021.     Pregabalin Dizziness or Vertigo    Bactrim [Sulfamethoxazole-Trimethoprim] Other (See Comments)     BLISTERS IN MOUTH    Other Diarrhea     Lactose Intolerance- No liquid milk or ice cream. May have milk cooked in foods.     Pcn [Penicillins] Hives    Trimethoprim     Lidoderm [Lidocaine] Rash    Macrobid [Nitrofurantoin] Nausea And Vomiting        Past Medical History:   Diagnosis Date    Acute cystitis without hematuria 10/1/2017    EDINSON (acute kidney injury) 3/3/2021    Arthritis     Chronic daily headache     GERD (gastroesophageal reflux disease)     Hyperlipidemia     Hypertension     Moderate malnutrition 3/2/2019    Multiple sclerosis (HCC)     Multiple sclerosis exacerbation (HCC) 2/25/2021    Neuropathy     Pneumonia 2017    states had double pneumonia    Vitamin D deficiency         ASSESSMENT:  See matrix for vitals     Diagnosis Orders   1. Bilateral impacted cerumen        2. Vaginal burning             Plan:  Debrox 6.5% - 5 drops both ears BID  x 7 days then irrigate    Clotrimazole/betamethasone 1%/0.05% cream - apply to vulva/teofilo area BID

## 2025-07-15 ENCOUNTER — OUTSIDE SERVICES (OUTPATIENT)
Dept: PRIMARY CARE CLINIC | Age: 89
End: 2025-07-15

## 2025-07-15 DIAGNOSIS — E87.1 HYPONATREMIA: ICD-10-CM

## 2025-07-15 DIAGNOSIS — N39.0 RECURRENT URINARY TRACT INFECTION: ICD-10-CM

## 2025-07-15 DIAGNOSIS — R26.81 UNSTABLE GAIT: ICD-10-CM

## 2025-07-15 DIAGNOSIS — R51.9 HEADACHE, WORSENING: Primary | ICD-10-CM

## 2025-07-15 ASSESSMENT — ENCOUNTER SYMPTOMS
NAUSEA: 0
DIARRHEA: 0
VOMITING: 0
COUGH: 0
SHORTNESS OF BREATH: 0

## 2025-07-15 NOTE — PROGRESS NOTES
Nichelle Raza is a 89 y.o. female being seen for her weekly follow up.  Location of visit: [facility]    HPI:  New today:  Pt c/o bad headache today. Does not feel good at all.         Review of Systems   Constitutional:  Negative for activity change, appetite change, chills, diaphoresis and fever.   HENT:  Positive for hearing loss (using debrox and will do flush). Negative for congestion.    Respiratory:  Negative for cough and shortness of breath.    Cardiovascular:  Negative for chest pain and palpitations.   Gastrointestinal:  Negative for diarrhea, nausea and vomiting.   Genitourinary:  Negative for hematuria.   Musculoskeletal:  Negative for arthralgias and myalgias.   Skin:  Negative for rash.   Neurological:  Positive for weakness and headaches.   Psychiatric/Behavioral:  Negative for agitation, dysphoric mood and sleep disturbance. The patient is not nervous/anxious.           Physical Exam  Vitals reviewed.   Constitutional:       General: She is not in acute distress.  HENT:      Head: Normocephalic and atraumatic.      Nose: No congestion or rhinorrhea.   Eyes:      General:         Right eye: No discharge.         Left eye: No discharge.   Cardiovascular:      Rate and Rhythm: Normal rate and regular rhythm.   Pulmonary:      Effort: Pulmonary effort is normal. No respiratory distress.      Breath sounds: Normal breath sounds.   Abdominal:      Palpations: Abdomen is soft.      Tenderness: There is no abdominal tenderness.   Musculoskeletal:      Right lower leg: No edema.      Left lower leg: No edema.   Skin:     General: Skin is warm and dry.   Neurological:      Mental Status: She is alert. Mental status is at baseline.          ASSESSMENT:   Diagnosis Orders   1. Headache, worsening        2. Hyponatremia        3. Recurrent urinary tract infection        4. Unstable gait            PLAN:  1. Headache, worsening  Assessment & Plan:  Pharmacy unable to get fioricet, so daughter is trying to

## 2025-07-15 NOTE — ASSESSMENT & PLAN NOTE
Pharmacy unable to get fioricet, so daughter is trying to get some at her outside pharmacy for us to use.  Tylenol not helping

## 2025-07-22 ENCOUNTER — OUTSIDE SERVICES (OUTPATIENT)
Dept: PRIMARY CARE CLINIC | Age: 89
End: 2025-07-22

## 2025-07-22 DIAGNOSIS — E87.1 HYPONATREMIA: ICD-10-CM

## 2025-07-22 DIAGNOSIS — N39.0 RECURRENT URINARY TRACT INFECTION: ICD-10-CM

## 2025-07-22 DIAGNOSIS — H91.93 BILATERAL HEARING LOSS, UNSPECIFIED HEARING LOSS TYPE: Primary | ICD-10-CM

## 2025-07-22 DIAGNOSIS — E11.9 TYPE 2 DIABETES MELLITUS WITHOUT COMPLICATION, WITHOUT LONG-TERM CURRENT USE OF INSULIN (HCC): ICD-10-CM

## 2025-07-22 DIAGNOSIS — G44.229 CHRONIC TENSION-TYPE HEADACHE, NOT INTRACTABLE: ICD-10-CM

## 2025-07-22 ASSESSMENT — ENCOUNTER SYMPTOMS
COUGH: 0
VOMITING: 0
SHORTNESS OF BREATH: 0
DIARRHEA: 0
NAUSEA: 0

## 2025-07-22 NOTE — ASSESSMENT & PLAN NOTE
Wax was removed, but does not seem to be any better.  Amplifiers are helping some.  Agreeable to hearing testing and possible hearing aides. Referral to ENT

## 2025-07-22 NOTE — PROGRESS NOTES
2/2 Isopeptide TF.  The patients most recent potassium results are listed below.  Recent Labs     07/11/25 1957 07/12/25  0434 07/14/25  0744   K 4.3 4.0 4.4     Now resolved and hypokalemic   Nichelle Raza is a 89 y.o. female being seen for her weekly follow up.  Location of visit: [facility]    HPI:  New today:  Pt c/o hearing problems.  Had was which was removed with gentle flushing after treating with debrox.  Still some trouble hearing.          Review of Systems   Constitutional:  Negative for activity change, appetite change, chills, diaphoresis and fever.   HENT:  Positive for hearing loss. Negative for congestion.    Respiratory:  Negative for cough and shortness of breath.    Cardiovascular:  Negative for chest pain and palpitations.   Gastrointestinal:  Negative for diarrhea, nausea and vomiting.   Genitourinary:  Negative for hematuria.   Musculoskeletal:  Negative for arthralgias and myalgias.   Skin:  Negative for rash.   Neurological:  Negative for weakness and headaches.   Psychiatric/Behavioral:  Negative for agitation, dysphoric mood and sleep disturbance. The patient is not nervous/anxious.           Physical Exam  Vitals reviewed.   Constitutional:       General: She is not in acute distress.  HENT:      Head: Normocephalic and atraumatic.      Right Ear: Tympanic membrane normal.      Left Ear: There is impacted cerumen (still some wax, unable to see TM clearly).      Nose: No congestion or rhinorrhea.   Eyes:      General:         Right eye: No discharge.         Left eye: No discharge.   Cardiovascular:      Rate and Rhythm: Normal rate and regular rhythm.      Heart sounds: Murmur heard.   Pulmonary:      Effort: Pulmonary effort is normal. No respiratory distress.      Breath sounds: Normal breath sounds.   Abdominal:      Palpations: Abdomen is soft.      Tenderness: There is no abdominal tenderness.   Musculoskeletal:      Right lower leg: No edema.      Left lower leg: No edema.   Skin:     General: Skin is warm and dry.   Neurological:      Mental Status: She is alert. Mental status is at baseline.          ASSESSMENT:   Diagnosis Orders   1. Bilateral hearing loss,

## 2025-07-29 PROBLEM — R34 DECREASED URINATION: Status: ACTIVE | Noted: 2025-01-01

## 2025-07-29 PROBLEM — R09.02 HYPOXIA: Status: ACTIVE | Noted: 2025-01-01

## 2025-07-29 NOTE — ASSESSMENT & PLAN NOTE
Unclear if it is due to dehydration or urinary retention.  Pt is pushing fluids. If has trouble urinating then will get urine sample     36.9

## 2025-07-29 NOTE — PROGRESS NOTES
Nichelle Raza is a 89 y.o. female being seen for her weekly follow up.  Location of visit: [facility]    HPI:  New today:  Pt having headache and back pain today.  Has not taken her h/a med yet.  No SOB or CP. Also c/o trouble urinating.  Had to push to get the urine out last night and has not gone since then.         Review of Systems   Constitutional:  Negative for activity change, appetite change, chills, diaphoresis and fever.   HENT:  Negative for congestion.    Respiratory:  Negative for cough and shortness of breath.    Cardiovascular:  Positive for leg swelling. Negative for chest pain and palpitations.   Gastrointestinal:  Negative for diarrhea, nausea and vomiting.   Genitourinary:  Positive for difficulty urinating. Negative for hematuria.   Musculoskeletal:  Positive for back pain. Negative for arthralgias and myalgias.   Skin:  Negative for rash.   Neurological:  Positive for headaches. Negative for weakness.   Psychiatric/Behavioral:  Negative for agitation, dysphoric mood and sleep disturbance. The patient is not nervous/anxious.           Physical Exam  Vitals reviewed.   Constitutional:       General: She is not in acute distress.  HENT:      Head: Normocephalic and atraumatic.      Nose: No congestion or rhinorrhea.   Eyes:      General:         Right eye: No discharge.         Left eye: No discharge.   Cardiovascular:      Rate and Rhythm: Normal rate and regular rhythm.   Pulmonary:      Effort: Pulmonary effort is normal. No respiratory distress.      Breath sounds: Normal breath sounds.   Abdominal:      Palpations: Abdomen is soft.      Tenderness: There is no abdominal tenderness.   Musculoskeletal:      Right lower leg: No edema.      Left lower leg: No edema.   Skin:     General: Skin is warm and dry.   Neurological:      Mental Status: She is alert. Mental status is at baseline.          ASSESSMENT:   Diagnosis Orders   1. Chronic tension-type headache, not intractable        2.

## 2025-08-02 NOTE — PROGRESS NOTES
Nichelle Raza is a 89 y.o. female being seen for her requested follow up.  Location of visit: [facility]    Visit Date:  8/1/2025    Reason for Visit:    Chief Complaint   Patient presents with    Oral Pain        Oral Pain        Resident is has lesions on lower gum line.   Irritated with eating.    Urine culture is positive and sensitivity is completed.    Review of Systems   HENT:  Positive for dental problem. Negative for sinus pain.    Cardiovascular:  Negative for chest pain and leg swelling.   Gastrointestinal:  Negative for diarrhea and nausea.   Genitourinary:  Negative for difficulty urinating and dysuria.   Musculoskeletal:  Positive for gait problem.   Skin:  Negative for rash and wound.   Neurological:  Negative for dizziness and headaches.   Psychiatric/Behavioral:  Negative for dysphoric mood and sleep disturbance. The patient is not nervous/anxious.         Physical Exam  Vitals and nursing note reviewed.   Constitutional:       Appearance: Normal appearance.   HENT:      Head: Normocephalic and atraumatic.      Mouth/Throat:      Mouth: Mucous membranes are moist.      Dentition: Gum lesions present.     Cardiovascular:      Rate and Rhythm: Normal rate and regular rhythm.      Heart sounds: Normal heart sounds.   Pulmonary:      Effort: Pulmonary effort is normal.      Breath sounds: Normal breath sounds.   Abdominal:      General: Bowel sounds are normal.      Palpations: Abdomen is soft.   Skin:     General: Skin is warm and dry.   Neurological:      Mental Status: She is alert and oriented to person, place, and time.   Psychiatric:         Mood and Affect: Mood normal.         Behavior: Behavior normal.          Allergies   Allergen Reactions    Gabapentin Dizziness or Vertigo    Gadolinium Derivatives Shortness Of Breath     Pt states wheezing/ SOB after having MRI contrast from previous MRI in about 2022/2021.     Pregabalin Dizziness or Vertigo    Bactrim

## 2025-08-05 ENCOUNTER — TELEPHONE (OUTPATIENT)
Dept: PRIMARY CARE CLINIC | Age: 89
End: 2025-08-05

## (undated) DEVICE — DRAPE,REIN 53X77,STERILE: Brand: MEDLINE

## (undated) DEVICE — Device

## (undated) DEVICE — 1010 S-DRAPE TOWEL DRAPE 10/BX: Brand: STERI-DRAPE™

## (undated) DEVICE — Device: Brand: MILD DEVICE KIT

## (undated) DEVICE — TOWEL,OR,DSP,ST,BLUE,STD,6/PK,12PK/CS: Brand: MEDLINE

## (undated) DEVICE — COVER,MAYO STAND,STERILE: Brand: MEDLINE

## (undated) DEVICE — GOWN,AURORA,NONREINFORCED,LARGE: Brand: MEDLINE

## (undated) DEVICE — GLOVE SURG SZ 8 THK118MIL BLK LTX FREE POLYISOPRENE BEAD CUF

## (undated) DEVICE — MARKER,SKIN,WI/RULER AND LABELS: Brand: MEDLINE

## (undated) DEVICE — C-ARM: Brand: UNBRANDED

## (undated) DEVICE — INTENDED FOR TISSUE SEPARATION, AND OTHER PROCEDURES THAT REQUIRE A SHARP SURGICAL BLADE TO PUNCTURE OR CUT.: Brand: BARD-PARKER ® CARBON RIB-BACK BLADES

## (undated) DEVICE — COVER,TABLE,60X90,STERILE: Brand: MEDLINE

## (undated) DEVICE — ADHESIVE SKIN CLOSURE TOP 36 CC HI VISC DERMBND MINI

## (undated) DEVICE — SOLUTION IRRIG 1000ML 0.9% SOD CHL USP POUR PLAS BTL

## (undated) DEVICE — GAUZE,SPONGE,4"X4",16PLY,XRAY,STRL,LF: Brand: MEDLINE

## (undated) DEVICE — NEEDLE SPINAL 22GA L3.5IN SPINOCAN

## (undated) DEVICE — BLANKET WRM W40.2XL55.9IN IORT LO BODY + MISTRAL AIR

## (undated) DEVICE — APPLICATOR MEDICATED 26 CC SOLUTION HI LT ORNG CHLORAPREP

## (undated) DEVICE — SHEET, T, LAPAROTOMY, STERILE: Brand: MEDLINE

## (undated) DEVICE — COVER LT HNDL BLU PLAS

## (undated) DEVICE — LABEL MED DRUG DESCRIPTION MP STL

## (undated) DEVICE — SYRINGE MED 10ML LUERLOCK TIP W/O SFTY DISP

## (undated) DEVICE — SYRINGE MED 5ML STD CLR PLAS LUERLOCK TIP N CTRL DISP